# Patient Record
Sex: MALE | Race: BLACK OR AFRICAN AMERICAN | Employment: OTHER | URBAN - METROPOLITAN AREA
[De-identification: names, ages, dates, MRNs, and addresses within clinical notes are randomized per-mention and may not be internally consistent; named-entity substitution may affect disease eponyms.]

---

## 2017-02-18 ENCOUNTER — HOSPITAL ENCOUNTER (INPATIENT)
Age: 82
LOS: 3 days | Discharge: HOME HEALTH | DRG: 101 | End: 2017-02-21
Attending: EMERGENCY MEDICINE | Admitting: INTERNAL MEDICINE
Payer: MEDICARE

## 2017-02-18 ENCOUNTER — APPOINTMENT (OUTPATIENT)
Dept: MRI IMAGING | Age: 82
DRG: 101 | End: 2017-02-18
Attending: INTERNAL MEDICINE
Payer: MEDICARE

## 2017-02-18 ENCOUNTER — APPOINTMENT (OUTPATIENT)
Dept: GENERAL RADIOLOGY | Age: 82
DRG: 101 | End: 2017-02-18
Attending: INTERNAL MEDICINE
Payer: MEDICARE

## 2017-02-18 ENCOUNTER — APPOINTMENT (OUTPATIENT)
Dept: CT IMAGING | Age: 82
DRG: 101 | End: 2017-02-18
Attending: EMERGENCY MEDICINE
Payer: MEDICARE

## 2017-02-18 DIAGNOSIS — G40.909 SEIZURE DISORDER (HCC): ICD-10-CM

## 2017-02-18 DIAGNOSIS — R56.9 SEIZURE (HCC): ICD-10-CM

## 2017-02-18 DIAGNOSIS — R47.01 APHASIA: ICD-10-CM

## 2017-02-18 DIAGNOSIS — R56.9 POST-ICTAL STATE (HCC): ICD-10-CM

## 2017-02-18 DIAGNOSIS — G93.40 ACUTE ENCEPHALOPATHY: Primary | ICD-10-CM

## 2017-02-18 LAB
ALBUMIN SERPL BCP-MCNC: 4.1 G/DL (ref 3.5–5)
ALBUMIN/GLOB SERPL: 1.1 {RATIO} (ref 1.1–2.2)
ALP SERPL-CCNC: 80 U/L (ref 45–117)
ALT SERPL-CCNC: 16 U/L (ref 12–78)
ANION GAP BLD CALC-SCNC: 5 MMOL/L (ref 5–15)
AST SERPL W P-5'-P-CCNC: 21 U/L (ref 15–37)
BASOPHILS # BLD AUTO: 0 K/UL (ref 0–0.1)
BASOPHILS # BLD: 0 % (ref 0–1)
BILIRUB SERPL-MCNC: 0.6 MG/DL (ref 0.2–1)
BUN SERPL-MCNC: 16 MG/DL (ref 6–20)
BUN/CREAT SERPL: 13 (ref 12–20)
CALCIUM SERPL-MCNC: 8.7 MG/DL (ref 8.5–10.1)
CHLORIDE SERPL-SCNC: 102 MMOL/L (ref 97–108)
CK MB CFR SERPL CALC: 0.8 % (ref 0–2.5)
CK MB SERPL-MCNC: 2.4 NG/ML (ref 5–25)
CK SERPL-CCNC: 320 U/L (ref 39–308)
CO2 SERPL-SCNC: 30 MMOL/L (ref 21–32)
CREAT SERPL-MCNC: 1.19 MG/DL (ref 0.7–1.3)
EOSINOPHIL # BLD: 0 K/UL (ref 0–0.4)
EOSINOPHIL NFR BLD: 0 % (ref 0–7)
ERYTHROCYTE [DISTWIDTH] IN BLOOD BY AUTOMATED COUNT: 12 % (ref 11.5–14.5)
ETHANOL SERPL-MCNC: <10 MG/DL
GLOBULIN SER CALC-MCNC: 3.7 G/DL (ref 2–4)
GLUCOSE BLD STRIP.AUTO-MCNC: 97 MG/DL (ref 65–100)
GLUCOSE SERPL-MCNC: 108 MG/DL (ref 65–100)
HCT VFR BLD AUTO: 43.8 % (ref 36.6–50.3)
HGB BLD-MCNC: 14.9 G/DL (ref 12.1–17)
INR BLD: 1.1 (ref 0.9–1.2)
LYMPHOCYTES # BLD AUTO: 20 % (ref 12–49)
LYMPHOCYTES # BLD: 0.9 K/UL (ref 0.8–3.5)
MAGNESIUM SERPL-MCNC: 1.6 MG/DL (ref 1.6–2.4)
MCH RBC QN AUTO: 32.6 PG (ref 26–34)
MCHC RBC AUTO-ENTMCNC: 34 G/DL (ref 30–36.5)
MCV RBC AUTO: 95.8 FL (ref 80–99)
MONOCYTES # BLD: 0.4 K/UL (ref 0–1)
MONOCYTES NFR BLD AUTO: 9 % (ref 5–13)
NEUTS SEG # BLD: 3.2 K/UL (ref 1.8–8)
NEUTS SEG NFR BLD AUTO: 71 % (ref 32–75)
PLATELET # BLD AUTO: 113 K/UL (ref 150–400)
POTASSIUM SERPL-SCNC: 5.1 MMOL/L (ref 3.5–5.1)
PROT SERPL-MCNC: 7.8 G/DL (ref 6.4–8.2)
RBC # BLD AUTO: 4.57 M/UL (ref 4.1–5.7)
SERVICE CMNT-IMP: NORMAL
SODIUM SERPL-SCNC: 137 MMOL/L (ref 136–145)
TROPONIN I SERPL-MCNC: 0.04 NG/ML
WBC # BLD AUTO: 4.5 K/UL (ref 4.1–11.1)

## 2017-02-18 PROCEDURE — 77030031197 HC NDL INFUS INTOSSE TELE -C

## 2017-02-18 PROCEDURE — 74011250637 HC RX REV CODE- 250/637: Performed by: INTERNAL MEDICINE

## 2017-02-18 PROCEDURE — 82550 ASSAY OF CK (CPK): CPT | Performed by: EMERGENCY MEDICINE

## 2017-02-18 PROCEDURE — 80053 COMPREHEN METABOLIC PANEL: CPT | Performed by: EMERGENCY MEDICINE

## 2017-02-18 PROCEDURE — 82962 GLUCOSE BLOOD TEST: CPT

## 2017-02-18 PROCEDURE — 80307 DRUG TEST PRSMV CHEM ANLYZR: CPT | Performed by: EMERGENCY MEDICINE

## 2017-02-18 PROCEDURE — 80177 DRUG SCRN QUAN LEVETIRACETAM: CPT | Performed by: EMERGENCY MEDICINE

## 2017-02-18 PROCEDURE — 84484 ASSAY OF TROPONIN QUANT: CPT | Performed by: EMERGENCY MEDICINE

## 2017-02-18 PROCEDURE — 74011000258 HC RX REV CODE- 258: Performed by: INTERNAL MEDICINE

## 2017-02-18 PROCEDURE — 70551 MRI BRAIN STEM W/O DYE: CPT

## 2017-02-18 PROCEDURE — 65660000000 HC RM CCU STEPDOWN

## 2017-02-18 PROCEDURE — 85025 COMPLETE CBC W/AUTO DIFF WBC: CPT | Performed by: EMERGENCY MEDICINE

## 2017-02-18 PROCEDURE — 74011000258 HC RX REV CODE- 258: Performed by: EMERGENCY MEDICINE

## 2017-02-18 PROCEDURE — 94761 N-INVAS EAR/PLS OXIMETRY MLT: CPT

## 2017-02-18 PROCEDURE — 36415 COLL VENOUS BLD VENIPUNCTURE: CPT | Performed by: EMERGENCY MEDICINE

## 2017-02-18 PROCEDURE — 99285 EMERGENCY DEPT VISIT HI MDM: CPT

## 2017-02-18 PROCEDURE — 74011250636 HC RX REV CODE- 250/636: Performed by: INTERNAL MEDICINE

## 2017-02-18 PROCEDURE — 83735 ASSAY OF MAGNESIUM: CPT | Performed by: EMERGENCY MEDICINE

## 2017-02-18 PROCEDURE — 70450 CT HEAD/BRAIN W/O DYE: CPT

## 2017-02-18 PROCEDURE — 77030011992 HC AIRWY NASOPHGL TELE -A

## 2017-02-18 PROCEDURE — 74011250636 HC RX REV CODE- 250/636: Performed by: EMERGENCY MEDICINE

## 2017-02-18 PROCEDURE — 71010 XR CHEST PORT: CPT

## 2017-02-18 PROCEDURE — 85610 PROTHROMBIN TIME: CPT

## 2017-02-18 RX ORDER — MAGNESIUM SULFATE HEPTAHYDRATE 40 MG/ML
2 INJECTION, SOLUTION INTRAVENOUS ONCE
Status: COMPLETED | OUTPATIENT
Start: 2017-02-18 | End: 2017-02-19

## 2017-02-18 RX ORDER — METOPROLOL SUCCINATE 25 MG/1
TABLET, EXTENDED RELEASE ORAL DAILY
COMMUNITY
End: 2017-07-22

## 2017-02-18 RX ORDER — NALOXONE HYDROCHLORIDE 0.4 MG/ML
0.4 INJECTION, SOLUTION INTRAMUSCULAR; INTRAVENOUS; SUBCUTANEOUS AS NEEDED
Status: DISCONTINUED | OUTPATIENT
Start: 2017-02-18 | End: 2017-02-21 | Stop reason: HOSPADM

## 2017-02-18 RX ORDER — ASPIRIN 325 MG
325 TABLET ORAL DAILY
Status: DISCONTINUED | OUTPATIENT
Start: 2017-02-19 | End: 2017-02-21 | Stop reason: HOSPADM

## 2017-02-18 RX ORDER — PREGABALIN 75 MG/1
300 CAPSULE ORAL 2 TIMES DAILY
Status: DISCONTINUED | OUTPATIENT
Start: 2017-02-18 | End: 2017-02-21 | Stop reason: HOSPADM

## 2017-02-18 RX ORDER — SODIUM CHLORIDE 9 MG/ML
75 INJECTION, SOLUTION INTRAVENOUS CONTINUOUS
Status: DISCONTINUED | OUTPATIENT
Start: 2017-02-18 | End: 2017-02-20

## 2017-02-18 RX ORDER — ONDANSETRON 2 MG/ML
4 INJECTION INTRAMUSCULAR; INTRAVENOUS
Status: DISCONTINUED | OUTPATIENT
Start: 2017-02-18 | End: 2017-02-21 | Stop reason: HOSPADM

## 2017-02-18 RX ORDER — SODIUM CHLORIDE 0.9 % (FLUSH) 0.9 %
5-10 SYRINGE (ML) INJECTION AS NEEDED
Status: DISCONTINUED | OUTPATIENT
Start: 2017-02-18 | End: 2017-02-21 | Stop reason: HOSPADM

## 2017-02-18 RX ORDER — HYDRALAZINE HYDROCHLORIDE 20 MG/ML
20 INJECTION INTRAMUSCULAR; INTRAVENOUS
Status: DISCONTINUED | OUTPATIENT
Start: 2017-02-18 | End: 2017-02-21 | Stop reason: HOSPADM

## 2017-02-18 RX ORDER — LORAZEPAM 2 MG/ML
2 INJECTION INTRAMUSCULAR AS NEEDED
Status: DISCONTINUED | OUTPATIENT
Start: 2017-02-18 | End: 2017-02-21 | Stop reason: HOSPADM

## 2017-02-18 RX ORDER — POTASSIUM CHLORIDE 750 MG/1
10 TABLET, FILM COATED, EXTENDED RELEASE ORAL
COMMUNITY
End: 2017-02-21

## 2017-02-18 RX ORDER — OMEPRAZOLE 40 MG/1
40 CAPSULE, DELAYED RELEASE ORAL DAILY
COMMUNITY
End: 2017-08-01

## 2017-02-18 RX ORDER — LEVETIRACETAM 750 MG/1
750 TABLET ORAL 2 TIMES DAILY
Status: ON HOLD | COMMUNITY
End: 2017-02-21

## 2017-02-18 RX ORDER — THIAMINE HYDROCHLORIDE 100 MG/ML
100 INJECTION, SOLUTION INTRAMUSCULAR; INTRAVENOUS DAILY
Status: DISCONTINUED | OUTPATIENT
Start: 2017-02-19 | End: 2017-02-18 | Stop reason: SDUPTHER

## 2017-02-18 RX ORDER — FACIAL-BODY WIPES
10 EACH TOPICAL DAILY PRN
Status: DISCONTINUED | OUTPATIENT
Start: 2017-02-18 | End: 2017-02-21 | Stop reason: HOSPADM

## 2017-02-18 RX ORDER — PANTOPRAZOLE SODIUM 40 MG/1
40 TABLET, DELAYED RELEASE ORAL
Status: DISCONTINUED | OUTPATIENT
Start: 2017-02-19 | End: 2017-02-21 | Stop reason: HOSPADM

## 2017-02-18 RX ORDER — ENOXAPARIN SODIUM 100 MG/ML
40 INJECTION SUBCUTANEOUS EVERY 24 HOURS
Status: DISCONTINUED | OUTPATIENT
Start: 2017-02-19 | End: 2017-02-21 | Stop reason: HOSPADM

## 2017-02-18 RX ORDER — SODIUM CHLORIDE 0.9 % (FLUSH) 0.9 %
5-10 SYRINGE (ML) INJECTION EVERY 8 HOURS
Status: DISCONTINUED | OUTPATIENT
Start: 2017-02-18 | End: 2017-02-21 | Stop reason: HOSPADM

## 2017-02-18 RX ORDER — METOPROLOL SUCCINATE 25 MG/1
25 TABLET, EXTENDED RELEASE ORAL DAILY
Status: DISCONTINUED | OUTPATIENT
Start: 2017-02-19 | End: 2017-02-21 | Stop reason: HOSPADM

## 2017-02-18 RX ORDER — LANOLIN ALCOHOL/MO/W.PET/CERES
400 CREAM (GRAM) TOPICAL 2 TIMES DAILY
Status: DISCONTINUED | OUTPATIENT
Start: 2017-02-18 | End: 2017-02-21 | Stop reason: HOSPADM

## 2017-02-18 RX ORDER — OXYCODONE AND ACETAMINOPHEN 5; 325 MG/1; MG/1
1 TABLET ORAL
Status: DISCONTINUED | OUTPATIENT
Start: 2017-02-18 | End: 2017-02-21 | Stop reason: HOSPADM

## 2017-02-18 RX ORDER — ACETAMINOPHEN 325 MG/1
650 TABLET ORAL
Status: DISCONTINUED | OUTPATIENT
Start: 2017-02-18 | End: 2017-02-21 | Stop reason: HOSPADM

## 2017-02-18 RX ADMIN — LEVETIRACETAM 1000 MG: 100 INJECTION, SOLUTION, CONCENTRATE INTRAVENOUS at 15:39

## 2017-02-18 RX ADMIN — SODIUM CHLORIDE 75 ML/HR: 900 INJECTION, SOLUTION INTRAVENOUS at 18:41

## 2017-02-18 RX ADMIN — Medication 10 ML: at 22:51

## 2017-02-18 RX ADMIN — PREGABALIN 300 MG: 75 CAPSULE ORAL at 18:50

## 2017-02-18 RX ADMIN — MAGNESIUM SULFATE HEPTAHYDRATE 2 G: 40 INJECTION, SOLUTION INTRAVENOUS at 21:13

## 2017-02-18 RX ADMIN — THIAMINE HYDROCHLORIDE 100 MG: 100 INJECTION, SOLUTION INTRAMUSCULAR; INTRAVENOUS at 19:54

## 2017-02-18 RX ADMIN — Medication 10 ML: at 18:44

## 2017-02-18 RX ADMIN — Medication 400 MG: at 18:51

## 2017-02-18 NOTE — IP AVS SNAPSHOT
[address] [facility] 
[phone] Patient: Hector Wong MRN: [id number] :1934 You are allergic to the following No active allergies Immunizations Administered for This Admission Name Date Influenza Vaccine (Quad) PF 2017 Recent Documentation Smoking Status Never Smoker Emergency Contacts Name Discharge Info Relation Home Work Mobile [address] [address]  Patient [10] [phone] About your hospitalization You were admitted on:  2017 You last received care in the:  [facility] 3 NEUROSCIENCE TELEMETRY You were discharged on:  2017 Unit phone number:  [phone] Why you were hospitalized Your primary diagnosis was:  Not on File Your diagnoses also included:  Seizures (Hcc) Providers Seen During Your Hospitalizations Provider Role Specialty Primary office phone Li Vilchis MD Attending Provider Emergency Medicine [phone] Catarina Casanova MD Attending Provider Hospitalist [phone] Sushant Craft MD Attending Provider Internal Medicine [phone] Karen Linton MD Attending Provider Hospitalist [phone] Your Primary Care Physician (PCP) Primary Care Physician Office Phone Office Fax Sarwatbonifacio Zackary [phone] Follow-up Information Follow up With Details Comments Contact Info [facility]  This is your provider for your rolling walker, if you have any questions please contact them directly  [address] [facility] [address] [place] [address] [phone] [address]  This is your home health provider of choice. If you do not hear from them within 24 - 48 hours please contact them directly  [address] [address] [phone] Francesco Toth MD In 1 week  1 [facility] [address] [facility] [address] [phone] 
  
 neurology In 2 weeks Current Discharge Medication List  
  
START taking these medications Dose & Instructions Dispensing Information Comments Morning Noon Evening Bedtime  
 atorvastatin 40 mg tablet Commonly known as:  LIPITOR Your next dose is: Today, Tomorrow Other:  _________ Dose:  40 mg Take 1 Tab by mouth daily. Quantity:  30 Tab Refills:  0  
     
   
   
   
  
 pregabalin 300 mg capsule Commonly known as:  Dipika Fletcher Your next dose is: Today, Tomorrow Other:  _________ Dose:  300 mg Take 1 Cap by mouth two (2) times a day. Max Daily Amount: 600 mg. Quantity:  60 Cap Refills:  0  
     
   
   
   
  
 thiamine 100 mg tablet Commonly known as:  B-1 Your next dose is: Today, Tomorrow Other:  _________ Dose:  100 mg Take 1 Tab by mouth daily. Quantity:  30 Tab Refills:  0 CONTINUE these medications which have CHANGED Dose & Instructions Dispensing Information Comments Morning Noon Evening Bedtime  
 levETIRAcetam 1,000 mg tablet What changed:   
- medication strength 
- how much to take Your next dose is: Today, Tomorrow Other:  _________ Dose:  1000 mg Take 1 Tab by mouth two (2) times a day. Quantity:  60 Tab Refills:  0 CONTINUE these medications which have NOT CHANGED Dose & Instructions Dispensing Information Comments Morning Noon Evening Bedtime  
 aspirin 325 mg tablet Commonly known as:  ASPIRIN Your next dose is: Today, Tomorrow Other:  _________ Dose:  325 mg Take 1 Tab by mouth daily. Refills:  0  
     
   
   
   
  
 magnesium oxide 400 mg tablet Commonly known as:  MAG-OX Your next dose is: Today, Tomorrow Other:  _________  Dose:  400 mg  
 Take 400 mg by mouth daily. Refills:  0 METANX 2.8-2-25 mg Tab Generic drug:  l-methylfolate-vit b12-vit b6 Your next dose is: Today, Tomorrow Other:  _________ Take  by mouth. Refills:  0  
     
   
   
   
  
 metoprolol succinate 25 mg XL tablet Commonly known as:  TOPROL-XL Your next dose is: Today, Tomorrow Other:  _________ Take  by mouth daily. Refills:  0  
     
   
   
   
  
 omeprazole 40 mg capsule Commonly known as:  PRILOSEC Your next dose is: Today, Tomorrow Other:  _________ Dose:  40 mg Take 40 mg by mouth daily. Refills:  0 STOP taking these medications   
 potassium chloride SR 10 mEq tablet Commonly known as:  KLOR-CON 10 Where to Get Your Medications Information on where to get these meds will be given to you by the nurse or doctor. ! Ask your nurse or doctor about these medications  
  atorvastatin 40 mg tablet  
 levETIRAcetam 1,000 mg tablet  
 pregabalin 300 mg capsule  
 thiamine 100 mg tablet Discharge Instructions HOSPITALIST DISCHARGE INSTRUCTIONS 
 
NAME: Hermelindo Serrato :  1934 MRN:  [id number] Date/Time:  2017 4:38 PM 
 
ADMIT DATE: 2017 DISCHARGE DATE: 2017 · It is important that you take the medication exactly as they are prescribed. · Keep your medication in the bottles provided by the pharmacist and keep a list of the medication names, dosages, and times to be taken in your wallet. · Do not take other medications without consulting your doctor. What to do at [facility] Recommended diet:  Soft diet Recommended activity: PT/OT per Home Health, walker If you have questions regarding the hospital related prescriptions or hospital related issues please call Edouard Frank at [phone] can always direct your questions to your primary care doctor if you are unable to reach your hospital physician; your PCP works as an extension of your hospital doctor just like your hospital doctor is an extension of your PCP for your time at the [facility], [facility]) If you experience any of the following symptoms then please call your primary care physician or return to the emergency room if you cannot get hold of your doctor: 
 
Fever, chills, nausea, vomiting, or persistent diarrhea Worsening weakness or new problems with your speech or balance Dark stools or visible blood in your stools New Leg swelling or shortness of breath as this could be signs of a clot Additional Instructions: 
 
Ordered home health PT/OT/SLP/nursing aid Follow up with PCP in 1 week Follow up with neurology in 2 weeks Bring these papers with you to your follow up appointments. The papers will help your doctors be sure to continue the care plan from the hospital. 
 
 
 
 
 
 
Information obtained by : 
I understand that if any problems occur once I am at home I am to contact my physician. I understand and acknowledge receipt of the instructions indicated above. Physician's or R.N.'s Signature                                                                  Date/Time Patient or Representative Signature Discharge Orders None Introducing [address] Dwayne Barnett introduces [employer] patient portal. Now you can access parts of your medical record, email your doctor's office, and request medication refills online. 1. In your internet browser, go to [email or web address] 2. Click on the First Time User? Click Here link in the Sign In box. You will see the New Member Sign Up page. 3. Enter your [employer] Access Code exactly as it appears below. You will not need to use this code after youve completed the sign-up process. If you do not sign up before the expiration date, you must request a new code. · [employer] Access Code: [email or web address] Expires: 5/19/2017  1:03 PM 
 
4. Enter the last four digits of your Social Security Number (xxxx) and Date of Birth (mm/dd/yyyy) as indicated and click Submit. You will be taken to the next sign-up page. 5. Create a [employer] ID. This will be your [employer] login ID and cannot be changed, so think of one that is secure and easy to remember. 6. Create a [employer] password. You can change your password at any time. 7. Enter your Password Reset Question and Answer. This can be used at a later time if you forget your password. 8. Enter your e-mail address. You will receive e-mail notification when new information is available in [address] 9. Click Sign Up. You can now view and download portions of your medical record. 10. Click the Download Summary menu link to download a portable copy of your medical information. If you have questions, please visit the Frequently Asked Questions section of the [employer] website. Remember, [employer] is NOT to be used for urgent needs. For medical emergencies, dial 911. Now available from your iPhone and Android! General Information Please provide this summary of care documentation to your next provider. Patient Signature:  ____________________________________________________________ Date:  ____________________________________________________________  
  
Greta Deiters Provider Signature:  ____________________________________________________________ Date:  ____________________________________________________________

## 2017-02-18 NOTE — ED NOTES
TRANSFER - OUT REPORT:    Verbal report given to Nikki Nova RN (name) on Javier Camejoton  being transferred to Room 3108 Neuro (unit) for routine progression of care       Report consisted of patients Situation, Background, Assessment and   Recommendations(SBAR). Information from the following report(s) SBAR, ED Summary, [facility] and Recent Results was reviewed with the receiving nurse. Lines:   Peripheral IV 09/06/12 Left Hand (Active)       Peripheral IV 02/18/17 Left Hand (Active)   Site Assessment Clean, dry, & intact 2/18/2017 12:25 PM   Phlebitis Assessment 0 2/18/2017 12:25 PM   Infiltration Assessment 0 2/18/2017 12:25 PM   Dressing Status Clean, dry, & intact 2/18/2017 12:25 PM   Hub Color/Line Status Pink 2/18/2017 12:25 PM        Opportunity for questions and clarification was provided.

## 2017-02-18 NOTE — H&P
[facility], [address]   HISTORY AND PHYSICAL       Name:  Mahogany Mix   MR#:  [id number]   :  1934   Account #:  [de-identified]        Date of Adm:  2017     Birdia Jourdain P. Corrinne Kicks, MD      [facility] / Yaima Hartmann   D:  2017   16:33   T:  2017   16:53   Job #:  [id number]

## 2017-02-18 NOTE — ED NOTES
Bedside shift change report given to [address] [place] [address] 1788 (oncoming nurse) by Eladia Pate (offgoing nurse). Report included the following information SBAR, ED Summary, MAR and Recent Results. Assumed care of patient who is lying quietly on the stretcher in no apparent distress. Pt is alert but nonverbal.  Respirations are even and unlabored. No needs are expressed at this time.

## 2017-02-18 NOTE — ED NOTES
Family reports seizure activity from alcohol abuse . Pt had a bottle of seizure medication in his bag. Spouse fills seizure medication for him.
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
## 2017-02-18 NOTE — ED PROVIDER NOTES
HPI Comments: Rosario Shepherd is a 80 y.o. male with hx seizures who presents via EMS to the ED for evaluation of AMS x 0845. Per EMS, pt was in his normal state of health until around [address] when his family noted he became unresponsive, not following commands, mumbling to himself, and not speaking in full sentences. He is neurologically intact at baseline. En route to the ED, EMS noted a rightward eye gaze. Pt received 10 mg Versed intranasally, and his blood glucose was 86. Pt is currently taking Keppra. Limited history due to AMS. PCP: Olivia Gunter MD  Soc Hx: -tobacco, -EtOH    History is limited due to the condition of the patient. The history is provided by the EMS personnel. Past Medical History:   Diagnosis Date    Alcohol abuse, in remission     Seizures ([facility] [address]        History reviewed. No pertinent past surgical history. Family History:   Problem Relation Age of Onset    Other Other      no strokes or seizures       Social History     Social History    Marital status:      Spouse name: N/A    Number of children: N/A    Years of education: N/A     Occupational History    Not on file. Social History Main Topics    Smoking status: Never Smoker    Smokeless tobacco: Not on file    Alcohol use Yes      Comment: Quit drinking 10 years ago    Drug use: No    Sexual activity: Not on file     Other Topics Concern    Not on file     Social History Narrative         ALLERGIES: Review of patient's allergies indicates no known allergies. Review of Systems   Unable to perform ROS: Mental status change       Vitals:    02/18/17 1242 02/18/17 1245   BP: 144/69 152/71   Pulse: (!) 58 (!) 59   Resp: 18 11   Temp: 98.6 °F (37 °C)    SpO2: 90% 93%            Physical Exam   Constitutional: He appears well-developed and well-nourished. No distress. HENT:   Head: Normocephalic and atraumatic. Eyes: EOM are normal. Pupils are equal, round, and reactive to light.  Right eye exhibits no discharge. Left eye exhibits no discharge. No scleral icterus. Neck: Normal range of motion. Neck supple. No tracheal deviation present. Cardiovascular: Normal rate, regular rhythm, normal heart sounds and intact distal pulses. Exam reveals no gallop and no friction rub. No murmur heard. Pulmonary/Chest: Effort normal and breath sounds normal. No respiratory distress. He has no wheezes. He has no rales. Abdominal: Soft. He exhibits no distension. There is no tenderness. Musculoskeletal: Normal range of motion. He exhibits no edema. Lymphadenopathy:     He has no cervical adenopathy. Neurological: He is alert. Alert, nonverbal, moving all extremities, facial symmetry, not following commands   Skin: Skin is warm and dry. No rash noted. Psychiatric: He has a normal mood and affect. Nursing note and vitals reviewed.        MDM  Number of Diagnoses or Management Options  Acute encephalopathy:   Post-ictal state ([facility] [address]):   Seizure [facility]):   Diagnosis management comments:     Differential includes seizure, post-ictal state, TIA, CVA, hypoglycemia  - CBC, CMP, Mg, Ruby, coags  - HCT  - Tele-neurology consult         Amount and/or Complexity of Data Reviewed  Clinical lab tests: ordered and reviewed  Tests in the radiology section of CPT®: ordered and reviewed  Tests in the medicine section of CPT®: ordered and reviewed  Obtain history from someone other than the patient: yes (EMS)  Review and summarize past medical records: yes  Discuss the patient with other providers: yes (Neurology, hospitalist)  Independent visualization of images, tracings, or specimens: yes    Critical Care  Total time providing critical care: 30-74 minutes    ED Course       Procedures    Darren Henning  1934    Arrival time to ED: 12:16    Code S Called: 12:15    Physician at Bedside: 12:16     CT Order Time: 12:22    ACT Page: 12:22    ACT Call Back: 12:26    CONSULT NOTE:   12:39 PM  Jonathon Segal MD spoke with Kori Gayle MD  Specialty: Neurology  Discussed pt's hx, disposition, and available diagnostic and imaging results. Reviewed care plans. Consultant agrees with plans as outlined. He will evaluate the pt via tele-neurology. Written by VANGIE Smith, as dictated by Lacy Mclean MD.    PROGRESS NOTE:  12:58 PM  Per Dr. Sedrick Bustamante, pt 's family at bedside reporting witnessed seizure and medication non-compliance. He recommends admitting the pt for observation and treating with 1 gram of Keppra. Written by VANGIE Smith, as dictated by Lacy Mclean MD     PROGRESS NOTE:  2:07 PM  Pt reevaluated. Pt alert but with mumbling speech, intermittently following commands. Discussed again with tele-neurology - sx are consistent with postictal state (CVA unlikely) and advises MRI upon admission. Of note, patient had similar presentation in 2012. Written by VANGIE Smith, as dictated by Lacy Mclean MD    CONSULT NOTE:   2:21 PM  Lacy Mclean MD spoke with Dr. Tatiana Lin,   Specialty: Hospitalist  Discussed pt's hx, disposition, and available diagnostic and imaging results. Reviewed care plans. Consultant will evaluate pt for admission.   Written by VANGIE Smith, as dictated by Lacy Mclean MD.    LABORATORY TESTS:  Recent Results (from the past 12 hour(s))   GLUCOSE, POC    Collection Time: 02/18/17 12:22 PM   Result Value Ref Range    Glucose (POC) 97 65 - 100 mg/dL    Performed by Jens Shore    POC INR    Collection Time: 02/18/17 12:24 PM   Result Value Ref Range    INR (POC) 1.1 <1.2     CBC WITH AUTOMATED DIFF    Collection Time: 02/18/17 12:27 PM   Result Value Ref Range    WBC 4.5 4.1 - 11.1 K/uL    RBC 4.57 4.10 - 5.70 M/uL    HGB 14.9 12.1 - 17.0 g/dL    HCT 43.8 36.6 - 50.3 %    MCV 95.8 80.0 - 99.0 FL    MCH 32.6 26.0 - 34.0 PG    MCHC 34.0 30.0 - 36.5 g/dL    RDW 12.0 11.5 - 14.5 %    PLATELET 662 (L) 745 - 400 K/uL    NEUTROPHILS 71 32 - 75 %    LYMPHOCYTES 20 12 - 49 %    MONOCYTES 9 5 - 13 %    EOSINOPHILS 0 0 - 7 %    BASOPHILS 0 0 - 1 %    ABS. NEUTROPHILS 3.2 1.8 - 8.0 K/UL    ABS. LYMPHOCYTES 0.9 0.8 - 3.5 K/UL    ABS. MONOCYTES 0.4 0.0 - 1.0 K/UL    ABS. EOSINOPHILS 0.0 0.0 - 0.4 K/UL    ABS. BASOPHILS 0.0 0.0 - 0.1 K/UL   METABOLIC PANEL, COMPREHENSIVE    Collection Time: 02/18/17 12:27 PM   Result Value Ref Range    Sodium 137 136 - 145 mmol/L    Potassium 5.1 3.5 - 5.1 mmol/L    Chloride 102 97 - 108 mmol/L    CO2 30 21 - 32 mmol/L    Anion gap 5 5 - 15 mmol/L    Glucose 108 (H) 65 - 100 mg/dL    BUN 16 6 - 20 MG/DL    Creatinine 1.19 0.70 - 1.30 MG/DL    BUN/Creatinine ratio 13 12 - 20      GFR est AA >60 >60 ml/min/1.73m2    GFR est non-AA 59 (L) >60 ml/min/1.73m2    Calcium 8.7 8.5 - 10.1 MG/DL    Bilirubin, total 0.6 0.2 - 1.0 MG/DL    ALT (SGPT) 16 12 - 78 U/L    AST (SGOT) 21 15 - 37 U/L    Alk. phosphatase 80 45 - 117 U/L    Protein, total 7.8 6.4 - 8.2 g/dL    Albumin 4.1 3.5 - 5.0 g/dL    Globulin 3.7 2.0 - 4.0 g/dL    A-G Ratio 1.1 1.1 - 2.2     CK W/ CKMB & INDEX    Collection Time: 02/18/17 12:27 PM   Result Value Ref Range     (H) 39 - 308 U/L    CK - MB 2.4 <3.6 NG/ML    CK-MB Index 0.8 0 - 2.5     TROPONIN I    Collection Time: 02/18/17 12:27 PM   Result Value Ref Range    Troponin-I, Qt. 0.04 <0.05 ng/mL   MAGNESIUM    Collection Time: 02/18/17 12:27 PM   Result Value Ref Range    Magnesium 1.6 1.6 - 2.4 mg/dL   ETHYL ALCOHOL    Collection Time: 02/18/17 12:27 PM   Result Value Ref Range    ALCOHOL(ETHYL),SERUM <10 <10 MG/DL       IMAGING RESULTS:  CT CODE NEURO HEAD WO CONTRAST   Final Result   CT Results  (Last 48 hours)               02/18/17 1238  CT CODE NEURO HEAD WO CONTRAST Final result    Impression:  IMPRESSION:    1. No evidence of acute intracranial abnormality by this modality. 2. Sinusitis.                Narrative:  EXAM:  CT CODE NEURO HEAD WO CONTRAST   INDICATION:   AMS, slurred speech, possible seizure activity. Additional history: Extremity weakness all over. COMPARISON: CT of the head, 9/3/2012. .   TECHNIQUE:    Unenhanced CT of the head was performed using 5 mm images. Coronal and sagittal   reformats were produced. Brain and bone windows were generated. CT dose reduction was achieved through use of a standardized protocol tailored   for this examination and automatic exposure control for dose modulation. [place] Gains FINDINGS:   The ventricles and sulci are normal in size, shape and configuration and   midline. Calcifications in the basal ganglia subcortical and periventricular   white matter hypoattenuation. There is no intracranial hemorrhage, extra-axial   collection, mass, mass effect or midline shift. The basilar cisterns are open. No acute infarct is identified. Intracranial atherosclerosis. The bone windows   demonstrate no abnormalities. Mucosal thickening in the ethmoid air cells. [place] Gains MEDICATIONS GIVEN:  Medications   levETIRAcetam (KEPPRA) 1,000 mg in 0.9% sodium chloride IVPB (not administered)       IMPRESSION:  1. Acute encephalopathy    2. Seizure ([address]    3. Post-ictal state ([address]        PLAN:  1. Admit patient to hospitalist care. Admission Note:  2:21 PM  Patient is being admitted to the hospital by Dr. Radu Dorman. The results of their tests and reasons for their admission have been discussed with them and available family. They convey agreement and understanding for the need to be admitted and for their admission diagnosis. Written by VANGIE Fraser, as dictated by Marbin Theodore MD.    This note is prepared by Dimas Higgins acting as Scribe for Marbin Theodore MD.    Marbin Theodore MD: The scribe's documentation has been prepared under my direction and personally reviewed by me in its entirety.  I confirm that the note above accurately reflects all work, treatment, procedures, and medical decision making performed by me.

## 2017-02-19 LAB
ALBUMIN SERPL BCP-MCNC: 3.6 G/DL (ref 3.5–5)
ALBUMIN/GLOB SERPL: 0.9 {RATIO} (ref 1.1–2.2)
ALP SERPL-CCNC: 73 U/L (ref 45–117)
ALT SERPL-CCNC: 14 U/L (ref 12–78)
ANION GAP BLD CALC-SCNC: 7 MMOL/L (ref 5–15)
APPEARANCE UR: CLEAR
AST SERPL W P-5'-P-CCNC: 21 U/L (ref 15–37)
BACTERIA URNS QL MICRO: NEGATIVE /HPF
BASOPHILS # BLD AUTO: 0 K/UL (ref 0–0.1)
BASOPHILS # BLD: 0 % (ref 0–1)
BILIRUB SERPL-MCNC: 1.1 MG/DL (ref 0.2–1)
BILIRUB UR QL: NEGATIVE
BUN SERPL-MCNC: 17 MG/DL (ref 6–20)
BUN/CREAT SERPL: 15 (ref 12–20)
CALCIUM SERPL-MCNC: 8.9 MG/DL (ref 8.5–10.1)
CHLORIDE SERPL-SCNC: 101 MMOL/L (ref 97–108)
CHOLEST SERPL-MCNC: 183 MG/DL
CK MB CFR SERPL CALC: 1.3 % (ref 0–2.5)
CK MB SERPL-MCNC: 4.8 NG/ML (ref 5–25)
CK SERPL-CCNC: 382 U/L (ref 39–308)
CO2 SERPL-SCNC: 29 MMOL/L (ref 21–32)
COLOR UR: ABNORMAL
CREAT SERPL-MCNC: 1.1 MG/DL (ref 0.7–1.3)
EOSINOPHIL # BLD: 0 K/UL (ref 0–0.4)
EOSINOPHIL NFR BLD: 0 % (ref 0–7)
EPITH CASTS URNS QL MICRO: ABNORMAL /LPF
ERYTHROCYTE [DISTWIDTH] IN BLOOD BY AUTOMATED COUNT: 12 % (ref 11.5–14.5)
EST. AVERAGE GLUCOSE BLD GHB EST-MCNC: 97 MG/DL
GLOBULIN SER CALC-MCNC: 4 G/DL (ref 2–4)
GLUCOSE SERPL-MCNC: 106 MG/DL (ref 65–100)
GLUCOSE UR STRIP.AUTO-MCNC: NEGATIVE MG/DL
HBA1C MFR BLD: 5 % (ref 4.2–6.3)
HCT VFR BLD AUTO: 40.5 % (ref 36.6–50.3)
HDLC SERPL-MCNC: 80 MG/DL
HDLC SERPL: 2.3 {RATIO} (ref 0–5)
HGB BLD-MCNC: 14.1 G/DL (ref 12.1–17)
HGB UR QL STRIP: ABNORMAL
KETONES UR QL STRIP.AUTO: 15 MG/DL
LDLC SERPL CALC-MCNC: 93.8 MG/DL (ref 0–100)
LEUKOCYTE ESTERASE UR QL STRIP.AUTO: NEGATIVE
LIPID PROFILE,FLP: NORMAL
LYMPHOCYTES # BLD AUTO: 19 % (ref 12–49)
LYMPHOCYTES # BLD: 1.9 K/UL (ref 0.8–3.5)
MAGNESIUM SERPL-MCNC: 2.1 MG/DL (ref 1.6–2.4)
MCH RBC QN AUTO: 32.9 PG (ref 26–34)
MCHC RBC AUTO-ENTMCNC: 34.8 G/DL (ref 30–36.5)
MCV RBC AUTO: 94.4 FL (ref 80–99)
MONOCYTES # BLD: 0.8 K/UL (ref 0–1)
MONOCYTES NFR BLD AUTO: 8 % (ref 5–13)
NEUTS SEG # BLD: 7.5 K/UL (ref 1.8–8)
NEUTS SEG NFR BLD AUTO: 73 % (ref 32–75)
NITRITE UR QL STRIP.AUTO: NEGATIVE
PH UR STRIP: 5.5 [PH] (ref 5–8)
PLATELET # BLD AUTO: 128 K/UL (ref 150–400)
POTASSIUM SERPL-SCNC: 4.4 MMOL/L (ref 3.5–5.1)
PROT SERPL-MCNC: 7.6 G/DL (ref 6.4–8.2)
PROT UR STRIP-MCNC: NEGATIVE MG/DL
RBC # BLD AUTO: 4.29 M/UL (ref 4.1–5.7)
RBC #/AREA URNS HPF: ABNORMAL /HPF (ref 0–5)
SODIUM SERPL-SCNC: 137 MMOL/L (ref 136–145)
SP GR UR REFRACTOMETRY: 1.01 (ref 1–1.03)
TRIGL SERPL-MCNC: 46 MG/DL (ref ?–150)
TROPONIN I SERPL-MCNC: 0.88 NG/ML
TSH SERPL DL<=0.05 MIU/L-ACNC: 0.35 UIU/ML (ref 0.36–3.74)
UROBILINOGEN UR QL STRIP.AUTO: 0.2 EU/DL (ref 0.2–1)
VIT B12 SERPL-MCNC: 1371 PG/ML (ref 211–911)
VLDLC SERPL CALC-MCNC: 9.2 MG/DL
WBC # BLD AUTO: 10.2 K/UL (ref 4.1–11.1)
WBC URNS QL MICRO: ABNORMAL /HPF (ref 0–4)

## 2017-02-19 PROCEDURE — 85025 COMPLETE CBC W/AUTO DIFF WBC: CPT | Performed by: INTERNAL MEDICINE

## 2017-02-19 PROCEDURE — 83735 ASSAY OF MAGNESIUM: CPT | Performed by: INTERNAL MEDICINE

## 2017-02-19 PROCEDURE — 74011000258 HC RX REV CODE- 258: Performed by: INTERNAL MEDICINE

## 2017-02-19 PROCEDURE — 65660000000 HC RM CCU STEPDOWN

## 2017-02-19 PROCEDURE — 80061 LIPID PANEL: CPT | Performed by: INTERNAL MEDICINE

## 2017-02-19 PROCEDURE — 83036 HEMOGLOBIN GLYCOSYLATED A1C: CPT | Performed by: INTERNAL MEDICINE

## 2017-02-19 PROCEDURE — 80053 COMPREHEN METABOLIC PANEL: CPT | Performed by: INTERNAL MEDICINE

## 2017-02-19 PROCEDURE — 74011250637 HC RX REV CODE- 250/637: Performed by: PSYCHIATRY & NEUROLOGY

## 2017-02-19 PROCEDURE — 84484 ASSAY OF TROPONIN QUANT: CPT | Performed by: INTERNAL MEDICINE

## 2017-02-19 PROCEDURE — 84443 ASSAY THYROID STIM HORMONE: CPT | Performed by: INTERNAL MEDICINE

## 2017-02-19 PROCEDURE — 74011250636 HC RX REV CODE- 250/636: Performed by: INTERNAL MEDICINE

## 2017-02-19 PROCEDURE — 82607 VITAMIN B-12: CPT | Performed by: INTERNAL MEDICINE

## 2017-02-19 PROCEDURE — 81001 URINALYSIS AUTO W/SCOPE: CPT | Performed by: INTERNAL MEDICINE

## 2017-02-19 PROCEDURE — 87086 URINE CULTURE/COLONY COUNT: CPT | Performed by: INTERNAL MEDICINE

## 2017-02-19 PROCEDURE — 74011250637 HC RX REV CODE- 250/637: Performed by: INTERNAL MEDICINE

## 2017-02-19 PROCEDURE — 95816 EEG AWAKE AND DROWSY: CPT | Performed by: PSYCHIATRY & NEUROLOGY

## 2017-02-19 PROCEDURE — 36415 COLL VENOUS BLD VENIPUNCTURE: CPT | Performed by: INTERNAL MEDICINE

## 2017-02-19 PROCEDURE — 82550 ASSAY OF CK (CPK): CPT | Performed by: INTERNAL MEDICINE

## 2017-02-19 RX ADMIN — LEVETIRACETAM 1000 MG: 100 INJECTION, SOLUTION, CONCENTRATE INTRAVENOUS at 01:28

## 2017-02-19 RX ADMIN — ASPIRIN 325 MG ORAL TABLET 325 MG: 325 PILL ORAL at 09:18

## 2017-02-19 RX ADMIN — Medication 10 ML: at 22:07

## 2017-02-19 RX ADMIN — LEVETIRACETAM 750 MG: 250 TABLET, FILM COATED ORAL at 20:52

## 2017-02-19 RX ADMIN — PREGABALIN 300 MG: 75 CAPSULE ORAL at 09:18

## 2017-02-19 RX ADMIN — THIAMINE HYDROCHLORIDE 100 MG: 100 INJECTION, SOLUTION INTRAMUSCULAR; INTRAVENOUS at 09:18

## 2017-02-19 RX ADMIN — Medication 10 ML: at 16:11

## 2017-02-19 RX ADMIN — METOPROLOL SUCCINATE 25 MG: 25 TABLET, EXTENDED RELEASE ORAL at 09:18

## 2017-02-19 RX ADMIN — PANTOPRAZOLE SODIUM 40 MG: 40 TABLET, DELAYED RELEASE ORAL at 09:18

## 2017-02-19 RX ADMIN — Medication 400 MG: at 17:29

## 2017-02-19 RX ADMIN — SODIUM CHLORIDE 75 ML/HR: 900 INJECTION, SOLUTION INTRAVENOUS at 07:04

## 2017-02-19 RX ADMIN — ENOXAPARIN SODIUM 40 MG: 40 INJECTION SUBCUTANEOUS at 09:18

## 2017-02-19 RX ADMIN — SODIUM CHLORIDE 75 ML/HR: 900 INJECTION, SOLUTION INTRAVENOUS at 21:01

## 2017-02-19 RX ADMIN — Medication 400 MG: at 09:18

## 2017-02-19 RX ADMIN — PREGABALIN 300 MG: 75 CAPSULE ORAL at 17:29

## 2017-02-19 RX ADMIN — LEVETIRACETAM 750 MG: 250 TABLET, FILM COATED ORAL at 13:58

## 2017-02-19 NOTE — PROGRESS NOTES
* No surgery found *  Bedside and Verbal shift change report given to Yoselin RN (oncoming nurse) by Addison Puri (offgoing nurse). Report included the following information SBAR, Kardex and MAR.     Zone Phone: [phone]        Significant changes during shift:   1) elevated troponin           Patient Information     Cody Borden  80 y.o.  2/18/2017 12:17 PM by Faye Jaimes MD. Cody Borden was admitted from Home     Problem List          Patient Active Problem List     Diagnosis Date Noted    Seizures ([facility] [address] 02/18/2017    Encephalopathy, unspecified 09/03/2012    SVT (supraventricular tachycardia) 09/03/2012    Aphasia 09/03/2012    CVA (cerebral vascular accident) ([facility] [address] 09/03/2012    Seizure disorder ([facility] [address] 09/03/2012           Past Medical History   Diagnosis Date    Alcohol abuse, in remission      Seizures ([facility] [address]              Core Measures:     CVA: No Not applicable  CHF:No Not applicable  PNA:No Not applicable     Activity Status:     OOB to Chair No  Ambulated this shift No   Bed Rest No     Supplemental O2: (If Applicable)     NC No  NRB No  Venti-mask No  On room air Liters/min        LINES AND DRAINS:     Central Line? No      PICC LINE? No      Urinary Catheter? No      DVT prophylaxis:     DVT prophylaxis Med- Yes  DVT prophylaxis SCD or JAIME- No      Wounds: (If Applicable)     Wounds- No     Location none     Patient Safety:     Falls Score Total Score: 4  Safety Level____III___  Bed Alarm On? Yes  Sitter?  No. tele sitter in place     Plan for upcoming shift: 1) Neuro consult  2) SLP swallow eval (NPO for altereed mental state/slurred speecj  3)            Discharge Plan: No just admitted     Active Consults:  IP CONSULT TO NEUROLOGY

## 2017-02-19 NOTE — CONSULTS
[address]   [address] [place] [address]       Name:  Cyn Camejo   MR#:  [id number]   :  1934   Account #:  [de-identified]    Date of Consultation:  2017   Date of Adm:  2017       REFERRING PHYSICIAN:   Consulted by Dr. Meg Bonilla. REASON FOR CONSULTATION: Seizure. HISTORY OF PRESENT ILLNESS: The patient is an 20-year-old   gentleman who presented to the emergency room yesterday with   complaints of breakthrough seizure. The patient has a known seizure   disorder. Apparently this is secondary to alcohol withdrawal. The   patient has not been drinking in over 5 years. He also has not had a   seizure in over 5 years. The patient denies any recent illnesses or   fevers. When he came to the emergency room, he was very confused   and not at his baseline. He was admitted for further observation. There   is no family at the bedside to help assist with history. Currently, the   patient appears to continue with confusion and possibly aphasia. I   am not sure with his previous history of some type Wernicke's aphasia,   so will have to discuss with family when available. Also, according to   medical records, the patient was being noncompliant with his Keppra   medication and this likely is the etiology of his current seizures. PAST MEDICAL HISTORY: Alcohol abuse and seizures. SOCIAL HISTORY: The patient is , does not smoke, no longer   drinks. FAMILY HISTORY: No significant family history. ALLERGIES: NO KNOWN DRUG ALLERGIES. CURRENT MEDICATIONS   1. Tylenol. 2. Percocet. 3. Narcan. 4. Zofran. 5. Dulcolax. 6. Lovenox. 7. Keppra 1 gram q.12h.   8. Ativan. 9. Hydralazine. 10. Toprol-XL. 11. Aspirin. 12. Magnesium oxide. 13. Protonix. 14. Lyrica. REVIEW OF SYSTEMS   Unobtainable secondary to the patient's confusion. CLINICAL DATA REVIEW   Imaging:   CT of the head showed no acute process. MRI of the brain   showed atrophy and white matter disease, but no acute process. LABORATORY DATA: CBC within normal limits. Metabolic panel   within normal limits. , troponin 0.04. Magnesium 1.6. Alcohol   less than 10. UA negative for infection. Troponin 0.887. Vitamin B12:   1371. Lipid profile with an LDL of 93.8. Hemoglobin A1c 5.0. is 0.35. PHYSICAL EXAMINATION   VITAL SIGNS: Blood pressure 127/64, pulse 59, temperature 98.8,   respirations 18, oxygen saturation 94%. GENERAL: The patient is alert and cooperative. HEAD:  Normocephalic without obvious abnormality. EYES: Conjunctivae clear. Pupils equally round and reactive to light. Extraocular movements are intact. Visual fields appear to be full. LUNGS:  Nonlabored breathing. HEART: Regular rate and rhythm. ABDOMEN: Soft, nondistended. EXTREMITIES: Normocephalic, atraumatic. No cyanosis or edema. Pulses 2+ and symmetric. SKIN: Color, texture, turgor normal.   NEUROLOGIC   GENERAL:  Attention normal.   LANGUAGE: Naming, repetition and fluency are impaired. The patient   is not able to name or repeat. MEMORY: Unable to be assessed. CRANIAL NERVES 2-12: Visual fields appear to be full. Pupils equally   round and reactive to light. Extraocular movements intact. Face is   symmetric. MOTOR: Normal bulk and tone. No tremor. Strength: He moves all 4   extremities equally. SENSORY: Intact to light touch in all 4 extremities. COORDINATION AND GAIT: Deferred. IMPRESSION: This is an 80-year-old gentleman who presented to the   emergency room with breakthrough seizure, likely secondary to   noncompliance with his Keppra. I will restart Keppra orally as his usual   dose is 750 twice a day. Additionally, he had an MRI of the brain that   was negative. Currently, the patient is either still postictal and confused   or has aphasia secondary to his drinking history. Will have to   confirm with the family when available.  I did attempt to call family but   nobody answered the phone. RECOMMENDATIONS   1. MRI of the brain negative as above. 2. No need for EEG at this time. 3. We will change Keppra to p.o. 750 twice a day. 4. Seizure precautions. 5. Ativan as needed for seizures lasting greater than 5 minutes. 6. Continue aspirin and statin for stroke prevention. 7. VTE prophylaxis with Lovenox. Thank you very much for this consultation. We will followup on the   patient's status with you and give further recommendations as   indicated. If the patient is back to baseline according to family, he is   cleared for discharge from a neurology standpoint and can followup   with Neurology as an outpatient.         Andre Halsted, MD      MR / DV   D:  02/19/2017   13:04   T:  02/19/2017   16:21   Job #:  [id number]

## 2017-02-19 NOTE — ROUTINE PROCESS
* No surgery found *  Bedside and Verbal shift change report given to [address] (oncoming nurse) by Bella Davis RN (offgoing nurse). Report included the following information SBAR, Kardex and MAR. Zone Phone:   [phone]      Significant changes during shift:    1) admission        Patient Information    Jelena Mustafa  80 y.o.  2/18/2017 12:17 PM by Fernanda Lee MD. Jelena Mustafa was admitted from Home    Problem List    Patient Active Problem List    Diagnosis Date Noted    Seizures ([facility] [address] 02/18/2017    Encephalopathy, unspecified 09/03/2012    SVT (supraventricular tachycardia) 09/03/2012    Aphasia 09/03/2012    CVA (cerebral vascular accident) ([facility] [address] 09/03/2012    [facility] 09/03/2012     Past Medical History   Diagnosis Date    Alcohol abuse, in remission     Seizures ([facility] [address]          Core Measures:    CVA: No Not applicable  CHF:No Not applicable  PNA:No Not applicable    Activity Status:    OOB to Chair No  Ambulated this shift No   Bed Rest No    Supplemental O2: (If Applicable)    NC No  NRB No  Venti-mask No  On room air Liters/min      LINES AND DRAINS:    Central Line? No     PICC LINE? No     Urinary Catheter? No     DVT prophylaxis:    DVT prophylaxis Med- Yes  DVT prophylaxis SCD or JAIME- No     Wounds: (If Applicable)    Wounds- No    Location none    Patient Safety:    Falls Score Total Score: 4  Safety Level____III___  Bed Alarm On? Yes  Sitter?  No. tele sitter in place    Plan for upcoming shift: 1) Neuro consult  2) SLP swallow eval (NPO for altereed mental state/slurred speecj  3)         Discharge Plan: No just admitted    Active Consults:  IP CONSULT TO NEUROLOGY

## 2017-02-19 NOTE — ROUTINE PROCESS
Bedside and Verbal shift change report given to [address] [place] [address] [address] (oncoming nurse) by Griselda Woodward (offgoing nurse). Report included the following information SBAR, Kardex and MAR.     Zone Phone: [phone]        Significant changes during shift: more alert and responsive speech slurred and delayed               Patient Information     Alicia Celeste  80 y.o.  2/18/2017 12:17 PM by Alda Yusuf MD. Alicia Celeste was admitted from Home     Problem List          Patient Active Problem List     Diagnosis Date Noted    Seizures ([facility] [address] 02/18/2017    Encephalopathy, unspecified 09/03/2012    SVT (supraventricular tachycardia) 09/03/2012    Aphasia 09/03/2012    CVA (cerebral vascular accident) ([facility] [address] 09/03/2012    Seizure disorder ([facility] 09/03/2012           Past Medical History   Diagnosis Date    Alcohol abuse, in remission      Seizures ([facility] [address]              Core Measures:     CVA: No Not applicable  CHF:No Not applicable  PNA:No Not applicable     Activity Status:     OOB to Chair No  Ambulated this shift No   Bed Rest No     Supplemental O2: (If Applicable)     NC No  NRB No  Venti-mask No  On room air Liters/min        LINES AND DRAINS:     Central Line? No      PICC LINE? No      Urinary Catheter? No      DVT prophylaxis:     DVT prophylaxis Med- Yes  DVT prophylaxis SCD or JAIME- No      Wounds: (If Applicable)     Wounds- No     Location none     Patient Safety:     Falls Score Total Score: 4  Safety Level____III___  Bed Alarm On? Yes  Sitter?  No. tele sitter in place     Plan for upcoming shift: 1) Neuro consult  2) SLP edgar guardado (NPO for altereed mental state/slurred speecj  3)            Discharge Plan: No just admitted     Active Consults:  IP CONSULT TO NEUROLOGY

## 2017-02-19 NOTE — PROGRESS NOTES
Hospitalist Progress Note    NAME: Rosario Shepherd   :  1934   MRN:  [id number]       Interim Hospital Summary: 80 y.o. male whom presented on 2017 with      Assessment / Plan:  Seizure POA  Acute encephalopathy POA due to post-ictal state   Had similar episode of seizure in   Known seizure disorder \"from alcohol\", admitted in  at [facility]  Brain MRI negative  Maintained on keppra, no seizures family is aware of since that time  Stopped drinking then  Complaint with keppra  Head CT negative  EEG pending  Neurology is on the case  Continue Keppra with increased dose  MTI brain negative for acute changes  Takes lyrica for neuropathy, just ran out 3 days ago, ? Effect as trigger  Seizure precautions  PT/OT  PRN ativan for seizures  Check CXR and UA  No fevers or leukocytosis, consider LP if develops fevers     Elevated Troponin, possible NSTEmi  Continue ASA  Trend troponin  Cardiology consult    Essential HTN   History of SVT   Continue toprol  PRN hydralazine     Peripheral neuropathy POA  Resume lyrica, ran out 3 days ago (getting from mail order company)     Remote ETOH abuse  IV THIAMINE, no need for CIWA scale     GERD continue PPI     Code status: Full, Next of Kin: Wife  Prophylaxis: Lovenox  Recommended Disposition: Home w/Family     Subjective: Pt seen and examined at bedside. NAD. Feels \"fine\". Overnight events d/w RN     Chief Complaint / Reason for Physician Visit: f/u \"seizure\"    Review of Systems:  Symptom Y/N Comments  Symptom Y/N Comments   Fever/Chills    Chest Pain     Poor Appetite    Edema     Cough    Abdominal Pain     Sputum    Joint Pain     SOB/AL    Pruritis/Rash     Nausea/vomit    Tolerating PT/OT     Diarrhea    Tolerating Diet     Constipation    Other       Could NOT obtain due to: Poor Insight     Objective:     VITALS:   Last 24hrs VS reviewed since prior progress note.  Most recent are:  Patient Vitals for the past 24 hrs:   Temp Pulse Resp BP SpO2   17 1115 98.8 °F (37.1 °C) (!) 59 18 127/68 94 %   02/19/17 0725 98.2 °F (36.8 °C) 97 16 117/71 96 %   02/19/17 0430 98 °F (36.7 °C) 94 18 141/76 92 %   02/18/17 2304 99 °F (37.2 °C) 72 18 129/71 97 %   02/18/17 2103 98.9 °F (37.2 °C) 99 18 (!) 141/92 96 %   02/18/17 1835 98.4 °F (36.9 °C) 73 18 130/59 98 %   02/18/17 1630 - 67 11 165/85 97 %   02/18/17 1600 - 63 12 (!) 153/135 96 %   02/18/17 1530 - 77 14 (!) 136/99 96 %   02/18/17 1500 - 61 13 154/78 95 %   02/18/17 1430 - (!) 57 13 151/72 96 %       Intake/Output Summary (Last 24 hours) at 02/19/17 1416  Last data filed at 02/19/17 0725   Gross per 24 hour   Intake           626.25 ml   Output              650 ml   Net           -23.75 ml        PHYSICAL EXAM:  General: WD, WN. Alert, cooperative, no acute distress    EENT:  EOMI. Anicteric sclerae. MMM  Resp:  CTA bilaterally, no wheezing or rales. No accessory muscle use  CV:  Regular  rhythm,  No edema  GI:  Soft, Non distended, Non tender.  +Bowel sounds  Neurologic:  Alert and oriented X 2, normal speech,   Psych:   Poor insight. Not anxious nor agitated  Skin:  No rashes. No jaundice    Reviewed most current lab test results and cultures  YES  Reviewed most current radiology test results   YES  Review and summation of old records today    NO  Reviewed patient's current orders and MAR    YES  PMH/SH reviewed - no change compared to H&P  ________________________________________________________________________  Care Plan discussed with:    Comments   Patient y    Family      RN y    Care Manager     Consultant                        Multidiciplinary team rounds were held today with , nursing, pharmacist and clinical coordinator. Patient's plan of care was discussed; medications were reviewed and discharge planning was addressed.      ________________________________________________________________________  Total NON critical care TIME:  35 Minutes    Total CRITICAL CARE TIME Spent:   Minutes non procedure based      Comments   >50% of visit spent in counseling and coordination of care     ________________________________________________________________________  Salima Barboza MD     Procedures: see electronic medical records for all procedures/Xrays and details which were not copied into this note but were reviewed prior to creation of Plan. LABS:  I reviewed today's most current labs and imaging studies.   Pertinent labs include:  Recent Labs      02/19/17 0232 02/18/17   1227   WBC  10.2  4.5   HGB  14.1  14.9   HCT  40.5  43.8   PLT  128*  113*     Recent Labs      02/19/17   0232  02/18/17   1227  02/18/17   1224   NA  137  137   --    K  4.4  5.1   --    CL  101  102   --    CO2  29  30   --    GLU  106*  108*   --    BUN  17  16   --    CREA  1.10  1.19   --    CA  8.9  8.7   --    MG  2.1  1.6   --    ALB  3.6  4.1   --    TBILI  1.1*  0.6   --    SGOT  21  21   --    ALT  14  16   --    INR   --    --   1.1       Signed: Salima Barboza MD

## 2017-02-19 NOTE — PROGRESS NOTES
Bedside report given to PETR RN in [employer]. All questions answered. Called maintenance about smoking outlet. Informed they are on their way to turn .

## 2017-02-20 LAB
ANION GAP BLD CALC-SCNC: 7 MMOL/L (ref 5–15)
BACTERIA SPEC CULT: ABNORMAL
BUN SERPL-MCNC: 15 MG/DL (ref 6–20)
BUN/CREAT SERPL: 15 (ref 12–20)
CALCIUM SERPL-MCNC: 8.6 MG/DL (ref 8.5–10.1)
CC UR VC: ABNORMAL
CHLORIDE SERPL-SCNC: 103 MMOL/L (ref 97–108)
CO2 SERPL-SCNC: 28 MMOL/L (ref 21–32)
CREAT SERPL-MCNC: 1.03 MG/DL (ref 0.7–1.3)
GLUCOSE SERPL-MCNC: 90 MG/DL (ref 65–100)
POTASSIUM SERPL-SCNC: 4.3 MMOL/L (ref 3.5–5.1)
SERVICE CMNT-IMP: ABNORMAL
SODIUM SERPL-SCNC: 138 MMOL/L (ref 136–145)
TROPONIN I SERPL-MCNC: 0.26 NG/ML

## 2017-02-20 PROCEDURE — 36415 COLL VENOUS BLD VENIPUNCTURE: CPT | Performed by: INTERNAL MEDICINE

## 2017-02-20 PROCEDURE — G8988 SELF CARE GOAL STATUS: HCPCS | Performed by: OCCUPATIONAL THERAPIST

## 2017-02-20 PROCEDURE — 92523 SPEECH SOUND LANG COMPREHEN: CPT

## 2017-02-20 PROCEDURE — 97165 OT EVAL LOW COMPLEX 30 MIN: CPT | Performed by: OCCUPATIONAL THERAPIST

## 2017-02-20 PROCEDURE — G8979 MOBILITY GOAL STATUS: HCPCS

## 2017-02-20 PROCEDURE — 74011250637 HC RX REV CODE- 250/637: Performed by: INTERNAL MEDICINE

## 2017-02-20 PROCEDURE — 84484 ASSAY OF TROPONIN QUANT: CPT | Performed by: INTERNAL MEDICINE

## 2017-02-20 PROCEDURE — G8987 SELF CARE CURRENT STATUS: HCPCS | Performed by: OCCUPATIONAL THERAPIST

## 2017-02-20 PROCEDURE — 74011250636 HC RX REV CODE- 250/636: Performed by: INTERNAL MEDICINE

## 2017-02-20 PROCEDURE — 92610 EVALUATE SWALLOWING FUNCTION: CPT

## 2017-02-20 PROCEDURE — G8978 MOBILITY CURRENT STATUS: HCPCS

## 2017-02-20 PROCEDURE — 97116 GAIT TRAINING THERAPY: CPT

## 2017-02-20 PROCEDURE — 74011000258 HC RX REV CODE- 258: Performed by: INTERNAL MEDICINE

## 2017-02-20 PROCEDURE — 80048 BASIC METABOLIC PNL TOTAL CA: CPT | Performed by: INTERNAL MEDICINE

## 2017-02-20 PROCEDURE — 97161 PT EVAL LOW COMPLEX 20 MIN: CPT

## 2017-02-20 PROCEDURE — 74011250637 HC RX REV CODE- 250/637: Performed by: PSYCHIATRY & NEUROLOGY

## 2017-02-20 PROCEDURE — 65660000000 HC RM CCU STEPDOWN

## 2017-02-20 PROCEDURE — 93306 TTE W/DOPPLER COMPLETE: CPT

## 2017-02-20 RX ORDER — LEVETIRACETAM 500 MG/1
1000 TABLET ORAL EVERY 12 HOURS
Status: DISCONTINUED | OUTPATIENT
Start: 2017-02-20 | End: 2017-02-21 | Stop reason: HOSPADM

## 2017-02-20 RX ADMIN — SODIUM CHLORIDE 75 ML/HR: 900 INJECTION, SOLUTION INTRAVENOUS at 09:54

## 2017-02-20 RX ADMIN — Medication 400 MG: at 08:33

## 2017-02-20 RX ADMIN — Medication 10 ML: at 14:10

## 2017-02-20 RX ADMIN — ENOXAPARIN SODIUM 40 MG: 40 INJECTION SUBCUTANEOUS at 08:32

## 2017-02-20 RX ADMIN — THIAMINE HYDROCHLORIDE 100 MG: 100 INJECTION, SOLUTION INTRAMUSCULAR; INTRAVENOUS at 08:32

## 2017-02-20 RX ADMIN — PREGABALIN 300 MG: 75 CAPSULE ORAL at 08:33

## 2017-02-20 RX ADMIN — Medication 10 ML: at 21:41

## 2017-02-20 RX ADMIN — Medication 10 ML: at 03:30

## 2017-02-20 RX ADMIN — PANTOPRAZOLE SODIUM 40 MG: 40 TABLET, DELAYED RELEASE ORAL at 08:33

## 2017-02-20 RX ADMIN — LEVETIRACETAM 750 MG: 250 TABLET, FILM COATED ORAL at 08:33

## 2017-02-20 RX ADMIN — LEVETIRACETAM 1000 MG: 500 TABLET, FILM COATED ORAL at 21:41

## 2017-02-20 RX ADMIN — PREGABALIN 300 MG: 75 CAPSULE ORAL at 17:59

## 2017-02-20 RX ADMIN — ASPIRIN 325 MG ORAL TABLET 325 MG: 325 PILL ORAL at 08:33

## 2017-02-20 RX ADMIN — Medication 400 MG: at 17:59

## 2017-02-20 RX ADMIN — METOPROLOL SUCCINATE 25 MG: 25 TABLET, EXTENDED RELEASE ORAL at 08:33

## 2017-02-20 NOTE — INTERDISCIPLINARY ROUNDS
[facility] Telemetry Unit Interdisciplinary rounds were held today to discuss patient plan of care and outcomes. The interdisciplinary team collaborated to facilitate discharge planning needs. The following members were present; Nurse Manager, [id number], Pharmacist, Primary Nurse, , Physical Therapy,   Physician, and Case Management.      PLAN:  PT recommending SNF, need 3 midnights, neuro to see, echo and eeg completed

## 2017-02-20 NOTE — PROGRESS NOTES
Neurology Progress Note    Patient ID:  Elvia Vitale  [id number]  76 y.o.  1934    Chief Complaint: none    Subjective:     Pt with no further seizures. Family at bedside and report that patient does have a history of speech issues after previous seizure 5 years ago. He recovered after rehab and is not currently at baseline. Objective:    All records in [facility] reviewed and noted    ROS:  Per HPI  All other 12 pt ROS negative    Meds:  Current Facility-Administered Medications   Medication Dose Route Frequency    [START ON 2/21/2017] influenza vaccine 2016-17 (36mos+)(PF) (FLUZONE/FLUARIX/FLULAVAL QUAD) injection 0.5 mL  0.5 mL IntraMUSCular PRIOR TO DISCHARGE    levETIRAcetam (KEPPRA) tablet 750 mg  750 mg Oral Q12H    sodium chloride (NS) flush 5-10 mL  5-10 mL IntraVENous Q8H    sodium chloride (NS) flush 5-10 mL  5-10 mL IntraVENous PRN    acetaminophen (TYLENOL) tablet 650 mg  650 mg Oral Q6H PRN    oxyCODONE-acetaminophen (PERCOCET) 5-325 mg per tablet 1 Tab  1 Tab Oral Q6H PRN    naloxone (NARCAN) injection 0.4 mg  0.4 mg IntraVENous PRN    ondansetron (ZOFRAN) injection 4 mg  4 mg IntraVENous Q4H PRN    bisacodyl (DULCOLAX) suppository 10 mg  10 mg Rectal DAILY PRN    enoxaparin (LOVENOX) injection 40 mg  40 mg SubCUTAneous Q24H    LORazepam (ATIVAN) injection 2 mg  2 mg IntraVENous PRN    hydrALAZINE (APRESOLINE) 20 mg/mL injection 20 mg  20 mg IntraVENous Q6H PRN    thiamine (B-1) 100 mg in 0.9% sodium chloride 50 mL IVPB  100 mg IntraVENous DAILY    metoprolol succinate (TOPROL-XL) XL tablet 25 mg  25 mg Oral DAILY    aspirin (ASPIRIN) tablet 325 mg  325 mg Oral DAILY    magnesium oxide (MAG-OX) tablet 400 mg  400 mg Oral BID    pantoprazole (PROTONIX) tablet 40 mg  40 mg Oral ACB    pregabalin (LYRICA) capsule 300 mg  300 mg Oral BID       Imaging:  EEG with left frontotemporal sharps with no seizures    Lab Review   Results for orders placed or performed during the hospital encounter of 02/18/17   CULTURE, URINE   Result Value Ref Range    Special Requests: NO SPECIAL REQUESTS      [place] Count >100,000  COLONIES/mL        Culture result: DIPHTHEROIDS  (>100,000 COLONIES/mL)   (A)      Culture result: (A)       STAPHYLOCOCCUS SPECIES, COAGULASE NEGATIVE  (POSSIBLE 2 COLONY TYPES/STRAINS)  (10,000 COLONIES/mL)      Culture result: (A)       PROBABLE  ENTEROCOCCUS SPECIES  (4,000 COLONIES/mL)     CBC WITH AUTOMATED DIFF   Result Value Ref Range    WBC 4.5 4.1 - 11.1 K/uL    RBC 4.57 4.10 - 5.70 M/uL    HGB 14.9 12.1 - 17.0 g/dL    HCT 43.8 36.6 - 50.3 %    MCV 95.8 80.0 - 99.0 FL    MCH 32.6 26.0 - 34.0 PG    MCHC 34.0 30.0 - 36.5 g/dL    RDW 12.0 11.5 - 14.5 %    PLATELET 220 (L) 218 - 400 K/uL    NEUTROPHILS 71 32 - 75 %    LYMPHOCYTES 20 12 - 49 %    MONOCYTES 9 5 - 13 %    EOSINOPHILS 0 0 - 7 %    BASOPHILS 0 0 - 1 %    ABS. NEUTROPHILS 3.2 1.8 - 8.0 K/UL    ABS. LYMPHOCYTES 0.9 0.8 - 3.5 K/UL    ABS. MONOCYTES 0.4 0.0 - 1.0 K/UL    ABS. EOSINOPHILS 0.0 0.0 - 0.4 K/UL    ABS. BASOPHILS 0.0 0.0 - 0.1 K/UL   METABOLIC PANEL, COMPREHENSIVE   Result Value Ref Range    Sodium 137 136 - 145 mmol/L    Potassium 5.1 3.5 - 5.1 mmol/L    Chloride 102 97 - 108 mmol/L    CO2 30 21 - 32 mmol/L    Anion gap 5 5 - 15 mmol/L    Glucose 108 (H) 65 - 100 mg/dL    BUN 16 6 - 20 MG/DL    Creatinine 1.19 0.70 - 1.30 MG/DL    BUN/Creatinine ratio 13 12 - 20      GFR est AA >60 >60 ml/min/1.73m2    GFR est non-AA 59 (L) >60 ml/min/1.73m2    Calcium 8.7 8.5 - 10.1 MG/DL    Bilirubin, total 0.6 0.2 - 1.0 MG/DL    ALT (SGPT) 16 12 - 78 U/L    AST (SGOT) 21 15 - 37 U/L    Alk.  phosphatase 80 45 - 117 U/L    Protein, total 7.8 6.4 - 8.2 g/dL    Albumin 4.1 3.5 - 5.0 g/dL    Globulin 3.7 2.0 - 4.0 g/dL    A-G Ratio 1.1 1.1 - 2.2     CK W/ CKMB & INDEX   Result Value Ref Range     (H) 39 - 308 U/L    CK - MB 2.4 <3.6 NG/ML    CK-MB Index 0.8 0 - 2.5     TROPONIN I   Result Value Ref Range Troponin-I, Qt. 0.04 <0.05 ng/mL   MAGNESIUM   Result Value Ref Range    Magnesium 1.6 1.6 - 2.4 mg/dL   ETHYL ALCOHOL   Result Value Ref Range    ALCOHOL(ETHYL),SERUM <10 <10 MG/DL   LEVETIRACETAM (KEPPRA)   Result Value Ref Range    Levetiracetam (Keppra) 21.1 10.0 - 40.0 ug/mL   URINALYSIS W/MICROSCOPIC   Result Value Ref Range    Color YELLOW/STRAW      Appearance CLEAR CLEAR      Specific gravity 1.015 1.003 - 1.030      pH (UA) 5.5 5.0 - 8.0      Protein NEGATIVE  NEG mg/dL    Glucose NEGATIVE  NEG mg/dL    Ketone 15 (A) NEG mg/dL    Bilirubin NEGATIVE  NEG      Blood TRACE (A) NEG      Urobilinogen 0.2 0.2 - 1.0 EU/dL    Nitrites NEGATIVE  NEG      Leukocyte Esterase NEGATIVE  NEG      WBC 0-4 0 - 4 /hpf    RBC 5-10 0 - 5 /hpf    Epithelial cells FEW FEW /lpf    Bacteria NEGATIVE  NEG /hpf   METABOLIC PANEL, COMPREHENSIVE   Result Value Ref Range    Sodium 137 136 - 145 mmol/L    Potassium 4.4 3.5 - 5.1 mmol/L    Chloride 101 97 - 108 mmol/L    CO2 29 21 - 32 mmol/L    Anion gap 7 5 - 15 mmol/L    Glucose 106 (H) 65 - 100 mg/dL    BUN 17 6 - 20 MG/DL    Creatinine 1.10 0.70 - 1.30 MG/DL    BUN/Creatinine ratio 15 12 - 20      GFR est AA >60 >60 ml/min/1.73m2    GFR est non-AA >60 >60 ml/min/1.73m2    Calcium 8.9 8.5 - 10.1 MG/DL    Bilirubin, total 1.1 (H) 0.2 - 1.0 MG/DL    ALT (SGPT) 14 12 - 78 U/L    AST (SGOT) 21 15 - 37 U/L    Alk. phosphatase 73 45 - 117 U/L    Protein, total 7.6 6.4 - 8.2 g/dL    Albumin 3.6 3.5 - 5.0 g/dL    Globulin 4.0 2.0 - 4.0 g/dL    A-G Ratio 0.9 (L) 1.1 - 2.2     CBC WITH AUTOMATED DIFF   Result Value Ref Range    WBC 10.2 4.1 - 11.1 K/uL    RBC 4.29 4. 10 - 5.70 M/uL    HGB 14.1 12.1 - 17.0 g/dL    HCT 40.5 36.6 - 50.3 %    MCV 94.4 80.0 - 99.0 FL    MCH 32.9 26.0 - 34.0 PG    MCHC 34.8 30.0 - 36.5 g/dL    RDW 12.0 11.5 - 14.5 %    PLATELET 700 (L) 717 - 400 K/uL    NEUTROPHILS 73 32 - 75 %    LYMPHOCYTES 19 12 - 49 %    MONOCYTES 8 5 - 13 %    EOSINOPHILS 0 0 - 7 % BASOPHILS 0 0 - 1 %    ABS. NEUTROPHILS 7.5 1.8 - 8.0 K/UL    ABS. LYMPHOCYTES 1.9 0.8 - 3.5 K/UL    ABS. MONOCYTES 0.8 0.0 - 1.0 K/UL    ABS. EOSINOPHILS 0.0 0.0 - 0.4 K/UL    ABS.  BASOPHILS 0.0 0.0 - 0.1 K/UL   CK W/ CKMB & INDEX   Result Value Ref Range     (H) 39 - 308 U/L    CK - MB 4.8 (H) <3.6 NG/ML    CK-MB Index 1.3 0 - 2.5     TROPONIN I   Result Value Ref Range    Troponin-I, Qt. 0.88 (H) <0.05 ng/mL   VITAMIN B12   Result Value Ref Range    Vitamin B12 1371 (H) 211 - 911 pg/mL   LIPID PANEL   Result Value Ref Range    LIPID PROFILE          Cholesterol, total 183 <200 MG/DL    Triglyceride 46 <150 MG/DL    HDL Cholesterol 80 MG/DL    LDL, calculated 93.8 0 - 100 MG/DL    VLDL, calculated 9.2 MG/DL    CHOL/HDL Ratio 2.3 0 - 5.0     HEMOGLOBIN A1C WITH EAG   Result Value Ref Range    Hemoglobin A1c 5.0 4.2 - 6.3 %    Est. average glucose 97 mg/dL   MAGNESIUM   Result Value Ref Range    Magnesium 2.1 1.6 - 2.4 mg/dL   TSH 3RD GENERATION   Result Value Ref Range    TSH 0.35 (L) 0.36 - 3.74 uIU/mL   TROPONIN I   Result Value Ref Range    Troponin-I, Qt. 0.26 (H) <5.89 ng/mL   METABOLIC PANEL, BASIC   Result Value Ref Range    Sodium 138 136 - 145 mmol/L    Potassium 4.3 3.5 - 5.1 mmol/L    Chloride 103 97 - 108 mmol/L    CO2 28 21 - 32 mmol/L    Anion gap 7 5 - 15 mmol/L    Glucose 90 65 - 100 mg/dL    BUN 15 6 - 20 MG/DL    Creatinine 1.03 0.70 - 1.30 MG/DL    BUN/Creatinine ratio 15 12 - 20      GFR est AA >60 >60 ml/min/1.73m2    GFR est non-AA >60 >60 ml/min/1.73m2    Calcium 8.6 8.5 - 10.1 MG/DL   POC INR   Result Value Ref Range    INR (POC) 1.1 <1.2     GLUCOSE, POC   Result Value Ref Range    Glucose (POC) 97 65 - 100 mg/dL    Performed by Valery Dash        Exam:  Visit Vitals    /73 (BP 1 Location: Right arm, BP Patient Position: At rest;Sitting)    Pulse (!) 55    Temp 98.6 °F (37 °C)    Resp 16    SpO2 98%     Gen: Well developed  CV: RRR  Lungs: non labored breathing  Abd: non distending  Neuro: Alert with aphasia  CN II-XII: PERRL, EOMI, face symmetric, tongue/palate midline  Motor: strength 5/5 all four ext  Sensory: intact to LT      Assessment:     Patient Active Problem List   Diagnosis Code    Encephalopathy, unspecified G93.40    SVT (supraventricular tachycardia) I47.1    Aphasia R47.01    CVA (cerebral vascular accident) ([facility] [address] I63.9    Seizure disorder ([address] G40.909    Seizures ([facility] [address] R649     80-year-old gentleman who presented to the   emergency room with breakthrough seizure, likely secondary to   noncompliance with his Keppra. However family feels he was taking it so will increase dose to hopefully prevent further seizures. Speech is a postictal issue for him so will have eval for rehab. Plan:   1. MRI of the brain negative as above. 2. EEG with left frontal temporal sharps but no seizures  3. Increase keppra to 1G BID  4. Seizure precautions. 5. Ativan as needed for seizures lasting greater than 5 minutes. 6. Continue aspirin and statin for stroke prevention. 7. VTE prophylaxis with Lovenox. 8. Plan for SNF for patient to recover speech    At this point, no other neuro recs. Will sign off but please call with further questions. Patient should f/u with neuro as outpt in 2 wks. Signed:  Rosy Shook MD  2/20/2017  4:56 PM    Over 35 minutes was spent reviewing records, discussing with Dr. Jean Samples and nursing, obtaining history, examining patient and discussing treatment plans.

## 2017-02-20 NOTE — CONSULTS
[address]    [facility]       Name:  Nate Barber   MR#:  [id number]   :  1934   Account #:  [de-identified]    Date of Consultation:  2017   Date of Adm:  2017       REQUESTING PHYSICIAN: Jinx Opitz, MD.    REASON FOR CONSULTATION: Abnormal troponin. CHIEF COMPLAINT: The patient voices no chief complaint. HISTORY OF PRESENT ILLNESS: The patient is an 20-year-old man   with a history of a seizure disorder and a remote history of alcohol   abuse, as well as a history of hypertension and a remote history of   SVT. He was admitted after an apparent seizure episode. Details are   somewhat sketchy, but the patient has apparently not had any alcohol   abuse for a number of years. He has been seen by Neurology. For   unclear reasons, a troponin was ordered this morning and was   abnormal at 0.88. For equally unclear reasons, an EKG was not done   and Cardiology was subsequently consulted. I was asked to comment   on this abnormal troponin. The patient saw Dr. Kat Gonzalez in  during the hospital admission. He had   an echo that showed an EF of 55%. There was mild pulmonary   hypertension. The patient himself is a limited historian but apparently   has not had any chest pain or shortness of breath. He has not seen a   cardiologist recently. Prior to admission, he was on Toprol and aspirin. PAST MEDICAL HISTORY: Seizure disorder, peripheral neuropathy,   gastroesophageal reflux. PAST SURGICAL HISTORY: None. CURRENT MEDICATIONS   1. IV normal saline. 2. Aspirin. 3. Lovenox. 4. Toprol-XL. 5. Lyrica. 6. Keppra. 7. Thiamine. 8. Protonix. 9. Magnesium oxide. SOCIAL HISTORY: The patient is a nonsmoker and has a remote   history of alcohol abuse. FAMILY HISTORY: No history of coronary disease. REVIEW OF SYSTEMS   As noted above, otherwise noncontributory.     PHYSICAL EXAMINATION   GENERAL: Reveals an elderly  male in no obvious   distress. VITAL SIGNS: Blood pressure 136/70, pulse 67, respirations 18,   temperature 98.7. HEENT: Pupils are equal. Oropharynx with moist oral mucosa. NECK: Supple. No masses or thyromegaly. No cervical or   supraclavicular adenopathy. No carotid bruits or obvious JVD. CHEST: Clear. BACK: No scoliosis. SKIN: Warm and dry. CARDIAC: Regular rate and rhythm, 1-7/5 systolic murmur, no gallop. ABDOMEN: Soft, nontender, no masses or organomegaly. Bowel   sounds positive. EXTREMITIES: No cyanosis or clubbing. Distal pulses not well   palpated in the feet, 1+ pedal edema. NEUROLOGIC: No obvious gross motor deficits. LABORATORY DATA: BUN 17, creatinine 1.1, troponin 0.88. Hemoglobin 14.1. LDL 94. IMPRESSION   1. Abnormal troponin after a seizure disorder. 2. Hypertension. 3. Supraventricular tachycardia. 4. Remote history of alcohol abuse, none recently. RECOMMENDATIONS: I think that the abnormal troponin was   probably due to transient hypoxia at the time of the seizure. PLAN: The patient has certainly had no clinical evidence to support an   acute coronary syndrome. I am not sure what the thought process was   in ordering a troponin in this situation or if there was a thought process. I would certainly get an EKG if there is any cardiac concern, but I do   not see much point in doing additional testing in this situation. Thank you for this interesting consult.          Leelee Tam MD      [address] [place] [address] / [facility]   D:  02/19/2017   16:32   T:  02/19/2017   22:10   Job #:  [id number]

## 2017-02-20 NOTE — PROGRESS NOTES
Occupational Therapy Goals  Initiated 2/20/2017  1. Patient will perform grooming standing at sink with modified independence within 7 day(s). 2.  Patient will perform upper body dressing with moderate assistance  within 7 day(s). 3.  Patient will perform lower body dressing with moderate assistance  within 7 day(s). 4.  Patient will perform toilet transfers with supervision/set-up within 7 day(s). 5.  Patient will perform all aspects of toileting with moderate assistance  within 7 day(s). 6.  Patient will perform sponge bathing with supervision/set-up within 7 day(s). Occupational Therapy EVALUATION  Patient: Nena Jade (68 y.o. male)  Date: 2/20/2017  Primary Diagnosis: Seizures ([address]        Precautions: falls       ASSESSMENT :  Based on the objective data described below, the patient presents with confusion, h/o aphasia, confusion, GW, decreased activity tolerance, decreased safety awareness and decreased balance which is impairing his functional independence. He is also extremely hard of hearing, making command follow even more so difficult. Difficult to obtain baseline level of function 2/2 to family not present and patient being unable to convey information. He is at a total A level for most ADLs, with the exception of needing supervision for self feeding, and is supervision to min A for functional mobility. Patient will benefit from skilled OT intervention to address the above impairments on a trial basis.   Patients rehabilitation potential is considered to be Guarded  Factors which may influence rehabilitation potential include:   []             None noted  [x]             Mental ability/status  []             Medical condition  []             Home/family situation and support systems  [x]             Safety awareness  []             Pain tolerance/management  []             Other:      PLAN :  Recommendations and Planned Interventions:  [x]               Self Care Training []        Therapeutic Activities  [x]               Functional Mobility Training    [x]        Cognitive Retraining  []               Therapeutic Exercises           [x]        Endurance Activities  [x]               Balance Training                   []        Neuromuscular Re-Education  []               Visual/Perceptual Training     [x]   Home Safety Training  [x]               Patient Education                 [x]        Family Training/Education  []               Other (comment):    Frequency/Duration: Patient will be followed by occupational therapy 3 times per week to address goals. Discharge Recommendations: Skilled Nursing Facility         OBJECTIVE DATA SUMMARY:     Past Medical History   Diagnosis Date    Alcohol abuse, in remission     Seizures ([facility] [place] [address]    History reviewed. No pertinent past surgical history. Prior Level of Function/Home Situation: Unable to determine. No family present, patient [place] and confused, with question of residual aphasia per documentation from 2012 admit for CVA. Expanded or extensive additional review of patient history:        [x]  Right hand dominant   []  Left hand dominant  Cognitive/Behavioral Status:  Neurologic State: Alert;Confused  Orientation Level: Oriented to place  Cognition: Impaired decision making; Impulsive;Poor safety awareness;Decreased command following;Decreased attention/concentration        Safety/Judgement: Decreased awareness of environment;Decreased awareness of need for assistance;Decreased awareness of need for safety;Decreased insight into deficits    Vision/Perceptual:    Acuity:  (Appears WFL)       Range of Motion:  AROM: Generally decreased, functional     Strength:  Strength: Generally decreased, functional  Coordination:  Coordination: Generally decreased, functional          Tone & Sensation:  Tone: Normal  Balance:  Sitting: Intact  Standing: Impaired  Standing - Static: Fair  Standing - Dynamic : Fair  Functional Mobility and Transfers for ADLs:  Bed Mobility:  Rolling: Supervision  Supine to Sit: Supervision     Scooting: Supervision  Transfers:  Sit to Stand: Minimum assistance     Bed to Chair: Minimum assistance         ADL Assessment:  Feeding: Supervision;Setup    Oral Facial Hygiene/Grooming: Total assistance    Bathing: Total assistance    Upper Body Dressing: Total assistance    Lower Body Dressing: Total assistance    Toileting: Total assistance                Functional Measure:  Barthel Index:    Bathin  Bladder: 5  Bowels: 10  Groomin  Dressin  Feedin  Mobility: 0  Stairs: 0  Toilet Use: 0  Transfer (Bed to Chair and Back): 10  Total: 30       Barthel and G-code impairment scale:  Percentage of impairment CH  0% CI  1-19% CJ  20-39% CK  40-59% CL  60-79% CM  80-99% CN  100%   Barthel Score 0-100 100 99-80 79-60 59-40 20-39 1-19   0   Barthel Score 0-20 20 17-19 13-16 9-12 5-8 1-4 0      The Barthel ADL Index: Guidelines  1. The index should be used as a record of what a patient does, not as a record of what a patient could do. 2. The main aim is to establish degree of independence from any help, physical or verbal, however minor and for whatever reason. 3. The need for supervision renders the patient not independent. 4. A patient's performance should be established using the best available evidence. Asking the patient, friends/relatives and nurses are the usual sources, but direct observation and common sense are also important. However direct testing is not needed. 5. Usually the patient's performance over the preceding 24-48 hours is important, but occasionally longer periods will be relevant. 6. Middle categories imply that the patient supplies over 50 per cent of the effort. 7. Use of aids to be independent is allowed. Johnnie Angel., Barthel, D.W. ([id number]). Functional evaluation: the Barthel Index. [address] [facility] (14)2. HUBER Curiel, Mireille De Leon.Danielle Killen, [address] [place] [address] Ave ().  Measuring the change indisability after inpatient rehabilitation; comparison of the responsiveness of the Barthel Index and Functional [place] Measure. Journal of Neurology, Neurosurgery, and Psychiatry, 66(4), 867-085. ANGELIC Gaona, GLADYS Richey, & Jazzy Valle M.A. (2004.) Assessment of post-stroke quality of life in cost-effectiveness studies: The usefulness of the Barthel Index and the EuroQoL-5D. Quality of Life Research, 13, 427-43       In compliance with CMSs Claims Based Outcome Reporting, the following G-code set was chosen for this patient based on their primary functional limitation being treated: The outcome measure chosen to determine the severity of the functional limitation was the Barthel Index with a score of 30/100 which was correlated with the impairment scale. ?  Self Care:     - CURRENT STATUS: CL - 60%-79% impaired, limited or restricted    - GOAL STATUS:  CK - 40%-59% impaired, limited or restricted    - D/C STATUS:  ---------------To be determined---------------       Occupational Therapy Evaluation Charge Determination   History Examination Decision-Making   LOW Complexity : Brief history review  LOW Complexity : 1-3 performance deficits relating to physical, cognitive , or psychosocial skils that result in activity limitations and / or participation restrictions  LOW Complexity : No comorbidities that affect functional and no verbal or physical assistance needed to complete eval tasks       Based on the above components, the patient evaluation is determined to be of the following complexity level: LOW   Pain:  Pain Scale 1: Numeric (0 - 10)  Pain Intensity 1: 0     After treatment:   [x] Patient left in no apparent distress sitting up in chair  [] Patient left in no apparent distress in bed  [x] Call bell left within reach  [x] Nursing notified  [] Caregiver present  [x] Bed alarm activated    COMMUNICATION/EDUCATION:   The patients plan of care was discussed with: Registered Nurse and PT  [] Home safety education was provided and the patient/caregiver indicated understanding. [] Patient/family have participated as able in goal setting and plan of care. [] Patient/family agree to work toward stated goals and plan of care. [] Patient understands intent and goals of therapy, but is neutral about his/her participation. [x] Patient is unable to participate in goal setting and plan of care. This patients plan of care is appropriate for delegation to [facility].     Thank you for this referral.  Yoan Horn, OTR/L  Time Calculation: 11 mins

## 2017-02-20 NOTE — PROGRESS NOTES
Problem: Mobility Impaired (Adult and Pediatric)  Goal: *Acute Goals and Plan of Care (Insert Text)  Physical Therapy Goals  Initiated 2/20/2017  1. Patient will move from supine to sit and sit to supine , scoot up and down and roll side to side in bed with independence within 7 day(s). 2. Patient will transfer from bed to chair and chair to bed with modified independence using the least restrictive device within 7 day(s). 3. Patient will perform sit to stand with modified independence within 7 day(s). 4. Patient will ambulate with modified independence for 300 feet with the least restrictive device within 7 day(s). 5. Patient will ascend/descend 4 stairs with single handrail(s) with modified independence within 7 day(s). PHYSICAL THERAPY EVALUATION  Patient: Cody Borden (12 y.o. male)  Date: 2/20/2017  Primary Diagnosis: Seizures ([facility] [address]        Precautions: Bed Alarm         ASSESSMENT :  Based on the objective data described below, the patient presents with generalized weakness, impaired balance, and decreased functional mobility s/p seizure resulting increased confusion. Unable to obtain clear history 2/2 aphasia and difficulty hearing; pt prior level of function and family support unclear. Pt required Sup for bed mobility, Min A for transfers and amb. Attempted amb x 75 ft w/ no AD, Min A w/ pt unsteady gait, decreased arm swing, pt reaching for objects for increased stability; 1 LOB noted w/ Min A to correct. Trial w/ RW w/ increased stability and safety noted. Pt demonstrates poor safety awareness. Would benefit from continued PT services for improved safety and functional mobility. At end of session pt seated in chair, bed alarm on, all needs met. SNF recommended upon DC. Patient will benefit from skilled intervention to address the above impairments.   Patients rehabilitation potential is considered to be Fair  Factors which may influence rehabilitation potential include:   [ ]         None noted  [X]         Mental ability/status  [ ]         Medical condition  [X]         Home/family situation and support systems  [X]         Safety awareness  [ ]         Pain tolerance/management  [ ]         Other:        PLAN :  Recommendations and Planned Interventions:  [X]           Bed Mobility Training             [ ]    Neuromuscular Re-Education  [X]           Transfer Training                   [ ]    Orthotic/Prosthetic Training  [X]           Gait Training                         [ ]    Modalities  [X]           Therapeutic Exercises           [ ]    Edema Management/Control  [X]           Therapeutic Activities            [X]    Patient and Family Training/Education  [ ]           Other (comment):     Frequency/Duration: Patient will be followed by physical therapy  3 times a week to address goals. Discharge Recommendations: Skilled Nursing Facility  Further Equipment Recommendations for Discharge: Defer to SNF       SUBJECTIVE:   Patient stated You want me to get to the chair? Kallie Gnuter      OBJECTIVE DATA SUMMARY:   HISTORY:    Past Medical History   Diagnosis Date    Alcohol abuse, in remission      Seizures ([facility] [address]     History reviewed. No pertinent past surgical history. Prior Level of Function/Home Situation: Unable to obtain clear hx 2/2 aphasia.    Personal factors and/or comorbidities impacting plan of care:            EXAMINATION/PRESENTATION/DECISION MAKING:   Critical Behavior:  Neurologic State: Alert, Confused  Orientation Level: Oriented to place  Cognition: Impaired decision making, Impulsive, Poor safety awareness, Decreased command following, Decreased attention/concentration  Safety/Judgement: Decreased awareness of environment, Decreased awareness of need for assistance, Decreased awareness of need for safety, Decreased insight into deficits     Strength:    Strength: Generally decreased, functional                    Tone & Sensation:   Tone: Normal                              Range Of Motion:  AROM: Generally decreased, functional                       Coordination:  Coordination: Generally decreased, functional     Functional Mobility:  Bed Mobility:  Rolling: Supervision  Supine to Sit: Supervision     Scooting: Supervision  Transfers:  Sit to Stand: Minimum assistance  Stand to Sit: Minimum assistance        Bed to Chair: Minimum assistance              Balance:   Sitting: Intact  Standing: Impaired  Standing - Static: Fair  Standing - Dynamic : Fair  Ambulation/Gait Training:  Distance (ft): 75 Feet (ft)  Assistive Device: Gait belt (trial RW w/ improved stability noted)  Ambulation - Level of Assistance: Minimal assistance        Gait Abnormalities: Trunk sway increased; Path deviations;Decreased step clearance; Altered arm swing        Base of Support: Widened     Speed/Shannon: Pace decreased (<100 feet/min)  Step Length: Left shortened;Right shortened                             Pt required Min A for correction of LOB, tactile cues in order to prevent pt from reaching for objects. Trial w/ RW w/ increased stability noted. Functional Measure:  Barthel Index:      Bathin  Bladder: 5  Bowels: 10  Groomin  Dressin  Feedin  Mobility: 0  Stairs: 0  Toilet Use: 0  Transfer (Bed to Chair and Back): 10  Total: 30         Barthel and G-code impairment scale:  Percentage of impairment CH  0% CI  1-19% CJ  20-39% CK  40-59% CL  60-79% CM  80-99% CN  100%   Barthel Score 0-100 100 99-80 79-60 59-40 20-39 1-19    0   Barthel Score 0-20 20 17-19 13-16 9-12 5-8 1-4 0      The Barthel ADL Index: Guidelines  1. The index should be used as a record of what a patient does, not as a record of what a patient could do. 2. The main aim is to establish degree of independence from any help, physical or verbal, however minor and for whatever reason. 3. The need for supervision renders the patient not independent.   4. A patient's performance should be established using the best available evidence. Asking the patient, friends/relatives and nurses are the usual sources, but direct observation and common sense are also important. However direct testing is not needed. 5. Usually the patient's performance over the preceding 24-48 hours is important, but occasionally longer periods will be relevant. 6. Middle categories imply that the patient supplies over 50 per cent of the effort. 7. Use of aids to be independent is allowed. José Antonio Castillo., Barthel, D.W. ([id number]). Functional evaluation: the Barthel Index. [address] [facility] (14)2. HUBER Camargo, Chelsea Almeida., Blair Hoffman., Terence, [address] (1999). Measuring the change indisability after inpatient rehabilitation; comparison of the responsiveness of the Barthel Index and Functional [place] Measure. Journal of Neurology, Neurosurgery, and Psychiatry, 66(4), 619-790. CECI Burkett.JIMBO, GLADYS Richey, & Elidia Mancia MEdwarA. (2004.) Assessment of post-stroke quality of life in cost-effectiveness studies: The usefulness of the Barthel Index and the EuroQoL-5D. Quality of Life Research, 13, 396-60            G codes: In compliance with CMSs Claims Based Outcome Reporting, the following G-code set was chosen for this patient based on their primary functional limitation being treated: The outcome measure chosen to determine the severity of the functional limitation was the Barthel Index with a score of 30/100 which was correlated with the impairment scale. · Mobility - Walking and Moving Around:               - CURRENT STATUS:    CL - 60%-79% impaired, limited or restricted               - GOAL STATUS:           CK - 40%-59% impaired, limited or restricted               - D/C STATUS:                       ---------------To be determined---------------               Activity Tolerance: WNL  Please refer to the flowsheet for vital signs taken during this treatment.   After treatment:   [X]         Patient left in no apparent distress sitting up in chair  [ ]         Patient left in no apparent distress in bed  [X]         Call bell left within reach  [X]         Nursing notified  [ ]         Caregiver present  [X]         Bed alarm activated      COMMUNICATION/EDUCATION:   The patients plan of care was discussed with: Occupational Therapist, Registered Nurse and . [X]         Fall prevention education was provided and the patient/caregiver indicated understanding. [X]         Patient/family have participated as able in goal setting and plan of care. [X]         Patient/family agree to work toward stated goals and plan of care. [ ]         Patient understands intent and goals of therapy, but is neutral about his/her participation. [ ]         Patient is unable to participate in goal setting and plan of care. Thank you for this referral.  Duncan Rabago, SPT   Time Calculation: 25 mins  Regarding student involvement in patient care:  A student participated in this treatment session. Per CMS Medicare statements and APTA guidelines I certify that the following was true:  1. I was present and directly observed the entire session. 2. I made all skilled judgments and clinical decisions regarding care. 3. I am the practitioner responsible for assessment, treatment, and documentation.

## 2017-02-20 NOTE — PROGRESS NOTES
Problem: Communication Impaired (Adult)  Goal: *Acute Goals and Plan of Care (Insert Text)  2/20/2017  Speech path goals:  1. Pt will follow one step commands with 50% acc with max cues. 2. Pt will answer simple y/n questions with 60% acc with max cues. 3. Pt will name basic ADL objects with 80% acc with mod cues. SPEECH LANGUAGE PATHOLOGY EVALUATION  Patient: Hermelindo Serrato (86 y.o. male)  Date: 2/20/2017  Primary Diagnosis: Seizures ([facility] [address]        Precautions:          ASSESSMENT :  Based on the objective data described below, the patient presents with severe Wernicke's aphasia characterized by poor auditory comprehension and poor verbal expression. He does not follow commands or answer y/n questions. He was fluent and intelligible but very perseverative. Poor naming with perseveration noted. He did not state any biog info accurately as he did not understand the questions. Pt had something similar occur 5 years ago. He had aphasia following szs but recovered fully per family. They seemed distraught with current aphasia. Patient will benefit from skilled intervention to address the above impairments. Patients rehabilitation potential is considered to be Fair  Factors which may influence rehabilitation potential include:   [ ]              None noted  [X]              Mental ability/status  [X]              Medical condition  [X]              Home/family situation and support systems  [X]              Safety awareness  [ ]              Pain tolerance/management  [ ]              Other:        PLAN :  Recommendations and Planned Interventions:  Intensive speech therapy  Frequency/Duration: Patient will be followed by speech-language pathology 4 times a week to address goals. Discharge Recommendations: Outpatient       SUBJECTIVE:   Patient stated highway. OBJECTIVE:       Past Medical History   Diagnosis Date    Alcohol abuse, in remission      Seizures ([facility] [address]     History reviewed.  No pertinent past surgical history. Prior Level of Function/Home Situation:*  Home Situation  Home Environment: Private residence  # Steps to Enter: 3  One/Two Story Residence: One story  Living Alone: No  Support Systems: Child(rogelio), Family member(s), Spouse/Significant Other/Partner  Patient Expects to be Discharged to[de-identified] Skilled nursing facility  Current DME Used/Available at Home: Shower chair  Mental Status:  Neurologic State: Alert  Orientation Level: Other (Comment)  Cognition: No command following  Perception: Appears intact  Perseveration: Perseverates during conversation  Safety/Judgement: Decreased awareness of environment, Decreased awareness of need for assistance, Decreased awareness of need for safety, Decreased insight into deficits  Motor Speech:  Oral-Motor Structure/Motor Speech  Labial: No impairment  Dentition: Limited; Extractions; Natural;Partials (comment)  Mandible: No impairment  Language Comprehension and Expression:  Auditory Comprehension  Auditory Impairment: Yes  Response to Basic Yes/No Questions (%): 16 %  One-Step Basic Commands (%): 0 %  Verbal Expression  Primary Mode of Expression: Verbal  Initiation: No impairment  Naming: Impaired  Confrontation (%): 38 %  Responsive (%): 0 %  Naming cueing amount: Mod-max  Conversation: Fluent  Speech Characteristics: Word retrieval;Paraphasias;Perseveration                                                                    Voice:                 Vocal Quality: Hoarse                                   G Codes: In compliance with CMSs Claims Based Outcome Reporting, the following G-code set was chosen for this patient based the use of the NOMS functional outcome to quantify this patient's level of auditory comprehension impairment. Using the NOMS, the patient was determined to be at level 2 for auditory comprehension which correlates with the CM= 80-99% level of severity.      Based on the objective assessment provided within this note, the current, goal, and discharge g-codes are as follows:     Comprehension   Lang Comp Current Status CM= 80-99%   Lang Comp Goal Status CL= 60-79%           Pain:  Pain Scale 1: Numeric (0 - 10)  Pain Intensity 1: 0     After treatment:   [ ]              Patient left in no apparent distress sitting up in chair  [X]              Patient left in no apparent distress in bed  [X]              Call bell left within reach  [X]              Nursing notified  [ ]              Caregiver present  [ ]              Bed alarm activated      COMMUNICATION/EDUCATION:   The patients plan of care including recommendations and planned interventions was discussed with: Registered Nurse. Patient was educated regarding his deficit(s) of aphasia as this relates to His diagnosis of szs. Kevin Schneider He demonstrated Poor -  understanding as evidenced by poor comprehension. [X]  Patient/family have participated as able in goal setting and plan of care. [ ]  Patient/family agree to work toward stated goals and plan of care. [ ]  Patient understands intent and goals of therapy, but is neutral about his/her participation. [ ]  Patient is unable to participate in goal setting and plan of care.      Thank you for this referral.  Sherlyn Levi, SLP  Time Calculation: 28 mins

## 2017-02-20 NOTE — ROUTINE PROCESS
* No surgery found *  Bedside and Verbal shift change report given to [address] [place] [address] [address] (oncoming nurse) by Anthony Mcgowan RN (offgoing nurse).  Report included the following information SBAR, Kardex and MAR.     Zone Phone: [phone]        Significant changes during shift:   none           Patient Information     Lili Giron  80 y.o.  2/18/2017 12:17 PM by Kathi Centeno MD. Lili Giron was admitted from Home     Problem List          Patient Active Problem List     Diagnosis Date Noted    Seizures ([facility] 02/18/2017    Encephalopathy, unspecified 09/03/2012    SVT (supraventricular tachycardia) 09/03/2012    Aphasia 09/03/2012    CVA (cerebral vascular accident) ([facility] 09/03/2012    Seizure disorder ([facility] 09/03/2012           Past Medical History   Diagnosis Date    Alcohol abuse, in remission      Seizures ([facility]              Core Measures:     CVA: No Not applicable  CHF:No Not applicable  PNA:No Not applicable     Activity Status:     OOB to Chair No  Ambulated this shift No   Bed Rest No     Supplemental O2: (If Applicable)     NC No  NRB No  Venti-mask No  On room air Liters/min

## 2017-02-20 NOTE — PROGRESS NOTES
Speech Pathology bedside swallow evaluation/discharge  Patient: Wandy Martínez (40 y.o. male)  Date: 2/20/2017  Primary Diagnosis: Seizures ([facility]        Precautions:       ASSESSMENT :  Based on the objective data described below, the patient presents with functional swallow of current diet. Tolerating thins and solids. Please see language eval.   Skilled therapy provided by a speech-language pathologist is not indicated at this time. PLAN :  Recommendations:  Intensive speech therapy  Discharge Recommendations: Outpatient speech path     SUBJECTIVE:   Patient stated highway for questions and commands. OBJECTIVE:     Past Medical History   Diagnosis Date    Alcohol abuse, in remission     Seizures ([facility]    History reviewed. No pertinent past surgical history. Prior Level of Function/Home Situation:      Diet prior to admission: regular /thins  Current Diet: mech soft  Cognitive and Communication Status:  Neurologic State: Alert  Orientation Level:  Cognition: No command following  Perception: Appears intact  Perseveration: Perseverates during conversation  Safety/Judgement: Decreased awareness of environment, Decreased awareness of need for assistance, Decreased awareness of need for safety, Decreased insight into deficits  Oral Assessment:  Oral Assessment  Labial: No impairment  Dentition: Limited; Extractions; Natural;Partials (comment)  Mandible: No impairment  P.O. Trials:     Vocal quality prior to P.O.: Hoarse  Consistency Presented: Thin liquid; Solid  How Presented: Self-fed/presented;Straw     Bolus Acceptance: No impairment  Bolus Formation/Control: No impairment     Propulsion: No impairment  Oral Residue: None  Initiation of Swallow: No impairment  Laryngeal Elevation: Functional  Aspiration Signs/Symptoms: None  Pharyngeal Phase Characteristics: No impairment, issues, or problems              Oral Phase Severity: No impairment  Pharyngeal Phase Severity : No impairment  NOMS:   The NOMS functional outcome measure was used to quantify this patient's level of swallowing impairment. Based on the NOMS, the patient was determined to be at level 7 for swallow function     G Codes: In compliance with CMSs Claims Based Outcome Reporting, the following G-code set was chosen for this patient based the use of the NOMS functional outcome to quantify this patient's level of swallowing impairment. Using the NOMS, the patient was determined to be at level 7 for swallow function which correlates with the CH= 0% level of severity. Based on the objective assessment provided within this note, the current, goal, and discharge g-codes are as follows:    Swallow  Swallowing:   Swallow Current Status CH= 0%   Swallow Goal Status CH= 0%   Swallow D/C Status CH= 0%      NOMS Swallowing Levels:  Level 1 (CN): NPO  Level 2 (CM): NPO but takes consistency in therapy  Level 3 (CL): Takes less than 50% of nutrition p.o. and continues with nonoral feedings; and/or safe with mod cues; and/or max diet restriction  Level 4 (CK): Safe swallow but needs mod cues; and/or mod diet restriction; and/or still requires some nonoral feeding/supplements  Level 5 (CJ): Safe swallow with min diet restriction; and/or needs min cues  Level 6 (CI): Independent with p.o.; rare cues; usually self cues; may need to avoid some foods or needs extra time  Level 7 ([address] [place] [address]): Independent for all p.o.  DEON. (2003). National Outcomes Measurement System (NOMS): Adult Speech-Language Pathology User's Guide.        Pain:  Pain Scale 1: Numeric (0 - 10)  Pain Intensity 1: 0     After treatment:   [x] Patient left in no apparent distress sitting up in chair  [] Patient left in no apparent distress in bed  [x] Call bell left within reach  [x] Nursing notified  [x] Caregivers present  [] Bed alarm activated    COMMUNICATION/EDUCATION:   The patients plan of care including findings, recommendations, and recommended diet changes were discussed with: Registered Nurse.  [] Posted safety precautions in patient's room. [] Patient/family have participated as able and agree with findings and recommendations. [] Patient is unable to participate in plan of care at this time.     Thank you for this referral.  FABIANO Tidwell  Time Calculation: 28 mins

## 2017-02-20 NOTE — PROGRESS NOTES
Cardiology Progress Note      2/20/2017 1:05 PM    Admit Date: 2/18/2017          Subjective:  Denies chest pain . Echo ok, nl EF          Visit Vitals    /60    Pulse (!) 58    Temp 98.3 °F (36.8 °C)    Resp 18    SpO2 95%     02/18 1901 - 02/20 0700  In: 626.3 [I.V.:626.3]  Out: 1100 [Urine:1100]        Objective:      Physical Exam:  VS as above     Data Review:   Labs:    Trop 0.26  Creat 1.0     Telemetry: SR   No 12 lead on chart       Assessment:     1. Abnormal troponin after a seizure disorder. 2. Hypertension. Nl EF  3. Supraventricular tachycardia. 4. Remote history of alcohol abuse, none recently. Plan:  No clinical evidence of acute cardiac issue. Still need 12 lead. Will see back prn. Call if further questions.

## 2017-02-20 NOTE — PROGRESS NOTES
Hospitalist Progress Note    NAME: Wandy Martínez   :  1934   MRN:  [id number]       Interim Hospital Summary: 80 y.o. male whom presented on 2017 with      Assessment / Plan:  Seizure POA  Acute encephalopathy POA due to post-ictal state   Had similar episode of seizure in   Known seizure disorder \"from alcohol\", admitted in  at [address]  Brain MRI negative  Maintained on keppra, no seizures family is aware of since that time  Stopped drinking then  Complaint with keppra  Head CT negative  EEG pending  Neurology is on the case  Continue Keppra with increased dose  MTI brain negative for acute changes  Takes lyrica for neuropathy, just ran out 3 days ago, ? Effect as trigger  Seizure precautions  PT/OT  PRN ativan for seizures  Check CXR and UA  No fevers or leukocytosis, consider LP if develops fevers     Elevated Troponin, likely related to intracranial event (seizure)  Continue ASA  Trending down  Cardiology consult highly apreacited, they questioned why troponin was ordered in first place, they don't think ACS. 2D echo with normal EF     Essential HTN   History of SVT   Continue toprol  PRN hydralazine     Peripheral neuropathy POA  Resume lyrica, ran out 3 days ago (getting from mail order company)     Remote ETOH abuse  IV THIAMINE, no need for CIWA scale     GERD continue PPI     Code status: Full, Next of Kin: Wife  Prophylaxis: Lovenox  Recommended Disposition: SNF     Subjective: Pt seen and examined at bedside. NAD. Feels \"fine\".  Overnight events d/w RN     Chief Complaint / Reason for Physician Visit: f/u \"seizure\"    Review of Systems:  Symptom Y/N Comments  Symptom Y/N Comments   Fever/Chills    Chest Pain     Poor Appetite    Edema     Cough    Abdominal Pain     Sputum    Joint Pain     SOB/AL    Pruritis/Rash     Nausea/vomit    Tolerating PT/OT     Diarrhea    Tolerating Diet     Constipation    Other       Could NOT obtain due to: Poor Insight     Objective:     VITALS: Last 24hrs VS reviewed since prior progress note. Most recent are:  Patient Vitals for the past 24 hrs:   Temp Pulse Resp BP SpO2   02/20/17 1538 98.6 °F (37 °C) (!) 55 16 122/73 98 %   02/20/17 1228 98.3 °F (36.8 °C) (!) 58 18 122/60 95 %   02/20/17 0830 98.3 °F (36.8 °C) 61 18 151/72 96 %   02/20/17 0322 98.5 °F (36.9 °C) 61 18 137/75 -   02/19/17 2335 97.5 °F (36.4 °C) (!) 55 16 131/65 97 %   02/19/17 1938 97.5 °F (36.4 °C) (!) 55 18 130/56 95 %       Intake/Output Summary (Last 24 hours) at 02/20/17 1551  Last data filed at 02/19/17 2337   Gross per 24 hour   Intake                0 ml   Output              325 ml   Net             -325 ml        PHYSICAL EXAM:  General: WD, WN. Alert, cooperative, no acute distress    EENT:  EOMI. Anicteric sclerae. MMM  Resp:  CTA bilaterally, no wheezing or rales. No accessory muscle use  CV:  Regular  rhythm,  No edema  GI:  Soft, Non distended, Non tender.  +Bowel sounds  Neurologic:  Alert and oriented X 2, normal speech,   Psych:   Poor insight. Not anxious nor agitated  Skin:  No rashes. No jaundice    Reviewed most current lab test results and cultures  YES  Reviewed most current radiology test results   YES  Review and summation of old records today    NO  Reviewed patient's current orders and MAR    YES  PMH/ reviewed - no change compared to H&P  ________________________________________________________________________  Care Plan discussed with:    Comments   Patient y    Family      RN y    Care Manager     Consultant                        Multidiciplinary team rounds were held today with , nursing, pharmacist and clinical coordinator. Patient's plan of care was discussed; medications were reviewed and discharge planning was addressed.      ________________________________________________________________________  Total NON critical care TIME:  25 Minutes    Total CRITICAL CARE TIME Spent:   Minutes non procedure based      Comments   >50% of visit spent in counseling and coordination of care     ________________________________________________________________________  Holli Camacho MD     Procedures: see electronic medical records for all procedures/Xrays and details which were not copied into this note but were reviewed prior to creation of Plan. LABS:  I reviewed today's most current labs and imaging studies.   Pertinent labs include:  Recent Labs      02/19/17   0232  02/18/17   1227   WBC  10.2  4.5   HGB  14.1  14.9   HCT  40.5  43.8   PLT  128*  113*     Recent Labs      02/20/17   0338  02/19/17   0232  02/18/17   1227  02/18/17   1224   NA  138  137  137   --    K  4.3  4.4  5.1   --    CL  103  101  102   --    CO2  28  29  30   --    GLU  90  106*  108*   --    BUN  15  17  16   --    CREA  1.03  1.10  1.19   --    CA  8.6  8.9  8.7   --    MG   --   2.1  1.6   --    ALB   --   3.6  4.1   --    TBILI   --   1.1*  0.6   --    SGOT   --   21  21   --    ALT   --   14  16   --    INR   --    --    --   1.1       Signed: Holli Camacho MD

## 2017-02-20 NOTE — PROCEDURES
Patient Name: Salina Villalobos  : 1934  Age: 80 y.o. Ordering physician: No ref. provider found  Date of EE2017  EEG procedure number: [id number]  Diagnosis: seizure  Interpreting physician: Na Cortés MD      ELECTROENCEPHALOGRAM REPORT     PROCEDURE: EEG. CLINICAL INDICATION: The patient is a 80 y.o. male with a history of   possible seizures. EEG to rule out seizures, rule out stroke, rule out   cortical abnormality. EEG CLASSIFICATION: Abnormal    DESCRIPTION OF THE RECORD:   The background of this recording contains a posteriorly-located occipital alpha rhythm of 9 Hz that attenuates with eye opening. Throughout the recording, there were left frontotemporal sharps seen. Hyperventilation was not performed. Photic stimulation produced a minimal driving response in the posterior head regions. During the recording, the patient did enter prolonged states of sleep with K-complexes and sleep spindles seen in the central head regions. INTERPRETATION: There are left frontotemporal sharps indicative of underlying cortical irritability and could be a seizure focus. No seizures. Clinical correlation recommended.         Jonathon Marie MD  2017  5:29 PM

## 2017-02-21 ENCOUNTER — HOME HEALTH ADMISSION (OUTPATIENT)
Dept: HOME HEALTH SERVICES | Facility: HOME HEALTH | Age: 82
End: 2017-02-21
Payer: MEDICARE

## 2017-02-21 VITALS
SYSTOLIC BLOOD PRESSURE: 146 MMHG | OXYGEN SATURATION: 97 % | TEMPERATURE: 98 F | HEART RATE: 57 BPM | RESPIRATION RATE: 16 BRPM | DIASTOLIC BLOOD PRESSURE: 79 MMHG

## 2017-02-21 PROCEDURE — 74011000258 HC RX REV CODE- 258: Performed by: INTERNAL MEDICINE

## 2017-02-21 PROCEDURE — 74011250637 HC RX REV CODE- 250/637: Performed by: INTERNAL MEDICINE

## 2017-02-21 PROCEDURE — 3E0234Z INTRODUCTION OF SERUM, TOXOID AND VACCINE INTO MUSCLE, PERCUTANEOUS APPROACH: ICD-10-PCS | Performed by: INTERNAL MEDICINE

## 2017-02-21 PROCEDURE — 90686 IIV4 VACC NO PRSV 0.5 ML IM: CPT | Performed by: INTERNAL MEDICINE

## 2017-02-21 PROCEDURE — 97116 GAIT TRAINING THERAPY: CPT | Performed by: PHYSICAL THERAPIST

## 2017-02-21 PROCEDURE — 90471 IMMUNIZATION ADMIN: CPT

## 2017-02-21 PROCEDURE — 74011250637 HC RX REV CODE- 250/637: Performed by: PSYCHIATRY & NEUROLOGY

## 2017-02-21 PROCEDURE — 74011250636 HC RX REV CODE- 250/636: Performed by: INTERNAL MEDICINE

## 2017-02-21 RX ORDER — PREGABALIN 300 MG/1
300 CAPSULE ORAL 2 TIMES DAILY
Qty: 60 CAP | Refills: 0 | Status: SHIPPED | OUTPATIENT
Start: 2017-02-21 | End: 2017-07-27 | Stop reason: DRUGHIGH

## 2017-02-21 RX ORDER — LEVETIRACETAM 1000 MG/1
1000 TABLET ORAL 2 TIMES DAILY
Qty: 60 TAB | Refills: 0 | Status: SHIPPED | OUTPATIENT
Start: 2017-02-21 | End: 2017-03-07 | Stop reason: SDUPTHER

## 2017-02-21 RX ORDER — ATORVASTATIN CALCIUM 40 MG/1
40 TABLET, FILM COATED ORAL DAILY
Qty: 30 TAB | Refills: 0 | Status: SHIPPED | OUTPATIENT
Start: 2017-02-21 | End: 2017-07-27 | Stop reason: DRUGHIGH

## 2017-02-21 RX ORDER — LANOLIN ALCOHOL/MO/W.PET/CERES
100 CREAM (GRAM) TOPICAL DAILY
Qty: 30 TAB | Refills: 0 | Status: SHIPPED | OUTPATIENT
Start: 2017-02-21 | End: 2017-07-27 | Stop reason: SDUPTHER

## 2017-02-21 RX ADMIN — Medication 400 MG: at 11:18

## 2017-02-21 RX ADMIN — THIAMINE HYDROCHLORIDE 100 MG: 100 INJECTION, SOLUTION INTRAMUSCULAR; INTRAVENOUS at 11:22

## 2017-02-21 RX ADMIN — PREGABALIN 300 MG: 75 CAPSULE ORAL at 11:17

## 2017-02-21 RX ADMIN — ASPIRIN 325 MG ORAL TABLET 325 MG: 325 PILL ORAL at 11:17

## 2017-02-21 RX ADMIN — INFLUENZA VIRUS VACCINE 0.5 ML: 15; 15; 15; 15 SUSPENSION INTRAMUSCULAR at 11:27

## 2017-02-21 RX ADMIN — PANTOPRAZOLE SODIUM 40 MG: 40 TABLET, DELAYED RELEASE ORAL at 11:17

## 2017-02-21 RX ADMIN — LEVETIRACETAM 1000 MG: 500 TABLET, FILM COATED ORAL at 11:18

## 2017-02-21 RX ADMIN — METOPROLOL SUCCINATE 25 MG: 25 TABLET, EXTENDED RELEASE ORAL at 11:18

## 2017-02-21 RX ADMIN — ENOXAPARIN SODIUM 40 MG: 40 INJECTION SUBCUTANEOUS at 11:17

## 2017-02-21 RX ADMIN — Medication 10 ML: at 03:37

## 2017-02-21 NOTE — DISCHARGE INSTRUCTIONS
HOSPITALIST DISCHARGE INSTRUCTIONS    NAME: Holly Orellana   :  1934   MRN:  [id number]     Date/Time:  2017 4:38 PM    ADMIT DATE: 2017   DISCHARGE DATE: 2017         · It is important that you take the medication exactly as they are prescribed. · Keep your medication in the bottles provided by the pharmacist and keep a list of the medication names, dosages, and times to be taken in your wallet. · Do not take other medications without consulting your doctor. What to do at Home    Recommended diet:  Soft diet    Recommended activity: PT/OT per Home Health, walker      If you have questions regarding the hospital related prescriptions or hospital related issues please call [facility] at . You can always direct your questions to your primary care doctor if you are unable to reach your hospital physician; your PCP works as an extension of your hospital doctor just like your hospital doctor is an extension of your PCP for your time at the hospital [facility], [facility])    If you experience any of the following symptoms then please call your primary care physician or return to the emergency room if you cannot get hold of your doctor:    Fever, chills, nausea, vomiting, or persistent diarrhea  Worsening weakness or new problems with your speech or balance  Dark stools or visible blood in your stools  New Leg swelling or shortness of breath as this could be signs of a clot    Additional Instructions:    Ordered home health PT/OT/SLP/nursing aid  Follow up with PCP in 1 week  Follow up with neurology in 2 weeks    Bring these papers with you to your follow up appointments. The papers will help your doctors be sure to continue the care plan from the hospital.              Information obtained by :  I understand that if any problems occur once I am at home I am to contact my physician.     I understand and acknowledge receipt of the instructions indicated above.                                                                                                                                           Physician's or R.N.'s Signature                                                                  Date/Time                                                                                                                                              Patient or Representative Signature

## 2017-02-21 NOTE — PROGRESS NOTES
Bedside and Verbal shift change report given to RICHARD saldana (oncoming nurse) by Oracio Nash RN (offgoing nurse). Report included the following information SBAR, Kardex and MAR.     Zone Phone: [phone]        Significant changes during shift: none        Patient Information     Bala Nunez  80 y.o.  2/18/2017 12:17 PM by Courtney Dubose MD. Bala Nunez was admitted from Home     Problem List          Patient Active Problem List     Diagnosis Date Noted    Seizures ([facility] 02/18/2017    Encephalopathy, unspecified 09/03/2012    SVT (supraventricular tachycardia) 09/03/2012    Aphasia 09/03/2012    CVA (cerebral vascular accident) ([facility] 09/03/2012    Seizure disorder ([facility] 09/03/2012           Past Medical History   Diagnosis Date    Alcohol abuse, in remission      Seizures ([facility]              Core Measures:     CVA: No Not applicable  CHF:No Not applicable  PNA:No Not applicable     Activity Status:     OOB to Chair No  Ambulated this shift No   Bed Rest No     Supplemental O2: (If Applicable)     NC No  NRB No  Venti-mask No  On room air Liters/min        LINES AND DRAINS:     Central Line? No      PICC LINE? No      Urinary Catheter? No      DVT prophylaxis:     DVT prophylaxis Med- Yes  DVT prophylaxis SCD or JAIME- No      Wounds: (If Applicable)     Wounds- No     Patient Safety:     Falls Score Total Score: 4  Safety Level____III___  Bed Alarm On? Yes  Sitter?  No. tele sitter in place     Plan for upcoming shift: discharge planning , CM consult placed           Discharge Plan: ongoing     Active Consults:  IP CONSULT TO NEUROLOGY  IP CONSULT TO CARDIOLOGY

## 2017-02-21 NOTE — PROGRESS NOTES
CM was advised pt was prepared for discharge. CM reviewed latest PT note which recommends HH (PT/OT/SLP and Nursing) with rolling walker. CM spoke with pt spouse and she recommended [address] for their needs. Pt will be transported at discharge by his daughter. CM was advised pt's PCP may not approve [facility] services but [facility] representative is contacting neurologist to approve. CM sent referral for rolling walker to [facility]. Care Management Interventions  PCP Verified by CM: Yes (Dr. Araceli Harris )  Mode of Transport at Discharge:  Other (see comment) (private vehicle )  Transition of Care Consult (CM Consult): [address]: Yes  Discharge Durable Medical Equipment: Yes (CM ordered rolling walker )  Health Maintenance Reviewed: Yes  Physical Therapy Consult: Yes  Occupational Therapy Consult: Yes  Current Support Network: Lives with Spouse  Confirm Follow Up Transport: Family  Freedom of Choice Offered: Yes ([address] )  Discharge Location  Discharge Placement: Home with home health    SHERRY Yoo, [employer]   [phone]

## 2017-02-21 NOTE — PROGRESS NOTES
CM was advised pt would need to have rolling walker requested from PCP. CM contacted PCP to advise of DME order needing to be from his office. CM was advised that PCP is out of the office on Tuesdays and will not return until Wednesday. CM provided PCP office with direct fax number to [facility] to have them send order. CM advised attending that rolling walker is not going to be available for pt until PCP completes orders. Attending advised pt will not be able to discharge without rolling walker. CM contacted pt spouse to see if pt already owns a rolling walker and she advised they do not have one nor do they have access to one. CM advised pt family and floor nurse of cancelled discharge pending rolling walker.      SHERRY Vega, [employer]   [phone]

## 2017-02-21 NOTE — PROGRESS NOTES
* No surgery found *  Bedside and Verbal shift change report given to  [address] (oncoming nurse) by [address] (offgoing nurse).  Report included the following information SBAR, Kardex and MAR.      Zone Phone: [phone]          Significant changes during shift:   none              Patient Information  Swapna Form  80 y.o.  2/18/2017 12:17 PM by Asim Rodrigez MD. Wandy Martínez was admitted from Home      Problem List              Patient Active Problem List      Diagnosis Date Noted    Seizures ([facility] 02/18/2017    Encephalopathy, unspecified 09/03/2012    SVT (supraventricular tachycardia) 09/03/2012    Aphasia 09/03/2012    CVA (cerebral vascular accident) ([facility] 09/03/2012    Seizure disorder ([facility] 09/03/2012               Past Medical History   Diagnosis Date    Alcohol abuse, in remission       Seizures ([facility]                  Core Measures:      CVA: No Not applicable  CHF:No Not applicable  PNA:No Not applicable      Activity Status:      OOB to Chair No  Ambulated this shift No   Bed Rest No      Supplemental O2: (If Applicable)      NC No  NRB No  Venti-mask No  On room air Liters/min

## 2017-02-21 NOTE — PROGRESS NOTES
Spoke with Dr. Wali Thomas, neurologist,  and she is willing to follow his  Home Health . Have spoken with daughter in reviewing discharge instructions and she has been provided with contact information of [phone] as well. Daughter verbalizes understanding of all information reviewed.

## 2017-02-21 NOTE — ROUTINE PROCESS
Bedside and Verbal shift change report given to Juvenal Faye RN (oncoming nurse) by Kimberlee Hester RN (offgoing nurse). Report included the following information SBAR, Kardex and MAR. Zone Phone:   [phone]      Significant changes during shift:  none      Patient Donya Aranda  80 y.o.  2/18/2017 12:17 PM by Jose Antonio Bianchi MD. Aniya Cedillo was admitted from Home    Problem List    Patient Active Problem List    Diagnosis Date Noted    Seizures ([facility] 02/18/2017    Encephalopathy, unspecified 09/03/2012    SVT (supraventricular tachycardia) 09/03/2012    Aphasia 09/03/2012    CVA (cerebral vascular accident) ([facility] 09/03/2012    [facility]/2012     Past Medical History   Diagnosis Date    Alcohol abuse, in remission     Seizures ([facility]          Core Measures:    CVA: No Not applicable  CHF:No Not applicable  PNA:No Not applicable    Activity Status:    OOB to Chair No  Ambulated this shift No   Bed Rest No    Supplemental O2: (If Applicable)    NC No  NRB No  Venti-mask No  On room air Liters/min      LINES AND DRAINS:    Central Line? No     PICC LINE? No     Urinary Catheter? No     DVT prophylaxis:    DVT prophylaxis Med- Yes  DVT prophylaxis SCD or JAIME- No     Wounds: (If Applicable)    Wounds- No    Patient Safety:    Falls Score Total Score: 4  Safety Level____III___  Bed Alarm On? Yes  Sitter?  No. tele sitter in place    Plan for upcoming shift: discharge planning and fall safety        Discharge Plan: ongoing    Active Consults:  IP CONSULT TO NEUROLOGY  IP CONSULT TO CARDIOLOGY

## 2017-02-21 NOTE — PROGRESS NOTES
Problem: Mobility Impaired (Adult and Pediatric)  Goal: *Acute Goals and Plan of Care (Insert Text)  Physical Therapy Goals  Initiated 2/20/2017  1. Patient will move from supine to sit and sit to supine , scoot up and down and roll side to side in bed with independence within 7 day(s). 2. Patient will transfer from bed to chair and chair to bed with modified independence using the least restrictive device within 7 day(s). 3. Patient will perform sit to stand with modified independence within 7 day(s). 4. Patient will ambulate with modified independence for 300 feet with the least restrictive device within 7 day(s). 5. Patient will ascend/descend 4 stairs with single handrail(s) with modified independence within 7 day(s). PHYSICAL THERAPY TREATMENT  Patient: Sahra Teixeira (13 y.o. male)  Date: 2/21/2017  Diagnosis: Seizures ([facility] [address] <principal problem not specified>       Precautions:  fall      ASSESSMENT:  Patient with improvement in mobility today; able to increase gait distance and balance improved. Bed mobility with supervision; transfers with SBA. Patient ambulated 200' with rolling walker and CGA. Verbal cues to increase step clearance. Patient is steady with no LOB, no SOB. Patient does not seem to be having difficulty with speech today; states lives in a home with a roommate. If patient able to have supervision at home, at least initially, recommend HHPT and rolling walker. If this is not possible, may need short SNF stay, but will continue to assess for improvements, safety. Progression toward goals:  [X]    Improving appropriately and progressing toward goals  [ ]    Improving slowly and progressing toward goals  [ ]    Not making progress toward goals and plan of care will be adjusted       PLAN:  Patient continues to benefit from skilled intervention to address the above impairments. Continue treatment per established plan of care.   Discharge Recommendations:  Home Health and additional assistance  Further Equipment Recommendations for Discharge:  rolling walker       SUBJECTIVE:   Patient stated I can keep going.       OBJECTIVE DATA SUMMARY:   Critical Behavior:  Neurologic State: Alert  Orientation Level: Unable to verbalize  Cognition: Decreased command following  Safety/Judgement: Decreased awareness of environment, Decreased awareness of need for assistance, Decreased awareness of need for safety, Decreased insight into deficits  Functional Mobility Training:  Bed Mobility:  Rolling: Supervision  Supine to Sit: Independent     Scooting: Independent        Transfers:  Sit to Stand: Stand-by asssistance  Stand to Sit: Stand-by asssistance        Bed to Chair: Stand-by asssistance                    Balance:  Sitting: Intact  Standing: Intact; With support  Standing - Static: Good;Constant support  Standing - Dynamic : Good (with rolling walker)  Ambulation/Gait Training:  Distance (ft): 200 Feet (ft)  Assistive Device: Gait belt;Walker, rolling  Ambulation - Level of Assistance: Contact guard assistance        Gait Abnormalities: Decreased step clearance              Speed/Shannon: Pace decreased (<100 feet/min)  Step Length: Left shortened;Right shortened                                                    Therapeutic Exercises:   Patient performed a few LE strengthening exercises but requires cues to do properly. Pain:  Pain Scale 1: Numeric (0 - 10)  Pain Intensity 1: 0              Activity Tolerance:   good  Please refer to the flowsheet for vital signs taken during this treatment.   After treatment:   [X]    Patient left in no apparent distress sitting up in chair  [ ]    Patient left in no apparent distress in bed  [X]    Call bell left within reach  [X]    Nursing notified  [ ]    Caregiver present  [X]    Bed alarm activated      COMMUNICATION/COLLABORATION:   The patients plan of care was discussed with: Registered Nurse     Delores Bethea, PT   Time Calculation: 14 mins

## 2017-02-21 NOTE — PROGRESS NOTES
Wife called and stated that they have found a rolling walker that they are able to use and are picking it up today. Dr. Rachana Giraldo, neurologist has ordered [place] for nursing, PT/OT and speech. CM made aware of all of this . Wife stated that their daughter will  patient. MD made aware and he will  proceed with DC order today.

## 2017-02-22 NOTE — DISCHARGE SUMMARY
Hospitalist Discharge Summary     Patient ID:  Rosario Shepherd  [id number]  88 y.o.  1934    PCP on record: Olivia Gunter MD    Admit date: 2/18/2017  Discharge date and time: 2/21/2017      DISCHARGE DIAGNOSIS:    Seizure POA  Acute encephalopathy POA due to post-ictal state   Aphasia  Elevated Troponin, likely related to intracranial event (seizure)  Essential HTN   History of SVT   Peripheral neuropathy POA  Remote ETOH abuse  GERD      CONSULTATIONS:  IP CONSULT TO NEUROLOGY  IP CONSULT TO CARDIOLOGY    Excerpted HPI from H&P of Iveth Sinclair MD:  An 78-year-old [facility] [employer]   gentleman with remote history of alcohol use. Family says he has not   had a drink in 5 years. He was admitted last at [facility] in September 2012, when he presented with a seizure. He had a prolonged postictal state with encephalopathy and aphasia. MRI of the brain and stroke workup were negative at that time. He was   placed on Keppra, and the family is not aware of any further   seizures since that time. He has a known history of peripheral   neuropathy and has been taking Lyrica 300 mg twice a day. He   recently ran out of the lyrica 3 days ago and is waiting for his new prescription  to come in the mail. In the last several days, he has had a poor   appetite but, otherwise, has been feeling well. He had an episode of   \"dizziness\" after going to [employer] for breakfast several days ago,   but this resolved. Today he got up and seemed to be at his baseline. He drove to [employer] for breakfast and then came back. He did not   really eat much for breakfast. He then went and laid down, which the   wife said was unusual. He began to feel like he was a little bit hot and   [place] The wife noticed, when he was walking   to the porch, that he was off balance and she had to help him walk. Finally she got him to a chair.  While he was sitting in the chair, he had   what she described as a \"seizure episode\", where his face was twitching   uncontrollably. He may have had some mild shaking of his right hand. He was unable to talk and afterward was very confused and his   speech was slurred. They called EMS and he was brought to the   emergency room.      The wife said he has not been feeling sick recently. He has not had   any documented fevers or chills. He has not been reporting a   headache or had any changes in his behavior. No chest pain or abdominal   pain. No nausea, vomiting, or diarrhea. No dysuria. He has had a mild   cough and sinus congestion for the past several days. He has been   compliant with his medications, except for the Lyrica, for which he is   waiting to get the new prescription mailed to him.      In the emergency room, he had no further seizures. He was   encephalopathic, was not able really to talk, except for saying a few   words here and there. He would follow some simple commands. He   did have some intermittent shaking of his right hand, and he was   loaded with Keppra, which then seemed to be improved. He was seen by   Teleneurology, who recommended admission and MRI of the brain.    ______________________________________________________________________  DISCHARGE SUMMARY/HOSPITAL COURSE:  for full details see H&P, daily progress notes, labs, consult notes. 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   Follow up with neurology in 2 weeks     Elevated Troponin, likely related to intracranial event (seizure)  Continue ASA  Trending down  Cardiology consult highly apreacited, they questioned why troponin was ordered in first place, they don't think ACS. 2D echo with normal EF      Essential HTN   History of SVT   Continue toprol  PRN hydralazine      Peripheral neuropathy POA  Resume lyrica, ran out 3 days prior to admission (getting from mail order company)      Remote ETOH abuse  IV THIAMINE while here, DC with PO thiamine      GERD continue PPI        _______________________________________________________________________  Patient seen and examined by me on discharge day. Pertinent Findings:  Gen:    Not in distress  Chest: Clear lungs  CVS:   Regular rhythm. No edema  Abd:  Soft, not distended, not tender  Neuro:  Alert, aphasia  _______________________________________________________________________  DISCHARGE MEDICATIONS:   Discharge Medication List as of 2/21/2017  5:05 PM      START taking these medications    Details   pregabalin (LYRICA) 300 mg capsule Take 1 Cap by mouth two (2) times a day. Max Daily Amount: 600 mg., Print, Disp-60 Cap, R-0      thiamine (B-1) 100 mg tablet Take 1 Tab by mouth daily. , Print, Disp-30 Tab, R-0      atorvastatin (LIPITOR) 40 mg tablet Take 1 Tab by mouth daily. , Print, Disp-30 Tab, R-0         CONTINUE these medications which have CHANGED    Details   levETIRAcetam 1,000 mg tablet Take 1 Tab by mouth two (2) times a day., Print, Disp-60 Tab, R-0         CONTINUE these medications which have NOT CHANGED    Details   metoprolol succinate (TOPROL-XL) 25 mg XL tablet Take  by mouth daily. , Historical Med      omeprazole (PRILOSEC) 40 mg capsule Take 40 mg by mouth daily. , Historical Med      aspirin (ASPIRIN) 325 mg tablet Take 1 Tab by mouth daily. OTC, 325 mg      magnesium oxide (MAG-OX) 400 mg tablet Take 400 mg by mouth daily. Historical Med, 400 mg      l-methylfolate-vit b12-vit b6 (METANX) 2.8-2-25 mg Tab Take  by mouth.   Historical Med         STOP taking these medications       potassium chloride SR (KLOR-CON 10) 10 mEq tablet Comments:   Reason for Stopping:               My Recommended Diet, Activity, Wound Care, and follow-up labs are listed in the patient's Discharge Insturctions which I have personally completed and reviewed. _______________________________________________________________________  DISPOSITION:    Home with Family:    Home with HH/PT/OT/RN: x   SNF/LTC:    CHRISTINA:    OTHER:        Condition at Discharge:  Stable  _______________________________________________________________________  Follow up with:   PCP : Adam Byrd MD  Follow-up Information     Follow up With Details Comments [facility]  This is your provider for your rolling walker, if you have any questions please contact them directly  [address] [address]  This is your home health provider of choice.  If you do not hear from them within 24 - 48 hours please contact them directly  Jose Osuna MD In 1 week  [facility]  [phone]      neurology In 2 weeks                Total time in minutes spent coordinating this discharge (includes going over instructions, follow-up, prescriptions, and preparing report for sign off to her PCP) :  45 minutes    Signed:  Helene Crowder MD

## 2017-02-25 ENCOUNTER — HOME CARE VISIT (OUTPATIENT)
Dept: SCHEDULING | Facility: HOME HEALTH | Age: 82
End: 2017-02-25
Payer: MEDICARE

## 2017-02-25 PROCEDURE — 400013 HH SOC

## 2017-02-25 PROCEDURE — 3331090001 HH PPS REVENUE CREDIT

## 2017-02-25 PROCEDURE — 3331090002 HH PPS REVENUE DEBIT

## 2017-02-25 PROCEDURE — G0299 HHS/HOSPICE OF RN EA 15 MIN: HCPCS

## 2017-02-26 PROCEDURE — 3331090002 HH PPS REVENUE DEBIT

## 2017-02-26 PROCEDURE — 3331090001 HH PPS REVENUE CREDIT

## 2017-02-27 ENCOUNTER — HOME CARE VISIT (OUTPATIENT)
Dept: HOME HEALTH SERVICES | Facility: HOME HEALTH | Age: 82
End: 2017-02-27
Payer: MEDICARE

## 2017-02-27 ENCOUNTER — TELEPHONE (OUTPATIENT)
Dept: NEUROLOGY | Age: 82
End: 2017-02-27

## 2017-02-27 VITALS
BODY MASS INDEX: 24.88 KG/M2 | DIASTOLIC BLOOD PRESSURE: 70 MMHG | RESPIRATION RATE: 16 BRPM | SYSTOLIC BLOOD PRESSURE: 132 MMHG | HEART RATE: 54 BPM | OXYGEN SATURATION: 98 % | HEIGHT: 69 IN | WEIGHT: 168 LBS | TEMPERATURE: 99 F

## 2017-02-27 PROCEDURE — 3331090002 HH PPS REVENUE DEBIT

## 2017-02-27 PROCEDURE — 3331090001 HH PPS REVENUE CREDIT

## 2017-02-27 NOTE — TELEPHONE ENCOUNTER
Elyse with [address] [place] [address]. Please call her at [phone]. Need to give report of visit and discuss medications.

## 2017-02-28 ENCOUNTER — HOME CARE VISIT (OUTPATIENT)
Dept: SCHEDULING | Facility: HOME HEALTH | Age: 82
End: 2017-02-28
Payer: MEDICARE

## 2017-02-28 VITALS
HEART RATE: 52 BPM | RESPIRATION RATE: 18 BRPM | SYSTOLIC BLOOD PRESSURE: 117 MMHG | TEMPERATURE: 98 F | DIASTOLIC BLOOD PRESSURE: 64 MMHG | OXYGEN SATURATION: 96 %

## 2017-02-28 PROCEDURE — 3331090001 HH PPS REVENUE CREDIT

## 2017-02-28 PROCEDURE — 3331090002 HH PPS REVENUE DEBIT

## 2017-02-28 PROCEDURE — G0151 HHCP-SERV OF PT,EA 15 MIN: HCPCS

## 2017-02-28 PROCEDURE — G0153 HHCP-SVS OF S/L PATH,EA 15MN: HCPCS

## 2017-03-01 ENCOUNTER — HOME CARE VISIT (OUTPATIENT)
Dept: HOME HEALTH SERVICES | Facility: HOME HEALTH | Age: 82
End: 2017-03-01
Payer: MEDICARE

## 2017-03-01 VITALS
SYSTOLIC BLOOD PRESSURE: 126 MMHG | HEART RATE: 76 BPM | TEMPERATURE: 98 F | OXYGEN SATURATION: 98 % | RESPIRATION RATE: 17 BRPM | DIASTOLIC BLOOD PRESSURE: 76 MMHG

## 2017-03-01 PROCEDURE — 3331090001 HH PPS REVENUE CREDIT

## 2017-03-01 PROCEDURE — 3331090002 HH PPS REVENUE DEBIT

## 2017-03-02 ENCOUNTER — HOME CARE VISIT (OUTPATIENT)
Dept: SCHEDULING | Facility: HOME HEALTH | Age: 82
End: 2017-03-02
Payer: MEDICARE

## 2017-03-02 VITALS
HEART RATE: 76 BPM | RESPIRATION RATE: 17 BRPM | DIASTOLIC BLOOD PRESSURE: 76 MMHG | SYSTOLIC BLOOD PRESSURE: 124 MMHG | TEMPERATURE: 98 F

## 2017-03-02 PROCEDURE — G0151 HHCP-SERV OF PT,EA 15 MIN: HCPCS

## 2017-03-02 PROCEDURE — 3331090001 HH PPS REVENUE CREDIT

## 2017-03-02 PROCEDURE — G0152 HHCP-SERV OF OT,EA 15 MIN: HCPCS

## 2017-03-02 PROCEDURE — 3331090002 HH PPS REVENUE DEBIT

## 2017-03-03 ENCOUNTER — HOME CARE VISIT (OUTPATIENT)
Dept: SCHEDULING | Facility: HOME HEALTH | Age: 82
End: 2017-03-03
Payer: MEDICARE

## 2017-03-03 VITALS
OXYGEN SATURATION: 97 % | TEMPERATURE: 98.8 F | HEART RATE: 50 BPM | SYSTOLIC BLOOD PRESSURE: 118 MMHG | RESPIRATION RATE: 16 BRPM | DIASTOLIC BLOOD PRESSURE: 80 MMHG

## 2017-03-03 PROCEDURE — 3331090001 HH PPS REVENUE CREDIT

## 2017-03-03 PROCEDURE — 3331090002 HH PPS REVENUE DEBIT

## 2017-03-04 PROCEDURE — 3331090001 HH PPS REVENUE CREDIT

## 2017-03-04 PROCEDURE — 3331090002 HH PPS REVENUE DEBIT

## 2017-03-05 PROCEDURE — 3331090001 HH PPS REVENUE CREDIT

## 2017-03-05 PROCEDURE — 3331090002 HH PPS REVENUE DEBIT

## 2017-03-06 ENCOUNTER — HOME CARE VISIT (OUTPATIENT)
Dept: SCHEDULING | Facility: HOME HEALTH | Age: 82
End: 2017-03-06
Payer: MEDICARE

## 2017-03-06 PROCEDURE — 3331090001 HH PPS REVENUE CREDIT

## 2017-03-06 PROCEDURE — 3331090002 HH PPS REVENUE DEBIT

## 2017-03-06 PROCEDURE — G0153 HHCP-SVS OF S/L PATH,EA 15MN: HCPCS

## 2017-03-07 ENCOUNTER — HOME CARE VISIT (OUTPATIENT)
Dept: SCHEDULING | Facility: HOME HEALTH | Age: 82
End: 2017-03-07
Payer: MEDICARE

## 2017-03-07 ENCOUNTER — OFFICE VISIT (OUTPATIENT)
Dept: NEUROLOGY | Age: 82
End: 2017-03-07

## 2017-03-07 VITALS
OXYGEN SATURATION: 98 % | DIASTOLIC BLOOD PRESSURE: 80 MMHG | HEART RATE: 62 BPM | BODY MASS INDEX: 24.59 KG/M2 | SYSTOLIC BLOOD PRESSURE: 140 MMHG | HEIGHT: 69 IN | WEIGHT: 166 LBS

## 2017-03-07 VITALS
DIASTOLIC BLOOD PRESSURE: 68 MMHG | TEMPERATURE: 98.3 F | HEART RATE: 55 BPM | OXYGEN SATURATION: 96 % | SYSTOLIC BLOOD PRESSURE: 123 MMHG

## 2017-03-07 VITALS
DIASTOLIC BLOOD PRESSURE: 80 MMHG | HEART RATE: 62 BPM | SYSTOLIC BLOOD PRESSURE: 140 MMHG | OXYGEN SATURATION: 98 % | RESPIRATION RATE: 16 BRPM

## 2017-03-07 DIAGNOSIS — R56.9 SEIZURES (HCC): Primary | ICD-10-CM

## 2017-03-07 DIAGNOSIS — R47.01 APHASIA: ICD-10-CM

## 2017-03-07 PROCEDURE — 3331090001 HH PPS REVENUE CREDIT

## 2017-03-07 PROCEDURE — 3331090002 HH PPS REVENUE DEBIT

## 2017-03-07 PROCEDURE — G0151 HHCP-SERV OF PT,EA 15 MIN: HCPCS

## 2017-03-07 PROCEDURE — G0152 HHCP-SERV OF OT,EA 15 MIN: HCPCS

## 2017-03-07 RX ORDER — LEVETIRACETAM 1000 MG/1
1000 TABLET ORAL 2 TIMES DAILY
Qty: 60 TAB | Refills: 5 | Status: SHIPPED | OUTPATIENT
Start: 2017-03-07 | End: 2017-07-22

## 2017-03-07 NOTE — MR AVS SNAPSHOT
Visit Information Date & Time Provider Department Dept. Phone Encounter #  
 3/7/2017  9:00 AM Shonda Bernstein MD Neurology Clinic at [facility] [phone] [id number] Upcoming Health Maintenance Date Due DTaP/Tdap/Td series (1 - Tdap) 12/24/1955 ZOSTER VACCINE AGE 60> 12/24/1994 GLAUCOMA SCREENING Q2Y 12/24/1999 MEDICARE YEARLY EXAM 12/24/1999 Pneumococcal 65+ Low/Medium Risk (2 of 2 - PPSV23) 9/7/2017 Allergies as of 3/7/2017  Review Complete On: 3/7/2017 By: Shonda Bernstein MD  
  
 Severity Noted Reaction Type Reactions No Known Allergies  02/25/2017 Current Immunizations  Reviewed on 9/3/2012 Name Date Influenza Vaccine (Quad) PF 2/21/2017 11:27 AM  
 Influenza Vaccine Split 9/7/2012  1:50 PM  
 Pneumococcal Vaccine (Unspecified Type) 9/7/2012  1:54 PM  
  
 Not reviewed this visit Vitals BP Pulse Height(growth percentile) Weight(growth percentile) SpO2 BMI  
 140/80 62 5' 9\" (1.753 m) 166 lb (75.3 kg) 98% 24.51 kg/m2 Smoking Status Never Smoker Vitals History BMI and BSA Data Body Mass Index Body Surface Area 24.51 kg/m 2 1.91 m 2 Preferred Pharmacy Pharmacy Name Phone [phone] [place] [place] [phone] Your Updated Medication List  
  
   
This list is accurate as of: 3/7/17  9:41 AM.  Always use your most recent med list.  
  
  
  
  
 aspirin 325 mg tablet Commonly known as:  ASPIRIN Take 1 Tab by mouth daily. atorvastatin 40 mg tablet Commonly known as:  LIPITOR Take 1 Tab by mouth daily. levETIRAcetam 1,000 mg tablet Take 1 Tab by mouth two (2) times a day. magnesium oxide 400 mg tablet Commonly known as:  MAG-OX Take 400 mg by mouth daily. metoprolol succinate 25 mg XL tablet Commonly known as:  TOPROL-XL Take  by mouth daily. omeprazole 40 mg capsule Commonly known as:  PRILOSEC Take 40 mg by mouth daily. pregabalin 300 mg capsule Commonly known as:  Susie Torres Take 1 Cap by mouth two (2) times a day. Max Daily Amount: 600 mg.  
  
 thiamine 100 mg tablet Commonly known as:  B-1 Take 1 Tab by mouth daily. Prescriptions Sent to Pharmacy Refills  
 levETIRAcetam 1,000 mg tablet 5 Sig: Take 1 Tab by mouth two (2) times a day. Class: Normal  
 [facility] [place] [address] Ph #: [phone] Route: Oral  
  
To-Do List   
 03/07/2017 1:30 PM  
  Appointment with Peggy Fowler PT at [place] [address]  
  
 03/07/2017 1:30 PM  
  Appointment with Jessica Velázquez OT at [place] [address]  
  
 03/09/2017 To Be Determined Appointment with Peter Galloway. RICHARD Montgomery at [place] [address]  
  
 03/09/2017 To Be Determined Appointment with Peggy Fowler PT at [place] [address]  
  
 03/09/2017 1:45 PM  
  Appointment with FABIANO Jeffries at [place] [address]  
  
 03/10/2017 10:00 AM  
  Appointment with Jessica Velázquez OT at [place] [address]  
  
 03/13/2017 To Be Determined Appointment with FABIANO Jeffries at [place] [address]  
  
 03/14/2017 To Be Determined Appointment with Jessica Velázquez OT at [place] [address]  
  
 03/14/2017 To Be Determined Appointment with Peggy Fowler PT at [place] [address]  
  
 03/15/2017 To Be Determined Appointment with FABIANO Jeffries at [place] [address]  
  
 03/15/2017 To Be Determined Appointment with Peter Montgomery RN at [place] [address]  
  
 03/16/2017 To Be Determined Appointment with Yenni Nova PT at [place] [address]  
  
 03/20/2017 To Be Determined Appointment with Susi Cabot, SLP at [place] [address]  
  
 03/21/2017 To Be Determined Appointment with Yenni Nova PT at [place] [address]  
  
 03/22/2017 To Be Determined Appointment with Susi Cabot, SLP at [place] [address]  
  
 03/23/2017 To Be Determined Appointment with Yenni Nova PT at [place] [address] Patient Instructions PRESCRIPTION REFILL POLICY [facility] Statement to Patients April 1, 2014 In an effort to ensure the large volume of patient prescription refills is processed in the most efficient and expeditious manner, we are asking our patients to assist us by calling your Pharmacy for all prescription refills, this will include also your  Mail Order Pharmacy. The pharmacy will contact our office electronically to continue the refill process. Please do not wait until the last minute to call your pharmacy. We need at least 48 hours (2days) to fill prescriptions. We also encourage you to call your pharmacy before going to  your prescription to make sure it is ready. With regard to controlled substance prescription refill requests (narcotic refills) that need to be picked up at our office, we ask your cooperation by providing us with at least 72 hours (3days) notice that you will need a refill. We will not refill narcotic prescription refill requests after 4:00pm on any weekday, Monday through Thursday, or after 2:00pm on Fridays, or on the weekends. We encourage everyone to explore another way of getting your prescription refill request processed using [employer], our patient web portal through our electronic medical record system.  [employer] is an efficient and effective way to communicate your medication request directly to the office and  downloadable as an karen on your smart phone . [employer] also features a review functionality that allows you to view your medication list as well as leave messages for your physician. Are you ready to get connected? If so please review the attatched instructions or speak to any of our staff to get you set up right away! Thank you so much for your cooperation. Should you have any questions please contact our Practice Administrator. The Physicians and Staff,  [facility] Patient Instructions/Plans: 
Please try tonic water (with quinine) 4-8 ounces per day for leg cramping Introducing [facility] SERVICES! Hiral Martinezuel introduces [employer] patient portal. Now you can access parts of your medical record, email your doctor's office, and request medication refills online. 1. In your internet browser, go to [email or web address] "Shahab [email or web address]. Tabatabai, Broker"/[email or web address] 2. Click on the First Time User? Click Here link in the Sign In box. You will see the New Member Sign Up page. 3. Enter your [employer] Access Code exactly as it appears below. You will not need to use this code after youve completed the sign-up process. If you do not sign up before the expiration date, you must request a new code. · [employer] Access Code: [email or web address] Expires: 5/19/2017  1:03 PM 
 
4. Enter the last four digits of your Social Security Number (xxxx) and Date of Birth (mm/dd/yyyy) as indicated and click Submit. You will be taken to the next sign-up page. 5. Create a Active International[employer] ID. This will be your [employer] login ID and cannot be changed, so think of one that is secure and easy to remember. 6. Create a [employer] password. You can change your password at any time. 7. Enter your Password Reset Question and Answer. This can be used at a later time if you forget your password. 8. Enter your e-mail address.  You will receive e-mail notification when new information is available in [facility]. 9. Click Sign Up. You can now view and download portions of your medical record. 10. Click the Download Summary menu link to download a portable copy of your medical information. If you have questions, please visit the Frequently Asked Questions section of the [employer] [facility] website. Remember, [facility] is NOT to be used for urgent needs. For medical emergencies, dial 911. Now available from your iPhone and Android! Please provide this summary of care documentation to your next provider. Your primary care clinician is listed as Hosea Martinez. If you have any questions after today's visit, please call [phone].

## 2017-03-07 NOTE — PATIENT INSTRUCTIONS
[facility]   Statement to Patients  April 1, 2014      In an effort to ensure the large volume of patient prescription refills is processed in the most efficient and expeditious manner, we are asking our patients to assist us by calling your Pharmacy for all prescription refills, this will include also your  Mail Order Pharmacy. The pharmacy will contact our office electronically to continue the refill process. Please do not wait until the last minute to call your pharmacy. We need at least 48 hours (2days) to fill prescriptions. We also encourage you to call your pharmacy before going to  your prescription to make sure it is ready. With regard to controlled substance prescription refill requests (narcotic refills) that need to be picked up at our office, we ask your cooperation by providing us with at least 72 hours (3days) notice that you will need a refill. We will not refill narcotic prescription refill requests after 4:00pm on any weekday, Monday through Thursday, or after 2:00pm on Fridays, or on the weekends. We encourage everyone to explore another way of getting your prescription refill request processed using [employer], our patient web portal through our electronic medical record system. [employer] is an efficient and effective way to communicate your medication request directly to the office and  downloadable as an kaern on your smart phone . [employer] also features a review functionality that allows you to view your medication list as well as leave messages for your physician. Are you ready to get connected? If so please review the attatched instructions or speak to any of our staff to get you set up right away! Thank you so much for your cooperation. Should you have any questions please contact our Practice Administrator.     The Physicians and Staff,  [facility]     Patient Instructions/Plans:  Please try tonic water (with quinine) 4-8 ounces per day for leg cramping

## 2017-03-07 NOTE — LETTER
Neurology Progress Note Patient ID: 
Shanae Baum [id number] 
76 y.o. 
1934 Chief Complaint: Hospital follow-up Subjective:  
 
Mr. Patria Primrose presents with his wife today for follow-up after hospital.  He was recently hospitalized in February for a breakthrough seizure. 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  He was seizure-free for 5 years prior to this admission. Following his seizure he had a period of a aphasia. This is also improved. In the hospital patient did have an MRI of the brain that was negative for stroke. His Keppra was increased to 1 g twice daily. It is unclear if patient was not compliant with his medication prior to the seizure. His wife is now trying to help with his medications to ensure this does not happen. Patient also has a history of leg pain. He takes Lyrica 300 mg twice daily for this. Patient does endorse some leg cramping in the evenings. We discussed trying gabapentin, however since he is on Lyrica, will try just quinine water. Patient denies any focal numbness or weakness at this time. Patient is continuing with home PT/OT/ST. Objective: All records in [facility] reviewed and noted ROS: 
Per HPI All other 12 pt ROS negative Meds: 
Current Outpatient Prescriptions Medication Sig  pregabalin (LYRICA) 300 mg capsule Take 1 Cap by mouth two (2) times a day. Max Daily Amount: 600 mg.  levETIRAcetam 1,000 mg tablet Take 1 Tab by mouth two (2) times a day.  thiamine (B-1) 100 mg tablet Take 1 Tab by mouth daily.  atorvastatin (LIPITOR) 40 mg tablet Take 1 Tab by mouth daily.  metoprolol succinate (TOPROL-XL) 25 mg XL tablet Take  by mouth daily.  omeprazole (PRILOSEC) 40 mg capsule Take 40 mg by mouth daily.  aspirin (ASPIRIN) 325 mg tablet Take 1 Tab by mouth daily.  magnesium oxide (MAG-OX) 400 mg tablet Take 400 mg by mouth daily. No current facility-administered medications for this visit. Imaging: EEG with left frontotemporal sharps with no seizures MRI of the brain: Negative for acute stroke Lab Review Results for orders placed or performed during the hospital encounter of 02/18/17 CULTURE, URINE Result Value Ref Range Special Requests: NO SPECIAL REQUESTS [place] Count >100,000 COLONIES/mL Culture result: DIPHTHEROIDS 
(>100,000 COLONIES/mL) 
 (A) Culture result: (A) STAPHYLOCOCCUS SPECIES, COAGULASE NEGATIVE 
(POSSIBLE 2 COLONY TYPES/STRAINS) 
(10,000 COLONIES/mL) Culture result: (A) PROBABLE 
ENTEROCOCCUS SPECIES 
(4,000 COLONIES/mL) CBC WITH AUTOMATED DIFF Result Value Ref Range WBC 4.5 4.1 - 11.1 K/uL  
 RBC 4.57 4.10 - 5.70 M/uL  
 HGB 14.9 12.1 - 17.0 g/dL HCT 43.8 36.6 - 50.3 % MCV 95.8 80.0 - 99.0 FL  
 MCH 32.6 26.0 - 34.0 PG  
 MCHC 34.0 30.0 - 36.5 g/dL  
 RDW 12.0 11.5 - 14.5 % PLATELET 243 (L) 110 - 400 K/uL NEUTROPHILS 71 32 - 75 % LYMPHOCYTES 20 12 - 49 % MONOCYTES 9 5 - 13 % EOSINOPHILS 0 0 - 7 % BASOPHILS 0 0 - 1 %  
 ABS. NEUTROPHILS 3.2 1.8 - 8.0 K/UL  
 ABS. LYMPHOCYTES 0.9 0.8 - 3.5 K/UL  
 ABS. MONOCYTES 0.4 0.0 - 1.0 K/UL  
 ABS. EOSINOPHILS 0.0 0.0 - 0.4 K/UL  
 ABS. BASOPHILS 0.0 0.0 - 0.1 K/UL METABOLIC PANEL, COMPREHENSIVE Result Value Ref Range Sodium 137 136 - 145 mmol/L Potassium 5.1 3.5 - 5.1 mmol/L Chloride 102 97 - 108 mmol/L  
 CO2 30 21 - 32 mmol/L Anion gap 5 5 - 15 mmol/L Glucose 108 (H) 65 - 100 mg/dL BUN 16 6 - 20 MG/DL Creatinine 1.19 0.70 - 1.30 MG/DL  
 BUN/Creatinine ratio 13 12 - 20 GFR est AA >60 >60 ml/min/1.73m2 GFR est non-AA 59 (L) >60 ml/min/1.73m2 Calcium 8.7 8.5 - 10.1 MG/DL Bilirubin, total 0.6 0.2 - 1.0 MG/DL  
 ALT (SGPT) 16 12 - 78 U/L  
 AST (SGOT) 21 15 - 37 U/L Alk. phosphatase 80 45 - 117 U/L Protein, total 7.8 6.4 - 8.2 g/dL Albumin 4.1 3.5 - 5.0 g/dL Globulin 3.7 2.0 - 4.0 g/dL A-G Ratio 1.1 1.1 - 2.2 CK W/ CKMB & INDEX Result Value Ref Range  (H) 39 - 308 U/L  
 CK - MB 2.4 <3.6 NG/ML  
 CK-MB Index 0.8 0 - 2.5    
TROPONIN I Result Value Ref Range Troponin-I, Qt. 0.04 <0.05 ng/mL MAGNESIUM Result Value Ref Range Magnesium 1.6 1.6 - 2.4 mg/dL ETHYL ALCOHOL Result Value Ref Range ALCOHOL(ETHYL),SERUM <10 <10 MG/DL  
LEVETIRACETAM (KEPPRA) Result Value Ref Range Levetiracetam (Keppra) 21.1 10.0 - 40.0 ug/mL URINALYSIS W/MICROSCOPIC Result Value Ref Range Color YELLOW/STRAW Appearance CLEAR CLEAR Specific gravity 1.015 1.003 - 1.030    
 pH (UA) 5.5 5.0 - 8.0 Protein NEGATIVE  NEG mg/dL Glucose NEGATIVE  NEG mg/dL Ketone 15 (A) NEG mg/dL Bilirubin NEGATIVE  NEG Blood TRACE (A) NEG Urobilinogen 0.2 0.2 - 1.0 EU/dL Nitrites NEGATIVE  NEG Leukocyte Esterase NEGATIVE  NEG    
 WBC 0-4 0 - 4 /hpf  
 RBC 5-10 0 - 5 /hpf Epithelial cells FEW FEW /lpf Bacteria NEGATIVE  NEG /hpf METABOLIC PANEL, COMPREHENSIVE Result Value Ref Range Sodium 137 136 - 145 mmol/L Potassium 4.4 3.5 - 5.1 mmol/L Chloride 101 97 - 108 mmol/L  
 CO2 29 21 - 32 mmol/L Anion gap 7 5 - 15 mmol/L Glucose 106 (H) 65 - 100 mg/dL BUN 17 6 - 20 MG/DL Creatinine 1.10 0.70 - 1.30 MG/DL  
 BUN/Creatinine ratio 15 12 - 20 GFR est AA >60 >60 ml/min/1.73m2 GFR est non-AA >60 >60 ml/min/1.73m2 Calcium 8.9 8.5 - 10.1 MG/DL Bilirubin, total 1.1 (H) 0.2 - 1.0 MG/DL  
 ALT (SGPT) 14 12 - 78 U/L  
 AST (SGOT) 21 15 - 37 U/L Alk. phosphatase 73 45 - 117 U/L Protein, total 7.6 6.4 - 8.2 g/dL Albumin 3.6 3.5 - 5.0 g/dL Globulin 4.0 2.0 - 4.0 g/dL A-G Ratio 0.9 (L) 1.1 - 2.2    
CBC WITH AUTOMATED DIFF Result Value Ref Range WBC 10.2 4.1 - 11.1 K/uL  
 RBC 4.29 4. 10 - 5.70 M/uL  
 HGB 14.1 12.1 - 17.0 g/dL HCT 40.5 36.6 - 50.3 % MCV 94.4 80.0 - 99.0 FL  
 MCH 32.9 26.0 - 34.0 PG  
 MCHC 34.8 30.0 - 36.5 g/dL RDW 12.0 11.5 - 14.5 % PLATELET 929 (L) 905 - 400 K/uL NEUTROPHILS 73 32 - 75 % LYMPHOCYTES 19 12 - 49 % MONOCYTES 8 5 - 13 % EOSINOPHILS 0 0 - 7 % BASOPHILS 0 0 - 1 %  
 ABS. NEUTROPHILS 7.5 1.8 - 8.0 K/UL  
 ABS. LYMPHOCYTES 1.9 0.8 - 3.5 K/UL  
 ABS. MONOCYTES 0.8 0.0 - 1.0 K/UL  
 ABS. EOSINOPHILS 0.0 0.0 - 0.4 K/UL  
 ABS. BASOPHILS 0.0 0.0 - 0.1 K/UL  
CK W/ CKMB & INDEX Result Value Ref Range  (H) 39 - 308 U/L  
 CK - MB 4.8 (H) <3.6 NG/ML  
 CK-MB Index 1.3 0 - 2.5    
TROPONIN I Result Value Ref Range Troponin-I, Qt. 0.88 (H) <0.05 ng/mL VITAMIN B12 Result Value Ref Range Vitamin B12 1371 (H) 211 - 911 pg/mL LIPID PANEL Result Value Ref Range LIPID PROFILE Cholesterol, total 183 <200 MG/DL Triglyceride 46 <150 MG/DL  
 HDL Cholesterol 80 MG/DL  
 LDL, calculated 93.8 0 - 100 MG/DL VLDL, calculated 9.2 MG/DL  
 CHOL/HDL Ratio 2.3 0 - 5.0 HEMOGLOBIN A1C WITH EAG Result Value Ref Range Hemoglobin A1c 5.0 4.2 - 6.3 % Est. average glucose 97 mg/dL MAGNESIUM Result Value Ref Range Magnesium 2.1 1.6 - 2.4 mg/dL TSH 3RD GENERATION Result Value Ref Range TSH 0.35 (L) 0.36 - 3.74 uIU/mL  
TROPONIN I Result Value Ref Range Troponin-I, Qt. 0.26 (H) <0.05 ng/mL METABOLIC PANEL, BASIC Result Value Ref Range Sodium 138 136 - 145 mmol/L Potassium 4.3 3.5 - 5.1 mmol/L Chloride 103 97 - 108 mmol/L  
 CO2 28 21 - 32 mmol/L Anion gap 7 5 - 15 mmol/L Glucose 90 65 - 100 mg/dL BUN 15 6 - 20 MG/DL Creatinine 1.03 0.70 - 1.30 MG/DL  
 BUN/Creatinine ratio 15 12 - 20 GFR est AA >60 >60 ml/min/1.73m2 GFR est non-AA >60 >60 ml/min/1.73m2 Calcium 8.6 8.5 - 10.1 MG/DL  
POC INR Result Value Ref Range INR (POC) 1.1 <1.2 GLUCOSE, POC Result Value Ref Range Glucose (POC) 97 65 - 100 mg/dL Performed by Louanna Blizzard Exam: 
Visit Vitals  /80  Pulse 62  Ht 5' 9\" (1.753 m)  Wt 166 lb (75.3 kg)  SpO2 98%  BMI 24.51 kg/m2 Gen: Well developed CV: RRR Lungs: non labored breathing Abd: non distending Neuro: Alert and oriented ×3, no aphasia CN II-XII: PERRL, EOMI, face symmetric, tongue/palate midline Motor: strength 5/5 all four ext Sensory: intact to LT Assessment:  
 
Patient Active Problem List  
Diagnosis Code  Encephalopathy, unspecified G93.40  SVT (supraventricular tachycardia) I47.1  Aphasia R47.01  
 CVA (cerebral vascular accident) ([facility] I63.9  Seizure disorder ([facility] [address] G40.909  [facility] R649  
 
35-year-old gentleman who presents for follow-up of seizures. Seizures were in the setting of alcohol withdrawal.  However patient has not had a drink in over 5 years and had a breakthrough seizure. Keppra was increased. Will continue this increased dose for the next 6 months and consider decreasing at follow-up. It is not clear if patient was compliant with medication or not prior to the event. Family is not taking care of his medication. We will try quinine water for leg cramping. Plan: 1. MRI of the brain negative as above. 2.  EEG with left frontal temporal sharps but no seizures 3. Continue Keppra 1 g twice daily 4. Try quinine water for leg cramping 5. Continue aspirin and statin for stroke prevention 6. Continue PT/OT/ST 7.  [place]. Patient cannot drive until August. 
 
Follow-up in August to be released to drive Signed: 
Ana Riddle MD 
3/7/2017 
4:56 PM 
 
Over 40 minutes was spent with the patient and family of which > 50% of the visit was spent counseling on diagnosis, management, and treatment of the diagnosis seizures This note was created using voice recognition software. Despite editing, there may be syntax errors. This note will not be viewable in [address]

## 2017-03-07 NOTE — PROGRESS NOTES
Neurology Progress Note    Patient ID:  Sommer Mclain  [id number]  27 y.o.  1934    Chief Complaint: Hospital follow-up    Subjective:     Mr. Elena Perez presents with his wife today for follow-up after hospital.  He was recently hospitalized in February for a breakthrough seizure. 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  He was seizure-free for 5 years prior to this admission. Following his seizure he had a period of a aphasia. This is also improved. In the hospital patient did have an MRI of the brain that was negative for stroke. His Keppra was increased to 1 g twice daily. It is unclear if patient was not compliant with his medication prior to the seizure. His wife is now trying to help with his medications to ensure this does not happen. Patient also has a history of leg pain. He takes Lyrica 300 mg twice daily for this. Patient does endorse some leg cramping in the evenings. We discussed trying gabapentin, however since he is on Lyrica, will try just quinine water. Patient denies any focal numbness or weakness at this time. Patient is continuing with home PT/OT/ST. Objective: All records in [facility] reviewed and noted    ROS:  Per HPI  All other 12 pt ROS negative    Meds:  Current Outpatient Prescriptions   Medication Sig    pregabalin (LYRICA) 300 mg capsule Take 1 Cap by mouth two (2) times a day. Max Daily Amount: 600 mg.  levETIRAcetam 1,000 mg tablet Take 1 Tab by mouth two (2) times a day.  thiamine (B-1) 100 mg tablet Take 1 Tab by mouth daily.  atorvastatin (LIPITOR) 40 mg tablet Take 1 Tab by mouth daily.  metoprolol succinate (TOPROL-XL) 25 mg XL tablet Take  by mouth daily.  omeprazole (PRILOSEC) 40 mg capsule Take 40 mg by mouth daily.  aspirin (ASPIRIN) 325 mg tablet Take 1 Tab by mouth daily.  magnesium oxide (MAG-OX) 400 mg tablet Take 400 mg by mouth daily. No current facility-administered medications for this visit. Imaging:  EEG with left frontotemporal sharps with no seizures  MRI of the brain: Negative for acute stroke  Lab Review   Results for orders placed or performed during the hospital encounter of 02/18/17   CULTURE, URINE   Result Value Ref Range    Special Requests: NO SPECIAL REQUESTS      [place] Count >100,000  COLONIES/mL        Culture result: DIPHTHEROIDS  (>100,000 COLONIES/mL)   (A)      Culture result: (A)       STAPHYLOCOCCUS SPECIES, COAGULASE NEGATIVE  (POSSIBLE 2 COLONY TYPES/STRAINS)  (10,000 COLONIES/mL)      Culture result: (A)       PROBABLE  ENTEROCOCCUS SPECIES  (4,000 COLONIES/mL)     CBC WITH AUTOMATED DIFF   Result Value Ref Range    WBC 4.5 4.1 - 11.1 K/uL    RBC 4.57 4.10 - 5.70 M/uL    HGB 14.9 12.1 - 17.0 g/dL    HCT 43.8 36.6 - 50.3 %    MCV 95.8 80.0 - 99.0 FL    MCH 32.6 26.0 - 34.0 PG    MCHC 34.0 30.0 - 36.5 g/dL    RDW 12.0 11.5 - 14.5 %    PLATELET 831 (L) 292 - 400 K/uL    NEUTROPHILS 71 32 - 75 %    LYMPHOCYTES 20 12 - 49 %    MONOCYTES 9 5 - 13 %    EOSINOPHILS 0 0 - 7 %    BASOPHILS 0 0 - 1 %    ABS. NEUTROPHILS 3.2 1.8 - 8.0 K/UL    ABS. LYMPHOCYTES 0.9 0.8 - 3.5 K/UL    ABS. MONOCYTES 0.4 0.0 - 1.0 K/UL    ABS. EOSINOPHILS 0.0 0.0 - 0.4 K/UL    ABS. BASOPHILS 0.0 0.0 - 0.1 K/UL   METABOLIC PANEL, COMPREHENSIVE   Result Value Ref Range    Sodium 137 136 - 145 mmol/L    Potassium 5.1 3.5 - 5.1 mmol/L    Chloride 102 97 - 108 mmol/L    CO2 30 21 - 32 mmol/L    Anion gap 5 5 - 15 mmol/L    Glucose 108 (H) 65 - 100 mg/dL    BUN 16 6 - 20 MG/DL    Creatinine 1.19 0.70 - 1.30 MG/DL    BUN/Creatinine ratio 13 12 - 20      GFR est AA >60 >60 ml/min/1.73m2    GFR est non-AA 59 (L) >60 ml/min/1.73m2    Calcium 8.7 8.5 - 10.1 MG/DL    Bilirubin, total 0.6 0.2 - 1.0 MG/DL    ALT (SGPT) 16 12 - 78 U/L    AST (SGOT) 21 15 - 37 U/L    Alk.  phosphatase 80 45 - 117 U/L    Protein, total 7.8 6.4 - 8.2 g/dL    Albumin 4.1 3.5 - 5.0 g/dL    Globulin 3.7 2.0 - 4.0 g/dL    A-G Ratio 1.1 1.1 - 2.2 CK W/ CKMB & INDEX   Result Value Ref Range     (H) 39 - 308 U/L    CK - MB 2.4 <3.6 NG/ML    CK-MB Index 0.8 0 - 2.5     TROPONIN I   Result Value Ref Range    Troponin-I, Qt. 0.04 <0.05 ng/mL   MAGNESIUM   Result Value Ref Range    Magnesium 1.6 1.6 - 2.4 mg/dL   ETHYL ALCOHOL   Result Value Ref Range    ALCOHOL(ETHYL),SERUM <10 <10 MG/DL   LEVETIRACETAM (KEPPRA)   Result Value Ref Range    Levetiracetam (Keppra) 21.1 10.0 - 40.0 ug/mL   URINALYSIS W/MICROSCOPIC   Result Value Ref Range    Color YELLOW/STRAW      Appearance CLEAR CLEAR      Specific gravity 1.015 1.003 - 1.030      pH (UA) 5.5 5.0 - 8.0      Protein NEGATIVE  NEG mg/dL    Glucose NEGATIVE  NEG mg/dL    Ketone 15 (A) NEG mg/dL    Bilirubin NEGATIVE  NEG      Blood TRACE (A) NEG      Urobilinogen 0.2 0.2 - 1.0 EU/dL    Nitrites NEGATIVE  NEG      Leukocyte Esterase NEGATIVE  NEG      WBC 0-4 0 - 4 /hpf    RBC 5-10 0 - 5 /hpf    Epithelial cells FEW FEW /lpf    Bacteria NEGATIVE  NEG /hpf   METABOLIC PANEL, COMPREHENSIVE   Result Value Ref Range    Sodium 137 136 - 145 mmol/L    Potassium 4.4 3.5 - 5.1 mmol/L    Chloride 101 97 - 108 mmol/L    CO2 29 21 - 32 mmol/L    Anion gap 7 5 - 15 mmol/L    Glucose 106 (H) 65 - 100 mg/dL    BUN 17 6 - 20 MG/DL    Creatinine 1.10 0.70 - 1.30 MG/DL    BUN/Creatinine ratio 15 12 - 20      GFR est AA >60 >60 ml/min/1.73m2    GFR est non-AA >60 >60 ml/min/1.73m2    Calcium 8.9 8.5 - 10.1 MG/DL    Bilirubin, total 1.1 (H) 0.2 - 1.0 MG/DL    ALT (SGPT) 14 12 - 78 U/L    AST (SGOT) 21 15 - 37 U/L    Alk. phosphatase 73 45 - 117 U/L    Protein, total 7.6 6.4 - 8.2 g/dL    Albumin 3.6 3.5 - 5.0 g/dL    Globulin 4.0 2.0 - 4.0 g/dL    A-G Ratio 0.9 (L) 1.1 - 2.2     CBC WITH AUTOMATED DIFF   Result Value Ref Range    WBC 10.2 4.1 - 11.1 K/uL    RBC 4.29 4. 10 - 5.70 M/uL    HGB 14.1 12.1 - 17.0 g/dL    HCT 40.5 36.6 - 50.3 %    MCV 94.4 80.0 - 99.0 FL    MCH 32.9 26.0 - 34.0 PG    MCHC 34.8 30.0 - 36.5 g/dL    RDW 12.0 11.5 - 14.5 %    PLATELET 261 (L) 547 - 400 K/uL    NEUTROPHILS 73 32 - 75 %    LYMPHOCYTES 19 12 - 49 %    MONOCYTES 8 5 - 13 %    EOSINOPHILS 0 0 - 7 %    BASOPHILS 0 0 - 1 %    ABS. NEUTROPHILS 7.5 1.8 - 8.0 K/UL    ABS. LYMPHOCYTES 1.9 0.8 - 3.5 K/UL    ABS. MONOCYTES 0.8 0.0 - 1.0 K/UL    ABS. EOSINOPHILS 0.0 0.0 - 0.4 K/UL    ABS.  BASOPHILS 0.0 0.0 - 0.1 K/UL   CK W/ CKMB & INDEX   Result Value Ref Range     (H) 39 - 308 U/L    CK - MB 4.8 (H) <3.6 NG/ML    CK-MB Index 1.3 0 - 2.5     TROPONIN I   Result Value Ref Range    Troponin-I, Qt. 0.88 (H) <0.05 ng/mL   VITAMIN B12   Result Value Ref Range    Vitamin B12 1371 (H) 211 - 911 pg/mL   LIPID PANEL   Result Value Ref Range    LIPID PROFILE          Cholesterol, total 183 <200 MG/DL    Triglyceride 46 <150 MG/DL    HDL Cholesterol 80 MG/DL    LDL, calculated 93.8 0 - 100 MG/DL    VLDL, calculated 9.2 MG/DL    CHOL/HDL Ratio 2.3 0 - 5.0     HEMOGLOBIN A1C WITH EAG   Result Value Ref Range    Hemoglobin A1c 5.0 4.2 - 6.3 %    Est. average glucose 97 mg/dL   MAGNESIUM   Result Value Ref Range    Magnesium 2.1 1.6 - 2.4 mg/dL   TSH 3RD GENERATION   Result Value Ref Range    TSH 0.35 (L) 0.36 - 3.74 uIU/mL   TROPONIN I   Result Value Ref Range    Troponin-I, Qt. 0.26 (H) <5.86 ng/mL   METABOLIC PANEL, BASIC   Result Value Ref Range    Sodium 138 136 - 145 mmol/L    Potassium 4.3 3.5 - 5.1 mmol/L    Chloride 103 97 - 108 mmol/L    CO2 28 21 - 32 mmol/L    Anion gap 7 5 - 15 mmol/L    Glucose 90 65 - 100 mg/dL    BUN 15 6 - 20 MG/DL    Creatinine 1.03 0.70 - 1.30 MG/DL    BUN/Creatinine ratio 15 12 - 20      GFR est AA >60 >60 ml/min/1.73m2    GFR est non-AA >60 >60 ml/min/1.73m2    Calcium 8.6 8.5 - 10.1 MG/DL   POC INR   Result Value Ref Range    INR (POC) 1.1 <1.2     GLUCOSE, POC   Result Value Ref Range    Glucose (POC) 97 65 - 100 mg/dL    Performed by Esequiel Helm        Exam:  Visit Vitals    /80    Pulse 62    Ht 5' 9\" (1.753 m)    [place] 166 lb (75.3 kg)    SpO2 98%    BMI 24.51 kg/m2     Gen: Well developed  CV: RRR  Lungs: non labored breathing  Abd: non distending  Neuro: Alert and oriented ×3, no aphasia  CN II-XII: PERRL, EOMI, face symmetric, tongue/palate midline  Motor: strength 5/5 all four ext  Sensory: intact to LT      Assessment:     Patient Active Problem List   Diagnosis Code    Encephalopathy, unspecified G93.40    SVT (supraventricular tachycardia) I47.1    Aphasia R47.01    CVA (cerebral vascular accident) ([facility] [address] I63.9    Seizure disorder ([facility] [address] G40.909    [facility]     80-year-old gentleman who presents for follow-up of seizures. Seizures were in the setting of alcohol withdrawal.  However patient has not had a drink in over 5 years and had a breakthrough seizure. Keppra was increased. Will continue this increased dose for the next 6 months and consider decreasing at follow-up. It is not clear if patient was compliant with medication or not prior to the event. Family is not taking care of his medication. We will try quinine water for leg cramping. Plan:   1. MRI of the brain negative as above. 2.  EEG with left frontal temporal sharps but no seizures  3. Continue Keppra 1 g twice daily  4. Try quinine water for leg cramping  5. Continue aspirin and statin for stroke prevention  6. Continue PT/OT/ST  7.  [place] law discussed. Patient cannot drive until August.    Follow-up in August to be released to drive    Signed:  Marisa Fam MD  3/7/2017  4:56 PM    Over 40 minutes was spent with the patient and family of which > 50% of the visit was spent counseling on diagnosis, management, and treatment of the diagnosis seizures    This note was created using voice recognition software. Despite editing, there may be syntax errors. This note will not be viewable in [address]

## 2017-03-08 VITALS
DIASTOLIC BLOOD PRESSURE: 73 MMHG | SYSTOLIC BLOOD PRESSURE: 123 MMHG | OXYGEN SATURATION: 98 % | RESPIRATION RATE: 17 BRPM | TEMPERATURE: 98 F | HEART RATE: 73 BPM

## 2017-03-08 PROCEDURE — 3331090002 HH PPS REVENUE DEBIT

## 2017-03-08 PROCEDURE — 3331090001 HH PPS REVENUE CREDIT

## 2017-03-09 ENCOUNTER — HOME CARE VISIT (OUTPATIENT)
Dept: SCHEDULING | Facility: HOME HEALTH | Age: 82
End: 2017-03-09
Payer: MEDICARE

## 2017-03-09 ENCOUNTER — HOME CARE VISIT (OUTPATIENT)
Dept: HOME HEALTH SERVICES | Facility: HOME HEALTH | Age: 82
End: 2017-03-09
Payer: MEDICARE

## 2017-03-09 PROCEDURE — G0300 HHS/HOSPICE OF LPN EA 15 MIN: HCPCS

## 2017-03-09 PROCEDURE — G0151 HHCP-SERV OF PT,EA 15 MIN: HCPCS

## 2017-03-09 PROCEDURE — 3331090001 HH PPS REVENUE CREDIT

## 2017-03-09 PROCEDURE — G0153 HHCP-SVS OF S/L PATH,EA 15MN: HCPCS

## 2017-03-09 PROCEDURE — 3331090002 HH PPS REVENUE DEBIT

## 2017-03-10 ENCOUNTER — HOME CARE VISIT (OUTPATIENT)
Dept: SCHEDULING | Facility: HOME HEALTH | Age: 82
End: 2017-03-10
Payer: MEDICARE

## 2017-03-10 VITALS
OXYGEN SATURATION: 98 % | SYSTOLIC BLOOD PRESSURE: 125 MMHG | RESPIRATION RATE: 17 BRPM | HEART RATE: 70 BPM | DIASTOLIC BLOOD PRESSURE: 70 MMHG | TEMPERATURE: 98 F

## 2017-03-10 VITALS
DIASTOLIC BLOOD PRESSURE: 60 MMHG | SYSTOLIC BLOOD PRESSURE: 118 MMHG | HEART RATE: 55 BPM | RESPIRATION RATE: 16 BRPM | OXYGEN SATURATION: 96 %

## 2017-03-10 VITALS
SYSTOLIC BLOOD PRESSURE: 129 MMHG | OXYGEN SATURATION: 97 % | TEMPERATURE: 98.1 F | HEART RATE: 52 BPM | DIASTOLIC BLOOD PRESSURE: 73 MMHG

## 2017-03-10 PROCEDURE — G0152 HHCP-SERV OF OT,EA 15 MIN: HCPCS

## 2017-03-10 PROCEDURE — 3331090001 HH PPS REVENUE CREDIT

## 2017-03-10 PROCEDURE — 3331090002 HH PPS REVENUE DEBIT

## 2017-03-11 PROCEDURE — 3331090001 HH PPS REVENUE CREDIT

## 2017-03-11 PROCEDURE — 3331090002 HH PPS REVENUE DEBIT

## 2017-03-12 PROCEDURE — 3331090001 HH PPS REVENUE CREDIT

## 2017-03-12 PROCEDURE — 3331090002 HH PPS REVENUE DEBIT

## 2017-03-13 ENCOUNTER — HOME CARE VISIT (OUTPATIENT)
Dept: SCHEDULING | Facility: HOME HEALTH | Age: 82
End: 2017-03-13
Payer: MEDICARE

## 2017-03-13 VITALS
HEART RATE: 55 BPM | SYSTOLIC BLOOD PRESSURE: 145 MMHG | DIASTOLIC BLOOD PRESSURE: 87 MMHG | TEMPERATURE: 96.9 F | OXYGEN SATURATION: 92 %

## 2017-03-13 PROCEDURE — 3331090002 HH PPS REVENUE DEBIT

## 2017-03-13 PROCEDURE — 3331090001 HH PPS REVENUE CREDIT

## 2017-03-13 PROCEDURE — G0151 HHCP-SERV OF PT,EA 15 MIN: HCPCS

## 2017-03-13 PROCEDURE — G0153 HHCP-SVS OF S/L PATH,EA 15MN: HCPCS

## 2017-03-14 VITALS
SYSTOLIC BLOOD PRESSURE: 124 MMHG | TEMPERATURE: 98 F | HEART RATE: 70 BPM | DIASTOLIC BLOOD PRESSURE: 73 MMHG | OXYGEN SATURATION: 98 % | RESPIRATION RATE: 17 BRPM

## 2017-03-14 PROCEDURE — 3331090002 HH PPS REVENUE DEBIT

## 2017-03-14 PROCEDURE — 3331090001 HH PPS REVENUE CREDIT

## 2017-03-15 ENCOUNTER — HOME CARE VISIT (OUTPATIENT)
Dept: SCHEDULING | Facility: HOME HEALTH | Age: 82
End: 2017-03-15
Payer: MEDICARE

## 2017-03-15 VITALS
HEART RATE: 57 BPM | TEMPERATURE: 97.1 F | SYSTOLIC BLOOD PRESSURE: 131 MMHG | OXYGEN SATURATION: 92 % | DIASTOLIC BLOOD PRESSURE: 67 MMHG | RESPIRATION RATE: 18 BRPM

## 2017-03-15 PROCEDURE — G0153 HHCP-SVS OF S/L PATH,EA 15MN: HCPCS

## 2017-03-15 PROCEDURE — 3331090001 HH PPS REVENUE CREDIT

## 2017-03-15 PROCEDURE — 3331090002 HH PPS REVENUE DEBIT

## 2017-03-16 PROCEDURE — 3331090001 HH PPS REVENUE CREDIT

## 2017-03-16 PROCEDURE — 3331090002 HH PPS REVENUE DEBIT

## 2017-03-17 PROCEDURE — 3331090002 HH PPS REVENUE DEBIT

## 2017-03-17 PROCEDURE — 3331090001 HH PPS REVENUE CREDIT

## 2017-03-18 ENCOUNTER — HOSPITAL ENCOUNTER (EMERGENCY)
Age: 82
Discharge: HOME | End: 2017-03-18
Attending: EMERGENCY MEDICINE
Payer: MEDICARE

## 2017-03-18 ENCOUNTER — APPOINTMENT (OUTPATIENT)
Dept: GENERAL RADIOLOGY | Age: 82
End: 2017-03-18
Attending: EMERGENCY MEDICINE
Payer: MEDICARE

## 2017-03-18 VITALS
HEIGHT: 68 IN | HEART RATE: 64 BPM | RESPIRATION RATE: 16 BRPM | WEIGHT: 150 LBS | DIASTOLIC BLOOD PRESSURE: 71 MMHG | BODY MASS INDEX: 22.73 KG/M2 | OXYGEN SATURATION: 97 % | SYSTOLIC BLOOD PRESSURE: 133 MMHG

## 2017-03-18 DIAGNOSIS — S46.912A SHOULDER STRAIN, LEFT, INITIAL ENCOUNTER: ICD-10-CM

## 2017-03-18 DIAGNOSIS — R56.9 SEIZURE (HCC): Primary | ICD-10-CM

## 2017-03-18 LAB
ANION GAP BLD CALC-SCNC: 6 MMOL/L (ref 5–15)
BUN SERPL-MCNC: 15 MG/DL (ref 6–20)
BUN/CREAT SERPL: 13 (ref 12–20)
CALCIUM SERPL-MCNC: 9.3 MG/DL (ref 8.5–10.1)
CHLORIDE SERPL-SCNC: 101 MMOL/L (ref 97–108)
CO2 SERPL-SCNC: 32 MMOL/L (ref 21–32)
CREAT SERPL-MCNC: 1.13 MG/DL (ref 0.7–1.3)
ERYTHROCYTE [DISTWIDTH] IN BLOOD BY AUTOMATED COUNT: 12 % (ref 11.5–14.5)
GLUCOSE SERPL-MCNC: 91 MG/DL (ref 65–100)
HCT VFR BLD AUTO: 39.4 % (ref 36.6–50.3)
HGB BLD-MCNC: 13.4 G/DL (ref 12.1–17)
MCH RBC QN AUTO: 32.5 PG (ref 26–34)
MCHC RBC AUTO-ENTMCNC: 34 G/DL (ref 30–36.5)
MCV RBC AUTO: 95.6 FL (ref 80–99)
PLATELET # BLD AUTO: 107 K/UL (ref 150–400)
POTASSIUM SERPL-SCNC: 3.8 MMOL/L (ref 3.5–5.1)
RBC # BLD AUTO: 4.12 M/UL (ref 4.1–5.7)
SODIUM SERPL-SCNC: 139 MMOL/L (ref 136–145)
WBC # BLD AUTO: 5.3 K/UL (ref 4.1–11.1)

## 2017-03-18 PROCEDURE — 85027 COMPLETE CBC AUTOMATED: CPT | Performed by: EMERGENCY MEDICINE

## 2017-03-18 PROCEDURE — 73030 X-RAY EXAM OF SHOULDER: CPT

## 2017-03-18 PROCEDURE — 99284 EMERGENCY DEPT VISIT MOD MDM: CPT

## 2017-03-18 PROCEDURE — 80048 BASIC METABOLIC PNL TOTAL CA: CPT | Performed by: EMERGENCY MEDICINE

## 2017-03-18 PROCEDURE — 36415 COLL VENOUS BLD VENIPUNCTURE: CPT | Performed by: EMERGENCY MEDICINE

## 2017-03-18 PROCEDURE — 3331090002 HH PPS REVENUE DEBIT

## 2017-03-18 PROCEDURE — 74011250637 HC RX REV CODE- 250/637: Performed by: EMERGENCY MEDICINE

## 2017-03-18 PROCEDURE — 3331090001 HH PPS REVENUE CREDIT

## 2017-03-18 RX ORDER — LEVETIRACETAM 250 MG/1
250 TABLET ORAL
Status: COMPLETED | OUTPATIENT
Start: 2017-03-18 | End: 2017-03-18

## 2017-03-18 RX ORDER — CYCLOBENZAPRINE HCL 10 MG
10 TABLET ORAL
Status: COMPLETED | OUTPATIENT
Start: 2017-03-18 | End: 2017-03-18

## 2017-03-18 RX ORDER — NAPROXEN 250 MG/1
500 TABLET ORAL
Status: COMPLETED | OUTPATIENT
Start: 2017-03-18 | End: 2017-03-18

## 2017-03-18 RX ORDER — LEVETIRACETAM 250 MG/1
250 TABLET ORAL 2 TIMES DAILY
Qty: 60 TAB | Refills: 0 | Status: SHIPPED | OUTPATIENT
Start: 2017-03-18 | End: 2017-03-21 | Stop reason: SDUPTHER

## 2017-03-18 RX ORDER — CYCLOBENZAPRINE HCL 5 MG
5 TABLET ORAL
Qty: 20 TAB | Refills: 0 | Status: SHIPPED | OUTPATIENT
Start: 2017-03-18 | End: 2017-07-27 | Stop reason: DRUGHIGH

## 2017-03-18 RX ADMIN — CYCLOBENZAPRINE HYDROCHLORIDE 10 MG: 10 TABLET, FILM COATED ORAL at 13:36

## 2017-03-18 RX ADMIN — LEVETIRACETAM 250 MG: 250 TABLET, FILM COATED ORAL at 13:36

## 2017-03-18 RX ADMIN — NAPROXEN 500 MG: 250 TABLET ORAL at 13:36

## 2017-03-18 NOTE — DISCHARGE INSTRUCTIONS

## 2017-03-18 NOTE — ED PROVIDER NOTES
HPI Comments: Johny Gutierrez is a 80 y.o. male with PMhx significant for Seizure disorder who presents via EMS from [facility] to the ED s/p 2 witnessed seizures that occurred today. Per EMS report each episode lasted about 45 seconds. EMS reports pt had a brief postictal moment following his episodes. Pt reports that he is compliant with his rx for Keppra and notes that his last dose occurred this morning. Wife reports that he patients rx was recentlt changed from 750 mg BID to 1000 mg BID. Pt also c/o left shoulder pain secondary to shoulder injury. Pt denies any CP, fevers, cough, N/V/D, or dysuria. Social history significant for: - Tobacco, - EtOH, - Illicit drug use    PCP: Alex Garcia MD  Neurology: Tigist Isaac MD  There are no other complaints, changes or physical findings at this time. Written by Dagoberto Lamas ED Scribe, as dictated by Debbi Lindsay M.D      The history is provided by the patient, the spouse and the EMS personnel. No  was used. Past Medical History:   Diagnosis Date    Alcohol abuse, in remission     Seizures ([facility] [address]        History reviewed. No pertinent surgical history. Family History:   Problem Relation Age of Onset    Other Other      no strokes or seizures       Social History     Social History    Marital status:      Spouse name: N/A    Number of children: N/A    Years of education: N/A     Occupational History    Not on file. Social History Main Topics    Smoking status: Never Smoker    Smokeless tobacco: Not on file    Alcohol use No      Comment: Quit drinking 10 years ago    Drug use: No    Sexual activity: Not on file     Other Topics Concern    Not on file     Social History Narrative         ALLERGIES: No known allergies    Review of Systems   Constitutional: Negative for chills and fever. Respiratory: Negative for cough and shortness of breath. Cardiovascular: Negative for chest pain.    Gastrointestinal: Negative for constipation, diarrhea, nausea and vomiting. Genitourinary: Negative for dysuria. Musculoskeletal: Positive for arthralgias (Left shoulder). Neurological: Positive for seizures. Negative for weakness and numbness. All other systems reviewed and are negative. Patient Vitals for the past 12 hrs:   Pulse Resp BP SpO2   03/18/17 1345 - - 133/71 97 %   03/18/17 1245 - - 105/74 96 %   03/18/17 1218 - - 145/73 94 %   03/18/17 1213 64 16 (!) 135/92 94 %     Physical Exam   Constitutional: He is oriented to person, place, and time. He appears well-developed and well-nourished. HENT:   Head: Normocephalic and atraumatic. Eyes: Conjunctivae and EOM are normal.   Neck: Normal range of motion. Neck supple. Cardiovascular: Normal rate and regular rhythm. Pulmonary/Chest: Effort normal and breath sounds normal. No respiratory distress. Abdominal: Soft. He exhibits no distension. There is no tenderness. Musculoskeletal: Normal range of motion. Neurological: He is alert and oriented to person, place, and time. Skin: Skin is warm and dry. Psychiatric: He has a normal mood and affect. Nursing note and vitals reviewed. MDM  Number of Diagnoses or Management Options  Seizure [facility]):   Shoulder strain, left, initial encounter:   Diagnosis management comments:   Patient presents after a seizure. DDx: seizure 2/2 noncompliance, infection, increased stressor, electrolyte anomaly (hypoglycemia, etc), ETOH withdrawal. Less likely related to meningitis or brain mass at this time. Will obtain  labs and speak with tele neuro about management. 1:57 PM  Hilda Kagn  results have been reviewed with him. He has been counseled regarding his diagnosis. He verbally conveys understanding and agreement of the signs, symptoms, diagnosis, treatment and prognosis and additionally agrees to follow up as recommended with Neurology.   He also agrees with the care-plan and conveys that all of his questions have been answered. He understands also the seizure precautions. I have also put together some discharge instructions for him that include: 1) educational information regarding their diagnosis, 2) how to care for their diagnosis at home, as well a 3) list of reasons why they would want to return to the ED prior to their follow-up appointment, should their condition change. Amount and/or Complexity of Data Reviewed  Clinical lab tests: ordered and reviewed  Tests in the radiology section of CPT®: ordered and reviewed  Obtain history from someone other than the patient: yes (EMS  Wife)  Review and summarize past medical records: yes  Discuss the patient with other providers: yes (Neurology)    Patient Progress  Patient progress: stable    Procedures    CONSULT NOTE:  12:39 PM  Melvin Santillan M.D spoke with Elvia Al. Aurora Marina MD  Specialty: Neurology  Discussed pt's hx, disposition, and available diagnostic and imaging results. Reviewed care plans. Consultant recommends Increase pt rx for Cleo Merl to 1250. Give 250 dose of Cleo Merl in ED. Follow up with Dr. Antonio Caruso to add another antiepileptic. Written by Jimmie Avery, ED scribe, as dictated by Melvin Santillan M.D    PROGRESS NOTE:  1:19 PM  Pt has been re-evaluated. Old circular wound over left  I/O head with tenderness. No erythema, ecchymosis, or swelling.    Written by Jimmie Avery ED scribe, as dictated by Melvin Santillan M.D    LABORATORY TESTS:  Recent Results (from the past 12 hour(s))   CBC W/O DIFF    Collection Time: 03/18/17 12:25 PM   Result Value Ref Range    WBC 5.3 4.1 - 11.1 K/uL    RBC 4.12 4.10 - 5.70 M/uL    HGB 13.4 12.1 - 17.0 g/dL    HCT 39.4 36.6 - 50.3 %    MCV 95.6 80.0 - 99.0 FL    MCH 32.5 26.0 - 34.0 PG    MCHC 34.0 30.0 - 36.5 g/dL    RDW 12.0 11.5 - 14.5 %    PLATELET 059 (L) 207 - 226 K/uL   METABOLIC PANEL, BASIC    Collection Time: 03/18/17 12:25 PM   Result Value Ref Range    Sodium 139 136 - 145 mmol/L Potassium 3.8 3.5 - 5.1 mmol/L    Chloride 101 97 - 108 mmol/L    CO2 32 21 - 32 mmol/L    Anion gap 6 5 - 15 mmol/L    Glucose 91 65 - 100 mg/dL    BUN 15 6 - 20 MG/DL    Creatinine 1.13 0.70 - 1.30 MG/DL    BUN/Creatinine ratio 13 12 - 20      GFR est AA >60 >60 ml/min/1.73m2    GFR est non-AA >60 >60 ml/min/1.73m2    Calcium 9.3 8.5 - 10.1 MG/DL       IMAGING RESULTS:  XR SHOULDER LT AP/LAT MIN 2 V   Final Result   EXAM: XR SHOULDER LT AP/LAT MIN 2 V     INDICATION: Left shoulder pain following seizure today.     COMPARISON: None.     FINDINGS: Three views of the left shoulder demonstrate no fracture, dislocation  or other acute abnormality.     IMPRESSION  IMPRESSION: No acute abnormality. MEDICATIONS GIVEN:  Medications   levETIRAcetam (KEPPRA) tablet 250 mg (250 mg Oral Given 3/18/17 1336)   cyclobenzaprine (FLEXERIL) tablet 10 mg (10 mg Oral Given 3/18/17 1336)   naproxen (NAPROSYN) tablet 500 mg (500 mg Oral Given 3/18/17 1336)       IMPRESSION:  1. Seizure ([address]    2. Shoulder strain, left, initial encounter        PLAN:  1. Current Discharge Medication List      START taking these medications    Details   !! levETIRAcetam (KEPPRA) 250 mg tablet Take 1 Tab by mouth two (2) times a day. Take with the 1000mg to make 1250mg twice a day. Qty: 60 Tab, Refills: 0      cyclobenzaprine (FLEXERIL) 5 mg tablet Take 1 Tab by mouth three (3) times daily as needed for Muscle Spasm(s). Qty: 20 Tab, Refills: 0       !! - Potential duplicate medications found. Please discuss with provider. CONTINUE these medications which have NOT CHANGED    Details   !! levETIRAcetam 1,000 mg tablet Take 1 Tab by mouth two (2) times a day. Qty: 60 Tab, Refills: 5       !! - Potential duplicate medications found. Please discuss with provider.         2.   Follow-up Information     Follow up With Details Comments Nerissa Lopez MD  About breakthrough seizures and this visit [address] [address] [place] [address]  [phone]          Return to ED if worse     DISCHARGE NOTE  1:12 PM  The patient has been re-evaluated and is ready for discharge. Reviewed available results with patient. Counseled patient on diagnosis and care plan. Patient has expressed understanding, and all questions have been answered. Patient agrees with plan and agrees to follow up as recommended, or return to the ED if their symptoms worsen. Discharge instructions have been provided and explained to the patient, along with reasons to return to the ED. This note is prepared by Charmayne Cohen, acting as Scribe for Luke Obrien M.D. Luke Obrien M.D: The scribe's documentation has been prepared under my direction and personally reviewed by me in its entirety. I confirm that the note above accurately reflects all work, treatment, procedures, and medical decision making performed by me.

## 2017-03-18 NOTE — ED NOTES
Pt discharged by Dr. Augustin Zarate. Patient provided with discharge instructions, Rx, and follow-up care instructions. Pt out of ED via wheelchair, accompanied by family.

## 2017-03-19 PROCEDURE — 3331090002 HH PPS REVENUE DEBIT

## 2017-03-19 PROCEDURE — 3331090001 HH PPS REVENUE CREDIT

## 2017-03-20 PROCEDURE — 3331090002 HH PPS REVENUE DEBIT

## 2017-03-20 PROCEDURE — 3331090001 HH PPS REVENUE CREDIT

## 2017-03-21 ENCOUNTER — OFFICE VISIT (OUTPATIENT)
Dept: NEUROLOGY | Age: 82
End: 2017-03-21

## 2017-03-21 VITALS
HEIGHT: 68 IN | WEIGHT: 166 LBS | BODY MASS INDEX: 25.16 KG/M2 | HEART RATE: 76 BPM | DIASTOLIC BLOOD PRESSURE: 70 MMHG | OXYGEN SATURATION: 98 % | SYSTOLIC BLOOD PRESSURE: 130 MMHG

## 2017-03-21 DIAGNOSIS — R26.89 BALANCE DISORDER: ICD-10-CM

## 2017-03-21 DIAGNOSIS — R25.3 MUSCLE TWITCHING: ICD-10-CM

## 2017-03-21 DIAGNOSIS — R56.9 SEIZURES (HCC): Primary | ICD-10-CM

## 2017-03-21 PROCEDURE — 3331090002 HH PPS REVENUE DEBIT

## 2017-03-21 PROCEDURE — 3331090001 HH PPS REVENUE CREDIT

## 2017-03-21 RX ORDER — LEVETIRACETAM 250 MG/1
250 TABLET ORAL 2 TIMES DAILY
Qty: 60 TAB | Refills: 5 | Status: SHIPPED | OUTPATIENT
Start: 2017-03-21 | End: 2017-07-22

## 2017-03-21 NOTE — MR AVS SNAPSHOT
Visit Information Date & Time Provider Department Dept. Phone Encounter #  
 3/21/2017  8:00 AM Khurram Oglesby NP Neurology [facility] at [facility] [phone] [id number] Follow-up Instructions Return in about 3 months (around 6/21/2017). Upcoming Health Maintenance Date Due DTaP/Tdap/Td series (1 - Tdap) 12/24/1955 ZOSTER VACCINE AGE 60> 12/24/1994 GLAUCOMA SCREENING Q2Y 12/24/1999 MEDICARE YEARLY EXAM 12/24/1999 Pneumococcal 65+ Low/Medium Risk (2 of 2 - PPSV23) 9/7/2017 Allergies as of 3/21/2017  Review Complete On: 3/21/2017 By: Khurram Oglesby NP Severity Noted Reaction Type Reactions No Known Allergies  02/25/2017 Current Immunizations  Reviewed on 9/3/2012 Name Date Influenza Vaccine (Quad) PF 2/21/2017 11:27 AM  
 Influenza Vaccine Split 9/7/2012  1:50 PM  
 Pneumococcal Vaccine (Unspecified Type) 9/7/2012  1:54 PM  
  
 Not reviewed this visit You Were Diagnosed With   
  
 Codes Comments [facility]izures [facility]    -  Primary ICD-10-CM: R56.9 ICD-9-CM: 780.39 Balance disorder     ICD-10-CM: R26.89 
ICD-9-CM: 781.99 Muscle twitching     ICD-10-CM: R25.3 ICD-9-CM: 802. 0 Vitals BP Pulse Height(growth percentile) Weight(growth percentile) SpO2 BMI  
 130/70 76 5' 8\" (1.727 m) 166 lb (75.3 kg) 98% 25.24 kg/m2 Smoking Status Never Smoker Vitals History BMI and BSA Data Body Mass Index Body Surface Area  
 25.24 kg/m 2 1.9 m 2 Preferred Pharmacy Pharmacy Name Phone [phone], [address] [phone] Your Updated Medication List  
  
   
This list is accurate as of: 3/21/17  8:38 AM.  Always use your most recent med list.  
  
  
  
  
 aspirin 325 mg tablet Commonly known as:  ASPIRIN Take 1 Tab by mouth daily. atorvastatin 40 mg tablet Commonly known as:  LIPITOR Take 1 Tab by mouth daily. cyclobenzaprine 5 mg tablet Commonly known as:  FLEXERIL Take 1 Tab by mouth three (3) times daily as needed for Muscle Spasm(s). * levETIRAcetam 1,000 mg tablet Take 1 Tab by mouth two (2) times a day. * levETIRAcetam 250 mg tablet Commonly known as:  KEPPRA Take 1 Tab by mouth two (2) times a day. Take with the 1000mg to make 1250mg twice a day. magnesium oxide 400 mg tablet Commonly known as:  MAG-OX Take 400 mg by mouth daily. metoprolol succinate 25 mg XL tablet Commonly known as:  TOPROL-XL Take  by mouth daily. omeprazole 40 mg capsule Commonly known as:  PRILOSEC Take 40 mg by mouth daily. pregabalin 300 mg capsule Commonly known as:  Seleta Baller Take 1 Cap by mouth two (2) times a day. Max Daily Amount: 600 mg.  
  
 thiamine 100 mg tablet Commonly known as:  B-1 Take 1 Tab by mouth daily. * Notice: This list has 2 medication(s) that are the same as other medications prescribed for you. Read the directions carefully, and ask your doctor or other care provider to review them with you. Prescriptions Sent to Pharmacy Refills  
 levETIRAcetam (KEPPRA) 250 mg tablet 5 Sig: Take 1 Tab by mouth two (2) times a day. Take with the 1000mg to make 1250mg twice a day. Class: Normal  
 Pharmacy: [address] Ph #: [phone] Route: Oral  
  
Follow-up Instructions Return in about 3 months (around 6/21/2017). To-Do List   
 03/22/2017 To Be Determined Appointment with Clair Lewis, FABIANO at [address] [facility] [facility],[address] \[facility] [place] [facility]\"" & HE[facility] SERVICES! Raoul Griffin introduces Infor patient portal. Now you can access parts of your medical record, email your doctor's office, and request medication refills online. 1. In your internet browser, go to [email or web address]. [email or web address] 2. Click on the First Time User? Click Here link in the Sign In box. You will see the New Member Sign Up page. 3. Enter your Viewdle Access Code exactly as it appears below. You will not need to use this code after youve completed the sign-up process. If you do not sign up before the expiration date, you must request a new code. · Viewdle Access Code: [email or web address] Expires: 5/19/2017  2:03 PM 
 
4. Enter the last four digits of your Social Security Number (xxxx) and Date of Birth (mm/dd/yyyy) as indicated and click Submit. You will be taken to the next sign-up page. 5. Create a Viewdle ID. This will be your Viewdle login ID and cannot be changed, so think of one that is secure and easy to remember. 6. Create a Viewdle password. You can change your password at any time. 7. Enter your Password Reset Question and Answer. This can be used at a later time if you forget your password. 8. Enter your e-mail address. You will receive e-mail notification when new information is available in [address] 9. Click Sign Up. You can now view and download portions of your medical record. 10. Click the Download Summary menu link to download a portable copy of your medical information. If you have questions, please visit the Frequently Asked Questions section of the Viewdle website. Remember, Viewdle is NOT to be used for urgent needs. For medical emergencies, dial 911. Now available from your iPhone and Android! Please provide this summary of care documentation to your next provider. Your primary care clinician is listed as Nathan Fisher. If you have any questions after today's visit, please call [phone].

## 2017-03-21 NOTE — PROGRESS NOTES
Date:            2017    Name:  Sirena Morrell  :  1934  MRN:  [id number]     PCP:  Clinton Suazo MD    Chief Complaint   Patient presents with    Seizure     last saturday 3/18/17         HISTORY OF PRESENT ILLNESS:  Eliud Dc is a 80 y.o., male who presents today for follow up for seizures. He was admitted for breakthrough seizures in February, Keppra was increased to 1000 mg twice daily. Was in the ER again last week for breakthrough seizures, he was at urgent care because he had pain in his shoulder, then had a seizure while there. Keppra was then increase to 1250 twice daily. He has not missed any doses of his meds, denies infections or illness. He is now taking medicine for muscle spasm and is taking tylenol and using heat. Seizures initially were related to alcohol withdrawal, he has not been drinking for at least five years. He twitches sometimes, not very often. He admits that he does not drink enough water. Is getting home PT for unstable gait.     3.7.2017 recap  Mr. Bibi Jon presents with his wife today for follow-up after hospital. He was recently hospitalized in February for a breakthrough seizure. 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 He was seizure-free for 5 years prior to this admission. Following his seizure he had a period of a aphasia. This is also improved. In the hospital patient did have an MRI of the brain that was negative for stroke. His Keppra was increased to 1 g twice daily. It is unclear if patient was not compliant with his medication prior to the seizure. His wife is now trying to help with his medications to ensure this does not happen. Patient also has a history of leg pain. He takes Lyrica 300 mg twice daily for this. Patient does endorse some leg cramping in the evenings. We discussed trying gabapentin, however since he is on Lyrica, will try just quinine water. Patient denies any focal numbness or weakness at this time.   Patient is continuing with home PT/OT/ST. Current Outpatient Prescriptions   Medication Sig    levETIRAcetam (KEPPRA) 250 mg tablet Take 1 Tab by mouth two (2) times a day. Take with the 1000mg to make 1250mg twice a day.  cyclobenzaprine (FLEXERIL) 5 mg tablet Take 1 Tab by mouth three (3) times daily as needed for Muscle Spasm(s).  levETIRAcetam 1,000 mg tablet Take 1 Tab by mouth two (2) times a day.  pregabalin (LYRICA) 300 mg capsule Take 1 Cap by mouth two (2) times a day. Max Daily Amount: 600 mg.  thiamine (B-1) 100 mg tablet Take 1 Tab by mouth daily.  atorvastatin (LIPITOR) 40 mg tablet Take 1 Tab by mouth daily.  metoprolol succinate (TOPROL-XL) 25 mg XL tablet Take  by mouth daily.  omeprazole (PRILOSEC) 40 mg capsule Take 40 mg by mouth daily.  aspirin (ASPIRIN) 325 mg tablet Take 1 Tab by mouth daily.  magnesium oxide (MAG-OX) 400 mg tablet Take 400 mg by mouth daily. No current facility-administered medications for this visit. Allergies   Allergen Reactions    No Known Allergies      Past Medical History:   Diagnosis Date    Alcohol abuse, in remission     Seizures ([facility] [address]      No past surgical history on file. Social History     Social History    Marital status:      Spouse name: N/A    Number of children: N/A    Years of education: N/A     Occupational History    Not on file.      Social History Main Topics    Smoking status: Never Smoker    Smokeless tobacco: Not on file    Alcohol use No      Comment: Quit drinking 10 years ago    Drug use: No    Sexual activity: Not on file     Other Topics Concern    Not on file     Social History Narrative     Family History   Problem Relation Age of Onset    Other Other      no strokes or seizures         PHYSICAL EXAMINATION:    Visit Vitals    /70    Pulse 76    Ht 5' 8\" (1.727 m)    Wt 166 lb (75.3 kg)    SpO2 98%    BMI 25.24 kg/m2     General:  Well defined, nourished, and groomed individual in no acute distress. Neck: Supple, nontender, no bruits, no pain with resistance to active range of motion. Heart: Regular rate and rhythm, no murmurs, rub, or gallop. Normal S1S2. Lungs:  Clear to auscultation bilaterally with equal chest expansion, no cough, no wheeze  Musculoskeletal:  Extremities revealed no edema and had full range of motion of joints. Psych:  Good mood and bright affect    NEUROLOGICAL EXAMINATION:     Mental Status:   Alert and oriented to person, place, and time with recent and remote memory intact. Attention span and concentration are normal. Speech is fluent with a full fund of knowledge. Cranial Nerves:    II, III, IV, VI:  Visual acuity grossly intact. Pupils are equal, round, and reactive to light. Extra-ocular movements are full and fluid. No ptosis or nystagmus. V-XII: [place]. Facial features are symmetric, with normal sensation and strength. The palate rises symmetrically and the tongue protrudes midline. Sternocleidomastoids 5/5. Motor Examination: Normal tone, bulk, and strength, 5/5 RUE, BLE. LUE exam limited by pain, but 5/5 distal   Coordination:  No resting or intention tremor  Gait and Station:  Steady while walking. Normal arm swing. No pronator drift. No muscle wasting or fasciculations noted. ASSESSMENT AND PLAN    ICD-10-CM ICD-9-CM    1. Seizures ([facility] [address] R56.9 780.39    2. Balance disorder R26.89 781.99    3. Muscle twitching R25.3 66.0      80year old male seen in follow up for seizures. Initially secondary to alcohol withdrawal, then well controlled for 5 years on Keppra 750 mg bid. This has been increased twice in the last two months due to breakthrough seizures. Last was when he was in severe shoulder pain. Gait is unstable, he is getting home PT/OT. C/o occasional twitches in his hands, admits that he is dehydrated. 1. Continue Keppra 1250 big for seizures  2.  Patient or his wife will call for seizures or staring spells, will increase Keppra to 1500 mg bid and consider adding second AED  3. Continue exercises as managed by PT/OT. Call for worsening of gait and will workup up further if needed  4. Follow with PCP of should pain is not controlled  5. Increase hydration for twitching, unlikely to be a seizure without alteration of consciousness    FU in 3 months, will establish care with Dr Debbi Becerra NP    This note was created using voice recognition software. Despite editing, there may be syntax errors.

## 2017-03-22 LAB — LEVETIRACETAM SERPL-MCNC: 21.1 UG/ML (ref 10–40)

## 2017-03-22 PROCEDURE — 3331090001 HH PPS REVENUE CREDIT

## 2017-03-22 PROCEDURE — 3331090002 HH PPS REVENUE DEBIT

## 2017-03-23 ENCOUNTER — HOME CARE VISIT (OUTPATIENT)
Dept: SCHEDULING | Facility: HOME HEALTH | Age: 82
End: 2017-03-23
Payer: MEDICARE

## 2017-03-23 PROCEDURE — 3331090002 HH PPS REVENUE DEBIT

## 2017-03-23 PROCEDURE — 3331090001 HH PPS REVENUE CREDIT

## 2017-03-23 PROCEDURE — G0153 HHCP-SVS OF S/L PATH,EA 15MN: HCPCS

## 2017-03-24 ENCOUNTER — HOME CARE VISIT (OUTPATIENT)
Dept: SCHEDULING | Facility: HOME HEALTH | Age: 82
End: 2017-03-24
Payer: MEDICARE

## 2017-03-24 VITALS
TEMPERATURE: 98.8 F | OXYGEN SATURATION: 93 % | HEART RATE: 67 BPM | SYSTOLIC BLOOD PRESSURE: 133 MMHG | DIASTOLIC BLOOD PRESSURE: 74 MMHG

## 2017-03-24 VITALS
DIASTOLIC BLOOD PRESSURE: 69 MMHG | TEMPERATURE: 99.6 F | SYSTOLIC BLOOD PRESSURE: 109 MMHG | HEART RATE: 68 BPM | OXYGEN SATURATION: 97 %

## 2017-03-24 VITALS
OXYGEN SATURATION: 96 % | DIASTOLIC BLOOD PRESSURE: 72 MMHG | RESPIRATION RATE: 18 BRPM | TEMPERATURE: 97.9 F | SYSTOLIC BLOOD PRESSURE: 132 MMHG | HEART RATE: 60 BPM

## 2017-03-24 PROCEDURE — 3331090002 HH PPS REVENUE DEBIT

## 2017-03-24 PROCEDURE — 3331090001 HH PPS REVENUE CREDIT

## 2017-03-24 PROCEDURE — G0153 HHCP-SVS OF S/L PATH,EA 15MN: HCPCS

## 2017-03-24 PROCEDURE — 3331090003 HH PPS REVENUE ADJ

## 2017-03-25 PROCEDURE — 3331090001 HH PPS REVENUE CREDIT

## 2017-03-25 PROCEDURE — 3331090002 HH PPS REVENUE DEBIT

## 2017-03-26 PROCEDURE — 3331090002 HH PPS REVENUE DEBIT

## 2017-03-26 PROCEDURE — 3331090001 HH PPS REVENUE CREDIT

## 2017-03-27 PROCEDURE — 3331090001 HH PPS REVENUE CREDIT

## 2017-03-27 PROCEDURE — 3331090002 HH PPS REVENUE DEBIT

## 2017-03-28 PROCEDURE — 3331090001 HH PPS REVENUE CREDIT

## 2017-03-28 PROCEDURE — 3331090002 HH PPS REVENUE DEBIT

## 2017-04-17 ENCOUNTER — HOSPITAL ENCOUNTER (OUTPATIENT)
Dept: MRI IMAGING | Age: 82
Discharge: HOME | End: 2017-04-17
Attending: ORTHOPAEDIC SURGERY
Payer: MEDICARE

## 2017-04-17 DIAGNOSIS — M25.512 LEFT SHOULDER PAIN: ICD-10-CM

## 2017-04-17 PROCEDURE — 73221 MRI JOINT UPR EXTREM W/O DYE: CPT

## 2017-06-21 ENCOUNTER — OFFICE VISIT (OUTPATIENT)
Dept: NEUROLOGY | Age: 82
End: 2017-06-21

## 2017-06-21 VITALS
HEIGHT: 68 IN | OXYGEN SATURATION: 98 % | DIASTOLIC BLOOD PRESSURE: 62 MMHG | SYSTOLIC BLOOD PRESSURE: 110 MMHG | HEART RATE: 63 BPM | WEIGHT: 155 LBS | BODY MASS INDEX: 23.49 KG/M2

## 2017-06-21 DIAGNOSIS — R20.0 NUMBNESS OF LEFT HAND: ICD-10-CM

## 2017-06-21 DIAGNOSIS — R56.9 SEIZURES (HCC): Primary | ICD-10-CM

## 2017-06-21 NOTE — PROGRESS NOTES
Date:             2017    Name:  Carter Caraballo  :  1934  MRN:  [id number]     PCP:  Peyton Esposito MD    Chief Complaint   Patient presents with    Follow-up    Seizure         HISTORY OF PRESENT ILLNESS:  Elodia Lion is a 80 y.o., male who presents today for follow up for seizures. When he was last seen in March of this year, Basia Kotyk had been increased due to breakthrough seizures. He has not had any seizures since he was last seen, he reports that he has not missed any doses. Last seizure was on , he went to the ER for that. He was in severe shoulder pain at that time, Keppra was increased. He denies significant side effects since the increase, he might get a little dizzy sometimes, but he can sit down and it will go away. He has completed PT, is walking better and has not fallen. His daughter is wondering about driving. He was driving before his seizure without issues, they think he does not have any issues with memory or with directions. They also wonder if he can be safely left alone or if somebody should be with him at all times in case he has a seizure. He does not carry cell phone. Gevena Ergina His left arm and fingers are getting numb, this started after getting injection in his left shoulder for tendinopathy by an orthopedic provider. Left shoulder pain is better, but numbness is new. 3.21.2017 recap  Elodia Lion is a 80 y.o., male who presents today for follow up for seizures. He was admitted for breakthrough seizures in February, Keppra was increased to 1000 mg twice daily. Was in the ER again last week for breakthrough seizures, he was at urgent care because he had pain in his shoulder, then had a seizure while there. Keppra was then increase to 1250 twice daily. He has not missed any doses of his meds, denies infections or illness. He is now taking medicine for muscle spasm and is taking tylenol and using heat.  Seizures initially were related to alcohol withdrawal, he has not been drinking for at least five years. He twitches sometimes, not very often. He admits that he does not drink enough water. Is getting home PT for unstable gait. Current Outpatient Prescriptions   Medication Sig    levETIRAcetam (KEPPRA) 250 mg tablet Take 1 Tab by mouth two (2) times a day. Take with the 1000mg to make 1250mg twice a day.  cyclobenzaprine (FLEXERIL) 5 mg tablet Take 1 Tab by mouth three (3) times daily as needed for Muscle Spasm(s).  levETIRAcetam 1,000 mg tablet Take 1 Tab by mouth two (2) times a day.  pregabalin (LYRICA) 300 mg capsule Take 1 Cap by mouth two (2) times a day. Max Daily Amount: 600 mg.  thiamine (B-1) 100 mg tablet Take 1 Tab by mouth daily.  atorvastatin (LIPITOR) 40 mg tablet Take 1 Tab by mouth daily.  metoprolol succinate (TOPROL-XL) 25 mg XL tablet Take  by mouth daily.  omeprazole (PRILOSEC) 40 mg capsule Take 40 mg by mouth daily.  aspirin (ASPIRIN) 325 mg tablet Take 1 Tab by mouth daily.  magnesium oxide (MAG-OX) 400 mg tablet Take 400 mg by mouth daily. No current facility-administered medications for this visit. Allergies   Allergen Reactions    No Known Allergies      Past Medical History:   Diagnosis Date    Alcohol abuse, in remission     Seizures ([facility] [address]      No past surgical history on file. Social History     Social History    Marital status:      Spouse name: N/A    Number of children: N/A    Years of education: N/A     Occupational History    Not on file.      Social History Main Topics    Smoking status: Never Smoker    Smokeless tobacco: Not on file    Alcohol use No      Comment: Quit drinking 10 years ago    Drug use: No    Sexual activity: Not on file     Other Topics Concern    Not on file     Social History Narrative     Family History   Problem Relation Age of Onset    Other Other      no strokes or seizures         PHYSICAL EXAMINATION:    Visit Vitals    /62    Pulse 63  Ht 5' 8\" (1.727 m)    Wt 155 lb (70.3 kg)    SpO2 98%    BMI 23.57 kg/m2     General:  Well defined, nourished, and groomed individual in no acute distress. Neck: Supple, nontender, no bruits, no pain with resistance to active range of motion. Heart: Regular rate and rhythm, no murmurs, rub, or gallop. Normal S1S2. Lungs:  Clear to auscultation bilaterally with equal chest expansion, no cough, no wheeze  Musculoskeletal:  Extremities revealed no edema and had full range of motion of joints. Psych:  Good mood and bright affect    NEUROLOGICAL EXAMINATION:     Mental Status:   Alert. Attention span and concentration are normal. Speech is fluent with a full fund of knowledge. Follows commands well, speech is clear. Cranial Nerves:    II, III, IV, VI:  Visual acuity grossly intact. Pupils are equal, round, and reactive to light. Extra-ocular movements are full and fluid. No ptosis or nystagmus. V-XII: Very hard of hearing. Facial features are symmetric, with normal sensation and strength. The palate rises symmetrically and the tongue protrudes midline. Sternocleidomastoids 5/5. Motor Examination: Normal tone, bulk, and strength, 5/5 muscle strength throughout. No cogwheel rigidity. Coordination:  Finger to nose testing was normal.   No resting or intention tremor  Gait and Station:  Steady while walking with walker, no shuffling. No pronator drift. No muscle wasting or fasciculations noted. ASSESSMENT AND PLAN    ICD-10-CM ICD-9-CM    1. Seizures ([address] R56.9 780.39    2. Numbness of left hand R20.8 66.0      80-year-old male seen in follow-up for seizures. He did have breakthrough seizure in March when he was under significant stress due to shoulder pain, none since then as his Keppra was increased. No significant side effects of Keppra. He has occasional dizziness, relieved with sitting down. Gait is improved, he is not falling.   He does have a new left hand numbness since getting injection in his left shoulder for tendinopathy. 1.  Continue Keppra 1250 mg twice daily for seizure prophylaxis  2. Advised patient and family that he can drive again after September 18, which is 6 months after his last seizure. If he has another seizure, he will need to wait another 6 months. 3.  Family denies significant changes in memory or difficulty with driving, advised him to notify us if they do notice changes for patient getting lost  4. Advised to follow with orthopedic provider for left hand numbness secondary to shoulder tendinopathy and cortisone injection, if still present when he returns he may need an EMG although his orthopedic provider can also do that  5. Advised family to get patient medical alert button if they are concerned about leaving him alone at home    Follow-up in 6 months, call sooner with concerns      Marisol Rae NP    This note was created using voice recognition software. Despite editing, there may be syntax errors.

## 2017-06-21 NOTE — PATIENT INSTRUCTIONS
You can drive again after September 18, if you have another seizure he will not be able to drive for 6 months after that    I suggest that you drive only locally and during the day at first, you should have family members with you to let us know how they think you are driving    [facility]   Statement to Patients  April 1, 2014      In an effort to ensure the large volume of patient prescription refills is processed in the most efficient and expeditious manner, we are asking our patients to assist us by calling your Pharmacy for all prescription refills, this will include also your  Mail Order Pharmacy. The pharmacy will contact our office electronically to continue the refill process. Please do not wait until the last minute to call your pharmacy. We need at least 48 hours (2days) to fill prescriptions. We also encourage you to call your pharmacy before going to  your prescription to make sure it is ready. With regard to controlled substance prescription refill requests (narcotic refills) that need to be picked up at our office, we ask your cooperation by providing us with at least 72 hours (3days) notice that you will need a refill. We will not refill narcotic prescription refill requests after 4:00pm on any weekday, Monday through Thursday, or after 2:00pm on Fridays, or on the weekends. We encourage everyone to explore another way of getting your prescription refill request processed using [employer], our patient web portal through our electronic medical record system. [employer] is an efficient and effective way to communicate your medication request directly to the office and  downloadable as an karen on your smart phone . [employer] also features a review functionality that allows you to view your medication list as well as leave messages for your physician. Are you ready to get connected?  If so please review the attatched instructions or speak to any of our staff to get you set up right away! Thank you so much for your cooperation. Should you have any questions please contact our Practice Administrator. The Physicians and Staff,  [facility]          A Healthy Lifestyle: Care Instructions  Your Care Instructions  A healthy lifestyle can help you feel good, stay at a healthy weight, and have plenty of energy for both work and play. A healthy lifestyle is something you can share with your whole family. A healthy lifestyle also can lower your risk for serious health problems, such as high blood pressure, heart disease, and diabetes. You can follow a few steps listed below to improve your health and the health of your family. Follow-up care is a key part of your treatment and safety. Be sure to make and go to all appointments, and call your doctor if you are having problems. Its also a good idea to know your test results and keep a list of the medicines you take. How can you care for yourself at home? · Do not eat too much sugar, fat, or fast foods. You can still have dessert and treats now and then. The goal is moderation. · Start small to improve your eating habits. Pay attention to portion sizes, drink less juice and soda pop, and eat more fruits and vegetables. ¨ Eat a healthy amount of food. A 3-ounce serving of meat, for example, is about the size of a deck of cards. Fill the rest of your plate with vegetables and whole grains. ¨ Limit the amount of soda and sports drinks you have every day. Drink more water when you are thirsty. ¨ Eat at least 5 servings of fruits and vegetables every day. It may seem like a lot, but it is not hard to reach this goal. A serving or helping is 1 piece of fruit, 1 cup of vegetables, or 2 cups of leafy, raw vegetables. Have an apple or some carrot sticks as an afternoon snack instead of a candy bar. Try to have fruits and/or vegetables at every meal.  · Make exercise part of your daily routine. You may want to start with simple activities, such as walking, bicycling, or slow swimming. Try to be active 30 to 60 minutes every day. You do not need to do all 30 to 60 minutes all at once. For example, you can exercise 3 times a day for 10 or 20 minutes. Moderate exercise is safe for most people, but it is always a good idea to talk to your doctor before starting an exercise program.  · Keep moving. Shannon Cathy the lawn, work in the garden, or [employer]. Take the stairs instead of the elevator at work. · If you smoke, quit. People who smoke have an increased risk for heart attack, stroke, cancer, and other lung illnesses. Quitting is hard, but there are ways to boost your chance of quitting tobacco for good. ¨ Use nicotine gum, patches, or lozenges. ¨ Ask your doctor about stop-smoking programs and medicines. ¨ Keep trying. In addition to reducing your risk of diseases in the future, you will notice some benefits soon after you stop using tobacco. If you have shortness of breath or asthma symptoms, they will likely get better within a few weeks after you quit. · Limit how much alcohol you drink. Moderate amounts of alcohol (up to 2 drinks a day for men, 1 drink a day for women) are okay. But drinking too much can lead to liver problems, high blood pressure, and other health problems. Family health  If you have a family, there are many things you can do together to improve your health. · Eat meals together as a family as often as possible. · Eat healthy foods. This includes fruits, vegetables, lean meats and dairy, and whole grains. · Include your family in your fitness plan. Most people think of activities such as jogging or tennis as the way to fitness, but there are many ways you and your family can be more active. Anything that makes you breathe hard and gets your heart pumping is exercise. Here are some tips:  ¨ Walk to do errands or to take your child to school or the bus.   ¨ Go for a family bike ride after dinner instead of watching TV. Where can you learn more? Go to http://angelina-kendal.info/. Enter I498 in the search box to learn more about \"A Healthy Lifestyle: Care Instructions. \"  Current as of: July 26, 2016  Content Version: 11.3  © 9388-5432 [employer]. Care instructions adapted under license by [employer] (which disclaims liability or warranty for this information). If you have questions about a medical condition or this instruction, always ask your healthcare professional. [address] any warranty or liability for your use of this information.

## 2017-06-21 NOTE — MR AVS SNAPSHOT
Visit Information Date & Time Provider Department Dept. Phone Encounter #  
 6/21/2017  8:00 AM Erwin Rivas NP [facility] [phone] [id number] Follow-up Instructions Return in about 6 months (around 12/21/2017). Upcoming Health Maintenance Date Due DTaP/Tdap/Td series (1 - Tdap) 12/24/1955 ZOSTER VACCINE AGE 60> 12/24/1994 GLAUCOMA SCREENING Q2Y 12/24/1999 MEDICARE YEARLY EXAM 12/24/1999 INFLUENZA AGE 9 TO ADULT 8/1/2017 Pneumococcal 65+ Low/Medium Risk (2 of 2 - PPSV23) 9/7/2017 Allergies as of 6/21/2017  Review Complete On: 3/21/2017 By: Erwin Rivas NP Severity Noted Reaction Type Reactions No Known Allergies  02/25/2017 Current Immunizations  Reviewed on 9/3/2012 Name Date Influenza Vaccine (Quad) PF 2/21/2017 11:27 AM  
 Influenza Vaccine Split 9/7/2012  1:50 PM  
 Pneumococcal Vaccine (Unspecified Type) 9/7/2012  1:54 PM  
  
 Not reviewed this visit Vitals BP Pulse Height(growth percentile) Weight(growth percentile) SpO2 BMI  
 110/62 63 5' 8\" (1.727 m) 155 lb (70.3 kg) 98% 23.57 kg/m2 Smoking Status Never Smoker Vitals History BMI and BSA Data Body Mass Index Body Surface Area  
 23.57 kg/m 2 1.84 m 2 Preferred Pharmacy Pharmacy Name Phone [id number] [address], [address] [phone] Your Updated Medication List  
  
   
This list is accurate as of: 6/21/17  8:18 AM.  Always use your most recent med list.  
  
  
  
  
 aspirin 325 mg tablet Commonly known as:  ASPIRIN Take 1 Tab by mouth daily. atorvastatin 40 mg tablet Commonly known as:  LIPITOR Take 1 Tab by mouth daily. cyclobenzaprine 5 mg tablet Commonly known as:  FLEXERIL Take 1 Tab by mouth three (3) times daily as needed for Muscle Spasm(s). * levETIRAcetam 1,000 mg tablet Take 1 Tab by mouth two (2) times a day. * levETIRAcetam 250 mg tablet Commonly known as:  KEPPRA Take 1 Tab by mouth two (2) times a day. Take with the 1000mg to make 1250mg twice a day. magnesium oxide 400 mg tablet Commonly known as:  MAG-OX Take 400 mg by mouth daily. metoprolol succinate 25 mg XL tablet Commonly known as:  TOPROL-XL Take  by mouth daily. omeprazole 40 mg capsule Commonly known as:  PRILOSEC Take 40 mg by mouth daily. pregabalin 300 mg capsule Commonly known as:  Vivar Dye Take 1 Cap by mouth two (2) times a day. Max Daily Amount: 600 mg.  
  
 thiamine 100 mg tablet Commonly known as:  B-1 Take 1 Tab by mouth daily. * Notice: This list has 2 medication(s) that are the same as other medications prescribed for you. Read the directions carefully, and ask your doctor or other care provider to review them with you. Follow-up Instructions Return in about 6 months (around 12/21/2017). Patient Instructions You can drive again after September 18, if you have another seizure he will not be able to drive for 6 months after that I suggest that you drive only locally and during the day at first, you should have family members with you to let us know how they think you are driving PRESCRIPTION REFILL POLICY [facility] Statement to Patients April 1, 2014 In an effort to ensure the large volume of patient prescription refills is processed in the most efficient and expeditious manner, we are asking our patients to assist us by calling your Pharmacy for all prescription refills, this will include also your  Mail Order Pharmacy. The pharmacy will contact our office electronically to continue the refill process. Please do not wait until the last minute to call your pharmacy. We need at least 48 hours (2days) to fill prescriptions.  We also encourage you to call your pharmacy before going to  your prescription to make sure it is ready. With regard to controlled substance prescription refill requests (narcotic refills) that need to be picked up at our office, we ask your cooperation by providing us with at least 72 hours (3days) notice that you will need a refill. We will not refill narcotic prescription refill requests after 4:00pm on any weekday, Monday through Thursday, or after 2:00pm on Fridays, or on the weekends. We encourage everyone to explore another way of getting your prescription refill request processed using Tapastreet, our patient web portal through our electronic medical record system. Tapastreet is an efficient and effective way to communicate your medication request directly to the office and  downloadable as an karen on your smart phone . Tapastreet also features a review functionality that allows you to view your medication list as well as leave messages for your physician. Are you ready to get connected? If so please review the attatched instructions or speak to any of our staff to get you set up right away! Thank you so much for your cooperation. Should you have any questions please contact our Practice Administrator. The Physicians and Staff,  [facility] A Healthy Lifestyle: Care Instructions Your Care Instructions A healthy lifestyle can help you feel good, stay at a healthy weight, and have plenty of energy for both work and play. A healthy lifestyle is something you can share with your whole family. A healthy lifestyle also can lower your risk for serious health problems, such as high blood pressure, heart disease, and diabetes. You can follow a few steps listed below to improve your health and the health of your family. Follow-up care is a key part of your treatment and safety.  Be sure to make and go to all appointments, and call your doctor if you are having problems. Its also a good idea to know your test results and keep a list of the medicines you take. How can you care for yourself at home? · Do not eat too much sugar, fat, or fast foods. You can still have dessert and treats now and then. The goal is moderation. · Start small to improve your eating habits. Pay attention to portion sizes, drink less juice and soda pop, and eat more fruits and vegetables. ¨ Eat a healthy amount of food. A 3-ounce serving of meat, for example, is about the size of a deck of cards. Fill the rest of your plate with vegetables and whole grains. ¨ Limit the amount of soda and sports drinks you have every day. Drink more water when you are thirsty. ¨ Eat at least 5 servings of fruits and vegetables every day. It may seem like a lot, but it is not hard to reach this goal. A serving or helping is 1 piece of fruit, 1 cup of vegetables, or 2 cups of leafy, raw vegetables. Have an apple or some carrot sticks as an afternoon snack instead of a candy bar. Try to have fruits and/or vegetables at every meal. 
· Make exercise part of your daily routine. You may want to start with simple activities, such as walking, bicycling, or slow swimming. Try to be active 30 to 60 minutes every day. You do not need to do all 30 to 60 minutes all at once. For example, you can exercise 3 times a day for 10 or 20 minutes. Moderate exercise is safe for most people, but it is always a good idea to talk to your doctor before starting an exercise program. 
· Keep moving. Remedios Moralesdex the lawn, work in the garden, or [employer]. Take the stairs instead of the elevator at work. · If you smoke, quit. People who smoke have an increased risk for heart attack, stroke, cancer, and other lung illnesses. Quitting is hard, but there are ways to boost your chance of quitting tobacco for good. ¨ Use nicotine gum, patches, or lozenges. ¨ Ask your doctor about stop-smoking programs and medicines. ¨ Keep trying. In addition to reducing your risk of diseases in the future, you will notice some benefits soon after you stop using tobacco. If you have shortness of breath or asthma symptoms, they will likely get better within a few weeks after you quit. · Limit how much alcohol you drink. Moderate amounts of alcohol (up to 2 drinks a day for men, 1 drink a day for women) are okay. But drinking too much can lead to liver problems, high blood pressure, and other health problems. Family health If you have a family, there are many things you can do together to improve your health. · Eat meals together as a family as often as possible. · Eat healthy foods. This includes fruits, vegetables, lean meats and dairy, and whole grains. · Include your family in your fitness plan. Most people think of activities such as jogging or tennis as the way to fitness, but there are many ways you and your family can be more active. Anything that makes you breathe hard and gets your heart pumping is exercise. Here are some tips: 
¨ Walk to do errands or to take your child to school or the bus. ¨ Go for a family bike ride after dinner instead of watching TV. Where can you learn more? Go to http://angelina-kendal.info/. Enter Q306 in the search box to learn more about \"A Healthy Lifestyle: Care Instructions. \" Current as of: July 26, 2016 Content Version: 11.3 © 5250-6471 [employer]. Care instructions adapted under license by [employer] (which disclaims liability or warranty for this information). If you have questions about a medical condition or this instruction, always ask your healthcare professional. [place] [address] any warranty or liability for your use of this information. Introducing [facility]! [facility] introduces Donuts patient portal. Now you can access parts of your medical record, email your doctor's office, and request medication refills online. 1. In your internet browser, go to [email or web address] 2. Click on the First Time User? Click Here link in the Sign In box. You will see the New Member Sign Up page. 3. Enter your Avison Young Access Code exactly as it appears below. You will not need to use this code after youve completed the sign-up process. If you do not sign up before the expiration date, you must request a new code. · Avison Young Access Code: [email or web address] Expires: 9/9/2017  5:34 PM 
 
4. Enter the last four digits of your Social Security Number (xxxx) and Date of Birth (mm/dd/yyyy) as indicated and click Submit. You will be taken to the next sign-up page. 5. Create a Rentabilitiest ID. This will be your Avison Young login ID and cannot be changed, so think of one that is secure and easy to remember. 6. Create a Avison Young password. You can change your password at any time. 7. Enter your Password Reset Question and Answer. This can be used at a later time if you forget your password. 8. Enter your e-mail address. You will receive e-mail notification when new information is available in [address] 9. Click Sign Up. You can now view and download portions of your medical record. 10. Click the Download Summary menu link to download a portable copy of your medical information. If you have questions, please visit the Frequently Asked Questions section of the Avison Young website. Remember, Avison Young is NOT to be used for urgent needs. For medical emergencies, dial 911. Now available from your iPhone and Android! Please provide this summary of care documentation to your next provider. Your primary care clinician is listed as Zonia Medeiros. If you have any questions after today's visit, please call [phone].

## 2017-06-24 ENCOUNTER — HOSPITAL ENCOUNTER (INPATIENT)
Age: 82
LOS: 28 days | Discharge: NURSING, REHAB OR LONG-TERM CARE | DRG: 208 | End: 2017-07-22
Attending: EMERGENCY MEDICINE | Admitting: INTERNAL MEDICINE
Payer: MEDICARE

## 2017-06-24 ENCOUNTER — APPOINTMENT (OUTPATIENT)
Dept: CT IMAGING | Age: 82
DRG: 208 | End: 2017-06-24
Attending: EMERGENCY MEDICINE
Payer: MEDICARE

## 2017-06-24 ENCOUNTER — APPOINTMENT (OUTPATIENT)
Dept: GENERAL RADIOLOGY | Age: 82
DRG: 208 | End: 2017-06-24
Attending: EMERGENCY MEDICINE
Payer: MEDICARE

## 2017-06-24 DIAGNOSIS — Z71.89 COUNSELING REGARDING GOALS OF CARE: ICD-10-CM

## 2017-06-24 DIAGNOSIS — R47.01 APHASIA: ICD-10-CM

## 2017-06-24 DIAGNOSIS — G40.909 SEIZURE DISORDER (HCC): ICD-10-CM

## 2017-06-24 DIAGNOSIS — R41.82 ALTERED MENTAL STATUS, UNSPECIFIED ALTERED MENTAL STATUS TYPE: Primary | ICD-10-CM

## 2017-06-24 DIAGNOSIS — G93.40 ENCEPHALOPATHY, UNSPECIFIED: ICD-10-CM

## 2017-06-24 DIAGNOSIS — R13.12 OROPHARYNGEAL DYSPHAGIA: ICD-10-CM

## 2017-06-24 DIAGNOSIS — R13.11 ORAL PHASE DYSPHAGIA: ICD-10-CM

## 2017-06-24 DIAGNOSIS — R53.1 WEAKNESS: ICD-10-CM

## 2017-06-24 DIAGNOSIS — G40.901 SEIZURE DISORDER, NONCONVULSIVE, WITH STATUS EPILEPTICUS (HCC): ICD-10-CM

## 2017-06-24 DIAGNOSIS — R53.81 DEBILITY: ICD-10-CM

## 2017-06-24 DIAGNOSIS — G40.901 STATUS EPILEPTICUS (HCC): ICD-10-CM

## 2017-06-24 DIAGNOSIS — R47.1 DYSARTHRIA: ICD-10-CM

## 2017-06-24 DIAGNOSIS — J96.00 ACUTE RESPIRATORY FAILURE, UNSPECIFIED WHETHER WITH HYPOXIA OR HYPERCAPNIA (HCC): ICD-10-CM

## 2017-06-24 LAB
ALBUMIN SERPL BCP-MCNC: 4.4 G/DL (ref 3.5–5)
ALBUMIN/GLOB SERPL: 1 {RATIO} (ref 1.1–2.2)
ALP SERPL-CCNC: 83 U/L (ref 45–117)
ALT SERPL-CCNC: 19 U/L (ref 12–78)
ANION GAP BLD CALC-SCNC: 14 MMOL/L (ref 5–15)
ANION GAP BLD CALC-SCNC: 7 MMOL/L (ref 5–15)
APPEARANCE UR: ABNORMAL
ARTERIAL PATENCY WRIST A: YES
AST SERPL W P-5'-P-CCNC: 16 U/L (ref 15–37)
BACTERIA URNS QL MICRO: NEGATIVE /HPF
BASE EXCESS BLDA CALC-SCNC: 1 MMOL/L
BASOPHILS # BLD AUTO: 0 K/UL (ref 0–0.1)
BASOPHILS # BLD: 0 % (ref 0–1)
BDY SITE: ABNORMAL
BILIRUB SERPL-MCNC: 1.1 MG/DL (ref 0.2–1)
BILIRUB UR QL CFM: NEGATIVE
BUN BLD-MCNC: 15 MG/DL (ref 9–20)
BUN SERPL-MCNC: 13 MG/DL (ref 6–20)
BUN/CREAT SERPL: 13 (ref 12–20)
CA-I BLD-MCNC: 1.14 MMOL/L (ref 1.12–1.32)
CALCIUM SERPL-MCNC: 9.6 MG/DL (ref 8.5–10.1)
CHLORIDE BLD-SCNC: 101 MMOL/L (ref 98–107)
CHLORIDE SERPL-SCNC: 102 MMOL/L (ref 97–108)
CK MB CFR SERPL CALC: 0.7 % (ref 0–2.5)
CK MB SERPL-MCNC: 1.6 NG/ML (ref 5–25)
CK SERPL-CCNC: 196 U/L (ref 39–308)
CK SERPL-CCNC: 232 U/L (ref 39–308)
CO2 BLD-SCNC: 30 MMOL/L (ref 21–32)
CO2 SERPL-SCNC: 28 MMOL/L (ref 21–32)
COLOR UR: ABNORMAL
CREAT BLD-MCNC: 0.9 MG/DL (ref 0.6–1.3)
CREAT BLD-MCNC: 0.9 MG/DL (ref 0.6–1.3)
CREAT SERPL-MCNC: 0.98 MG/DL (ref 0.7–1.3)
EOSINOPHIL # BLD: 0 K/UL (ref 0–0.4)
EOSINOPHIL NFR BLD: 0 % (ref 0–7)
EPAP/CPAP/PEEP, PAPEEP: 6
EPITH CASTS URNS QL MICRO: ABNORMAL /LPF
ERYTHROCYTE [DISTWIDTH] IN BLOOD BY AUTOMATED COUNT: 12.4 % (ref 11.5–14.5)
FIO2 ON VENT: 50 %
GAS FLOW.O2 SETTING OXYMISER: 16 L/MIN
GLOBULIN SER CALC-MCNC: 4.5 G/DL (ref 2–4)
GLUCOSE BLD STRIP.AUTO-MCNC: 110 MG/DL (ref 65–100)
GLUCOSE BLD-MCNC: 107 MG/DL (ref 65–100)
GLUCOSE SERPL-MCNC: 105 MG/DL (ref 65–100)
GLUCOSE UR STRIP.AUTO-MCNC: NEGATIVE MG/DL
HCO3 BLDA-SCNC: 24 MMOL/L (ref 22–26)
HCT VFR BLD AUTO: 40.8 % (ref 36.6–50.3)
HCT VFR BLD CALC: 42 % (ref 36.6–50.3)
HGB BLD-MCNC: 14.3 G/DL (ref 12.1–17)
HGB BLD-MCNC: 14.3 GM/DL (ref 12.1–17)
HGB UR QL STRIP: NEGATIVE
HYALINE CASTS URNS QL MICRO: ABNORMAL /LPF (ref 0–5)
KETONES UR QL STRIP.AUTO: ABNORMAL MG/DL
LACTATE SERPL-SCNC: 2.2 MMOL/L (ref 0.4–2)
LEUKOCYTE ESTERASE UR QL STRIP.AUTO: NEGATIVE
LYMPHOCYTES # BLD AUTO: 23 % (ref 12–49)
LYMPHOCYTES # BLD: 1.4 K/UL (ref 0.8–3.5)
MCH RBC QN AUTO: 33.4 PG (ref 26–34)
MCHC RBC AUTO-ENTMCNC: 35 G/DL (ref 30–36.5)
MCV RBC AUTO: 95.3 FL (ref 80–99)
MONOCYTES # BLD: 0.8 K/UL (ref 0–1)
MONOCYTES NFR BLD AUTO: 12 % (ref 5–13)
NEUTS SEG # BLD: 4 K/UL (ref 1.8–8)
NEUTS SEG NFR BLD AUTO: 65 % (ref 32–75)
NITRITE UR QL STRIP.AUTO: NEGATIVE
PCO2 BLDA: 34 MMHG (ref 35–45)
PH BLDA: 7.47 [PH] (ref 7.35–7.45)
PH UR STRIP: 7 [PH] (ref 5–8)
PLATELET # BLD AUTO: 115 K/UL (ref 150–400)
PO2 BLDA: 212 MMHG (ref 80–100)
POTASSIUM BLD-SCNC: 4.3 MMOL/L (ref 3.5–5.1)
POTASSIUM SERPL-SCNC: 3.6 MMOL/L (ref 3.5–5.1)
PROT SERPL-MCNC: 8.9 G/DL (ref 6.4–8.2)
PROT UR STRIP-MCNC: NEGATIVE MG/DL
RBC # BLD AUTO: 4.28 M/UL (ref 4.1–5.7)
RBC #/AREA URNS HPF: ABNORMAL /HPF (ref 0–5)
SAO2 % BLD: 100 % (ref 92–97)
SAO2% DEVICE SAO2% SENSOR NAME: ABNORMAL
SERVICE CMNT-IMP: ABNORMAL
SERVICE CMNT-IMP: ABNORMAL
SODIUM BLD-SCNC: 140 MMOL/L (ref 136–145)
SODIUM SERPL-SCNC: 137 MMOL/L (ref 136–145)
SP GR UR REFRACTOMETRY: 1.02 (ref 1–1.03)
SPECIMEN SITE: ABNORMAL
TROPONIN I SERPL-MCNC: <0.04 NG/ML
UROBILINOGEN UR QL STRIP.AUTO: 1 EU/DL (ref 0.2–1)
VENTILATION MODE VENT: ABNORMAL
VT SETTING VENT: 500 ML
WBC # BLD AUTO: 6.2 K/UL (ref 4.1–11.1)
WBC URNS QL MICRO: ABNORMAL /HPF (ref 0–4)

## 2017-06-24 PROCEDURE — 74011250636 HC RX REV CODE- 250/636: Performed by: EMERGENCY MEDICINE

## 2017-06-24 PROCEDURE — 87040 BLOOD CULTURE FOR BACTERIA: CPT | Performed by: EMERGENCY MEDICINE

## 2017-06-24 PROCEDURE — 84484 ASSAY OF TROPONIN QUANT: CPT | Performed by: EMERGENCY MEDICINE

## 2017-06-24 PROCEDURE — 74011636320 HC RX REV CODE- 636/320: Performed by: EMERGENCY MEDICINE

## 2017-06-24 PROCEDURE — 5A1945Z RESPIRATORY VENTILATION, 24-96 CONSECUTIVE HOURS: ICD-10-PCS | Performed by: EMERGENCY MEDICINE

## 2017-06-24 PROCEDURE — 87077 CULTURE AEROBIC IDENTIFY: CPT | Performed by: EMERGENCY MEDICINE

## 2017-06-24 PROCEDURE — 70450 CT HEAD/BRAIN W/O DYE: CPT

## 2017-06-24 PROCEDURE — 83605 ASSAY OF LACTIC ACID: CPT | Performed by: EMERGENCY MEDICINE

## 2017-06-24 PROCEDURE — 36415 COLL VENOUS BLD VENIPUNCTURE: CPT | Performed by: PSYCHIATRY & NEUROLOGY

## 2017-06-24 PROCEDURE — 77030032490 HC SLV COMPR SCD KNE COVD -B

## 2017-06-24 PROCEDURE — 36600 WITHDRAWAL OF ARTERIAL BLOOD: CPT | Performed by: INTERNAL MEDICINE

## 2017-06-24 PROCEDURE — 82550 ASSAY OF CK (CPK): CPT | Performed by: EMERGENCY MEDICINE

## 2017-06-24 PROCEDURE — 02H633Z INSERTION OF INFUSION DEVICE INTO RIGHT ATRIUM, PERCUTANEOUS APPROACH: ICD-10-PCS | Performed by: EMERGENCY MEDICINE

## 2017-06-24 PROCEDURE — 75810000137 HC PLCMT CENT VENOUS CATH

## 2017-06-24 PROCEDURE — 82803 BLOOD GASES ANY COMBINATION: CPT | Performed by: INTERNAL MEDICINE

## 2017-06-24 PROCEDURE — 71010 XR CHEST PORT: CPT

## 2017-06-24 PROCEDURE — 65610000006 HC RM INTENSIVE CARE

## 2017-06-24 PROCEDURE — 74011000250 HC RX REV CODE- 250: Performed by: EMERGENCY MEDICINE

## 2017-06-24 PROCEDURE — 82962 GLUCOSE BLOOD TEST: CPT

## 2017-06-24 PROCEDURE — 74000 XR ABD (KUB): CPT

## 2017-06-24 PROCEDURE — 77030008771 HC TU NG SALEM SUMP -A

## 2017-06-24 PROCEDURE — 80366 DRUG SCREENING PREGABALIN: CPT

## 2017-06-24 PROCEDURE — 94002 VENT MGMT INPAT INIT DAY: CPT

## 2017-06-24 PROCEDURE — 96375 TX/PRO/DX INJ NEW DRUG ADDON: CPT

## 2017-06-24 PROCEDURE — 94762 N-INVAS EAR/PLS OXIMTRY CONT: CPT

## 2017-06-24 PROCEDURE — 96376 TX/PRO/DX INJ SAME DRUG ADON: CPT

## 2017-06-24 PROCEDURE — 74011000258 HC RX REV CODE- 258: Performed by: EMERGENCY MEDICINE

## 2017-06-24 PROCEDURE — 77030019563 HC DEV ATTCH FEED HOLL -A

## 2017-06-24 PROCEDURE — 87186 SC STD MICRODIL/AGAR DIL: CPT | Performed by: EMERGENCY MEDICINE

## 2017-06-24 PROCEDURE — 82565 ASSAY OF CREATININE: CPT

## 2017-06-24 PROCEDURE — 80177 DRUG SCRN QUAN LEVETIRACETAM: CPT | Performed by: EMERGENCY MEDICINE

## 2017-06-24 PROCEDURE — 96365 THER/PROPH/DIAG IV INF INIT: CPT

## 2017-06-24 PROCEDURE — 99285 EMERGENCY DEPT VISIT HI MDM: CPT

## 2017-06-24 PROCEDURE — 70496 CT ANGIOGRAPHY HEAD: CPT

## 2017-06-24 PROCEDURE — 80053 COMPREHEN METABOLIC PANEL: CPT | Performed by: EMERGENCY MEDICINE

## 2017-06-24 PROCEDURE — 81001 URINALYSIS AUTO W/SCOPE: CPT | Performed by: EMERGENCY MEDICINE

## 2017-06-24 PROCEDURE — 82553 CREATINE MB FRACTION: CPT | Performed by: INTERNAL MEDICINE

## 2017-06-24 PROCEDURE — 74011250636 HC RX REV CODE- 250/636

## 2017-06-24 PROCEDURE — C1751 CATH, INF, PER/CENT/MIDLINE: HCPCS

## 2017-06-24 PROCEDURE — 80047 BASIC METABLC PNL IONIZED CA: CPT

## 2017-06-24 PROCEDURE — 85025 COMPLETE CBC W/AUTO DIFF WBC: CPT | Performed by: EMERGENCY MEDICINE

## 2017-06-24 PROCEDURE — 31500 INSERT EMERGENCY AIRWAY: CPT

## 2017-06-24 PROCEDURE — 0BH17EZ INSERTION OF ENDOTRACHEAL AIRWAY INTO TRACHEA, VIA NATURAL OR ARTIFICIAL OPENING: ICD-10-PCS | Performed by: EMERGENCY MEDICINE

## 2017-06-24 PROCEDURE — 77030008683 HC TU ET CUF COVD -A

## 2017-06-24 PROCEDURE — 74011250636 HC RX REV CODE- 250/636: Performed by: INTERNAL MEDICINE

## 2017-06-24 PROCEDURE — 93005 ELECTROCARDIOGRAM TRACING: CPT

## 2017-06-24 RX ORDER — ACETAMINOPHEN 650 MG/1
650 SUPPOSITORY RECTAL
Status: DISCONTINUED | OUTPATIENT
Start: 2017-06-24 | End: 2017-07-22 | Stop reason: HOSPADM

## 2017-06-24 RX ORDER — LABETALOL HYDROCHLORIDE 5 MG/ML
20 INJECTION, SOLUTION INTRAVENOUS
Status: DISCONTINUED | OUTPATIENT
Start: 2017-06-24 | End: 2017-06-30

## 2017-06-24 RX ORDER — SODIUM CHLORIDE 0.9 % (FLUSH) 0.9 %
10 SYRINGE (ML) INJECTION
Status: COMPLETED | OUTPATIENT
Start: 2017-06-24 | End: 2017-06-24

## 2017-06-24 RX ORDER — LIDOCAINE HYDROCHLORIDE AND EPINEPHRINE 20; 10 MG/ML; UG/ML
4.5 INJECTION, SOLUTION INFILTRATION; PERINEURAL
Status: COMPLETED | OUTPATIENT
Start: 2017-06-24 | End: 2017-06-24

## 2017-06-24 RX ORDER — NALOXONE HYDROCHLORIDE 0.4 MG/ML
0.4 INJECTION, SOLUTION INTRAMUSCULAR; INTRAVENOUS; SUBCUTANEOUS AS NEEDED
Status: DISCONTINUED | OUTPATIENT
Start: 2017-06-24 | End: 2017-07-22 | Stop reason: HOSPADM

## 2017-06-24 RX ORDER — LIDOCAINE HYDROCHLORIDE AND EPINEPHRINE 20; 5 MG/ML; UG/ML
INJECTION, SOLUTION EPIDURAL; INFILTRATION; INTRACAUDAL; PERINEURAL
Status: DISPENSED
Start: 2017-06-24 | End: 2017-06-25

## 2017-06-24 RX ORDER — LORAZEPAM 2 MG/ML
2 INJECTION INTRAMUSCULAR AS NEEDED
Status: DISCONTINUED | OUTPATIENT
Start: 2017-06-24 | End: 2017-07-06

## 2017-06-24 RX ORDER — ENOXAPARIN SODIUM 100 MG/ML
40 INJECTION SUBCUTANEOUS EVERY 24 HOURS
Status: DISCONTINUED | OUTPATIENT
Start: 2017-06-25 | End: 2017-07-22 | Stop reason: HOSPADM

## 2017-06-24 RX ORDER — LORAZEPAM 2 MG/ML
1 INJECTION INTRAMUSCULAR
Status: COMPLETED | OUTPATIENT
Start: 2017-06-24 | End: 2017-06-24

## 2017-06-24 RX ORDER — PROPOFOL 10 MG/ML
5-50 VIAL (ML) INTRAVENOUS
Status: DISCONTINUED | OUTPATIENT
Start: 2017-06-24 | End: 2017-06-26

## 2017-06-24 RX ORDER — FENTANYL CITRATE 50 UG/ML
50 INJECTION, SOLUTION INTRAMUSCULAR; INTRAVENOUS
Status: COMPLETED | OUTPATIENT
Start: 2017-06-24 | End: 2017-06-24

## 2017-06-24 RX ORDER — ONDANSETRON 2 MG/ML
4 INJECTION INTRAMUSCULAR; INTRAVENOUS
Status: DISCONTINUED | OUTPATIENT
Start: 2017-06-24 | End: 2017-07-22 | Stop reason: HOSPADM

## 2017-06-24 RX ORDER — SODIUM CHLORIDE 0.9 % (FLUSH) 0.9 %
5-10 SYRINGE (ML) INJECTION EVERY 8 HOURS
Status: DISCONTINUED | OUTPATIENT
Start: 2017-06-24 | End: 2017-07-22

## 2017-06-24 RX ORDER — PHENYTOIN SODIUM 50 MG/ML
100 INJECTION, SOLUTION INTRAMUSCULAR; INTRAVENOUS EVERY 8 HOURS
Status: DISCONTINUED | OUTPATIENT
Start: 2017-06-25 | End: 2017-06-24

## 2017-06-24 RX ORDER — SODIUM CHLORIDE 0.9 % (FLUSH) 0.9 %
5-10 SYRINGE (ML) INJECTION AS NEEDED
Status: DISCONTINUED | OUTPATIENT
Start: 2017-06-24 | End: 2017-07-22 | Stop reason: HOSPADM

## 2017-06-24 RX ORDER — LORAZEPAM 2 MG/ML
1 INJECTION INTRAMUSCULAR ONCE
Status: COMPLETED | OUTPATIENT
Start: 2017-06-24 | End: 2017-06-24

## 2017-06-24 RX ORDER — ASPIRIN 300 MG/1
300 SUPPOSITORY RECTAL DAILY
Status: DISCONTINUED | OUTPATIENT
Start: 2017-06-25 | End: 2017-07-05

## 2017-06-24 RX ORDER — SUCCINYLCHOLINE CHLORIDE 20 MG/ML
100 INJECTION INTRAMUSCULAR; INTRAVENOUS
Status: COMPLETED | OUTPATIENT
Start: 2017-06-24 | End: 2017-06-24

## 2017-06-24 RX ORDER — PROPOFOL 10 MG/ML
120 INJECTION, EMULSION INTRAVENOUS
Status: COMPLETED | OUTPATIENT
Start: 2017-06-24 | End: 2017-06-24

## 2017-06-24 RX ORDER — PHENYTOIN SODIUM 50 MG/ML
100 INJECTION, SOLUTION INTRAMUSCULAR; INTRAVENOUS EVERY 8 HOURS
Status: DISCONTINUED | OUTPATIENT
Start: 2017-06-24 | End: 2017-06-24

## 2017-06-24 RX ORDER — FOSPHENYTOIN SODIUM 50 MG/ML
20 INJECTION, SOLUTION INTRAMUSCULAR; INTRAVENOUS
Status: DISCONTINUED | OUTPATIENT
Start: 2017-06-24 | End: 2017-06-24 | Stop reason: RX

## 2017-06-24 RX ORDER — SODIUM CHLORIDE 9 MG/ML
1000 INJECTION, SOLUTION INTRAVENOUS ONCE
Status: COMPLETED | OUTPATIENT
Start: 2017-06-24 | End: 2017-06-26

## 2017-06-24 RX ORDER — SODIUM CHLORIDE 9 MG/ML
25 INJECTION, SOLUTION INTRAVENOUS CONTINUOUS
Status: DISCONTINUED | OUTPATIENT
Start: 2017-06-24 | End: 2017-07-08

## 2017-06-24 RX ORDER — LORAZEPAM 2 MG/ML
INJECTION INTRAMUSCULAR
Status: COMPLETED
Start: 2017-06-24 | End: 2017-06-24

## 2017-06-24 RX ORDER — FACIAL-BODY WIPES
10 EACH TOPICAL DAILY PRN
Status: DISCONTINUED | OUTPATIENT
Start: 2017-06-24 | End: 2017-07-22 | Stop reason: HOSPADM

## 2017-06-24 RX ORDER — SODIUM CHLORIDE 9 MG/ML
50 INJECTION, SOLUTION INTRAVENOUS
Status: COMPLETED | OUTPATIENT
Start: 2017-06-24 | End: 2017-06-24

## 2017-06-24 RX ORDER — PROPOFOL 10 MG/ML
INJECTION, EMULSION INTRAVENOUS
Status: COMPLETED
Start: 2017-06-24 | End: 2017-06-24

## 2017-06-24 RX ADMIN — PROPOFOL 15 MCG/KG/MIN: 10 INJECTION, EMULSION INTRAVENOUS at 14:48

## 2017-06-24 RX ADMIN — FENTANYL CITRATE 50 MCG: 50 INJECTION, SOLUTION INTRAMUSCULAR; INTRAVENOUS at 14:50

## 2017-06-24 RX ADMIN — PROPOFOL 20 MCG/KG/MIN: 10 INJECTION, EMULSION INTRAVENOUS at 15:45

## 2017-06-24 RX ADMIN — SUCCINYLCHOLINE CHLORIDE 100 MG: 20 INJECTION, SOLUTION INTRAMUSCULAR; INTRAVENOUS at 14:36

## 2017-06-24 RX ADMIN — LORAZEPAM 1 MG: 2 INJECTION INTRAMUSCULAR; INTRAVENOUS at 12:48

## 2017-06-24 RX ADMIN — Medication 10 ML: at 13:41

## 2017-06-24 RX ADMIN — PROPOFOL 25 MCG/KG/MIN: 10 INJECTION, EMULSION INTRAVENOUS at 16:00

## 2017-06-24 RX ADMIN — LIDOCAINE HYDROCHLORIDE AND EPINEPHRINE 90 MG: 20; 10 INJECTION, SOLUTION INFILTRATION; PERINEURAL at 14:40

## 2017-06-24 RX ADMIN — LORAZEPAM 2 MG: 2 INJECTION INTRAMUSCULAR; INTRAVENOUS at 17:11

## 2017-06-24 RX ADMIN — LORAZEPAM 1 MG: 2 INJECTION INTRAMUSCULAR; INTRAVENOUS at 13:16

## 2017-06-24 RX ADMIN — LEVETIRACETAM 500 MG: 100 INJECTION, SOLUTION, CONCENTRATE INTRAVENOUS at 15:30

## 2017-06-24 RX ADMIN — SODIUM CHLORIDE 1000 ML: 900 INJECTION, SOLUTION INTRAVENOUS at 12:50

## 2017-06-24 RX ADMIN — PROPOFOL 10 MCG/KG/MIN: 10 INJECTION, EMULSION INTRAVENOUS at 14:43

## 2017-06-24 RX ADMIN — LEVETIRACETAM 1000 MG: 100 INJECTION, SOLUTION, CONCENTRATE INTRAVENOUS at 13:02

## 2017-06-24 RX ADMIN — LORAZEPAM 1 MG: 2 INJECTION INTRAMUSCULAR at 13:16

## 2017-06-24 RX ADMIN — PHENYTOIN SODIUM 1500 MG: 50 INJECTION INTRAMUSCULAR; INTRAVENOUS at 17:11

## 2017-06-24 RX ADMIN — SODIUM CHLORIDE 75 ML/HR: 900 INJECTION, SOLUTION INTRAVENOUS at 20:00

## 2017-06-24 RX ADMIN — LORAZEPAM 1 MG: 2 INJECTION INTRAMUSCULAR; INTRAVENOUS at 12:39

## 2017-06-24 RX ADMIN — PROPOFOL 50 MCG/KG/MIN: 10 INJECTION, EMULSION INTRAVENOUS at 18:37

## 2017-06-24 RX ADMIN — PROPOFOL 50 MCG/KG/MIN: 10 INJECTION, EMULSION INTRAVENOUS at 22:41

## 2017-06-24 RX ADMIN — SODIUM CHLORIDE 50 ML/HR: 900 INJECTION, SOLUTION INTRAVENOUS at 13:41

## 2017-06-24 RX ADMIN — Medication 5 ML: at 15:25

## 2017-06-24 RX ADMIN — Medication 10 ML: at 22:42

## 2017-06-24 RX ADMIN — FENTANYL CITRATE 50 MCG: 50 INJECTION, SOLUTION INTRAMUSCULAR; INTRAVENOUS at 15:13

## 2017-06-24 RX ADMIN — PROPOFOL 120 MG: 10 INJECTION, EMULSION INTRAVENOUS at 14:35

## 2017-06-24 RX ADMIN — IOPAMIDOL 100 ML: 755 INJECTION, SOLUTION INTRAVENOUS at 13:41

## 2017-06-24 NOTE — PROGRESS NOTES
Spiritual Care Assessment/Progress Notes    Coral Jackson [id number]  [id number]    1934  80 y.o.  male    Patient Telephone Number: [phone] (home)   Scientology Affiliation: [facility]   Language: English   Extended Emergency Contact Information  Primary Emergency Contact: [address] Phone: [phone]  Relation: Spouse   Patient Active Problem List    Diagnosis Date Noted    [facility]    Encephalopathy, unspecified 09/03/2012    SVT [facility]ra[facility]ricular [facility]cardia[facility] 09/03/2012    Aphasia 09/03/2012    CVA (cerebral vascular accident) ([facility] 09/03/2012    Seizure disorder ([facility] 09/03/2012        Date: 6/24/2017       Level of Scientology/Spiritual Activity:  [x]         Involved in niles tradition/spiritual practice    []         Not involved in niles tradition/spiritual practice  [x]         Spiritually oriented    []         Claims no spiritual orientation    []         seeking spiritual identity  []         Feels alienated from Scientology practice/tradition  []         Feels angry about Scientology practice/tradition  [x]         Spirituality/Scientology tradition is a resource for coping at this time.   []         Not able to assess due to medical condition    Services Provided Today:  [x]         crisis intervention    []         reading Scriptures  []         spiritual assessment    []         prayer  []         empathic listening/emotional support  []         rites and rituals (cite in comments)  []         life review     [x]         Scientology support  []         theological development   []         advocacy  []         ethical dialog     []         blessing  []         bereavement support    [x]         support to family  []         anticipatory grief support   []         help with AMD  []         spiritual guidance    []         meditation      Spiritual Care Needs  [x]         Emotional Support  [x]         Spiritual/Scientology Care  [x] Loss/Adjustment  []         Advocacy/Referral                /Ethics  []         No needs expressed at               this time  []         Other: (note in               comments)  Spiritual Care Plan  [x]         Follow up visits with               pt/family  []         Provide materials  []         Schedule sacraments  []         Contact Community               Clergy  []         Follow up as needed  []         Other: (note in               comments)     Comments:   Paged to ER to provide support to patient's family as Mr. Estrada Rodríguez had been intubated. Pt's wife and family are familiar to me from previous hospitalizations. Provided emotional support to wife and daughter Praneeth James who was present. Mr. Estrada Rodírguez is a member of [employer] and niles is important to family. Presence and care provided. Escorted family to waiting area while line was being placed with patient; pastoral care will continue to follow family and provide care. Please page as needed. [phone]. Visit by: Ning Cummings. Anish Vanegas.  Wale Gallardo MA, [facility]    Lead  Profession Development & Advancement

## 2017-06-24 NOTE — PROGRESS NOTES
Primary Nurse Johanna Matthew, RN and ER NURSE  RN performed a dual skin assessment on this patient No impairment noted  Klever score is 7

## 2017-06-24 NOTE — IP AVS SNAPSHOT
[address] [facility] 
[phone] Patient: Tiffani Gordon MRN: [id number] :1934 You are allergic to the following Allergen Reactions No Known Allergies Not Noted Recent Documentation Height Weight BMI Smoking Status 1.727 m 69 kg 23.14 kg/m2 Never Smoker Unresulted Labs Order Current Status MYASTHENIA GRAVIS PANEL (COMPLETE AB PROFILE) Preliminary result Emergency Contacts Name Discharge Info Relation Home Work Mobile [address] CAREGIVER [3] Spouse [3] [phone] Lanny Torre  Daughter [21]   [phone] About your hospitalization You were admitted on:  2017 You last received care in the:  [facility] PROGRESSIVE [facility] You were discharged on:  2017 Unit phone number:  [phone] Why you were hospitalized Your primary diagnosis was:  Not on File Your diagnoses also included:  Status Epilepticus (Hcc), Oral Phase Dysphagia, Dysarthria, Dysphagia, Weakness, Debility, Counseling Regarding Goals Of Care Providers Seen During Your Hospitalizations Provider Role Specialty Primary office phone Rudolph Real. Lotus Stephens MD Attending Provider Emergency Medicine [phone] Etta Dougherty MD Attending Provider Hospitalist [phone] Vy Baker DO Attending Provider Internal Medicine [phone] Seble Sullivan MD Attending Provider Internal Medicine [phone] Alberto Bui MD Attending Provider Internal Medicine [phone] Darion Bowie MD Attending Provider Internal Medicine [phone] Tylor Haddad MD Attending Provider Internal Medicine [phone] Your Primary Care Physician (PCP) Primary Care Physician Office Phone Office Fax Estephanie Phillips [phone] Follow-up Information Follow up With Details Comments Contact Info CONSULATE Bladimir Robison ([facility])  after Discharge from [address] [place] [address] [phone] Sree Contreras MD Call  1 [address] [address] [phone] Current Discharge Medication List  
  
START taking these medications Dose & Instructions Dispensing Information Comments Morning Noon Evening Bedtime  
 acetaminophen 650 mg suppository Commonly known as:  TYLENOL Your last dose was: Your next dose is:    
   
   
 Dose:  650 mg Insert 1 Suppository into rectum every four (4) hours as needed. Indications: Fever, HEADACHE DISORDER, Pain Quantity:  30 Suppository Refills:  1  
     
   
   
   
  
 albuterol-ipratropium 2.5 mg-0.5 mg/3 ml Nebu Commonly known as:  Valjean Left Your last dose was: Your next dose is:    
   
   
 Dose:  3 mL  
3 mL by Nebulization route every six (6) hours as needed. Quantity:  30 Nebule Refills:  1  
     
   
   
   
  
 latanoprost 0.005 % ophthalmic solution Commonly known as:  Collin Gil Your last dose was: Your next dose is:    
   
   
 Dose:  1 Drop Administer 1 Drop to both eyes every evening. Quantity:  1 mL Refills:  1  
     
   
   
   
  
 levETIRAcetam 100 mg/ml Soln oral solution Commonly known as:  KEPPRA Replaces:  levETIRAcetam 1,000 mg tablet Your last dose was: Your next dose is:    
   
   
 Dose:  1500 mg  
15 mL by Per G Tube route two (2) times a day. Quantity:  1 Bottle Refills:  1  
     
   
   
   
  
 metoprolol tartrate 25 mg tablet Commonly known as:  LOPRESSOR Your last dose was: Your next dose is:    
   
   
 Dose:  12.5 mg  
0.5 Tabs by Per G Tube route two (2) times a day. Quantity:  60 Tab Refills:  1  
     
   
   
   
  
 multivit w-min-ferrous gluconate 9 mg iron/15 mL oral liquid Commonly known as:  Sera Cook Your last dose was: Your next dose is:    
   
   
 Dose:  15 mL  
15 mL by Per G Tube route daily. Quantity:  1 Bottle Refills:  01  
     
   
   
   
  
 nitrofurantoin (macrocrystal-monohydrate) 100 mg capsule Commonly known as:  MACROBID Your last dose was: Your next dose is:    
   
   
 Dose:  100 mg Take 1 Cap by mouth two (2) times daily (with meals). Quantity:  10 Cap Refills:  0 QUEtiapine 25 mg tablet Commonly known as:  SEROquel Your last dose was: Your next dose is:    
   
   
 Dose:  25 mg Take 1 Tab by mouth nightly. Quantity:  30 Tab Refills:  1  
     
   
   
   
  
 valproic acid (as sodium salt) 250 mg/5 mL (5 mL) Soln oral solution Commonly known as:  Richarbill Savage Your last dose was: Your next dose is:    
   
   
 Dose:  500 mg  
10 mL by Per G Tube route three (3) times daily. Quantity:  1 Bottle Refills:  1 CONTINUE these medications which have CHANGED Dose & Instructions Dispensing Information Comments Morning Noon Evening Bedtime * thiamine 100 mg tablet Commonly known as:  B-1 What changed:  Another medication with the same name was added. Make sure you understand how and when to take each. Your last dose was: Your next dose is:    
   
   
 Dose:  100 mg Take 1 Tab by mouth daily. Quantity:  30 Tab Refills:  0  
     
   
   
   
  
 * thiamine 100 mg tablet Commonly known as:  B-1 What changed: You were already taking a medication with the same name, and this prescription was added. Make sure you understand how and when to take each. Your last dose was: Your next dose is:    
   
   
 Dose:  100 mg  
1 Tab by Per G Tube route daily. Quantity:  30 Tab Refills:  1  
     
   
   
   
  
 * Notice:   This list has 2 medication(s) that are the same as other medications prescribed for you. Read the directions carefully, and ask your doctor or other care provider to review them with you. CONTINUE these medications which have NOT CHANGED Dose & Instructions Dispensing Information Comments Morning Noon Evening Bedtime  
 aspirin 325 mg tablet Commonly known as:  ASPIRIN Your last dose was: Your next dose is:    
   
   
 Dose:  325 mg Take 1 Tab by mouth daily. Refills:  0  
     
   
   
   
  
 atorvastatin 40 mg tablet Commonly known as:  LIPITOR Your last dose was: Your next dose is:    
   
   
 Dose:  40 mg Take 1 Tab by mouth daily. Quantity:  30 Tab Refills:  0  
     
   
   
   
  
 cyclobenzaprine 5 mg tablet Commonly known as:  FLEXERIL Your last dose was: Your next dose is:    
   
   
 Dose:  5 mg Take 1 Tab by mouth three (3) times daily as needed for Muscle Spasm(s). Quantity:  20 Tab Refills:  0  
     
   
   
   
  
 omeprazole 40 mg capsule Commonly known as:  PRILOSEC Your last dose was: Your next dose is:    
   
   
 Dose:  40 mg Take 40 mg by mouth daily. Refills:  0  
     
   
   
   
  
 pregabalin 300 mg capsule Commonly known as:  Charlesetta Oms Your last dose was: Your next dose is:    
   
   
 Dose:  300 mg Take 1 Cap by mouth two (2) times a day. Max Daily Amount: 600 mg. Quantity:  60 Cap Refills:  0 STOP taking these medications   
 levETIRAcetam 1,000 mg tablet Replaced by:  levETIRAcetam 100 mg/ml Soln oral solution  
   
  
 levETIRAcetam 250 mg tablet Commonly known as:  KEPPRA  
   
  
 magnesium oxide 400 mg tablet Commonly known as:  MAG-OX  
   
  
 metoprolol succinate 25 mg XL tablet Commonly known as:  TOPROL-XL Where to Get Your Medications These medications were sent to [facility] [place] - [address], [address] [place] [id number] Phone:  [phone]  
  acetaminophen 650 mg suppository  
 albuterol-ipratropium 2.5 mg-0.5 mg/3 ml Nebu  
 latanoprost 0.005 % ophthalmic solution  
 metoprolol tartrate 25 mg tablet  
 multivit w-min-ferrous gluconate 9 mg iron/15 mL oral liquid  
 nitrofurantoin (macrocrystal-monohydrate) 100 mg capsule QUEtiapine 25 mg tablet  
 thiamine 100 mg tablet  
 valproic acid (as sodium salt) 250 mg/5 mL (5 mL) Soln oral solution Information on where to get these meds will be given to you by the nurse or doctor. ! Ask your nurse or doctor about these medications  
  levETIRAcetam 100 mg/ml Soln oral solution Discharge Instructions HOSPITALIST DISCHARGE INSTRUCTIONS 
 
NAME: Clarice Garcia :  1934 MRN:  [id number] Date/Time:  2017 11:52 AM 
 
ADMIT DATE: 2017 DISCHARGE DATE: 2017 Attending Physician: Micaela Santoyo MD 
 
DISCHARGE DIAGNOSIs Acute encephalopathy , POA Status epilepticus in setting of known  Seizure Disorder  
Seizure Disorder  E. Coli and enterococcus UTI Dysphagia/dyarthria with resultant moderate protein calorie malnutrition:   
S/P Peg tube placement Hypertension, benign/essential: Hyperlipidemia:  
History of SVT:   
Peripheral neuropathy: 
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   
GERD:  
Acute respiratory failure (resolved) Medications: Per above medication reconciliation. Pain Management: per above medications Recommended diet:   NPO  ALL     MEDICATIONS ARE VIA PEG 
 OSMOLYTE  PEG feeding at 50 ml/hr, continuous,  200CC/Free water flushes every 6 hours Recommended activity: PT/OT Eval and Treat Wound care: None Indwelling devices:  PEG and Chronic Cummins Catheter Supplemental Oxygen: None Required Lab work: Per SNF routine Glucose management:  None Code status: Full Outside physician follow up:  
 
 NO BENZO's On this patient please, causes WORSE Encephalopathy Follow-up Information Follow up With Details Comments Contact Info CONSULATE Bladimir Robison ([facility])  after Discharge from [address] [place] [address] [phone] Judith Mascorro MD Call  1 [facility] [facility] [address] [address] [phone] Skilled nursing facility/ SNF MD responsible for above on discharge. Information obtained by : 
I understand that if any problems occur once I am at home I am to contact my physician. I understand and acknowledge receipt of the instructions indicated above. Physician's or R.N.'s Signature                                                                  Date/Time Patient or Repres Discharge Orders None Introducing [facility]! [facility] introduces [employer] patient portal. Now you can access parts of your medical record, email your doctor's office, and request medication refills online. 1. In your internet browser, go to [email or web address] 2. Click on the First Time User? Click Here link in the Sign In box. You will see the New Member Sign Up page. 3. Enter your [employer] Access Code exactly as it appears below. You will not need to use this code after youve completed the sign-up process. If you do not sign up before the expiration date, you must request a new code. · [employer] Access Code: [email or web address] Expires: 9/9/2017  5:34 PM 
 
4. Enter the last four digits of your Social Security Number (xxxx) and Date of Birth (mm/dd/yyyy) as indicated and click Submit.  You will be taken to the next sign-up page. 5. Create a [email or web address] ID. This will be your [employer] login ID and cannot be changed, so think of one that is secure and easy to remember. 6. Create a [email or web address] password. You can change your password at any time. 7. Enter your Password Reset Question and Answer. This can be used at a later time if you forget your password. 8. Enter your e-mail address. You will receive e-mail notification when new information is available in [address] 9. Click Sign Up. You can now view and download portions of your medical record. 10. Click the Download Summary menu link to download a portable copy of your medical information. If you have questions, please visit the Frequently Asked Questions section of the [email or web address] website. Remember, [email or web address] is NOT to be used for urgent needs. For medical emergencies, dial 911. Now available from your iPhone and Android! General Information Please provide this summary of care documentation to your next provider. Patient Signature:  ____________________________________________________________ Date:  ____________________________________________________________  
  
My [facility] Provider Signature:  ____________________________________________________________ Date:  ____________________________________________________________

## 2017-06-24 NOTE — ED NOTES
Pt arrives to ED via EMS c/o AMS when he woke up this morning as reported by his wife, wife states he went to bed normal last night (a/o x 3), wife states pt has a hx of seizures. Pt is unable to converse appropriately with staff, unable to preform NIH. Nystagmus present on exam. RIGHT arm rigid, eye and head  deviation to the RIGHT.

## 2017-06-24 NOTE — PROGRESS NOTES
1630 pt received from ED REPORT RECEIVED ASSESSED 2 NURSE SKIN ASSESSMENT NO SKIN BREAKDOWN 1700 DR Simeon in CCU aware of pt 1735 ABG Called to DR Simeon Rate Per DR Simeon ok to use central line  changed to 12 FIO2 to 35 per respiratory 1800 Dr Desi Caal called to inform that lactate was 2.2 and to clarify seizure meds 1821 Call placed to DR Abraham to clarify seizure meds 1830 PER neuro for seizure control pt to have diprivan and keppra 1930 report to nurse

## 2017-06-24 NOTE — ED PROVIDER NOTES
HPI Comments: Judi Austin is a 80 y.o. male, pmhx significant for seizures, who presents via EMS to the ED for evaluation of sudden onset AMS x 0400 this morning. Per pt's wife, the pt went to bed at his baseline last night, noting that he is A&Ox3 and able to communicate effectively normally. He woke up this morning being unable to speak in coherent sentences along with confusion about orientation. She reports that he was saying that he didn't feel right and that he wanted to go to the ER. Pt was able to ambulate to and from the bathroom on his own just after waking up with his sxs. She endorses that the pt has a long hx of seizures and that he takes 1250mg of Keppra BID. However, pt was not compliant with any of his medications today. His dose of Keppra was recently increased by Dr. Angela Shrestha. His last seizure was a few months ago, but his wife is concerned that he may have had one in the middle of the night. She specifically denies nausea, vomiting, recent illness, hx of CVA or urinary/bowel incontinence. PCP: Reina Alcaraz MD  Neurology: Dr. Gabriela Fletcher Hx: -tobacco, +EtOH (hx of abuse 10 years ago), -Illicit Drugs   FHx: no pertinent family hx  Medication Allergies: none      HPI is limited due to AMS      The history is provided by the patient, the spouse and the EMS personnel. Past Medical History:   Diagnosis Date    Alcohol abuse, in remission     Seizures ([address]        No past surgical history on file. Family History:   Problem Relation Age of Onset    Other Other      no strokes or seizures       Social History     Social History    Marital status:      Spouse name: N/A    Number of children: N/A    Years of education: N/A     Occupational History    Not on file.      Social History Main Topics    Smoking status: Never Smoker    Smokeless tobacco: Not on file    Alcohol use No      Comment: Quit drinking 10 years ago    Drug use: No    Sexual activity: Not on file Other Topics Concern    Not on file     Social History Narrative         ALLERGIES: No known allergies    Review of Systems   Unable to perform ROS: Mental status change       Patient Vitals for the past 12 hrs:   Temp Pulse Resp BP SpO2   06/24/17 1530 - 62 16 117/64 99 %   06/24/17 1515 - (!) 59 16 126/66 100 %   06/24/17 1500 - 60 16 92/54 99 %   06/24/17 1449 - 84 20 - 98 %   06/24/17 1445 - 65 20 141/85 98 %   06/24/17 1439 - 79 - 144/87 98 %   06/24/17 1330 - 88 20 151/82 93 %   06/24/17 1230 - 89 20 145/79 95 %   06/24/17 1219 - - - 139/80 97 %   06/24/17 1217 98.4 °F (36.9 °C) 90 - - 97 %       Physical Exam   Nursing note and vitals reviewed.   General appearance: non-toxic, NAD  Eyes: PERRL, EOMI, conjunctiva normal, anicteric sclera, some intermittent rightward/downward beating nystagmus,   HEENT: mucous membranes moist, oropharynx is clear  Pulmonary: clear to auscultation bilaterally  Cardiac: normal rate and regular rhythm, no murmurs, gallops, or rubs, 2+DP pulses, 2+ radial pulses, 2+ peripheral pulses  Abdomen: soft, nontender, nondistended, bowel sounds present  MSK: no pre-tibial edema, no clonus, nl tone BUE, tone symmetrical and slightly increased in BLE, neck supple  Neuro: awake, confused, twitching movement of the lips, gaze preference rightward but able to turn his head leftward, responds \"yeah\" to most questions, reflex difficult to illicit BL,  Skin: capillary refill brisk    MDM  Number of Diagnoses or Management Options  Altered mental status, unspecified altered mental status type:   Diagnosis management comments: DDx: suspicion for ongoing, non-convulsive status epilepticus, electrolyte disorder, subtherapeutic drug level, break through seizure, CVA, UTI        Amount and/or Complexity of Data Reviewed  Clinical lab tests: reviewed and ordered  Tests in the radiology section of CPT®: ordered and reviewed  Tests in the medicine section of CPT®: ordered and reviewed  Obtain history from someone other than the patient: yes (EMS, wife )  Review and summarize past medical records: yes  Discuss the patient with other providers: yes (Tele-Neuro, neuro, hospitalist )  Independent visualization of images, tracings, or specimens: yes    Critical Care  Total time providing critical care:  minutes    Patient Progress  Patient progress: stable    ED Course       Procedures   12:48 PM  Pt reexamined. He is having continued nystagmus, confusion and one word answers. Will consult neurology. Written by Luke Stephenson ED Scribe as dictated by Ava Lou MD    CONSULT NOTE:   12:54 PM  Ava Lou MD spoke with Dr. Hooper Lung  Specialty: Neurology   Discussed pt's hx, disposition, and available diagnostic and imaging results. Reviewed care plans. Consultant agrees with current plan and recommends a CTA and to load him with keppra. Written by Luke Stephenson, ED Scribe, as dictated by Ava Lou MD.    12:55 PM  Now has R gaze preference head turned to the R, decreased responsiveness, CT pending. Written by Luke Stephenson ED Scribe as dictated by Ava Lou MD    1:18 PM  R deviation continues with nystagmus. No change in level of responsiveness. Increase in muscle tone of the RUE, concerned for continued status epilepticus. Written by Luke Stephenson ED Scribe as dictated by Ava Lou MD    2:17 PM  Updated family on current situation and need for intubation. Written by Luke Stephenson ED Scribe as dictated by Ava Lou MD    Procedure Note - Orotracheal Intubation:   2:35 PM  Performed by: Ava Lou MD   Indication for procedure: impending respiratory failure  RSI performed. The patient was sedated with 120 mg of propofol and 100mg of succinylcholine and orotracheally intubated with a 7.5 cuffed Serbian endotracheal tube using a mach 4 blade with direct visualization. Tube was advanced to 21 cm at the lip.  ETT location confirmed by bilateral, symmetric breath sounds, color change, CXR pending. Number of attempts: 1  Complications: none  Assisted with [facility]  The procedure took 1-15 minutes, and pt tolerated well. Written by VANGIE Terry, as dictated by Giana Calhoun MD.     CONSULT NOTE:   2:36 PM  Giana Calhoun MD spoke with Dr. Cecilio Albarran,   Specialty: Neurology  Discussed pt's hx, disposition, and available diagnostic and imaging results. Reviewed care plans. Consultant recommends increasing phosphotoic acid and loading him with keppra as seizure activity continues. Written by VANGIE Terry, as dictated by Giana Calhoun MD.    CONSULT NOTE:   2:55 PM  Giana Calhoun MD spoke with Dr. Sheryle Basque,   Specialty: Hospitalist  Discussed pt's hx, disposition, and available diagnostic and imaging results. Reviewed care plans. Consultant will evaluate pt for admission. Written by VANGIE Terry, as dictated by Giana Calhoun MD.    Procedure Note - Central Line Placement:   3:39 PM  Performed by: Giana Calhoun MD      Immediately prior to the procedure, the patient was reevaluated and found suitable for the planned procedure and any planned medications. Immediately prior to the procedure a time out was called to verify the correct patient, procedure, equipment, staff, and marking as appropriate. Area was cleansed with Chlorprep and anesthetized with 5 mLs of 2% lidocaine with epi. Prepped and draped in sterile fashion. Landmarks identified. 18 gauge needle with triple lumen catheter was inserted into pt's Right, Internal Jugular Vein with ultrasound guidance. Line sutured in place; sterile dressing applied. Position: Trendelenburg  Number of attempts: 1  Estimated blood loss: 5 mL  The procedure took 16-30 minutes, and pt tolerated well.   Written by VANGIE Terry, as dictated by Giana Calhoun MD.       CRITICAL CARE NOTE :    3:50 PM      IMPENDING DETERIORATION -Airway, Respiratory and CNS    ASSOCIATED RISK FACTORS - Hypoxia and CNS Decompensation    MANAGEMENT- Bedside Assessment and Supervision of Care    INTERPRETATION -  Xrays, CT Scan, Blood Gases, ECG and Blood Pressure    INTERVENTIONS - vent mngmt and Neurologic interventions     CASE REVIEW - Hospitalist, Medical Sub-Specialist, Nursing and Family    TREATMENT RESPONSE -Stable    PERFORMED BY - Self        NOTES   :      I have spent 75 minutes of critical care time involved in lab review, consultations with specialist, family decision- making, bedside attention and documentation. During this entire length of time I was immediately available to the patient . Haydee Taylor MD    ADMIT NOTE:  LABORATORY TESTS:  Recent Results (from the past 12 hour(s))   EKG, 12 LEAD, INITIAL    Collection Time: 06/24/17 12:22 PM   Result Value Ref Range    Ventricular Rate 88 BPM    Atrial Rate 88 BPM    P-R Interval 294 ms    QRS Duration 82 ms    Q-T Interval 368 ms    QTC Calculation (Bezet) 445 ms    Calculated P Axis 61 degrees    Calculated R Axis -38 degrees    Calculated T Axis 52 degrees    Diagnosis       Sinus rhythm with 1st degree AV block  Left axis deviation  Nonspecific T wave abnormality  When compared with ECG of 06-SEP-2012 03:00,  IN interval has increased  ST no longer elevated in Anterior leads  Nonspecific T wave abnormality, worse in Anterolateral leads     CBC WITH AUTOMATED DIFF    Collection Time: 06/24/17 12:30 PM   Result Value Ref Range    WBC 6.2 4.1 - 11.1 K/uL    RBC 4.28 4.10 - 5.70 M/uL    HGB 14.3 12.1 - 17.0 g/dL    HCT 40.8 36.6 - 50.3 %    MCV 95.3 80.0 - 99.0 FL    MCH 33.4 26.0 - 34.0 PG    MCHC 35.0 30.0 - 36.5 g/dL    RDW 12.4 11.5 - 14.5 %    PLATELET 002 (L) 913 - 400 K/uL    NEUTROPHILS 65 32 - 75 %    LYMPHOCYTES 23 12 - 49 %    MONOCYTES 12 5 - 13 %    EOSINOPHILS 0 0 - 7 %    BASOPHILS 0 0 - 1 %    ABS. NEUTROPHILS 4.0 1.8 - 8.0 K/UL    ABS. LYMPHOCYTES 1.4 0.8 - 3.5 K/UL    ABS. MONOCYTES 0.8 0.0 - 1.0 K/UL    ABS. EOSINOPHILS 0.0 0.0 - 0.4 K/UL    ABS. BASOPHILS 0.0 0.0 - 0.1 K/UL   METABOLIC PANEL, COMPREHENSIVE    Collection Time: 06/24/17 12:30 PM   Result Value Ref Range    Sodium 137 136 - 145 mmol/L    Potassium 3.6 3.5 - 5.1 mmol/L    Chloride 102 97 - 108 mmol/L    CO2 28 21 - 32 mmol/L    Anion gap 7 5 - 15 mmol/L    Glucose 105 (H) 65 - 100 mg/dL    BUN 13 6 - 20 MG/DL    Creatinine 0.98 0.70 - 1.30 MG/DL    BUN/Creatinine ratio 13 12 - 20      GFR est AA >60 >60 ml/min/1.73m2    GFR est non-AA >60 >60 ml/min/1.73m2    Calcium 9.6 8.5 - 10.1 MG/DL    Bilirubin, total 1.1 (H) 0.2 - 1.0 MG/DL    ALT (SGPT) 19 12 - 78 U/L    AST (SGOT) 16 15 - 37 U/L    Alk.  phosphatase 83 45 - 117 U/L    Protein, total 8.9 (H) 6.4 - 8.2 g/dL    Albumin 4.4 3.5 - 5.0 g/dL    Globulin 4.5 (H) 2.0 - 4.0 g/dL    A-G Ratio 1.0 (L) 1.1 - 2.2     TROPONIN I    Collection Time: 06/24/17 12:30 PM   Result Value Ref Range    Troponin-I, Qt. <0.04 <0.05 ng/mL   CK W/ REFLX CKMB    Collection Time: 06/24/17 12:30 PM   Result Value Ref Range     39 - 308 U/L   GLUCOSE, POC    Collection Time: 06/24/17 12:42 PM   Result Value Ref Range    Glucose (POC) 110 (H) 65 - 100 mg/dL    Performed by Laurence Reyes    POC CREATININE    Collection Time: 06/24/17  1:04 PM   Result Value Ref Range    Creatinine (POC) 0.9 0.6 - 1.3 MG/DL    GFRAA, POC >60 >60 ml/min/1.73m2    GFRNA, POC >60 >60 ml/min/1.73m2   URINALYSIS W/MICROSCOPIC    Collection Time: 06/24/17  1:05 PM   Result Value Ref Range    Color YELLOW/STRAW      Appearance CLOUDY (A) CLEAR      Specific gravity 1.019 1.003 - 1.030      pH (UA) 7.0 5.0 - 8.0      Protein NEGATIVE  NEG mg/dL    Glucose NEGATIVE  NEG mg/dL    Ketone TRACE (A) NEG mg/dL    Blood NEGATIVE  NEG      Urobilinogen 1.0 0.2 - 1.0 EU/dL    Nitrites NEGATIVE  NEG      Leukocyte Esterase NEGATIVE  NEG      WBC 0-4 0 - 4 /hpf    RBC 0-5 0 - 5 /hpf Epithelial cells FEW FEW /lpf    Bacteria NEGATIVE  NEG /hpf    Hyaline cast 0-2 0 - 5 /lpf   BILIRUBIN, CONFIRM    Collection Time: 06/24/17  1:05 PM   Result Value Ref Range    Bilirubin UA, confirm NEGATIVE  NEG     POC CHEM8    Collection Time: 06/24/17  1:10 PM   Result Value Ref Range    Calcium, ionized (POC) 1.14 1.12 - 1.32 MMOL/L    Sodium (POC) 140 136 - 145 MMOL/L    Potassium (POC) 4.3 3.5 - 5.1 MMOL/L    Chloride (POC) 101 98 - 107 MMOL/L    CO2 (POC) 30 21 - 32 MMOL/L    Anion gap (POC) 14 5 - 15 mmol/L    Glucose (POC) 107 (H) 65 - 100 MG/DL    BUN (POC) 15 9 - 20 MG/DL    Creatinine (POC) 0.9 0.6 - 1.3 MG/DL    GFRAA, POC >60 >60 ml/min/1.73m2    GFRNA, POC >60 >60 ml/min/1.73m2    Hemoglobin (POC) 14.3 12.1 - 17.0 GM/DL    Hematocrit (POC) 42 36.6 - 50.3 %    Comment Comment Not Indicated. IMAGING RESULTS:  ALL IMAGING RESULTS FROM THE ED REVIEWED, INCLUDING PRELIMINARY REPORT OF CTA.        MEDICATIONS GIVEN:  Medications   phenytoin (DILANTIN) 1,500 mg in 0.9% sodium chloride 250 mL IVPB (not administered)   propofol (DIPRIVAN) infusion (15 mcg/kg/min × 74.2 kg IntraVENous New Bag 6/24/17 1448)   levETIRAcetam (KEPPRA) 500 mg in 0.9% sodium chloride IVPB (not administered)   lidocaine-EPINEPHrine (PF) (XYLOCAINE) 2 %-1:200,000 injection (  Canceled Entry 6/24/17 1509)   sodium chloride (NS) flush 5-10 mL (not administered)   sodium chloride (NS) flush 5-10 mL (not administered)   naloxone (NARCAN) injection 0.4 mg (not administered)   ondansetron (ZOFRAN) injection 4 mg (not administered)   bisacodyl (DULCOLAX) suppository 10 mg (not administered)   acetaminophen (TYLENOL) suppository 650 mg (not administered)   enoxaparin (LOVENOX) injection 40 mg (not administered)   LORazepam (ATIVAN) injection 1 mg (1 mg IntraVENous Given 6/24/17 1239)   levETIRAcetam (KEPPRA) 1,000 mg in 0.9% sodium chloride IVPB (0 mg IntraVENous IV Completed 6/24/17 1342)   LORazepam (ATIVAN) injection 1 mg (1 mg IntraVENous Given 6/24/17 1248)   0.9% sodium chloride infusion 1,000 mL (1,000 mL IntraVENous New Bag 6/24/17 1250)   0.9% sodium chloride infusion (0 mL/hr IntraVENous IV Completed 6/24/17 1343)   iopamidol (ISOVUE-370) 76 % injection 100 mL (100 mL IntraVENous Given 6/24/17 1341)   sodium chloride (NS) flush 10 mL (10 mL IntraVENous Given 6/24/17 1341)   LORazepam (ATIVAN) injection 1 mg (1 mg IntraVENous Given 6/24/17 1316)   succinylcholine (ANECTINE) injection 100 mg (100 mg IntraVENous Given 6/24/17 1436)   propofol (DIPRIVAN) 10 mg/mL injection 120 mg (120 mg IntraVENous Given by Provider 6/24/17 1435)   fentaNYL citrate (PF) injection 50 mcg (50 mcg IntraVENous Given 6/24/17 1450)   lidocaine-EPINEPHrine (XYLOCAINE) 2 %-1:100,000 injection 90 mg (90 mg IntraDERMal Given by Provider 6/24/17 1440)   fentaNYL citrate (PF) injection 50 mcg (50 mcg IntraVENous Given 6/24/17 1513)       IMPRESSION:  1. Altered mental status, unspecified altered mental status type    2. Seizure disorder, nonconvulsive, with status epilepticus ([address]    3. Acute respiratory failure, unspecified whether with hypoxia or hypercapnia ([address]        PLAN: Admit to Hospitalist    2:56 PM  Patient is being admitted to the hospital by Dr. Bong Shook. The results of their tests and reasons for their admission have been discussed with them and/or available family. They convey agreement and understanding for the need to be admitted and for their admission diagnosis. Written by VANGIE Garland, as dictated by Lorenzo Meadows MD  .    This note is prepared by Sabi George acting as scribe for MD Lorenzo Wong MD : The scribe's documentation has been prepared under my direction and personally reviewed by me in its entirety.  I confirm that the note above accurately reflects all work, treatment, procedures, and medical decision making performed by me.

## 2017-06-24 NOTE — H&P
Hospitalist Admission Note    NAME:  Arjun Lilly   :   1934   MRN:   [id number]     Date of admit:  2017    PCP: Steffi Pedro MD    Assessment/Plan:      Status epilepticus ([address] (2017) POA with recurrent seizures in ED  Acute encephalopathy POA  Acute respiratory failure requiring intubation for airway protection POA  Known seizure disorder with admits for seizures in   and 2017, takes keppra at home(compliant)   Prior seizures tonic clonic with prolonged post-ictal confusion  No seizures since discharge until today  Confused this Am, ? Post ictal from seizure before wife got up  Seizure activity started in ED  Interestingly both the seizure today and in February occurred after stopping lyrica for several days  IV keppra 1500 mg q12(prior was on 1250 mg)  Phenytoin load then  mg q8  PRN ativan  Will be on propafol for ventilator as well  No fevers, HA, leukocytosis PTA, hold on antibiotics for now  IVF  Check CXR and ABG, pulmonary consult  Discussed case and management with Dr Tommy Ham, he will see in Consultation in AM     Essential HTN POA   History of SVT POA  Hold oral meds till OG tube placed  PRN labetalol  Tele     Peripheral neuropathy POA  Again ran out of lyrica several days ago     Remote ETOH abuse  Family confirms no ETOH in 5 years     GERD continue PPI     DVT prophylaxis: lovenox SQ     Stress ulcer prophylaxis: Not indicated     Code status: Full code     Next of Kin: Wife    Subjective:     CHIEF COMPLAINT:  Brought in by family with AMS this morning, recurrent seizures in ED    HISTORY OF PRESENT ILLNESS:      80 y.o.    male:  Known seizure diosorder first in , maintained on keppra, no seizures until 2012  Admitted  to 2017 with recurrent seizure   Had run out of lyrica 3 days prior(mail order package was late)   keppra increased to 1000 mg BID   No further seizures   MRI brain with no acute findings, atrophy and small vessel ichemic changes   Bumped troponin to 0.88, seen by Dr Charli Garibay, quickly came back to normal    Echo LVEF 55 to 60% with no wall motion changes, normal RV size and function  Other wise doing well with no recurrent seizures until today   Wife denies seizures at home since discharge, but keppa was increased to 1250 mg PID for unclear reasons  Ran out of lyrica Monday(no refills)  Not feeling sick the past few days, no fevers, chills, headaches, abnormal behavior or MS changes  Well yesterday at bedtime after eating a normal dinner  awake today and was confused \"not himself\", wife unaware of any seizures overnight   Pt was talking and not having any clear seizure activity   Kept saying he could not move his bowels and that he needed to go the ED  EMS was called    ED confused, then started having recurrent seizure activity   Not like his typical tonic clonic seizures   Looking off to the right side with nystagmus to the right and downward   Right arm was repeatedly flexing  This happened repeatedly and pt never woke up  Intubated and started on propafol  Loaded with phenytoin and keppra  Tele neuro saw  Head CT and CTA head and neck negative  We were called to admit the patient    Past Medical History:   Diagnosis Date    Alcohol abuse, in remission     Seizures ([facility] [address]         No past surgical history on file. Social History:  Social History   Substance Use Topics    Smoking status: Never Smoker    Smokeless tobacco: Not on file    Alcohol use No      Comment: Quit drinking 10 years ago    Lives with wife    FAMILY HISTORY:  Family History   Problem Relation Age of Onset    Other Other      no strokes or seizures   Mother had unknown type of cancer  1 daughter    Allergies   Allergen Reactions    No Known Allergies         Prior to Admission medications    Medication Sig Start Date End Date Taking? Authorizing Provider   levETIRAcetam (KEPPRA) 250 mg tablet Take 1 Tab by mouth two (2) times a day.  Take with the 1000mg to make 1250mg twice a day. 3/21/17   Lianna Davis NP   cyclobenzaprine (FLEXERIL) 5 mg tablet Take 1 Tab by mouth three (3) times daily as needed for Muscle Spasm(s). 3/18/17   Maru Horton MD   levETIRAcetam 1,000 mg tablet Take 1 Tab by mouth two (2) times a day. 3/7/17   Abdulaziz Lindsey MD   pregabalin (LYRICA) 300 mg capsule Take 1 Cap by mouth two (2) times a day. Max Daily Amount: 600 mg. 17   Marlene Guevara MD   thiamine (B-1) 100 mg tablet Take 1 Tab by mouth daily. 17   Marlene Guevara MD   atorvastatin (LIPITOR) 40 mg tablet Take 1 Tab by mouth daily. 17   Marlene Guevara MD   metoprolol succinate (TOPROL-XL) 25 mg XL tablet Take  by mouth daily. Nadia Bob MD   omeprazole (PRILOSEC) 40 mg capsule Take 40 mg by mouth daily. Nadia Bob MD   aspirin (ASPIRIN) 325 mg tablet Take 1 Tab by mouth daily. 12   Diomedes Rojo MD   magnesium oxide (MAG-OX) 400 mg tablet Take 400 mg by mouth daily.       Nadia Bob MD       REVIEW OF SYSTEMS:  Bold equals positive    Unable to obtain  ROS due to mental status change and intubated      Objective:   VITALS:    Patient Vitals for the past 24 hrs:   Temp Pulse Resp BP SpO2   17 1515 - (!) 59 16 126/66 100 %   17 1500 - 60 16 92/54 99 %   17 1449 - 84 20 - 98 %   17 1445 - 65 20 141/85 98 %   17 1439 - 79 - 144/87 98 %   17 1330 - 88 20 151/82 93 %   17 1230 - 89 20 145/79 95 %   17 1219 - - - 139/80 97 %   17 1217 98.4 °F (36.9 °C) 90 - - 97 %     Temp (24hrs), Av.4 °F (36.9 °C), Min:98.4 °F (36.9 °C), Max:98.4 °F (36.9 °C)      O2 Device: Room air    PHYSICAL EXAM:   General:    Intubated, sedated, no recurrent seizure activity  HEENT: Normocephalic, atraumatic    PERRL Sclera no icterus    Nasal mucosa without masses or discharge     Oropharynx with ETT in place  Neck:  No meningismus, trachea midline, no carotid bruits     Thyroid not enlarged, no nodules or tenderness  Lungs:   Clear to auscultation bilaterally. No wheezing or rales    No accessory muscle use or retractions. Heart:   Regular rate and rhythm,  no murmur or gallop. No LE edema  Abdomen:   Soft, non-tender. Not distended. Bowel sounds normal.     No masses, No Hepatosplenomegaly, No Rebound or guarding  Lymph nodes: No cervical or inguinal VESNA  Musculoskeletal:  No Joint swelling, erythema, warmth. No Cyanosis or clubbing  Skin:      No rashes    Not Jaundiced   No nodules or thickening  Neurologic: Intubated and sedated    Not moving or withdrawing or following commands    Cranial nerves 2 to 12 intact    Symmetric motor strength bilaterally         LAB DATA REVIEWED:    Recent Results (from the past 24 hour(s))   EKG, 12 LEAD, INITIAL    Collection Time: 06/24/17 12:22 PM   Result Value Ref Range    Ventricular Rate 88 BPM    Atrial Rate 88 BPM    P-R Interval 294 ms    QRS Duration 82 ms    Q-T Interval 368 ms    QTC Calculation (Bezet) 445 ms    Calculated P Axis 61 degrees    Calculated R Axis -38 degrees    Calculated T Axis 52 degrees    Diagnosis       Sinus rhythm with 1st degree AV block  Left axis deviation  Nonspecific T wave abnormality  When compared with ECG of 06-SEP-2012 03:00,  NE interval has increased  ST no longer elevated in Anterior leads  Nonspecific T wave abnormality, worse in Anterolateral leads     CBC WITH AUTOMATED DIFF    Collection Time: 06/24/17 12:30 PM   Result Value Ref Range    WBC 6.2 4.1 - 11.1 K/uL    RBC 4.28 4.10 - 5.70 M/uL    HGB 14.3 12.1 - 17.0 g/dL    HCT 40.8 36.6 - 50.3 %    MCV 95.3 80.0 - 99.0 FL    MCH 33.4 26.0 - 34.0 PG    MCHC 35.0 30.0 - 36.5 g/dL    RDW 12.4 11.5 - 14.5 %    PLATELET 569 (L) 426 - 400 K/uL    NEUTROPHILS 65 32 - 75 %    LYMPHOCYTES 23 12 - 49 %    MONOCYTES 12 5 - 13 %    EOSINOPHILS 0 0 - 7 %    BASOPHILS 0 0 - 1 %    ABS. NEUTROPHILS 4.0 1.8 - 8.0 K/UL    ABS. LYMPHOCYTES 1.4 0.8 - 3.5 K/UL    ABS.  MONOCYTES 0.8 0.0 - 1.0 K/UL    ABS. EOSINOPHILS 0.0 0.0 - 0.4 K/UL    ABS. BASOPHILS 0.0 0.0 - 0.1 K/UL   METABOLIC PANEL, COMPREHENSIVE    Collection Time: 06/24/17 12:30 PM   Result Value Ref Range    Sodium 137 136 - 145 mmol/L    Potassium 3.6 3.5 - 5.1 mmol/L    Chloride 102 97 - 108 mmol/L    CO2 28 21 - 32 mmol/L    Anion gap 7 5 - 15 mmol/L    Glucose 105 (H) 65 - 100 mg/dL    BUN 13 6 - 20 MG/DL    Creatinine 0.98 0.70 - 1.30 MG/DL    BUN/Creatinine ratio 13 12 - 20      GFR est AA >60 >60 ml/min/1.73m2    GFR est non-AA >60 >60 ml/min/1.73m2    Calcium 9.6 8.5 - 10.1 MG/DL    Bilirubin, total 1.1 (H) 0.2 - 1.0 MG/DL    ALT (SGPT) 19 12 - 78 U/L    AST (SGOT) 16 15 - 37 U/L    Alk.  phosphatase 83 45 - 117 U/L    Protein, total 8.9 (H) 6.4 - 8.2 g/dL    Albumin 4.4 3.5 - 5.0 g/dL    Globulin 4.5 (H) 2.0 - 4.0 g/dL    A-G Ratio 1.0 (L) 1.1 - 2.2     TROPONIN I    Collection Time: 06/24/17 12:30 PM   Result Value Ref Range    Troponin-I, Qt. <0.04 <0.05 ng/mL   CK W/ REFLX CKMB    Collection Time: 06/24/17 12:30 PM   Result Value Ref Range     39 - 308 U/L   GLUCOSE, POC    Collection Time: 06/24/17 12:42 PM   Result Value Ref Range    Glucose (POC) 110 (H) 65 - 100 mg/dL    Performed by Wendelyn Cooks    POC CREATININE    Collection Time: 06/24/17  1:04 PM   Result Value Ref Range    Creatinine (POC) 0.9 0.6 - 1.3 MG/DL    GFRAA, POC >60 >60 ml/min/1.73m2    GFRNA, POC >60 >60 ml/min/1.73m2   URINALYSIS W/MICROSCOPIC    Collection Time: 06/24/17  1:05 PM   Result Value Ref Range    Color YELLOW/STRAW      Appearance CLOUDY (A) CLEAR      Specific gravity 1.019 1.003 - 1.030      pH (UA) 7.0 5.0 - 8.0      Protein NEGATIVE  NEG mg/dL    Glucose NEGATIVE  NEG mg/dL    Ketone TRACE (A) NEG mg/dL    Blood NEGATIVE  NEG      Urobilinogen 1.0 0.2 - 1.0 EU/dL    Nitrites NEGATIVE  NEG      Leukocyte Esterase NEGATIVE  NEG      WBC 0-4 0 - 4 /hpf    RBC 0-5 0 - 5 /hpf    Epithelial cells FEW FEW /lpf    Bacteria NEGATIVE  NEG /hpf    Hyaline cast 0-2 0 - 5 /lpf   BILIRUBIN, CONFIRM    Collection Time: 06/24/17  1:05 PM   Result Value Ref Range    Bilirubin UA, confirm NEGATIVE  NEG     POC CHEM8    Collection Time: 06/24/17  1:10 PM   Result Value Ref Range    Calcium, ionized (POC) 1.14 1.12 - 1.32 MMOL/L    Sodium (POC) 140 136 - 145 MMOL/L    Potassium (POC) 4.3 3.5 - 5.1 MMOL/L    Chloride (POC) 101 98 - 107 MMOL/L    CO2 (POC) 30 21 - 32 MMOL/L    Anion gap (POC) 14 5 - 15 mmol/L    Glucose (POC) 107 (H) 65 - 100 MG/DL    BUN (POC) 15 9 - 20 MG/DL    Creatinine (POC) 0.9 0.6 - 1.3 MG/DL    GFRAA, POC >60 >60 ml/min/1.73m2    GFRNA, POC >60 >60 ml/min/1.73m2    Hemoglobin (POC) 14.3 12.1 - 17.0 GM/DL    Hematocrit (POC) 42 36.6 - 50.3 %    Comment Comment Not Indicated. I have reviewed the results of all available imaging studies. Yes I have personally reviewed the actual    xray     EKG as read by me shows SR with first degree AV block, LAD, No LVH  AZ 0.29, nonspecific ST-T changes    CXR pending    CT scan head FINDINGS:  The ventricles and sulci are normal in size, shape and configuration and  midline. There are atherosclerotic calcifications of the vertebral and carotid  arteries. There is no significant white matter disease. There is basal ganglia  calcification. There is no intracranial hemorrhage. There is no extra-axial  collection, mass, mass effect or midline shift. The basilar cisterns are open. No acute infarct is identified. The bone windows demonstrate no abnormalities. There is mucoperiosteal thickening in the ethmoid sinuses. IMPRESSION: No acute intracranial abnormality and no change    CTA chest FINDINGS: Comparison exam:  9/4/2012. Images through the thoracic arch reveal patency of the right innominate, left  common carotid, and left subclavian arteries. Both vertebral arteries are patent.   Images through the neck reveal patency of both common and internal and external  carotid arteries with no significant bifurcation flow-limiting stenosis. Right internal carotid arterial stenosis: [20] % by NASCET criteria. Left internal carotid arterial stenosis: [20] % by NASCET criteria. Images through the brain reveal patency of the cavernous carotid arteries as  well as the bilateral anterior and middle cerebral arterial distributions. Vertebral and basilar arteries are patent. There is patency of the bilateral  posterior cerebral arteries. Superior sagittal sinus patent. No significant edema or hemorrhage or mass effect are identified. Incidental mild mucoperiosteal thickening involving bilateral maxillary sinuses. IMPRESSION:  No significant cervical carotid arterial stenosis. Patent vertebral basilar system. No large vessel intracranial vascular occlusive change. Mild mucous retention cyst and mucoperiosteal thickening in the maxillary sinuses bilaterally. 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  ___________________________________________________    Inpatient is warranted for this patient because they presents with AMS. I have a high level of concern for Recurrent seizures  I anticipate the stay in the hospital will span at least 2 midnights. My assessment of the clinical condition and my plan of care is as outlined above  __________________________________________________    Care Plan discussed with:    Family, ED Doc, Dr Corrine De León     I saw the patient personally, took a history and did a complete physical exam at the bedside. I performed complex decision making in coming up with a diagnostic and treatment plan for the patient. I reviewed the patient's past medical records, current laboratory and radiology results, and actual Xray films/EKG. I have also discussed this case with the involved ED physician.     Risk of deterioration:  High    Total Time Coordinating Admission:   65   minutes    Total Critical Care Time:     Admitting Physician: Douglas Epps Javad Sprague MD

## 2017-06-24 NOTE — PROGRESS NOTES
1900 Bedside verbal report received from Babatunde Slaughter RN    2000 patient is intubated and sedated. Patient does withdrawal from pain. Pupils are equal and reactive to light. Lung sounds are clear. Bowel sounds are active. Pulses are palpable. Right IJ with Propofol infusing @ 50mcg, Normal Saline @ 100mL/hr. Cummins is draining clear yellow urine. Restraints are in place. Skin is dry and intact. 0000 Assessment complete. No changes from previous. 0400 Assessment complete. No changes from previous.     7831 Propofol stopped to start SAT/SBT    0656 Patient tolerating SBT.    0700 Bedside verbal report given to Julio Cesar Burroughs, 24[address] [facility]

## 2017-06-24 NOTE — IP AVS SNAPSHOT
[address] [facility] 
[phone] Patient: Sneha Cedillo MRN: [id number] :1934 Current Discharge Medication List  
  
START taking these medications Dose & Instructions Dispensing Information Comments Morning Noon Evening Bedtime  
 acetaminophen 650 mg suppository Commonly known as:  TYLENOL Your last dose was: Your next dose is:    
   
   
 Dose:  650 mg Insert 1 Suppository into rectum every four (4) hours as needed. Indications: Fever, HEADACHE DISORDER, Pain Quantity:  30 Suppository Refills:  1  
     
   
   
   
  
 albuterol-ipratropium 2.5 mg-0.5 mg/3 ml Nebu Commonly known as:  Metcalfe Frame Your last dose was: Your next dose is:    
   
   
 Dose:  3 mL  
3 mL by Nebulization route every six (6) hours as needed. Quantity:  30 Nebule Refills:  1  
     
   
   
   
  
 latanoprost 0.005 % ophthalmic solution Commonly known as:  Marlyse Billow Your last dose was: Your next dose is:    
   
   
 Dose:  1 Drop Administer 1 Drop to both eyes every evening. Quantity:  1 mL Refills:  1  
     
   
   
   
  
 levETIRAcetam 100 mg/ml Soln oral solution Commonly known as:  KEPPRA Replaces:  levETIRAcetam 1,000 mg tablet Your last dose was: Your next dose is:    
   
   
 Dose:  1500 mg  
15 mL by Per G Tube route two (2) times a day. Quantity:  1 Bottle Refills:  1  
     
   
   
   
  
 metoprolol tartrate 25 mg tablet Commonly known as:  LOPRESSOR Your last dose was: Your next dose is:    
   
   
 Dose:  12.5 mg  
0.5 Tabs by Per G Tube route two (2) times a day. Quantity:  60 Tab Refills:  1  
     
   
   
   
  
 multivit w-min-ferrous gluconate 9 mg iron/15 mL oral liquid Commonly known as:  Collin Cantu Your last dose was: Your next dose is:    
   
   
 Dose:  15 mL 15 mL by Per G Tube route daily. Quantity:  1 Bottle Refills:  01  
     
   
   
   
  
 nitrofurantoin (macrocrystal-monohydrate) 100 mg capsule Commonly known as:  MACROBID Your last dose was: Your next dose is:    
   
   
 Dose:  100 mg Take 1 Cap by mouth two (2) times daily (with meals). Quantity:  10 Cap Refills:  0 QUEtiapine 25 mg tablet Commonly known as:  SEROquel Your last dose was: Your next dose is:    
   
   
 Dose:  25 mg Take 1 Tab by mouth nightly. Quantity:  30 Tab Refills:  1  
     
   
   
   
  
 valproic acid (as sodium salt) 250 mg/5 mL (5 mL) Soln oral solution Commonly known as:  Danyelle Gaaurelio Your last dose was: Your next dose is:    
   
   
 Dose:  500 mg  
10 mL by Per G Tube route three (3) times daily. Quantity:  1 Bottle Refills:  1 CONTINUE these medications which have CHANGED Dose & Instructions Dispensing Information Comments Morning Noon Evening Bedtime * thiamine 100 mg tablet Commonly known as:  B-1 What changed:  Another medication with the same name was added. Make sure you understand how and when to take each. Your last dose was: Your next dose is:    
   
   
 Dose:  100 mg Take 1 Tab by mouth daily. Quantity:  30 Tab Refills:  0  
     
   
   
   
  
 * thiamine 100 mg tablet Commonly known as:  B-1 What changed: You were already taking a medication with the same name, and this prescription was added. Make sure you understand how and when to take each. Your last dose was: Your next dose is:    
   
   
 Dose:  100 mg  
1 Tab by Per G Tube route daily. Quantity:  30 Tab Refills:  1  
     
   
   
   
  
 * Notice: This list has 2 medication(s) that are the same as other medications prescribed for you.  Read the directions carefully, and ask your doctor or other care provider to review them with you. CONTINUE these medications which have NOT CHANGED Dose & Instructions Dispensing Information Comments Morning Noon Evening Bedtime  
 aspirin 325 mg tablet Commonly known as:  ASPIRIN Your last dose was: Your next dose is:    
   
   
 Dose:  325 mg Take 1 Tab by mouth daily. Refills:  0  
     
   
   
   
  
 atorvastatin 40 mg tablet Commonly known as:  LIPITOR Your last dose was: Your next dose is:    
   
   
 Dose:  40 mg Take 1 Tab by mouth daily. Quantity:  30 Tab Refills:  0  
     
   
   
   
  
 cyclobenzaprine 5 mg tablet Commonly known as:  FLEXERIL Your last dose was: Your next dose is:    
   
   
 Dose:  5 mg Take 1 Tab by mouth three (3) times daily as needed for Muscle Spasm(s). Quantity:  20 Tab Refills:  0  
     
   
   
   
  
 omeprazole 40 mg capsule Commonly known as:  PRILOSEC Your last dose was: Your next dose is:    
   
   
 Dose:  40 mg Take 40 mg by mouth daily. Refills:  0  
     
   
   
   
  
 pregabalin 300 mg capsule Commonly known as:  Elaineraquel Albrechtutz Your last dose was: Your next dose is:    
   
   
 Dose:  300 mg Take 1 Cap by mouth two (2) times a day. Max Daily Amount: 600 mg. Quantity:  60 Cap Refills:  0 STOP taking these medications   
 levETIRAcetam 1,000 mg tablet Replaced by:  levETIRAcetam 100 mg/ml Soln oral solution  
   
  
 levETIRAcetam 250 mg tablet Commonly known as:  KEPPRA  
   
  
 magnesium oxide 400 mg tablet Commonly known as:  MAG-OX  
   
  
 metoprolol succinate 25 mg XL tablet Commonly known as:  TOPROL-XL Where to Get Your Medications These medications were sent to [address], [address], [address] [place] [id number] Phone:  [phone] acetaminophen 650 mg suppository  
 albuterol-ipratropium 2.5 mg-0.5 mg/3 ml Nebu  
 latanoprost 0.005 % ophthalmic solution  
 metoprolol tartrate 25 mg tablet  
 multivit w-min-ferrous gluconate 9 mg iron/15 mL oral liquid  
 nitrofurantoin (macrocrystal-monohydrate) 100 mg capsule QUEtiapine 25 mg tablet  
 thiamine 100 mg tablet  
 valproic acid (as sodium salt) 250 mg/5 mL (5 mL) Soln oral solution Information on where to get these meds will be given to you by the nurse or doctor. ! Ask your nurse or doctor about these medications  
  levETIRAcetam 100 mg/ml Soln oral solution

## 2017-06-24 NOTE — PROGRESS NOTES
Escorted family from ER waiting area to CCU waiting area as patient was transferred to . Pt's sister now present along with wife and daughter. Assured family of continued care and support. [phone]. Visit by: Sharda Nickerson. Fabiola Waddell.  Wale Gallardo MA, [facility]    Lead  Profession Development & Advancement

## 2017-06-24 NOTE — CONSULTS
NEUROLOGY CONSULTATION NOTE     DATE OF CONSULTATION: 6/24/2017    CONSULTED BY: Chelo Waddell MD    Reason for Consult  I have been asked to see the patient in neurological consultation to render advice and opinion regarding seizures    HISTORY OF PRESENT ILLNESS  Alfonso Escamilla is a 80 y.o. male who presents to the hospital because of seizures. The family is not at bedside in the history is obtained by chart review. According to the ED notes. The patient was oriented ×3 when he went to bed last night and was able to communicate but when he woke up in the morning he was unable to speak in coherent sentences and was confused about orientation. He he was not feeling great and wanted to go to the ER. He does have long history of seizures and takes Keppra 1250 mg p.o. twice daily and Lyrica 300 mg p.o. twice daily. He ran out of his Lyrica 3 days ago. In the emergency room he did have an episode of deviation of the eyes to the right along with stiffening of the right upper extremity. The patient was intubated at this time for airway protection and is on propofol. No obvious seizures in the ICU yet.     ROS  A ten system review of constitutional, cardiovascular, respiratory, musculoskeletal, endocrine, skin, SHEENT, genitourinary, psychiatric and neurologic systems was obtained and is unremarkable except as stated in HPI     PMH  Past Medical History:   Diagnosis Date    Alcohol abuse, in remission     Seizures ([address]        FH  Family History   Problem Relation Age of Onset    Other Other      no strokes or seizures       SH  Social History     Social History    Marital status:      Spouse name: N/A    Number of children: N/A    Years of education: N/A     Social History Main Topics    Smoking status: Never Smoker    Smokeless tobacco: Not on file    Alcohol use No      Comment: Quit drinking 10 years ago   Minerva Arreola Drug use: No    Sexual activity: Not on file     Other Topics Concern    Not on file     Social History Narrative       ALLERGIES  Allergies   Allergen Reactions    No Known Allergies        PHYSICAL EXAM  EXAMINATION:   Patient Vitals for the past 24 hrs:   Temp Pulse Resp BP SpO2   06/24/17 1530 - 62 16 117/64 99 %   06/24/17 1515 - (!) 59 16 126/66 100 %   06/24/17 1500 - 60 16 92/54 99 %   06/24/17 1449 - 84 20 - 98 %   06/24/17 1445 - 65 20 141/85 98 %   06/24/17 1439 - 79 - 144/87 98 %   06/24/17 1330 - 88 20 151/82 93 %   06/24/17 1230 - 89 20 145/79 95 %   06/24/17 1219 - - - 139/80 97 %   06/24/17 1217 98.4 °F (36.9 °C) 90 - - 97 %        General:   General appearance: Pt is in no acute distress   Distal pulses are preserved  Fundoscopic exam: attempted    Neurological Examination:   Mental Status: Patient is intubated. Patient is in sedation. Patient does not follow any commands. Patient grimaces to painful stimuli. Cranial Nerves: PERRL, Extraocular movements are full. Facial movement intact, symmetric. Motor: Moves all 4 extremities symmetrically and spontaneously    Sensation: Grimaces to painful stimuli in all 4 extremities    Coordination/Cerebellar: Unable to assess    Gait: Unable to assess    Skin: No significant bruising or lacerations. LAB DATA REVIEWED:    Results for orders placed or performed during the hospital encounter of 06/24/17   CBC WITH AUTOMATED DIFF   Result Value Ref Range    WBC 6.2 4.1 - 11.1 K/uL    RBC 4.28 4.10 - 5.70 M/uL    HGB 14.3 12.1 - 17.0 g/dL    HCT 40.8 36.6 - 50.3 %    MCV 95.3 80.0 - 99.0 FL    MCH 33.4 26.0 - 34.0 PG    MCHC 35.0 30.0 - 36.5 g/dL    RDW 12.4 11.5 - 14.5 %    PLATELET 556 (L) 882 - 400 K/uL    NEUTROPHILS 65 32 - 75 %    LYMPHOCYTES 23 12 - 49 %    MONOCYTES 12 5 - 13 %    EOSINOPHILS 0 0 - 7 %    BASOPHILS 0 0 - 1 %    ABS. NEUTROPHILS 4.0 1.8 - 8.0 K/UL    ABS. LYMPHOCYTES 1.4 0.8 - 3.5 K/UL    ABS. MONOCYTES 0.8 0.0 - 1.0 K/UL    ABS. EOSINOPHILS 0.0 0.0 - 0.4 K/UL    ABS.  BASOPHILS 0.0 0.0 - 0.1 K/UL METABOLIC PANEL, COMPREHENSIVE   Result Value Ref Range    Sodium 137 136 - 145 mmol/L    Potassium 3.6 3.5 - 5.1 mmol/L    Chloride 102 97 - 108 mmol/L    CO2 28 21 - 32 mmol/L    Anion gap 7 5 - 15 mmol/L    Glucose 105 (H) 65 - 100 mg/dL    BUN 13 6 - 20 MG/DL    Creatinine 0.98 0.70 - 1.30 MG/DL    BUN/Creatinine ratio 13 12 - 20      GFR est AA >60 >60 ml/min/1.73m2    GFR est non-AA >60 >60 ml/min/1.73m2    Calcium 9.6 8.5 - 10.1 MG/DL    Bilirubin, total 1.1 (H) 0.2 - 1.0 MG/DL    ALT (SGPT) 19 12 - 78 U/L    AST (SGOT) 16 15 - 37 U/L    Alk.  phosphatase 83 45 - 117 U/L    Protein, total 8.9 (H) 6.4 - 8.2 g/dL    Albumin 4.4 3.5 - 5.0 g/dL    Globulin 4.5 (H) 2.0 - 4.0 g/dL    A-G Ratio 1.0 (L) 1.1 - 2.2     TROPONIN I   Result Value Ref Range    Troponin-I, Qt. <0.04 <0.05 ng/mL   CK W/ REFLX CKMB   Result Value Ref Range     39 - 308 U/L   URINALYSIS W/MICROSCOPIC   Result Value Ref Range    Color YELLOW/STRAW      Appearance CLOUDY (A) CLEAR      Specific gravity 1.019 1.003 - 1.030      pH (UA) 7.0 5.0 - 8.0      Protein NEGATIVE  NEG mg/dL    Glucose NEGATIVE  NEG mg/dL    Ketone TRACE (A) NEG mg/dL    Blood NEGATIVE  NEG      Urobilinogen 1.0 0.2 - 1.0 EU/dL    Nitrites NEGATIVE  NEG      Leukocyte Esterase NEGATIVE  NEG      WBC 0-4 0 - 4 /hpf    RBC 0-5 0 - 5 /hpf    Epithelial cells FEW FEW /lpf    Bacteria NEGATIVE  NEG /hpf    Hyaline cast 0-2 0 - 5 /lpf   BILIRUBIN, CONFIRM   Result Value Ref Range    Bilirubin UA, confirm NEGATIVE  NEG     GLUCOSE, POC   Result Value Ref Range    Glucose (POC) 110 (H) 65 - 100 mg/dL    Performed by Laurence Reyes    POC CREATININE   Result Value Ref Range    Creatinine (POC) 0.9 0.6 - 1.3 MG/DL    GFRAA, POC >60 >60 ml/min/1.73m2    GFRNA, POC >60 >60 ml/min/1.73m2   POC CHEM8   Result Value Ref Range    Calcium, ionized (POC) 1.14 1.12 - 1.32 MMOL/L    Sodium (POC) 140 136 - 145 MMOL/L    Potassium (POC) 4.3 3.5 - 5.1 MMOL/L    Chloride (POC) 101 98 - 107 MMOL/L    CO2 (POC) 30 21 - 32 MMOL/L    Anion gap (POC) 14 5 - 15 mmol/L    Glucose (POC) 107 (H) 65 - 100 MG/DL    BUN (POC) 15 9 - 20 MG/DL    Creatinine (POC) 0.9 0.6 - 1.3 MG/DL    GFRAA, POC >60 >60 ml/min/1.73m2    GFRNA, POC >60 >60 ml/min/1.73m2    Hemoglobin (POC) 14.3 12.1 - 17.0 GM/DL    Hematocrit (POC) 42 36.6 - 50.3 %    Comment Comment Not Indicated. EKG, 12 LEAD, INITIAL   Result Value Ref Range    Ventricular Rate 88 BPM    Atrial Rate 88 BPM    P-R Interval 294 ms    QRS Duration 82 ms    Q-T Interval 368 ms    QTC Calculation (Bezet) 445 ms    Calculated P Axis 61 degrees    Calculated R Axis -38 degrees    Calculated T Axis 52 degrees    Diagnosis       Sinus rhythm with 1st degree AV block  Left axis deviation  Nonspecific T wave abnormality  When compared with ECG of 06-SEP-2012 03:00,  NE interval has increased  ST no longer elevated in Anterior leads  Nonspecific T wave abnormality, worse in Anterolateral leads          Imaging review:  6/24/2017  CT scan of the head without contrast  No acute intracranial abnormality    CT angiogram of the head and neck  No significant cervical carotid artery stenosis. No large vessel intracranial vascular occlusive change. HOME MEDS  Prior to Admission Medications   Prescriptions Last Dose Informant Patient Reported? Taking?   aspirin (ASPIRIN) 325 mg tablet   Yes No   Sig: Take 1 Tab by mouth daily. atorvastatin (LIPITOR) 40 mg tablet   No No   Sig: Take 1 Tab by mouth daily. cyclobenzaprine (FLEXERIL) 5 mg tablet   No No   Sig: Take 1 Tab by mouth three (3) times daily as needed for Muscle Spasm(s). levETIRAcetam (KEPPRA) 250 mg tablet   No No   Sig: Take 1 Tab by mouth two (2) times a day. Take with the 1000mg to make 1250mg twice a day. levETIRAcetam 1,000 mg tablet   No No   Sig: Take 1 Tab by mouth two (2) times a day. magnesium oxide (MAG-OX) 400 mg tablet   Yes No   Sig: Take 400 mg by mouth daily.      metoprolol succinate (TOPROL-XL) 25 mg XL tablet   Yes No   Sig: Take  by mouth daily. omeprazole (PRILOSEC) 40 mg capsule   Yes No   Sig: Take 40 mg by mouth daily. pregabalin (LYRICA) 300 mg capsule   No No   Sig: Take 1 Cap by mouth two (2) times a day. Max Daily Amount: 600 mg.   thiamine (B-1) 100 mg tablet   No No   Sig: Take 1 Tab by mouth daily. Facility-Administered Medications: None       CURRENT MEDS  Current Facility-Administered Medications   Medication Dose Route Frequency    phenytoin (DILANTIN) 1,500 mg in 0.9% sodium chloride 250 mL IVPB  1,500 mg IntraVENous NOW    propofol (DIPRIVAN) infusion  5-50 mcg/kg/min IntraVENous TITRATE    levETIRAcetam (KEPPRA) 500 mg in 0.9% sodium chloride IVPB  500 mg IntraVENous ONCE    lidocaine-EPINEPHrine (PF) (XYLOCAINE) 2 %-1:200,000 injection        sodium chloride (NS) flush 5-10 mL  5-10 mL IntraVENous Q8H    [START ON 2017] enoxaparin (LOVENOX) injection 40 mg  40 mg SubCUTAneous Q24H    [START ON 2017] aspirin (ASA) suppository 300 mg  300 mg Rectal DAILY    [START ON 2017] pantoprazole (PROTONIX) 40 mg in sodium chloride 0.9 % 10 mL injection  40 mg IntraVENous DAILY    0.9% sodium chloride infusion  75 mL/hr IntraVENous CONTINUOUS    phenytoin (DILANTIN) injection 100 mg  100 mg IntraVENous Q8H       IMPRESSION:  Lucretia Jaramillo is a 80 y.o. male who presents with altered mental status since this morning and in the emergency room had stiffening on right side of the body along with deviation of the eye to the right. Left hemispheric seizure suspected. The patient is taking Keppra 1250 mg p.o. twice daily and Lyrica 300 mg p.o. twice daily. The patient ran out of Lyrica 3 days ago. RECOMMENDATIONS:  1. EEG  2. Increase Keppra to 1500 mg p.o. twice daily  3. Continue Lyrica 300 mg p.o. twice daily    Patient is critically ill with status epilepticus. Significant risk of morbidity and mortality.   More than 30 minutes of critical care time provided. Thank you very much for this consultation. We will follow up on the above studies and give further recommendations as indicated.       Paul Vergara MD  Neurologist

## 2017-06-25 ENCOUNTER — APPOINTMENT (OUTPATIENT)
Dept: GENERAL RADIOLOGY | Age: 82
DRG: 208 | End: 2017-06-25
Attending: INTERNAL MEDICINE
Payer: MEDICARE

## 2017-06-25 LAB
ALBUMIN SERPL BCP-MCNC: 3.4 G/DL (ref 3.5–5)
ALBUMIN/GLOB SERPL: 0.9 {RATIO} (ref 1.1–2.2)
ALP SERPL-CCNC: 59 U/L (ref 45–117)
ALT SERPL-CCNC: 15 U/L (ref 12–78)
ANION GAP BLD CALC-SCNC: 7 MMOL/L (ref 5–15)
AST SERPL W P-5'-P-CCNC: 14 U/L (ref 15–37)
ATRIAL RATE: 88 BPM
BASOPHILS # BLD AUTO: 0 K/UL (ref 0–0.1)
BASOPHILS # BLD: 0 % (ref 0–1)
BILIRUB SERPL-MCNC: 1.4 MG/DL (ref 0.2–1)
BUN SERPL-MCNC: 17 MG/DL (ref 6–20)
BUN/CREAT SERPL: 16 (ref 12–20)
CALCIUM SERPL-MCNC: 8.2 MG/DL (ref 8.5–10.1)
CALCULATED P AXIS, ECG09: 61 DEGREES
CALCULATED R AXIS, ECG10: -38 DEGREES
CALCULATED T AXIS, ECG11: 52 DEGREES
CHLORIDE SERPL-SCNC: 103 MMOL/L (ref 97–108)
CO2 SERPL-SCNC: 26 MMOL/L (ref 21–32)
CREAT SERPL-MCNC: 1.04 MG/DL (ref 0.7–1.3)
DIAGNOSIS, 93000: NORMAL
EOSINOPHIL # BLD: 0 K/UL (ref 0–0.4)
EOSINOPHIL NFR BLD: 0 % (ref 0–7)
ERYTHROCYTE [DISTWIDTH] IN BLOOD BY AUTOMATED COUNT: 12.8 % (ref 11.5–14.5)
GLOBULIN SER CALC-MCNC: 3.7 G/DL (ref 2–4)
GLUCOSE SERPL-MCNC: 123 MG/DL (ref 65–100)
HCT VFR BLD AUTO: 34.9 % (ref 36.6–50.3)
HGB BLD-MCNC: 12 G/DL (ref 12.1–17)
LYMPHOCYTES # BLD AUTO: 9 % (ref 12–49)
LYMPHOCYTES # BLD: 1.3 K/UL (ref 0.8–3.5)
MCH RBC QN AUTO: 33.4 PG (ref 26–34)
MCHC RBC AUTO-ENTMCNC: 34.4 G/DL (ref 30–36.5)
MCV RBC AUTO: 97.2 FL (ref 80–99)
MONOCYTES # BLD: 1.1 K/UL (ref 0–1)
MONOCYTES NFR BLD AUTO: 8 % (ref 5–13)
NEUTS SEG # BLD: 11.7 K/UL (ref 1.8–8)
NEUTS SEG NFR BLD AUTO: 83 % (ref 32–75)
P-R INTERVAL, ECG05: 294 MS
PLATELET # BLD AUTO: 96 K/UL (ref 150–400)
POTASSIUM SERPL-SCNC: 4.4 MMOL/L (ref 3.5–5.1)
PROT SERPL-MCNC: 7.1 G/DL (ref 6.4–8.2)
Q-T INTERVAL, ECG07: 368 MS
QRS DURATION, ECG06: 82 MS
QTC CALCULATION (BEZET), ECG08: 445 MS
RBC # BLD AUTO: 3.59 M/UL (ref 4.1–5.7)
SODIUM SERPL-SCNC: 136 MMOL/L (ref 136–145)
TROPONIN I SERPL-MCNC: <0.04 NG/ML
VENTRICULAR RATE, ECG03: 88 BPM
WBC # BLD AUTO: 14.1 K/UL (ref 4.1–11.1)

## 2017-06-25 PROCEDURE — 74011250636 HC RX REV CODE- 250/636: Performed by: INTERNAL MEDICINE

## 2017-06-25 PROCEDURE — 36415 COLL VENOUS BLD VENIPUNCTURE: CPT | Performed by: INTERNAL MEDICINE

## 2017-06-25 PROCEDURE — 74011250637 HC RX REV CODE- 250/637: Performed by: INTERNAL MEDICINE

## 2017-06-25 PROCEDURE — 74000 XR ABD PORT  1 V: CPT

## 2017-06-25 PROCEDURE — 77030008771 HC TU NG SALEM SUMP -A

## 2017-06-25 PROCEDURE — 71010 XR CHEST PORT: CPT

## 2017-06-25 PROCEDURE — 80053 COMPREHEN METABOLIC PANEL: CPT | Performed by: INTERNAL MEDICINE

## 2017-06-25 PROCEDURE — C9113 INJ PANTOPRAZOLE SODIUM, VIA: HCPCS | Performed by: INTERNAL MEDICINE

## 2017-06-25 PROCEDURE — 95816 EEG AWAKE AND DROWSY: CPT | Performed by: INTERNAL MEDICINE

## 2017-06-25 PROCEDURE — 74011000258 HC RX REV CODE- 258: Performed by: PSYCHIATRY & NEUROLOGY

## 2017-06-25 PROCEDURE — 94003 VENT MGMT INPAT SUBQ DAY: CPT

## 2017-06-25 PROCEDURE — 74011000250 HC RX REV CODE- 250: Performed by: INTERNAL MEDICINE

## 2017-06-25 PROCEDURE — 85025 COMPLETE CBC W/AUTO DIFF WBC: CPT | Performed by: INTERNAL MEDICINE

## 2017-06-25 PROCEDURE — 65610000006 HC RM INTENSIVE CARE

## 2017-06-25 PROCEDURE — 84484 ASSAY OF TROPONIN QUANT: CPT | Performed by: INTERNAL MEDICINE

## 2017-06-25 PROCEDURE — 74011250636 HC RX REV CODE- 250/636: Performed by: EMERGENCY MEDICINE

## 2017-06-25 PROCEDURE — 74011250636 HC RX REV CODE- 250/636: Performed by: PSYCHIATRY & NEUROLOGY

## 2017-06-25 RX ORDER — LANOLIN ALCOHOL/MO/W.PET/CERES
100 CREAM (GRAM) TOPICAL DAILY
Status: DISCONTINUED | OUTPATIENT
Start: 2017-06-26 | End: 2017-06-26

## 2017-06-25 RX ORDER — FENTANYL CITRATE 50 UG/ML
25-100 INJECTION, SOLUTION INTRAMUSCULAR; INTRAVENOUS
Status: DISCONTINUED | OUTPATIENT
Start: 2017-06-25 | End: 2017-07-06

## 2017-06-25 RX ORDER — MUPIROCIN 20 MG/G
OINTMENT TOPICAL 2 TIMES DAILY
Status: COMPLETED | OUTPATIENT
Start: 2017-06-25 | End: 2017-06-29

## 2017-06-25 RX ORDER — LANOLIN ALCOHOL/MO/W.PET/CERES
100 CREAM (GRAM) TOPICAL DAILY
Status: DISCONTINUED | OUTPATIENT
Start: 2017-06-25 | End: 2017-06-25

## 2017-06-25 RX ORDER — PREGABALIN 150 MG/1
300 CAPSULE ORAL 2 TIMES DAILY
Status: DISCONTINUED | OUTPATIENT
Start: 2017-06-25 | End: 2017-07-06

## 2017-06-25 RX ORDER — ATORVASTATIN CALCIUM 40 MG/1
40 TABLET, FILM COATED ORAL DAILY
Status: DISCONTINUED | OUTPATIENT
Start: 2017-06-25 | End: 2017-06-25

## 2017-06-25 RX ORDER — LANOLIN ALCOHOL/MO/W.PET/CERES
400 CREAM (GRAM) TOPICAL DAILY
Status: DISCONTINUED | OUTPATIENT
Start: 2017-06-26 | End: 2017-06-26

## 2017-06-25 RX ORDER — ATORVASTATIN CALCIUM 40 MG/1
40 TABLET, FILM COATED ORAL DAILY
Status: DISCONTINUED | OUTPATIENT
Start: 2017-06-26 | End: 2017-07-06

## 2017-06-25 RX ORDER — CHLORHEXIDINE GLUCONATE 1.2 MG/ML
15 RINSE ORAL 2 TIMES DAILY
Status: DISCONTINUED | OUTPATIENT
Start: 2017-06-25 | End: 2017-07-04

## 2017-06-25 RX ORDER — LANOLIN ALCOHOL/MO/W.PET/CERES
400 CREAM (GRAM) TOPICAL DAILY
Status: DISCONTINUED | OUTPATIENT
Start: 2017-06-25 | End: 2017-06-25

## 2017-06-25 RX ORDER — PANTOPRAZOLE SODIUM 40 MG/1
40 GRANULE, DELAYED RELEASE ORAL
Status: DISCONTINUED | OUTPATIENT
Start: 2017-06-26 | End: 2017-06-26

## 2017-06-25 RX ADMIN — LEVETIRACETAM 1500 MG: 100 INJECTION, SOLUTION, CONCENTRATE INTRAVENOUS at 22:08

## 2017-06-25 RX ADMIN — PROPOFOL 40 MCG/KG/MIN: 10 INJECTION, EMULSION INTRAVENOUS at 03:05

## 2017-06-25 RX ADMIN — MUPIROCIN: 20 OINTMENT TOPICAL at 17:20

## 2017-06-25 RX ADMIN — FENTANYL CITRATE 50 MCG: 50 INJECTION, SOLUTION INTRAMUSCULAR; INTRAVENOUS at 10:01

## 2017-06-25 RX ADMIN — ASPIRIN 300 MG: 300 SUPPOSITORY RECTAL at 08:52

## 2017-06-25 RX ADMIN — MUPIROCIN: 20 OINTMENT TOPICAL at 08:59

## 2017-06-25 RX ADMIN — SODIUM CHLORIDE 40 MG: 9 INJECTION, SOLUTION INTRAMUSCULAR; INTRAVENOUS; SUBCUTANEOUS at 08:52

## 2017-06-25 RX ADMIN — PROPOFOL 30 MCG/KG/MIN: 10 INJECTION, EMULSION INTRAVENOUS at 22:10

## 2017-06-25 RX ADMIN — ENOXAPARIN SODIUM 40 MG: 40 INJECTION SUBCUTANEOUS at 08:52

## 2017-06-25 RX ADMIN — LEVETIRACETAM 1500 MG: 100 INJECTION, SOLUTION, CONCENTRATE INTRAVENOUS at 00:00

## 2017-06-25 RX ADMIN — PROPOFOL 40 MCG/KG/MIN: 10 INJECTION, EMULSION INTRAVENOUS at 14:57

## 2017-06-25 RX ADMIN — LEVETIRACETAM 1500 MG: 100 INJECTION, SOLUTION, CONCENTRATE INTRAVENOUS at 08:55

## 2017-06-25 RX ADMIN — Medication 10 ML: at 06:00

## 2017-06-25 RX ADMIN — Medication 10 ML: at 13:00

## 2017-06-25 RX ADMIN — Medication 10 ML: at 22:08

## 2017-06-25 RX ADMIN — PREGABALIN 300 MG: 150 CAPSULE ORAL at 13:00

## 2017-06-25 RX ADMIN — CHLORHEXIDINE GLUCONATE 15 ML: 1.2 RINSE ORAL at 17:20

## 2017-06-25 RX ADMIN — SODIUM CHLORIDE 75 ML/HR: 900 INJECTION, SOLUTION INTRAVENOUS at 17:58

## 2017-06-25 RX ADMIN — CHLORHEXIDINE GLUCONATE 15 ML: 1.2 RINSE ORAL at 08:55

## 2017-06-25 RX ADMIN — SODIUM CHLORIDE 75 ML/HR: 900 INJECTION, SOLUTION INTRAVENOUS at 08:57

## 2017-06-25 RX ADMIN — PREGABALIN 300 MG: 150 CAPSULE ORAL at 18:30

## 2017-06-25 NOTE — PROGRESS NOTES
Hospitalist Progress Note    NAME: Rajat Winston   :  1934   MRN:  [id number]       Assessment / Plan:  Acute encephalopathy due to status epilepticus in setting of seizure d/o:  Out of lyrica for a few days prior to admission. Prior seizures tonic clonic with prolonged post-ictal confusion. Mental status remains poor this morning.  - CT head no acute process  - CTA head/neck with no significant cervical carotid arterial stenosis. Patent vertebral basilar system. No large vessel intracranial vascular occlusive change. Mild mucous retention cyst and mucoperiosteal thickening in the maxillary sinuses bilaterally. 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 - appreciate neuro consult  - con't keppra at increased dose  - I am checking with pharmacy to see if lyrica can be given per NG  - ativan prn seizure activity  Acute respiratory failure due to acute encephalopathy (intubated for airway protection):   - CXR this morning with little change in the right upper lobe infiltrate compared to prior exam.  - appreciate pulmonology assistance with vent mgmt  - con't propfol prn sedation  Hypertension, benign/essential:  Intermittent low BP, possibly due to propofol  - holding home toprol  - prn IV labetalol  History of SVT    Peripheral neuropathy: ran out of lyrica several days ago as discussed above    Remote ETOH abuse:  Family confirms no ETOH in 5 years  GERD: con't protonix, changed to per NG      DVT prophylaxis: lovenox    Code status: Full (wife is decision maker)     Subjective:     Chief Complaint / Reason for Physician Visit  Intubated, will weakly squeeze R hand but otherwise not following commands. Discussed with RN events overnight.      Review of Systems:  Symptom Y/N Comments  Symptom Y/N Comments   Fever/Chills    Chest Pain     Poor Appetite    Edema     Cough    Abdominal Pain     Sputum    Joint Pain     SOB/AL    Pruritis/Rash     Nausea/vomit Tolerating PT/OT     Diarrhea    Tolerating Diet     Constipation    Other       Could NOT obtain due to: encephalopathy     Objective:     VITALS:   Last 24hrs VS reviewed since prior progress note. Most recent are:  Patient Vitals for the past 24 hrs:   Temp Pulse Resp BP SpO2   06/25/17 0800 - 71 22 130/62 100 %   06/25/17 0725 - 66 26 - 100 %   06/25/17 0700 - 84 22 123/63 99 %   06/25/17 0600 - 76 12 104/56 98 %   06/25/17 0500 - 79 12 93/57 98 %   06/25/17 0400 98.5 °F (36.9 °C) 78 13 96/56 98 %   06/25/17 0336 - 78 14 - 98 %   06/25/17 0300 - 81 12 (!) 80/47 97 %   06/25/17 0200 - 84 12 90/52 97 %   06/25/17 0100 - 85 12 (!) 85/56 97 %   06/25/17 0010 - 79 13 - 97 %   06/25/17 0000 98.8 °F (37.1 °C) 69 12 - 97 %   06/24/17 2300 - 73 12 92/52 97 %   06/24/17 2200 - 85 12 101/50 97 %   06/24/17 2100 - 71 12 104/58 98 %   06/24/17 2005 - 77 14 - 98 %   06/24/17 2000 98.4 °F (36.9 °C) 74 12 110/53 98 %   06/24/17 1900 - 73 12 114/58 100 %   06/24/17 1806 - 73 15 - 99 %   06/24/17 1805 - 74 14 - 99 %   06/24/17 1804 - 74 14 - 99 %   06/24/17 1800 - 73 15 111/58 99 %   06/24/17 1757 - 73 12 - 100 %   06/24/17 1700 98.5 °F (36.9 °C) - 14 117/84 100 %   06/24/17 1630 98.5 °F (36.9 °C) - - - -   06/24/17 1530 - 62 16 117/64 99 %   06/24/17 1515 - (!) 59 16 126/66 100 %   06/24/17 1500 - 60 16 92/54 99 %   06/24/17 1449 - 84 20 - 98 %   06/24/17 1445 - 65 20 141/85 98 %   06/24/17 1439 - 79 - 144/87 98 %   06/24/17 1330 - 88 20 151/82 93 %   06/24/17 1230 - 89 20 145/79 95 %   06/24/17 1219 - - - 139/80 97 %   06/24/17 1217 98.4 °F (36.9 °C) 90 - - 97 %       Intake/Output Summary (Last 24 hours) at 06/25/17 0804  Last data filed at 06/25/17 0600   Gross per 24 hour   Intake          1016.15 ml   Output              429 ml   Net           587.15 ml        PHYSICAL EXAM:  General: WD, WN. Minimally alert, not consistently cooperative with commands, no acute distress    EENT:  EOMI. Anicteric sclerae.  MMM with ET tube in place  Resp:  CTA bilaterally, no wheezing or rales. No accessory muscle use  CV:  Regular rhythm,  No edema  GI:  Soft, mildly distended, Non tender.  +Bowel sounds  Neurologic:  Oriented X 0-1, no speech,   Psych:   No insight. Not anxious nor agitated  Skin:  No rashes. No jaundice    Reviewed most current lab test results and cultures  YES  Reviewed most current radiology test results   YES  Review and summation of old records today    NO  Reviewed patient's current orders and MAR    YES  PMH/SH reviewed - no change compared to H&P  ________________________________________________________________________  Care Plan discussed with:    Comments   Patient x    Family      RN x    Care Manager     Consultant                        Multidiciplinary team rounds were held today with , nursing, pharmacist and clinical coordinator. Patient's plan of care was discussed; medications were reviewed and discharge planning was addressed. ________________________________________________________________________  Total NON critical care TIME:  35 Minutes    Total CRITICAL CARE TIME Spent:   Minutes non procedure based      Comments   >50% of visit spent in counseling and coordination of care x    ________________________________________________________________________  Sebastian Hernandez MD     Procedures: see electronic medical records for all procedures/Xrays and details which were not copied into this note but were reviewed prior to creation of Plan. LABS:  I reviewed today's most current labs and imaging studies.   Pertinent labs include:  Recent Labs      06/25/17   0415  06/24/17   1230   WBC  14.1*  6.2   HGB  12.0*  14.3   HCT  34.9*  40.8   PLT  96*  115*     Recent Labs      06/25/17   0415  06/24/17   1230   NA  136  137   K  4.4  3.6   CL  103  102   CO2  26  28   GLU  123*  105*   BUN  17  13   CREA  1.04  0.98   CA  8.2*  9.6   ALB  3.4*  4.4   TBILI  1.4*  1.1*   SGOT  14*  16   ALT  15  19 Signed: Marilou Fleischer, MD

## 2017-06-25 NOTE — PROGRESS NOTES
0700 Report received from [facility]. 0745 Assessment completed. Patient currently intubated on Spontaneous mode on ventilator, currently tolerating SBT well. Patient opens eyes spontaneously, does not follow commands. Patient currently on no sedation, no signs of acute distress noted at present. Cummins intact draining yellow colored urine. Seizure precautions maintained, no signs of seizure activity noted at present. Bilateral wrist restrains intact due to patient attempting to pull ETT and IV lines. Will closely monitor. 46 Dr. Elian Rodriguez in to see and assess patient. Dr. Elian Rodriguez placed patient back on Rate on ventilator for airway protection. Will closely monitor. 1001 Fentanyl 50mcg IV PRN given due to patient becoming restless and agitated. 1030 Fentanyl IV PRN medication not effective. Patient continues to be agitated, restless and attempting to move arms to pull out ETT and IV lines, patient is not able to be redirected and not following commands. Propofol IV restarted at 15mcg/kg/min and will titrate to keep patient calm. Will closely monitor. 1056 OG tube placed per request of Dr. Elian Rodriguez, portable abdominal x-ray ordered to verify placement. Patient tolerated procedure well. 1215 Patient resting in bed. No seizure activity noted at present. Will closely monitor. [id number] [facility] Dr. Milton Rodriguez made aware that EEG will not be done until tomorrow, Dr. Milton Rodriguez noted understanding and confirmed that that was fine with him. 1610 No change from previous assessment. Patient sedated on Propofol IV 30mcg/kgmin due to patient being highly restless and agitated in AM.    1900 Report given to [facility].

## 2017-06-25 NOTE — CONSULTS
NEUROLOGY PROGRESS NOTE     CC: Seizures    SUBJECTIVE  No seizures in ICU  Pt still confused and was AAO X 3 at home    202 ABDIAZIZ Huber is a 80 y.o. male who presents to the hospital because of seizures. The family is not at bedside in the history is obtained by chart review. According to the ED notes. The patient was oriented ×3 when he went to bed last night and was able to communicate but when he woke up in the morning he was unable to speak in coherent sentences and was confused about orientation. He he was not feeling great and wanted to go to the ER. He does have long history of seizures and takes Keppra 1250 mg p.o. twice daily and Lyrica 300 mg p.o. twice daily. He ran out of his Lyrica 3 days ago. In the emergency room he did have an episode of deviation of the eyes to the right along with stiffening of the right upper extremity. The patient was intubated at this time for airway protection and is on propofol. No obvious seizures in the ICU yet.     ROS  A ten system review of constitutional, cardiovascular, respiratory, musculoskeletal, endocrine, skin, SHEENT, genitourinary, psychiatric and neurologic systems was obtained and is unremarkable except as stated in HPI     PHYSICAL EXAM  EXAMINATION:   Patient Vitals for the past 24 hrs:   Temp Pulse Resp BP SpO2   06/25/17 0900 - 74 16 118/68 100 %   06/25/17 0800 98 °F (36.7 °C) 71 22 130/62 100 %   06/25/17 0725 - 66 26 - 100 %   06/25/17 0700 - 84 22 123/63 99 %   06/25/17 0600 - 76 12 104/56 98 %   06/25/17 0500 - 79 12 93/57 98 %   06/25/17 0400 98.5 °F (36.9 °C) 78 13 96/56 98 %   06/25/17 0336 - 78 14 - 98 %   06/25/17 0300 - 81 12 (!) 80/47 97 %   06/25/17 0200 - 84 12 90/52 97 %   06/25/17 0100 - 85 12 (!) 85/56 97 %   06/25/17 0010 - 79 13 - 97 %   06/25/17 0000 98.8 °F (37.1 °C) 69 12 - 97 %   06/24/17 2300 - 73 12 92/52 97 %   06/24/17 2200 - 85 12 101/50 97 %   06/24/17 2100 - 71 12 104/58 98 %   06/24/17 2005 - 77 14 - 98 %   06/24/17 2000 98.4 °F (36.9 °C) 74 12 110/53 98 %   06/24/17 1900 - 73 12 114/58 100 %   06/24/17 1806 - 73 15 - 99 %   06/24/17 1805 - 74 14 - 99 %   06/24/17 1804 - 74 14 - 99 %   06/24/17 1800 - 73 15 111/58 99 %   06/24/17 1757 - 73 12 - 100 %   06/24/17 1700 98.5 °F (36.9 °C) - 14 117/84 100 %   06/24/17 1630 98.5 °F (36.9 °C) - - - -   06/24/17 1530 - 62 16 117/64 99 %   06/24/17 1515 - (!) 59 16 126/66 100 %   06/24/17 1500 - 60 16 92/54 99 %   06/24/17 1449 - 84 20 - 98 %   06/24/17 1445 - 65 20 141/85 98 %   06/24/17 1439 - 79 - 144/87 98 %   06/24/17 1330 - 88 20 151/82 93 %   06/24/17 1230 - 89 20 145/79 95 %   06/24/17 1219 - - - 139/80 97 %   06/24/17 1217 98.4 °F (36.9 °C) 90 - - 97 %        General:   General appearance: Pt is in no acute distress   Distal pulses are preserved    Neurological Examination:   Mental Status: Patient is intubated. Patient not on sedation. Patient does not follow any commands. Patient grimaces to painful stimuli. Cranial Nerves: PERRL, Extraocular movements are full. Facial movement intact, symmetric. Motor: Moves all 4 extremities symmetrically and spontaneously    Sensation: Grimaces to painful stimuli in all 4 extremities    Coordination/Cerebellar: Unable to assess    Gait: Unable to assess    Skin: No significant bruising or lacerations. LAB DATA REVIEWED:    Results for orders placed or performed during the hospital encounter of 06/24/17   CBC WITH AUTOMATED DIFF   Result Value Ref Range    WBC 6.2 4.1 - 11.1 K/uL    RBC 4.28 4.10 - 5.70 M/uL    HGB 14.3 12.1 - 17.0 g/dL    HCT 40.8 36.6 - 50.3 %    MCV 95.3 80.0 - 99.0 FL    MCH 33.4 26.0 - 34.0 PG    MCHC 35.0 30.0 - 36.5 g/dL    RDW 12.4 11.5 - 14.5 %    PLATELET 514 (L) 767 - 400 K/uL    NEUTROPHILS 65 32 - 75 %    LYMPHOCYTES 23 12 - 49 %    MONOCYTES 12 5 - 13 %    EOSINOPHILS 0 0 - 7 %    BASOPHILS 0 0 - 1 %    ABS. NEUTROPHILS 4.0 1.8 - 8.0 K/UL    ABS.  LYMPHOCYTES 1.4 0.8 - 3.5 K/UL    ABS. MONOCYTES 0.8 0.0 - 1.0 K/UL    ABS. EOSINOPHILS 0.0 0.0 - 0.4 K/UL    ABS. BASOPHILS 0.0 0.0 - 0.1 K/UL   METABOLIC PANEL, COMPREHENSIVE   Result Value Ref Range    Sodium 137 136 - 145 mmol/L    Potassium 3.6 3.5 - 5.1 mmol/L    Chloride 102 97 - 108 mmol/L    CO2 28 21 - 32 mmol/L    Anion gap 7 5 - 15 mmol/L    Glucose 105 (H) 65 - 100 mg/dL    BUN 13 6 - 20 MG/DL    Creatinine 0.98 0.70 - 1.30 MG/DL    BUN/Creatinine ratio 13 12 - 20      GFR est AA >60 >60 ml/min/1.73m2    GFR est non-AA >60 >60 ml/min/1.73m2    Calcium 9.6 8.5 - 10.1 MG/DL    Bilirubin, total 1.1 (H) 0.2 - 1.0 MG/DL    ALT (SGPT) 19 12 - 78 U/L    AST (SGOT) 16 15 - 37 U/L    Alk.  phosphatase 83 45 - 117 U/L    Protein, total 8.9 (H) 6.4 - 8.2 g/dL    Albumin 4.4 3.5 - 5.0 g/dL    Globulin 4.5 (H) 2.0 - 4.0 g/dL    A-G Ratio 1.0 (L) 1.1 - 2.2     TROPONIN I   Result Value Ref Range    Troponin-I, Qt. <0.04 <0.05 ng/mL   CK W/ REFLX CKMB   Result Value Ref Range     39 - 308 U/L   URINALYSIS W/MICROSCOPIC   Result Value Ref Range    Color YELLOW/STRAW      Appearance CLOUDY (A) CLEAR      Specific gravity 1.019 1.003 - 1.030      pH (UA) 7.0 5.0 - 8.0      Protein NEGATIVE  NEG mg/dL    Glucose NEGATIVE  NEG mg/dL    Ketone TRACE (A) NEG mg/dL    Blood NEGATIVE  NEG      Urobilinogen 1.0 0.2 - 1.0 EU/dL    Nitrites NEGATIVE  NEG      Leukocyte Esterase NEGATIVE  NEG      WBC 0-4 0 - 4 /hpf    RBC 0-5 0 - 5 /hpf    Epithelial cells FEW FEW /lpf    Bacteria NEGATIVE  NEG /hpf    Hyaline cast 0-2 0 - 5 /lpf   BILIRUBIN, CONFIRM   Result Value Ref Range    Bilirubin UA, confirm NEGATIVE  NEG     CK W/ CKMB & INDEX   Result Value Ref Range     39 - 308 U/L    CK - MB 1.6 <3.6 NG/ML    CK-MB Index 0.7 0 - 2.5     LACTIC ACID, PLASMA   Result Value Ref Range    Lactic acid 2.2 (HH) 0.4 - 2.0 MMOL/L   BLOOD GAS, ARTERIAL   Result Value Ref Range    pH 7.47 (H) 7.35 - 7.45      PCO2 34 (L) 35.0 - 45.0 mmHg    PO2 212 (H) 80 - 100 mmHg    O2  (H) 92 - 97 %    BICARBONATE 24 22 - 26 mmol/L    BASE EXCESS 1.0 mmol/L    O2 METHOD VENTILATOR      FIO2 50 %    MODE A/C      Tidal volume 500      SET RATE 16      EPAP/CPAP/PEEP 6.0      Sample source ARTERIAL      SITE RIGHT RADIAL      TERE'S TEST YES     METABOLIC PANEL, COMPREHENSIVE   Result Value Ref Range    Sodium 136 136 - 145 mmol/L    Potassium 4.4 3.5 - 5.1 mmol/L    Chloride 103 97 - 108 mmol/L    CO2 26 21 - 32 mmol/L    Anion gap 7 5 - 15 mmol/L    Glucose 123 (H) 65 - 100 mg/dL    BUN 17 6 - 20 MG/DL    Creatinine 1.04 0.70 - 1.30 MG/DL    BUN/Creatinine ratio 16 12 - 20      GFR est AA >60 >60 ml/min/1.73m2    GFR est non-AA >60 >60 ml/min/1.73m2    Calcium 8.2 (L) 8.5 - 10.1 MG/DL    Bilirubin, total 1.4 (H) 0.2 - 1.0 MG/DL    ALT (SGPT) 15 12 - 78 U/L    AST (SGOT) 14 (L) 15 - 37 U/L    Alk. phosphatase 59 45 - 117 U/L    Protein, total 7.1 6.4 - 8.2 g/dL    Albumin 3.4 (L) 3.5 - 5.0 g/dL    Globulin 3.7 2.0 - 4.0 g/dL    A-G Ratio 0.9 (L) 1.1 - 2.2     CBC WITH AUTOMATED DIFF   Result Value Ref Range    WBC 14.1 (H) 4.1 - 11.1 K/uL    RBC 3.59 (L) 4.10 - 5.70 M/uL    HGB 12.0 (L) 12.1 - 17.0 g/dL    HCT 34.9 (L) 36.6 - 50.3 %    MCV 97.2 80.0 - 99.0 FL    MCH 33.4 26.0 - 34.0 PG    MCHC 34.4 30.0 - 36.5 g/dL    RDW 12.8 11.5 - 14.5 %    PLATELET 96 (L) 550 - 400 K/uL    NEUTROPHILS 83 (H) 32 - 75 %    LYMPHOCYTES 9 (L) 12 - 49 %    MONOCYTES 8 5 - 13 %    EOSINOPHILS 0 0 - 7 %    BASOPHILS 0 0 - 1 %    ABS. NEUTROPHILS 11.7 (H) 1.8 - 8.0 K/UL    ABS. LYMPHOCYTES 1.3 0.8 - 3.5 K/UL    ABS. MONOCYTES 1.1 (H) 0.0 - 1.0 K/UL    ABS. EOSINOPHILS 0.0 0.0 - 0.4 K/UL    ABS.  BASOPHILS 0.0 0.0 - 0.1 K/UL   TROPONIN I   Result Value Ref Range    Troponin-I, Qt. <0.04 <0.05 ng/mL   GLUCOSE, POC   Result Value Ref Range    Glucose (POC) 110 (H) 65 - 100 mg/dL    Performed by Ian George    POC CREATININE   Result Value Ref Range    Creatinine (POC) 0.9 0.6 - 1.3 MG/DL GFRAA, POC >60 >60 ml/min/1.73m2    GFRNA, POC >60 >60 ml/min/1.73m2   POC CHEM8   Result Value Ref Range    Calcium, ionized (POC) 1.14 1.12 - 1.32 MMOL/L    Sodium (POC) 140 136 - 145 MMOL/L    Potassium (POC) 4.3 3.5 - 5.1 MMOL/L    Chloride (POC) 101 98 - 107 MMOL/L    CO2 (POC) 30 21 - 32 MMOL/L    Anion gap (POC) 14 5 - 15 mmol/L    Glucose (POC) 107 (H) 65 - 100 MG/DL    BUN (POC) 15 9 - 20 MG/DL    Creatinine (POC) 0.9 0.6 - 1.3 MG/DL    GFRAA, POC >60 >60 ml/min/1.73m2    GFRNA, POC >60 >60 ml/min/1.73m2    Hemoglobin (POC) 14.3 12.1 - 17.0 GM/DL    Hematocrit (POC) 42 36.6 - 50.3 %    Comment Comment Not Indicated. EKG, 12 LEAD, INITIAL   Result Value Ref Range    Ventricular Rate 88 BPM    Atrial Rate 88 BPM    P-R Interval 294 ms    QRS Duration 82 ms    Q-T Interval 368 ms    QTC Calculation (Bezet) 445 ms    Calculated P Axis 61 degrees    Calculated R Axis -38 degrees    Calculated T Axis 52 degrees    Diagnosis       Sinus rhythm with 1st degree AV block  Left axis deviation  Nonspecific T wave abnormality  When compared with ECG of 06-SEP-2012 03:00,  SC interval has increased  ST no longer elevated in Anterior leads  Nonspecific T wave abnormality, worse in Anterolateral leads          Imaging review:  6/24/2017  CT scan of the head without contrast  No acute intracranial abnormality    CT angiogram of the head and neck  No significant cervical carotid artery stenosis. No large vessel intracranial vascular occlusive change. HOME MEDS  Prior to Admission Medications   Prescriptions Last Dose Informant Patient Reported? Taking?   aspirin (ASPIRIN) 325 mg tablet   Yes No   Sig: Take 1 Tab by mouth daily. atorvastatin (LIPITOR) 40 mg tablet   No No   Sig: Take 1 Tab by mouth daily. cyclobenzaprine (FLEXERIL) 5 mg tablet   No No   Sig: Take 1 Tab by mouth three (3) times daily as needed for Muscle Spasm(s).    levETIRAcetam (KEPPRA) 250 mg tablet   No No   Sig: Take 1 Tab by mouth two (2) times a day. Take with the 1000mg to make 1250mg twice a day. levETIRAcetam 1,000 mg tablet   No No   Sig: Take 1 Tab by mouth two (2) times a day. magnesium oxide (MAG-OX) 400 mg tablet   Yes No   Sig: Take 400 mg by mouth daily. metoprolol succinate (TOPROL-XL) 25 mg XL tablet   Yes No   Sig: Take  by mouth daily. omeprazole (PRILOSEC) 40 mg capsule   Yes No   Sig: Take 40 mg by mouth daily. pregabalin (LYRICA) 300 mg capsule   No No   Sig: Take 1 Cap by mouth two (2) times a day. Max Daily Amount: 600 mg.   thiamine (B-1) 100 mg tablet   No No   Sig: Take 1 Tab by mouth daily. Facility-Administered Medications: None       CURRENT MEDS  Current Facility-Administered Medications   Medication Dose Route Frequency    chlorhexidine (PERIDEX) 0.12 % mouthwash 15 mL  15 mL Oral BID    mupirocin (BACTROBAN) 2 % ointment   Both Nostrils BID    propofol (DIPRIVAN) infusion  5-50 mcg/kg/min IntraVENous TITRATE    sodium chloride (NS) flush 5-10 mL  5-10 mL IntraVENous Q8H    enoxaparin (LOVENOX) injection 40 mg  40 mg SubCUTAneous Q24H    aspirin (ASA) suppository 300 mg  300 mg Rectal DAILY    pantoprazole (PROTONIX) 40 mg in sodium chloride 0.9 % 10 mL injection  40 mg IntraVENous DAILY    0.9% sodium chloride infusion  75 mL/hr IntraVENous CONTINUOUS    levETIRAcetam (KEPPRA) 1,500 mg in 0.9% sodium chloride IVPB  1,500 mg IntraVENous Q12H       IMPRESSION:  Arelis Graham is a 80 y.o. male who presents with altered mental status since this morning and in the emergency room had stiffening on right side of the body along with deviation of the eye to the right. Left hemispheric seizure suspected. The patient was taking Keppra 1250 mg p.o. twice daily and Lyrica 300 mg p.o. twice daily. The patient ran out of Lyrica 3 days ago. RECOMMENDATIONS:  1.  EEG to check for any ongoing seizures  2. Continue Keppra 1500 mg p.o. twice daily  3.   Start Lyrica 300 mg p.o. twice daily once extubated. Patient is critically ill with status epilepticus. Significant risk of morbidity and mortality. More than 30 minutes of critical care time provided.       Tanja Knowles MD  Neurologist

## 2017-06-25 NOTE — PROGRESS NOTES
06/25/17 0642   ABCDE Bundle   SBT Safety Screen Passed Yes   SBT Trial Passed Yes   Weaning Parameters   Spontaneous Breathing Trial Complete Yes   Resp Rate Observed 22   Ve 7      RSBI 65

## 2017-06-26 ENCOUNTER — APPOINTMENT (OUTPATIENT)
Dept: GENERAL RADIOLOGY | Age: 82
DRG: 208 | End: 2017-06-26
Attending: INTERNAL MEDICINE
Payer: MEDICARE

## 2017-06-26 LAB
ANION GAP BLD CALC-SCNC: 6 MMOL/L (ref 5–15)
ARTERIAL PATENCY WRIST A: ABNORMAL
BASE DEFICIT BLDA-SCNC: 1.3 MMOL/L
BDY SITE: ABNORMAL
BREATHS.SPONTANEOUS ON VENT: 26
BUN SERPL-MCNC: 12 MG/DL (ref 6–20)
BUN/CREAT SERPL: 17 (ref 12–20)
CALCIUM SERPL-MCNC: 8.6 MG/DL (ref 8.5–10.1)
CHLORIDE SERPL-SCNC: 105 MMOL/L (ref 97–108)
CO2 SERPL-SCNC: 27 MMOL/L (ref 21–32)
CREAT SERPL-MCNC: 0.71 MG/DL (ref 0.7–1.3)
ERYTHROCYTE [DISTWIDTH] IN BLOOD BY AUTOMATED COUNT: 12.7 % (ref 11.5–14.5)
GAS FLOW.O2 O2 DELIVERY SYS: 4 L/MIN
GLUCOSE SERPL-MCNC: 100 MG/DL (ref 65–100)
HCO3 BLDA-SCNC: 27 MMOL/L (ref 22–26)
HCT VFR BLD AUTO: 33.3 % (ref 36.6–50.3)
HGB BLD-MCNC: 11.3 G/DL (ref 12.1–17)
LEVETIRACETAM SERPL-MCNC: 36.6 UG/ML (ref 10–40)
MAGNESIUM SERPL-MCNC: 2.2 MG/DL (ref 1.6–2.4)
MCH RBC QN AUTO: 32.9 PG (ref 26–34)
MCHC RBC AUTO-ENTMCNC: 33.9 G/DL (ref 30–36.5)
MCV RBC AUTO: 97.1 FL (ref 80–99)
PCO2 BLDA: 59 MMHG (ref 35–45)
PH BLDA: 7.27 [PH] (ref 7.35–7.45)
PHOSPHATE SERPL-MCNC: 2.4 MG/DL (ref 2.6–4.7)
PLATELET # BLD AUTO: 77 K/UL (ref 150–400)
PO2 BLDA: 95 MMHG (ref 80–100)
POTASSIUM SERPL-SCNC: 3.7 MMOL/L (ref 3.5–5.1)
RBC # BLD AUTO: 3.43 M/UL (ref 4.1–5.7)
SAO2 % BLD: 96 % (ref 92–97)
SAO2% DEVICE SAO2% SENSOR NAME: ABNORMAL
SODIUM SERPL-SCNC: 138 MMOL/L (ref 136–145)
SPECIMEN SITE: ABNORMAL
WBC # BLD AUTO: 8 K/UL (ref 4.1–11.1)

## 2017-06-26 PROCEDURE — 94003 VENT MGMT INPAT SUBQ DAY: CPT

## 2017-06-26 PROCEDURE — 36600 WITHDRAWAL OF ARTERIAL BLOOD: CPT | Performed by: INTERNAL MEDICINE

## 2017-06-26 PROCEDURE — 80048 BASIC METABOLIC PNL TOTAL CA: CPT | Performed by: INTERNAL MEDICINE

## 2017-06-26 PROCEDURE — 77010033678 HC OXYGEN DAILY

## 2017-06-26 PROCEDURE — 83735 ASSAY OF MAGNESIUM: CPT | Performed by: INTERNAL MEDICINE

## 2017-06-26 PROCEDURE — 84100 ASSAY OF PHOSPHORUS: CPT | Performed by: INTERNAL MEDICINE

## 2017-06-26 PROCEDURE — 77030029684 HC NEB SM VOL KT MONA -A

## 2017-06-26 PROCEDURE — 71010 XR CHEST PORT: CPT

## 2017-06-26 PROCEDURE — 74011250636 HC RX REV CODE- 250/636: Performed by: INTERNAL MEDICINE

## 2017-06-26 PROCEDURE — 94640 AIRWAY INHALATION TREATMENT: CPT

## 2017-06-26 PROCEDURE — 74011000250 HC RX REV CODE- 250: Performed by: INTERNAL MEDICINE

## 2017-06-26 PROCEDURE — 74011250636 HC RX REV CODE- 250/636: Performed by: PSYCHIATRY & NEUROLOGY

## 2017-06-26 PROCEDURE — 74011250636 HC RX REV CODE- 250/636: Performed by: EMERGENCY MEDICINE

## 2017-06-26 PROCEDURE — 85027 COMPLETE CBC AUTOMATED: CPT | Performed by: INTERNAL MEDICINE

## 2017-06-26 PROCEDURE — 74011250636 HC RX REV CODE- 250/636

## 2017-06-26 PROCEDURE — 74011000258 HC RX REV CODE- 258: Performed by: INTERNAL MEDICINE

## 2017-06-26 PROCEDURE — 82803 BLOOD GASES ANY COMBINATION: CPT | Performed by: INTERNAL MEDICINE

## 2017-06-26 PROCEDURE — 65610000006 HC RM INTENSIVE CARE

## 2017-06-26 PROCEDURE — 74011250637 HC RX REV CODE- 250/637: Performed by: INTERNAL MEDICINE

## 2017-06-26 PROCEDURE — 36415 COLL VENOUS BLD VENIPUNCTURE: CPT | Performed by: INTERNAL MEDICINE

## 2017-06-26 PROCEDURE — 74011000258 HC RX REV CODE- 258: Performed by: PSYCHIATRY & NEUROLOGY

## 2017-06-26 RX ORDER — ASPIRIN 325 MG
325 TABLET ORAL DAILY
Status: DISCONTINUED | OUTPATIENT
Start: 2017-06-27 | End: 2017-06-26

## 2017-06-26 RX ORDER — HALOPERIDOL 5 MG/ML
INJECTION INTRAMUSCULAR
Status: COMPLETED
Start: 2017-06-26 | End: 2017-06-26

## 2017-06-26 RX ORDER — HALOPERIDOL 5 MG/ML
5 INJECTION INTRAMUSCULAR
Status: DISCONTINUED | OUTPATIENT
Start: 2017-06-26 | End: 2017-06-27

## 2017-06-26 RX ORDER — METOPROLOL SUCCINATE 25 MG/1
25 TABLET, EXTENDED RELEASE ORAL DAILY
Status: DISCONTINUED | OUTPATIENT
Start: 2017-06-26 | End: 2017-06-26

## 2017-06-26 RX ORDER — HALOPERIDOL 5 MG/ML
5 INJECTION INTRAMUSCULAR ONCE
Status: ACTIVE | OUTPATIENT
Start: 2017-06-26 | End: 2017-06-26

## 2017-06-26 RX ORDER — IPRATROPIUM BROMIDE AND ALBUTEROL SULFATE 2.5; .5 MG/3ML; MG/3ML
3 SOLUTION RESPIRATORY (INHALATION)
Status: DISCONTINUED | OUTPATIENT
Start: 2017-06-26 | End: 2017-06-29

## 2017-06-26 RX ORDER — METOPROLOL TARTRATE 5 MG/5ML
2.5 INJECTION INTRAVENOUS EVERY 6 HOURS
Status: DISCONTINUED | OUTPATIENT
Start: 2017-06-26 | End: 2017-06-26

## 2017-06-26 RX ORDER — METOPROLOL TARTRATE 5 MG/5ML
5 INJECTION INTRAVENOUS EVERY 6 HOURS
Status: DISCONTINUED | OUTPATIENT
Start: 2017-06-26 | End: 2017-06-30

## 2017-06-26 RX ADMIN — PROPOFOL 30 MCG/KG/MIN: 10 INJECTION, EMULSION INTRAVENOUS at 05:08

## 2017-06-26 RX ADMIN — Medication 10 ML: at 15:17

## 2017-06-26 RX ADMIN — MUPIROCIN: 20 OINTMENT TOPICAL at 18:44

## 2017-06-26 RX ADMIN — HALOPERIDOL LACTATE 5 MG: 5 INJECTION, SOLUTION INTRAMUSCULAR at 21:30

## 2017-06-26 RX ADMIN — HALOPERIDOL LACTATE 5 MG: 5 INJECTION, SOLUTION INTRAMUSCULAR at 11:35

## 2017-06-26 RX ADMIN — IPRATROPIUM BROMIDE AND ALBUTEROL SULFATE 3 ML: .5; 3 SOLUTION RESPIRATORY (INHALATION) at 11:11

## 2017-06-26 RX ADMIN — MUPIROCIN: 20 OINTMENT TOPICAL at 09:44

## 2017-06-26 RX ADMIN — ENOXAPARIN SODIUM 40 MG: 40 INJECTION SUBCUTANEOUS at 09:42

## 2017-06-26 RX ADMIN — CHLORHEXIDINE GLUCONATE 15 ML: 1.2 RINSE ORAL at 08:51

## 2017-06-26 RX ADMIN — CHLORHEXIDINE GLUCONATE 15 ML: 1.2 RINSE ORAL at 18:44

## 2017-06-26 RX ADMIN — Medication 10 ML: at 05:07

## 2017-06-26 RX ADMIN — IPRATROPIUM BROMIDE AND ALBUTEROL SULFATE 3 ML: .5; 3 SOLUTION RESPIRATORY (INHALATION) at 19:03

## 2017-06-26 RX ADMIN — METOPROLOL TARTRATE 5 MG: 5 INJECTION INTRAVENOUS at 18:45

## 2017-06-26 RX ADMIN — LEVETIRACETAM 1500 MG: 100 INJECTION, SOLUTION, CONCENTRATE INTRAVENOUS at 21:32

## 2017-06-26 RX ADMIN — IPRATROPIUM BROMIDE AND ALBUTEROL SULFATE 3 ML: .5; 3 SOLUTION RESPIRATORY (INHALATION) at 09:11

## 2017-06-26 RX ADMIN — FAMOTIDINE 20 MG: 10 INJECTION, SOLUTION INTRAVENOUS at 11:39

## 2017-06-26 RX ADMIN — SODIUM CHLORIDE 75 ML/HR: 900 INJECTION, SOLUTION INTRAVENOUS at 14:15

## 2017-06-26 RX ADMIN — THIAMINE HYDROCHLORIDE 100 MG: 100 INJECTION, SOLUTION INTRAMUSCULAR; INTRAVENOUS at 12:30

## 2017-06-26 RX ADMIN — FENTANYL CITRATE 50 MCG: 50 INJECTION, SOLUTION INTRAMUSCULAR; INTRAVENOUS at 19:58

## 2017-06-26 RX ADMIN — LEVETIRACETAM 1500 MG: 100 INJECTION, SOLUTION, CONCENTRATE INTRAVENOUS at 08:54

## 2017-06-26 RX ADMIN — ASPIRIN 300 MG: 300 SUPPOSITORY RECTAL at 10:36

## 2017-06-26 RX ADMIN — FENTANYL CITRATE 50 MCG: 50 INJECTION, SOLUTION INTRAMUSCULAR; INTRAVENOUS at 21:59

## 2017-06-26 RX ADMIN — Medication 10 ML: at 21:35

## 2017-06-26 RX ADMIN — SODIUM CHLORIDE 75 ML/HR: 900 INJECTION, SOLUTION INTRAVENOUS at 06:04

## 2017-06-26 RX ADMIN — FAMOTIDINE 20 MG: 10 INJECTION, SOLUTION INTRAVENOUS at 21:36

## 2017-06-26 RX ADMIN — METOPROLOL TARTRATE 2.5 MG: 5 INJECTION INTRAVENOUS at 11:29

## 2017-06-26 RX ADMIN — IPRATROPIUM BROMIDE AND ALBUTEROL SULFATE 3 ML: .5; 3 SOLUTION RESPIRATORY (INHALATION) at 15:56

## 2017-06-26 NOTE — WOUND CARE
Pressure Ulcer Prevention In basket Alert Received for Klever < 14 (moderate risk).      Suggested Care Plan/Interventions for Nursing  1. Complete Klever Pressure Ulcer Risk Scale and use sub scores to identify appropriate interventions. 2. Perform Assessment: skin, changes in LOC, visual cues for pain, monitor skin under medical devices  3. Respond to Reduced Sensory Perception: changes in LOC, check visual cues for pain, float heels, suspension boots, pressure redistribution bed/mattress/chair cushion, turning and reposition approximately every 2 hours (pillows & wedges), pad between skin to skin, turn & reposition  4. Manage Moisture: absorbent under pads, internal / external urinary device, internal /  external fecal device, minimize layers, contain wound drainage, access need for specialty bed, limit adult briefs, maintain skin hydration (lotion/cream), moisture barrier, offer toileting every hour  5. Promote Activity: increase time out of bed, chair cushion, PT/OT evaluation, trapeze to reposition, pressure redistribution bed/mattress/chair  6. Address Reduced Mobility: float heels / suspension boot, HOB 30 degrees or less, pressure redistribution bed/mattress/cushion, PT / OT evaluation, turn and reposition approximately every 2 hours (pillows & wedges)  7. Promote Nutrition: document food / fluid / supplement intake, encourage/assist with meals as needed  8. Reduce Friction and Shear: transferring/repositioning devices (lift/draw sheet), lift team/ patient mobility team, feet elevated on foot rest, minimize layers, foam dressing / transparent film / skin sealants, protective barrier creams and emollients, transfer aides (board, Leonor lift, ceiling lift, stand assist), HOB 30 degrees or less, trapeze to reposition.   Wound Care Team

## 2017-06-26 NOTE — PROGRESS NOTES
Hospitalist Progress Note    NAME: Elaine Dunbar   :  1934   MRN:  [id number]       Assessment / Plan:  Acute encephalopathy due to status epilepticus in setting of seizure d/o:  Out of lyrica for a few days prior to admission. Prior seizures tonic clonic with prolonged post-ictal confusion. Pt remains confused with slurred speech and difficulty handling secretions post self-extubation this morning.  - CT head  no acute process  - CTA head/neck  with no significant cervical carotid arterial stenosis. Patent vertebral basilar system. No large vessel intracranial vascular occlusive change. Mild mucous retention cyst and mucoperiosteal thickening in the maxillary sinuses bilaterally. 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 - EEG this morning abnormal. Moderate diffuse cerebral dysfunction which is non specific for etiology but can be seen in toxic/metabolic states. No seizures. - appreciate neuro consult  - con't keppra at increased dose with home lyrica  - ativan prn seizure activity  - PT/OT to assess  Acute respiratory failure due to acute encephalopathy (intubated for airway protection) with chronic underlying hypercapnea:   - CXR today with minimal airspace disease right upper lobe. Appropriate nasogastric tube placement. - appreciate pulmonology assistance   - strict aspiration precautions with suction prn for secretions. May need reintubation due to poor mental status. Hypertension, benign/essential:  Intermittent low BP  - restart toprol  - prn IV labetalol  History of SVT    Peripheral neuropathy: ran out of lyrica several days PTA    Remote ETOH abuse:  Family confirms no ETOH in 5 years  GERD: con't protonix      DVT prophylaxis: lovenox    Code status: Full (wife is decision maker)     Subjective:     Chief Complaint / Reason for Physician Visit  Awake, slurred speech. Minimally following some simple commands.   Discussed with RN events overnight. Review of Systems:  Symptom Y/N Comments  Symptom Y/N Comments   Fever/Chills    Chest Pain     Poor Appetite    Edema     Cough    Abdominal Pain     Sputum    Joint Pain     SOB/AL    Pruritis/Rash     Nausea/vomit    Tolerating PT/OT     Diarrhea    Tolerating Diet     Constipation    Other       Could NOT obtain due to: encephalopathy     Objective:     VITALS:   Last 24hrs VS reviewed since prior progress note.  Most recent are:  Patient Vitals for the past 24 hrs:   Temp Pulse Resp BP SpO2   06/26/17 0800 99 °F (37.2 °C) (!) 104 18 160/65 99 %   06/26/17 0723 - - - - 96 %   06/26/17 0700 - 96 22 156/76 95 %   06/26/17 0640 - 87 18 - 100 %   06/26/17 0635 - 95 (!) 33 - 100 %   06/26/17 0630 - 79 22 - 94 %   06/26/17 0620 - 71 21 - 90 %   06/26/17 0605 - 62 12 - 100 %   06/26/17 0600 - 62 13 121/60 100 %   06/26/17 0500 - 64 14 104/70 100 %   06/26/17 0400 98 °F (36.7 °C) (!) 59 14 117/56 99 %   06/26/17 0336 - (!) 54 15 - 98 %   06/26/17 0300 - (!) 58 17 107/53 99 %   06/26/17 0200 - (!) 56 16 111/49 99 %   06/26/17 0100 - (!) 59 15 106/47 98 %   06/26/17 0001 98.3 °F (36.8 °C) 61 15 94/53 100 %   06/25/17 2300 - (!) 58 11 108/55 98 %   06/25/17 2200 - (!) 58 12 108/57 99 %   06/25/17 2100 - (!) 59 12 120/61 99 %   06/25/17 2000 - (!) 59 17 117/57 100 %   06/25/17 1948 - 60 15 - 99 %   06/25/17 1930 98.1 °F (36.7 °C) - - - -   06/25/17 1900 - 60 14 104/50 99 %   06/25/17 1800 - 60 12 118/58 100 %   06/25/17 1700 - 60 12 111/55 99 %   06/25/17 1600 98.2 °F (36.8 °C) (!) 59 13 105/53 98 %   06/25/17 1522 - 60 12 - 98 %   06/25/17 1500 - 60 11 102/51 98 %   06/25/17 1400 - 64 15 109/51 99 %   06/25/17 1300 - 73 13 138/66 100 %   06/25/17 1200 98.2 °F (36.8 °C) 74 12 143/63 100 %   06/25/17 1146 - 74 15 - 100 %   06/25/17 1100 - 74 12 143/64 100 %   06/25/17 1000 - 84 16 133/71 100 %   06/25/17 0900 - 74 16 118/68 100 %       Intake/Output Summary (Last 24 hours) at 06/26/17 0831  Last data filed at 06/26/17 0700   Gross per 24 hour   Intake          2227.09 ml   Output              965 ml   Net          1262.09 ml        PHYSICAL EXAM:  General: WD, WN. Alert, intermittently cooperative, no acute distress    EENT:  EOMI. Anicteric sclerae. MMM  Resp:  CTA bilaterally, no wheezing or rales. No accessory muscle use  CV:  Regular  rhythm,  No edema  GI:  Soft, Non distended, Non tender.  +Bowel sounds  Neurologic:  Alert and oriented X 1, slurred speech,   Psych:   Poor insight. Not anxious nor agitated  Skin:  No rashes. No jaundice    Reviewed most current lab test results and cultures  YES  Reviewed most current radiology test results   YES  Review and summation of old records today    NO  Reviewed patient's current orders and MAR    YES  PMH/SH reviewed - no change compared to H&P  ________________________________________________________________________  Care Plan discussed with:    Comments   Patient x    Family      RN     Care Manager     Consultant                        Multidiciplinary team rounds were held today with , nursing, pharmacist and clinical coordinator. Patient's plan of care was discussed; medications were reviewed and discharge planning was addressed. ________________________________________________________________________  Total NON critical care TIME:  25 Minutes    Total CRITICAL CARE TIME Spent:   Minutes non procedure based      Comments   >50% of visit spent in counseling and coordination of care x    ________________________________________________________________________  Leo Dyer MD     Procedures: see electronic medical records for all procedures/Xrays and details which were not copied into this note but were reviewed prior to creation of Plan. LABS:  I reviewed today's most current labs and imaging studies.   Pertinent labs include:  Recent Labs      06/26/17   0358  06/25/17   0415  06/24/17   1230   WBC  8.0  14.1*  6.2   HGB  11.3*  12.0*  14.3 HCT  33.3*  34.9*  40.8   PLT  77*  96*  115*     Recent Labs      06/26/17   0358  06/25/17   0415  06/24/17   1230   NA  138  136  137   K  3.7  4.4  3.6   CL  105  103  102   CO2  27  26  28   GLU  100  123*  105*   BUN  12  17  13   CREA  0.71  1.04  0.98   CA  8.6  8.2*  9.6   MG  2.2   --    --    PHOS  2.4*   --    --    ALB   --   3.4*  4.4   TBILI   --   1.4*  1.1*   SGOT   --   14*  16   ALT   --   15  19       Signed: Bigg Chaparro MD

## 2017-06-26 NOTE — PROGRESS NOTES
2000 vss. Afebrile. Assessment as noted. Will monitor. 0000 no acute changes at this time. Will continue to monitor. 0400 labs drawn and sent. 0500 pt bathed, linens changed. 3459 propofol stopped for sat.     0620 pt's vent alarming pt found to have reached head forward to meet his hands (while in bilat wrist restraints) and self extubated. Pt suctioned orally and nt suctioned for thick tan secretions. Rt paged and placed pt on 4l nc.     0905 pt attempting to communicate. Making incomprehensible sounds at this time. Pt still reaches for tubes and pulls at monitors and equipment when hands are free. Restraints left in place. 0700 dr still updated on recent events.

## 2017-06-26 NOTE — PROGRESS NOTES
Patient with very wet congested cough. Patient suctioned frequently for large amounts of oral secretions. Patient often coughing up secretions onto his gown or bed covers. 0945 Tech in to do EEG. 1000 Patient restless and attempting to pull O2 off and pull at kelly with the wrist restraints on. Patient oriented to room and situation. 1100 Patient restless and kicking legs out of bed. Patient speech mumbled off and on.   1126 Patient attempting to get out of bed, very reslelss and agitated. Patient re-oriented to room and situation, but patient constantly trying to get up. Dr. Bree Onofre notified and order received for patient to have a sitter. 1135 Patient agitated and restless. Patient trying to pull and kelly and keeps swinging legs out of bed. Dr. Bree Onofre notified and order received for haldol. Haldol 5mg IV given at this time. 1210 Patient calmer at this time. Sitter remains at bedside. 1600 Patient restless off and on. Still swinging legs out of bed and trying to get up. Sitter at bedside. Kelly catheter discontinued at this time. Condom cath placed on patient at this time. 1640 Patient moving around in bed so much that condom cath came off. New condom cath placed on patient at this time. 1755 Condom cath off of patient and patient incontinent of urine at this time. Patient cleaned and linens changed. 1900 Patient restless. Sitter at bedside. Report to Kerrie Escobedo RN.

## 2017-06-26 NOTE — PROGRESS NOTES
NEUROLOGY PROGRESS NOTE     CC: Seizures    SUBJECTIVE  No seizures in ICU  Pt self extubated. Remains confused. Pt still confused and was AAO X 3 at home    [address] ABDIAZ[address] Huber is a 80 y.o. male who presents to the hospital because of seizures. The family is not at bedside in the history is obtained by chart review. According to the ED notes. The patient was oriented ×3 when he went to bed last night and was able to communicate but when he woke up in the morning he was unable to speak in coherent sentences and was confused about orientation. He he was not feeling great and wanted to go to the ER. He does have long history of seizures and takes Keppra 1250 mg p.o. twice daily and Lyrica 300 mg p.o. twice daily. He ran out of his Lyrica 3 days ago. In the emergency room he did have an episode of deviation of the eyes to the right along with stiffening of the right upper extremity. The patient was intubated at this time for airway protection and is on propofol. No obvious seizures in the ICU yet.     ROS  A ten system review of constitutional, cardiovascular, respiratory, musculoskeletal, endocrine, skin, SHEENT, genitourinary, psychiatric and neurologic systems was obtained and is unremarkable except as stated in HPI     PHYSICAL EXAM  EXAMINATION:   Patient Vitals for the past 24 hrs:   Temp Pulse Resp BP SpO2   06/26/17 1000 - (!) 110 17 172/81 98 %   06/26/17 0910 - - - - 100 %   06/26/17 0900 - 98 23 145/73 100 %   06/26/17 0800 99 °F (37.2 °C) (!) 104 18 160/65 99 %   06/26/17 0723 - - - - 96 %   06/26/17 0700 - 96 22 156/76 95 %   06/26/17 0640 - 87 18 - 100 %   06/26/17 0635 - 95 (!) 33 - 100 %   06/26/17 0630 - 79 22 - 94 %   06/26/17 0620 - 71 21 - 90 %   06/26/17 0605 - 62 12 - 100 %   06/26/17 0600 - 62 13 121/60 100 %   06/26/17 0500 - 64 14 104/70 100 %   06/26/17 0400 98 °F (36.7 °C) (!) 59 14 117/56 99 %   06/26/17 0336 - (!) 54 15 - 98 %   06/26/17 0300 - (!) 58 17 107/53 99 %   06/26/17 0200 - (!) 56 16 111/49 99 %   06/26/17 0100 - (!) 59 15 106/47 98 %   06/26/17 0001 98.3 °F (36.8 °C) 61 15 94/53 100 %   06/25/17 2300 - (!) 58 11 108/55 98 %   06/25/17 2200 - (!) 58 12 108/57 99 %   06/25/17 2100 - (!) 59 12 120/61 99 %   06/25/17 2000 - (!) 59 17 117/57 100 %   06/25/17 1948 - 60 15 - 99 %   06/25/17 1930 98.1 °F (36.7 °C) - - - -   06/25/17 1900 - 60 14 104/50 99 %   06/25/17 1800 - 60 12 118/58 100 %   06/25/17 1700 - 60 12 111/55 99 %   06/25/17 1600 98.2 °F (36.8 °C) (!) 59 13 105/53 98 %   06/25/17 1522 - 60 12 - 98 %   06/25/17 1500 - 60 11 102/51 98 %   06/25/17 1400 - 64 15 109/51 99 %   06/25/17 1300 - 73 13 138/66 100 %   06/25/17 1200 98.2 °F (36.8 °C) 74 12 143/63 100 %   06/25/17 1146 - 74 15 - 100 %   06/25/17 1100 - 74 12 143/64 100 %        General:   General appearance: Pt is in no acute distress   Distal pulses are preserved    Neurological Examination:   Mental Status: Patient is self extubated and combative. Cranial Nerves: Unable to examine as patient is combative. Motor: Moves all 4 extremities symmetrically and spontaneously    Sensation: Unable to assess    Coordination/Cerebellar: Unable to assess    Gait: Unable to assess    Skin: No significant bruising or lacerations.     LAB DATA REVIEWED:    Results for orders placed or performed during the hospital encounter of 06/24/17   CULTURE, BLOOD   Result Value Ref Range    Special Requests: NO SPECIAL REQUESTS      Culture result: NO GROWTH 2 DAYS     CULTURE, BLOOD   Result Value Ref Range    Special Requests: NO SPECIAL REQUESTS      Culture result: NO GROWTH 2 DAYS     CBC WITH AUTOMATED DIFF   Result Value Ref Range    WBC 6.2 4.1 - 11.1 K/uL    RBC 4.28 4.10 - 5.70 M/uL    HGB 14.3 12.1 - 17.0 g/dL    HCT 40.8 36.6 - 50.3 %    MCV 95.3 80.0 - 99.0 FL    MCH 33.4 26.0 - 34.0 PG    MCHC 35.0 30.0 - 36.5 g/dL    RDW 12.4 11.5 - 14.5 %    PLATELET 674 (L) 328 - 400 K/uL    NEUTROPHILS 65 32 - 75 %    LYMPHOCYTES 23 12 - 49 %    MONOCYTES 12 5 - 13 %    EOSINOPHILS 0 0 - 7 %    BASOPHILS 0 0 - 1 %    ABS. NEUTROPHILS 4.0 1.8 - 8.0 K/UL    ABS. LYMPHOCYTES 1.4 0.8 - 3.5 K/UL    ABS. MONOCYTES 0.8 0.0 - 1.0 K/UL    ABS. EOSINOPHILS 0.0 0.0 - 0.4 K/UL    ABS. BASOPHILS 0.0 0.0 - 0.1 K/UL   METABOLIC PANEL, COMPREHENSIVE   Result Value Ref Range    Sodium 137 136 - 145 mmol/L    Potassium 3.6 3.5 - 5.1 mmol/L    Chloride 102 97 - 108 mmol/L    CO2 28 21 - 32 mmol/L    Anion gap 7 5 - 15 mmol/L    Glucose 105 (H) 65 - 100 mg/dL    BUN 13 6 - 20 MG/DL    Creatinine 0.98 0.70 - 1.30 MG/DL    BUN/Creatinine ratio 13 12 - 20      GFR est AA >60 >60 ml/min/1.73m2    GFR est non-AA >60 >60 ml/min/1.73m2    Calcium 9.6 8.5 - 10.1 MG/DL    Bilirubin, total 1.1 (H) 0.2 - 1.0 MG/DL    ALT (SGPT) 19 12 - 78 U/L    AST (SGOT) 16 15 - 37 U/L    Alk.  phosphatase 83 45 - 117 U/L    Protein, total 8.9 (H) 6.4 - 8.2 g/dL    Albumin 4.4 3.5 - 5.0 g/dL    Globulin 4.5 (H) 2.0 - 4.0 g/dL    A-G Ratio 1.0 (L) 1.1 - 2.2     TROPONIN I   Result Value Ref Range    Troponin-I, Qt. <0.04 <0.05 ng/mL   CK W/ REFLX CKMB   Result Value Ref Range     39 - 308 U/L   LEVETIRACETAM (KEPPRA)   Result Value Ref Range    Levetiracetam (Keppra) 36.6 10.0 - 40.0 ug/mL   URINALYSIS W/MICROSCOPIC   Result Value Ref Range    Color YELLOW/STRAW      Appearance CLOUDY (A) CLEAR      Specific gravity 1.019 1.003 - 1.030      pH (UA) 7.0 5.0 - 8.0      Protein NEGATIVE  NEG mg/dL    Glucose NEGATIVE  NEG mg/dL    Ketone TRACE (A) NEG mg/dL    Blood NEGATIVE  NEG      Urobilinogen 1.0 0.2 - 1.0 EU/dL    Nitrites NEGATIVE  NEG      Leukocyte Esterase NEGATIVE  NEG      WBC 0-4 0 - 4 /hpf    RBC 0-5 0 - 5 /hpf    Epithelial cells FEW FEW /lpf    Bacteria NEGATIVE  NEG /hpf    Hyaline cast 0-2 0 - 5 /lpf   BILIRUBIN, CONFIRM   Result Value Ref Range    Bilirubin UA, confirm NEGATIVE  NEG     CK W/ CKMB & INDEX   Result Value Ref Range     39 - 308 U/L    CK - MB 1.6 <3.6 NG/ML    CK-MB Index 0.7 0 - 2.5     LACTIC ACID, PLASMA   Result Value Ref Range    Lactic acid 2.2 (HH) 0.4 - 2.0 MMOL/L   BLOOD GAS, ARTERIAL   Result Value Ref Range    pH 7.47 (H) 7.35 - 7.45      PCO2 34 (L) 35.0 - 45.0 mmHg    PO2 212 (H) 80 - 100 mmHg    O2  (H) 92 - 97 %    BICARBONATE 24 22 - 26 mmol/L    BASE EXCESS 1.0 mmol/L    O2 METHOD VENTILATOR      FIO2 50 %    MODE A/C      Tidal volume 500      SET RATE 16      EPAP/CPAP/PEEP 6.0      Sample source ARTERIAL      SITE RIGHT RADIAL      TERE'S TEST YES     MISC. LAB TEST   Result Value Ref Range    Test Description: LYRICA LEVEL     Reference Lab: [email or web address]     Results: PENDING    METABOLIC PANEL, COMPREHENSIVE   Result Value Ref Range    Sodium 136 136 - 145 mmol/L    Potassium 4.4 3.5 - 5.1 mmol/L    Chloride 103 97 - 108 mmol/L    CO2 26 21 - 32 mmol/L    Anion gap 7 5 - 15 mmol/L    Glucose 123 (H) 65 - 100 mg/dL    BUN 17 6 - 20 MG/DL    Creatinine 1.04 0.70 - 1.30 MG/DL    BUN/Creatinine ratio 16 12 - 20      GFR est AA >60 >60 ml/min/1.73m2    GFR est non-AA >60 >60 ml/min/1.73m2    Calcium 8.2 (L) 8.5 - 10.1 MG/DL    Bilirubin, total 1.4 (H) 0.2 - 1.0 MG/DL    ALT (SGPT) 15 12 - 78 U/L    AST (SGOT) 14 (L) 15 - 37 U/L    Alk. phosphatase 59 45 - 117 U/L    Protein, total 7.1 6.4 - 8.2 g/dL    Albumin 3.4 (L) 3.5 - 5.0 g/dL    Globulin 3.7 2.0 - 4.0 g/dL    A-G Ratio 0.9 (L) 1.1 - 2.2     CBC WITH AUTOMATED DIFF   Result Value Ref Range    WBC 14.1 (H) 4.1 - 11.1 K/uL    RBC 3.59 (L) 4.10 - 5.70 M/uL    HGB 12.0 (L) 12.1 - 17.0 g/dL    HCT 34.9 (L) 36.6 - 50.3 %    MCV 97.2 80.0 - 99.0 FL    MCH 33.4 26.0 - 34.0 PG    MCHC 34.4 30.0 - 36.5 g/dL    RDW 12.8 11.5 - 14.5 %    PLATELET 96 (L) 765 - 400 K/uL    NEUTROPHILS 83 (H) 32 - 75 %    LYMPHOCYTES 9 (L) 12 - 49 %    MONOCYTES 8 5 - 13 %    EOSINOPHILS 0 0 - 7 %    BASOPHILS 0 0 - 1 %    ABS. NEUTROPHILS 11.7 (H) 1.8 - 8.0 K/UL    ABS.  LYMPHOCYTES 1.3 0.8 - 3.5 K/UL    ABS. MONOCYTES 1.1 (H) 0.0 - 1.0 K/UL    ABS. EOSINOPHILS 0.0 0.0 - 0.4 K/UL    ABS.  BASOPHILS 0.0 0.0 - 0.1 K/UL   TROPONIN I   Result Value Ref Range    Troponin-I, Qt. <0.04 <0.05 ng/mL   CBC W/O DIFF   Result Value Ref Range    WBC 8.0 4.1 - 11.1 K/uL    RBC 3.43 (L) 4.10 - 5.70 M/uL    HGB 11.3 (L) 12.1 - 17.0 g/dL    HCT 33.3 (L) 36.6 - 50.3 %    MCV 97.1 80.0 - 99.0 FL    MCH 32.9 26.0 - 34.0 PG    MCHC 33.9 30.0 - 36.5 g/dL    RDW 12.7 11.5 - 14.5 %    PLATELET 77 (L) 073 - 274 K/uL   METABOLIC PANEL, BASIC   Result Value Ref Range    Sodium 138 136 - 145 mmol/L    Potassium 3.7 3.5 - 5.1 mmol/L    Chloride 105 97 - 108 mmol/L    CO2 27 21 - 32 mmol/L    Anion gap 6 5 - 15 mmol/L    Glucose 100 65 - 100 mg/dL    BUN 12 6 - 20 MG/DL    Creatinine 0.71 0.70 - 1.30 MG/DL    BUN/Creatinine ratio 17 12 - 20      GFR est AA >60 >60 ml/min/1.73m2    GFR est non-AA >60 >60 ml/min/1.73m2    Calcium 8.6 8.5 - 10.1 MG/DL   MAGNESIUM   Result Value Ref Range    Magnesium 2.2 1.6 - 2.4 mg/dL   PHOSPHORUS   Result Value Ref Range    Phosphorus 2.4 (L) 2.6 - 4.7 MG/DL   BLOOD GAS, ARTERIAL   Result Value Ref Range    pH 7.27 (L) 7.35 - 7.45      PCO2 59 (H) 35.0 - 45.0 mmHg    PO2 95 80 - 100 mmHg    O2 SAT 96 92 - 97 %    BICARBONATE 27 (H) 22 - 26 mmol/L    BASE DEFICIT 1.3 mmol/L    O2 METHOD NASAL O2      O2 FLOW RATE 4.00 L/min    SPONTANEOUS RATE 26.0      Sample source ARTERIAL      SITE LEFT BRACHIAL      TERE'S TEST N/A     GLUCOSE, POC   Result Value Ref Range    Glucose (POC) 110 (H) 65 - 100 mg/dL    Performed by Laurence Reyes    POC CREATININE   Result Value Ref Range    Creatinine (POC) 0.9 0.6 - 1.3 MG/DL    GFRAA, POC >60 >60 ml/min/1.73m2    GFRNA, POC >60 >60 ml/min/1.73m2   POC CHEM8   Result Value Ref Range    Calcium, ionized (POC) 1.14 1.12 - 1.32 MMOL/L    Sodium (POC) 140 136 - 145 MMOL/L    Potassium (POC) 4.3 3.5 - 5.1 MMOL/L    Chloride (POC) 101 98 - 107 MMOL/L    CO2 (POC) 30 21 - 32 MMOL/L    Anion gap (POC) 14 5 - 15 mmol/L    Glucose (POC) 107 (H) 65 - 100 MG/DL    BUN (POC) 15 9 - 20 MG/DL    Creatinine (POC) 0.9 0.6 - 1.3 MG/DL    GFRAA, POC >60 >60 ml/min/1.73m2    GFRNA, POC >60 >60 ml/min/1.73m2    Hemoglobin (POC) 14.3 12.1 - 17.0 GM/DL    Hematocrit (POC) 42 36.6 - 50.3 %    Comment Comment Not Indicated. EKG, 12 LEAD, INITIAL   Result Value Ref Range    Ventricular Rate 88 BPM    Atrial Rate 88 BPM    P-R Interval 294 ms    QRS Duration 82 ms    Q-T Interval 368 ms    QTC Calculation (Bezet) 445 ms    Calculated P Axis 61 degrees    Calculated R Axis -38 degrees    Calculated T Axis 52 degrees    Diagnosis       Sinus rhythm with 1st degree AV block  Left axis deviation  Nonspecific T wave abnormality  When compared with ECG of 06-SEP-2012 03:00,  DE interval has increased  ST no longer elevated in Anterior leads  Nonspecific T wave abnormality, worse in Anterolateral leads  Confirmed by Denia Saravia ([id number]) on 6/25/2017 1:15:25 PM          Imaging review:  6/24/2017  CT scan of the head without contrast  No acute intracranial abnormality    CT angiogram of the head and neck  No significant cervical carotid artery stenosis. No large vessel intracranial vascular occlusive change. HOME MEDS  Prior to Admission Medications   Prescriptions Last Dose Informant Patient Reported? Taking?   aspirin (ASPIRIN) 325 mg tablet   Yes No   Sig: Take 1 Tab by mouth daily. atorvastatin (LIPITOR) 40 mg tablet   No No   Sig: Take 1 Tab by mouth daily. cyclobenzaprine (FLEXERIL) 5 mg tablet   No No   Sig: Take 1 Tab by mouth three (3) times daily as needed for Muscle Spasm(s). levETIRAcetam (KEPPRA) 250 mg tablet   No No   Sig: Take 1 Tab by mouth two (2) times a day. Take with the 1000mg to make 1250mg twice a day. levETIRAcetam 1,000 mg tablet   No No   Sig: Take 1 Tab by mouth two (2) times a day.    magnesium oxide (MAG-OX) 400 mg tablet   Yes No   Sig: Take 400 mg by mouth daily.     metoprolol succinate (TOPROL-XL) 25 mg XL tablet   Yes No   Sig: Take  by mouth daily. omeprazole (PRILOSEC) 40 mg capsule   Yes No   Sig: Take 40 mg by mouth daily. pregabalin (LYRICA) 300 mg capsule   No No   Sig: Take 1 Cap by mouth two (2) times a day. Max Daily Amount: 600 mg.   thiamine (B-1) 100 mg tablet   No No   Sig: Take 1 Tab by mouth daily. Facility-Administered Medications: None       CURRENT MEDS  Current Facility-Administered Medications   Medication Dose Route Frequency    albuterol-ipratropium (DUO-NEB) 2.5 MG-0.5 MG/3 ML  3 mL Nebulization QID RT    famotidine (PF) (PEPCID) 20 mg in sodium chloride 0.9 % 10 mL injection  20 mg IntraVENous Q12H    thiamine (B-1) 100 mg in 0.9% sodium chloride 50 mL IVPB  100 mg IntraVENous DAILY    metoprolol (LOPRESSOR) injection 2.5 mg  2.5 mg IntraVENous Q6H    chlorhexidine (PERIDEX) 0.12 % mouthwash 15 mL  15 mL Oral BID    mupirocin (BACTROBAN) 2 % ointment   Both Nostrils BID    atorvastatin (LIPITOR) tablet 40 mg  40 mg Per NG tube DAILY    pregabalin (LYRICA) capsule 300 mg  300 mg Oral BID    propofol (DIPRIVAN) infusion  5-50 mcg/kg/min IntraVENous TITRATE    sodium chloride (NS) flush 5-10 mL  5-10 mL IntraVENous Q8H    enoxaparin (LOVENOX) injection 40 mg  40 mg SubCUTAneous Q24H    aspirin (ASA) suppository 300 mg  300 mg Rectal DAILY    0.9% sodium chloride infusion  75 mL/hr IntraVENous CONTINUOUS    levETIRAcetam (KEPPRA) 1,500 mg in 0.9% sodium chloride IVPB  1,500 mg IntraVENous Q12H       IMPRESSION:  Elaine Dunbar is a 80 y.o. male who presents with altered mental status since this morning and in the emergency room had stiffening on right side of the body along with deviation of the eye to the right. Left hemispheric seizure suspected. The patient was taking Keppra 1250 mg p.o. twice daily and Lyrica 300 mg p.o. twice daily. The patient ran out of Lyrica 3 days ago.     RECOMMENDATIONS:  1.  EEG to check for any ongoing seizures  2. Continue Keppra 1500 mg p.o. twice daily  3. Continue Lyrica 300 mg p.o. twice daily      Patient is critically ill with status epilepticus. Significant risk of morbidity and mortality. 30 minutes of critical care time provided.       Paul Vergara MD  Neurologist

## 2017-06-26 NOTE — PROGRESS NOTES
Nutrition Assessment:    RECOMMENDATIONS:   Advance diet as medically able per SLP    DIETITIANS INTERVENTIONS/PLAN:   Advance diet as tolerated  Monitor plan of care    ASSESSMENT:   Pt admitted status post epilepticus. PMH: GERD, HTN, ETOH abuse (5 years sober). Chart reviewed, case discussed during CCU rounds. Pt self extubated this morning. He is very confused and agitated and requires restraints and a sitter. NPO for now, as he is unable to work with SLP. Labs reviewed, WNL currently. BM noted on 6/24. No skin breakdown. Will monitor readiness for diet advancement. SUBJECTIVE/OBJECTIVE:   Pt confused/combative, no family in the room to speak with  Diet Order: NPO  % Eaten:  No data found. Pertinent Medications:pepcid, thiamin; Hubert@[email or web address]). Chemistries:  Lab Results   Component Value Date/Time    Sodium 138 06/26/2017 03:58 AM    Potassium 3.7 06/26/2017 03:58 AM    Chloride 105 06/26/2017 03:58 AM    CO2 27 06/26/2017 03:58 AM    Anion gap 6 06/26/2017 03:58 AM    Glucose 100 06/26/2017 03:58 AM    BUN 12 06/26/2017 03:58 AM    Creatinine 0.71 06/26/2017 03:58 AM    BUN/Creatinine ratio 17 06/26/2017 03:58 AM    GFR est AA >60 06/26/2017 03:58 AM    GFR est non-AA >60 06/26/2017 03:58 AM    Calcium 8.6 06/26/2017 03:58 AM    AST (SGOT) 14 06/25/2017 04:15 AM    Alk. phosphatase 59 06/25/2017 04:15 AM    Protein, total 7.1 06/25/2017 04:15 AM    Albumin 3.4 06/25/2017 04:15 AM    Globulin 3.7 06/25/2017 04:15 AM    A-G Ratio 0.9 06/25/2017 04:15 AM    ALT (SGPT) 15 06/25/2017 04:15 AM      Anthropometrics: Height: 5' 8\" (172.7 cm) Weight: 72.4 kg (159 lb 9.8 oz)    IBW (%IBW):   ( ) UBW (%UBW):   (  %)    BMI: Body mass index is 24.27 kg/(m^2). This BMI is indicative of:  []Underweight   [x]Normal   []Overweight   [] Obesity   [] Extreme Obesity (BMI>40)  Estimated Nutrition Needs (Based on): 1673 Kcals/day (MSJ 1394 x 1.2) , 58 g (-72 (0.8-1gPro/kg) ) Protein  Carbohydrate:  At Least 130 g/day  Fluids: 1700 mL/day    Last BM: 6/24   [x]Active     []Hyperactive  []Hypoactive       [] Absent   BS  Skin:    [x] Intact   [] Incision  [] Breakdown   [] DTI   [] Tears/Excoriation/Abrasion  []Edema [] Other: Wt Readings from Last 30 Encounters:   06/26/17 72.4 kg (159 lb 9.8 oz)   06/21/17 70.3 kg (155 lb)   03/21/17 75.3 kg (166 lb)   03/18/17 68 kg (150 lb)   03/07/17 75.3 kg (166 lb)   02/25/17 76.2 kg (168 lb)   02/18/17 85.5 kg (188 lb 7.9 oz)   09/03/12 87.1 kg (192 lb)      NUTRITION DIAGNOSES:   Problem:  Inadequate protein-energy intake      Etiology: related to pt NPO 2' agitation/confusion     Signs/Symptoms: as evidenced by NPO meets 0% kcal and protein needs. NUTRITION INTERVENTIONS:                     GOAL:   Pt will be advanced to PO diet vs nutrition support in 2-3 days.      Cultural, Synagogue, or Ethnic Dietary Needs: None     LEARNING NEEDS (Diet, Food/Nutrient-Drug Interaction):    [x] None Identified   [] Identified and Education Provided/Documented   [] Identified and Pt declined/was not appropriate      [x] Interdisciplinary Care Plan Reviewed/Documented    [x] Participated in Discharge Planning: unable to determine    [] Interdisciplinary Rounds     NUTRITION RISK:    [x] High              [] Moderate           []  Low  []  Minimal/Uncompromised    PT SEEN FOR:    []  MD Consult: []Calorie Count      []Diabetic Diet Education        []Diet Education     []Electrolyte Management     []General Nutrition Management and Supplements     []Management of Tube Feeding     []TPN Recommendations    [x]  RN Referral:  [x]MST score >=2     []Enteral/Parenteral Nutrition PTA     []Pregnant: Gestational DM or Multigestation   []  Low BMI  []  DTR Referral       Marcos Lara, [address] [place] [address]    Pager [phone]  Weekend Pager [phone]

## 2017-06-26 NOTE — INTERDISCIPLINARY ROUNDS
Interdisciplinary team rounds were held 6/26/17 with the following team members:Care Management, Diabetes Treatment Specialist, Nursing, Nutrition, Pharmacy, Physical Therapy, Physician, Respiratory Therapy and Clinical Coordinator. Plan of care discussed. Goal: See MD orders and progress notes for further  interventions and desired outcomes.

## 2017-06-26 NOTE — PROGRESS NOTES
PT note:    Orders received and acknowledged. Chart reviewed and cleared by nursing. Attempted to see patient for PT evaluation. Patient very confused, attempting to climb out of bed, pulling off nebulizer treatment and very restless. Attempted to re-direct patient without success. RN and PCT in room with patient. Will continue to follow.      Lenora Castro, PT, DPT   Total time spent: 8 minutes

## 2017-06-26 NOTE — PROGRESS NOTES
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 Self-extubated during SBT. Unintelligible speech.     Review of Systems   Unable to perform ROS: Mental status change     Objective:   Vital Signs:    Visit Vitals    /65 (BP 1 Location: Right arm, BP Patient Position: At rest)    Pulse (!) 104    Temp 99 °F (37.2 °C)    Resp 18    Wt 72.4 kg (159 lb 9.8 oz)    SpO2 99%    BMI 24.27 kg/m2       O2 Device: Nasal cannula   O2 Flow Rate (L/min): 4 l/min   Temp (24hrs), Av.3 °F (36.8 °C), Min:98 °F (36.7 °C), Max:99 °F (37.2 °C)       Intake/Output:   Last shift:      701 - 1900  In: 75 [I.V.:75]  Out: 215 [Urine:215]  Last 3 shifts:  190 -  0700  In: 3260.1 [I.V.:3260.1]  Out: 1334 [RMKQL:1640]    Intake/Output Summary (Last 24 hours) at 17 0844  Last data filed at 17 0800   Gross per 24 hour   Intake          2302.09 ml   Output             1180 ml   Net          1122.09 ml     Hemodynamics:   PAP:   CO:     Wedge: CI:     CVP:    SVR:       PVR:       Ventilator Settings:  Mode Rate Tidal Volume Pressure FiO2 PEEP   Assist control   500 ml  5 cm H2O 35 % 6 cm H20     Peak airway pressure: 22 cm H2O    Minute ventilation: 6.15 l/min       Physical Exam   Constitutional: No distress. HENT:   Head: Normocephalic and atraumatic. Mouth/Throat: No oropharyngeal exudate. Eyes: No scleral icterus. Cardiovascular: Normal rate and regular rhythm. Pulmonary/Chest: No respiratory distress. He has no wheezes. He has no rales. Abdominal: Soft. Bowel sounds are normal. He exhibits no distension. There is no tenderness. Musculoskeletal: He exhibits no edema. Neurological: He is alert. Skin: Skin is warm and dry.      Data:     Current Facility-Administered Medications   Medication Dose Route Frequency    chlorhexidine (PERIDEX) 0.12 % mouthwash 15 mL  15 mL Oral BID    mupirocin (BACTROBAN) 2 % ointment   Both Nostrils BID    atorvastatin (LIPITOR) tablet 40 mg  40 mg Per NG tube DAILY    thiamine (B-1) tablet 100 mg  100 mg Per NG tube DAILY    magnesium oxide (MAG-OX) tablet 400 mg  400 mg Per NG tube DAILY    pantoprazole (PROTONIX) granules for oral suspension 40 mg  40 mg Per NG tube ACB    pregabalin (LYRICA) capsule 300 mg  300 mg Oral BID    0.9% sodium chloride infusion 1,000 mL  1,000 mL IntraVENous ONCE    propofol (DIPRIVAN) infusion  5-50 mcg/kg/min IntraVENous TITRATE    sodium chloride (NS) flush 5-10 mL  5-10 mL IntraVENous Q8H    enoxaparin (LOVENOX) injection 40 mg  40 mg SubCUTAneous Q24H    aspirin (ASA) suppository 300 mg  300 mg Rectal DAILY    0.9% sodium chloride infusion  75 mL/hr IntraVENous CONTINUOUS    levETIRAcetam (KEPPRA) 1,500 mg in 0.9% sodium chloride IVPB  1,500 mg IntraVENous Q12H                Labs:  Recent Labs      06/26/17   0358  06/25/17   0415  06/24/17   1230   WBC  8.0  14.1*  6.2   HGB  11.3*  12.0*  14.3   HCT  33.3*  34.9*  40.8   PLT  77*  96*  115* Recent Labs      06/26/17   0358  06/25/17   0415  06/24/17   1230   NA  138  136  137   K  3.7  4.4  3.6   CL  105  103  102   CO2  27  26  28   GLU  100  123*  105*   BUN  12  17  13   CREA  0.71  1.04  0.98   CA  8.6  8.2*  9.6   MG  2.2   --    --    PHOS  2.4*   --    --    ALB   --   3.4*  4.4   TBILI   --   1.4*  1.1*   SGOT   --   14*  16   ALT   --   15  19     Recent Labs      06/26/17   0647  06/24/17   1639   PH  7.27*  7.47*   PCO2  59*  34*   PO2  95  212*   HCO3  27*  24   FIO2   --   50     Imaging:  I have personally reviewed the patients radiographs and have reviewed the reports:  No change        Total critical care time exclusive of procedures: 25 minutes  Damon Cosme MD

## 2017-06-26 NOTE — PROCEDURES
Patient Name: Dwight Manzo  : 1934  Age: 80 y.o. Ordering physician: No ref. provider found  Date of EE2017  EEG procedure number: [id number]   Diagnosis: Seizure  Interpreting physician: Asael Tilley MD    ELECTROENCEPHALOGRAM REPORT     PROCEDURE: EEG. CLINICAL INDICATION: The patient is a 80 y.o. male with a history of possible seizures. EEG to rule out seizures, rule out stroke, rule out cortical abnormality. EEG CLASSIFICATION: Abnormal     DESCRIPTION OF THE RECORD:   The background of this recording contains a posterior rhythm of 4-5 hz.    Throughout the recording, there were no clear areas of focal slowing nor spike or spike-and-wave discharges seen. Hyperventilation was not performed. Photic stimulation produced no response. During the recording the patient did not achieve stage II sleep    INTERPRETATION:   Abnormal. Moderate diffuse cerebral dysfunction which is non specific for etiology but can be seen in toxic/metabolic states. No seizures. Clinical correlation recommended.       Asael Tilley MD  Neurologist

## 2017-06-26 NOTE — PROGRESS NOTES
OT note:  Orders received and acknowledged. Chart reviewed and cleared by nursing. Attempted to see patient for OT evaluation. Patient very confused, attempting to climb out of bed, pulling off nebulizer treatment and very restless. Attempted to re-direct patient without success. RN and PCT in room with patient.  Will continue to follow.        Total time spent: 8 minutes

## 2017-06-27 ENCOUNTER — APPOINTMENT (OUTPATIENT)
Dept: GENERAL RADIOLOGY | Age: 82
DRG: 208 | End: 2017-06-27
Attending: INTERNAL MEDICINE
Payer: MEDICARE

## 2017-06-27 LAB
AMMONIA PLAS-SCNC: 25 UMOL/L
ANION GAP BLD CALC-SCNC: 6 MMOL/L (ref 5–15)
ARTERIAL PATENCY WRIST A: YES
BASE EXCESS BLDA CALC-SCNC: 0.8 MMOL/L
BDY SITE: ABNORMAL
BREATHS.SPONTANEOUS ON VENT: 26
BUN SERPL-MCNC: 8 MG/DL (ref 6–20)
BUN/CREAT SERPL: 11 (ref 12–20)
CALCIUM SERPL-MCNC: 8.9 MG/DL (ref 8.5–10.1)
CHLORIDE SERPL-SCNC: 108 MMOL/L (ref 97–108)
CO2 SERPL-SCNC: 29 MMOL/L (ref 21–32)
CREAT SERPL-MCNC: 0.71 MG/DL (ref 0.7–1.3)
ERYTHROCYTE [DISTWIDTH] IN BLOOD BY AUTOMATED COUNT: 12.4 % (ref 11.5–14.5)
GAS FLOW.O2 O2 DELIVERY SYS: 2 L/MIN
GLUCOSE SERPL-MCNC: 119 MG/DL (ref 65–100)
HCO3 BLDA-SCNC: 26 MMOL/L (ref 22–26)
HCT VFR BLD AUTO: 33.2 % (ref 36.6–50.3)
HGB BLD-MCNC: 11.6 G/DL (ref 12.1–17)
MAGNESIUM SERPL-MCNC: 1.8 MG/DL (ref 1.6–2.4)
MCH RBC QN AUTO: 34.1 PG (ref 26–34)
MCHC RBC AUTO-ENTMCNC: 34.9 G/DL (ref 30–36.5)
MCV RBC AUTO: 97.6 FL (ref 80–99)
PCO2 BLDA: 45 MMHG (ref 35–45)
PH BLDA: 7.38 [PH] (ref 7.35–7.45)
PHOSPHATE SERPL-MCNC: 2.4 MG/DL (ref 2.6–4.7)
PLATELET # BLD AUTO: 77 K/UL (ref 150–400)
PO2 BLDA: 104 MMHG (ref 80–100)
POTASSIUM SERPL-SCNC: 3.5 MMOL/L (ref 3.5–5.1)
RBC # BLD AUTO: 3.4 M/UL (ref 4.1–5.7)
SAO2 % BLD: 98 % (ref 92–97)
SAO2% DEVICE SAO2% SENSOR NAME: ABNORMAL
SODIUM SERPL-SCNC: 143 MMOL/L (ref 136–145)
SPECIMEN SITE: ABNORMAL
TSH SERPL DL<=0.05 MIU/L-ACNC: 0.93 UIU/ML (ref 0.36–3.74)
VIT B12 SERPL-MCNC: 1064 PG/ML (ref 211–911)
WBC # BLD AUTO: 7.8 K/UL (ref 4.1–11.1)

## 2017-06-27 PROCEDURE — 82607 VITAMIN B-12: CPT | Performed by: PSYCHIATRY & NEUROLOGY

## 2017-06-27 PROCEDURE — 74011000258 HC RX REV CODE- 258: Performed by: PSYCHIATRY & NEUROLOGY

## 2017-06-27 PROCEDURE — 74011000258 HC RX REV CODE- 258: Performed by: INTERNAL MEDICINE

## 2017-06-27 PROCEDURE — 82803 BLOOD GASES ANY COMBINATION: CPT | Performed by: INTERNAL MEDICINE

## 2017-06-27 PROCEDURE — 71010 XR CHEST PORT: CPT

## 2017-06-27 PROCEDURE — 36415 COLL VENOUS BLD VENIPUNCTURE: CPT | Performed by: INTERNAL MEDICINE

## 2017-06-27 PROCEDURE — 74011250637 HC RX REV CODE- 250/637: Performed by: INTERNAL MEDICINE

## 2017-06-27 PROCEDURE — 77010033678 HC OXYGEN DAILY

## 2017-06-27 PROCEDURE — 36600 WITHDRAWAL OF ARTERIAL BLOOD: CPT | Performed by: INTERNAL MEDICINE

## 2017-06-27 PROCEDURE — 82140 ASSAY OF AMMONIA: CPT | Performed by: PSYCHIATRY & NEUROLOGY

## 2017-06-27 PROCEDURE — 85027 COMPLETE CBC AUTOMATED: CPT | Performed by: INTERNAL MEDICINE

## 2017-06-27 PROCEDURE — 80048 BASIC METABOLIC PNL TOTAL CA: CPT | Performed by: INTERNAL MEDICINE

## 2017-06-27 PROCEDURE — 65610000006 HC RM INTENSIVE CARE

## 2017-06-27 PROCEDURE — 84100 ASSAY OF PHOSPHORUS: CPT | Performed by: INTERNAL MEDICINE

## 2017-06-27 PROCEDURE — 83735 ASSAY OF MAGNESIUM: CPT | Performed by: INTERNAL MEDICINE

## 2017-06-27 PROCEDURE — 74011000250 HC RX REV CODE- 250: Performed by: INTERNAL MEDICINE

## 2017-06-27 PROCEDURE — 74011250636 HC RX REV CODE- 250/636: Performed by: PSYCHIATRY & NEUROLOGY

## 2017-06-27 PROCEDURE — 74011250636 HC RX REV CODE- 250/636: Performed by: INTERNAL MEDICINE

## 2017-06-27 PROCEDURE — 84443 ASSAY THYROID STIM HORMONE: CPT | Performed by: PSYCHIATRY & NEUROLOGY

## 2017-06-27 PROCEDURE — 94640 AIRWAY INHALATION TREATMENT: CPT

## 2017-06-27 RX ORDER — HALOPERIDOL 5 MG/ML
5 INJECTION INTRAMUSCULAR 3 TIMES DAILY
Status: DISCONTINUED | OUTPATIENT
Start: 2017-06-27 | End: 2017-07-05

## 2017-06-27 RX ADMIN — HALOPERIDOL LACTATE 5 MG: 5 INJECTION, SOLUTION INTRAMUSCULAR at 09:53

## 2017-06-27 RX ADMIN — HALOPERIDOL LACTATE 5 MG: 5 INJECTION, SOLUTION INTRAMUSCULAR at 21:01

## 2017-06-27 RX ADMIN — MUPIROCIN: 20 OINTMENT TOPICAL at 17:11

## 2017-06-27 RX ADMIN — SODIUM CHLORIDE 0.4 MCG/KG/HR: 900 INJECTION, SOLUTION INTRAVENOUS at 12:52

## 2017-06-27 RX ADMIN — IPRATROPIUM BROMIDE AND ALBUTEROL SULFATE 3 ML: .5; 3 SOLUTION RESPIRATORY (INHALATION) at 15:13

## 2017-06-27 RX ADMIN — FAMOTIDINE 20 MG: 10 INJECTION, SOLUTION INTRAVENOUS at 10:05

## 2017-06-27 RX ADMIN — SODIUM CHLORIDE 75 ML/HR: 900 INJECTION, SOLUTION INTRAVENOUS at 02:00

## 2017-06-27 RX ADMIN — FAMOTIDINE 20 MG: 10 INJECTION, SOLUTION INTRAVENOUS at 20:59

## 2017-06-27 RX ADMIN — HALOPERIDOL LACTATE 5 MG: 5 INJECTION, SOLUTION INTRAMUSCULAR at 16:03

## 2017-06-27 RX ADMIN — Medication 10 ML: at 21:00

## 2017-06-27 RX ADMIN — FENTANYL CITRATE 50 MCG: 50 INJECTION, SOLUTION INTRAMUSCULAR; INTRAVENOUS at 05:55

## 2017-06-27 RX ADMIN — CHLORHEXIDINE GLUCONATE 15 ML: 1.2 RINSE ORAL at 17:10

## 2017-06-27 RX ADMIN — MUPIROCIN: 20 OINTMENT TOPICAL at 08:59

## 2017-06-27 RX ADMIN — CHLORHEXIDINE GLUCONATE 15 ML: 1.2 RINSE ORAL at 08:54

## 2017-06-27 RX ADMIN — ASPIRIN 300 MG: 300 SUPPOSITORY RECTAL at 10:53

## 2017-06-27 RX ADMIN — METOPROLOL TARTRATE 5 MG: 5 INJECTION INTRAVENOUS at 12:15

## 2017-06-27 RX ADMIN — METOPROLOL TARTRATE 5 MG: 5 INJECTION INTRAVENOUS at 17:11

## 2017-06-27 RX ADMIN — Medication 10 ML: at 06:14

## 2017-06-27 RX ADMIN — METOPROLOL TARTRATE 5 MG: 5 INJECTION INTRAVENOUS at 00:37

## 2017-06-27 RX ADMIN — METOPROLOL TARTRATE 5 MG: 5 INJECTION INTRAVENOUS at 06:13

## 2017-06-27 RX ADMIN — SODIUM CHLORIDE 0.4 MCG/KG/HR: 900 INJECTION, SOLUTION INTRAVENOUS at 01:40

## 2017-06-27 RX ADMIN — SODIUM CHLORIDE 75 ML/HR: 900 INJECTION, SOLUTION INTRAVENOUS at 16:02

## 2017-06-27 RX ADMIN — IPRATROPIUM BROMIDE AND ALBUTEROL SULFATE 3 ML: .5; 3 SOLUTION RESPIRATORY (INHALATION) at 22:12

## 2017-06-27 RX ADMIN — THIAMINE HYDROCHLORIDE 100 MG: 100 INJECTION, SOLUTION INTRAMUSCULAR; INTRAVENOUS at 10:06

## 2017-06-27 RX ADMIN — Medication 10 ML: at 14:26

## 2017-06-27 RX ADMIN — ENOXAPARIN SODIUM 40 MG: 40 INJECTION SUBCUTANEOUS at 08:53

## 2017-06-27 RX ADMIN — IPRATROPIUM BROMIDE AND ALBUTEROL SULFATE 3 ML: .5; 3 SOLUTION RESPIRATORY (INHALATION) at 08:45

## 2017-06-27 RX ADMIN — LEVETIRACETAM 1500 MG: 100 INJECTION, SOLUTION, CONCENTRATE INTRAVENOUS at 20:58

## 2017-06-27 RX ADMIN — IPRATROPIUM BROMIDE AND ALBUTEROL SULFATE 3 ML: .5; 3 SOLUTION RESPIRATORY (INHALATION) at 12:57

## 2017-06-27 RX ADMIN — LEVETIRACETAM 1500 MG: 100 INJECTION, SOLUTION, CONCENTRATE INTRAVENOUS at 09:02

## 2017-06-27 NOTE — PROGRESS NOTES
Pt is a 79 y/o male that was admitted on 6/24/17 due to Dx of epilepticus with recurrent seizures, acute encephalopathy, acute respiratory failure requiring intubation for airway protection. Pt is a Full code. Pt is confused and disoriented x 4 with poor command following. CM called and spoke with spouse, Sarah Jones over the phone to review demographic information with Pt. CM obtained another emergency contact for the chart. Kenny Gomez, [address] [place] [address], [phone] entered onto the face sheet. Spouse indicated that he does see his PCP Dr. Galen Jung regularly. Pt lives with his spouse in a one story home with 3 steps. DME: Rolling walker, shower chair and raised toilet seat with arms on the side. Pt has used [id number] in the past.   Pt has gone to [facility] for SNF in the past in 2012. Spouse indicated that Pt was AMB with walker/cane at times at home and outside. Spouse would standby during baths for safety and would wash his back but he was I W all other ADLs. Pt was not driving. Wife would drive locally in [place] and DTR, would transport in car if needed into [facility]. Pt's Pharmacy is [facility] in [place]. CM will continue to monitor discharge plan. Care Management Interventions  PCP Verified by CM: Yes  Transition of Care Consult (CM Consult): Discharge Planning  Discharge Durable Medical Equipment:  (Pt owns a RW, shower chair and raised toilet seat with arms. )  Physical Therapy Consult: Yes  Occupational Therapy Consult: Yes  Speech Therapy Consult: Yes  Current Support Network: Lives with Spouse, Own Home, Family Lives Nearby (Pt lives with spouse in a one story home with 3 steps.   DTR lives nearby. )  Discharge Location  Discharge Placement:  (TBD)    [address]

## 2017-06-27 NOTE — PROGRESS NOTES
NEUROLOGY PROGRESS NOTE     CC: Seizures    SUBJECTIVE  No seizures in ICU  Pt still confused and was AAO X 3 at home    [address] ABDIAZ[address] Huber is a 80 y.o. male who presents to the hospital because of seizures. The family is not at bedside in the history is obtained by chart review. According to the ED notes. The patient was oriented ×3 when he went to bed last night and was able to communicate but when he woke up in the morning he was unable to speak in coherent sentences and was confused about orientation. He he was not feeling great and wanted to go to the ER. He does have long history of seizures and takes Keppra 1250 mg p.o. twice daily and Lyrica 300 mg p.o. twice daily. He ran out of his Lyrica 3 days ago. In the emergency room he did have an episode of deviation of the eyes to the right along with stiffening of the right upper extremity. The patient was intubated at this time for airway protection and is on propofol. No obvious seizures in the ICU yet.     ROS  A ten system review of constitutional, cardiovascular, respiratory, musculoskeletal, endocrine, skin, SHEENT, genitourinary, psychiatric and neurologic systems was obtained and is unremarkable except as stated in HPI     PHYSICAL EXAM  EXAMINATION:   Patient Vitals for the past 24 hrs:   Temp Pulse Resp BP SpO2   06/27/17 1100 - 71 23 160/57 97 %   06/27/17 1000 - 76 21 162/76 97 %   06/27/17 0900 - 73 22 159/68 96 %   06/27/17 0854 - - - - 98 %   06/27/17 0800 - 61 22 162/70 97 %   06/27/17 0700 - 63 18 147/63 96 %   06/27/17 0600 - 97 - 167/89 96 %   06/27/17 0500 - 83 23 150/72 98 %   06/27/17 0400 98.1 °F (36.7 °C) 73 25 163/65 99 %   06/27/17 0300 - 87 21 147/65 99 %   06/27/17 0200 - 86 19 146/78 99 %   06/27/17 0100 - 79 20 140/69 98 %   06/27/17 0000 98 °F (36.7 °C) 80 24 142/66 98 %   06/26/17 2305 - 81 19 152/63 97 %   06/26/17 2300 - 80 23 (!) 163/23 98 %   06/26/17 2200 - 91 24 156/80 97 %   06/26/17 2100 - 88 25 163/83 97 %   06/26/17 2007 - - - - 99 %   06/26/17 2000 97.9 °F (36.6 °C) 83 26 154/72 98 %   06/26/17 1904 - - - - 99 %   06/26/17 1900 - 81 21 146/78 100 %   06/26/17 1845 - 86 - 154/73 -   06/26/17 1800 - 87 20 154/73 97 %   06/26/17 1700 - 97 20 157/75 98 %   06/26/17 1600 98.7 °F (37.1 °C) 100 19 159/81 100 %   06/26/17 1555 - - - - 97 %   06/26/17 1500 - (!) 105 21 168/88 97 %   06/26/17 1400 - (!) 101 18 148/74 99 %   06/26/17 1300 - (!) 109 21 160/72 97 %   06/26/17 1200 98.8 °F (37.1 °C) (!) 119 21 126/68 96 %        General:   General appearance: Pt is in no acute distress   Distal pulses are preserved    Neurological Examination:   Mental Status: Patient is lying comfortably in the bed    Cranial Nerves: Pupils equal and reactive to light. Face symmetric. Motor: Moves all 4 extremities symmetrically and spontaneously    Sensation: Unable to assess    Coordination/Cerebellar: Unable to assess    Gait: Unable to assess    Skin: No significant bruising or lacerations. LAB DATA REVIEWED:    Results for orders placed or performed during the hospital encounter of 06/24/17   CULTURE, BLOOD   Result Value Ref Range    Special Requests: NO SPECIAL REQUESTS      Culture result: NO GROWTH 3 DAYS     CULTURE, BLOOD   Result Value Ref Range    Special Requests: NO SPECIAL REQUESTS      Culture result: NO GROWTH 3 DAYS     CBC WITH AUTOMATED DIFF   Result Value Ref Range    WBC 6.2 4.1 - 11.1 K/uL    RBC 4.28 4.10 - 5.70 M/uL    HGB 14.3 12.1 - 17.0 g/dL    HCT 40.8 36.6 - 50.3 %    MCV 95.3 80.0 - 99.0 FL    MCH 33.4 26.0 - 34.0 PG    MCHC 35.0 30.0 - 36.5 g/dL    RDW 12.4 11.5 - 14.5 %    PLATELET 882 (L) 228 - 400 K/uL    NEUTROPHILS 65 32 - 75 %    LYMPHOCYTES 23 12 - 49 %    MONOCYTES 12 5 - 13 %    EOSINOPHILS 0 0 - 7 %    BASOPHILS 0 0 - 1 %    ABS. NEUTROPHILS 4.0 1.8 - 8.0 K/UL    ABS. LYMPHOCYTES 1.4 0.8 - 3.5 K/UL    ABS. MONOCYTES 0.8 0.0 - 1.0 K/UL    ABS.  EOSINOPHILS 0.0 0.0 - 0.4 K/UL ABS. BASOPHILS 0.0 0.0 - 0.1 K/UL   METABOLIC PANEL, COMPREHENSIVE   Result Value Ref Range    Sodium 137 136 - 145 mmol/L    Potassium 3.6 3.5 - 5.1 mmol/L    Chloride 102 97 - 108 mmol/L    CO2 28 21 - 32 mmol/L    Anion gap 7 5 - 15 mmol/L    Glucose 105 (H) 65 - 100 mg/dL    BUN 13 6 - 20 MG/DL    Creatinine 0.98 0.70 - 1.30 MG/DL    BUN/Creatinine ratio 13 12 - 20      GFR est AA >60 >60 ml/min/1.73m2    GFR est non-AA >60 >60 ml/min/1.73m2    Calcium 9.6 8.5 - 10.1 MG/DL    Bilirubin, total 1.1 (H) 0.2 - 1.0 MG/DL    ALT (SGPT) 19 12 - 78 U/L    AST (SGOT) 16 15 - 37 U/L    Alk.  phosphatase 83 45 - 117 U/L    Protein, total 8.9 (H) 6.4 - 8.2 g/dL    Albumin 4.4 3.5 - 5.0 g/dL    Globulin 4.5 (H) 2.0 - 4.0 g/dL    A-G Ratio 1.0 (L) 1.1 - 2.2     TROPONIN I   Result Value Ref Range    Troponin-I, Qt. <0.04 <0.05 ng/mL   CK W/ REFLX CKMB   Result Value Ref Range     39 - 308 U/L   LEVETIRACETAM (KEPPRA)   Result Value Ref Range    Levetiracetam (Keppra) 36.6 10.0 - 40.0 ug/mL   URINALYSIS W/MICROSCOPIC   Result Value Ref Range    Color YELLOW/STRAW      Appearance CLOUDY (A) CLEAR      Specific gravity 1.019 1.003 - 1.030      pH (UA) 7.0 5.0 - 8.0      Protein NEGATIVE  NEG mg/dL    Glucose NEGATIVE  NEG mg/dL    Ketone TRACE (A) NEG mg/dL    Blood NEGATIVE  NEG      Urobilinogen 1.0 0.2 - 1.0 EU/dL    Nitrites NEGATIVE  NEG      Leukocyte Esterase NEGATIVE  NEG      WBC 0-4 0 - 4 /hpf    RBC 0-5 0 - 5 /hpf    Epithelial cells FEW FEW /lpf    Bacteria NEGATIVE  NEG /hpf    Hyaline cast 0-2 0 - 5 /lpf   BILIRUBIN, CONFIRM   Result Value Ref Range    Bilirubin UA, confirm NEGATIVE  NEG     CK W/ CKMB & INDEX   Result Value Ref Range     39 - 308 U/L    CK - MB 1.6 <3.6 NG/ML    CK-MB Index 0.7 0 - 2.5     LACTIC ACID, PLASMA   Result Value Ref Range    Lactic acid 2.2 (HH) 0.4 - 2.0 MMOL/L   BLOOD GAS, ARTERIAL   Result Value Ref Range    pH 7.47 (H) 7.35 - 7.45      PCO2 34 (L) 35.0 - 45.0 mmHg    PO2 212 (H) 80 - 100 mmHg    O2  (H) 92 - 97 %    BICARBONATE 24 22 - 26 mmol/L    BASE EXCESS 1.0 mmol/L    O2 METHOD VENTILATOR      FIO2 50 %    MODE A/C      Tidal volume 500      SET RATE 16      EPAP/CPAP/PEEP 6.0      Sample source ARTERIAL      SITE RIGHT RADIAL      TERE'S TEST YES     MISC. LAB TEST   Result Value Ref Range    Test Description: LYRICA LEVEL     Reference Lab: [id number]     Results: PENDING    METABOLIC PANEL, COMPREHENSIVE   Result Value Ref Range    Sodium 136 136 - 145 mmol/L    Potassium 4.4 3.5 - 5.1 mmol/L    Chloride 103 97 - 108 mmol/L    CO2 26 21 - 32 mmol/L    Anion gap 7 5 - 15 mmol/L    Glucose 123 (H) 65 - 100 mg/dL    BUN 17 6 - 20 MG/DL    Creatinine 1.04 0.70 - 1.30 MG/DL    BUN/Creatinine ratio 16 12 - 20      GFR est AA >60 >60 ml/min/1.73m2    GFR est non-AA >60 >60 ml/min/1.73m2    Calcium 8.2 (L) 8.5 - 10.1 MG/DL    Bilirubin, total 1.4 (H) 0.2 - 1.0 MG/DL    ALT (SGPT) 15 12 - 78 U/L    AST (SGOT) 14 (L) 15 - 37 U/L    Alk. phosphatase 59 45 - 117 U/L    Protein, total 7.1 6.4 - 8.2 g/dL    Albumin 3.4 (L) 3.5 - 5.0 g/dL    Globulin 3.7 2.0 - 4.0 g/dL    A-G Ratio 0.9 (L) 1.1 - 2.2     CBC WITH AUTOMATED DIFF   Result Value Ref Range    WBC 14.1 (H) 4.1 - 11.1 K/uL    RBC 3.59 (L) 4.10 - 5.70 M/uL    HGB 12.0 (L) 12.1 - 17.0 g/dL    HCT 34.9 (L) 36.6 - 50.3 %    MCV 97.2 80.0 - 99.0 FL    MCH 33.4 26.0 - 34.0 PG    MCHC 34.4 30.0 - 36.5 g/dL    RDW 12.8 11.5 - 14.5 %    PLATELET 96 (L) 694 - 400 K/uL    NEUTROPHILS 83 (H) 32 - 75 %    LYMPHOCYTES 9 (L) 12 - 49 %    MONOCYTES 8 5 - 13 %    EOSINOPHILS 0 0 - 7 %    BASOPHILS 0 0 - 1 %    ABS. NEUTROPHILS 11.7 (H) 1.8 - 8.0 K/UL    ABS. LYMPHOCYTES 1.3 0.8 - 3.5 K/UL    ABS. MONOCYTES 1.1 (H) 0.0 - 1.0 K/UL    ABS. EOSINOPHILS 0.0 0.0 - 0.4 K/UL    ABS.  BASOPHILS 0.0 0.0 - 0.1 K/UL   TROPONIN I   Result Value Ref Range    Troponin-I, Qt. <0.04 <0.05 ng/mL   CBC W/O DIFF   Result Value Ref Range    WBC 8.0 4.1 - 11.1 K/uL    RBC 3.43 (L) 4.10 - 5.70 M/uL    HGB 11.3 (L) 12.1 - 17.0 g/dL    HCT 33.3 (L) 36.6 - 50.3 %    MCV 97.1 80.0 - 99.0 FL    MCH 32.9 26.0 - 34.0 PG    MCHC 33.9 30.0 - 36.5 g/dL    RDW 12.7 11.5 - 14.5 %    PLATELET 77 (L) 779 - 207 K/uL   METABOLIC PANEL, BASIC   Result Value Ref Range    Sodium 138 136 - 145 mmol/L    Potassium 3.7 3.5 - 5.1 mmol/L    Chloride 105 97 - 108 mmol/L    CO2 27 21 - 32 mmol/L    Anion gap 6 5 - 15 mmol/L    Glucose 100 65 - 100 mg/dL    BUN 12 6 - 20 MG/DL    Creatinine 0.71 0.70 - 1.30 MG/DL    BUN/Creatinine ratio 17 12 - 20      GFR est AA >60 >60 ml/min/1.73m2    GFR est non-AA >60 >60 ml/min/1.73m2    Calcium 8.6 8.5 - 10.1 MG/DL   MAGNESIUM   Result Value Ref Range    Magnesium 2.2 1.6 - 2.4 mg/dL   PHOSPHORUS   Result Value Ref Range    Phosphorus 2.4 (L) 2.6 - 4.7 MG/DL   BLOOD GAS, ARTERIAL   Result Value Ref Range    pH 7.27 (L) 7.35 - 7.45      PCO2 59 (H) 35.0 - 45.0 mmHg    PO2 95 80 - 100 mmHg    O2 SAT 96 92 - 97 %    BICARBONATE 27 (H) 22 - 26 mmol/L    BASE DEFICIT 1.3 mmol/L    O2 METHOD NASAL O2      O2 FLOW RATE 4.00 L/min    SPONTANEOUS RATE 26.0      Sample source ARTERIAL      SITE LEFT BRACHIAL      TERE'S TEST N/A     CBC W/O DIFF   Result Value Ref Range    WBC 7.8 4.1 - 11.1 K/uL    RBC 3.40 (L) 4.10 - 5.70 M/uL    HGB 11.6 (L) 12.1 - 17.0 g/dL    HCT 33.2 (L) 36.6 - 50.3 %    MCV 97.6 80.0 - 99.0 FL    MCH 34.1 (H) 26.0 - 34.0 PG    MCHC 34.9 30.0 - 36.5 g/dL    RDW 12.4 11.5 - 14.5 %    PLATELET 77 (L) 975 - 496 K/uL   METABOLIC PANEL, BASIC   Result Value Ref Range    Sodium 143 136 - 145 mmol/L    Potassium 3.5 3.5 - 5.1 mmol/L    Chloride 108 97 - 108 mmol/L    CO2 29 21 - 32 mmol/L    Anion gap 6 5 - 15 mmol/L    Glucose 119 (H) 65 - 100 mg/dL    BUN 8 6 - 20 MG/DL    Creatinine 0.71 0.70 - 1.30 MG/DL    BUN/Creatinine ratio 11 (L) 12 - 20      GFR est AA >60 >60 ml/min/1.73m2    GFR est non-AA >60 >60 ml/min/1.73m2    Calcium 8.9 8.5 - 10.1 MG/DL   MAGNESIUM   Result Value Ref Range    Magnesium 1.8 1.6 - 2.4 mg/dL   PHOSPHORUS   Result Value Ref Range    Phosphorus 2.4 (L) 2.6 - 4.7 MG/DL   BLOOD GAS, ARTERIAL   Result Value Ref Range    pH 7.38 7.35 - 7.45      PCO2 45 35.0 - 45.0 mmHg    PO2 104 (H) 80 - 100 mmHg    O2 SAT 98 (H) 92 - 97 %    BICARBONATE 26 22 - 26 mmol/L    BASE EXCESS 0.8 mmol/L    O2 METHOD NASAL O2      O2 FLOW RATE 2.00 L/min    SPONTANEOUS RATE 26.0      Sample source ARTERIAL      SITE LEFT RADIAL      TERE'S TEST YES     GLUCOSE, POC   Result Value Ref Range    Glucose (POC) 110 (H) 65 - 100 mg/dL    Performed by Carlos Del Rosario    POC CREATININE   Result Value Ref Range    Creatinine (POC) 0.9 0.6 - 1.3 MG/DL    GFRAA, POC >60 >60 ml/min/1.73m2    GFRNA, POC >60 >60 ml/min/1.73m2   POC CHEM8   Result Value Ref Range    Calcium, ionized (POC) 1.14 1.12 - 1.32 MMOL/L    Sodium (POC) 140 136 - 145 MMOL/L    Potassium (POC) 4.3 3.5 - 5.1 MMOL/L    Chloride (POC) 101 98 - 107 MMOL/L    CO2 (POC) 30 21 - 32 MMOL/L    Anion gap (POC) 14 5 - 15 mmol/L    Glucose (POC) 107 (H) 65 - 100 MG/DL    BUN (POC) 15 9 - 20 MG/DL    Creatinine (POC) 0.9 0.6 - 1.3 MG/DL    GFRAA, POC >60 >60 ml/min/1.73m2    GFRNA, POC >60 >60 ml/min/1.73m2    Hemoglobin (POC) 14.3 12.1 - 17.0 GM/DL    Hematocrit (POC) 42 36.6 - 50.3 %    Comment Comment Not Indicated.      EKG, 12 LEAD, INITIAL   Result Value Ref Range    Ventricular Rate 88 BPM    Atrial Rate 88 BPM    P-R Interval 294 ms    QRS Duration 82 ms    Q-T Interval 368 ms    QTC Calculation (Bezet) 445 ms    Calculated P Axis 61 degrees    Calculated R Axis -38 degrees    Calculated T Axis 52 degrees    Diagnosis       Sinus rhythm with 1st degree AV block  Left axis deviation  Nonspecific T wave abnormality  When compared with ECG of 06-SEP-2012 03:00,  [place] interval has increased  ST no longer elevated in Anterior leads  Nonspecific T wave abnormality, worse in Anterolateral leads  Confirmed by Nisreen Schofield ([id number]) on 6/25/2017 1:15:25 PM          Imaging review:  6/24/2017  CT scan of the head without contrast  No acute intracranial abnormality    CT angiogram of the head and neck  No significant cervical carotid artery stenosis. No large vessel intracranial vascular occlusive change. HOME MEDS  Prior to Admission Medications   Prescriptions Last Dose Informant Patient Reported? Taking?   aspirin (ASPIRIN) 325 mg tablet   Yes No   Sig: Take 1 Tab by mouth daily. atorvastatin (LIPITOR) 40 mg tablet   No No   Sig: Take 1 Tab by mouth daily. cyclobenzaprine (FLEXERIL) 5 mg tablet   No No   Sig: Take 1 Tab by mouth three (3) times daily as needed for Muscle Spasm(s). levETIRAcetam (KEPPRA) 250 mg tablet   No No   Sig: Take 1 Tab by mouth two (2) times a day. Take with the 1000mg to make 1250mg twice a day. levETIRAcetam 1,000 mg tablet   No No   Sig: Take 1 Tab by mouth two (2) times a day. magnesium oxide (MAG-OX) 400 mg tablet   Yes No   Sig: Take 400 mg by mouth daily. metoprolol succinate (TOPROL-XL) 25 mg XL tablet   Yes No   Sig: Take  by mouth daily. omeprazole (PRILOSEC) 40 mg capsule   Yes No   Sig: Take 40 mg by mouth daily. pregabalin (LYRICA) 300 mg capsule   No No   Sig: Take 1 Cap by mouth two (2) times a day. Max Daily Amount: 600 mg.   thiamine (B-1) 100 mg tablet   No No   Sig: Take 1 Tab by mouth daily.       Facility-Administered Medications: None       CURRENT MEDS  Current Facility-Administered Medications   Medication Dose Route Frequency    haloperidol lactate (HALDOL) injection 5 mg  5 mg IntraVENous TID    albuterol-ipratropium (DUO-NEB) 2.5 MG-0.5 MG/3 ML  3 mL Nebulization QID RT    famotidine (PF) (PEPCID) 20 mg in sodium chloride 0.9 % 10 mL injection  20 mg IntraVENous Q12H    thiamine (B-1) 100 mg in 0.9% sodium chloride 50 mL IVPB  100 mg IntraVENous DAILY    metoprolol (LOPRESSOR) injection 5 mg  5 mg IntraVENous Q6H    dexmedetomidine (PRECEDEX) 400 mcg in 0.9% sodium chloride 100 mL infusion  0.4-1.5 mcg/kg/hr IntraVENous TITRATE    chlorhexidine (PERIDEX) 0.12 % mouthwash 15 mL  15 mL Oral BID    mupirocin (BACTROBAN) 2 % ointment   Both Nostrils BID    atorvastatin (LIPITOR) tablet 40 mg  40 mg Per NG tube DAILY    pregabalin (LYRICA) capsule 300 mg  300 mg Oral BID    sodium chloride (NS) flush 5-10 mL  5-10 mL IntraVENous Q8H    enoxaparin (LOVENOX) injection 40 mg  40 mg SubCUTAneous Q24H    aspirin (ASA) suppository 300 mg  300 mg Rectal DAILY    0.9% sodium chloride infusion  75 mL/hr IntraVENous CONTINUOUS    levETIRAcetam (KEPPRA) 1,500 mg in 0.9% sodium chloride IVPB  1,500 mg IntraVENous Q12H       IMPRESSION:  Madeline Dietrich is a 80 y.o. male who presents with altered mental status since this morning and in the emergency room had stiffening on right side of the body along with deviation of the eye to the right. Left hemispheric seizure suspected. The patient was taking Keppra 1250 mg p.o. twice daily and Lyrica 300 mg p.o. twice daily. The patient ran out of Lyrica 3 days ago. RECOMMENDATIONS:  1. No improvement in mental status. Will get blood work. If no improvement in mental status, will repeat EEG tomorrow. 2.  Continue Keppra 1500 mg p.o. twice daily  3. Continue Lyrica 300 mg p.o. twice daily      Patient is critically ill with status epilepticus. Significant risk of morbidity and mortality. 30 minutes of critical care time provided.       Aminata Whittaker MD  Neurologist

## 2017-06-27 NOTE — PROGRESS NOTES
PT note:    Chart reviewed and spoke with nursing. Per OT, patient not able to follow one step commands or participate in therapy at this time. Will continue to follow.     Ange Laureano, SPT

## 2017-06-27 NOTE — PROGRESS NOTES
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 Episodes of confusion/agitation. Now sedated on Precedex. Review of Systems   Unable to perform ROS: Mental status change     Objective:   Vital Signs:    Visit Vitals    /63    Pulse 63    Temp 98.1 °F (36.7 °C)    Resp 18    Ht 5' 8\" (1.727 m)    Wt 72.4 kg (159 lb 9.8 oz)    SpO2 96%    BMI 24.27 kg/m2       O2 Device: Room air   O2 Flow Rate (L/min): 2 l/min   Temp (24hrs), Av.3 °F (36.8 °C), Min:97.9 °F (36.6 °C), Max:98.8 °F (37.1 °C)       Intake/Output:   Last shift:         Last 3 shifts: 1901 -  0700  In: 3186.9 [I.V.:3186.9]  Out: 1665 [Urine:2715]    Intake/Output Summary (Last 24 hours) at 17 0839  Last data filed at 17 0700   Gross per 24 hour   Intake           1963.4 ml   Output             1940 ml   Net             23.4 ml      Physical Exam   Constitutional: No distress. HENT:   Head: Normocephalic and atraumatic. Mouth/Throat: No oropharyngeal exudate. Eyes: No scleral icterus. Cardiovascular: Normal rate and regular rhythm.     Pulmonary/Chest: No respiratory distress. He has no wheezes. He has no rales. Abdominal: Soft. Bowel sounds are normal. He exhibits no distension. There is no tenderness. Musculoskeletal: He exhibits no edema. Neurological: He is alert. Skin: Skin is warm and dry.      Data:     Current Facility-Administered Medications   Medication Dose Route Frequency    albuterol-ipratropium (DUO-NEB) 2.5 MG-0.5 MG/3 ML  3 mL Nebulization QID RT    famotidine (PF) (PEPCID) 20 mg in sodium chloride 0.9 % 10 mL injection  20 mg IntraVENous Q12H    thiamine (B-1) 100 mg in 0.9% sodium chloride 50 mL IVPB  100 mg IntraVENous DAILY    metoprolol (LOPRESSOR) injection 5 mg  5 mg IntraVENous Q6H    dexmedetomidine (PRECEDEX) 400 mcg in 0.9% sodium chloride 100 mL infusion  0.4-1.5 mcg/kg/hr IntraVENous TITRATE    chlorhexidine (PERIDEX) 0.12 % mouthwash 15 mL  15 mL Oral BID    mupirocin (BACTROBAN) 2 % ointment   Both Nostrils BID    atorvastatin (LIPITOR) tablet 40 mg  40 mg Per NG tube DAILY    pregabalin (LYRICA) capsule 300 mg  300 mg Oral BID    sodium chloride (NS) flush 5-10 mL  5-10 mL IntraVENous Q8H    enoxaparin (LOVENOX) injection 40 mg  40 mg SubCUTAneous Q24H    aspirin (ASA) suppository 300 mg  300 mg Rectal DAILY    0.9% sodium chloride infusion  75 mL/hr IntraVENous CONTINUOUS    levETIRAcetam (KEPPRA) 1,500 mg in 0.9% sodium chloride IVPB  1,500 mg IntraVENous Q12H                Labs:  Recent Labs      06/27/17   0333  06/26/17   0358  06/25/17   0415   WBC  7.8  8.0  14.1*   HGB  11.6*  11.3*  12.0*   HCT  33.2*  33.3*  34.9*   PLT  77*  77*  96*     Recent Labs      06/27/17   0333  06/26/17   0358  06/25/17   0415  06/24/17   1230   NA  143  138  136  137   K  3.5  3.7  4.4  3.6   CL  108  105  103  102   CO2  29  27  26  28   GLU  119*  100  123*  105*   BUN  8  12  17  13   CREA  0.71  0.71  1.04  0.98   CA  8.9  8.6  8.2*  9.6   MG  1.8  2.2   --    --    PHOS  2.4*  2.4*   --    --    ALB   --    --   3.4* 4. 4   TBILI   --    --   1.4*  1.1*   SGOT   --    --   14*  16   ALT   --    --   15  19     Recent Labs      06/27/17   0513  06/26/17   0647  06/24/17   1639   PH  7.38  7.27*  7.47*   PCO2  45  59*  34*   PO2  104*  95  212*   HCO3  26  27*  24   FIO2   --    --   50     Imaging:  I have personally reviewed the patients radiographs and have reviewed the reports:  Extubated, no change        Total critical care time exclusive of procedures:   minutes  Khoi Elder MD

## 2017-06-27 NOTE — PROGRESS NOTES
Speech pathology note  Reviewed chart and discussed case with RN who cleared SLP to see patient. Attempted to see patient for swallowing evaluation, however patient not appropriate at this time. Patient confused and disoriented x4. Also with poor command following. Required verbal, visual, and tactile cues to follow commands for oral motor exam. Patient with brown secretions in oral cavity. Attempted to remove with suction and green swab, however patient with bite reflex and would not follow commands to allow oral care. Patient perseverated on \"I need to go home. \" Not appropriate for PO trials at this time secondary to significant confusion. SLP to follow up tomorrow for swallowing evaluation. Thank you.     Jovana Carranza, CCC-SLP

## 2017-06-27 NOTE — PROGRESS NOTES
OT was cleared to see by nursing and OT arrived in room, with pt supine, wrist restraints on and sitter in room. Pt was consistently saying \" I need to go home. I need to go home\"  Pt was not able to follow one step commands and did not respond to therapy asking his name, or to squeeze therapists hand. Pt was asked if he wanted to sit on EOB and he stopped talking for a few seconds and then started to repeat that he needed to go home. He stated that he had to pee, and OT assisted with urinal and assisted sitter with changing pad on bed. Pt is not appropriate for therapy at this time and will continue to follow.

## 2017-06-27 NOTE — PROGRESS NOTES
PT and OT in to see patient. 1206 Patient increasingly restless and kicking legs out of bed. Precedex increased to . 4mcg/kg/min. 1220 Patient less restless at this time. Moving about in bed, but not trying to get out of bed. Patient oriented to self only. 1500 Patient restless off and on. Sitter at bedside. 1800 Family in to visit with patient. Patient restless. Sitter at bedside. 1900 No changes in patient condition. Report to Estephania Castro RN.

## 2017-06-27 NOTE — PROGRESS NOTES
2000 vss. Afebrile. Pt agitated and restless. Attempting to get up out of bed despite bilat wrists restrained and pt's sitter constantly reorienting and reminding patient to stay in bed. Pt does say yes when asked if he is in pain. 50 mcgs of fentanyl given iv. Will monitor. 2130 pt continues to be restless and agitated, attempting to get out of bed and yelling at times. 5mg haldol given im.     2200 agitation and restlessness continues. 50 mcgs of fentanyl given iv.     0000 dr Honora Najjar paged for pulmonary associates and updated on continued restlessness and agitation. Order received for precedex iv. Will start when received from pharmacy. 0100 pt is calm and restful at this time. Will delay start of precedex unless pt becomes agitated again. 0140 pt restless and attempting to get out of bed. precedex started at 0.4 mcg/kg/hr. Will ttr as needed. 0330 labs drawn and sent. Assessment remains unchanged. Pt less agitated on lowest dose of precedex gtt. Will monitor. 0500 pt bathed, linens changed. Pt reports \"yes\" when asked if he is in pain. 50 mcgs fentanyl given iv.

## 2017-06-28 LAB
ANION GAP BLD CALC-SCNC: 7 MMOL/L (ref 5–15)
BUN SERPL-MCNC: 7 MG/DL (ref 6–20)
BUN/CREAT SERPL: 11 (ref 12–20)
CALCIUM SERPL-MCNC: 8.6 MG/DL (ref 8.5–10.1)
CHLORIDE SERPL-SCNC: 105 MMOL/L (ref 97–108)
CO2 SERPL-SCNC: 26 MMOL/L (ref 21–32)
CREAT SERPL-MCNC: 0.61 MG/DL (ref 0.7–1.3)
ERYTHROCYTE [DISTWIDTH] IN BLOOD BY AUTOMATED COUNT: 12.1 % (ref 11.5–14.5)
GLUCOSE SERPL-MCNC: 111 MG/DL (ref 65–100)
HCT VFR BLD AUTO: 33.9 % (ref 36.6–50.3)
HGB BLD-MCNC: 12 G/DL (ref 12.1–17)
Lab: NORMAL
MAGNESIUM SERPL-MCNC: 1.5 MG/DL (ref 1.6–2.4)
MCH RBC QN AUTO: 33 PG (ref 26–34)
MCHC RBC AUTO-ENTMCNC: 35.4 G/DL (ref 30–36.5)
MCV RBC AUTO: 93.1 FL (ref 80–99)
PHOSPHATE SERPL-MCNC: 2.2 MG/DL (ref 2.6–4.7)
PLATELET # BLD AUTO: 87 K/UL (ref 150–400)
POTASSIUM SERPL-SCNC: 3.2 MMOL/L (ref 3.5–5.1)
RBC # BLD AUTO: 3.64 M/UL (ref 4.1–5.7)
REFERENCE LAB,REFLB: NORMAL
SODIUM SERPL-SCNC: 138 MMOL/L (ref 136–145)
TEST DESCRIPTION:,ATST: NORMAL
WBC # BLD AUTO: 6.2 K/UL (ref 4.1–11.1)

## 2017-06-28 PROCEDURE — 85027 COMPLETE CBC AUTOMATED: CPT | Performed by: INTERNAL MEDICINE

## 2017-06-28 PROCEDURE — 95816 EEG AWAKE AND DROWSY: CPT | Performed by: PSYCHIATRY & NEUROLOGY

## 2017-06-28 PROCEDURE — 94640 AIRWAY INHALATION TREATMENT: CPT

## 2017-06-28 PROCEDURE — 74011250637 HC RX REV CODE- 250/637: Performed by: INTERNAL MEDICINE

## 2017-06-28 PROCEDURE — 74011250636 HC RX REV CODE- 250/636: Performed by: INTERNAL MEDICINE

## 2017-06-28 PROCEDURE — 74011000250 HC RX REV CODE- 250: Performed by: INTERNAL MEDICINE

## 2017-06-28 PROCEDURE — 36415 COLL VENOUS BLD VENIPUNCTURE: CPT | Performed by: INTERNAL MEDICINE

## 2017-06-28 PROCEDURE — 74011000258 HC RX REV CODE- 258: Performed by: INTERNAL MEDICINE

## 2017-06-28 PROCEDURE — 84100 ASSAY OF PHOSPHORUS: CPT | Performed by: INTERNAL MEDICINE

## 2017-06-28 PROCEDURE — 80048 BASIC METABOLIC PNL TOTAL CA: CPT | Performed by: INTERNAL MEDICINE

## 2017-06-28 PROCEDURE — 83735 ASSAY OF MAGNESIUM: CPT | Performed by: INTERNAL MEDICINE

## 2017-06-28 PROCEDURE — 74011000258 HC RX REV CODE- 258: Performed by: PSYCHIATRY & NEUROLOGY

## 2017-06-28 PROCEDURE — 65610000006 HC RM INTENSIVE CARE

## 2017-06-28 PROCEDURE — 74011250636 HC RX REV CODE- 250/636: Performed by: PSYCHIATRY & NEUROLOGY

## 2017-06-28 RX ORDER — SODIUM CHLORIDE 0.9 % (FLUSH) 0.9 %
10 SYRINGE (ML) INJECTION AS NEEDED
Status: DISCONTINUED | OUTPATIENT
Start: 2017-06-28 | End: 2017-07-22 | Stop reason: HOSPADM

## 2017-06-28 RX ORDER — SODIUM CHLORIDE 0.9 % (FLUSH) 0.9 %
10 SYRINGE (ML) INJECTION EVERY 24 HOURS
Status: DISCONTINUED | OUTPATIENT
Start: 2017-06-28 | End: 2017-07-22 | Stop reason: HOSPADM

## 2017-06-28 RX ORDER — MAGNESIUM SULFATE 1 G/100ML
1 INJECTION INTRAVENOUS ONCE
Status: COMPLETED | OUTPATIENT
Start: 2017-06-28 | End: 2017-06-30

## 2017-06-28 RX ORDER — POTASSIUM CHLORIDE 29.8 MG/ML
20 INJECTION INTRAVENOUS
Status: COMPLETED | OUTPATIENT
Start: 2017-06-28 | End: 2017-06-28

## 2017-06-28 RX ORDER — SODIUM CHLORIDE 0.9 % (FLUSH) 0.9 %
10-40 SYRINGE (ML) INJECTION EVERY 8 HOURS
Status: DISCONTINUED | OUTPATIENT
Start: 2017-06-28 | End: 2017-07-22 | Stop reason: HOSPADM

## 2017-06-28 RX ORDER — SODIUM CHLORIDE 0.9 % (FLUSH) 0.9 %
10-30 SYRINGE (ML) INJECTION AS NEEDED
Status: DISCONTINUED | OUTPATIENT
Start: 2017-06-28 | End: 2017-07-22 | Stop reason: HOSPADM

## 2017-06-28 RX ORDER — MAGNESIUM SULFATE HEPTAHYDRATE 40 MG/ML
2 INJECTION, SOLUTION INTRAVENOUS ONCE
Status: COMPLETED | OUTPATIENT
Start: 2017-06-28 | End: 2017-06-28

## 2017-06-28 RX ORDER — LATANOPROST 50 UG/ML
1 SOLUTION/ DROPS OPHTHALMIC EVERY EVENING
Status: DISCONTINUED | OUTPATIENT
Start: 2017-06-28 | End: 2017-07-22 | Stop reason: HOSPADM

## 2017-06-28 RX ORDER — SODIUM CHLORIDE 0.9 % (FLUSH) 0.9 %
20 SYRINGE (ML) INJECTION EVERY 24 HOURS
Status: DISCONTINUED | OUTPATIENT
Start: 2017-06-28 | End: 2017-07-22 | Stop reason: HOSPADM

## 2017-06-28 RX ADMIN — LEVETIRACETAM 1500 MG: 100 INJECTION, SOLUTION, CONCENTRATE INTRAVENOUS at 21:19

## 2017-06-28 RX ADMIN — Medication 10 ML: at 13:35

## 2017-06-28 RX ADMIN — MUPIROCIN: 20 OINTMENT TOPICAL at 10:23

## 2017-06-28 RX ADMIN — POTASSIUM CHLORIDE 20 MEQ: 400 INJECTION, SOLUTION INTRAVENOUS at 09:50

## 2017-06-28 RX ADMIN — IPRATROPIUM BROMIDE AND ALBUTEROL SULFATE 3 ML: .5; 3 SOLUTION RESPIRATORY (INHALATION) at 15:42

## 2017-06-28 RX ADMIN — LEVETIRACETAM 1500 MG: 100 INJECTION, SOLUTION, CONCENTRATE INTRAVENOUS at 09:47

## 2017-06-28 RX ADMIN — HALOPERIDOL LACTATE 5 MG: 5 INJECTION, SOLUTION INTRAMUSCULAR at 22:54

## 2017-06-28 RX ADMIN — IPRATROPIUM BROMIDE AND ALBUTEROL SULFATE 3 ML: .5; 3 SOLUTION RESPIRATORY (INHALATION) at 08:05

## 2017-06-28 RX ADMIN — SODIUM CHLORIDE 0.5 MCG/KG/HR: 900 INJECTION, SOLUTION INTRAVENOUS at 03:50

## 2017-06-28 RX ADMIN — POTASSIUM CHLORIDE 20 MEQ: 400 INJECTION, SOLUTION INTRAVENOUS at 08:39

## 2017-06-28 RX ADMIN — IPRATROPIUM BROMIDE AND ALBUTEROL SULFATE 3 ML: .5; 3 SOLUTION RESPIRATORY (INHALATION) at 19:18

## 2017-06-28 RX ADMIN — HALOPERIDOL LACTATE 5 MG: 5 INJECTION, SOLUTION INTRAMUSCULAR at 09:43

## 2017-06-28 RX ADMIN — MAGNESIUM SULFATE HEPTAHYDRATE 1 G: 1 INJECTION, SOLUTION INTRAVENOUS at 06:57

## 2017-06-28 RX ADMIN — SODIUM CHLORIDE 0.4 MCG/KG/HR: 900 INJECTION, SOLUTION INTRAVENOUS at 00:14

## 2017-06-28 RX ADMIN — IPRATROPIUM BROMIDE AND ALBUTEROL SULFATE 3 ML: .5; 3 SOLUTION RESPIRATORY (INHALATION) at 11:09

## 2017-06-28 RX ADMIN — METOPROLOL TARTRATE 5 MG: 5 INJECTION INTRAVENOUS at 00:14

## 2017-06-28 RX ADMIN — Medication 20 ML: at 13:27

## 2017-06-28 RX ADMIN — Medication 10 ML: at 05:29

## 2017-06-28 RX ADMIN — Medication 10 ML: at 08:41

## 2017-06-28 RX ADMIN — CHLORHEXIDINE GLUCONATE 15 ML: 1.2 RINSE ORAL at 09:48

## 2017-06-28 RX ADMIN — CHLORHEXIDINE GLUCONATE 15 ML: 1.2 RINSE ORAL at 18:16

## 2017-06-28 RX ADMIN — SODIUM CHLORIDE 0.5 MCG/KG/HR: 900 INJECTION, SOLUTION INTRAVENOUS at 13:34

## 2017-06-28 RX ADMIN — POTASSIUM CHLORIDE 20 MEQ: 400 INJECTION, SOLUTION INTRAVENOUS at 07:58

## 2017-06-28 RX ADMIN — METOPROLOL TARTRATE 5 MG: 5 INJECTION INTRAVENOUS at 18:09

## 2017-06-28 RX ADMIN — MUPIROCIN: 20 OINTMENT TOPICAL at 18:15

## 2017-06-28 RX ADMIN — HALOPERIDOL LACTATE 5 MG: 5 INJECTION, SOLUTION INTRAMUSCULAR at 18:08

## 2017-06-28 RX ADMIN — SODIUM CHLORIDE 75 ML/HR: 900 INJECTION, SOLUTION INTRAVENOUS at 05:09

## 2017-06-28 RX ADMIN — Medication 10 ML: at 21:00

## 2017-06-28 RX ADMIN — FAMOTIDINE 20 MG: 10 INJECTION, SOLUTION INTRAVENOUS at 21:00

## 2017-06-28 RX ADMIN — METOPROLOL TARTRATE 5 MG: 5 INJECTION INTRAVENOUS at 12:28

## 2017-06-28 RX ADMIN — FAMOTIDINE 20 MG: 10 INJECTION, SOLUTION INTRAVENOUS at 08:41

## 2017-06-28 RX ADMIN — SODIUM CHLORIDE 75 ML/HR: 900 INJECTION, SOLUTION INTRAVENOUS at 21:17

## 2017-06-28 RX ADMIN — THIAMINE HYDROCHLORIDE 100 MG: 100 INJECTION, SOLUTION INTRAMUSCULAR; INTRAVENOUS at 11:40

## 2017-06-28 RX ADMIN — POTASSIUM PHOSPHATE, MONOBASIC AND POTASSIUM PHOSPHATE, DIBASIC: 224; 236 INJECTION, SOLUTION INTRAVENOUS at 21:21

## 2017-06-28 RX ADMIN — METOPROLOL TARTRATE 5 MG: 5 INJECTION INTRAVENOUS at 05:30

## 2017-06-28 RX ADMIN — ENOXAPARIN SODIUM 40 MG: 40 INJECTION SUBCUTANEOUS at 09:45

## 2017-06-28 RX ADMIN — MAGNESIUM SULFATE HEPTAHYDRATE 2 G: 40 INJECTION, SOLUTION INTRAVENOUS at 07:50

## 2017-06-28 RX ADMIN — ASPIRIN 300 MG: 300 SUPPOSITORY RECTAL at 09:33

## 2017-06-28 RX ADMIN — SODIUM CHLORIDE 75 ML/HR: 900 INJECTION, SOLUTION INTRAVENOUS at 03:52

## 2017-06-28 RX ADMIN — SODIUM CHLORIDE 0.5 MCG/KG/HR: 900 INJECTION, SOLUTION INTRAVENOUS at 11:13

## 2017-06-28 NOTE — PROGRESS NOTES
Hospitalist Progress Note    NAME: Malini Gallardo   :  1934   MRN:  [id number]       Assessment / Plan: Witnessed left hemispheric Seizure in ER with Acute respiratory failure with hypoxia, requiring intubation [id number], self  extubated  am. No obvious infection. CXR no infiltrate. Blood culture negative so far. no urine culture indicated by UA. Metabolic encephalopathy secondary to presumed prolonged seizure overnight at home (status epilepticus) after stopping lyrica and keppra X3 days prior. No seizure activity currently. apprciate neurology, keppra iv and lyrica orally. ct head no abnormality, ct angiogram of head and neck, no overt pathology. vitamin b1, electrolyte replacement, haldol as needed. Ammonia normal. siter and restraints as needed. Tube feedings? Restarted presedex.     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 aspirin, lipitor      Peripheral neuropathy, vitamin b12 and tsh normal    Copd , appreciate pulmonary, duonebs. Remote etoh use per family      Code status: full, wife is nok  Prophylaxis: lovenox, continues on pepcid iv from intubation  Recommended Disposition: snf     Subjective:     Chief Complaint / Reason for Physician Visit   Discussed with RN events overnight. Review of Systems:  Symptom Y/N Comments  Symptom Y/N Comments   Fever/Chills    Chest Pain     Poor Appetite    Edema     Cough    Abdominal Pain     Sputum    Joint Pain     SOB/AL    Pruritis/Rash     Nausea/vomit    Tolerating PT/OT     Diarrhea    Tolerating Diet     Constipation    Other       Could NOT obtain due to: Confusion, no change     Objective:     VITALS:   Last 24hrs VS reviewed since prior progress note.  Most recent are:  Patient Vitals for the past 24 hrs:   Temp Pulse Resp BP SpO2   178 - - - - 99 %   17 1900 - 76 23 171/62 98 %   17 1800 - 85 19 160/88 99 %   17 1700 - 70 16 164/79 99 %   17 1615 98.2 °F (36.8 °C) 84 25 - 98 % 06/28/17 1600 - 82 26 150/84 99 %   06/28/17 1542 - - - - 99 %   06/28/17 1500 - 72 18 149/67 -   06/28/17 1400 - 62 17 148/68 96 %   06/28/17 1306 - 74 21 158/85 100 %   06/28/17 1300 - 74 19 - 100 %   06/28/17 1212 98.5 °F (36.9 °C) 92 23 171/70 99 %   06/28/17 1109 - - - - 98 %   06/28/17 1100 - 96 20 - -   06/28/17 1000 - 84 20 156/82 -   06/28/17 0900 - 89 20 (!) 166/94 -   06/28/17 0805 - 71 19 - 98 %   06/28/17 0800 - 75 19 168/88 99 %   06/28/17 0759 98.1 °F (36.7 °C) 74 19 - 99 %   06/28/17 0700 - 87 18 - 99 %   06/28/17 0600 - 84 23 (!) 155/92 97 %   06/28/17 0535 - 84 19 150/86 98 %   06/28/17 0530 - 82 21 154/86 98 %   06/28/17 0436 98.3 °F (36.8 °C) 80 22 166/77 97 %   06/28/17 0400 - 82 15 - 99 %   06/28/17 0300 - 85 27 163/87 98 %   06/28/17 0200 - 74 21 159/83 98 %   06/28/17 0100 - 74 17 151/75 98 %   06/28/17 0013 98 °F (36.7 °C) 80 20 157/82 98 %   06/28/17 0000 - 86 29 - 97 %   06/27/17 2300 - 83 15 152/76 98 %   06/27/17 2212 - - - - 100 %   06/27/17 2200 - 71 24 150/71 98 %   06/27/17 2145 98.3 °F (36.8 °C) 74 26 - 98 %   06/27/17 2100 - 68 19 140/67 97 %   06/27/17 2037 - 75 22 160/78 94 %   06/27/17 2000 - 73 23 - 98 %       Intake/Output Summary (Last 24 hours) at 06/28/17 1920  Last data filed at 06/28/17 1800   Gross per 24 hour   Intake          2154.18 ml   Output                0 ml   Net          2154.18 ml        PHYSICAL EXAM:  General: Trying to get out of bed no acute distress, sitter present.    EENT:  No stridor, trachea midline. Anicteric sclerae. MMM  Resp:  Coarse bilaterally, no wheezing or rales. No accessory muscle use  CV:  Regular  rhythm,  No edema  GI:  Soft, Non distended, Non tender.  +Bowel sounds  Neurologic:  Moves extremities spontaneously, does not follow commands, agitated . Psych:    agitated  Skin:  No rashes.   No jaundice    Reviewed most current lab test results and cultures  YES  Reviewed most current radiology test results   YES  Review and summation of old records today    NO  Reviewed patient's current orders and MAR    YES  PMH/SH reviewed - no change compared to H&P  ________________________________________________________________________  Care Plan discussed with:    Comments   Patient x    Family      RN x    Care Manager     Consultant                        Multidiciplinary team rounds were held today with , nursing, pharmacist and clinical coordinator. Patient's plan of care was discussed; medications were reviewed and discharge planning was addressed. ________________________________________________________________________  Total NON critical care TIME:   27   Minutes    Total CRITICAL CARE TIME Spent:   Minutes non procedure based      Comments   >50% of visit spent in counseling and coordination of care     ________________________________________________________________________  Chinedu Vivian, DO     Procedures: see electronic medical records for all procedures/Xrays and details which were not copied into this note but were reviewed prior to creation of Plan. LABS:  I reviewed today's most current labs and imaging studies.   Pertinent labs include:  Recent Labs      06/28/17   0356  06/27/17   0333  06/26/17   0358   WBC  6.2  7.8  8.0   HGB  12.0*  11.6*  11.3*   HCT  33.9*  33.2*  33.3*   PLT  87*  77*  77*     Recent Labs      06/28/17   0356  06/27/17   0333  06/26/17   0358   NA  138  143  138   K  3.2*  3.5  3.7   CL  105  108  105   CO2  26  29  27   GLU  111*  119*  100   BUN  7  8  12   CREA  0.61*  0.71  0.71   CA  8.6  8.9  8.6   MG  1.5*  1.8  2.2   PHOS  2.2*  2.4*  2.4*       Signed: Chinedu Park, DO

## 2017-06-28 NOTE — PROGRESS NOTES
1930- Bedside and verbal report received from [facility]. Pt resting in bed, no signs of distress. VSS. Family and sitter at bedside. 2145- Complete assessment done as documented. 2345- Call placed to Dr Sanam Silverio re: [facility] results. 0025- 2nd call to hospitalist.   Nadeem De Jesus- Dr Sanam Silverio returned call. Updated with results. No new orders. 1817- Pt noted to be in/out of A-flutter and NSR.   [id number]- Dr Sanam Silverio called with lab results and cardiac rhythm. Orders received for electrolyte replacements. 9956- Bedside and verbal report given to [facility].

## 2017-06-28 NOTE — PROGRESS NOTES
OT note:  Chart reviewed and spoke with nursing. Patient not able to follow commands and is not able to participate in therapy at this time.   Will continue to follow.

## 2017-06-28 NOTE — PROGRESS NOTES
Hospitalist Progress Note    NAME: Madeline Dietrich   :  1934   MRN:  [id number]       Assessment / Plan:    Pt seen this pm. Confusion persists, non focal.  No need for re-intubation.  neg for pneumonia/infiltrate/pneumothorax, ammonia level and vit b12 in range. Recheck electrolyts in am.    Witnessed left hemispheric Seizure in ER with Acute respiratory failure with hypoxia, requiring intubation [id number], self  extubated  am and improved. No obvious infection. Blood culture taken, no urine culture indicated by UA. Metabolic encephalopathy secondary to presumed prolonged seizure overnight at home (status epilepticus) after stopping lyrica and keppra X3 days prior. No seizure activity currently. apprciate neurology, keppra iv and lyrica orally. ct head no abnormality, ct angiogram of head and neck, no overt pathology. vitamin b1, electrolyte replacement, haldol as needed. Ammonia normal.  cxr no infiltrate, but scarring noted. siter and restraints as needed.    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 aspirin, lipitor      Peripheral neuropathy, vitamin b12 and tsh normal    Copd , appreciate pulmonary, duonebs. Remote etoh use per family      Code status: full, wife is nok  Prophylaxis: lovenox, continues on pepcid iv from intubation  Recommended Disposition: snf     Subjective:     Chief Complaint / Reason for Physician Visit   Discussed with RN events overnight. Review of Systems:  Symptom Y/N Comments  Symptom Y/N Comments   Fever/Chills    Chest Pain     Poor Appetite    Edema     Cough    Abdominal Pain     Sputum    Joint Pain     SOB/AL    Pruritis/Rash     Nausea/vomit    Tolerating PT/OT     Diarrhea    Tolerating Diet     Constipation    Other       Could NOT obtain due to: confusion     Objective:     VITALS:   Last 24hrs VS reviewed since prior progress note.  Most recent are:  Patient Vitals for the past 24 hrs:   Temp Pulse Resp BP SpO2   17 1900 - 71 21 152/72 99 %   06/27/17 1800 - 71 19 151/75 98 %   06/27/17 1700 - 84 22 156/63 98 %   06/27/17 1600 98.3 °F (36.8 °C) 83 25 159/72 97 %   06/27/17 1513 - - - - 97 %   06/27/17 1502 - 71 20 162/74 98 %   06/27/17 1500 - 75 18 170/70 97 %   06/27/17 1400 - 88 29 (!) 151/127 97 %   06/27/17 1300 - 73 16 150/69 97 %   06/27/17 1257 - - - - 99 %   06/27/17 1215 - 74 - 150/72 -   06/27/17 1200 98.4 °F (36.9 °C) 70 23 150/72 98 %   06/27/17 1100 - 71 23 160/57 97 %   06/27/17 1000 - 76 21 162/76 97 %   06/27/17 0900 - 73 22 159/68 96 %   06/27/17 0854 - - - - 98 %   06/27/17 0800 - 61 22 162/70 97 %   06/27/17 0700 - 63 18 147/63 96 %   06/27/17 0600 - 97 - 167/89 96 %   06/27/17 0500 - 83 23 150/72 98 %   06/27/17 0400 98.1 °F (36.7 °C) 73 25 163/65 99 %   06/27/17 0300 - 87 21 147/65 99 %   06/27/17 0200 - 86 19 146/78 99 %   06/27/17 0100 - 79 20 140/69 98 %   06/27/17 0000 98 °F (36.7 °C) 80 24 142/66 98 %   06/26/17 2305 - 81 19 152/63 97 %   06/26/17 2300 - 80 23 (!) 163/23 98 %   06/26/17 2200 - 91 24 156/80 97 %   06/26/17 2100 - 88 25 163/83 97 %   06/26/17 2007 - - - - 99 %       Intake/Output Summary (Last 24 hours) at 06/27/17 2001  Last data filed at 06/27/17 1700   Gross per 24 hour   Intake           1931.8 ml   Output                0 ml   Net           1931.8 ml        PHYSICAL EXAM:  General: Trying to get out of bed no acute distress    EENT:  No stridor, trachea midline. Anicteric sclerae. MMM  Resp:  Coarse bilaterally, no wheezing or rales. No accessory muscle use  CV:  Regular  rhythm,  No edema  GI:  Soft, Non distended, Non tender.  +Bowel sounds  Neurologic:  Moves extremities spontaneously, does not follow commands, agitated . Psych:    agitated  Skin:  No rashes.   No jaundice    Reviewed most current lab test results and cultures  YES  Reviewed most current radiology test results   YES  Review and summation of old records today    NO  Reviewed patient's current orders and MAR    YES  PMH/SH reviewed - no change compared to H&P  ________________________________________________________________________  Care Plan discussed with:    Comments   Patient x    Family      RN x    Care Manager     Consultant                        Multidiciplinary team rounds were held today with , nursing, pharmacist and clinical coordinator. Patient's plan of care was discussed; medications were reviewed and discharge planning was addressed. ________________________________________________________________________  Total NON critical care TIME:   35   Minutes    Total CRITICAL CARE TIME Spent:   Minutes non procedure based      Comments   >50% of visit spent in counseling and coordination of care     ________________________________________________________________________  Yulisa Brennan DO     Procedures: see electronic medical records for all procedures/Xrays and details which were not copied into this note but were reviewed prior to creation of Plan. LABS:  I reviewed today's most current labs and imaging studies.   Pertinent labs include:  Recent Labs      06/27/17 0333 06/26/17 0358  06/25/17   0415   WBC  7.8  8.0  14.1*   HGB  11.6*  11.3*  12.0*   HCT  33.2*  33.3*  34.9*   PLT  77*  77*  96*     Recent Labs      06/27/17 0333  06/26/17 0358  06/25/17   0415   NA  143  138  136   K  3.5  3.7  4.4   CL  108  105  103   CO2  29  27  26   GLU  119*  100  123*   BUN  8  12  17   CREA  0.71  0.71  1.04   CA  8.9  8.6  8.2*   MG  1.8  2.2   --    PHOS  2.4*  2.4*   --    ALB   --    --   3.4*   TBILI   --    --   1.4*   SGOT   --    --   14*   ALT   --    --   15       Signed: Yulisa Brennan DO

## 2017-06-28 NOTE — PROGRESS NOTES
PT note:    Chart reviewed and spoke with nursing. Patient not able to follow commands and is not able to participate in therapy at this time. Will continue to follow.     Dima Betancourt, SPT

## 2017-06-28 NOTE — PROGRESS NOTES
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 No meaningful history from patient. Tracks movement and he moans. Review of Systems   Unable to perform ROS: Mental status change     Objective:   Vital Signs:    Visit Vitals    BP (!) 155/92    Pulse 71    Temp 98.1 °F (36.7 °C)    Resp 19    Ht 5' 8\" (1.727 m)    Wt 72.4 kg (159 lb 9.8 oz)    SpO2 98%    BMI 24.27 kg/m2       O2 Device: Room air   O2 Flow Rate (L/min): 2 l/min   Temp (24hrs), Av.2 °F (36.8 °C), Min:98 °F (36.7 °C), Max:98.4 °F (36.9 °C)       Intake/Output:   Last shift:      701 - 1900  In: 485.9 [I.V.:485.9]  Out: -   Last 3 shifts: 1901 -  07  In: 3211.8 [I.V.:3211.8]  Out: -     Intake/Output Summary (Last 24 hours) at 17 0845  Last data filed at 17 0800   Gross per 24 hour   Intake          2427.12 ml   Output                0 ml   Net          2427.12 ml      Physical Exam   Constitutional: No distress. HENT:   Head: Normocephalic and atraumatic. Mouth/Throat: No oropharyngeal exudate. Eyes: No scleral icterus.    Cardiovascular: Normal rate and regular rhythm. Pulmonary/Chest: No respiratory distress. He has no wheezes. He has no rales. Abdominal: Soft. Bowel sounds are normal. He exhibits no distension. There is no tenderness. Musculoskeletal: He exhibits no edema. Neurological: He is alert. Skin: Skin is warm and dry.      Data:     Current Facility-Administered Medications   Medication Dose Route Frequency    potassium chloride 20 mEq in 50 ml IVPB  20 mEq IntraVENous Q1H    haloperidol lactate (HALDOL) injection 5 mg  5 mg IntraVENous TID    albuterol-ipratropium (DUO-NEB) 2.5 MG-0.5 MG/3 ML  3 mL Nebulization QID RT    famotidine (PF) (PEPCID) 20 mg in sodium chloride 0.9 % 10 mL injection  20 mg IntraVENous Q12H    thiamine (B-1) 100 mg in 0.9% sodium chloride 50 mL IVPB  100 mg IntraVENous DAILY    metoprolol (LOPRESSOR) injection 5 mg  5 mg IntraVENous Q6H    dexmedetomidine (PRECEDEX) 400 mcg in 0.9% sodium chloride 100 mL infusion  0.4-1.5 mcg/kg/hr IntraVENous TITRATE    chlorhexidine (PERIDEX) 0.12 % mouthwash 15 mL  15 mL Oral BID    mupirocin (BACTROBAN) 2 % ointment   Both Nostrils BID    atorvastatin (LIPITOR) tablet 40 mg  40 mg Per NG tube DAILY    pregabalin (LYRICA) capsule 300 mg  300 mg Oral BID    sodium chloride (NS) flush 5-10 mL  5-10 mL IntraVENous Q8H    enoxaparin (LOVENOX) injection 40 mg  40 mg SubCUTAneous Q24H    aspirin (ASA) suppository 300 mg  300 mg Rectal DAILY    0.9% sodium chloride infusion  75 mL/hr IntraVENous CONTINUOUS    levETIRAcetam (KEPPRA) 1,500 mg in 0.9% sodium chloride IVPB  1,500 mg IntraVENous Q12H                Labs:  Recent Labs      06/28/17   0356  06/27/17   0333  06/26/17 0358   WBC  6.2  7.8  8.0   HGB  12.0*  11.6*  11.3*   HCT  33.9*  33.2*  33.3*   PLT  87*  77*  77*     Recent Labs      06/28/17   0356  06/27/17   0333  06/26/17 0358   NA  138  143  138   K  3.2*  3.5  3.7   CL  105  108  105   CO2  26  29  27   GLU  111*  119*  100   BUN  7  8  12 CREA  0.61*  0.71  0.71   CA  8.6  8.9  8.6   MG  1.5*  1.8  2.2   PHOS  2.2*  2.4*  2.4*     Recent Labs      06/27/17   0513  06/26/17   0647   PH  7.38  7.27*   PCO2  45  59*   PO2  104*  95   HCO3  26  27*     Imaging:  I have personally reviewed the patients radiographs and have reviewed the reports:  None today        Total critical care time exclusive of procedures:   minutes  Dudley Muñoz MD

## 2017-06-28 NOTE — PROGRESS NOTES
NEUROLOGY PROGRESS NOTE     CC: Seizures    SUBJECTIVE  No seizures in ICU  Pt still confused and was AAO X 3 at home based on outpatient neurology notes    HISTORY OF PRESENT ILLNESS  Judi Austin is a 80 y.o. male who presents to the hospital because of seizures. The family is not at bedside in the history is obtained by chart review. According to the ED notes. The patient was oriented ×3 when he went to bed last night and was able to communicate but when he woke up in the morning he was unable to speak in coherent sentences and was confused about orientation. He he was not feeling great and wanted to go to the ER. He does have long history of seizures and takes Keppra 1250 mg p.o. twice daily and Lyrica 300 mg p.o. twice daily. He ran out of his Lyrica 3 days ago. In the emergency room he did have an episode of deviation of the eyes to the right along with stiffening of the right upper extremity. The patient was intubated at this time for airway protection and is on propofol. No obvious seizures in the ICU yet.     ROS  A ten system review of constitutional, cardiovascular, respiratory, musculoskeletal, endocrine, skin, SHEENT, genitourinary, psychiatric and neurologic systems was obtained and is unremarkable except as stated in HPI     PHYSICAL EXAM  EXAMINATION:   Patient Vitals for the past 24 hrs:   Temp Pulse Resp BP SpO2   06/28/17 1000 - 84 20 156/82 -   06/28/17 0900 - 89 20 (!) 166/94 -   06/28/17 0805 - 71 19 - 98 %   06/28/17 0759 98.1 °F (36.7 °C) 74 19 - 99 %   06/28/17 0700 - 87 18 - 99 %   06/28/17 0600 - 84 23 (!) 155/92 97 %   06/28/17 0535 - 84 19 150/86 98 %   06/28/17 0530 - 82 21 154/86 98 %   06/28/17 0436 98.3 °F (36.8 °C) 80 22 166/77 97 %   06/28/17 0400 - 82 15 - 99 %   06/28/17 0300 - 85 27 163/87 98 %   06/28/17 0200 - 74 21 159/83 98 %   06/28/17 0100 - 74 17 151/75 98 %   06/28/17 0013 98 °F (36.7 °C) 80 20 157/82 98 %   06/28/17 0000 - 86 29 - 97 %   06/27/17 2300 - 83 15 152/76 98 %   06/27/17 2212 - - - - 100 %   06/27/17 2200 - 71 24 150/71 98 %   06/27/17 2145 98.3 °F (36.8 °C) 74 26 - 98 %   06/27/17 2100 - 68 19 140/67 97 %   06/27/17 2037 - 75 22 160/78 94 %   06/27/17 2000 - 73 23 - 98 %   06/27/17 1900 - 71 21 152/72 99 %   06/27/17 1800 - 71 19 151/75 98 %   06/27/17 1700 - 84 22 156/63 98 %   06/27/17 1600 98.3 °F (36.8 °C) 83 25 159/72 97 %   06/27/17 1513 - - - - 97 %   06/27/17 1502 - 71 20 162/74 98 %   06/27/17 1500 - 75 18 170/70 97 %   06/27/17 1400 - 88 29 (!) 151/127 97 %   06/27/17 1300 - 73 16 150/69 97 %   06/27/17 1257 - - - - 99 %   06/27/17 1215 - 74 - 150/72 -   06/27/17 1200 98.4 °F (36.9 °C) 70 23 150/72 98 %   06/27/17 1100 - 71 23 160/57 97 %        General:   General appearance: Pt is in no acute distress   Distal pulses are preserved    Neurological Examination:   Mental Status: Patient is lying comfortably in the bed    Cranial Nerves: Pupils equal and reactive to light. Face symmetric. Motor: Moves all 4 extremities symmetrically and spontaneously    Sensation: Unable to assess    Coordination/Cerebellar: Unable to assess    Gait: Unable to assess    Skin: No significant bruising or lacerations. LAB DATA REVIEWED:    Results for orders placed or performed during the hospital encounter of 06/24/17   CULTURE, BLOOD   Result Value Ref Range    Special Requests: NO SPECIAL REQUESTS      Culture result: NO GROWTH 4 DAYS     CULTURE, BLOOD   Result Value Ref Range    Special Requests: NO SPECIAL REQUESTS      Culture result: (A)       GRAM POSITIVE COCCI  IN CLUSTERS  GROWING IN 1 OF 2 BOTTLES DRAWN  SITE = (C LINE )      Culture result: (A)       PRELIMINARY REPORT OF  GRAM POSITIVE COCCI  IN CLUSTERS  GROWING IN 1 OF 2 BOTTLES DRAWN  CALLED TO AND READ BACK BY  FIGUEROA RODRIGUES RN [id number] [place] y AT 2013 ON 6/27/2017.  Roc 1850      Culture result: REMAINING BOTTLE(S) HAS/HAVE NO GROWTH SO FAR     CBC WITH AUTOMATED DIFF   Result Value Ref Range    WBC 6.2 4.1 - 11.1 K/uL    RBC 4.28 4.10 - 5.70 M/uL    HGB 14.3 12.1 - 17.0 g/dL    HCT 40.8 36.6 - 50.3 %    MCV 95.3 80.0 - 99.0 FL    MCH 33.4 26.0 - 34.0 PG    MCHC 35.0 30.0 - 36.5 g/dL    RDW 12.4 11.5 - 14.5 %    PLATELET 298 (L) 089 - 400 K/uL    NEUTROPHILS 65 32 - 75 %    LYMPHOCYTES 23 12 - 49 %    MONOCYTES 12 5 - 13 %    EOSINOPHILS 0 0 - 7 %    BASOPHILS 0 0 - 1 %    ABS. NEUTROPHILS 4.0 1.8 - 8.0 K/UL    ABS. LYMPHOCYTES 1.4 0.8 - 3.5 K/UL    ABS. MONOCYTES 0.8 0.0 - 1.0 K/UL    ABS. EOSINOPHILS 0.0 0.0 - 0.4 K/UL    ABS. BASOPHILS 0.0 0.0 - 0.1 K/UL   METABOLIC PANEL, COMPREHENSIVE   Result Value Ref Range    Sodium 137 136 - 145 mmol/L    Potassium 3.6 3.5 - 5.1 mmol/L    Chloride 102 97 - 108 mmol/L    CO2 28 21 - 32 mmol/L    Anion gap 7 5 - 15 mmol/L    Glucose 105 (H) 65 - 100 mg/dL    BUN 13 6 - 20 MG/DL    Creatinine 0.98 0.70 - 1.30 MG/DL    BUN/Creatinine ratio 13 12 - 20      GFR est AA >60 >60 ml/min/1.73m2    GFR est non-AA >60 >60 ml/min/1.73m2    Calcium 9.6 8.5 - 10.1 MG/DL    Bilirubin, total 1.1 (H) 0.2 - 1.0 MG/DL    ALT (SGPT) 19 12 - 78 U/L    AST (SGOT) 16 15 - 37 U/L    Alk.  phosphatase 83 45 - 117 U/L    Protein, total 8.9 (H) 6.4 - 8.2 g/dL    Albumin 4.4 3.5 - 5.0 g/dL    Globulin 4.5 (H) 2.0 - 4.0 g/dL    A-G Ratio 1.0 (L) 1.1 - 2.2     TROPONIN I   Result Value Ref Range    Troponin-I, Qt. <0.04 <0.05 ng/mL   CK W/ REFLX CKMB   Result Value Ref Range     39 - 308 U/L   LEVETIRACETAM (KEPPRA)   Result Value Ref Range    Levetiracetam (Keppra) 36.6 10.0 - 40.0 ug/mL   URINALYSIS W/MICROSCOPIC   Result Value Ref Range    Color YELLOW/STRAW      Appearance CLOUDY (A) CLEAR      Specific gravity 1.019 1.003 - 1.030      pH (UA) 7.0 5.0 - 8.0      Protein NEGATIVE  NEG mg/dL    Glucose NEGATIVE  NEG mg/dL    Ketone TRACE (A) NEG mg/dL    Blood NEGATIVE  NEG      Urobilinogen 1.0 0.2 - 1.0 EU/dL    Nitrites NEGATIVE  NEG      Leukocyte Esterase NEGATIVE  NEG      WBC 0-4 0 - 4 /hpf RBC 0-5 0 - 5 /hpf    Epithelial cells FEW FEW /lpf    Bacteria NEGATIVE  NEG /hpf    Hyaline cast 0-2 0 - 5 /lpf   BILIRUBIN, CONFIRM   Result Value Ref Range    Bilirubin UA, confirm NEGATIVE  NEG     CK W/ CKMB & INDEX   Result Value Ref Range     39 - 308 U/L    CK - MB 1.6 <3.6 NG/ML    CK-MB Index 0.7 0 - 2.5     LACTIC ACID, PLASMA   Result Value Ref Range    Lactic acid 2.2 (HH) 0.4 - 2.0 MMOL/L   BLOOD GAS, ARTERIAL   Result Value Ref Range    pH 7.47 (H) 7.35 - 7.45      PCO2 34 (L) 35.0 - 45.0 mmHg    PO2 212 (H) 80 - 100 mmHg    O2  (H) 92 - 97 %    BICARBONATE 24 22 - 26 mmol/L    BASE EXCESS 1.0 mmol/L    O2 METHOD VENTILATOR      FIO2 50 %    MODE A/C      Tidal volume 500      SET RATE 16      EPAP/CPAP/PEEP 6.0      Sample source ARTERIAL      SITE RIGHT RADIAL      TERE'S TEST YES     MISC. LAB TEST   Result Value Ref Range    Test Description: LYRICA LEVEL     Reference Lab: [email or web address]     Results: PENDING    METABOLIC PANEL, COMPREHENSIVE   Result Value Ref Range    Sodium 136 136 - 145 mmol/L    Potassium 4.4 3.5 - 5.1 mmol/L    Chloride 103 97 - 108 mmol/L    CO2 26 21 - 32 mmol/L    Anion gap 7 5 - 15 mmol/L    Glucose 123 (H) 65 - 100 mg/dL    BUN 17 6 - 20 MG/DL    Creatinine 1.04 0.70 - 1.30 MG/DL    BUN/Creatinine ratio 16 12 - 20      GFR est AA >60 >60 ml/min/1.73m2    GFR est non-AA >60 >60 ml/min/1.73m2    Calcium 8.2 (L) 8.5 - 10.1 MG/DL    Bilirubin, total 1.4 (H) 0.2 - 1.0 MG/DL    ALT (SGPT) 15 12 - 78 U/L    AST (SGOT) 14 (L) 15 - 37 U/L    Alk.  phosphatase 59 45 - 117 U/L    Protein, total 7.1 6.4 - 8.2 g/dL    Albumin 3.4 (L) 3.5 - 5.0 g/dL    Globulin 3.7 2.0 - 4.0 g/dL    A-G Ratio 0.9 (L) 1.1 - 2.2     CBC WITH AUTOMATED DIFF   Result Value Ref Range    WBC 14.1 (H) 4.1 - 11.1 K/uL    RBC 3.59 (L) 4.10 - 5.70 M/uL    HGB 12.0 (L) 12.1 - 17.0 g/dL    HCT 34.9 (L) 36.6 - 50.3 %    MCV 97.2 80.0 - 99.0 FL    MCH 33.4 26.0 - 34.0 PG    MCHC 34.4 30.0 - 36.5 g/dL    RDW 12.8 11.5 - 14.5 %    PLATELET 96 (L) 175 - 400 K/uL    NEUTROPHILS 83 (H) 32 - 75 %    LYMPHOCYTES 9 (L) 12 - 49 %    MONOCYTES 8 5 - 13 %    EOSINOPHILS 0 0 - 7 %    BASOPHILS 0 0 - 1 %    ABS. NEUTROPHILS 11.7 (H) 1.8 - 8.0 K/UL    ABS. LYMPHOCYTES 1.3 0.8 - 3.5 K/UL    ABS. MONOCYTES 1.1 (H) 0.0 - 1.0 K/UL    ABS. EOSINOPHILS 0.0 0.0 - 0.4 K/UL    ABS.  BASOPHILS 0.0 0.0 - 0.1 K/UL   TROPONIN I   Result Value Ref Range    Troponin-I, Qt. <0.04 <0.05 ng/mL   CBC W/O DIFF   Result Value Ref Range    WBC 8.0 4.1 - 11.1 K/uL    RBC 3.43 (L) 4.10 - 5.70 M/uL    HGB 11.3 (L) 12.1 - 17.0 g/dL    HCT 33.3 (L) 36.6 - 50.3 %    MCV 97.1 80.0 - 99.0 FL    MCH 32.9 26.0 - 34.0 PG    MCHC 33.9 30.0 - 36.5 g/dL    RDW 12.7 11.5 - 14.5 %    PLATELET 77 (L) 557 - 763 K/uL   METABOLIC PANEL, BASIC   Result Value Ref Range    Sodium 138 136 - 145 mmol/L    Potassium 3.7 3.5 - 5.1 mmol/L    Chloride 105 97 - 108 mmol/L    CO2 27 21 - 32 mmol/L    Anion gap 6 5 - 15 mmol/L    Glucose 100 65 - 100 mg/dL    BUN 12 6 - 20 MG/DL    Creatinine 0.71 0.70 - 1.30 MG/DL    BUN/Creatinine ratio 17 12 - 20      GFR est AA >60 >60 ml/min/1.73m2    GFR est non-AA >60 >60 ml/min/1.73m2    Calcium 8.6 8.5 - 10.1 MG/DL   MAGNESIUM   Result Value Ref Range    Magnesium 2.2 1.6 - 2.4 mg/dL   PHOSPHORUS   Result Value Ref Range    Phosphorus 2.4 (L) 2.6 - 4.7 MG/DL   BLOOD GAS, ARTERIAL   Result Value Ref Range    pH 7.27 (L) 7.35 - 7.45      PCO2 59 (H) 35.0 - 45.0 mmHg    PO2 95 80 - 100 mmHg    O2 SAT 96 92 - 97 %    BICARBONATE 27 (H) 22 - 26 mmol/L    BASE DEFICIT 1.3 mmol/L    O2 METHOD NASAL O2      O2 FLOW RATE 4.00 L/min    SPONTANEOUS RATE 26.0      Sample source ARTERIAL      SITE LEFT BRACHIAL      TERE'S TEST N/A     CBC W/O DIFF   Result Value Ref Range    WBC 7.8 4.1 - 11.1 K/uL    RBC 3.40 (L) 4.10 - 5.70 M/uL    HGB 11.6 (L) 12.1 - 17.0 g/dL    HCT 33.2 (L) 36.6 - 50.3 %    MCV 97.6 80.0 - 99.0 FL    MCH 34.1 (H) 26.0 - 34.0 PG MCHC 34.9 30.0 - 36.5 g/dL    RDW 12.4 11.5 - 14.5 %    PLATELET 77 (L) 444 - 473 K/uL   METABOLIC PANEL, BASIC   Result Value Ref Range    Sodium 143 136 - 145 mmol/L    Potassium 3.5 3.5 - 5.1 mmol/L    Chloride 108 97 - 108 mmol/L    CO2 29 21 - 32 mmol/L    Anion gap 6 5 - 15 mmol/L    Glucose 119 (H) 65 - 100 mg/dL    BUN 8 6 - 20 MG/DL    Creatinine 0.71 0.70 - 1.30 MG/DL    BUN/Creatinine ratio 11 (L) 12 - 20      GFR est AA >60 >60 ml/min/1.73m2    GFR est non-AA >60 >60 ml/min/1.73m2    Calcium 8.9 8.5 - 10.1 MG/DL   MAGNESIUM   Result Value Ref Range    Magnesium 1.8 1.6 - 2.4 mg/dL   PHOSPHORUS   Result Value Ref Range    Phosphorus 2.4 (L) 2.6 - 4.7 MG/DL   BLOOD GAS, ARTERIAL   Result Value Ref Range    pH 7.38 7.35 - 7.45      PCO2 45 35.0 - 45.0 mmHg    PO2 104 (H) 80 - 100 mmHg    O2 SAT 98 (H) 92 - 97 %    BICARBONATE 26 22 - 26 mmol/L    BASE EXCESS 0.8 mmol/L    O2 METHOD NASAL O2      O2 FLOW RATE 2.00 L/min    SPONTANEOUS RATE 26.0      Sample source ARTERIAL      SITE LEFT RADIAL      TERE'S TEST YES     VITAMIN B12   Result Value Ref Range    Vitamin B12 1064 (H) 211 - 911 pg/mL   TSH 3RD GENERATION   Result Value Ref Range    TSH 0.93 0.36 - 3.74 uIU/mL   AMMONIA   Result Value Ref Range    Ammonia 25 <32 UMOL/L   CBC W/O DIFF   Result Value Ref Range    WBC 6.2 4.1 - 11.1 K/uL    RBC 3.64 (L) 4.10 - 5.70 M/uL    HGB 12.0 (L) 12.1 - 17.0 g/dL    HCT 33.9 (L) 36.6 - 50.3 %    MCV 93.1 80.0 - 99.0 FL    MCH 33.0 26.0 - 34.0 PG    MCHC 35.4 30.0 - 36.5 g/dL    RDW 12.1 11.5 - 14.5 %    PLATELET 87 (L) 893 - 040 K/uL   METABOLIC PANEL, BASIC   Result Value Ref Range    Sodium 138 136 - 145 mmol/L    Potassium 3.2 (L) 3.5 - 5.1 mmol/L    Chloride 105 97 - 108 mmol/L    CO2 26 21 - 32 mmol/L    Anion gap 7 5 - 15 mmol/L    Glucose 111 (H) 65 - 100 mg/dL    BUN 7 6 - 20 MG/DL    Creatinine 0.61 (L) 0.70 - 1.30 MG/DL    BUN/Creatinine ratio 11 (L) 12 - 20      GFR est AA >60 >60 ml/min/1.73m2    GFR est non-AA >60 >60 ml/min/1.73m2    Calcium 8.6 8.5 - 10.1 MG/DL   MAGNESIUM   Result Value Ref Range    Magnesium 1.5 (L) 1.6 - 2.4 mg/dL   PHOSPHORUS   Result Value Ref Range    Phosphorus 2.2 (L) 2.6 - 4.7 MG/DL   GLUCOSE, POC   Result Value Ref Range    Glucose (POC) 110 (H) 65 - 100 mg/dL    Performed by Nyla Ware    POC CREATININE   Result Value Ref Range    Creatinine (POC) 0.9 0.6 - 1.3 MG/DL    GFRAA, POC >60 >60 ml/min/1.73m2    GFRNA, POC >60 >60 ml/min/1.73m2   POC CHEM8   Result Value Ref Range    Calcium, ionized (POC) 1.14 1.12 - 1.32 MMOL/L    Sodium (POC) 140 136 - 145 MMOL/L    Potassium (POC) 4.3 3.5 - 5.1 MMOL/L    Chloride (POC) 101 98 - 107 MMOL/L    CO2 (POC) 30 21 - 32 MMOL/L    Anion gap (POC) 14 5 - 15 mmol/L    Glucose (POC) 107 (H) 65 - 100 MG/DL    BUN (POC) 15 9 - 20 MG/DL    Creatinine (POC) 0.9 0.6 - 1.3 MG/DL    GFRAA, POC >60 >60 ml/min/1.73m2    GFRNA, POC >60 >60 ml/min/1.73m2    Hemoglobin (POC) 14.3 12.1 - 17.0 GM/DL    Hematocrit (POC) 42 36.6 - 50.3 %    Comment Comment Not Indicated. EKG, 12 LEAD, INITIAL   Result Value Ref Range    Ventricular Rate 88 BPM    Atrial Rate 88 BPM    P-R Interval 294 ms    QRS Duration 82 ms    Q-T Interval 368 ms    QTC Calculation (Bezet) 445 ms    Calculated P Axis 61 degrees    Calculated R Axis -38 degrees    Calculated T Axis 52 degrees    Diagnosis       Sinus rhythm with 1st degree AV block  Left axis deviation  Nonspecific T wave abnormality  When compared with ECG of 06-SEP-2012 03:00,  FL interval has increased  ST no longer elevated in Anterior leads  Nonspecific T wave abnormality, worse in Anterolateral leads  Confirmed by Robbin Morales ([id number]) on 6/25/2017 1:15:25 PM          Imaging review:  6/24/2017  CT scan of the head without contrast  No acute intracranial abnormality    CT angiogram of the head and neck  No significant cervical carotid artery stenosis.   No large vessel intracranial vascular occlusive change. HOME MEDS  Prior to Admission Medications   Prescriptions Last Dose Informant Patient Reported? Taking?   aspirin (ASPIRIN) 325 mg tablet   Yes No   Sig: Take 1 Tab by mouth daily. atorvastatin (LIPITOR) 40 mg tablet   No No   Sig: Take 1 Tab by mouth daily. cyclobenzaprine (FLEXERIL) 5 mg tablet   No No   Sig: Take 1 Tab by mouth three (3) times daily as needed for Muscle Spasm(s). levETIRAcetam (KEPPRA) 250 mg tablet   No No   Sig: Take 1 Tab by mouth two (2) times a day. Take with the 1000mg to make 1250mg twice a day. levETIRAcetam 1,000 mg tablet   No No   Sig: Take 1 Tab by mouth two (2) times a day. magnesium oxide (MAG-OX) 400 mg tablet   Yes No   Sig: Take 400 mg by mouth daily. metoprolol succinate (TOPROL-XL) 25 mg XL tablet   Yes No   Sig: Take  by mouth daily. omeprazole (PRILOSEC) 40 mg capsule   Yes No   Sig: Take 40 mg by mouth daily. pregabalin (LYRICA) 300 mg capsule   No No   Sig: Take 1 Cap by mouth two (2) times a day. Max Daily Amount: 600 mg.   thiamine (B-1) 100 mg tablet   No No   Sig: Take 1 Tab by mouth daily.       Facility-Administered Medications: None       CURRENT MEDS  Current Facility-Administered Medications   Medication Dose Route Frequency    potassium chloride 20 mEq in 50 ml IVPB  20 mEq IntraVENous Q1H    dexmedetomidine (PRECEDEX) 400 mcg in 0.9% sodium chloride 100 mL infusion  0.2-0.7 mcg/kg/hr IntraVENous TITRATE    haloperidol lactate (HALDOL) injection 5 mg  5 mg IntraVENous TID    albuterol-ipratropium (DUO-NEB) 2.5 MG-0.5 MG/3 ML  3 mL Nebulization QID RT    famotidine (PF) (PEPCID) 20 mg in sodium chloride 0.9 % 10 mL injection  20 mg IntraVENous Q12H    thiamine (B-1) 100 mg in 0.9% sodium chloride 50 mL IVPB  100 mg IntraVENous DAILY    metoprolol (LOPRESSOR) injection 5 mg  5 mg IntraVENous Q6H    chlorhexidine (PERIDEX) 0.12 % mouthwash 15 mL  15 mL Oral BID    mupirocin (BACTROBAN) 2 % ointment   Both Nostrils BID    atorvastatin (LIPITOR) tablet 40 mg  40 mg Per NG tube DAILY    pregabalin (LYRICA) capsule 300 mg  300 mg Oral BID    sodium chloride (NS) flush 5-10 mL  5-10 mL IntraVENous Q8H    enoxaparin (LOVENOX) injection 40 mg  40 mg SubCUTAneous Q24H    aspirin (ASA) suppository 300 mg  300 mg Rectal DAILY    0.9% sodium chloride infusion  75 mL/hr IntraVENous CONTINUOUS    levETIRAcetam (KEPPRA) 1,500 mg in 0.9% sodium chloride IVPB  1,500 mg IntraVENous Q12H       IMPRESSION:  Malini Gallardo is a 80 y.o. male who presents with altered mental status since this morning and in the emergency room had stiffening on right side of the body along with deviation of the eye to the right. Left hemispheric seizure suspected. The patient was taking Keppra 1250 mg p.o. twice daily and Lyrica 300 mg p.o. twice daily. RECOMMENDATIONS:  1. No improvement in mental status. Blood work is normal. Will repeat EEG  2. Continue Keppra 1500 mg p.o. twice daily  3. Continue Lyrica 300 mg p.o. twice daily      Patient is critically ill with status epilepticus. Significant risk of morbidity and mortality. 30 minutes of critical care time provided.       Chuy Dasilva MD  Neurologist

## 2017-06-28 NOTE — PROGRESS NOTES
Speech path  Pt is currently lethargic. His oral cavity has thick bloody dried mucus caked on the hard palate. Nsg has attempted to clear and was partially successful.  We will follow up when he is able to stay alert enough for an eval.   Andrew Sun, SLP

## 2017-06-28 NOTE — PROGRESS NOTES
Attended Interdisciplinary rounds in Critical Care Unit, where patient care was discussed. Visit by: Amber Kuhn. Hudson Stevenson.  Janna Farah MA, [address]

## 2017-06-28 NOTE — PROGRESS NOTES
Report received at bedside from Aicha, lab values reviewed. 6330  Assessment completed, will titrate Precedex down as able. 1015  Incontinent of urine, attempted to use condom catheter without success. Swings legs over side of bed at times. 1215  No changes noted. Coarse cough noted, unable to expectorate effectively. Copious amount of old blood in mouth, difficult to retrieve. Remains restrained to keep patient in bed and prevent removal of Central line. 1430  Incontinent again with total bed change completed. 1615  No changes in assessment. Speech garbled at times, but continues to cough secretions into mouth without letting staff retrieve easily. Frequent mouth care given and required. 26  Wife called giving name of prescription eye drop recently ordered. Asked to please bring to hospital.  On [place]  0.0004% OU QHS 1 drop. Will alert Dr. Chirinos Call to recent prescription. 0  Wife and daughter at bedside, aware of sedative and patient continued confusion. Requested to talk with MD, but now on call only available. Asked to call or arrive earlier and a Doctor will talk to them who has seen him. Eye drops from home now here.

## 2017-06-29 LAB
ALBUMIN SERPL BCP-MCNC: 2.9 G/DL (ref 3.5–5)
ALBUMIN/GLOB SERPL: 0.6 {RATIO} (ref 1.1–2.2)
ALP SERPL-CCNC: 56 U/L (ref 45–117)
ALT SERPL-CCNC: 17 U/L (ref 12–78)
ANION GAP BLD CALC-SCNC: 9 MMOL/L (ref 5–15)
AST SERPL W P-5'-P-CCNC: 23 U/L (ref 15–37)
BASOPHILS # BLD AUTO: 0 K/UL (ref 0–0.1)
BASOPHILS # BLD: 0 % (ref 0–1)
BILIRUB SERPL-MCNC: 1.2 MG/DL (ref 0.2–1)
BUN SERPL-MCNC: 9 MG/DL (ref 6–20)
BUN/CREAT SERPL: 17 (ref 12–20)
CALCIUM SERPL-MCNC: 8.6 MG/DL (ref 8.5–10.1)
CHLORIDE SERPL-SCNC: 105 MMOL/L (ref 97–108)
CO2 SERPL-SCNC: 24 MMOL/L (ref 21–32)
CREAT SERPL-MCNC: 0.52 MG/DL (ref 0.7–1.3)
EOSINOPHIL # BLD: 0.2 K/UL (ref 0–0.4)
EOSINOPHIL NFR BLD: 3 % (ref 0–7)
ERYTHROCYTE [DISTWIDTH] IN BLOOD BY AUTOMATED COUNT: 12.1 % (ref 11.5–14.5)
GLOBULIN SER CALC-MCNC: 4.6 G/DL (ref 2–4)
GLUCOSE SERPL-MCNC: 102 MG/DL (ref 65–100)
HCT VFR BLD AUTO: 33.2 % (ref 36.6–50.3)
HGB BLD-MCNC: 11.9 G/DL (ref 12.1–17)
LYMPHOCYTES # BLD AUTO: 18 % (ref 12–49)
LYMPHOCYTES # BLD: 1 K/UL (ref 0.8–3.5)
MAGNESIUM SERPL-MCNC: 1.6 MG/DL (ref 1.6–2.4)
MCH RBC QN AUTO: 33.2 PG (ref 26–34)
MCHC RBC AUTO-ENTMCNC: 35.8 G/DL (ref 30–36.5)
MCV RBC AUTO: 92.7 FL (ref 80–99)
MONOCYTES # BLD: 1 K/UL (ref 0–1)
MONOCYTES NFR BLD AUTO: 18 % (ref 5–13)
NEUTS SEG # BLD: 3.3 K/UL (ref 1.8–8)
NEUTS SEG NFR BLD AUTO: 61 % (ref 32–75)
PHOSPHATE SERPL-MCNC: 3 MG/DL (ref 2.6–4.7)
PLATELET # BLD AUTO: 103 K/UL (ref 150–400)
POTASSIUM SERPL-SCNC: 3.3 MMOL/L (ref 3.5–5.1)
PROT SERPL-MCNC: 7.5 G/DL (ref 6.4–8.2)
RBC # BLD AUTO: 3.58 M/UL (ref 4.1–5.7)
SODIUM SERPL-SCNC: 138 MMOL/L (ref 136–145)
WBC # BLD AUTO: 5.4 K/UL (ref 4.1–11.1)

## 2017-06-29 PROCEDURE — 36591 DRAW BLOOD OFF VENOUS DEVICE: CPT

## 2017-06-29 PROCEDURE — 74011250636 HC RX REV CODE- 250/636: Performed by: PSYCHIATRY & NEUROLOGY

## 2017-06-29 PROCEDURE — 74011000258 HC RX REV CODE- 258: Performed by: PSYCHIATRY & NEUROLOGY

## 2017-06-29 PROCEDURE — 84100 ASSAY OF PHOSPHORUS: CPT | Performed by: INTERNAL MEDICINE

## 2017-06-29 PROCEDURE — 74011000250 HC RX REV CODE- 250: Performed by: INTERNAL MEDICINE

## 2017-06-29 PROCEDURE — 74011250636 HC RX REV CODE- 250/636: Performed by: INTERNAL MEDICINE

## 2017-06-29 PROCEDURE — 80053 COMPREHEN METABOLIC PANEL: CPT | Performed by: INTERNAL MEDICINE

## 2017-06-29 PROCEDURE — 74011250637 HC RX REV CODE- 250/637: Performed by: INTERNAL MEDICINE

## 2017-06-29 PROCEDURE — 36415 COLL VENOUS BLD VENIPUNCTURE: CPT | Performed by: INTERNAL MEDICINE

## 2017-06-29 PROCEDURE — 85025 COMPLETE CBC W/AUTO DIFF WBC: CPT | Performed by: INTERNAL MEDICINE

## 2017-06-29 PROCEDURE — 65610000006 HC RM INTENSIVE CARE

## 2017-06-29 PROCEDURE — 74011000258 HC RX REV CODE- 258: Performed by: INTERNAL MEDICINE

## 2017-06-29 PROCEDURE — 83735 ASSAY OF MAGNESIUM: CPT | Performed by: INTERNAL MEDICINE

## 2017-06-29 RX ORDER — IPRATROPIUM BROMIDE AND ALBUTEROL SULFATE 2.5; .5 MG/3ML; MG/3ML
3 SOLUTION RESPIRATORY (INHALATION)
Status: DISCONTINUED | OUTPATIENT
Start: 2017-06-29 | End: 2017-07-04

## 2017-06-29 RX ADMIN — Medication 30 ML: at 13:15

## 2017-06-29 RX ADMIN — FAMOTIDINE 20 MG: 10 INJECTION, SOLUTION INTRAVENOUS at 21:01

## 2017-06-29 RX ADMIN — FAMOTIDINE 20 MG: 10 INJECTION, SOLUTION INTRAVENOUS at 10:25

## 2017-06-29 RX ADMIN — Medication 20 ML: at 22:55

## 2017-06-29 RX ADMIN — METOPROLOL TARTRATE 5 MG: 5 INJECTION INTRAVENOUS at 00:59

## 2017-06-29 RX ADMIN — MUPIROCIN: 20 OINTMENT TOPICAL at 09:33

## 2017-06-29 RX ADMIN — Medication 20 ML: at 05:38

## 2017-06-29 RX ADMIN — Medication 10 ML: at 03:14

## 2017-06-29 RX ADMIN — Medication 5 ML: at 06:00

## 2017-06-29 RX ADMIN — ENOXAPARIN SODIUM 40 MG: 40 INJECTION SUBCUTANEOUS at 09:42

## 2017-06-29 RX ADMIN — LATANOPROST 1 DROP: 50 SOLUTION OPHTHALMIC at 17:25

## 2017-06-29 RX ADMIN — Medication 10 ML: at 06:40

## 2017-06-29 RX ADMIN — ASPIRIN 300 MG: 300 SUPPOSITORY RECTAL at 10:31

## 2017-06-29 RX ADMIN — METOPROLOL TARTRATE 5 MG: 5 INJECTION INTRAVENOUS at 12:11

## 2017-06-29 RX ADMIN — MUPIROCIN: 20 OINTMENT TOPICAL at 17:26

## 2017-06-29 RX ADMIN — LEVETIRACETAM 1500 MG: 100 INJECTION, SOLUTION, CONCENTRATE INTRAVENOUS at 09:29

## 2017-06-29 RX ADMIN — SODIUM CHLORIDE 0.5 MCG/KG/HR: 900 INJECTION, SOLUTION INTRAVENOUS at 13:36

## 2017-06-29 RX ADMIN — SODIUM CHLORIDE 0.5 MCG/KG/HR: 900 INJECTION, SOLUTION INTRAVENOUS at 02:17

## 2017-06-29 RX ADMIN — SODIUM CHLORIDE 75 ML/HR: 900 INJECTION, SOLUTION INTRAVENOUS at 11:12

## 2017-06-29 RX ADMIN — HALOPERIDOL LACTATE 5 MG: 5 INJECTION, SOLUTION INTRAMUSCULAR at 10:19

## 2017-06-29 RX ADMIN — METOPROLOL TARTRATE 5 MG: 5 INJECTION INTRAVENOUS at 06:40

## 2017-06-29 RX ADMIN — HALOPERIDOL LACTATE 5 MG: 5 INJECTION, SOLUTION INTRAMUSCULAR at 21:00

## 2017-06-29 RX ADMIN — LEVETIRACETAM 1500 MG: 100 INJECTION, SOLUTION, CONCENTRATE INTRAVENOUS at 21:22

## 2017-06-29 RX ADMIN — CHLORHEXIDINE GLUCONATE 15 ML: 1.2 RINSE ORAL at 09:33

## 2017-06-29 RX ADMIN — Medication 10 ML: at 22:56

## 2017-06-29 RX ADMIN — THIAMINE HYDROCHLORIDE 100 MG: 100 INJECTION, SOLUTION INTRAMUSCULAR; INTRAVENOUS at 10:26

## 2017-06-29 RX ADMIN — SODIUM CHLORIDE 0.3 MCG/KG/HR: 900 INJECTION, SOLUTION INTRAVENOUS at 21:00

## 2017-06-29 RX ADMIN — CHLORHEXIDINE GLUCONATE 15 ML: 1.2 RINSE ORAL at 17:27

## 2017-06-29 RX ADMIN — METOPROLOL TARTRATE 5 MG: 5 INJECTION INTRAVENOUS at 17:21

## 2017-06-29 RX ADMIN — HALOPERIDOL LACTATE 5 MG: 5 INJECTION, SOLUTION INTRAMUSCULAR at 16:29

## 2017-06-29 NOTE — PROGRESS NOTES
Chart reviewed and patient discussed at rounds. Per nursing, patient is agitated and not able to follow any commands at this time. Patient not appropriate for therapy td.   Will continue to follow.

## 2017-06-29 NOTE — PROGRESS NOTES
Spoke to nurse. Pt uncooperative and restrained. Unable to be done today.  Waiting on family to fill out Safety Screening Form

## 2017-06-29 NOTE — PROGRESS NOTES
Nutrition Assessment:    RECOMMENDATIONS:   Advance diet as medically able per SLP    If unable to advance diet in 24-48h, consider NGT placement and initiation of TF (RD to follow with recommendations prn)     ASSESSMENT:   Chart reviewed, case discussed during CCU rounds. Pt remains agitated and on a precedex drip. He has been getting violent today and has been inappropriate for SLP consult. Labs reviewed, K+ 3.3 and being repleted. Phos and Mag WNL today. BM noted on 6/27. He is NPO x 4 days today, we should consider an NGT in 24-48h but this may not be feasible given his agitation level and high likeliness of pulling it out. Will continue to monitor closely. Dietitians Intervention(s)/Plan(s): Advance diet as/if able, monitor need for enteral nutrition via NGt   SUBJECTIVE/OBJECTIVE:   Pt agitated, violent   Diet Order: NPO  % Eaten:  No data found. Pertinent Medications:pepcid, thiamin, MagSO4, KCl; [email or web address]); Drips: precedex. Chemistries:  Lab Results   Component Value Date/Time    Sodium 138 06/29/2017 05:37 AM    Potassium 3.3 06/29/2017 05:37 AM    Chloride 105 06/29/2017 05:37 AM    CO2 24 06/29/2017 05:37 AM    Anion gap 9 06/29/2017 05:37 AM    Glucose 102 06/29/2017 05:37 AM    BUN 9 06/29/2017 05:37 AM    Creatinine 0.52 06/29/2017 05:37 AM    BUN/Creatinine ratio 17 06/29/2017 05:37 AM    GFR est AA >60 06/29/2017 05:37 AM    GFR est non-AA >60 06/29/2017 05:37 AM    Calcium 8.6 06/29/2017 05:37 AM    Albumin 2.9 06/29/2017 05:37 AM      Anthropometrics: Height: 5' 8\" (172.7 cm) Weight: 72.4 kg (159 lb 9.8 oz)    IBW (%IBW):   ( ) UBW (%UBW):   (  %)    BMI: Body mass index is 24.27 kg/(m^2). This BMI is indicative of:  []Underweight   [x]Normal   []Overweight   [] Obesity   [] Extreme Obesity (BMI>40)  Estimated Nutrition Needs (Based on): 1673 Kcals/day (MSJ 1394 x 1.2) , 58 g (-72 (0.8-1gPro/kg) ) Protein  Carbohydrate:  At Least 130 g/day  Fluids: 1700 mL/day    Last BM: 6/27   [x]Active     []Hyperactive  []Hypoactive       [] Absent   BS  Skin:    [x] Intact   [] Incision  [] Breakdown   [] DTI   [x] Tears/Excoriation/Abrasion  []Edema [] Other: Wt Readings from Last 30 Encounters:   06/26/17 72.4 kg (159 lb 9.8 oz)   06/21/17 70.3 kg (155 lb)   03/21/17 75.3 kg (166 lb)   03/18/17 68 kg (150 lb)   03/07/17 75.3 kg (166 lb)   02/25/17 76.2 kg (168 lb)   02/18/17 85.5 kg (188 lb 7.9 oz)   09/03/12 87.1 kg (192 lb)      NUTRITION DIAGNOSES:   Problem:  Inadequate protein-energy intake      Etiology: related to pt NPO 2' agitation/confusion     Signs/Symptoms: as evidenced by NPO meets 0% kcal and protein needs. Previous dx re: inadequate protein energy intake continues, pt remains NPO. NUTRITION INTERVENTIONS:  Meals/Snacks: Other (advance diet as medically able per SLP )                  GOAL:   Pt will be advanced to PO diet vs nutrition support in 2-4 days.      NUTRITION MONITORING AND EVALUATION   Previous Goal: Pt will be advanced to PO diet vs nutrition support in 2-3 days   Previous Goal Met: No   Previous Recommendations Implemented: N/A   Cultural, Yazidism, or Ethnic Dietary Needs: None   LEARNING NEEDS (Diet, Food/Nutrient-Drug Interaction):    [x] None Identified   [] Identified and Education Provided/Documented   [] Identified and Pt declined/was not appropriate      [x] Interdisciplinary Care Plan Reviewed/Documented    [x] Participated in Discharge Planning: Unable to determine    [x] Interdisciplinary Rounds     NUTRITION RISK:    [x] High              [] Moderate           []  Low  []  Minimal/Uncompromised      Franne Sandifer, [address]  Pager [phone]  Weekend Pager [phone]

## 2017-06-29 NOTE — PROGRESS NOTES
PT note:      Chart reviewed and patient discussed at rounds. Per nursing, patient is agitated and not able to follow any commands at this time. Patient not appropriate for therapy td. Will continue to follow.     Radha Dawson, SPT

## 2017-06-29 NOTE — PROGRESS NOTES
Attended Interdisciplinary rounds in Critical Care Unit, where patient care was discussed. Visit by: Mahogany De Leon.  Sandhya Stephens MA, [address]

## 2017-06-29 NOTE — PROGRESS NOTES
Speech pathology  Spoke with RN and PCT in room who both report agitation and no command following. Rn reports poor oral care due to resistance. Cleared to attempt to see patient but patient finally fell asleep and nursing preferred to hold off. Will follow up this afternoon if schedule permits.    Thanks,   Yi Schumacher M.S. CCC-SLP

## 2017-06-29 NOTE — ROUTINE PROCESS

## 2017-06-29 NOTE — PROGRESS NOTES
Report received from Shante Swenson, lab results reviewed. 0825  Attempted to care for patient, but upset after having EEG with tech cleansing his bald head. Unable to get temp, but able to assess skin and rest of assessment. Irritated and wants to go home. Incontinent of urine, tech gave malina care, but unable to remove bottom pad at present. Will allow to rest and attempt again soon. 0900  Afebrile, remains on Precedex at 0.9 with periods of agitation making care difficult. Very resistant to oral care, will continue to try to give oral care when he allows. 1015  Incontinent of moderate amount of urine, malina care given and new diaper applied. 1215 assessment completed, no changes noted. Remains restrained to protect him from central line removal and sudden movement propelling out of bed.  1645  No changes noted since prior assessment. Precedex decreased due to heart rate slowed. Resting quietly now.

## 2017-06-29 NOTE — PROGRESS NOTES
[facility]  Pulmonary, Critical Care, and Sleep Medicine    Name: Katie Gil MRN: [id number]   : 1934 Hospital: [facility]   Date: 2017        IMPRESSION:   · Acute respiratory failure - airway protection - now extubated  · Persistent encephalopathy  · Seizures with status epilepticus  · HTN  · H/O SVT  · Peripheral neuropathy  · Right upper lobe scar/infiltrate - no tobacco history per records - has been present since at least February of this year, may be present to some degree on chest X-ray from   · Suspect COPD      PLAN:   · On room air  · Bronchodilators   · Antiepileptics  · Neurology eval ongoing - encephalopathy persists  · Antihypertensives  · Monitor platelets   · Antipsychotics for agitation  · Restrain for safety  · DVT prophylaxis     Subjective/Interval History:   I have reviewed the flowsheet and previous days notes. No meaningful history from patient. Tracks movement and he moans. Still requiring sedation with precedex, otherwise he is uncontrollable. Review of Systems   Unable to perform ROS: Mental status change     Objective:   Vital Signs:    Visit Vitals    /75    Pulse 73    Temp 98.3 °F (36.8 °C)    Resp 17    Ht 5' 8\" (1.727 m)    Wt 72.4 kg (159 lb 9.8 oz)    SpO2 99%    BMI 24.27 kg/m2       O2 Device: Room air   O2 Flow Rate (L/min): 2 l/min   Temp (24hrs), Av.3 °F (36.8 °C), Min:97.8 °F (36.6 °C), Max:98.7 °F (37.1 °C)       Intake/Output:   Last shift:         Last 3 shifts: 1901 -  0700  In: 3219.5 [I.V.:3219.5]  Out: 50 [Urine:50]    Intake/Output Summary (Last 24 hours) at 17 0850  Last data filed at 17 0640   Gross per 24 hour   Intake          1842.93 ml   Output               50 ml   Net          1792.93 ml      Physical Exam   Constitutional: No distress. HENT:   Head: Normocephalic and atraumatic. Mouth/Throat: No oropharyngeal exudate. Eyes: No scleral icterus. Cardiovascular: Normal rate and regular rhythm. Pulmonary/Chest: No respiratory distress. He has no wheezes. He has no rales. Abdominal: Soft. Bowel sounds are normal. He exhibits no distension. There is no tenderness. Musculoskeletal: He exhibits no edema. Neurological: He is alert. Skin: Skin is warm and dry.      Data:     Current Facility-Administered Medications   Medication Dose Route Frequency    dexmedetomidine (PRECEDEX) 400 mcg in 0.9% sodium chloride 100 mL infusion  0.2-0.7 mcg/kg/hr IntraVENous TITRATE    sodium chloride (NS) flush 10 mL  10 mL InterCATHeter Q24H    sodium chloride (NS) flush 10-40 mL  10-40 mL InterCATHeter Q8H    sodium chloride (NS) flush 20 mL  20 mL InterCATHeter Q24H    latanoprost (XALATAN) 0.005 % ophthalmic solution 1 Drop  1 Drop Both Eyes QPM    haloperidol lactate (HALDOL) injection 5 mg  5 mg IntraVENous TID    albuterol-ipratropium (DUO-NEB) 2.5 MG-0.5 MG/3 ML  3 mL Nebulization QID RT    famotidine (PF) (PEPCID) 20 mg in sodium chloride 0.9 % 10 mL injection  20 mg IntraVENous Q12H    thiamine (B-1) 100 mg in 0.9% sodium chloride 50 mL IVPB  100 mg IntraVENous DAILY    metoprolol (LOPRESSOR) injection 5 mg  5 mg IntraVENous Q6H    chlorhexidine (PERIDEX) 0.12 % mouthwash 15 mL  15 mL Oral BID    mupirocin (BACTROBAN) 2 % ointment   Both Nostrils BID    atorvastatin (LIPITOR) tablet 40 mg  40 mg Per NG tube DAILY    pregabalin (LYRICA) capsule 300 mg  300 mg Oral BID    sodium chloride (NS) flush 5-10 mL  5-10 mL IntraVENous Q8H    enoxaparin (LOVENOX) injection 40 mg  40 mg SubCUTAneous Q24H    aspirin (ASA) suppository 300 mg  300 mg Rectal DAILY    0.9% sodium chloride infusion  75 mL/hr IntraVENous CONTINUOUS    levETIRAcetam (KEPPRA) 1,500 mg in 0.9% sodium chloride IVPB  1,500 mg IntraVENous Q12H                Labs:  Recent Labs      06/29/17   0537  06/28/17   0356  06/27/17   0333   WBC  5.4  6.2  7.8   HGB  11.9*  12.0*  11.6*   HCT 33.2*  33.9*  33.2*   PLT  103*  87*  77*     Recent Labs      06/29/17   0537  06/28/17   0356  06/27/17   0333   NA  138  138  143   K  3.3*  3.2*  3.5   CL  105  105  108   CO2  24  26  29   GLU  102*  111*  119*   BUN  9  7  8   CREA  0.52*  0.61*  0.71   CA  8.6  8.6  8.9   MG  1.6  1.5*  1.8   PHOS  3.0  2.2*  2.4*   ALB  2.9*   --    --    TBILI  1.2*   --    --    SGOT  23   --    --    ALT  17   --    --      Recent Labs      06/27/17   0513   PH  7.38   PCO2  45   PO2  104*   HCO3  26     Imaging:  I have personally reviewed the patients radiographs and have reviewed the reports:  None today        Total critical care time exclusive of procedures:   minutes  Deven De Leon MD

## 2017-06-29 NOTE — PROCEDURES
Patient Name: Ernesto Evangelista  : 1934  Age: 80 y.o. Ordering physician: No ref. provider found  Date of EE2017  EEG procedure number: [id number]  Diagnosis: Seizure  Interpreting physician: Arcenio Duarte MD    ELECTROENCEPHALOGRAM REPORT     PROCEDURE: EEG. CLINICAL INDICATION: The patient is a 80 y.o. male with a history of possible seizures. EEG to rule out seizures, rule out stroke, rule out cortical abnormality. EEG CLASSIFICATION: Abnormal     DESCRIPTION OF THE RECORD:   The background of this recording contains a posterior rhythm of 4-5 hz.    Throughout the recording, there were no clear areas of focal slowing nor spike or spike-and-wave discharges seen. Hyperventilation was not performed. Photic stimulation produced no response. During the recording the patient did not achieve stage II sleep    INTERPRETATION:   Abnormal. Moderate diffuse cerebral dysfunction which is non specific for etiology but can be seen in toxic/metabolic states. No seizures. Clinical correlation recommended.       Arcenio Duarte MD  Neurologist

## 2017-06-29 NOTE — PROGRESS NOTES
[employer] Approved Pharmacy Auto-Substitution    Patient uses Travatan eye drops prior to admission. Travatan eye drops auto-substituted to formulary Xalatan (latanoprost) eyes drops while admitted.      Communicated auto-substitution information on CCU rounds on 6/29/17 AM.    Thanks,  Ricarod Cotto, PHARMD

## 2017-06-29 NOTE — PROGRESS NOTES
Hospitalist Progress Note    NAME: Katie Gil   :  1934   MRN:  [id number]       Assessment / Plan: Witnessed left hemispheric Seizure in ER with Acute respiratory failure with hypoxia, requiring intubation [id number], self  extubated  am. No obvious infection. CXR no infiltrate. Blood culture. no urine culture indicated by UA. Metabolic encephalopathy secondary to presumed prolonged seizure at home (status epilepticus) after stopping lyrica. Pt was continued on keppra. apprciate neurology, keppra iv and lyrica orally. ct head no abnormality, ct angiogram of head and neck, no overt pathology. vitamin b1, electrolyte replacement, haldol as needed. Ammonia normal. siter and restraints as needed. discussedTube feedings with family, reconsider in 1-3 days. Continue presedex.     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 Remote etoh use per family, last drink 5 years ago. Code status: full, wife is nok  Prophylaxis: lovenox, continues on pepcid iv from intubation  Recommended Disposition: snf     Subjective:     Chief Complaint / Reason for Physician Visit  No change in mental status. Discussed with RN events overnight. Review of Systems:  Symptom Y/N Comments  Symptom Y/N Comments   Fever/Chills    Chest Pain     Poor Appetite    Edema     Cough    Abdominal Pain     Sputum    Joint Pain     SOB/AL    Pruritis/Rash     Nausea/vomit    Tolerating PT/OT     Diarrhea    Tolerating Diet     Constipation    Other       Could NOT obtain due to: Confusion, no change     Objective:     VITALS:   Last 24hrs VS reviewed since prior progress note.  Most recent are:  Patient Vitals for the past 24 hrs:   Temp Pulse Resp BP SpO2   17 1800 - 64 15 164/81 99 %   17 1700 - 69 19 163/80 99 %   17 1624 98.2 °F (36.8 °C) 77 20 174/86 100 %   06/29/17 1500 - 62 16 - 100 %   06/29/17 1400 - (!) 55 14 146/73 99 %   06/29/17 1315 - (!) 56 16 156/69 98 %   06/29/17 1300 - (!) 53 15 - 99 %   06/29/17 1215 98.5 °F (36.9 °C) 71 17 - 99 %   06/29/17 1200 - 81 21 - 99 %   06/29/17 1100 - (!) 57 16 158/74 98 %   06/29/17 1000 - 61 26 168/85 99 %   06/29/17 0900 98.8 °F (37.1 °C) (!) 58 18 168/81 99 %   06/29/17 0800 - 73 17 161/75 99 %   06/29/17 0700 - 68 16 (!) 167/101 100 %   06/29/17 0640 - 70 - 173/79 -   06/29/17 0600 - 76 18 184/89 100 %   06/29/17 0500 - 60 13 169/86 99 %   06/29/17 0400 98.3 °F (36.8 °C) 73 22 158/80 100 %   06/29/17 0300 - 60 15 158/74 100 %   06/29/17 0200 - 62 15 162/77 99 %   06/29/17 0100 - 72 21 (!) 170/92 99 %   06/29/17 0059 - 72 - 153/76 -   06/29/17 0000 97.8 °F (36.6 °C) 60 14 153/76 99 %   06/28/17 2300 - 65 16 146/77 99 %   06/28/17 2100 - 71 14 150/83 100 %   06/28/17 2000 98.7 °F (37.1 °C) 89 24 163/86 100 %       Intake/Output Summary (Last 24 hours) at 06/29/17 1955  Last data filed at 06/29/17 1800   Gross per 24 hour   Intake          2102.43 ml   Output              150 ml   Net          1952.43 ml        PHYSICAL EXAM:  General: More sedate, sitter present.    EENT:  No stridor, trachea midline. Anicteric sclerae. MMM  Resp:  Coarse bilaterally, no wheezing or rales. No accessory muscle use  CV:  Regular  rhythm,  No edema  GI:  Soft, Non distended, Non tender.  +Bowel sounds  Neurologic:  Sedate, restraints intact   Psych:   sedate  Skin:  No rashes.   No jaundice    Reviewed most current lab test results and cultures  YES  Reviewed most current radiology test results   YES  Review and summation of old records today    NO  Reviewed patient's current orders and MAR    YES  PMH/ reviewed - no change compared to H&P  ________________________________________________________________________  Care Plan discussed with:    Comments   Patient x    Family  x    RN x    Care Manager Consultant                        Multidiciplinary team rounds were held today with , nursing, pharmacist and clinical coordinator. Patient's plan of care was discussed; medications were reviewed and discharge planning was addressed. ________________________________________________________________________  Total NON critical care TIME:   25   Minutes    Total CRITICAL CARE TIME Spent:   Minutes non procedure based      Comments   >50% of visit spent in counseling and coordination of care x Discussed tube feedings with wife, as well as clinical progress. ________________________________________________________________________  Lauren Hollingsworth, DO     Procedures: see electronic medical records for all procedures/Xrays and details which were not copied into this note but were reviewed prior to creation of Plan. LABS:  I reviewed today's most current labs and imaging studies.   Pertinent labs include:  Recent Labs      06/29/17   0537  06/28/17   0356  06/27/17   0333   WBC  5.4  6.2  7.8   HGB  11.9*  12.0*  11.6*   HCT  33.2*  33.9*  33.2*   PLT  103*  87*  77*     Recent Labs      06/29/17   0537  06/28/17   0356  06/27/17   0333   NA  138  138  143   K  3.3*  3.2*  3.5   CL  105  105  108   CO2  24  26  29   GLU  102*  111*  119*   BUN  9  7  8   CREA  0.52*  0.61*  0.71   CA  8.6  8.6  8.9   MG  1.6  1.5*  1.8   PHOS  3.0  2.2*  2.4*   ALB  2.9*   --    --    TBILI  1.2*   --    --    SGOT  23   --    --    ALT  17   --    --        Signed: Lauren Hollingsworth, DO

## 2017-06-29 NOTE — PROGRESS NOTES
NEUROLOGY PROGRESS NOTE     CC: Seizures    SUBJECTIVE  No seizures in ICU  Pt still confused and was AAO X 3 at home based on outpatient neurology notes    HISTORY OF PRESENT ILLNESS  Karen Cha is a 80 y.o. male who presents to the hospital because of seizures. The family is not at bedside in the history is obtained by chart review. According to the ED notes. The patient was oriented ×3 when he went to bed last night and was able to communicate but when he woke up in the morning he was unable to speak in coherent sentences and was confused about orientation. He he was not feeling great and wanted to go to the ER. He does have long history of seizures and takes Keppra 1250 mg p.o. twice daily and Lyrica 300 mg p.o. twice daily. He ran out of his Lyrica 3 days ago. In the emergency room he did have an episode of deviation of the eyes to the right along with stiffening of the right upper extremity. The patient was intubated at this time for airway protection and is on propofol. No obvious seizures in the ICU yet.     ROS  A ten system review of constitutional, cardiovascular, respiratory, musculoskeletal, endocrine, skin, SHEENT, genitourinary, psychiatric and neurologic systems was obtained and is unremarkable except as stated in HPI     PHYSICAL EXAM  EXAMINATION:   Patient Vitals for the past 24 hrs:   Temp Pulse Resp BP SpO2   06/29/17 1315 - (!) 56 16 156/69 98 %   06/29/17 1300 - (!) 53 15 - 99 %   06/29/17 1215 98.5 °F (36.9 °C) 71 17 - 99 %   06/29/17 1200 - 81 21 - 99 %   06/29/17 1100 - (!) 57 16 158/74 98 %   06/29/17 1000 - 61 26 168/85 99 %   06/29/17 0900 98.8 °F (37.1 °C) (!) 58 18 168/81 99 %   06/29/17 0800 - 73 17 161/75 99 %   06/29/17 0700 - 68 16 (!) 167/101 100 %   06/29/17 0640 - 70 - 173/79 -   06/29/17 0600 - 76 18 184/89 100 %   06/29/17 0500 - 60 13 169/86 99 %   06/29/17 0400 98.3 °F (36.8 °C) 73 22 158/80 100 %   06/29/17 0300 - 60 15 158/74 100 %   06/29/17 0200 - 62 15 162/77 99 %   06/29/17 0100 - 72 21 (!) 170/92 99 %   06/29/17 0059 - 72 - 153/76 -   06/29/17 0000 97.8 °F (36.6 °C) 60 14 153/76 99 %   06/28/17 2300 - 65 16 146/77 99 %   06/28/17 2100 - 71 14 150/83 100 %   06/28/17 2000 98.7 °F (37.1 °C) 89 24 163/86 100 %   06/28/17 1918 - - - - 99 %   06/28/17 1900 - 76 23 171/62 98 %   06/28/17 1800 - 85 19 160/88 99 %   06/28/17 1700 - 70 16 164/79 99 %   06/28/17 1615 98.2 °F (36.8 °C) 84 25 - 98 %   06/28/17 1600 - 82 26 150/84 99 %   06/28/17 1542 - - - - 99 %   06/28/17 1500 - 72 18 149/67 -   06/28/17 1400 - 62 17 148/68 96 %        General:   General appearance: Pt is in no acute distress   Distal pulses are preserved    Neurological Examination:   Mental Status: Patient is lying comfortably in the bed    Cranial Nerves: Pupils equal and reactive to light. Face symmetric. Motor: Moves all 4 extremities symmetrically and spontaneously    Sensation: Unable to assess    Coordination/Cerebellar: Unable to assess    Gait: Unable to assess    Skin: No significant bruising or lacerations. LAB DATA REVIEWED:    Results for orders placed or performed during the hospital encounter of 06/24/17   CULTURE, BLOOD   Result Value Ref Range    Special Requests: NO SPECIAL REQUESTS      Culture result: NO GROWTH 4 DAYS     CULTURE, BLOOD   Result Value Ref Range    Special Requests: NO SPECIAL REQUESTS      Culture result: (A)       PROBABLE  STAPHYLOCOCCUS SPECIES, COAGULASE NEGATIVE  GROWING IN 1 OF 2 BOTTLES DRAWN  SITE = (C LINE )      Culture result: (A)       PRELIMINARY REPORT OF  GRAM POSITIVE COCCI  IN CLUSTERS  GROWING IN 1 OF 2 BOTTLES DRAWN  CALLED TO AND READ BACK BY  FIGUEROA RODRIGUES RN [facility] AT 2013 ON 6/27/2017.  Roc [phone]      Culture result: REMAINING BOTTLE(S) HAS/HAVE NO GROWTH SO FAR     CBC WITH AUTOMATED DIFF   Result Value Ref Range    WBC 6.2 4.1 - 11.1 K/uL    RBC 4.28 4.10 - 5.70 M/uL    HGB 14.3 12.1 - 17.0 g/dL    HCT 40.8 36.6 - 50.3 %    MCV 95.3 80.0 - 99.0 FL MCH 33.4 26.0 - 34.0 PG    MCHC 35.0 30.0 - 36.5 g/dL    RDW 12.4 11.5 - 14.5 %    PLATELET 446 (L) 274 - 400 K/uL    NEUTROPHILS 65 32 - 75 %    LYMPHOCYTES 23 12 - 49 %    MONOCYTES 12 5 - 13 %    EOSINOPHILS 0 0 - 7 %    BASOPHILS 0 0 - 1 %    ABS. NEUTROPHILS 4.0 1.8 - 8.0 K/UL    ABS. LYMPHOCYTES 1.4 0.8 - 3.5 K/UL    ABS. MONOCYTES 0.8 0.0 - 1.0 K/UL    ABS. EOSINOPHILS 0.0 0.0 - 0.4 K/UL    ABS. BASOPHILS 0.0 0.0 - 0.1 K/UL   METABOLIC PANEL, COMPREHENSIVE   Result Value Ref Range    Sodium 137 136 - 145 mmol/L    Potassium 3.6 3.5 - 5.1 mmol/L    Chloride 102 97 - 108 mmol/L    CO2 28 21 - 32 mmol/L    Anion gap 7 5 - 15 mmol/L    Glucose 105 (H) 65 - 100 mg/dL    BUN 13 6 - 20 MG/DL    Creatinine 0.98 0.70 - 1.30 MG/DL    BUN/Creatinine ratio 13 12 - 20      GFR est AA >60 >60 ml/min/1.73m2    GFR est non-AA >60 >60 ml/min/1.73m2    Calcium 9.6 8.5 - 10.1 MG/DL    Bilirubin, total 1.1 (H) 0.2 - 1.0 MG/DL    ALT (SGPT) 19 12 - 78 U/L    AST (SGOT) 16 15 - 37 U/L    Alk.  phosphatase 83 45 - 117 U/L    Protein, total 8.9 (H) 6.4 - 8.2 g/dL    Albumin 4.4 3.5 - 5.0 g/dL    Globulin 4.5 (H) 2.0 - 4.0 g/dL    A-G Ratio 1.0 (L) 1.1 - 2.2     TROPONIN I   Result Value Ref Range    Troponin-I, Qt. <0.04 <0.05 ng/mL   CK W/ REFLX CKMB   Result Value Ref Range     39 - 308 U/L   LEVETIRACETAM (KEPPRA)   Result Value Ref Range    Levetiracetam (Keppra) 36.6 10.0 - 40.0 ug/mL   URINALYSIS W/MICROSCOPIC   Result Value Ref Range    Color YELLOW/STRAW      Appearance CLOUDY (A) CLEAR      Specific gravity 1.019 1.003 - 1.030      pH (UA) 7.0 5.0 - 8.0      Protein NEGATIVE  NEG mg/dL    Glucose NEGATIVE  NEG mg/dL    Ketone TRACE (A) NEG mg/dL    Blood NEGATIVE  NEG      Urobilinogen 1.0 0.2 - 1.0 EU/dL    Nitrites NEGATIVE  NEG      Leukocyte Esterase NEGATIVE  NEG      WBC 0-4 0 - 4 /hpf    RBC 0-5 0 - 5 /hpf    Epithelial cells FEW FEW /lpf    Bacteria NEGATIVE  NEG /hpf    Hyaline cast 0-2 0 - 5 /lpf BILIRUBIN, CONFIRM   Result Value Ref Range    Bilirubin UA, confirm NEGATIVE  NEG     CK W/ CKMB & INDEX   Result Value Ref Range     39 - 308 U/L    CK - MB 1.6 <3.6 NG/ML    CK-MB Index 0.7 0 - 2.5     LACTIC ACID, PLASMA   Result Value Ref Range    Lactic acid 2.2 (HH) 0.4 - 2.0 MMOL/L   BLOOD GAS, ARTERIAL   Result Value Ref Range    pH 7.47 (H) 7.35 - 7.45      PCO2 34 (L) 35.0 - 45.0 mmHg    PO2 212 (H) 80 - 100 mmHg    O2  (H) 92 - 97 %    BICARBONATE 24 22 - 26 mmol/L    BASE EXCESS 1.0 mmol/L    O2 METHOD VENTILATOR      FIO2 50 %    MODE A/C      Tidal volume 500      SET RATE 16      EPAP/CPAP/PEEP 6.0      Sample source ARTERIAL      SITE RIGHT RADIAL      TERE'S TEST YES     MISC. LAB TEST   Result Value Ref Range    Test Description: LYRICA LEVEL     Reference Lab: [email or web address]     Results: SEE REPORT FROM [facility]    METABOLIC PANEL, COMPREHENSIVE   Result Value Ref Range    Sodium 136 136 - 145 mmol/L    Potassium 4.4 3.5 - 5.1 mmol/L    Chloride 103 97 - 108 mmol/L    CO2 26 21 - 32 mmol/L    Anion gap 7 5 - 15 mmol/L    Glucose 123 (H) 65 - 100 mg/dL    BUN 17 6 - 20 MG/DL    Creatinine 1.04 0.70 - 1.30 MG/DL    BUN/Creatinine ratio 16 12 - 20      GFR est AA >60 >60 ml/min/1.73m2    GFR est non-AA >60 >60 ml/min/1.73m2    Calcium 8.2 (L) 8.5 - 10.1 MG/DL    Bilirubin, total 1.4 (H) 0.2 - 1.0 MG/DL    ALT (SGPT) 15 12 - 78 U/L    AST (SGOT) 14 (L) 15 - 37 U/L    Alk.  phosphatase 59 45 - 117 U/L    Protein, total 7.1 6.4 - 8.2 g/dL    Albumin 3.4 (L) 3.5 - 5.0 g/dL    Globulin 3.7 2.0 - 4.0 g/dL    A-G Ratio 0.9 (L) 1.1 - 2.2     CBC WITH AUTOMATED DIFF   Result Value Ref Range    WBC 14.1 (H) 4.1 - 11.1 K/uL    RBC 3.59 (L) 4.10 - 5.70 M/uL    HGB 12.0 (L) 12.1 - 17.0 g/dL    HCT 34.9 (L) 36.6 - 50.3 %    MCV 97.2 80.0 - 99.0 FL    MCH 33.4 26.0 - 34.0 PG    MCHC 34.4 30.0 - 36.5 g/dL    RDW 12.8 11.5 - 14.5 %    PLATELET 96 (L) 707 - 400 K/uL    NEUTROPHILS 83 (H) 32 - 75 % LYMPHOCYTES 9 (L) 12 - 49 %    MONOCYTES 8 5 - 13 %    EOSINOPHILS 0 0 - 7 %    BASOPHILS 0 0 - 1 %    ABS. NEUTROPHILS 11.7 (H) 1.8 - 8.0 K/UL    ABS. LYMPHOCYTES 1.3 0.8 - 3.5 K/UL    ABS. MONOCYTES 1.1 (H) 0.0 - 1.0 K/UL    ABS. EOSINOPHILS 0.0 0.0 - 0.4 K/UL    ABS.  BASOPHILS 0.0 0.0 - 0.1 K/UL   TROPONIN I   Result Value Ref Range    Troponin-I, Qt. <0.04 <0.05 ng/mL   CBC W/O DIFF   Result Value Ref Range    WBC 8.0 4.1 - 11.1 K/uL    RBC 3.43 (L) 4.10 - 5.70 M/uL    HGB 11.3 (L) 12.1 - 17.0 g/dL    HCT 33.3 (L) 36.6 - 50.3 %    MCV 97.1 80.0 - 99.0 FL    MCH 32.9 26.0 - 34.0 PG    MCHC 33.9 30.0 - 36.5 g/dL    RDW 12.7 11.5 - 14.5 %    PLATELET 77 (L) 286 - 834 K/uL   METABOLIC PANEL, BASIC   Result Value Ref Range    Sodium 138 136 - 145 mmol/L    Potassium 3.7 3.5 - 5.1 mmol/L    Chloride 105 97 - 108 mmol/L    CO2 27 21 - 32 mmol/L    Anion gap 6 5 - 15 mmol/L    Glucose 100 65 - 100 mg/dL    BUN 12 6 - 20 MG/DL    Creatinine 0.71 0.70 - 1.30 MG/DL    BUN/Creatinine ratio 17 12 - 20      GFR est AA >60 >60 ml/min/1.73m2    GFR est non-AA >60 >60 ml/min/1.73m2    Calcium 8.6 8.5 - 10.1 MG/DL   MAGNESIUM   Result Value Ref Range    Magnesium 2.2 1.6 - 2.4 mg/dL   PHOSPHORUS   Result Value Ref Range    Phosphorus 2.4 (L) 2.6 - 4.7 MG/DL   BLOOD GAS, ARTERIAL   Result Value Ref Range    pH 7.27 (L) 7.35 - 7.45      PCO2 59 (H) 35.0 - 45.0 mmHg    PO2 95 80 - 100 mmHg    O2 SAT 96 92 - 97 %    BICARBONATE 27 (H) 22 - 26 mmol/L    BASE DEFICIT 1.3 mmol/L    O2 METHOD NASAL O2      O2 FLOW RATE 4.00 L/min    SPONTANEOUS RATE 26.0      Sample source ARTERIAL      SITE LEFT BRACHIAL      TERE'S TEST N/A     CBC W/O DIFF   Result Value Ref Range    WBC 7.8 4.1 - 11.1 K/uL    RBC 3.40 (L) 4.10 - 5.70 M/uL    HGB 11.6 (L) 12.1 - 17.0 g/dL    HCT 33.2 (L) 36.6 - 50.3 %    MCV 97.6 80.0 - 99.0 FL    MCH 34.1 (H) 26.0 - 34.0 PG    MCHC 34.9 30.0 - 36.5 g/dL    RDW 12.4 11.5 - 14.5 %    PLATELET 77 (L) 560 - 400 K/uL METABOLIC PANEL, BASIC   Result Value Ref Range    Sodium 143 136 - 145 mmol/L    Potassium 3.5 3.5 - 5.1 mmol/L    Chloride 108 97 - 108 mmol/L    CO2 29 21 - 32 mmol/L    Anion gap 6 5 - 15 mmol/L    Glucose 119 (H) 65 - 100 mg/dL    BUN 8 6 - 20 MG/DL    Creatinine 0.71 0.70 - 1.30 MG/DL    BUN/Creatinine ratio 11 (L) 12 - 20      GFR est AA >60 >60 ml/min/1.73m2    GFR est non-AA >60 >60 ml/min/1.73m2    Calcium 8.9 8.5 - 10.1 MG/DL   MAGNESIUM   Result Value Ref Range    Magnesium 1.8 1.6 - 2.4 mg/dL   PHOSPHORUS   Result Value Ref Range    Phosphorus 2.4 (L) 2.6 - 4.7 MG/DL   BLOOD GAS, ARTERIAL   Result Value Ref Range    pH 7.38 7.35 - 7.45      PCO2 45 35.0 - 45.0 mmHg    PO2 104 (H) 80 - 100 mmHg    O2 SAT 98 (H) 92 - 97 %    BICARBONATE 26 22 - 26 mmol/L    BASE EXCESS 0.8 mmol/L    O2 METHOD NASAL O2      O2 FLOW RATE 2.00 L/min    SPONTANEOUS RATE 26.0      Sample source ARTERIAL      SITE LEFT RADIAL      TERE'S TEST YES     VITAMIN B12   Result Value Ref Range    Vitamin B12 1064 (H) 211 - 911 pg/mL   TSH 3RD GENERATION   Result Value Ref Range    TSH 0.93 0.36 - 3.74 uIU/mL   AMMONIA   Result Value Ref Range    Ammonia 25 <32 UMOL/L   CBC W/O DIFF   Result Value Ref Range    WBC 6.2 4.1 - 11.1 K/uL    RBC 3.64 (L) 4.10 - 5.70 M/uL    HGB 12.0 (L) 12.1 - 17.0 g/dL    HCT 33.9 (L) 36.6 - 50.3 %    MCV 93.1 80.0 - 99.0 FL    MCH 33.0 26.0 - 34.0 PG    MCHC 35.4 30.0 - 36.5 g/dL    RDW 12.1 11.5 - 14.5 %    PLATELET 87 (L) 957 - 327 K/uL   METABOLIC PANEL, BASIC   Result Value Ref Range    Sodium 138 136 - 145 mmol/L    Potassium 3.2 (L) 3.5 - 5.1 mmol/L    Chloride 105 97 - 108 mmol/L    CO2 26 21 - 32 mmol/L    Anion gap 7 5 - 15 mmol/L    Glucose 111 (H) 65 - 100 mg/dL    BUN 7 6 - 20 MG/DL    Creatinine 0.61 (L) 0.70 - 1.30 MG/DL    BUN/Creatinine ratio 11 (L) 12 - 20      GFR est AA >60 >60 ml/min/1.73m2    GFR est non-AA >60 >60 ml/min/1.73m2    Calcium 8.6 8.5 - 10.1 MG/DL   MAGNESIUM   Result Value Ref Range    Magnesium 1.5 (L) 1.6 - 2.4 mg/dL   PHOSPHORUS   Result Value Ref Range    Phosphorus 2.2 (L) 2.6 - 4.7 MG/DL   METABOLIC PANEL, COMPREHENSIVE   Result Value Ref Range    Sodium 138 136 - 145 mmol/L    Potassium 3.3 (L) 3.5 - 5.1 mmol/L    Chloride 105 97 - 108 mmol/L    CO2 24 21 - 32 mmol/L    Anion gap 9 5 - 15 mmol/L    Glucose 102 (H) 65 - 100 mg/dL    BUN 9 6 - 20 MG/DL    Creatinine 0.52 (L) 0.70 - 1.30 MG/DL    BUN/Creatinine ratio 17 12 - 20      GFR est AA >60 >60 ml/min/1.73m2    GFR est non-AA >60 >60 ml/min/1.73m2    Calcium 8.6 8.5 - 10.1 MG/DL    Bilirubin, total 1.2 (H) 0.2 - 1.0 MG/DL    ALT (SGPT) 17 12 - 78 U/L    AST (SGOT) 23 15 - 37 U/L    Alk. phosphatase 56 45 - 117 U/L    Protein, total 7.5 6.4 - 8.2 g/dL    Albumin 2.9 (L) 3.5 - 5.0 g/dL    Globulin 4.6 (H) 2.0 - 4.0 g/dL    A-G Ratio 0.6 (L) 1.1 - 2.2     MAGNESIUM   Result Value Ref Range    Magnesium 1.6 1.6 - 2.4 mg/dL   PHOSPHORUS   Result Value Ref Range    Phosphorus 3.0 2.6 - 4.7 MG/DL   CBC WITH AUTOMATED DIFF   Result Value Ref Range    WBC 5.4 4.1 - 11.1 K/uL    RBC 3.58 (L) 4.10 - 5.70 M/uL    HGB 11.9 (L) 12.1 - 17.0 g/dL    HCT 33.2 (L) 36.6 - 50.3 %    MCV 92.7 80.0 - 99.0 FL    MCH 33.2 26.0 - 34.0 PG    MCHC 35.8 30.0 - 36.5 g/dL    RDW 12.1 11.5 - 14.5 %    PLATELET 141 (L) 789 - 400 K/uL    NEUTROPHILS 61 32 - 75 %    LYMPHOCYTES 18 12 - 49 %    MONOCYTES 18 (H) 5 - 13 %    EOSINOPHILS 3 0 - 7 %    BASOPHILS 0 0 - 1 %    ABS. NEUTROPHILS 3.3 1.8 - 8.0 K/UL    ABS. LYMPHOCYTES 1.0 0.8 - 3.5 K/UL    ABS. MONOCYTES 1.0 0.0 - 1.0 K/UL    ABS. EOSINOPHILS 0.2 0.0 - 0.4 K/UL    ABS.  BASOPHILS 0.0 0.0 - 0.1 K/UL   GLUCOSE, POC   Result Value Ref Range    Glucose (POC) 110 (H) 65 - 100 mg/dL    Performed by [facility]    POC CREATININE   Result Value Ref Range    Creatinine (POC) 0.9 0.6 - 1.3 MG/DL    GFRAA, POC >60 >60 ml/min/1.73m2    GFRNA, POC >60 >60 ml/min/1.73m2   POC CHEM8   Result Value Ref Range    Calcium, ionized (POC) 1.14 1.12 - 1.32 MMOL/L    Sodium (POC) 140 136 - 145 MMOL/L    Potassium (POC) 4.3 3.5 - 5.1 MMOL/L    Chloride (POC) 101 98 - 107 MMOL/L    CO2 (POC) 30 21 - 32 MMOL/L    Anion gap (POC) 14 5 - 15 mmol/L    Glucose (POC) 107 (H) 65 - 100 MG/DL    BUN (POC) 15 9 - 20 MG/DL    Creatinine (POC) 0.9 0.6 - 1.3 MG/DL    GFRAA, POC >60 >60 ml/min/1.73m2    GFRNA, POC >60 >60 ml/min/1.73m2    Hemoglobin (POC) 14.3 12.1 - 17.0 GM/DL    Hematocrit (POC) 42 36.6 - 50.3 %    Comment Comment Not Indicated. EKG, 12 LEAD, INITIAL   Result Value Ref Range    Ventricular Rate 88 BPM    Atrial Rate 88 BPM    P-R Interval 294 ms    QRS Duration 82 ms    Q-T Interval 368 ms    QTC Calculation (Bezet) 445 ms    Calculated P Axis 61 degrees    Calculated R Axis -38 degrees    Calculated T Axis 52 degrees    Diagnosis       Sinus rhythm with 1st degree AV block  Left axis deviation  Nonspecific T wave abnormality  When compared with ECG of 06-SEP-2012 03:00,  WV interval has increased  ST no longer elevated in Anterior leads  Nonspecific T wave abnormality, worse in Anterolateral leads  Confirmed by Edmond Patel ([id number]) on 6/25/2017 1:15:25 PM          Imaging review:  6/24/2017  CT scan of the head without contrast  No acute intracranial abnormality    CT angiogram of the head and neck  No significant cervical carotid artery stenosis. No large vessel intracranial vascular occlusive change. HOME MEDS  Prior to Admission Medications   Prescriptions Last Dose Informant Patient Reported? Taking?   aspirin (ASPIRIN) 325 mg tablet   Yes No   Sig: Take 1 Tab by mouth daily. atorvastatin (LIPITOR) 40 mg tablet   No No   Sig: Take 1 Tab by mouth daily. cyclobenzaprine (FLEXERIL) 5 mg tablet   No No   Sig: Take 1 Tab by mouth three (3) times daily as needed for Muscle Spasm(s). levETIRAcetam (KEPPRA) 250 mg tablet   No No   Sig: Take 1 Tab by mouth two (2) times a day.  Take with the 1000mg to make 1250mg twice a day. levETIRAcetam 1,000 mg tablet   No No   Sig: Take 1 Tab by mouth two (2) times a day. magnesium oxide (MAG-OX) 400 mg tablet   Yes No   Sig: Take 400 mg by mouth daily. metoprolol succinate (TOPROL-XL) 25 mg XL tablet   Yes No   Sig: Take  by mouth daily. omeprazole (PRILOSEC) 40 mg capsule   Yes No   Sig: Take 40 mg by mouth daily. pregabalin (LYRICA) 300 mg capsule   No No   Sig: Take 1 Cap by mouth two (2) times a day. Max Daily Amount: 600 mg.   thiamine (B-1) 100 mg tablet   No No   Sig: Take 1 Tab by mouth daily.       Facility-Administered Medications: None       CURRENT MEDS  Current Facility-Administered Medications   Medication Dose Route Frequency    dexmedetomidine (PRECEDEX) 400 mcg in 0.9% sodium chloride 100 mL infusion  0.2-0.7 mcg/kg/hr IntraVENous TITRATE    sodium chloride (NS) flush 10 mL  10 mL InterCATHeter Q24H    sodium chloride (NS) flush 10-40 mL  10-40 mL InterCATHeter Q8H    sodium chloride (NS) flush 20 mL  20 mL InterCATHeter Q24H    latanoprost (XALATAN) 0.005 % ophthalmic solution 1 Drop  1 Drop Both Eyes QPM    haloperidol lactate (HALDOL) injection 5 mg  5 mg IntraVENous TID    famotidine (PF) (PEPCID) 20 mg in sodium chloride 0.9 % 10 mL injection  20 mg IntraVENous Q12H    thiamine (B-1) 100 mg in 0.9% sodium chloride 50 mL IVPB  100 mg IntraVENous DAILY    metoprolol (LOPRESSOR) injection 5 mg  5 mg IntraVENous Q6H    chlorhexidine (PERIDEX) 0.12 % mouthwash 15 mL  15 mL Oral BID    mupirocin (BACTROBAN) 2 % ointment   Both Nostrils BID    atorvastatin (LIPITOR) tablet 40 mg  40 mg Per NG tube DAILY    pregabalin (LYRICA) capsule 300 mg  300 mg Oral BID    sodium chloride (NS) flush 5-10 mL  5-10 mL IntraVENous Q8H    enoxaparin (LOVENOX) injection 40 mg  40 mg SubCUTAneous Q24H    aspirin (ASA) suppository 300 mg  300 mg Rectal DAILY    0.9% sodium chloride infusion  75 mL/hr IntraVENous CONTINUOUS    levETIRAcetam (KEPPRA) 1,500 mg in 0.9% sodium chloride IVPB  1,500 mg IntraVENous Q12H       IMPRESSION:  Sneha Cedillo is a 80 y.o. male who presents with altered mental status since this morning and in the emergency room had stiffening on right side of the body along with deviation of the eye to the right. Left hemispheric seizure suspected. The patient was taking Keppra 1250 mg p.o. twice daily and Lyrica 300 mg p.o. twice daily. RECOMMENDATIONS:  1. No improvement in mental status. Blood work is normal. Repeat EEG shows slowing but no seizure activity. Will get a MRI of the brain to check for any ischemic event. Not sure why the pt is still having altered mental status. 2.  Continue Keppra 1500 mg p.o. twice daily  3. Continue Lyrica 300 mg p.o. twice daily      Patient is critically ill with status epilepticus. Significant risk of morbidity and mortality. 30 minutes of critical care time provided.       Cleve Walker MD  Neurologist

## 2017-06-29 NOTE — PROGRESS NOTES

## 2017-06-30 ENCOUNTER — TELEPHONE (OUTPATIENT)
Dept: NEUROLOGY | Age: 82
End: 2017-06-30

## 2017-06-30 ENCOUNTER — APPOINTMENT (OUTPATIENT)
Dept: GENERAL RADIOLOGY | Age: 82
DRG: 208 | End: 2017-06-30
Attending: INTERNAL MEDICINE
Payer: MEDICARE

## 2017-06-30 LAB
ANION GAP BLD CALC-SCNC: 13 MMOL/L (ref 5–15)
BACTERIA SPEC CULT: NORMAL
BUN SERPL-MCNC: 10 MG/DL (ref 6–20)
BUN/CREAT SERPL: 20 (ref 12–20)
CALCIUM SERPL-MCNC: 8.6 MG/DL (ref 8.5–10.1)
CHLORIDE SERPL-SCNC: 104 MMOL/L (ref 97–108)
CO2 SERPL-SCNC: 22 MMOL/L (ref 21–32)
CREAT SERPL-MCNC: 0.49 MG/DL (ref 0.7–1.3)
ERYTHROCYTE [DISTWIDTH] IN BLOOD BY AUTOMATED COUNT: 12.3 % (ref 11.5–14.5)
GLUCOSE BLD STRIP.AUTO-MCNC: 112 MG/DL (ref 65–100)
GLUCOSE BLD STRIP.AUTO-MCNC: 92 MG/DL (ref 65–100)
GLUCOSE SERPL-MCNC: 92 MG/DL (ref 65–100)
HCT VFR BLD AUTO: 33.1 % (ref 36.6–50.3)
HGB BLD-MCNC: 11.9 G/DL (ref 12.1–17)
MAGNESIUM SERPL-MCNC: 1.3 MG/DL (ref 1.6–2.4)
MCH RBC QN AUTO: 33.8 PG (ref 26–34)
MCHC RBC AUTO-ENTMCNC: 36 G/DL (ref 30–36.5)
MCV RBC AUTO: 94 FL (ref 80–99)
PHOSPHATE SERPL-MCNC: 2.8 MG/DL (ref 2.6–4.7)
PLATELET # BLD AUTO: 112 K/UL (ref 150–400)
POTASSIUM SERPL-SCNC: 3.3 MMOL/L (ref 3.5–5.1)
RBC # BLD AUTO: 3.52 M/UL (ref 4.1–5.7)
SERVICE CMNT-IMP: ABNORMAL
SERVICE CMNT-IMP: NORMAL
SERVICE CMNT-IMP: NORMAL
SODIUM SERPL-SCNC: 139 MMOL/L (ref 136–145)
WBC # BLD AUTO: 4.7 K/UL (ref 4.1–11.1)

## 2017-06-30 PROCEDURE — 74011000250 HC RX REV CODE- 250: Performed by: INTERNAL MEDICINE

## 2017-06-30 PROCEDURE — 97162 PT EVAL MOD COMPLEX 30 MIN: CPT

## 2017-06-30 PROCEDURE — 83735 ASSAY OF MAGNESIUM: CPT | Performed by: INTERNAL MEDICINE

## 2017-06-30 PROCEDURE — 77030011256 HC DRSG MEPILEX <16IN NO BORD MOLN -A

## 2017-06-30 PROCEDURE — 94640 AIRWAY INHALATION TREATMENT: CPT

## 2017-06-30 PROCEDURE — 36415 COLL VENOUS BLD VENIPUNCTURE: CPT | Performed by: INTERNAL MEDICINE

## 2017-06-30 PROCEDURE — 82962 GLUCOSE BLOOD TEST: CPT

## 2017-06-30 PROCEDURE — G8979 MOBILITY GOAL STATUS: HCPCS

## 2017-06-30 PROCEDURE — 84100 ASSAY OF PHOSPHORUS: CPT | Performed by: INTERNAL MEDICINE

## 2017-06-30 PROCEDURE — 92610 EVALUATE SWALLOWING FUNCTION: CPT

## 2017-06-30 PROCEDURE — 74011250636 HC RX REV CODE- 250/636: Performed by: INTERNAL MEDICINE

## 2017-06-30 PROCEDURE — G8987 SELF CARE CURRENT STATUS: HCPCS

## 2017-06-30 PROCEDURE — 74011250637 HC RX REV CODE- 250/637: Performed by: INTERNAL MEDICINE

## 2017-06-30 PROCEDURE — G8978 MOBILITY CURRENT STATUS: HCPCS

## 2017-06-30 PROCEDURE — 74000 XR ABD PORT  1 V: CPT

## 2017-06-30 PROCEDURE — 85027 COMPLETE CBC AUTOMATED: CPT | Performed by: INTERNAL MEDICINE

## 2017-06-30 PROCEDURE — 74011250636 HC RX REV CODE- 250/636: Performed by: PSYCHIATRY & NEUROLOGY

## 2017-06-30 PROCEDURE — 74011000258 HC RX REV CODE- 258: Performed by: INTERNAL MEDICINE

## 2017-06-30 PROCEDURE — 77030010547 HC BG URIN W/UMETER -A

## 2017-06-30 PROCEDURE — 97165 OT EVAL LOW COMPLEX 30 MIN: CPT

## 2017-06-30 PROCEDURE — 77030011992 HC AIRWY NASOPHGL TELE -A

## 2017-06-30 PROCEDURE — 80048 BASIC METABOLIC PNL TOTAL CA: CPT | Performed by: INTERNAL MEDICINE

## 2017-06-30 PROCEDURE — 65610000006 HC RM INTENSIVE CARE

## 2017-06-30 PROCEDURE — 74011000258 HC RX REV CODE- 258: Performed by: PSYCHIATRY & NEUROLOGY

## 2017-06-30 RX ORDER — METOPROLOL TARTRATE 5 MG/5ML
5 INJECTION INTRAVENOUS
Status: DISCONTINUED | OUTPATIENT
Start: 2017-06-30 | End: 2017-07-01

## 2017-06-30 RX ORDER — METOPROLOL TARTRATE 5 MG/5ML
10 INJECTION INTRAVENOUS EVERY 6 HOURS
Status: DISCONTINUED | OUTPATIENT
Start: 2017-06-30 | End: 2017-07-01

## 2017-06-30 RX ORDER — MAGNESIUM SULFATE HEPTAHYDRATE 40 MG/ML
2 INJECTION, SOLUTION INTRAVENOUS ONCE
Status: COMPLETED | OUTPATIENT
Start: 2017-06-30 | End: 2017-06-30

## 2017-06-30 RX ORDER — POTASSIUM CHLORIDE 29.8 MG/ML
20 INJECTION INTRAVENOUS
Status: COMPLETED | OUTPATIENT
Start: 2017-06-30 | End: 2017-06-30

## 2017-06-30 RX ADMIN — SODIUM CHLORIDE 0.2 MCG/KG/HR: 900 INJECTION, SOLUTION INTRAVENOUS at 03:26

## 2017-06-30 RX ADMIN — THIAMINE HYDROCHLORIDE 100 MG: 100 INJECTION, SOLUTION INTRAMUSCULAR; INTRAVENOUS at 10:42

## 2017-06-30 RX ADMIN — PREGABALIN 300 MG: 150 CAPSULE ORAL at 18:04

## 2017-06-30 RX ADMIN — Medication 10 ML: at 21:47

## 2017-06-30 RX ADMIN — METOPROLOL TARTRATE 5 MG: 5 INJECTION INTRAVENOUS at 14:09

## 2017-06-30 RX ADMIN — LEVETIRACETAM 1500 MG: 100 INJECTION, SOLUTION, CONCENTRATE INTRAVENOUS at 20:20

## 2017-06-30 RX ADMIN — SODIUM CHLORIDE 75 ML/HR: 900 INJECTION, SOLUTION INTRAVENOUS at 16:29

## 2017-06-30 RX ADMIN — ENOXAPARIN SODIUM 40 MG: 40 INJECTION SUBCUTANEOUS at 08:48

## 2017-06-30 RX ADMIN — ASPIRIN 300 MG: 300 SUPPOSITORY RECTAL at 10:40

## 2017-06-30 RX ADMIN — METOPROLOL TARTRATE 10 MG: 5 INJECTION INTRAVENOUS at 16:45

## 2017-06-30 RX ADMIN — METOPROLOL TARTRATE 5 MG: 5 INJECTION INTRAVENOUS at 05:41

## 2017-06-30 RX ADMIN — LEVETIRACETAM 1500 MG: 100 INJECTION, SOLUTION, CONCENTRATE INTRAVENOUS at 10:40

## 2017-06-30 RX ADMIN — Medication 30 ML: at 16:48

## 2017-06-30 RX ADMIN — HALOPERIDOL LACTATE 5 MG: 5 INJECTION, SOLUTION INTRAMUSCULAR at 08:52

## 2017-06-30 RX ADMIN — FAMOTIDINE 20 MG: 10 INJECTION, SOLUTION INTRAVENOUS at 08:48

## 2017-06-30 RX ADMIN — CHLORHEXIDINE GLUCONATE 15 ML: 1.2 RINSE ORAL at 18:08

## 2017-06-30 RX ADMIN — METOPROLOL TARTRATE 5 MG: 5 INJECTION INTRAVENOUS at 01:00

## 2017-06-30 RX ADMIN — FAMOTIDINE 20 MG: 10 INJECTION, SOLUTION INTRAVENOUS at 20:16

## 2017-06-30 RX ADMIN — FENTANYL CITRATE 50 MCG: 50 INJECTION, SOLUTION INTRAMUSCULAR; INTRAVENOUS at 10:47

## 2017-06-30 RX ADMIN — POTASSIUM CHLORIDE 20 MEQ: 400 INJECTION, SOLUTION INTRAVENOUS at 13:27

## 2017-06-30 RX ADMIN — IPRATROPIUM BROMIDE AND ALBUTEROL SULFATE 3 ML: .5; 3 SOLUTION RESPIRATORY (INHALATION) at 20:09

## 2017-06-30 RX ADMIN — Medication 10 ML: at 19:53

## 2017-06-30 RX ADMIN — HALOPERIDOL LACTATE 5 MG: 5 INJECTION, SOLUTION INTRAMUSCULAR at 21:45

## 2017-06-30 RX ADMIN — FENTANYL CITRATE 50 MCG: 50 INJECTION, SOLUTION INTRAMUSCULAR; INTRAVENOUS at 16:50

## 2017-06-30 RX ADMIN — MAGNESIUM SULFATE HEPTAHYDRATE 2 G: 40 INJECTION, SOLUTION INTRAVENOUS at 10:50

## 2017-06-30 RX ADMIN — SODIUM CHLORIDE 0.2 MCG/KG/HR: 900 INJECTION, SOLUTION INTRAVENOUS at 18:48

## 2017-06-30 RX ADMIN — HALOPERIDOL LACTATE 5 MG: 5 INJECTION, SOLUTION INTRAMUSCULAR at 16:49

## 2017-06-30 RX ADMIN — SODIUM CHLORIDE 75 ML/HR: 900 INJECTION, SOLUTION INTRAVENOUS at 23:45

## 2017-06-30 RX ADMIN — LATANOPROST 1 DROP: 50 SOLUTION OPHTHALMIC at 18:11

## 2017-06-30 RX ADMIN — POTASSIUM CHLORIDE 20 MEQ: 400 INJECTION, SOLUTION INTRAVENOUS at 10:51

## 2017-06-30 NOTE — PROGRESS NOTES
Hospitalist Progress Note    NAME: Broderick Ortega   :  1934   MRN:  [id number]       Assessment / Plan: Witnessed left hemispheric Seizure in ER with Acute respiratory failure and hypoxia, requiring intubation [id number], self  extubated  am. No obvious infection. CXR no infiltrate. Blood culture. no urine culture indicated by UA. Metabolic encephalopathy secondary to presumed prolonged seizure at home (status epilepticus) after stopping lyrica improving and following some commands. keppra iv and lyrica orally. ct head no abnormality, ct angiogram of head and neck, no overt pathology. vitamin b1, electrolyte replacement, haldol as needed. Ammonia normal. sitter and restraints as needed. Discussed tube feedings with family, reconsider July 3. Weaned presedex today with improved mentation. htn metoprolol iv scheduled. CAD/CVA risk with carotid stenosis. 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 Remote etoh use per family, last drink 5 years ago. Code status: full, wife is nok  Prophylaxis: lovenox, continues on pepcid iv from intubation  Recommended Disposition: snf     Subjective:     Chief Complaint / Reason for Physician Visit  Answers to name     Discussed with RN events overnight. Review of Systems:  Symptom Y/N Comments  Symptom Y/N Comments   Fever/Chills    Chest Pain     Poor Appetite    Edema     Cough    Abdominal Pain     Sputum    Joint Pain     SOB/AL    Pruritis/Rash     Nausea/vomit    Tolerating PT/OT     Diarrhea    Tolerating Diet     Constipation    Other       Could NOT obtain due to: Confusion, some improvenent     Objective:     VITALS:   Last 24hrs VS reviewed since prior progress note.  Most recent are:  Patient Vitals for the past 24 hrs:   Temp Pulse Resp BP SpO2   17 1700 - 80 22 159/90 95 %   17 1600 - 97 23 (!) 193/111 98 % 06/30/17 1535 - (!) 104 25 (!) 176/98 97 %   06/30/17 1400 - 76 17 158/88 97 %   06/30/17 1300 - (!) 109 22 (!) 151/111 98 %   06/30/17 1200 - (!) 102 25 (!) 165/122 98 %   06/30/17 1100 - (!) 105 27 (!) 163/92 97 %   06/30/17 1000 - 99 15 (!) 182/100 98 %   06/30/17 0900 - 81 20 169/79 99 %   06/30/17 0800 98.8 °F (37.1 °C) 72 23 168/86 98 %   06/30/17 0600 - 66 24 167/79 100 %   06/30/17 0541 - 68 - 157/78 -   06/30/17 0500 - 60 15 - 99 %   06/30/17 0400 - (!) 51 15 133/57 99 %   06/30/17 0300 - 62 17 (!) 161/96 99 %   06/30/17 0200 - (!) 58 20 158/70 99 %   06/30/17 0100 - 78 25 161/88 99 %   06/30/17 0000 - 83 24 (!) 169/92 99 %   06/29/17 2300 - (!) 58 16 150/69 99 %   06/29/17 2255 - (!) 52 18 141/66 99 %   06/29/17 2200 - 75 22 - -   06/29/17 2100 - 66 21 169/76 99 %   06/29/17 2000 98.5 °F (36.9 °C) (!) 54 16 150/77 99 %   06/29/17 1900 - 64 14 162/75 99 %   06/29/17 1800 - 64 15 164/81 99 %       Intake/Output Summary (Last 24 hours) at 06/30/17 1726  Last data filed at 06/30/17 1400   Gross per 24 hour   Intake          1309.09 ml   Output              250 ml   Net          1059.09 ml        PHYSICAL EXAM:  General: Coughing, restraints intact, sitter present.    EENT:  No stridor, trachea midline. Anicteric sclerae. Resp:  Coarse bilaterally, no wheezing or rales. No accessory muscle use, upper secretions heard  CV:  Regular  rhythm,  No edema  GI:  Soft, Non distended, Non tender.  +Bowel sounds  Neurologic:  Tracks with eyes, answers to name restraints intact   Psych:   Minor aggitation  Skin:  No rashes.   No jaundice    Reviewed most current lab test results and cultures  YES  Reviewed most current radiology test results   YES  Review and summation of old records today    NO  Reviewed patient's current orders and MAR    YES  PMH/ reviewed - no change compared to H&P  ________________________________________________________________________  Care Plan discussed with:    Comments   Patient x Family      RN x    Care Manager     Consultant                        Multidiciplinary team rounds were held today with , nursing, pharmacist and clinical coordinator. Patient's plan of care was discussed; medications were reviewed and discharge planning was addressed. ________________________________________________________________________  Total NON critical care TIME:   19   Minutes    Total CRITICAL CARE TIME Spent:   Minutes non procedure based      Comments   >50% of visit spent in counseling and coordination of care     ________________________________________________________________________  Jassi Franklin DO     Procedures: see electronic medical records for all procedures/Xrays and details which were not copied into this note but were reviewed prior to creation of Plan. LABS:  I reviewed today's most current labs and imaging studies.   Pertinent labs include:  Recent Labs      06/30/17   0354  06/29/17   0537  06/28/17   0356   WBC  4.7  5.4  6.2   HGB  11.9*  11.9*  12.0*   HCT  33.1*  33.2*  33.9*   PLT  112*  103*  87*     Recent Labs      06/30/17   0354  06/29/17   0537  06/28/17   0356   NA  139  138  138   K  3.3*  3.3*  3.2*   CL  104  105  105   CO2  22  24  26   GLU  92  102*  111*   BUN  10  9  7   CREA  0.49*  0.52*  0.61*   CA  8.6  8.6  8.6   MG  1.3*  1.6  1.5*   PHOS  2.8  3.0  2.2*   ALB   --   2.9*   --    TBILI   --   1.2*   --    SGOT   --   23   --    ALT   --   17   --        Signed: Jassi Franklin DO

## 2017-06-30 NOTE — PROGRESS NOTES
0745 Bed side shift report received from [facility].    0820 Assessment ocmpleted. Restraints removed during assessment period. He has slow movement not attempting to grab at central line at this time. Precedex decreased to 0.1 mcg/kg/hr. Will continue to monitor closely. Condom catheter in place and draining. 0830 Patient without restraints for 10 minutes. Nurse remained at the bedside the entire time. He started pulling at his condom catheter, increased restlessness. Restraints replaced. 1047 Patient complaining of pain, restless. Fentanyl 50mcg given. 1340 Limit responsiveness. Precedex stopped. 1420 NGT placed with out difficulty. 1650 Patient complaining of pain, restless. Fentanyl 50mcg given. 1845 TF started, patient confused and restless attempting to pull lines and get out of bed. Sitter at the bed side and restraints in place. Precedex restarted. 1916 Report given to [facility]. Patient remains confused and restless. Continues to sound wet/coarse in his throat. Suctioned about every two hours with moderated to copious secretions.

## 2017-06-30 NOTE — PROGRESS NOTES
[facility]  Pulmonary, Critical Care, and Sleep Medicine    Name: Geovani Valenzuela MRN: [id number]   : 1934 Hospital: [facility]   Date: 2017        IMPRESSION:   · Acute respiratory failure - airway protection - now extubated  · Persistent encephalopathy  · Seizures with status epilepticus  · HTN  · H/O SVT  · Peripheral neuropathy  · Right upper lobe scar/infiltrate - no tobacco history per records - has been present since at least February of this year, may be present to some degree on chest X-ray from   · Suspect COPD      PLAN:   · On room air  · Bronchodilators   · Antiepileptics  · Neurology eval ongoing - encephalopathy persists - MRI pending  · Antihypertensives  · Monitor platelets   · Antipsychotics for agitation  · Restrain for safety  · DVT prophylaxis     Subjective/Interval History:   I have reviewed the flowsheet and previous days notes. No meaningful history from patient. More alert, but still confused and difficult to control. Pulls at lines, etc.     Review of Systems   Unable to perform ROS: Mental status change     Objective:   Vital Signs:    Visit Vitals    /86 (BP 1 Location: Left arm, BP Patient Position: At rest)    Pulse 72    Temp 98.8 °F (37.1 °C)    Resp 23    Ht 5' 8\" (1.727 m)    Wt 72.4 kg (159 lb 9.8 oz)    SpO2 98%    BMI 24.27 kg/m2       O2 Device: Room air   O2 Flow Rate (L/min): 2 l/min   Temp (24hrs), Av.6 °F (37 °C), Min:98.2 °F (36.8 °C), Max:98.8 °F (37.1 °C)       Intake/Output:   Last shift:         Last 3 shifts: 1901 -  07  In: 3157.3 [I.V.:3157.3]  Out: 150 [Urine:150]    Intake/Output Summary (Last 24 hours) at 17 0830  Last data filed at 17 0600   Gross per 24 hour   Intake          2176.16 ml   Output              100 ml   Net          2076.16 ml      Physical Exam   Constitutional: No distress. HENT:   Head: Normocephalic and atraumatic.    Mouth/Throat: No oropharyngeal exudate. Eyes: No scleral icterus. Cardiovascular: Normal rate and regular rhythm. Pulmonary/Chest: No respiratory distress. He has no wheezes. He has no rales. Abdominal: Soft. Bowel sounds are normal. He exhibits no distension. There is no tenderness. Musculoskeletal: He exhibits no edema. Neurological: He is alert. Skin: Skin is warm and dry.      Data:     Current Facility-Administered Medications   Medication Dose Route Frequency    dexmedetomidine (PRECEDEX) 400 mcg in 0.9% sodium chloride 100 mL infusion  0.2-0.7 mcg/kg/hr IntraVENous TITRATE    sodium chloride (NS) flush 10 mL  10 mL InterCATHeter Q24H    sodium chloride (NS) flush 10-40 mL  10-40 mL InterCATHeter Q8H    sodium chloride (NS) flush 20 mL  20 mL InterCATHeter Q24H    latanoprost (XALATAN) 0.005 % ophthalmic solution 1 Drop  1 Drop Both Eyes QPM    haloperidol lactate (HALDOL) injection 5 mg  5 mg IntraVENous TID    famotidine (PF) (PEPCID) 20 mg in sodium chloride 0.9 % 10 mL injection  20 mg IntraVENous Q12H    thiamine (B-1) 100 mg in 0.9% sodium chloride 50 mL IVPB  100 mg IntraVENous DAILY    metoprolol (LOPRESSOR) injection 5 mg  5 mg IntraVENous Q6H    chlorhexidine (PERIDEX) 0.12 % mouthwash 15 mL  15 mL Oral BID    atorvastatin (LIPITOR) tablet 40 mg  40 mg Per NG tube DAILY    pregabalin (LYRICA) capsule 300 mg  300 mg Oral BID    sodium chloride (NS) flush 5-10 mL  5-10 mL IntraVENous Q8H    enoxaparin (LOVENOX) injection 40 mg  40 mg SubCUTAneous Q24H    aspirin (ASA) suppository 300 mg  300 mg Rectal DAILY    0.9% sodium chloride infusion  75 mL/hr IntraVENous CONTINUOUS    levETIRAcetam (KEPPRA) 1,500 mg in 0.9% sodium chloride IVPB  1,500 mg IntraVENous Q12H                Labs:  Recent Labs      06/30/17   0354  06/29/17   0537  06/28/17 0356   WBC  4.7  5.4  6.2   HGB  11.9*  11.9*  12.0*   HCT  33.1*  33.2*  33.9*   PLT  112*  103*  87*     Recent Labs      06/30/17   0354 06/29/17   0537  06/28/17   0356   NA  139  138  138   K  3.3*  3.3*  3.2*   CL  104  105  105   CO2  22  24  26   GLU  92  102*  111*   BUN  10  9  7   CREA  0.49*  0.52*  0.61*   CA  8.6  8.6  8.6   MG  1.3*  1.6  1.5*   PHOS  2.8  3.0  2.2*   ALB   --   2.9*   --    TBILI   --   1.2*   --    SGOT   --   23   --    ALT   --   17   --      No results for input(s): PH, PCO2, PO2, HCO3, FIO2 in the last 72 hours.   Imaging:  I have personally reviewed the patients radiographs and have reviewed the reports:  None today        Total critical care time exclusive of procedures:   minutes  Mulugeta Allison MD

## 2017-06-30 NOTE — PROGRESS NOTES
1900- bedside report rc'd from [place], [place] and assumed care of pt. Sitter in room. Pt continues to be confused, attempting to get OOB, picking at lines. Wrist restraints bilat on.    0100-  Continue to monitor . Remains oriented to name only, confused . Haldol scheduled and given routinely. ttr precedex.       0700- Bedside report given to RICHARD Holcomb

## 2017-06-30 NOTE — PROGRESS NOTES
Problem: Self Care Deficits Care Plan (Adult)  Goal: *Acute Goals and Plan of Care (Insert Text)  Occupational Therapy Goals  Initiated 6/30/2017  1. Patient will perform self-feeding with maximal assistance within 7 day(s). 2. Patient will be able to sit on EOB for 5 minutes with min assist in prep for ADLs and within 7 days. 3. Patient will be able to follow one step verbal commands with max verbal cues, within 7days  OCCUPATIONAL THERAPY EVALUATION  Patient: Hilda Monroy (34 y.o. male)  Date: 6/30/2017  Primary Diagnosis: Status epilepticus ([facility] [address]        Precautions: fall         ASSESSMENT :  Based on the objective data described below, the patient presents with generalized weakness, decreased endurance, strength, functional mobility, cognition, bilateral wrists restraints, and ADLs. Unable to get PLOF from pt due to his confusion and decreased cognition. Pt was cleared to see for therapy, and he was supine in bed with sitter and bilateral wrist restraints. Pt was able to state his name when asked but was otherwise was not oriented . He was able to come to sitting on EOb with CGA  and  one hand was restrained at all time due to pts confusion and past agitation. Pt needed hand over hand assist to jaspreet his face and SLP came in to assess pt and he was not able to follow commands in order work with SLP. Jase Chandler He continued to try to stand and noted that he has full range in right UE and strength in right is good. Pt was put back to bed, min assist of 2. Pt  was starting to get more agitated once he was put in bed and then calmed. He was left in bed with sitter in room and bilateral wrist restraints. Recommend that pt have further therapy at discharge at [facility]. Patient will benefit from skilled intervention to address the above impairments.   Patients rehabilitation potential is considered to be Guarded  Factors which may influence rehabilitation potential include:   [ ]             None noted  [X] Mental ability/status  [X]             Medical condition  [X]             Home/family situation and support systems  [ ]             Safety awareness  [ ]             Pain tolerance/management  [ ]             Other:        PLAN :  Recommendations and Planned Interventions:  [X]               Self Care Training                  [ ]        Therapeutic Activities  [X]               Functional Mobility Training    [ ]        Cognitive Retraining  [X]               Therapeutic Exercises           [X]        Endurance Activities  [ ]               Balance Training                   [ ]        Neuromuscular Re-Education  [ ]               Visual/Perceptual Training     [X]   Home Safety Training  [X]               Patient Education                 [X]        Family Training/Education  [ ]               Other (comment):     Frequency/Duration: Patient will be followed by occupational therapy 3 times a week to address goals. Discharge Recommendations: Skilled Nursing Facility  Further Equipment Recommendations for Discharge: tbd       SUBJECTIVE:   Patient stated I need to wash my face.       OBJECTIVE DATA SUMMARY:   HISTORY:   Past Medical History:   Diagnosis Date    Alcohol abuse, in remission      Seizures ([facility] [address]     No past surgical history on file.      Prior Level of Function/Home Situation: unable to attain PLOF due to pts confusion  Expanded or extensive additional review of patient history:      Home Situation  Home Environment: Apartment  # Steps to Enter: 2  One/Two Story Residence: One story  Living Alone: No  Support Systems: Family member(s)  Patient Expects to be Discharged to[de-identified] Apartment  Current DME Used/Available at Home: None  [X]  Right hand dominant             [ ]  Left hand dominant     EXAMINATION OF PERFORMANCE DEFICITS:  Cognitive/Behavioral Status:  Neurologic State: Alert;Confused  Orientation Level: Oriented to person;Disoriented to time;Disoriented to situation;Disoriented to place  Cognition: Decreased command following;Decreased attention/concentration  Perception: Appears intact  Perseveration: Perseverates during conversation;Perseverates during ADLS  Safety/Judgement: Decreased awareness of environment;Decreased awareness of need for assistance;Decreased awareness of need for safety;Decreased insight into deficits     Skin: in good health      Edema: none      Hearing: Auditory  Auditory Impairment: Hard of hearing, bilateral     Vision/Perceptual:                 Difficult to assess                     Range of Motion:     AROM: Within functional limits  PROM: Within functional limits                       Strength:     Strength: Within functional limits                 Coordination:  Coordination: Generally decreased, functional             Tone & Sensation:     Tone: Normal  Sensation: Intact                       Balance:  Sitting: Impaired; With support  Sitting - Static: Fair (occasional)  Sitting - Dynamic: Fair (occasional)     Functional Mobility and Transfers for ADLs:  Bed Mobility:  Rolling: Contact guard assistance  Supine to Sit: Contact guard assistance  Sit to Supine: Maximum assistance  Scooting: Contact guard assistance     Transfers:  Sit to Stand:  (did not test due to confusion and unable to redirect)     ADL Assessment:  Feeding: Total assistance     Oral Facial Hygiene/Grooming: Total assistance     Bathing: Total assistance     Upper Body Dressing: Total assistance     Lower Body Dressing: Total assistance     Toileting:  Total assistance              Barthel Index:      Bathin  Bladder: 0  Bowels: 0  Groomin  Dressin  Feedin  Mobility: 0  Stairs: 0  Toilet Use: 0  Transfer (Bed to Chair and Back): 0  Total: 0         Barthel and G-code impairment scale:  Percentage of impairment CH  0% CI  1-19% CJ  20-39% CK  40-59% CL  60-79% CM  80-99% CN  100%   Barthel Score 0-100 100 99-80 79-60 59-40 20-39 1-19    0   Barthel Score 0-20 20 17-19 13-16 9-12 5-8 1-4 0      The Barthel ADL Index: Guidelines  1. The index should be used as a record of what a patient does, not as a record of what a patient could do. 2. The main aim is to establish degree of independence from any help, physical or verbal, however minor and for whatever reason. 3. The need for supervision renders the patient not independent. 4. A patient's performance should be established using the best available evidence. Asking the patient, friends/relatives and nurses are the usual sources, but direct observation and common sense are also important. However direct testing is not needed. 5. Usually the patient's performance over the preceding 24-48 hours is important, but occasionally longer periods will be relevant. 6. Middle categories imply that the patient supplies over 50 per cent of the effort. 7. Use of aids to be independent is allowed. Tyson Theodore., Barthel, DEdwarW. (1528). Functional evaluation: the Barthel Index. [facility] (14)2. Mando Meyers velasquez HUBER Godinez, Serena Real., Perry Sinks., [place], [address] [place] [address] (1999). Measuring the change indisability after inpatient rehabilitation; comparison of the responsiveness of the Barthel Index and Functional [place] Measure. Journal of Neurology, Neurosurgery, and Psychiatry, 66(4), 864-928. Sugey Hernandez, N.J.A, GLADYS Richey, & Dante Mitchell MJUAN ANTONIO. (2004.) Assessment of post-stroke quality of life in cost-effectiveness studies: The usefulness of the Barthel Index and the EuroQoL-5D. Quality of Life Research, 15, 389-04         Cognitive Retraining  Safety/Judgement: Decreased awareness of environment;Decreased awareness of need for assistance;Decreased awareness of need for safety;Decreased insight into deficits           Functional Measure:        G codes: In compliance with CMSs Claims Based Outcome Reporting, the following G-code set was chosen for this patient based on their primary functional limitation being treated:      The outcome measure chosen to determine the severity of the functional limitation was the Barthel with a score of 0/100 which was correlated with the impairment scale. · Self Care:               - CURRENT STATUS:    CN - 100% impaired, limited or restricted               - GOAL STATUS:           CM - 80%-99% impaired, limited or restricted               - D/C STATUS:                       ---------------To be determined---------------      Occupational Therapy Evaluation Charge Determination   History Examination Decision-Making   HIGH Complexity : Extensive review of history including physical, cognitive and psychosocial history  HIGH Complexity : 5 or more performance deficits relating to physical, cognitive , or psychosocial skils that result in activity limitations and / or participation restrictions HIGH Complexity : Patient presents with comorbidities that affect occupational performance. Signifigant modification of tasks or assistance (eg, physical or verbal) with assessment (s) is necessary to enable patient to complete evaluation       Based on the above components, the patient evaluation is determined to be of the following complexity level: HIGH   Pain:  Pain Scale 1: Numeric (0 - 10)  Pain Intensity 1: 0              Activity Tolerance:   vss  Please refer to the flowsheet for vital signs taken during this treatment. After treatment:   [ ] Patient left in no apparent distress sitting up in chair  [ ] Patient left in no apparent distress in bed  [ ] Call bell left within reach  [ ] Nursing notified  [ ] Caregiver present  [ ] Bed alarm activated      COMMUNICATION/EDUCATION:   The patients plan of care was discussed with: Physical Therapist and Registered Nurse. [ ] Home safety education was provided and the patient/caregiver indicated understanding. [ ] Patient/family have participated as able in goal setting and plan of care.   [ ] Patient/family agree to work toward stated goals and plan of care. [ ] Patient understands intent and goals of therapy, but is neutral about his/her participation. [X] Patient is unable to participate in goal setting and plan of care. This patients plan of care is appropriate for delegation to [facility].      Thank you for this referral.  Oc Barlow OT  Time Calculation: 19 mins

## 2017-06-30 NOTE — PROGRESS NOTES
Problem: Dysphagia (Adult)  Goal: *Acute Goals and Plan of Care (Insert Text)  Speech pathology goals  Initiated 6/30/2017  1. Patient will participate in re-evaluation of swallow function daily  [address] EVALUATION  Patient: Mukesh Levi (65 y.o. male)  Date: 6/30/2017  Primary Diagnosis: Status epilepticus ([facility] [address]        Precautions: swallow         ASSESSMENT :  Based on the objective data described below, the patient presents with severe oral dysphagia secondary to significant confusion. Patient seen with PT, OT, and sitter assisting patient with sitting EOB. Patient alert and agreeable to PO trials, however patient unable to extract ice chip from spoon. Patient able to suck on straw to extract thin liquid MCFP up the straw, however unable to take a sip from the straw despite max verbal cues. Patient is at high risk for aspiration secondary to cognitive status. Recommend continue NPO until mental status improves. Discussed with RN who will place NG today. Patient will benefit from skilled intervention to address the above impairments. Patients rehabilitation potential is considered to be Fair  Factors which may influence rehabilitation potential include:   [ ]            None noted  [X]            Mental ability/status  [X]            Medical condition  [ ]            Home/family situation and support systems  [ ]            Safety awareness  [ ]            Pain tolerance/management  [ ]            Other:        PLAN :  Recommendations and Planned Interventions:  --NPO with oral care. RN to place NG today  --SLP to follow for daily re-evaluation of swallow function  Frequency/Duration: Patient will be followed by speech-language pathology 4 times a week to address goals. Discharge Recommendations: To Be Determined       SUBJECTIVE:   Patient stated I want some water.  Patient alert, cooperative given encouragement. Oriented to person only.       OBJECTIVE:       Past Medical History:   Diagnosis Date    Alcohol abuse, in remission      Seizures ([facility] [address]     No past surgical history on file. Prior Level of Function/Home Situation:   Home Situation  Home Environment: Apartment  # Steps to Enter: 2  One/Two Story Residence: One story  Living Alone: No  Support Systems: Family member(s)  Patient Expects to be Discharged to[de-identified] Apartment  Current DME Used/Available at Home: None  Diet prior to admission: unknown  Current Diet:  NPO   Cognitive and Communication Status:  Neurologic State: Alert, Confused  Orientation Level: Oriented to person, Disoriented to time, Disoriented to situation, Disoriented to place  Cognition: Decreased command following, Decreased attention/concentration  Perception: Appears intact  Perseveration: Perseverates during conversation, Perseverates during ADLS  Safety/Judgement: Decreased awareness of environment, Decreased awareness of need for assistance, Decreased awareness of need for safety, Decreased insight into deficits  Oral Assessment:  Oral Assessment  Labial: Other (comment) (unable to assess; poor command following)  P.O. Trials:  Patient Position: sitting EOB with assistance from PT, OT, and sitter  Vocal quality prior to P.O.: No impairment  Consistency Presented: Ice chips; Thin liquid  How Presented: SLP-fed/presented;Spoon;Straw     Bolus Acceptance: Impaired (unable to extract ice from spoon or liquid from straw)                  NOMS:   The NOMS functional outcome measure was used to quantify this patient's level of swallowing impairment. Based on the NOMS, the patient was determined to be at level 1 for swallow function      G Codes: In compliance with CMSs Claims Based Outcome Reporting, the following G-code set was chosen for this patient based the use of the NOMS functional outcome to quantify this patient's level of swallowing impairment.      Using the NOMS, the patient was determined to be at level 1 for swallow function which correlates with the CN= 100% level of severity. Based on the objective assessment provided within this note, the current, goal, and discharge g-codes are as follows:     Swallow  Swallowing:   Swallow Current Status CN= 100%   Swallow Goal Status CK= 40-59%         NOMS Swallowing Levels:  Level 1 (CN): NPO  Level 2 (CM): NPO but takes consistency in therapy  Level 3 (CL): Takes less than 50% of nutrition p.o. and continues with nonoral feedings; and/or safe with mod cues; and/or max diet restriction  Level 4 (CK): Safe swallow but needs mod cues; and/or mod diet restriction; and/or still requires some nonoral feeding/supplements  Level 5 (CJ): Safe swallow with min diet restriction; and/or needs min cues  Level 6 (CI): Independent with p.o.; rare cues; usually self cues; may need to avoid some foods or needs extra time  Level 7 ([address] [place] [address]): Independent for all p.o.  DEON. (2003). National Outcomes Measurement System (NOMS): Adult Speech-Language Pathology User's Guide. Pain:  Pain Scale 1: Numeric (0 - 10)  Pain Intensity 1: 0     After treatment:   [ ]            Patient left in no apparent distress sitting up in chair  [X]            Patient left in no apparent distress in bed  [X]            Call bell left within reach  [X]            Nursing notified  [ ]            Caregiver present  [ ]            Bed alarm activated      COMMUNICATION/EDUCATION:   The patients plan of care including recommendations, planned interventions, and recommended diet changes were discussed with: Registered Nurse. [ ]            Patient/family have participated as able in goal setting and plan of care. [ ]            Patient/family agree to work toward stated goals and plan of care. [ ]            Patient understands intent and goals of therapy, but is neutral about his/her participation. [X]            Patient is unable to participate in goal setting and plan of care.      Thank you for this referral.  Suma Rivers Nataliya Harris, SLP  Time Calculation: 10 mins

## 2017-06-30 NOTE — PROGRESS NOTES
NEUROLOGY PROGRESS NOTE     CC: Seizures    SUBJECTIVE  Patient's confusion is better. Incomprehensible speech but was able to stick his tongue out on command. HISTORY OF PRESENT ILLNESS  Edmond Smith is a 80 y.o. male who presents to the hospital because of seizures. The family is not at bedside in the history is obtained by chart review. According to the ED notes. The patient was oriented ×3 when he went to bed last night and was able to communicate but when he woke up in the morning he was unable to speak in coherent sentences and was confused about orientation. He he was not feeling great and wanted to go to the ER. He does have long history of seizures and takes Keppra 1250 mg p.o. twice daily and Lyrica 300 mg p.o. twice daily. He ran out of his Lyrica 3 days ago. In the emergency room he did have an episode of deviation of the eyes to the right along with stiffening of the right upper extremity. The patient was intubated at this time for airway protection and is on propofol. No obvious seizures in the ICU yet.     ROS  A ten system review of constitutional, cardiovascular, respiratory, musculoskeletal, endocrine, skin, SHEENT, genitourinary, psychiatric and neurologic systems was obtained and is unremarkable except as stated in HPI     PHYSICAL EXAM  EXAMINATION:   Patient Vitals for the past 24 hrs:   Temp Pulse Resp BP SpO2   06/30/17 1400 - 76 17 158/88 97 %   06/30/17 1300 - (!) 109 22 (!) 151/111 98 %   06/30/17 1200 - (!) 102 25 (!) 165/122 98 %   06/30/17 1000 - 99 15 (!) 182/100 98 %   06/30/17 0900 - 81 20 169/79 99 %   06/30/17 0800 98.8 °F (37.1 °C) 72 23 168/86 98 %   06/30/17 0600 - 66 24 167/79 100 %   06/30/17 0541 - 68 - 157/78 -   06/30/17 0500 - 60 15 - 99 %   06/30/17 0400 - (!) 51 15 133/57 99 %   06/30/17 0300 - 62 17 (!) 161/96 99 %   06/30/17 0200 - (!) 58 20 158/70 99 %   06/30/17 0100 - 78 25 161/88 99 %   06/30/17 0000 - 83 24 (!) 169/92 99 %   06/29/17 2300 - (!) 58 16 150/69 99 %   06/29/17 2255 - (!) 52 18 141/66 99 %   06/29/17 2200 - 75 22 - -   06/29/17 2100 - 66 21 169/76 99 %   06/29/17 2000 98.5 °F (36.9 °C) (!) 54 16 150/77 99 %   06/29/17 1900 - 64 14 162/75 99 %   06/29/17 1800 - 64 15 164/81 99 %   06/29/17 1700 - 69 19 163/80 99 %   06/29/17 1624 98.2 °F (36.8 °C) 77 20 174/86 100 %        General:   General appearance: Pt is in no acute distress   Distal pulses are preserved    Neurological Examination:   Mental Status: Patient is lying comfortably in the bed. Patient has incomprehensible speech but he did stick his tongue out on command. Cranial Nerves: Pupils equal and reactive to light. Face symmetric. Tongue is midline    Motor: Moves all 4 extremities symmetrically and spontaneously    Sensation: Unable to assess    Coordination/Cerebellar: Unable to assess    Gait: Unable to assess    Skin: No significant bruising or lacerations. LAB DATA REVIEWED:    Results for orders placed or performed during the hospital encounter of 06/24/17   CULTURE, BLOOD   Result Value Ref Range    Special Requests: NO SPECIAL REQUESTS      Culture result: NO GROWTH 6 DAYS     CULTURE, BLOOD   Result Value Ref Range    Special Requests: NO SPECIAL REQUESTS      Culture result: (A)       PROBABLE  STAPHYLOCOCCUS SPECIES, COAGULASE NEGATIVE  GROWING IN 1 OF 2 BOTTLES DRAWN  SITE = (C LINE )      Culture result: (A)       PRELIMINARY REPORT OF  GRAM POSITIVE COCCI  IN CLUSTERS  GROWING IN 1 OF 2 BOTTLES DRAWN  CALLED TO AND READ BACK BY  FIGUEROA RODRIGUES RN [facility] AT 2013 ON 6/27/2017.  Roc 1920      Culture result: REMAINING BOTTLE(S) HAS/HAVE NO GROWTH SO FAR     CBC WITH AUTOMATED DIFF   Result Value Ref Range    WBC 6.2 4.1 - 11.1 K/uL    RBC 4.28 4.10 - 5.70 M/uL    HGB 14.3 12.1 - 17.0 g/dL    HCT 40.8 36.6 - 50.3 %    MCV 95.3 80.0 - 99.0 FL    MCH 33.4 26.0 - 34.0 PG    MCHC 35.0 30.0 - 36.5 g/dL    RDW 12.4 11.5 - 14.5 %    PLATELET 135 (L) 456 - 400 K/uL    NEUTROPHILS 65 32 - 75 %    LYMPHOCYTES 23 12 - 49 %    MONOCYTES 12 5 - 13 %    EOSINOPHILS 0 0 - 7 %    BASOPHILS 0 0 - 1 %    ABS. NEUTROPHILS 4.0 1.8 - 8.0 K/UL    ABS. LYMPHOCYTES 1.4 0.8 - 3.5 K/UL    ABS. MONOCYTES 0.8 0.0 - 1.0 K/UL    ABS. EOSINOPHILS 0.0 0.0 - 0.4 K/UL    ABS. BASOPHILS 0.0 0.0 - 0.1 K/UL   METABOLIC PANEL, COMPREHENSIVE   Result Value Ref Range    Sodium 137 136 - 145 mmol/L    Potassium 3.6 3.5 - 5.1 mmol/L    Chloride 102 97 - 108 mmol/L    CO2 28 21 - 32 mmol/L    Anion gap 7 5 - 15 mmol/L    Glucose 105 (H) 65 - 100 mg/dL    BUN 13 6 - 20 MG/DL    Creatinine 0.98 0.70 - 1.30 MG/DL    BUN/Creatinine ratio 13 12 - 20      GFR est AA >60 >60 ml/min/1.73m2    GFR est non-AA >60 >60 ml/min/1.73m2    Calcium 9.6 8.5 - 10.1 MG/DL    Bilirubin, total 1.1 (H) 0.2 - 1.0 MG/DL    ALT (SGPT) 19 12 - 78 U/L    AST (SGOT) 16 15 - 37 U/L    Alk.  phosphatase 83 45 - 117 U/L    Protein, total 8.9 (H) 6.4 - 8.2 g/dL    Albumin 4.4 3.5 - 5.0 g/dL    Globulin 4.5 (H) 2.0 - 4.0 g/dL    A-G Ratio 1.0 (L) 1.1 - 2.2     TROPONIN I   Result Value Ref Range    Troponin-I, Qt. <0.04 <0.05 ng/mL   CK W/ REFLX CKMB   Result Value Ref Range     39 - 308 U/L   LEVETIRACETAM (KEPPRA)   Result Value Ref Range    Levetiracetam (Keppra) 36.6 10.0 - 40.0 ug/mL   URINALYSIS W/MICROSCOPIC   Result Value Ref Range    Color YELLOW/STRAW      Appearance CLOUDY (A) CLEAR      Specific gravity 1.019 1.003 - 1.030      pH (UA) 7.0 5.0 - 8.0      Protein NEGATIVE  NEG mg/dL    Glucose NEGATIVE  NEG mg/dL    Ketone TRACE (A) NEG mg/dL    Blood NEGATIVE  NEG      Urobilinogen 1.0 0.2 - 1.0 EU/dL    Nitrites NEGATIVE  NEG      Leukocyte Esterase NEGATIVE  NEG      WBC 0-4 0 - 4 /hpf    RBC 0-5 0 - 5 /hpf    Epithelial cells FEW FEW /lpf    Bacteria NEGATIVE  NEG /hpf    Hyaline cast 0-2 0 - 5 /lpf   BILIRUBIN, CONFIRM   Result Value Ref Range    Bilirubin UA, confirm NEGATIVE  NEG     CK W/ CKMB & INDEX   Result Value Ref Range     39 - 308 U/L    CK - MB 1.6 <3.6 NG/ML    CK-MB Index 0.7 0 - 2.5     LACTIC ACID, PLASMA   Result Value Ref Range    Lactic acid 2.2 (HH) 0.4 - 2.0 MMOL/L   BLOOD GAS, ARTERIAL   Result Value Ref Range    pH 7.47 (H) 7.35 - 7.45      PCO2 34 (L) 35.0 - 45.0 mmHg    PO2 212 (H) 80 - 100 mmHg    O2  (H) 92 - 97 %    BICARBONATE 24 22 - 26 mmol/L    BASE EXCESS 1.0 mmol/L    O2 METHOD VENTILATOR      FIO2 50 %    MODE A/C      Tidal volume 500      SET RATE 16      EPAP/CPAP/PEEP 6.0      Sample source ARTERIAL      SITE RIGHT RADIAL      TERE'S TEST YES     MISC. LAB TEST   Result Value Ref Range    Test Description: LYRICA LEVEL     Reference Lab: [email or web address]     Results: SEE REPORT FROM [facility]    METABOLIC PANEL, COMPREHENSIVE   Result Value Ref Range    Sodium 136 136 - 145 mmol/L    Potassium 4.4 3.5 - 5.1 mmol/L    Chloride 103 97 - 108 mmol/L    CO2 26 21 - 32 mmol/L    Anion gap 7 5 - 15 mmol/L    Glucose 123 (H) 65 - 100 mg/dL    BUN 17 6 - 20 MG/DL    Creatinine 1.04 0.70 - 1.30 MG/DL    BUN/Creatinine ratio 16 12 - 20      GFR est AA >60 >60 ml/min/1.73m2    GFR est non-AA >60 >60 ml/min/1.73m2    Calcium 8.2 (L) 8.5 - 10.1 MG/DL    Bilirubin, total 1.4 (H) 0.2 - 1.0 MG/DL    ALT (SGPT) 15 12 - 78 U/L    AST (SGOT) 14 (L) 15 - 37 U/L    Alk. phosphatase 59 45 - 117 U/L    Protein, total 7.1 6.4 - 8.2 g/dL    Albumin 3.4 (L) 3.5 - 5.0 g/dL    Globulin 3.7 2.0 - 4.0 g/dL    A-G Ratio 0.9 (L) 1.1 - 2.2     CBC WITH AUTOMATED DIFF   Result Value Ref Range    WBC 14.1 (H) 4.1 - 11.1 K/uL    RBC 3.59 (L) 4.10 - 5.70 M/uL    HGB 12.0 (L) 12.1 - 17.0 g/dL    HCT 34.9 (L) 36.6 - 50.3 %    MCV 97.2 80.0 - 99.0 FL    MCH 33.4 26.0 - 34.0 PG    MCHC 34.4 30.0 - 36.5 g/dL    RDW 12.8 11.5 - 14.5 %    PLATELET 96 (L) 845 - 400 K/uL    NEUTROPHILS 83 (H) 32 - 75 %    LYMPHOCYTES 9 (L) 12 - 49 %    MONOCYTES 8 5 - 13 %    EOSINOPHILS 0 0 - 7 %    BASOPHILS 0 0 - 1 %    ABS.  NEUTROPHILS 11.7 (H) 1.8 - 8.0 K/UL ABS. LYMPHOCYTES 1.3 0.8 - 3.5 K/UL    ABS. MONOCYTES 1.1 (H) 0.0 - 1.0 K/UL    ABS. EOSINOPHILS 0.0 0.0 - 0.4 K/UL    ABS.  BASOPHILS 0.0 0.0 - 0.1 K/UL   TROPONIN I   Result Value Ref Range    Troponin-I, Qt. <0.04 <0.05 ng/mL   CBC W/O DIFF   Result Value Ref Range    WBC 8.0 4.1 - 11.1 K/uL    RBC 3.43 (L) 4.10 - 5.70 M/uL    HGB 11.3 (L) 12.1 - 17.0 g/dL    HCT 33.3 (L) 36.6 - 50.3 %    MCV 97.1 80.0 - 99.0 FL    MCH 32.9 26.0 - 34.0 PG    MCHC 33.9 30.0 - 36.5 g/dL    RDW 12.7 11.5 - 14.5 %    PLATELET 77 (L) 877 - 477 K/uL   METABOLIC PANEL, BASIC   Result Value Ref Range    Sodium 138 136 - 145 mmol/L    Potassium 3.7 3.5 - 5.1 mmol/L    Chloride 105 97 - 108 mmol/L    CO2 27 21 - 32 mmol/L    Anion gap 6 5 - 15 mmol/L    Glucose 100 65 - 100 mg/dL    BUN 12 6 - 20 MG/DL    Creatinine 0.71 0.70 - 1.30 MG/DL    BUN/Creatinine ratio 17 12 - 20      GFR est AA >60 >60 ml/min/1.73m2    GFR est non-AA >60 >60 ml/min/1.73m2    Calcium 8.6 8.5 - 10.1 MG/DL   MAGNESIUM   Result Value Ref Range    Magnesium 2.2 1.6 - 2.4 mg/dL   PHOSPHORUS   Result Value Ref Range    Phosphorus 2.4 (L) 2.6 - 4.7 MG/DL   BLOOD GAS, ARTERIAL   Result Value Ref Range    pH 7.27 (L) 7.35 - 7.45      PCO2 59 (H) 35.0 - 45.0 mmHg    PO2 95 80 - 100 mmHg    O2 SAT 96 92 - 97 %    BICARBONATE 27 (H) 22 - 26 mmol/L    BASE DEFICIT 1.3 mmol/L    O2 METHOD NASAL O2      O2 FLOW RATE 4.00 L/min    SPONTANEOUS RATE 26.0      Sample source ARTERIAL      SITE LEFT BRACHIAL      TERE'S TEST N/A     CBC W/O DIFF   Result Value Ref Range    WBC 7.8 4.1 - 11.1 K/uL    RBC 3.40 (L) 4.10 - 5.70 M/uL    HGB 11.6 (L) 12.1 - 17.0 g/dL    HCT 33.2 (L) 36.6 - 50.3 %    MCV 97.6 80.0 - 99.0 FL    MCH 34.1 (H) 26.0 - 34.0 PG    MCHC 34.9 30.0 - 36.5 g/dL    RDW 12.4 11.5 - 14.5 %    PLATELET 77 (L) 351 - 696 K/uL   METABOLIC PANEL, BASIC   Result Value Ref Range    Sodium 143 136 - 145 mmol/L    Potassium 3.5 3.5 - 5.1 mmol/L    Chloride 108 97 - 108 mmol/L CO2 29 21 - 32 mmol/L    Anion gap 6 5 - 15 mmol/L    Glucose 119 (H) 65 - 100 mg/dL    BUN 8 6 - 20 MG/DL    Creatinine 0.71 0.70 - 1.30 MG/DL    BUN/Creatinine ratio 11 (L) 12 - 20      GFR est AA >60 >60 ml/min/1.73m2    GFR est non-AA >60 >60 ml/min/1.73m2    Calcium 8.9 8.5 - 10.1 MG/DL   MAGNESIUM   Result Value Ref Range    Magnesium 1.8 1.6 - 2.4 mg/dL   PHOSPHORUS   Result Value Ref Range    Phosphorus 2.4 (L) 2.6 - 4.7 MG/DL   BLOOD GAS, ARTERIAL   Result Value Ref Range    pH 7.38 7.35 - 7.45      PCO2 45 35.0 - 45.0 mmHg    PO2 104 (H) 80 - 100 mmHg    O2 SAT 98 (H) 92 - 97 %    BICARBONATE 26 22 - 26 mmol/L    BASE EXCESS 0.8 mmol/L    O2 METHOD NASAL O2      O2 FLOW RATE 2.00 L/min    SPONTANEOUS RATE 26.0      Sample source ARTERIAL      SITE LEFT RADIAL      TERE'S TEST YES     VITAMIN B12   Result Value Ref Range    Vitamin B12 1064 (H) 211 - 911 pg/mL   TSH 3RD GENERATION   Result Value Ref Range    TSH 0.93 0.36 - 3.74 uIU/mL   AMMONIA   Result Value Ref Range    Ammonia 25 <32 UMOL/L   CBC W/O DIFF   Result Value Ref Range    WBC 6.2 4.1 - 11.1 K/uL    RBC 3.64 (L) 4.10 - 5.70 M/uL    HGB 12.0 (L) 12.1 - 17.0 g/dL    HCT 33.9 (L) 36.6 - 50.3 %    MCV 93.1 80.0 - 99.0 FL    MCH 33.0 26.0 - 34.0 PG    MCHC 35.4 30.0 - 36.5 g/dL    RDW 12.1 11.5 - 14.5 %    PLATELET 87 (L) 575 - 961 K/uL   METABOLIC PANEL, BASIC   Result Value Ref Range    Sodium 138 136 - 145 mmol/L    Potassium 3.2 (L) 3.5 - 5.1 mmol/L    Chloride 105 97 - 108 mmol/L    CO2 26 21 - 32 mmol/L    Anion gap 7 5 - 15 mmol/L    Glucose 111 (H) 65 - 100 mg/dL    BUN 7 6 - 20 MG/DL    Creatinine 0.61 (L) 0.70 - 1.30 MG/DL    BUN/Creatinine ratio 11 (L) 12 - 20      GFR est AA >60 >60 ml/min/1.73m2    GFR est non-AA >60 >60 ml/min/1.73m2    Calcium 8.6 8.5 - 10.1 MG/DL   MAGNESIUM   Result Value Ref Range    Magnesium 1.5 (L) 1.6 - 2.4 mg/dL   PHOSPHORUS   Result Value Ref Range    Phosphorus 2.2 (L) 2.6 - 4.7 MG/DL   METABOLIC PANEL, COMPREHENSIVE   Result Value Ref Range    Sodium 138 136 - 145 mmol/L    Potassium 3.3 (L) 3.5 - 5.1 mmol/L    Chloride 105 97 - 108 mmol/L    CO2 24 21 - 32 mmol/L    Anion gap 9 5 - 15 mmol/L    Glucose 102 (H) 65 - 100 mg/dL    BUN 9 6 - 20 MG/DL    Creatinine 0.52 (L) 0.70 - 1.30 MG/DL    BUN/Creatinine ratio 17 12 - 20      GFR est AA >60 >60 ml/min/1.73m2    GFR est non-AA >60 >60 ml/min/1.73m2    Calcium 8.6 8.5 - 10.1 MG/DL    Bilirubin, total 1.2 (H) 0.2 - 1.0 MG/DL    ALT (SGPT) 17 12 - 78 U/L    AST (SGOT) 23 15 - 37 U/L    Alk. phosphatase 56 45 - 117 U/L    Protein, total 7.5 6.4 - 8.2 g/dL    Albumin 2.9 (L) 3.5 - 5.0 g/dL    Globulin 4.6 (H) 2.0 - 4.0 g/dL    A-G Ratio 0.6 (L) 1.1 - 2.2     MAGNESIUM   Result Value Ref Range    Magnesium 1.6 1.6 - 2.4 mg/dL   PHOSPHORUS   Result Value Ref Range    Phosphorus 3.0 2.6 - 4.7 MG/DL   CBC WITH AUTOMATED DIFF   Result Value Ref Range    WBC 5.4 4.1 - 11.1 K/uL    RBC 3.58 (L) 4.10 - 5.70 M/uL    HGB 11.9 (L) 12.1 - 17.0 g/dL    HCT 33.2 (L) 36.6 - 50.3 %    MCV 92.7 80.0 - 99.0 FL    MCH 33.2 26.0 - 34.0 PG    MCHC 35.8 30.0 - 36.5 g/dL    RDW 12.1 11.5 - 14.5 %    PLATELET 771 (L) 800 - 400 K/uL    NEUTROPHILS 61 32 - 75 %    LYMPHOCYTES 18 12 - 49 %    MONOCYTES 18 (H) 5 - 13 %    EOSINOPHILS 3 0 - 7 %    BASOPHILS 0 0 - 1 %    ABS. NEUTROPHILS 3.3 1.8 - 8.0 K/UL    ABS. LYMPHOCYTES 1.0 0.8 - 3.5 K/UL    ABS. MONOCYTES 1.0 0.0 - 1.0 K/UL    ABS. EOSINOPHILS 0.2 0.0 - 0.4 K/UL    ABS.  BASOPHILS 0.0 0.0 - 0.1 K/UL   CBC W/O DIFF   Result Value Ref Range    WBC 4.7 4.1 - 11.1 K/uL    RBC 3.52 (L) 4.10 - 5.70 M/uL    HGB 11.9 (L) 12.1 - 17.0 g/dL    HCT 33.1 (L) 36.6 - 50.3 %    MCV 94.0 80.0 - 99.0 FL    MCH 33.8 26.0 - 34.0 PG    MCHC 36.0 30.0 - 36.5 g/dL    RDW 12.3 11.5 - 14.5 %    PLATELET 632 (L) 948 - 342 K/uL   METABOLIC PANEL, BASIC   Result Value Ref Range    Sodium 139 136 - 145 mmol/L    Potassium 3.3 (L) 3.5 - 5.1 mmol/L    Chloride 104 97 - 108 mmol/L    CO2 22 21 - 32 mmol/L    Anion gap 13 5 - 15 mmol/L    Glucose 92 65 - 100 mg/dL    BUN 10 6 - 20 MG/DL    Creatinine 0.49 (L) 0.70 - 1.30 MG/DL    BUN/Creatinine ratio 20 12 - 20      GFR est AA >60 >60 ml/min/1.73m2    GFR est non-AA >60 >60 ml/min/1.73m2    Calcium 8.6 8.5 - 10.1 MG/DL   MAGNESIUM   Result Value Ref Range    Magnesium 1.3 (L) 1.6 - 2.4 mg/dL   PHOSPHORUS   Result Value Ref Range    Phosphorus 2.8 2.6 - 4.7 MG/DL   GLUCOSE, POC   Result Value Ref Range    Glucose (POC) 110 (H) 65 - 100 mg/dL    Performed by Ruth Velásquez    POC CREATININE   Result Value Ref Range    Creatinine (POC) 0.9 0.6 - 1.3 MG/DL    GFRAA, POC >60 >60 ml/min/1.73m2    GFRNA, POC >60 >60 ml/min/1.73m2   POC CHEM8   Result Value Ref Range    Calcium, ionized (POC) 1.14 1.12 - 1.32 MMOL/L    Sodium (POC) 140 136 - 145 MMOL/L    Potassium (POC) 4.3 3.5 - 5.1 MMOL/L    Chloride (POC) 101 98 - 107 MMOL/L    CO2 (POC) 30 21 - 32 MMOL/L    Anion gap (POC) 14 5 - 15 mmol/L    Glucose (POC) 107 (H) 65 - 100 MG/DL    BUN (POC) 15 9 - 20 MG/DL    Creatinine (POC) 0.9 0.6 - 1.3 MG/DL    GFRAA, POC >60 >60 ml/min/1.73m2    GFRNA, POC >60 >60 ml/min/1.73m2    Hemoglobin (POC) 14.3 12.1 - 17.0 GM/DL    Hematocrit (POC) 42 36.6 - 50.3 %    Comment Comment Not Indicated.      EKG, 12 LEAD, INITIAL   Result Value Ref Range    Ventricular Rate 88 BPM    Atrial Rate 88 BPM    P-R Interval 294 ms    QRS Duration 82 ms    Q-T Interval 368 ms    QTC Calculation (Bezet) 445 ms    Calculated P Axis 61 degrees    Calculated R Axis -38 degrees    Calculated T Axis 52 degrees    Diagnosis       Sinus rhythm with 1st degree AV block  Left axis deviation  Nonspecific T wave abnormality  When compared with ECG of 06-SEP-2012 03:00,  HI interval has increased  ST no longer elevated in Anterior leads  Nonspecific T wave abnormality, worse in Anterolateral leads  Confirmed by Nisreen Schofield ([id number]) on 6/25/2017 1:15:25 PM          Imaging review:  6/24/2017  CT scan of the head without contrast  No acute intracranial abnormality    CT angiogram of the head and neck  No significant cervical carotid artery stenosis. No large vessel intracranial vascular occlusive change. HOME MEDS  Prior to Admission Medications   Prescriptions Last Dose Informant Patient Reported? Taking?   aspirin (ASPIRIN) 325 mg tablet   Yes No   Sig: Take 1 Tab by mouth daily. atorvastatin (LIPITOR) 40 mg tablet   No No   Sig: Take 1 Tab by mouth daily. cyclobenzaprine (FLEXERIL) 5 mg tablet   No No   Sig: Take 1 Tab by mouth three (3) times daily as needed for Muscle Spasm(s). levETIRAcetam (KEPPRA) 250 mg tablet   No No   Sig: Take 1 Tab by mouth two (2) times a day. Take with the 1000mg to make 1250mg twice a day. levETIRAcetam 1,000 mg tablet   No No   Sig: Take 1 Tab by mouth two (2) times a day. magnesium oxide (MAG-OX) 400 mg tablet   Yes No   Sig: Take 400 mg by mouth daily. metoprolol succinate (TOPROL-XL) 25 mg XL tablet   Yes No   Sig: Take  by mouth daily. omeprazole (PRILOSEC) 40 mg capsule   Yes No   Sig: Take 40 mg by mouth daily. pregabalin (LYRICA) 300 mg capsule   No No   Sig: Take 1 Cap by mouth two (2) times a day. Max Daily Amount: 600 mg.   thiamine (B-1) 100 mg tablet   No No   Sig: Take 1 Tab by mouth daily.       Facility-Administered Medications: None       CURRENT MEDS  Current Facility-Administered Medications   Medication Dose Route Frequency    dexmedetomidine (PRECEDEX) 400 mcg in 0.9% sodium chloride 100 mL infusion  0.2-0.7 mcg/kg/hr IntraVENous TITRATE    sodium chloride (NS) flush 10 mL  10 mL InterCATHeter Q24H    sodium chloride (NS) flush 10-40 mL  10-40 mL InterCATHeter Q8H    sodium chloride (NS) flush 20 mL  20 mL InterCATHeter Q24H    latanoprost (XALATAN) 0.005 % ophthalmic solution 1 Drop  1 Drop Both Eyes QPM    haloperidol lactate (HALDOL) injection 5 mg  5 mg IntraVENous TID    famotidine (PF) (PEPCID) 20 mg in sodium chloride 0.9 % 10 mL injection  20 mg IntraVENous Q12H    thiamine (B-1) 100 mg in 0.9% sodium chloride 50 mL IVPB  100 mg IntraVENous DAILY    metoprolol (LOPRESSOR) injection 5 mg  5 mg IntraVENous Q6H    chlorhexidine (PERIDEX) 0.12 % mouthwash 15 mL  15 mL Oral BID    atorvastatin (LIPITOR) tablet 40 mg  40 mg Per NG tube DAILY    pregabalin (LYRICA) capsule 300 mg  300 mg Oral BID    sodium chloride (NS) flush 5-10 mL  5-10 mL IntraVENous Q8H    enoxaparin (LOVENOX) injection 40 mg  40 mg SubCUTAneous Q24H    aspirin (ASA) suppository 300 mg  300 mg Rectal DAILY    0.9% sodium chloride infusion  75 mL/hr IntraVENous CONTINUOUS    levETIRAcetam (KEPPRA) 1,500 mg in 0.9% sodium chloride IVPB  1,500 mg IntraVENous Q12H       IMPRESSION:  Dwight Manzo is a 80 y.o. male who presents with altered mental status since this morning and in the emergency room had stiffening on right side of the body along with deviation of the eye to the right. Left hemispheric seizure suspected. The patient was taking Keppra 1250 mg p.o. twice daily and Lyrica 300 mg p.o. twice daily. RECOMMENDATIONS:  1. The patient's mental status is improving. 2.  Continue Keppra 1500 mg p.o. twice daily  3. Continue Lyrica 300 mg p.o. twice daily        No further neuro workup at this time. Call with questions.     Asael Tilley MD  Neurologist

## 2017-06-30 NOTE — PROGRESS NOTES
Problem: Mobility Impaired (Adult and Pediatric)  Goal: *Acute Goals and Plan of Care (Insert Text)  Physical Therapy Goals  Initiated 6/30/2017  1. Patient will move from supine to sit and sit to supine , scoot up and down and roll side to side in bed with supervision/set-up within 7 day(s). 2. Patient will transfer from bed to chair and chair to bed with minimal assistance/contact guard assist using the least restrictive device within 7 day(s). 3. Patient will perform sit to stand with minimal assistance/contact guard assist within 7 day(s). 4. Patient will demonstrate intact sitting balance without support within 7 days. PHYSICAL THERAPY EVALUATION  Patient: Fátima Wiseman (76 y.o. male)  Date: 6/30/2017  Primary Diagnosis: Status epilepticus ([facility] [address]        Precautions:          ASSESSMENT :  Based on the objective data described below, the patient presents with confusion, decreased command following, decreased functional mobility, and impaired sitting balance following admission for seizure. PTA patient unable to provide PLOF due to confusion. Patient lives with his wife and per chart ambulates with RW. Currently, patient confused and able to only follow simple commands inconsistently. Patient restrained and able to only undo one wrist restraint for safety. Patient required CGA for bed mobility. Patient required CGA-min A for sitting balance. Attempted to have patient wash his face and SLP entered room to attempt swallowing exam although patient unable to follow commands. Patient returned to supine with max A at the conclusion of PT evaluation and restrained with sitter present. Patient will benefit from SNF at discharge. Patient will benefit from skilled intervention to address the above impairments.   Patients rehabilitation potential is considered to be Fair  Factors which may influence rehabilitation potential include:   [ ]         None noted  [X]         Mental ability/status  [X]         Medical condition  [ ]         Home/family situation and support systems  [ ]         Safety awareness  [ ]         Pain tolerance/management  [ ]         Other:        PLAN :  Recommendations and Planned Interventions:  [X]           Bed Mobility Training             [ ]    Neuromuscular Re-Education  [X]           Transfer Training                   [ ]    Orthotic/Prosthetic Training  [ ]           Gait Training                         [ ]    Modalities  [X]           Therapeutic Exercises           [ ]    Edema Management/Control  [X]           Therapeutic Activities            [X]    Patient and Family Training/Education  [ ]           Other (comment):     Frequency/Duration: Patient will be followed by physical therapy  3 times a week to address goals. Discharge Recommendations: Skilled Nursing Facility  Further Equipment Recommendations for Discharge: TBD       SUBJECTIVE:   Patient stated I need to go home.       OBJECTIVE DATA SUMMARY:   HISTORY:    Past Medical History:   Diagnosis Date    Alcohol abuse, in remission      Seizures ([facility] [address]     No past surgical history on file. Prior Level of Function/Home Situation: patient unable to provide PLOF due to confusion. Patient lives with his wife and per chart ambulates with RW. Personal factors and/or comorbidities impacting plan of care: confusion, seizure     Home Situation  Home Environment: Apartment  # Steps to Enter: 2  One/Two Story Residence: One story  Living Alone: No  Support Systems: Family member(s)  Patient Expects to be Discharged to[de-identified] Apartment  Current DME Used/Available at Home: None     EXAMINATION/PRESENTATION/DECISION MAKING:   Critical Behavior:  Neurologic State: Confused  Orientation Level: Unable to verbalize  Cognition: Decreased command following     Hearing:   Auditory  Auditory Impairment: Hard of hearing, bilateral  Skin:    Edema:   Range Of Motion:  AROM: Within functional limits           PROM: Within functional limits Strength:    Strength: Within functional limits                    Tone & Sensation:   Tone: Normal              Sensation: Intact               Coordination:  Coordination: Generally decreased, functional  Vision:      Functional Mobility:  Bed Mobility:  Rolling: Contact guard assistance  Supine to Sit: Contact guard assistance  Sit to Supine: Maximum assistance  Scooting: Contact guard assistance  Transfers:  Sit to Stand:  (did not test due to confusion and unable to redirect)                          Balance:   Sitting: Impaired; With support  Sitting - Static: Fair (occasional)  Sitting - Dynamic: Fair (occasional)  Ambulation/Gait Training:              Gait Description (WDL): Exceptions to WDL                                                                Stairs: Therapeutic Exercises:         Functional Measure:  Barthel Index:      Bathin  Bladder: 0  Bowels: 0  Groomin  Dressin  Feedin  Mobility: 0  Stairs: 0  Toilet Use: 0  Transfer (Bed to Chair and Back): 0  Total: 0         Barthel and G-code impairment scale:  Percentage of impairment CH  0% CI  1-19% CJ  20-39% CK  40-59% CL  60-79% CM  80-99% CN  100%   Barthel Score 0-100 100 99-80 79-60 59-40 20-39 1-19    0   Barthel Score 0-20 20 17-19 13-16 9-12 5-8 1-4 0      The Barthel ADL Index: Guidelines  1. The index should be used as a record of what a patient does, not as a record of what a patient could do. 2. The main aim is to establish degree of independence from any help, physical or verbal, however minor and for whatever reason. 3. The need for supervision renders the patient not independent. 4. A patient's performance should be established using the best available evidence. Asking the patient, friends/relatives and nurses are the usual sources, but direct observation and common sense are also important. However direct testing is not needed.   5. Usually the patient's performance over the preceding 24-48 hours is important, but occasionally longer periods will be relevant. 6. Middle categories imply that the patient supplies over 50 per cent of the effort. 7. Use of aids to be independent is allowed. Marzella Mis., Barthel, D.W. (1191). Functional evaluation: the Barthel Index. [facility] (14)2. Raoul Pinto velasquez HUBER Godinez, Jeffrey El., Silvino Lebron., Terence, [address] [place] [address] (1999). Measuring the change indisability after inpatient rehabilitation; comparison of the responsiveness of the Barthel Index and Functional [place] Measure. Journal of Neurology, Neurosurgery, and Psychiatry, 66(4), 298-042. Criss Eric, N.J.A, GLADYS Richey, & Rosalee Madera M.A. (2004.) Assessment of post-stroke quality of life in cost-effectiveness studies: The usefulness of the Barthel Index and the EuroQoL-5D. Quality of Life Research, 13, 758-55            G codes: In compliance with CMSs Claims Based Outcome Reporting, the following G-code set was chosen for this patient based on their primary functional limitation being treated: The outcome measure chosen to determine the severity of the functional limitation was the Barthel with a score of 0/100 which was correlated with the impairment scale.       · Mobility - Walking and Moving Around:               - CURRENT STATUS:    CN - 100% impaired, limited or restricted               - GOAL STATUS:           CM - 80%-99% impaired, limited or restricted               - D/C STATUS:                       ---------------To be determined---------------      Physical Therapy Evaluation Charge Determination   History Examination Presentation Decision-Making   MEDIUM  Complexity : 1-2 comorbidities / personal factors will impact the outcome/ POC  MEDIUM Complexity : 3 Standardized tests and measures addressing body structure, function, activity limitation and / or participation in recreation  MEDIUM Complexity : Evolving with changing characteristics  Other outcome measures Barthel  MEDIUM      Based on the above components, the patient evaluation is determined to be of the following complexity level: MEDIUM     Pain:  Pain Scale 1: Numeric (0 - 10)  Pain Intensity 1: 0              Activity Tolerance:   VSS on RA, confused and difficult to follow commands. Please refer to the flowsheet for vital signs taken during this treatment. After treatment:   [ ]         Patient left in no apparent distress sitting up in chair  [X]         Patient left in no apparent distress in bed  [X]         Call bell left within reach  [X]         Nursing notified  [X]         Caregiver present  [ ]         Bed alarm activated      COMMUNICATION/EDUCATION:   The patients plan of care was discussed with: Occupational Therapist, Registered Nurse and . [X]         Fall prevention education was provided and the patient/caregiver indicated understanding. [X]         Patient/family have participated as able in goal setting and plan of care. [ ]         Patient/family agree to work toward stated goals and plan of care. [ ]         Patient understands intent and goals of therapy, but is neutral about his/her participation. [X]         Patient is unable to participate in goal setting and plan of care.      Thank you for this referral.  Gina He, PT, DPT   Time Calculation: 19 mins

## 2017-06-30 NOTE — PROGRESS NOTES
Nutrition:  Chart reviewed, case discussed during CCU rounds. Pt is NPO day 5, he continues to fail swallow evals. Discussed weekend plan for NGT placement if pt fails SLP eval again today. He is in restraints and hopefully will be unable to pull out an NGT. Given extended period of NPO, recommend slow TF advancement and monitor K+, Mag, Phos for refeeding. Recommend:    Start Osmolite 1.2 @ 10mL/h via NGT, advance rate 10mL q 8h as tolerated to Goal Rate of 60mL/h + 100mL H2O flush q 4h (provides 1728kcals/80gPro/1781mL)     Will continue to monitor plan of care closely. Thank you!     Yamilex, [address]   Pager [phone]

## 2017-07-01 ENCOUNTER — APPOINTMENT (OUTPATIENT)
Dept: GENERAL RADIOLOGY | Age: 82
DRG: 208 | End: 2017-07-01
Attending: INTERNAL MEDICINE
Payer: MEDICARE

## 2017-07-01 LAB
ANION GAP BLD CALC-SCNC: 12 MMOL/L (ref 5–15)
BACTERIA SPEC CULT: ABNORMAL
BUN SERPL-MCNC: 13 MG/DL (ref 6–20)
BUN/CREAT SERPL: 19 (ref 12–20)
CALCIUM SERPL-MCNC: 8.6 MG/DL (ref 8.5–10.1)
CHLORIDE SERPL-SCNC: 104 MMOL/L (ref 97–108)
CO2 SERPL-SCNC: 23 MMOL/L (ref 21–32)
CREAT SERPL-MCNC: 0.67 MG/DL (ref 0.7–1.3)
ERYTHROCYTE [DISTWIDTH] IN BLOOD BY AUTOMATED COUNT: 12.2 % (ref 11.5–14.5)
GLUCOSE SERPL-MCNC: 114 MG/DL (ref 65–100)
HCT VFR BLD AUTO: 33.5 % (ref 36.6–50.3)
HGB BLD-MCNC: 11.7 G/DL (ref 12.1–17)
MAGNESIUM SERPL-MCNC: 1.6 MG/DL (ref 1.6–2.4)
MCH RBC QN AUTO: 32.5 PG (ref 26–34)
MCHC RBC AUTO-ENTMCNC: 34.9 G/DL (ref 30–36.5)
MCV RBC AUTO: 93.1 FL (ref 80–99)
PHOSPHATE SERPL-MCNC: 3.1 MG/DL (ref 2.6–4.7)
PLATELET # BLD AUTO: 148 K/UL (ref 150–400)
POTASSIUM SERPL-SCNC: 3.4 MMOL/L (ref 3.5–5.1)
RBC # BLD AUTO: 3.6 M/UL (ref 4.1–5.7)
SERVICE CMNT-IMP: ABNORMAL
SODIUM SERPL-SCNC: 139 MMOL/L (ref 136–145)
WBC # BLD AUTO: 6.2 K/UL (ref 4.1–11.1)

## 2017-07-01 PROCEDURE — 74011250637 HC RX REV CODE- 250/637: Performed by: INTERNAL MEDICINE

## 2017-07-01 PROCEDURE — 83735 ASSAY OF MAGNESIUM: CPT | Performed by: INTERNAL MEDICINE

## 2017-07-01 PROCEDURE — 77030032490 HC SLV COMPR SCD KNE COVD -B

## 2017-07-01 PROCEDURE — 74011000258 HC RX REV CODE- 258: Performed by: INTERNAL MEDICINE

## 2017-07-01 PROCEDURE — 74011250636 HC RX REV CODE- 250/636: Performed by: INTERNAL MEDICINE

## 2017-07-01 PROCEDURE — 74011250636 HC RX REV CODE- 250/636: Performed by: PSYCHIATRY & NEUROLOGY

## 2017-07-01 PROCEDURE — 77030011256 HC DRSG MEPILEX <16IN NO BORD MOLN -A

## 2017-07-01 PROCEDURE — 84100 ASSAY OF PHOSPHORUS: CPT | Performed by: INTERNAL MEDICINE

## 2017-07-01 PROCEDURE — 74011000258 HC RX REV CODE- 258: Performed by: PSYCHIATRY & NEUROLOGY

## 2017-07-01 PROCEDURE — 74011000250 HC RX REV CODE- 250: Performed by: INTERNAL MEDICINE

## 2017-07-01 PROCEDURE — 36415 COLL VENOUS BLD VENIPUNCTURE: CPT | Performed by: INTERNAL MEDICINE

## 2017-07-01 PROCEDURE — 65610000006 HC RM INTENSIVE CARE

## 2017-07-01 PROCEDURE — 71010 XR CHEST PORT: CPT

## 2017-07-01 PROCEDURE — 85027 COMPLETE CBC AUTOMATED: CPT | Performed by: INTERNAL MEDICINE

## 2017-07-01 PROCEDURE — 80048 BASIC METABOLIC PNL TOTAL CA: CPT | Performed by: INTERNAL MEDICINE

## 2017-07-01 RX ORDER — METOPROLOL TARTRATE 5 MG/5ML
5 INJECTION INTRAVENOUS EVERY 6 HOURS
Status: DISCONTINUED | OUTPATIENT
Start: 2017-07-01 | End: 2017-07-01

## 2017-07-01 RX ORDER — DILTIAZEM HYDROCHLORIDE 5 MG/ML
INJECTION INTRAVENOUS
Status: DISPENSED
Start: 2017-07-01 | End: 2017-07-02

## 2017-07-01 RX ORDER — HYDRALAZINE HYDROCHLORIDE 20 MG/ML
10 INJECTION INTRAMUSCULAR; INTRAVENOUS
Status: DISCONTINUED | OUTPATIENT
Start: 2017-07-01 | End: 2017-07-22 | Stop reason: HOSPADM

## 2017-07-01 RX ORDER — DILTIAZEM HYDROCHLORIDE 5 MG/ML
5 INJECTION INTRAVENOUS ONCE
Status: COMPLETED | OUTPATIENT
Start: 2017-07-01 | End: 2017-07-01

## 2017-07-01 RX ORDER — METOPROLOL TARTRATE 5 MG/5ML
5 INJECTION INTRAVENOUS
Status: DISCONTINUED | OUTPATIENT
Start: 2017-07-01 | End: 2017-07-14

## 2017-07-01 RX ORDER — POTASSIUM CHLORIDE 29.8 MG/ML
20 INJECTION INTRAVENOUS
Status: COMPLETED | OUTPATIENT
Start: 2017-07-01 | End: 2017-07-01

## 2017-07-01 RX ORDER — FUROSEMIDE 10 MG/ML
20 INJECTION INTRAMUSCULAR; INTRAVENOUS ONCE
Status: COMPLETED | OUTPATIENT
Start: 2017-07-01 | End: 2017-07-01

## 2017-07-01 RX ADMIN — Medication 10 ML: at 21:19

## 2017-07-01 RX ADMIN — POTASSIUM CHLORIDE 20 MEQ: 400 INJECTION, SOLUTION INTRAVENOUS at 11:40

## 2017-07-01 RX ADMIN — LEVETIRACETAM 1500 MG: 100 INJECTION, SOLUTION, CONCENTRATE INTRAVENOUS at 20:14

## 2017-07-01 RX ADMIN — Medication 20 ML: at 14:04

## 2017-07-01 RX ADMIN — Medication 10 ML: at 14:05

## 2017-07-01 RX ADMIN — DILTIAZEM HYDROCHLORIDE 5 MG: 5 INJECTION INTRAVENOUS at 15:54

## 2017-07-01 RX ADMIN — HALOPERIDOL LACTATE 5 MG: 5 INJECTION, SOLUTION INTRAMUSCULAR at 22:54

## 2017-07-01 RX ADMIN — Medication 10 ML: at 14:04

## 2017-07-01 RX ADMIN — METOPROLOL TARTRATE 5 MG: 5 INJECTION INTRAVENOUS at 17:15

## 2017-07-01 RX ADMIN — ASPIRIN 300 MG: 300 SUPPOSITORY RECTAL at 08:55

## 2017-07-01 RX ADMIN — THIAMINE HYDROCHLORIDE 100 MG: 100 INJECTION, SOLUTION INTRAMUSCULAR; INTRAVENOUS at 09:11

## 2017-07-01 RX ADMIN — METOPROLOL TARTRATE 5 MG: 5 INJECTION INTRAVENOUS at 12:50

## 2017-07-01 RX ADMIN — LEVETIRACETAM 1500 MG: 100 INJECTION, SOLUTION, CONCENTRATE INTRAVENOUS at 08:52

## 2017-07-01 RX ADMIN — HALOPERIDOL LACTATE 5 MG: 5 INJECTION, SOLUTION INTRAMUSCULAR at 09:46

## 2017-07-01 RX ADMIN — FAMOTIDINE 20 MG: 10 INJECTION, SOLUTION INTRAVENOUS at 09:41

## 2017-07-01 RX ADMIN — Medication 10 ML: at 06:33

## 2017-07-01 RX ADMIN — SODIUM CHLORIDE 0.2 MCG/KG/HR: 900 INJECTION, SOLUTION INTRAVENOUS at 01:12

## 2017-07-01 RX ADMIN — LATANOPROST 1 DROP: 50 SOLUTION OPHTHALMIC at 18:05

## 2017-07-01 RX ADMIN — SODIUM CHLORIDE 0.2 MCG/KG/HR: 900 INJECTION, SOLUTION INTRAVENOUS at 20:40

## 2017-07-01 RX ADMIN — POTASSIUM CHLORIDE 20 MEQ: 400 INJECTION, SOLUTION INTRAVENOUS at 12:54

## 2017-07-01 RX ADMIN — PREGABALIN 300 MG: 150 CAPSULE ORAL at 18:05

## 2017-07-01 RX ADMIN — CHLORHEXIDINE GLUCONATE 15 ML: 1.2 RINSE ORAL at 08:55

## 2017-07-01 RX ADMIN — SODIUM CHLORIDE 0.1 MCG/KG/HR: 900 INJECTION, SOLUTION INTRAVENOUS at 13:13

## 2017-07-01 RX ADMIN — METOPROLOL TARTRATE 5 MG: 5 INJECTION INTRAVENOUS at 01:05

## 2017-07-01 RX ADMIN — HYDRALAZINE HYDROCHLORIDE 10 MG: 20 INJECTION INTRAMUSCULAR; INTRAVENOUS at 15:25

## 2017-07-01 RX ADMIN — METOPROLOL TARTRATE 5 MG: 5 INJECTION INTRAVENOUS at 06:30

## 2017-07-01 RX ADMIN — HALOPERIDOL LACTATE 5 MG: 5 INJECTION, SOLUTION INTRAMUSCULAR at 16:04

## 2017-07-01 RX ADMIN — DILTIAZEM HYDROCHLORIDE 2.5 MG/HR: 5 INJECTION, SOLUTION INTRAVENOUS at 16:12

## 2017-07-01 RX ADMIN — PREGABALIN 300 MG: 150 CAPSULE ORAL at 08:55

## 2017-07-01 RX ADMIN — CHLORHEXIDINE GLUCONATE 15 ML: 1.2 RINSE ORAL at 18:05

## 2017-07-01 RX ADMIN — ATORVASTATIN CALCIUM 40 MG: 40 TABLET, FILM COATED ORAL at 08:54

## 2017-07-01 RX ADMIN — FAMOTIDINE 20 MG: 10 INJECTION, SOLUTION INTRAVENOUS at 20:10

## 2017-07-01 RX ADMIN — ENOXAPARIN SODIUM 40 MG: 40 INJECTION SUBCUTANEOUS at 08:55

## 2017-07-01 RX ADMIN — FUROSEMIDE 20 MG: 10 INJECTION, SOLUTION INTRAMUSCULAR; INTRAVENOUS at 11:36

## 2017-07-01 NOTE — PROGRESS NOTES
Hospitalist Progress Note    NAME: Reyes Savoy   :  1934   MRN:  [id number]       Assessment / Plan: Witnessed left hemispheric Seizure in ER with Acute respiratory failure and hypoxia, requiring intubation [id number], self  extubated  am. No obvious infection. CXR no infiltrate. Blood culture. no urine culture indicated by UA. Metabolic encephalopathy secondary to presumed prolonged seizure (status epilepticus) after stopping lyrica. Improved mentation, consider changing to oral meds next am. keppra iv and lyrica orally. ct head no abnormality, ct angiogram of head and neck, no overt pathology. vitamin b1, electrolyte replacement, haldol as needed. Ammonia normal. sitter and restraints as needed. Discussed tube feedings with family, reconsider July 3. Weaned presedex today with improved mentation, now at a low dose.     htn metoprolol iv scheduled. switched to diltiazem iv. CAD/CVA risk with carotid stenosis. 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 Remote etoh use per family, last drink 5 years ago. Code status: full, wife is nok  Prophylaxis: lovenox, continues on pepcid iv from intubation  Recommended Disposition: snf     Subjective:     Chief Complaint / Reason for Physician Visit  Answers to name, awake     Discussed with RN events overnight. Review of Systems:  Symptom Y/N Comments  Symptom Y/N Comments   Fever/Chills    Chest Pain     Poor Appetite    Edema     Cough    Abdominal Pain     Sputum    Joint Pain     SOB/AL    Pruritis/Rash     Nausea/vomit    Tolerating PT/OT     Diarrhea    Tolerating Diet     Constipation    Other       Could NOT obtain due to: Confusion, more improvenent     Objective:     VITALS:   Last 24hrs VS reviewed since prior progress note.  Most recent are:  Patient Vitals for the past 24 hrs:   Temp Pulse Resp BP SpO2   17 1500 97.8 °F (36.6 °C) (!) 112 24 (!) 165/92 97 %   07/01/17 1400 97.4 °F (36.3 °C) 79 15 133/79 98 %   07/01/17 1300 97.8 °F (36.6 °C) 64 15 116/60 96 %   07/01/17 1250 - 66 - 111/65 -   07/01/17 1200 98.2 °F (36.8 °C) 70 15 147/72 97 %   07/01/17 1135 - 89 18 (!) 144/97 98 %   07/01/17 1100 97.9 °F (36.6 °C) 75 18 (!) 144/97 97 %   07/01/17 1038 - 84 19 153/87 99 %   07/01/17 1000 97.4 °F (36.3 °C) 83 21 153/87 99 %   07/01/17 0939 - 90 20 116/62 99 %   07/01/17 0900 97.8 °F (36.6 °C) 62 15 116/62 97 %   07/01/17 0845 - 61 16 155/86 97 %   07/01/17 0800 97.4 °F (36.3 °C) 75 18 155/86 98 %   07/01/17 0747 - 74 18 141/79 98 %   07/01/17 0630 - 80 - 150/90 -   07/01/17 0600 - 79 17 (!) 150/94 98 %   07/01/17 0500 - 62 13 124/65 98 %   07/01/17 0400 - 64 16 118/54 96 %   07/01/17 0313 97.8 °F (36.6 °C) - - - -   07/01/17 0312 98.3 °F (36.8 °C) - - - -   07/01/17 0300 - 82 21 147/77 96 %   07/01/17 0200 - 78 19 145/81 97 %   07/01/17 0105 - (!) 112 - 157/82 -   07/01/17 0100 - (!) 124 21 157/82 98 %   07/01/17 0001 - 70 14 127/64 97 %   07/01/17 0000 - 82 16 - 97 %   06/30/17 2319 97.5 °F (36.4 °C) - - - -   06/30/17 2313 98.2 °F (36.8 °C) - - - -   06/30/17 2300 - 64 17 114/58 97 %   06/30/17 2200 - 69 16 102/54 97 %   06/30/17 2100 - 80 16 117/63 96 %   06/30/17 2010 99.6 °F (37.6 °C) - - - 96 %   06/30/17 2000 99.3 °F (37.4 °C) 71 19 115/64 97 %   06/30/17 1926 - 77 25 141/71 95 %   06/30/17 1900 - (!) 111 26 (!) 185/106 96 %   06/30/17 1700 - 80 22 159/90 95 %   06/30/17 1600 98.7 °F (37.1 °C) 97 23 (!) 193/111 98 %       Intake/Output Summary (Last 24 hours) at 07/01/17 1553  Last data filed at 07/01/17 1400   Gross per 24 hour   Intake          2964.75 ml   Output              757 ml   Net          2207.75 ml        PHYSICAL EXAM:  General: awake, restraints intact, sitter present.    EENT:  No stridor, trachea midline. Anicteric sclerae. Resp:  Coarse bilaterally, no wheezing or rales.   No accessory muscle use, upper secretions heard  CV:  Regular  rhythm,  No edema  GI:  Soft, Non distended, Non tender.  +Bowel sounds  Neurologic:  Answers yes and no questions. answers to name restraints intact   Psych:   no agitation, calm  Skin:  No rashes. No jaundice    Reviewed most current lab test results and cultures  YES  Reviewed most current radiology test results   YES  Review and summation of old records today    NO  Reviewed patient's current orders and MAR    YES  PMH/SH reviewed - no change compared to H&P  ________________________________________________________________________  Care Plan discussed with:    Comments   Patient x    Family      RN x    Care Manager     Consultant                        Multidiciplinary team rounds were held today with , nursing, pharmacist and clinical coordinator. Patient's plan of care was discussed; medications were reviewed and discharge planning was addressed. ________________________________________________________________________  Total NON critical care TIME:   18   Minutes    Total CRITICAL CARE TIME Spent:   Minutes non procedure based      Comments   >50% of visit spent in counseling and coordination of care     ________________________________________________________________________  Alvira Hint, DO     Procedures: see electronic medical records for all procedures/Xrays and details which were not copied into this note but were reviewed prior to creation of Plan. LABS:  I reviewed today's most current labs and imaging studies.   Pertinent labs include:  Recent Labs      07/01/17   0308  06/30/17   0354  06/29/17   0537   WBC  6.2  4.7  5.4   HGB  11.7*  11.9*  11.9*   HCT  33.5*  33.1*  33.2*   PLT  148*  112*  103*     Recent Labs      07/01/17   0308  06/30/17   0354  06/29/17   0537   NA  139  139  138   K  3.4*  3.3*  3.3*   CL  104  104  105   CO2  23  22  24   GLU  114*  92  102*   BUN  13  10  9   CREA  0.67*  0.49*  0.52*   CA  8.6  8.6  8.6 MG  1.6  1.3*  1.6   PHOS  3.1  2.8  3.0   ALB   --    --   2.9*   TBILI   --    --   1.2*   SGOT   --    --   23   ALT   --    --   17       Signed: Christy Nair, DO

## 2017-07-01 NOTE — PROGRESS NOTES
End of shift change report given to Sophie Patel RN. Report included SBAR, Kardex, MAR, and recent results.

## 2017-07-01 NOTE — PROGRESS NOTES
5742  Patient received at 1900 hours last evening from RICHARD Holcomb; patient on room air; patient has large amount of tan oral secretions and requires suction aprox. Every 1 - 1 1/2 hours; patient opens eyes to stimulus, once to verbal command; patient stated his name one time only this shift, otherwise non-verbal; patient moves all extremities and is restrained on right and left wrist to protect IV line and NG tube; patient has right IJ triple lumen catheter infusing NS at 75 cc/hr and precedex drip at 0.2 mcg; patient is NPO and has right nare NG tube infusing Osmolite 1.2 at 20 cc/hr; order to increase tube feed by 10 cc every 8 hours and give water flush 100 cc every 4 hours; patient in sinus rhythm and had a few short episodes of a-fib/flutter with rate in the 80's/min; condom catheter intact; patient has sitter at the bedside. 0700  Patient rested well on precedex 0.2 - 0.3 mics; patient now alert, oriented to name, obeys, moves all extremities; I have replaced the condom catheter three times this shift and the last time it came off around 0600 hours I just put a depend diaper on the patient; patient now awake stating he needs to void and we are giving the patient the urinal; patient has been in sinus rhythm; no more a-fib/flutter rhythm since last evening; while on the precedex drip I only gave metoprolol 5 mg IV each dose tonight instead of the 10 mg ordered for each dose (metoprolol is scheduled); tube feed rate increased to 30 cc/hr; patient was suctioned orally every two hours the last couple of times; portable chest x-ray done, lab done; no contact with family during the night; report given to Chari Aburto RN.

## 2017-07-01 NOTE — PROGRESS NOTES
0800: Initial assessment completed on patient. Patient arousable to voice and stimulus and able to verbalize name. Patient stated he needed to go to the bathroom, and a urinal was provided - patient did not void. Patient continued to be lethargic, but vital signs stable. Patient suctioned first with yaunkauer, but continued to sound coarse bilaterally. A 14fr suction was used and nurse able to suction a small amount of tan, thick secretions. 0848: Precedex rate changed to 0.2 mcg/kg/hr. 1041: Orders received to decrease rate of normal saline infusion to 25mL/hour and begin two runs of  Potassium chloride 20mEq as patient's 0308 lab results revealed a potassium of 3.4    1100: Patient voided and had large bowel movement. 1313: Patient continues to rest without impulsivity to get out of bed. Precedex rate changed to 0.1 mcg/kg/hr. 1342: Precedex infusion put on standby. 1532: PRN dose of hydralazine given for a blood pressure of 165/92. Patient went into a-fib with heart rate fluctuating from 110's to 130's. MD Renee Hernández notified. Orders to begin Cardizem drip received. 1554: Cardizem bolus of 5mg given at this time. Pharmacy notified, and waiting for drip to come up from pharmacy. 1612: Cardizem drip started at 2.5 mg/hr. Patient's NG tube retaped and placement verified. 1642: Heart rate 115. Per order, rate on cardizem increased to 5.0 mg/hr in order to achieve a heart rate goal of <110.    1711: Heart rate 151. Cardizem increased to 7.5 mg/hr and PRN dose of metoprolol given. 1737: Precedex drip restarted at 0.1 mcg/kg/hr as patient becomes more restless and trying to pull off gown. 1814: Precedex drip rate increased to 0.2 mcg/kg/hr as patient becomes increasingly more restless and patient kicking legs out of the bed.

## 2017-07-02 LAB
ALBUMIN SERPL BCP-MCNC: 2.6 G/DL (ref 3.5–5)
ALBUMIN/GLOB SERPL: 0.7 {RATIO} (ref 1.1–2.2)
ALP SERPL-CCNC: 59 U/L (ref 45–117)
ALT SERPL-CCNC: 25 U/L (ref 12–78)
ANION GAP BLD CALC-SCNC: 7 MMOL/L (ref 5–15)
APTT PPP: 30.1 SEC (ref 22.1–32.5)
AST SERPL W P-5'-P-CCNC: 24 U/L (ref 15–37)
BASOPHILS # BLD AUTO: 0 K/UL (ref 0–0.1)
BASOPHILS # BLD: 0 % (ref 0–1)
BILIRUB SERPL-MCNC: 0.8 MG/DL (ref 0.2–1)
BUN SERPL-MCNC: 9 MG/DL (ref 6–20)
BUN/CREAT SERPL: 14 (ref 12–20)
CALCIUM SERPL-MCNC: 8.4 MG/DL (ref 8.5–10.1)
CHLORIDE SERPL-SCNC: 102 MMOL/L (ref 97–108)
CK MB CFR SERPL CALC: 1.8 % (ref 0–2.5)
CK MB SERPL-MCNC: 1.8 NG/ML (ref 5–25)
CK SERPL-CCNC: 99 U/L (ref 39–308)
CO2 SERPL-SCNC: 29 MMOL/L (ref 21–32)
CREAT SERPL-MCNC: 0.63 MG/DL (ref 0.7–1.3)
DIFFERENTIAL METHOD BLD: ABNORMAL
EOSINOPHIL # BLD: 0.3 K/UL (ref 0–0.4)
EOSINOPHIL NFR BLD: 5 % (ref 0–7)
ERYTHROCYTE [DISTWIDTH] IN BLOOD BY AUTOMATED COUNT: 12.3 % (ref 11.5–14.5)
GLOBULIN SER CALC-MCNC: 3.9 G/DL (ref 2–4)
GLUCOSE BLD STRIP.AUTO-MCNC: 125 MG/DL (ref 65–100)
GLUCOSE SERPL-MCNC: 133 MG/DL (ref 65–100)
HCT VFR BLD AUTO: 32.5 % (ref 36.6–50.3)
HGB BLD-MCNC: 11.7 G/DL (ref 12.1–17)
INR PPP: 1.2 (ref 0.9–1.1)
LYMPHOCYTES # BLD AUTO: 21 % (ref 12–49)
LYMPHOCYTES # BLD: 1.2 K/UL (ref 0.8–3.5)
MAGNESIUM SERPL-MCNC: 1.3 MG/DL (ref 1.6–2.4)
MCH RBC QN AUTO: 33.3 PG (ref 26–34)
MCHC RBC AUTO-ENTMCNC: 36 G/DL (ref 30–36.5)
MCV RBC AUTO: 92.6 FL (ref 80–99)
MONOCYTES # BLD: 1.2 K/UL (ref 0–1)
MONOCYTES NFR BLD AUTO: 20 % (ref 5–13)
NEUTS SEG # BLD: 3.1 K/UL (ref 1.8–8)
NEUTS SEG NFR BLD AUTO: 54 % (ref 32–75)
PHOSPHATE SERPL-MCNC: 2.8 MG/DL (ref 2.6–4.7)
PLATELET # BLD AUTO: 140 K/UL (ref 150–400)
POTASSIUM SERPL-SCNC: 2.9 MMOL/L (ref 3.5–5.1)
PROT SERPL-MCNC: 6.5 G/DL (ref 6.4–8.2)
PROTHROMBIN TIME: 11.7 SEC (ref 9–11.1)
RBC # BLD AUTO: 3.51 M/UL (ref 4.1–5.7)
RBC MORPH BLD: ABNORMAL
SERVICE CMNT-IMP: ABNORMAL
SODIUM SERPL-SCNC: 138 MMOL/L (ref 136–145)
THERAPEUTIC RANGE,PTTT: NORMAL SECS (ref 58–77)
TROPONIN I SERPL-MCNC: 0.31 NG/ML
WBC # BLD AUTO: 5.8 K/UL (ref 4.1–11.1)

## 2017-07-02 PROCEDURE — 77030019563 HC DEV ATTCH FEED HOLL -A

## 2017-07-02 PROCEDURE — 74011000250 HC RX REV CODE- 250: Performed by: INTERNAL MEDICINE

## 2017-07-02 PROCEDURE — 74011250637 HC RX REV CODE- 250/637: Performed by: INTERNAL MEDICINE

## 2017-07-02 PROCEDURE — 85610 PROTHROMBIN TIME: CPT | Performed by: INTERNAL MEDICINE

## 2017-07-02 PROCEDURE — 84100 ASSAY OF PHOSPHORUS: CPT | Performed by: INTERNAL MEDICINE

## 2017-07-02 PROCEDURE — 85025 COMPLETE CBC W/AUTO DIFF WBC: CPT | Performed by: INTERNAL MEDICINE

## 2017-07-02 PROCEDURE — 74011000258 HC RX REV CODE- 258: Performed by: PSYCHIATRY & NEUROLOGY

## 2017-07-02 PROCEDURE — 77010033678 HC OXYGEN DAILY

## 2017-07-02 PROCEDURE — 74011000258 HC RX REV CODE- 258: Performed by: INTERNAL MEDICINE

## 2017-07-02 PROCEDURE — 74011250636 HC RX REV CODE- 250/636: Performed by: INTERNAL MEDICINE

## 2017-07-02 PROCEDURE — 80053 COMPREHEN METABOLIC PANEL: CPT | Performed by: INTERNAL MEDICINE

## 2017-07-02 PROCEDURE — 65610000006 HC RM INTENSIVE CARE

## 2017-07-02 PROCEDURE — 82550 ASSAY OF CK (CPK): CPT | Performed by: INTERNAL MEDICINE

## 2017-07-02 PROCEDURE — 85730 THROMBOPLASTIN TIME PARTIAL: CPT | Performed by: INTERNAL MEDICINE

## 2017-07-02 PROCEDURE — 36415 COLL VENOUS BLD VENIPUNCTURE: CPT | Performed by: INTERNAL MEDICINE

## 2017-07-02 PROCEDURE — 77030018798 HC PMP KT ENTRL FED COVD -A

## 2017-07-02 PROCEDURE — 84484 ASSAY OF TROPONIN QUANT: CPT | Performed by: INTERNAL MEDICINE

## 2017-07-02 PROCEDURE — 74011250636 HC RX REV CODE- 250/636: Performed by: PSYCHIATRY & NEUROLOGY

## 2017-07-02 PROCEDURE — 77030011256 HC DRSG MEPILEX <16IN NO BORD MOLN -A

## 2017-07-02 PROCEDURE — 83735 ASSAY OF MAGNESIUM: CPT | Performed by: INTERNAL MEDICINE

## 2017-07-02 PROCEDURE — 82962 GLUCOSE BLOOD TEST: CPT

## 2017-07-02 RX ORDER — POTASSIUM CHLORIDE 29.8 MG/ML
20 INJECTION INTRAVENOUS
Status: COMPLETED | OUTPATIENT
Start: 2017-07-02 | End: 2017-07-02

## 2017-07-02 RX ORDER — MAGNESIUM SULFATE HEPTAHYDRATE 40 MG/ML
2 INJECTION, SOLUTION INTRAVENOUS ONCE
Status: ACTIVE | OUTPATIENT
Start: 2017-07-02 | End: 2017-07-02

## 2017-07-02 RX ADMIN — CHLORHEXIDINE GLUCONATE 15 ML: 1.2 RINSE ORAL at 18:01

## 2017-07-02 RX ADMIN — HALOPERIDOL LACTATE 5 MG: 5 INJECTION, SOLUTION INTRAMUSCULAR at 16:23

## 2017-07-02 RX ADMIN — FAMOTIDINE 20 MG: 10 INJECTION, SOLUTION INTRAVENOUS at 20:34

## 2017-07-02 RX ADMIN — Medication 20 ML: at 06:28

## 2017-07-02 RX ADMIN — ACETAMINOPHEN 650 MG: 650 SUPPOSITORY RECTAL at 15:29

## 2017-07-02 RX ADMIN — ATORVASTATIN CALCIUM 40 MG: 40 TABLET, FILM COATED ORAL at 09:09

## 2017-07-02 RX ADMIN — Medication 10 ML: at 06:29

## 2017-07-02 RX ADMIN — HYDRALAZINE HYDROCHLORIDE 10 MG: 20 INJECTION INTRAMUSCULAR; INTRAVENOUS at 08:28

## 2017-07-02 RX ADMIN — PREGABALIN 300 MG: 150 CAPSULE ORAL at 09:09

## 2017-07-02 RX ADMIN — DILTIAZEM HYDROCHLORIDE 10 MG/HR: 5 INJECTION, SOLUTION INTRAVENOUS at 01:21

## 2017-07-02 RX ADMIN — THIAMINE HYDROCHLORIDE 100 MG: 100 INJECTION, SOLUTION INTRAMUSCULAR; INTRAVENOUS at 09:44

## 2017-07-02 RX ADMIN — CHLORHEXIDINE GLUCONATE 15 ML: 1.2 RINSE ORAL at 09:10

## 2017-07-02 RX ADMIN — Medication 10 ML: at 14:27

## 2017-07-02 RX ADMIN — SODIUM CHLORIDE 25 ML/HR: 900 INJECTION, SOLUTION INTRAVENOUS at 03:45

## 2017-07-02 RX ADMIN — HALOPERIDOL LACTATE 5 MG: 5 INJECTION, SOLUTION INTRAMUSCULAR at 09:05

## 2017-07-02 RX ADMIN — LATANOPROST 1 DROP: 50 SOLUTION OPHTHALMIC at 18:01

## 2017-07-02 RX ADMIN — ASPIRIN 300 MG: 300 SUPPOSITORY RECTAL at 09:10

## 2017-07-02 RX ADMIN — POTASSIUM CHLORIDE 20 MEQ: 400 INJECTION, SOLUTION INTRAVENOUS at 10:04

## 2017-07-02 RX ADMIN — ACETAMINOPHEN 650 MG: 650 SUPPOSITORY RECTAL at 09:24

## 2017-07-02 RX ADMIN — FAMOTIDINE 20 MG: 10 INJECTION, SOLUTION INTRAVENOUS at 09:07

## 2017-07-02 RX ADMIN — ENOXAPARIN SODIUM 40 MG: 40 INJECTION SUBCUTANEOUS at 09:09

## 2017-07-02 RX ADMIN — Medication 10 ML: at 14:26

## 2017-07-02 RX ADMIN — LEVETIRACETAM 1500 MG: 100 INJECTION, SOLUTION, CONCENTRATE INTRAVENOUS at 20:37

## 2017-07-02 RX ADMIN — SODIUM CHLORIDE 0.1 MCG/KG/HR: 900 INJECTION, SOLUTION INTRAVENOUS at 14:05

## 2017-07-02 RX ADMIN — PREGABALIN 300 MG: 150 CAPSULE ORAL at 18:00

## 2017-07-02 RX ADMIN — LEVETIRACETAM 1500 MG: 100 INJECTION, SOLUTION, CONCENTRATE INTRAVENOUS at 09:21

## 2017-07-02 RX ADMIN — HALOPERIDOL LACTATE 5 MG: 5 INJECTION, SOLUTION INTRAMUSCULAR at 21:48

## 2017-07-02 RX ADMIN — DEXTROSE MONOHYDRATE 10 MG/HR: 50 INJECTION, SOLUTION INTRAVENOUS at 10:02

## 2017-07-02 RX ADMIN — POTASSIUM CHLORIDE 20 MEQ: 400 INJECTION, SOLUTION INTRAVENOUS at 11:11

## 2017-07-02 RX ADMIN — Medication 20 ML: at 14:27

## 2017-07-02 NOTE — PROGRESS NOTES
0700  Patient on room air at the beginning of the shift; around 2300 hours patient restless, heart rate increased to 130's - 140's/min and oxygen saturations 94 - 95 % and due to increased heart rate and agitation patient placed on nasal cannula 2 liters/min; patient on precedex drip which has been off for a short time this shift when patient very sleepy and as high as 0.3 mics; currently precedex drip at 0.1 mcg; patient had drop in systolic BP for about 3 hours to 89 - 96 systolic and then spontaneously back to 422 systolic; currently patient alert, oriented to name, obeys; restraints removed around 0645 hours this AM; previously during the night however, patient only spoke his name and would not obey; moving all extremities; patient requires oral suctioning to back of throat every 1 hour for thick tan secretions which patient cannot cough up on his own; patient has been in a sinus rhythm rate in the 80's - 90's/min with occasional short bursts of a-fib rate 130's - 140's; cardizem drip currently at 10 mg (has been as low as 5 mg and as high as 15 mg also during the night); patient has right IJ triple lumen catheter infusing NS at 25 cc/hr, and the precedex drip and the cardizem drip; patient is NPO and has NG tube infusing Osmolite 1.2 at 50 cc/hr (goal of 60 cc) with water flush 100 cc every 4 hours; no BM this shift; patient is incontinent of urine most of the night and was able to use the urinal one time and voided 100 cc at that time; lab done, EKG done. Restraints right and left wrist removed at 0645 hours this AM; patient alert, obeys and sitter at bedside. Report given to Сергей García RN.

## 2017-07-02 NOTE — PROGRESS NOTES
[facility]  Pulmonary, Critical Care, and Sleep Medicine    Name: Xiao Luis MRN: [id number]   : 1934 Hospital: [facility]   Date: 2017        IMPRESSION:   · Acute respiratory failure - airway protection - now extubated  · Encephalopathy  · Seizures with status epilepticus  · HTN  · H/O SVT  · Peripheral neuropathy  · Right upper lobe scar/infiltrate - no tobacco history per records - has been present since at least February of this year, may be present to some degree on chest X-ray from   · Suspect COPD      PLAN:   · On O2  · Poor pulmonary toilet, needs suctioning   · Bronchodilators   · Antiepileptics  · Neurology eval ongoing - encephalopathy persists - MRI pending  · Antihypertensives  · Monitor platelets   · Antipsychotics for agitation  · Restrain for safety  · DVT prophylaxis     Subjective/Interval History:     Poor airway clearance. Needs assistance. Replete K and Mag      I have reviewed the flowsheet and previous days notes. No meaningful history from patient. More alert, but still confused and difficult to control.   Pulls at lines, etc.     Review of Systems   Unable to perform ROS: Mental status change     Objective:   Vital Signs:    Visit Vitals    /65    Pulse 100    Temp 98.6 °F (37 °C)    Resp 19    Ht 5' 8\" (1.727 m)    Wt 67 kg (147 lb 11.3 oz)    SpO2 97%    BMI 22.46 kg/m2       O2 Device: Nasal cannula   O2 Flow Rate (L/min): 1 l/min   Temp (24hrs), Av.1 °F (36.7 °C), Min:97.4 °F (36.3 °C), Max:98.6 °F (37 °C)       Intake/Output:   Last shift:      701 - 1900  In: 303.6 [I.V.:73.6]  Out: -   Last 3 shifts: 1901 - 700  In: 4361.8 [I.V.:2381.8]  Out: 607 [Urine:607]    Intake/Output Summary (Last 24 hours) at 17 0934  Last data filed at 17 0900   Gross per 24 hour   Intake          2665.22 ml   Output              300 ml   Net          2365.22 ml      Physical Exam Constitutional: No distress. HENT:   Head: Normocephalic and atraumatic. Mouth/Throat: No oropharyngeal exudate. Eyes: No scleral icterus. Cardiovascular: Normal rate and regular rhythm. Pulmonary/Chest: No respiratory distress. He has no wheezes. He has no rales. Abdominal: Soft. Bowel sounds are normal. He exhibits no distension. There is no tenderness. Musculoskeletal: He exhibits no edema. Neurological: He is alert. Skin: Skin is warm and dry.      Data:     Current Facility-Administered Medications   Medication Dose Route Frequency    dilTIAZem (CARDIZEM) 125 mg in dextrose 5% 125 mL infusion  0-15 mg/hr IntraVENous TITRATE    potassium chloride 20 mEq in 50 ml IVPB  20 mEq IntraVENous Q1H    magnesium sulfate 2 g/50 ml IVPB (premix or compounded)  2 g IntraVENous ONCE    dilTIAZem (CARDIZEM) 100 mg in dextrose 5% (MBP/ADV) 100 mL infusion  0-15 mg/hr IntraVENous TITRATE    dexmedetomidine (PRECEDEX) 400 mcg in 0.9% sodium chloride 100 mL infusion  0.2-0.7 mcg/kg/hr IntraVENous TITRATE    sodium chloride (NS) flush 10 mL  10 mL InterCATHeter Q24H    sodium chloride (NS) flush 10-40 mL  10-40 mL InterCATHeter Q8H    sodium chloride (NS) flush 20 mL  20 mL InterCATHeter Q24H    latanoprost (XALATAN) 0.005 % ophthalmic solution 1 Drop  1 Drop Both Eyes QPM    haloperidol lactate (HALDOL) injection 5 mg  5 mg IntraVENous TID    famotidine (PF) (PEPCID) 20 mg in sodium chloride 0.9 % 10 mL injection  20 mg IntraVENous Q12H    thiamine (B-1) 100 mg in 0.9% sodium chloride 50 mL IVPB  100 mg IntraVENous DAILY    chlorhexidine (PERIDEX) 0.12 % mouthwash 15 mL  15 mL Oral BID    atorvastatin (LIPITOR) tablet 40 mg  40 mg Per NG tube DAILY    pregabalin (LYRICA) capsule 300 mg  300 mg Oral BID    sodium chloride (NS) flush 5-10 mL  5-10 mL IntraVENous Q8H    enoxaparin (LOVENOX) injection 40 mg  40 mg SubCUTAneous Q24H    aspirin (ASA) suppository 300 mg  300 mg Rectal DAILY    0.9% sodium chloride infusion  25 mL/hr IntraVENous CONTINUOUS    levETIRAcetam (KEPPRA) 1,500 mg in 0.9% sodium chloride IVPB  1,500 mg IntraVENous Q12H                Labs:  Recent Labs      07/02/17 0333 07/01/17 0308 06/30/17 0354   WBC  5.8  6.2  4.7   HGB  11.7*  11.7*  11.9*   HCT  32.5*  33.5*  33.1*   PLT  140*  148*  112*     Recent Labs      07/02/17 0333 07/01/17 0308 06/30/17 0354   NA  138  139  139   K  2.9*  3.4*  3.3*   CL  102  104  104   CO2  29  23  22   GLU  133*  114*  92   BUN  9  13  10   CREA  0.63*  0.67*  0.49*   CA  8.4*  8.6  8.6   MG  1.3*  1.6  1.3*   PHOS  2.8  3.1  2.8   ALB  2.6*   --    --    TBILI  0.8   --    --    SGOT  24   --    --    ALT  25   --    --    INR  1.2*   --    --      No results for input(s): PH, PCO2, PO2, HCO3, FIO2 in the last 72 hours.   Imaging:  I have personally reviewed the patients radiographs and have reviewed the reports:  None today        Total critical care time exclusive of procedures:   minutes  Apryl Cao MD

## 2017-07-02 NOTE — PROGRESS NOTES
Hospitalist Progress Note    NAME: Ernesto Remedies   :  1934   MRN:  [id number]       Assessment / Plan:  Acute respiratory failure requiring intubation for airway protection POA  Suspected baseline COPD   Self extubated    cxray with RUL scar/infiltarte - present since       Status epilepticus POA with recurrent seizures in ED  Acute encephalopathy POA  MS: slowly improving  Failed speech, cont TF   Cont c/w IV Keppra while NPO   On scheduled Haldol for agitation   Interestingly both the seizure today and in February occurred after stopping lyrica for several days      Essential HTN POA   History of SVT POA  Hold oral meds till OG tube placed  PRN labetalol  Tele      Peripheral neuropathy POA, cont lyrica   Remote ETOH abuse. Family confirms no ETOH in 5 years  GERD continue PPI           Code status: Full codeNext of Kin: Wife  DVT prophylaxis: lovenox SQ    Recommended Disposition: TBD     Subjective:     Chief Complaint / Reason for Physician Visit: following respiratory failure / encephalopathy / seizure   MS is slowly improving  + copious secretion, poor inspiratory effort, cannot clear secretion     Discussed with RN events overnight. Review of Systems:  Symptom Y/N Comments  Symptom Y/N Comments   Fever/Chills    Chest Pain     Poor Appetite    Edema     Cough    Abdominal Pain     Sputum    Joint Pain     SOB/AL    Pruritis/Rash     Nausea/vomit    Tolerating PT/OT     Diarrhea    Tolerating Diet     Constipation    Other       Could NOT obtain due to: AMS      Objective:     VITALS:   Last 24hrs VS reviewed since prior progress note.  Most recent are:  Patient Vitals for the past 24 hrs:   Temp Pulse Resp BP SpO2   17 1400 98.2 °F (36.8 °C) 92 22 126/60 96 %   17 1300 98.3 °F (36.8 °C) 87 18 110/68 98 %   17 1216 - 81 18 101/50 96 %   17 1200 98.3 °F (36.8 °C) 79 16 96/54 95 %   17 1100 98.1 °F (36.7 °C) 87 19 122/56 96 %   17 1030 - 92 20 - 98 % 07/02/17 1000 98.1 °F (36.7 °C) 93 26 126/59 98 %   07/02/17 0900 98.6 °F (37 °C) 100 19 170/65 97 %   07/02/17 0828 - 89 - 149/72 -   07/02/17 0800 98.3 °F (36.8 °C) 86 18 154/73 97 %   07/02/17 0715 - 89 20 156/70 97 %   07/02/17 0714 97.7 °F (36.5 °C) 95 18 156/70 99 %   07/02/17 0700 - 84 18 137/79 98 %   07/02/17 0600 - 83 16 143/76 98 %   07/02/17 0500 - 89 21 148/73 97 %   07/02/17 0400 - 77 16 123/56 98 %   07/02/17 0326 98.6 °F (37 °C) - - - -   07/02/17 0300 - 88 19 140/65 98 %   07/02/17 0200 - 81 20 108/58 97 %   07/02/17 0154 - 71 20 99/52 97 %   07/02/17 0100 - 73 18 (!) 88/50 97 %   07/02/17 0001 - 98 17 143/64 96 %   07/02/17 0000 - (!) 101 19 - 99 %   07/01/17 2332 - 91 16 117/68 96 %   07/01/17 2325 - - - - 97 %   07/01/17 2320 98.4 °F (36.9 °C) - - - -   07/01/17 2314 98.6 °F (37 °C) - - - -   07/01/17 2300 - (!) 116 25 149/77 96 %   07/01/17 2200 - 69 14 99/53 96 %   07/01/17 2132 - 70 14 95/50 96 %   07/01/17 2100 - 70 15 93/51 97 %   07/01/17 2000 - 86 22 133/75 97 %   07/01/17 1952 98.4 °F (36.9 °C) - - - -   07/01/17 1900 98.5 °F (36.9 °C) 90 21 149/76 98 %   07/01/17 1800 98.4 °F (36.9 °C) 95 20 (!) 155/94 98 %   07/01/17 1715 - (!) 151 - 140/87 -   07/01/17 1700 98 °F (36.7 °C) (!) 108 24 142/69 98 %   07/01/17 1642 - (!) 115 - - -   07/01/17 1600 98.3 °F (36.8 °C) (!) 107 25 134/72 98 %   07/01/17 1554 - (!) 123 - 142/82 -   07/01/17 1500 97.8 °F (36.6 °C) (!) 112 24 (!) 165/92 97 %       Intake/Output Summary (Last 24 hours) at 07/02/17 1435  Last data filed at 07/02/17 1400   Gross per 24 hour   Intake          2789.65 ml   Output              200 ml   Net          2589.65 ml        PHYSICAL EXAM:  General: WD, WN. Alert, cooperative, no acute distress    EENT:  EOMI. Anicteric sclerae. MMM  Resp:  rhonchi bilaterally, no wheezing or rales.   No accessory muscle use  CV:  Regular  rhythm,  No edema  GI:  Soft, Non distended, Non tender.  +Bowel sounds  Neurologic:  Alert and oriented X 2, confused, normal speech,   Psych:   Poor insight. Not anxious nor agitated  Skin:  No rashes. No jaundice    Reviewed most current lab test results and cultures  YES  Reviewed most current radiology test results   YES  Review and summation of old records today    NO  Reviewed patient's current orders and MAR    YES  PMH/SH reviewed - no change compared to H&P  ________________________________________________________________________  Care Plan discussed with:    Comments   Patient y    Family      RN y    Care Manager     Consultant                        Multidiciplinary team rounds were held today with , nursing, pharmacist and clinical coordinator. Patient's plan of care was discussed; medications were reviewed and discharge planning was addressed. ________________________________________________________________________  Total NON critical care TIME:  25  Minutes    Total CRITICAL CARE TIME Spent:   Minutes non procedure based      Comments   >50% of visit spent in counseling and coordination of care     ________________________________________________________________________  Basia Casillas MD     Procedures: see electronic medical records for all procedures/Xrays and details which were not copied into this note but were reviewed prior to creation of Plan. LABS:  I reviewed today's most current labs and imaging studies.   Pertinent labs include:  Recent Labs      07/02/17 0333 07/01/17 0308 06/30/17   0354   WBC  5.8  6.2  4.7   HGB  11.7*  11.7*  11.9*   HCT  32.5*  33.5*  33.1*   PLT  140*  148*  112*     Recent Labs      07/02/17 0333 07/01/17   0308  06/30/17   0354   NA  138  139  139   K  2.9*  3.4*  3.3*   CL  102  104  104   CO2  29  23  22   GLU  133*  114*  92   BUN  9  13  10   CREA  0.63*  0.67*  0.49*   CA  8.4*  8.6  8.6   MG  1.3*  1.6  1.3*   PHOS  2.8  3.1  2.8   ALB  2.6*   --    --    TBILI  0.8   --    --    SGOT  24   --    --    ALT  25   --    --    INR 1.2*   --    --        Signed: Julien Max MD

## 2017-07-02 NOTE — PROGRESS NOTES
0800: Initial assessment completed on patient. Patient alert and oriented to person and place. Patient suctioned first with yaunkauer, but continued to sound coarse bilaterally. A 14fr suction was used and nurse able to suction a copious amount of white, thick secretions. A new tube feed set was started (Osmolite 1.2) at a rate of 50mL/hour with a 100cc flush. New cardizem drip 100mg in dextrose 5% 100mL infusion begun at 10mL/hour. 1822: PRN dose of hydralazine was given for a blood pressure of 149/72.    0900: Patient c/o pain but unable to verbalize where pain was located; PRN dose of tylenol (suppository) given. 0521: Based on patient's magnesium level of 1.3 and potassium level of 2.9 at 0333, orders received to infuse two runs of 20mEq of potassium, and one run of 2g/50mL magnesium. 1030: Patient O2 saturation on 1 liter 98% and holding. Have removed O2 via nasal canula and patient back on room air. Will continue to monitor sats. 1130: Cardizem rate changed from 10mL/hr to 5mL/hr as patient's heart rate is harboring in the 70s and holding. 1212: Patient blood pressure 96/54; cardizem drip put on standby    1216: Patient very lethargic, but arousable to stimulation and voice; precedex put on standby. Blood pressure rechecked and revealed a reading of 101/50.    1330: Patient becoming increasingly restless, precedex restarted at 0.1 mcg/kg/hr    1529: Wife and daughter with patient. Per wife, copious amounts of secretions are not new to patient. Patient states he is in pain, and PRN dose of tylenol given. 1707: Patient drowsy and blood pressure dropped to 93/46; patient arousable to voice and stimulus. Precedex put on standby    9717 9434: Patient once again increasingly restless and precedex restarted at 0.1 mcg/kg/hr    1824: Patient converted into a-fib. Heart rate holding in the 110s.  Cardizem drip restarted at 5mL/hr

## 2017-07-02 NOTE — PROGRESS NOTES
End of shift change report given to Tiesha Sanford RN. Report included SBAR, Kardex, MAR, and recent results.

## 2017-07-03 ENCOUNTER — APPOINTMENT (OUTPATIENT)
Dept: GENERAL RADIOLOGY | Age: 82
DRG: 208 | End: 2017-07-03
Attending: INTERNAL MEDICINE
Payer: MEDICARE

## 2017-07-03 LAB
ANION GAP BLD CALC-SCNC: 6 MMOL/L (ref 5–15)
BASOPHILS # BLD AUTO: 0 K/UL (ref 0–0.1)
BASOPHILS # BLD: 0 % (ref 0–1)
BUN SERPL-MCNC: 11 MG/DL (ref 6–20)
BUN/CREAT SERPL: 18 (ref 12–20)
CALCIUM SERPL-MCNC: 8.6 MG/DL (ref 8.5–10.1)
CHLORIDE SERPL-SCNC: 100 MMOL/L (ref 97–108)
CO2 SERPL-SCNC: 31 MMOL/L (ref 21–32)
CREAT SERPL-MCNC: 0.62 MG/DL (ref 0.7–1.3)
EOSINOPHIL # BLD: 0.3 K/UL (ref 0–0.4)
EOSINOPHIL NFR BLD: 4 % (ref 0–7)
ERYTHROCYTE [DISTWIDTH] IN BLOOD BY AUTOMATED COUNT: 12.5 % (ref 11.5–14.5)
GLUCOSE SERPL-MCNC: 136 MG/DL (ref 65–100)
HCT VFR BLD AUTO: 33.5 % (ref 36.6–50.3)
HGB BLD-MCNC: 11.8 G/DL (ref 12.1–17)
LYMPHOCYTES # BLD AUTO: 22 % (ref 12–49)
LYMPHOCYTES # BLD: 1.6 K/UL (ref 0.8–3.5)
MAGNESIUM SERPL-MCNC: 1.6 MG/DL (ref 1.6–2.4)
MCH RBC QN AUTO: 33 PG (ref 26–34)
MCHC RBC AUTO-ENTMCNC: 35.2 G/DL (ref 30–36.5)
MCV RBC AUTO: 93.6 FL (ref 80–99)
MONOCYTES # BLD: 1.2 K/UL (ref 0–1)
MONOCYTES NFR BLD AUTO: 17 % (ref 5–13)
NEUTS SEG # BLD: 4.2 K/UL (ref 1.8–8)
NEUTS SEG NFR BLD AUTO: 57 % (ref 32–75)
PHOSPHATE SERPL-MCNC: 2.9 MG/DL (ref 2.6–4.7)
PLATELET # BLD AUTO: 181 K/UL (ref 150–400)
POTASSIUM SERPL-SCNC: 3 MMOL/L (ref 3.5–5.1)
RBC # BLD AUTO: 3.58 M/UL (ref 4.1–5.7)
SODIUM SERPL-SCNC: 137 MMOL/L (ref 136–145)
WBC # BLD AUTO: 7.2 K/UL (ref 4.1–11.1)

## 2017-07-03 PROCEDURE — 65610000006 HC RM INTENSIVE CARE

## 2017-07-03 PROCEDURE — 77030019563 HC DEV ATTCH FEED HOLL -A

## 2017-07-03 PROCEDURE — 74011000258 HC RX REV CODE- 258: Performed by: INTERNAL MEDICINE

## 2017-07-03 PROCEDURE — 74011250636 HC RX REV CODE- 250/636: Performed by: INTERNAL MEDICINE

## 2017-07-03 PROCEDURE — 74011250636 HC RX REV CODE- 250/636: Performed by: PSYCHIATRY & NEUROLOGY

## 2017-07-03 PROCEDURE — 74011250637 HC RX REV CODE- 250/637: Performed by: INTERNAL MEDICINE

## 2017-07-03 PROCEDURE — 74011000250 HC RX REV CODE- 250: Performed by: INTERNAL MEDICINE

## 2017-07-03 PROCEDURE — 84100 ASSAY OF PHOSPHORUS: CPT | Performed by: INTERNAL MEDICINE

## 2017-07-03 PROCEDURE — 74011000258 HC RX REV CODE- 258: Performed by: PSYCHIATRY & NEUROLOGY

## 2017-07-03 PROCEDURE — 36415 COLL VENOUS BLD VENIPUNCTURE: CPT | Performed by: INTERNAL MEDICINE

## 2017-07-03 PROCEDURE — 80048 BASIC METABOLIC PNL TOTAL CA: CPT | Performed by: INTERNAL MEDICINE

## 2017-07-03 PROCEDURE — 77030011256 HC DRSG MEPILEX <16IN NO BORD MOLN -A

## 2017-07-03 PROCEDURE — 71010 XR CHEST PORT: CPT

## 2017-07-03 PROCEDURE — 83735 ASSAY OF MAGNESIUM: CPT | Performed by: INTERNAL MEDICINE

## 2017-07-03 PROCEDURE — 85025 COMPLETE CBC W/AUTO DIFF WBC: CPT | Performed by: INTERNAL MEDICINE

## 2017-07-03 RX ORDER — POTASSIUM CHLORIDE 1.5 G/1.77G
40 POWDER, FOR SOLUTION ORAL EVERY 4 HOURS
Status: COMPLETED | OUTPATIENT
Start: 2017-07-03 | End: 2017-07-03

## 2017-07-03 RX ORDER — MIDAZOLAM HYDROCHLORIDE 1 MG/ML
1-2 INJECTION, SOLUTION INTRAMUSCULAR; INTRAVENOUS
Status: DISCONTINUED | OUTPATIENT
Start: 2017-07-03 | End: 2017-07-06

## 2017-07-03 RX ADMIN — SODIUM CHLORIDE 25 ML/HR: 900 INJECTION, SOLUTION INTRAVENOUS at 04:30

## 2017-07-03 RX ADMIN — POTASSIUM CHLORIDE 40 MEQ: 1.5 POWDER, FOR SOLUTION ORAL at 18:59

## 2017-07-03 RX ADMIN — HALOPERIDOL LACTATE 5 MG: 5 INJECTION, SOLUTION INTRAMUSCULAR at 15:38

## 2017-07-03 RX ADMIN — LEVETIRACETAM 1500 MG: 100 INJECTION, SOLUTION, CONCENTRATE INTRAVENOUS at 20:55

## 2017-07-03 RX ADMIN — HALOPERIDOL LACTATE 5 MG: 5 INJECTION, SOLUTION INTRAMUSCULAR at 08:51

## 2017-07-03 RX ADMIN — LEVETIRACETAM 1500 MG: 100 INJECTION, SOLUTION, CONCENTRATE INTRAVENOUS at 09:00

## 2017-07-03 RX ADMIN — MIDAZOLAM HYDROCHLORIDE 1 MG: 1 INJECTION INTRAMUSCULAR; INTRAVENOUS at 18:57

## 2017-07-03 RX ADMIN — PIPERACILLIN SODIUM,TAZOBACTAM SODIUM 3.38 G: 3; .375 INJECTION, POWDER, FOR SOLUTION INTRAVENOUS at 21:07

## 2017-07-03 RX ADMIN — CHLORHEXIDINE GLUCONATE 15 ML: 1.2 RINSE ORAL at 17:06

## 2017-07-03 RX ADMIN — Medication 10 ML: at 21:02

## 2017-07-03 RX ADMIN — PREGABALIN 300 MG: 150 CAPSULE ORAL at 18:08

## 2017-07-03 RX ADMIN — FAMOTIDINE 20 MG: 10 INJECTION, SOLUTION INTRAVENOUS at 08:58

## 2017-07-03 RX ADMIN — ATORVASTATIN CALCIUM 40 MG: 40 TABLET, FILM COATED ORAL at 09:02

## 2017-07-03 RX ADMIN — CHLORHEXIDINE GLUCONATE 15 ML: 1.2 RINSE ORAL at 10:13

## 2017-07-03 RX ADMIN — POTASSIUM CHLORIDE 40 MEQ: 1.5 POWDER, FOR SOLUTION ORAL at 15:41

## 2017-07-03 RX ADMIN — ASPIRIN 300 MG: 300 SUPPOSITORY RECTAL at 10:08

## 2017-07-03 RX ADMIN — MIDAZOLAM HYDROCHLORIDE 2 MG: 1 INJECTION INTRAMUSCULAR; INTRAVENOUS at 23:56

## 2017-07-03 RX ADMIN — LATANOPROST 1 DROP: 50 SOLUTION OPHTHALMIC at 18:09

## 2017-07-03 RX ADMIN — Medication 10 ML: at 06:27

## 2017-07-03 RX ADMIN — Medication 10 ML: at 00:08

## 2017-07-03 RX ADMIN — Medication 10 ML: at 12:52

## 2017-07-03 RX ADMIN — ENOXAPARIN SODIUM 40 MG: 40 INJECTION SUBCUTANEOUS at 08:51

## 2017-07-03 RX ADMIN — DEXTROSE MONOHYDRATE 15 MG/HR: 50 INJECTION, SOLUTION INTRAVENOUS at 04:34

## 2017-07-03 RX ADMIN — POTASSIUM CHLORIDE 40 MEQ: 1.5 POWDER, FOR SOLUTION ORAL at 11:52

## 2017-07-03 RX ADMIN — Medication 10 ML: at 13:36

## 2017-07-03 RX ADMIN — THIAMINE HYDROCHLORIDE 100 MG: 100 INJECTION, SOLUTION INTRAMUSCULAR; INTRAVENOUS at 09:54

## 2017-07-03 RX ADMIN — PIPERACILLIN SODIUM,TAZOBACTAM SODIUM 3.38 G: 3; .375 INJECTION, POWDER, FOR SOLUTION INTRAVENOUS at 12:39

## 2017-07-03 RX ADMIN — FAMOTIDINE 20 MG: 10 INJECTION, SOLUTION INTRAVENOUS at 20:55

## 2017-07-03 RX ADMIN — PREGABALIN 300 MG: 150 CAPSULE ORAL at 09:02

## 2017-07-03 RX ADMIN — HALOPERIDOL LACTATE 5 MG: 5 INJECTION, SOLUTION INTRAMUSCULAR at 21:02

## 2017-07-03 NOTE — PROGRESS NOTES
Pharmacy Automatic Renal Dosing Protocol - Antimicrobials    Indication for Antimicrobials: sepsis of unknown origin  Current Regimen of Each Antimicrobial (Start Day & Day of Therapy):  Zosyn 3.375 gm q8h IVPB, day #1 (7/3)    Significant Cultures:  - blood - Staph capitis - sensitive to vanc EDGARD 0.5 mcg/ml  CAPD, Hemodialysis or Renal Replacement Therapy: n/a  Paralysis, amputations, malnutrition: n/a  Recent Labs      17   0423  17   0333  17   0308   CREA  0.62*  0.63*  0.67*   BUN  11  9  13   WBC  7.2  5.8  6.2     Temp (24hrs), Av.5 °F (36.9 °C), Min:97.9 °F (36.6 °C), Max:100.2 °F (37.9 °C)    Creatinine Clearance (Creatinine Clearance (ml/min)): ~ 89 ml/min    Impression/Plan: adjusted dose to q8h according to renal dosing protocol       Pharmacy will follow daily and adjust medications as appropriate for renal function and/or serum levels.     Thank you,  Sloane Blue

## 2017-07-03 NOTE — PROGRESS NOTES
Nutrition Assessment:    RECOMMENDATIONS:   Continue TF as ordered    ASSESSMENT:   Chart reviewed, case discussed during CCU rounds. Pt remains confused and on a precedex drip. He has a sitter. NGT placed Friday and TF started, now running at goal rate as ordered with minimal residuals. Labs reviewed, K+ 3.0, being repleted. Phos and Mag WNL. BM noted on 7/1. TF is adequate to meet 100% kcal and protein needs. Dietitians Intervention(s)/Plan(s): Continue TF as ordered, monitor long term goals of care  SUBJECTIVE/OBJECTIVE:   Pt confused, sleeping  Diet Order: NPO, Other (comment) (TF via NGT: Osm 1.2 @ 60mL/h + 100mL flush q 4h (provides 1728kcals/80gPro/1781mL) )  % Eaten:  No data found. Osmolite 1.2 at 60 mL/hr flush with 100 mL  Q4H  via NG Tube   Residuals: 10 mL    Pertinent Medications:pepcid, KCl, thiamin; [email or web address]); Drips: precedex. Chemistries:  Lab Results   Component Value Date/Time    Sodium 137 07/03/2017 04:23 AM    Potassium 3.0 07/03/2017 04:23 AM    Chloride 100 07/03/2017 04:23 AM    CO2 31 07/03/2017 04:23 AM    Anion gap 6 07/03/2017 04:23 AM    Glucose 136 07/03/2017 04:23 AM    BUN 11 07/03/2017 04:23 AM    Creatinine 0.62 07/03/2017 04:23 AM    BUN/Creatinine ratio 18 07/03/2017 04:23 AM    GFR est AA >60 07/03/2017 04:23 AM    GFR est non-AA >60 07/03/2017 04:23 AM    Calcium 8.6 07/03/2017 04:23 AM    Albumin 2.6 07/02/2017 03:33 AM      Anthropometrics: Height: 5' 8\" (172.7 cm) Weight: 68.7 kg (151 lb 7.3 oz)    IBW (%IBW):   ( ) UBW (%UBW):   (  %)    BMI: Body mass index is 23.03 kg/(m^2). This BMI is indicative of:  []Underweight   [x]Normal   []Overweight   [] Obesity   [] Extreme Obesity (BMI>40)  Estimated Nutrition Needs (Based on): 1673 Kcals/day (MSJ 1394 x 1.2) , 58 g (-72 (0.8-1gPro/kg) ) Protein  Carbohydrate:  At Least 130 g/day  Fluids: 1700 mL/day    Last BM: 7/1   []Active     []Hyperactive  [x]Hypoactive       [] Absent   BS  Skin:    [x] Intact [] Incision  [] Breakdown   [] DTI   [] Tears/Excoriation/Abrasion  []Edema [] Other: Wt Readings from Last 30 Encounters:   07/03/17 68.7 kg (151 lb 7.3 oz)   06/21/17 70.3 kg (155 lb)   03/21/17 75.3 kg (166 lb)   03/18/17 68 kg (150 lb)   03/07/17 75.3 kg (166 lb)   02/25/17 76.2 kg (168 lb)   02/18/17 85.5 kg (188 lb 7.9 oz)   09/03/12 87.1 kg (192 lb)      NUTRITION DIAGNOSES:   Problem:  Inadequate protein-energy intake      Etiology: related to pt NPO 2' agitation/confusion     Signs/Symptoms: as evidenced by NPO meets 0% kcal and protein needs. Previous dx re: inadequate protein energy intake resolved. TF meets 100% needs. NUTRITION INTERVENTIONS:   Enteral/Parenteral Nutrition: Other (Continue TF as ordered)                GOAL:   Pt will tolerate TF @ goal rate with residuals <250mL in 2-4 days.      NUTRITION MONITORING AND EVALUATION   Previous Goal: Pt will be advanced to PO diet vs nutrition support in 2-4 days  Previous Goal Met: Yes   Previous Recommendations Implemented: Yes   Cultural, Moravian, or Ethnic Dietary Needs: None   LEARNING NEEDS (Diet, Food/Nutrient-Drug Interaction):    [x] None Identified   [] Identified and Education Provided/Documented   [] Identified and Pt declined/was not appropriate      [x] Interdisciplinary Care Plan Reviewed/Documented    [x] Participated in Discharge Planning: Unable to determine    [x] Interdisciplinary Rounds     NUTRITION RISK:    [x] High              [] Moderate           []  Low  []  Minimal/Uncompromised      Solomon López, [address], [address] [place] [address]   Pager [phone]  Weekend Pager [phone]

## 2017-07-03 NOTE — PROGRESS NOTES
Hospitalist Progress Note    NAME: Dawit Jones   :  1934   MRN:  [id number]       Assessment / Plan:  Acute respiratory failure requiring intubation for airway protection POA  Suspected baseline COPD   Self extubated    cxray with RUL scar/infiltarte - present since     + copious secretion, cannot clear - aggressive pulmonary toilet   Pulmonary help appreciated     Status epilepticus POA with recurrent seizures in ED  Acute encephalopathy POA  MS: slowly improving  Still failed speech, cont TF   Cont c/w IV Keppra while NPO   On scheduled Haldol for agitation   Interestingly both the seizure today and in February occurred after stopping lyrica for several days      Essential HTN POA   History of SVT POA  Hold oral meds till OG tube placed  On cardizem gtt   PRN labetalol      Peripheral neuropathy POA, cont lyrica   Remote ETOH abuse. Family confirms no ETOH in 5 years  GERD continue PPI           Code status: Full codeNext of Kin: Wife  DVT prophylaxis: lovenox SQ    Recommended Disposition: TBD     Subjective:     Chief Complaint / Reason for Physician Visit: following respiratory failure / encephalopathy / seizure   MS: continue to slowly improve   + copious secretion, poor inspiratory effort, cannot clear secretion     Discussed with RN events overnight. Review of Systems:  Symptom Y/N Comments  Symptom Y/N Comments   Fever/Chills    Chest Pain     Poor Appetite    Edema     Cough    Abdominal Pain     Sputum    Joint Pain     SOB/AL    Pruritis/Rash     Nausea/vomit    Tolerating PT/OT     Diarrhea    Tolerating Diet     Constipation    Other       Could NOT obtain due to: AMS      Objective:     VITALS:   Last 24hrs VS reviewed since prior progress note.  Most recent are:  Patient Vitals for the past 24 hrs:   Temp Pulse Resp BP SpO2   17 1300 - 68 13 93/42 98 %   17 1200 100.2 °F (37.9 °C) 67 15 105/53 100 %   17 1100 - 66 15 (!) 88/49 99 %   17 1007 - 70 18 91/41 95 %   07/03/17 1000 - 63 19 (!) 81/40 95 %   07/03/17 0900 - 98 26 147/79 94 %   07/03/17 0800 99.4 °F (37.4 °C) 99 24 (!) 137/95 96 %   07/03/17 0600 - (!) 101 19 158/73 97 %   07/03/17 0500 - 97 21 119/76 96 %   07/03/17 0400 - 95 17 142/70 97 %   07/03/17 0349 98.2 °F (36.8 °C) - - - -   07/03/17 0300 - 86 24 130/63 97 %   07/03/17 0200 - 88 22 127/62 95 %   07/03/17 0100 - 87 22 116/63 96 %   07/03/17 0001 - 73 15 118/49 95 %   07/03/17 0000 98.3 °F (36.8 °C) 74 16 - 96 %   07/02/17 2300 - 70 14 108/52 96 %   07/02/17 2200 - 87 21 141/62 97 %   07/02/17 2100 - 74 19 117/58 96 %   07/02/17 2000 98.5 °F (36.9 °C) 74 14 110/51 96 %   07/02/17 1900 97.9 °F (36.6 °C) (!) 107 18 120/65 96 %   07/02/17 1800 98.6 °F (37 °C) 74 16 109/55 96 %   07/02/17 1700 98 °F (36.7 °C) 74 17 93/46 95 %   07/02/17 1600 98.2 °F (36.8 °C) 88 22 116/63 96 %   07/02/17 1500 98.4 °F (36.9 °C) 90 22 131/63 95 %       Intake/Output Summary (Last 24 hours) at 07/03/17 1428  Last data filed at 07/03/17 1300   Gross per 24 hour   Intake          3109.99 ml   Output              175 ml   Net          2934.99 ml        PHYSICAL EXAM:  General: WD, WN. Alert, cooperative, no acute distress, appears weak and debilitated     EENT:  EOMI. Anicteric sclerae. MMM  Resp:  rhonchi bilaterally, no wheezing or rales. No accessory muscle use  CV:  Regular  rhythm,  No edema  GI:  Soft, Non distended, Non tender.  +Bowel sounds  Neurologic:  Alert and oriented X 2, confused, normal speech,   Psych:   Poor insight. Not anxious nor agitated  Skin:  No rashes.   No jaundice    Reviewed most current lab test results and cultures  YES  Reviewed most current radiology test results   YES  Review and summation of old records today    NO  Reviewed patient's current orders and MAR    YES  PMH/SH reviewed - no change compared to H&P  ________________________________________________________________________  Care Plan discussed with:    Comments   Patient y Family      RN y    Care Manager     Consultant                        Multidiciplinary team rounds were held today with , nursing, pharmacist and clinical coordinator. Patient's plan of care was discussed; medications were reviewed and discharge planning was addressed. ________________________________________________________________________  Total NON critical care TIME:  25  Minutes    Total CRITICAL CARE TIME Spent:   Minutes non procedure based      Comments   >50% of visit spent in counseling and coordination of care     ________________________________________________________________________  Caitlin Syed MD     Procedures: see electronic medical records for all procedures/Xrays and details which were not copied into this note but were reviewed prior to creation of Plan. LABS:  I reviewed today's most current labs and imaging studies.   Pertinent labs include:  Recent Labs      07/03/17   0423  07/02/17   0333  07/01/17   0308   WBC  7.2  5.8  6.2   HGB  11.8*  11.7*  11.7*   HCT  33.5*  32.5*  33.5*   PLT  181  140*  148*     Recent Labs      07/03/17   0423  07/02/17   0333  07/01/17   0308   NA  137  138  139   K  3.0*  2.9*  3.4*   CL  100  102  104   CO2  31  29  23   GLU  136*  133*  114*   BUN  11  9  13   CREA  0.62*  0.63*  0.67*   CA  8.6  8.4*  8.6   MG  1.6  1.3*  1.6   PHOS  2.9  2.8  3.1   ALB   --   2.6*   --    TBILI   --   0.8   --    SGOT   --   24   --    ALT   --   25   --    INR   --   1.2*   --        Signed: Caitlin Syed MD

## 2017-07-03 NOTE — INTERDISCIPLINARY ROUNDS
Interdisciplinary team rounds were held 7/3/2017 with the following team members:Care Management, Diabetes Treatment Specialist, Nursing, Nutrition, Pharmacy, Physician, and Respiratory Therapy. Plan of care discussed. See clinical pathway and/or care plan for interventions and desired outcomes.

## 2017-07-03 NOTE — PROGRESS NOTES
CM completed chart review today. Pt remains in CCU. No CM needs at this time. Pt may require SNF at discharge. CM will continue to monitor discharge plan. [facility].  CM  Ext [phone]

## 2017-07-03 NOTE — PROGRESS NOTES
Speech Pathology Note:  Attempted to see patient this am, however, spoke with RN who reported patient is not appropriate for swallow treatment at this time. Will continue to follow as medically appropriate. Thank you.     Larinda Mcburney, MS; CCC-SLP

## 2017-07-03 NOTE — PROGRESS NOTES
[facility]  Pulmonary, Critical Care, and Sleep Medicine    Name: Edmond Smith MRN: [id number]   : 1934 Hospital: [facility]   Date: 7/3/2017        IMPRESSION:   · Acute respiratory failure - airway protection - now extubated  · Encephalopathy- alert but very sluggish; on IV precedex  · Seizures with status epilepticus  · HTN  · SVT on IV cardizem  · Peripheral neuropathy  · Right upper lobe scar/infiltrate - no tobacco history per records - has been present since at least February of this year, may be present to some degree on chest X-ray from   · Suspect COPD      PLAN:   · On O2  · Poor pulmonary toilet, needs suctioning   · mobilize  · Bronchodilators   · Antiepileptics  · Neurology eval ongoing - encephalopathy persists - MRI pending  · Antihypertensives  · Monitor platelets   · Antipsychotics for agitation  · Restrain for safety but trying to reorient and wean Precedex  · DVT prophylaxis     Subjective/Interval History:     7/3 copious bronchial secretions requiring deep suctioning prn     Poor airway clearance. Needs assistance. Replete K and       I have reviewed the flowsheet and previous days notes. No meaningful history from patient. More alert, but still confused and difficult to control. Pulls at lines, etc.     Review of Systems   Unable to perform ROS: Mental status change   Neurological: Positive for tremors.      Objective:   Vital Signs:    Visit Vitals    /73 (BP 1 Location: Left arm, BP Patient Position: At rest)    Pulse (!) 101    Temp 98.2 °F (36.8 °C)    Resp 19    Ht 5' 8\" (1.727 m)    Wt 68.7 kg (151 lb 7.3 oz)    SpO2 97%    BMI 23.03 kg/m2       O2 Device: Room air   O2 Flow Rate (L/min): 1 l/min   Temp (24hrs), Av.3 °F (36.8 °C), Min:97.9 °F (36.6 °C), Max:98.6 °F (37 °C)       Intake/Output:   Last shift:         Last 3 shifts:  1901 -  0700  In: 4378.2 [I.V.:1618.2]  Out: 275 [Urine:275]    Intake/Output Summary (Last 24 hours) at 07/03/17 0727  Last data filed at 07/03/17 0600   Gross per 24 hour   Intake          3039.73 ml   Output              175 ml   Net          2864.73 ml      Physical Exam   Constitutional: No distress. HENT:   Head: Normocephalic and atraumatic. Mouth/Throat: No oropharyngeal exudate. Eyes: No scleral icterus. Cardiovascular: Normal rate and regular rhythm. Pulmonary/Chest: No respiratory distress. He has no wheezes. He has no rales. Abdominal: Soft. Bowel sounds are normal. He exhibits no distension. There is no tenderness. Musculoskeletal: He exhibits no edema. Neurological: He is alert. Skin: Skin is warm and dry.      Data:     Current Facility-Administered Medications   Medication Dose Route Frequency    dilTIAZem (CARDIZEM) 100 mg in dextrose 5% (MBP/ADV) 100 mL infusion  0-15 mg/hr IntraVENous TITRATE    dilTIAZem (CARDIZEM) 100 mg in dextrose 5% (MBP/ADV) 100 mL infusion  0-15 mg/hr IntraVENous TITRATE    dexmedetomidine (PRECEDEX) 400 mcg in 0.9% sodium chloride 100 mL infusion  0.2-0.7 mcg/kg/hr IntraVENous TITRATE    sodium chloride (NS) flush 10 mL  10 mL InterCATHeter Q24H    sodium chloride (NS) flush 10-40 mL  10-40 mL InterCATHeter Q8H    sodium chloride (NS) flush 20 mL  20 mL InterCATHeter Q24H    latanoprost (XALATAN) 0.005 % ophthalmic solution 1 Drop  1 Drop Both Eyes QPM    haloperidol lactate (HALDOL) injection 5 mg  5 mg IntraVENous TID    famotidine (PF) (PEPCID) 20 mg in sodium chloride 0.9 % 10 mL injection  20 mg IntraVENous Q12H    thiamine (B-1) 100 mg in 0.9% sodium chloride 50 mL IVPB  100 mg IntraVENous DAILY    chlorhexidine (PERIDEX) 0.12 % mouthwash 15 mL  15 mL Oral BID    atorvastatin (LIPITOR) tablet 40 mg  40 mg Per NG tube DAILY    pregabalin (LYRICA) capsule 300 mg  300 mg Oral BID    sodium chloride (NS) flush 5-10 mL  5-10 mL IntraVENous Q8H    enoxaparin (LOVENOX) injection 40 mg  40 mg SubCUTAneous Q24H    aspirin (ASA) suppository 300 mg  300 mg Rectal DAILY    0.9% sodium chloride infusion  25 mL/hr IntraVENous CONTINUOUS    levETIRAcetam (KEPPRA) 1,500 mg in 0.9% sodium chloride IVPB  1,500 mg IntraVENous Q12H                Labs:  Recent Labs      07/03/17 0423 07/02/17 0333  07/01/17   0308   WBC  7.2  5.8  6.2   HGB  11.8*  11.7*  11.7*   HCT  33.5*  32.5*  33.5*   PLT  181  140*  148*     Recent Labs      07/03/17 0423 07/02/17 0333  07/01/17   0308   NA  137  138  139   K  3.0*  2.9*  3.4*   CL  100  102  104   CO2  31  29  23   GLU  136*  133*  114*   BUN  11  9  13   CREA  0.62*  0.63*  0.67*   CA  8.6  8.4*  8.6   MG  1.6  1.3*  1.6   PHOS  2.9  2.8  3.1   ALB   --   2.6*   --    TBILI   --   0.8   --    SGOT   --   24   --    ALT   --   25   --    INR   --   1.2*   --      No results for input(s): PH, PCO2, PO2, HCO3, FIO2 in the last 72 hours.   Imaging:  I have personally reviewed the patients radiographs and have reviewed the reports:  None today        Total critical care time exclusive of procedures:   minutes  Kenyetta Sifuentes MD

## 2017-07-03 NOTE — PROGRESS NOTES
Report received from JESSICA Sandoval RN,lab results reviewed. 0800  Assessment completed, coughing frequently. All upper airway secretions, possible gagging against NG tube at times also. 0830  NT suctioned by nurse and Dr. Alyson Collier. Oxygen added due to desat, but maintaining sats now. Frequently pulling at tubes and monitor lines. Sitter helpful to patient. 0900  Becoming more agitated, required more suctioning with tonsillar but performed orally. Sitter remains attentive to patient. Unable to control patient attempting to pull out tubes, kicking legs over side rails and nearly constantly gagging and coughing. See increase in Precedex. 1015  BP down, Precedex dropped back to 0.3, sleepy and not awakening easily to stimulus. Cardizem also decreased due to changes in heart rate. Now back up to 65-80, appeats to be in NSR.  1130  Dr. Alyson Collier told of 1 degree AVB. Remains off of Cardizem. 1200  Assessment completed, grimaces when touched, but otherwise sleeping. Will wean off of Precedex if he doesn't awaken soon. 1300  Did not fully awaken when oral tracheal suctioned. Precedex turned off. Remains in 1 degree AVB. Snoring at intervals. 1340  Still sleeping with Precedex off, will continue to watch closely. 1430  Back to baseline, awake and moving arms occasionally. Sitter asked to make me aware if patient begins to be agitated. 1855  Versed 1 mg given IV for confusion and increased anxiety. Attempts to pull gown off, impulsive with tubes. Sitter remains at bedside. 1920  Report given to [facility].

## 2017-07-03 NOTE — ROUTINE PROCESS
8218  Patient received last evening at 1900 hours; on room air; patient has very congested cough, requires suctioning to the back of the throat area every 1 - 2 hours (sometimes every 1 - 1/2 hours) and suctioned for moderate to large amount of tan secretions; patient has right IJ triple lumen catheter infusing precedex drip, cardizem drip and NS at 25 cc/hr; patient is in a-fib; patient is usually incontinent of urine and has a depend diaper on; on occasion patient can and will state he needs to \"use the bathroom\" and will use the urinal with assistance; patient is NPO and has NG tube infusing Osmolite 1.2 at 60 cc/hr (goal) with water flush 100 cc every 4 hours; lab done; patient has sitter at the bedside; Neuro -   On low dose precedex of 0.1 mcg patient will open eyes spontaneously, moves all extremities, has tremors both arms (tremors more right arm than left arm); patient is oriented to name, sometimes place, oriented about his family members, will ask when he can go home and when he can have something to eat, otherwise confused; patient is cooperative and obeys and mildly restless at times and is able to self suction with yankauer  when patient's precedex drip on 0.1 mcg patient's heart rate 90 - 746/WNF and systolic BP in the 679'H - 150's and cardizem drip is at 15 mg; when patient more sedated on precedex drip at 0.2 - 0.3 mcg systolic  - 662'R and heart rate in the 80's/min; patient is on scheduled haldol 5 mg IV three times a day; no contact with family during the night. Report given to RICHARD Lu.

## 2017-07-04 LAB
ANION GAP BLD CALC-SCNC: 6 MMOL/L (ref 5–15)
BUN SERPL-MCNC: 13 MG/DL (ref 6–20)
BUN/CREAT SERPL: 19 (ref 12–20)
CALCIUM SERPL-MCNC: 8.6 MG/DL (ref 8.5–10.1)
CHLORIDE SERPL-SCNC: 97 MMOL/L (ref 97–108)
CO2 SERPL-SCNC: 33 MMOL/L (ref 21–32)
CREAT SERPL-MCNC: 0.69 MG/DL (ref 0.7–1.3)
GLUCOSE SERPL-MCNC: 149 MG/DL (ref 65–100)
POTASSIUM SERPL-SCNC: 4.2 MMOL/L (ref 3.5–5.1)
SODIUM SERPL-SCNC: 136 MMOL/L (ref 136–145)

## 2017-07-04 PROCEDURE — 74011250636 HC RX REV CODE- 250/636: Performed by: INTERNAL MEDICINE

## 2017-07-04 PROCEDURE — 74011000250 HC RX REV CODE- 250: Performed by: INTERNAL MEDICINE

## 2017-07-04 PROCEDURE — 74011250637 HC RX REV CODE- 250/637: Performed by: EMERGENCY MEDICINE

## 2017-07-04 PROCEDURE — 94640 AIRWAY INHALATION TREATMENT: CPT

## 2017-07-04 PROCEDURE — 80048 BASIC METABOLIC PNL TOTAL CA: CPT | Performed by: INTERNAL MEDICINE

## 2017-07-04 PROCEDURE — 74011250637 HC RX REV CODE- 250/637: Performed by: INTERNAL MEDICINE

## 2017-07-04 PROCEDURE — 74011000258 HC RX REV CODE- 258: Performed by: PSYCHIATRY & NEUROLOGY

## 2017-07-04 PROCEDURE — 65610000006 HC RM INTENSIVE CARE

## 2017-07-04 PROCEDURE — 77010033678 HC OXYGEN DAILY

## 2017-07-04 PROCEDURE — 74011000258 HC RX REV CODE- 258: Performed by: INTERNAL MEDICINE

## 2017-07-04 PROCEDURE — 74011250636 HC RX REV CODE- 250/636: Performed by: PSYCHIATRY & NEUROLOGY

## 2017-07-04 PROCEDURE — 77030010545

## 2017-07-04 PROCEDURE — 36415 COLL VENOUS BLD VENIPUNCTURE: CPT | Performed by: INTERNAL MEDICINE

## 2017-07-04 PROCEDURE — 77030018846 HC SOL IRR STRL H20 ICUM -A

## 2017-07-04 PROCEDURE — 77030019563 HC DEV ATTCH FEED HOLL -A

## 2017-07-04 RX ORDER — GUAIFENESIN 100 MG/5ML
100 SOLUTION ORAL EVERY 6 HOURS
Status: DISCONTINUED | OUTPATIENT
Start: 2017-07-04 | End: 2017-07-06

## 2017-07-04 RX ORDER — SCOLOPAMINE TRANSDERMAL SYSTEM 1 MG/1
1.5 PATCH, EXTENDED RELEASE TRANSDERMAL
Status: DISCONTINUED | OUTPATIENT
Start: 2017-07-04 | End: 2017-07-06

## 2017-07-04 RX ORDER — IPRATROPIUM BROMIDE AND ALBUTEROL SULFATE 2.5; .5 MG/3ML; MG/3ML
3 SOLUTION RESPIRATORY (INHALATION)
Status: DISCONTINUED | OUTPATIENT
Start: 2017-07-04 | End: 2017-07-08

## 2017-07-04 RX ADMIN — PIPERACILLIN SODIUM,TAZOBACTAM SODIUM 3.38 G: 3; .375 INJECTION, POWDER, FOR SOLUTION INTRAVENOUS at 04:50

## 2017-07-04 RX ADMIN — METOPROLOL TARTRATE 5 MG: 5 INJECTION INTRAVENOUS at 12:38

## 2017-07-04 RX ADMIN — FENTANYL CITRATE 25 MCG: 50 INJECTION, SOLUTION INTRAMUSCULAR; INTRAVENOUS at 15:18

## 2017-07-04 RX ADMIN — DEXTROSE MONOHYDRATE 2.5 MG/HR: 50 INJECTION, SOLUTION INTRAVENOUS at 17:22

## 2017-07-04 RX ADMIN — Medication 10 ML: at 14:13

## 2017-07-04 RX ADMIN — GUAIFENESIN 100 MG: 100 SOLUTION ORAL at 14:35

## 2017-07-04 RX ADMIN — IPRATROPIUM BROMIDE AND ALBUTEROL SULFATE 3 ML: .5; 3 SOLUTION RESPIRATORY (INHALATION) at 03:31

## 2017-07-04 RX ADMIN — GUAIFENESIN 100 MG: 100 SOLUTION ORAL at 23:01

## 2017-07-04 RX ADMIN — IPRATROPIUM BROMIDE AND ALBUTEROL SULFATE 3 ML: .5; 3 SOLUTION RESPIRATORY (INHALATION) at 19:59

## 2017-07-04 RX ADMIN — LATANOPROST 1 DROP: 50 SOLUTION OPHTHALMIC at 18:27

## 2017-07-04 RX ADMIN — ATORVASTATIN CALCIUM 40 MG: 40 TABLET, FILM COATED ORAL at 08:28

## 2017-07-04 RX ADMIN — FAMOTIDINE 20 MG: 10 INJECTION, SOLUTION INTRAVENOUS at 21:06

## 2017-07-04 RX ADMIN — CHLORHEXIDINE GLUCONATE 15 ML: 1.2 RINSE ORAL at 09:58

## 2017-07-04 RX ADMIN — Medication 10 ML: at 04:50

## 2017-07-04 RX ADMIN — HYDRALAZINE HYDROCHLORIDE 10 MG: 20 INJECTION INTRAMUSCULAR; INTRAVENOUS at 20:12

## 2017-07-04 RX ADMIN — HYDRALAZINE HYDROCHLORIDE 10 MG: 20 INJECTION INTRAMUSCULAR; INTRAVENOUS at 06:16

## 2017-07-04 RX ADMIN — PIPERACILLIN SODIUM,TAZOBACTAM SODIUM 3.38 G: 3; .375 INJECTION, POWDER, FOR SOLUTION INTRAVENOUS at 21:27

## 2017-07-04 RX ADMIN — LEVETIRACETAM 1500 MG: 100 INJECTION, SOLUTION, CONCENTRATE INTRAVENOUS at 21:03

## 2017-07-04 RX ADMIN — HALOPERIDOL LACTATE 5 MG: 5 INJECTION, SOLUTION INTRAMUSCULAR at 22:18

## 2017-07-04 RX ADMIN — IPRATROPIUM BROMIDE AND ALBUTEROL SULFATE 3 ML: .5; 3 SOLUTION RESPIRATORY (INHALATION) at 12:20

## 2017-07-04 RX ADMIN — SODIUM CHLORIDE 25 ML/HR: 900 INJECTION, SOLUTION INTRAVENOUS at 10:50

## 2017-07-04 RX ADMIN — IPRATROPIUM BROMIDE AND ALBUTEROL SULFATE 3 ML: .5; 3 SOLUTION RESPIRATORY (INHALATION) at 15:16

## 2017-07-04 RX ADMIN — FAMOTIDINE 20 MG: 10 INJECTION, SOLUTION INTRAVENOUS at 08:35

## 2017-07-04 RX ADMIN — PREGABALIN 300 MG: 150 CAPSULE ORAL at 08:23

## 2017-07-04 RX ADMIN — LEVETIRACETAM 1500 MG: 100 INJECTION, SOLUTION, CONCENTRATE INTRAVENOUS at 09:42

## 2017-07-04 RX ADMIN — HYDRALAZINE HYDROCHLORIDE 10 MG: 20 INJECTION INTRAMUSCULAR; INTRAVENOUS at 14:10

## 2017-07-04 RX ADMIN — HALOPERIDOL LACTATE 5 MG: 5 INJECTION, SOLUTION INTRAMUSCULAR at 08:40

## 2017-07-04 RX ADMIN — Medication 10 ML: at 21:08

## 2017-07-04 RX ADMIN — GUAIFENESIN 100 MG: 100 SOLUTION ORAL at 17:17

## 2017-07-04 RX ADMIN — ENOXAPARIN SODIUM 40 MG: 40 INJECTION SUBCUTANEOUS at 08:33

## 2017-07-04 RX ADMIN — HALOPERIDOL LACTATE 5 MG: 5 INJECTION, SOLUTION INTRAMUSCULAR at 16:08

## 2017-07-04 RX ADMIN — THIAMINE HYDROCHLORIDE 100 MG: 100 INJECTION, SOLUTION INTRAMUSCULAR; INTRAVENOUS at 09:44

## 2017-07-04 RX ADMIN — PREGABALIN 300 MG: 150 CAPSULE ORAL at 17:30

## 2017-07-04 RX ADMIN — PIPERACILLIN SODIUM,TAZOBACTAM SODIUM 3.38 G: 3; .375 INJECTION, POWDER, FOR SOLUTION INTRAVENOUS at 12:29

## 2017-07-04 RX ADMIN — ASPIRIN 300 MG: 300 SUPPOSITORY RECTAL at 09:47

## 2017-07-04 RX ADMIN — IPRATROPIUM BROMIDE AND ALBUTEROL SULFATE 3 ML: .5; 3 SOLUTION RESPIRATORY (INHALATION) at 23:05

## 2017-07-04 NOTE — PROGRESS NOTES
0700:  Bedside handoff report received from Emma Turner RN (offgoing nurse). Pt oriented to self, place, disoriented to time and situation. VSS, afebrile; on 2L NC. Wheezy, coarse lung sounds bilaterally. Sitter remains at bedside. Osmolite 1.2 infusing at 60 mL/hr; no residuals noted. 6096-0212:  Pt able to expectorate small amounts of tan sputum with coughing, writer able to clear sputum with yankauer. No nt suctioning required at this time. 1230:  Pt's HR noted to be 120-130 during albuterol treatment, BP stable; s/w Dr. Yo Lai, ok to give metoprolol and continue treatment. 1238:  Metoprolol given for HR ~ 130-140.    6215-0951:  Pt NT suctioned x 4 with good effect. Pt tolerates poorly. 1518:  Patient states \"I hurt everywhere\"; 25 mcg of fentanyl given with good effect. 1650:  Pt's HR noted to be sustaining between 130-150; BP stable; Sinus tach vs A fib;  Dr. Yo Lai updated: orders received to begin diltiazem infusion. 1722:  Diltiazem started at 2.5 mg/hr. Will monitor for tolerance and titrate accordingly. 1900:  Bedside handoff report given to Mati Britt RN (oncoming nurse).     Candy Bai RN

## 2017-07-04 NOTE — PROGRESS NOTES
Hospitalist Progress Note    NAME: Alisha Hartman   :  1934   MRN:  [id number]       Assessment / Plan:    Acute respiratory failure requiring intubation for airway protection POA  RUL infiltrate / Suspected baseline COPD   -Self extubated    -cxray with RUL scar/infiltarte - present since     -still sounds congested. Continue pulmonary toilet; suction prn; duonebs and oxygen prn  -continue zosyn    Status epilepticus POA with recurrent seizures  -admitted for seizures in  and 2017. Both this admission and last February occurred after stopping lyrica for several days  -continue IV keppra    Acute encephalopathy POA  MS: slowly improving  Speech re eval in AM, hopefully can transition from TF to PO    Essential HTN POA   History of SVT POA  -holding toprol; now off cardizem gtt   -asa supp        Peripheral neuropathy POA, cont lyrica   Remote ETOH abuse. Family confirms no ETOH in 5 years  GERD continue PPI       Code status: Full codeNext of Kin: Wife  DVT prophylaxis: lovenox SQ    Recommended Disposition: TBD     Subjective:     Chief Complaint / Reason for Physician Visit:   following respiratory failure / encephalopathy / seizure   Discussed with RN events overnight. Seems more alert and responsive today. Review of Systems:  Symptom Y/N Comments  Symptom Y/N Comments   Fever/Chills n   Chest Pain n    Poor Appetite    Edema     Cough    Abdominal Pain n    Sputum    Joint Pain     SOB/AL   stable  Pruritis/Rash     Nausea/vomit    Tolerating PT/OT     Diarrhea    Tolerating Diet     Constipation    Other       Could NOT obtain due to:      Objective:     VITALS:   Last 24hrs VS reviewed since prior progress note.  Most recent are:  Patient Vitals for the past 24 hrs:   Temp Pulse Resp BP SpO2   17 1300 - 96 22 (!) 154/105 98 %   17 1222 - (!) 131 20 157/75 95 %   17 1219 - - - - 99 %   17 1200 - (!) 110 19 (!) 159/131 98 %   17 1100 98.6 °F (37 °C) (!) 113 21 152/75 95 %   07/04/17 1000 - 82 16 129/60 99 %   07/04/17 0900 - (!) 101 25 150/71 98 %   07/04/17 0803 98.4 °F (36.9 °C) (!) 103 24 151/65 95 %   07/04/17 0700 - (!) 109 28 95/53 98 %   07/04/17 0600 - (!) 101 22 175/72 98 %   07/04/17 0500 - 78 15 124/52 96 %   07/04/17 0400 99.2 °F (37.3 °C) 84 16 136/55 98 %   07/04/17 0331 - - - - 97 %   07/04/17 0300 - (!) 107 25 154/83 95 %   07/04/17 0200 - 92 17 144/56 98 %   07/04/17 0100 - 83 15 139/54 98 %   07/04/17 0001 98.7 °F (37.1 °C) 97 23 155/74 96 %   07/03/17 2300 - 76 15 138/65 99 %   07/03/17 2200 - 94 16 154/73 93 %   07/03/17 2100 - 94 22 156/77 95 %   07/03/17 2000 98.6 °F (37 °C) 87 21 152/71 95 %   07/03/17 1900 - 93 23 149/75 100 %   07/03/17 1800 - 95 24 152/62 94 %   07/03/17 1700 - 86 18 132/83 100 %   07/03/17 1600 - 80 17 132/61 100 %   07/03/17 1545 98.4 °F (36.9 °C) 91 18 - 100 %   07/03/17 1500 - 89 20 138/59 98 %   07/03/17 1400 - 74 14 112/40 99 %       Intake/Output Summary (Last 24 hours) at 07/04/17 1345  Last data filed at 07/04/17 1100   Gross per 24 hour   Intake          3115.59 ml   Output                0 ml   Net          3115.59 ml        PHYSICAL EXAM:  General: WD, WN. Alert, cooperative, no acute distress, appears weak and debilitated     EENT:  EOMI. Anicteric sclerae. MMM  Resp:  rhonchi bilaterally, no wheezing or rales. No accessory muscle use  CV:  Regular  rhythm,  No edema  GI:  Soft, Non distended, Non tender.  +Bowel sounds  Neurologic:  Alert and follows commands, normal speech, weak but grossly non focal  Psych:   Not anxious nor agitated  Skin:  No rashes.   No jaundice    Reviewed most current lab test results and cultures  YES  Reviewed most current radiology test results   YES  Review and summation of old records today    NO  Reviewed patient's current orders and MAR    YES  PMH/SH reviewed - no change compared to H&P  ________________________________________________________________________  Care Plan discussed with:    Comments   Patient y    Family  y wife   RN y    Care Manager     Consultant                        Multidiciplinary team rounds were held today with , nursing, pharmacist and clinical coordinator. Patient's plan of care was discussed; medications were reviewed and discharge planning was addressed. ________________________________________________________________________  Total NON critical care TIME:  25  Minutes    Total CRITICAL CARE TIME Spent:   Minutes non procedure based      Comments   >50% of visit spent in counseling and coordination of care     ________________________________________________________________________  Del Landaverde MD     Procedures: see electronic medical records for all procedures/Xrays and details which were not copied into this note but were reviewed prior to creation of Plan. LABS:  I reviewed today's most current labs and imaging studies.   Pertinent labs include:  Recent Labs      07/03/17 0423 07/02/17 0333   WBC  7.2  5.8   HGB  11.8*  11.7*   HCT  33.5*  32.5*   PLT  181  140*     Recent Labs      07/03/17 0423 07/02/17 0333   NA  137  138   K  3.0*  2.9*   CL  100  102   CO2  31  29   GLU  136*  133*   BUN  11  9   CREA  0.62*  0.63*   CA  8.6  8.4*   MG  1.6  1.3*   PHOS  2.9  2.8   ALB   --   2.6*   TBILI   --   0.8   SGOT   --   24   ALT   --   25   INR   --   1.2*       Signed: Del Landaverde MD

## 2017-07-04 NOTE — PROGRESS NOTES
[facility] [place]  Pulmonary, Critical Care, and Sleep Medicine    Name: Rajat Winston MRN: [id number]   : 1934 Hospital: [facility]   Date: 2017        IMPRESSION:   · Acute respiratory failure - airway protection - now extubated  · Encephalopathy- alert but very sluggish; on IV precedex  · Seizures with status epilepticus  · HTN  · SVT on IV cardizem  · Peripheral neuropathy  · Right upper lobe scar/infiltrate - no tobacco history per records - has been present since at least February of this year, may be present to some degree on chest X-ray from   · Suspect COPD      PLAN:   · Wean O2  · Poor pulmonary toilet, suctioning prn  · mobilize  · Bronchodilators   · Antiepileptics  · Neurology eval ongoing - encephalopathy improving  · Antihypertensives  · Monitor platelets   · Antipsychotics for agitation  · Restrain for safety but trying to reorient and wean off Precedex  · DVT prophylaxis  · Transfer to step down today     Subjective/Interval History:     More awake and cooperative today  No acute distress       Review of Systems   Unable to perform ROS: Mental status change     Objective:   Vital Signs:    Visit Vitals    /65 (BP 1 Location: Left arm, BP Patient Position: Supine)    Pulse (!) 103    Temp 98.4 °F (36.9 °C)    Resp 24    Ht 5' 8\" (1.727 m)    Wt 68.7 kg (151 lb 7.3 oz)    SpO2 95%    BMI 23.03 kg/m2       O2 Device: Nasal cannula   O2 Flow Rate (L/min): 2 l/min   Temp (24hrs), Av.9 °F (37.2 °C), Min:98.4 °F (36.9 °C), Max:100.2 °F (37.9 °C)       Intake/Output:   Last shift:      701 - 1900  In: 260   Out: -   Last 3 shifts: 1901 -  07  In: 4845.5 [I.V.:1725.5]  Out: 150 [Urine:150]    Intake/Output Summary (Last 24 hours) at 17 0918  Last data filed at 17 0835   Gross per 24 hour   Intake          3156.58 ml   Output                0 ml   Net          3156.58 ml      Physical Exam Constitutional: No distress. HENT:   Head: Normocephalic and atraumatic. Mouth/Throat: No oropharyngeal exudate. Eyes: No scleral icterus. Cardiovascular: Normal rate and regular rhythm. Pulmonary/Chest: No respiratory distress. He has no wheezes. He has no rales. Abdominal: Soft. Bowel sounds are normal. He exhibits no distension. There is no tenderness. Musculoskeletal: He exhibits no edema. Neurological: He is alert. Skin: Skin is warm and dry.      Data:     Current Facility-Administered Medications   Medication Dose Route Frequency    piperacillin-tazobactam (ZOSYN) 3.375 g in 0.9% sodium chloride (MBP/ADV) 100 mL  3.375 g IntraVENous Q8H    dexmedetomidine (PRECEDEX) 400 mcg in 0.9% sodium chloride 100 mL infusion  0.2-0.7 mcg/kg/hr IntraVENous TITRATE    sodium chloride (NS) flush 10 mL  10 mL InterCATHeter Q24H    sodium chloride (NS) flush 10-40 mL  10-40 mL InterCATHeter Q8H    sodium chloride (NS) flush 20 mL  20 mL InterCATHeter Q24H    latanoprost (XALATAN) 0.005 % ophthalmic solution 1 Drop  1 Drop Both Eyes QPM    haloperidol lactate (HALDOL) injection 5 mg  5 mg IntraVENous TID    famotidine (PF) (PEPCID) 20 mg in sodium chloride 0.9 % 10 mL injection  20 mg IntraVENous Q12H    thiamine (B-1) 100 mg in 0.9% sodium chloride 50 mL IVPB  100 mg IntraVENous DAILY    chlorhexidine (PERIDEX) 0.12 % mouthwash 15 mL  15 mL Oral BID    atorvastatin (LIPITOR) tablet 40 mg  40 mg Per NG tube DAILY    pregabalin (LYRICA) capsule 300 mg  300 mg Oral BID    sodium chloride (NS) flush 5-10 mL  5-10 mL IntraVENous Q8H    enoxaparin (LOVENOX) injection 40 mg  40 mg SubCUTAneous Q24H    aspirin (ASA) suppository 300 mg  300 mg Rectal DAILY    0.9% sodium chloride infusion  25 mL/hr IntraVENous CONTINUOUS    levETIRAcetam (KEPPRA) 1,500 mg in 0.9% sodium chloride IVPB  1,500 mg IntraVENous Q12H                Labs:  Recent Labs      07/03/17   0423  07/02/17   0333   WBC  7.2  5.8 HGB  11.8*  11.7*   HCT  33.5*  32.5*   PLT  181  140*     Recent Labs      07/03/17   0423  07/02/17   0333   NA  137  138   K  3.0*  2.9*   CL  100  102   CO2  31  29   GLU  136*  133*   BUN  11  9   CREA  0.62*  0.63*   CA  8.6  8.4*   MG  1.6  1.3*   PHOS  2.9  2.8   ALB   --   2.6*   TBILI   --   0.8   SGOT   --   24   ALT   --   25   INR   --   1.2*     No results for input(s): PH, PCO2, PO2, HCO3, FIO2 in the last 72 hours.   Imaging:  I have personally reviewed the patients radiographs and have reviewed the reports:  None today        Total critical care time exclusive of procedures:   minutes  Cassia Willard MD

## 2017-07-04 NOTE — PROGRESS NOTES
1900 Report received from Raciel Pardo RN.     0400 Bilateral wrist restraints d/c'd. Sitter at bedside-precedex restarted. 0630 Shift summary-pt with periodic confusion, A&Ox3-confused to time only. Pt needing to be NT suctioned frequently, pt with weak cough but moderate to large amount of oral secretions. 2L NC placed on pt d/t O2 dropping into upper 80s while sleeping & also desating while NT suctioning. Pt bathed/linen changed & cleaned of 1 BM. PRN versed given x1 & precedex restarted d/t agiation/pt pulling at lines/NGT-BP & HR stable. Sitter at bedside. 0700 Report given to Toño Vergara RN.

## 2017-07-05 LAB
ARTERIAL PATENCY WRIST A: YES
BASE EXCESS BLDA CALC-SCNC: 6.7 MMOL/L
BDY SITE: ABNORMAL
BREATHS.SPONTANEOUS ON VENT: 19
EPAP/CPAP/PEEP, PAPEEP: 5
FIO2 ON VENT: 40 %
GAS FLOW.O2 SETTING OXYMISER: 4 L/MIN
GLUCOSE BLD STRIP.AUTO-MCNC: 138 MG/DL (ref 65–100)
GLUCOSE BLD STRIP.AUTO-MCNC: 151 MG/DL (ref 65–100)
HCO3 BLDA-SCNC: 35 MMOL/L (ref 22–26)
IPAP/PIP, IPAPIP: 14
PCO2 BLDA: 65 MMHG (ref 35–45)
PH BLDA: 7.35 [PH] (ref 7.35–7.45)
PO2 BLDA: 164 MMHG (ref 80–100)
SAO2 % BLD: 99 % (ref 92–97)
SAO2% DEVICE SAO2% SENSOR NAME: ABNORMAL
SERVICE CMNT-IMP: ABNORMAL
SERVICE CMNT-IMP: ABNORMAL
SPECIMEN SITE: ABNORMAL
VENTILATION MODE VENT: ABNORMAL

## 2017-07-05 PROCEDURE — 74011250636 HC RX REV CODE- 250/636: Performed by: INTERNAL MEDICINE

## 2017-07-05 PROCEDURE — 74011000250 HC RX REV CODE- 250: Performed by: INTERNAL MEDICINE

## 2017-07-05 PROCEDURE — 74011000258 HC RX REV CODE- 258: Performed by: INTERNAL MEDICINE

## 2017-07-05 PROCEDURE — 65610000006 HC RM INTENSIVE CARE

## 2017-07-05 PROCEDURE — 36600 WITHDRAWAL OF ARTERIAL BLOOD: CPT | Performed by: INTERNAL MEDICINE

## 2017-07-05 PROCEDURE — 94660 CPAP INITIATION&MGMT: CPT

## 2017-07-05 PROCEDURE — 82962 GLUCOSE BLOOD TEST: CPT

## 2017-07-05 PROCEDURE — 74011250636 HC RX REV CODE- 250/636: Performed by: PSYCHIATRY & NEUROLOGY

## 2017-07-05 PROCEDURE — 74011250637 HC RX REV CODE- 250/637: Performed by: INTERNAL MEDICINE

## 2017-07-05 PROCEDURE — 77010033678 HC OXYGEN DAILY

## 2017-07-05 PROCEDURE — 82803 BLOOD GASES ANY COMBINATION: CPT | Performed by: INTERNAL MEDICINE

## 2017-07-05 PROCEDURE — 74011000258 HC RX REV CODE- 258: Performed by: PSYCHIATRY & NEUROLOGY

## 2017-07-05 PROCEDURE — 94640 AIRWAY INHALATION TREATMENT: CPT

## 2017-07-05 RX ORDER — ASPIRIN 325 MG
325 TABLET ORAL DAILY
Status: DISCONTINUED | OUTPATIENT
Start: 2017-07-05 | End: 2017-07-06

## 2017-07-05 RX ORDER — DEXAMETHASONE SODIUM PHOSPHATE 4 MG/ML
4 INJECTION, SOLUTION INTRA-ARTICULAR; INTRALESIONAL; INTRAMUSCULAR; INTRAVENOUS; SOFT TISSUE ONCE
Status: COMPLETED | OUTPATIENT
Start: 2017-07-05 | End: 2017-07-05

## 2017-07-05 RX ORDER — DEXAMETHASONE SODIUM PHOSPHATE 4 MG/ML
4 INJECTION, SOLUTION INTRA-ARTICULAR; INTRALESIONAL; INTRAMUSCULAR; INTRAVENOUS; SOFT TISSUE EVERY 8 HOURS
Status: COMPLETED | OUTPATIENT
Start: 2017-07-05 | End: 2017-07-06

## 2017-07-05 RX ORDER — FAMOTIDINE 20 MG/1
20 TABLET, FILM COATED ORAL 2 TIMES DAILY
Status: DISCONTINUED | OUTPATIENT
Start: 2017-07-05 | End: 2017-07-06

## 2017-07-05 RX ORDER — HALOPERIDOL 5 MG/ML
2 INJECTION INTRAMUSCULAR 3 TIMES DAILY
Status: DISCONTINUED | OUTPATIENT
Start: 2017-07-05 | End: 2017-07-06

## 2017-07-05 RX ORDER — FAMOTIDINE 20 MG/1
20 TABLET, FILM COATED ORAL 2 TIMES DAILY
Status: DISCONTINUED | OUTPATIENT
Start: 2017-07-05 | End: 2017-07-05

## 2017-07-05 RX ADMIN — IPRATROPIUM BROMIDE AND ALBUTEROL SULFATE 3 ML: .5; 3 SOLUTION RESPIRATORY (INHALATION) at 11:29

## 2017-07-05 RX ADMIN — FAMOTIDINE 20 MG: 20 TABLET ORAL at 17:54

## 2017-07-05 RX ADMIN — METOPROLOL TARTRATE 5 MG: 5 INJECTION INTRAVENOUS at 00:36

## 2017-07-05 RX ADMIN — PREGABALIN 300 MG: 150 CAPSULE ORAL at 10:05

## 2017-07-05 RX ADMIN — IPRATROPIUM BROMIDE AND ALBUTEROL SULFATE 3 ML: .5; 3 SOLUTION RESPIRATORY (INHALATION) at 07:39

## 2017-07-05 RX ADMIN — PREGABALIN 300 MG: 150 CAPSULE ORAL at 17:54

## 2017-07-05 RX ADMIN — DEXAMETHASONE SODIUM PHOSPHATE 4 MG: 4 INJECTION, SOLUTION INTRAMUSCULAR; INTRAVENOUS at 13:52

## 2017-07-05 RX ADMIN — HYDRALAZINE HYDROCHLORIDE 10 MG: 20 INJECTION INTRAMUSCULAR; INTRAVENOUS at 02:30

## 2017-07-05 RX ADMIN — ATORVASTATIN CALCIUM 40 MG: 40 TABLET, FILM COATED ORAL at 10:05

## 2017-07-05 RX ADMIN — DEXAMETHASONE SODIUM PHOSPHATE 4 MG: 4 INJECTION, SOLUTION INTRAMUSCULAR; INTRAVENOUS at 23:00

## 2017-07-05 RX ADMIN — GUAIFENESIN 100 MG: 100 SOLUTION ORAL at 05:00

## 2017-07-05 RX ADMIN — LATANOPROST 1 DROP: 50 SOLUTION OPHTHALMIC at 17:57

## 2017-07-05 RX ADMIN — Medication 10 ML: at 05:00

## 2017-07-05 RX ADMIN — IPRATROPIUM BROMIDE AND ALBUTEROL SULFATE 3 ML: .5; 3 SOLUTION RESPIRATORY (INHALATION) at 15:17

## 2017-07-05 RX ADMIN — DEXTROSE MONOHYDRATE 10 MG/HR: 50 INJECTION, SOLUTION INTRAVENOUS at 10:14

## 2017-07-05 RX ADMIN — SODIUM CHLORIDE 25 ML/HR: 900 INJECTION, SOLUTION INTRAVENOUS at 09:42

## 2017-07-05 RX ADMIN — IPRATROPIUM BROMIDE AND ALBUTEROL SULFATE 3 ML: .5; 3 SOLUTION RESPIRATORY (INHALATION) at 04:00

## 2017-07-05 RX ADMIN — HALOPERIDOL LACTATE 5 MG: 5 INJECTION, SOLUTION INTRAMUSCULAR at 10:05

## 2017-07-05 RX ADMIN — Medication 10 ML: at 23:01

## 2017-07-05 RX ADMIN — GUAIFENESIN 100 MG: 100 SOLUTION ORAL at 17:54

## 2017-07-05 RX ADMIN — Medication 10 ML: at 16:28

## 2017-07-05 RX ADMIN — PIPERACILLIN SODIUM,TAZOBACTAM SODIUM 3.38 G: 3; .375 INJECTION, POWDER, FOR SOLUTION INTRAVENOUS at 04:58

## 2017-07-05 RX ADMIN — ASPIRIN 325 MG: 325 TABLET ORAL at 10:05

## 2017-07-05 RX ADMIN — GUAIFENESIN 100 MG: 100 SOLUTION ORAL at 13:48

## 2017-07-05 RX ADMIN — PIPERACILLIN SODIUM,TAZOBACTAM SODIUM 3.38 G: 3; .375 INJECTION, POWDER, FOR SOLUTION INTRAVENOUS at 20:56

## 2017-07-05 RX ADMIN — IPRATROPIUM BROMIDE AND ALBUTEROL SULFATE 3 ML: .5; 3 SOLUTION RESPIRATORY (INHALATION) at 23:00

## 2017-07-05 RX ADMIN — LEVETIRACETAM 1500 MG: 100 INJECTION, SOLUTION, CONCENTRATE INTRAVENOUS at 10:14

## 2017-07-05 RX ADMIN — FAMOTIDINE 20 MG: 10 INJECTION, SOLUTION INTRAVENOUS at 10:05

## 2017-07-05 RX ADMIN — HALOPERIDOL LACTATE 2 MG: 5 INJECTION, SOLUTION INTRAMUSCULAR at 16:29

## 2017-07-05 RX ADMIN — GUAIFENESIN 100 MG: 100 SOLUTION ORAL at 23:01

## 2017-07-05 RX ADMIN — PIPERACILLIN SODIUM,TAZOBACTAM SODIUM 3.38 G: 3; .375 INJECTION, POWDER, FOR SOLUTION INTRAVENOUS at 13:52

## 2017-07-05 RX ADMIN — HALOPERIDOL LACTATE 2 MG: 5 INJECTION, SOLUTION INTRAMUSCULAR at 23:01

## 2017-07-05 RX ADMIN — IPRATROPIUM BROMIDE AND ALBUTEROL SULFATE 3 ML: .5; 3 SOLUTION RESPIRATORY (INHALATION) at 20:02

## 2017-07-05 RX ADMIN — THIAMINE HYDROCHLORIDE 100 MG: 100 INJECTION, SOLUTION INTRAMUSCULAR; INTRAVENOUS at 10:38

## 2017-07-05 RX ADMIN — DEXAMETHASONE SODIUM PHOSPHATE 4 MG: 4 INJECTION, SOLUTION INTRAMUSCULAR; INTRAVENOUS at 10:02

## 2017-07-05 RX ADMIN — ENOXAPARIN SODIUM 40 MG: 40 INJECTION SUBCUTANEOUS at 10:05

## 2017-07-05 RX ADMIN — LEVETIRACETAM 1500 MG: 100 INJECTION, SOLUTION, CONCENTRATE INTRAVENOUS at 20:34

## 2017-07-05 RX ADMIN — RACEPINEPHRINE HYDROCHLORIDE 0.5 ML: 11.25 SOLUTION RESPIRATORY (INHALATION) at 08:51

## 2017-07-05 NOTE — PROGRESS NOTES
Ot note:  Chart reviewed and spoke with nursing. Patient currently not appropriate for OT treatment this date.  Will continue to follow.

## 2017-07-05 NOTE — PROGRESS NOTES
Hospitalist Progress Note    NAME: Judi Austin   :  1934   MRN:  [id number]       Assessment / Plan:    Acute respiratory failure requiring intubation for airway protection POA  RUL infiltrate / Suspected baseline COPD   -Self extubated    -cxray with RUL scar/infiltarte - present since     -still sounds congested. Continue pulmonary toilet; suction prn; duonebs and oxygen prn  -continue BIPAP support prn  -continue zosyn    Status epilepticus POA with recurrent seizures  -admitted for seizures in  and 2017. Both this admission and last February occurred after stopping lyrica for several days  -continue IV keppra    Acute encephalopathy POA  MS: slowly improving  Not ready for PO diet yet. Essential HTN POA   History of SVT POA  -holding toprol; on cardizem gtt prn  -asa po        Peripheral neuropathy POA, cont lyrica   Remote ETOH abuse. Family confirms no ETOH in 5 years  GERD continue PPI       Code status: Full codeNext of Kin: Wife  DVT prophylaxis: lovenox SQ    Recommended Disposition: TBD     Subjective:     Chief Complaint / Reason for Physician Visit:   following respiratory failure / encephalopathy / seizure   Discussed with RN events overnight. Back on BIPAP support    Review of Systems:  Symptom Y/N Comments  Symptom Y/N Comments   Fever/Chills n   Chest Pain n    Poor Appetite    Edema     Cough    Abdominal Pain n    Sputum    Joint Pain     SOB/AL     Pruritis/Rash     Nausea/vomit    Tolerating PT/OT     Diarrhea    Tolerating Diet     Constipation    Other       Could NOT obtain due to:      Objective:     VITALS:   Last 24hrs VS reviewed since prior progress note.  Most recent are:  Patient Vitals for the past 24 hrs:   Temp Pulse Resp BP SpO2   17 1630 98.1 °F (36.7 °C) 91 21 140/71 98 %   17 1600 - 86 20 (!) 143/38 99 %   17 1530 - 86 19 140/57 98 %   17 1517 - - - - 98 %   17 1500 - 76 16 129/49 97 %   17 1430 - 75 16 131/51 98 %   07/05/17 1400 - 75 16 130/51 97 %   07/05/17 1330 - 78 17 118/48 98 %   07/05/17 1300 - 78 18 117/52 92 %   07/05/17 1230 98.9 °F (37.2 °C) 90 24 142/63 98 %   07/05/17 1200 - 87 20 141/46 99 %   07/05/17 1130 - 80 18 129/51 98 %   07/05/17 1129 - - - - 98 %   07/05/17 1100 - 80 18 125/53 97 %   07/05/17 1030 - 77 19 125/48 98 %   07/05/17 1000 - 78 20 134/52 98 %   07/05/17 0930 - 82 20 122/52 98 %   07/05/17 0900 - 99 30 143/61 98 %   07/05/17 0852 - - - - 98 %   07/05/17 0845 - - - - 98 %   07/05/17 0830 - (!) 104 (!) 34 163/60 97 %   07/05/17 0800 - (!) 105 (!) 34 160/56 99 %   07/05/17 0739 - - - - 97 %   07/05/17 0730 99.6 °F (37.6 °C) (!) 106 (!) 34 158/63 97 %   07/05/17 0700 - (!) 107 23 162/62 96 %   07/05/17 0600 99.4 °F (37.4 °C) (!) 102 28 149/64 96 %   07/05/17 0500 - (!) 105 (!) 31 149/61 97 %   07/05/17 0400 99.4 °F (37.4 °C) (!) 106 (!) 32 140/58 96 %   07/05/17 0305 - - - - 97 %   07/05/17 0300 - (!) 105 (!) 33 149/61 96 %   07/05/17 0200 - 99 30 157/66 98 %   07/05/17 0130 - 95 27 145/57 97 %   07/05/17 0100 - 90 29 152/56 97 %   07/05/17 0030 - (!) 114 29 172/68 96 %   07/05/17 0000 - 85 27 130/51 96 %   07/04/17 2305 - - - - 97 %   07/04/17 2300 99.7 °F (37.6 °C) 85 19 121/46 97 %   07/04/17 2217 - (!) 107 26 146/63 97 %   07/04/17 2200 - (!) 108 28 167/63 96 %   07/04/17 2100 - (!) 109 28 137/60 98 %   07/04/17 2000 99.5 °F (37.5 °C) (!) 107 22 162/79 97 %   07/04/17 1959 - - - - 97 %   07/04/17 1900 - (!) 109 25 151/61 98 %   07/04/17 1830 - (!) 133 27 160/78 98 %   07/04/17 1804 - (!) 129 26 (!) 156/123 97 %       Intake/Output Summary (Last 24 hours) at 07/05/17 1745  Last data filed at 07/05/17 1630   Gross per 24 hour   Intake          2467.13 ml   Output              525 ml   Net          1942.13 ml        PHYSICAL EXAM:  General: WD, WN. Alert, cooperative, mild distress, appears weak and debilitated     EENT:  EOMI. Anicteric sclerae.  MMM  Resp:  rhonchi bilaterally, no wheezing or rales. No accessory muscle use  CV:  Regular  rhythm,  No edema  GI:  Soft, Non distended, Non tender.  +Bowel sounds  Neurologic:  Alert and follows commands, normal speech, weak but grossly non focal  Psych:   Not anxious nor agitated  Skin:  No rashes. No jaundice    Reviewed most current lab test results and cultures  YES  Reviewed most current radiology test results   YES  Review and summation of old records today    NO  Reviewed patient's current orders and MAR    YES  PMH/SH reviewed - no change compared to H&P  ________________________________________________________________________  Care Plan discussed with:    Comments   Patient y    Family   wife   RN y    Care Manager     Consultant                        Multidiciplinary team rounds were held today with , nursing, pharmacist and clinical coordinator. Patient's plan of care was discussed; medications were reviewed and discharge planning was addressed. ________________________________________________________________________  Total NON critical care TIME:  15  Minutes    Total CRITICAL CARE TIME Spent:   Minutes non procedure based      Comments   >50% of visit spent in counseling and coordination of care     ________________________________________________________________________  Chava Galindo MD     Procedures: see electronic medical records for all procedures/Xrays and details which were not copied into this note but were reviewed prior to creation of Plan. LABS:  I reviewed today's most current labs and imaging studies.   Pertinent labs include:  Recent Labs      07/03/17 0423   WBC  7.2   HGB  11.8*   HCT  33.5*   PLT  181     Recent Labs      07/04/17   1947  07/03/17   0423   NA  136  137   K  4.2  3.0*   CL  97  100   CO2  33*  31   GLU  149*  136*   BUN  13  11   CREA  0.69*  0.62*   CA  8.6  8.6   MG   --   1.6   PHOS   --   2.9       Signed: Chava Galindo MD

## 2017-07-05 NOTE — PROGRESS NOTES
[facility]  Pulmonary, Critical Care, and Sleep Medicine    Name: Malini Gallardo MRN: [id number]   : 1934 Hospital: [facility]   Date: 2017          IMPRESSION:   · Acute respiratory failure -   · Tracheobronchitis? On empiric abx given seizures and possible aspiration  · Encephalopathy- very sluggish with intermittent agitation; on IV precedex  · Seizures with status epilepticus  · HTN  · SVT on IV cardizem  · Peripheral neuropathy  · Right upper lobe scar/infiltrate - no tobacco history per records - has been present since at least February of this year, may be present to some degree on chest X-ray from   · Suspect COPD      PLAN:   · BIPAP- hold on transfer until stridor has resolved  · Racemic epinephrine prn  · pulmonary toilet  · Reorient- try to reduce Haldol  · mobilize  · Bronchodilators   · Antiepileptics  · Antihypertensives  · Monitor platelets   · Antipsychotics for agitation  · Restrain for safety  · DVT prophylaxis     Subjective/Interval History:       Scopolamine added the other day. Stridor after NT suctioning. No fever. WBC ok. No seizures. Review of Systems   Unable to perform ROS: Mental status change     Objective:   Vital Signs:    Visit Vitals    /56    Pulse (!) 105    Temp 99.4 °F (37.4 °C)    Resp (!) 31    Ht 5' 8\" (1.727 m)    Wt 68.7 kg (151 lb 7.3 oz)    SpO2 98%    BMI 23.03 kg/m2       O2 Device: BIPAP   O2 Flow Rate (L/min): 2 l/min   Temp (24hrs), Av.2 °F (37.3 °C), Min:98.6 °F (37 °C), Max:99.7 °F (37.6 °C)       Intake/Output:   Last shift:         Last 3 shifts: 1 -  0700  In: 4942.8 [I.V.:1762.8]  Out: 200 [Urine:200]    Intake/Output Summary (Last 24 hours) at 17 1028  Last data filed at 17 0700   Gross per 24 hour   Intake          2782.17 ml   Output              200 ml   Net          2582.17 ml      Physical Exam   Constitutional: No distress.    HENT:   Head: Normocephalic and atraumatic. Mouth/Throat: No oropharyngeal exudate. Eyes: No scleral icterus. Cardiovascular: Normal rate and regular rhythm. Pulmonary/Chest: No respiratory distress. He has no wheezes. He has no rales. Abdominal: Soft. Bowel sounds are normal. He exhibits no distension. There is no tenderness. Musculoskeletal: He exhibits no edema. Neurological: He is alert. Skin: Skin is warm and dry.      Data:     Current Facility-Administered Medications   Medication Dose Route Frequency    aspirin (ASPIRIN) tablet 325 mg  325 mg Nasogastric DAILY    albuterol-ipratropium (DUO-NEB) 2.5 MG-0.5 MG/3 ML  3 mL Nebulization Q4H RT    guaiFENesin (ROBITUSSIN) 100 mg/5 mL oral liquid 100 mg  100 mg Per NG tube Q6H    dilTIAZem (CARDIZEM) 100 mg in dextrose 5% (MBP/ADV) 100 mL infusion  0-15 mg/hr IntraVENous TITRATE    scopolamine (TRANSDERM-SCOP) 1.5 mg  1.5 mg TransDERmal Q72H    piperacillin-tazobactam (ZOSYN) 3.375 g in 0.9% sodium chloride (MBP/ADV) 100 mL  3.375 g IntraVENous Q8H    sodium chloride (NS) flush 10 mL  10 mL InterCATHeter Q24H    sodium chloride (NS) flush 10-40 mL  10-40 mL InterCATHeter Q8H    sodium chloride (NS) flush 20 mL  20 mL InterCATHeter Q24H    latanoprost (XALATAN) 0.005 % ophthalmic solution 1 Drop  1 Drop Both Eyes QPM    haloperidol lactate (HALDOL) injection 5 mg  5 mg IntraVENous TID    famotidine (PF) (PEPCID) 20 mg in sodium chloride 0.9 % 10 mL injection  20 mg IntraVENous Q12H    thiamine (B-1) 100 mg in 0.9% sodium chloride 50 mL IVPB  100 mg IntraVENous DAILY    atorvastatin (LIPITOR) tablet 40 mg  40 mg Per NG tube DAILY    pregabalin (LYRICA) capsule 300 mg  300 mg Oral BID    sodium chloride (NS) flush 5-10 mL  5-10 mL IntraVENous Q8H    enoxaparin (LOVENOX) injection 40 mg  40 mg SubCUTAneous Q24H    0.9% sodium chloride infusion  25 mL/hr IntraVENous CONTINUOUS    levETIRAcetam (KEPPRA) 1,500 mg in 0.9% sodium chloride IVPB  1,500 mg IntraVENous Q12H                Labs:  Recent Labs      07/03/17   0423   WBC  7.2   HGB  11.8*   HCT  33.5*   PLT  181     Recent Labs      07/04/17   1947  07/03/17   0423   NA  136  137   K  4.2  3.0*   CL  97  100   CO2  33*  31   GLU  149*  136*   BUN  13  11   CREA  0.69*  0.62*   CA  8.6  8.6   MG   --   1.6   PHOS   --   2.9     No results for input(s): PH, PCO2, PO2, HCO3, FIO2 in the last 72 hours.   Imaging:  I have personally reviewed the patients radiographs and have reviewed the reports:  None today        Total critical care time exclusive of procedures:   minutes  Alfredo Zamora MD

## 2017-07-05 NOTE — PROGRESS NOTES
PT note:    Chart reviewed and spoke with nursing. Patient currently not appropriate for PT treatment this date. Will continue to follow.      Atiya Higgins, PT, DPT

## 2017-07-05 NOTE — PROGRESS NOTES
Interdisciplinary team rounds were held 7/5/2017 with the following team members:Care Management, Diabetes Treatment Specialist, Nursing, Nutrition, Pastoral Care, Pharmacy and Physician. Plan of care discussed. Goal: Adjust medications, continue to monitor and support. See MD orders and progress notes for further  interventions and desired outcomes.

## 2017-07-05 NOTE — PROGRESS NOTES
Speech Pathology  Spoke with RN. Patient has had an increase in secretions, needing NG suctioning, having increase stridor and is now on Bipap. Patient is not appropriate for swallowing tx today. Will continue to follow.

## 2017-07-05 NOTE — PROGRESS NOTES
, patient received from day shift. Neuro; Alert , slight drowsy and oriented to self , not to situation or time, GCS;  Eye; 4, verbal; 4, motor; 6.  Respiratory; Sat 97% on NC 3 l/min, coarse rhonchi diminished lung sounds all over the lung, patient cough but nothing came out, NT suction perform > small thick Tan secretion. Cardiac; S Tachycardia Irrigular, 107-120 B/min, NIBP 162/79 mmHg > to give Hydralazine, on Diltiazem 7.5 mg/hr. GI; NPO ( NGT at 68 cm) Osmolite 1.2  At 60 ml/hr, free water 100 ml Q 4 hr, residual  10 ml, Abd semi soft with hypoactive bowel sound, NS at 25 ml/hr  Renal; Diaper > to apply condom cath. Skin; laceration at left shin, both shin cover with foam dressing. Endo; no,   Lines; right CVC,   Code; Full. Lab; WBC; 7.2,  HGB; 11.8, platelet; 897 , Na; 295 , K; 3.0 > sample repeated > 4.2 , BUN; 13, Crea; 0.69 ,  DVT; SCD both legs & Lovenox. Plan; titrate oxygen to keep sat more than 92%, suction PRN, titrate Cardizem to keep HR less than 100, BP management to keep SBP less [id number] mmHg with PRN med, pain management, follow lab tomorrow,    2250, Cardizem held for HR 85, to follow up. Discussed the case with Hospitalist on call as RT suggest to add scopolamine to reduce secretion, she agreed, medication order scopolamine  Dose 1.5 mg  :  TransDERmal  :  EVERY 72 HOURS. Condom cath applied. 0700, Bedside and Verbal shift change report given to MOHSEN TYREE [facility] (oncoming nurse) by Prasad Bonilla (offgoing nurse). Report included the following information SBAR, Kardex, Procedure Summary, Intake/Output, MAR, Accordion, Recent Results, Med Rec Status and Cardiac Rhythm Sinus Tachycardia 104.

## 2017-07-05 NOTE — PROGRESS NOTES
0730  AM assessment complete. Pt NT suctioned by RT. Thin, blood tinged secretions obtained. Pt has stridor. Duoneb applied. RR 34. Pt agrees that he is having \"a little bit\" of trouble breathing. Pt appears to be pulling hard for each breath and has a constant facial grimace. , ST with increased PVC's, . Cardizem increased to 10mg/hr. 0820  No improvement with breathing treatment. Suctioned thin, blood tinged secretions from back of throat after pt coughed using small suction catheter. Called Dr. Nay Pop to bedside to discuss pt condition. Orders received for BIPAP, racemic epi and decadron  1000  Restraints removed. Pt no longer pulling lines/tubes. Pt resting comfortably on BIPAP. 1030  /48, RR 19, HR 77, O2 sats 98%. Pt tolerating BIPAP well and stridor improved. Lungs with rhonchi bilaterally. Spoke with pt's wife via phone. Updated her on pt condition. 1230  Pt pulled tubing from BIPAP mask and disconnected it. Pt has secretions audible in back of throat. Oral tracheal suctioning performed with 14f suction catheter. Thick, tenacious secretions obtained. Pt on 3LPM NC at this time. Stridor still audible to auscultation. Pt placed back on BIPAP. Pt not responding to commands or answering orientation questions. Dr. Nay Pop notified. Orders received. ABG ordered. Haldol decreased  1630  Pt more awake. States first and last name. Disoriented to place. BIPAP removed for mouth care and suctioning and replaced. Pt still having stridor and lung sounds rhonchi and inspiratory wheezing  1720  Spoke with Dr. Nay Pop regarding tube feeds still on hold. Ok to continue holding.   Will start POC glucose Q6H

## 2017-07-06 ENCOUNTER — APPOINTMENT (OUTPATIENT)
Dept: GENERAL RADIOLOGY | Age: 82
DRG: 208 | End: 2017-07-06
Attending: INTERNAL MEDICINE
Payer: MEDICARE

## 2017-07-06 LAB
ARTERIAL PATENCY WRIST A: YES
BASE EXCESS BLDA CALC-SCNC: 6.7 MMOL/L
BDY SITE: ABNORMAL
EPAP/CPAP/PEEP, PAPEEP: 5
FIO2 ON VENT: 30 %
GAS FLOW.O2 SETTING OXYMISER: 4 L/MIN
GLUCOSE BLD STRIP.AUTO-MCNC: 123 MG/DL (ref 65–100)
GLUCOSE BLD STRIP.AUTO-MCNC: 138 MG/DL (ref 65–100)
GLUCOSE BLD STRIP.AUTO-MCNC: 139 MG/DL (ref 65–100)
GLUCOSE BLD STRIP.AUTO-MCNC: 160 MG/DL (ref 65–100)
HCO3 BLDA-SCNC: 32 MMOL/L (ref 22–26)
IPAP/PIP, IPAPIP: 14
PCO2 BLDA: 47 MMHG (ref 35–45)
PH BLDA: 7.45 [PH] (ref 7.35–7.45)
PO2 BLDA: 139 MMHG (ref 80–100)
SAO2 % BLD: 99 % (ref 92–97)
SAO2% DEVICE SAO2% SENSOR NAME: ABNORMAL
SERVICE CMNT-IMP: ABNORMAL
SPECIMEN SITE: ABNORMAL
VENTILATION MODE VENT: ABNORMAL

## 2017-07-06 PROCEDURE — 74011250637 HC RX REV CODE- 250/637: Performed by: INTERNAL MEDICINE

## 2017-07-06 PROCEDURE — 36600 WITHDRAWAL OF ARTERIAL BLOOD: CPT | Performed by: INTERNAL MEDICINE

## 2017-07-06 PROCEDURE — 94660 CPAP INITIATION&MGMT: CPT

## 2017-07-06 PROCEDURE — 77030027138 HC INCENT SPIROMETER -A

## 2017-07-06 PROCEDURE — 77010033678 HC OXYGEN DAILY

## 2017-07-06 PROCEDURE — 74011250636 HC RX REV CODE- 250/636: Performed by: INTERNAL MEDICINE

## 2017-07-06 PROCEDURE — 82962 GLUCOSE BLOOD TEST: CPT

## 2017-07-06 PROCEDURE — 92526 ORAL FUNCTION THERAPY: CPT

## 2017-07-06 PROCEDURE — 82803 BLOOD GASES ANY COMBINATION: CPT | Performed by: INTERNAL MEDICINE

## 2017-07-06 PROCEDURE — 74011000250 HC RX REV CODE- 250: Performed by: INTERNAL MEDICINE

## 2017-07-06 PROCEDURE — 71010 XR CHEST PORT: CPT

## 2017-07-06 PROCEDURE — 74011000258 HC RX REV CODE- 258: Performed by: INTERNAL MEDICINE

## 2017-07-06 PROCEDURE — 65610000006 HC RM INTENSIVE CARE

## 2017-07-06 PROCEDURE — 74011000258 HC RX REV CODE- 258: Performed by: PSYCHIATRY & NEUROLOGY

## 2017-07-06 PROCEDURE — 94640 AIRWAY INHALATION TREATMENT: CPT

## 2017-07-06 PROCEDURE — 74011250636 HC RX REV CODE- 250/636: Performed by: PSYCHIATRY & NEUROLOGY

## 2017-07-06 PROCEDURE — 36600 WITHDRAWAL OF ARTERIAL BLOOD: CPT

## 2017-07-06 RX ORDER — LORAZEPAM 2 MG/ML
2 INJECTION INTRAMUSCULAR AS NEEDED
Status: DISCONTINUED | OUTPATIENT
Start: 2017-07-06 | End: 2017-07-06

## 2017-07-06 RX ORDER — HALOPERIDOL 5 MG/ML
4 INJECTION INTRAMUSCULAR 3 TIMES DAILY
Status: DISCONTINUED | OUTPATIENT
Start: 2017-07-06 | End: 2017-07-06

## 2017-07-06 RX ORDER — FENTANYL CITRATE 50 UG/ML
25-50 INJECTION, SOLUTION INTRAMUSCULAR; INTRAVENOUS
Status: DISCONTINUED | OUTPATIENT
Start: 2017-07-06 | End: 2017-07-22 | Stop reason: HOSPADM

## 2017-07-06 RX ORDER — HALOPERIDOL 5 MG/ML
4 INJECTION INTRAMUSCULAR EVERY 6 HOURS
Status: DISCONTINUED | OUTPATIENT
Start: 2017-07-06 | End: 2017-07-07

## 2017-07-06 RX ORDER — MAGNESIUM SULFATE 1 G/100ML
1 INJECTION INTRAVENOUS ONCE
Status: COMPLETED | OUTPATIENT
Start: 2017-07-06 | End: 2017-07-12

## 2017-07-06 RX ORDER — LORAZEPAM 2 MG/ML
2 INJECTION INTRAMUSCULAR
Status: DISCONTINUED | OUTPATIENT
Start: 2017-07-06 | End: 2017-07-09

## 2017-07-06 RX ADMIN — DEXTROSE MONOHYDRATE 7.5 MG/HR: 50 INJECTION, SOLUTION INTRAVENOUS at 15:13

## 2017-07-06 RX ADMIN — ACETAMINOPHEN 650 MG: 650 SUPPOSITORY RECTAL at 01:40

## 2017-07-06 RX ADMIN — GUAIFENESIN 100 MG: 100 SOLUTION ORAL at 05:17

## 2017-07-06 RX ADMIN — PIPERACILLIN SODIUM,TAZOBACTAM SODIUM 3.38 G: 3; .375 INJECTION, POWDER, FOR SOLUTION INTRAVENOUS at 05:15

## 2017-07-06 RX ADMIN — LORAZEPAM 2 MG: 2 INJECTION INTRAMUSCULAR; INTRAVENOUS at 17:55

## 2017-07-06 RX ADMIN — DEXTROSE MONOHYDRATE 5 MG/HR: 50 INJECTION, SOLUTION INTRAVENOUS at 02:11

## 2017-07-06 RX ADMIN — IPRATROPIUM BROMIDE AND ALBUTEROL SULFATE 3 ML: .5; 3 SOLUTION RESPIRATORY (INHALATION) at 12:09

## 2017-07-06 RX ADMIN — IPRATROPIUM BROMIDE AND ALBUTEROL SULFATE 3 ML: .5; 3 SOLUTION RESPIRATORY (INHALATION) at 03:04

## 2017-07-06 RX ADMIN — HALOPERIDOL LACTATE 4 MG: 5 INJECTION, SOLUTION INTRAMUSCULAR at 15:38

## 2017-07-06 RX ADMIN — Medication 10 ML: at 21:02

## 2017-07-06 RX ADMIN — Medication 10 ML: at 13:02

## 2017-07-06 RX ADMIN — HALOPERIDOL LACTATE 4 MG: 5 INJECTION, SOLUTION INTRAMUSCULAR at 23:01

## 2017-07-06 RX ADMIN — IPRATROPIUM BROMIDE AND ALBUTEROL SULFATE 3 ML: .5; 3 SOLUTION RESPIRATORY (INHALATION) at 20:05

## 2017-07-06 RX ADMIN — SODIUM CHLORIDE 25 ML/HR: 900 INJECTION, SOLUTION INTRAVENOUS at 22:00

## 2017-07-06 RX ADMIN — Medication 10 ML: at 05:18

## 2017-07-06 RX ADMIN — ENOXAPARIN SODIUM 40 MG: 40 INJECTION SUBCUTANEOUS at 08:03

## 2017-07-06 RX ADMIN — DEXAMETHASONE SODIUM PHOSPHATE 4 MG: 4 INJECTION, SOLUTION INTRAMUSCULAR; INTRAVENOUS at 05:17

## 2017-07-06 RX ADMIN — ATORVASTATIN CALCIUM 40 MG: 40 TABLET, FILM COATED ORAL at 08:03

## 2017-07-06 RX ADMIN — HYDRALAZINE HYDROCHLORIDE 10 MG: 20 INJECTION INTRAMUSCULAR; INTRAVENOUS at 14:51

## 2017-07-06 RX ADMIN — THIAMINE HYDROCHLORIDE 100 MG: 100 INJECTION, SOLUTION INTRAMUSCULAR; INTRAVENOUS at 08:05

## 2017-07-06 RX ADMIN — LEVETIRACETAM 1500 MG: 100 INJECTION, SOLUTION, CONCENTRATE INTRAVENOUS at 20:36

## 2017-07-06 RX ADMIN — Medication 10 ML: at 13:01

## 2017-07-06 RX ADMIN — FENTANYL CITRATE 25 MCG: 50 INJECTION, SOLUTION INTRAMUSCULAR; INTRAVENOUS at 11:07

## 2017-07-06 RX ADMIN — IPRATROPIUM BROMIDE AND ALBUTEROL SULFATE 3 ML: .5; 3 SOLUTION RESPIRATORY (INHALATION) at 07:50

## 2017-07-06 RX ADMIN — IPRATROPIUM BROMIDE AND ALBUTEROL SULFATE 3 ML: .5; 3 SOLUTION RESPIRATORY (INHALATION) at 23:41

## 2017-07-06 RX ADMIN — LATANOPROST 1 DROP: 50 SOLUTION OPHTHALMIC at 17:44

## 2017-07-06 RX ADMIN — FAMOTIDINE 20 MG: 20 TABLET ORAL at 08:03

## 2017-07-06 RX ADMIN — FENTANYL CITRATE 50 MCG: 50 INJECTION, SOLUTION INTRAMUSCULAR; INTRAVENOUS at 15:52

## 2017-07-06 RX ADMIN — PIPERACILLIN SODIUM,TAZOBACTAM SODIUM 3.38 G: 3; .375 INJECTION, POWDER, FOR SOLUTION INTRAVENOUS at 20:59

## 2017-07-06 RX ADMIN — ASPIRIN 325 MG: 325 TABLET ORAL at 08:03

## 2017-07-06 RX ADMIN — PREGABALIN 300 MG: 150 CAPSULE ORAL at 08:03

## 2017-07-06 RX ADMIN — IPRATROPIUM BROMIDE AND ALBUTEROL SULFATE 3 ML: .5; 3 SOLUTION RESPIRATORY (INHALATION) at 15:23

## 2017-07-06 RX ADMIN — LEVETIRACETAM 1500 MG: 100 INJECTION, SOLUTION, CONCENTRATE INTRAVENOUS at 08:05

## 2017-07-06 RX ADMIN — PIPERACILLIN SODIUM,TAZOBACTAM SODIUM 3.38 G: 3; .375 INJECTION, POWDER, FOR SOLUTION INTRAVENOUS at 12:57

## 2017-07-06 RX ADMIN — Medication 20 ML: at 13:01

## 2017-07-06 RX ADMIN — HALOPERIDOL LACTATE 2 MG: 5 INJECTION, SOLUTION INTRAMUSCULAR at 08:04

## 2017-07-06 RX ADMIN — MAGNESIUM SULFATE HEPTAHYDRATE 1 G: 1 INJECTION, SOLUTION INTRAVENOUS at 21:00

## 2017-07-06 RX ADMIN — HYDRALAZINE HYDROCHLORIDE 10 MG: 20 INJECTION INTRAMUSCULAR; INTRAVENOUS at 03:08

## 2017-07-06 NOTE — PROGRESS NOTES
OT note:  Chart reviewed and spoke with nursing and pt is agitated and not following commands. Defer pt at this time and will continue to follow.

## 2017-07-06 NOTE — PROGRESS NOTES
Problem: Dysphagia (Adult)  Goal: *Acute Goals and Plan of Care (Insert Text)  Speech pathology goals  Initiated 6/30/2017, Re-eval 7/6/2017   1. Patient will participate in re-evaluation of swallow function daily continued 7/6/2017   SPEECH LANGUAGE PATHOLOGY DYSPHAGIA TREATMENT: WEEKLY REASSESSMENT  Patient: Alfonso Escamilla (75 y.o. male)  Date: 7/6/2017  Diagnosis: Status epilepticus ([facility] [address] <principal problem not specified>       Precautions: Aspiration        ASSESSMENT:  Patient pulled NGT and SLP asked to re-assess to determine if replacement was needed. Patient continues with confusion, attempting to get out of bed. He is in wrist restraints. Patient oriented to self only. He presents with at least moderate oral and severe pharyngeal dysphagia. Oral dysphagia characterized by impaired bolus acceptance with cues needed to close lips around spoon, delayed posterior propulsion of ice chip and puree, high suspicion for premature spillage. Hyolaryngeal elevation/excursion is limited via palpation, suspect delayed swallow initiation, multiple audible swallows with thin liquids followed by coughing. Double, audible swallows with puree followed by throat clearing and change in vocal quality. Regardless of confusion and agitation, patient has significant dysphagia and therefore not appropriate for PO. Recommend replacing NGT. Patient's progression toward goals since last assessment: SLP has been following for the past week; however, he has remained inappropriate for PO consideration due to confusion and agitation. He requires NGT for nutrition. PLAN:  Goals have been updated based on progression since last assessment. Patient continues to benefit from skilled intervention to address the above impairments. Continue to follow the patient 2-3 times a week to address goals. Recommendations and Planned Interventions:  NPO.  Recommend replacing NGT  slp to follow up next week     Discharge Recommendations: Skilled Nursing Facility       SUBJECTIVE:   Patient alert, confused. OBJECTIVE:   Cognitive and Communication Status:  Neurologic State: Confused  Orientation Level: Oriented to place  Cognition: Decreased command following  Perception: Appears intact  Perseveration: Perseverates during conversation  Safety/Judgement: Decreased awareness of environment  Dysphagia Treatment:  Oral Assessment:  Oral Assessment  Labial:  (unable to formally assess)  Dentition: Natural  Oral Hygiene:  (dry)  Lingual:  (unable to assess)  Velum: Unable to visualize  Mandible: No impairment  P.O. Trials:  Patient Position:  (upright in bed)  Vocal quality prior to P.O.: No impairment  Consistency Presented: Ice chips; Thin liquid;Puree  How Presented: Straw;Spoon;SLP-fed/presented     Bolus Acceptance: Impaired  Bolus Formation/Control: Impaired  Type of Impairment: Delayed;Premature spillage  Propulsion: Delayed (# of seconds)  Oral Residue: None  Initiation of Swallow: Delayed (# of seconds)  Laryngeal Elevation: Decreased;Weak  Aspiration Signs/Symptoms: Strong cough;Clear throat; Change vocal quality  Pharyngeal Phase Characteristics: Multiple swallows; Audible swallow; Altered vocal quality  Effective Modifications: None        Oral Phase Severity: Moderate  Pharyngeal Phase Severity : Severe     Pain:  Pain Scale 1: Numeric (0 - 10)  Pain Intensity 1: 0  Pain Location 1: Generalized  After treatment:   [ ]                Patient left in no apparent distress sitting up in chair  [X]                Patient left in no apparent distress in bed  [X]                Call bell left within reach  [X]                Nursing notified  [ ]                Caregiver present  [ ]                Bed alarm activated      COMMUNICATION/EDUCATION:      The patients plan of care including recommendations, planned interventions, and recommended diet changes were discussed with: Registered Nurse.   Yamilex Pereira M.S. CCC-SLP  Time Calculation: 11 mins

## 2017-07-06 NOTE — PROGRESS NOTES
0700 Bedside report received from Anderson Ortega RN.    0800 Assessment completed. Patient oriented to self, confused and needs redirection and reorientation frequently. Patient is anxious and restless at times, hard to redirect because of confusion. Respirations nonlabored on 2LNC at rest. Patient suctioned for moderate amount of tan secretions. No seizure activity noted, seizure precautions maintained. Cardizem IV drip infusing at 7.5mg/hr, currently HR 94. Patient remain NPO per doctors order. No signs of pain or acute distress noted. Will closely monitor. 2821 Patient pulled out NG tube, per  NG tube does not need to be reinserted at this time. Speech consulted for speech evaluation. 0930 Patient calmer, resting in bed at present. 1150 Patient restless and agitated attempting to pull at IV lines and continues to take off monitor leads, patient is not able to be redirected to stop pulling lines due to patient's confusion. Bilateral wrist restraints reordered by Dr. Tee Burk due to restlessness and patient attempting to pull out IV lines. [address] in room to see patient. 1210 Per Speech Therapist, patient is at high risk for aspiration and should not be taking anything PO at present. Dr. Tee Burk notified and per Dr. Tee Burk do not reinsert NG tube. Will closely monitor. 1451 Hydralazine 10mg IV PRN given due to /71, HR 94.     1530 Patient highly agitated, attempting to get out of bed. Dr. Tee Burk notified, scheduled Haldol IV increased to 4mg.    1550 Cardizem IV drip increased to 10mg/hr due to .    1640 Per Dr. Tee Burk if patient is agitated we may given Ativan PRN. 1755 Patient agitated, attempting to hit staff if approached. Patient is not able to be redirected with therapeutic communication. Ativan 2mg IV PRN given due to restlessness, agitation, and combativeness. Will continue to monitor. 1845 Ativan PRN effective. Patient calmer with no signs of agitation noted.  Patient shows no signs of respiratory distress at present. Cardizem IV drip decreased to 7.5mg/hr, HR 76.    1910 Bedside report given to [facility] RICHARD GARAY.

## 2017-07-06 NOTE — PROGRESS NOTES
[facility]  Pulmonary, Critical Care, and Sleep Medicine    Name: Mukesh Levi MRN: [id number]   : 1934 Hospital: [facility]   Date: 2017          IMPRESSION:   · Acute respiratory failure - improving  · Stridor better  · Tracheobronchitis? On empiric abx given seizures and possible aspiration  · Encephalopathy-with intermittent agitation;  · Seizures with status epilepticus  · HTN  · SVT on IV cardizem  · Peripheral neuropathy  · Right upper lobe scar/infiltrate - no tobacco history per records - has been present since at least February of this year, may be present to some degree on chest X-ray from   · Suspect COPD      PLAN:   · BIPAP prn  · Leave NGT out  · pulmonary toilet but try to avoid NT suctioning  · IV abx  · Reorient- try to reduce Haldol  · mobilize  · Bronchodilators   · Antiepileptics  · Antihypertensives  · Monitor platelets   · Antipsychotics for agitation  · Restrain for safety  · DVT prophylaxis     Subjective/Interval History:      stridor better off BIPAP but more fidgety. Pulled out his NGT. Disoriented. No SOB    Scopolamine added the other day. Stridor after NT suctioning. No fever. WBC ok. No seizures.       Review of Systems   Unable to perform ROS: Mental status change     Objective:   Vital Signs:    Visit Vitals    /72 (BP 1 Location: Left arm, BP Patient Position: At rest)    Pulse 93    Temp 98 °F (36.7 °C)    Resp 16    Ht 5' 8\" (1.727 m)    Wt 68.7 kg (151 lb 7.3 oz)    SpO2 97%    BMI 23.03 kg/m2       O2 Device: Nasal cannula   O2 Flow Rate (L/min): 2 l/min   Temp (24hrs), Av.1 °F (36.7 °C), Min:97.1 °F (36.2 °C), Max:98.9 °F (37.2 °C)       Intake/Output:   Last shift:      701 - 1900  In: 190 [I.V.:190]  Out: 250 [Urine:250]  Last 3 shifts: 1901 - 700  In: 3018.3 [I.V.:1898.3]  Out: 1325 [Urine:1325]    Intake/Output Summary (Last 24 hours) at 17 1229  Last data filed at 07/06/17 1200   Gross per 24 hour   Intake          1458.67 ml   Output             1375 ml   Net            83.67 ml      Physical Exam   Constitutional: No distress. HENT:   Head: Normocephalic and atraumatic. Mouth/Throat: No oropharyngeal exudate. Eyes: No scleral icterus. Cardiovascular: Normal rate and regular rhythm. Pulmonary/Chest: No respiratory distress. He has no wheezes. He has no rales. Abdominal: Soft. Bowel sounds are normal. He exhibits no distension. There is no tenderness. Musculoskeletal: He exhibits no edema. Neurological: He is alert. Skin: Skin is warm and dry.      Data:     Current Facility-Administered Medications   Medication Dose Route Frequency    piperacillin-tazobactam (ZOSYN) 3.375 g in 0.9% sodium chloride (MBP/ADV) 100 mL  3.375 g IntraVENous Q8H    aspirin (ASPIRIN) tablet 325 mg  325 mg Nasogastric DAILY    famotidine (PEPCID) tablet 20 mg  20 mg Per NG tube BID    haloperidol lactate (HALDOL) injection 2 mg  2 mg IntraVENous TID    albuterol-ipratropium (DUO-NEB) 2.5 MG-0.5 MG/3 ML  3 mL Nebulization Q4H RT    guaiFENesin (ROBITUSSIN) 100 mg/5 mL oral liquid 100 mg  100 mg Per NG tube Q6H    dilTIAZem (CARDIZEM) 100 mg in dextrose 5% (MBP/ADV) 100 mL infusion  0-15 mg/hr IntraVENous TITRATE    scopolamine (TRANSDERM-SCOP) 1.5 mg  1.5 mg TransDERmal Q72H    sodium chloride (NS) flush 10 mL  10 mL InterCATHeter Q24H    sodium chloride (NS) flush 10-40 mL  10-40 mL InterCATHeter Q8H    sodium chloride (NS) flush 20 mL  20 mL InterCATHeter Q24H    latanoprost (XALATAN) 0.005 % ophthalmic solution 1 Drop  1 Drop Both Eyes QPM    thiamine (B-1) 100 mg in 0.9% sodium chloride 50 mL IVPB  100 mg IntraVENous DAILY    atorvastatin (LIPITOR) tablet 40 mg  40 mg Per NG tube DAILY    pregabalin (LYRICA) capsule 300 mg  300 mg Oral BID    sodium chloride (NS) flush 5-10 mL  5-10 mL IntraVENous Q8H    enoxaparin (LOVENOX) injection 40 mg  40 mg SubCUTAneous Q24H    0.9% sodium chloride infusion  25 mL/hr IntraVENous CONTINUOUS    levETIRAcetam (KEPPRA) 1,500 mg in 0.9% sodium chloride IVPB  1,500 mg IntraVENous Q12H                Labs:  No results for input(s): WBC, HGB, HCT, PLT, HGBEXT, HCTEXT, PLTEXT, HGBEXT, HCTEXT, PLTEXT in the last 72 hours.   Recent Labs      07/04/17   1947   NA  136   K  4.2   CL  97   CO2  33*   GLU  149*   BUN  13   CREA  0.69*   CA  8.6     Recent Labs      07/06/17   0407  07/05/17   1320   PH  7.45  7.35   PCO2  47*  65*   PO2  139*  164*   HCO3  32*  35*   FIO2  30  40     Imaging:  I have personally reviewed the patients radiographs and have reviewed the reports:  None today        Total critical care time exclusive of procedures:   minutes  Spencer Giles MD

## 2017-07-06 NOTE — PROGRESS NOTES
, patient received from day shift. Neuro; Alert , slight drowsy and oriented to self , not to situation or time, GCS;  Eye; 4, verbal; 4, motor; 6.  Respiratory; Sat 97% on BiPAP 30%, coarse rhonchi diminished lung sounds all over the lung, patient cough but nothing came out, NT suction perform > small thick Tan secretion. Cardiac; NSR, 68 B/min, NIBP 132/58 mmHg, on Diltiazem 10 mg/hr > held for now  GI; NPO ( NGT at 68 cm) (Osmolite 1.2  At 60 ml/hr, free water 100 ml Q 4 hr, residual  0 ml) > feeding held for today, Abd semi soft with hypoactive bowel sound, NS at 25 ml/hr  Renal; condom cath. Leaks another one applied  Skin; laceration at left shin, both shin cover with foam dressing. Endo; Q 6 hr check only while NPO,   Lines; right CVC, Condom, NGT   Code; Full. Lab; WBC; 7.2,  HGB; 11.8, platelet; 920 , Na; 075 , K; 3.0 > sample repeated > 4.2 , BUN; 13, Crea; 0.69 ,  DVT; SCD both legs & Lovenox. Plan; BiPAP management, titrate oxygen to keep sat more than 92%, suction PRN, titrate Cardizem to keep HR less than 100, BP management to keep SBP less [id number] mmHg with PRN med, pain management, follow lab tomorrow,  0055, patient HR jumped to 130 SVT like/ S Tachy Irregular after turning him > Diltiazem restarted at 2.5 mg/hr, after 4 minutes, rhythm back to NSR at 80, to follow up.  0320, patient more awake, re oriented, bathed, Cardizem at 5 mg/hr, /69 mmHg > hydralazine 10 mg IV PRN given, chest still coarse rhonchi diminished all over the lungs > CPT > vibration by bed done, weak to cough still, to train him to use I/S,  8630, patient in NC at 2 L/min, taking well more alert and oriented, CPT done, he can now cough and use the Yankauer to clear the mouth, I/S 250 ml weak to use it, need more encourage. No lab today, to follow up.   0700, Bedside and Verbal shift change report given to [address] [place] [address] (oncoming nurse) by Anderson Ortega RN (offgoing nurse).  Report included the following information SBAR, Kardex, ED Summary, Intake/Output, MAR, Accordion, Recent Results, Med Rec Status and Cardiac Rhythm NSR.

## 2017-07-06 NOTE — PROGRESS NOTES
Nutrition Assessment:    RECOMMENDATIONS:   Recommend place NGT and initiate TF:   Jevity 1.5 @ 30mL/h, advance rate 10mL q 4h as tolerated to goal rate of 50mL/h + 150mL H2O flush q 6h (provides 1800kcals/77gPro/1512mL)     ASSESSMENT:   Chart reviewed, case discussed during CCU rounds. Pt is confused and has gotten progressively more agitated over the course of the day. He pulled his NGT out this morning which was not replaced in hopeful anticipation of diet advancement by SLP. However pt failed speech eval and recommendations from SLP are for NGT insertion and resumption of TF. See recommendations above if this is the case. No new labs today. Last BM noted on 7/4. Dietitians Intervention(s)/Plan(s): TF recommendations  SUBJECTIVE/OBJECTIVE:   Pt confused, agitated  Diet Order: NPO  % Eaten:  No data found. Pertinent Medications:thiamin, zosyn; [email or web address]). Chemistries:  Lab Results   Component Value Date/Time    Sodium 136 07/04/2017 07:47 PM    Potassium 4.2 07/04/2017 07:47 PM    Chloride 97 07/04/2017 07:47 PM    CO2 33 07/04/2017 07:47 PM    Anion gap 6 07/04/2017 07:47 PM    Glucose 149 07/04/2017 07:47 PM    BUN 13 07/04/2017 07:47 PM    Creatinine 0.69 07/04/2017 07:47 PM    BUN/Creatinine ratio 19 07/04/2017 07:47 PM    GFR est AA >60 07/04/2017 07:47 PM    GFR est non-AA >60 07/04/2017 07:47 PM    Calcium 8.6 07/04/2017 07:47 PM    Albumin 2.6 07/02/2017 03:33 AM      Anthropometrics: Height: 5' 8\" (172.7 cm) Weight: 68.7 kg (151 lb 7.3 oz)    IBW (%IBW):   ( ) UBW (%UBW):   (  %)    BMI: Body mass index is 23.03 kg/(m^2). This BMI is indicative of:  []Underweight   [x]Normal   []Overweight   [] Obesity   [] Extreme Obesity (BMI>40)  Estimated Nutrition Needs (Based on): 1673 Kcals/day (MSJ 1394 x 1.2) , 58 g (-72 (0.8-1gPro/kg) ) Protein  Carbohydrate:  At Least 130 g/day  Fluids: 1700 mL/day    Last BM: 7/4   []Active     []Hyperactive  [x]Hypoactive       [] Absent   BS  Skin: [x] Intact   [] Incision  [] Breakdown   [] DTI   [] Tears/Excoriation/Abrasion  []Edema [] Other: Wt Readings from Last 30 Encounters:   07/03/17 68.7 kg (151 lb 7.3 oz)   06/21/17 70.3 kg (155 lb)   03/21/17 75.3 kg (166 lb)   03/18/17 68 kg (150 lb)   03/07/17 75.3 kg (166 lb)   02/25/17 76.2 kg (168 lb)   02/18/17 85.5 kg (188 lb 7.9 oz)   09/03/12 87.1 kg (192 lb)      NUTRITION DIAGNOSES:   Problem:  Inadequate protein-energy intake      Etiology: related to pt NPO 2' agitation/confusion     Signs/Symptoms: as evidenced by NPO meets 0% kcal and protein needs. Previous dx re: inadequate protein energy intake continues, pt NPO. NUTRITION INTERVENTIONS:  Meals/Snacks: Other (advance diet as medically able per SLP ) Enteral/Parenteral Nutrition: Initiate enteral nutrition                GOAL:   Pt will be started on PO diet vs nutrition support in 2-4 days.      NUTRITION MONITORING AND EVALUATION   Previous Goal: Pt will tolerate TF @ goal rate with residuals <250mL in 2-4 days   Previous Goal Met: N/A (TF has been discontinued as pt pulled NGT out)   Previous Recommendations Implemented: Yes   Cultural, Anabaptism, or Ethnic Dietary Needs: None   LEARNING NEEDS (Diet, Food/Nutrient-Drug Interaction):    [x] None Identified   [] Identified and Education Provided/Documented   [] Identified and Pt declined/was not appropriate      [x] Interdisciplinary Care Plan Reviewed/Documented    [x] Participated in Discharge Planning: Unable to determine    [x] Interdisciplinary Rounds     NUTRITION RISK:    [x] High              [] Moderate           []  Low  []  Minimal/Uncompromised      Aimee Lin, [address], [address] [place] [address] Dr  Pager [phone]  Weekend Pager [phone]

## 2017-07-06 NOTE — PROGRESS NOTES
PT note:    Chart reviewed and spoke with nursing. Per nursing, patient still agitated and not following commands. Defer PT at this time, will continue to follow.       Мария Deepthi, SPT

## 2017-07-06 NOTE — PROGRESS NOTES
Hospitalist Progress Note    NAME: Arjun Lilly   :  1934   MRN:  [id number]       Assessment / Plan:    Acute respiratory failure requiring intubation for airway protection POA  Tracheobronchitis / RUL infiltrate / Suspected baseline COPD   -Self extubated    -cxray with RUL scar/infiltarte - present since  . Possible aspiration  -still sounds congested. Continue pulmonary toilet; suction prn; duonebs and oxygen prn  -continue BIPAP support prn  -continue zosyn    Status epilepticus POA with recurrent seizures  -admitted for seizures in  and 2017. Both this admission and last February occurred after stopping lyrica for several days  -continue IV keppra    Acute encephalopathy POA  MS: slowly improving  Not ready for PO diet yet. Patient pulled NGT out. Essential HTN POA   History of SVT POA  -holding toprol; on cardizem gtt prn  -start ASA when safe for PO        Peripheral neuropathy POA, cont lyrica   Remote ETOH abuse. Family confirms no ETOH in 5 years  GERD continue PPI       Code status: Full codeNext of Kin: Wife  DVT prophylaxis: lovenox SQ    Recommended Disposition: TBD     Subjective:     Chief Complaint / Reason for Physician Visit:   following respiratory failure / encephalopathy / seizure   Discussed with RN events overnight. Off BIPAP this AM.  Pulled out NGT    Review of Systems:  Symptom Y/N Comments  Symptom Y/N Comments   Fever/Chills n   Chest Pain n    Poor Appetite    Edema     Cough    Abdominal Pain n    Sputum    Joint Pain     SOB/AL     Pruritis/Rash     Nausea/vomit    Tolerating PT/OT     Diarrhea    Tolerating Diet     Constipation    Other       Could NOT obtain due to:      Objective:     VITALS:   Last 24hrs VS reviewed since prior progress note.  Most recent are:  Patient Vitals for the past 24 hrs:   Temp Pulse Resp BP SpO2   17 1700 - 100 22 124/59 93 %   17 1645 - 99 18 - 95 %   17 1600 97.9 °F (36.6 °C) (!) 108 20 125/64 92 %   07/06/17 1523 - - - - 96 %   07/06/17 1500 - 99 16 157/68 95 %   07/06/17 1448 - 97 17 162/71 92 %   07/06/17 1400 - 88 19 148/72 98 %   07/06/17 1300 - 89 19 157/54 98 %   07/06/17 1207 - - - - 97 %   07/06/17 1200 98 °F (36.7 °C) 93 16 150/72 97 %   07/06/17 1100 - 94 18 156/62 97 %   07/06/17 1000 - 93 16 135/57 96 %   07/06/17 0900 - 96 21 136/58 97 %   07/06/17 0800 97.8 °F (36.6 °C) 97 20 138/72 99 %   07/06/17 0749 - - - - 98 %   07/06/17 0700 - 95 20 133/79 98 %   07/06/17 0600 - 99 25 128/57 98 %   07/06/17 0511 - - - - 99 %   07/06/17 0500 - 94 19 156/57 99 %   07/06/17 0400 - 92 18 144/72 99 %   07/06/17 0347 - - - - 100 %   07/06/17 0330 - 78 15 140/63 99 %   07/06/17 0304 - - - - 100 %   07/06/17 0300 97.1 °F (36.2 °C) 77 18 149/69 100 %   07/06/17 0200 - 97 17 (!) 137/92 99 %   07/06/17 0130 - 95 17 141/83 100 %   07/06/17 0100 - 79 17 145/69 100 %   07/06/17 0055 - 90 17 - 100 %   07/06/17 0051 - (!) 124 20 128/85 99 %   07/06/17 0047 - (!) 127 14 - 100 %   07/06/17 0003 - - - - 99 %   07/06/17 0000 - 72 14 135/60 99 %   07/05/17 2344 - 76 17 147/64 98 %   07/05/17 2306 98 °F (36.7 °C) 91 18 162/81 96 %   07/05/17 2300 - - - - 99 %   07/05/17 2200 - 65 16 132/58 99 %   07/05/17 2100 - 66 11 138/62 99 %   07/05/17 2032 - - - - 99 %   07/05/17 2003 - - - - 99 %   07/05/17 2000 98.6 °F (37 °C) 68 15 132/58 98 %   07/05/17 1900 - 93 19 135/90 99 %   07/05/17 1830 - 91 24 164/70 95 %   07/05/17 1800 - 69 15 134/56 99 %       Intake/Output Summary (Last 24 hours) at 07/06/17 1739  Last data filed at 07/06/17 1700   Gross per 24 hour   Intake          1256.67 ml   Output             1050 ml   Net           206.67 ml        PHYSICAL EXAM:  General: WD, WN. Alert, cooperative, mild distress, appears weak and debilitated     EENT:  EOMI. Anicteric sclerae. MMM  Resp:  rhonchi bilaterally, no wheezing or rales.   No accessory muscle use  CV:  Regular  rhythm,  No edema  GI:  Soft, Non distended, Non tender.  +Bowel sounds  Neurologic:  Alert but seems confused. He follows commands, normal speech, weak but grossly non focal  Psych:   Not anxious nor agitated  Skin:  No rashes. No jaundice    Reviewed most current lab test results and cultures  YES  Reviewed most current radiology test results   YES  Review and summation of old records today    NO  Reviewed patient's current orders and MAR    YES  PMH/SH reviewed - no change compared to H&P  ________________________________________________________________________  Care Plan discussed with:    Comments   Patient y    Family      RN y    Care Manager     Consultant                        Multidiciplinary team rounds were held today with , nursing, pharmacist and clinical coordinator. Patient's plan of care was discussed; medications were reviewed and discharge planning was addressed. ________________________________________________________________________  Total NON critical care TIME:  15  Minutes    Total CRITICAL CARE TIME Spent:   Minutes non procedure based      Comments   >50% of visit spent in counseling and coordination of care     ________________________________________________________________________  Latisha Larios MD     Procedures: see electronic medical records for all procedures/Xrays and details which were not copied into this note but were reviewed prior to creation of Plan. LABS:  I reviewed today's most current labs and imaging studies. Pertinent labs include:  No results for input(s): WBC, HGB, HCT, PLT, HGBEXT, HCTEXT, PLTEXT, HGBEXT, HCTEXT, PLTEXT in the last 72 hours.   Recent Labs      07/04/17   1947   NA  136   K  4.2   CL  97   CO2  33*   GLU  149*   BUN  13   CREA  0.69*   CA  8.6       Signed: Latisha Larios MD

## 2017-07-07 LAB
ANION GAP BLD CALC-SCNC: 5 MMOL/L (ref 5–15)
BUN SERPL-MCNC: 26 MG/DL (ref 6–20)
BUN/CREAT SERPL: 34 (ref 12–20)
CALCIUM SERPL-MCNC: 9.3 MG/DL (ref 8.5–10.1)
CHLORIDE SERPL-SCNC: 99 MMOL/L (ref 97–108)
CO2 SERPL-SCNC: 33 MMOL/L (ref 21–32)
CREAT SERPL-MCNC: 0.77 MG/DL (ref 0.7–1.3)
GLUCOSE BLD STRIP.AUTO-MCNC: 104 MG/DL (ref 65–100)
GLUCOSE BLD STRIP.AUTO-MCNC: 121 MG/DL (ref 65–100)
GLUCOSE SERPL-MCNC: 126 MG/DL (ref 65–100)
MAGNESIUM SERPL-MCNC: 2 MG/DL (ref 1.6–2.4)
POTASSIUM SERPL-SCNC: 3.6 MMOL/L (ref 3.5–5.1)
SERVICE CMNT-IMP: ABNORMAL
SERVICE CMNT-IMP: ABNORMAL
SODIUM SERPL-SCNC: 137 MMOL/L (ref 136–145)

## 2017-07-07 PROCEDURE — 74011250637 HC RX REV CODE- 250/637: Performed by: INTERNAL MEDICINE

## 2017-07-07 PROCEDURE — 82962 GLUCOSE BLOOD TEST: CPT

## 2017-07-07 PROCEDURE — 74011000258 HC RX REV CODE- 258: Performed by: INTERNAL MEDICINE

## 2017-07-07 PROCEDURE — 74011250636 HC RX REV CODE- 250/636: Performed by: INTERNAL MEDICINE

## 2017-07-07 PROCEDURE — 74011000250 HC RX REV CODE- 250: Performed by: INTERNAL MEDICINE

## 2017-07-07 PROCEDURE — 36415 COLL VENOUS BLD VENIPUNCTURE: CPT | Performed by: INTERNAL MEDICINE

## 2017-07-07 PROCEDURE — 74011000258 HC RX REV CODE- 258: Performed by: PSYCHIATRY & NEUROLOGY

## 2017-07-07 PROCEDURE — 83735 ASSAY OF MAGNESIUM: CPT | Performed by: INTERNAL MEDICINE

## 2017-07-07 PROCEDURE — 65610000006 HC RM INTENSIVE CARE

## 2017-07-07 PROCEDURE — 80048 BASIC METABOLIC PNL TOTAL CA: CPT | Performed by: INTERNAL MEDICINE

## 2017-07-07 PROCEDURE — 76450000000

## 2017-07-07 PROCEDURE — 77030010545

## 2017-07-07 PROCEDURE — 94640 AIRWAY INHALATION TREATMENT: CPT

## 2017-07-07 PROCEDURE — 74011250636 HC RX REV CODE- 250/636: Performed by: PSYCHIATRY & NEUROLOGY

## 2017-07-07 RX ORDER — DIPHENHYDRAMINE HYDROCHLORIDE 50 MG/ML
25 INJECTION, SOLUTION INTRAMUSCULAR; INTRAVENOUS EVERY 6 HOURS
Status: COMPLETED | OUTPATIENT
Start: 2017-07-07 | End: 2017-07-08

## 2017-07-07 RX ORDER — HALOPERIDOL 5 MG/ML
2 INJECTION INTRAMUSCULAR EVERY 6 HOURS
Status: DISCONTINUED | OUTPATIENT
Start: 2017-07-07 | End: 2017-07-09

## 2017-07-07 RX ADMIN — IPRATROPIUM BROMIDE AND ALBUTEROL SULFATE 3 ML: .5; 3 SOLUTION RESPIRATORY (INHALATION) at 11:50

## 2017-07-07 RX ADMIN — LORAZEPAM 2 MG: 2 INJECTION INTRAMUSCULAR; INTRAVENOUS at 23:26

## 2017-07-07 RX ADMIN — HALOPERIDOL LACTATE 2 MG: 5 INJECTION, SOLUTION INTRAMUSCULAR at 16:53

## 2017-07-07 RX ADMIN — LORAZEPAM 2 MG: 2 INJECTION INTRAMUSCULAR; INTRAVENOUS at 08:22

## 2017-07-07 RX ADMIN — HYDRALAZINE HYDROCHLORIDE 10 MG: 20 INJECTION INTRAMUSCULAR; INTRAVENOUS at 07:15

## 2017-07-07 RX ADMIN — IPRATROPIUM BROMIDE AND ALBUTEROL SULFATE 3 ML: .5; 3 SOLUTION RESPIRATORY (INHALATION) at 23:57

## 2017-07-07 RX ADMIN — Medication 10 ML: at 13:27

## 2017-07-07 RX ADMIN — METOPROLOL TARTRATE 5 MG: 5 INJECTION INTRAVENOUS at 08:14

## 2017-07-07 RX ADMIN — HYDRALAZINE HYDROCHLORIDE 10 MG: 20 INJECTION INTRAMUSCULAR; INTRAVENOUS at 00:09

## 2017-07-07 RX ADMIN — LORAZEPAM 2 MG: 2 INJECTION INTRAMUSCULAR; INTRAVENOUS at 15:11

## 2017-07-07 RX ADMIN — DIPHENHYDRAMINE HYDROCHLORIDE 25 MG: 50 INJECTION, SOLUTION INTRAMUSCULAR; INTRAVENOUS at 21:12

## 2017-07-07 RX ADMIN — IPRATROPIUM BROMIDE AND ALBUTEROL SULFATE 3 ML: .5; 3 SOLUTION RESPIRATORY (INHALATION) at 08:19

## 2017-07-07 RX ADMIN — PIPERACILLIN SODIUM,TAZOBACTAM SODIUM 3.38 G: 3; .375 INJECTION, POWDER, FOR SOLUTION INTRAVENOUS at 21:08

## 2017-07-07 RX ADMIN — IPRATROPIUM BROMIDE AND ALBUTEROL SULFATE 3 ML: .5; 3 SOLUTION RESPIRATORY (INHALATION) at 20:13

## 2017-07-07 RX ADMIN — PIPERACILLIN SODIUM,TAZOBACTAM SODIUM 3.38 G: 3; .375 INJECTION, POWDER, FOR SOLUTION INTRAVENOUS at 04:56

## 2017-07-07 RX ADMIN — DIPHENHYDRAMINE HYDROCHLORIDE 25 MG: 50 INJECTION, SOLUTION INTRAMUSCULAR; INTRAVENOUS at 16:53

## 2017-07-07 RX ADMIN — LEVETIRACETAM 1500 MG: 100 INJECTION, SOLUTION, CONCENTRATE INTRAVENOUS at 08:12

## 2017-07-07 RX ADMIN — Medication 10 ML: at 13:26

## 2017-07-07 RX ADMIN — ACETAMINOPHEN 650 MG: 650 SUPPOSITORY RECTAL at 00:11

## 2017-07-07 RX ADMIN — LEVETIRACETAM 1500 MG: 100 INJECTION, SOLUTION, CONCENTRATE INTRAVENOUS at 20:35

## 2017-07-07 RX ADMIN — SODIUM CHLORIDE 25 ML/HR: 900 INJECTION, SOLUTION INTRAVENOUS at 08:11

## 2017-07-07 RX ADMIN — ENOXAPARIN SODIUM 40 MG: 40 INJECTION SUBCUTANEOUS at 08:12

## 2017-07-07 RX ADMIN — Medication 10 ML: at 21:08

## 2017-07-07 RX ADMIN — IPRATROPIUM BROMIDE AND ALBUTEROL SULFATE 3 ML: .5; 3 SOLUTION RESPIRATORY (INHALATION) at 15:34

## 2017-07-07 RX ADMIN — THIAMINE HYDROCHLORIDE 100 MG: 100 INJECTION, SOLUTION INTRAMUSCULAR; INTRAVENOUS at 08:14

## 2017-07-07 RX ADMIN — DEXTROSE MONOHYDRATE 5 MG/HR: 50 INJECTION, SOLUTION INTRAVENOUS at 10:11

## 2017-07-07 RX ADMIN — DIPHENHYDRAMINE HYDROCHLORIDE 25 MG: 50 INJECTION, SOLUTION INTRAMUSCULAR; INTRAVENOUS at 12:55

## 2017-07-07 RX ADMIN — PIPERACILLIN SODIUM,TAZOBACTAM SODIUM 3.38 G: 3; .375 INJECTION, POWDER, FOR SOLUTION INTRAVENOUS at 12:51

## 2017-07-07 RX ADMIN — LORAZEPAM 2 MG: 2 INJECTION INTRAMUSCULAR; INTRAVENOUS at 04:59

## 2017-07-07 RX ADMIN — HALOPERIDOL LACTATE 2 MG: 5 INJECTION, SOLUTION INTRAMUSCULAR at 21:11

## 2017-07-07 RX ADMIN — Medication 20 ML: at 13:27

## 2017-07-07 RX ADMIN — HALOPERIDOL LACTATE 4 MG: 5 INJECTION, SOLUTION INTRAMUSCULAR at 04:10

## 2017-07-07 RX ADMIN — Medication 10 ML: at 05:00

## 2017-07-07 RX ADMIN — IPRATROPIUM BROMIDE AND ALBUTEROL SULFATE 3 ML: .5; 3 SOLUTION RESPIRATORY (INHALATION) at 04:27

## 2017-07-07 RX ADMIN — LATANOPROST 1 DROP: 50 SOLUTION OPHTHALMIC at 18:09

## 2017-07-07 RX ADMIN — HALOPERIDOL LACTATE 2 MG: 5 INJECTION, SOLUTION INTRAMUSCULAR at 10:06

## 2017-07-07 NOTE — PROGRESS NOTES
1900-Bedside report received from RICHARD Reyez  2000-Shift assessment complete, alert and confused, bilateral wrist restraints continued for patient safety, NSR, cardizem gtt infusing at 5, VSS, room air, will continue to monitor. 0000-Reassessment complete, no changes. 0400-Reassessment complete, no changes. 0710-Bedside report given to RICHARD Reyez.

## 2017-07-07 NOTE — PROGRESS NOTES
[facility]  Pulmonary, Critical Care, and Sleep Medicine    Name: Clarice Garcia MRN: [id number]   : 1934 Hospital: [facility]   Date: 2017          IMPRESSION:   · Acute respiratory failure -resolving  · Stridor better  · Tracheobronchitis? On empiric abx given seizures and possible aspiration  · Encephalopathy-with intermittent agitation; very restless despite excellent nursing care- not sure about his baseline  · Seizures with status epilepticus  · HTN  · SVT on IV cardizem  · Peripheral neuropathy  · Right upper lobe scar/infiltrate - no tobacco history per records - has been present since at least February of this year, may be present to some degree on chest X-ray from   · Suspect COPD      PLAN:   · D/c BIPAP  · Trial of IV benadryl in case restlessness from IV haldol  · Leave NGT out  · Finish IV abx  · Reorient- try to reduce Haldol  · mobilize  · Bronchodilators   · Antiepileptics  · Antihypertensives  · Monitor platelets   · Antipsychotics for agitation  · Restrain for safety, pt keeps puling at devices  · DVT prophylaxis     Subjective/Interval History:    No SOB. Has not needed BIPAP. Very restless and cannot localize any particular discomfort. Mouth now dry.  stridor better off BIPAP but more fidgety. Pulled out his NGT. Disoriented. No SOB    Scopolamine added the other day. Stridor after NT suctioning. No fever. WBC ok. No seizures.       Review of Systems   Unable to perform ROS: Mental status change     Objective:   Vital Signs:    Visit Vitals    /56 (BP 1 Location: Left arm, BP Patient Position: Other (comment))  Comment (BP Patient Position): agitated and restless    Pulse 83    Temp 98.2 °F (36.8 °C)    Resp 16    Ht 5' 8\" (1.727 m)    Wt 68.7 kg (151 lb 7.3 oz)    SpO2 96%    BMI 23.03 kg/m2       O2 Device: Room air   O2 Flow Rate (L/min): 2 l/min   Temp (24hrs), Av °F (36.7 °C), Min:97.3 °F (36.3 °C), Max:98.5 °F (36.9 °C)       Intake/Output:   Last shift:      07/07 0701 - 07/07 1900  In: 30 [I.V.:30]  Out: 450 [Urine:450]  Last 3 shifts: 07/05 1901 - 07/07 0700  In: 1937.9 [I.V.:1937.9]  Out: 4484 [Urine:1675]    Intake/Output Summary (Last 24 hours) at 07/07/17 0903  Last data filed at 07/07/17 0844   Gross per 24 hour   Intake          1074.21 ml   Output             1215 ml   Net          -140.79 ml      Physical Exam   Constitutional: He is active. No distress. HENT:   Head: Normocephalic and atraumatic. Mouth/Throat: No oropharyngeal exudate. Eyes: No scleral icterus. Cardiovascular: Normal rate and regular rhythm. Pulmonary/Chest: No respiratory distress. He has no wheezes. He has no rales. Abdominal: Soft. Bowel sounds are normal. He exhibits no distension. There is no tenderness. Musculoskeletal: He exhibits no edema. Neurological: He is alert. He is disoriented. Skin: Skin is warm and dry.      Data:     Current Facility-Administered Medications   Medication Dose Route Frequency    piperacillin-tazobactam (ZOSYN) 3.375 g in 0.9% sodium chloride (MBP/ADV) 100 mL  3.375 g IntraVENous Q8H    haloperidol lactate (HALDOL) injection 4 mg  4 mg IntraVENous Q6H    albuterol-ipratropium (DUO-NEB) 2.5 MG-0.5 MG/3 ML  3 mL Nebulization Q4H RT    dilTIAZem (CARDIZEM) 100 mg in dextrose 5% (MBP/ADV) 100 mL infusion  0-15 mg/hr IntraVENous TITRATE    sodium chloride (NS) flush 10 mL  10 mL InterCATHeter Q24H    sodium chloride (NS) flush 10-40 mL  10-40 mL InterCATHeter Q8H    sodium chloride (NS) flush 20 mL  20 mL InterCATHeter Q24H    latanoprost (XALATAN) 0.005 % ophthalmic solution 1 Drop  1 Drop Both Eyes QPM    thiamine (B-1) 100 mg in 0.9% sodium chloride 50 mL IVPB  100 mg IntraVENous DAILY    sodium chloride (NS) flush 5-10 mL  5-10 mL IntraVENous Q8H    enoxaparin (LOVENOX) injection 40 mg  40 mg SubCUTAneous Q24H    0.9% sodium chloride infusion  25 mL/hr IntraVENous CONTINUOUS    levETIRAcetam (KEPPRA) 1,500 mg in 0.9% sodium chloride IVPB  1,500 mg IntraVENous Q12H                Labs:  No results for input(s): WBC, HGB, HCT, PLT, HGBEXT, HCTEXT, PLTEXT, HGBEXT, HCTEXT, PLTEXT in the last 72 hours.   Recent Labs      07/07/17   0415  07/04/17   1947   NA  137  136   K  3.6  4.2   CL  99  97   CO2  33*  33*   GLU  126*  149*   BUN  26*  13   CREA  0.77  0.69*   CA  9.3  8.6   MG  2.0   --      Recent Labs      07/06/17   0407  07/05/17   1320   PH  7.45  7.35   PCO2  47*  65*   PO2  139*  164*   HCO3  32*  35*   FIO2  30  40     Imaging:  I have personally reviewed the patients radiographs and have reviewed the reports:  None today        Total critical care time exclusive of procedures:   minutes  Anyi Munoz MD

## 2017-07-07 NOTE — CONSULTS
Palliative Medicine Consult  Buddy: [phone] ([phone])    Patient Name: Gilbert Pereyra  YOB: 1934    Date of Initial Consult:  7/7/17  Reason for Consult: care decisions  Requesting Provider: Kacie Peck  Primary Care Physician: Esther Mejias MD      SUMMARY:   Gilbert Pereyra is a 80 y.o. with a past history of seizures, who was admitted on 6/24/2017 from home with a diagnosis of status epilecticus. Current medical issues leading to Palliative Medicine involvement include: AMS not related to seizures, EEGs neg for seizure activity. AMS continues, no real improvement. Neurology recommended MRI, which was not done. PALLIATIVE DIAGNOSES:   1. Acute encephalopathy  2. Acute respiratory failure requiring intubation for airway protection: self extubated 6/26,   3. Peripheral neuropathy: ran out of lyrica several days ago  4. Dysphagia        PLAN:   1. Met with wife and daughter; obtained history, discussed current medical condition, goals of care. Family report pt independent with ADLs and some IADLs just prior to admission. They report pt was much better mentally on July 4 &5th, then suddenly declined. We talked about his not being safe to eat, may have to replace NGT. We agreed to give pt the weekend and reevaluate on Monday. 2. There are 2 medications that were added after July 5th that might be affecting pt's mental status; Haldol and Ativan. I suggest we titrate off the ativan, and consider changing the Haldol to Citalopram.  3. Initial consult note routed to primary continuity provider  4.  Communicated plan of care with: Palliative IDT RN       GOALS OF CARE / TREATMENT PREFERENCES:   [====Goals of Care====]  GOALS OF CARE:  Patient / health care proxy stated goals:     Pending; will meet again [facility]      TREATMENT PREFERENCES:   Code Status: Full Code    Advance Care Planning:  Advance Care Planning 7/2/2017   Patient's Healthcare Decision Maker is: Verbal statement (Legal Next of Kin remains as decision maker)   Primary Decision Maker Name -   Primary Decision Maker Phone Number -   Primary Decision Maker Relationship to Patient -   Secondary Decision Maker Name Melvin Sanches   Confirm Advance Directive -       Other:    The palliative care team has discussed with patient / health care proxy about goals of care / treatment preferences for patient.  [====Goals of Care====]         HISTORY:     History obtained from: chart    CHIEF COMPLAINT:  AMS    HPI/SUBJECTIVE:    The patient is:   [] Verbal and participatory  [x] Non-participatory due to:     [facility] for evaluation of sudden onset of AMS that started at 0400 on am of admission. Wife reports pt went to bed at baseline the night before (baseline is AA&O x3 and able to communicate normally, independent with all ADLs), but woke up unable to speak in coherent sentences and confused about orientation. He was able to tell her he did not feel right and he wanted to go to the ED. Pt takes 1250mg of Keppra bid, however, he is not compliant with his medications. Last seizure was a few months ago. In the ED, his neuro status decreased and he required intubation. Was experiencing some seizure activity with stiffening on right side of the body and deviation gaze to the right, but not his usual tonic clonic type. Head CT and CTA neg. Self extubated 6/26. EEG 6/26 neg for seizures, however, moderate diffuse cerebral dysfunction which is non specific for etiology but can be seen in toxic/metabolic states. 6/29 no improvement in mental status, all blood work normal, EEG showed slowing but no seizure activity. Neuro signed off 6/30, with nothing more to offer, no identified cause of AMS.      Clinical Pain Assessment (nonverbal scale for severity on nonverbal patients):   [++++ Clinical Pain Assessment++++]  [++++Pain Severity++++]: Pain: 0  [++++Pain Character++++]:   [++++Pain Duration++++]:   [++++Pain Effect++++]:   [++++Pain Factors++++]: [++++Pain Frequency++++]:   [++++Pain Location++++]:   [++++ Clinical Pain Assessment++++]     FUNCTIONAL ASSESSMENT:     Palliative Performance Scale (PPS):  PPS: 30       PSYCHOSOCIAL/SPIRITUAL SCREENING:     Advance Care Planning:  Advance Care Planning 7/2/2017   Patient's Healthcare Decision Maker is: Verbal statement (Legal Next of Kin remains as decision maker)   Primary Decision Maker Name -   Primary Decision Maker Phone Number -   Primary Decision Maker Relationship to Patient -   Secondary Decision Maker Name Jacky Finnegan   Confirm Advance Directive -        Any spiritual / Tenriism concerns:  [] Yes /  [x] No    Caregiver Burnout:  [] Yes /  [x] No /  [] No Caregiver Present      Anticipatory grief assessment:   [x] Normal  / [] Maladaptive       ESAS Anxiety: Anxiety: 3    ESAS Depression:   unable to assess due to pt factors       REVIEW OF SYSTEMS:     Positive and pertinent negative findings in ROS are noted above in HPI. The following systems were [] reviewed / [x] unable to be reviewed as noted in HPI  Other findings are noted below. Systems: constitutional, ears/nose/mouth/throat, respiratory, gastrointestinal, genitourinary, musculoskeletal, integumentary, neurologic, psychiatric, endocrine. Positive findings noted below. Modified ESAS Completed by: provider           Pain: 0   Anxiety: 3       Dyspnea: 0           Stool Occurrence(s): 1        PHYSICAL EXAM:     From RN flowsheet:  Wt Readings from Last 3 Encounters:   07/03/17 151 lb 7.3 oz (68.7 kg)   06/21/17 155 lb (70.3 kg)   03/21/17 166 lb (75.3 kg)     Blood pressure 141/71, pulse 80, temperature 98 °F (36.7 °C), resp. rate 22, height 5' 8\" (1.727 m), weight 151 lb 7.3 oz (68.7 kg), SpO2 97 %.     Pain Scale 1: FLACC  Pain Intensity 1: 3     Pain Location 1: Generalized        Pain Intervention(s) 1: Medication (see MAR), Repositioned, Rest  Last bowel movement, if known:     Constitutional: elderly, confused, unable to answer questions  Eyes: pupils equal, anicteric  ENMT: no nasal discharge, moist mucous membranes  Cardiovascular: regular rhythm, distal pulses intact  Respiratory: breathing not labored, symmetric  Gastrointestinal: soft non-tender, +bowel sounds  Musculoskeletal: no deformity, no tenderness to palpation  Skin: warm, dry  Neurologic: not following commands, picking at clothes, pulling on lines  Psychiatric: delirium      HISTORY:     Active Problems:    Status epilepticus ([address] (6/24/2017)      Past Medical History:   Diagnosis Date    Alcohol abuse, in remission     Seizures (HCC)       No past surgical history on file. Family History   Problem Relation Age of Onset    Other Other      no strokes or seizures      History reviewed, no pertinent family history.   Social History   Substance Use Topics    Smoking status: Never Smoker    Smokeless tobacco: Not on file    Alcohol use No      Comment: Quit drinking 10 years ago     Allergies   Allergen Reactions    No Known Allergies       Current Facility-Administered Medications   Medication Dose Route Frequency    diphenhydrAMINE (BENADRYL) injection 25 mg  25 mg IntraVENous Q6H    haloperidol lactate (HALDOL) injection 2 mg  2 mg IntraVENous Q6H    piperacillin-tazobactam (ZOSYN) 3.375 g in 0.9% sodium chloride (MBP/ADV) 100 mL  3.375 g IntraVENous Q8H    LORazepam (ATIVAN) injection 2 mg  2 mg IntraVENous Q2H PRN    fentaNYL citrate (PF) injection 25-50 mcg  25-50 mcg IntraVENous Q2H PRN    albuterol-ipratropium (DUO-NEB) 2.5 MG-0.5 MG/3 ML  3 mL Nebulization Q4H RT    dilTIAZem (CARDIZEM) 100 mg in dextrose 5% (MBP/ADV) 100 mL infusion  0-15 mg/hr IntraVENous TITRATE    hydrALAZINE (APRESOLINE) 20 mg/mL injection 10 mg  10 mg IntraVENous Q6H PRN    metoprolol (LOPRESSOR) injection 5 mg  5 mg IntraVENous Q6H PRN    sodium chloride (NS) flush 10-30 mL  10-30 mL InterCATHeter PRN    sodium chloride (NS) flush 10 mL  10 mL InterCATHeter Q24H    sodium chloride (NS) flush 10 mL  10 mL InterCATHeter PRN    sodium chloride (NS) flush 10-40 mL  10-40 mL InterCATHeter Q8H    sodium chloride (NS) flush 20 mL  20 mL InterCATHeter Q24H    latanoprost (XALATAN) 0.005 % ophthalmic solution 1 Drop  1 Drop Both Eyes QPM    thiamine (B-1) 100 mg in 0.9% sodium chloride 50 mL IVPB  100 mg IntraVENous DAILY    sodium chloride (NS) flush 5-10 mL  5-10 mL IntraVENous Q8H    sodium chloride (NS) flush 5-10 mL  5-10 mL IntraVENous PRN    naloxone (NARCAN) injection 0.4 mg  0.4 mg IntraVENous PRN    ondansetron (ZOFRAN) injection 4 mg  4 mg IntraVENous Q4H PRN    bisacodyl (DULCOLAX) suppository 10 mg  10 mg Rectal DAILY PRN    acetaminophen (TYLENOL) suppository 650 mg  650 mg Rectal Q4H PRN    enoxaparin (LOVENOX) injection 40 mg  40 mg SubCUTAneous Q24H    0.9% sodium chloride infusion  25 mL/hr IntraVENous CONTINUOUS    levETIRAcetam (KEPPRA) 1,500 mg in 0.9% sodium chloride IVPB  1,500 mg IntraVENous Q12H          LAB AND IMAGING FINDINGS:     Lab Results   Component Value Date/Time    WBC 7.2 07/03/2017 04:23 AM    HGB 11.8 07/03/2017 04:23 AM    PLATELET 538 84/09/7463 04:23 AM     Lab Results   Component Value Date/Time    Sodium 137 07/07/2017 04:15 AM    Potassium 3.6 07/07/2017 04:15 AM    Chloride 99 07/07/2017 04:15 AM    CO2 33 07/07/2017 04:15 AM    BUN 26 07/07/2017 04:15 AM    Creatinine 0.77 07/07/2017 04:15 AM    Calcium 9.3 07/07/2017 04:15 AM    Magnesium 2.0 07/07/2017 04:15 AM    Phosphorus 2.9 07/03/2017 04:23 AM      Lab Results   Component Value Date/Time    AST (SGOT) 24 07/02/2017 03:33 AM    Alk.  phosphatase 59 07/02/2017 03:33 AM    Protein, total 6.5 07/02/2017 03:33 AM    Albumin 2.6 07/02/2017 03:33 AM    Globulin 3.9 07/02/2017 03:33 AM     Lab Results   Component Value Date/Time    INR 1.2 07/02/2017 03:33 AM    Prothrombin time 11.7 07/02/2017 03:33 AM    aPTT 30.1 07/02/2017 03:33 AM      No results found for: IRON, FE, TIBC, IBCT, PSAT, FERR   Lab Results   Component Value Date/Time    pH 7.45 07/06/2017 04:07 AM    PCO2 47 07/06/2017 04:07 AM    PO2 139 07/06/2017 04:07 AM     No components found for: [place]   Lab Results   Component Value Date/Time    CK 99 07/02/2017 03:33 AM    CK - MB 1.8 07/02/2017 03:33 AM                Total time: 75 min  Counseling / coordination time, spent as noted above: 65 min  > 50% counseling / coordination?: yes    Prolonged service was provided for  []30 min   []75 min in face to face time in the presence of the patient, spent as noted above. Time Start:   Time End:   Note: this can only be billed with 33833 (initial) or 31189 (follow up). If multiple start / stop times, list each separately.

## 2017-07-07 NOTE — PROGRESS NOTES
OT note:  Chart reviewed and cleared by nursing. Attempted to see patient for OT treatment session. Patient received very drowsy, not following commands. Attempted to have patient wash his face with cold washcloth with no response noted with stimulus. Performed PROM on BUE and patient resisting, not understanding role of PT/OT due to poor command following and confusion.  Aborted session and notified nursing.      Total time spent: 11 minutes

## 2017-07-07 NOTE — PROGRESS NOTES
2000, patient received from day shift. Neuro; drowsy > mostly sleeping post ativan PRN for agitation, and oriented to self only, not to situation or time, GCS;  Eye; 3, verbal; 4, motor; 4.  Respiratory;  Sat 95% on RA, coarse rhonchi diminished lung sounds all over the lung,   Cardiac; NSR, 76 B/min, NIBP 139/48 mmHg, on Diltiazem 7.5 mg/hr   GI; NPO with Dysphagia ( NGT removed today) (Osmolite 1.2  At 60 ml/hr, free water 100 ml Q 4 hr, residual  0 ml) > feeding held for today, Abd semi soft with hypoactive bowel sound, NS at 25 ml/hr  Renal; diapper > to apply condom  Skin; laceration at left shin, both shin cover with foam dressing > today looks better. Endo; Q 6 hr check only while NPO,   Lines; right CVC,    Code; Full. Lab; WBC; 7.2,  HGB; 11.8, platelet; 292 , Na; 597 , K; 3.0 > sample repeated > 4.2 , BUN; 13, Crea; 0.69 ,  DVT; SCD both legs & Lovenox. Restraint type: Soft restraint:  right wrist and left wrist  Reason for restraints: Interference with medical treatment  Duration: 24 hours    Restraints must be removed when an alternative is available and effective and/or patient no longer meets criteria. Orders must be renewed every calendar day or when discontinued.     The MD must conduct a face to face assessment within 1 calendar day of initiation when initial restraint order is verbal.    Plan; BiPAP management PRN, titrate oxygen to keep sat more than 92%, suction PRN, titrate Cardizem to keep HR less than 100, BP management to keep SBP less [id number] mmHg with PRN med, pain management, follow lab tomorrow,  2010, 6 beats of V Tach noted, Dr Lani Pineda informed, prescribed to give 1 g magnesium IV once as last Mg was 1.6, then check BMP & Mg tomorrow am.   2300, patient more awake right now, moving with some restless, try to get out from the bed, reoriented but still confused, schedule haldol given, to follow up.  0030, new condom applied, incontinent care done, BP been 175/59 mmHg, Hydralazine 10 mg IV PRN given, to follow up,  0500, patient more agitated, Ativan 2 mg IV PRN given to follow up.  0530; P. SVT irregular, Diltiazem  Increased to 5 mg/hr,   0630, again has 4 beats of V Tach, K 3.6, Mg 2, patient restless a little, to follow up with MD.  0700, Bedside and Verbal shift change report given to [address] [place] [address] Drive  (oncoming nurse) by Richard Barlow RN (offgoing nurse). Report included the following information SBAR, Kardex, ED Summary, Intake/Output, MAR, Accordion, Recent Results, Med Rec Status and Cardiac Rhythm NSR.

## 2017-07-07 NOTE — PROGRESS NOTES
PT note:    Chart reviewed and cleared by nursing. Attempted to see patient for PT treatment session. Patient received very drowsy, not following commands. Attempted to have patient wash his face with cold washcloth with no response noted with stimulus. Performed PROM on BUE and patient resisting, not understanding role of PT/OT due to poor command following and confusion. Aborted session and notified nursing.      Lenora Castro, PT, DPT   Total time spent: 11 minutes

## 2017-07-07 NOTE — INTERDISCIPLINARY ROUNDS
Interdisciplinary team rounds were held 7/7/2017 with the following team members:Care Management, Diabetes Treatment Specialist, Nursing, Nutrition, Pastoral Care, Pharmacy, Physician and Respiratory Therapy. Plan of care discussed, palliative consult. See clinical pathway and/or care plan for interventions and desired outcomes.

## 2017-07-07 NOTE — PROGRESS NOTES
Attended Interdisciplinary rounds in Critical Care Unit, where patient care was discussed. Visit by: Farooq George. Vida Thacker.  Chiqui Becerril MA, [address]

## 2017-07-08 PROBLEM — R13.11 ORAL PHASE DYSPHAGIA: Status: ACTIVE | Noted: 2017-07-08

## 2017-07-08 LAB
GLUCOSE BLD STRIP.AUTO-MCNC: 104 MG/DL (ref 65–100)
GLUCOSE BLD STRIP.AUTO-MCNC: 97 MG/DL (ref 65–100)
SERVICE CMNT-IMP: ABNORMAL
SERVICE CMNT-IMP: NORMAL

## 2017-07-08 PROCEDURE — 82962 GLUCOSE BLOOD TEST: CPT

## 2017-07-08 PROCEDURE — 74011000250 HC RX REV CODE- 250: Performed by: INTERNAL MEDICINE

## 2017-07-08 PROCEDURE — 74011250636 HC RX REV CODE- 250/636: Performed by: INTERNAL MEDICINE

## 2017-07-08 PROCEDURE — 74011000258 HC RX REV CODE- 258: Performed by: INTERNAL MEDICINE

## 2017-07-08 PROCEDURE — 94640 AIRWAY INHALATION TREATMENT: CPT

## 2017-07-08 PROCEDURE — 74011000258 HC RX REV CODE- 258: Performed by: PSYCHIATRY & NEUROLOGY

## 2017-07-08 PROCEDURE — 74011250636 HC RX REV CODE- 250/636: Performed by: PSYCHIATRY & NEUROLOGY

## 2017-07-08 PROCEDURE — 65610000006 HC RM INTENSIVE CARE

## 2017-07-08 RX ORDER — IPRATROPIUM BROMIDE AND ALBUTEROL SULFATE 2.5; .5 MG/3ML; MG/3ML
3 SOLUTION RESPIRATORY (INHALATION)
Status: DISCONTINUED | OUTPATIENT
Start: 2017-07-08 | End: 2017-07-11

## 2017-07-08 RX ORDER — POTASSIUM CHLORIDE 29.8 MG/ML
20 INJECTION INTRAVENOUS
Status: COMPLETED | OUTPATIENT
Start: 2017-07-08 | End: 2017-07-12

## 2017-07-08 RX ORDER — DEXTROSE MONOHYDRATE AND SODIUM CHLORIDE 5; .45 G/100ML; G/100ML
75 INJECTION, SOLUTION INTRAVENOUS CONTINUOUS
Status: DISCONTINUED | OUTPATIENT
Start: 2017-07-08 | End: 2017-07-12

## 2017-07-08 RX ADMIN — IPRATROPIUM BROMIDE AND ALBUTEROL SULFATE 3 ML: .5; 3 SOLUTION RESPIRATORY (INHALATION) at 07:19

## 2017-07-08 RX ADMIN — Medication 10 ML: at 14:17

## 2017-07-08 RX ADMIN — IPRATROPIUM BROMIDE AND ALBUTEROL SULFATE 3 ML: .5; 3 SOLUTION RESPIRATORY (INHALATION) at 14:54

## 2017-07-08 RX ADMIN — Medication 10 ML: at 14:58

## 2017-07-08 RX ADMIN — PIPERACILLIN SODIUM,TAZOBACTAM SODIUM 3.38 G: 3; .375 INJECTION, POWDER, FOR SOLUTION INTRAVENOUS at 21:21

## 2017-07-08 RX ADMIN — THIAMINE HYDROCHLORIDE 100 MG: 100 INJECTION, SOLUTION INTRAMUSCULAR; INTRAVENOUS at 09:40

## 2017-07-08 RX ADMIN — HALOPERIDOL LACTATE 2 MG: 5 INJECTION, SOLUTION INTRAMUSCULAR at 16:36

## 2017-07-08 RX ADMIN — HYDRALAZINE HYDROCHLORIDE 10 MG: 20 INJECTION INTRAMUSCULAR; INTRAVENOUS at 01:18

## 2017-07-08 RX ADMIN — HALOPERIDOL LACTATE 2 MG: 5 INJECTION, SOLUTION INTRAMUSCULAR at 03:07

## 2017-07-08 RX ADMIN — POTASSIUM CHLORIDE 20 MEQ: 400 INJECTION, SOLUTION INTRAVENOUS at 16:34

## 2017-07-08 RX ADMIN — IPRATROPIUM BROMIDE AND ALBUTEROL SULFATE 3 ML: .5; 3 SOLUTION RESPIRATORY (INHALATION) at 20:25

## 2017-07-08 RX ADMIN — Medication 10 ML: at 04:21

## 2017-07-08 RX ADMIN — IPRATROPIUM BROMIDE AND ALBUTEROL SULFATE 3 ML: .5; 3 SOLUTION RESPIRATORY (INHALATION) at 03:43

## 2017-07-08 RX ADMIN — PIPERACILLIN SODIUM,TAZOBACTAM SODIUM 3.38 G: 3; .375 INJECTION, POWDER, FOR SOLUTION INTRAVENOUS at 05:08

## 2017-07-08 RX ADMIN — METOPROLOL TARTRATE 5 MG: 5 INJECTION INTRAVENOUS at 05:08

## 2017-07-08 RX ADMIN — Medication 20 ML: at 21:22

## 2017-07-08 RX ADMIN — LORAZEPAM 2 MG: 2 INJECTION INTRAMUSCULAR; INTRAVENOUS at 19:29

## 2017-07-08 RX ADMIN — DEXTROSE MONOHYDRATE AND SODIUM CHLORIDE 75 ML/HR: 5; .45 INJECTION, SOLUTION INTRAVENOUS at 14:18

## 2017-07-08 RX ADMIN — PIPERACILLIN SODIUM,TAZOBACTAM SODIUM 3.38 G: 3; .375 INJECTION, POWDER, FOR SOLUTION INTRAVENOUS at 14:16

## 2017-07-08 RX ADMIN — LEVETIRACETAM 1500 MG: 100 INJECTION, SOLUTION, CONCENTRATE INTRAVENOUS at 09:39

## 2017-07-08 RX ADMIN — LEVETIRACETAM 1500 MG: 100 INJECTION, SOLUTION, CONCENTRATE INTRAVENOUS at 21:17

## 2017-07-08 RX ADMIN — HALOPERIDOL LACTATE 2 MG: 5 INJECTION, SOLUTION INTRAMUSCULAR at 10:10

## 2017-07-08 RX ADMIN — ENOXAPARIN SODIUM 40 MG: 40 INJECTION SUBCUTANEOUS at 09:39

## 2017-07-08 RX ADMIN — DEXTROSE MONOHYDRATE 7.5 MG/HR: 50 INJECTION, SOLUTION INTRAVENOUS at 05:09

## 2017-07-08 RX ADMIN — LORAZEPAM 2 MG: 2 INJECTION INTRAMUSCULAR; INTRAVENOUS at 04:21

## 2017-07-08 RX ADMIN — POTASSIUM CHLORIDE 20 MEQ: 400 INJECTION, SOLUTION INTRAVENOUS at 15:00

## 2017-07-08 RX ADMIN — HALOPERIDOL LACTATE 2 MG: 5 INJECTION, SOLUTION INTRAMUSCULAR at 21:22

## 2017-07-08 RX ADMIN — Medication 10 ML: at 23:52

## 2017-07-08 RX ADMIN — DIPHENHYDRAMINE HYDROCHLORIDE 25 MG: 50 INJECTION, SOLUTION INTRAMUSCULAR; INTRAVENOUS at 10:11

## 2017-07-08 RX ADMIN — DIPHENHYDRAMINE HYDROCHLORIDE 25 MG: 50 INJECTION, SOLUTION INTRAMUSCULAR; INTRAVENOUS at 03:07

## 2017-07-08 RX ADMIN — LATANOPROST 1 DROP: 50 SOLUTION OPHTHALMIC at 17:35

## 2017-07-08 RX ADMIN — POTASSIUM CHLORIDE 20 MEQ: 400 INJECTION, SOLUTION INTRAVENOUS at 14:17

## 2017-07-08 NOTE — PROGRESS NOTES
Hospitalist Progress Note    NAME: Geovani Valenzuela   :  1934   MRN:  [id number]       Assessment / Plan:    Acute respiratory failure requiring intubation for airway protection POA  Tracheobronchitis / RUL infiltrate / Suspected baseline COPD   -Self extubated . -cxray with RUL scar/infiltarte - present since  . Possible aspiration  -still sounds congested. Continue pulmonary toilet; suction prn; duonebs and oxygen prn  -BIPAP support prn - currently seems stable off bipap  -continue zosyn    Status epilepticus POA with recurrent seizures  -admitted for seizures in  and 2017. Both this admission and last February occurred after stopping lyrica for several days  -continue IV keppra    Acute encephalopathy / agitation  -Not ready for PO diet yet. Patient pulled NGT out - leaving it out for now.     -currently on IV haldol with benadryl and prn ativan per ICU team    Essential HTN POA   History of SVT POA  -holding toprol; on cardizem gtt prn  -start ASA when safe for PO        Peripheral neuropathy POA, cont lyrica   Remote ETOH abuse. Family confirms no ETOH in 5 years  GERD continue PPI       Code status: Full codeNext of Kin: Wife  DVT prophylaxis: lovenox SQ    Recommended Disposition: TBD     Subjective:     Chief Complaint / Reason for Physician Visit:   following respiratory failure / encephalopathy / seizure   Discussed with RN events overnight. Off BIPAP. NG out. Remains confused and agitated    Review of Systems:  Symptom Y/N Comments  Symptom Y/N Comments   Fever/Chills n   Chest Pain n    Poor Appetite    Edema     Cough    Abdominal Pain n    Sputum    Joint Pain     SOB/AL     Pruritis/Rash     Nausea/vomit    Tolerating PT/OT     Diarrhea    Tolerating Diet     Constipation    Other       Could NOT obtain due to:      Objective:     VITALS:   Last 24hrs VS reviewed since prior progress note.  Most recent are:  Patient Vitals for the past 24 hrs:   Temp Pulse Resp BP SpO2   07/07/17 2200 - 74 19 130/62 96 %   07/07/17 2100 - 79 21 139/57 94 %   07/07/17 2013 - - - - 96 %   07/07/17 2000 - 84 18 145/58 96 %   07/07/17 1900 98.2 °F (36.8 °C) 71 20 139/55 97 %   07/07/17 1800 - 76 20 137/57 97 %   07/07/17 1700 - 89 18 - 96 %   07/07/17 1600 98 °F (36.7 °C) 80 22 141/71 97 %   07/07/17 1534 - - - - 97 %   07/07/17 1500 - 86 20 145/70 97 %   07/07/17 1400 - 85 22 128/82 97 %   07/07/17 1300 - 89 22 - 97 %   07/07/17 1200 97.6 °F (36.4 °C) 84 20 133/86 99 %   07/07/17 1148 - - - - 97 %   07/07/17 1100 - 84 16 162/73 98 %   07/07/17 1000 - 85 22 132/61 96 %   07/07/17 0900 - 80 20 124/56 95 %   07/07/17 0830 - 83 16 - 96 %   07/07/17 0818 - - - - 96 %   07/07/17 0800 98.2 °F (36.8 °C) - 21 145/56 97 %   07/07/17 0700 - 92 25 169/71 95 %   07/07/17 0600 - 85 - 126/79 95 %   07/07/17 0500 - (!) 106 23 151/72 97 %   07/07/17 0400 - 97 19 148/60 96 %   07/07/17 0300 97.3 °F (36.3 °C) 89 20 131/80 97 %   07/07/17 0200 - 94 19 149/69 95 %   07/07/17 0110 - 94 21 160/59 95 %   07/07/17 0000 - 88 21 175/59 96 %   07/06/17 2300 98.5 °F (36.9 °C) 91 21 137/73 96 %       Intake/Output Summary (Last 24 hours) at 07/07/17 2248  Last data filed at 07/07/17 2200   Gross per 24 hour   Intake          1109.33 ml   Output             1525 ml   Net          -415.67 ml        PHYSICAL EXAM:  General: WD, WN. Alert, cooperative, mild distress, appears weak and debilitated     EENT:  EOMI. Anicteric sclerae. MMM  Resp:  rhonchi bilaterally, no wheezing or rales. No accessory muscle use  CV:  Regular  rhythm,  No edema  GI:  Soft, Non distended, Non tender.  +Bowel sounds  Neurologic:  Alert but confused. He follows commands, normal speech, weak but grossly non focal  Psych:   Not anxious nor agitated  Skin:  No rashes.   No jaundice    Reviewed most current lab test results and cultures  YES  Reviewed most current radiology test results   YES  Review and summation of old records today    NO  Reviewed patient's current orders and MAR    YES  PMH/SH reviewed - no change compared to H&P  ________________________________________________________________________  Care Plan discussed with:    Comments   Patient y    Family  y wife   RN y    Care Manager     Consultant                        Multidiciplinary team rounds were held today with , nursing, pharmacist and clinical coordinator. Patient's plan of care was discussed; medications were reviewed and discharge planning was addressed. ________________________________________________________________________  Total NON critical care TIME:  15  Minutes    Total CRITICAL CARE TIME Spent:   Minutes non procedure based      Comments   >50% of visit spent in counseling and coordination of care     ________________________________________________________________________  Faustino Mayes MD     Procedures: see electronic medical records for all procedures/Xrays and details which were not copied into this note but were reviewed prior to creation of Plan. LABS:  I reviewed today's most current labs and imaging studies. Pertinent labs include:  No results for input(s): WBC, HGB, HCT, PLT, HGBEXT, HCTEXT, PLTEXT, HGBEXT, HCTEXT, PLTEXT in the last 72 hours.   Recent Labs      07/07/17   0415   NA  137   K  3.6   CL  99   CO2  33*   GLU  126*   BUN  26*   CREA  0.77   CA  9.3   MG  2.0       Signed: Faustino Mayes MD

## 2017-07-08 NOTE — PROGRESS NOTES
[facility]  Pulmonary, Critical Care, and Sleep Medicine    Name: Alfonso Escamilla MRN: [id number]   : 1934 Hospital: [facility]   Date: 2017          IMPRESSION:   · Acute respiratory failure -resolving  · Stridor better  · Tracheobronchitis? On empiric abx given seizures and possible aspiration- less secretions  · Encephalopathy-with intermittent agitation; very restless despite excellent nursing care- wife says he is independent at home  · Seizures with status epilepticus  · HTN  · SVT on IV cardizem  · Peripheral neuropathy  · Right upper lobe scar/infiltrate - no tobacco history per records - has been present since at least February of this year, may be present to some degree on chest X-ray from   · Suspect COPD      PLAN:   · Trial of IV benadryl in case restlessness from IV haldol may have helped some? · Leave NGT out  · adjsut IVF- getting dry? · Finish IV abx  · Reorient- try to reduce Haldol  · mobilize  · Bronchodilators   · Antiepileptics  · Antihypertensives  · Monitor platelets   · Antipsychotics for agitation  · Restrain for safety, pt keeps puling at devices  · DVT prophylaxis     Subjective/Interval History:      less restless?  No SOB. Has not needed BIPAP. Very restless and cannot localize any particular discomfort. Mouth now dry.  stridor better off BIPAP but more fidgety. Pulled out his NGT. Disoriented. No SOB   Scopolamine added the other day. Stridor after NT suctioning. No fever. WBC ok. No seizures.   BIPAP    Review of Systems   Unable to perform ROS: Mental status change     Objective:   Vital Signs:    Visit Vitals    BP (!) 178/92    Pulse 88    Temp 97.2 °F (36.2 °C)    Resp 22    Ht 5' 8\" (1.727 m)    Wt 68.7 kg (151 lb 7.3 oz)    SpO2 96%    BMI 23.03 kg/m2       O2 Device: Room air   O2 Flow Rate (L/min): 2 l/min   Temp (24hrs), Av.7 °F (36.5 °C), Min:97.2 °F (36.2 °C), Max:98.2 °F (36.8 °C) Intake/Output:   Last shift:      07/08 0701 - 07/08 1900  In: 30 [I.V.:30]  Out: -   Last 3 shifts: 07/06 1901 - 07/08 0700  In: 1518.8 [I.V.:1518.8]  Out: 1525 [Urine:1525]    Intake/Output Summary (Last 24 hours) at 07/08/17 1143  Last data filed at 07/08/17 0800   Gross per 24 hour   Intake           816.96 ml   Output              450 ml   Net           366.96 ml      Physical Exam   Constitutional: He is active. No distress. HENT:   Head: Normocephalic and atraumatic. Mouth/Throat: No oropharyngeal exudate. Eyes: No scleral icterus. Cardiovascular: Normal rate and regular rhythm. Pulmonary/Chest: No respiratory distress. He has no wheezes. He has no rales. Abdominal: Soft. Bowel sounds are normal. He exhibits no distension. There is no tenderness. Musculoskeletal: He exhibits no edema. Neurological: He is alert. He is disoriented. Skin: Skin is warm and dry.      Data:     Current Facility-Administered Medications   Medication Dose Route Frequency    potassium chloride 20 mEq in 50 ml IVPB  20 mEq IntraVENous Q1H    dextrose 5 % - 0.45% NaCl infusion  50 mL/hr IntraVENous CONTINUOUS    haloperidol lactate (HALDOL) injection 2 mg  2 mg IntraVENous Q6H    piperacillin-tazobactam (ZOSYN) 3.375 g in 0.9% sodium chloride (MBP/ADV) 100 mL  3.375 g IntraVENous Q8H    albuterol-ipratropium (DUO-NEB) 2.5 MG-0.5 MG/3 ML  3 mL Nebulization Q4H RT    dilTIAZem (CARDIZEM) 100 mg in dextrose 5% (MBP/ADV) 100 mL infusion  0-15 mg/hr IntraVENous TITRATE    sodium chloride (NS) flush 10 mL  10 mL InterCATHeter Q24H    sodium chloride (NS) flush 10-40 mL  10-40 mL InterCATHeter Q8H    sodium chloride (NS) flush 20 mL  20 mL InterCATHeter Q24H    latanoprost (XALATAN) 0.005 % ophthalmic solution 1 Drop  1 Drop Both Eyes QPM    thiamine (B-1) 100 mg in 0.9% sodium chloride 50 mL IVPB  100 mg IntraVENous DAILY    sodium chloride (NS) flush 5-10 mL  5-10 mL IntraVENous Q8H    enoxaparin (LOVENOX) injection 40 mg  40 mg SubCUTAneous Q24H    levETIRAcetam (KEPPRA) 1,500 mg in 0.9% sodium chloride IVPB  1,500 mg IntraVENous Q12H                Labs:  No results for input(s): WBC, HGB, HCT, PLT, HGBEXT, HCTEXT, PLTEXT, HGBEXT, HCTEXT, PLTEXT in the last 72 hours.   Recent Labs      07/07/17   0415   NA  137   K  3.6   CL  99   CO2  33*   GLU  126*   BUN  26*   CREA  0.77   CA  9.3   MG  2.0     Recent Labs      07/06/17   0407  07/05/17   1320   PH  7.45  7.35   PCO2  47*  65*   PO2  139*  164*   HCO3  32*  35*   FIO2  30  40     Imaging:  I have personally reviewed the patients radiographs and have reviewed the reports:  None today        Total critical care time exclusive of procedures:   minutes  Kacie Peck MD

## 2017-07-08 NOTE — PROGRESS NOTES
Hospitalist Progress Note    NAME: Karen Cha   :  1934   MRN:  [id number]       Assessment / Plan:    Acute respiratory failure requiring intubation for airway protection POA  Tracheobronchitis / RUL infiltrate / Suspected baseline COPD   -cxray with RUL scar/infiltarte - present since  . Possible aspiration  -Self extubated . Continue pulmonary toilet; suction prn; duonebs and oxygen prn  -BIPAP support prn - currently seems stable off bipap  -finishing course of zosyn    Status epilepticus POA with recurrent seizures  -admitted for seizures in  and 2017. Both this admission and last February occurred after stopping lyrica for several days  -continue IV keppra    Acute encephalopathy / agitation  -Not ready for PO diet yet. Patient pulled NGT out - leaving it out for now. Continue IVFs  -trial of IV haldol with benadryl and prn ativan per ICU team    Essential HTN POA   History of SVT POA  -on cardizem gtt prn  -start ASA when safe for PO    Peripheral neuropathy POA, cont lyrica   Remote ETOH abuse. Family confirms no ETOH in 5 years  GERD continue PPI       Code status: Full codeNext of Kin: Wife  DVT prophylaxis: lovenox SQ    Recommended Disposition: TBD     Subjective:     Chief Complaint / Reason for Physician Visit:   following respiratory failure / encephalopathy / seizure   Discussed with RN events overnight. Off BIPAP. NG out. Remains confused and agitated    Review of Systems:  Symptom Y/N Comments  Symptom Y/N Comments   Fever/Chills n   Chest Pain n    Poor Appetite    Edema     Cough    Abdominal Pain n    Sputum    Joint Pain     SOB/AL     Pruritis/Rash     Nausea/vomit    Tolerating PT/OT     Diarrhea    Tolerating Diet     Constipation    Other       Could NOT obtain due to:      Objective:     VITALS:   Last 24hrs VS reviewed since prior progress note.  Most recent are:  Patient Vitals for the past 24 hrs:   Temp Pulse Resp BP SpO2   17 1400 - 72 21 122/65 94 %   07/08/17 1311 - 72 18 135/54 96 %   07/08/17 1236 98.2 °F (36.8 °C) - - - -   07/08/17 1109 - 90 24 131/65 95 %   07/08/17 1106 - 90 25 119/52 94 %   07/08/17 1100 - 88 22 (!) 178/92 96 %   07/08/17 1043 - 88 23 - 97 %   07/08/17 1000 - 91 20 - (!) 79 %   07/08/17 0900 - 83 21 132/65 96 %   07/08/17 0800 - 72 20 126/65 96 %   07/08/17 0726 97.2 °F (36.2 °C) 69 20 - 100 %   07/08/17 0700 - 67 17 134/64 98 %   07/08/17 0600 - 68 23 116/67 98 %   07/08/17 0528 - 70 21 133/68 96 %   07/08/17 0507 - (!) 133 24 (!) 157/108 90 %   07/08/17 0500 - 96 21 (!) 156/92 96 %   07/08/17 0400 97.5 °F (36.4 °C) 88 20 137/73 99 %   07/08/17 0343 - - - - 97 %   07/08/17 0300 - (!) 119 21 131/61 96 %   07/08/17 0200 - 85 20 138/55 96 %   07/08/17 0116 - 76 20 153/70 94 %   07/08/17 0100 - 80 19 149/64 96 %   07/08/17 0001 - 74 20 145/69 99 %   07/07/17 2358 - - - - 97 %   07/07/17 2300 97.9 °F (36.6 °C) 81 20 146/62 96 %   07/07/17 2200 - 74 19 130/62 96 %   07/07/17 2100 - 79 21 139/57 94 %   07/07/17 2013 - - - - 96 %   07/07/17 2000 - 84 18 145/58 96 %   07/07/17 1900 98.2 °F (36.8 °C) 71 20 139/55 97 %   07/07/17 1800 - 76 20 137/57 97 %   07/07/17 1700 - 89 18 - 96 %   07/07/17 1600 98 °F (36.7 °C) 80 22 141/71 97 %   07/07/17 1534 - - - - 97 %   07/07/17 1500 - 86 20 145/70 97 %       Intake/Output Summary (Last 24 hours) at 07/08/17 1458  Last data filed at 07/08/17 0800   Gross per 24 hour   Intake           816.96 ml   Output                0 ml   Net           816.96 ml        PHYSICAL EXAM:  General: WD, WN. Alert, cooperative, mild distress, appears weak and debilitated     EENT:  EOMI. Anicteric sclerae. MMM  Resp:  rhonchi bilaterally, no wheezing or rales. No accessory muscle use  CV:  Regular  rhythm,  No edema  GI:  Soft, Non distended, Non tender.  +Bowel sounds  Neurologic:  Alert but confused.  He follows commands, normal speech, weak but grossly non focal  Psych:   Not anxious nor agitated  Skin:  No rashes. No jaundice    Reviewed most current lab test results and cultures  YES  Reviewed most current radiology test results   YES  Review and summation of old records today    NO  Reviewed patient's current orders and MAR    YES  PMH/SH reviewed - no change compared to H&P  ________________________________________________________________________  Care Plan discussed with:    Comments   Patient y    Family  y wife   RN y    Care Manager     Consultant                        Multidiciplinary team rounds were held today with , nursing, pharmacist and clinical coordinator. Patient's plan of care was discussed; medications were reviewed and discharge planning was addressed. ________________________________________________________________________  Total NON critical care TIME:  15  Minutes    Total CRITICAL CARE TIME Spent:   Minutes non procedure based      Comments   >50% of visit spent in counseling and coordination of care     ________________________________________________________________________  Jolanta Spargue MD     Procedures: see electronic medical records for all procedures/Xrays and details which were not copied into this note but were reviewed prior to creation of Plan. LABS:  I reviewed today's most current labs and imaging studies. Pertinent labs include:  No results for input(s): WBC, HGB, HCT, PLT, HGBEXT, HCTEXT, PLTEXT, HGBEXT, HCTEXT, PLTEXT in the last 72 hours.   Recent Labs      07/07/17   0415   NA  137   K  3.6   CL  99   CO2  33*   GLU  126*   BUN  26*   CREA  0.77   CA  9.3   MG  2.0       Signed: Jolanta Sprague MD

## 2017-07-09 LAB
ARTERIAL PATENCY WRIST A: YES
BASE EXCESS BLDA CALC-SCNC: 4 MMOL/L
BDY SITE: ABNORMAL
GAS FLOW.O2 O2 DELIVERY SYS: 2 L/MIN
HCO3 BLDA-SCNC: 30 MMOL/L (ref 22–26)
PCO2 BLDA: 49 MMHG (ref 35–45)
PH BLDA: 7.4 [PH] (ref 7.35–7.45)
PO2 BLDA: 127 MMHG (ref 80–100)
SAO2 % BLD: 99 % (ref 92–97)
SAO2% DEVICE SAO2% SENSOR NAME: ABNORMAL
SPECIMEN SITE: ABNORMAL

## 2017-07-09 PROCEDURE — 74011000258 HC RX REV CODE- 258: Performed by: INTERNAL MEDICINE

## 2017-07-09 PROCEDURE — 74011250636 HC RX REV CODE- 250/636: Performed by: INTERNAL MEDICINE

## 2017-07-09 PROCEDURE — 74011000250 HC RX REV CODE- 250: Performed by: INTERNAL MEDICINE

## 2017-07-09 PROCEDURE — 74011250636 HC RX REV CODE- 250/636: Performed by: PSYCHIATRY & NEUROLOGY

## 2017-07-09 PROCEDURE — 36600 WITHDRAWAL OF ARTERIAL BLOOD: CPT | Performed by: INTERNAL MEDICINE

## 2017-07-09 PROCEDURE — 65610000006 HC RM INTENSIVE CARE

## 2017-07-09 PROCEDURE — 94640 AIRWAY INHALATION TREATMENT: CPT

## 2017-07-09 PROCEDURE — 77010033678 HC OXYGEN DAILY

## 2017-07-09 PROCEDURE — 82803 BLOOD GASES ANY COMBINATION: CPT | Performed by: INTERNAL MEDICINE

## 2017-07-09 PROCEDURE — 74011000258 HC RX REV CODE- 258: Performed by: PSYCHIATRY & NEUROLOGY

## 2017-07-09 RX ORDER — LORAZEPAM 2 MG/ML
1 INJECTION INTRAMUSCULAR
Status: DISCONTINUED | OUTPATIENT
Start: 2017-07-09 | End: 2017-07-20

## 2017-07-09 RX ORDER — HALOPERIDOL 5 MG/ML
2 INJECTION INTRAMUSCULAR EVERY 12 HOURS
Status: DISCONTINUED | OUTPATIENT
Start: 2017-07-09 | End: 2017-07-10

## 2017-07-09 RX ADMIN — PIPERACILLIN SODIUM,TAZOBACTAM SODIUM 3.38 G: 3; .375 INJECTION, POWDER, FOR SOLUTION INTRAVENOUS at 14:00

## 2017-07-09 RX ADMIN — METOPROLOL TARTRATE 5 MG: 5 INJECTION INTRAVENOUS at 01:18

## 2017-07-09 RX ADMIN — LATANOPROST 1 DROP: 50 SOLUTION OPHTHALMIC at 18:09

## 2017-07-09 RX ADMIN — LORAZEPAM 2 MG: 2 INJECTION INTRAMUSCULAR; INTRAVENOUS at 00:58

## 2017-07-09 RX ADMIN — LEVETIRACETAM 1500 MG: 100 INJECTION, SOLUTION, CONCENTRATE INTRAVENOUS at 09:17

## 2017-07-09 RX ADMIN — Medication 20 ML: at 14:00

## 2017-07-09 RX ADMIN — HYDRALAZINE HYDROCHLORIDE 10 MG: 20 INJECTION INTRAMUSCULAR; INTRAVENOUS at 23:15

## 2017-07-09 RX ADMIN — IPRATROPIUM BROMIDE AND ALBUTEROL SULFATE 3 ML: .5; 3 SOLUTION RESPIRATORY (INHALATION) at 07:24

## 2017-07-09 RX ADMIN — Medication 10 ML: at 14:00

## 2017-07-09 RX ADMIN — HYDRALAZINE HYDROCHLORIDE 10 MG: 20 INJECTION INTRAMUSCULAR; INTRAVENOUS at 00:00

## 2017-07-09 RX ADMIN — Medication 10 ML: at 21:51

## 2017-07-09 RX ADMIN — HALOPERIDOL LACTATE 2 MG: 5 INJECTION, SOLUTION INTRAMUSCULAR at 21:19

## 2017-07-09 RX ADMIN — DEXTROSE MONOHYDRATE 5 MG/HR: 50 INJECTION, SOLUTION INTRAVENOUS at 02:47

## 2017-07-09 RX ADMIN — PIPERACILLIN SODIUM,TAZOBACTAM SODIUM 3.38 G: 3; .375 INJECTION, POWDER, FOR SOLUTION INTRAVENOUS at 04:22

## 2017-07-09 RX ADMIN — Medication 10 ML: at 14:01

## 2017-07-09 RX ADMIN — PIPERACILLIN SODIUM,TAZOBACTAM SODIUM 3.38 G: 3; .375 INJECTION, POWDER, FOR SOLUTION INTRAVENOUS at 21:03

## 2017-07-09 RX ADMIN — LEVETIRACETAM 1500 MG: 100 INJECTION, SOLUTION, CONCENTRATE INTRAVENOUS at 21:06

## 2017-07-09 RX ADMIN — LORAZEPAM 2 MG: 2 INJECTION INTRAMUSCULAR; INTRAVENOUS at 04:44

## 2017-07-09 RX ADMIN — IPRATROPIUM BROMIDE AND ALBUTEROL SULFATE 3 ML: .5; 3 SOLUTION RESPIRATORY (INHALATION) at 02:09

## 2017-07-09 RX ADMIN — HALOPERIDOL LACTATE 2 MG: 5 INJECTION, SOLUTION INTRAMUSCULAR at 09:12

## 2017-07-09 RX ADMIN — IPRATROPIUM BROMIDE AND ALBUTEROL SULFATE 3 ML: .5; 3 SOLUTION RESPIRATORY (INHALATION) at 14:05

## 2017-07-09 RX ADMIN — HALOPERIDOL LACTATE 2 MG: 5 INJECTION, SOLUTION INTRAMUSCULAR at 04:22

## 2017-07-09 RX ADMIN — DEXTROSE MONOHYDRATE 5 MG/HR: 50 INJECTION, SOLUTION INTRAVENOUS at 16:30

## 2017-07-09 RX ADMIN — Medication 10 ML: at 05:29

## 2017-07-09 RX ADMIN — IPRATROPIUM BROMIDE AND ALBUTEROL SULFATE 3 ML: .5; 3 SOLUTION RESPIRATORY (INHALATION) at 20:01

## 2017-07-09 RX ADMIN — DEXTROSE MONOHYDRATE AND SODIUM CHLORIDE 75 ML/HR: 5; .45 INJECTION, SOLUTION INTRAVENOUS at 04:30

## 2017-07-09 RX ADMIN — THIAMINE HYDROCHLORIDE 100 MG: 100 INJECTION, SOLUTION INTRAMUSCULAR; INTRAVENOUS at 09:17

## 2017-07-09 RX ADMIN — LORAZEPAM 1 MG: 2 INJECTION INTRAMUSCULAR; INTRAVENOUS at 21:49

## 2017-07-09 RX ADMIN — METOPROLOL TARTRATE 5 MG: 5 INJECTION INTRAVENOUS at 19:43

## 2017-07-09 RX ADMIN — DEXTROSE MONOHYDRATE 15 MG/HR: 50 INJECTION, SOLUTION INTRAVENOUS at 09:12

## 2017-07-09 RX ADMIN — DEXTROSE MONOHYDRATE AND SODIUM CHLORIDE 75 ML/HR: 5; .45 INJECTION, SOLUTION INTRAVENOUS at 18:42

## 2017-07-09 RX ADMIN — Medication 10 ML: at 23:15

## 2017-07-09 RX ADMIN — ENOXAPARIN SODIUM 40 MG: 40 INJECTION SUBCUTANEOUS at 09:12

## 2017-07-09 NOTE — PROGRESS NOTES
9996[facility]  [facility]  [facility]- Patient assessed. See flowsheet. Patient opens eyes to verbal stimuli, then closes eyes. Patient is drowsy and lethargic. Lung sounds diminished and coarse. 4940- 0459- Updated patient's wife via telephone. 1200- Reassessed. No changes. 1400- Patient remains drowsy. ABG drawn per protocol. 1429-  Removed nasal cannula. 1500- Reassessed. No changes. 1600- Patient's family is very concerned that the patient slept most of the day. They request to avoid sedatives if possible. 1640- Patient alert. Patient able to cough on request.  Suctioned copious amounts of yellow sputum. 1700- Patient is talking with his wife. His wife states he is answering some questions appropriately. He is trying to get out of bed from time to time, but is listening to his wife when she redirects him.  1930-Report given to [facility]

## 2017-07-09 NOTE — PROGRESS NOTES
Hospitalist Progress Note    NAME: Katie Gil   :  1934   MRN:  [id number]       Assessment / Plan:    Acute respiratory failure requiring intubation for airway protection POA  Tracheobronchitis / RUL infiltrate / Suspected baseline COPD   -cxray with RUL scar/infiltarte - present since  . Possible aspiration  -Self extubated . Now off BIPAP also. Continue pulmonary toilet; suction prn; duonebs and oxygen prn  -finishing course of zosyn    Status epilepticus POA with recurrent seizures  -admitted for seizures in  and 2017. Both this admission and last February occurred after stopping lyrica for several days  -continue IV keppra    Acute encephalopathy / agitation  -on scheduled IV haldol with prn ativan per ICU team.     -Not ready for PO diet yet. Patient pulled NGT out - leaving it out for now. Continue IVF as patient getting pre renal.   Hopefully can start PO soon. Essential HTN POA   History of SVT POA  -on cardizem gtt prn  -start ASA when safe for PO    Peripheral neuropathy POA, cont lyrica   Remote ETOH abuse. Family confirms no ETOH in 5 years  GERD continue PPI       Code status: Full codeNext of Kin: Wife  DVT prophylaxis: lovenox SQ    Recommended Disposition: TBD     Subjective:     Chief Complaint / Reason for Physician Visit:   following respiratory failure / encephalopathy / seizure   Discussed with RN events overnight. Review of Systems:  Symptom Y/N Comments  Symptom Y/N Comments   Fever/Chills n   Chest Pain n    Poor Appetite    Edema     Cough    Abdominal Pain n    Sputum    Joint Pain     SOB/AL     Pruritis/Rash     Nausea/vomit    Tolerating PT/OT     Diarrhea    Tolerating Diet     Constipation    Other       Could NOT obtain due to:      Objective:     VITALS:   Last 24hrs VS reviewed since prior progress note.  Most recent are:  Patient Vitals for the past 24 hrs:   Temp Pulse Resp BP SpO2   17 0831 98.3 °F (36.8 °C) 69 16 148/64 100 %   07/09/17 0724 - - - - 100 %   07/09/17 0700 - 71 21 136/69 100 %   07/09/17 0600 - 87 20 138/70 99 %   07/09/17 0500 - 95 22 - 98 %   07/09/17 0400 - 93 19 133/64 99 %   07/09/17 0300 - 82 17 134/66 100 %   07/09/17 0209 - - - - 99 %   07/09/17 0200 - 80 18 - 99 %   07/09/17 0118 - (!) 115 - - -   07/09/17 0100 - (!) 107 21 - 99 %   07/09/17 0009 98.5 °F (36.9 °C) 70 16 152/70 100 %   07/09/17 0000 - 68 19 - 100 %   07/08/17 2300 - (!) 110 22 169/69 98 %   07/08/17 2200 - 94 27 - 98 %   07/08/17 2100 - 97 27 152/79 99 %   07/08/17 2027 97.9 °F (36.6 °C) 77 18 142/68 100 %   07/08/17 2025 - - - - 98 %   07/08/17 2000 - 92 24 161/69 98 %   07/08/17 1900 - 80 17 137/70 97 %   07/08/17 1800 - 82 17 141/72 96 %   07/08/17 1700 - 80 19 143/59 96 %   07/08/17 1600 97.5 °F (36.4 °C) 86 19 (!) 158/97 99 %   07/08/17 1500 - (!) 113 23 155/66 100 %   07/08/17 1400 - 72 21 122/65 94 %   07/08/17 1311 - 72 18 135/54 96 %   07/08/17 1300 - 71 19 - 96 %   07/08/17 1236 98.2 °F (36.8 °C) - - - -   07/08/17 1200 - 92 25 - 97 %       Intake/Output Summary (Last 24 hours) at 07/09/17 1130  Last data filed at 07/09/17 0700   Gross per 24 hour   Intake             1675 ml   Output                0 ml   Net             1675 ml        PHYSICAL EXAM:  General: WD, WN. restless  EENT:  EOMI. Anicteric sclerae. MMM  Resp:  rhonchi bilaterally, no wheezing or rales. No accessory muscle use  CV:  Regular  rhythm,  No edema  GI:  Soft, Non distended, Non tender.  +Bowel sounds  Neurologic:  Alert but confused, occasionally follows simple commands, weak ext   Psych:   Not anxious nor agitated  Skin:  No rashes.   No jaundice    Reviewed most current lab test results and cultures  YES  Reviewed most current radiology test results   YES  Review and summation of old records today    NO  Reviewed patient's current orders and MAR    YES  PMH/SH reviewed - no change compared to H&P  ________________________________________________________________________  Care Plan discussed with:    Comments   Patient y    Family   wife   RN y    Care Manager     Consultant  y Pulm                     Multidiciplinary team rounds were held today with , nursing, pharmacist and clinical coordinator. Patient's plan of care was discussed; medications were reviewed and discharge planning was addressed. ________________________________________________________________________  Total NON critical care TIME:  15  Minutes    Total CRITICAL CARE TIME Spent:   Minutes non procedure based      Comments   >50% of visit spent in counseling and coordination of care     ________________________________________________________________________  Ruiz Bermeo MD     Procedures: see electronic medical records for all procedures/Xrays and details which were not copied into this note but were reviewed prior to creation of Plan. LABS:  I reviewed today's most current labs and imaging studies. Pertinent labs include:  No results for input(s): WBC, HGB, HCT, PLT, HGBEXT, HCTEXT, PLTEXT, HGBEXT, HCTEXT, PLTEXT in the last 72 hours.   Recent Labs      07/07/17   0415   NA  137   K  3.6   CL  99   CO2  33*   GLU  126*   BUN  26*   CREA  0.77   CA  9.3   MG  2.0       Signed: Ruiz Bermeo MD

## 2017-07-09 NOTE — PROGRESS NOTES
[facility]  Pulmonary, Critical Care, and Sleep Medicine    Name: Lucretia Jaramillo MRN: [id number]   : 1934 Hospital: [facility]   Date: 2017          IMPRESSION:   · Acute respiratory failure -resolving  · Stridor better  · Tracheobronchitis? On empiric abx given seizures and possible aspiration- less secretions  · Encephalopathy-with intermittent agitation; very restless despite excellent nursing care- wife says he is independent at home  · Seizures with status epilepticus  · HTN  · SVT on IV cardizem  · Peripheral neuropathy  · Right upper lobe scar/infiltrate - no tobacco history per records - has been present since at least February of this year, may be present to some degree on chest X-ray from   · Suspect COPD      PLAN:   · Speech therapy consult in AM  · Leave NGT out  · adjsut IVF- getting dry? · Finish IV abx  · Reorient- try to reduce Haldol and ativan  · Bronchodilators   · Antiepileptics  · Antihypertensives  · Monitor platelets   · Antipsychotics for agitation  · Restrain for safety, pt keeps puling at devices  · DVT prophylaxis     Subjective/Interval History:      agitation at times but now sedated   less restless?  No SOB. Has not needed BIPAP. Very restless and cannot localize any particular discomfort. Mouth now dry.  stridor better off BIPAP but more fidgety. Pulled out his NGT. Disoriented. No SOB   Scopolamine added the other day. Stridor after NT suctioning. No fever. WBC ok. No seizures.   BIPAP    Review of Systems   Unable to perform ROS: Mental status change     Objective:   Vital Signs:    Visit Vitals    /64 (BP 1 Location: Left arm, BP Patient Position: At rest)    Pulse 69    Temp 98.3 °F (36.8 °C)    Resp 16    Ht 5' 8\" (1.727 m)    Wt 67.3 kg (148 lb 5.9 oz)    SpO2 100%    BMI 22.56 kg/m2       O2 Device: Nasal cannula   O2 Flow Rate (L/min): 2 l/min   Temp (24hrs), Av.1 °F (36.7 °C), Min:97.5 °F (36.4 °C), Max:98.5 °F (36.9 °C)       Intake/Output:   Last shift:         Last 3 shifts: 07/07 1901 - 07/09 0700  In: 4447 [I.V.:2422]  Out: -     Intake/Output Summary (Last 24 hours) at 07/09/17 1122  Last data filed at 07/09/17 0700   Gross per 24 hour   Intake             1675 ml   Output                0 ml   Net             1675 ml      Physical Exam   Constitutional: He is active. No distress. HENT:   Head: Normocephalic and atraumatic. Mouth/Throat: No oropharyngeal exudate. Eyes: No scleral icterus. Cardiovascular: Normal rate and regular rhythm. Pulmonary/Chest: No respiratory distress. He has no wheezes. He has no rales. Abdominal: Soft. Bowel sounds are normal. He exhibits no distension. There is no tenderness. Musculoskeletal: He exhibits no edema. Neurological: He is alert. He is disoriented. Skin: Skin is warm and dry.      Data:     Current Facility-Administered Medications   Medication Dose Route Frequency    haloperidol lactate (HALDOL) injection 2 mg  2 mg IntraVENous Q12H    dextrose 5 % - 0.45% NaCl infusion  75 mL/hr IntraVENous CONTINUOUS    albuterol-ipratropium (DUO-NEB) 2.5 MG-0.5 MG/3 ML  3 mL Nebulization Q6H RT    piperacillin-tazobactam (ZOSYN) 3.375 g in 0.9% sodium chloride (MBP/ADV) 100 mL  3.375 g IntraVENous Q8H    dilTIAZem (CARDIZEM) 100 mg in dextrose 5% (MBP/ADV) 100 mL infusion  0-15 mg/hr IntraVENous TITRATE    sodium chloride (NS) flush 10 mL  10 mL InterCATHeter Q24H    sodium chloride (NS) flush 10-40 mL  10-40 mL InterCATHeter Q8H    sodium chloride (NS) flush 20 mL  20 mL InterCATHeter Q24H    latanoprost (XALATAN) 0.005 % ophthalmic solution 1 Drop  1 Drop Both Eyes QPM    thiamine (B-1) 100 mg in 0.9% sodium chloride 50 mL IVPB  100 mg IntraVENous DAILY    sodium chloride (NS) flush 5-10 mL  5-10 mL IntraVENous Q8H    enoxaparin (LOVENOX) injection 40 mg  40 mg SubCUTAneous Q24H    levETIRAcetam (KEPPRA) 1,500 mg in 0.9% sodium chloride IVPB  1,500 mg IntraVENous Q12H                Labs:  No results for input(s): WBC, HGB, HCT, PLT, HGBEXT, HCTEXT, PLTEXT, HGBEXT, HCTEXT, PLTEXT in the last 72 hours. Recent Labs      07/07/17   0415   NA  137   K  3.6   CL  99   CO2  33*   GLU  126*   BUN  26*   CREA  0.77   CA  9.3   MG  2.0     No results for input(s): PH, PCO2, PO2, HCO3, FIO2 in the last 72 hours.   Imaging:  I have personally reviewed the patients radiographs and have reviewed the reports:  None today        Total critical care time exclusive of procedures:   minutes  Lamar Sotomayor MD

## 2017-07-09 NOTE — PROGRESS NOTES
Shift summary  1900 to 720 am  Patient restless to agitated to period of resting after ativan prn  and haldol schedule. Confused and hard to reorient. Denies pain. SR on the monitor 80. Prn lopressor 5 mg given. Patient was agitated and hr 130. Good response. Maintained on soft wrist for safety. Attempt to pull out line. Multiple incontinent of urine and cleaned multiple time. Maintained dry and clean. Skin intact. Applied barrier cream to sacrum.   720 am   Shift report given to [facility]

## 2017-07-10 LAB
ANION GAP BLD CALC-SCNC: 5 MMOL/L (ref 5–15)
BUN SERPL-MCNC: 10 MG/DL (ref 6–20)
BUN/CREAT SERPL: 16 (ref 12–20)
CALCIUM SERPL-MCNC: 8.9 MG/DL (ref 8.5–10.1)
CHLORIDE SERPL-SCNC: 101 MMOL/L (ref 97–108)
CO2 SERPL-SCNC: 29 MMOL/L (ref 21–32)
CREAT SERPL-MCNC: 0.64 MG/DL (ref 0.7–1.3)
ERYTHROCYTE [DISTWIDTH] IN BLOOD BY AUTOMATED COUNT: 12.2 % (ref 11.5–14.5)
GLUCOSE SERPL-MCNC: 115 MG/DL (ref 65–100)
HCT VFR BLD AUTO: 32.8 % (ref 36.6–50.3)
HGB BLD-MCNC: 11.2 G/DL (ref 12.1–17)
MCH RBC QN AUTO: 32 PG (ref 26–34)
MCHC RBC AUTO-ENTMCNC: 34.1 G/DL (ref 30–36.5)
MCV RBC AUTO: 93.7 FL (ref 80–99)
PLATELET # BLD AUTO: 268 K/UL (ref 150–400)
POTASSIUM SERPL-SCNC: 3.5 MMOL/L (ref 3.5–5.1)
RBC # BLD AUTO: 3.5 M/UL (ref 4.1–5.7)
SODIUM SERPL-SCNC: 135 MMOL/L (ref 136–145)
WBC # BLD AUTO: 5.9 K/UL (ref 4.1–11.1)

## 2017-07-10 PROCEDURE — 65660000000 HC RM CCU STEPDOWN

## 2017-07-10 PROCEDURE — 74011000250 HC RX REV CODE- 250: Performed by: INTERNAL MEDICINE

## 2017-07-10 PROCEDURE — 74011250636 HC RX REV CODE- 250/636: Performed by: INTERNAL MEDICINE

## 2017-07-10 PROCEDURE — 3E0336Z INTRODUCTION OF NUTRITIONAL SUBSTANCE INTO PERIPHERAL VEIN, PERCUTANEOUS APPROACH: ICD-10-PCS | Performed by: INTERNAL MEDICINE

## 2017-07-10 PROCEDURE — 92526 ORAL FUNCTION THERAPY: CPT

## 2017-07-10 PROCEDURE — 74011000258 HC RX REV CODE- 258: Performed by: INTERNAL MEDICINE

## 2017-07-10 PROCEDURE — 80048 BASIC METABOLIC PNL TOTAL CA: CPT | Performed by: INTERNAL MEDICINE

## 2017-07-10 PROCEDURE — 77030029684 HC NEB SM VOL KT MONA -A

## 2017-07-10 PROCEDURE — 85027 COMPLETE CBC AUTOMATED: CPT | Performed by: INTERNAL MEDICINE

## 2017-07-10 PROCEDURE — 94640 AIRWAY INHALATION TREATMENT: CPT

## 2017-07-10 PROCEDURE — 74011250636 HC RX REV CODE- 250/636: Performed by: PSYCHIATRY & NEUROLOGY

## 2017-07-10 PROCEDURE — 36415 COLL VENOUS BLD VENIPUNCTURE: CPT | Performed by: INTERNAL MEDICINE

## 2017-07-10 PROCEDURE — 95951 EEG 24 HR W/ VIDEO: CPT | Performed by: PSYCHIATRY & NEUROLOGY

## 2017-07-10 PROCEDURE — 74011000258 HC RX REV CODE- 258: Performed by: PSYCHIATRY & NEUROLOGY

## 2017-07-10 RX ORDER — HALOPERIDOL 5 MG/ML
2 INJECTION INTRAMUSCULAR
Status: DISCONTINUED | OUTPATIENT
Start: 2017-07-10 | End: 2017-07-11

## 2017-07-10 RX ORDER — HEPARIN 100 UNIT/ML
300 SYRINGE INTRAVENOUS AS NEEDED
Status: DISCONTINUED | OUTPATIENT
Start: 2017-07-10 | End: 2017-07-22 | Stop reason: HOSPADM

## 2017-07-10 RX ADMIN — Medication 10 ML: at 05:28

## 2017-07-10 RX ADMIN — IPRATROPIUM BROMIDE AND ALBUTEROL SULFATE 3 ML: .5; 3 SOLUTION RESPIRATORY (INHALATION) at 15:05

## 2017-07-10 RX ADMIN — HALOPERIDOL LACTATE 2 MG: 5 INJECTION, SOLUTION INTRAMUSCULAR at 21:54

## 2017-07-10 RX ADMIN — LEVETIRACETAM 1500 MG: 100 INJECTION, SOLUTION, CONCENTRATE INTRAVENOUS at 09:11

## 2017-07-10 RX ADMIN — PIPERACILLIN SODIUM,TAZOBACTAM SODIUM 3.38 G: 3; .375 INJECTION, POWDER, FOR SOLUTION INTRAVENOUS at 06:19

## 2017-07-10 RX ADMIN — LEVETIRACETAM 1500 MG: 100 INJECTION, SOLUTION, CONCENTRATE INTRAVENOUS at 21:54

## 2017-07-10 RX ADMIN — METOPROLOL TARTRATE 5 MG: 5 INJECTION INTRAVENOUS at 02:04

## 2017-07-10 RX ADMIN — IPRATROPIUM BROMIDE AND ALBUTEROL SULFATE 3 ML: .5; 3 SOLUTION RESPIRATORY (INHALATION) at 20:18

## 2017-07-10 RX ADMIN — IPRATROPIUM BROMIDE AND ALBUTEROL SULFATE 3 ML: .5; 3 SOLUTION RESPIRATORY (INHALATION) at 07:26

## 2017-07-10 RX ADMIN — Medication 10 ML: at 21:55

## 2017-07-10 RX ADMIN — DEXTROSE MONOHYDRATE AND SODIUM CHLORIDE 75 ML/HR: 5; .45 INJECTION, SOLUTION INTRAVENOUS at 23:44

## 2017-07-10 RX ADMIN — DEXTROSE MONOHYDRATE AND SODIUM CHLORIDE 75 ML/HR: 5; .45 INJECTION, SOLUTION INTRAVENOUS at 09:32

## 2017-07-10 RX ADMIN — Medication 20 ML: at 13:25

## 2017-07-10 RX ADMIN — IPRATROPIUM BROMIDE AND ALBUTEROL SULFATE 3 ML: .5; 3 SOLUTION RESPIRATORY (INHALATION) at 02:03

## 2017-07-10 RX ADMIN — SODIUM CHLORIDE, PRESERVATIVE FREE 300 UNITS: 5 INJECTION INTRAVENOUS at 12:24

## 2017-07-10 RX ADMIN — METOPROLOL TARTRATE 5 MG: 5 INJECTION INTRAVENOUS at 06:48

## 2017-07-10 RX ADMIN — THIAMINE HYDROCHLORIDE 100 MG: 100 INJECTION, SOLUTION INTRAMUSCULAR; INTRAVENOUS at 08:21

## 2017-07-10 RX ADMIN — HALOPERIDOL LACTATE 2 MG: 5 INJECTION, SOLUTION INTRAMUSCULAR at 09:24

## 2017-07-10 RX ADMIN — LORAZEPAM 1 MG: 2 INJECTION INTRAMUSCULAR; INTRAVENOUS at 06:07

## 2017-07-10 RX ADMIN — LATANOPROST 1 DROP: 50 SOLUTION OPHTHALMIC at 17:47

## 2017-07-10 RX ADMIN — ENOXAPARIN SODIUM 40 MG: 40 INJECTION SUBCUTANEOUS at 08:13

## 2017-07-10 NOTE — PROGRESS NOTES
Music Therapy Assessment    Coral Jackson [id number]  [id number]    1934  80 y.o.  male    Patient Telephone Number: [phone] (home)   Restorationism Affiliation: Torsten Tituspromiseernie   Language: English   Extended Emergency Contact Information  Primary Emergency Contact: Alexander Peck Osteen Phone: [phone]  Relation: Spouse  Secondary Emergency Contact: [address]  Mobile Phone: [phone]  Relation: Daughter   Patient Active Problem List    Diagnosis Date Noted    Oral phase dysphagia 07/08/2017    Status epilepticus ([facility] [address] 06/24/2017    Seizures ([facility] [address] 02/18/2017    Encephalopathy, unspecified 09/03/2012    SVT (supraventricular tachycardia) ([facility] [address] 09/03/2012    Aphasia 09/03/2012    CVA (cerebral vascular accident) ([facility] [address] 09/03/2012    Seizure disorder ([facility] [address] 09/03/2012        Date: 7/10/2017       Mental Status:   [  ] Alert [  ] Almetta Zelalem [  ]  Confused  [x] Asleep    Communication Status: [  ] Impaired Speech [  ] Nonverbal     Physical Status:   [  ] Oxygen in use  [  ] Hard of Hearing [  ] Vision Impaired  [  ] Ambulatory  [  ] Ambulatory with assistance [  ] Non-ambulatory -N/A    Music Preferences, Background: N/A: Please see Session Observations below. Clinical Problem addressed: N/A: Please see Session Observations below. Goal(s) met in session: N/A: Please see Session Observations below.   Physical/Pain management (Scale of 1-10):    Pre-session rating ___________    Post-session rating __________   [  ] Increased relaxation   [  ] Regulated breathing patterns  [  ] Decreased muscle tension   [  ] Minimized physical distress     Emotional/Psychological:  [  ] Increased self-expression   [  ] Decreased aggressive behavior   [  ] Decreased sadness   [  ] Discussed healthy coping skills     [  ] Improved mood    [  ] Decreased withdrawn behavior     Social:  [  ] Decreased feelings of isolation/loneliness [  ] Positive social interaction   [ ] Provided support and/or comfort for family/friends    Spiritual:  [  ] Spiritual support    [  ] Expressed peace  [  ] Expressed niles    [  ] Discussed beliefs    Techniques Utilized (Check all that apply): N/A: Please see Session Observations below. [  ] Procedural support MT [  ] Music for relaxation [  ] Patient preferred music  [  ] Kenna analysis  [  ] Song choice  [  ] Music for validation  [  ] Entrainment  [  ] Progressive muscle relax. [  ] Guided visualization  [  ] Ralradhadayday Cradle  [  ] Patient instrument playing [  ] Gabino Silence writing  [  ] Raygoran Jf along   [  ] Evalina Arm  [  ] Sensory stimulation  [  ] Active Listening  [  ] Music for spiritual support [  ] Making of CDs as gifts    Session Observations:  Referral from Oxana Max, Palliative NP. The patient (pt) was observed to be sleeping soundly in bed. This music therapist (MT) consulted the pt's nurse Mita to ask if it was alright to awaken the pt or if he needed rest. Shaun Alfredo said if the pt is awake that music therapy would be helpful because the pt has been agitated. She said he needs rest, so to please let him sleep if he's sleeping. Will follow as able.     Gina Ennis MT-BC (Music Therapist-Board Certified)  Spiritual Care Department  Referral-based service

## 2017-07-10 NOTE — PROGRESS NOTES
Hospitalist Progress Note    NAME: Mukesh Levi   :  1934   MRN:  [id number]       Assessment / Plan:      Acute encephalopathy / agitation  -mental status has not recovered since his extubation and persisted throughout his ICU course. At one point he was on scheduled haldol / benadryl with ativan.    -Not ready for PO diet yet. Patient pulled NGT out - leaving it out for now. Continue IVF as patient getting pre renal.    -discussed with palliative care and with Neurology. More testing ordered. Avoid or limit sedative or psychoactive meds as much as possible. Status epilepticus POA with recurrent seizures  -admitted for seizures in  and 2017. Both this admission and last February occurred after stopping lyrica for several days  -continue IV keppra  -repeating EEG  -getting CT head,  MRI at some point    Acute respiratory failure requiring intubation for airway protection POA  Tracheobronchitis / RUL infiltrate / Suspected baseline COPD   -cxray with RUL scar/infiltarte - present since  . Possible aspiration  -Self extubated . Now off BIPAP also. Continue pulmonary toilet; suction prn; duonebs and oxygen prn.   -finished course of zosyn     Essential HTN POA   History of SVT POA  -off cardizem drip  -start ASA when safe for PO    Peripheral neuropathy POA, cont lyrica   Remote ETOH abuse. Family confirms no ETOH in 5 years  GERD continue PPI       Code status: Full codeNext of Kin: Wife  DVT prophylaxis: lovenox SQ    Recommended Disposition: TBD     Subjective:     Chief Complaint / Reason for Physician Visit:   following respiratory failure / encephalopathy / seizure   Discussed with RN events overnight.    Remains encephalopathic   Transferred out of ICU to neurology floor    Review of Systems:  Symptom Y/N Comments  Symptom Y/N Comments   Fever/Chills n   Chest Pain n    Poor Appetite    Edema     Cough    Abdominal Pain n    Sputum    Joint Pain     SOB/AL Pruritis/Rash     Nausea/vomit    Tolerating PT/OT     Diarrhea    Tolerating Diet     Constipation    Other       Could NOT obtain due to:      Objective:     VITALS:   Last 24hrs VS reviewed since prior progress note. Most recent are:  Patient Vitals for the past 24 hrs:   Temp Pulse Resp BP SpO2   07/10/17 1505 - - - - 98 %   07/10/17 1322 97.8 °F (36.6 °C) 89 18 169/86 99 %   07/10/17 1131 - (!) 112 16 148/86 97 %   07/10/17 1100 - (!) 108 26 - 96 %   07/10/17 1000 - (!) 108 18 (!) 156/138 98 %   07/10/17 0900 - (!) 109 15 (!) 181/92 98 %   07/10/17 0800 - 81 16 163/77 97 %   07/10/17 0726 - - - - 97 %   07/10/17 0700 97.8 °F (36.6 °C) 87 21 (!) 164/96 98 %   07/10/17 0600 - (!) 103 20 - 97 %   07/10/17 0500 - 89 26 151/77 98 %   07/10/17 0400 - 92 21 151/78 98 %   07/10/17 0300 - 82 24 128/77 98 %   07/10/17 0203 - - - - 98 %   07/10/17 0200 - 96 19 (!) 142/101 98 %   07/10/17 0100 - 94 21 144/77 98 %   07/10/17 0000 - 95 24 - 97 %   07/09/17 2355 98.2 °F (36.8 °C) 92 15 - 99 %   07/09/17 2315 - 88 - 154/77 -   07/09/17 2300 - 90 17 - 98 %   07/09/17 2200 - 78 23 149/73 99 %   07/09/17 2130 - 82 21 (!) 147/93 99 %   07/09/17 2100 - 80 21 151/65 96 %   07/09/17 2030 - 68 16 133/55 96 %   07/09/17 2001 - - - - 98 %   07/09/17 2000 - 70 16 137/60 92 %   07/09/17 1950 97.8 °F (36.6 °C) 77 16 150/61 -   07/09/17 1943 - 83 - 150/61 -   07/09/17 1900 - 88 20 150/61 98 %   07/09/17 1800 - 83 24 141/74 98 %   07/09/17 1730 - 84 19 126/74 98 %   07/09/17 1700 - 89 20 - 97 %   07/09/17 1630 - 88 22 - 97 %   07/09/17 1600 98.2 °F (36.8 °C) 70 16 117/53 97 %       Intake/Output Summary (Last 24 hours) at 07/10/17 1538  Last data filed at 07/10/17 1100   Gross per 24 hour   Intake          1685.83 ml   Output              400 ml   Net          1285.83 ml        PHYSICAL EXAM:  General: WD, WN. Restless but today looks sedated  EENT:  EOMI. Anicteric sclerae. MMM  Resp:  rhonchi bilaterally, no wheezing or rales.   No accessory muscle use  CV:  Regular  rhythm,  No edema  GI:  Soft, Non distended, Non tender.  +Bowel sounds  Neurologic:  When awake he occasionally follows simple commands, weak ext   Psych:   Not anxious nor agitated  Skin:  No rashes. No jaundice    Reviewed most current lab test results and cultures  YES  Reviewed most current radiology test results   YES  Review and summation of old records today    NO  Reviewed patient's current orders and MAR    YES  PMH/SH reviewed - no change compared to H&P  ________________________________________________________________________  Care Plan discussed with:    Comments   Patient y    Family   wife   RN y    Care Manager     Consultant  y Neurology                     Multidiciplinary team rounds were held today with , nursing, pharmacist and clinical coordinator. Patient's plan of care was discussed; medications were reviewed and discharge planning was addressed. ________________________________________________________________________  Total NON critical care TIME:  25  Minutes    Total CRITICAL CARE TIME Spent:   Minutes non procedure based      Comments   >50% of visit spent in counseling and coordination of care     ________________________________________________________________________  Carlos Alberto Engle MD     Procedures: see electronic medical records for all procedures/Xrays and details which were not copied into this note but were reviewed prior to creation of Plan. LABS:  I reviewed today's most current labs and imaging studies.   Pertinent labs include:  Recent Labs      07/10/17   0457   WBC  5.9   HGB  11.2*   HCT  32.8*   PLT  268     Recent Labs      07/10/17   0325   NA  135*   K  3.5   CL  101   CO2  29   GLU  115*   BUN  10   CREA  0.64*   CA  8.9       Signed: Carlos Alberto Engle MD

## 2017-07-10 NOTE — PROGRESS NOTES
Speech pathology  SLP has been following/seeing this patient when appropriate since 6/27. We have received multiple new orders since that time despite patient already being on caseload. SLP will see patient today.      Vahid Neves M.S. CCC-SLP

## 2017-07-10 NOTE — PROGRESS NOTES
[facility]  Pulmonary, Critical Care, and Sleep Medicine    Name: Gale Delgado MRN: [id number]   : 1934 Hospital: [address]   Date: 7/10/2017          IMPRESSION:   · Acute respiratory failure -resolving  · Stridor better  · Tracheobronchitis? On empiric abx given seizures and possible aspiration- less secretions  · Encephalopathy-with intermittent agitation; very restless despite excellent nursing care- wife says he is independent at home  · Seizures with status epilepticus  · HTN  · SVT on IV cardizem  · Peripheral neuropathy  · Right upper lobe scar/infiltrate - no tobacco history per records - has been present since at least February of this year, may be present to some degree on chest X-ray from   · Suspect COPD      PLAN:   · Speech therapy consult today  · IVF  · Finish IV abx  · Reorient  · Bronchodilators   · Antiepileptics  · Antihypertensives  · Monitor platelets   · Antipsychotics for agitation  · Restrain for safety, pt keeps puling at devices  · DVT prophylaxis     Subjective/Interval History:     No acute distress  No agitation     Review of Systems   Unable to perform ROS: Mental status change     Objective:   Vital Signs:    Visit Vitals    /77    Pulse 81    Temp 97.8 °F (36.6 °C)    Resp 16    Ht 5' 8\" (1.727 m)    Wt 67.3 kg (148 lb 5.9 oz)    SpO2 97%    BMI 22.56 kg/m2       O2 Device: Room air   O2 Flow Rate (L/min): 2 l/min   Temp (24hrs), Av.2 °F (36.8 °C), Min:97.8 °F (36.6 °C), Max:98.8 °F (37.1 °C)       Intake/Output:   Last shift:         Last 3 shifts: 1901 - 07/10 0700  In: 3709.6 [I.V.:3709.6]  Out: 400 [Urine:400]    Intake/Output Summary (Last 24 hours) at 07/10/17 0914  Last data filed at 07/10/17 07   Gross per 24 hour   Intake          2438. 58 ml   Output              400 ml   Net          2038. 58 ml      Physical Exam   Constitutional: He is active. No distress.    HENT:   Head: Normocephalic and atraumatic. Mouth/Throat: No oropharyngeal exudate. Eyes: No scleral icterus. Cardiovascular: Normal rate and regular rhythm. Pulmonary/Chest: No respiratory distress. He has no wheezes. He has no rales. Abdominal: Soft. Bowel sounds are normal. He exhibits no distension. There is no tenderness. Musculoskeletal: He exhibits no edema. Neurological: He is alert. He is disoriented. Skin: Skin is warm and dry.      Data:     Current Facility-Administered Medications   Medication Dose Route Frequency    haloperidol lactate (HALDOL) injection 2 mg  2 mg IntraVENous Q12H    dextrose 5 % - 0.45% NaCl infusion  75 mL/hr IntraVENous CONTINUOUS    albuterol-ipratropium (DUO-NEB) 2.5 MG-0.5 MG/3 ML  3 mL Nebulization Q6H RT    piperacillin-tazobactam (ZOSYN) 3.375 g in 0.9% sodium chloride (MBP/ADV) 100 mL  3.375 g IntraVENous Q8H    dilTIAZem (CARDIZEM) 100 mg in dextrose 5% (MBP/ADV) 100 mL infusion  0-15 mg/hr IntraVENous TITRATE    sodium chloride (NS) flush 10 mL  10 mL InterCATHeter Q24H    sodium chloride (NS) flush 10-40 mL  10-40 mL InterCATHeter Q8H    sodium chloride (NS) flush 20 mL  20 mL InterCATHeter Q24H    latanoprost (XALATAN) 0.005 % ophthalmic solution 1 Drop  1 Drop Both Eyes QPM    thiamine (B-1) 100 mg in 0.9% sodium chloride 50 mL IVPB  100 mg IntraVENous DAILY    sodium chloride (NS) flush 5-10 mL  5-10 mL IntraVENous Q8H    enoxaparin (LOVENOX) injection 40 mg  40 mg SubCUTAneous Q24H    levETIRAcetam (KEPPRA) 1,500 mg in 0.9% sodium chloride IVPB  1,500 mg IntraVENous Q12H                Labs:  Recent Labs      07/10/17   0457   WBC  5.9   HGB  11.2*   HCT  32.8*   PLT  268     Recent Labs      07/10/17   0325   NA  135*   K  3.5   CL  101   CO2  29   GLU  115*   BUN  10   CREA  0.64*   CA  8.9     Recent Labs      07/09/17   1429   PH  7.40   PCO2  49*   PO2  127*   HCO3  30*     Imaging:  I have personally reviewed the patients radiographs and have reviewed the reports:  None today        Total critical care time exclusive of procedures:   minutes  James Franco MD

## 2017-07-10 NOTE — PROGRESS NOTES
Problem: Dysphagia (Adult)  Goal: *Acute Goals and Plan of Care (Insert Text)  Speech pathology goals  Initiated 6/30/2017, Re-eval 7/6/2017   1. Patient will participate in re-evaluation of swallow function daily continued 7/6/2017    SPEECH LANGUAGE PATHOLOGY DYSPHAGIA TREATMENT  Patient: Aretha Eisenmenger (74 y.o. male)  Date: 7/10/2017  Diagnosis: Status epilepticus ([facility] [address] <principal problem not specified>       Precautions:       ASSESSMENT:  Patient with no change in ability to safely take PO overt the last 13 days of SLP following. Patient remains confused, restless, no awareness of surroundings. He intermittently follows commands and has no interest in accepting PO. He remains in wrist restraints. Patient accepted ice chip and 2 tsp amounts of applesauce. Patient with slow bolus manipulation and mild lingual residue. Refusal to accept liquids. Pharyngeal swallow initiation is suspected to be delayed, hyolaryngeal elevation/excursion limited via palpation, multiple audible swallows with puree followed by repeated weak coughing. SLP continues to recommend strict NPO due to severity of pharyngeal phase of swallow function based on bedside assessments along with significant confusion. Progression toward goals:  [ ]         Improving appropriately and progressing toward goals  [ ]         Improving slowly and progressing toward goals  [ ]         Not making progress toward goals and plan of care will be adjusted       PLAN:  Recommendations and Planned Interventions:  --Continue to recommend NPO. Would re-start TF via replacement of NGT. Discuss how to proceed with nutrition- ? PEG as this is unlikely something that is going to improve in the next few days as we have seen no change in the past 13 days     Patient continues to benefit from skilled intervention to address the above impairments. Continue treatment per established plan of care. Discharge Recommendations:   To Be Determined       SUBJECTIVE: Patient confused, trying to get out of bed. OBJECTIVE:   Cognitive and Communication Status:  Neurologic State: Confused  Orientation Level: Oriented to person, Oriented to place  Cognition: Decreased command following  Perception: Appears intact  Perseveration: Perseverates during conversation  Safety/Judgement: Decreased awareness of environment  Dysphagia Treatment:  Oral Assessment:  Oral Assessment  Dentition: Natural  Oral Hygiene:  (thick mucous on uvula- unable to remove due to refusal)  Lingual:  (didnt follow commands to assess)  Velum: Unable to visualize  Mandible: No impairment  P.O. Trials:  Patient Position:  (upright in bed)  Vocal quality prior to P.O.: Low volume  Consistency Presented: Ice chips;Puree; Thin liquid  How Presented: Straw;Spoon;SLP-fed/presented     Bolus Acceptance: Impaired  Bolus Formation/Control: Impaired  Type of Impairment: Delayed  Propulsion: Delayed (# of seconds)  Oral Residue: None  Initiation of Swallow: Delayed (# of seconds)  Laryngeal Elevation: Decreased;Weak  Aspiration Signs/Symptoms: Weak cough;Clear throat (with puree)  Pharyngeal Phase Characteristics: Multiple swallows; Suspected pharyngeal residue;Poor endurance  Effective Modifications: None           Oral Phase Severity: Moderate-severe  Pharyngeal Phase Severity : Severe        Pain:  Pain Scale 1: Adult Nonverbal Pain Scale  Pain Intensity 1: 0     After treatment:   [ ]              Patient left in no apparent distress sitting up in chair  [X]              Patient left in no apparent distress in bed  [X]              Call bell left within reach  [ ]              Nursing notified  [ ]              Caregiver present  [X]              Bed alarm activated      COMMUNICATION/EDUCATION:         The patients plan of care including recommendations, planned interventions, and recommended diet changes were discussed with: Registered Nurse. [ ]              Posted safety precautions in patient's room. Bernardo Levi M.S. CCC-SLP  Time Calculation: 15 mins

## 2017-07-10 NOTE — PROGRESS NOTES
07:15 Report received from [facility], pt resting in bed   09:00 pt continues to try and exit the bed over the side rail, bilateral wrist restraints in place, needs frequent redirection   09:40 brief changed, urine soaked, pt repositioned, continues to be restless

## 2017-07-10 NOTE — PROGRESS NOTES
Pt agitated and pulling at restraints. Right wrist red and blanchable. Left wrist approx 1 inch abrasion. Notified Dr. Gabino Bartlett. Orders to discontinue restraints and order for sitter.

## 2017-07-10 NOTE — PROGRESS NOTES
..  * No surgery found *  Bedside and Verbal shift change report given to April Choi (oncoming nurse) by Romie Lundberg (offgoing nurse). Report included the following information SBAR, Kardex and ED Summary. Zone Phone:   [phone]      Significant changes during shift:  Restraints discontinued, sitter in room        Patient Eri Riddles  80 y.o.  6/24/2017 12:13 PM by Jhoan Tomlin MD. Ashley Mazariegos was admitted from Home    Problem List    Patient Active Problem List    Diagnosis Date Noted    Oral phase dysphagia 07/08/2017    Status epilepticus ([facility] [address] 06/24/2017    Seizures ([address] 02/18/2017    Encephalopathy, unspecified 09/03/2012    SVT (supraventricular tachycardia) ([facility] [address] 09/03/2012    Aphasia 09/03/2012    CVA (cerebral vascular accident) ([facility] [address] 09/03/2012    Seizure disorder ([facility] [address] 09/03/2012     Past Medical History:   Diagnosis Date    Alcohol abuse, in remission     Seizures ([facility] [address]          Core Measures:    CVA: No No  CHF:No No  PNA:No No    Post Op Surgical (If Applicable):     Number times ambulated in hallway past shift:  0  Number of times OOB to chair past shift:   0  NG Tube: No  Incentive Spirometer: No  Drains: No   Volume    Dressing Present:  No  Flatus:  Yes    Activity Status:    OOB to Chair No  Ambulated this shift No   Bed Rest Yes    Supplemental O2: (If Applicable)    NC No  NRB No  Venti-mask No  On  Liters/min      LINES AND DRAINS:    Central Line? Yes Placement date 6/24/17 Reason Medically Necessary limited access    PICC LINE? No Placement date Reason Medically Necessary     Urinary Catheter? No Placement Date  Reason Medically Necessary     DVT prophylaxis:    DVT prophylaxis Med- No  DVT prophylaxis SCD or JAIME- Refused     Wounds: (If Applicable)    Wounds- No    Location     Patient Safety:    Falls Score Total Score: 3  Safety Level_______  Bed Alarm On? No  Sitter?  Yes    Plan for upcoming shift: safety        Discharge Plan: Yes     Active Consults:  IP CONSULT TO NEUROLOGY  IP CONSULT TO INTENSIVIST  IP CONSULT TO PALLIATIVE CARE - PROVIDER

## 2017-07-10 NOTE — PROGRESS NOTES
Nutrition Assessment:    RECOMMENDATIONS:   Advance diet as medically able per SLP  If unable to advance diet, recommend NGT placement vs discussion of feeding tube placement (pt NPO day 6): If access is obtained, recommend initiation Jevity 1.5 @ 30mL/h, advance rate 10mL q 12h as tolerated to Goal Rate of 50mL/h + 150mL H2O flush q 6h (provides 1800kcals/77gPro/1512mL)     ASSESSMENT:   Chart reviewed, case discussed during CCU rounds. Pt remains confused and agitated at times. He is now NPO day 6 as he was unable to be advanced over the weekend. SLP to re-evaluate for PO diet today. Palliative is following and discussed possible need for feeding tube with family last week, but they wanted to see if he improved over the weekend. Provided recommendations for possible TF during rounds. Plan is to see what SLP finds during swallow eval.  See recommendations above. Labs reviewed. No BM since 7/4, but also NPO x 6 days. Dietitians Intervention(s)/Plan(s): Advance diet as/if able, consider TF  SUBJECTIVE/OBJECTIVE:   Pt sleeping  Diet Order: NPO  % Eaten:  No data found. Pertinent Medications:MagSO4, zosyn, KCl, thiamin; IVF(D5, [email or web address]). Chemistries:  Lab Results   Component Value Date/Time    Sodium 135 07/10/2017 03:25 AM    Potassium 3.5 07/10/2017 03:25 AM    Chloride 101 07/10/2017 03:25 AM    CO2 29 07/10/2017 03:25 AM    Anion gap 5 07/10/2017 03:25 AM    Glucose 115 07/10/2017 03:25 AM    BUN 10 07/10/2017 03:25 AM    Creatinine 0.64 07/10/2017 03:25 AM    BUN/Creatinine ratio 16 07/10/2017 03:25 AM    GFR est AA >60 07/10/2017 03:25 AM    GFR est non-AA >60 07/10/2017 03:25 AM    Calcium 8.9 07/10/2017 03:25 AM    Albumin 2.6 07/02/2017 03:33 AM      Anthropometrics: Height: 5' 8\" (172.7 cm) Weight: 67.3 kg (148 lb 5.9 oz)    IBW (%IBW):   ( ) UBW (%UBW):   (  %)    BMI: Body mass index is 22.56 kg/(m^2).     This BMI is indicative of:  []Underweight   [x]Normal   []Overweight   [] Obesity   [] Extreme Obesity (BMI>40)  Estimated Nutrition Needs (Based on): 1673 Kcals/day (MSJ 1394 x 1.2) , 58 g (-72 (0.8-1gPro/kg) ) Protein  Carbohydrate: At Least 130 g/day  Fluids: 1700 mL/day    Last BM: 7/4   [x]Active     []Hyperactive  []Hypoactive       [] Absent   BS  Skin:    [x] Intact   [] Incision  [] Breakdown   [] DTI   [] Tears/Excoriation/Abrasion  []Edema [] Other: Wt Readings from Last 30 Encounters:   07/09/17 67.3 kg (148 lb 5.9 oz)   06/21/17 70.3 kg (155 lb)   03/21/17 75.3 kg (166 lb)   03/18/17 68 kg (150 lb)   03/07/17 75.3 kg (166 lb)   02/25/17 76.2 kg (168 lb)   02/18/17 85.5 kg (188 lb 7.9 oz)   09/03/12 87.1 kg (192 lb)      NUTRITION DIAGNOSES:   Problem:  Inadequate protein-energy intake      Etiology: related to pt NPO 2' agitation/confusion     Signs/Symptoms: as evidenced by NPO meets 0% kcal and protein needs. Previous dx re: inadequate protein energy intake continues, pt remains NPO. NUTRITION INTERVENTIONS:  Meals/Snacks: Other (advance diet per SLP) Enteral/Parenteral Nutrition: Initiate enteral nutrition                GOAL:   Pt will be started on PO diet vs nutrition support in 1-2 days.    P  NUTRITION MONITORING AND EVALUATION   Previous Goal: Pt will be started on PO diet vs nutrition support in 2-4 days   Previous Goal Met: No   Previous Recommendations Implemented: N/A   Cultural, Cheondoism, or Ethnic Dietary Needs: None   LEARNING NEEDS (Diet, Food/Nutrient-Drug Interaction):    [x] None Identified   [] Identified and Education Provided/Documented   [] Identified and Pt declined/was not appropriate      [x] Interdisciplinary Care Plan Reviewed/Documented    [x] Participated in Discharge Planning: Unable to determine    [x] Interdisciplinary Rounds     NUTRITION RISK:    [x] High              [] Moderate           []  Low  []  Minimal/Uncompromised      Ana Rosa Alexandre, [address] [place] [address], [address] [place] [address] Dr  Pager [phone]  Weekend Pager [phone]

## 2017-07-10 NOTE — PROGRESS NOTES
Shift summary  1900 to 7 am  Patient received restless, confused, answer to name. Hard of hearing. Wife at bedside does not recognize wife. SR on monitor. Maintained clean and dry. Increase agitation for incontinence noted. Applied sensicare cream post cleaning. Frequent oral care and suctioned done all night. Unable to reorient or redirect remained restless with only short period of resting noted after ativan 1 mg prn .  Bedside shift report given to [facility] incoming [address]

## 2017-07-10 NOTE — PROGRESS NOTES
NEUROLOGY PROGRESS NOTE     CC: Seizures    SUBJECTIVE  Neurology reconsulted to evaluate Mr. Marcano today. He was last seen on 6/30/17 by my partner Dr. Milton Rodriguez. As listed below, patient was admitted for confusion and suspected seizure. Patient has a history of seizures. I have followed him as an outpatient in the past.  He has had his Keppra dosing adjusted several times due to seizures. This admission his Keppra was increased to 1500 mg twice daily. Patient has had progressive confusion this hospital stay. He was requiring Benadryl, Ativan, and Haldol for agitation. After several days of this patient has become obtunded. Some of these medications are being held, but patient is still obtunded. When I came to see the patient was having some focal twitching of the right hand. This is not the patient's baseline at all and is concerning for continued seizures versus medication side effect. No family is at the bedside. HISTORY OF PRESENT ILLNESS  Alexandra Chan is a 80 y.o. male who presents to the hospital because of seizures. The family is not at bedside in the history is obtained by chart review. According to the ED notes. The patient was oriented ×3 when he went to bed last night and was able to communicate but when he woke up in the morning he was unable to speak in coherent sentences and was confused about orientation. He he was not feeling great and wanted to go to the ER. He does have long history of seizures and takes Keppra 1250 mg p.o. twice daily and Lyrica 300 mg p.o. twice daily. He ran out of his Lyrica 3 days ago. In the emergency room he did have an episode of deviation of the eyes to the right along with stiffening of the right upper extremity. The patient was intubated at this time for airway protection and is on propofol. No obvious seizures in the ICU yet.     ROS  A ten system review of constitutional, cardiovascular, respiratory, musculoskeletal, endocrine, skin, SHEENT, genitourinary, psychiatric and neurologic systems was obtained and is unremarkable except as stated in HPI     PHYSICAL EXAM  EXAMINATION:   Patient Vitals for the past 24 hrs:   Temp Pulse Resp BP SpO2   07/10/17 1322 97.8 °F (36.6 °C) 89 18 169/86 99 %   07/10/17 1131 - (!) 112 16 148/86 97 %   07/10/17 1100 - (!) 108 26 - 96 %   07/10/17 1000 - (!) 108 18 (!) 156/138 98 %   07/10/17 0900 - (!) 109 15 (!) 181/92 98 %   07/10/17 0800 - 81 16 163/77 97 %   07/10/17 0726 - - - - 97 %   07/10/17 0700 97.8 °F (36.6 °C) 87 21 (!) 164/96 98 %   07/10/17 0600 - (!) 103 20 - 97 %   07/10/17 0500 - 89 26 151/77 98 %   07/10/17 0400 - 92 21 151/78 98 %   07/10/17 0300 - 82 24 128/77 98 %   07/10/17 0203 - - - - 98 %   07/10/17 0200 - 96 19 (!) 142/101 98 %   07/10/17 0100 - 94 21 144/77 98 %   07/10/17 0000 - 95 24 - 97 %   07/09/17 2355 98.2 °F (36.8 °C) 92 15 - 99 %   07/09/17 2315 - 88 - 154/77 -   07/09/17 2300 - 90 17 - 98 %   07/09/17 2200 - 78 23 149/73 99 %   07/09/17 2130 - 82 21 (!) 147/93 99 %   07/09/17 2100 - 80 21 151/65 96 %   07/09/17 2030 - 68 16 133/55 96 %   07/09/17 2001 - - - - 98 %   07/09/17 2000 - 70 16 137/60 92 %   07/09/17 1950 97.8 °F (36.6 °C) 77 16 150/61 -   07/09/17 1943 - 83 - 150/61 -   07/09/17 1900 - 88 20 150/61 98 %   07/09/17 1800 - 83 24 141/74 98 %   07/09/17 1730 - 84 19 126/74 98 %   07/09/17 1700 - 89 20 - 97 %   07/09/17 1630 - 88 22 - 97 %   07/09/17 1600 98.2 °F (36.8 °C) 70 16 117/53 97 %   07/09/17 1530 - 69 17 - 96 %   07/09/17 1500 - 70 14 115/57 97 %        General:   General appearance: Pt is in no acute distress   Distal pulses are preserved    Neurological Examination:   Mental Status: Patient snoring loudly but opens eyes with painful stimulation    Cranial Nerves: Pupils equal and reactive to light. Face symmetric.   Tongue is midline    Motor: Moves all 4 extremities symmetrically to pain    Sensation: Withdraws to pain ×4    Coordination/Cerebellar: Unable to assess    Gait: Unable to assess    Skin: No significant bruising or lacerations. LAB DATA REVIEWED:    Results for orders placed or performed during the hospital encounter of 06/24/17   CULTURE, BLOOD   Result Value Ref Range    Special Requests: NO SPECIAL REQUESTS      Culture result: NO GROWTH 6 DAYS     CULTURE, BLOOD   Result Value Ref Range    Special Requests: NO SPECIAL REQUESTS      Culture result: (A)       STAPHYLOCOCCUS CAPITIS  GROWING IN 1 OF 2 BOTTLES DRAWN  (SITE = C LINE)      Culture result: (A)       PRELIMINARY REPORT OF  GRAM POSITIVE COCCI  IN CLUSTERS  GROWING IN 1 OF 2 BOTTLES DRAWN  CALLED TO AND READ BACK BY  FIGUEROA RODRIGUES RN [facility] AT 2013 ON 6/27/2017. Roc 1107      Culture result: REMAINING BOTTLE(S) HAS/HAVE NO GROWTH IN 5 DAYS         Susceptibility    Staphylococcus capitis - EDGARD     Ampicillin/sulbactam ($) <=8/4 Susceptible ug/mL     Cefazolin ($) <=4 Susceptible ug/mL     Ciprofloxacin ($) <=1 Susceptible ug/mL     Clindamycin ($) <=0.5 Susceptible ug/mL     Daptomycin ($$$$$) <=0.5 Susceptible ug/mL     Erythromycin ($$$$) <=0.5 Susceptible ug/mL     Gentamicin ($) <=4 Susceptible ug/mL     Levofloxacin ($) <=1 Susceptible ug/mL     Linezolid ($$$$$) <=1 Susceptible ug/mL     Oxacillin <=0.25 Susceptible ug/mL     Rifampin ($$$$)* <=1 Susceptible ug/mL      * Rifampin is not to be used for mono-therapy. Tetracycline <=4 Susceptible ug/mL     Trimeth-Sulfamethoxa <=0.5/9.5 Susceptible ug/mL     Vancomycin ($) 0.5 Susceptible ug/mL   CBC WITH AUTOMATED DIFF   Result Value Ref Range    WBC 6.2 4.1 - 11.1 K/uL    RBC 4.28 4.10 - 5.70 M/uL    HGB 14.3 12.1 - 17.0 g/dL    HCT 40.8 36.6 - 50.3 %    MCV 95.3 80.0 - 99.0 FL    MCH 33.4 26.0 - 34.0 PG    MCHC 35.0 30.0 - 36.5 g/dL    RDW 12.4 11.5 - 14.5 %    PLATELET 769 (L) 269 - 400 K/uL    NEUTROPHILS 65 32 - 75 %    LYMPHOCYTES 23 12 - 49 %    MONOCYTES 12 5 - 13 %    EOSINOPHILS 0 0 - 7 %    BASOPHILS 0 0 - 1 %    ABS.  NEUTROPHILS 4.0 1.8 - 8.0 K/UL    ABS. LYMPHOCYTES 1.4 0.8 - 3.5 K/UL    ABS. MONOCYTES 0.8 0.0 - 1.0 K/UL    ABS. EOSINOPHILS 0.0 0.0 - 0.4 K/UL    ABS. BASOPHILS 0.0 0.0 - 0.1 K/UL   METABOLIC PANEL, COMPREHENSIVE   Result Value Ref Range    Sodium 137 136 - 145 mmol/L    Potassium 3.6 3.5 - 5.1 mmol/L    Chloride 102 97 - 108 mmol/L    CO2 28 21 - 32 mmol/L    Anion gap 7 5 - 15 mmol/L    Glucose 105 (H) 65 - 100 mg/dL    BUN 13 6 - 20 MG/DL    Creatinine 0.98 0.70 - 1.30 MG/DL    BUN/Creatinine ratio 13 12 - 20      GFR est AA >60 >60 ml/min/1.73m2    GFR est non-AA >60 >60 ml/min/1.73m2    Calcium 9.6 8.5 - 10.1 MG/DL    Bilirubin, total 1.1 (H) 0.2 - 1.0 MG/DL    ALT (SGPT) 19 12 - 78 U/L    AST (SGOT) 16 15 - 37 U/L    Alk.  phosphatase 83 45 - 117 U/L    Protein, total 8.9 (H) 6.4 - 8.2 g/dL    Albumin 4.4 3.5 - 5.0 g/dL    Globulin 4.5 (H) 2.0 - 4.0 g/dL    A-G Ratio 1.0 (L) 1.1 - 2.2     TROPONIN I   Result Value Ref Range    Troponin-I, Qt. <0.04 <0.05 ng/mL   CK W/ REFLX CKMB   Result Value Ref Range     39 - 308 U/L   LEVETIRACETAM (KEPPRA)   Result Value Ref Range    Levetiracetam (Keppra) 36.6 10.0 - 40.0 ug/mL   URINALYSIS W/MICROSCOPIC   Result Value Ref Range    Color YELLOW/STRAW      Appearance CLOUDY (A) CLEAR      Specific gravity 1.019 1.003 - 1.030      pH (UA) 7.0 5.0 - 8.0      Protein NEGATIVE  NEG mg/dL    Glucose NEGATIVE  NEG mg/dL    Ketone TRACE (A) NEG mg/dL    Blood NEGATIVE  NEG      Urobilinogen 1.0 0.2 - 1.0 EU/dL    Nitrites NEGATIVE  NEG      Leukocyte Esterase NEGATIVE  NEG      WBC 0-4 0 - 4 /hpf    RBC 0-5 0 - 5 /hpf    Epithelial cells FEW FEW /lpf    Bacteria NEGATIVE  NEG /hpf    Hyaline cast 0-2 0 - 5 /lpf   BILIRUBIN, CONFIRM   Result Value Ref Range    Bilirubin UA, confirm NEGATIVE  NEG     CK W/ CKMB & INDEX   Result Value Ref Range     39 - 308 U/L    CK - MB 1.6 <3.6 NG/ML    CK-MB Index 0.7 0 - 2.5     LACTIC ACID, PLASMA   Result Value Ref Range    Lactic acid 2.2 (HH) 0.4 - 2.0 MMOL/L   BLOOD GAS, ARTERIAL   Result Value Ref Range    pH 7.47 (H) 7.35 - 7.45      PCO2 34 (L) 35.0 - 45.0 mmHg    PO2 212 (H) 80 - 100 mmHg    O2  (H) 92 - 97 %    BICARBONATE 24 22 - 26 mmol/L    BASE EXCESS 1.0 mmol/L    O2 METHOD VENTILATOR      FIO2 50 %    MODE A/C      Tidal volume 500      SET RATE 16      EPAP/CPAP/PEEP 6.0      Sample source ARTERIAL      SITE RIGHT RADIAL      ETRE'S TEST YES     MISC. LAB TEST   Result Value Ref Range    Test Description: LYRICA LEVEL     Reference Lab: [email or web address]     Results: SEE REPORT FROM [facility]    METABOLIC PANEL, COMPREHENSIVE   Result Value Ref Range    Sodium 136 136 - 145 mmol/L    Potassium 4.4 3.5 - 5.1 mmol/L    Chloride 103 97 - 108 mmol/L    CO2 26 21 - 32 mmol/L    Anion gap 7 5 - 15 mmol/L    Glucose 123 (H) 65 - 100 mg/dL    BUN 17 6 - 20 MG/DL    Creatinine 1.04 0.70 - 1.30 MG/DL    BUN/Creatinine ratio 16 12 - 20      GFR est AA >60 >60 ml/min/1.73m2    GFR est non-AA >60 >60 ml/min/1.73m2    Calcium 8.2 (L) 8.5 - 10.1 MG/DL    Bilirubin, total 1.4 (H) 0.2 - 1.0 MG/DL    ALT (SGPT) 15 12 - 78 U/L    AST (SGOT) 14 (L) 15 - 37 U/L    Alk. phosphatase 59 45 - 117 U/L    Protein, total 7.1 6.4 - 8.2 g/dL    Albumin 3.4 (L) 3.5 - 5.0 g/dL    Globulin 3.7 2.0 - 4.0 g/dL    A-G Ratio 0.9 (L) 1.1 - 2.2     CBC WITH AUTOMATED DIFF   Result Value Ref Range    WBC 14.1 (H) 4.1 - 11.1 K/uL    RBC 3.59 (L) 4.10 - 5.70 M/uL    HGB 12.0 (L) 12.1 - 17.0 g/dL    HCT 34.9 (L) 36.6 - 50.3 %    MCV 97.2 80.0 - 99.0 FL    MCH 33.4 26.0 - 34.0 PG    MCHC 34.4 30.0 - 36.5 g/dL    RDW 12.8 11.5 - 14.5 %    PLATELET 96 (L) 304 - 400 K/uL    NEUTROPHILS 83 (H) 32 - 75 %    LYMPHOCYTES 9 (L) 12 - 49 %    MONOCYTES 8 5 - 13 %    EOSINOPHILS 0 0 - 7 %    BASOPHILS 0 0 - 1 %    ABS. NEUTROPHILS 11.7 (H) 1.8 - 8.0 K/UL    ABS. LYMPHOCYTES 1.3 0.8 - 3.5 K/UL    ABS. MONOCYTES 1.1 (H) 0.0 - 1.0 K/UL    ABS. EOSINOPHILS 0.0 0.0 - 0.4 K/UL    ABS. BASOPHILS 0.0 0.0 - 0.1 K/UL   TROPONIN I   Result Value Ref Range    Troponin-I, Qt. <0.04 <0.05 ng/mL   CBC W/O DIFF   Result Value Ref Range    WBC 8.0 4.1 - 11.1 K/uL    RBC 3.43 (L) 4.10 - 5.70 M/uL    HGB 11.3 (L) 12.1 - 17.0 g/dL    HCT 33.3 (L) 36.6 - 50.3 %    MCV 97.1 80.0 - 99.0 FL    MCH 32.9 26.0 - 34.0 PG    MCHC 33.9 30.0 - 36.5 g/dL    RDW 12.7 11.5 - 14.5 %    PLATELET 77 (L) 225 - 638 K/uL   METABOLIC PANEL, BASIC   Result Value Ref Range    Sodium 138 136 - 145 mmol/L    Potassium 3.7 3.5 - 5.1 mmol/L    Chloride 105 97 - 108 mmol/L    CO2 27 21 - 32 mmol/L    Anion gap 6 5 - 15 mmol/L    Glucose 100 65 - 100 mg/dL    BUN 12 6 - 20 MG/DL    Creatinine 0.71 0.70 - 1.30 MG/DL    BUN/Creatinine ratio 17 12 - 20      GFR est AA >60 >60 ml/min/1.73m2    GFR est non-AA >60 >60 ml/min/1.73m2    Calcium 8.6 8.5 - 10.1 MG/DL   MAGNESIUM   Result Value Ref Range    Magnesium 2.2 1.6 - 2.4 mg/dL   PHOSPHORUS   Result Value Ref Range    Phosphorus 2.4 (L) 2.6 - 4.7 MG/DL   BLOOD GAS, ARTERIAL   Result Value Ref Range    pH 7.27 (L) 7.35 - 7.45      PCO2 59 (H) 35.0 - 45.0 mmHg    PO2 95 80 - 100 mmHg    O2 SAT 96 92 - 97 %    BICARBONATE 27 (H) 22 - 26 mmol/L    BASE DEFICIT 1.3 mmol/L    O2 METHOD NASAL O2      O2 FLOW RATE 4.00 L/min    SPONTANEOUS RATE 26.0      Sample source ARTERIAL      SITE LEFT BRACHIAL      TERE'S TEST N/A     CBC W/O DIFF   Result Value Ref Range    WBC 7.8 4.1 - 11.1 K/uL    RBC 3.40 (L) 4.10 - 5.70 M/uL    HGB 11.6 (L) 12.1 - 17.0 g/dL    HCT 33.2 (L) 36.6 - 50.3 %    MCV 97.6 80.0 - 99.0 FL    MCH 34.1 (H) 26.0 - 34.0 PG    MCHC 34.9 30.0 - 36.5 g/dL    RDW 12.4 11.5 - 14.5 %    PLATELET 77 (L) 617 - 522 K/uL   METABOLIC PANEL, BASIC   Result Value Ref Range    Sodium 143 136 - 145 mmol/L    Potassium 3.5 3.5 - 5.1 mmol/L    Chloride 108 97 - 108 mmol/L    CO2 29 21 - 32 mmol/L    Anion gap 6 5 - 15 mmol/L    Glucose 119 (H) 65 - 100 mg/dL    BUN 8 6 - 20 MG/DL    Creatinine 0. 71 0.70 - 1.30 MG/DL    BUN/Creatinine ratio 11 (L) 12 - 20      GFR est AA >60 >60 ml/min/1.73m2    GFR est non-AA >60 >60 ml/min/1.73m2    Calcium 8.9 8.5 - 10.1 MG/DL   MAGNESIUM   Result Value Ref Range    Magnesium 1.8 1.6 - 2.4 mg/dL   PHOSPHORUS   Result Value Ref Range    Phosphorus 2.4 (L) 2.6 - 4.7 MG/DL   BLOOD GAS, ARTERIAL   Result Value Ref Range    pH 7.38 7.35 - 7.45      PCO2 45 35.0 - 45.0 mmHg    PO2 104 (H) 80 - 100 mmHg    O2 SAT 98 (H) 92 - 97 %    BICARBONATE 26 22 - 26 mmol/L    BASE EXCESS 0.8 mmol/L    O2 METHOD NASAL O2      O2 FLOW RATE 2.00 L/min    SPONTANEOUS RATE 26.0      Sample source ARTERIAL      SITE LEFT RADIAL      TERE'S TEST YES     VITAMIN B12   Result Value Ref Range    Vitamin B12 1064 (H) 211 - 911 pg/mL   TSH 3RD GENERATION   Result Value Ref Range    TSH 0.93 0.36 - 3.74 uIU/mL   AMMONIA   Result Value Ref Range    Ammonia 25 <32 UMOL/L   CBC W/O DIFF   Result Value Ref Range    WBC 6.2 4.1 - 11.1 K/uL    RBC 3.64 (L) 4.10 - 5.70 M/uL    HGB 12.0 (L) 12.1 - 17.0 g/dL    HCT 33.9 (L) 36.6 - 50.3 %    MCV 93.1 80.0 - 99.0 FL    MCH 33.0 26.0 - 34.0 PG    MCHC 35.4 30.0 - 36.5 g/dL    RDW 12.1 11.5 - 14.5 %    PLATELET 87 (L) 203 - 622 K/uL   METABOLIC PANEL, BASIC   Result Value Ref Range    Sodium 138 136 - 145 mmol/L    Potassium 3.2 (L) 3.5 - 5.1 mmol/L    Chloride 105 97 - 108 mmol/L    CO2 26 21 - 32 mmol/L    Anion gap 7 5 - 15 mmol/L    Glucose 111 (H) 65 - 100 mg/dL    BUN 7 6 - 20 MG/DL    Creatinine 0.61 (L) 0.70 - 1.30 MG/DL    BUN/Creatinine ratio 11 (L) 12 - 20      GFR est AA >60 >60 ml/min/1.73m2    GFR est non-AA >60 >60 ml/min/1.73m2    Calcium 8.6 8.5 - 10.1 MG/DL   MAGNESIUM   Result Value Ref Range    Magnesium 1.5 (L) 1.6 - 2.4 mg/dL   PHOSPHORUS   Result Value Ref Range    Phosphorus 2.2 (L) 2.6 - 4.7 MG/DL   METABOLIC PANEL, COMPREHENSIVE   Result Value Ref Range    Sodium 138 136 - 145 mmol/L    Potassium 3.3 (L) 3.5 - 5.1 mmol/L    Chloride 105 97 - 108 mmol/L    CO2 24 21 - 32 mmol/L    Anion gap 9 5 - 15 mmol/L    Glucose 102 (H) 65 - 100 mg/dL    BUN 9 6 - 20 MG/DL    Creatinine 0.52 (L) 0.70 - 1.30 MG/DL    BUN/Creatinine ratio 17 12 - 20      GFR est AA >60 >60 ml/min/1.73m2    GFR est non-AA >60 >60 ml/min/1.73m2    Calcium 8.6 8.5 - 10.1 MG/DL    Bilirubin, total 1.2 (H) 0.2 - 1.0 MG/DL    ALT (SGPT) 17 12 - 78 U/L    AST (SGOT) 23 15 - 37 U/L    Alk. phosphatase 56 45 - 117 U/L    Protein, total 7.5 6.4 - 8.2 g/dL    Albumin 2.9 (L) 3.5 - 5.0 g/dL    Globulin 4.6 (H) 2.0 - 4.0 g/dL    A-G Ratio 0.6 (L) 1.1 - 2.2     MAGNESIUM   Result Value Ref Range    Magnesium 1.6 1.6 - 2.4 mg/dL   PHOSPHORUS   Result Value Ref Range    Phosphorus 3.0 2.6 - 4.7 MG/DL   CBC WITH AUTOMATED DIFF   Result Value Ref Range    WBC 5.4 4.1 - 11.1 K/uL    RBC 3.58 (L) 4.10 - 5.70 M/uL    HGB 11.9 (L) 12.1 - 17.0 g/dL    HCT 33.2 (L) 36.6 - 50.3 %    MCV 92.7 80.0 - 99.0 FL    MCH 33.2 26.0 - 34.0 PG    MCHC 35.8 30.0 - 36.5 g/dL    RDW 12.1 11.5 - 14.5 %    PLATELET 484 (L) 287 - 400 K/uL    NEUTROPHILS 61 32 - 75 %    LYMPHOCYTES 18 12 - 49 %    MONOCYTES 18 (H) 5 - 13 %    EOSINOPHILS 3 0 - 7 %    BASOPHILS 0 0 - 1 %    ABS. NEUTROPHILS 3.3 1.8 - 8.0 K/UL    ABS. LYMPHOCYTES 1.0 0.8 - 3.5 K/UL    ABS. MONOCYTES 1.0 0.0 - 1.0 K/UL    ABS. EOSINOPHILS 0.2 0.0 - 0.4 K/UL    ABS.  BASOPHILS 0.0 0.0 - 0.1 K/UL   CBC W/O DIFF   Result Value Ref Range    WBC 4.7 4.1 - 11.1 K/uL    RBC 3.52 (L) 4.10 - 5.70 M/uL    HGB 11.9 (L) 12.1 - 17.0 g/dL    HCT 33.1 (L) 36.6 - 50.3 %    MCV 94.0 80.0 - 99.0 FL    MCH 33.8 26.0 - 34.0 PG    MCHC 36.0 30.0 - 36.5 g/dL    RDW 12.3 11.5 - 14.5 %    PLATELET 751 (L) 594 - 941 K/uL   METABOLIC PANEL, BASIC   Result Value Ref Range    Sodium 139 136 - 145 mmol/L    Potassium 3.3 (L) 3.5 - 5.1 mmol/L    Chloride 104 97 - 108 mmol/L    CO2 22 21 - 32 mmol/L    Anion gap 13 5 - 15 mmol/L    Glucose 92 65 - 100 mg/dL    BUN 10 6 - 20 MG/DL Creatinine 0.49 (L) 0.70 - 1.30 MG/DL    BUN/Creatinine ratio 20 12 - 20      GFR est AA >60 >60 ml/min/1.73m2    GFR est non-AA >60 >60 ml/min/1.73m2    Calcium 8.6 8.5 - 10.1 MG/DL   MAGNESIUM   Result Value Ref Range    Magnesium 1.3 (L) 1.6 - 2.4 mg/dL   PHOSPHORUS   Result Value Ref Range    Phosphorus 2.8 2.6 - 4.7 MG/DL   METABOLIC PANEL, BASIC   Result Value Ref Range    Sodium 139 136 - 145 mmol/L    Potassium 3.4 (L) 3.5 - 5.1 mmol/L    Chloride 104 97 - 108 mmol/L    CO2 23 21 - 32 mmol/L    Anion gap 12 5 - 15 mmol/L    Glucose 114 (H) 65 - 100 mg/dL    BUN 13 6 - 20 MG/DL    Creatinine 0.67 (L) 0.70 - 1.30 MG/DL    BUN/Creatinine ratio 19 12 - 20      GFR est AA >60 >60 ml/min/1.73m2    GFR est non-AA >60 >60 ml/min/1.73m2    Calcium 8.6 8.5 - 10.1 MG/DL   MAGNESIUM   Result Value Ref Range    Magnesium 1.6 1.6 - 2.4 mg/dL   PHOSPHORUS   Result Value Ref Range    Phosphorus 3.1 2.6 - 4.7 MG/DL   CBC W/O DIFF   Result Value Ref Range    WBC 6.2 4.1 - 11.1 K/uL    RBC 3.60 (L) 4.10 - 5.70 M/uL    HGB 11.7 (L) 12.1 - 17.0 g/dL    HCT 33.5 (L) 36.6 - 50.3 %    MCV 93.1 80.0 - 99.0 FL    MCH 32.5 26.0 - 34.0 PG    MCHC 34.9 30.0 - 36.5 g/dL    RDW 12.2 11.5 - 14.5 %    PLATELET 776 (L) 422 - 400 K/uL   CBC WITH AUTOMATED DIFF   Result Value Ref Range    WBC 5.8 4.1 - 11.1 K/uL    RBC 3.51 (L) 4.10 - 5.70 M/uL    HGB 11.7 (L) 12.1 - 17.0 g/dL    HCT 32.5 (L) 36.6 - 50.3 %    MCV 92.6 80.0 - 99.0 FL    MCH 33.3 26.0 - 34.0 PG    MCHC 36.0 30.0 - 36.5 g/dL    RDW 12.3 11.5 - 14.5 %    PLATELET 482 (L) 819 - 400 K/uL    NEUTROPHILS 54 32 - 75 %    LYMPHOCYTES 21 12 - 49 %    MONOCYTES 20 (H) 5 - 13 %    EOSINOPHILS 5 0 - 7 %    BASOPHILS 0 0 - 1 %    ABS. NEUTROPHILS 3.1 1.8 - 8.0 K/UL    ABS. LYMPHOCYTES 1.2 0.8 - 3.5 K/UL    ABS. MONOCYTES 1.2 (H) 0.0 - 1.0 K/UL    ABS. EOSINOPHILS 0.3 0.0 - 0.4 K/UL    ABS.  BASOPHILS 0.0 0.0 - 0.1 K/UL    RBC COMMENTS NORMOCYTIC, NORMOCHROMIC      DF SMEAR SCANNED METABOLIC PANEL, COMPREHENSIVE   Result Value Ref Range    Sodium 138 136 - 145 mmol/L    Potassium 2.9 (L) 3.5 - 5.1 mmol/L    Chloride 102 97 - 108 mmol/L    CO2 29 21 - 32 mmol/L    Anion gap 7 5 - 15 mmol/L    Glucose 133 (H) 65 - 100 mg/dL    BUN 9 6 - 20 MG/DL    Creatinine 0.63 (L) 0.70 - 1.30 MG/DL    BUN/Creatinine ratio 14 12 - 20      GFR est AA >60 >60 ml/min/1.73m2    GFR est non-AA >60 >60 ml/min/1.73m2    Calcium 8.4 (L) 8.5 - 10.1 MG/DL    Bilirubin, total 0.8 0.2 - 1.0 MG/DL    ALT (SGPT) 25 12 - 78 U/L    AST (SGOT) 24 15 - 37 U/L    Alk. phosphatase 59 45 - 117 U/L    Protein, total 6.5 6.4 - 8.2 g/dL    Albumin 2.6 (L) 3.5 - 5.0 g/dL    Globulin 3.9 2.0 - 4.0 g/dL    A-G Ratio 0.7 (L) 1.1 - 2.2     MAGNESIUM   Result Value Ref Range    Magnesium 1.3 (L) 1.6 - 2.4 mg/dL   PHOSPHORUS   Result Value Ref Range    Phosphorus 2.8 2.6 - 4.7 MG/DL   TROPONIN I   Result Value Ref Range    Troponin-I, Qt. 0.31 (H) <0.05 ng/mL   CK W/ CKMB & INDEX   Result Value Ref Range    CK 99 39 - 308 U/L    CK - MB 1.8 <3.6 NG/ML    CK-MB Index 1.8 0 - 2.5     PROTHROMBIN TIME + INR   Result Value Ref Range    INR 1.2 (H) 0.9 - 1.1      Prothrombin time 11.7 (H) 9.0 - 11.1 sec   PTT   Result Value Ref Range    aPTT 30.1 22.1 - 32.5 sec    aPTT, therapeutic range     58.0 - 77.0 SECS   CBC WITH AUTOMATED DIFF   Result Value Ref Range    WBC 7.2 4.1 - 11.1 K/uL    RBC 3.58 (L) 4.10 - 5.70 M/uL    HGB 11.8 (L) 12.1 - 17.0 g/dL    HCT 33.5 (L) 36.6 - 50.3 %    MCV 93.6 80.0 - 99.0 FL    MCH 33.0 26.0 - 34.0 PG    MCHC 35.2 30.0 - 36.5 g/dL    RDW 12.5 11.5 - 14.5 %    PLATELET 003 943 - 639 K/uL    NEUTROPHILS 57 32 - 75 %    LYMPHOCYTES 22 12 - 49 %    MONOCYTES 17 (H) 5 - 13 %    EOSINOPHILS 4 0 - 7 %    BASOPHILS 0 0 - 1 %    ABS. NEUTROPHILS 4.2 1.8 - 8.0 K/UL    ABS. LYMPHOCYTES 1.6 0.8 - 3.5 K/UL    ABS. MONOCYTES 1.2 (H) 0.0 - 1.0 K/UL    ABS. EOSINOPHILS 0.3 0.0 - 0.4 K/UL    ABS.  BASOPHILS 0.0 0.0 - 0.1 K/UL METABOLIC PANEL, BASIC   Result Value Ref Range    Sodium 137 136 - 145 mmol/L    Potassium 3.0 (L) 3.5 - 5.1 mmol/L    Chloride 100 97 - 108 mmol/L    CO2 31 21 - 32 mmol/L    Anion gap 6 5 - 15 mmol/L    Glucose 136 (H) 65 - 100 mg/dL    BUN 11 6 - 20 MG/DL    Creatinine 0.62 (L) 0.70 - 1.30 MG/DL    BUN/Creatinine ratio 18 12 - 20      GFR est AA >60 >60 ml/min/1.73m2    GFR est non-AA >60 >60 ml/min/1.73m2    Calcium 8.6 8.5 - 10.1 MG/DL   MAGNESIUM   Result Value Ref Range    Magnesium 1.6 1.6 - 2.4 mg/dL   PHOSPHORUS   Result Value Ref Range    Phosphorus 2.9 2.6 - 4.7 MG/DL   METABOLIC PANEL, BASIC   Result Value Ref Range    Sodium 136 136 - 145 mmol/L    Potassium 4.2 3.5 - 5.1 mmol/L    Chloride 97 97 - 108 mmol/L    CO2 33 (H) 21 - 32 mmol/L    Anion gap 6 5 - 15 mmol/L    Glucose 149 (H) 65 - 100 mg/dL    BUN 13 6 - 20 MG/DL    Creatinine 0.69 (L) 0.70 - 1.30 MG/DL    BUN/Creatinine ratio 19 12 - 20      GFR est AA >60 >60 ml/min/1.73m2    GFR est non-AA >60 >60 ml/min/1.73m2    Calcium 8.6 8.5 - 10.1 MG/DL   BLOOD GAS, ARTERIAL   Result Value Ref Range    pH 7.35 7.35 - 7.45      PCO2 65 (H) 35.0 - 45.0 mmHg    PO2 164 (H) 80 - 100 mmHg    O2 SAT 99 (H) 92 - 97 %    BICARBONATE 35 (H) 22 - 26 mmol/L    BASE EXCESS 6.7 mmol/L    O2 METHOD BIPAP      FIO2 40 %    MODE BIPAP      SET RATE 4      SPONTANEOUS RATE 19.0      IPAP/PIP 14.0      EPAP/CPAP/PEEP 5.0      Sample source ARTERIAL      SITE RIGHT RADIAL      TERE'S TEST YES     BLOOD GAS, ARTERIAL   Result Value Ref Range    pH 7.45 7.35 - 7.45      PCO2 47 (H) 35.0 - 45.0 mmHg    PO2 139 (H) 80 - 100 mmHg    O2 SAT 99 (H) 92 - 97 %    BICARBONATE 32 (H) 22 - 26 mmol/L    BASE EXCESS 6.7 mmol/L    O2 METHOD BIPAP      FIO2 30 %    MODE BIPAP      SET RATE 4      IPAP/PIP 14.0      EPAP/CPAP/PEEP 5.0      Sample source ARTERIAL      SITE RIGHT RADIAL      TERE'S TEST YES     METABOLIC PANEL, BASIC   Result Value Ref Range    Sodium 137 136 - 145 mmol/L    Potassium 3.6 3.5 - 5.1 mmol/L    Chloride 99 97 - 108 mmol/L    CO2 33 (H) 21 - 32 mmol/L    Anion gap 5 5 - 15 mmol/L    Glucose 126 (H) 65 - 100 mg/dL    BUN 26 (H) 6 - 20 MG/DL    Creatinine 0.77 0.70 - 1.30 MG/DL    BUN/Creatinine ratio 34 (H) 12 - 20      GFR est AA >60 >60 ml/min/1.73m2    GFR est non-AA >60 >60 ml/min/1.73m2    Calcium 9.3 8.5 - 10.1 MG/DL   MAGNESIUM   Result Value Ref Range    Magnesium 2.0 1.6 - 2.4 mg/dL   BLOOD GAS, ARTERIAL   Result Value Ref Range    pH 7.40 7.35 - 7.45      PCO2 49 (H) 35.0 - 45.0 mmHg    PO2 127 (H) 80 - 100 mmHg    O2 SAT 99 (H) 92 - 97 %    BICARBONATE 30 (H) 22 - 26 mmol/L    BASE EXCESS 4.0 mmol/L    O2 METHOD NASAL O2      O2 FLOW RATE 2.00 L/min    Sample source ARTERIAL      SITE LEFT RADIAL      TERE'S TEST YES     METABOLIC PANEL, BASIC   Result Value Ref Range    Sodium 135 (L) 136 - 145 mmol/L    Potassium 3.5 3.5 - 5.1 mmol/L    Chloride 101 97 - 108 mmol/L    CO2 29 21 - 32 mmol/L    Anion gap 5 5 - 15 mmol/L    Glucose 115 (H) 65 - 100 mg/dL    BUN 10 6 - 20 MG/DL    Creatinine 0.64 (L) 0.70 - 1.30 MG/DL    BUN/Creatinine ratio 16 12 - 20      GFR est AA >60 >60 ml/min/1.73m2    GFR est non-AA >60 >60 ml/min/1.73m2    Calcium 8.9 8.5 - 10.1 MG/DL   CBC W/O DIFF   Result Value Ref Range    WBC 5.9 4.1 - 11.1 K/uL    RBC 3.50 (L) 4.10 - 5.70 M/uL    HGB 11.2 (L) 12.1 - 17.0 g/dL    HCT 32.8 (L) 36.6 - 50.3 %    MCV 93.7 80.0 - 99.0 FL    MCH 32.0 26.0 - 34.0 PG    MCHC 34.1 30.0 - 36.5 g/dL    RDW 12.2 11.5 - 14.5 %    PLATELET 049 497 - 278 K/uL   GLUCOSE, POC   Result Value Ref Range    Glucose (POC) 110 (H) 65 - 100 mg/dL    Performed by Maddi Baltazar    POC CREATININE   Result Value Ref Range    Creatinine (POC) 0.9 0.6 - 1.3 MG/DL    GFRAA, POC >60 >60 ml/min/1.73m2    GFRNA, POC >60 >60 ml/min/1.73m2   POC CHEM8   Result Value Ref Range    Calcium, ionized (POC) 1.14 1.12 - 1.32 MMOL/L    Sodium (POC) 140 136 - 145 MMOL/L Potassium (POC) 4.3 3.5 - 5.1 MMOL/L    Chloride (POC) 101 98 - 107 MMOL/L    CO2 (POC) 30 21 - 32 MMOL/L    Anion gap (POC) 14 5 - 15 mmol/L    Glucose (POC) 107 (H) 65 - 100 MG/DL    BUN (POC) 15 9 - 20 MG/DL    Creatinine (POC) 0.9 0.6 - 1.3 MG/DL    GFRAA, POC >60 >60 ml/min/1.73m2    GFRNA, POC >60 >60 ml/min/1.73m2    Hemoglobin (POC) 14.3 12.1 - 17.0 GM/DL    Hematocrit (POC) 42 36.6 - 50.3 %    Comment Comment Not Indicated.      GLUCOSE, POC   Result Value Ref Range    Glucose (POC) 92 65 - 100 mg/dL    Performed by Reymundo Holcomb    GLUCOSE, POC   Result Value Ref Range    Glucose (POC) 112 (H) 65 - 100 mg/dL    Performed by Prashant Ennis    GLUCOSE, POC   Result Value Ref Range    Glucose (POC) 125 (H) 65 - 100 mg/dL    Performed by Prashant Ennis    GLUCOSE, POC   Result Value Ref Range    Glucose (POC) 151 (H) 65 - 100 mg/dL    Performed by Brandie Jaime    GLUCOSE, POC   Result Value Ref Range    Glucose (POC) 138 (H) 65 - 100 mg/dL    [facility]    GLUCOSE, POC   Result Value Ref Range    Glucose (POC) 139 (H) 65 - 100 mg/dL    Performed by [facility]    GLUCOSE, POC   Result Value Ref Range    Glucose (POC) 160 (H) 65 - 100 mg/dL    Performed by Keena Jackson    GLUCOSE, POC   Result Value Ref Range    Glucose (POC) 138 (H) 65 - 100 mg/dL    Performed by Keena Jackson    GLUCOSE, POC   Result Value Ref Range    Glucose (POC) 123 (H) 65 - 100 mg/dL    [facility]    GLUCOSE, POC   Result Value Ref Range    Glucose (POC) 121 (H) 65 - 100 mg/dL    Performed by [facility]    GLUCOSE, POC   Result Value Ref Range    Glucose (POC) 104 (H) 65 - 100 mg/dL    Performed by Demarco Davila    GLUCOSE, POC   Result Value Ref Range    Glucose (POC) 97 65 - 100 mg/dL    Performed by Tatyana Friedman    GLUCOSE, POC   Result Value Ref Range    Glucose (POC) 104 (H) 65 - 100 mg/dL    Performed by Tatyana Friedman    EKG, 12 LEAD, INITIAL   Result Value Ref Range Ventricular Rate 88 BPM    Atrial Rate 88 BPM    P-R Interval 294 ms    QRS Duration 82 ms    Q-T Interval 368 ms    QTC Calculation (Bezet) 445 ms    Calculated P Axis 61 degrees    Calculated R Axis -38 degrees    Calculated T Axis 52 degrees    Diagnosis       Sinus rhythm with 1st degree AV block  Left axis deviation  Nonspecific T wave abnormality  When compared with ECG of 06-SEP-2012 03:00,  [place] interval has increased  ST no longer elevated in Anterior leads  Nonspecific T wave abnormality, worse in Anterolateral leads  Confirmed by Debbie Mcgowan ([id number]) on 6/25/2017 1:15:25 PM          Imaging review:  6/24/2017  CT scan of the head without contrast  No acute intracranial abnormality    CT angiogram of the head and neck  No significant cervical carotid artery stenosis. No large vessel intracranial vascular occlusive change. HOME MEDS  Prior to Admission Medications   Prescriptions Last Dose Informant Patient Reported? Taking?   aspirin (ASPIRIN) 325 mg tablet   Yes No   Sig: Take 1 Tab by mouth daily. atorvastatin (LIPITOR) 40 mg tablet   No No   Sig: Take 1 Tab by mouth daily. cyclobenzaprine (FLEXERIL) 5 mg tablet   No No   Sig: Take 1 Tab by mouth three (3) times daily as needed for Muscle Spasm(s). levETIRAcetam (KEPPRA) 250 mg tablet   No No   Sig: Take 1 Tab by mouth two (2) times a day. Take with the 1000mg to make 1250mg twice a day. levETIRAcetam 1,000 mg tablet   No No   Sig: Take 1 Tab by mouth two (2) times a day. magnesium oxide (MAG-OX) 400 mg tablet   Yes No   Sig: Take 400 mg by mouth daily. metoprolol succinate (TOPROL-XL) 25 mg XL tablet   Yes No   Sig: Take  by mouth daily. omeprazole (PRILOSEC) 40 mg capsule   Yes No   Sig: Take 40 mg by mouth daily. pregabalin (LYRICA) 300 mg capsule   No No   Sig: Take 1 Cap by mouth two (2) times a day. Max Daily Amount: 600 mg.   thiamine (B-1) 100 mg tablet   No No   Sig: Take 1 Tab by mouth daily.       Facility-Administered Medications: None       CURRENT MEDS  Current Facility-Administered Medications   Medication Dose Route Frequency    alteplase (CATHFLO) 1 mg in sterile water (preservative free) 1 mL injection  1 mg InterCATHeter ONCE    haloperidol lactate (HALDOL) injection 2 mg  2 mg IntraVENous Q12H    dextrose 5 % - 0.45% NaCl infusion  75 mL/hr IntraVENous CONTINUOUS    albuterol-ipratropium (DUO-NEB) 2.5 MG-0.5 MG/3 ML  3 mL Nebulization Q6H RT    sodium chloride (NS) flush 10 mL  10 mL InterCATHeter Q24H    sodium chloride (NS) flush 10-40 mL  10-40 mL InterCATHeter Q8H    sodium chloride (NS) flush 20 mL  20 mL InterCATHeter Q24H    latanoprost (XALATAN) 0.005 % ophthalmic solution 1 Drop  1 Drop Both Eyes QPM    thiamine (B-1) 100 mg in 0.9% sodium chloride 50 mL IVPB  100 mg IntraVENous DAILY    sodium chloride (NS) flush 5-10 mL  5-10 mL IntraVENous Q8H    enoxaparin (LOVENOX) injection 40 mg  40 mg SubCUTAneous Q24H    levETIRAcetam (KEPPRA) 1,500 mg in 0.9% sodium chloride IVPB  1,500 mg IntraVENous Q12H       IMPRESSION:  Maritza Woodson is a 80 y.o. male gentleman admitted with seizure. Keppra was increased. Patient continues to be combative and confused. There is a possibility of continued seizure activity versus stroke versus medication effect. Will limit all sedating medications. Continue Keppra at current dose. Will also get imaging and continuous EEG. RECOMMENDATIONS:  1. Continue Keppra 1500 mg twice daily  2. Continuous EEG  3. Continue Lyrica 300 mg p.o. twice daily when has p.o. access  4. Limit all sedating medications including Ativan, Benadryl, and Haldol. Now that he is out of ICU will start to wean off Haldol. 5.  Seizure precautions  6.   CT head and would like to get MRI of the brain when patient is able to tolerate    Will follow    Todd Celis MD  7/10/2017    Over 35 minutes was spent reviewing records, discussing with Dr. Zulma Shin and nursing, obtaining history, examining patient and discussing treatment plans.

## 2017-07-10 NOTE — PROGRESS NOTES
Attended Interdisciplinary rounds in Critical Care Unit, where patient care was discussed. Visit by: Waldemar Storey. Xena Bustos.  Jayshree Colby MA, [address]

## 2017-07-11 LAB — TROPONIN I SERPL-MCNC: 0.08 NG/ML

## 2017-07-11 PROCEDURE — 74011000250 HC RX REV CODE- 250: Performed by: INTERNAL MEDICINE

## 2017-07-11 PROCEDURE — 74011250636 HC RX REV CODE- 250/636: Performed by: INTERNAL MEDICINE

## 2017-07-11 PROCEDURE — 74011000258 HC RX REV CODE- 258: Performed by: PSYCHIATRY & NEUROLOGY

## 2017-07-11 PROCEDURE — 97530 THERAPEUTIC ACTIVITIES: CPT

## 2017-07-11 PROCEDURE — 74011250637 HC RX REV CODE- 250/637: Performed by: INTERNAL MEDICINE

## 2017-07-11 PROCEDURE — 74011000258 HC RX REV CODE- 258: Performed by: INTERNAL MEDICINE

## 2017-07-11 PROCEDURE — 74011250636 HC RX REV CODE- 250/636: Performed by: PSYCHIATRY & NEUROLOGY

## 2017-07-11 PROCEDURE — 94640 AIRWAY INHALATION TREATMENT: CPT

## 2017-07-11 PROCEDURE — 93005 ELECTROCARDIOGRAM TRACING: CPT

## 2017-07-11 PROCEDURE — 84484 ASSAY OF TROPONIN QUANT: CPT | Performed by: INTERNAL MEDICINE

## 2017-07-11 PROCEDURE — 65660000000 HC RM CCU STEPDOWN

## 2017-07-11 PROCEDURE — 36415 COLL VENOUS BLD VENIPUNCTURE: CPT | Performed by: INTERNAL MEDICINE

## 2017-07-11 RX ORDER — KETOROLAC TROMETHAMINE 30 MG/ML
15 INJECTION, SOLUTION INTRAMUSCULAR; INTRAVENOUS
Status: COMPLETED | OUTPATIENT
Start: 2017-07-11 | End: 2017-07-11

## 2017-07-11 RX ORDER — METOPROLOL TARTRATE 5 MG/5ML
2.5 INJECTION INTRAVENOUS EVERY 6 HOURS
Status: DISCONTINUED | OUTPATIENT
Start: 2017-07-12 | End: 2017-07-12

## 2017-07-11 RX ORDER — IPRATROPIUM BROMIDE AND ALBUTEROL SULFATE 2.5; .5 MG/3ML; MG/3ML
3 SOLUTION RESPIRATORY (INHALATION)
Status: DISCONTINUED | OUTPATIENT
Start: 2017-07-11 | End: 2017-07-14

## 2017-07-11 RX ORDER — IPRATROPIUM BROMIDE AND ALBUTEROL SULFATE 2.5; .5 MG/3ML; MG/3ML
3 SOLUTION RESPIRATORY (INHALATION)
Status: DISCONTINUED | OUTPATIENT
Start: 2017-07-11 | End: 2017-07-11

## 2017-07-11 RX ORDER — LORAZEPAM 2 MG/ML
0.5 INJECTION INTRAMUSCULAR ONCE
Status: COMPLETED | OUTPATIENT
Start: 2017-07-11 | End: 2017-07-11

## 2017-07-11 RX ORDER — HALOPERIDOL 5 MG/ML
1 INJECTION INTRAMUSCULAR
Status: DISCONTINUED | OUTPATIENT
Start: 2017-07-11 | End: 2017-07-12

## 2017-07-11 RX ADMIN — Medication 10 ML: at 14:53

## 2017-07-11 RX ADMIN — HALOPERIDOL LACTATE 1 MG: 5 INJECTION, SOLUTION INTRAMUSCULAR at 21:13

## 2017-07-11 RX ADMIN — IPRATROPIUM BROMIDE AND ALBUTEROL SULFATE 3 ML: .5; 3 SOLUTION RESPIRATORY (INHALATION) at 20:49

## 2017-07-11 RX ADMIN — ACETAMINOPHEN 650 MG: 650 SUPPOSITORY RECTAL at 15:25

## 2017-07-11 RX ADMIN — IPRATROPIUM BROMIDE AND ALBUTEROL SULFATE 3 ML: .5; 3 SOLUTION RESPIRATORY (INHALATION) at 02:52

## 2017-07-11 RX ADMIN — LEVETIRACETAM 500 MG: 100 INJECTION, SOLUTION, CONCENTRATE INTRAVENOUS at 16:32

## 2017-07-11 RX ADMIN — THIAMINE HYDROCHLORIDE 100 MG: 100 INJECTION, SOLUTION INTRAMUSCULAR; INTRAVENOUS at 09:41

## 2017-07-11 RX ADMIN — LEVETIRACETAM 2000 MG: 100 INJECTION, SOLUTION, CONCENTRATE INTRAVENOUS at 21:18

## 2017-07-11 RX ADMIN — LEVETIRACETAM 1500 MG: 100 INJECTION, SOLUTION, CONCENTRATE INTRAVENOUS at 10:20

## 2017-07-11 RX ADMIN — KETOROLAC TROMETHAMINE 15 MG: 30 INJECTION, SOLUTION INTRAMUSCULAR at 16:31

## 2017-07-11 RX ADMIN — IPRATROPIUM BROMIDE AND ALBUTEROL SULFATE 3 ML: .5; 3 SOLUTION RESPIRATORY (INHALATION) at 08:12

## 2017-07-11 RX ADMIN — Medication 10 ML: at 05:59

## 2017-07-11 RX ADMIN — Medication 20 ML: at 14:54

## 2017-07-11 RX ADMIN — LATANOPROST 1 DROP: 50 SOLUTION OPHTHALMIC at 18:18

## 2017-07-11 RX ADMIN — ENOXAPARIN SODIUM 40 MG: 40 INJECTION SUBCUTANEOUS at 09:41

## 2017-07-11 RX ADMIN — DEXTROSE MONOHYDRATE AND SODIUM CHLORIDE 75 ML/HR: 5; .45 INJECTION, SOLUTION INTRAVENOUS at 12:40

## 2017-07-11 RX ADMIN — Medication 10 ML: at 21:18

## 2017-07-11 RX ADMIN — HYDRALAZINE HYDROCHLORIDE 10 MG: 20 INJECTION INTRAMUSCULAR; INTRAVENOUS at 14:53

## 2017-07-11 RX ADMIN — Medication 10 ML: at 14:54

## 2017-07-11 RX ADMIN — IPRATROPIUM BROMIDE AND ALBUTEROL SULFATE 3 ML: .5; 3 SOLUTION RESPIRATORY (INHALATION) at 13:40

## 2017-07-11 RX ADMIN — Medication 10 ML: at 06:00

## 2017-07-11 RX ADMIN — Medication 10 ML: at 21:13

## 2017-07-11 RX ADMIN — LORAZEPAM 0.5 MG: 2 INJECTION INTRAMUSCULAR; INTRAVENOUS at 19:11

## 2017-07-11 NOTE — PROGRESS NOTES
..  * No surgery found *  Bedside and Verbal shift change report given to [facility] by Palmer waters). Report included the following information SBAR, Kardex and ED Summary. Zone Phone:   [phone]      Significant changes during shift:  Sitter at bedside. Pt voiding frequently. Very restless trying to get out of bed often to urinate. Patient Anusha Gregorio  80 y.o.  6/24/2017 12:13 PM by Sha Jones MD. Arjun Lilly was admitted from Home    Problem List    Patient Active Problem List    Diagnosis Date Noted    Oral phase dysphagia 07/08/2017    Status epilepticus ([facility] [address] 06/24/2017    Seizures ([facility] [address] 02/18/2017    Encephalopathy, unspecified 09/03/2012    SVT (supraventricular tachycardia) ([facility] [address] 09/03/2012    Aphasia 09/03/2012    CVA (cerebral vascular accident) ([facility] [address] 09/03/2012    Seizure disorder ([facility] [address] 09/03/2012     Past Medical History:   Diagnosis Date    Alcohol abuse, in remission     Seizures ([facility] [address]          Core Measures:    CVA: No No  CHF:No No  PNA:No No    Post Op Surgical (If Applicable):     Number times ambulated in hallway past shift:  0  Number of times OOB to chair past shift:   0  NG Tube: No  Incentive Spirometer: No  Drains: No   Volume    Dressing Present:  No  Flatus:  Yes    Activity Status:    OOB to Chair No  Ambulated this shift No   Bed Rest Yes    Supplemental O2: (If Applicable)    NC No  NRB No  Venti-mask No  On  Liters/min      LINES AND DRAINS:    Central Line? Yes Placement date 6/24/17 Reason Medically Necessary limited access    PICC LINE? No Placement date Reason Medically Necessary     Urinary Catheter? No Placement Date  Reason Medically Necessary     DVT prophylaxis:    DVT prophylaxis Med- No  DVT prophylaxis SCD or JAIME- Refused     Wounds: (If Applicable)    Wounds- No    Location     Patient Safety:    Falls Score Total Score: 3  Safety Level_______  Bed Alarm On? No  Sitter?  Yes    Plan for upcoming shift: safety        Discharge Plan: Yes     Active Consults:  IP CONSULT TO NEUROLOGY  IP CONSULT TO INTENSIVIST  IP CONSULT TO PALLIATIVE CARE - PROVIDER

## 2017-07-11 NOTE — PROGRESS NOTES
Pt reporting pain in in his feet bilat that is unchanged since receiving toradol at 430p. Telemetry also reporting ST depression. Pt restless in bed and ST at 106-108 on telemetry. Phoned  to report findings. She ordered to perform an EKG, draw troponin, and administer ativan.

## 2017-07-11 NOTE — PROGRESS NOTES
Problem: Mobility Impaired (Adult and Pediatric)  Goal: *Acute Goals and Plan of Care (Insert Text)  Physical Therapy Goals  Initiated 6/30/2017  1. Patient will move from supine to sit and sit to supine , scoot up and down and roll side to side in bed with supervision/set-up within 7 day(s). 2. Patient will transfer from bed to chair and chair to bed with minimal assistance/contact guard assist using the least restrictive device within 7 day(s). 3. Patient will perform sit to stand with minimal assistance/contact guard assist within 7 day(s). 4. Patient will demonstrate intact sitting balance without support within 7 days. Revised 7/11/2017  1. Patient will move from supine to sit and sit to supine , scoot up and down and roll side to side in bed with supervision/set-up within 7 day(s). 2. Patient will transfer from bed to chair and chair to bed with contact guard assist using the least restrictive device within 7 day(s). 3. Patient will perform sit to stand with contact guard assist within 7 day(s). 4. Patient will ambulate with minimal assistance/contact guard assist for 25 feet with the least restrictive device within 7 day(s). PHYSICAL THERAPY TREATMENT: WEEKLY REASSESSMENT  Patient: Tiffani Gordon (64 y.o. male)  Date: 7/11/2017  Diagnosis: Status epilepticus ([address] <principal problem not specified>       Precautions: Fall      ASSESSMENT:  Pt was received in supine and cleared by nursing to mobilize. Pt cooperative and following commands today. He performed bed mobility at a SBA level. Needing increased cues and additional time to complete tasks. Sit<>stand was performed 2x with mod/min A x 2 to come to full stand with RW. Needed hand over hand assist for proper hand placement with RW. Noted posterior lean in standing, which he had difficulty correcting with cues. Able to march in place with RW and side stepped to [facility] with min A from cues and managing walker.  Noted increased tremulous movements in LE and UE. He was returned to sitting then supine. Continue to recommend SNF at discharge. Patient's progression toward goals since last assessment: making progress towards goals, sitting balance improved and ambulation goal created        PLAN:  Goals have been updated based on progression since last assessment. Patient continues to benefit from skilled intervention to address the above impairments. Continue to follow the patient 4 times a week to address goals. Planned Interventions:  [X]              Bed Mobility Training             [ ]       Neuromuscular Re-Education  [X]              Transfer Training                   [ ]       Orthotic/Prosthetic Training  [X]              Gait Training                         [ ]       Modalities  [X]              Therapeutic Exercises           [ ]       Edema Management/Control  [X]              Therapeutic Activities            [X]       Patient and Family Training/Education  [ ]              Other (comment):  Discharge Recommendations: Skilled Nursing Facility  Further Equipment Recommendations for Discharge: TBD       SUBJECTIVE:   Patient stated You want me to stand now.       OBJECTIVE DATA SUMMARY:   Critical Behavior:  Neurologic State: Alert, Confused  Orientation Level: Oriented to person, Oriented to place, Disoriented to situation, Disoriented to time  Cognition: Impulsive, Decreased command following, Decreased attention/concentration  Safety/Judgement: Decreased awareness of environment     Strength:                           Functional Mobility Training:  Bed Mobility:  Rolling: Stand-by asssistance  Supine to Sit: Stand-by asssistance  Sit to Supine: Stand-by asssistance  Scooting: Stand-by asssistance        Transfers:  Sit to Stand:  Moderate assistance;Minimum assistance;Assist x2                                Balance:  Sitting: Intact  Standing: Impaired  Standing - Static: Fair;Constant support  Standing - Dynamic : Poor  Ambulation/Gait Training:   side stepped to [facility] for 2ft with RW and min A x 1-2 (decreased foot clearance)                          Therapeutic Exercises:   Standing marches with RW for 20x  Pain:   no complaint                 Activity Tolerance:   WFL  Please refer to the flowsheet for vital signs taken during this treatment.   After treatment:   [ ]  Patient left in no apparent distress sitting up in chair  [X]  Patient left in no apparent distress in bed (in chair position)  [X]  Call bell left within reach  [X]  Nursing notified  [X]  Caregiver present (sitter)  [ ]  Bed alarm activated      COMMUNICATION/COLLABORATION:   The patients plan of care was discussed with: Occupational Therapist and Registered Nurse     Patricio Joseph, PT, DPT   Time Calculation: 18 mins

## 2017-07-11 NOTE — PROGRESS NOTES
Problem: Self Care Deficits Care Plan (Adult)  Goal: *Acute Goals and Plan of Care (Insert Text)  Occupational Therapy Goals  Weekly reassessment 7/11/2017  1. Patient will perform self-feeding with maximal assistance within 7 day(s). 2. Patient will perform transfer to [facility] with least restrictive DME with moderate assistance within 7 days. 3. Patient will be able to follow one step verbal commands with minimum verbal cues, within 7 days. 4. Patient will perform LB ADLs with moderate assistance seated within 7 days. 5. Patient will perform toileting hygiene with moderate assistance within 7 days. Initiated 6/30/2017, goals modified above  1. Patient will perform self-feeding with maximal assistance within 7 day(s). (change to supervision/setup 7/11/17)  2. Patient will be able to sit on EOB for 5 minutes with min assist in prep for ADLs and within 7 days. (MET 7/11/17)  3. Patient will be able to follow one step verbal commands with max verbal cues, within 7 days (Change minimum 7/11/17)     OCCUPATIONAL THERAPY TREATMENT: WEEKLY REASSESSMENT  Patient: Ana Ball (11 y.o. male)  Date: 7/11/2017  Diagnosis: Status epilepticus ([facility] [address] <principal problem not specified>       Precautions: Fall      ASSESSMENT:  Patient is slowly progress, met 1/3 OT goals with 2/3 upgraded to reflect progress. Pt cooperative this date, no agitation noted. However, pt continues to demonstrate impaired processing, decreased command following and poor standing balance. Pt was SBA and additional time for bed mobility, mod A x 2 to stand from EOB with strong posterior lean. Pt leaning B LEs heavily against bed for support, total A in standing for brief management. Pt was able to demonstrated marching in place with mod A for weight shift to the L , able to take several small side steps up to [facility]. Pt performed face washing seated EOB with setup in R hand and verbal cue and visual cue to initiate.  He is progressing but would continue to benefit from OT. He is needing additional time for seated grooming, mod A upper body and max to total for toileting and LB ADLs at this time. Will follow 3x/week to address above goals. recommend SNF rehab at discharge. Progression toward goals:  [ ]            Improving appropriately and progressing toward goals  [X]            Improving slowly and progressing toward goals  [ ]            Not making progress toward goals and plan of care will be adjusted       PLAN:  Goals have been updated based on progression since last assessment. Patient continues to benefit from skilled intervention to address the above impairments. Continue to follow patient 3 times a week to address goals. Planned Interventions:  [X]                    Self Care Training                  [X]             Therapeutic Activities  [X]                    Functional Mobility Training    [ ]             Cognitive Retraining  [X]                    Therapeutic Exercises           [X]             Endurance Activities  [X]                    Balance Training                   [ ]             Neuromuscular Re-Education  [ ]                    Visual/Perceptual Training     [X]        Home Safety Training  [X]                    Patient Education                 [X]             Family Training/Education  [ ]                    Other (comment):  Discharge Recommendations: Skilled Nursing Facility  Further Equipment Recommendations for Discharge: TBD       SUBJECTIVE:   Patient stated Fargo Lis are you?       OBJECTIVE DATA SUMMARY:   Cognitive/Behavioral Status:  Neurologic State: Alert;Confused  Orientation Level: Oriented to person;Oriented to place  Cognition: Impaired decision making              Functional Mobility and Transfers for ADLs:  Bed Mobility:  Rolling: Stand-by asssistance  Supine to Sit: Stand-by asssistance  Sit to Supine: Stand-by asssistance  Scooting: Stand-by asssistance     Transfers:  Sit to Stand:  Moderate assistance;Minimum assistance;Assist x2        Balance:  Sitting: Intact  Standing: Impaired  Standing - Static: Fair;Constant support  Standing - Dynamic : Poor     ADL Intervention:        Grooming  Washing Face: Supervision/set-up (in R hand, verbal cue x 1 to initiate)              Pain:  Pain Scale 1: Numeric (0 - 10)  Pain Intensity 1: 0              Activity Tolerance:   VSS  Please refer to the flowsheet for vital signs taken during this treatment.   After treatment:   [ ] Patient left in no apparent distress sitting up in chair  [X] Patient left in no apparent distress in bed  [X] Call bell left within reach  [X] Nursing notified  [X] Caregiver present  [ ] Bed alarm activated      COMMUNICATION/COLLABORATION:   The patients plan of care was discussed with: Physical Therapist and Registered Nurse     Yogesh Snow OT  Time Calculation: 18 mins

## 2017-07-11 NOTE — PROGRESS NOTES
Hospitalist Progress Note    NAME: Christina Bernstein   :  1934   MRN:  [id number]       Assessment / Plan:  Acute encephalopathy due to status epilepticus in setting of seizure d/o:  Out of lyrica for a few days prior to admission - has also preceeded other admissions for sz. Mental status still not at baseline since his extubation and persisted throughout his hospital course. - CT head  no acute process  - CTA head/neck  with no significant cervical carotid arterial stenosis.  Patent vertebral basilar system. No large vessel intracranial vascular occlusive change. Mild mucous retention cyst and mucoperiosteal thickening in the maxillary sinuses bilaterally. 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 - appreciate neuro consult, did have some focal twitching today per notes, so have increased keppra. Repeat EEG pending.   - holding home lyrica while NPO. Con't ativan prn seizure activity only. - reducing sedating meds, including reducing haldol tonight  - PT/OT appreciated, improved participation today. Have asked speech to reconsult. Hypertension, benign/essential:    - metoprolol IV q6hrs started (on toprol at home)  - prn IV metoprolol  Hyperlipidemia: holding lipitor while NPO  History of SVT:  Briefly on cardizem gtt this admission  - serial troponin and EKG ordered with lower chest/abd pain complaints today  Peripheral neuropathy: ran out of lyrica several days PTA. Needs to be restarted ASAP due to ongoing leg sx. Remote ETOH abuse:  Family confirms no ETOH in 5 years. Con't thiamine supplementation. GERD: con't protonix  Acute respiratory failure (resolved) due to acute encephalopathy with chronic underlying hypercapnea: intubated initially during admission. Duonebs changed to prn only.   - self-extubated   - finished zosyn       DVT prophylaxis: lovenox    Code status: Full (wife is decision maker)     Subjective:     Chief Complaint / Reason for Physician Visit  Mild confusion, but understandable speech. Case d/w Pt's wife at bedside. Discussed with RN events overnight. Review of Systems:  Symptom Y/N Comments  Symptom Y/N Comments   Fever/Chills n   Chest Pain n    Poor Appetite n   Edema n    Cough n   Abdominal Pain y    Sputum n   Joint Pain     SOB/AL n   Pruritis/Rash     Nausea/vomit    Tolerating PT/OT     Diarrhea    Tolerating Diet     Constipation    Other       Could NOT obtain due to:      Objective:     VITALS:   Last 24hrs VS reviewed since prior progress note. Most recent are:  Patient Vitals for the past 24 hrs:   Temp Pulse Resp BP SpO2   07/11/17 0813 - - - - 98 %   07/11/17 0807 98.4 °F (36.9 °C) 91 18 (!) 167/93 98 %   07/11/17 0413 97.9 °F (36.6 °C) 87 18 143/71 98 %   07/11/17 0251 - - - - 98 %   07/10/17 2329 97.3 °F (36.3 °C) (!) 54 18 157/66 98 %   07/10/17 2017 - - - - 96 %   07/10/17 1955 97.3 °F (36.3 °C) 92 18 150/83 99 %   07/10/17 1545 98 °F (36.7 °C) 100 18 149/75 97 %   07/10/17 1505 - - - - 98 %   07/10/17 1322 97.8 °F (36.6 °C) 89 18 169/86 99 %     No intake or output data in the 24 hours ending 07/11/17 1134     PHYSICAL EXAM:  General: WD, WN. Alert, cooperative, no acute distress    EENT:  EOMI. Anicteric sclerae. MMM  Resp:  CTA bilaterally, no wheezing or rales. No accessory muscle use  CV:  Regular rhythm,  No edema  GI:  Soft, Non distended, Non tender.  +Bowel sounds  Neurologic:  Alert and oriented X 3, normal speech,   Psych:   Limited insight. Not anxious nor agitated  Skin:  No rashes.   No jaundice    Reviewed most current lab test results and cultures  YES  Reviewed most current radiology test results   YES  Review and summation of old records today    NO  Reviewed patient's current orders and MAR    YES  PMH/SH reviewed - no change compared to H&P  ________________________________________________________________________  Care Plan discussed with:    Comments   Patient x    Family  x wife RN x    Care Manager     Consultant  x neuro                     Multidiciplinary team rounds were held today with , nursing, pharmacist and clinical coordinator. Patient's plan of care was discussed; medications were reviewed and discharge planning was addressed. ________________________________________________________________________  Total NON critical care TIME:  35 Minutes    Total CRITICAL CARE TIME Spent:   Minutes non procedure based      Comments   >50% of visit spent in counseling and coordination of care x    ________________________________________________________________________  Lorraine Pineda MD     Procedures: see electronic medical records for all procedures/Xrays and details which were not copied into this note but were reviewed prior to creation of Plan. LABS:  I reviewed today's most current labs and imaging studies.   Pertinent labs include:  Recent Labs      07/10/17   0457   WBC  5.9   HGB  11.2*   HCT  32.8*   PLT  268     Recent Labs      07/10/17   0325   NA  135*   K  3.5   CL  101   CO2  29   GLU  115*   BUN  10   CREA  0.64*   CA  8.9       Signed: Lorraine Pineda MD

## 2017-07-11 NOTE — PROGRESS NOTES
NEUROLOGY PROGRESS NOTE     CC: Seizures    SUBJECTIVE  No new issues. Patient more alert and partially oriented today. He did have some focal left arm and facial twitching today. ROS  A ten system review of constitutional, cardiovascular, respiratory, musculoskeletal, endocrine, skin, SHEENT, genitourinary, psychiatric and neurologic systems was obtained and is unremarkable except as stated in HPI     PHYSICAL EXAM  EXAMINATION:   Patient Vitals for the past 24 hrs:   Temp Pulse Resp BP SpO2   07/11/17 1442 98.7 °F (37.1 °C) 99 18 158/79 98 %   07/11/17 1340 - - - - 96 %   07/11/17 1205 97.9 °F (36.6 °C) 95 19 (!) 179/94 97 %   07/11/17 0813 - - - - 98 %   07/11/17 0807 98.4 °F (36.9 °C) 91 18 (!) 167/93 98 %   07/11/17 0413 97.9 °F (36.6 °C) 87 18 143/71 98 %   07/11/17 0251 - - - - 98 %   07/10/17 2329 97.3 °F (36.3 °C) (!) 54 18 157/66 98 %   07/10/17 2017 - - - - 96 %   07/10/17 1955 97.3 °F (36.3 °C) 92 18 150/83 99 %        General:   General appearance: Pt is in no acute distress   Distal pulses are preserved    Neurological Examination:   Mental Status: alert and oriented to partial date and location    Cranial Nerves: Pupils equal and reactive to light. Face symmetric. Tongue is midline, left facial twitching noted    Motor: Moves all 4 extremities symmetrically to pain, left UE tremor    Sensation: Withdraws to pain ×4    Coordination/Cerebellar: Unable to assess    Gait: Unable to assess    Skin: No significant bruising or lacerations.     LAB DATA REVIEWED:    Results for orders placed or performed during the hospital encounter of 06/24/17   CULTURE, BLOOD   Result Value Ref Range    Special Requests: NO SPECIAL REQUESTS      Culture result: NO GROWTH 6 DAYS     CULTURE, BLOOD   Result Value Ref Range    Special Requests: NO SPECIAL REQUESTS      Culture result: (A)       STAPHYLOCOCCUS CAPITIS  GROWING IN 1 OF 2 BOTTLES DRAWN  (SITE = C LINE)      Culture result: (A)       PRELIMINARY REPORT OF  GRAM POSITIVE COCCI  IN CLUSTERS  GROWING IN 1 OF 2 BOTTLES DRAWN  CALLED TO AND READ BACK BY  [facility] RN [id number] AT 2013 ON 6/27/2017. Roc [address]68      Culture result: REMAINING BOTTLE(S) HAS/HAVE NO GROWTH IN 5 DAYS         Susceptibility    Staphylococcus capitis - EDGARD     Ampicillin/sulbactam ($) <=8/4 Susceptible ug/mL     Cefazolin ($) <=4 Susceptible ug/mL     Ciprofloxacin ($) <=1 Susceptible ug/mL     Clindamycin ($) <=0.5 Susceptible ug/mL     Daptomycin ($$$$$) <=0.5 Susceptible ug/mL     Erythromycin ($$$$) <=0.5 Susceptible ug/mL     Gentamicin ($) <=4 Susceptible ug/mL     Levofloxacin ($) <=1 Susceptible ug/mL     Linezolid ($$$$$) <=1 Susceptible ug/mL     Oxacillin <=0.25 Susceptible ug/mL     Rifampin ($$$$)* <=1 Susceptible ug/mL      * Rifampin is not to be used for mono-therapy. Tetracycline <=4 Susceptible ug/mL     Trimeth-Sulfamethoxa <=0.5/9.5 Susceptible ug/mL     Vancomycin ($) 0.5 Susceptible ug/mL   CBC WITH AUTOMATED DIFF   Result Value Ref Range    WBC 6.2 4.1 - 11.1 K/uL    RBC 4.28 4.10 - 5.70 M/uL    HGB 14.3 12.1 - 17.0 g/dL    HCT 40.8 36.6 - 50.3 %    MCV 95.3 80.0 - 99.0 FL    MCH 33.4 26.0 - 34.0 PG    MCHC 35.0 30.0 - 36.5 g/dL    RDW 12.4 11.5 - 14.5 %    PLATELET 181 (L) 354 - 400 K/uL    NEUTROPHILS 65 32 - 75 %    LYMPHOCYTES 23 12 - 49 %    MONOCYTES 12 5 - 13 %    EOSINOPHILS 0 0 - 7 %    BASOPHILS 0 0 - 1 %    ABS. NEUTROPHILS 4.0 1.8 - 8.0 K/UL    ABS. LYMPHOCYTES 1.4 0.8 - 3.5 K/UL    ABS. MONOCYTES 0.8 0.0 - 1.0 K/UL    ABS. EOSINOPHILS 0.0 0.0 - 0.4 K/UL    ABS.  BASOPHILS 0.0 0.0 - 0.1 K/UL   METABOLIC PANEL, COMPREHENSIVE   Result Value Ref Range    Sodium 137 136 - 145 mmol/L    Potassium 3.6 3.5 - 5.1 mmol/L    Chloride 102 97 - 108 mmol/L    CO2 28 21 - 32 mmol/L    Anion gap 7 5 - 15 mmol/L    Glucose 105 (H) 65 - 100 mg/dL    BUN 13 6 - 20 MG/DL    Creatinine 0.98 0.70 - 1.30 MG/DL    BUN/Creatinine ratio 13 12 - 20      GFR est AA >60 >60 ml/min/1.73m2 GFR est non-AA >60 >60 ml/min/1.73m2    Calcium 9.6 8.5 - 10.1 MG/DL    Bilirubin, total 1.1 (H) 0.2 - 1.0 MG/DL    ALT (SGPT) 19 12 - 78 U/L    AST (SGOT) 16 15 - 37 U/L    Alk. phosphatase 83 45 - 117 U/L    Protein, total 8.9 (H) 6.4 - 8.2 g/dL    Albumin 4.4 3.5 - 5.0 g/dL    Globulin 4.5 (H) 2.0 - 4.0 g/dL    A-G Ratio 1.0 (L) 1.1 - 2.2     TROPONIN I   Result Value Ref Range    Troponin-I, Qt. <0.04 <0.05 ng/mL   CK W/ REFLX CKMB   Result Value Ref Range     39 - 308 U/L   LEVETIRACETAM (KEPPRA)   Result Value Ref Range    Levetiracetam (Keppra) 36.6 10.0 - 40.0 ug/mL   URINALYSIS W/MICROSCOPIC   Result Value Ref Range    Color YELLOW/STRAW      Appearance CLOUDY (A) CLEAR      Specific gravity 1.019 1.003 - 1.030      pH (UA) 7.0 5.0 - 8.0      Protein NEGATIVE  NEG mg/dL    Glucose NEGATIVE  NEG mg/dL    Ketone TRACE (A) NEG mg/dL    Blood NEGATIVE  NEG      Urobilinogen 1.0 0.2 - 1.0 EU/dL    Nitrites NEGATIVE  NEG      Leukocyte Esterase NEGATIVE  NEG      WBC 0-4 0 - 4 /hpf    RBC 0-5 0 - 5 /hpf    Epithelial cells FEW FEW /lpf    Bacteria NEGATIVE  NEG /hpf    Hyaline cast 0-2 0 - 5 /lpf   BILIRUBIN, CONFIRM   Result Value Ref Range    Bilirubin UA, confirm NEGATIVE  NEG     CK W/ CKMB & INDEX   Result Value Ref Range     39 - 308 U/L    CK - MB 1.6 <3.6 NG/ML    CK-MB Index 0.7 0 - 2.5     LACTIC ACID, PLASMA   Result Value Ref Range    Lactic acid 2.2 (HH) 0.4 - 2.0 MMOL/L   BLOOD GAS, ARTERIAL   Result Value Ref Range    pH 7.47 (H) 7.35 - 7.45      PCO2 34 (L) 35.0 - 45.0 mmHg    PO2 212 (H) 80 - 100 mmHg    O2  (H) 92 - 97 %    BICARBONATE 24 22 - 26 mmol/L    BASE EXCESS 1.0 mmol/L    O2 METHOD VENTILATOR      FIO2 50 %    MODE A/C      Tidal volume 500      SET RATE 16      EPAP/CPAP/PEEP 6.0      Sample source ARTERIAL      SITE RIGHT RADIAL      TERE'S TEST YES     MISC.  LAB TEST   Result Value Ref Range    Test Description: LYRICA LEVEL     Reference Lab: [facility] Results: SEE REPORT FROM [facility]    METABOLIC PANEL, COMPREHENSIVE   Result Value Ref Range    Sodium 136 136 - 145 mmol/L    Potassium 4.4 3.5 - 5.1 mmol/L    Chloride 103 97 - 108 mmol/L    CO2 26 21 - 32 mmol/L    Anion gap 7 5 - 15 mmol/L    Glucose 123 (H) 65 - 100 mg/dL    BUN 17 6 - 20 MG/DL    Creatinine 1.04 0.70 - 1.30 MG/DL    BUN/Creatinine ratio 16 12 - 20      GFR est AA >60 >60 ml/min/1.73m2    GFR est non-AA >60 >60 ml/min/1.73m2    Calcium 8.2 (L) 8.5 - 10.1 MG/DL    Bilirubin, total 1.4 (H) 0.2 - 1.0 MG/DL    ALT (SGPT) 15 12 - 78 U/L    AST (SGOT) 14 (L) 15 - 37 U/L    Alk. phosphatase 59 45 - 117 U/L    Protein, total 7.1 6.4 - 8.2 g/dL    Albumin 3.4 (L) 3.5 - 5.0 g/dL    Globulin 3.7 2.0 - 4.0 g/dL    A-G Ratio 0.9 (L) 1.1 - 2.2     CBC WITH AUTOMATED DIFF   Result Value Ref Range    WBC 14.1 (H) 4.1 - 11.1 K/uL    RBC 3.59 (L) 4.10 - 5.70 M/uL    HGB 12.0 (L) 12.1 - 17.0 g/dL    HCT 34.9 (L) 36.6 - 50.3 %    MCV 97.2 80.0 - 99.0 FL    MCH 33.4 26.0 - 34.0 PG    MCHC 34.4 30.0 - 36.5 g/dL    RDW 12.8 11.5 - 14.5 %    PLATELET 96 (L) 131 - 400 K/uL    NEUTROPHILS 83 (H) 32 - 75 %    LYMPHOCYTES 9 (L) 12 - 49 %    MONOCYTES 8 5 - 13 %    EOSINOPHILS 0 0 - 7 %    BASOPHILS 0 0 - 1 %    ABS. NEUTROPHILS 11.7 (H) 1.8 - 8.0 K/UL    ABS. LYMPHOCYTES 1.3 0.8 - 3.5 K/UL    ABS. MONOCYTES 1.1 (H) 0.0 - 1.0 K/UL    ABS. EOSINOPHILS 0.0 0.0 - 0.4 K/UL    ABS.  BASOPHILS 0.0 0.0 - 0.1 K/UL   TROPONIN I   Result Value Ref Range    Troponin-I, Qt. <0.04 <0.05 ng/mL   CBC W/O DIFF   Result Value Ref Range    WBC 8.0 4.1 - 11.1 K/uL    RBC 3.43 (L) 4.10 - 5.70 M/uL    HGB 11.3 (L) 12.1 - 17.0 g/dL    HCT 33.3 (L) 36.6 - 50.3 %    MCV 97.1 80.0 - 99.0 FL    MCH 32.9 26.0 - 34.0 PG    MCHC 33.9 30.0 - 36.5 g/dL    RDW 12.7 11.5 - 14.5 %    PLATELET 77 (L) 830 - 631 K/uL   METABOLIC PANEL, BASIC   Result Value Ref Range    Sodium 138 136 - 145 mmol/L    Potassium 3.7 3.5 - 5.1 mmol/L    Chloride 105 97 - 108 mmol/L    CO2 27 21 - 32 mmol/L    Anion gap 6 5 - 15 mmol/L    Glucose 100 65 - 100 mg/dL    BUN 12 6 - 20 MG/DL    Creatinine 0.71 0.70 - 1.30 MG/DL    BUN/Creatinine ratio 17 12 - 20      GFR est AA >60 >60 ml/min/1.73m2    GFR est non-AA >60 >60 ml/min/1.73m2    Calcium 8.6 8.5 - 10.1 MG/DL   MAGNESIUM   Result Value Ref Range    Magnesium 2.2 1.6 - 2.4 mg/dL   PHOSPHORUS   Result Value Ref Range    Phosphorus 2.4 (L) 2.6 - 4.7 MG/DL   BLOOD GAS, ARTERIAL   Result Value Ref Range    pH 7.27 (L) 7.35 - 7.45      PCO2 59 (H) 35.0 - 45.0 mmHg    PO2 95 80 - 100 mmHg    O2 SAT 96 92 - 97 %    BICARBONATE 27 (H) 22 - 26 mmol/L    BASE DEFICIT 1.3 mmol/L    O2 METHOD NASAL O2      O2 FLOW RATE 4.00 L/min    SPONTANEOUS RATE 26.0      Sample source ARTERIAL      SITE LEFT BRACHIAL      TERE'S TEST N/A     CBC W/O DIFF   Result Value Ref Range    WBC 7.8 4.1 - 11.1 K/uL    RBC 3.40 (L) 4.10 - 5.70 M/uL    HGB 11.6 (L) 12.1 - 17.0 g/dL    HCT 33.2 (L) 36.6 - 50.3 %    MCV 97.6 80.0 - 99.0 FL    MCH 34.1 (H) 26.0 - 34.0 PG    MCHC 34.9 30.0 - 36.5 g/dL    RDW 12.4 11.5 - 14.5 %    PLATELET 77 (L) 638 - 525 K/uL   METABOLIC PANEL, BASIC   Result Value Ref Range    Sodium 143 136 - 145 mmol/L    Potassium 3.5 3.5 - 5.1 mmol/L    Chloride 108 97 - 108 mmol/L    CO2 29 21 - 32 mmol/L    Anion gap 6 5 - 15 mmol/L    Glucose 119 (H) 65 - 100 mg/dL    BUN 8 6 - 20 MG/DL    Creatinine 0.71 0.70 - 1.30 MG/DL    BUN/Creatinine ratio 11 (L) 12 - 20      GFR est AA >60 >60 ml/min/1.73m2    GFR est non-AA >60 >60 ml/min/1.73m2    Calcium 8.9 8.5 - 10.1 MG/DL   MAGNESIUM   Result Value Ref Range    Magnesium 1.8 1.6 - 2.4 mg/dL   PHOSPHORUS   Result Value Ref Range    Phosphorus 2.4 (L) 2.6 - 4.7 MG/DL   BLOOD GAS, ARTERIAL   Result Value Ref Range    pH 7.38 7.35 - 7.45      PCO2 45 35.0 - 45.0 mmHg    PO2 104 (H) 80 - 100 mmHg    O2 SAT 98 (H) 92 - 97 %    BICARBONATE 26 22 - 26 mmol/L    BASE EXCESS 0.8 mmol/L    O2 METHOD NASAL O2      O2 FLOW RATE 2.00 L/min    SPONTANEOUS RATE 26.0      Sample source ARTERIAL      SITE LEFT RADIAL      TERE'S TEST YES     VITAMIN B12   Result Value Ref Range    Vitamin B12 1064 (H) 211 - 911 pg/mL   TSH 3RD GENERATION   Result Value Ref Range    TSH 0.93 0.36 - 3.74 uIU/mL   AMMONIA   Result Value Ref Range    Ammonia 25 <32 UMOL/L   CBC W/O DIFF   Result Value Ref Range    WBC 6.2 4.1 - 11.1 K/uL    RBC 3.64 (L) 4.10 - 5.70 M/uL    HGB 12.0 (L) 12.1 - 17.0 g/dL    HCT 33.9 (L) 36.6 - 50.3 %    MCV 93.1 80.0 - 99.0 FL    MCH 33.0 26.0 - 34.0 PG    MCHC 35.4 30.0 - 36.5 g/dL    RDW 12.1 11.5 - 14.5 %    PLATELET 87 (L) 100 - 498 K/uL   METABOLIC PANEL, BASIC   Result Value Ref Range    Sodium 138 136 - 145 mmol/L    Potassium 3.2 (L) 3.5 - 5.1 mmol/L    Chloride 105 97 - 108 mmol/L    CO2 26 21 - 32 mmol/L    Anion gap 7 5 - 15 mmol/L    Glucose 111 (H) 65 - 100 mg/dL    BUN 7 6 - 20 MG/DL    Creatinine 0.61 (L) 0.70 - 1.30 MG/DL    BUN/Creatinine ratio 11 (L) 12 - 20      GFR est AA >60 >60 ml/min/1.73m2    GFR est non-AA >60 >60 ml/min/1.73m2    Calcium 8.6 8.5 - 10.1 MG/DL   MAGNESIUM   Result Value Ref Range    Magnesium 1.5 (L) 1.6 - 2.4 mg/dL   PHOSPHORUS   Result Value Ref Range    Phosphorus 2.2 (L) 2.6 - 4.7 MG/DL   METABOLIC PANEL, COMPREHENSIVE   Result Value Ref Range    Sodium 138 136 - 145 mmol/L    Potassium 3.3 (L) 3.5 - 5.1 mmol/L    Chloride 105 97 - 108 mmol/L    CO2 24 21 - 32 mmol/L    Anion gap 9 5 - 15 mmol/L    Glucose 102 (H) 65 - 100 mg/dL    BUN 9 6 - 20 MG/DL    Creatinine 0.52 (L) 0.70 - 1.30 MG/DL    BUN/Creatinine ratio 17 12 - 20      GFR est AA >60 >60 ml/min/1.73m2    GFR est non-AA >60 >60 ml/min/1.73m2    Calcium 8.6 8.5 - 10.1 MG/DL    Bilirubin, total 1.2 (H) 0.2 - 1.0 MG/DL    ALT (SGPT) 17 12 - 78 U/L    AST (SGOT) 23 15 - 37 U/L    Alk.  phosphatase 56 45 - 117 U/L    Protein, total 7.5 6.4 - 8.2 g/dL    Albumin 2.9 (L) 3.5 - 5.0 g/dL    Globulin 4.6 (H) 2.0 - 4.0 g/dL    A-G Ratio 0.6 (L) 1.1 - 2.2     MAGNESIUM   Result Value Ref Range    Magnesium 1.6 1.6 - 2.4 mg/dL   PHOSPHORUS   Result Value Ref Range    Phosphorus 3.0 2.6 - 4.7 MG/DL   CBC WITH AUTOMATED DIFF   Result Value Ref Range    WBC 5.4 4.1 - 11.1 K/uL    RBC 3.58 (L) 4.10 - 5.70 M/uL    HGB 11.9 (L) 12.1 - 17.0 g/dL    HCT 33.2 (L) 36.6 - 50.3 %    MCV 92.7 80.0 - 99.0 FL    MCH 33.2 26.0 - 34.0 PG    MCHC 35.8 30.0 - 36.5 g/dL    RDW 12.1 11.5 - 14.5 %    PLATELET 708 (L) 660 - 400 K/uL    NEUTROPHILS 61 32 - 75 %    LYMPHOCYTES 18 12 - 49 %    MONOCYTES 18 (H) 5 - 13 %    EOSINOPHILS 3 0 - 7 %    BASOPHILS 0 0 - 1 %    ABS. NEUTROPHILS 3.3 1.8 - 8.0 K/UL    ABS. LYMPHOCYTES 1.0 0.8 - 3.5 K/UL    ABS. MONOCYTES 1.0 0.0 - 1.0 K/UL    ABS. EOSINOPHILS 0.2 0.0 - 0.4 K/UL    ABS.  BASOPHILS 0.0 0.0 - 0.1 K/UL   CBC W/O DIFF   Result Value Ref Range    WBC 4.7 4.1 - 11.1 K/uL    RBC 3.52 (L) 4.10 - 5.70 M/uL    HGB 11.9 (L) 12.1 - 17.0 g/dL    HCT 33.1 (L) 36.6 - 50.3 %    MCV 94.0 80.0 - 99.0 FL    MCH 33.8 26.0 - 34.0 PG    MCHC 36.0 30.0 - 36.5 g/dL    RDW 12.3 11.5 - 14.5 %    PLATELET 121 (L) 870 - 284 K/uL   METABOLIC PANEL, BASIC   Result Value Ref Range    Sodium 139 136 - 145 mmol/L    Potassium 3.3 (L) 3.5 - 5.1 mmol/L    Chloride 104 97 - 108 mmol/L    CO2 22 21 - 32 mmol/L    Anion gap 13 5 - 15 mmol/L    Glucose 92 65 - 100 mg/dL    BUN 10 6 - 20 MG/DL    Creatinine 0.49 (L) 0.70 - 1.30 MG/DL    BUN/Creatinine ratio 20 12 - 20      GFR est AA >60 >60 ml/min/1.73m2    GFR est non-AA >60 >60 ml/min/1.73m2    Calcium 8.6 8.5 - 10.1 MG/DL   MAGNESIUM   Result Value Ref Range    Magnesium 1.3 (L) 1.6 - 2.4 mg/dL   PHOSPHORUS   Result Value Ref Range    Phosphorus 2.8 2.6 - 4.7 MG/DL   METABOLIC PANEL, BASIC   Result Value Ref Range    Sodium 139 136 - 145 mmol/L    Potassium 3.4 (L) 3.5 - 5.1 mmol/L    Chloride 104 97 - 108 mmol/L    CO2 23 21 - 32 mmol/L    Anion gap 12 5 - 15 mmol/L    Glucose 114 (H) 65 - 100 mg/dL    BUN 13 6 - 20 MG/DL    Creatinine 0.67 (L) 0.70 - 1.30 MG/DL    BUN/Creatinine ratio 19 12 - 20      GFR est AA >60 >60 ml/min/1.73m2    GFR est non-AA >60 >60 ml/min/1.73m2    Calcium 8.6 8.5 - 10.1 MG/DL   MAGNESIUM   Result Value Ref Range    Magnesium 1.6 1.6 - 2.4 mg/dL   PHOSPHORUS   Result Value Ref Range    Phosphorus 3.1 2.6 - 4.7 MG/DL   CBC W/O DIFF   Result Value Ref Range    WBC 6.2 4.1 - 11.1 K/uL    RBC 3.60 (L) 4.10 - 5.70 M/uL    HGB 11.7 (L) 12.1 - 17.0 g/dL    HCT 33.5 (L) 36.6 - 50.3 %    MCV 93.1 80.0 - 99.0 FL    MCH 32.5 26.0 - 34.0 PG    MCHC 34.9 30.0 - 36.5 g/dL    RDW 12.2 11.5 - 14.5 %    PLATELET 814 (L) 498 - 400 K/uL   CBC WITH AUTOMATED DIFF   Result Value Ref Range    WBC 5.8 4.1 - 11.1 K/uL    RBC 3.51 (L) 4.10 - 5.70 M/uL    HGB 11.7 (L) 12.1 - 17.0 g/dL    HCT 32.5 (L) 36.6 - 50.3 %    MCV 92.6 80.0 - 99.0 FL    MCH 33.3 26.0 - 34.0 PG    MCHC 36.0 30.0 - 36.5 g/dL    RDW 12.3 11.5 - 14.5 %    PLATELET 392 (L) 026 - 400 K/uL    NEUTROPHILS 54 32 - 75 %    LYMPHOCYTES 21 12 - 49 %    MONOCYTES 20 (H) 5 - 13 %    EOSINOPHILS 5 0 - 7 %    BASOPHILS 0 0 - 1 %    ABS. NEUTROPHILS 3.1 1.8 - 8.0 K/UL    ABS. LYMPHOCYTES 1.2 0.8 - 3.5 K/UL    ABS. MONOCYTES 1.2 (H) 0.0 - 1.0 K/UL    ABS. EOSINOPHILS 0.3 0.0 - 0.4 K/UL    ABS.  BASOPHILS 0.0 0.0 - 0.1 K/UL    RBC COMMENTS NORMOCYTIC, NORMOCHROMIC      DF SMEAR SCANNED     METABOLIC PANEL, COMPREHENSIVE   Result Value Ref Range    Sodium 138 136 - 145 mmol/L    Potassium 2.9 (L) 3.5 - 5.1 mmol/L    Chloride 102 97 - 108 mmol/L    CO2 29 21 - 32 mmol/L    Anion gap 7 5 - 15 mmol/L    Glucose 133 (H) 65 - 100 mg/dL    BUN 9 6 - 20 MG/DL    Creatinine 0.63 (L) 0.70 - 1.30 MG/DL    BUN/Creatinine ratio 14 12 - 20      GFR est AA >60 >60 ml/min/1.73m2    GFR est non-AA >60 >60 ml/min/1.73m2    Calcium 8.4 (L) 8.5 - 10.1 MG/DL    Bilirubin, total 0.8 0.2 - 1.0 MG/DL    ALT (SGPT) 25 12 - 78 U/L    AST (SGOT) 24 15 - 37 U/L Alk. phosphatase 59 45 - 117 U/L    Protein, total 6.5 6.4 - 8.2 g/dL    Albumin 2.6 (L) 3.5 - 5.0 g/dL    Globulin 3.9 2.0 - 4.0 g/dL    A-G Ratio 0.7 (L) 1.1 - 2.2     MAGNESIUM   Result Value Ref Range    Magnesium 1.3 (L) 1.6 - 2.4 mg/dL   PHOSPHORUS   Result Value Ref Range    Phosphorus 2.8 2.6 - 4.7 MG/DL   TROPONIN I   Result Value Ref Range    Troponin-I, Qt. 0.31 (H) <0.05 ng/mL   CK W/ CKMB & INDEX   Result Value Ref Range    CK 99 39 - 308 U/L    CK - MB 1.8 <3.6 NG/ML    CK-MB Index 1.8 0 - 2.5     PROTHROMBIN TIME + INR   Result Value Ref Range    INR 1.2 (H) 0.9 - 1.1      Prothrombin time 11.7 (H) 9.0 - 11.1 sec   PTT   Result Value Ref Range    aPTT 30.1 22.1 - 32.5 sec    aPTT, therapeutic range     58.0 - 77.0 SECS   CBC WITH AUTOMATED DIFF   Result Value Ref Range    WBC 7.2 4.1 - 11.1 K/uL    RBC 3.58 (L) 4.10 - 5.70 M/uL    HGB 11.8 (L) 12.1 - 17.0 g/dL    HCT 33.5 (L) 36.6 - 50.3 %    MCV 93.6 80.0 - 99.0 FL    MCH 33.0 26.0 - 34.0 PG    MCHC 35.2 30.0 - 36.5 g/dL    RDW 12.5 11.5 - 14.5 %    PLATELET 487 125 - 960 K/uL    NEUTROPHILS 57 32 - 75 %    LYMPHOCYTES 22 12 - 49 %    MONOCYTES 17 (H) 5 - 13 %    EOSINOPHILS 4 0 - 7 %    BASOPHILS 0 0 - 1 %    ABS. NEUTROPHILS 4.2 1.8 - 8.0 K/UL    ABS. LYMPHOCYTES 1.6 0.8 - 3.5 K/UL    ABS. MONOCYTES 1.2 (H) 0.0 - 1.0 K/UL    ABS. EOSINOPHILS 0.3 0.0 - 0.4 K/UL    ABS.  BASOPHILS 0.0 0.0 - 0.1 K/UL   METABOLIC PANEL, BASIC   Result Value Ref Range    Sodium 137 136 - 145 mmol/L    Potassium 3.0 (L) 3.5 - 5.1 mmol/L    Chloride 100 97 - 108 mmol/L    CO2 31 21 - 32 mmol/L    Anion gap 6 5 - 15 mmol/L    Glucose 136 (H) 65 - 100 mg/dL    BUN 11 6 - 20 MG/DL    Creatinine 0.62 (L) 0.70 - 1.30 MG/DL    BUN/Creatinine ratio 18 12 - 20      GFR est AA >60 >60 ml/min/1.73m2    GFR est non-AA >60 >60 ml/min/1.73m2    Calcium 8.6 8.5 - 10.1 MG/DL   MAGNESIUM   Result Value Ref Range    Magnesium 1.6 1.6 - 2.4 mg/dL   PHOSPHORUS   Result Value Ref Range Phosphorus 2.9 2.6 - 4.7 MG/DL   METABOLIC PANEL, BASIC   Result Value Ref Range    Sodium 136 136 - 145 mmol/L    Potassium 4.2 3.5 - 5.1 mmol/L    Chloride 97 97 - 108 mmol/L    CO2 33 (H) 21 - 32 mmol/L    Anion gap 6 5 - 15 mmol/L    Glucose 149 (H) 65 - 100 mg/dL    BUN 13 6 - 20 MG/DL    Creatinine 0.69 (L) 0.70 - 1.30 MG/DL    BUN/Creatinine ratio 19 12 - 20      GFR est AA >60 >60 ml/min/1.73m2    GFR est non-AA >60 >60 ml/min/1.73m2    Calcium 8.6 8.5 - 10.1 MG/DL   BLOOD GAS, ARTERIAL   Result Value Ref Range    pH 7.35 7.35 - 7.45      PCO2 65 (H) 35.0 - 45.0 mmHg    PO2 164 (H) 80 - 100 mmHg    O2 SAT 99 (H) 92 - 97 %    BICARBONATE 35 (H) 22 - 26 mmol/L    BASE EXCESS 6.7 mmol/L    O2 METHOD BIPAP      FIO2 40 %    MODE BIPAP      SET RATE 4      SPONTANEOUS RATE 19.0      IPAP/PIP 14.0      EPAP/CPAP/PEEP 5.0      Sample source ARTERIAL      SITE RIGHT RADIAL      TERE'S TEST YES     BLOOD GAS, ARTERIAL   Result Value Ref Range    pH 7.45 7.35 - 7.45      PCO2 47 (H) 35.0 - 45.0 mmHg    PO2 139 (H) 80 - 100 mmHg    O2 SAT 99 (H) 92 - 97 %    BICARBONATE 32 (H) 22 - 26 mmol/L    BASE EXCESS 6.7 mmol/L    O2 METHOD BIPAP      FIO2 30 %    MODE BIPAP      SET RATE 4      IPAP/PIP 14.0      EPAP/CPAP/PEEP 5.0      Sample source ARTERIAL      SITE RIGHT RADIAL      TERE'S TEST YES     METABOLIC PANEL, BASIC   Result Value Ref Range    Sodium 137 136 - 145 mmol/L    Potassium 3.6 3.5 - 5.1 mmol/L    Chloride 99 97 - 108 mmol/L    CO2 33 (H) 21 - 32 mmol/L    Anion gap 5 5 - 15 mmol/L    Glucose 126 (H) 65 - 100 mg/dL    BUN 26 (H) 6 - 20 MG/DL    Creatinine 0.77 0.70 - 1.30 MG/DL    BUN/Creatinine ratio 34 (H) 12 - 20      GFR est AA >60 >60 ml/min/1.73m2    GFR est non-AA >60 >60 ml/min/1.73m2    Calcium 9.3 8.5 - 10.1 MG/DL   MAGNESIUM   Result Value Ref Range    Magnesium 2.0 1.6 - 2.4 mg/dL   BLOOD GAS, ARTERIAL   Result Value Ref Range    pH 7.40 7.35 - 7.45      PCO2 49 (H) 35.0 - 45.0 mmHg    PO2 127 (H) 80 - 100 mmHg    O2 SAT 99 (H) 92 - 97 %    BICARBONATE 30 (H) 22 - 26 mmol/L    BASE EXCESS 4.0 mmol/L    O2 METHOD NASAL O2      O2 FLOW RATE 2.00 L/min    Sample source ARTERIAL      SITE LEFT RADIAL      TERE'S TEST YES     METABOLIC PANEL, BASIC   Result Value Ref Range    Sodium 135 (L) 136 - 145 mmol/L    Potassium 3.5 3.5 - 5.1 mmol/L    Chloride 101 97 - 108 mmol/L    CO2 29 21 - 32 mmol/L    Anion gap 5 5 - 15 mmol/L    Glucose 115 (H) 65 - 100 mg/dL    BUN 10 6 - 20 MG/DL    Creatinine 0.64 (L) 0.70 - 1.30 MG/DL    BUN/Creatinine ratio 16 12 - 20      GFR est AA >60 >60 ml/min/1.73m2    GFR est non-AA >60 >60 ml/min/1.73m2    Calcium 8.9 8.5 - 10.1 MG/DL   CBC W/O DIFF   Result Value Ref Range    WBC 5.9 4.1 - 11.1 K/uL    RBC 3.50 (L) 4.10 - 5.70 M/uL    HGB 11.2 (L) 12.1 - 17.0 g/dL    HCT 32.8 (L) 36.6 - 50.3 %    MCV 93.7 80.0 - 99.0 FL    MCH 32.0 26.0 - 34.0 PG    MCHC 34.1 30.0 - 36.5 g/dL    RDW 12.2 11.5 - 14.5 %    PLATELET 090 661 - 510 K/uL   GLUCOSE, POC   Result Value Ref Range    Glucose (POC) 110 (H) 65 - 100 mg/dL    Performed by Devin Orantes    POC CREATININE   Result Value Ref Range    Creatinine (POC) 0.9 0.6 - 1.3 MG/DL    GFRAA, POC >60 >60 ml/min/1.73m2    GFRNA, POC >60 >60 ml/min/1.73m2   POC CHEM8   Result Value Ref Range    Calcium, ionized (POC) 1.14 1.12 - 1.32 MMOL/L    Sodium (POC) 140 136 - 145 MMOL/L    Potassium (POC) 4.3 3.5 - 5.1 MMOL/L    Chloride (POC) 101 98 - 107 MMOL/L    CO2 (POC) 30 21 - 32 MMOL/L    Anion gap (POC) 14 5 - 15 mmol/L    Glucose (POC) 107 (H) 65 - 100 MG/DL    BUN (POC) 15 9 - 20 MG/DL    Creatinine (POC) 0.9 0.6 - 1.3 MG/DL    GFRAA, POC >60 >60 ml/min/1.73m2    GFRNA, POC >60 >60 ml/min/1.73m2    Hemoglobin (POC) 14.3 12.1 - 17.0 GM/DL    Hematocrit (POC) 42 36.6 - 50.3 %    Comment Comment Not Indicated.      GLUCOSE, POC   Result Value Ref Range    Glucose (POC) 92 65 - 100 mg/dL    Performed by Juvencio Holcomb    GLUCOSE, POC   Result Value Ref Range    Glucose (POC) 112 (H) 65 - 100 mg/dL    Performed by [email or web address]    GLUCOSE, POC   Result Value Ref Range    Glucose (POC) 125 (H) 65 - 100 mg/dL    Performed by [email or web address]    GLUCOSE, POC   Result Value Ref Range    Glucose (POC) 151 (H) 65 - 100 mg/dL    Performed by [facility]    GLUCOSE, POC   Result Value Ref Range    Glucose (POC) 138 (H) 65 - 100 mg/dL    [facility]formed [facility]    GLUCOSE, POC   Result Value Ref Range    Glucose (POC) 139 (H) 65 - 100 mg/dL    Performed by [facility]    GLUCOSE, POC   Result Value Ref Range    Glucose (POC) 160 (H) 65 - 100 mg/dL    Performed by Arias Busby    GLUCOSE, POC   Result Value Ref Range    Glucose (POC) 138 (H) 65 - 100 mg/dL    Performed by Arias Busby    GLUCOSE, POC   Result Value Ref Range    Glucose (POC) 123 (H) 65 - 100 mg/dL    Performed by [facility]    GLUCOSE, POC   Result Value Ref Range    Glucose (POC) 121 (H) 65 - 100 mg/dL    Performed by [facility]    GLUCOSE, POC   Result Value Ref Range    Glucose (POC) 104 (H) 65 - 100 mg/dL    Performed by [facility]    GLUCOSE, POC   Result Value Ref Range    Glucose (POC) 97 65 - 100 mg/dL    Performed by [facility]    GLUCOSE, POC   Result Value Ref Range    Glucose (POC) 104 (H) 65 - 100 mg/dL    Performed by [facility]    EKG, 12 LEAD, INITIAL   Result Value Ref Range    Ventricular Rate 88 BPM    Atrial Rate 88 BPM    P-R Interval 294 ms    QRS Duration 82 ms    Q-T Interval 368 ms    QTC Calculation (Bezet) 445 ms    Calculated P Axis 61 degrees    Calculated R Axis -38 degrees    Calculated T Axis 52 degrees    Diagnosis       Sinus rhythm with 1st degree AV block  Left axis deviation  Nonspecific T wave abnormality  When compared with ECG of 06-SEP-2012 03:00,  CO interval has increased  ST no longer elevated in Anterior leads  Nonspecific T wave abnormality, worse in Anterolateral leads  Confirmed by Etelvina Winters ([id number]) on 6/25/2017 1:15:25 PM          Imaging review:  6/24/2017  CT scan of the head without contrast  No acute intracranial abnormality    CT angiogram of the head and neck  No significant cervical carotid artery stenosis. No large vessel intracranial vascular occlusive change. HOME MEDS  Prior to Admission Medications   Prescriptions Last Dose Informant Patient Reported? Taking?   aspirin (ASPIRIN) 325 mg tablet   Yes No   Sig: Take 1 Tab by mouth daily. atorvastatin (LIPITOR) 40 mg tablet   No No   Sig: Take 1 Tab by mouth daily. cyclobenzaprine (FLEXERIL) 5 mg tablet   No No   Sig: Take 1 Tab by mouth three (3) times daily as needed for Muscle Spasm(s). levETIRAcetam (KEPPRA) 250 mg tablet   No No   Sig: Take 1 Tab by mouth two (2) times a day. Take with the 1000mg to make 1250mg twice a day. levETIRAcetam 1,000 mg tablet   No No   Sig: Take 1 Tab by mouth two (2) times a day. magnesium oxide (MAG-OX) 400 mg tablet   Yes No   Sig: Take 400 mg by mouth daily. metoprolol succinate (TOPROL-XL) 25 mg XL tablet   Yes No   Sig: Take  by mouth daily. omeprazole (PRILOSEC) 40 mg capsule   Yes No   Sig: Take 40 mg by mouth daily. pregabalin (LYRICA) 300 mg capsule   No No   Sig: Take 1 Cap by mouth two (2) times a day. Max Daily Amount: 600 mg.   thiamine (B-1) 100 mg tablet   No No   Sig: Take 1 Tab by mouth daily.       Facility-Administered Medications: None       CURRENT MEDS  Current Facility-Administered Medications   Medication Dose Route Frequency    albuterol-ipratropium (DUO-NEB) 2.5 MG-0.5 MG/3 ML  3 mL Nebulization Q6HWA RT    haloperidol lactate (HALDOL) injection 1 mg  1 mg IntraVENous QHS    levETIRAcetam (KEPPRA) 2,000 mg in 0.9% sodium chloride IVPB  2,000 mg IntraVENous Q12H    levETIRAcetam (KEPPRA) 500 mg in 0.9% sodium chloride IVPB  500 mg IntraVENous ONCE    dextrose 5 % - 0.45% NaCl infusion  75 mL/hr IntraVENous CONTINUOUS    sodium chloride (NS) flush 10 mL  10 mL InterCATHeter Q24H    sodium chloride (NS) flush 10-40 mL  10-40 mL InterCATHeter Q8H    sodium chloride (NS) flush 20 mL  20 mL InterCATHeter Q24H    latanoprost (XALATAN) 0.005 % ophthalmic solution 1 Drop  1 Drop Both Eyes QPM    thiamine (B-1) 100 mg in 0.9% sodium chloride 50 mL IVPB  100 mg IntraVENous DAILY    sodium chloride (NS) flush 5-10 mL  5-10 mL IntraVENous Q8H    enoxaparin (LOVENOX) injection 40 mg  40 mg SubCUTAneous Q24H       IMPRESSION:  Gilbert Pereyra is a 80 y.o. male gentleman admitted with seizure. He has been encephalopathic for days. He is improving with decreased haldol. Today he did have some focal twitching so will increase keppra. EEG pending. RECOMMENDATIONS:  1. Keppra 500mg IV x 1 then increase to 2000mg BID  2. Continuous EEG complete. Report pending. 3.  Continue Lyrica 300 mg p.o. twice daily when has p.o. access  4. Limit all sedating medications including Ativan, Benadryl, and Haldol. Decrease haldol again tonight to 1 mg qhs  5. Seizure precautions  6. Will wait on further neuro imaging at this point since patient is improved from yesterday. Will follow    Charlotte Mistry MD  7/11/2017    Over 35 minutes was spent reviewing records, discussing with Dr. Jaqui Carrington, palliative, and nursing, obtaining history, examining patient and discussing treatment plans.

## 2017-07-11 NOTE — INTERDISCIPLINARY ROUNDS
[facility] Telemetry Unit Interdisciplinary rounds were held today to discuss patient plan of care and outcomes. The interdisciplinary team collaborated to facilitate discharge planning needs. The following members were present; Nurse Manager, [id number], Pharmacist, Primary Nurse, , Physical Therapy,   Physician, and Case Management.      PLAN:  Pt was transferred to our unit, PT is recommending SNF, MD to eval, DC planning TBD

## 2017-07-11 NOTE — PROGRESS NOTES
ADULT PROTOCOL: JET AEROSOL ASSESSMENT    Patient  Broderick Ortega     80 y.o.   male     7/11/2017  10:09 AM    Breath Sounds Pre Procedure: Right Breath Sounds: Clear                               Left Breath Sounds: Clear    Breath Sounds Post Procedure: Right Breath Sounds: Clear                                 Left Breath Sounds: Clear    Breathing pattern: Pre procedure Breathing Pattern: Regular          Post procedure Breathing Pattern: Regular    Heart Rate: Pre procedure Pulse: 90           Post procedure Pulse: 88    Resp Rate: Pre procedure Respirations: 16           Post procedure Respirations: 16      Cough: Pre procedure Cough: Congested               Post procedure Cough: Non-productive    Sputum: Pre procedure Sputum amount: Moderate  Sputum color/odor: Pink tinged, Tan  Sputum consistency:  Thick  Sputum method obtained: Cough on request                 Post procedure      Oxygen: O2 Device: Room air   room air     Changed: NO    SpO2: Pre procedure SpO2: 98 %   without oxygen              Post procedure SpO2: 96 %  without oxygen    Nebulizer Therapy: Current medications Aerosolized Medications: DuoNeb      Changed: YES    Smoking History: never    Problem List:   Patient Active Problem List   Diagnosis Code    Encephalopathy, unspecified G93.40    SVT [facility]supraventricular tachycardia) ([facility]) I47.1    Aphasia R47.01    CVA (cerebral vascular accident) ([facility] [address] I63.9    Seizure disorder ([facility] [address] G40.909    Seizures ([address] R56.9    Status epilepticus ([facility] [address] G40.901    Oral phase dysphagia R13.11       Respiratory Therapist: Modesta Rosenthal RT

## 2017-07-11 NOTE — PROGRESS NOTES
Met with pt and pt's family and discussed therapy's recommendation of SNF at discharge. Pt had been to [facility] in the past and family lives in [place]. Pt's wife would like pt to go to [facility]. CM will send referral via [facility]. FOC form completed and filed on pt's chart.      Jessica Huang, [id number]

## 2017-07-12 LAB
ALBUMIN SERPL BCP-MCNC: 2.9 G/DL (ref 3.5–5)
ALBUMIN/GLOB SERPL: 0.8 {RATIO} (ref 1.1–2.2)
ALP SERPL-CCNC: 52 U/L (ref 45–117)
ALT SERPL-CCNC: 28 U/L (ref 12–78)
ANION GAP BLD CALC-SCNC: 5 MMOL/L (ref 5–15)
AST SERPL W P-5'-P-CCNC: 21 U/L (ref 15–37)
ATRIAL RATE: 120 BPM
BILIRUB SERPL-MCNC: 0.7 MG/DL (ref 0.2–1)
BUN SERPL-MCNC: 10 MG/DL (ref 6–20)
BUN/CREAT SERPL: 16 (ref 12–20)
CALCIUM SERPL-MCNC: 8.9 MG/DL (ref 8.5–10.1)
CALCULATED R AXIS, ECG10: -76 DEGREES
CALCULATED T AXIS, ECG11: -78 DEGREES
CHLORIDE SERPL-SCNC: 103 MMOL/L (ref 97–108)
CO2 SERPL-SCNC: 30 MMOL/L (ref 21–32)
CREAT SERPL-MCNC: 0.64 MG/DL (ref 0.7–1.3)
DIAGNOSIS, 93000: NORMAL
ERYTHROCYTE [DISTWIDTH] IN BLOOD BY AUTOMATED COUNT: 12.4 % (ref 11.5–14.5)
GLOBULIN SER CALC-MCNC: 3.5 G/DL (ref 2–4)
GLUCOSE SERPL-MCNC: 120 MG/DL (ref 65–100)
HCT VFR BLD AUTO: 31.2 % (ref 36.6–50.3)
HGB BLD-MCNC: 10.9 G/DL (ref 12.1–17)
MAGNESIUM SERPL-MCNC: 1.3 MG/DL (ref 1.6–2.4)
MCH RBC QN AUTO: 32.7 PG (ref 26–34)
MCHC RBC AUTO-ENTMCNC: 34.9 G/DL (ref 30–36.5)
MCV RBC AUTO: 93.7 FL (ref 80–99)
P-R INTERVAL, ECG05: 180 MS
PHOSPHATE SERPL-MCNC: 2.5 MG/DL (ref 2.6–4.7)
PLATELET # BLD AUTO: 237 K/UL (ref 150–400)
POTASSIUM SERPL-SCNC: 3.2 MMOL/L (ref 3.5–5.1)
PROT SERPL-MCNC: 6.4 G/DL (ref 6.4–8.2)
Q-T INTERVAL, ECG07: 356 MS
QRS DURATION, ECG06: 76 MS
QTC CALCULATION (BEZET), ECG08: 503 MS
RBC # BLD AUTO: 3.33 M/UL (ref 4.1–5.7)
SODIUM SERPL-SCNC: 138 MMOL/L (ref 136–145)
TROPONIN I SERPL-MCNC: 0.07 NG/ML
TROPONIN I SERPL-MCNC: 0.09 NG/ML
VENTRICULAR RATE, ECG03: 120 BPM
WBC # BLD AUTO: 4 K/UL (ref 4.1–11.1)

## 2017-07-12 PROCEDURE — 74011000258 HC RX REV CODE- 258: Performed by: PSYCHIATRY & NEUROLOGY

## 2017-07-12 PROCEDURE — 74011000258 HC RX REV CODE- 258: Performed by: INTERNAL MEDICINE

## 2017-07-12 PROCEDURE — 74011250636 HC RX REV CODE- 250/636: Performed by: INTERNAL MEDICINE

## 2017-07-12 PROCEDURE — 84484 ASSAY OF TROPONIN QUANT: CPT | Performed by: INTERNAL MEDICINE

## 2017-07-12 PROCEDURE — 36415 COLL VENOUS BLD VENIPUNCTURE: CPT | Performed by: INTERNAL MEDICINE

## 2017-07-12 PROCEDURE — 92526 ORAL FUNCTION THERAPY: CPT

## 2017-07-12 PROCEDURE — 83735 ASSAY OF MAGNESIUM: CPT | Performed by: INTERNAL MEDICINE

## 2017-07-12 PROCEDURE — 65660000000 HC RM CCU STEPDOWN

## 2017-07-12 PROCEDURE — 85027 COMPLETE CBC AUTOMATED: CPT | Performed by: INTERNAL MEDICINE

## 2017-07-12 PROCEDURE — 74011250636 HC RX REV CODE- 250/636: Performed by: PSYCHIATRY & NEUROLOGY

## 2017-07-12 PROCEDURE — 80053 COMPREHEN METABOLIC PANEL: CPT | Performed by: INTERNAL MEDICINE

## 2017-07-12 PROCEDURE — 74011000250 HC RX REV CODE- 250: Performed by: INTERNAL MEDICINE

## 2017-07-12 PROCEDURE — 84100 ASSAY OF PHOSPHORUS: CPT | Performed by: INTERNAL MEDICINE

## 2017-07-12 RX ORDER — LORAZEPAM 2 MG/ML
0.5 INJECTION INTRAMUSCULAR
Status: COMPLETED | OUTPATIENT
Start: 2017-07-12 | End: 2017-07-13

## 2017-07-12 RX ORDER — HALOPERIDOL 5 MG/ML
0.5 INJECTION INTRAMUSCULAR
Status: DISCONTINUED | OUTPATIENT
Start: 2017-07-12 | End: 2017-07-13

## 2017-07-12 RX ORDER — METOPROLOL TARTRATE 5 MG/5ML
5 INJECTION INTRAVENOUS EVERY 6 HOURS
Status: DISCONTINUED | OUTPATIENT
Start: 2017-07-13 | End: 2017-07-14

## 2017-07-12 RX ORDER — POTASSIUM CHLORIDE 7.45 MG/ML
10 INJECTION INTRAVENOUS
Status: COMPLETED | OUTPATIENT
Start: 2017-07-12 | End: 2017-07-15

## 2017-07-12 RX ORDER — ENALAPRILAT 1.25 MG/ML
0.62 INJECTION INTRAVENOUS EVERY 6 HOURS
Status: DISCONTINUED | OUTPATIENT
Start: 2017-07-12 | End: 2017-07-13

## 2017-07-12 RX ADMIN — METOPROLOL TARTRATE 2.5 MG: 5 INJECTION INTRAVENOUS at 11:41

## 2017-07-12 RX ADMIN — Medication 10 ML: at 13:10

## 2017-07-12 RX ADMIN — Medication 10 ML: at 21:38

## 2017-07-12 RX ADMIN — ENALAPRILAT 0.62 MG: 2.5 INJECTION INTRAVENOUS at 23:46

## 2017-07-12 RX ADMIN — LEVETIRACETAM 2000 MG: 100 INJECTION, SOLUTION, CONCENTRATE INTRAVENOUS at 21:36

## 2017-07-12 RX ADMIN — METOPROLOL TARTRATE 2.5 MG: 5 INJECTION INTRAVENOUS at 00:25

## 2017-07-12 RX ADMIN — THIAMINE HYDROCHLORIDE 100 MG: 100 INJECTION, SOLUTION INTRAMUSCULAR; INTRAVENOUS at 08:37

## 2017-07-12 RX ADMIN — METOPROLOL TARTRATE 2.5 MG: 5 INJECTION INTRAVENOUS at 07:30

## 2017-07-12 RX ADMIN — DEXTROSE MONOHYDRATE AND SODIUM CHLORIDE 75 ML/HR: 5; .45 INJECTION, SOLUTION INTRAVENOUS at 00:57

## 2017-07-12 RX ADMIN — HYDRALAZINE HYDROCHLORIDE 10 MG: 20 INJECTION INTRAMUSCULAR; INTRAVENOUS at 15:55

## 2017-07-12 RX ADMIN — POTASSIUM CHLORIDE 10 MEQ: 10 INJECTION, SOLUTION INTRAVENOUS at 08:28

## 2017-07-12 RX ADMIN — POTASSIUM CHLORIDE 10 MEQ: 10 INJECTION, SOLUTION INTRAVENOUS at 13:09

## 2017-07-12 RX ADMIN — Medication 10 ML: at 04:13

## 2017-07-12 RX ADMIN — Medication 10 ML: at 00:26

## 2017-07-12 RX ADMIN — METOPROLOL TARTRATE 2.5 MG: 5 INJECTION INTRAVENOUS at 18:25

## 2017-07-12 RX ADMIN — LEVETIRACETAM 2000 MG: 100 INJECTION, SOLUTION, CONCENTRATE INTRAVENOUS at 09:18

## 2017-07-12 RX ADMIN — METOPROLOL TARTRATE 5 MG: 5 INJECTION INTRAVENOUS at 23:47

## 2017-07-12 RX ADMIN — LATANOPROST 1 DROP: 50 SOLUTION OPHTHALMIC at 19:46

## 2017-07-12 RX ADMIN — RETINOL, ERGOCALCIFEROL, .ALPHA.-TOCOPHEROL ACETATE, DL-, PHYTONADIONE, ASCORBIC ACID, NIACINAMIDE, RIBOFLAVIN 5-PHOSPHATE SODIUM, THIAMINE HYDROCHLORIDE, PYRIDOXINE HYDROCHLORIDE, DEXPANTHENOL, BIOTIN, FOLIC ACID, AND CYANOCOBALAMIN: KIT at 19:48

## 2017-07-12 RX ADMIN — LORAZEPAM 1 MG: 2 INJECTION INTRAMUSCULAR; INTRAVENOUS at 04:12

## 2017-07-12 RX ADMIN — LORAZEPAM 1 MG: 2 INJECTION INTRAMUSCULAR; INTRAVENOUS at 00:25

## 2017-07-12 RX ADMIN — POTASSIUM CHLORIDE 10 MEQ: 10 INJECTION, SOLUTION INTRAVENOUS at 11:44

## 2017-07-12 RX ADMIN — Medication 10 ML: at 07:34

## 2017-07-12 RX ADMIN — DEXTROSE MONOHYDRATE AND SODIUM CHLORIDE 75 ML/HR: 5; .45 INJECTION, SOLUTION INTRAVENOUS at 12:02

## 2017-07-12 RX ADMIN — POTASSIUM CHLORIDE 10 MEQ: 10 INJECTION, SOLUTION INTRAVENOUS at 09:19

## 2017-07-12 RX ADMIN — ENALAPRILAT 0.62 MG: 2.5 INJECTION INTRAVENOUS at 20:41

## 2017-07-12 RX ADMIN — ENOXAPARIN SODIUM 40 MG: 40 INJECTION SUBCUTANEOUS at 08:27

## 2017-07-12 RX ADMIN — HALOPERIDOL LACTATE 0.5 MG: 5 INJECTION, SOLUTION INTRAMUSCULAR at 21:38

## 2017-07-12 NOTE — PROGRESS NOTES
Nutrition Assessment:    INTERVENTIONS/RECOMMENDATIONS:   Addendum: Per Conversation with Dr. Hunter Memos, pt to start TPN: D20 5%AA @ 42ml/hr. Monitor for refeeding    If K+, Mg and Phos are stable and anticipate pt will be receive for >5 days, consider below recommendations:   D20 5%AA @ 63 + 250 ml 20% lipid emulsion 3x per week    Provides: 1544 kcal and 75 g protein    Of note, pt has functioning GI tract, recommend TF via NG, recs provided below    · Consider TF via NG until PO intake resumes or PEG is placed  · Jevity 1.5 @ 30mL/h, advance rate 10mL q 12h as tolerated to Goal Rate of 50mL/h + 150mL H2O flush q 6h (provides 1800kcals/77gPro/1512mL)   · Pt may be at risk for refeeding, monitor K+, Mg and Phos once nutrition is initiated     ASSESSMENT:   Chart reviewed, medically noted for SLP documenting pt not candidate for PO due to altered mental status and GI holding off on PEG for 1-2 days in hope that mental status improves for PO intake. This is day 7 without nutrition and receiving minimal kcal from D5. Diet Order: NPO (x7days)  % Eaten:  No data found. Pertinent Medications: [x] Reviewed []Other: (D5 1/2NS)  Pertinent Labs: [x]Reviewed  []Other: (K+ 3.2, Phos 2.5, Mg 1.3)  Food Allergies: [x]None []Other:     Last BM: 7/4   []Active     []Hyperactive  []Hypoactive       [] Absent  BS  Skin:    [x] Intact   [] Incision  [] Breakdown   []Edema   []Other:    Anthropometrics: Height: 5' 8\" (172.7 cm) Weight: 67.3 kg (148 lb 5.9 oz)    IBW (%IBW):   ( ) UBW (%UBW):   (  %)    BMI: Body mass index is 22.56 kg/(m^2).     This BMI is indicative of:  []Underweight   [x]Normal   []Overweight   [] Obesity   [] Extreme Obesity (BMI>40)  Last Weight Metrics:  Weight Loss Metrics 7/9/2017 6/21/2017 3/21/2017 3/18/2017 3/7/2017 2/25/2017 9/3/2012   Today's Wt 148 lb 5.9 oz 155 lb 166 lb 150 lb 166 lb 168 lb 192 lb   BMI 22.56 kg/m2 23.57 kg/m2 25.24 kg/m2 22.81 kg/m2 24.51 kg/m2 24.81 kg/m2 25.34 kg/m2 Estimated Nutrition Needs (Based on): 1673 Kcals/day (MSJ 1394 x 1.2) , 58 g (-72 (0.8-1gPro/kg) ) Protein  Carbohydrate: At Least 130 g/day  Fluids: 1673 mL/day or per primary team    NUTRITION DIAGNOSES:   Problem:  Inadequate protein-energy intake      Etiology: related to pt NPO 2' agitation/confusion     Signs/Symptoms: as evidenced by NPO meets 0% kcal and protein needs. NUTRITION INTERVENTIONS:  Meals/Snacks:  Other (advance diet per SLP) Enteral/Parenteral Nutrition: Initiate enteral nutrition                GOAL:   intiate nutrition via TF or PO in 1-2 days    NUTRITION MONITORING AND EVALUATION   Behavioral-Environmental Outcomes: Behavior  Food/Nutrient Intake Outcomes: Enteral/parenteral nutrition intake, Total energy intake, IV fluids  Physical Signs/Symptoms Outcomes: GI, Glucose profile, GI profile, Electrolyte and renal profile, Weight/weight change    Previous Goal Met:   [] Met              [] Progressing Towards Goal              [x] Not Progressing Towards Goal   Previous Recommendations:   [] Implemented          [] Not Implemented          [x] Not Applicable    LEARNING NEEDS (Diet, Food/Nutrient-Drug Interaction):    [x] None Identified   [] Identified and Education Provided/Documented   [] Identified and Pt declined/was not appropriate     Cultural, Jainism, OR Ethnic Dietary Needs:    [x] None Identified   [] Identified and Addressed     [x] Interdisciplinary Care Plan Reviewed/Documented    [x] Discharge Planning: TBD   [] Participated in Interdisciplinary Rounds    NUTRITION RISK:    [x] High              [] Moderate           []  Low  []  Minimal/Uncompromised      Evelyne Espinoza RDN  Pager [phone]  Weekend Pager [phone]

## 2017-07-12 NOTE — PROGRESS NOTES
Problem: Falls - Risk of  Goal: *Absence of falls  Outcome: Progressing Towards Goal  Pt was trying to get out of bed, not listening to sitter's instructions. Became agitated. Medicated with Ativan 1mg IV. Pt resting comfortably at present.

## 2017-07-12 NOTE — CONSULTS
GI Consultation Note [facility])    NAME: Mitesh Dhillon : 1934 MRN: [id number]   ATTG: [unfilled] PCP: Silvia Trevino MD  Date/Time:  2017 1:47 PM  Subjective:   REASON FOR CONSULT:    Oropharyngeal dysphagia for PEG placement    Emmy Goetz is a 80 y.o.  male who I was asked to see for above. Pt has had a complicated hospital course after presenting with acute encephalopathy 2/2 status epilepticus. Hospital course has included acute respiratory failure requiring intubation from which patient had self extubated himself. Pt failed a Speech pathology evaluation today and GI is consulted for PEG tube. I am unable to get a history from pt but he does not appear to have any h/o abdominal surgeries and other than ASA 325mg he is not on any other anticoagulation/antii-platelet agents. Past Medical History:   Diagnosis Date    Alcohol abuse, in remission     Seizures ([facility] [address]       No past surgical history on file. Social History   Substance Use Topics    Smoking status: Never Smoker    Smokeless tobacco: Not on file    Alcohol use No      Comment: Quit drinking 10 years ago      Family History   Problem Relation Age of Onset    Other Other      no strokes or seizures      Allergies   Allergen Reactions    No Known Allergies       Home Medications:  Prior to Admission Medications   Prescriptions Last Dose Informant Patient Reported? Taking?   aspirin (ASPIRIN) 325 mg tablet   Yes No   Sig: Take 1 Tab by mouth daily. atorvastatin (LIPITOR) 40 mg tablet   No No   Sig: Take 1 Tab by mouth daily. cyclobenzaprine (FLEXERIL) 5 mg tablet   No No   Sig: Take 1 Tab by mouth three (3) times daily as needed for Muscle Spasm(s). levETIRAcetam (KEPPRA) 250 mg tablet   No No   Sig: Take 1 Tab by mouth two (2) times a day. Take with the 1000mg to make 1250mg twice a day. levETIRAcetam 1,000 mg tablet   No No   Sig: Take 1 Tab by mouth two (2) times a day.    magnesium oxide (MAG-OX) 400 mg tablet   Yes No   Sig: Take 400 mg by mouth daily. metoprolol succinate (TOPROL-XL) 25 mg XL tablet   Yes No   Sig: Take  by mouth daily. omeprazole (PRILOSEC) 40 mg capsule   Yes No   Sig: Take 40 mg by mouth daily. pregabalin (LYRICA) 300 mg capsule   No No   Sig: Take 1 Cap by mouth two (2) times a day. Max Daily Amount: 600 mg.   thiamine (B-1) 100 mg tablet   No No   Sig: Take 1 Tab by mouth daily.       Facility-Administered Medications: None     Hospital medications:  Current Facility-Administered Medications   Medication Dose Route Frequency    potassium chloride 10 mEq in 100 ml IVPB  10 mEq IntraVENous Q1H    albuterol-ipratropium (DUO-NEB) 2.5 MG-0.5 MG/3 ML  3 mL Nebulization Q4H PRN    haloperidol lactate (HALDOL) injection 1 mg  1 mg IntraVENous QHS    levETIRAcetam (KEPPRA) 2,000 mg in 0.9% sodium chloride IVPB  2,000 mg IntraVENous Q12H    metoprolol (LOPRESSOR) injection 2.5 mg  2.5 mg IntraVENous Q6H    heparin (porcine) pf 300 Units  300 Units InterCATHeter PRN    LORazepam (ATIVAN) injection 1 mg  1 mg IntraVENous Q2H PRN    dextrose 5 % - 0.45% NaCl infusion  75 mL/hr IntraVENous CONTINUOUS    fentaNYL citrate (PF) injection 25-50 mcg  25-50 mcg IntraVENous Q2H PRN    hydrALAZINE (APRESOLINE) 20 mg/mL injection 10 mg  10 mg IntraVENous Q6H PRN    metoprolol (LOPRESSOR) injection 5 mg  5 mg IntraVENous Q6H PRN    sodium chloride (NS) flush 10-30 mL  10-30 mL InterCATHeter PRN    sodium chloride (NS) flush 10 mL  10 mL InterCATHeter Q24H    sodium chloride (NS) flush 10 mL  10 mL InterCATHeter PRN    sodium chloride (NS) flush 10-40 mL  10-40 mL InterCATHeter Q8H    sodium chloride (NS) flush 20 mL  20 mL InterCATHeter Q24H    latanoprost (XALATAN) 0.005 % ophthalmic solution 1 Drop  1 Drop Both Eyes QPM    thiamine (B-1) 100 mg in 0.9% sodium chloride 50 mL IVPB  100 mg IntraVENous DAILY    sodium chloride (NS) flush 5-10 mL  5-10 mL IntraVENous Q8H    sodium chloride (NS) flush 5-10 mL  5-10 mL IntraVENous PRN    naloxone (NARCAN) injection 0.4 mg  0.4 mg IntraVENous PRN    ondansetron (ZOFRAN) injection 4 mg  4 mg IntraVENous Q4H PRN    bisacodyl (DULCOLAX) suppository 10 mg  10 mg Rectal DAILY PRN    acetaminophen (TYLENOL) suppository 650 mg  650 mg Rectal Q4H PRN    enoxaparin (LOVENOX) injection 40 mg  40 mg SubCUTAneous Q24H     REVIEW OF SYSTEMS:     [x]     Unable to obtain  ROS due to  [x]    mental status change  []    sedated   []    intubated   []    Total of 11 systems reviewed as follows:  Const:  negative fever, negative chills, negative weight loss  Eyes:   negative diplopia or visual changes, negative eye pain  ENT:   negative coryza, negative sore throat  Resp:   negative cough, hemoptysis, dyspnea  Cards:  negative for chest pain, palpitations, lower extremity edema  :  negative for frequency, dysuria and hematuria  Skin:   negative for rash and pruritus  Heme:  negative for easy bruising and gum/nose bleeding  MS:  negative for myalgias, arthralgias, back pain and muscle weakness  Neurolo:  negative for headaches, dizziness, vertigo, memory problems   Psych:  negative for feelings of anxiety, depression     Pertinent Positives include :    Objective:   VITALS:    Visit Vitals    BP (!) 178/92 (BP 1 Location: Left arm, BP Patient Position: At rest)    Pulse 98    Temp 97.5 °F (36.4 °C)    Resp 18    Ht 5' 8\" (1.727 m)    Wt 67.3 kg (148 lb 5.9 oz)    SpO2 98%    BMI 22.56 kg/m2     Temp (24hrs), Av.3 °F (36.8 °C), Min:97.5 °F (36.4 °C), Max:98.7 °F (37.1 °C)    PHYSICAL EXAM:   General:    Alert, cooperative appears stated age. Head:   Normocephalic, without obvious abnormality, atraumatic. Eyes:   Conjunctivae clear, anicteric sclerae. Pupils are equal  Nose:  Nares normal. No drainage or sinus tenderness.   Throat:    Lips, mucosa, and tongue normal.  No Thrush  Neck:  Supple, symmetrical,  no adenopathy, thyroid: non tender  Back:    Symmetric,  No CVA tenderness. Lungs:   CTA bilaterally. No wheezing/rhonchi/rales. Chest wall:  No tenderness or deformity. No Accessory muscle use. Heart:   Regular rate and rhythm,  no murmur, rub or gallop. Abdomen:   Soft, non-tender. Not distended. Bowel sounds normal. No masses  Extremities: Atraumatic, No cyanosis. No edema. No clubbing  Skin:     Texture, turgor normal. No rashes/lesions/jaundice  Lymph: Cervical, supraclavicular normal.  Psych:  Good insight. Not depressed. Not anxious or agitated. Neurologic: EOMs intact. No facial asymmetry. No aphasia or slurred speech. Normal  strength, A/O X 2.      LAB DATA REVIEWED:    Recent Results (from the past 48 hour(s))   TROPONIN I    Collection Time: 07/11/17  6:31 PM   Result Value Ref Range    Troponin-I, Qt. 0.08 (H) <0.05 ng/mL   EKG, 12 LEAD, SUBSEQUENT    Collection Time: 07/11/17  6:31 PM   Result Value Ref Range    Ventricular Rate 120 BPM    Atrial Rate 120 BPM    P-R Interval 180 ms    QRS Duration 76 ms    Q-T Interval 356 ms    QTC Calculation (Bezet) 503 ms    Calculated R Axis -76 degrees    Calculated T Axis -78 degrees    Diagnosis       Sinus tachycardia with frequent premature ventricular complexes  Left anterior fascicular block  Confirmed by Valarie Smith ([id number]) on 7/12/2017 9:55:41 AM     CBC W/O DIFF    Collection Time: 07/12/17  1:03 AM   Result Value Ref Range    WBC 4.0 (L) 4.1 - 11.1 K/uL    RBC 3.33 (L) 4.10 - 5.70 M/uL    HGB 10.9 (L) 12.1 - 17.0 g/dL    HCT 31.2 (L) 36.6 - 50.3 %    MCV 93.7 80.0 - 99.0 FL    MCH 32.7 26.0 - 34.0 PG    MCHC 34.9 30.0 - 36.5 g/dL    RDW 12.4 11.5 - 14.5 %    PLATELET 201 113 - 129 K/uL   METABOLIC PANEL, COMPREHENSIVE    Collection Time: 07/12/17  1:03 AM   Result Value Ref Range    Sodium 138 136 - 145 mmol/L    Potassium 3.2 (L) 3.5 - 5.1 mmol/L    Chloride 103 97 - 108 mmol/L    CO2 30 21 - 32 mmol/L    Anion gap 5 5 - 15 mmol/L    Glucose 120 (H) 65 - 100 mg/dL    BUN 10 6 - 20 MG/DL    Creatinine 0.64 (L) 0.70 - 1.30 MG/DL    BUN/Creatinine ratio 16 12 - 20      GFR est AA >60 >60 ml/min/1.73m2    GFR est non-AA >60 >60 ml/min/1.73m2    Calcium 8.9 8.5 - 10.1 MG/DL    Bilirubin, total 0.7 0.2 - 1.0 MG/DL    ALT (SGPT) 28 12 - 78 U/L    AST (SGOT) 21 15 - 37 U/L    Alk. phosphatase 52 45 - 117 U/L    Protein, total 6.4 6.4 - 8.2 g/dL    Albumin 2.9 (L) 3.5 - 5.0 g/dL    Globulin 3.5 2.0 - 4.0 g/dL    A-G Ratio 0.8 (L) 1.1 - 2.2     MAGNESIUM    Collection Time: 07/12/17  1:03 AM   Result Value Ref Range    Magnesium 1.3 (L) 1.6 - 2.4 mg/dL   PHOSPHORUS    Collection Time: 07/12/17  1:03 AM   Result Value Ref Range    Phosphorus 2.5 (L) 2.6 - 4.7 MG/DL   TROPONIN I    Collection Time: 07/12/17  1:03 AM   Result Value Ref Range    Troponin-I, Qt. 0.09 (H) <0.05 ng/mL   TROPONIN I    Collection Time: 07/12/17  5:41 AM   Result Value Ref Range    Troponin-I, Qt. 0.07 (H) <0.05 ng/mL     IMAGING RESULTS:   []      I have personally reviewed the actual   []    CXR  []    CT  []     US    Assessment/Plan:      Active Problems:    Status epilepticus ([facility] [address] (6/24/2017)      Oral phase dysphagia (7/8/2017)    Pt is currently on hospital day #25 of complicated course for encephalopathy 2/2 status epilepticus, acute respiratory failure s/p intubation and now self-extubation. GI is consulted for PEG tube as pt failed Speech Pathology evaluation today. I had a lengthy discussion with Dr. Ashley Gonzalez today about pt prior to PEG placement. Dr. Ashley Gonzalez feels that patient has improved clinically from Neurologic standpoint over the last couple of days and should continue to improve. She hopes that pt will be able to swallow on his own with continued improvement.  ___________________________________________________  RECOMMENDATIONS:    Given it appears that patient has a reversible condition with the hope that he can eat on his own in the near future, will defer PEG tube at this time.  Will monitor to see how patient does clinically over the next 1-2 days. If patient does not continue to improve and his not able to pass a swallow evaluation in the near future we can reconsider PEG at that time.     ___________________________________________________  Care Plan discussed with:    [x]    Patient   []    Family   []    Nursing   [x]    Attending     ___________________________________________________  GI: Rico Dallas MD

## 2017-07-12 NOTE — ROUTINE PROCESS
Bedside and Verbal shift change report given to [facility] (oncoming nurse) by [employer] nurse). Report included the following information SBAR, Kardex and ED Summary.     Zone Phone:   [phone]        Significant changes during shift:  Sitter at bedside. Very restless trying to get out of bed often. Blue port does not flush. PICC team aware.            Patient Information     Rodrigo Parisi  80 y.o.  6/24/2017 12:13 PM by Ridge Torres MD. Rodrigo Parisi was admitted from Home     Problem List          Patient Active Problem List     Diagnosis Date Noted    Oral phase dysphagia 07/08/2017    Status epilepticus ([facility] [address] 06/24/2017    Seizures ([facility] [address] 02/18/2017    Encephalopathy, unspecified 09/03/2012    SVT (supraventricular tachycardia) ([facility] [address] 09/03/2012    Aphasia 09/03/2012    CVA (cerebral vascular accident) ([facility] [address] 09/03/2012    Seizure disorder ([facility] [address] 09/03/2012           Past Medical History:   Diagnosis Date    Alcohol abuse, in remission      Seizures ([address]              Core Measures:     CVA: No No  CHF:No No  PNA:No No     Post Op Surgical (If Applicable):      Number times ambulated in hallway past shift:  0  Number of times OOB to chair past shift:   0  NG Tube: No  Incentive Spirometer: No  Drains: No   Volume    Dressing Present:  No  Flatus: Yes     Activity Status:     OOB to Chair No  Ambulated this shift No   Bed Rest Yes     Supplemental O2: (If Applicable)     NC No  NRB No  Venti-mask No  On  Liters/min        LINES AND DRAINS:     Central Line? Yes Placement date 6/24/17 Reason Medically Necessary limited access     PICC LINE? No Placement date Reason Medically Necessary      Urinary Catheter? No Placement Date  Reason Medically Necessary      DVT prophylaxis:     DVT prophylaxis Med- No  DVT prophylaxis SCD or JAIME- Refused      Wounds: (If Applicable)     Wounds- No     Location      Patient Safety:     Falls Score Total Score: 3  Safety Level_______  Bed Alarm On? No  Sitter? Yes     Plan for upcoming shift: safety           Discharge Plan:  Yes      Active Consults:  IP CONSULT TO NEUROLOGY  IP CONSULT TO INTENSIVIST  IP CONSULT TO PALLIATIVE CARE - PROVIDER

## 2017-07-12 NOTE — ROUTINE PROCESS
Bedside and Verbal shift change report given to Renuka Chase RN (oncoming nurse) by Marlene Orozco RN (offgoing nurse). Report included the following information SBAR, Kardex, Intake/Output, MAR and Recent Results.

## 2017-07-12 NOTE — PROCEDURES
Patient Name: Aretha Eisenmenger  : 1934  Age: 80 y.o. Ordering physician: No ref. provider found  Date of EE2017  EEG procedure number: [id number]  Diagnosis: Seizure  Interpreting physician: Jimy Bourne MD    24 HR ELECTROENCEPHALOGRAM REPORT     PROCEDURE: 24 HR EEG with video. Start time: 7/10/17 at 1546  End time: 7/10/17 at [address]: The patient is a 80 y.o. male with a history of possible seizures. EEG to rule out seizures, rule out stroke, rule out cortical abnormality. EEG CLASSIFICATION: Abnormal     DESCRIPTION OF THE RECORD:   The background of this recording contains a posterior rhythm of 6 hz. Throughout the recording, there were no clear areas of focal slowing nor spike or spike-and-wave discharges seen. Hyperventilation was not performed. Photic stimulation produced no response. During the recording the patient did achieve stage II sleep with sleep spindles and K complexes seen in the central head regions. INTERPRETATION:   Abnormal. Moderate diffuse cerebral dysfunction which is non specific for etiology but can be seen in toxic/metabolic states. No seizures. Clinical correlation recommended. Study was terminated early due to patient pulling off the leads due to agitation.     Jimy Bourne MD  Neurologist

## 2017-07-12 NOTE — PROGRESS NOTES
Hospitalist Progress Note    NAME: Xiao Luis   :  1934   MRN:  [id number]       Assessment / Plan:  Dysphagia with resultant moderate protein calorie malnutrition:  Has not had any nutrition in the last 7 days. - failed 10th speech evaluation today  - discussed with wife today who favors placement of a PEG tube as she is not ready to discuss palliative options  - GI consult placed for PEG, they would rather wait. I agree that mental status has been improving, he has not been able to follow instructions well enough to come close to safe po intake and needs a PEG tube. Will have to remain hospitalized until nutritional issues as resolved. - will start central TPN for now. Monitor electrolytes. Hypokalemia:    - replacing IV  - check mag, K in AM  Hypertension, benign/essential:  uncontrolled  - metoprolol IV q6hrs increased dose today (on toprol at home)  - add vasotec  - prn IV metoprolol  Acute encephalopathy due to status epilepticus in setting of seizure d/o:  Out of lyrica for a few days prior to admission - has also preceeded other admissions for sz. Mental status still not at baseline since his extubation and persisted throughout his hospital course. - CT head  no acute process  - CTA head/neck  with no significant cervical carotid arterial stenosis.  Patent vertebral basilar system. No large vessel intracranial vascular occlusive change. Mild mucous retention cyst and mucoperiosteal thickening in the maxillary sinuses bilaterally. 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 - will try to get MRI given improving mental status to confirm if comorbid CVA  - appreciate neuro consult, con't keppra  - holding home lyrica while NPO. Con't ativan prn seizure activity only. - titrating haldol down as mental status improved  - PT/OT and speech appreciated. Plan for [facility] on discharge.   Hyperlipidemia: holding lipitor while NPO  History of SVT: Briefly on cardizem gtt this admission  - serial troponin and EKG ordered with lower chest/abd pain complaints today  Peripheral neuropathy: ran out of lyrica several days PTA. Needs to be restarted ASAP due to ongoing leg sx. Remote ETOH abuse:  Family confirms no ETOH in 5 years. Con't thiamine supplementation. GERD: con't protonix  Acute respiratory failure (resolved) due to acute encephalopathy with chronic underlying hypercapnea: intubated initially during admission. Duonebs changed to prn only. - self-extubated 6/24  - finished zosyn 7/09      DVT prophylaxis: lovenox    Code status: Full (wife is decision maker)     Subjective:     Chief Complaint / Reason for Physician Visit  More alert, not following instructions. Discussed with RN events overnight. Review of Systems:  Symptom Y/N Comments  Symptom Y/N Comments   Fever/Chills    Chest Pain     Poor Appetite    Edema     Cough    Abdominal Pain     Sputum    Joint Pain     SOB/AL    Pruritis/Rash     Nausea/vomit    Tolerating PT/OT     Diarrhea    Tolerating Diet     Constipation    Other       Could NOT obtain due to: encephalopathy     Objective:     VITALS:   Last 24hrs VS reviewed since prior progress note.  Most recent are:  Patient Vitals for the past 24 hrs:   Temp Pulse Resp BP SpO2   07/12/17 0856 98 °F (36.7 °C) - - - -   07/12/17 0720 - 92 18 (!) 179/99 99 %   07/12/17 0331 98.7 °F (37.1 °C) 73 20 139/76 96 %   07/11/17 2351 98.6 °F (37 °C) 73 18 149/73 96 %   07/11/17 2049 - - - - 96 %   07/11/17 1938 - (!) 104 - - -   07/11/17 1841 98.3 °F (36.8 °C) (!) 126 20 138/68 94 %   07/11/17 1442 98.7 °F (37.1 °C) 99 18 158/79 98 %   07/11/17 1340 - - - - 96 %   07/11/17 1205 97.9 °F (36.6 °C) 95 19 (!) 179/94 97 %       Intake/Output Summary (Last 24 hours) at 07/12/17 1041  Last data filed at 07/12/17 0023   Gross per 24 hour   Intake          2803.75 ml   Output                0 ml   Net          2803.75 ml        PHYSICAL EXAM:  General: WD, WN. Alert, not cooperative, no acute distress    EENT:  EOMI. Anicteric sclerae. MMM  Resp:  CTA bilaterally, no wheezing or rales. No accessory muscle use  CV:  Regular  rhythm,  No edema  GI:  Soft, Non distended, Non tender.  +Bowel sounds  Neurologic:  Alert and oriented X 1, mildly slurred speech,   Psych:   Poor insight. Not anxious; intermittently mildly agitated  Skin:  No rashes. No jaundice    Reviewed most current lab test results and cultures  YES  Reviewed most current radiology test results   YES  Review and summation of old records today    NO  Reviewed patient's current orders and MAR    YES  PMH/SH reviewed - no change compared to H&P  ________________________________________________________________________  Care Plan discussed with:    Comments   Patient x    Family  x Wife over phone   RN x    Care Manager     Consultant  x neuro                     Multidiciplinary team rounds were held today with , nursing, pharmacist and clinical coordinator. Patient's plan of care was discussed; medications were reviewed and discharge planning was addressed. ________________________________________________________________________  Total NON critical care TIME:  35 Minutes    Total CRITICAL CARE TIME Spent:   Minutes non procedure based      Comments   >50% of visit spent in counseling and coordination of care x    ________________________________________________________________________  Nj Hernandez MD     Procedures: see electronic medical records for all procedures/Xrays and details which were not copied into this note but were reviewed prior to creation of Plan. LABS:  I reviewed today's most current labs and imaging studies.   Pertinent labs include:  Recent Labs      07/12/17   0103  07/10/17   0457   WBC  4.0*  5.9   HGB  10.9*  11.2*   HCT  31.2*  32.8*   PLT  237  268     Recent Labs      07/12/17   0103  07/10/17   0325   NA  138  135*   K  3.2*  3.5   CL 103  101   CO2  30  29   GLU  120*  115*   BUN  10  10   CREA  0.64*  0.64*   CA  8.9  8.9   MG  1.3*   --    PHOS  2.5*   --    ALB  2.9*   --    TBILI  0.7   --    SGOT  21   --    ALT  28   --        Signed: Danny Escalante MD

## 2017-07-12 NOTE — PROGRESS NOTES
Problem: Dysphagia (Adult)  Goal: *Acute Goals and Plan of Care (Insert Text)  Speech pathology goals  Initiated 6/30/2017, Re-eval 7/6/2017   1. Patient will participate in re-evaluation of swallow function daily continued 7/6/2017    SPEECH LANGUAGE PATHOLOGY DYSPHAGIA TREATMENT  Patient: Ashley Mazariegos (5 y.o. male)  Date: 7/12/2017  Diagnosis: Status epilepticus ([facility] [address] <principal problem not specified>       Precautions: aspiration Fall      ASSESSMENT:  Pt seen today for swallowing therapy. He was Ox2 but very confused. He accepted water via straw with mild difficulty but was successful. Large sip taken with multiple, audible swallows which could indicate pharyngeal residue. He had altered vocal quality and throat clearing after water. He accepted applesauce with oral residue on the back and right lingual surface after the swallow. Double swallow after pureed with altered vocal quality. Suctioning of oral residue needed. His dysphagia and altered mental status put him at significant risk to aspirate. After a lengthy stay, decision to place a feeding tube, feed with risk of aspiration or consider comfort care will be discussed with the pt's wife. Progression toward goals:  [ ]         Improving appropriately and progressing toward goals  [X]         Improving slowly and progressing toward goals  [ ]         Not making progress toward goals and plan of care will be adjusted       PLAN:  Recommendations and Planned Interventions:  Pt is not a candidate for po due to his altered mental status causing severe dysphagia. Options to be discussed with his wife today. Patient continues to benefit from skilled intervention to address the above impairments. Continue treatment per established plan of care. Discharge Recommendations: To Be Determined       SUBJECTIVE:   Patient stated his name and hospital name.        OBJECTIVE:   Cognitive and Communication Status:  Neurologic State: Alert  Orientation Level: Oriented to person, Oriented to place  Cognition: Decreased command following, Impaired decision making, Poor safety awareness, Decreased attention/concentration  Perception: Appears intact  Perseveration: Perseverates during conversation  Safety/Judgement: Lack of insight into deficits, Decreased awareness of need for safety  Dysphagia Treatment:  Oral Assessment:  Oral Assessment  Labial: No impairment  Dentition: Natural  Lingual: Other (comment)  Velum: Other (comment)  Mandible: No impairment  P.O. Trials:  Patient Position: upright in bed  Vocal quality prior to P.O.: Low volume  Consistency Presented: Thin liquid;Puree  How Presented: Self-fed/presented;Straw;Spoon     Bolus Acceptance: Impaired  Bolus Formation/Control: Impaired  Type of Impairment: Delayed  Propulsion: Delayed (# of seconds)     Initiation of Swallow: Delayed (# of seconds)  Laryngeal Elevation: Decreased;Weak  Aspiration Signs/Symptoms: Material suctioned; Change vocal quality                 Oral Phase Severity: Moderate-severe  Pharyngeal Phase Severity : Severe            Pain:      appeared very comfortable     After treatment:   [ ]              Patient left in no apparent distress sitting up in chair  [X]              Patient left in no apparent distress in bed  [X]              Call bell left within reach  [X]              Nursing notified  [X]              Caregiver present  [ ]              Bed alarm activated      COMMUNICATION/EDUCATION:   Patient was educated regarding His deficit(s) of dysphagia as this relates to His diagnosis of status epilepticus. He demonstrated guarded understanding as evidenced by poor comprehension     The patients plan of care including recommendations, planned interventions, and recommended diet changes were discussed with: Registered Nurse.         Andrew Sun SLP  Time Calculation: 15 mins

## 2017-07-12 NOTE — PROGRESS NOTES
Bedside shift change report given to Janes North (oncoming nurse) by Christian Wilson RN (offgoing nurse). Report included the following information SBAR.

## 2017-07-12 NOTE — ROUTINE PROCESS
Became agitated again. Pulled tele leads off. Would not let sitter reapply them. Wanted to leave. Medicated with Ativan 1mg IV with good results. Pt resting comfortably at present.

## 2017-07-13 ENCOUNTER — APPOINTMENT (OUTPATIENT)
Dept: MRI IMAGING | Age: 82
DRG: 208 | End: 2017-07-13
Attending: INTERNAL MEDICINE
Payer: MEDICARE

## 2017-07-13 LAB
ANION GAP BLD CALC-SCNC: 6 MMOL/L (ref 5–15)
BUN SERPL-MCNC: 10 MG/DL (ref 6–20)
BUN/CREAT SERPL: 15 (ref 12–20)
CALCIUM SERPL-MCNC: 9.3 MG/DL (ref 8.5–10.1)
CHLORIDE SERPL-SCNC: 98 MMOL/L (ref 97–108)
CO2 SERPL-SCNC: 28 MMOL/L (ref 21–32)
CREAT SERPL-MCNC: 0.65 MG/DL (ref 0.7–1.3)
ERYTHROCYTE [DISTWIDTH] IN BLOOD BY AUTOMATED COUNT: 12.5 % (ref 11.5–14.5)
GLUCOSE BLD STRIP.AUTO-MCNC: 118 MG/DL (ref 65–100)
GLUCOSE BLD STRIP.AUTO-MCNC: 146 MG/DL (ref 65–100)
GLUCOSE SERPL-MCNC: 114 MG/DL (ref 65–100)
HCT VFR BLD AUTO: 36.8 % (ref 36.6–50.3)
HGB BLD-MCNC: 13.2 G/DL (ref 12.1–17)
MAGNESIUM SERPL-MCNC: 1.2 MG/DL (ref 1.6–2.4)
MCH RBC QN AUTO: 34.1 PG (ref 26–34)
MCHC RBC AUTO-ENTMCNC: 35.9 G/DL (ref 30–36.5)
MCV RBC AUTO: 95.1 FL (ref 80–99)
PHOSPHATE SERPL-MCNC: 2.3 MG/DL (ref 2.6–4.7)
PLATELET # BLD AUTO: 233 K/UL (ref 150–400)
POTASSIUM SERPL-SCNC: 3.5 MMOL/L (ref 3.5–5.1)
RBC # BLD AUTO: 3.87 M/UL (ref 4.1–5.7)
SERVICE CMNT-IMP: ABNORMAL
SERVICE CMNT-IMP: ABNORMAL
SODIUM SERPL-SCNC: 132 MMOL/L (ref 136–145)
WBC # BLD AUTO: 6.1 K/UL (ref 4.1–11.1)

## 2017-07-13 PROCEDURE — 74011250636 HC RX REV CODE- 250/636: Performed by: PSYCHIATRY & NEUROLOGY

## 2017-07-13 PROCEDURE — 74011250636 HC RX REV CODE- 250/636: Performed by: INTERNAL MEDICINE

## 2017-07-13 PROCEDURE — 74011000258 HC RX REV CODE- 258: Performed by: INTERNAL MEDICINE

## 2017-07-13 PROCEDURE — 65660000000 HC RM CCU STEPDOWN

## 2017-07-13 PROCEDURE — 70551 MRI BRAIN STEM W/O DYE: CPT

## 2017-07-13 PROCEDURE — 94640 AIRWAY INHALATION TREATMENT: CPT

## 2017-07-13 PROCEDURE — 74011000250 HC RX REV CODE- 250: Performed by: INTERNAL MEDICINE

## 2017-07-13 PROCEDURE — 84100 ASSAY OF PHOSPHORUS: CPT | Performed by: INTERNAL MEDICINE

## 2017-07-13 PROCEDURE — 80048 BASIC METABOLIC PNL TOTAL CA: CPT | Performed by: INTERNAL MEDICINE

## 2017-07-13 PROCEDURE — 85027 COMPLETE CBC AUTOMATED: CPT | Performed by: INTERNAL MEDICINE

## 2017-07-13 PROCEDURE — 82962 GLUCOSE BLOOD TEST: CPT

## 2017-07-13 PROCEDURE — 83735 ASSAY OF MAGNESIUM: CPT | Performed by: INTERNAL MEDICINE

## 2017-07-13 PROCEDURE — 74011000258 HC RX REV CODE- 258: Performed by: PSYCHIATRY & NEUROLOGY

## 2017-07-13 PROCEDURE — 74011250637 HC RX REV CODE- 250/637: Performed by: INTERNAL MEDICINE

## 2017-07-13 PROCEDURE — 36415 COLL VENOUS BLD VENIPUNCTURE: CPT | Performed by: INTERNAL MEDICINE

## 2017-07-13 RX ORDER — MAGNESIUM SULFATE HEPTAHYDRATE 40 MG/ML
2 INJECTION, SOLUTION INTRAVENOUS ONCE
Status: COMPLETED | OUTPATIENT
Start: 2017-07-13 | End: 2017-07-15

## 2017-07-13 RX ORDER — CLONIDINE 0.2 MG/24H
1 PATCH, EXTENDED RELEASE TRANSDERMAL
Status: DISCONTINUED | OUTPATIENT
Start: 2017-07-13 | End: 2017-07-14

## 2017-07-13 RX ORDER — ENALAPRILAT 1.25 MG/ML
1.25 INJECTION INTRAVENOUS EVERY 6 HOURS
Status: DISCONTINUED | OUTPATIENT
Start: 2017-07-13 | End: 2017-07-15

## 2017-07-13 RX ADMIN — Medication 10 ML: at 13:26

## 2017-07-13 RX ADMIN — METOPROLOL TARTRATE 5 MG: 5 INJECTION INTRAVENOUS at 11:24

## 2017-07-13 RX ADMIN — HYDRALAZINE HYDROCHLORIDE 10 MG: 20 INJECTION INTRAMUSCULAR; INTRAVENOUS at 20:10

## 2017-07-13 RX ADMIN — ONDANSETRON 4 MG: 2 INJECTION INTRAMUSCULAR; INTRAVENOUS at 11:24

## 2017-07-13 RX ADMIN — Medication 10 ML: at 05:50

## 2017-07-13 RX ADMIN — LEVETIRACETAM 2000 MG: 100 INJECTION, SOLUTION, CONCENTRATE INTRAVENOUS at 22:24

## 2017-07-13 RX ADMIN — Medication 10 ML: at 13:27

## 2017-07-13 RX ADMIN — METOPROLOL TARTRATE 5 MG: 5 INJECTION INTRAVENOUS at 18:13

## 2017-07-13 RX ADMIN — LEVETIRACETAM 2000 MG: 100 INJECTION, SOLUTION, CONCENTRATE INTRAVENOUS at 09:27

## 2017-07-13 RX ADMIN — Medication 10 ML: at 22:01

## 2017-07-13 RX ADMIN — Medication 10 ML: at 05:54

## 2017-07-13 RX ADMIN — Medication 10 ML: at 22:02

## 2017-07-13 RX ADMIN — IPRATROPIUM BROMIDE AND ALBUTEROL SULFATE 3 ML: .5; 3 SOLUTION RESPIRATORY (INHALATION) at 23:58

## 2017-07-13 RX ADMIN — ENALAPRILAT 0.62 MG: 2.5 INJECTION INTRAVENOUS at 05:51

## 2017-07-13 RX ADMIN — THIAMINE HYDROCHLORIDE 100 MG: 100 INJECTION, SOLUTION INTRAMUSCULAR; INTRAVENOUS at 11:22

## 2017-07-13 RX ADMIN — SODIUM CHLORIDE 1000 ML: 900 INJECTION, SOLUTION INTRAVENOUS at 21:01

## 2017-07-13 RX ADMIN — HYDRALAZINE HYDROCHLORIDE 10 MG: 20 INJECTION INTRAMUSCULAR; INTRAVENOUS at 04:00

## 2017-07-13 RX ADMIN — MAGNESIUM SULFATE HEPTAHYDRATE 2 G: 40 INJECTION, SOLUTION INTRAVENOUS at 09:28

## 2017-07-13 RX ADMIN — POTASSIUM PHOSPHATE, MONOBASIC AND POTASSIUM PHOSPHATE, DIBASIC: 224; 236 INJECTION, SOLUTION INTRAVENOUS at 09:27

## 2017-07-13 RX ADMIN — IPRATROPIUM BROMIDE AND ALBUTEROL SULFATE 3 ML: .5; 3 SOLUTION RESPIRATORY (INHALATION) at 20:12

## 2017-07-13 RX ADMIN — LORAZEPAM 0.5 MG: 2 INJECTION INTRAMUSCULAR; INTRAVENOUS at 20:09

## 2017-07-13 RX ADMIN — ENOXAPARIN SODIUM 40 MG: 40 INJECTION SUBCUTANEOUS at 09:27

## 2017-07-13 RX ADMIN — METOPROLOL TARTRATE 5 MG: 5 INJECTION INTRAVENOUS at 05:53

## 2017-07-13 RX ADMIN — RETINOL, ERGOCALCIFEROL, .ALPHA.-TOCOPHEROL ACETATE, DL-, PHYTONADIONE, ASCORBIC ACID, NIACINAMIDE, RIBOFLAVIN 5-PHOSPHATE SODIUM, THIAMINE HYDROCHLORIDE, PYRIDOXINE HYDROCHLORIDE, DEXPANTHENOL, BIOTIN, FOLIC ACID, AND CYANOCOBALAMIN: KIT at 18:05

## 2017-07-13 RX ADMIN — ENALAPRILAT 1.25 MG: 2.5 INJECTION INTRAVENOUS at 11:24

## 2017-07-13 RX ADMIN — Medication 20 ML: at 13:26

## 2017-07-13 RX ADMIN — LATANOPROST 1 DROP: 50 SOLUTION OPHTHALMIC at 18:04

## 2017-07-13 NOTE — PROGRESS NOTES
Attempted to see pt for PT tx. Currently being taken off the floor to MRI. Will follow up tomorrow. Thank you.   Francine James, PT, DPT

## 2017-07-13 NOTE — PROGRESS NOTES
NEUROLOGY PROGRESS NOTE     CC: Seizures    SUBJECTIVE  His twitching continues today, believe that this is a an extrapyramidal side effects of excessive antipsychotic use. Patient had another speech eval and still was not able to swallow TPN was started last night. MRI order waiting for Imaging to be completed. Leg pain seem to be better today based on patient. A ten system review of constitutional, cardiovascular, respiratory, musculoskeletal, endocrine, skin, SHEENT, genitourinary, psychiatric and neurologic systems was obtained and is unremarkable except as stated in HPI     PHYSICAL EXAM  EXAMINATION:   Patient Vitals for the past 24 hrs:   Temp Pulse Resp BP SpO2   07/13/17 0659 97.5 °F (36.4 °C) 83 18 158/78 94 %   07/13/17 0442 - - - (!) 164/93 -   07/13/17 0342 97.5 °F (36.4 °C) 99 - (!) 189/111 99 %   07/13/17 0105 - 84 - (!) 121/93 -   07/12/17 2325 97.5 °F (36.4 °C) 91 18 172/90 98 %   07/12/17 1939 98.4 °F (36.9 °C) 88 18 148/86 98 %   07/12/17 1551 97.9 °F (36.6 °C) 83 18 (!) 188/100 97 %   07/12/17 1122 97.5 °F (36.4 °C) 98 18 (!) 178/92 98 %        General:   General appearance: Pt is in no acute distress   Distal pulses are preserved    Neurological Examination:   Mental Status: alert and oriented to partial date and location    Cranial Nerves: Pupils equal and reactive to light. Face symmetric. Tongue is midline,  bilateral facial twitching noted    Motor: Moves all 4 extremities symmetrically to pain, slight tremor of both upper extremities    Sensation: Withdraws to pain ×4    Coordination/Cerebellar: Unable to assess    Gait: Unable to assess    Skin: No significant bruising or lacerations.     LAB DATA REVIEWED:    Results for orders placed or performed during the hospital encounter of 06/24/17   CULTURE, BLOOD   Result Value Ref Range    Special Requests: NO SPECIAL REQUESTS      Culture result: NO GROWTH 6 DAYS     CULTURE, BLOOD   Result Value Ref Range    Special Requests: NO SPECIAL REQUESTS      Culture result: (A)       STAPHYLOCOCCUS CAPITIS  GROWING IN 1 OF 2 BOTTLES DRAWN  (SITE = C LINE)      Culture result: (A)       PRELIMINARY REPORT OF  GRAM POSITIVE COCCI  IN CLUSTERS  GROWING IN 1 OF 2 BOTTLES DRAWN  CALLED TO AND READ BACK BY  FIGUEROA RODRIGUES RN [facility] AT 2013 ON 6/27/2017. Roc [id number]      Culture result: REMAINING BOTTLE(S) HAS/HAVE NO GROWTH IN 5 DAYS         Susceptibility    Staphylococcus capitis - EDGARD     Ampicillin/sulbactam ($) <=8/4 Susceptible ug/mL     Cefazolin ($) <=4 Susceptible ug/mL     Ciprofloxacin ($) <=1 Susceptible ug/mL     Clindamycin ($) <=0.5 Susceptible ug/mL     Daptomycin ($$$$$) <=0.5 Susceptible ug/mL     Erythromycin ($$$$) <=0.5 Susceptible ug/mL     Gentamicin ($) <=4 Susceptible ug/mL     Levofloxacin ($) <=1 Susceptible ug/mL     Linezolid ($$$$$) <=1 Susceptible ug/mL     Oxacillin <=0.25 Susceptible ug/mL     Rifampin ($$$$)* <=1 Susceptible ug/mL      * Rifampin is not to be used for mono-therapy. Tetracycline <=4 Susceptible ug/mL     Trimeth-Sulfamethoxa <=0.5/9.5 Susceptible ug/mL     Vancomycin ($) 0.5 Susceptible ug/mL   CBC WITH AUTOMATED DIFF   Result Value Ref Range    WBC 6.2 4.1 - 11.1 K/uL    RBC 4.28 4.10 - 5.70 M/uL    HGB 14.3 12.1 - 17.0 g/dL    HCT 40.8 36.6 - 50.3 %    MCV 95.3 80.0 - 99.0 FL    MCH 33.4 26.0 - 34.0 PG    MCHC 35.0 30.0 - 36.5 g/dL    RDW 12.4 11.5 - 14.5 %    PLATELET 979 (L) 535 - 400 K/uL    NEUTROPHILS 65 32 - 75 %    LYMPHOCYTES 23 12 - 49 %    MONOCYTES 12 5 - 13 %    EOSINOPHILS 0 0 - 7 %    BASOPHILS 0 0 - 1 %    ABS. NEUTROPHILS 4.0 1.8 - 8.0 K/UL    ABS. LYMPHOCYTES 1.4 0.8 - 3.5 K/UL    ABS. MONOCYTES 0.8 0.0 - 1.0 K/UL    ABS. EOSINOPHILS 0.0 0.0 - 0.4 K/UL    ABS.  BASOPHILS 0.0 0.0 - 0.1 K/UL   METABOLIC PANEL, COMPREHENSIVE   Result Value Ref Range    Sodium 137 136 - 145 mmol/L    Potassium 3.6 3.5 - 5.1 mmol/L    Chloride 102 97 - 108 mmol/L    CO2 28 21 - 32 mmol/L    Anion gap 7 5 - 15 mmol/L Glucose 105 (H) 65 - 100 mg/dL    BUN 13 6 - 20 MG/DL    Creatinine 0.98 0.70 - 1.30 MG/DL    BUN/Creatinine ratio 13 12 - 20      GFR est AA >60 >60 ml/min/1.73m2    GFR est non-AA >60 >60 ml/min/1.73m2    Calcium 9.6 8.5 - 10.1 MG/DL    Bilirubin, total 1.1 (H) 0.2 - 1.0 MG/DL    ALT (SGPT) 19 12 - 78 U/L    AST (SGOT) 16 15 - 37 U/L    Alk.  phosphatase 83 45 - 117 U/L    Protein, total 8.9 (H) 6.4 - 8.2 g/dL    Albumin 4.4 3.5 - 5.0 g/dL    Globulin 4.5 (H) 2.0 - 4.0 g/dL    A-G Ratio 1.0 (L) 1.1 - 2.2     TROPONIN I   Result Value Ref Range    Troponin-I, Qt. <0.04 <0.05 ng/mL   CK W/ REFLX CKMB   Result Value Ref Range     39 - 308 U/L   LEVETIRACETAM (KEPPRA)   Result Value Ref Range    Levetiracetam (Keppra) 36.6 10.0 - 40.0 ug/mL   URINALYSIS W/MICROSCOPIC   Result Value Ref Range    Color YELLOW/STRAW      Appearance CLOUDY (A) CLEAR      Specific gravity 1.019 1.003 - 1.030      pH (UA) 7.0 5.0 - 8.0      Protein NEGATIVE  NEG mg/dL    Glucose NEGATIVE  NEG mg/dL    Ketone TRACE (A) NEG mg/dL    Blood NEGATIVE  NEG      Urobilinogen 1.0 0.2 - 1.0 EU/dL    Nitrites NEGATIVE  NEG      Leukocyte Esterase NEGATIVE  NEG      WBC 0-4 0 - 4 /hpf    RBC 0-5 0 - 5 /hpf    Epithelial cells FEW FEW /lpf    Bacteria NEGATIVE  NEG /hpf    Hyaline cast 0-2 0 - 5 /lpf   BILIRUBIN, CONFIRM   Result Value Ref Range    Bilirubin UA, confirm NEGATIVE  NEG     CK W/ CKMB & INDEX   Result Value Ref Range     39 - 308 U/L    CK - MB 1.6 <3.6 NG/ML    CK-MB Index 0.7 0 - 2.5     LACTIC ACID, PLASMA   Result Value Ref Range    Lactic acid 2.2 (HH) 0.4 - 2.0 MMOL/L   BLOOD GAS, ARTERIAL   Result Value Ref Range    pH 7.47 (H) 7.35 - 7.45      PCO2 34 (L) 35.0 - 45.0 mmHg    PO2 212 (H) 80 - 100 mmHg    O2  (H) 92 - 97 %    BICARBONATE 24 22 - 26 mmol/L    BASE EXCESS 1.0 mmol/L    O2 METHOD VENTILATOR      FIO2 50 %    MODE A/C      Tidal volume 500      SET RATE 16      EPAP/CPAP/PEEP 6.0      Sample source ARTERIAL      SITE RIGHT RADIAL      TERE'S TEST YES     MISC. LAB TEST   Result Value Ref Range    Test Description: LYRICA LEVEL     Reference Lab: [email or web address]     Results: SEE REPORT FROM [facility]    METABOLIC PANEL, COMPREHENSIVE   Result Value Ref Range    Sodium 136 136 - 145 mmol/L    Potassium 4.4 3.5 - 5.1 mmol/L    Chloride 103 97 - 108 mmol/L    CO2 26 21 - 32 mmol/L    Anion gap 7 5 - 15 mmol/L    Glucose 123 (H) 65 - 100 mg/dL    BUN 17 6 - 20 MG/DL    Creatinine 1.04 0.70 - 1.30 MG/DL    BUN/Creatinine ratio 16 12 - 20      GFR est AA >60 >60 ml/min/1.73m2    GFR est non-AA >60 >60 ml/min/1.73m2    Calcium 8.2 (L) 8.5 - 10.1 MG/DL    Bilirubin, total 1.4 (H) 0.2 - 1.0 MG/DL    ALT (SGPT) 15 12 - 78 U/L    AST (SGOT) 14 (L) 15 - 37 U/L    Alk. phosphatase 59 45 - 117 U/L    Protein, total 7.1 6.4 - 8.2 g/dL    Albumin 3.4 (L) 3.5 - 5.0 g/dL    Globulin 3.7 2.0 - 4.0 g/dL    A-G Ratio 0.9 (L) 1.1 - 2.2     CBC WITH AUTOMATED DIFF   Result Value Ref Range    WBC 14.1 (H) 4.1 - 11.1 K/uL    RBC 3.59 (L) 4.10 - 5.70 M/uL    HGB 12.0 (L) 12.1 - 17.0 g/dL    HCT 34.9 (L) 36.6 - 50.3 %    MCV 97.2 80.0 - 99.0 FL    MCH 33.4 26.0 - 34.0 PG    MCHC 34.4 30.0 - 36.5 g/dL    RDW 12.8 11.5 - 14.5 %    PLATELET 96 (L) 209 - 400 K/uL    NEUTROPHILS 83 (H) 32 - 75 %    LYMPHOCYTES 9 (L) 12 - 49 %    MONOCYTES 8 5 - 13 %    EOSINOPHILS 0 0 - 7 %    BASOPHILS 0 0 - 1 %    ABS. NEUTROPHILS 11.7 (H) 1.8 - 8.0 K/UL    ABS. LYMPHOCYTES 1.3 0.8 - 3.5 K/UL    ABS. MONOCYTES 1.1 (H) 0.0 - 1.0 K/UL    ABS. EOSINOPHILS 0.0 0.0 - 0.4 K/UL    ABS.  BASOPHILS 0.0 0.0 - 0.1 K/UL   TROPONIN I   Result Value Ref Range    Troponin-I, Qt. <0.04 <0.05 ng/mL   CBC W/O DIFF   Result Value Ref Range    WBC 8.0 4.1 - 11.1 K/uL    RBC 3.43 (L) 4.10 - 5.70 M/uL    HGB 11.3 (L) 12.1 - 17.0 g/dL    HCT 33.3 (L) 36.6 - 50.3 %    MCV 97.1 80.0 - 99.0 FL    MCH 32.9 26.0 - 34.0 PG    MCHC 33.9 30.0 - 36.5 g/dL    RDW 12.7 11.5 - 14.5 % PLATELET 77 (L) 421 - 831 K/uL   METABOLIC PANEL, BASIC   Result Value Ref Range    Sodium 138 136 - 145 mmol/L    Potassium 3.7 3.5 - 5.1 mmol/L    Chloride 105 97 - 108 mmol/L    CO2 27 21 - 32 mmol/L    Anion gap 6 5 - 15 mmol/L    Glucose 100 65 - 100 mg/dL    BUN 12 6 - 20 MG/DL    Creatinine 0.71 0.70 - 1.30 MG/DL    BUN/Creatinine ratio 17 12 - 20      GFR est AA >60 >60 ml/min/1.73m2    GFR est non-AA >60 >60 ml/min/1.73m2    Calcium 8.6 8.5 - 10.1 MG/DL   MAGNESIUM   Result Value Ref Range    Magnesium 2.2 1.6 - 2.4 mg/dL   PHOSPHORUS   Result Value Ref Range    Phosphorus 2.4 (L) 2.6 - 4.7 MG/DL   BLOOD GAS, ARTERIAL   Result Value Ref Range    pH 7.27 (L) 7.35 - 7.45      PCO2 59 (H) 35.0 - 45.0 mmHg    PO2 95 80 - 100 mmHg    O2 SAT 96 92 - 97 %    BICARBONATE 27 (H) 22 - 26 mmol/L    BASE DEFICIT 1.3 mmol/L    O2 METHOD NASAL O2      O2 FLOW RATE 4.00 L/min    SPONTANEOUS RATE 26.0      Sample source ARTERIAL      SITE LEFT BRACHIAL      TERE'S TEST N/A     CBC W/O DIFF   Result Value Ref Range    WBC 7.8 4.1 - 11.1 K/uL    RBC 3.40 (L) 4.10 - 5.70 M/uL    HGB 11.6 (L) 12.1 - 17.0 g/dL    HCT 33.2 (L) 36.6 - 50.3 %    MCV 97.6 80.0 - 99.0 FL    MCH 34.1 (H) 26.0 - 34.0 PG    MCHC 34.9 30.0 - 36.5 g/dL    RDW 12.4 11.5 - 14.5 %    PLATELET 77 (L) 368 - 145 K/uL   METABOLIC PANEL, BASIC   Result Value Ref Range    Sodium 143 136 - 145 mmol/L    Potassium 3.5 3.5 - 5.1 mmol/L    Chloride 108 97 - 108 mmol/L    CO2 29 21 - 32 mmol/L    Anion gap 6 5 - 15 mmol/L    Glucose 119 (H) 65 - 100 mg/dL    BUN 8 6 - 20 MG/DL    Creatinine 0.71 0.70 - 1.30 MG/DL    BUN/Creatinine ratio 11 (L) 12 - 20      GFR est AA >60 >60 ml/min/1.73m2    GFR est non-AA >60 >60 ml/min/1.73m2    Calcium 8.9 8.5 - 10.1 MG/DL   MAGNESIUM   Result Value Ref Range    Magnesium 1.8 1.6 - 2.4 mg/dL   PHOSPHORUS   Result Value Ref Range    Phosphorus 2.4 (L) 2.6 - 4.7 MG/DL   BLOOD GAS, ARTERIAL   Result Value Ref Range    pH 7.38 7.35 - 7.45      PCO2 45 35.0 - 45.0 mmHg    PO2 104 (H) 80 - 100 mmHg    O2 SAT 98 (H) 92 - 97 %    BICARBONATE 26 22 - 26 mmol/L    BASE EXCESS 0.8 mmol/L    O2 METHOD NASAL O2      O2 FLOW RATE 2.00 L/min    SPONTANEOUS RATE 26.0      Sample source ARTERIAL      SITE LEFT RADIAL      TERE'S TEST YES     VITAMIN B12   Result Value Ref Range    Vitamin B12 1064 (H) 211 - 911 pg/mL   TSH 3RD GENERATION   Result Value Ref Range    TSH 0.93 0.36 - 3.74 uIU/mL   AMMONIA   Result Value Ref Range    Ammonia 25 <32 UMOL/L   CBC W/O DIFF   Result Value Ref Range    WBC 6.2 4.1 - 11.1 K/uL    RBC 3.64 (L) 4.10 - 5.70 M/uL    HGB 12.0 (L) 12.1 - 17.0 g/dL    HCT 33.9 (L) 36.6 - 50.3 %    MCV 93.1 80.0 - 99.0 FL    MCH 33.0 26.0 - 34.0 PG    MCHC 35.4 30.0 - 36.5 g/dL    RDW 12.1 11.5 - 14.5 %    PLATELET 87 (L) 238 - 110 K/uL   METABOLIC PANEL, BASIC   Result Value Ref Range    Sodium 138 136 - 145 mmol/L    Potassium 3.2 (L) 3.5 - 5.1 mmol/L    Chloride 105 97 - 108 mmol/L    CO2 26 21 - 32 mmol/L    Anion gap 7 5 - 15 mmol/L    Glucose 111 (H) 65 - 100 mg/dL    BUN 7 6 - 20 MG/DL    Creatinine 0.61 (L) 0.70 - 1.30 MG/DL    BUN/Creatinine ratio 11 (L) 12 - 20      GFR est AA >60 >60 ml/min/1.73m2    GFR est non-AA >60 >60 ml/min/1.73m2    Calcium 8.6 8.5 - 10.1 MG/DL   MAGNESIUM   Result Value Ref Range    Magnesium 1.5 (L) 1.6 - 2.4 mg/dL   PHOSPHORUS   Result Value Ref Range    Phosphorus 2.2 (L) 2.6 - 4.7 MG/DL   METABOLIC PANEL, COMPREHENSIVE   Result Value Ref Range    Sodium 138 136 - 145 mmol/L    Potassium 3.3 (L) 3.5 - 5.1 mmol/L    Chloride 105 97 - 108 mmol/L    CO2 24 21 - 32 mmol/L    Anion gap 9 5 - 15 mmol/L    Glucose 102 (H) 65 - 100 mg/dL    BUN 9 6 - 20 MG/DL    Creatinine 0.52 (L) 0.70 - 1.30 MG/DL    BUN/Creatinine ratio 17 12 - 20      GFR est AA >60 >60 ml/min/1.73m2    GFR est non-AA >60 >60 ml/min/1.73m2    Calcium 8.6 8.5 - 10.1 MG/DL    Bilirubin, total 1.2 (H) 0.2 - 1.0 MG/DL    ALT (SGPT) 17 12 - 78 U/L    AST (SGOT) 23 15 - 37 U/L    Alk. phosphatase 56 45 - 117 U/L    Protein, total 7.5 6.4 - 8.2 g/dL    Albumin 2.9 (L) 3.5 - 5.0 g/dL    Globulin 4.6 (H) 2.0 - 4.0 g/dL    A-G Ratio 0.6 (L) 1.1 - 2.2     MAGNESIUM   Result Value Ref Range    Magnesium 1.6 1.6 - 2.4 mg/dL   PHOSPHORUS   Result Value Ref Range    Phosphorus 3.0 2.6 - 4.7 MG/DL   CBC WITH AUTOMATED DIFF   Result Value Ref Range    WBC 5.4 4.1 - 11.1 K/uL    RBC 3.58 (L) 4.10 - 5.70 M/uL    HGB 11.9 (L) 12.1 - 17.0 g/dL    HCT 33.2 (L) 36.6 - 50.3 %    MCV 92.7 80.0 - 99.0 FL    MCH 33.2 26.0 - 34.0 PG    MCHC 35.8 30.0 - 36.5 g/dL    RDW 12.1 11.5 - 14.5 %    PLATELET 168 (L) 628 - 400 K/uL    NEUTROPHILS 61 32 - 75 %    LYMPHOCYTES 18 12 - 49 %    MONOCYTES 18 (H) 5 - 13 %    EOSINOPHILS 3 0 - 7 %    BASOPHILS 0 0 - 1 %    ABS. NEUTROPHILS 3.3 1.8 - 8.0 K/UL    ABS. LYMPHOCYTES 1.0 0.8 - 3.5 K/UL    ABS. MONOCYTES 1.0 0.0 - 1.0 K/UL    ABS. EOSINOPHILS 0.2 0.0 - 0.4 K/UL    ABS.  BASOPHILS 0.0 0.0 - 0.1 K/UL   CBC W/O DIFF   Result Value Ref Range    WBC 4.7 4.1 - 11.1 K/uL    RBC 3.52 (L) 4.10 - 5.70 M/uL    HGB 11.9 (L) 12.1 - 17.0 g/dL    HCT 33.1 (L) 36.6 - 50.3 %    MCV 94.0 80.0 - 99.0 FL    MCH 33.8 26.0 - 34.0 PG    MCHC 36.0 30.0 - 36.5 g/dL    RDW 12.3 11.5 - 14.5 %    PLATELET 362 (L) 773 - 790 K/uL   METABOLIC PANEL, BASIC   Result Value Ref Range    Sodium 139 136 - 145 mmol/L    Potassium 3.3 (L) 3.5 - 5.1 mmol/L    Chloride 104 97 - 108 mmol/L    CO2 22 21 - 32 mmol/L    Anion gap 13 5 - 15 mmol/L    Glucose 92 65 - 100 mg/dL    BUN 10 6 - 20 MG/DL    Creatinine 0.49 (L) 0.70 - 1.30 MG/DL    BUN/Creatinine ratio 20 12 - 20      GFR est AA >60 >60 ml/min/1.73m2    GFR est non-AA >60 >60 ml/min/1.73m2    Calcium 8.6 8.5 - 10.1 MG/DL   MAGNESIUM   Result Value Ref Range    Magnesium 1.3 (L) 1.6 - 2.4 mg/dL   PHOSPHORUS   Result Value Ref Range    Phosphorus 2.8 2.6 - 4.7 MG/DL   METABOLIC PANEL, BASIC   Result Value Ref Range Sodium 139 136 - 145 mmol/L    Potassium 3.4 (L) 3.5 - 5.1 mmol/L    Chloride 104 97 - 108 mmol/L    CO2 23 21 - 32 mmol/L    Anion gap 12 5 - 15 mmol/L    Glucose 114 (H) 65 - 100 mg/dL    BUN 13 6 - 20 MG/DL    Creatinine 0.67 (L) 0.70 - 1.30 MG/DL    BUN/Creatinine ratio 19 12 - 20      GFR est AA >60 >60 ml/min/1.73m2    GFR est non-AA >60 >60 ml/min/1.73m2    Calcium 8.6 8.5 - 10.1 MG/DL   MAGNESIUM   Result Value Ref Range    Magnesium 1.6 1.6 - 2.4 mg/dL   PHOSPHORUS   Result Value Ref Range    Phosphorus 3.1 2.6 - 4.7 MG/DL   CBC W/O DIFF   Result Value Ref Range    WBC 6.2 4.1 - 11.1 K/uL    RBC 3.60 (L) 4.10 - 5.70 M/uL    HGB 11.7 (L) 12.1 - 17.0 g/dL    HCT 33.5 (L) 36.6 - 50.3 %    MCV 93.1 80.0 - 99.0 FL    MCH 32.5 26.0 - 34.0 PG    MCHC 34.9 30.0 - 36.5 g/dL    RDW 12.2 11.5 - 14.5 %    PLATELET 440 (L) 944 - 400 K/uL   CBC WITH AUTOMATED DIFF   Result Value Ref Range    WBC 5.8 4.1 - 11.1 K/uL    RBC 3.51 (L) 4.10 - 5.70 M/uL    HGB 11.7 (L) 12.1 - 17.0 g/dL    HCT 32.5 (L) 36.6 - 50.3 %    MCV 92.6 80.0 - 99.0 FL    MCH 33.3 26.0 - 34.0 PG    MCHC 36.0 30.0 - 36.5 g/dL    RDW 12.3 11.5 - 14.5 %    PLATELET 289 (L) 835 - 400 K/uL    NEUTROPHILS 54 32 - 75 %    LYMPHOCYTES 21 12 - 49 %    MONOCYTES 20 (H) 5 - 13 %    EOSINOPHILS 5 0 - 7 %    BASOPHILS 0 0 - 1 %    ABS. NEUTROPHILS 3.1 1.8 - 8.0 K/UL    ABS. LYMPHOCYTES 1.2 0.8 - 3.5 K/UL    ABS. MONOCYTES 1.2 (H) 0.0 - 1.0 K/UL    ABS. EOSINOPHILS 0.3 0.0 - 0.4 K/UL    ABS.  BASOPHILS 0.0 0.0 - 0.1 K/UL    RBC COMMENTS NORMOCYTIC, NORMOCHROMIC      DF SMEAR SCANNED     METABOLIC PANEL, COMPREHENSIVE   Result Value Ref Range    Sodium 138 136 - 145 mmol/L    Potassium 2.9 (L) 3.5 - 5.1 mmol/L    Chloride 102 97 - 108 mmol/L    CO2 29 21 - 32 mmol/L    Anion gap 7 5 - 15 mmol/L    Glucose 133 (H) 65 - 100 mg/dL    BUN 9 6 - 20 MG/DL    Creatinine 0.63 (L) 0.70 - 1.30 MG/DL    BUN/Creatinine ratio 14 12 - 20      GFR est AA >60 >60 ml/min/1.73m2    GFR est non-AA >60 >60 ml/min/1.73m2    Calcium 8.4 (L) 8.5 - 10.1 MG/DL    Bilirubin, total 0.8 0.2 - 1.0 MG/DL    ALT (SGPT) 25 12 - 78 U/L    AST (SGOT) 24 15 - 37 U/L    Alk. phosphatase 59 45 - 117 U/L    Protein, total 6.5 6.4 - 8.2 g/dL    Albumin 2.6 (L) 3.5 - 5.0 g/dL    Globulin 3.9 2.0 - 4.0 g/dL    A-G Ratio 0.7 (L) 1.1 - 2.2     MAGNESIUM   Result Value Ref Range    Magnesium 1.3 (L) 1.6 - 2.4 mg/dL   PHOSPHORUS   Result Value Ref Range    Phosphorus 2.8 2.6 - 4.7 MG/DL   TROPONIN I   Result Value Ref Range    Troponin-I, Qt. 0.31 (H) <0.05 ng/mL   CK W/ CKMB & INDEX   Result Value Ref Range    CK 99 39 - 308 U/L    CK - MB 1.8 <3.6 NG/ML    CK-MB Index 1.8 0 - 2.5     PROTHROMBIN TIME + INR   Result Value Ref Range    INR 1.2 (H) 0.9 - 1.1      Prothrombin time 11.7 (H) 9.0 - 11.1 sec   PTT   Result Value Ref Range    aPTT 30.1 22.1 - 32.5 sec    aPTT, therapeutic range     58.0 - 77.0 SECS   CBC WITH AUTOMATED DIFF   Result Value Ref Range    WBC 7.2 4.1 - 11.1 K/uL    RBC 3.58 (L) 4.10 - 5.70 M/uL    HGB 11.8 (L) 12.1 - 17.0 g/dL    HCT 33.5 (L) 36.6 - 50.3 %    MCV 93.6 80.0 - 99.0 FL    MCH 33.0 26.0 - 34.0 PG    MCHC 35.2 30.0 - 36.5 g/dL    RDW 12.5 11.5 - 14.5 %    PLATELET 624 439 - 062 K/uL    NEUTROPHILS 57 32 - 75 %    LYMPHOCYTES 22 12 - 49 %    MONOCYTES 17 (H) 5 - 13 %    EOSINOPHILS 4 0 - 7 %    BASOPHILS 0 0 - 1 %    ABS. NEUTROPHILS 4.2 1.8 - 8.0 K/UL    ABS. LYMPHOCYTES 1.6 0.8 - 3.5 K/UL    ABS. MONOCYTES 1.2 (H) 0.0 - 1.0 K/UL    ABS. EOSINOPHILS 0.3 0.0 - 0.4 K/UL    ABS.  BASOPHILS 0.0 0.0 - 0.1 K/UL   METABOLIC PANEL, BASIC   Result Value Ref Range    Sodium 137 136 - 145 mmol/L    Potassium 3.0 (L) 3.5 - 5.1 mmol/L    Chloride 100 97 - 108 mmol/L    CO2 31 21 - 32 mmol/L    Anion gap 6 5 - 15 mmol/L    Glucose 136 (H) 65 - 100 mg/dL    BUN 11 6 - 20 MG/DL    Creatinine 0.62 (L) 0.70 - 1.30 MG/DL    BUN/Creatinine ratio 18 12 - 20      GFR est AA >60 >60 ml/min/1.73m2    GFR est non-AA >60 >60 ml/min/1.73m2    Calcium 8.6 8.5 - 10.1 MG/DL   MAGNESIUM   Result Value Ref Range    Magnesium 1.6 1.6 - 2.4 mg/dL   PHOSPHORUS   Result Value Ref Range    Phosphorus 2.9 2.6 - 4.7 MG/DL   METABOLIC PANEL, BASIC   Result Value Ref Range    Sodium 136 136 - 145 mmol/L    Potassium 4.2 3.5 - 5.1 mmol/L    Chloride 97 97 - 108 mmol/L    CO2 33 (H) 21 - 32 mmol/L    Anion gap 6 5 - 15 mmol/L    Glucose 149 (H) 65 - 100 mg/dL    BUN 13 6 - 20 MG/DL    Creatinine 0.69 (L) 0.70 - 1.30 MG/DL    BUN/Creatinine ratio 19 12 - 20      GFR est AA >60 >60 ml/min/1.73m2    GFR est non-AA >60 >60 ml/min/1.73m2    Calcium 8.6 8.5 - 10.1 MG/DL   BLOOD GAS, ARTERIAL   Result Value Ref Range    pH 7.35 7.35 - 7.45      PCO2 65 (H) 35.0 - 45.0 mmHg    PO2 164 (H) 80 - 100 mmHg    O2 SAT 99 (H) 92 - 97 %    BICARBONATE 35 (H) 22 - 26 mmol/L    BASE EXCESS 6.7 mmol/L    O2 METHOD BIPAP      FIO2 40 %    MODE BIPAP      SET RATE 4      SPONTANEOUS RATE 19.0      IPAP/PIP 14.0      EPAP/CPAP/PEEP 5.0      Sample source ARTERIAL      SITE RIGHT RADIAL      TERE'S TEST YES     BLOOD GAS, ARTERIAL   Result Value Ref Range    pH 7.45 7.35 - 7.45      PCO2 47 (H) 35.0 - 45.0 mmHg    PO2 139 (H) 80 - 100 mmHg    O2 SAT 99 (H) 92 - 97 %    BICARBONATE 32 (H) 22 - 26 mmol/L    BASE EXCESS 6.7 mmol/L    O2 METHOD BIPAP      FIO2 30 %    MODE BIPAP      SET RATE 4      IPAP/PIP 14.0      EPAP/CPAP/PEEP 5.0      Sample source ARTERIAL      SITE RIGHT RADIAL      TERE'S TEST YES     METABOLIC PANEL, BASIC   Result Value Ref Range    Sodium 137 136 - 145 mmol/L    Potassium 3.6 3.5 - 5.1 mmol/L    Chloride 99 97 - 108 mmol/L    CO2 33 (H) 21 - 32 mmol/L    Anion gap 5 5 - 15 mmol/L    Glucose 126 (H) 65 - 100 mg/dL    BUN 26 (H) 6 - 20 MG/DL    Creatinine 0.77 0.70 - 1.30 MG/DL    BUN/Creatinine ratio 34 (H) 12 - 20      GFR est AA >60 >60 ml/min/1.73m2    GFR est non-AA >60 >60 ml/min/1.73m2    Calcium 9.3 8.5 - 10.1 MG/DL   MAGNESIUM Result Value Ref Range    Magnesium 2.0 1.6 - 2.4 mg/dL   BLOOD GAS, ARTERIAL   Result Value Ref Range    pH 7.40 7.35 - 7.45      PCO2 49 (H) 35.0 - 45.0 mmHg    PO2 127 (H) 80 - 100 mmHg    O2 SAT 99 (H) 92 - 97 %    BICARBONATE 30 (H) 22 - 26 mmol/L    BASE EXCESS 4.0 mmol/L    O2 METHOD NASAL O2      O2 FLOW RATE 2.00 L/min    Sample source ARTERIAL      SITE LEFT RADIAL      TERE'S TEST YES     METABOLIC PANEL, BASIC   Result Value Ref Range    Sodium 135 (L) 136 - 145 mmol/L    Potassium 3.5 3.5 - 5.1 mmol/L    Chloride 101 97 - 108 mmol/L    CO2 29 21 - 32 mmol/L    Anion gap 5 5 - 15 mmol/L    Glucose 115 (H) 65 - 100 mg/dL    BUN 10 6 - 20 MG/DL    Creatinine 0.64 (L) 0.70 - 1.30 MG/DL    BUN/Creatinine ratio 16 12 - 20      GFR est AA >60 >60 ml/min/1.73m2    GFR est non-AA >60 >60 ml/min/1.73m2    Calcium 8.9 8.5 - 10.1 MG/DL   CBC W/O DIFF   Result Value Ref Range    WBC 5.9 4.1 - 11.1 K/uL    RBC 3.50 (L) 4.10 - 5.70 M/uL    HGB 11.2 (L) 12.1 - 17.0 g/dL    HCT 32.8 (L) 36.6 - 50.3 %    MCV 93.7 80.0 - 99.0 FL    MCH 32.0 26.0 - 34.0 PG    MCHC 34.1 30.0 - 36.5 g/dL    RDW 12.2 11.5 - 14.5 %    PLATELET 861 454 - 460 K/uL   TROPONIN I   Result Value Ref Range    Troponin-I, Qt. 0.08 (H) <0.05 ng/mL   CBC W/O DIFF   Result Value Ref Range    WBC 4.0 (L) 4.1 - 11.1 K/uL    RBC 3.33 (L) 4.10 - 5.70 M/uL    HGB 10.9 (L) 12.1 - 17.0 g/dL    HCT 31.2 (L) 36.6 - 50.3 %    MCV 93.7 80.0 - 99.0 FL    MCH 32.7 26.0 - 34.0 PG    MCHC 34.9 30.0 - 36.5 g/dL    RDW 12.4 11.5 - 14.5 %    PLATELET 269 840 - 785 K/uL   METABOLIC PANEL, COMPREHENSIVE   Result Value Ref Range    Sodium 138 136 - 145 mmol/L    Potassium 3.2 (L) 3.5 - 5.1 mmol/L    Chloride 103 97 - 108 mmol/L    CO2 30 21 - 32 mmol/L    Anion gap 5 5 - 15 mmol/L    Glucose 120 (H) 65 - 100 mg/dL    BUN 10 6 - 20 MG/DL    Creatinine 0.64 (L) 0.70 - 1.30 MG/DL    BUN/Creatinine ratio 16 12 - 20      GFR est AA >60 >60 ml/min/1.73m2    GFR est non-AA >60 >60 ml/min/1.73m2    Calcium 8.9 8.5 - 10.1 MG/DL    Bilirubin, total 0.7 0.2 - 1.0 MG/DL    ALT (SGPT) 28 12 - 78 U/L    AST (SGOT) 21 15 - 37 U/L    Alk.  phosphatase 52 45 - 117 U/L    Protein, total 6.4 6.4 - 8.2 g/dL    Albumin 2.9 (L) 3.5 - 5.0 g/dL    Globulin 3.5 2.0 - 4.0 g/dL    A-G Ratio 0.8 (L) 1.1 - 2.2     MAGNESIUM   Result Value Ref Range    Magnesium 1.3 (L) 1.6 - 2.4 mg/dL   PHOSPHORUS   Result Value Ref Range    Phosphorus 2.5 (L) 2.6 - 4.7 MG/DL   TROPONIN I   Result Value Ref Range    Troponin-I, Qt. 0.09 (H) <0.05 ng/mL   TROPONIN I   Result Value Ref Range    Troponin-I, Qt. 0.07 (H) <8.79 ng/mL   METABOLIC PANEL, BASIC   Result Value Ref Range    Sodium 132 (L) 136 - 145 mmol/L    Potassium 3.5 3.5 - 5.1 mmol/L    Chloride 98 97 - 108 mmol/L    CO2 28 21 - 32 mmol/L    Anion gap 6 5 - 15 mmol/L    Glucose 114 (H) 65 - 100 mg/dL    BUN 10 6 - 20 MG/DL    Creatinine 0.65 (L) 0.70 - 1.30 MG/DL    BUN/Creatinine ratio 15 12 - 20      GFR est AA >60 >60 ml/min/1.73m2    GFR est non-AA >60 >60 ml/min/1.73m2    Calcium 9.3 8.5 - 10.1 MG/DL   MAGNESIUM   Result Value Ref Range    Magnesium 1.2 (L) 1.6 - 2.4 mg/dL   PHOSPHORUS   Result Value Ref Range    Phosphorus 2.3 (L) 2.6 - 4.7 MG/DL   CBC W/O DIFF   Result Value Ref Range    WBC 6.1 4.1 - 11.1 K/uL    RBC 3.87 (L) 4.10 - 5.70 M/uL    HGB 13.2 12.1 - 17.0 g/dL    HCT 36.8 36.6 - 50.3 %    MCV 95.1 80.0 - 99.0 FL    MCH 34.1 (H) 26.0 - 34.0 PG    MCHC 35.9 30.0 - 36.5 g/dL    RDW 12.5 11.5 - 14.5 %    PLATELET 405 796 - 764 K/uL   GLUCOSE, POC   Result Value Ref Range    Glucose (POC) 110 (H) 65 - 100 mg/dL    Performed by Laurence Reyes    POC CREATININE   Result Value Ref Range    Creatinine (POC) 0.9 0.6 - 1.3 MG/DL    GFRAA, POC >60 >60 ml/min/1.73m2    GFRNA, POC >60 >60 ml/min/1.73m2   POC CHEM8   Result Value Ref Range    Calcium, ionized (POC) 1.14 1.12 - 1.32 MMOL/L    Sodium (POC) 140 136 - 145 MMOL/L    Potassium (POC) 4.3 3.5 - 5.1 MMOL/L    Chloride (POC) 101 98 - 107 MMOL/L    CO2 (POC) 30 21 - 32 MMOL/L    Anion gap (POC) 14 5 - 15 mmol/L    Glucose (POC) 107 (H) 65 - 100 MG/DL    BUN (POC) 15 9 - 20 MG/DL    Creatinine (POC) 0.9 0.6 - 1.3 MG/DL    GFRAA, POC >60 >60 ml/min/1.73m2    GFRNA, POC >60 >60 ml/min/1.73m2    Hemoglobin (POC) 14.3 12.1 - 17.0 GM/DL    Hematocrit (POC) 42 36.6 - 50.3 %    Comment Comment Not Indicated.      GLUCOSE, POC   Result Value Ref Range    Glucose (POC) 92 65 - 100 mg/dL    Performed by Osman Ross'COLLIN'Cody Holcomb    GLUCOSE, POC   Result Value Ref Range    Glucose (POC) 112 (H) 65 - 100 mg/dL    Performed by Natasha Lopez    GLUCOSE, POC   Result Value Ref Range    Glucose (POC) 125 (H) 65 - 100 mg/dL    Performed by Natasha Lopez    GLUCOSE, POC   Result Value Ref Range    Glucose (POC) 151 (H) 65 - 100 mg/dL    Performed by Lizzy Mejia    GLUCOSE, POC   Result Value Ref Range    Glucose (POC) 138 (H) 65 - 100 mg/dL    [facility]    GLUCOSE, POC   Result Value Ref Range    Glucose (POC) 139 (H) 65 - 100 mg/dL    [facility]    GLUCOSE, POC   Result Value Ref Range    Glucose (POC) 160 (H) 65 - 100 mg/dL    Performed by Rudy Parker    GLUCOSE, POC   Result Value Ref Range    Glucose (POC) 138 (H) 65 - 100 mg/dL    Performed by Rudy Parker    GLUCOSE, POC   Result Value Ref Range    Glucose (POC) 123 (H) 65 - 100 mg/dL    [facility]    GLUCOSE, POC   Result Value Ref Range    Glucose (POC) 121 (H) 65 - 100 mg/dL    Performed by Kaila Merchant    GLUCOSE, POC   Result Value Ref Range    Glucose (POC) 104 (H) 65 - 100 mg/dL    Performed by Olga Florez    GLUCOSE, POC   Result Value Ref Range    Glucose (POC) 97 65 - 100 mg/dL    Performed by Leighton Collins    GLUCOSE, POC   Result Value Ref Range    Glucose (POC) 104 (H) 65 - 100 mg/dL    Performed by Leighton Dennis    EKG, 12 LEAD, INITIAL   Result Value Ref Range    Ventricular Rate 88 BPM    Atrial Rate 88 BPM    P-R Interval 294 ms    QRS Duration 82 ms    Q-T Interval 368 ms    QTC Calculation (Bezet) 445 ms    Calculated P Axis 61 degrees    Calculated R Axis -38 degrees    Calculated T Axis 52 degrees    Diagnosis       Sinus rhythm with 1st degree AV block  Left axis deviation  Nonspecific T wave abnormality  When compared with ECG of 06-SEP-2012 03:00,  [place] interval has increased  ST no longer elevated in Anterior leads  Nonspecific T wave abnormality, worse in Anterolateral leads  Confirmed by Marion Barnes ([id number]) on 6/25/2017 1:15:25 PM     EKG, 12 LEAD, SUBSEQUENT   Result Value Ref Range    Ventricular Rate 120 BPM    Atrial Rate 120 BPM    P-R Interval 180 ms    QRS Duration 76 ms    Q-T Interval 356 ms    QTC Calculation (Bezet) 503 ms    Calculated R Axis -76 degrees    Calculated T Axis -78 degrees    Diagnosis       Sinus tachycardia with frequent premature ventricular complexes  Left anterior fascicular block  Confirmed by Abbie Odonnell ([id number]) on 7/12/2017 9:55:41 AM          Imaging review:  6/24/2017  CT scan of the head without contrast  No acute intracranial abnormality    CT angiogram of the head and neck  No significant cervical carotid artery stenosis. No large vessel intracranial vascular occlusive change. HOME MEDS  Prior to Admission Medications   Prescriptions Last Dose Informant Patient Reported? Taking?   aspirin (ASPIRIN) 325 mg tablet   Yes No   Sig: Take 1 Tab by mouth daily. atorvastatin (LIPITOR) 40 mg tablet   No No   Sig: Take 1 Tab by mouth daily. cyclobenzaprine (FLEXERIL) 5 mg tablet   No No   Sig: Take 1 Tab by mouth three (3) times daily as needed for Muscle Spasm(s). levETIRAcetam (KEPPRA) 250 mg tablet   No No   Sig: Take 1 Tab by mouth two (2) times a day. Take with the 1000mg to make 1250mg twice a day. levETIRAcetam 1,000 mg tablet   No No   Sig: Take 1 Tab by mouth two (2) times a day.    magnesium oxide (MAG-OX) 400 mg tablet   Yes No   Sig: Take 400 mg by mouth daily. metoprolol succinate (TOPROL-XL) 25 mg XL tablet   Yes No   Sig: Take  by mouth daily. omeprazole (PRILOSEC) 40 mg capsule   Yes No   Sig: Take 40 mg by mouth daily. pregabalin (LYRICA) 300 mg capsule   No No   Sig: Take 1 Cap by mouth two (2) times a day. Max Daily Amount: 600 mg.   thiamine (B-1) 100 mg tablet   No No   Sig: Take 1 Tab by mouth daily. Facility-Administered Medications: None       CURRENT MEDS  Current Facility-Administered Medications   Medication Dose Route Frequency    enalaprilat (VASOTEC) injection 1.25 mg  1.25 mg IntraVENous Q6H    cloNIDine (CATAPRES) 0.2 mg/24 hr patch 1 Patch  1 Patch TransDERmal Q7D    magnesium sulfate 2 g/50 ml IVPB (premix or compounded)  2 g IntraVENous ONCE    potassium phosphate 20 mmol in 0.9% sodium chloride 250 mL infusion   IntraVENous ONCE    TPN ADULT - CENTRAL AA 5% D20% W/ CA + ELECTROLYTES   IntraVENous CONTINUOUS    metoprolol (LOPRESSOR) injection 5 mg  5 mg IntraVENous Q6H    haloperidol lactate (HALDOL) injection 0.5 mg  0.5 mg IntraVENous QHS    levETIRAcetam (KEPPRA) 2,000 mg in 0.9% sodium chloride IVPB  2,000 mg IntraVENous Q12H    sodium chloride (NS) flush 10 mL  10 mL InterCATHeter Q24H    sodium chloride (NS) flush 10-40 mL  10-40 mL InterCATHeter Q8H    sodium chloride (NS) flush 20 mL  20 mL InterCATHeter Q24H    latanoprost (XALATAN) 0.005 % ophthalmic solution 1 Drop  1 Drop Both Eyes QPM    thiamine (B-1) 100 mg in 0.9% sodium chloride 50 mL IVPB  100 mg IntraVENous DAILY    sodium chloride (NS) flush 5-10 mL  5-10 mL IntraVENous Q8H    enoxaparin (LOVENOX) injection 40 mg  40 mg SubCUTAneous Q24H       IMPRESSION:  .Malini Gallardo is a 80 y.o. male gentleman admitted with seizure. He has been encephalopathic for days. He is improving did not need haldol or ativan last night for agitation. Patient  alertness is the same from yesterday but still failing swallow eval this is the 3 attempt.  I believe that patient is coherent enough that we consider placing a PEG tube. As of now patient is receiving TPN. Still ruling out possible stroke, patient has not received MRI. Today patient  has increase bilateral facial twitching and arm twitching more consistent with potential extrapyramidal side effects of excessive antipsychotic use. RECOMMENDATIONS:  1. Continue Keppra  2000mg BID  2. Continue Lyrica 300 mg p.o. twice daily when has p.o. access  3. Limit all sedating medications including Ativan, Benadryl, and Haldol. Stop Haldol tonight  4. Seizure precautions  5. MRI of the brain without contrast.  Patient may need Ativan for this  6. Will monitor patient over the next day and if no improvement in swallowing will need a PEG placed. At this point I think he is able to be coherent enough to keep the PEG in place if needed.       Will follow    Angella Garrido MD      7/13/2017

## 2017-07-13 NOTE — PROGRESS NOTES
Occupational Therapy  chart reviewed. attempted to see pt for OT txt. Pt is off the floor for MRI. Will continue to follow.  Ge Wright OT

## 2017-07-13 NOTE — PROGRESS NOTES
Speech Therapy    Chart reviewed and spoke with RN and MD. Patient was awake and alert, oriented at least to himself and location. Audibly congested with palpable pharyngeal secretions. Oral care provided which patient tolerated fairly well. When asked if he felt as if his mouth was clean, or needed more care, he stated he felt that it was clean. Large chunk of yellowish mucous was then removed from his hard palate. Patient clearly has poor sensation in his oral cavity. Given this and congestion with difficulty clearing, he is not appropriate for PO. Given that he has now been seen by SLP 11 times, all with the same results, he will likely benefit from alternative nutrition. Alice Kidd M.S., CCC-SLP

## 2017-07-13 NOTE — ROUTINE PROCESS

## 2017-07-13 NOTE — PROGRESS NOTES
Bedside and Verbal shift change report given to  [address]  (oncoming nurse) by Dipesh Lester RN(offgoing nurse). Report included the following information SBAR, Kardex and ED Summary.      Zone Phone:   [phone]          Significant changes during shift:  Sitter at bedside. Very restless trying to get out of bed often. Blue port does not flush. PICC team aware. Red port does not give bood return. TPN running. PRN meds given for increased BP.  Fluids stopped.              Patient Information  Nedra Hogue  80 y.o.  6/24/2017 12:13 PM by Dede Armijo MD. Autumn Palma admitted from [facility]  Problem List              Patient Active Problem List      Diagnosis Date Noted    Oral phase dysphagia 07/08/2017    Status epilepticus ([address] 06/24/2017    Seizures ([address] 02/18/2017    Encephalopathy, unspecified 09/03/2012    SVT (supraventricular tachycardia) ([address] 09/03/2012    Aphasia 09/03/2012    CVA (cerebral vascular accident) ([address] 09/03/2012    Seizure disorder ([address] 09/03/2012               Past Medical History:   Diagnosis Date    Alcohol abuse, in remission       Seizures ([address]                  Core Measures:      CVA: No No  CHF:No No  PNA:No No      Post Op Surgical (If Applicable):       Number times ambulated in hallway past shift:  0  Number of times OOB to chair past shift:   0  NG Tube: No  Incentive Spirometer: No  Drains: No   Volume    Dressing Present:  No  Flatus:  Yes      Activity Status:      OOB to Chair No  Ambulated this shift No   Bed Rest Yes      Supplemental B1: (ZQ Applicable)      NC No  NRB No  Venti-mask No  On  Liters/min          LINES AND DRAINS:      Central Line? Yes Placement date 6/24/17 Reason Medically Necessary limited access      PICC Benja Beltran Placement date Reason Medically Necessary       Urinary Catheter? No Placement Date  Reason Medically Necessary       DVT prophylaxis:      DVT prophylaxis Med- No  DVT prophylaxis SCD or JAIME- Refused       Wounds: (If Applicable)      Wounds- No      Location       Patient Safety:      Falls Score Total Score: 3  Safety Level_______  Bed Alarm On? No  Sitter?  Yes      Plan for upcoming shift: safety, monitor BP, continue TPN              Discharge Plan: when stable      Active Consults:  IP CONSULT TO NEUROLOGY  IP CONSULT TO INTENSIVIST  IP CONSULT TO PALLIATIVE CARE - PROVIDER

## 2017-07-13 NOTE — PROGRESS NOTES
Problem: Pressure Injury - Risk of  Goal: *Prevention of pressure ulcer  Outcome: Progressing Towards Goal  Patient unable to verbalize understanding on importance of turning but still turns self in bed and does not put pressure on one area for very long.

## 2017-07-13 NOTE — PROGRESS NOTES
Hospitalist Progress Note    NAME: Broderick Ortega   :  1934   MRN:  [id number]       Assessment / Plan:  Dysphagia with resultant moderate protein calorie malnutrition:   - failed  speech evaluation today  - discussed with wife  who favors placement of a PEG tube if he is unable to take po as she is not ready to discuss palliative options  - GI consult placed for PEG, they would rather wait. I do not want to wait until Monday for his PEG as I feel outcome would be no different waiting further and is only prolonging hospital care. - con't central TPN for now. Monitor electrolytes as below. Hypomagnesemia, hypophosphatemia:  mild refeeding syndrome with initiation of TPN  - replacing IV  - check mag, phos, BMP in AM  Hypertension, benign/essential:  uncontrolled  - metoprolol IV q6hrs increased dose today (on toprol at home)  - titrating vasotec  - clionidine patch added  - prn IV metoprolol  Acute encephalopathy due to status epilepticus in setting of seizure d/o:  Out of lyrica for a few days prior to admission - has also preceeded other admissions for sz. Mental status still not at baseline since his extubation and persisted throughout his hospital course. ??increased bilateral facial twitching today  - CT head  no acute process  - CTA head/neck  with no significant cervical carotid arterial stenosis.  Patent vertebral basilar system. No large vessel intracranial vascular occlusive change. Mild mucous retention cyst and mucoperiosteal thickening in the maxillary sinuses bilaterally. 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 - MRI ordered yesterday, still pending. Will f/u result  - appreciate neuro consult, con't keppra  - holding home lyrica while NPO.  Con't ativan prn seizure activity only. - titrating haldol down as mental status improved  - PT/OT and speech appreciated. Plan for [facility] on discharge.   Hyperlipidemia: holding lipitor while NPO  History of SVT:  Briefly on cardizem gtt this admission  - serial troponin and EKG ordered with lower chest/abd pain complaints today  Peripheral neuropathy: ran out of lyrica several days PTA.  Needs to be restarted as soon as enteral access established due to ongoing leg sx. Remote ETOH abuse:  Family confirms no EtOH in 5 years.  Con't thiamine supplementation. GERD: con't protonix  Acute respiratory failure (resolved) due to acute encephalopathy with chronic underlying hypercapnea: intubated initially during admission.  Duonebs changed to prn only. Pt self-extubated 6/24; finished zosyn 7/09.      DVT prophylaxis: lovenox    Code status: Full (wife is decision maker)     Subjective:     Chief Complaint / Reason for Physician Visit  Awake, answers some simple questions. Discussed with RN events overnight. Review of Systems:  Symptom Y/N Comments  Symptom Y/N Comments   Fever/Chills    Chest Pain     Poor Appetite    Edema     Cough    Abdominal Pain     Sputum    Joint Pain     SOB/AL    Pruritis/Rash     Nausea/vomit    Tolerating PT/OT     Diarrhea    Tolerating Diet     Constipation    Other       Could NOT obtain due to: encephalopathy     Objective:     VITALS:   Last 24hrs VS reviewed since prior progress note.  Most recent are:  Patient Vitals for the past 24 hrs:   Temp Pulse Resp BP SpO2   07/13/17 0659 97.5 °F (36.4 °C) 83 18 158/78 94 %   07/13/17 0442 - - - (!) 164/93 -   07/13/17 0342 97.5 °F (36.4 °C) 99 - (!) 189/111 99 %   07/13/17 0105 - 84 - (!) 121/93 -   07/12/17 2325 97.5 °F (36.4 °C) 91 18 172/90 98 %   07/12/17 1939 98.4 °F (36.9 °C) 88 18 148/86 98 %   07/12/17 1551 97.9 °F (36.6 °C) 83 18 (!) 188/100 97 %   07/12/17 1122 97.5 °F (36.4 °C) 98 18 (!) 178/92 98 %       Intake/Output Summary (Last 24 hours) at 07/13/17 0916  Last data filed at 07/12/17 1600   Gross per 24 hour   Intake              600 ml   Output                0 ml   Net              600 ml        PHYSICAL EXAM:  General: WD, WN. Alert, cooperative, no acute distress    EENT:  EOMI. Anicteric sclerae. Dry oropharynx. Bilateral lower facial twiching. Resp:  CTA bilaterally, no wheezing or rales. No accessory muscle use  CV:  Regular rhythm,  No edema  GI:  Soft, non distended, Non tender.  +Bowel sounds  Neurologic:  Alert and oriented X 1-2, minimal speech,   Psych:   Poor insight. Not anxious nor agitated  Skin:  No rashes. No jaundice    Reviewed most current lab test results and cultures  YES  Reviewed most current radiology test results   YES  Review and summation of old records today    NO  Reviewed patient's current orders and MAR    YES  PMH/SH reviewed - no change compared to H&P  ________________________________________________________________________  Care Plan discussed with:    Comments   Patient x    Family      RN x    Care Manager     Consultant                        Multidiciplinary team rounds were held today with , nursing, pharmacist and clinical coordinator. Patient's plan of care was discussed; medications were reviewed and discharge planning was addressed. ________________________________________________________________________  Total NON critical care TIME:  30 Minutes    Total CRITICAL CARE TIME Spent:   Minutes non procedure based      Comments   >50% of visit spent in counseling and coordination of care x    ________________________________________________________________________  Nj Hernandez MD     Procedures: see electronic medical records for all procedures/Xrays and details which were not copied into this note but were reviewed prior to creation of Plan. LABS:  I reviewed today's most current labs and imaging studies.   Pertinent labs include:  Recent Labs      07/13/17 0354  07/12/17 0103   WBC  6.1  4.0*   HGB  13.2  10.9*   HCT  36.8  31.2*   PLT  233  237     Recent Labs      07/13/17 0354 07/12/17 0103   NA  132*  138   K  3.5  3.2*   CL  98  103 CO2  28  30   GLU  114*  120*   BUN  10  10   CREA  0.65*  0.64*   CA  9.3  8.9   MG  1.2*  1.3*   PHOS  2.3*  2.5*   ALB   --   2.9*   TBILI   --   0.7   SGOT   --   21   ALT   --   28       Signed: Leo Dyer MD

## 2017-07-13 NOTE — PROGRESS NOTES
Bedside shift change report given to Herman Kuhn RN (oncoming nurse) by Serina Olmstead RN (offgoing nurse). Report included the following information SBAR.

## 2017-07-14 ENCOUNTER — APPOINTMENT (OUTPATIENT)
Dept: GENERAL RADIOLOGY | Age: 82
DRG: 208 | End: 2017-07-14
Attending: INTERNAL MEDICINE
Payer: MEDICARE

## 2017-07-14 LAB
ANION GAP BLD CALC-SCNC: 7 MMOL/L (ref 5–15)
BUN SERPL-MCNC: 14 MG/DL (ref 6–20)
BUN/CREAT SERPL: 26 (ref 12–20)
CALCIUM SERPL-MCNC: 8.6 MG/DL (ref 8.5–10.1)
CHLORIDE SERPL-SCNC: 100 MMOL/L (ref 97–108)
CO2 SERPL-SCNC: 27 MMOL/L (ref 21–32)
CREAT SERPL-MCNC: 0.53 MG/DL (ref 0.7–1.3)
ERYTHROCYTE [DISTWIDTH] IN BLOOD BY AUTOMATED COUNT: 12.7 % (ref 11.5–14.5)
GLUCOSE BLD STRIP.AUTO-MCNC: 130 MG/DL (ref 65–100)
GLUCOSE BLD STRIP.AUTO-MCNC: 140 MG/DL (ref 65–100)
GLUCOSE BLD STRIP.AUTO-MCNC: 146 MG/DL (ref 65–100)
GLUCOSE SERPL-MCNC: 131 MG/DL (ref 65–100)
HCT VFR BLD AUTO: 31.5 % (ref 36.6–50.3)
HGB BLD-MCNC: 11 G/DL (ref 12.1–17)
MAGNESIUM SERPL-MCNC: 1.4 MG/DL (ref 1.6–2.4)
MCH RBC QN AUTO: 32.9 PG (ref 26–34)
MCHC RBC AUTO-ENTMCNC: 34.9 G/DL (ref 30–36.5)
MCV RBC AUTO: 94.3 FL (ref 80–99)
PHOSPHATE SERPL-MCNC: 2.9 MG/DL (ref 2.6–4.7)
PLATELET # BLD AUTO: 191 K/UL (ref 150–400)
POTASSIUM SERPL-SCNC: 3.5 MMOL/L (ref 3.5–5.1)
RBC # BLD AUTO: 3.34 M/UL (ref 4.1–5.7)
SERVICE CMNT-IMP: ABNORMAL
SODIUM SERPL-SCNC: 134 MMOL/L (ref 136–145)
WBC # BLD AUTO: 7 K/UL (ref 4.1–11.1)

## 2017-07-14 PROCEDURE — 85027 COMPLETE CBC AUTOMATED: CPT | Performed by: INTERNAL MEDICINE

## 2017-07-14 PROCEDURE — 74011000250 HC RX REV CODE- 250: Performed by: INTERNAL MEDICINE

## 2017-07-14 PROCEDURE — 94640 AIRWAY INHALATION TREATMENT: CPT

## 2017-07-14 PROCEDURE — 74011250636 HC RX REV CODE- 250/636: Performed by: INTERNAL MEDICINE

## 2017-07-14 PROCEDURE — 80048 BASIC METABOLIC PNL TOTAL CA: CPT | Performed by: INTERNAL MEDICINE

## 2017-07-14 PROCEDURE — 36415 COLL VENOUS BLD VENIPUNCTURE: CPT | Performed by: INTERNAL MEDICINE

## 2017-07-14 PROCEDURE — 83735 ASSAY OF MAGNESIUM: CPT | Performed by: INTERNAL MEDICINE

## 2017-07-14 PROCEDURE — 74011000258 HC RX REV CODE- 258: Performed by: PSYCHIATRY & NEUROLOGY

## 2017-07-14 PROCEDURE — 65660000000 HC RM CCU STEPDOWN

## 2017-07-14 PROCEDURE — 74011000250 HC RX REV CODE- 250: Performed by: PSYCHIATRY & NEUROLOGY

## 2017-07-14 PROCEDURE — 84100 ASSAY OF PHOSPHORUS: CPT | Performed by: INTERNAL MEDICINE

## 2017-07-14 PROCEDURE — 74011250636 HC RX REV CODE- 250/636: Performed by: PSYCHIATRY & NEUROLOGY

## 2017-07-14 PROCEDURE — 82962 GLUCOSE BLOOD TEST: CPT

## 2017-07-14 PROCEDURE — 74011000258 HC RX REV CODE- 258: Performed by: INTERNAL MEDICINE

## 2017-07-14 PROCEDURE — 71010 XR CHEST PORT: CPT

## 2017-07-14 RX ORDER — ALBUTEROL SULFATE 0.83 MG/ML
2.5 SOLUTION RESPIRATORY (INHALATION)
Status: DISCONTINUED | OUTPATIENT
Start: 2017-07-14 | End: 2017-07-22 | Stop reason: HOSPADM

## 2017-07-14 RX ORDER — IPRATROPIUM BROMIDE AND ALBUTEROL SULFATE 2.5; .5 MG/3ML; MG/3ML
3 SOLUTION RESPIRATORY (INHALATION)
Status: DISCONTINUED | OUTPATIENT
Start: 2017-07-14 | End: 2017-07-22

## 2017-07-14 RX ORDER — METOPROLOL TARTRATE 5 MG/5ML
5 INJECTION INTRAVENOUS EVERY 4 HOURS
Status: DISCONTINUED | OUTPATIENT
Start: 2017-07-14 | End: 2017-07-15

## 2017-07-14 RX ORDER — MAGNESIUM SULFATE HEPTAHYDRATE 40 MG/ML
2 INJECTION, SOLUTION INTRAVENOUS ONCE
Status: COMPLETED | OUTPATIENT
Start: 2017-07-14 | End: 2017-07-15

## 2017-07-14 RX ADMIN — Medication 10 ML: at 21:32

## 2017-07-14 RX ADMIN — Medication 20 ML: at 17:05

## 2017-07-14 RX ADMIN — Medication 10 ML: at 11:41

## 2017-07-14 RX ADMIN — Medication 10 ML: at 10:09

## 2017-07-14 RX ADMIN — ENOXAPARIN SODIUM 40 MG: 40 INJECTION SUBCUTANEOUS at 08:12

## 2017-07-14 RX ADMIN — ENALAPRILAT 1.25 MG: 2.5 INJECTION INTRAVENOUS at 17:40

## 2017-07-14 RX ADMIN — Medication 10 ML: at 08:12

## 2017-07-14 RX ADMIN — THIAMINE HYDROCHLORIDE 100 MG: 100 INJECTION, SOLUTION INTRAMUSCULAR; INTRAVENOUS at 09:35

## 2017-07-14 RX ADMIN — IPRATROPIUM BROMIDE AND ALBUTEROL SULFATE 3 ML: .5; 3 SOLUTION RESPIRATORY (INHALATION) at 20:18

## 2017-07-14 RX ADMIN — LEVETIRACETAM 2000 MG: 100 INJECTION, SOLUTION, CONCENTRATE INTRAVENOUS at 08:12

## 2017-07-14 RX ADMIN — Medication 10 ML: at 15:00

## 2017-07-14 RX ADMIN — VALPROATE SODIUM 500 MG: 100 INJECTION, SOLUTION INTRAVENOUS at 18:48

## 2017-07-14 RX ADMIN — METOPROLOL TARTRATE 5 MG: 5 INJECTION INTRAVENOUS at 11:27

## 2017-07-14 RX ADMIN — SODIUM CHLORIDE 500 ML: 900 INJECTION, SOLUTION INTRAVENOUS at 21:28

## 2017-07-14 RX ADMIN — ENALAPRILAT 1.25 MG: 2.5 INJECTION INTRAVENOUS at 11:40

## 2017-07-14 RX ADMIN — LEVETIRACETAM 1500 MG: 100 INJECTION, SOLUTION, CONCENTRATE INTRAVENOUS at 23:28

## 2017-07-14 RX ADMIN — IPRATROPIUM BROMIDE AND ALBUTEROL SULFATE 3 ML: .5; 3 SOLUTION RESPIRATORY (INHALATION) at 05:56

## 2017-07-14 RX ADMIN — HYDRALAZINE HYDROCHLORIDE 10 MG: 20 INJECTION INTRAMUSCULAR; INTRAVENOUS at 08:10

## 2017-07-14 RX ADMIN — Medication 10 ML: at 04:44

## 2017-07-14 RX ADMIN — Medication 10 ML: at 04:46

## 2017-07-14 RX ADMIN — ALBUTEROL SULFATE 2.5 MG: 2.5 SOLUTION RESPIRATORY (INHALATION) at 12:06

## 2017-07-14 RX ADMIN — IPRATROPIUM BROMIDE AND ALBUTEROL SULFATE 3 ML: .5; 3 SOLUTION RESPIRATORY (INHALATION) at 14:45

## 2017-07-14 RX ADMIN — Medication 10 ML: at 21:33

## 2017-07-14 RX ADMIN — METOPROLOL TARTRATE 5 MG: 5 INJECTION INTRAVENOUS at 17:27

## 2017-07-14 RX ADMIN — Medication 10 ML: at 17:27

## 2017-07-14 RX ADMIN — LATANOPROST 1 DROP: 50 SOLUTION OPHTHALMIC at 17:12

## 2017-07-14 RX ADMIN — RETINOL, ERGOCALCIFEROL, .ALPHA.-TOCOPHEROL ACETATE, DL-, PHYTONADIONE, ASCORBIC ACID, NIACINAMIDE, RIBOFLAVIN 5-PHOSPHATE SODIUM, THIAMINE HYDROCHLORIDE, PYRIDOXINE HYDROCHLORIDE, DEXPANTHENOL, BIOTIN, FOLIC ACID, AND CYANOCOBALAMIN: KIT at 17:02

## 2017-07-14 RX ADMIN — MAGNESIUM SULFATE HEPTAHYDRATE 2 G: 40 INJECTION, SOLUTION INTRAVENOUS at 20:49

## 2017-07-14 NOTE — PROGRESS NOTES
Pt. remains same,with labored snooring kind of breathing, mouth opens all the time and lungs coarse. Pt. was suctioned  several times and deep suction by RT. Pt. is alert and respond to his name. Dr. Jaqui Carrington have seen the patient twice and aware of patients condition.

## 2017-07-14 NOTE — PROGRESS NOTES
NEUROLOGY PROGRESS NOTE     CC: Seizures    SUBJECTIVE  Pt alert but not responding today. Wife at bedside and we discussed pt. A ten system review of constitutional, cardiovascular, respiratory, musculoskeletal, endocrine, skin, SHEENT, genitourinary, psychiatric and neurologic systems was obtained and is unremarkable except as stated in HPI     PHYSICAL EXAM  EXAMINATION:   Patient Vitals for the past 24 hrs:   Temp Pulse Resp BP SpO2   07/14/17 1740 - 100 - 148/76 -   07/14/17 1727 - 90 - 159/79 -   07/14/17 1452 99 °F (37.2 °C) 84 24 121/58 95 %   07/14/17 1447 - - - - 99 %   07/14/17 1206 - - - - 98 %   07/14/17 1140 - 74 - 159/79 -   07/14/17 1110 98.4 °F (36.9 °C) (!) 106 28 156/84 97 %   07/14/17 0941 - 61 - 121/61 -   07/14/17 0707 97.2 °F (36.2 °C) 97 24 159/81 93 %   07/14/17 0638 - 97 28 - -   07/14/17 0300 98.4 °F (36.9 °C) 96 24 149/79 97 %   07/14/17 0032 - 60 22 132/79 99 %   07/13/17 2248 98.5 °F (36.9 °C) 72 18 114/49 95 %   07/13/17 2225 - 71 18 112/47 96 %   07/13/17 2212 - 76 18 117/46 96 %   07/13/17 2159 - 72 18 93/40 97 %   07/13/17 2121 - 74 20 99/40 97 %   07/13/17 2114 - 71 - 92/41 96 %   07/13/17 2105 - (!) 103 - 96/45 94 %   07/13/17 2057 - (!) 110 - 91/46 -   07/13/17 2055 - (!) 104 - (!) 65/42 95 %   07/13/17 2052 - - 18 - -   07/13/17 2043 - (!) 103 - (!) 74/40 94 %   07/13/17 2012 - (!) 103 20 (!) 67/42 -   07/13/17 1914 98.2 °F (36.8 °C) (!) 110 24 (!) 157/102 96 %   07/13/17 1811 - 92 20 (!) 124/91 97 %        General:   General appearance: Pt is in no acute distress   Distal pulses are preserved    Neurological Examination:   Mental Status: alert and oriented to partial date and location    Cranial Nerves: Pupils equal and reactive to light. Face symmetric.   Tongue is midline,  bilateral facial twitching noted    Motor: Moves all 4 extremities symmetrically to pain, slight tremor of both upper extremities    Sensation: Withdraws to pain ×4    Coordination/Cerebellar: Unable to assess    Gait: Unable to assess    Skin: No significant bruising or lacerations. LAB DATA REVIEWED:    Results for orders placed or performed during the hospital encounter of 06/24/17   CULTURE, BLOOD   Result Value Ref Range    Special Requests: NO SPECIAL REQUESTS      Culture result: NO GROWTH 6 DAYS     CULTURE, BLOOD   Result Value Ref Range    Special Requests: NO SPECIAL REQUESTS      Culture result: (A)       STAPHYLOCOCCUS CAPITIS  GROWING IN 1 OF 2 BOTTLES DRAWN  (SITE = C LINE)      Culture result: (A)       PRELIMINARY REPORT OF  GRAM POSITIVE COCCI  IN CLUSTERS  GROWING IN 1 OF 2 BOTTLES DRAWN  CALLED TO AND READ BACK BY  FIGUEROA RODRIGUES RN [facility] AT 2013 ON 6/27/2017. Roc [id number]      Culture result: REMAINING BOTTLE(S) HAS/HAVE NO GROWTH IN 5 DAYS         Susceptibility    Staphylococcus capitis - EDGARD     Ampicillin/sulbactam ($) <=8/4 Susceptible ug/mL     Cefazolin ($) <=4 Susceptible ug/mL     Ciprofloxacin ($) <=1 Susceptible ug/mL     Clindamycin ($) <=0.5 Susceptible ug/mL     Daptomycin ($$$$$) <=0.5 Susceptible ug/mL     Erythromycin ($$$$) <=0.5 Susceptible ug/mL     Gentamicin ($) <=4 Susceptible ug/mL     Levofloxacin ($) <=1 Susceptible ug/mL     Linezolid ($$$$$) <=1 Susceptible ug/mL     Oxacillin <=0.25 Susceptible ug/mL     Rifampin ($$$$)* <=1 Susceptible ug/mL      * Rifampin is not to be used for mono-therapy. Tetracycline <=4 Susceptible ug/mL     Trimeth-Sulfamethoxa <=0.5/9.5 Susceptible ug/mL     Vancomycin ($) 0.5 Susceptible ug/mL   CBC WITH AUTOMATED DIFF   Result Value Ref Range    WBC 6.2 4.1 - 11.1 K/uL    RBC 4.28 4.10 - 5.70 M/uL    HGB 14.3 12.1 - 17.0 g/dL    HCT 40.8 36.6 - 50.3 %    MCV 95.3 80.0 - 99.0 FL    MCH 33.4 26.0 - 34.0 PG    MCHC 35.0 30.0 - 36.5 g/dL    RDW 12.4 11.5 - 14.5 %    PLATELET 036 (L) 461 - 400 K/uL    NEUTROPHILS 65 32 - 75 %    LYMPHOCYTES 23 12 - 49 %    MONOCYTES 12 5 - 13 %    EOSINOPHILS 0 0 - 7 %    BASOPHILS 0 0 - 1 %    ABS.  NEUTROPHILS 4.0 1.8 - 8.0 K/UL    ABS. LYMPHOCYTES 1.4 0.8 - 3.5 K/UL    ABS. MONOCYTES 0.8 0.0 - 1.0 K/UL    ABS. EOSINOPHILS 0.0 0.0 - 0.4 K/UL    ABS. BASOPHILS 0.0 0.0 - 0.1 K/UL   METABOLIC PANEL, COMPREHENSIVE   Result Value Ref Range    Sodium 137 136 - 145 mmol/L    Potassium 3.6 3.5 - 5.1 mmol/L    Chloride 102 97 - 108 mmol/L    CO2 28 21 - 32 mmol/L    Anion gap 7 5 - 15 mmol/L    Glucose 105 (H) 65 - 100 mg/dL    BUN 13 6 - 20 MG/DL    Creatinine 0.98 0.70 - 1.30 MG/DL    BUN/Creatinine ratio 13 12 - 20      GFR est AA >60 >60 ml/min/1.73m2    GFR est non-AA >60 >60 ml/min/1.73m2    Calcium 9.6 8.5 - 10.1 MG/DL    Bilirubin, total 1.1 (H) 0.2 - 1.0 MG/DL    ALT (SGPT) 19 12 - 78 U/L    AST (SGOT) 16 15 - 37 U/L    Alk.  phosphatase 83 45 - 117 U/L    Protein, total 8.9 (H) 6.4 - 8.2 g/dL    Albumin 4.4 3.5 - 5.0 g/dL    Globulin 4.5 (H) 2.0 - 4.0 g/dL    A-G Ratio 1.0 (L) 1.1 - 2.2     TROPONIN I   Result Value Ref Range    Troponin-I, Qt. <0.04 <0.05 ng/mL   CK W/ REFLX CKMB   Result Value Ref Range     39 - 308 U/L   LEVETIRACETAM (KEPPRA)   Result Value Ref Range    Levetiracetam (Keppra) 36.6 10.0 - 40.0 ug/mL   URINALYSIS W/MICROSCOPIC   Result Value Ref Range    Color YELLOW/STRAW      Appearance CLOUDY (A) CLEAR      Specific gravity 1.019 1.003 - 1.030      pH (UA) 7.0 5.0 - 8.0      Protein NEGATIVE  NEG mg/dL    Glucose NEGATIVE  NEG mg/dL    Ketone TRACE (A) NEG mg/dL    Blood NEGATIVE  NEG      Urobilinogen 1.0 0.2 - 1.0 EU/dL    Nitrites NEGATIVE  NEG      Leukocyte Esterase NEGATIVE  NEG      WBC 0-4 0 - 4 /hpf    RBC 0-5 0 - 5 /hpf    Epithelial cells FEW FEW /lpf    Bacteria NEGATIVE  NEG /hpf    Hyaline cast 0-2 0 - 5 /lpf   BILIRUBIN, CONFIRM   Result Value Ref Range    Bilirubin UA, confirm NEGATIVE  NEG     CK W/ CKMB & INDEX   Result Value Ref Range     39 - 308 U/L    CK - MB 1.6 <3.6 NG/ML    CK-MB Index 0.7 0 - 2.5     LACTIC ACID, PLASMA   Result Value Ref Range    Lactic acid 2.2 (HH) 0.4 - 2.0 MMOL/L   BLOOD GAS, ARTERIAL   Result Value Ref Range    pH 7.47 (H) 7.35 - 7.45      PCO2 34 (L) 35.0 - 45.0 mmHg    PO2 212 (H) 80 - 100 mmHg    O2  (H) 92 - 97 %    BICARBONATE 24 22 - 26 mmol/L    BASE EXCESS 1.0 mmol/L    O2 METHOD VENTILATOR      FIO2 50 %    MODE A/C      Tidal volume 500      SET RATE 16      EPAP/CPAP/PEEP 6.0      Sample source ARTERIAL      SITE RIGHT RADIAL      TERE'S TEST YES     MISC. LAB TEST   Result Value Ref Range    Test Description: LYRICA LEVEL     Reference Lab: [email or web address]     Results: SEE REPORT FROM [facility]    METABOLIC PANEL, COMPREHENSIVE   Result Value Ref Range    Sodium 136 136 - 145 mmol/L    Potassium 4.4 3.5 - 5.1 mmol/L    Chloride 103 97 - 108 mmol/L    CO2 26 21 - 32 mmol/L    Anion gap 7 5 - 15 mmol/L    Glucose 123 (H) 65 - 100 mg/dL    BUN 17 6 - 20 MG/DL    Creatinine 1.04 0.70 - 1.30 MG/DL    BUN/Creatinine ratio 16 12 - 20      GFR est AA >60 >60 ml/min/1.73m2    GFR est non-AA >60 >60 ml/min/1.73m2    Calcium 8.2 (L) 8.5 - 10.1 MG/DL    Bilirubin, total 1.4 (H) 0.2 - 1.0 MG/DL    ALT (SGPT) 15 12 - 78 U/L    AST (SGOT) 14 (L) 15 - 37 U/L    Alk. phosphatase 59 45 - 117 U/L    Protein, total 7.1 6.4 - 8.2 g/dL    Albumin 3.4 (L) 3.5 - 5.0 g/dL    Globulin 3.7 2.0 - 4.0 g/dL    A-G Ratio 0.9 (L) 1.1 - 2.2     CBC WITH AUTOMATED DIFF   Result Value Ref Range    WBC 14.1 (H) 4.1 - 11.1 K/uL    RBC 3.59 (L) 4.10 - 5.70 M/uL    HGB 12.0 (L) 12.1 - 17.0 g/dL    HCT 34.9 (L) 36.6 - 50.3 %    MCV 97.2 80.0 - 99.0 FL    MCH 33.4 26.0 - 34.0 PG    MCHC 34.4 30.0 - 36.5 g/dL    RDW 12.8 11.5 - 14.5 %    PLATELET 96 (L) 396 - 400 K/uL    NEUTROPHILS 83 (H) 32 - 75 %    LYMPHOCYTES 9 (L) 12 - 49 %    MONOCYTES 8 5 - 13 %    EOSINOPHILS 0 0 - 7 %    BASOPHILS 0 0 - 1 %    ABS. NEUTROPHILS 11.7 (H) 1.8 - 8.0 K/UL    ABS. LYMPHOCYTES 1.3 0.8 - 3.5 K/UL    ABS. MONOCYTES 1.1 (H) 0.0 - 1.0 K/UL    ABS. EOSINOPHILS 0.0 0.0 - 0.4 K/UL    ABS. BASOPHILS 0.0 0.0 - 0.1 K/UL   TROPONIN I   Result Value Ref Range    Troponin-I, Qt. <0.04 <0.05 ng/mL   CBC W/O DIFF   Result Value Ref Range    WBC 8.0 4.1 - 11.1 K/uL    RBC 3.43 (L) 4.10 - 5.70 M/uL    HGB 11.3 (L) 12.1 - 17.0 g/dL    HCT 33.3 (L) 36.6 - 50.3 %    MCV 97.1 80.0 - 99.0 FL    MCH 32.9 26.0 - 34.0 PG    MCHC 33.9 30.0 - 36.5 g/dL    RDW 12.7 11.5 - 14.5 %    PLATELET 77 (L) 750 - 474 K/uL   METABOLIC PANEL, BASIC   Result Value Ref Range    Sodium 138 136 - 145 mmol/L    Potassium 3.7 3.5 - 5.1 mmol/L    Chloride 105 97 - 108 mmol/L    CO2 27 21 - 32 mmol/L    Anion gap 6 5 - 15 mmol/L    Glucose 100 65 - 100 mg/dL    BUN 12 6 - 20 MG/DL    Creatinine 0.71 0.70 - 1.30 MG/DL    BUN/Creatinine ratio 17 12 - 20      GFR est AA >60 >60 ml/min/1.73m2    GFR est non-AA >60 >60 ml/min/1.73m2    Calcium 8.6 8.5 - 10.1 MG/DL   MAGNESIUM   Result Value Ref Range    Magnesium 2.2 1.6 - 2.4 mg/dL   PHOSPHORUS   Result Value Ref Range    Phosphorus 2.4 (L) 2.6 - 4.7 MG/DL   BLOOD GAS, ARTERIAL   Result Value Ref Range    pH 7.27 (L) 7.35 - 7.45      PCO2 59 (H) 35.0 - 45.0 mmHg    PO2 95 80 - 100 mmHg    O2 SAT 96 92 - 97 %    BICARBONATE 27 (H) 22 - 26 mmol/L    BASE DEFICIT 1.3 mmol/L    O2 METHOD NASAL O2      O2 FLOW RATE 4.00 L/min    SPONTANEOUS RATE 26.0      Sample source ARTERIAL      SITE LEFT BRACHIAL      TERE'S TEST N/A     CBC W/O DIFF   Result Value Ref Range    WBC 7.8 4.1 - 11.1 K/uL    RBC 3.40 (L) 4.10 - 5.70 M/uL    HGB 11.6 (L) 12.1 - 17.0 g/dL    HCT 33.2 (L) 36.6 - 50.3 %    MCV 97.6 80.0 - 99.0 FL    MCH 34.1 (H) 26.0 - 34.0 PG    MCHC 34.9 30.0 - 36.5 g/dL    RDW 12.4 11.5 - 14.5 %    PLATELET 77 (L) 975 - 309 K/uL   METABOLIC PANEL, BASIC   Result Value Ref Range    Sodium 143 136 - 145 mmol/L    Potassium 3.5 3.5 - 5.1 mmol/L    Chloride 108 97 - 108 mmol/L    CO2 29 21 - 32 mmol/L    Anion gap 6 5 - 15 mmol/L    Glucose 119 (H) 65 - 100 mg/dL    BUN 8 6 - 20 MG/DL    Creatinine 0. 71 0.70 - 1.30 MG/DL    BUN/Creatinine ratio 11 (L) 12 - 20      GFR est AA >60 >60 ml/min/1.73m2    GFR est non-AA >60 >60 ml/min/1.73m2    Calcium 8.9 8.5 - 10.1 MG/DL   MAGNESIUM   Result Value Ref Range    Magnesium 1.8 1.6 - 2.4 mg/dL   PHOSPHORUS   Result Value Ref Range    Phosphorus 2.4 (L) 2.6 - 4.7 MG/DL   BLOOD GAS, ARTERIAL   Result Value Ref Range    pH 7.38 7.35 - 7.45      PCO2 45 35.0 - 45.0 mmHg    PO2 104 (H) 80 - 100 mmHg    O2 SAT 98 (H) 92 - 97 %    BICARBONATE 26 22 - 26 mmol/L    BASE EXCESS 0.8 mmol/L    O2 METHOD NASAL O2      O2 FLOW RATE 2.00 L/min    SPONTANEOUS RATE 26.0      Sample source ARTERIAL      SITE LEFT RADIAL      TERE'S TEST YES     VITAMIN B12   Result Value Ref Range    Vitamin B12 1064 (H) 211 - 911 pg/mL   TSH 3RD GENERATION   Result Value Ref Range    TSH 0.93 0.36 - 3.74 uIU/mL   AMMONIA   Result Value Ref Range    Ammonia 25 <32 UMOL/L   CBC W/O DIFF   Result Value Ref Range    WBC 6.2 4.1 - 11.1 K/uL    RBC 3.64 (L) 4.10 - 5.70 M/uL    HGB 12.0 (L) 12.1 - 17.0 g/dL    HCT 33.9 (L) 36.6 - 50.3 %    MCV 93.1 80.0 - 99.0 FL    MCH 33.0 26.0 - 34.0 PG    MCHC 35.4 30.0 - 36.5 g/dL    RDW 12.1 11.5 - 14.5 %    PLATELET 87 (L) 380 - 514 K/uL   METABOLIC PANEL, BASIC   Result Value Ref Range    Sodium 138 136 - 145 mmol/L    Potassium 3.2 (L) 3.5 - 5.1 mmol/L    Chloride 105 97 - 108 mmol/L    CO2 26 21 - 32 mmol/L    Anion gap 7 5 - 15 mmol/L    Glucose 111 (H) 65 - 100 mg/dL    BUN 7 6 - 20 MG/DL    Creatinine 0.61 (L) 0.70 - 1.30 MG/DL    BUN/Creatinine ratio 11 (L) 12 - 20      GFR est AA >60 >60 ml/min/1.73m2    GFR est non-AA >60 >60 ml/min/1.73m2    Calcium 8.6 8.5 - 10.1 MG/DL   MAGNESIUM   Result Value Ref Range    Magnesium 1.5 (L) 1.6 - 2.4 mg/dL   PHOSPHORUS   Result Value Ref Range    Phosphorus 2.2 (L) 2.6 - 4.7 MG/DL   METABOLIC PANEL, COMPREHENSIVE   Result Value Ref Range    Sodium 138 136 - 145 mmol/L    Potassium 3.3 (L) 3.5 - 5.1 mmol/L    Chloride 105 97 - 108 mmol/L    CO2 24 21 - 32 mmol/L    Anion gap 9 5 - 15 mmol/L    Glucose 102 (H) 65 - 100 mg/dL    BUN 9 6 - 20 MG/DL    Creatinine 0.52 (L) 0.70 - 1.30 MG/DL    BUN/Creatinine ratio 17 12 - 20      GFR est AA >60 >60 ml/min/1.73m2    GFR est non-AA >60 >60 ml/min/1.73m2    Calcium 8.6 8.5 - 10.1 MG/DL    Bilirubin, total 1.2 (H) 0.2 - 1.0 MG/DL    ALT (SGPT) 17 12 - 78 U/L    AST (SGOT) 23 15 - 37 U/L    Alk. phosphatase 56 45 - 117 U/L    Protein, total 7.5 6.4 - 8.2 g/dL    Albumin 2.9 (L) 3.5 - 5.0 g/dL    Globulin 4.6 (H) 2.0 - 4.0 g/dL    A-G Ratio 0.6 (L) 1.1 - 2.2     MAGNESIUM   Result Value Ref Range    Magnesium 1.6 1.6 - 2.4 mg/dL   PHOSPHORUS   Result Value Ref Range    Phosphorus 3.0 2.6 - 4.7 MG/DL   CBC WITH AUTOMATED DIFF   Result Value Ref Range    WBC 5.4 4.1 - 11.1 K/uL    RBC 3.58 (L) 4.10 - 5.70 M/uL    HGB 11.9 (L) 12.1 - 17.0 g/dL    HCT 33.2 (L) 36.6 - 50.3 %    MCV 92.7 80.0 - 99.0 FL    MCH 33.2 26.0 - 34.0 PG    MCHC 35.8 30.0 - 36.5 g/dL    RDW 12.1 11.5 - 14.5 %    PLATELET 868 (L) 639 - 400 K/uL    NEUTROPHILS 61 32 - 75 %    LYMPHOCYTES 18 12 - 49 %    MONOCYTES 18 (H) 5 - 13 %    EOSINOPHILS 3 0 - 7 %    BASOPHILS 0 0 - 1 %    ABS. NEUTROPHILS 3.3 1.8 - 8.0 K/UL    ABS. LYMPHOCYTES 1.0 0.8 - 3.5 K/UL    ABS. MONOCYTES 1.0 0.0 - 1.0 K/UL    ABS. EOSINOPHILS 0.2 0.0 - 0.4 K/UL    ABS.  BASOPHILS 0.0 0.0 - 0.1 K/UL   CBC W/O DIFF   Result Value Ref Range    WBC 4.7 4.1 - 11.1 K/uL    RBC 3.52 (L) 4.10 - 5.70 M/uL    HGB 11.9 (L) 12.1 - 17.0 g/dL    HCT 33.1 (L) 36.6 - 50.3 %    MCV 94.0 80.0 - 99.0 FL    MCH 33.8 26.0 - 34.0 PG    MCHC 36.0 30.0 - 36.5 g/dL    RDW 12.3 11.5 - 14.5 %    PLATELET 081 (L) 695 - 826 K/uL   METABOLIC PANEL, BASIC   Result Value Ref Range    Sodium 139 136 - 145 mmol/L    Potassium 3.3 (L) 3.5 - 5.1 mmol/L    Chloride 104 97 - 108 mmol/L    CO2 22 21 - 32 mmol/L    Anion gap 13 5 - 15 mmol/L    Glucose 92 65 - 100 mg/dL    BUN 10 6 - 20 MG/DL Creatinine 0.49 (L) 0.70 - 1.30 MG/DL    BUN/Creatinine ratio 20 12 - 20      GFR est AA >60 >60 ml/min/1.73m2    GFR est non-AA >60 >60 ml/min/1.73m2    Calcium 8.6 8.5 - 10.1 MG/DL   MAGNESIUM   Result Value Ref Range    Magnesium 1.3 (L) 1.6 - 2.4 mg/dL   PHOSPHORUS   Result Value Ref Range    Phosphorus 2.8 2.6 - 4.7 MG/DL   METABOLIC PANEL, BASIC   Result Value Ref Range    Sodium 139 136 - 145 mmol/L    Potassium 3.4 (L) 3.5 - 5.1 mmol/L    Chloride 104 97 - 108 mmol/L    CO2 23 21 - 32 mmol/L    Anion gap 12 5 - 15 mmol/L    Glucose 114 (H) 65 - 100 mg/dL    BUN 13 6 - 20 MG/DL    Creatinine 0.67 (L) 0.70 - 1.30 MG/DL    BUN/Creatinine ratio 19 12 - 20      GFR est AA >60 >60 ml/min/1.73m2    GFR est non-AA >60 >60 ml/min/1.73m2    Calcium 8.6 8.5 - 10.1 MG/DL   MAGNESIUM   Result Value Ref Range    Magnesium 1.6 1.6 - 2.4 mg/dL   PHOSPHORUS   Result Value Ref Range    Phosphorus 3.1 2.6 - 4.7 MG/DL   CBC W/O DIFF   Result Value Ref Range    WBC 6.2 4.1 - 11.1 K/uL    RBC 3.60 (L) 4.10 - 5.70 M/uL    HGB 11.7 (L) 12.1 - 17.0 g/dL    HCT 33.5 (L) 36.6 - 50.3 %    MCV 93.1 80.0 - 99.0 FL    MCH 32.5 26.0 - 34.0 PG    MCHC 34.9 30.0 - 36.5 g/dL    RDW 12.2 11.5 - 14.5 %    PLATELET 914 (L) 288 - 400 K/uL   CBC WITH AUTOMATED DIFF   Result Value Ref Range    WBC 5.8 4.1 - 11.1 K/uL    RBC 3.51 (L) 4.10 - 5.70 M/uL    HGB 11.7 (L) 12.1 - 17.0 g/dL    HCT 32.5 (L) 36.6 - 50.3 %    MCV 92.6 80.0 - 99.0 FL    MCH 33.3 26.0 - 34.0 PG    MCHC 36.0 30.0 - 36.5 g/dL    RDW 12.3 11.5 - 14.5 %    PLATELET 741 (L) 132 - 400 K/uL    NEUTROPHILS 54 32 - 75 %    LYMPHOCYTES 21 12 - 49 %    MONOCYTES 20 (H) 5 - 13 %    EOSINOPHILS 5 0 - 7 %    BASOPHILS 0 0 - 1 %    ABS. NEUTROPHILS 3.1 1.8 - 8.0 K/UL    ABS. LYMPHOCYTES 1.2 0.8 - 3.5 K/UL    ABS. MONOCYTES 1.2 (H) 0.0 - 1.0 K/UL    ABS. EOSINOPHILS 0.3 0.0 - 0.4 K/UL    ABS.  BASOPHILS 0.0 0.0 - 0.1 K/UL    RBC COMMENTS NORMOCYTIC, NORMOCHROMIC      DF SMEAR SCANNED METABOLIC PANEL, COMPREHENSIVE   Result Value Ref Range    Sodium 138 136 - 145 mmol/L    Potassium 2.9 (L) 3.5 - 5.1 mmol/L    Chloride 102 97 - 108 mmol/L    CO2 29 21 - 32 mmol/L    Anion gap 7 5 - 15 mmol/L    Glucose 133 (H) 65 - 100 mg/dL    BUN 9 6 - 20 MG/DL    Creatinine 0.63 (L) 0.70 - 1.30 MG/DL    BUN/Creatinine ratio 14 12 - 20      GFR est AA >60 >60 ml/min/1.73m2    GFR est non-AA >60 >60 ml/min/1.73m2    Calcium 8.4 (L) 8.5 - 10.1 MG/DL    Bilirubin, total 0.8 0.2 - 1.0 MG/DL    ALT (SGPT) 25 12 - 78 U/L    AST (SGOT) 24 15 - 37 U/L    Alk. phosphatase 59 45 - 117 U/L    Protein, total 6.5 6.4 - 8.2 g/dL    Albumin 2.6 (L) 3.5 - 5.0 g/dL    Globulin 3.9 2.0 - 4.0 g/dL    A-G Ratio 0.7 (L) 1.1 - 2.2     MAGNESIUM   Result Value Ref Range    Magnesium 1.3 (L) 1.6 - 2.4 mg/dL   PHOSPHORUS   Result Value Ref Range    Phosphorus 2.8 2.6 - 4.7 MG/DL   TROPONIN I   Result Value Ref Range    Troponin-I, Qt. 0.31 (H) <0.05 ng/mL   CK W/ CKMB & INDEX   Result Value Ref Range    CK 99 39 - 308 U/L    CK - MB 1.8 <3.6 NG/ML    CK-MB Index 1.8 0 - 2.5     PROTHROMBIN TIME + INR   Result Value Ref Range    INR 1.2 (H) 0.9 - 1.1      Prothrombin time 11.7 (H) 9.0 - 11.1 sec   PTT   Result Value Ref Range    aPTT 30.1 22.1 - 32.5 sec    aPTT, therapeutic range     58.0 - 77.0 SECS   CBC WITH AUTOMATED DIFF   Result Value Ref Range    WBC 7.2 4.1 - 11.1 K/uL    RBC 3.58 (L) 4.10 - 5.70 M/uL    HGB 11.8 (L) 12.1 - 17.0 g/dL    HCT 33.5 (L) 36.6 - 50.3 %    MCV 93.6 80.0 - 99.0 FL    MCH 33.0 26.0 - 34.0 PG    MCHC 35.2 30.0 - 36.5 g/dL    RDW 12.5 11.5 - 14.5 %    PLATELET 036 695 - 786 K/uL    NEUTROPHILS 57 32 - 75 %    LYMPHOCYTES 22 12 - 49 %    MONOCYTES 17 (H) 5 - 13 %    EOSINOPHILS 4 0 - 7 %    BASOPHILS 0 0 - 1 %    ABS. NEUTROPHILS 4.2 1.8 - 8.0 K/UL    ABS. LYMPHOCYTES 1.6 0.8 - 3.5 K/UL    ABS. MONOCYTES 1.2 (H) 0.0 - 1.0 K/UL    ABS. EOSINOPHILS 0.3 0.0 - 0.4 K/UL    ABS.  BASOPHILS 0.0 0.0 - 0.1 K/UL METABOLIC PANEL, BASIC   Result Value Ref Range    Sodium 137 136 - 145 mmol/L    Potassium 3.0 (L) 3.5 - 5.1 mmol/L    Chloride 100 97 - 108 mmol/L    CO2 31 21 - 32 mmol/L    Anion gap 6 5 - 15 mmol/L    Glucose 136 (H) 65 - 100 mg/dL    BUN 11 6 - 20 MG/DL    Creatinine 0.62 (L) 0.70 - 1.30 MG/DL    BUN/Creatinine ratio 18 12 - 20      GFR est AA >60 >60 ml/min/1.73m2    GFR est non-AA >60 >60 ml/min/1.73m2    Calcium 8.6 8.5 - 10.1 MG/DL   MAGNESIUM   Result Value Ref Range    Magnesium 1.6 1.6 - 2.4 mg/dL   PHOSPHORUS   Result Value Ref Range    Phosphorus 2.9 2.6 - 4.7 MG/DL   METABOLIC PANEL, BASIC   Result Value Ref Range    Sodium 136 136 - 145 mmol/L    Potassium 4.2 3.5 - 5.1 mmol/L    Chloride 97 97 - 108 mmol/L    CO2 33 (H) 21 - 32 mmol/L    Anion gap 6 5 - 15 mmol/L    Glucose 149 (H) 65 - 100 mg/dL    BUN 13 6 - 20 MG/DL    Creatinine 0.69 (L) 0.70 - 1.30 MG/DL    BUN/Creatinine ratio 19 12 - 20      GFR est AA >60 >60 ml/min/1.73m2    GFR est non-AA >60 >60 ml/min/1.73m2    Calcium 8.6 8.5 - 10.1 MG/DL   BLOOD GAS, ARTERIAL   Result Value Ref Range    pH 7.35 7.35 - 7.45      PCO2 65 (H) 35.0 - 45.0 mmHg    PO2 164 (H) 80 - 100 mmHg    O2 SAT 99 (H) 92 - 97 %    BICARBONATE 35 (H) 22 - 26 mmol/L    BASE EXCESS 6.7 mmol/L    O2 METHOD BIPAP      FIO2 40 %    MODE BIPAP      SET RATE 4      SPONTANEOUS RATE 19.0      IPAP/PIP 14.0      EPAP/CPAP/PEEP 5.0      Sample source ARTERIAL      SITE RIGHT RADIAL      TERE'S TEST YES     BLOOD GAS, ARTERIAL   Result Value Ref Range    pH 7.45 7.35 - 7.45      PCO2 47 (H) 35.0 - 45.0 mmHg    PO2 139 (H) 80 - 100 mmHg    O2 SAT 99 (H) 92 - 97 %    BICARBONATE 32 (H) 22 - 26 mmol/L    BASE EXCESS 6.7 mmol/L    O2 METHOD BIPAP      FIO2 30 %    MODE BIPAP      SET RATE 4      IPAP/PIP 14.0      EPAP/CPAP/PEEP 5.0      Sample source ARTERIAL      SITE RIGHT RADIAL      TERE'S TEST YES     METABOLIC PANEL, BASIC   Result Value Ref Range    Sodium 137 136 - 145 mmol/L    Potassium 3.6 3.5 - 5.1 mmol/L    Chloride 99 97 - 108 mmol/L    CO2 33 (H) 21 - 32 mmol/L    Anion gap 5 5 - 15 mmol/L    Glucose 126 (H) 65 - 100 mg/dL    BUN 26 (H) 6 - 20 MG/DL    Creatinine 0.77 0.70 - 1.30 MG/DL    BUN/Creatinine ratio 34 (H) 12 - 20      GFR est AA >60 >60 ml/min/1.73m2    GFR est non-AA >60 >60 ml/min/1.73m2    Calcium 9.3 8.5 - 10.1 MG/DL   MAGNESIUM   Result Value Ref Range    Magnesium 2.0 1.6 - 2.4 mg/dL   BLOOD GAS, ARTERIAL   Result Value Ref Range    pH 7.40 7.35 - 7.45      PCO2 49 (H) 35.0 - 45.0 mmHg    PO2 127 (H) 80 - 100 mmHg    O2 SAT 99 (H) 92 - 97 %    BICARBONATE 30 (H) 22 - 26 mmol/L    BASE EXCESS 4.0 mmol/L    O2 METHOD NASAL O2      O2 FLOW RATE 2.00 L/min    Sample source ARTERIAL      SITE LEFT RADIAL      TERE'S TEST YES     METABOLIC PANEL, BASIC   Result Value Ref Range    Sodium 135 (L) 136 - 145 mmol/L    Potassium 3.5 3.5 - 5.1 mmol/L    Chloride 101 97 - 108 mmol/L    CO2 29 21 - 32 mmol/L    Anion gap 5 5 - 15 mmol/L    Glucose 115 (H) 65 - 100 mg/dL    BUN 10 6 - 20 MG/DL    Creatinine 0.64 (L) 0.70 - 1.30 MG/DL    BUN/Creatinine ratio 16 12 - 20      GFR est AA >60 >60 ml/min/1.73m2    GFR est non-AA >60 >60 ml/min/1.73m2    Calcium 8.9 8.5 - 10.1 MG/DL   CBC W/O DIFF   Result Value Ref Range    WBC 5.9 4.1 - 11.1 K/uL    RBC 3.50 (L) 4.10 - 5.70 M/uL    HGB 11.2 (L) 12.1 - 17.0 g/dL    HCT 32.8 (L) 36.6 - 50.3 %    MCV 93.7 80.0 - 99.0 FL    MCH 32.0 26.0 - 34.0 PG    MCHC 34.1 30.0 - 36.5 g/dL    RDW 12.2 11.5 - 14.5 %    PLATELET 491 250 - 416 K/uL   TROPONIN I   Result Value Ref Range    Troponin-I, Qt. 0.08 (H) <0.05 ng/mL   CBC W/O DIFF   Result Value Ref Range    WBC 4.0 (L) 4.1 - 11.1 K/uL    RBC 3.33 (L) 4.10 - 5.70 M/uL    HGB 10.9 (L) 12.1 - 17.0 g/dL    HCT 31.2 (L) 36.6 - 50.3 %    MCV 93.7 80.0 - 99.0 FL    MCH 32.7 26.0 - 34.0 PG    MCHC 34.9 30.0 - 36.5 g/dL    RDW 12.4 11.5 - 14.5 %    PLATELET 412 118 - 579 K/uL   METABOLIC PANEL, COMPREHENSIVE   Result Value Ref Range    Sodium 138 136 - 145 mmol/L    Potassium 3.2 (L) 3.5 - 5.1 mmol/L    Chloride 103 97 - 108 mmol/L    CO2 30 21 - 32 mmol/L    Anion gap 5 5 - 15 mmol/L    Glucose 120 (H) 65 - 100 mg/dL    BUN 10 6 - 20 MG/DL    Creatinine 0.64 (L) 0.70 - 1.30 MG/DL    BUN/Creatinine ratio 16 12 - 20      GFR est AA >60 >60 ml/min/1.73m2    GFR est non-AA >60 >60 ml/min/1.73m2    Calcium 8.9 8.5 - 10.1 MG/DL    Bilirubin, total 0.7 0.2 - 1.0 MG/DL    ALT (SGPT) 28 12 - 78 U/L    AST (SGOT) 21 15 - 37 U/L    Alk.  phosphatase 52 45 - 117 U/L    Protein, total 6.4 6.4 - 8.2 g/dL    Albumin 2.9 (L) 3.5 - 5.0 g/dL    Globulin 3.5 2.0 - 4.0 g/dL    A-G Ratio 0.8 (L) 1.1 - 2.2     MAGNESIUM   Result Value Ref Range    Magnesium 1.3 (L) 1.6 - 2.4 mg/dL   PHOSPHORUS   Result Value Ref Range    Phosphorus 2.5 (L) 2.6 - 4.7 MG/DL   TROPONIN I   Result Value Ref Range    Troponin-I, Qt. 0.09 (H) <0.05 ng/mL   TROPONIN I   Result Value Ref Range    Troponin-I, Qt. 0.07 (H) <5.09 ng/mL   METABOLIC PANEL, BASIC   Result Value Ref Range    Sodium 132 (L) 136 - 145 mmol/L    Potassium 3.5 3.5 - 5.1 mmol/L    Chloride 98 97 - 108 mmol/L    CO2 28 21 - 32 mmol/L    Anion gap 6 5 - 15 mmol/L    Glucose 114 (H) 65 - 100 mg/dL    BUN 10 6 - 20 MG/DL    Creatinine 0.65 (L) 0.70 - 1.30 MG/DL    BUN/Creatinine ratio 15 12 - 20      GFR est AA >60 >60 ml/min/1.73m2    GFR est non-AA >60 >60 ml/min/1.73m2    Calcium 9.3 8.5 - 10.1 MG/DL   MAGNESIUM   Result Value Ref Range    Magnesium 1.2 (L) 1.6 - 2.4 mg/dL   PHOSPHORUS   Result Value Ref Range    Phosphorus 2.3 (L) 2.6 - 4.7 MG/DL   CBC W/O DIFF   Result Value Ref Range    WBC 6.1 4.1 - 11.1 K/uL    RBC 3.87 (L) 4.10 - 5.70 M/uL    HGB 13.2 12.1 - 17.0 g/dL    HCT 36.8 36.6 - 50.3 %    MCV 95.1 80.0 - 99.0 FL    MCH 34.1 (H) 26.0 - 34.0 PG    MCHC 35.9 30.0 - 36.5 g/dL    RDW 12.5 11.5 - 14.5 %    PLATELET 781 113 - 344 K/uL   CBC W/O DIFF   Result Value Ref Range WBC 7.0 4.1 - 11.1 K/uL    RBC 3.34 (L) 4.10 - 5.70 M/uL    HGB 11.0 (L) 12.1 - 17.0 g/dL    HCT 31.5 (L) 36.6 - 50.3 %    MCV 94.3 80.0 - 99.0 FL    MCH 32.9 26.0 - 34.0 PG    MCHC 34.9 30.0 - 36.5 g/dL    RDW 12.7 11.5 - 14.5 %    PLATELET 867 279 - 466 K/uL   METABOLIC PANEL, BASIC   Result Value Ref Range    Sodium 134 (L) 136 - 145 mmol/L    Potassium 3.5 3.5 - 5.1 mmol/L    Chloride 100 97 - 108 mmol/L    CO2 27 21 - 32 mmol/L    Anion gap 7 5 - 15 mmol/L    Glucose 131 (H) 65 - 100 mg/dL    BUN 14 6 - 20 MG/DL    Creatinine 0.53 (L) 0.70 - 1.30 MG/DL    BUN/Creatinine ratio 26 (H) 12 - 20      GFR est AA >60 >60 ml/min/1.73m2    GFR est non-AA >60 >60 ml/min/1.73m2    Calcium 8.6 8.5 - 10.1 MG/DL   PHOSPHORUS   Result Value Ref Range    Phosphorus 2.9 2.6 - 4.7 MG/DL   MAGNESIUM   Result Value Ref Range    Magnesium 1.4 (L) 1.6 - 2.4 mg/dL   GLUCOSE, POC   Result Value Ref Range    Glucose (POC) 110 (H) 65 - 100 mg/dL    Performed by Danuta Mac    POC CREATININE   Result Value Ref Range    Creatinine (POC) 0.9 0.6 - 1.3 MG/DL    GFRAA, POC >60 >60 ml/min/1.73m2    GFRNA, POC >60 >60 ml/min/1.73m2   POC CHEM8   Result Value Ref Range    Calcium, ionized (POC) 1.14 1.12 - 1.32 MMOL/L    Sodium (POC) 140 136 - 145 MMOL/L    Potassium (POC) 4.3 3.5 - 5.1 MMOL/L    Chloride (POC) 101 98 - 107 MMOL/L    CO2 (POC) 30 21 - 32 MMOL/L    Anion gap (POC) 14 5 - 15 mmol/L    Glucose (POC) 107 (H) 65 - 100 MG/DL    BUN (POC) 15 9 - 20 MG/DL    Creatinine (POC) 0.9 0.6 - 1.3 MG/DL    GFRAA, POC >60 >60 ml/min/1.73m2    GFRNA, POC >60 >60 ml/min/1.73m2    Hemoglobin (POC) 14.3 12.1 - 17.0 GM/DL    Hematocrit (POC) 42 36.6 - 50.3 %    Comment Comment Not Indicated.      GLUCOSE, POC   Result Value Ref Range    Glucose (POC) 92 65 - 100 mg/dL    Performed by Juvencio Holcomb    GLUCOSE, POC   Result Value Ref Range    Glucose (POC) 112 (H) 65 - 100 mg/dL    Performed by [address] [place] [address], POC   Result Value Ref Range    Glucose (POC) 125 (H) 65 - 100 mg/dL    Performed by Lana Pennington    GLUCOSE, POC   Result Value Ref Range    Glucose (POC) 151 (H) 65 - 100 mg/dL    Performed by Tanja Diaz    GLUCOSE, POC   Result Value Ref Range    Glucose (POC) 138 (H) 65 - 100 mg/dL    [facility] by [facility]    GLUCOSE, POC   Result Value Ref Range    Glucose (POC) 139 (H) 65 - 100 mg/dL    Performed by [facility]    GLUCOSE, POC   Result Value Ref Range    Glucose (POC) 160 (H) 65 - 100 mg/dL    Performed by [employer]    GLUCOSE, POC   Result Value Ref Range    Glucose (POC) 138 (H) 65 - 100 mg/dL    Performed by [employer]    GLUCOSE, POC   Result Value Ref Range    Glucose (POC) 123 (H) 65 - 100 mg/dL    Performed by [facility]    GLUCOSE, POC   Result Value Ref Range    Glucose (POC) 121 (H) 65 - 100 mg/dL    [facility]    GLUCOSE, POC   Result Value Ref Range    Glucose (POC) 104 (H) 65 - 100 mg/dL    Performed by Geo Quevedo    GLUCOSE, POC   Result Value Ref Range    Glucose (POC) 97 65 - 100 mg/dL    Performed by Lethyojanael Comp    GLUCOSE, POC   Result Value Ref Range    Glucose (POC) 104 (H) 65 - 100 mg/dL    Performed by Lethaniel Comp    GLUCOSE, POC   Result Value Ref Range    Glucose (POC) 146 (H) 65 - 100 mg/dL    Performed by BRIT LARA    GLUCOSE, POC   Result Value Ref Range    Glucose (POC) 118 (H) 65 - 100 mg/dL    Performed by JEFF OCHOA (PCT)    GLUCOSE, POC   Result Value Ref Range    Glucose (POC) 130 (H) 65 - 100 mg/dL    Performed by Keshia DUGAN (S0N)    GLUCOSE, POC   Result Value Ref Range    Glucose (POC) 146 (H) 65 - 100 mg/dL    Performed by Edis Fischer    EKG, 12 LEAD, INITIAL   Result Value Ref Range    Ventricular Rate 88 BPM    Atrial Rate 88 BPM    P-R Interval 294 ms    QRS Duration 82 ms    Q-T Interval 368 ms    QTC Calculation (Bezet) 445 ms    Calculated P Axis 61 degrees    Calculated R Axis -38 degrees Calculated T Axis 52 degrees    Diagnosis       Sinus rhythm with 1st degree AV block  Left axis deviation  Nonspecific T wave abnormality  When compared with ECG of 06-SEP-2012 03:00,  AL interval has increased  ST no longer elevated in Anterior leads  Nonspecific T wave abnormality, worse in Anterolateral leads  Confirmed by Kisha Cross ([id number]) on 6/25/2017 1:15:25 PM     EKG, 12 LEAD, SUBSEQUENT   Result Value Ref Range    Ventricular Rate 120 BPM    Atrial Rate 120 BPM    P-R Interval 180 ms    QRS Duration 76 ms    Q-T Interval 356 ms    QTC Calculation (Bezet) 503 ms    Calculated R Axis -76 degrees    Calculated T Axis -78 degrees    Diagnosis       Sinus tachycardia with frequent premature ventricular complexes  Left anterior fascicular block  Confirmed by Laurine Skiff ([id number]) on 7/12/2017 9:55:41 AM          Imaging review:  6/24/2017  CT scan of the head without contrast  No acute intracranial abnormality    CT angiogram of the head and neck  No significant cervical carotid artery stenosis. No large vessel intracranial vascular occlusive change. MRI Brain: neg (I personally reviewed these images in PACS and this is my impression)      HOME MEDS  Prior to Admission Medications   Prescriptions Last Dose Informant Patient Reported? Taking?   aspirin (ASPIRIN) 325 mg tablet   Yes No   Sig: Take 1 Tab by mouth daily. atorvastatin (LIPITOR) 40 mg tablet   No No   Sig: Take 1 Tab by mouth daily. cyclobenzaprine (FLEXERIL) 5 mg tablet   No No   Sig: Take 1 Tab by mouth three (3) times daily as needed for Muscle Spasm(s). levETIRAcetam (KEPPRA) 250 mg tablet   No No   Sig: Take 1 Tab by mouth two (2) times a day. Take with the 1000mg to make 1250mg twice a day. levETIRAcetam 1,000 mg tablet   No No   Sig: Take 1 Tab by mouth two (2) times a day. magnesium oxide (MAG-OX) 400 mg tablet   Yes No   Sig: Take 400 mg by mouth daily.      metoprolol succinate (TOPROL-XL) 25 mg XL tablet   Yes No Sig: Take  by mouth daily. omeprazole (PRILOSEC) 40 mg capsule   Yes No   Sig: Take 40 mg by mouth daily. pregabalin (LYRICA) 300 mg capsule   No No   Sig: Take 1 Cap by mouth two (2) times a day. Max Daily Amount: 600 mg.   thiamine (B-1) 100 mg tablet   No No   Sig: Take 1 Tab by mouth daily. Facility-Administered Medications: None       CURRENT MEDS  Current Facility-Administered Medications   Medication Dose Route Frequency    albuterol-ipratropium (DUO-NEB) 2.5 MG-0.5 MG/3 ML  3 mL Nebulization Q6H RT    TPN ADULT - CENTRAL AA 5% D20% W/ CA + ELECTROLYTES   IntraVENous CONTINUOUS    levETIRAcetam (KEPPRA) 1,500 mg in 0.9% sodium chloride IVPB  1,500 mg IntraVENous Q12H    valproate (DEPACON) 500 mg in 0.9% sodium chloride 50 mL IVPB  500 mg IntraVENous Q8H    enalaprilat (VASOTEC) injection 1.25 mg  1.25 mg IntraVENous Q6H    cloNIDine (CATAPRES) 0.2 mg/24 hr patch 1 Patch  1 Patch TransDERmal Q7D    TPN ADULT - CENTRAL AA 5% D20% W/ CA + ELECTROLYTES   IntraVENous CONTINUOUS    metoprolol (LOPRESSOR) injection 5 mg  5 mg IntraVENous Q6H    sodium chloride (NS) flush 10 mL  10 mL InterCATHeter Q24H    sodium chloride (NS) flush 10-40 mL  10-40 mL InterCATHeter Q8H    sodium chloride (NS) flush 20 mL  20 mL InterCATHeter Q24H    latanoprost (XALATAN) 0.005 % ophthalmic solution 1 Drop  1 Drop Both Eyes QPM    thiamine (B-1) 100 mg in 0.9% sodium chloride 50 mL IVPB  100 mg IntraVENous DAILY    sodium chloride (NS) flush 5-10 mL  5-10 mL IntraVENous Q8H    enoxaparin (LOVENOX) injection 40 mg  40 mg SubCUTAneous Q24H       IMPRESSION:  .Lucretia Excello is a 80 y.o. male gentleman admitted with seizure. He has been encephalopathic for days. He is improving did not need haldol or ativan last night for agitation. Patient  alertness is the same from yesterday but still failing swallow eval this is the 3 attempt. I believe that patient is coherent enough that we consider placing a PEG tube.  As of now patient is receiving TPN. Still ruling out possible stroke, patient has not received MRI. Today patient  has increase bilateral facial twitching and arm twitching more consistent with potential extrapyramidal side effects of excessive antipsychotic use. MRI neg for stroke. Will try to adjust AEDs to see if this helps him. RECOMMENDATIONS:  1. Decrease keppra to 1500mg BID  2. Continue Lyrica 300 mg p.o. twice daily when has p.o. access  3. Limit all sedating medications including Ativan, Benadryl, and Haldol. Stop Haldol tonight  4. Seizure precautions  5. MRI of the brain neg  6. Start depacon 500mg q 8  7.  Suspect patient will need a PEG    Will follow    Power Bedoya MD      7/14/2017  Over 35 minutes was spent reviewing records, discussing with Dr. Nelson Calhoun  and nursing, obtaining history, examining patient and discussing treatment plans with wife

## 2017-07-14 NOTE — PROGRESS NOTES
Brief Progress Note    Chart reviewed. I called wife and left msg to set up time for f/u family meeting. Purpose of meeting is to continue discussion regarding goals of care. Should you have questions/concerns or the need for a bedside visit by our team, please do not hesitate to contact us at [phone] Thank you for providing us w/ the continued opportunity to be involved in this patient's care.       Deanne Miller MD  Palliative Care Team

## 2017-07-14 NOTE — PROGRESS NOTES
Attempted to see pt for PT tx. Cleared by nursing. PT/OT entered the room, pt visibly labored with breathing. Significant amount of agonal breathing, retracted tongue, increased secretions, open mouth and increased flexor tone in UEs. Able to track L and R, but gaze preference to the L and up. Called nursing in to assess and reported pt has been the same since she received report this morning. PT/OT informed nursing this is a significant change in status since last session Wednesday when pt was talking and able to stand. Pt is now non-verbal and non-interactive Noted that speech was concerned earlier in the morning and MD made aware, nurse attempting to call MD again. Extremely concerned with change in status. Pt would benefit from transfer to higher level of care as pt is a FULL code. Therapy hold.   Patricio Joseph, PT, DPT

## 2017-07-14 NOTE — PROGRESS NOTES
Rapid Response called for pt's BP 65 sys. Pt had received ativan and hydralazine close together. On repeat BP was 91 sys. Dr Kaden Coleman ordered 1000cc bolus and hold BP meds. BP 96/45 at 2105. Pt resting comfortably. 2230:  /47 after rapid finished. Will cont to watch in connect for changes. 0375:  Called to reassess pt for labored breathing. Pt sounds congested, more than before. O2 97%, resp 28/min. Coughs up white/tan sputum, suctioned orally. Chest xray ordered.

## 2017-07-14 NOTE — PROGRESS NOTES
Occupational Therapy  Chart reviewed. Attempted to see pt for OT txt. Pt received in bed, snorous respirations, heavy secretions, tongue retracted into back of throat, and flexor tone in B UEs. Pt is now non verbal (speaking to therapist on Wednesday in full sentences) Today he was able to track L and R with verbal and auditory cueing but with noted gaze preference upward and to the L. Noted RRT call last night for hypotension. Currently BP stable, O2 stable but pt's presentation is SIGNIFICANTLY different than presentation on Wednesday (when pt was last seen by OT/PT services). Informed RN of concerns regarding change in pt's status and asked RN into room for assessment. RN placed call to MD, pt would benefit from transfer to higher level of care as he is a full code. Session aborted.  Tori Gross, MAGALIS    Total time 25 minutes

## 2017-07-14 NOTE — PROGRESS NOTES
Speech path  Pt had a Rapid response last night. Pt is currently having labored respirations and ? stridorous inhalation. Nsg is assessing the pt. At this point the pt can state his name although with very dysarthric speech. He is not appropriate for po trials at this time due to labored breathing. We will try to reassess later today if he is appropriate. Dr. Arlet Gill is aware of concerns.    Ila Cartagena, SLP

## 2017-07-14 NOTE — PROGRESS NOTES
Nutrition Assessment:    INTERVENTIONS/RECOMMENDATIONS:   If no plans for PEG today, recommend placing NGT and initiating TF enterally as it is preferred over TPN in a patient with a functioning gut: Jevity 1.5 @ 20 mL/hr and advance as tolerated by 10 mL q 12 hours to a goal rate of 50 mL/hr + 150 mL water flushes q 4 hours (Provides 1800 kcal, 77g protein, and 1812 mL water)    If plans are to continue TPN, would increase rate to AA5% D20% @ 63 mL/hr to provide 1331 kcal, 76g protein and meet 77% of estimated kcal needs and 100% of estimated protein needs   Consider adding 250 mL 20% lipids 3x/week to provide a total of 1545 kcal, 76g protein and meet 89% of estimated kcal needs and 100% of estimated protein needs     ASSESSMENT:   Chart reviewed; patient medically noted for dysphagia. Nursing and therapy in room at time of visit. SLP unable to perform PO trials today due to respiratory issues but have been recommending NPO. Plans were for PEG but GI waiting to see if mental status and swallow function improved. TPN was started; currently meetings 51% of estimated kcal needs and 76% of estimated protein needs. Enteral nutrition preferred over parenteral as patient has a functioning gut. TF recommendations provided above; either via NGT or once PEG placed. Diet Order: NPO (TPN: AA5%/D20 @ 42 mL/hr; providse 887 kcal, 50g protein)  % Eaten:  No data found. Pertinent Medications: [x] Reviewed []Other: Keppra, Lopressor, Thiamine   Pertinent Labs: [x]Reviewed  []Other: Na 134, Mg 1.4  Food Allergies: [x]None []Other:     Last BM: 7/12   [x]Active     []Hyperactive  []Hypoactive       [] Absent  BS  Skin:    [x] Intact   [] Incision  [] Breakdown   []Edema   []Other:    Anthropometrics: Height: 5' 8\" (172.7 cm) Weight: 65.9 kg (145 lb 3.2 oz)    IBW (%IBW):   ( ) UBW (%UBW):   (  %)    BMI: Body mass index is 22.08 kg/(m^2).     This BMI is indicative of:  []Underweight   [x]Normal   []Overweight   [] Obesity [] Extreme Obesity (BMI>40)  Last Weight Metrics:  Weight Loss Metrics 7/14/2017 6/21/2017 3/21/2017 3/18/2017 3/7/2017 2/25/2017 9/3/2012   Today's Wt 145 lb 3.2 oz 155 lb 166 lb 150 lb 166 lb 168 lb 192 lb   BMI 22.08 kg/m2 23.57 kg/m2 25.24 kg/m2 22.81 kg/m2 24.51 kg/m2 24.81 kg/m2 25.34 kg/m2       Estimated Nutrition Needs (Based on): 2279 Kcals/day (BMR (1335) x 1. 3AF) , 66 g (-79g (1.0-1.2 g/kg bw)) Protein  Carbohydrate:  At Least 130 g/day  Fluids: 1750 mL/day     Pt expected to meet estimated nutrient needs: []Yes [x]No    NUTRITION DIAGNOSES:   Problem:  Inadequate protein-energy intake      Etiology: related to current medical condition, AMS, dysphagia     Signs/Symptoms: as evidenced by NPO status, TPN meeting 51% estimated kcal needs      NUTRITION INTERVENTIONS:   Enteral/Parenteral Nutrition: Modify rate, concentration, composition, and schedule                GOAL:   Initiate nutrition support via TF or TPN increased to goal next 1-3 days    NUTRITION MONITORING AND EVALUATION   Behavioral-Environmental Outcomes: Behavior  Food/Nutrient Intake Outcomes: Enteral/parenteral nutrition intake  Physical Signs/Symptoms Outcomes: Weight/weight change, Electrolyte and renal profile, GI profile, Glucose profile    Previous Goal Met:   [] Met              [] Progressing Towards Goal              [x] Not Progressing Towards Goal   Previous Recommendations:   [] Implemented          [x] Not Implemented          [] Not Applicable    LEARNING NEEDS (Diet, Food/Nutrient-Drug Interaction):    [x] None Identified   [] Identified and Education Provided/Documented   [] Identified and Pt declined/was not appropriate     Cultural, Zoroastrian, OR Ethnic Dietary Needs:    [x] None Identified   [] Identified and Addressed     [x] Interdisciplinary Care Plan Reviewed/Documented    [x] Discharge Planning: TBD pending progress/plan of care   [] Participated in Interdisciplinary Rounds    NUTRITION RISK:    [x] High [] Moderate           []  Low  []  Minimal/Uncompromised      Bob Johnson  Pager [phone]              Weekend Pager [phone]

## 2017-07-14 NOTE — PROGRESS NOTES
-1945: at start of shift patient was agitated, /102, , respirations 24 and wet sounding. -PRN ativan 0.5mg, PRN hydralazine 10mg, and PRN breathing treatment given. -Patient calmed down and started breathing easier but BP dropped to 67/42.   -Rapid response was called at 2055.  -Dr. Lilly Bunn ordered a 1000cc bolus and to hold all BP meds. Respiratory deep suctioned.   -At 2225, at end of bolus, /47, HR 71, respirations 18, O2 96. Patient resting comfortably.

## 2017-07-14 NOTE — PROGRESS NOTES
Hospitalist Progress Note    NAME: Dwight Manzo   :  1934   MRN:  [id number]       Assessment / Plan:  Acute encephalopathy due to status epilepticus in setting of seizure d/o:  Out of lyrica for a few days prior to admission - has also preceeded other admissions for sz. Pt clinically worsened overnight with decreased BP and worsened mental status. VS stable today but remains with gurgling upper airway noise, not following commands or as alert as he has been the last few days. - CT head  no acute process  - MRI brain with no evidence of acute infarction. No acute intracranial process. Moderate cerebral atrophy and mild chronic microvascular ischemic change. - CXR this morning with improved right upper lobe airspace disease and left basilar atelectasis. - appreciate neuro consult, adjusting keppra and adding valproate in light of worsened mental status today  - holding home lyrica while NPO.  Con't ativan prn seizure activity only. Dysphagia with resultant moderate protein calorie malnutrition:  Suspect aspiration event overnight. CXR clear but loud noises coming from throat. - discussed with wife and daughter again today. They still want PEG placement.  - GI consult placed for PEG, possible placement on Monday  - con't central TPN for now.  Monitor electrolytes as below. Hypomagnesemia:  mild refeeding syndrome with initiation of TPN  - con't replacing IV  - check mag, phos, BMP in AM  Hypertension, benign/essential:  uncontrolled  - metoprolol IV q4hrs increased dose today (on toprol at home)  - titrating vasotec  - prn IV hydralazine  - PT/OT and speech appreciated.  Plan for [facility] on discharge.   Hyperlipidemia: holding lipitor while NPO  History of SVT:  Briefly on cardizem gtt this admission  - serial troponin and EKG ordered with lower chest/abd pain complaints today  Peripheral neuropathy: ran out of lyrica several days PTA.  Needs to be restarted as soon as enteral access established due to ongoing leg sx. 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  Con't thiamine supplementation. GERD: con't protonix  Acute respiratory failure (resolved) due to acute encephalopathy with chronic underlying hypercapnea: intubated initially during admission.  Duonebs changed to prn only. Pt self-extubated 6/24; finished zosyn 7/09.      DVT prophylaxis: lovenox    Code status: Full (wife is decision maker)     Subjective:     Chief Complaint / Reason for Physician Visit  Awake but less responsive. Appropriately follows some simple commands. Discussed with RN events overnight. Review of Systems:  Symptom Y/N Comments  Symptom Y/N Comments   Fever/Chills    Chest Pain     Poor Appetite    Edema     Cough    Abdominal Pain     Sputum    Joint Pain     SOB/AL    Pruritis/Rash     Nausea/vomit    Tolerating PT/OT     Diarrhea    Tolerating Diet     Constipation    Other       Could NOT obtain due to: encephalopathy     Objective:     VITALS:   Last 24hrs VS reviewed since prior progress note.  Most recent are:  Patient Vitals for the past 24 hrs:   Temp Pulse Resp BP SpO2   07/14/17 1110 98.4 °F (36.9 °C) (!) 106 28 156/84 97 %   07/14/17 0941 - 61 - 121/61 -   07/14/17 0707 97.2 °F (36.2 °C) 97 24 159/81 93 %   07/14/17 0638 - 97 28 - -   07/14/17 0300 98.4 °F (36.9 °C) 96 24 149/79 97 %   07/14/17 0032 - 60 22 132/79 99 %   07/13/17 2248 98.5 °F (36.9 °C) 72 18 114/49 95 %   07/13/17 2225 - 71 18 112/47 96 %   07/13/17 2212 - 76 18 117/46 96 %   07/13/17 2159 - 72 18 93/40 97 %   07/13/17 2121 - 74 20 99/40 97 %   07/13/17 2114 - 71 - 92/41 96 %   07/13/17 2105 - (!) 103 - 96/45 94 %   07/13/17 2057 - (!) 110 - 91/46 -   07/13/17 2055 - (!) 104 - (!) 65/42 95 %   07/13/17 2052 - - 18 - -   07/13/17 2043 - (!) 103 - (!) 74/40 94 %   07/13/17 2012 - (!) 103 20 (!) 67/42 -   07/13/17 1914 98.2 °F (36.8 °C) (!) 110 24 (!) 157/102 96 %   07/13/17 1811 - 92 20 (!) 124/91 97 %   07/13/17 1612 98.2 °F (36.8 °C) 84 - 114/78 96 %     No intake or output data in the 24 hours ending 07/14/17 1127     PHYSICAL EXAM:  General: WD, WN. Alert, cooperative with simple commands, no acute distress    EENT:  EOMI. Anicteric sclerae. MMM  Resp:  CTA bilaterally, no wheezing or rales. No accessory muscle use  CV:  Regular  rhythm,  No edema  GI:  Soft, Non distended, Non tender.  +Bowel sounds  Neurologic:  Alert and oriented X 1-2, no speech,   Psych:   Poor insight. Not anxious nor agitated  Skin:  No rashes. No jaundice    Reviewed most current lab test results and cultures  YES  Reviewed most current radiology test results   YES  Review and summation of old records today    NO  Reviewed patient's current orders and MAR    YES  PMH/SH reviewed - no change compared to H&P  ________________________________________________________________________  Care Plan discussed with:    Comments   Patient x    Family  x Wife, dtr   RN x    Care Manager     Consultant  x GI                     Multidiciplinary team rounds were held today with , nursing, pharmacist and clinical coordinator. Patient's plan of care was discussed; medications were reviewed and discharge planning was addressed. ________________________________________________________________________  Total NON critical care TIME:  40 Minutes    Total CRITICAL CARE TIME Spent:   Minutes non procedure based      Comments   >50% of visit spent in counseling and coordination of care x    ________________________________________________________________________  Darion Bowie MD     Procedures: see electronic medical records for all procedures/Xrays and details which were not copied into this note but were reviewed prior to creation of Plan. LABS:  I reviewed today's most current labs and imaging studies.   Pertinent labs include:  Recent Labs      07/14/17   0305  07/13/17   0354  07/12/17   0103   WBC  7.0  6.1  4.0*   HGB  11.0*  13.2  10.9* HCT  31.5*  36.8  31.2*   PLT  191  233  237     Recent Labs      07/14/17   0305  07/13/17   0354  07/12/17   0103   NA  134*  132*  138   K  3.5  3.5  3.2*   CL  100  98  103   CO2  27  28  30   GLU  131*  114*  120*   BUN  14  10  10   CREA  0.53*  0.65*  0.64*   CA  8.6  9.3  8.9   MG  1.4*  1.2*  1.3*   PHOS  2.9  2.3*  2.5*   ALB   --    --   2.9*   TBILI   --    --   0.7   SGOT   --    --   21   ALT   --    --   28       Signed: Darion Bowie MD

## 2017-07-14 NOTE — PROGRESS NOTES
Bedside and Verbal shift change report given to   RN  (oncoming nurse) by Arnulfo Robertson RN(offgoing nurse). Report included the following information SBAR, Kardex and ED Summary.      Zone Phone:   [phone]          Significant changes during shift:  RR called due to drop in BP after ativan and hydralazine. Bolus given and BP came back up. Continued agitation and respirations throughout the night. Blue port does not flush. [facility] team aware.  Chest xray ordered.              Patient Information  Jimena Dugan  80 y.o.  6/24/2017 12:13 PM by Etta Dougherty MD. Sacha Agarwal admitted from [facility]  Problem List                 Patient Active Problem List      Diagnosis Date Noted    Oral phase dysphagia 07/08/2017    Status epilepticus ([facility] [address] 06/24/2017    Seizures ([address] 02/18/2017    Encephalopathy, unspecified 09/03/2012    SVT (supraventricular tachycardia) ([address] 09/03/2012    Aphasia 09/03/2012    CVA (cerebral vascular accident) ([address] 09/03/2012    Seizure disorder ([address] 09/03/2012                  Past Medical History:   Diagnosis Date    Alcohol abuse, in remission       Seizures ([address]                  Core Measures:      CVA: No No  CHF:No No  PNA:No No      Post Op Surgical (If Applicable):       Number times ambulated in hallway past shift:  0  Number of times OOB to chair past shift:   0  NG Tube: No  Incentive Spirometer: No  Drains: No   Volume    Dressing Present:  No  Flatus:  Yes      Activity Status:      OOB to Chair No  Ambulated this shift No   Bed Rest Yes      Supplemental G9: (HC Applicable)      NC No  NRB No  Venti-mask No  On  Liters/min          LINES AND DRAINS:      Central Line? Yes Placement date 6/24/17 Reason Medically Necessary limited access      PICC Ronaldo Drivers Placement date Reason Medically Necessary       Urinary Catheter? No Placement Date  Reason Medically Necessary       DVT prophylaxis:      DVT prophylaxis Med- No  DVT prophylaxis SCD or JAIME- Refused       Wounds: (If Applicable)      Wounds- No      Location       Patient Safety:      Falls Score Total Score: 3  Safety Level_______  Bed Alarm On? No  Sitter?  Yes      Plan for upcoming shift: safety, monitor BP, continue TPN, monitor respirations              Discharge Plan: no      Active Consults:  IP CONSULT TO NEUROLOGY  IP CONSULT TO INTENSIVIST  IP CONSULT TO PALLIATIVE CARE - PROVIDER

## 2017-07-14 NOTE — PROGRESS NOTES
Talked to Dr. Cassie Ortega because per PT/OT, pt. has changes from 2 days ago. I asked her if she wants to transfer it to PCU,, she said she is not going to do anything different even if she will transfer the patient.

## 2017-07-14 NOTE — PROGRESS NOTES
Bedside and Verbal shift change report given to [facility] RN  (oncoming nurse) by Anabel Louise RN(offgoing nurse). Report included the following information SBAR, Kardex and ED Summary.      Zone Phone:   [phone]          Significant changes during shift:  had a labored breathing this am,was evaluated by Dr. Dale Rao.  Nedra Drivers  80 y.o.  6/24/2017 12:13 PM by Nathan Naranjo MD. Lucie Correa admitted from [facility]  Problem List                 Patient Active Problem List      Diagnosis Date Noted    Oral phase dysphagia 07/08/2017    Status epilepticus ([facility] [address] [address] 06/24/2017    Seizures ([address] 02/18/2017    Encephalopathy, unspecified 09/03/2012    SVT (supraventricular tachycardia) ([address] 09/03/2012    Aphasia 09/03/2012    CVA (cerebral vascular accident) ([address] 09/03/2012    Seizure disorder ([facility] [address] 09/03/2012                  Past Medical History:   Diagnosis Date    Alcohol abuse, in remission       Seizures ([address]                  Core Measures:      CVA: No No  CHF:No No  PNA:No No      Post Op Surgical (If Applicable):       Number times ambulated in hallway past shift:  0  Number of times OOB to chair past shift:   0  NG Tube: No  Incentive Spirometer: No  Drains: No   Volume    Dressing Present:  No  Flatus:  Yes      Activity Status:      OOB to Chair No  Ambulated this shift No   Bed Rest Yes      Supplemental D4: (KL Applicable)      NC No  NRB No  Venti-mask No  On  Liters/min          LINES AND DRAINS:      Central Line? Yes Placement date 6/24/17       PICC LINE? No       Urinary Catheter? No       DVT prophylaxis:      DVT prophylaxis Med- No  DVT prophylaxis SCD or JAIME- Refused       Wounds: (If Applicable)      Wounds- No      Location       Patient Safety:      Falls Score Total Score: 3  Safety Level_______  Bed Alarm On? No  Sitter?  Yes      Plan for upcoming shift: safety, monitor BP, continue TPN, monitor respirations              Discharge Plan: no      Active Consults:  IP CONSULT TO NEUROLOGY  IP CONSULT TO INTENSIVIST  IP CONSULT TO PALLIATIVE CARE - PROVIDER

## 2017-07-14 NOTE — PROGRESS NOTES
Problem: Pressure Injury - Risk of  Goal: *Prevention of pressure ulcer  Outcome: Progressing Towards Goal  Patient turns self in bed. Pillows used to elevate heels and adjust pressure from side to side Q2 hours.

## 2017-07-15 PROBLEM — R47.1 DYSARTHRIA: Status: ACTIVE | Noted: 2017-07-15

## 2017-07-15 PROBLEM — R13.10 DYSPHAGIA: Status: ACTIVE | Noted: 2017-07-15

## 2017-07-15 LAB
ALBUMIN SERPL BCP-MCNC: 2.9 G/DL (ref 3.5–5)
ALBUMIN/GLOB SERPL: 0.8 {RATIO} (ref 1.1–2.2)
ALP SERPL-CCNC: 50 U/L (ref 45–117)
ALT SERPL-CCNC: 24 U/L (ref 12–78)
ANION GAP BLD CALC-SCNC: 4 MMOL/L (ref 5–15)
APPEARANCE UR: CLEAR
AST SERPL W P-5'-P-CCNC: 27 U/L (ref 15–37)
BACTERIA URNS QL MICRO: ABNORMAL /HPF
BASOPHILS # BLD AUTO: 0 K/UL (ref 0–0.1)
BASOPHILS # BLD: 0 % (ref 0–1)
BILIRUB SERPL-MCNC: 0.9 MG/DL (ref 0.2–1)
BILIRUB UR QL: NEGATIVE
BUN SERPL-MCNC: 20 MG/DL (ref 6–20)
BUN/CREAT SERPL: 31 (ref 12–20)
CALCIUM SERPL-MCNC: 8.2 MG/DL (ref 8.5–10.1)
CHLORIDE SERPL-SCNC: 99 MMOL/L (ref 97–108)
CO2 SERPL-SCNC: 30 MMOL/L (ref 21–32)
COLOR UR: ABNORMAL
CREAT SERPL-MCNC: 0.65 MG/DL (ref 0.7–1.3)
DIFFERENTIAL METHOD BLD: ABNORMAL
EOSINOPHIL # BLD: 0.1 K/UL (ref 0–0.4)
EOSINOPHIL NFR BLD: 1 % (ref 0–7)
EPITH CASTS URNS QL MICRO: ABNORMAL /LPF
ERYTHROCYTE [DISTWIDTH] IN BLOOD BY AUTOMATED COUNT: 12.8 % (ref 11.5–14.5)
GLOBULIN SER CALC-MCNC: 3.7 G/DL (ref 2–4)
GLUCOSE BLD STRIP.AUTO-MCNC: 129 MG/DL (ref 65–100)
GLUCOSE BLD STRIP.AUTO-MCNC: 140 MG/DL (ref 65–100)
GLUCOSE SERPL-MCNC: 125 MG/DL (ref 65–100)
GLUCOSE UR STRIP.AUTO-MCNC: NEGATIVE MG/DL
HCT VFR BLD AUTO: 29.7 % (ref 36.6–50.3)
HGB BLD-MCNC: 10.3 G/DL (ref 12.1–17)
HGB UR QL STRIP: NEGATIVE
KETONES UR QL STRIP.AUTO: NEGATIVE MG/DL
LEUKOCYTE ESTERASE UR QL STRIP.AUTO: NEGATIVE
LYMPHOCYTES # BLD AUTO: 25 % (ref 12–49)
LYMPHOCYTES # BLD: 1.6 K/UL (ref 0.8–3.5)
MAGNESIUM SERPL-MCNC: 1.7 MG/DL (ref 1.6–2.4)
MCH RBC QN AUTO: 33.8 PG (ref 26–34)
MCHC RBC AUTO-ENTMCNC: 34.7 G/DL (ref 30–36.5)
MCV RBC AUTO: 97.4 FL (ref 80–99)
MONOCYTES # BLD: 1.3 K/UL (ref 0–1)
MONOCYTES NFR BLD AUTO: 21 % (ref 5–13)
NEUTS SEG # BLD: 3.3 K/UL (ref 1.8–8)
NEUTS SEG NFR BLD AUTO: 53 % (ref 32–75)
NITRITE UR QL STRIP.AUTO: NEGATIVE
PH UR STRIP: 6 [PH] (ref 5–8)
PHOSPHATE SERPL-MCNC: 2.6 MG/DL (ref 2.6–4.7)
PLATELET # BLD AUTO: 160 K/UL (ref 150–400)
POTASSIUM SERPL-SCNC: 3.7 MMOL/L (ref 3.5–5.1)
PROT SERPL-MCNC: 6.6 G/DL (ref 6.4–8.2)
PROT UR STRIP-MCNC: NEGATIVE MG/DL
RBC # BLD AUTO: 3.05 M/UL (ref 4.1–5.7)
RBC #/AREA URNS HPF: ABNORMAL /HPF (ref 0–5)
RBC MORPH BLD: ABNORMAL
SERVICE CMNT-IMP: ABNORMAL
SERVICE CMNT-IMP: ABNORMAL
SODIUM SERPL-SCNC: 133 MMOL/L (ref 136–145)
SP GR UR REFRACTOMETRY: 1.03 (ref 1–1.03)
UA: UC IF INDICATED,UAUC: ABNORMAL
UROBILINOGEN UR QL STRIP.AUTO: 1 EU/DL (ref 0.2–1)
VALPROATE SERPL-MCNC: 62 UG/ML (ref 50–100)
WBC # BLD AUTO: 6.3 K/UL (ref 4.1–11.1)
WBC URNS QL MICRO: ABNORMAL /HPF (ref 0–4)

## 2017-07-15 PROCEDURE — 74011250637 HC RX REV CODE- 250/637: Performed by: INTERNAL MEDICINE

## 2017-07-15 PROCEDURE — 77030034849

## 2017-07-15 PROCEDURE — 84100 ASSAY OF PHOSPHORUS: CPT | Performed by: INTERNAL MEDICINE

## 2017-07-15 PROCEDURE — 74011250636 HC RX REV CODE- 250/636: Performed by: INTERNAL MEDICINE

## 2017-07-15 PROCEDURE — 87070 CULTURE OTHR SPECIMN AEROBIC: CPT | Performed by: INTERNAL MEDICINE

## 2017-07-15 PROCEDURE — 77030029684 HC NEB SM VOL KT MONA -A

## 2017-07-15 PROCEDURE — 74011250636 HC RX REV CODE- 250/636: Performed by: PSYCHIATRY & NEUROLOGY

## 2017-07-15 PROCEDURE — 87086 URINE CULTURE/COLONY COUNT: CPT | Performed by: INTERNAL MEDICINE

## 2017-07-15 PROCEDURE — 83520 IMMUNOASSAY QUANT NOS NONAB: CPT | Performed by: INTERNAL MEDICINE

## 2017-07-15 PROCEDURE — 87077 CULTURE AEROBIC IDENTIFY: CPT | Performed by: INTERNAL MEDICINE

## 2017-07-15 PROCEDURE — 81001 URINALYSIS AUTO W/SCOPE: CPT | Performed by: INTERNAL MEDICINE

## 2017-07-15 PROCEDURE — 82962 GLUCOSE BLOOD TEST: CPT

## 2017-07-15 PROCEDURE — 74011000258 HC RX REV CODE- 258: Performed by: INTERNAL MEDICINE

## 2017-07-15 PROCEDURE — 83735 ASSAY OF MAGNESIUM: CPT | Performed by: INTERNAL MEDICINE

## 2017-07-15 PROCEDURE — 74011000250 HC RX REV CODE- 250: Performed by: INTERNAL MEDICINE

## 2017-07-15 PROCEDURE — 36415 COLL VENOUS BLD VENIPUNCTURE: CPT | Performed by: INTERNAL MEDICINE

## 2017-07-15 PROCEDURE — 77010033678 HC OXYGEN DAILY

## 2017-07-15 PROCEDURE — 65660000000 HC RM CCU STEPDOWN

## 2017-07-15 PROCEDURE — 94640 AIRWAY INHALATION TREATMENT: CPT

## 2017-07-15 PROCEDURE — 74011000250 HC RX REV CODE- 250: Performed by: PSYCHIATRY & NEUROLOGY

## 2017-07-15 PROCEDURE — 80053 COMPREHEN METABOLIC PANEL: CPT | Performed by: INTERNAL MEDICINE

## 2017-07-15 PROCEDURE — 74011000258 HC RX REV CODE- 258: Performed by: PSYCHIATRY & NEUROLOGY

## 2017-07-15 PROCEDURE — 85025 COMPLETE CBC W/AUTO DIFF WBC: CPT | Performed by: INTERNAL MEDICINE

## 2017-07-15 PROCEDURE — 80164 ASSAY DIPROPYLACETIC ACD TOT: CPT | Performed by: INTERNAL MEDICINE

## 2017-07-15 PROCEDURE — 80177 DRUG SCRN QUAN LEVETIRACETAM: CPT | Performed by: INTERNAL MEDICINE

## 2017-07-15 PROCEDURE — 87186 SC STD MICRODIL/AGAR DIL: CPT | Performed by: INTERNAL MEDICINE

## 2017-07-15 RX ORDER — METOPROLOL TARTRATE 5 MG/5ML
2.5 INJECTION INTRAVENOUS EVERY 4 HOURS
Status: DISCONTINUED | OUTPATIENT
Start: 2017-07-15 | End: 2017-07-16

## 2017-07-15 RX ADMIN — ENALAPRILAT 1.25 MG: 2.5 INJECTION INTRAVENOUS at 06:10

## 2017-07-15 RX ADMIN — METOPROLOL TARTRATE 3 MG: 5 INJECTION INTRAVENOUS at 00:32

## 2017-07-15 RX ADMIN — Medication 30 ML: at 16:32

## 2017-07-15 RX ADMIN — SODIUM CHLORIDE 3 G: 900 INJECTION, SOLUTION INTRAVENOUS at 11:58

## 2017-07-15 RX ADMIN — METOPROLOL TARTRATE 2.5 MG: 5 INJECTION INTRAVENOUS at 08:44

## 2017-07-15 RX ADMIN — Medication 40 ML: at 14:10

## 2017-07-15 RX ADMIN — Medication 20 ML: at 14:10

## 2017-07-15 RX ADMIN — IPRATROPIUM BROMIDE AND ALBUTEROL SULFATE 3 ML: .5; 3 SOLUTION RESPIRATORY (INHALATION) at 14:57

## 2017-07-15 RX ADMIN — VALPROATE SODIUM 500 MG: 100 INJECTION, SOLUTION INTRAVENOUS at 23:32

## 2017-07-15 RX ADMIN — VALPROATE SODIUM 500 MG: 100 INJECTION, SOLUTION INTRAVENOUS at 00:01

## 2017-07-15 RX ADMIN — Medication 10 ML: at 14:10

## 2017-07-15 RX ADMIN — Medication 10 ML: at 05:37

## 2017-07-15 RX ADMIN — IPRATROPIUM BROMIDE AND ALBUTEROL SULFATE 3 ML: .5; 3 SOLUTION RESPIRATORY (INHALATION) at 02:24

## 2017-07-15 RX ADMIN — LEVETIRACETAM 1500 MG: 100 INJECTION, SOLUTION, CONCENTRATE INTRAVENOUS at 22:32

## 2017-07-15 RX ADMIN — IPRATROPIUM BROMIDE AND ALBUTEROL SULFATE 3 ML: .5; 3 SOLUTION RESPIRATORY (INHALATION) at 21:05

## 2017-07-15 RX ADMIN — FENTANYL CITRATE 25 MCG: 50 INJECTION, SOLUTION INTRAMUSCULAR; INTRAVENOUS at 23:57

## 2017-07-15 RX ADMIN — SODIUM CHLORIDE 3 G: 900 INJECTION, SOLUTION INTRAVENOUS at 18:56

## 2017-07-15 RX ADMIN — VALPROATE SODIUM 500 MG: 100 INJECTION, SOLUTION INTRAVENOUS at 05:38

## 2017-07-15 RX ADMIN — IPRATROPIUM BROMIDE AND ALBUTEROL SULFATE 3 ML: .5; 3 SOLUTION RESPIRATORY (INHALATION) at 07:40

## 2017-07-15 RX ADMIN — Medication 10 ML: at 05:38

## 2017-07-15 RX ADMIN — FENTANYL CITRATE 25 MCG: 50 INJECTION, SOLUTION INTRAMUSCULAR; INTRAVENOUS at 06:49

## 2017-07-15 RX ADMIN — Medication 10 ML: at 21:12

## 2017-07-15 RX ADMIN — RETINOL, ERGOCALCIFEROL, .ALPHA.-TOCOPHEROL ACETATE, DL-, PHYTONADIONE, ASCORBIC ACID, NIACINAMIDE, RIBOFLAVIN 5-PHOSPHATE SODIUM, THIAMINE HYDROCHLORIDE, PYRIDOXINE HYDROCHLORIDE, DEXPANTHENOL, BIOTIN, FOLIC ACID, AND CYANOCOBALAMIN: KIT at 19:45

## 2017-07-15 RX ADMIN — METOPROLOL TARTRATE 2.5 MG: 5 INJECTION INTRAVENOUS at 11:59

## 2017-07-15 RX ADMIN — VALPROATE SODIUM 500 MG: 100 INJECTION, SOLUTION INTRAVENOUS at 14:30

## 2017-07-15 RX ADMIN — ENOXAPARIN SODIUM 40 MG: 40 INJECTION SUBCUTANEOUS at 08:44

## 2017-07-15 RX ADMIN — THIAMINE HYDROCHLORIDE 100 MG: 100 INJECTION, SOLUTION INTRAMUSCULAR; INTRAVENOUS at 09:34

## 2017-07-15 RX ADMIN — Medication 10 ML: at 08:45

## 2017-07-15 RX ADMIN — ACETAMINOPHEN 650 MG: 650 SUPPOSITORY RECTAL at 03:16

## 2017-07-15 RX ADMIN — LATANOPROST 1 DROP: 50 SOLUTION OPHTHALMIC at 19:45

## 2017-07-15 RX ADMIN — LEVETIRACETAM 1500 MG: 100 INJECTION, SOLUTION, CONCENTRATE INTRAVENOUS at 08:58

## 2017-07-15 RX ADMIN — METOPROLOL TARTRATE 2.5 MG: 5 INJECTION INTRAVENOUS at 21:10

## 2017-07-15 NOTE — PROGRESS NOTES
Bedside and Verbal shift change report given to [address]  (oncoming nurse) by Torin Barraza RN(offgoing nurse). Report included the following information SBAR, Kardex and ED Summary.      Zone Phone:   [phone]          Significant changes during shift:  labored breathing continued. Blood pressure up and down. 500cc bolus given. BP meds held. Respiratory evaluated. Tylenol and then Fentanyl given for increased pain              Patient Information  Alfredo Danilo  80 y.o.  6/24/2017 12:13 PM by Pablito Jewell MD. Vashti Pederson admitted from [facility]  Problem List                 Patient Active Problem List      Diagnosis Date Noted    Oral phase dysphagia 07/08/2017    Status epilepticus ([facility] [address] 06/24/2017    Seizures ([address] 02/18/2017    Encephalopathy, unspecified 09/03/2012    SVT (supraventricular tachycardia) ([address] 09/03/2012    Aphasia 09/03/2012    CVA (cerebral vascular accident) ([facility] [address] 09/03/2012    Seizure disorder ([facility] [address] 09/03/2012                  Past Medical History:   Diagnosis Date    Alcohol abuse, in remission       Seizures ([address]                  Core Measures:      CVA: No No  CHF:No No  PNA:No No      Post Op Surgical (If Applicable):       Number times ambulated in hallway past shift:  0  Number of times OOB to chair past shift:   0  NG Tube: No  Incentive Spirometer: No  Drains: No   Volume    Dressing Present:  No  Flatus:  Yes      Activity Status:      OOB to Chair No  Ambulated this shift No   Bed Rest Yes      Supplemental J8: (OZ Applicable)      NC No  NRB No  Venti-mask No  On  Liters/min          LINES AND DRAINS:      Central Line? Yes Placement date 6/24/17       PICC LINE? No       Urinary Catheter? No       DVT prophylaxis:      DVT prophylaxis Med- No  DVT prophylaxis SCD or JAIME- Refused       Wounds: (If Applicable)      Wounds- No      Location       Patient Safety:      Falls Score Total Score: 3  Safety Level_______  Bed Alarm On? No  Sitter?  Yes      Plan for upcoming shift: safety, monitor BP, continue TPN, monitor respirations              Discharge Plan: no      Active Consults:  IP CONSULT TO NEUROLOGY  IP CONSULT TO INTENSIVIST  IP CONSULT TO PALLIATIVE CARE - PROVIDER

## 2017-07-15 NOTE — PROGRESS NOTES
Hospitalist Progress Note    NAME: Alexandra Chan   :  1934   MRN:  [id number]       Assessment / Plan:  Acute encephalopathy due to status epilepticus in setting of seizure d/o:  Out of lyrica for a few days prior to admission - has also preceeded other admissions for sz. Pt continues to be below most recent baseline with motor function in particular. Very gurgly upper airway/throat noise continues. - CT head  no acute process  - MRI brain  with no evidence of acute infarction. No acute intracranial process. Moderate cerebral atrophy and mild chronic microvascular ischemic change. - CXR  with improved right upper lobe airspace disease and left basilar atelectasis. - appreciate neuro consult, adjusted keppra and added valproate  in light of worsened mental status  - holding home lyrica while NPO - will hopefully get PEG next week so this can be started.  Con't ativan prn seizure activity only. - overall etiology for his continued encephalopathy is unclear despite multiple neurologists seeing patient in consultation here. I discussed all test results and reviewed very guarded prognosis with Pt's daughter and wife yesterday. Low-grade fever and hypotension:  Temp 99.5 overnight  - CXR nonacute yesterday as above  - check UA and culture  - sputum culture ordered  - will try on unasyn for potential recurrent aspiration   Dysphagia with resultant moderate protein calorie malnutrition:  CXR clear yesterday, but continued course noises coming from throat  - reviewed with wife/dtr , they still want PEG placement.  - GI consult placed for PEG, possible placement on Monday  - con't central TPN for now - I have renewed for today. Electrolytes stable so increasing rate. Hypertension, benign/essential: intermittent hypotension  - decrease IV metoprolol dose  - holding vasotec  - prn IV hydralazine  - PT/OT and speech appreciated.  Plan [facility] on discharge.   Hyperlipidemia: holding lipitor while NPO  History of SVT:  Briefly on cardizem gtt this admission  - serial troponin and EKG ordered with lower chest/abd pain complaints today  Peripheral neuropathy: ran out of lyrica several days PTA.  Needs to be restarted as soon as enteral access established due to ongoing leg sx. 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  Con't thiamine supplementation. GERD: con't protonix  Acute respiratory failure (resolved) due to acute encephalopathy with chronic underlying hypercapnea: intubated initially during admission.  Duonebs changed to prn only. Pt self-extubated 6/24; finished zosyn 7/09.      DVT prophylaxis: lovenox    Code status: Full (wife is decision maker)     Subjective:     Chief Complaint / Reason for Physician Visit  Eyes open, will respond to some simple commands. Continued course sounds from throat. Discussed with RN events overnight. Review of Systems:  Symptom Y/N Comments  Symptom Y/N Comments   Fever/Chills    Chest Pain     Poor Appetite    Edema     Cough    Abdominal Pain     Sputum    Joint Pain     SOB/AL    Pruritis/Rash     Nausea/vomit    Tolerating PT/OT     Diarrhea    Tolerating Diet     Constipation    Other       Could NOT obtain due to: encephalopathy     Objective:     VITALS:   Last 24hrs VS reviewed since prior progress note.  Most recent are:  Patient Vitals for the past 24 hrs:   Temp Pulse Resp BP SpO2   07/15/17 0740 - - - - 100 %   07/15/17 0712 99.1 °F (37.3 °C) 88 22 163/74 99 %   07/15/17 0608 - 87 - 162/78 100 %   07/15/17 0442 99.5 °F (37.5 °C) 69 16 103/49 100 %   07/15/17 0035 - 66 20 111/51 100 %   07/15/17 0032 - 86 28 159/65 -   07/14/17 2300 99 °F (37.2 °C) 68 20 109/47 100 %   07/14/17 2220 - 66 - 108/48 -   07/14/17 2214 - 68 - 116/52 -   07/14/17 2152 - 64 20 102/52 -   07/14/17 2142 - 66 - 95/45 100 %   07/14/17 2135 - 66 20 (!) 86/45 100 %   07/14/17 2115 - 71 - (!) 72/40 -   07/14/17 2110 - - - (!) 74/40 -   07/14/17 2108 - 73 - (!) 86/45 100 %   07/14/17 2103 99.5 °F (37.5 °C) 73 20 (!) 82/39 100 %   07/14/17 1944 98.5 °F (36.9 °C) - - - -   07/14/17 1842 100 °F (37.8 °C) 84 22 142/69 96 %   07/14/17 1740 - 100 - 148/76 -   07/14/17 1727 - 90 - 159/79 -   07/14/17 1452 99 °F (37.2 °C) 84 24 121/58 95 %   07/14/17 1447 - - - - 99 %   07/14/17 1206 - - - - 98 %   07/14/17 1140 - 74 - 159/79 -   07/14/17 1110 98.4 °F (36.9 °C) (!) 106 28 156/84 97 %   07/14/17 0941 - 61 - 121/61 -     No intake or output data in the 24 hours ending 07/15/17 0811     PHYSICAL EXAM:  General: WD, WN. Alert, cooperative with some simple commands, mild acute distress    EENT:  EOMI. Anicteric sclerae. Dry oropharynx. Course upper airway. Resp:  CTA bilaterally, no wheezing or rales. No accessory muscle use  CV:  Regular rhythm,  No edema  GI:  Soft, Non distended, Non tender.  +Bowel sounds  Neurologic:  Alert and oriented X 1. Minimal speech,   Psych:   Poor insight. Not anxious nor agitated  Skin:  No rashes. No jaundice    Reviewed most current lab test results and cultures  YES  Reviewed most current radiology test results   YES  Review and summation of old records today    NO  Reviewed patient's current orders and MAR    YES  PMH/SH reviewed - no change compared to H&P  ________________________________________________________________________  Care Plan discussed with:    Comments   Patient x    Family      RN x    Care Manager     Consultant                        Multidiciplinary team rounds were held today with , nursing, pharmacist and clinical coordinator. Patient's plan of care was discussed; medications were reviewed and discharge planning was addressed.      ________________________________________________________________________  Total NON critical care TIME:  30 Minutes    Total CRITICAL CARE TIME Spent:   Minutes non procedure based      Comments   >50% of visit spent in counseling and coordination of care x ________________________________________________________________________  Naomy Murguia MD     Procedures: see electronic medical records for all procedures/Xrays and details which were not copied into this note but were reviewed prior to creation of Plan. LABS:  I reviewed today's most current labs and imaging studies.   Pertinent labs include:  Recent Labs      07/15/17   0328  07/14/17   0305  07/13/17   0354   WBC  6.3  7.0  6.1   HGB  10.3*  11.0*  13.2   HCT  29.7*  31.5*  36.8   PLT  160  191  233     Recent Labs      07/15/17   0328  07/14/17   0305  07/13/17   0354   NA  133*  134*  132*   K  3.7  3.5  3.5   CL  99  100  98   CO2  30  27  28   GLU  125*  131*  114*   BUN  20  14  10   CREA  0.65*  0.53*  0.65*   CA  8.2*  8.6  9.3   MG  1.7  1.4*  1.2*   PHOS  2.6  2.9  2.3*   ALB  2.9*   --    --    TBILI  0.9   --    --    SGOT  27   --    --    ALT  24   --    --        Signed: Naomy Murguia MD

## 2017-07-15 NOTE — PROGRESS NOTES
TRANSFER - OUT REPORT:    Verbal report given to  Doug (name) on Geovani Pair  being transferred to PCU (unit) for routine progression of care       Report consisted of patients Situation, Background, Assessment and   Recommendations(SBAR). Information from the following report(s) Kardex, Procedure Summary, MAR, Recent Results and Cardiac Rhythm st was reviewed with the receiving nurse. Lines:   Triple Lumen IJ 06/24/17 Right Internal jugular (Active)   Central Line Being Utilized Yes 7/15/2017  3:15 AM   Criteria for Appropriate Use Limited/no vessel suitable for conventional peripheral access 7/15/2017  3:15 AM   Site Assessment Clean, dry, & intact 7/15/2017  3:15 AM   Infiltration Assessment 0 7/15/2017  3:15 AM   Affected Extremity/Extremities Color distal to insertion site pink (or appropriate for race) 7/15/2017  3:15 AM   Date of Last Dressing Change 07/13/17 7/15/2017  3:15 AM   Dressing Status Clean, dry, & intact 7/15/2017  3:15 AM   Dressing Type Disk with Chlorhexadine gluconate (CHG) 7/15/2017  3:15 AM   Action Taken Open ports on tubing capped 7/15/2017  3:15 AM   Proximal Hub Color/Line Status White 7/15/2017  3:15 AM   Positive Blood Return (Medial Site) Yes 7/15/2017  3:15 AM   Medial Hub Color/Line Status Blue 7/15/2017  3:15 AM   Positive Blood Return (Lateral Site) No 7/15/2017  3:15 AM   Distal Hub Color/Line Status Brown 7/15/2017  3:15 AM   Positive Blood Return (Site #3) Yes 7/15/2017  3:15 AM   External Catheter Length (cm) 0 centimeters 7/14/2017  8:09 AM   Alcohol Cap Used Yes 7/15/2017  3:15 AM        Opportunity for questions and clarification was provided.       Patient transported with:   Monitor  O2 @ 3 liters  Registered Nurse  [facility]

## 2017-07-15 NOTE — PROGRESS NOTES
TRANSFER - IN REPORT:    Verbal report received from Kingsley Waller RN(name) on Gale Delgado  being received from [facility] (unit) for urgent transfer      Report consisted of patients Situation, Background, Assessment and   Recommendations(SBAR). Information from the following report(s) SBAR, Kardex, ED Summary, OR Summary, Procedure Summary, Intake/Output, MAR, Recent Results, Med Rec Status, Cardiac Rhythm NSR and Alarm Parameters  was reviewed with the receiving nurse. Opportunity for questions and clarification was provided. Assessment completed upon patients arrival to unit and care assumed.        Primary Nurse Anitha Crooks RN and Kasandra Umanzor RN performed a dual skin assessment on this patient No impairment noted  Klever score is 13

## 2017-07-15 NOTE — PROGRESS NOTES
2103: Patient continues to have labored and wet breathing. BP dropped to 82/39, patient responsive only to voice, a change from last night. Paged on call physician Dr. Bryanna Hayes. and relayed changes. 500cc bolus ordered and given. Ordered to hold BP meds until BP reached previous baseline of systolic above 029. BP at 108/42 at end of bolus. Patient resting with labored breathing. 0044: BP rebecca to 159/65. Metoprolol started to administer, BP drop to 111/51, Metoprolol administration stopped at 3mg. Vasotec held.

## 2017-07-15 NOTE — PROGRESS NOTES
1530 Bedside and Verbal shift change report given to Timothy Pierre RN (oncoming nurse) by Tyrel Mcclure RN (offgoing nurse). Report included the following information SBAR, Kardex, Intake/Output, MAR, Accordion, Recent Results, Med Rec Status and Cardiac Rhythm NSR. Per report pt was just recently deep suctioned and received neb treatment which helped a lot with respiratory status. Deep suction and neb treatments ordered for RT to complete q6hr. 2030 RT made aware by phone by Richie Elder RN that pt needs 2000 neb treatment as soon as possible and will also need to be deep suctioned. 2109 RT at bedside, pt nasal tracheal suctioned by RT and nasal trumpet placed. Pt tolerated well. Sputum sample delivered to lab as per order. Shawanda RT states ok for nursing to deep suction prn via nasal trumpet. 2143 Message sent to pharmacy for keppra and depakote IVPB bags as they are not in pt specific pyxis meds. [address] messages marked as completed but keppra and depakote IVPB bags have not been delivered. Spoke with Mau Yanez from pharmacy, he states he will make sure meds are sent. 2315 Bedside and Verbal shift change report given to Jeffrey Lombardi (oncoming nurse) by Timothy Pierre RN (offgoing nurse). Report included the following information SBAR, Kardex, Intake/Output, MAR, Accordion, Recent Results, Med Rec Status and Cardiac Rhythm NSR.

## 2017-07-15 NOTE — PROGRESS NOTES
Problem: Falls - Risk of  Goal: *Absence of falls  Outcome: Progressing Towards Goal  Bed in low position, gripper socks on, sitter in room.

## 2017-07-15 NOTE — PROGRESS NOTES
Neurology Progress Note    Patient ID:  Dwight Manzo  [id number]  89 y.o.  1934    Subjective:      Patient has complaints of difficulty swallowing, and seemingly probably mildly aspirating still. He has prominent bulbar weakness of unclear etiology since he had his seizures. He is on Depakote and Keppra now, and we will check his levels. He is generally rigid, and bradykinetic and has bulbar weakness and dysarthria and dysphasia. We will check his neuromuscular parameters, but he probably needs endoscopy of his throat with ENT and probably a barium swallow to evaluate his swallowing. We will stabilize his breathing, and plan on proceeding with workup in a.m. Monday if he can swallow some to even do a modified barium swallow.   May need to consult ENT    Current Facility-Administered Medications   Medication Dose Route Frequency    metoprolol (LOPRESSOR) injection 2.5 mg  2.5 mg IntraVENous Q4H    TPN ADULT - CENTRAL AA 5% D20% W/ CA + ELECTROLYTES   IntraVENous CONTINUOUS    ampicillin-sulbactam (UNASYN) 3 g in 0.9% sodium chloride (MBP/ADV) 100 mL  3 g IntraVENous Q6H    albuterol (PROVENTIL VENTOLIN) nebulizer solution 2.5 mg  2.5 mg Nebulization Q4H PRN    albuterol-ipratropium (DUO-NEB) 2.5 MG-0.5 MG/3 ML  3 mL Nebulization Q6H RT    TPN ADULT - CENTRAL AA 5% D20% W/ CA + ELECTROLYTES   IntraVENous CONTINUOUS    levETIRAcetam (KEPPRA) 1,500 mg in 0.9% sodium chloride IVPB  1,500 mg IntraVENous Q12H    valproate (DEPACON) 500 mg in 0.9% sodium chloride 50 mL IVPB  500 mg IntraVENous Q8H    heparin (porcine) pf 300 Units  300 Units InterCATHeter PRN    LORazepam (ATIVAN) injection 1 mg  1 mg IntraVENous Q2H PRN    fentaNYL citrate (PF) injection 25-50 mcg  25-50 mcg IntraVENous Q2H PRN    hydrALAZINE (APRESOLINE) 20 mg/mL injection 10 mg  10 mg IntraVENous Q6H PRN    sodium chloride (NS) flush 10-30 mL  10-30 mL InterCATHeter PRN    sodium chloride (NS) flush 10 mL  10 mL InterCATHeter Q24H    sodium chloride (NS) flush 10 mL  10 mL InterCATHeter PRN    sodium chloride (NS) flush 10-40 mL  10-40 mL InterCATHeter Q8H    sodium chloride (NS) flush 20 mL  20 mL InterCATHeter Q24H    latanoprost (XALATAN) 0.005 % ophthalmic solution 1 Drop  1 Drop Both Eyes QPM    thiamine (B-1) 100 mg in 0.9% sodium chloride 50 mL IVPB  100 mg IntraVENous DAILY    sodium chloride (NS) flush 5-10 mL  5-10 mL IntraVENous Q8H    sodium chloride (NS) flush 5-10 mL  5-10 mL IntraVENous PRN    naloxone (NARCAN) injection 0.4 mg  0.4 mg IntraVENous PRN    ondansetron (ZOFRAN) injection 4 mg  4 mg IntraVENous Q4H PRN    bisacodyl (DULCOLAX) suppository 10 mg  10 mg Rectal DAILY PRN    acetaminophen (TYLENOL) suppository 650 mg  650 mg Rectal Q4H PRN    enoxaparin (LOVENOX) injection 40 mg  40 mg SubCUTAneous Q24H        Review of Systems:    Review of systems not obtained due to patient factors.     Objective:     Patient Vitals for the past 8 hrs:   BP Temp Pulse Resp SpO2 Height Weight   07/15/17 1457 - - - - 100 % - -   07/15/17 1127 122/79 98.9 °F (37.2 °C) 89 24 100 % - -   07/15/17 0958 166/72 98.8 °F (37.1 °C) 68 22 100 % 5' 8\" (1.727 m) 146 lb 8 oz (66.5 kg)   07/15/17 0740 - - - - 100 % - -       07/15 0701 - 07/15 1900  In: 1246.6 [I.V.:1246.6]  Out: 100 [Urine:100]       Lab Review   Recent Results (from the past 24 hour(s))   GLUCOSE, POC    Collection Time: 07/14/17  6:38 PM   Result Value Ref Range    Glucose (POC) 140 (H) 65 - 100 mg/dL    Performed by [address] [place] [address], POC    Collection Time: 07/15/17  1:18 AM   Result Value Ref Range    Glucose (POC) 140 (H) 65 - 100 mg/dL    Performed by Ismael Almanzar    MAGNESIUM    Collection Time: 07/15/17  3:28 AM   Result Value Ref Range    Magnesium 1.7 1.6 - 2.4 mg/dL   PHOSPHORUS    Collection Time: 07/15/17  3:28 AM   Result Value Ref Range    Phosphorus 2.6 2.6 - 4.7 MG/DL   CBC WITH AUTOMATED DIFF    Collection Time: 07/15/17  3:28 AM Result Value Ref Range    WBC 6.3 4.1 - 11.1 K/uL    RBC 3.05 (L) 4.10 - 5.70 M/uL    HGB 10.3 (L) 12.1 - 17.0 g/dL    HCT 29.7 (L) 36.6 - 50.3 %    MCV 97.4 80.0 - 99.0 FL    MCH 33.8 26.0 - 34.0 PG    MCHC 34.7 30.0 - 36.5 g/dL    RDW 12.8 11.5 - 14.5 %    PLATELET 344 860 - 990 K/uL    NEUTROPHILS 53 32 - 75 %    LYMPHOCYTES 25 12 - 49 %    MONOCYTES 21 (H) 5 - 13 %    EOSINOPHILS 1 0 - 7 %    BASOPHILS 0 0 - 1 %    ABS. NEUTROPHILS 3.3 1.8 - 8.0 K/UL    ABS. LYMPHOCYTES 1.6 0.8 - 3.5 K/UL    ABS. MONOCYTES 1.3 (H) 0.0 - 1.0 K/UL    ABS. EOSINOPHILS 0.1 0.0 - 0.4 K/UL    ABS. BASOPHILS 0.0 0.0 - 0.1 K/UL    RBC COMMENTS NORMOCYTIC, NORMOCHROMIC      DF SMEAR SCANNED     METABOLIC PANEL, COMPREHENSIVE    Collection Time: 07/15/17  3:28 AM   Result Value Ref Range    Sodium 133 (L) 136 - 145 mmol/L    Potassium 3.7 3.5 - 5.1 mmol/L    Chloride 99 97 - 108 mmol/L    CO2 30 21 - 32 mmol/L    Anion gap 4 (L) 5 - 15 mmol/L    Glucose 125 (H) 65 - 100 mg/dL    BUN 20 6 - 20 MG/DL    Creatinine 0.65 (L) 0.70 - 1.30 MG/DL    BUN/Creatinine ratio 31 (H) 12 - 20      GFR est AA >60 >60 ml/min/1.73m2    GFR est non-AA >60 >60 ml/min/1.73m2    Calcium 8.2 (L) 8.5 - 10.1 MG/DL    Bilirubin, total 0.9 0.2 - 1.0 MG/DL    ALT (SGPT) 24 12 - 78 U/L    AST (SGOT) 27 15 - 37 U/L    Alk. phosphatase 50 45 - 117 U/L    Protein, total 6.6 6.4 - 8.2 g/dL    Albumin 2.9 (L) 3.5 - 5.0 g/dL    Globulin 3.7 2.0 - 4.0 g/dL    A-G Ratio 0.8 (L) 1.1 - 2.2     GLUCOSE, POC    Collection Time: 07/15/17  6:18 AM   Result Value Ref Range    Glucose (POC) 129 (H) 65 - 100 mg/dL    Performed by Nel Hubbard        Additional comments:I reviewed the patients new imaging test results. MRI scan        NEUROLOGICAL EXAM:    Appearance: The patient is thinly developed and nourished, provides a incoherent history and is in no acute distress. Mental Status: Oriented to time, place and person. Mood and affect confused and anxious .    Cranial Nerves:   Intact visual fields. Fundi are benign. MAURICIO, EOM's full, no nystagmus, no ptosis. Facial sensation is normal. Corneal reflexes are not tested. Facial movement is symmetric. Hearing is abnormal bilaterally. Palate is midline with normal sternocleidomastoid and trapezius muscles are normal. Tongue is midline. Neck without meningismus or bruits  Temporal arteries are not tender or enlarged    Motor:  3/5 strength in upper and lower proximal and distal muscles. Generalized normal bulk and marked increased muscle tone tone. No fasciculations. Reflexes:   Deep tendon reflexes 2+/4 and symmetrical.  No Babinski or clonus present    Sensory:   Normal to touch, pinprick and vibration and temperature decreased in both feet . Gait:  Not tested gait. Tremor:   No tremor noted. Cerebellar:  No cerebellar signs present. Neurovascular:  Normal heart sounds and regular rhythm, peripheral pulses decreased, and no carotid bruits.            Assessment:   Active Problems:    Status epilepticus ([facility] [address] (6/24/2017)      Oral phase dysphagia (7/8/2017)        Plan:     Patient with prominent difficulty swallowing of unclear etiology and bulbar weakness with dysarthria and dysphasia  We will check neuromuscular panels but doubt he has these without other evidence of focal weakness or ptosis  May need ENT consult to rule out vocal cord mobility problems, or obstructive lesions of the esophagus, and may need modified barium swallow if he is even able to swallow some  MRI negative  Discussed with patient's wife  Very difficult case      Signed:  Dontae Martin MD  7/15/2017  3:36 PM

## 2017-07-16 LAB
ANION GAP BLD CALC-SCNC: 2 MMOL/L (ref 5–15)
BASOPHILS # BLD AUTO: 0 K/UL (ref 0–0.1)
BASOPHILS # BLD: 0 % (ref 0–1)
BUN SERPL-MCNC: 17 MG/DL (ref 6–20)
BUN/CREAT SERPL: 35 (ref 12–20)
CALCIUM SERPL-MCNC: 8.5 MG/DL (ref 8.5–10.1)
CHLORIDE SERPL-SCNC: 98 MMOL/L (ref 97–108)
CK SERPL-CCNC: 227 U/L (ref 39–308)
CO2 SERPL-SCNC: 35 MMOL/L (ref 21–32)
CREAT SERPL-MCNC: 0.48 MG/DL (ref 0.7–1.3)
EOSINOPHIL # BLD: 0 K/UL (ref 0–0.4)
EOSINOPHIL NFR BLD: 0 % (ref 0–7)
ERYTHROCYTE [DISTWIDTH] IN BLOOD BY AUTOMATED COUNT: 12.6 % (ref 11.5–14.5)
ERYTHROCYTE [SEDIMENTATION RATE] IN BLOOD: 41 MM/HR (ref 0–20)
GLUCOSE SERPL-MCNC: 118 MG/DL (ref 65–100)
HCT VFR BLD AUTO: 27.7 % (ref 36.6–50.3)
HGB BLD-MCNC: 9.4 G/DL (ref 12.1–17)
LYMPHOCYTES # BLD AUTO: 13 % (ref 12–49)
LYMPHOCYTES # BLD: 1.1 K/UL (ref 0.8–3.5)
MAGNESIUM SERPL-MCNC: 1.3 MG/DL (ref 1.6–2.4)
MCH RBC QN AUTO: 33.7 PG (ref 26–34)
MCHC RBC AUTO-ENTMCNC: 33.9 G/DL (ref 30–36.5)
MCV RBC AUTO: 99.3 FL (ref 80–99)
MONOCYTES # BLD: 1 K/UL (ref 0–1)
MONOCYTES NFR BLD AUTO: 12 % (ref 5–13)
NEUTS SEG # BLD: 6.3 K/UL (ref 1.8–8)
NEUTS SEG NFR BLD AUTO: 75 % (ref 32–75)
PLATELET # BLD AUTO: 152 K/UL (ref 150–400)
POTASSIUM SERPL-SCNC: 4 MMOL/L (ref 3.5–5.1)
RBC # BLD AUTO: 2.79 M/UL (ref 4.1–5.7)
SODIUM SERPL-SCNC: 135 MMOL/L (ref 136–145)
WBC # BLD AUTO: 8.4 K/UL (ref 4.1–11.1)

## 2017-07-16 PROCEDURE — 74011250636 HC RX REV CODE- 250/636: Performed by: INTERNAL MEDICINE

## 2017-07-16 PROCEDURE — 85652 RBC SED RATE AUTOMATED: CPT | Performed by: PSYCHIATRY & NEUROLOGY

## 2017-07-16 PROCEDURE — 74011000258 HC RX REV CODE- 258: Performed by: INTERNAL MEDICINE

## 2017-07-16 PROCEDURE — 36415 COLL VENOUS BLD VENIPUNCTURE: CPT | Performed by: PSYCHIATRY & NEUROLOGY

## 2017-07-16 PROCEDURE — 74011000250 HC RX REV CODE- 250: Performed by: INTERNAL MEDICINE

## 2017-07-16 PROCEDURE — 74011000250 HC RX REV CODE- 250: Performed by: PSYCHIATRY & NEUROLOGY

## 2017-07-16 PROCEDURE — 80177 DRUG SCRN QUAN LEVETIRACETAM: CPT | Performed by: INTERNAL MEDICINE

## 2017-07-16 PROCEDURE — 77010033678 HC OXYGEN DAILY

## 2017-07-16 PROCEDURE — 65660000000 HC RM CCU STEPDOWN

## 2017-07-16 PROCEDURE — 85025 COMPLETE CBC W/AUTO DIFF WBC: CPT | Performed by: INTERNAL MEDICINE

## 2017-07-16 PROCEDURE — 83735 ASSAY OF MAGNESIUM: CPT | Performed by: PSYCHIATRY & NEUROLOGY

## 2017-07-16 PROCEDURE — 82306 VITAMIN D 25 HYDROXY: CPT | Performed by: PSYCHIATRY & NEUROLOGY

## 2017-07-16 PROCEDURE — 74011250636 HC RX REV CODE- 250/636: Performed by: PSYCHIATRY & NEUROLOGY

## 2017-07-16 PROCEDURE — 74011000258 HC RX REV CODE- 258: Performed by: PSYCHIATRY & NEUROLOGY

## 2017-07-16 PROCEDURE — 80048 BASIC METABOLIC PNL TOTAL CA: CPT | Performed by: INTERNAL MEDICINE

## 2017-07-16 PROCEDURE — 82607 VITAMIN B-12: CPT | Performed by: PSYCHIATRY & NEUROLOGY

## 2017-07-16 PROCEDURE — 82550 ASSAY OF CK (CPK): CPT | Performed by: PSYCHIATRY & NEUROLOGY

## 2017-07-16 PROCEDURE — 83519 RIA NONANTIBODY: CPT | Performed by: PSYCHIATRY & NEUROLOGY

## 2017-07-16 PROCEDURE — 94640 AIRWAY INHALATION TREATMENT: CPT

## 2017-07-16 RX ORDER — MAGNESIUM SULFATE HEPTAHYDRATE 40 MG/ML
2 INJECTION, SOLUTION INTRAVENOUS ONCE
Status: COMPLETED | OUTPATIENT
Start: 2017-07-16 | End: 2017-07-16

## 2017-07-16 RX ORDER — METOPROLOL TARTRATE 5 MG/5ML
5 INJECTION INTRAVENOUS EVERY 6 HOURS
Status: DISCONTINUED | OUTPATIENT
Start: 2017-07-16 | End: 2017-07-20

## 2017-07-16 RX ORDER — METOPROLOL TARTRATE 5 MG/5ML
5 INJECTION INTRAVENOUS EVERY 4 HOURS
Status: DISCONTINUED | OUTPATIENT
Start: 2017-07-16 | End: 2017-07-16

## 2017-07-16 RX ADMIN — METOPROLOL TARTRATE 5 MG: 5 INJECTION INTRAVENOUS at 17:43

## 2017-07-16 RX ADMIN — IPRATROPIUM BROMIDE AND ALBUTEROL SULFATE 3 ML: .5; 3 SOLUTION RESPIRATORY (INHALATION) at 01:04

## 2017-07-16 RX ADMIN — ENOXAPARIN SODIUM 40 MG: 40 INJECTION SUBCUTANEOUS at 08:51

## 2017-07-16 RX ADMIN — METOPROLOL TARTRATE 2.5 MG: 5 INJECTION INTRAVENOUS at 11:30

## 2017-07-16 RX ADMIN — VALPROATE SODIUM 500 MG: 100 INJECTION, SOLUTION INTRAVENOUS at 06:44

## 2017-07-16 RX ADMIN — FENTANYL CITRATE 25 MCG: 50 INJECTION, SOLUTION INTRAMUSCULAR; INTRAVENOUS at 05:56

## 2017-07-16 RX ADMIN — Medication 10 ML: at 05:17

## 2017-07-16 RX ADMIN — IPRATROPIUM BROMIDE AND ALBUTEROL SULFATE 3 ML: .5; 3 SOLUTION RESPIRATORY (INHALATION) at 14:06

## 2017-07-16 RX ADMIN — IPRATROPIUM BROMIDE AND ALBUTEROL SULFATE 3 ML: .5; 3 SOLUTION RESPIRATORY (INHALATION) at 07:56

## 2017-07-16 RX ADMIN — METOPROLOL TARTRATE 5 MG: 5 INJECTION INTRAVENOUS at 22:59

## 2017-07-16 RX ADMIN — SODIUM CHLORIDE 3 G: 900 INJECTION, SOLUTION INTRAVENOUS at 05:16

## 2017-07-16 RX ADMIN — SODIUM CHLORIDE 3 G: 900 INJECTION, SOLUTION INTRAVENOUS at 00:39

## 2017-07-16 RX ADMIN — Medication 10 ML: at 14:30

## 2017-07-16 RX ADMIN — THIAMINE HYDROCHLORIDE 100 MG: 100 INJECTION, SOLUTION INTRAMUSCULAR; INTRAVENOUS at 11:00

## 2017-07-16 RX ADMIN — SODIUM CHLORIDE 3 G: 900 INJECTION, SOLUTION INTRAVENOUS at 11:13

## 2017-07-16 RX ADMIN — IPRATROPIUM BROMIDE AND ALBUTEROL SULFATE 3 ML: .5; 3 SOLUTION RESPIRATORY (INHALATION) at 19:30

## 2017-07-16 RX ADMIN — LATANOPROST 1 DROP: 50 SOLUTION OPHTHALMIC at 18:00

## 2017-07-16 RX ADMIN — METOPROLOL TARTRATE 2.5 MG: 5 INJECTION INTRAVENOUS at 00:06

## 2017-07-16 RX ADMIN — RETINOL, ERGOCALCIFEROL, .ALPHA.-TOCOPHEROL ACETATE, DL-, PHYTONADIONE, ASCORBIC ACID, NIACINAMIDE, RIBOFLAVIN 5-PHOSPHATE SODIUM, THIAMINE HYDROCHLORIDE, PYRIDOXINE HYDROCHLORIDE, DEXPANTHENOL, BIOTIN, FOLIC ACID, AND CYANOCOBALAMIN: KIT at 17:56

## 2017-07-16 RX ADMIN — LEVETIRACETAM 1500 MG: 100 INJECTION, SOLUTION, CONCENTRATE INTRAVENOUS at 21:59

## 2017-07-16 RX ADMIN — Medication 10 ML: at 22:00

## 2017-07-16 RX ADMIN — METOPROLOL TARTRATE 2.5 MG: 5 INJECTION INTRAVENOUS at 03:20

## 2017-07-16 RX ADMIN — MAGNESIUM SULFATE HEPTAHYDRATE 2 G: 40 INJECTION, SOLUTION INTRAVENOUS at 08:52

## 2017-07-16 RX ADMIN — FENTANYL CITRATE 25 MCG: 50 INJECTION, SOLUTION INTRAMUSCULAR; INTRAVENOUS at 22:55

## 2017-07-16 RX ADMIN — SODIUM CHLORIDE 3 G: 900 INJECTION, SOLUTION INTRAVENOUS at 17:43

## 2017-07-16 RX ADMIN — VALPROATE SODIUM 500 MG: 100 INJECTION, SOLUTION INTRAVENOUS at 14:30

## 2017-07-16 RX ADMIN — METOPROLOL TARTRATE 2.5 MG: 5 INJECTION INTRAVENOUS at 08:49

## 2017-07-16 RX ADMIN — Medication 10 ML: at 21:59

## 2017-07-16 RX ADMIN — LEVETIRACETAM 1500 MG: 100 INJECTION, SOLUTION, CONCENTRATE INTRAVENOUS at 10:30

## 2017-07-16 RX ADMIN — Medication 20 ML: at 14:30

## 2017-07-16 RX ADMIN — VALPROATE SODIUM 500 MG: 100 INJECTION, SOLUTION INTRAVENOUS at 22:50

## 2017-07-16 NOTE — ROUTINE PROCESS
11:29 PM  Report received from [facility], [place].     12:23 AM  Assessment completed, pt alert to self, very [place]. Does complain of abdominal pain, Fentanyl given. Will continue to monitor. 12:47 AM   Pt deep suctioned, small amount of thick tan sputum obtained. Pt tolerated well. Pt seems to be resting more comfortably. Will continue to monitor. 2:59 AM  Pt deep suctioned, thick tan/white sputum obtained, tolerated well.     3:31 AM  Reassessment completed, no changes noted. 6:59 AM  Uneventful night.

## 2017-07-16 NOTE — PROGRESS NOTES
Neurology Progress Note    Patient ID:  Dwight Manzo  [id number]  23 y.o.  1934    Subjective:      Patient has complaints of difficulty swallowing, and seemingly probably mildly aspirating still. He has prominent swallowing difficulties of unclear etiology since he had his seizures. He is on Depakote and Keppra now, and we will check his levels. He is less rigid, and bradykinetic today and has bulbar weakness and dysarthria and dysphasia. We will check his neuromuscular parameters, but he probably needs endoscopy of his throat with ENT and probably a modified barium swallow to evaluate his swallowing. We will stabilize his breathing, and plan on proceeding with workup in a.m. Monday if he can swallow some to even do a modified barium swallow. May need to consult ENT.   Will talk to speech therapy first    Current Facility-Administered Medications   Medication Dose Route Frequency    TPN ADULT - CENTRAL AA 5% D20% W/ CA + ELECTROLYTES   IntraVENous CONTINUOUS    [START ON 7/17/2017] fat emulsion 20% (LIPOSYN, INTRALIPID) infusion 250 mL  250 mL IntraVENous Q MON, WED & FRI    metoprolol (LOPRESSOR) injection 2.5 mg  2.5 mg IntraVENous Q4H    TPN ADULT - CENTRAL AA 5% D20% W/ CA + ELECTROLYTES   IntraVENous CONTINUOUS    ampicillin-sulbactam (UNASYN) 3 g in 0.9% sodium chloride (MBP/ADV) 100 mL  3 g IntraVENous Q6H    albuterol (PROVENTIL VENTOLIN) nebulizer solution 2.5 mg  2.5 mg Nebulization Q4H PRN    albuterol-ipratropium (DUO-NEB) 2.5 MG-0.5 MG/3 ML  3 mL Nebulization Q6H RT    levETIRAcetam (KEPPRA) 1,500 mg in 0.9% sodium chloride IVPB  1,500 mg IntraVENous Q12H    valproate (DEPACON) 500 mg in 0.9% sodium chloride 50 mL IVPB  500 mg IntraVENous Q8H    heparin (porcine) pf 300 Units  300 Units InterCATHeter PRN    LORazepam (ATIVAN) injection 1 mg  1 mg IntraVENous Q2H PRN    fentaNYL citrate (PF) injection 25-50 mcg  25-50 mcg IntraVENous Q2H PRN    hydrALAZINE (APRESOLINE) 20 mg/mL injection 10 mg  10 mg IntraVENous Q6H PRN    sodium chloride (NS) flush 10-30 mL  10-30 mL InterCATHeter PRN    sodium chloride (NS) flush 10 mL  10 mL InterCATHeter Q24H    sodium chloride (NS) flush 10 mL  10 mL InterCATHeter PRN    sodium chloride (NS) flush 10-40 mL  10-40 mL InterCATHeter Q8H    sodium chloride (NS) flush 20 mL  20 mL InterCATHeter Q24H    latanoprost (XALATAN) 0.005 % ophthalmic solution 1 Drop  1 Drop Both Eyes QPM    thiamine (B-1) 100 mg in 0.9% sodium chloride 50 mL IVPB  100 mg IntraVENous DAILY    sodium chloride (NS) flush 5-10 mL  5-10 mL IntraVENous Q8H    sodium chloride (NS) flush 5-10 mL  5-10 mL IntraVENous PRN    naloxone (NARCAN) injection 0.4 mg  0.4 mg IntraVENous PRN    ondansetron (ZOFRAN) injection 4 mg  4 mg IntraVENous Q4H PRN    bisacodyl (DULCOLAX) suppository 10 mg  10 mg Rectal DAILY PRN    acetaminophen (TYLENOL) suppository 650 mg  650 mg Rectal Q4H PRN    enoxaparin (LOVENOX) injection 40 mg  40 mg SubCUTAneous Q24H        Review of Systems:    Review of systems not obtained due to patient factors.     Objective:     Patient Vitals for the past 8 hrs:   BP Temp Pulse Resp SpO2   07/16/17 1109 177/68 98.6 °F (37 °C) 89 24 100 %   07/16/17 0849 135/55 98.7 °F (37.1 °C) 71 22 100 %   07/16/17 0756 - - - - 100 %   07/16/17 0733 (!) 200/112 98.9 °F (37.2 °C) 99 22 100 %   07/16/17 0706 (!) 190/104 99 °F (37.2 °C) (!) 101 24 100 %       07/16 0701 - 07/16 1900  In: 201.5 [I.V.:201.5]  Out: 425 [Urine:425]  07/14 1901 - 07/16 0700  In: 2651.7 [I.V.:2651.7]  Out: 1030 [Urine:1030]    Lab Review   Recent Results (from the past 24 hour(s))   VALPROIC ACID    Collection Time: 07/15/17  4:19 PM   Result Value Ref Range    Valproic acid 62 50 - 100 ug/ml   URINALYSIS W/ REFLEX CULTURE    Collection Time: 07/15/17  4:29 PM   Result Value Ref Range    Color DARK YELLOW      Appearance CLEAR CLEAR      Specific gravity 1.028 1.003 - 1.030      pH (UA) 6.0 5.0 - 8.0 Protein NEGATIVE  NEG mg/dL    Glucose NEGATIVE  NEG mg/dL    Ketone NEGATIVE  NEG mg/dL    Bilirubin NEGATIVE  NEG      Blood NEGATIVE  NEG      Urobilinogen 1.0 0.2 - 1.0 EU/dL    Nitrites NEGATIVE  NEG      Leukocyte Esterase NEGATIVE  NEG      WBC 5-10 0 - 4 /hpf    RBC 0-5 0 - 5 /hpf    Epithelial cells MODERATE (A) FEW /lpf    Bacteria 1+ (A) NEG /hpf    UA:UC IF INDICATED URINE CULTURE ORDERED (A) CNI     CULTURE, RESPIRATORY/SPUTUM/BRONCH W GRAM STAIN    Collection Time: 07/15/17  8:57 PM   Result Value Ref Range    Special Requests: NO SPECIAL REQUESTS      GRAM STAIN OCCASIONAL EPITHELIAL CELLS SEEN      GRAM STAIN NO WBC'S SEEN      GRAM STAIN NO ORGANISMS SEEN      Culture result: PENDING    CBC WITH AUTOMATED DIFF    Collection Time: 07/16/17  3:18 AM   Result Value Ref Range    WBC 8.4 4.1 - 11.1 K/uL    RBC 2.79 (L) 4.10 - 5.70 M/uL    HGB 9.4 (L) 12.1 - 17.0 g/dL    HCT 27.7 (L) 36.6 - 50.3 %    MCV 99.3 (H) 80.0 - 99.0 FL    MCH 33.7 26.0 - 34.0 PG    MCHC 33.9 30.0 - 36.5 g/dL    RDW 12.6 11.5 - 14.5 %    PLATELET 955 996 - 278 K/uL    NEUTROPHILS 75 32 - 75 %    LYMPHOCYTES 13 12 - 49 %    MONOCYTES 12 5 - 13 %    EOSINOPHILS 0 0 - 7 %    BASOPHILS 0 0 - 1 %    ABS. NEUTROPHILS 6.3 1.8 - 8.0 K/UL    ABS. LYMPHOCYTES 1.1 0.8 - 3.5 K/UL    ABS. MONOCYTES 1.0 0.0 - 1.0 K/UL    ABS. EOSINOPHILS 0.0 0.0 - 0.4 K/UL    ABS.  BASOPHILS 0.0 0.0 - 0.1 K/UL   METABOLIC PANEL, BASIC    Collection Time: 07/16/17  3:18 AM   Result Value Ref Range    Sodium 135 (L) 136 - 145 mmol/L    Potassium 4.0 3.5 - 5.1 mmol/L    Chloride 98 97 - 108 mmol/L    CO2 35 (H) 21 - 32 mmol/L    Anion gap 2 (L) 5 - 15 mmol/L    Glucose 118 (H) 65 - 100 mg/dL    BUN 17 6 - 20 MG/DL    Creatinine 0.48 (L) 0.70 - 1.30 MG/DL    BUN/Creatinine ratio 35 (H) 12 - 20      GFR est AA >60 >60 ml/min/1.73m2    GFR est non-AA >60 >60 ml/min/1.73m2    Calcium 8.5 8.5 - 10.1 MG/DL   CK    Collection Time: 07/16/17  3:23 AM   Result Value Ref Range     39 - 308 U/L   MAGNESIUM    Collection Time: 07/16/17  3:23 AM   Result Value Ref Range    Magnesium 1.3 (L) 1.6 - 2.4 mg/dL   SED RATE (ESR)    Collection Time: 07/16/17  3:23 AM   Result Value Ref Range    Sed rate, automated 41 (H) 0 - 20 mm/hr       Additional comments:I reviewed the patients new imaging test results. MRI scan        NEUROLOGICAL EXAM:    Appearance: The patient is thinly developed and nourished, provides a incoherent history and is in no acute distress. Mental Status: Oriented to time, place and person. Mood and affect confused and anxious . Patient dysarthric    Cranial Nerves:   Intact visual fields. Fundi are benign. MAURICIO, EOM's full, no nystagmus, no ptosis. Facial sensation is normal. Corneal reflexes are not tested. Facial movement is symmetric. Hearing is abnormal bilaterally. Palate is midline with normal sternocleidomastoid and trapezius muscles are normal. Tongue is midline. Patient very dysarthric and dysphasic  Neck without meningismus or bruits  Temporal arteries are not tender or enlarged    Motor:  3/5 strength in upper and lower proximal and distal muscles. Generalized normal bulk and mild increased muscle tone tone. No fasciculations. Reflexes:   Deep tendon reflexes 1+/4 and symmetrical.  No Babinski or clonus present    Sensory:   Normal to touch, pinprick and vibration and temperature decreased in both feet . Gait:  Not tested gait. Tremor:   No tremor noted. Cerebellar:  No cerebellar signs present. Neurovascular:  Normal heart sounds and regular rhythm, peripheral pulses decreased, and no carotid bruits.            Assessment:   Active Problems:    Status epilepticus ([facility] [address] (6/24/2017)      Oral phase dysphagia (7/8/2017)      Dysarthria (7/15/2017)      Dysphagia (7/15/2017)        Plan:     Patient with prominent difficulty swallowing of unclear etiology and bulbar weakness with dysarthria and dysphasia  We will check neuromuscular panels but doubt he has these without other evidence of focal weakness or ptosis  May need ENT consult to rule out vocal cord mobility problems, or obstructive lesions of the esophagus, and may need modified barium swallow if he is even able to swallow some  Talk to speech therapy first  MRI negative  Discussed with patient's wife  Very difficult case      Signed:  Ken Anderson MD  7/16/2017  3:36 PM

## 2017-07-16 NOTE — PROGRESS NOTES
PCU SHIFT NURSING NOTE      Bedside and Verbal shift change report given to Dia Horn RN (oncoming nurse) by RICHARD Barrera (CCU) (offgoing nurse). Report included the following information SBAR, Kardex, ED Summary, OR Summary, Procedure Summary, Intake/Output, MAR, Recent Results, Med Rec Status, Cardiac Rhythm NSR and Alarm Parameters . Shift Summary:         Admission Date 6/24/2017   Admission Diagnosis Status epilepticus ([address]   Consults IP CONSULT TO NEUROLOGY  IP CONSULT TO INTENSIVIST  IP CONSULT TO PALLIATIVE CARE - PROVIDER  IP CONSULT TO GASTROENTEROLOGY  IP CONSULT TO PALLIATIVE CARE - PROVIDER        Consults   [x]PT   [x]OT   [x]Speech   [x]Case Management      [x] Palliative      Cardiac Monitoring Order   [x]Yes   []No     IV drips   []Yes    Drip:                            Dose:  Drip:                            Dose:  Drip:                            Dose:   [x]No     GI Prophylaxis   [x]Yes   []No         DVT Prophylaxis   SCDs:  Sequential Compression Device: Bilateral     Patient Refused VTE Prophylaxis: Yes    Han stockings:         [x] Medication   []Contraindicated   []None      Activity Level Activity Level: Bed Rest     Activity Assistance: Complete care   Purposeful Rounding every 1-2 hour? [x]Yes   Edouard Score  Total Score: 3   Bed Alarm (If score 3 or >)   [x]Yes   [] Refused (See signed refusal form in chart)   Klever Score  Klever Score: 12   Klever Score (if score 14 or less)   [x]PMT consult   []Wound Care consult      []Specialty bed   [x] Nutrition consult          Needs prior to discharge:   Home O2 required:    []Yes   [x]No    If yes, how much O2 required?     Other:    Last Bowel Movement: Last Bowel Movement Date: 07/04/17      Influenza Vaccine Received Flu Vaccine for Current Season (usually Sept-March): Not Flu Season        Pneumonia Vaccine           Diet Active Orders   Diet    DIET NPO      LDAs           Triple Lumen IJ 06/24/17 Right Internal jugular (Active)   Central Line Being Utilized Yes 7/16/2017  9:00 AM   Criteria for Appropriate Use Limited/no vessel suitable for conventional peripheral access 7/16/2017  9:00 AM   Site Assessment Clean, dry, & intact 7/16/2017  9:00 AM   Infiltration Assessment 0 7/16/2017  9:00 AM   Affected Extremity/Extremities Color distal to insertion site pink (or appropriate for race) 7/16/2017  9:00 AM   Date of Last Dressing Change 07/13/17 7/16/2017  9:00 AM   Dressing Status Clean, dry, & intact 7/16/2017  9:00 AM   Dressing Type Disk with Chlorhexadine gluconate (CHG); Transparent 7/16/2017  9:00 AM   Action Taken Open ports on tubing capped 7/16/2017  9:00 AM   Proximal Hub Color/Line Status White; Infusing;Flushed 7/16/2017  9:00 AM   Positive Blood Return (Medial Site) Yes 7/16/2017  9:00 AM   Medial Hub Color/Line Status Blue;Capped;Flushed 7/16/2017  9:00 AM   Positive Blood Return (Lateral Site) Yes 7/16/2017  9:00 AM   Distal Hub Color/Line Status Brown; Infusing;Flushed 7/16/2017  9:00 AM   Positive Blood Return (Site #3) Yes 7/16/2017  9:00 AM   External Catheter Length (cm) 0 centimeters 7/14/2017  8:09 AM   Alcohol Cap Used Yes 7/16/2017  9:00 AM              Condom Catheter 07/15/17 (Active)   Criteria for Appropriate Use Strict I/Os; Comfort Care 7/16/2017  9:00 AM   Status Draining 7/16/2017  9:00 AM   Site Condition No abnormalities 7/16/2017  9:00 AM   Drainage Tube Clipped to Bed Yes 7/16/2017  9:00 AM   Catheter Secured to Thigh Yes 7/16/2017  9:00 AM   Tamper Seal Intact Yes 7/16/2017  9:00 AM   Bag Below Bladder/Not on Floor Yes 7/16/2017  9:00 AM   Lack of Dependent Loop in Tubing Yes 7/16/2017  9:00 AM   Drainage Bag Less Than Half Full Yes 7/16/2017  9:00 AM   Sterile Solution Used for  Irrigation Yes 7/16/2017  9:00 AM   Urine Output (mL) 125 ml 7/16/2017  9:00 AM                Urinary Catheter [REMOVED] Condom Catheter 06/30/17-Criteria for Appropriate Use: Medically/surgically unstable, Strict I/Os  [REMOVED] Condom Catheter 07/05/17-Criteria for Appropriate Use: Comfort Care  [REMOVED] Condom Catheter 07/07/17-Criteria for Appropriate Use: Comfort Care  Condom Catheter 07/15/17-Criteria for Appropriate Use: Strict I/Os, Comfort Care  [REMOVED] Urinary Catheter 06/24/17 2- way; [employer]-Criteria for Appropriate Use: Medically/surgically unstable    Intake & Output   Date 07/15/17 0700 - 07/16/17 0659 07/16/17 0700 - 07/17/17 0659   Shift 0700-1859 1900-0659 24 Hour Total 0700-1859 1900-0659 24 Hour Total   I  N  T  A  K  E   I.V.  (mL/kg/hr) 1459.7  (1.8) 1192  (1.5) 2651.7  (1.7) 101.5  101.5      Cardizem Volume 0 0 0         Lorazepam Volume 0  0         Volume (levETIRAcetam (KEPPRA) 1,500 mg in 0.9% sodium chloride IVPB) 200 100 300 0  0      Volume (valproate (DEPACON) 500 mg in 0.9% sodium chloride 50 mL IVPB) 200 100 300 0  0      Volume (ampicillin-sulbactam (UNASYN) 3 g in 0.9% sodium chloride (MBP/ADV) 100 mL) 100 300 400 0  0      Volume (magnesium sulfate 2 g/50 ml IVPB (premix or compounded))  0 0 50  50      Volume (TPN ADULT - CENTRAL AA 5% D20% W/ CA + ELECTROLYTES) 959.7  959.7         Volume (TPN ADULT - CENTRAL AA 5% D20% W/ CA + ELECTROLYTES) 0 692 692 51.5  51.5    Shift Total  (mL/kg) 1459.7  (22) 1192  (17.9) 2651.7  (39.9) 101.5  (1.5)  101.5  (1.5)   O  U  T  P  U  T   Urine  (mL/kg/hr) 480  (0.6) 550  (0.7) 1030  (0.6) 200  200      Urine Occurrence(s)  5 x 5 x         Urine Output (mL) (Condom Catheter 07/15/17)  200  200    Shift Total  (mL/kg) 480  (7.2) 550  (8.3) 1030  (15.5) 200  (3)  200  (3)   .7 642 1621.7 -98.6  -98.6   Weight (kg) 66.5 66.5 66.5 66.5 66.5 66.5         Readmission Risk Assessment Tool Score Medium Risk            20       Total Score        3 Relationship with PCP    3 Patient Length of Stay > 5    4 More than 1 Admission in calendar year    5 Patient Insurance is Medicare, Medicaid or Self Pay    5 Charlson Comorbidity Score Criteria that do not apply:    Patient Living Status       Expected Length of Stay 4d 4h   Actual Length of Stay 22

## 2017-07-16 NOTE — PROGRESS NOTES
Notified by nursing that Pt went into afib, HR ~115, with deep suctioning. Note h/o SVT earlier this admission requiring diltiazem gtt. Stop metoprolol. Restart diltiazem gtt.

## 2017-07-16 NOTE — PROGRESS NOTES
Nasal trumpet inserted and pt nasal tracheal suctioned for a sputum sample. Moderate amount of tan sputum obtained.

## 2017-07-16 NOTE — PROGRESS NOTES
Hospitalist Progress Note    NAME: Ernesto Remedies   :  1934   MRN:  [id number]       Assessment / Plan:  Acute encephalopathy due to status epilepticus in setting of seizure d/o:  Out of lyrica for a few days prior to admission - has also preceeded other admissions for sz. Pt continues to be below most recent baseline with worsened dysarthria bulbar weakness the last few days. More alert today with ongoing oropharyngeal sx. - CT head  no acute process  - MRI brain  with no evidence of acute infarction. No acute intracranial process. Moderate cerebral atrophy and mild chronic microvascular ischemic change. - CXR  with improved right upper lobe airspace disease and left basilar atelectasis. - appreciate neuro consult, adjusted keppra and added valproate    - holding home lyrica while NPO - will hopefully get PEG this week so this can be restarted.  Con't ativan prn seizure activity only. - overall etiology for his continued encephalopathy, dysarthria, dysphagia is unclear despite multiple neurologists seeing patient in consultation here. I discussed all test results and reviewed very guarded prognosis with Pt's daughter and wife this week. Fever (resolved) presumed due to aspiration pneumonitis :  Temp 99.5 7/15  - CXR nonacute as above  - UA 1+ bacteria but moderate epis - sent for culture  - sputum culture sent, will follow  - con't unasyn for possible aspiration, improving today   Dysphagia/dyarthria with resultant moderate protein calorie malnutrition:    - reviewed with wife/dtr , they still want PEG placement. GI will possibly place tomorrow if stable from respiratory standpoint  - con't central TPN for now - I have renewed for today. Now at goal rate. Hypertension, benign/essential: more stable  - increasing scheduled metoprolol (on po metoprolol at home)  - prn IV hydralazine  - PT/OT and speech appreciated.  Plan for [facility] on discharge.   Hyperlipidemia: holding lipitor while NPO  History of SVT:  Briefly on cardizem gtt this admission  - serial troponin and EKG ordered with lower chest/abd pain complaints today  Peripheral neuropathy: ran out of lyrica several days PTA.  Needs to be restarted as soon as enteral access established due to ongoing leg sx. 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  Con't thiamine supplementation. GERD: con't protonix  Acute respiratory failure (resolved) due to acute encephalopathy with chronic underlying hypercapnea: intubated initially during admission.  Duonebs changed to prn only. Pt self-extubated 6/24; finished zosyn 7/09.      DVT prophylaxis: lovenox    Code status: Full (wife is decision maker)     Subjective:     Chief Complaint / Reason for Physician Visit  More alert, following commands better and more fluent speech although dysarthric. Discussed with RN events overnight. Review of Systems:  Symptom Y/N Comments  Symptom Y/N Comments   Fever/Chills    Chest Pain     Poor Appetite    Edema     Cough    Abdominal Pain     Sputum    Joint Pain     SOB/AL    Pruritis/Rash     Nausea/vomit    Tolerating PT/OT     Diarrhea    Tolerating Diet     Constipation    Other       Could NOT obtain due to: dysarthria     Objective:     VITALS:   Last 24hrs VS reviewed since prior progress note.  Most recent are:  Patient Vitals for the past 24 hrs:   Temp Pulse Resp BP SpO2   07/16/17 0733 - - - (!) 200/112 -   07/16/17 0706 99 °F (37.2 °C) (!) 101 24 (!) 190/104 100 %   07/16/17 0314 98.1 °F (36.7 °C) 67 20 120/56 100 %   07/16/17 0208 - 68 - 112/53 100 %   07/16/17 0103 - - - - 100 %   07/15/17 2347 97.9 °F (36.6 °C) 91 28 156/80 100 %   07/15/17 2300 98.4 °F (36.9 °C) 90 26 159/73 100 %   07/15/17 2110 - (!) 123 - 183/67 -   07/15/17 2103 - - - - 100 %   07/15/17 1908 98.3 °F (36.8 °C) 95 22 157/73 100 %   07/15/17 1553 98.9 °F (37.2 °C) 71 20 111/48 100 %   07/15/17 1457 - - - - 100 %   07/15/17 1127 98.9 °F (37.2 °C) 89 24 122/79 100 %   07/15/17 0958 98.8 °F (37.1 °C) 68 22 166/72 100 %       Intake/Output Summary (Last 24 hours) at 07/16/17 0746  Last data filed at 07/16/17 0647   Gross per 24 hour   Intake          2392.45 ml   Output             1030 ml   Net          1362.45 ml        PHYSICAL EXAM:  General: WD, WN. Alert, cooperative with simple commands, no acute distress    EENT:  EOMI. Anicteric sclerae. MMM  Resp:  Ongoing upper airway noise, slightly improved. CTA bilaterally, no wheezing or rales. No accessory muscle use  CV:  Regular  rhythm,  No edema  GI:  Soft, Non distended, Non tender.  +Bowel sounds  Neurologic:  Alert and oriented X 2, dysarthric speech,   Psych:   Some insight. Not anxious nor agitated  Skin:  No rashes. No jaundice    Reviewed most current lab test results and cultures  YES  Reviewed most current radiology test results   YES  Review and summation of old records today    NO  Reviewed patient's current orders and MAR    YES  PMH/ reviewed - no change compared to H&P  ________________________________________________________________________  Care Plan discussed with:    Comments   Patient x    Family  x wife   RN x    Care Manager     Consultant                        Multidiciplinary team rounds were held today with , nursing, pharmacist and clinical coordinator. Patient's plan of care was discussed; medications were reviewed and discharge planning was addressed.      ________________________________________________________________________  Total NON critical care TIME:  30 Minutes    Total CRITICAL CARE TIME Spent:   Minutes non procedure based      Comments   >50% of visit spent in counseling and coordination of care x    ________________________________________________________________________  Naomy Murguia MD     Procedures: see electronic medical records for all procedures/Xrays and details which were not copied into this note but were reviewed prior to creation of Plan.      LABS:  I reviewed today's most current labs and imaging studies.   Pertinent labs include:  Recent Labs      07/16/17   0318  07/15/17   0328  07/14/17   0305   WBC  8.4  6.3  7.0   HGB  9.4*  10.3*  11.0*   HCT  27.7*  29.7*  31.5*   PLT  152  160  191     Recent Labs      07/16/17   0323  07/16/17   0318  07/15/17   0328  07/14/17   0305   NA   --   135*  133*  134*   K   --   4.0  3.7  3.5   CL   --   98  99  100   CO2   --   35*  30  27   GLU   --   118*  125*  131*   BUN   --   17  20  14   CREA   --   0.48*  0.65*  0.53*   CA   --   8.5  8.2*  8.6   MG  1.3*   --   1.7  1.4*   PHOS   --    --   2.6  2.9   ALB   --    --   2.9*   --    TBILI   --    --   0.9   --    SGOT   --    --   27   --    ALT   --    --   24   --        Signed: Hanny Wells MD

## 2017-07-17 LAB
ANION GAP BLD CALC-SCNC: 4 MMOL/L (ref 5–15)
BUN SERPL-MCNC: 15 MG/DL (ref 6–20)
BUN/CREAT SERPL: 33 (ref 12–20)
CALCIUM SERPL-MCNC: 8.7 MG/DL (ref 8.5–10.1)
CHLORIDE SERPL-SCNC: 95 MMOL/L (ref 97–108)
CO2 SERPL-SCNC: 35 MMOL/L (ref 21–32)
CREAT SERPL-MCNC: 0.45 MG/DL (ref 0.7–1.3)
ERYTHROCYTE [DISTWIDTH] IN BLOOD BY AUTOMATED COUNT: 12.4 % (ref 11.5–14.5)
GLUCOSE BLD STRIP.AUTO-MCNC: 103 MG/DL (ref 65–100)
GLUCOSE BLD STRIP.AUTO-MCNC: 111 MG/DL (ref 65–100)
GLUCOSE BLD STRIP.AUTO-MCNC: 121 MG/DL (ref 65–100)
GLUCOSE SERPL-MCNC: 139 MG/DL (ref 65–100)
HCT VFR BLD AUTO: 28.3 % (ref 36.6–50.3)
HGB BLD-MCNC: 9.7 G/DL (ref 12.1–17)
LEVETIRACETAM SERPL-MCNC: 35.4 UG/ML (ref 10–40)
LEVETIRACETAM SERPL-MCNC: 39.4 UG/ML (ref 10–40)
MCH RBC QN AUTO: 33.8 PG (ref 26–34)
MCHC RBC AUTO-ENTMCNC: 34.3 G/DL (ref 30–36.5)
MCV RBC AUTO: 98.6 FL (ref 80–99)
PLATELET # BLD AUTO: 114 K/UL (ref 150–400)
POTASSIUM SERPL-SCNC: 3.9 MMOL/L (ref 3.5–5.1)
RBC # BLD AUTO: 2.87 M/UL (ref 4.1–5.7)
SERVICE CMNT-IMP: ABNORMAL
SODIUM SERPL-SCNC: 134 MMOL/L (ref 136–145)
VALPROATE SERPL-MCNC: 61 UG/ML (ref 50–100)
VIT B12 SERPL-MCNC: 1963 PG/ML (ref 211–911)
WBC # BLD AUTO: 8.9 K/UL (ref 4.1–11.1)

## 2017-07-17 PROCEDURE — 77010033678 HC OXYGEN DAILY

## 2017-07-17 PROCEDURE — 80177 DRUG SCRN QUAN LEVETIRACETAM: CPT | Performed by: INTERNAL MEDICINE

## 2017-07-17 PROCEDURE — 74011250637 HC RX REV CODE- 250/637: Performed by: INTERNAL MEDICINE

## 2017-07-17 PROCEDURE — 80048 BASIC METABOLIC PNL TOTAL CA: CPT | Performed by: INTERNAL MEDICINE

## 2017-07-17 PROCEDURE — 85027 COMPLETE CBC AUTOMATED: CPT | Performed by: INTERNAL MEDICINE

## 2017-07-17 PROCEDURE — 82962 GLUCOSE BLOOD TEST: CPT

## 2017-07-17 PROCEDURE — 74011250636 HC RX REV CODE- 250/636: Performed by: INTERNAL MEDICINE

## 2017-07-17 PROCEDURE — 74011000258 HC RX REV CODE- 258: Performed by: INTERNAL MEDICINE

## 2017-07-17 PROCEDURE — 65660000000 HC RM CCU STEPDOWN

## 2017-07-17 PROCEDURE — 36415 COLL VENOUS BLD VENIPUNCTURE: CPT | Performed by: INTERNAL MEDICINE

## 2017-07-17 PROCEDURE — 74011250636 HC RX REV CODE- 250/636: Performed by: PSYCHIATRY & NEUROLOGY

## 2017-07-17 PROCEDURE — 74011000250 HC RX REV CODE- 250: Performed by: PSYCHIATRY & NEUROLOGY

## 2017-07-17 PROCEDURE — 74011000258 HC RX REV CODE- 258: Performed by: PSYCHIATRY & NEUROLOGY

## 2017-07-17 PROCEDURE — 80164 ASSAY DIPROPYLACETIC ACD TOT: CPT | Performed by: INTERNAL MEDICINE

## 2017-07-17 PROCEDURE — 74011000250 HC RX REV CODE- 250: Performed by: INTERNAL MEDICINE

## 2017-07-17 PROCEDURE — 94640 AIRWAY INHALATION TREATMENT: CPT

## 2017-07-17 RX ORDER — GLYCOPYRROLATE 0.2 MG/ML
0.1 INJECTION INTRAMUSCULAR; INTRAVENOUS 3 TIMES DAILY
Status: DISCONTINUED | OUTPATIENT
Start: 2017-07-17 | End: 2017-07-17

## 2017-07-17 RX ORDER — GLYCOPYRROLATE 0.2 MG/ML
0.2 INJECTION INTRAMUSCULAR; INTRAVENOUS 3 TIMES DAILY
Status: DISCONTINUED | OUTPATIENT
Start: 2017-07-17 | End: 2017-07-22 | Stop reason: HOSPADM

## 2017-07-17 RX ADMIN — Medication 10 ML: at 21:47

## 2017-07-17 RX ADMIN — GLYCOPYRROLATE 0.2 MG: 0.2 INJECTION, SOLUTION INTRAMUSCULAR; INTRAVENOUS at 21:55

## 2017-07-17 RX ADMIN — LEVETIRACETAM 1500 MG: 100 INJECTION, SOLUTION, CONCENTRATE INTRAVENOUS at 21:42

## 2017-07-17 RX ADMIN — Medication 10 ML: at 14:02

## 2017-07-17 RX ADMIN — Medication 10 ML: at 04:54

## 2017-07-17 RX ADMIN — FENTANYL CITRATE 25 MCG: 50 INJECTION, SOLUTION INTRAMUSCULAR; INTRAVENOUS at 21:49

## 2017-07-17 RX ADMIN — IPRATROPIUM BROMIDE AND ALBUTEROL SULFATE 3 ML: .5; 3 SOLUTION RESPIRATORY (INHALATION) at 08:39

## 2017-07-17 RX ADMIN — VALPROATE SODIUM 500 MG: 100 INJECTION, SOLUTION INTRAVENOUS at 21:58

## 2017-07-17 RX ADMIN — IPRATROPIUM BROMIDE AND ALBUTEROL SULFATE 3 ML: .5; 3 SOLUTION RESPIRATORY (INHALATION) at 21:11

## 2017-07-17 RX ADMIN — LATANOPROST 1 DROP: 50 SOLUTION OPHTHALMIC at 18:25

## 2017-07-17 RX ADMIN — LEVETIRACETAM 1500 MG: 100 INJECTION, SOLUTION, CONCENTRATE INTRAVENOUS at 09:23

## 2017-07-17 RX ADMIN — SODIUM CHLORIDE 3 G: 900 INJECTION, SOLUTION INTRAVENOUS at 18:10

## 2017-07-17 RX ADMIN — ENOXAPARIN SODIUM 40 MG: 40 INJECTION SUBCUTANEOUS at 09:24

## 2017-07-17 RX ADMIN — Medication 10 ML: at 04:50

## 2017-07-17 RX ADMIN — SODIUM CHLORIDE 3 G: 900 INJECTION, SOLUTION INTRAVENOUS at 01:01

## 2017-07-17 RX ADMIN — IPRATROPIUM BROMIDE AND ALBUTEROL SULFATE 3 ML: .5; 3 SOLUTION RESPIRATORY (INHALATION) at 14:33

## 2017-07-17 RX ADMIN — IPRATROPIUM BROMIDE AND ALBUTEROL SULFATE 3 ML: .5; 3 SOLUTION RESPIRATORY (INHALATION) at 02:16

## 2017-07-17 RX ADMIN — Medication 10 ML: at 21:48

## 2017-07-17 RX ADMIN — THIAMINE HYDROCHLORIDE 100 MG: 100 INJECTION, SOLUTION INTRAMUSCULAR; INTRAVENOUS at 09:23

## 2017-07-17 RX ADMIN — Medication 20 ML: at 14:02

## 2017-07-17 RX ADMIN — METOPROLOL TARTRATE 5 MG: 5 INJECTION INTRAVENOUS at 16:10

## 2017-07-17 RX ADMIN — METOPROLOL TARTRATE 5 MG: 5 INJECTION INTRAVENOUS at 21:52

## 2017-07-17 RX ADMIN — SODIUM CHLORIDE 3 G: 900 INJECTION, SOLUTION INTRAVENOUS at 11:44

## 2017-07-17 RX ADMIN — VALPROATE SODIUM 500 MG: 100 INJECTION, SOLUTION INTRAVENOUS at 05:05

## 2017-07-17 RX ADMIN — METOPROLOL TARTRATE 5 MG: 5 INJECTION INTRAVENOUS at 04:49

## 2017-07-17 RX ADMIN — I.V. FAT EMULSION 250 ML: 20 EMULSION INTRAVENOUS at 18:19

## 2017-07-17 RX ADMIN — ACETAMINOPHEN 650 MG: 650 SUPPOSITORY RECTAL at 11:44

## 2017-07-17 RX ADMIN — RETINOL, ERGOCALCIFEROL, .ALPHA.-TOCOPHEROL ACETATE, DL-, PHYTONADIONE, ASCORBIC ACID, NIACINAMIDE, RIBOFLAVIN 5-PHOSPHATE SODIUM, THIAMINE HYDROCHLORIDE, PYRIDOXINE HYDROCHLORIDE, DEXPANTHENOL, BIOTIN, FOLIC ACID, AND CYANOCOBALAMIN: KIT at 18:13

## 2017-07-17 RX ADMIN — Medication 40 ML: at 14:02

## 2017-07-17 RX ADMIN — SODIUM CHLORIDE 3 G: 900 INJECTION, SOLUTION INTRAVENOUS at 06:27

## 2017-07-17 RX ADMIN — VALPROATE SODIUM 500 MG: 100 INJECTION, SOLUTION INTRAVENOUS at 14:01

## 2017-07-17 NOTE — PROGRESS NOTES
Hospitalist Progress Note    NAME: Christ Sheppard   :  1934   MRN:  [id number]       Assessment / Plan:  Acute encephalopathy due to status epilepticus in setting of seizure d/o:  Out of lyrica for a few days prior to admission - has also preceeded other admissions for sz. Pt continues to be below most recent baseline with worsened dysarthria bulbar weakness the last few days. More alert today with ongoing oropharyngeal sx. - CT head  no acute process  - MRI brain  with no evidence of acute infarction. No acute intracranial process. Moderate cerebral atrophy and mild chronic microvascular ischemic change. - CXR  with improved right upper lobe airspace disease and left basilar atelectasis. - appreciate neuro consult, adjusted keppra and added valproate    - holding home lyrica while NPO - will hopefully get PEG this week so this can be restarted.  Con't ativan prn seizure activity only. Fever (resolved) presumed due to aspiration pneumonitis :  Temp 99.5 7/15  - CXR nonacute as above  - UA 1+ bacteria but moderate epis - sent for culture  - sputum culture sent, will follow  - con't unasyn for possible aspiration, improving today   Dysphagia/dyarthria with resultant moderate protein calorie malnutrition:    - reviewed with wife/dtr , they still want PEG placement, GI aware  - exam by ENT today with no structural reason for dysphagia. Ken recommended. - seen by speech again today, not candidate for MBS   - con't central TPN for now - I have renewed for today. Now at goal rate with stable electrolytes. Hypertension, benign/essential: more stable  - con't IV scheduled metoprolol (on po metoprolol at home)  - prn IV hydralazine  - PT/OT and speech appreciated.  Plan for [facility] on discharge.   Hyperlipidemia: holding lipitor while NPO  History of SVT:  Briefly on cardizem gtt this admission  - serial troponin and EKG ordered with lower chest/abd pain complaints today  Peripheral neuropathy: ran out of lyrica several days PTA.  Needs to be restarted as soon as enteral access established due to ongoing leg sx. 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  Con't thiamine supplementation. GERD: con't protonix  Acute respiratory failure (resolved) due to acute encephalopathy with chronic underlying hypercapnea: intubated initially during admission.  Duonebs changed to prn only. Pt self-extubated 6/24; finished zosyn 7/09.      DVT prophylaxis: lovenox    Code status: Full (wife is decision maker)     Subjective:     Chief Complaint / Reason for Physician Visit  Alert, no complaints today. Loud oropharyngeal noise/secretions continue. Discussed with RN events overnight. Review of Systems:  Symptom Y/N Comments  Symptom Y/N Comments   Fever/Chills    Chest Pain     Poor Appetite    Edema     Cough    Abdominal Pain     Sputum    Joint Pain     SOB/AL    Pruritis/Rash     Nausea/vomit    Tolerating PT/OT     Diarrhea    Tolerating Diet     Constipation    Other       Could NOT obtain due to: encephalopathy     Objective:     VITALS:   Last 24hrs VS reviewed since prior progress note.  Most recent are:  Patient Vitals for the past 24 hrs:   Temp Pulse Resp BP SpO2   07/17/17 0927 97.6 °F (36.4 °C) 76 24 144/90 100 %   07/17/17 0840 - - - - 100 %   07/17/17 0711 97.7 °F (36.5 °C) 77 24 187/79 100 %   07/17/17 0405 98.3 °F (36.8 °C) 82 24 156/82 100 %   07/17/17 0216 - - - - 100 %   07/16/17 2259 98.4 °F (36.9 °C) 80 22 153/72 100 %   07/16/17 2150 - 73 - 108/45 100 %   07/16/17 1957 98.9 °F (37.2 °C) 87 24 159/80 100 %   07/16/17 1930 - - - - 100 %   07/16/17 1521 98.2 °F (36.8 °C) (!) 112 24 147/64 100 %   07/16/17 1406 - - - - 99 %   07/16/17 1109 98.6 °F (37 °C) 89 24 177/68 100 %       Intake/Output Summary (Last 24 hours) at 07/17/17 1034  Last data filed at 07/17/17 0927   Gross per 24 hour   Intake           1473.9 ml   Output             1700 ml   Net -226.1 ml        PHYSICAL EXAM:  General: WD, WN. Alert, cooperative, no acute distress    EENT:  EOMI. Anicteric sclerae. MMM. Gurgling from throat. Resp:  CTA bilaterally, no wheezing or rales. No accessory muscle use  CV:  Regular  rhythm,  No edema  GI:  Soft, Non distended, Non tender.  +Bowel sounds  Neurologic:  Alert and oriented X 1-2, slurred speech,   Psych:   Some insight. Not anxious nor agitated  Skin:  No rashes. No jaundice    Reviewed most current lab test results and cultures  YES  Reviewed most current radiology test results   YES  Review and summation of old records today    NO  Reviewed patient's current orders and MAR    YES  PMH/SH reviewed - no change compared to H&P  ________________________________________________________________________  Care Plan discussed with:    Comments   Patient x    Family      RN x    Care Manager     Consultant  x GI                     Multidiciplinary team rounds were held today with , nursing, pharmacist and clinical coordinator. Patient's plan of care was discussed; medications were reviewed and discharge planning was addressed. ________________________________________________________________________  Total NON critical care TIME:  25 Minutes    Total CRITICAL CARE TIME Spent:   Minutes non procedure based      Comments   >50% of visit spent in counseling and coordination of care x    ________________________________________________________________________  Darion Bowie MD     Procedures: see electronic medical records for all procedures/Xrays and details which were not copied into this note but were reviewed prior to creation of Plan. LABS:  I reviewed today's most current labs and imaging studies.   Pertinent labs include:  Recent Labs      07/17/17   0447  07/16/17   0318  07/15/17   0328   WBC  8.9  8.4  6.3   HGB  9.7*  9.4*  10.3*   HCT  28.3*  27.7*  29.7*   PLT  114*  152  160     Recent Labs      07/17/17   0447 07/16/17   0323  07/16/17   0318  07/15/17   0328   NA  134*   --   135*  133*   K  3.9   --   4.0  3.7   CL  95*   --   98  99   CO2  35*   --   35*  30   GLU  139*   --   118*  125*   BUN  15   --   17  20   CREA  0.45*   --   0.48*  0.65*   CA  8.7   --   8.5  8.2*   MG   --   1.3*   --   1.7   PHOS   --    --    --   2.6   ALB   --    --    --   2.9*   TBILI   --    --    --   0.9   SGOT   --    --    --   27   ALT   --    --    --   24       Signed: Rosa Vanegas MD

## 2017-07-17 NOTE — PROGRESS NOTES
Nutrition Assessment:    INTERVENTIONS/RECOMMENDATIONS:   Enteral/Parenteral Nutrition:  (Continue current TPN regimen)    ASSESSMENT:   Chart reviewed; patient discussed during PCU rounds. TPN rate increased and lipids added as recommended; meeting majority of patient's estimated nutritional needs. Patient requiring suctioning today. SLP unable to evaluate. Plans were for PEG but patient hasn't been seen by GI since 7/12; hospitalist notes indicating he will hopefully receive PEG this week. Continue to recommend feeding patient enterally via NGT or PEG over TPN. Will monitor progress and plan of care. Diet Order: NPO (TPN AA5% D20% @ 63 mL/hr + lipids 3x/week; provides 1545 kcal, 76g protein)  % Eaten:  No data found. Pertinent Medications: [x] Reviewed []Other: Keppra, Lopressor, Thiamine   Pertinent Labs: [x]Reviewed  []Other: Mg 1.3  Food Allergies: [x]None []Other:     Last BM:    [x]Active     []Hyperactive  []Hypoactive       [] Absent  BS  Skin:    [x] Intact   [] Incision  [] Breakdown   []Edema   []Other:    Anthropometrics: Height: 5' 8\" (172.7 cm) Weight: 66.1 kg (145 lb 12.8 oz)    IBW (%IBW):   ( ) UBW (%UBW):   (  %)    BMI: Body mass index is 22.17 kg/(m^2). This BMI is indicative of:  []Underweight   [x]Normal   []Overweight   [] Obesity   [] Extreme Obesity (BMI>40)  Last Weight Metrics:  Weight Loss Metrics 7/16/2017 6/21/2017 3/21/2017 3/18/2017 3/7/2017 2/25/2017 9/3/2012   Today's Wt 145 lb 12.8 oz 155 lb 166 lb 150 lb 166 lb 168 lb 192 lb   BMI 22.17 kg/m2 23.57 kg/m2 25.24 kg/m2 22.81 kg/m2 24.51 kg/m2 24.81 kg/m2 25.34 kg/m2       Estimated Nutrition Needs (Based on): 1601 Kcals/day (BMR (1335) x 1. 2AF) , 66 g (-79g (1.0-1.2 g/kg bw)) Protein  Carbohydrate:  At Least 130 g/day  Fluids: 1600 mL/day     Pt expected to meet estimated nutrient needs: [x]Yes []No    NUTRITION DIAGNOSES:   Problem:  Inadequate protein-energy intake      Etiology: related to current medical condition, AMS, dysphagia     Signs/Symptoms: as evidenced by NPO status, TPN meeting 51% estimated kcal needs    Previous diagnosis resolved; TPN increased to goal and meeting majority of patient's nutritional needs    NUTRITION INTERVENTIONS:  Meals/Snacks:  Other (advance diet per SLP) Enteral/Parenteral Nutrition:  (Continue current TPN regimen)                GOAL:   Patient will tolerate TPN @ goal with electrolytes WNL next 1-3 days    NUTRITION MONITORING AND EVALUATION   Behavioral-Environmental Outcomes: Behavior  Food/Nutrient Intake Outcomes: Enteral/parenteral nutrition intake  Physical Signs/Symptoms Outcomes: Weight/weight change, Electrolyte and renal profile, Glucose profile, GI profile    Previous Goal Met:   [x] Met              [] Progressing Towards Goal              [] Not Progressing Towards Goal   Previous Recommendations:   [x] Implemented          [] Not Implemented          [] Not Applicable    LEARNING NEEDS (Diet, Food/Nutrient-Drug Interaction):    [x] None Identified   [] Identified and Education Provided/Documented   [] Identified and Pt declined/was not appropriate     Cultural, Church, OR Ethnic Dietary Needs:    [x] None Identified   [] Identified and Addressed     [x] Interdisciplinary Care Plan Reviewed/Documented    [x] Discharge Planning: TBD   [x] Participated in Interdisciplinary Rounds    NUTRITION RISK:    [x] High              [] Moderate           []  Low  []  Minimal/Uncompromised      [facility]  Pager [phone]              Weekend Pager [phone]

## 2017-07-17 NOTE — PROGRESS NOTES
PCU SHIFT NURSING NOTE      Bedside and Verbal shift change report given to Rosas Jack RN (oncoming nurse) by Rachele Santoyo RN (offgoing nurse). Report included the following information SBAR, Kardex, ED Summary, Procedure Summary, Intake/Output, MAR, Recent Results, Med Rec Status, Cardiac Rhythm NSR and Alarm Parameters . Shift Summary:         Admission Date 6/24/2017   Admission Diagnosis Status epilepticus ([facility] [address]  dysphagia   Consults IP CONSULT TO NEUROLOGY  IP CONSULT TO INTENSIVIST  IP CONSULT TO PALLIATIVE CARE - PROVIDER  IP CONSULT TO GASTROENTEROLOGY  IP CONSULT TO PALLIATIVE CARE - PROVIDER  IP CONSULT TO OTOLARYNGOLOGY        Consults   []PT   []OT   [x]Speech   [x]Case Management      [x] Palliative      Cardiac Monitoring Order   [x]Yes   []No     IV drips   []Yes    Drip:                            Dose:  Drip:                            Dose:  Drip:                            Dose:   [x]No     GI Prophylaxis   [x]Yes   []No         DVT Prophylaxis   SCDs:  Sequential Compression Device: Bilateral     Patient Refused VTE Prophylaxis: Yes    Han stockings:         [x] Medication   []Contraindicated   []None      Activity Level Activity Level: Bed Rest     Activity Assistance: Complete care   Purposeful Rounding every 1-2 hour? [x]Yes   Edouard Score  Total Score: 3   Bed Alarm (If score 3 or >)   [x]Yes   [] Refused (See signed refusal form in chart)   Klever Score  Klever Score: 12   Klever Score (if score 14 or less)   [x]PMT consult   []Wound Care consult      []Specialty bed   [x] Nutrition consult          Needs prior to discharge:   Home O2 required:    []Yes   [x]No    If yes, how much O2 required?     Other:    Last Bowel Movement: Last Bowel Movement Date: 07/04/17      Influenza Vaccine Received Flu Vaccine for Current Season (usually Sept-March): Not Flu Season        Pneumonia Vaccine           Diet Active Orders   Diet    DIET NPO      LDAs           Triple Lumen IJ 06/24/17 Right Internal jugular (Active)   Central Line Being Utilized Yes 7/17/2017  6:19 PM   Criteria for Appropriate Use Limited/no vessel suitable for conventional peripheral access 7/17/2017  6:19 PM   Site Assessment Clean, dry, & intact 7/17/2017  6:19 PM   Infiltration Assessment 0 7/17/2017  6:19 PM   Affected Extremity/Extremities Color distal to insertion site pink (or appropriate for race) 7/17/2017  6:19 PM   Date of Last Dressing Change 07/17/17 7/17/2017  6:19 PM   Dressing Status Clean, dry, & intact 7/17/2017  6:19 PM   Dressing Type Disk with Chlorhexadine gluconate (CHG); Transparent 7/17/2017  6:19 PM   Action Taken Open ports on tubing capped 7/17/2017  6:19 PM   Proximal Hub Color/Line Status White; Infusing;Flushed 7/17/2017  6:19 PM   Positive Blood Return (Medial Site) Yes 7/17/2017  6:19 PM   Medial Hub Color/Line Status Blue; Infusing;Flushed 7/17/2017  6:19 PM   Positive Blood Return (Lateral Site) Yes 7/17/2017  6:19 PM   Distal Hub Color/Line Status Brown;Capped;Flushed 7/17/2017  6:19 PM   Positive Blood Return (Site #3) Yes 7/17/2017  6:19 PM   External Catheter Length (cm) 0 centimeters 7/14/2017  8:09 AM   Alcohol Cap Used Yes 7/17/2017  6:19 PM              Condom Catheter 07/15/17 (Active)   Criteria for Appropriate Use Strict I/Os; Comfort Care 7/17/2017  6:32 PM   Status Draining 7/17/2017  6:32 PM   Site Condition No abnormalities 7/17/2017  6:32 PM   Drainage Tube Clipped to Bed Yes 7/17/2017  6:32 PM   Catheter Secured to Thigh Yes 7/17/2017  6:32 PM   Tamper Seal Intact Yes 7/17/2017  6:32 PM   Bag Below Bladder/Not on Floor Yes 7/17/2017  6:32 PM   Lack of Dependent Loop in Tubing Yes 7/17/2017  6:32 PM   Drainage Bag Less Than Half Full Yes 7/17/2017  6:32 PM   Sterile Solution Used for  Irrigation N/A 7/17/2017  6:32 PM   Urine Output (mL) 350 ml 7/17/2017  6:32 PM                Urinary Catheter [REMOVED] Condom Catheter 06/30/17-Criteria for Appropriate Use: Medically/surgically unstable, Strict I/Os  [REMOVED] Condom Catheter 07/05/17-Criteria for Appropriate Use: Comfort Care  [REMOVED] Condom Catheter 07/07/17-Criteria for Appropriate Use: Comfort Care  Condom Catheter 07/15/17-Criteria for Appropriate Use: Strict I/Os, Comfort Care  [REMOVED] Urinary Catheter 06/24/17 2- way; [employer]-Criteria for Appropriate Use: Medically/surgically unstable    Intake & Output   Date 07/16/17 0700 - 07/17/17 0659 07/17/17 0700 - 07/18/17 0659   Shift 8177-6559 7184-9036 24 Hour Total 6946-0165 6730-3602 24 Hour Total   I  N  T  A  K  E   I.V.  (mL/kg/hr) 694.2  (0.9) 1322.2  (1.7) 2016.3  (1.3) 1082. 2  1082. 2      Volume (levETIRAcetam (KEPPRA) 1,500 mg in 0.9% sodium chloride IVPB) 100 100 200 100  100      Volume (valproate (DEPACON) 500 mg in 0.9% sodium chloride 50 mL IVPB) 0 100 100 50  50      Volume (ampicillin-sulbactam (UNASYN) 3 g in 0.9% sodium chloride (MBP/ADV) 100 mL) 100 300 400 200  200      Volume (magnesium sulfate 2 g/50 ml IVPB (premix or compounded)) 50  50         Volume (TPN ADULT - CENTRAL AA 5% D20% W/ CA + ELECTROLYTES) 444.2  444. 2         Volume (TPN ADULT - CENTRAL AA 5% D20% W/ CA + ELECTROLYTES) 0 822.2 822.2 707.7  707.7      Volume (fat emulsion 20% (LIPOSYN, INTRALIPID) infusion 250 mL) 0 0 0 4.5  4.5      Volume (TPN ADULT - CENTRAL AA 5% D20% W/ CA + ELECTROLYTES)  0 0 20  20    Shift Total  (mL/kg) 694.2  (10.5) 1322.2  (20) 2016.3  (30.5) 1082.2  (16.3)  1082.2  (16.3)   O  U  T  P  U  T   Urine  (mL/kg/hr) 650  (0.8) 900  (1.1) 1550  (1) 1725  1725      Urine Voided    350  350      Urine Output (mL) (Condom Catheter 07/15/17)  1375  1375    Shift Total  (mL/kg) 650  (9.8) 900  (13.6) 1550  (23.4) 1725  (26)  1725  (26)   NET 44.2 422.2 466.3 -642.8  -642. 8   Weight (kg) 66.1 66.1 66.1 66.3 66.3 66.3         Readmission Risk Assessment Tool Score Medium Risk            20       Total Score        3 Relationship with PCP    3 Patient Length of Stay > 5 4 More than 1 Admission in calendar year    5 Patient Insurance is Medicare, Medicaid or Self Pay    5 Charlson Comorbidity Score        Criteria that do not apply:    Patient Living Status       Expected Length of Stay 4d 4h   Actual Length of Stay 23

## 2017-07-17 NOTE — PROGRESS NOTES
PCU SHIFT NURSING NOTE      Bedside and Verbal shift change report given to Criss Miller RN (oncoming nurse) by Violet Arevalo RN (offgoing nurse). Report included the following information SBAR, Kardex, Intake/Output, MAR, Recent Results and Cardiac Rhythm NST/ST. Shift Summary:       Admission Date 6/24/2017   Admission Diagnosis Status epilepticus ([address]   Consults IP CONSULT TO NEUROLOGY  IP CONSULT TO INTENSIVIST  IP CONSULT TO PALLIATIVE CARE - PROVIDER  IP CONSULT TO GASTROENTEROLOGY  IP CONSULT TO PALLIATIVE CARE - PROVIDER  IP CONSULT TO OTOLARYNGOLOGY        Consults   [x]PT   [x]OT   [x]Speech   []Case Management      [x] Palliative      Cardiac Monitoring Order   [x]Yes   []No     IV drips   []Yes    Drip:                            Dose:  Drip:                            Dose:  Drip:                            Dose:   [x]No     GI Prophylaxis   [x]Yes   []No         DVT Prophylaxis   SCDs:  Sequential Compression Device: Bilateral     Patient Refused VTE Prophylaxis: Yes    Han stockings:         [] Medication   []Contraindicated   []None      Activity Level Activity Level: Bed Rest     Activity Assistance: Complete care   Purposeful Rounding every 1-2 hour? [x]Yes   Edouard Score  Total Score: 3   Bed Alarm (If score 3 or >)   []Yes   [] Refused (See signed refusal form in chart)   Klever Score  Klever Score: 12   Klever Score (if score 14 or less)   []PMT consult   []Wound Care consult      []Specialty bed   [] Nutrition consult          Needs prior to discharge:   Home O2 required:    [x]Yes   []No    If yes, how much O2 required?     Other:    Last Bowel Movement: Last Bowel Movement Date: 07/04/17      Influenza Vaccine Received Flu Vaccine for Current Season (usually Sept-March): Not Flu Season        Pneumonia Vaccine           Diet Active Orders   Diet    DIET NPO      LDAs           Triple Lumen IJ 06/24/17 Right Internal jugular (Active)   Central Line Being Utilized Yes 7/17/2017  4:05 AM   Criteria for Appropriate Use Limited/no vessel suitable for conventional peripheral access 7/17/2017  4:05 AM   Site Assessment Clean, dry, & intact 7/17/2017  4:05 AM   Infiltration Assessment 0 7/17/2017  4:05 AM   Affected Extremity/Extremities Color distal to insertion site pink (or appropriate for race); Pulses palpable;Range of motion performed 7/17/2017  4:05 AM   Date of Last Dressing Change 07/17/17 7/17/2017  4:05 AM   Dressing Status New;Clean, dry, & intact 7/17/2017  4:05 AM   Dressing Type Disk with Chlorhexadine gluconate (CHG); Transparent;Tape 7/17/2017  4:05 AM   Action Taken Dressing changed;Blood drawn;Open ports on tubing capped 7/17/2017  4:05 AM   Proximal Hub Color/Line Status White; Infusing;Flushed;Patent;Cap end changed 7/17/2017  4:05 AM   Positive Blood Return (Medial Site) Yes 7/17/2017  4:05 AM   Medial Hub Color/Line Status Blue;Cap end changed; Capped;Flushed;Patent 7/17/2017  4:05 AM   Positive Blood Return (Lateral Site) Yes 7/17/2017  4:05 AM   Distal Hub Color/Line Status Mimi Tylor; Infusing;Cap end changed; Flushed;Patent 7/17/2017  4:05 AM   Positive Blood Return (Site #3) Yes 7/17/2017  4:05 AM   External Catheter Length (cm) 0 centimeters 7/14/2017  8:09 AM   Alcohol Cap Used Yes 7/17/2017  4:05 AM              Condom Catheter 07/15/17 (Active)   Criteria for Appropriate Use Strict I/Os; Comfort Care 7/16/2017  1:47 PM   Status Draining 7/16/2017  1:47 PM   Site Condition No abnormalities 7/16/2017  1:47 PM   Drainage Tube Clipped to Bed Yes 7/16/2017  1:47 PM   Catheter Secured to Thigh Yes 7/16/2017  1:47 PM   Tamper Seal Intact Yes 7/16/2017  1:47 PM   Bag Below Bladder/Not on Floor Yes 7/16/2017  1:47 PM   Lack of Dependent Loop in Tubing Yes 7/16/2017  1:47 PM   Drainage Bag Less Than Half Full Yes 7/16/2017  1:47 PM   Sterile Solution Used for  Irrigation N/A 7/16/2017  1:47 PM   Urine Output (mL) 900 ml 7/17/2017  5:09 AM                Urinary Catheter [REMOVED] Condom Catheter 06/30/17-Criteria for Appropriate Use: Medically/surgically unstable, Strict I/Os  [REMOVED] Condom Catheter 07/05/17-Criteria for Appropriate Use: Comfort Care  [REMOVED] Condom Catheter 07/07/17-Criteria for Appropriate Use: Comfort Care  Condom Catheter 07/15/17-Criteria for Appropriate Use: Strict I/Os, Comfort Care  [REMOVED] Urinary Catheter 06/24/17 2- way; [employer]-Criteria for Appropriate Use: Medically/surgically unstable    Intake & Output   Date 07/16/17 0700 - 07/17/17 0659 07/17/17 0700 - 07/18/17 0659   Shift 7489-9156 9354-2631 24 Hour Total [id number] 1900-0659 24 Hour Total   I  N  T  A  K  E   I.V.  (mL/kg/hr) 694.2  (0.9) 981. 2 1675.4         Volume (levETIRAcetam (KEPPRA) 1,500 mg in 0.9% sodium chloride IVPB) 100 100 200         Volume (valproate (DEPACON) 500 mg in 0.9% sodium chloride 50 mL IVPB) 0 100 100         Volume (ampicillin-sulbactam (UNASYN) 3 g in 0.9% sodium chloride (MBP/ADV) 100 mL) 100  100         Volume (magnesium sulfate 2 g/50 ml IVPB (premix or compounded)) 50  50         Volume (TPN ADULT - CENTRAL AA 5% D20% W/ CA + ELECTROLYTES) 444.2  444. 2         Volume (TPN ADULT - CENTRAL AA 5% D20% W/ CA + ELECTROLYTES) 0 781.2 781. 2         Volume (fat emulsion 20% (LIPOSYN, INTRALIPID) infusion 250 mL) 0  0       Shift Total  (mL/kg) 694.2  (10.5) 981.2  (14.8) 1675.4  (25.3)      O  U  T  P  U  T   Urine  (mL/kg/hr) 650  (0.8) 900 1550         Urine Output (mL) (Condom Catheter 07/15/17)        Shift Total  (mL/kg) 650  (9.8) 900  (13.6) 1550  (23.4)      NET 44.2 81.2 125.4      Weight (kg) 66.1 66.1 66.1 66.1 66.1 66.1         Readmission Risk Assessment Tool Score Medium Risk            20       Total Score        3 Relationship with PCP    3 Patient Length of Stay > 5    4 More than 1 Admission in calendar year    5 Patient Insurance is Medicare, Medicaid or Self Pay    5 Charlson Comorbidity Score        Criteria that do not apply:    Patient Living Status Expected Length of Stay 4d 4h   Actual Length of Stay 23

## 2017-07-17 NOTE — PROGRESS NOTES
Speech path   Pt is s/p admission for sz activity. Intubated and self extubated. Has not been able to resume any po since extubation due to dysphagia. He has had two rapid responses called. He has dysarthria which is new but his MRI revealed Moderate cerebral atrophy and mild chronic microvascular ischemic change/ no infarct. He is currently requiring NT suctioning. We will follow up once he is tolerating his secretions and no longer requiring NT suctioning. Discussed above with RN, Nik Marino and his wife. Dr. Bhavna Saleh is requesting ENT today to assess his TVCs.     Vita Myers, SLP

## 2017-07-17 NOTE — PROGRESS NOTES
General Daily Progress Note    Admit Date: 6/24/2017  Hospital day 7/17/2017    Subjective:seen last week and not better. O- Not able to participate for MBS per SLP.      Y  N  [] []  Abd Pain:    [] []  Nausea:  [] [] Vomiting:  [] []  Diarrhea:  [] []  Constipation:  [] []  Melena:  [] []  Hematochezia:  [] []  Tolerating Diet:    Current Facility-Administered Medications   Medication Dose Route Frequency    TPN ADULT - CENTRAL AA 5% D20% W/ CA + ELECTROLYTES   IntraVENous CONTINUOUS    TPN ADULT - CENTRAL AA 5% D20% W/ CA + ELECTROLYTES   IntraVENous CONTINUOUS    fat emulsion 20% (LIPOSYN, INTRALIPID) infusion 250 mL  250 mL IntraVENous Q MON, WED & FRI    metoprolol (LOPRESSOR) injection 5 mg  5 mg IntraVENous Q6H    ampicillin-sulbactam (UNASYN) 3 g in 0.9% sodium chloride (MBP/ADV) 100 mL  3 g IntraVENous Q6H    albuterol (PROVENTIL VENTOLIN) nebulizer solution 2.5 mg  2.5 mg Nebulization Q4H PRN    albuterol-ipratropium (DUO-NEB) 2.5 MG-0.5 MG/3 ML  3 mL Nebulization Q6H RT    levETIRAcetam (KEPPRA) 1,500 mg in 0.9% sodium chloride IVPB  1,500 mg IntraVENous Q12H    valproate (DEPACON) 500 mg in 0.9% sodium chloride 50 mL IVPB  500 mg IntraVENous Q8H    heparin (porcine) pf 300 Units  300 Units InterCATHeter PRN    LORazepam (ATIVAN) injection 1 mg  1 mg IntraVENous Q2H PRN    fentaNYL citrate (PF) injection 25-50 mcg  25-50 mcg IntraVENous Q2H PRN    hydrALAZINE (APRESOLINE) 20 mg/mL injection 10 mg  10 mg IntraVENous Q6H PRN    sodium chloride (NS) flush 10-30 mL  10-30 mL InterCATHeter PRN    sodium chloride (NS) flush 10 mL  10 mL InterCATHeter Q24H    sodium chloride (NS) flush 10 mL  10 mL InterCATHeter PRN    sodium chloride (NS) flush 10-40 mL  10-40 mL InterCATHeter Q8H    sodium chloride (NS) flush 20 mL  20 mL InterCATHeter Q24H    latanoprost (XALATAN) 0.005 % ophthalmic solution 1 Drop  1 Drop Both Eyes QPM    thiamine (B-1) 100 mg in 0.9% sodium chloride 50 mL IVPB  100 mg IntraVENous DAILY    sodium chloride (NS) flush 5-10 mL  5-10 mL IntraVENous Q8H    sodium chloride (NS) flush 5-10 mL  5-10 mL IntraVENous PRN    naloxone (NARCAN) injection 0.4 mg  0.4 mg IntraVENous PRN    ondansetron (ZOFRAN) injection 4 mg  4 mg IntraVENous Q4H PRN    bisacodyl (DULCOLAX) suppository 10 mg  10 mg Rectal DAILY PRN    acetaminophen (TYLENOL) suppository 650 mg  650 mg Rectal Q4H PRN    enoxaparin (LOVENOX) injection 40 mg  40 mg SubCUTAneous Q24H        Review of Systems  Review of systems not obtained due to patient factors. Objective:     Patient Vitals for the past 8 hrs:   BP Temp Pulse Resp SpO2   07/17/17 1526 137/62 99.7 °F (37.6 °C) 83 24 100 %   07/17/17 1434 - - - - 100 %   07/17/17 1301 147/85 100.3 °F (37.9 °C) (!) 105 28 100 %   07/17/17 1120 182/76 (!) 101.3 °F (38.5 °C) (!) 102 24 98 %   07/17/17 0927 144/90 97.6 °F (36.4 °C) 76 24 100 %     07/17 0701 - 07/17 1900  In: -   Out: 900 [Urine:900]  07/15 1901 - 07/17 0700  In: 2867.3 [I.V.:2867.3]  Out: 2100 [Urine:2100]    Physical Exam:   Visit Vitals    /62 (BP 1 Location: Left arm, BP Patient Position: At rest)    Pulse 83    Temp 99.7 °F (37.6 °C)    Resp 24    Ht 5' 8\" (1.727 m)    Wt 66.1 kg (145 lb 12.8 oz)    SpO2 100%    BMI 22.17 kg/m2     General appearance: severe distress, appears stated age  Abdomen: soft, non-tender.  Bowel sounds normal. No masses,  no organomegaly      Data Review   Recent Results (from the past 24 hour(s))   VALPROIC ACID    Collection Time: 07/17/17  4:47 AM   Result Value Ref Range    Valproic acid 61 50 - 706 ug/ml   METABOLIC PANEL, BASIC    Collection Time: 07/17/17  4:47 AM   Result Value Ref Range    Sodium 134 (L) 136 - 145 mmol/L    Potassium 3.9 3.5 - 5.1 mmol/L    Chloride 95 (L) 97 - 108 mmol/L    CO2 35 (H) 21 - 32 mmol/L    Anion gap 4 (L) 5 - 15 mmol/L    Glucose 139 (H) 65 - 100 mg/dL    BUN 15 6 - 20 MG/DL    Creatinine 0.45 (L) 0.70 - 1.30 MG/DL BUN/Creatinine ratio 33 (H) 12 - 20      GFR est AA >60 >60 ml/min/1.73m2    GFR est non-AA >60 >60 ml/min/1.73m2    Calcium 8.7 8.5 - 10.1 MG/DL   CBC W/O DIFF    Collection Time: 07/17/17  4:47 AM   Result Value Ref Range    WBC 8.9 4.1 - 11.1 K/uL    RBC 2.87 (L) 4.10 - 5.70 M/uL    HGB 9.7 (L) 12.1 - 17.0 g/dL    HCT 28.3 (L) 36.6 - 50.3 %    MCV 98.6 80.0 - 99.0 FL    MCH 33.8 26.0 - 34.0 PG    MCHC 34.3 30.0 - 36.5 g/dL    RDW 12.4 11.5 - 14.5 %    PLATELET 304 (L) 315 - 400 K/uL   GLUCOSE, POC    Collection Time: 07/17/17 11:15 AM   Result Value Ref Range    Glucose (POC) 111 (H) 65 - 100 mg/dL    Performed by Chepe De León          Assessment:     Active Problems:    Status epilepticus ([facility] (6/24/2017)      Oral phase dysphagia (7/8/2017)      Dysarthria (7/15/2017)      Dysphagia (7/15/2017)        Plan:     PEG.   D/W Dr Elisabeth Dangelo and Ms Adenike Juarez and wife

## 2017-07-17 NOTE — CONSULTS
Ears/Nose/Throat Consult    Subjective:     Date of Consultation:  July 17, 2017    Referring Physician: Dr. Vamsi Santiago    History of Present Illness:   Patient is a 80 y.o.  male who is being seen for dysphagia, dysphonia. he  was admitted to the hospital for [facility] Pt is hospital day 23 with status epilepticus complicated by respiratory failure and need for intubation. The patient self extubated and required no reintubation. Has been evaluated by GI with consideration for PEG. Is on TPN. SLP consulted, no mbs yet. Pt requiring frequent suctioning due to secretions. Has gone into afib with coughing/suctioning spells. Wife notes h/o drinking in past, nothing significantly recently. No dysphonia or dysphagia prior to admission. No issues with airway obstruction per staff. Past Medical History:   Diagnosis Date    Alcohol abuse, in remission     [facility] ([facility]       Family History   Problem Relation Age of Onset    Other Other      no strokes or seizures      Social History   Substance Use Topics    Smoking status: Never Smoker    Smokeless tobacco: Not on file    Alcohol use No      Comment: Quit drinking 10 years ago     No past surgical history on file.    Current Facility-Administered Medications   Medication Dose Route Frequency    TPN ADULT - CENTRAL AA 5% D20% W/ CA + ELECTROLYTES   IntraVENous CONTINUOUS    fat emulsion 20% (LIPOSYN, INTRALIPID) infusion 250 mL  250 mL IntraVENous Q MON, WED & FRI    metoprolol (LOPRESSOR) injection 5 mg  5 mg IntraVENous Q6H    ampicillin-sulbactam (UNASYN) 3 g in 0.9% sodium chloride (MBP/ADV) 100 mL  3 g IntraVENous Q6H    albuterol (PROVENTIL VENTOLIN) nebulizer solution 2.5 mg  2.5 mg Nebulization Q4H PRN    albuterol-ipratropium (DUO-NEB) 2.5 MG-0.5 MG/3 ML  3 mL Nebulization Q6H RT    levETIRAcetam (KEPPRA) 1,500 mg in 0.9% sodium chloride IVPB  1,500 mg IntraVENous Q12H    valproate (DEPACON) 500 mg in 0.9% sodium chloride 50 mL IVPB  500 mg IntraVENous Q8H    heparin (porcine) pf 300 Units  300 Units InterCATHeter PRN    LORazepam (ATIVAN) injection 1 mg  1 mg IntraVENous Q2H PRN    fentaNYL citrate (PF) injection 25-50 mcg  25-50 mcg IntraVENous Q2H PRN    hydrALAZINE (APRESOLINE) 20 mg/mL injection 10 mg  10 mg IntraVENous Q6H PRN    sodium chloride (NS) flush 10-30 mL  10-30 mL InterCATHeter PRN    sodium chloride (NS) flush 10 mL  10 mL InterCATHeter Q24H    sodium chloride (NS) flush 10 mL  10 mL InterCATHeter PRN    sodium chloride (NS) flush 10-40 mL  10-40 mL InterCATHeter Q8H    sodium chloride (NS) flush 20 mL  20 mL InterCATHeter Q24H    latanoprost (XALATAN) 0.005 % ophthalmic solution 1 Drop  1 Drop Both Eyes QPM    thiamine (B-1) 100 mg in 0.9% sodium chloride 50 mL IVPB  100 mg IntraVENous DAILY    sodium chloride (NS) flush 5-10 mL  5-10 mL IntraVENous Q8H    sodium chloride (NS) flush 5-10 mL  5-10 mL IntraVENous PRN    naloxone (NARCAN) injection 0.4 mg  0.4 mg IntraVENous PRN    ondansetron (ZOFRAN) injection 4 mg  4 mg IntraVENous Q4H PRN    bisacodyl (DULCOLAX) suppository 10 mg  10 mg Rectal DAILY PRN    acetaminophen (TYLENOL) suppository 650 mg  650 mg Rectal Q4H PRN    enoxaparin (LOVENOX) injection 40 mg  40 mg SubCUTAneous Q24H      Allergies   Allergen Reactions    No Known Allergies         Review of Systems:  Review of systems not obtained due to patient factors.      Objective:     Patient Vitals for the past 8 hrs:   BP Temp Pulse Resp SpO2   17 1120 182/76 (!) 101.3 °F (38.5 °C) (!) 102 24 98 %   17 0927 144/90 97.6 °F (36.4 °C) 76 24 100 %   17 0840 - - - - 100 %   17 0711 187/79 97.7 °F (36.5 °C) 77 24 100 %     Temp (24hrs), Av.6 °F (37 °C), Min:97.6 °F (36.4 °C), Max:101.3 °F (38.5 °C)    07/15 1901 -  0700  In: 2867.3 [I.V.:2867.3]  Out: 2100 [Urine:2100]    Physical Exam:   General  General Appearance- Well nourished adult in no apparent distress who is alert and cooperative. Gait- not assessed Voice- dysphonic, persistent coughing of clear secretions. HEENT  Head: Normally developed without evidence of trauma or lesions. Face:  Skin:  Normal color, texture, and turgor. There are no lesions of concern nor swelling or induration. Lips- normal.  Facial Nerve- Bilateral- function is equal and symmetrical with no deficit. Ear: Auricle- Left- Normal development without sinus pit, cyst or any other lesion. Right- Normal development without sinus pit, cyst or any other lesion  Auditory Canal (otoscopic examination deferred due to otoscope not working)    Mastoid- Left- No erythema, edema, tenderness, or protrusion of the auricle. Right- No erythema, edema, tenderness, or protrusion of the auricle. Nose: External Nose- Normally developed without lesion. Nasal trumpet in place right side Nasal Mucosa- moist and pink without erythema, edema, or lesions. Nasal septum- Caudally the septum is relatively straight without lesion. Turbinates- Bilateral- The turbinates are without hypertrophy, edema, or lesion. Sinuses-  All sinuses are nontender to percussion. Oral Cavity/OropharynxDentition- Normal dentition for age witho no evidence of discoloration, inflammation, or infection. Oral Mucosa: moist without erythema or lesions. Tongue- no lesions or palpable masses. Floor of mouth- soft without palpable masses or mucosal lesion. Palate and uvula- Palate is without cleft and the uvula is not bifid. Soft palate elevates symmetrically. Tonsils- Bilateral -Normal in size without exudates or erythema. Salivary Ducts-  Ducts appear to be normal.  Throat: Pharynx- Normal mucosal lining without erythema or mass. Larynx: difficult to examine directly. Neck:-- Trachea- midline with normal laryngeal framework with no crepitus. Salivary Glands- normal to palpation without nodules or tenderness.  Thyroid- normal to palpation without mass or irregularity. Lymph Nodes- No palpable adenopathy or masses. Range of Motion- good in all directions, with good anterior-posterior and lateral flexion and rotation. Chest and Lung Exam: Normal respiratory effort with no wheezing, retractions, or rales. Cardiovascular:, tachycardic. Normal peripheral pulses      Procedure:  Fiberoptic Laryngoscopy:  After topically decongesting the nose with afrin spray, a flexible fiberoptic endoscope was passed through the nose and into the pharynx. Nasal cavity: There was no evidence of discharge, polyps, synechea, mass or any other mucosal lesions. Nasopharynx:  No discharge , mass, or lesion. Nasal trumpet in place Base of tongue and vallecula- No evidence of lesion, erythema, or mass notes. Larynx/pharynx: vocal cords bilateral- limited mobility noted bilaterally without nodule, polyps , or lesions. Pharynx and pyriform sinus-  Lower pharynx and pyriform sinus without lesion or mass, exam limited by pooled secretions. Postcricoid-  No edema noted. Subglottis-  Limited view of the subglottis does not reveal any lesion or mass. The patient tolerated the procedure well. 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 No history of traumatic intubation, pt self extubated himself. Secretions seem to be a big problem now, consider robinul to help dry secretions. Plan is for PEG. Speech will evaluate when ready.   If airway becomes an issue and pt needs to be reintubated, trach may be considered down the road. The problem with a trach is that it won't prevent aspiration, so I would only recommend it for airway protection. Please call for any further assistance.         Signed By: Jackson Kay MD     July 17, 2017

## 2017-07-17 NOTE — PROGRESS NOTES
Responded to seeing family in the room. Provided active listening and gentle inquiry as wife shared that she sometimes has a hard time remembering to eat because they (she & pt) usually eat together.  reminded wife of good self-care and encouraged more family/friend support for *her* needs. Provided gentle inquiry to find pt and wife have been together almost 48 years.  encouraged both wife and family friend José Luis Castro) that their presence is so important. Provided assurance of keeping them in my prayers and provided prayer at family request.  Provided prayer around bedside. Wife encouraged pt to say thank you and pt seemed to comply. Wife also spoke about wanting daughter to be more involved in process -- possibly referring to family meeting? Danica Cardona will continue to follow as possible. Maite Nelson M.Div.   Palliative  Fellow

## 2017-07-17 NOTE — PROGRESS NOTES
Chart reviewed. I will try to reach out to family again tomorrow to set up family meeting. Should you have questions/concerns or the need for a bedside visit by our team, please do not hesitate to contact us at [phone]). Thank you for providing us w/ the continued opportunity to be involved in this patient's care.       Kacie Blanchard MD  Palliative Care Team

## 2017-07-18 ENCOUNTER — ANESTHESIA (OUTPATIENT)
Dept: ENDOSCOPY | Age: 82
DRG: 208 | End: 2017-07-18
Payer: MEDICARE

## 2017-07-18 ENCOUNTER — ANESTHESIA EVENT (OUTPATIENT)
Dept: ENDOSCOPY | Age: 82
DRG: 208 | End: 2017-07-18
Payer: MEDICARE

## 2017-07-18 LAB
ANION GAP BLD CALC-SCNC: 4 MMOL/L (ref 5–15)
BACTERIA SPEC CULT: ABNORMAL
BACTERIA SPEC CULT: ABNORMAL
BACTERIA SPEC CULT: NORMAL
BUN SERPL-MCNC: 14 MG/DL (ref 6–20)
BUN/CREAT SERPL: 30 (ref 12–20)
CALCIUM SERPL-MCNC: 9.2 MG/DL (ref 8.5–10.1)
CC UR VC: ABNORMAL
CHLORIDE SERPL-SCNC: 94 MMOL/L (ref 97–108)
CO2 SERPL-SCNC: 38 MMOL/L (ref 21–32)
CREAT SERPL-MCNC: 0.46 MG/DL (ref 0.7–1.3)
GLUCOSE BLD STRIP.AUTO-MCNC: 123 MG/DL (ref 65–100)
GLUCOSE BLD STRIP.AUTO-MCNC: 126 MG/DL (ref 65–100)
GLUCOSE BLD STRIP.AUTO-MCNC: 85 MG/DL (ref 65–100)
GLUCOSE BLD STRIP.AUTO-MCNC: 91 MG/DL (ref 65–100)
GLUCOSE BLD STRIP.AUTO-MCNC: 91 MG/DL (ref 65–100)
GLUCOSE SERPL-MCNC: 97 MG/DL (ref 65–100)
GRAM STN SPEC: NORMAL
LEVETIRACETAM SERPL-MCNC: 28 UG/ML (ref 10–40)
MAGNESIUM SERPL-MCNC: 1.5 MG/DL (ref 1.6–2.4)
PHOSPHATE SERPL-MCNC: 2.8 MG/DL (ref 2.6–4.7)
POTASSIUM SERPL-SCNC: 3.7 MMOL/L (ref 3.5–5.1)
SERVICE CMNT-IMP: ABNORMAL
SERVICE CMNT-IMP: NORMAL
SODIUM SERPL-SCNC: 136 MMOL/L (ref 136–145)

## 2017-07-18 PROCEDURE — 74011000250 HC RX REV CODE- 250

## 2017-07-18 PROCEDURE — 74011000258 HC RX REV CODE- 258: Performed by: INTERNAL MEDICINE

## 2017-07-18 PROCEDURE — 77030005122 HC CATH GASTMY PEG BSC -B: Performed by: INTERNAL MEDICINE

## 2017-07-18 PROCEDURE — 74011250636 HC RX REV CODE- 250/636: Performed by: INTERNAL MEDICINE

## 2017-07-18 PROCEDURE — 94640 AIRWAY INHALATION TREATMENT: CPT

## 2017-07-18 PROCEDURE — 84100 ASSAY OF PHOSPHORUS: CPT | Performed by: INTERNAL MEDICINE

## 2017-07-18 PROCEDURE — 0DH63UZ INSERTION OF FEEDING DEVICE INTO STOMACH, PERCUTANEOUS APPROACH: ICD-10-PCS | Performed by: INTERNAL MEDICINE

## 2017-07-18 PROCEDURE — 65660000000 HC RM CCU STEPDOWN

## 2017-07-18 PROCEDURE — 76060000031 HC ANESTHESIA FIRST 0.5 HR: Performed by: INTERNAL MEDICINE

## 2017-07-18 PROCEDURE — 74011000258 HC RX REV CODE- 258: Performed by: PSYCHIATRY & NEUROLOGY

## 2017-07-18 PROCEDURE — 80048 BASIC METABOLIC PNL TOTAL CA: CPT | Performed by: INTERNAL MEDICINE

## 2017-07-18 PROCEDURE — 36415 COLL VENOUS BLD VENIPUNCTURE: CPT | Performed by: INTERNAL MEDICINE

## 2017-07-18 PROCEDURE — 83735 ASSAY OF MAGNESIUM: CPT | Performed by: INTERNAL MEDICINE

## 2017-07-18 PROCEDURE — 77010033678 HC OXYGEN DAILY

## 2017-07-18 PROCEDURE — 82962 GLUCOSE BLOOD TEST: CPT

## 2017-07-18 PROCEDURE — 74011000250 HC RX REV CODE- 250: Performed by: PSYCHIATRY & NEUROLOGY

## 2017-07-18 PROCEDURE — 74011250636 HC RX REV CODE- 250/636: Performed by: PSYCHIATRY & NEUROLOGY

## 2017-07-18 PROCEDURE — 74011000250 HC RX REV CODE- 250: Performed by: INTERNAL MEDICINE

## 2017-07-18 PROCEDURE — 76040000019: Performed by: INTERNAL MEDICINE

## 2017-07-18 RX ORDER — SODIUM CHLORIDE 0.9 % (FLUSH) 0.9 %
5-10 SYRINGE (ML) INJECTION AS NEEDED
Status: ACTIVE | OUTPATIENT
Start: 2017-07-18 | End: 2017-07-18

## 2017-07-18 RX ORDER — LIDOCAINE HYDROCHLORIDE 20 MG/ML
INJECTION, SOLUTION EPIDURAL; INFILTRATION; INTRACAUDAL; PERINEURAL AS NEEDED
Status: DISCONTINUED | OUTPATIENT
Start: 2017-07-18 | End: 2017-07-18 | Stop reason: HOSPADM

## 2017-07-18 RX ORDER — GLYCOPYRROLATE 0.2 MG/ML
INJECTION INTRAMUSCULAR; INTRAVENOUS AS NEEDED
Status: DISCONTINUED | OUTPATIENT
Start: 2017-07-18 | End: 2017-07-18 | Stop reason: HOSPADM

## 2017-07-18 RX ORDER — WATER FOR INJECTION,STERILE
VIAL (ML) INJECTION
Status: COMPLETED
Start: 2017-07-18 | End: 2017-07-18

## 2017-07-18 RX ORDER — ETOMIDATE 2 MG/ML
INJECTION INTRAVENOUS AS NEEDED
Status: DISCONTINUED | OUTPATIENT
Start: 2017-07-18 | End: 2017-07-18 | Stop reason: HOSPADM

## 2017-07-18 RX ORDER — SODIUM CHLORIDE 0.9 % (FLUSH) 0.9 %
5-10 SYRINGE (ML) INJECTION EVERY 8 HOURS
Status: COMPLETED | OUTPATIENT
Start: 2017-07-18 | End: 2017-07-18

## 2017-07-18 RX ORDER — SODIUM CHLORIDE 9 MG/ML
50 INJECTION, SOLUTION INTRAVENOUS CONTINUOUS
Status: DISPENSED | OUTPATIENT
Start: 2017-07-18 | End: 2017-07-18

## 2017-07-18 RX ADMIN — VALPROATE SODIUM 500 MG: 100 INJECTION, SOLUTION INTRAVENOUS at 13:47

## 2017-07-18 RX ADMIN — ETOMIDATE 4 MG: 2 INJECTION INTRAVENOUS at 12:20

## 2017-07-18 RX ADMIN — SODIUM CHLORIDE 3 G: 900 INJECTION, SOLUTION INTRAVENOUS at 13:21

## 2017-07-18 RX ADMIN — GLYCOPYRROLATE 0.1 MG: 0.2 INJECTION INTRAMUSCULAR; INTRAVENOUS at 12:14

## 2017-07-18 RX ADMIN — LATANOPROST 1 DROP: 50 SOLUTION OPHTHALMIC at 17:11

## 2017-07-18 RX ADMIN — LEVETIRACETAM 1500 MG: 100 INJECTION, SOLUTION, CONCENTRATE INTRAVENOUS at 21:00

## 2017-07-18 RX ADMIN — FENTANYL CITRATE 50 MCG: 50 INJECTION, SOLUTION INTRAMUSCULAR; INTRAVENOUS at 14:38

## 2017-07-18 RX ADMIN — METOPROLOL TARTRATE 5 MG: 5 INJECTION INTRAVENOUS at 16:30

## 2017-07-18 RX ADMIN — GLYCOPYRROLATE 0.2 MG: 0.2 INJECTION, SOLUTION INTRAMUSCULAR; INTRAVENOUS at 16:25

## 2017-07-18 RX ADMIN — SODIUM CHLORIDE 3 G: 900 INJECTION, SOLUTION INTRAVENOUS at 17:09

## 2017-07-18 RX ADMIN — SODIUM CHLORIDE: 900 INJECTION, SOLUTION INTRAVENOUS at 12:05

## 2017-07-18 RX ADMIN — THIAMINE HYDROCHLORIDE 100 MG: 100 INJECTION, SOLUTION INTRAMUSCULAR; INTRAVENOUS at 09:00

## 2017-07-18 RX ADMIN — Medication 20 ML: at 13:49

## 2017-07-18 RX ADMIN — WATER 10 ML: 1 INJECTION INTRAMUSCULAR; INTRAVENOUS; SUBCUTANEOUS at 21:02

## 2017-07-18 RX ADMIN — Medication 10 ML: at 13:48

## 2017-07-18 RX ADMIN — SODIUM CHLORIDE 3 G: 900 INJECTION, SOLUTION INTRAVENOUS at 00:24

## 2017-07-18 RX ADMIN — METOPROLOL TARTRATE 5 MG: 5 INJECTION INTRAVENOUS at 04:09

## 2017-07-18 RX ADMIN — SODIUM CHLORIDE 3 G: 900 INJECTION, SOLUTION INTRAVENOUS at 23:47

## 2017-07-18 RX ADMIN — Medication 10 ML: at 14:36

## 2017-07-18 RX ADMIN — Medication 40 ML: at 13:48

## 2017-07-18 RX ADMIN — Medication 10 ML: at 21:01

## 2017-07-18 RX ADMIN — SODIUM CHLORIDE 3 G: 900 INJECTION, SOLUTION INTRAVENOUS at 06:45

## 2017-07-18 RX ADMIN — ETOMIDATE 4 MG: 2 INJECTION INTRAVENOUS at 12:17

## 2017-07-18 RX ADMIN — VALPROATE SODIUM 500 MG: 100 INJECTION, SOLUTION INTRAVENOUS at 05:37

## 2017-07-18 RX ADMIN — METOPROLOL TARTRATE 5 MG: 5 INJECTION INTRAVENOUS at 21:01

## 2017-07-18 RX ADMIN — LIDOCAINE HYDROCHLORIDE 60 MG: 20 INJECTION, SOLUTION EPIDURAL; INFILTRATION; INTRACAUDAL; PERINEURAL at 12:14

## 2017-07-18 RX ADMIN — ETOMIDATE 6 MG: 2 INJECTION INTRAVENOUS at 12:15

## 2017-07-18 RX ADMIN — IPRATROPIUM BROMIDE AND ALBUTEROL SULFATE 3 ML: .5; 3 SOLUTION RESPIRATORY (INHALATION) at 14:35

## 2017-07-18 RX ADMIN — Medication 10 ML: at 13:49

## 2017-07-18 RX ADMIN — VALPROATE SODIUM 500 MG: 100 INJECTION, SOLUTION INTRAVENOUS at 21:02

## 2017-07-18 RX ADMIN — GLYCOPYRROLATE 0.2 MG: 0.2 INJECTION, SOLUTION INTRAMUSCULAR; INTRAVENOUS at 08:58

## 2017-07-18 RX ADMIN — LEVETIRACETAM 1500 MG: 100 INJECTION, SOLUTION, CONCENTRATE INTRAVENOUS at 08:59

## 2017-07-18 RX ADMIN — Medication 20 ML: at 05:41

## 2017-07-18 RX ADMIN — GLYCOPYRROLATE 0.2 MG: 0.2 INJECTION, SOLUTION INTRAMUSCULAR; INTRAVENOUS at 22:00

## 2017-07-18 RX ADMIN — IPRATROPIUM BROMIDE AND ALBUTEROL SULFATE 3 ML: .5; 3 SOLUTION RESPIRATORY (INHALATION) at 08:38

## 2017-07-18 RX ADMIN — Medication 10 ML: at 21:02

## 2017-07-18 RX ADMIN — LORAZEPAM 1 MG: 2 INJECTION INTRAMUSCULAR; INTRAVENOUS at 21:03

## 2017-07-18 RX ADMIN — IPRATROPIUM BROMIDE AND ALBUTEROL SULFATE 3 ML: .5; 3 SOLUTION RESPIRATORY (INHALATION) at 01:12

## 2017-07-18 RX ADMIN — Medication 10 ML: at 09:00

## 2017-07-18 RX ADMIN — METOPROLOL TARTRATE 5 MG: 5 INJECTION INTRAVENOUS at 09:00

## 2017-07-18 RX ADMIN — IPRATROPIUM BROMIDE AND ALBUTEROL SULFATE 3 ML: .5; 3 SOLUTION RESPIRATORY (INHALATION) at 20:37

## 2017-07-18 NOTE — PROGRESS NOTES
Speech pathology note  Reviewed chart and note patient NPO for PEG placement today. Will follow up tomorrow as appropriate. Thank you.     Santa Saucedo., CCC-SLP

## 2017-07-18 NOTE — PROGRESS NOTES
Hospitalist Progress Note    NAME: Gale Delgado   :  1934   MRN:  [id number]       Assessment / Plan:  Acute encephalopathy due to status epilepticus in setting of seizure d/o:  Out of lyrica for a few days prior to admission - has also preceeded other admissions for sz. Pt continues to be below most recent baseline with worsened dysarthria bulbar weakness the last few days. More alert today with ongoing oropharyngeal sx. - CT head  no acute process  - MRI brain  with no evidence of acute infarction. No acute intracranial process. Moderate cerebral atrophy and mild chronic microvascular ischemic change. - CXR  with improved right upper lobe airspace disease and left basilar atelectasis. - appreciate neuro consult, adjusted keppra and added valproate    - holding home lyrica while NPO - will hopefully get PEG this week so this can be restarted.  Con't ativan prn seizure activity only. Fever (resolved) presumed due to aspiration pneumonitis :  Temp 99.5 7/15  - CXR nonacute as above  - UA 1+ bacteria but moderate epis - sent for culture  - sputum culture sent, will follow  - con't unasyn for possible aspiration, improving today   Dysphagia/dyarthria with resultant moderate protein calorie malnutrition:    Ordered half of TPN BAG today decreased rate Last Bag  S/P Peg tube placement had BM after placement and has good Bowel sounds  Will star Osmolite VERY slowly, watch for aspiration  - exam by ENT today with no structural reason for dysphagia. Ken recommended. - seen by speech again today, not candidate for MBS   - con't central TPN for now - I have renewed for today. Now at goal rate with stable electrolytes. Hypertension, benign/essential: more stable  - con't IV scheduled metoprolol (on po metoprolol at home)  - prn IV hydralazine  - PT/OT and speech appreciated.  Plan for [facility] on discharge.   Hyperlipidemia: holding lipitor while NPO  History of SVT:  Briefly on Pt is returning 's call, states she was left a message to call MD back, please advise. cardizem gtt this admission  - serial troponin and EKG ordered with lower chest/abd pain complaints today  Peripheral neuropathy: ran out of lyrica several days PTA.  Needs to be restarted as soon as enteral access established due to ongoing leg sx. 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  Con't thiamine supplementation. GERD: con't protonix  Acute respiratory failure (resolved) due to acute encephalopathy with chronic underlying hypercapnea: intubated initially during admission.  Duonebs changed to prn only. Pt self-extubated 6/24; finished zosyn 7/09.      DVT prophylaxis: lovenox    Code status: Full (wife is decision maker)     Subjective:     Chief Complaint / Reason for Physician Visit  Alert, no complaints today. Loud oropharyngeal noise/secretions continue. Discussed with RN events overnight. Review of Systems: confused  Symptom Y/N Comments  Symptom Y/N Comments   Fever/Chills    Chest Pain     Poor Appetite    Edema     Cough    Abdominal Pain     Sputum    Joint Pain     SOB/AL    Pruritis/Rash     Nausea/vomit    Tolerating PT/OT     Diarrhea    Tolerating Diet     Constipation    Other       Could NOT obtain due to: encephalopathy     Objective:     VITALS:   Last 24hrs VS reviewed since prior progress note.  Most recent are:  Patient Vitals for the past 24 hrs:   Temp Pulse Resp BP SpO2   07/18/17 1522 98.2 °F (36.8 °C) 83 22 154/64 98 %   07/18/17 1512 98.2 °F (36.8 °C) (!) 130 24 153/62 98 %   07/18/17 1434 - - - - 98 %   07/18/17 1307 - 61 13 116/73 100 %   07/18/17 1300 - 61 13 (!) 155/104 100 %   07/18/17 1255 - 61 14 149/69 100 %   07/18/17 1250 - 61 21 142/81 99 %   07/18/17 1245 97.6 °F (36.4 °C) 62 18 124/76 100 %   07/18/17 1237 - 64 22 160/73 98 %   07/18/17 1229 - (!) 130 26 152/68 99 %   07/18/17 1133 99.2 °F (37.3 °C) - - - -   07/18/17 1128 98.4 °F (36.9 °C) (!) 55 22 160/85 99 %   07/18/17 1053 98.4 °F (36.9 °C) (!) 107 22 176/57 99 %   07/18/17 0837 - - - - 100 %   07/18/17 0731 98.3 °F (36.8 °C) 72 22 157/66 100 %   07/18/17 0409 - 69 - 159/66 -   07/18/17 0308 98.4 °F (36.9 °C) 87 22 168/66 100 %   07/18/17 0112 - - - - 100 %   07/17/17 2319 97.7 °F (36.5 °C) 75 22 139/66 100 %   07/17/17 2143 - 79 - 157/67 100 %   07/17/17 2111 - - - - 100 %   07/17/17 1906 98.1 °F (36.7 °C) 77 22 158/70 100 %       Intake/Output Summary (Last 24 hours) at 07/18/17 1541  Last data filed at 07/18/17 1224   Gross per 24 hour   Intake          2396.38 ml   Output             4050 ml   Net         -1653.62 ml        PHYSICAL EXAM:  General: WD, WN. Alert, confused no acute distress    EENT:  EOMI. Anicteric sclerae. MMM. Gurgling from throat. Resp:  CTA bilaterally, no wheezing or rales. No accessory muscle use  CV:  Regular  rhythm,  No edema  GI:  Soft, Non distended, Non tender.  +Bowel sounds  Neurologic:  Alert and oriented X 1-2, slurred speech,   Psych:   Some insight. Not anxious nor agitated  Skin:  No rashes. No jaundice    Reviewed most current lab test results and cultures  YES  Reviewed most current radiology test results   YES  Review and summation of old records today    NO  Reviewed patient's current orders and MAR    YES  PMH/ reviewed - no change compared to H&P  ________________________________________________________________________  Care Plan discussed with:    Comments   Patient x    Family      RN x    Care Manager     Consultant                        Multidiciplinary team rounds were held today with , nursing, pharmacist and clinical coordinator. Patient's plan of care was discussed; medications were reviewed and discharge planning was addressed.      ________________________________________________________________________  Total NON critical care TIME:  25 Minutes    Total CRITICAL CARE TIME Spent:   Minutes non procedure based      Comments   >50% of visit spent in counseling and coordination of care x ________________________________________________________________________  Venkata To MD     Procedures: see electronic medical records for all procedures/Xrays and details which were not copied into this note but were reviewed prior to creation of Plan. LABS:  I reviewed today's most current labs and imaging studies.   Pertinent labs include:  Recent Labs      07/17/17 0447 07/16/17 0318   WBC  8.9  8.4   HGB  9.7*  9.4*   HCT  28.3*  27.7*   PLT  114*  152     Recent Labs      07/18/17   0435  07/17/17 0447 07/16/17 0323  07/16/17 0318   NA  136  134*   --   135*   K  3.7  3.9   --   4.0   CL  94*  95*   --   98   CO2  38*  35*   --   35*   GLU  97  139*   --   118*   BUN  14  15   --   17   CREA  0.46*  0.45*   --   0.48*   CA  9.2  8.7   --   8.5   MG  1.5*   --   1.3*   --    PHOS  2.8   --    --    --        Signed: Venkata To MD

## 2017-07-18 NOTE — PROGRESS NOTES
PCU SHIFT NURSING NOTE      Bedside shift change report given to Bashir Mayorga RN (oncoming nurse) by Juan Rivera RN (offgoing nurse). Report included the following information SBAR, Kardex, Intake/Output, MAR and Recent Results. Shift Summary:     7:10 PM  Patient resting quietly in bed with family at bedside. 1:1 sitter present. Admission Date 6/24/2017   Admission Diagnosis Status epilepticus ([address]  dysphagia   Consults IP CONSULT TO NEUROLOGY  IP CONSULT TO INTENSIVIST  IP CONSULT TO PALLIATIVE CARE - PROVIDER  IP CONSULT TO GASTROENTEROLOGY  IP CONSULT TO PALLIATIVE CARE - PROVIDER  IP CONSULT TO OTOLARYNGOLOGY        Consults   []PT   []OT   []Speech   []Case Management      [] Palliative      Cardiac Monitoring Order   []Yes   []No     IV drips   []Yes    Drip:                            Dose:  Drip:                            Dose:  Drip:                            Dose:   []No     GI Prophylaxis   []Yes   []No         DVT Prophylaxis   SCDs:  Sequential Compression Device: Bilateral     Patient Refused VTE Prophylaxis: Yes    Han stockings:         [] Medication   []Contraindicated   []None      Activity Level Activity Level: Bed Rest     Activity Assistance: Complete care   Purposeful Rounding every 1-2 hour? [x]Yes   Edouard Score  Total Score: 3   Bed Alarm (If score 3 or >)   [x]Yes   [] Refused (See signed refusal form in chart)   Klever Score  Klever Score: 12   Klever Score (if score 14 or less)   []PMT consult   []Wound Care consult      []Specialty bed   [] Nutrition consult          Needs prior to discharge:   Home O2 required:    []Yes   []No    If yes, how much O2 required?     Other:    Last Bowel Movement: Last Bowel Movement Date: 07/04/17      Influenza Vaccine Received Flu Vaccine for Current Season (usually Sept-March): Not Flu Season        Pneumonia Vaccine           Diet Active Orders   Diet    DIET NPO      LDAs           Triple Lumen IJ 06/24/17 Right Internal jugular (Active) Central Line Being Utilized Yes 7/17/2017  7:06 PM   Criteria for Appropriate Use Limited/no vessel suitable for conventional peripheral access 7/17/2017  6:19 PM   Site Assessment Clean, dry, & intact 7/17/2017  6:19 PM   Infiltration Assessment 0 7/17/2017  6:19 PM   Affected Extremity/Extremities Color distal to insertion site pink (or appropriate for race) 7/17/2017  6:19 PM   Date of Last Dressing Change 07/17/17 7/17/2017  6:19 PM   Dressing Status Clean, dry, & intact 7/17/2017  6:19 PM   Dressing Type Disk with Chlorhexadine gluconate (CHG); Transparent 7/17/2017  6:19 PM   Action Taken Open ports on tubing capped 7/17/2017  6:19 PM   Proximal Hub Color/Line Status White; Infusing;Flushed 7/17/2017  6:19 PM   Positive Blood Return (Medial Site) Yes 7/17/2017  6:19 PM   Medial Hub Color/Line Status Blue; Infusing;Flushed 7/17/2017  6:19 PM   Positive Blood Return (Lateral Site) Yes 7/17/2017  6:19 PM   Distal Hub Color/Line Status Brown;Capped;Flushed 7/17/2017  6:19 PM   Positive Blood Return (Site #3) Yes 7/17/2017  6:19 PM   External Catheter Length (cm) 0 centimeters 7/14/2017  8:09 AM   Alcohol Cap Used Yes 7/17/2017  6:19 PM              Condom Catheter 07/15/17 (Active)   Criteria for Appropriate Use Strict I/Os; Comfort Care 7/17/2017  6:32 PM   Status Draining 7/17/2017  6:32 PM   Site Condition No abnormalities 7/17/2017  6:32 PM   Drainage Tube Clipped to Bed Yes 7/17/2017  6:32 PM   Catheter Secured to Thigh Yes 7/17/2017  6:32 PM   Tamper Seal Intact Yes 7/17/2017  6:32 PM   Bag Below Bladder/Not on Floor Yes 7/17/2017  6:32 PM   Lack of Dependent Loop in Tubing Yes 7/17/2017  6:32 PM   Drainage Bag Less Than Half Full Yes 7/17/2017  6:32 PM   Sterile Solution Used for  Irrigation N/A 7/17/2017  6:32 PM   Urine Output (mL) 500 ml 7/17/2017 10:00 PM                Urinary Catheter [REMOVED] Condom Catheter 06/30/17-Criteria for Appropriate Use: Medically/surgically unstable, Strict I/Os  [REMOVED] Condom Catheter 07/05/17-Criteria for Appropriate Use: Comfort Care  [REMOVED] Condom Catheter 07/07/17-Criteria for Appropriate Use: Comfort Care  Condom Catheter 07/15/17-Criteria for Appropriate Use: Strict I/Os, Comfort Care  [REMOVED] Urinary Catheter 06/24/17 2- way; [employer]-Criteria for Appropriate Use: Medically/surgically unstable    Intake & Output   Date 07/16/17 1900 - 07/17/17 0659 07/17/17 0700 - 07/18/17 0659   Shift 4094-4733 24 Hour Total 9386-1694 0359-6193 24 Hour Total   I  N  T  A  K  E   I.V.  (mL/kg/hr) 1322.2 2016.3 1119.9  (1.4)  1119.9      Volume (levETIRAcetam (KEPPRA) 1,500 mg in 0.9% sodium chloride IVPB) 100 200 100  100      Volume (valproate (DEPACON) 500 mg in 0.9% sodium chloride 50 mL IVPB) 100 100 50  50      Volume (ampicillin-sulbactam (UNASYN) 3 g in 0.9% sodium chloride (MBP/ADV) 100 mL) 300 400 200  200      Volume (magnesium sulfate 2 g/50 ml IVPB (premix or compounded))  50         Volume (TPN ADULT - CENTRAL AA 5% D20% W/ CA + ELECTROLYTES)  444. 2         Volume (TPN ADULT - CENTRAL AA 5% D20% W/ CA + ELECTROLYTES) 822.2 822.2 707.7  707.7      Volume (fat emulsion 20% (LIPOSYN, INTRALIPID) infusion 250 mL) 0 0 13.9  13.9      Volume (TPN ADULT - CENTRAL AA 5% D20% W/ CA + ELECTROLYTES) 0 0 48.3  48.3    Shift Total  (mL/kg) 1322.2  (20) 2016.3  (30.5) 1119.9  (16.9)  1119.9  (16.9)   O  U  T  P  U  T   Urine  (mL/kg/hr) 900 1550 1725  (2.2) 500 2225      Urine Voided   350  350      Urine Output (mL) (Condom Catheter 07/15/17) 900 1550 5775 510 0392    Shift Total  (mL/kg) 900  (13.6) 1550  (23.4) 1725  (26) 500  (7.5) 2225  (33.6)   .2 466.3 -605.1 -500 -1105.1   Weight (kg) 66.1 66.1 66.3 66.3 66.3         Readmission Risk Assessment Tool Score Medium Risk            20       Total Score        3 Relationship with PCP    3 Patient Length of Stay > 5    4 More than 1 Admission in calendar year    5 Patient Insurance is Medicare, Medicaid or Self Pay    5 Charlson Comorbidity Score        Criteria that do not apply:    Patient Living Status       Expected Length of Stay 4d 4h   Actual Length of Stay 23

## 2017-07-18 NOTE — PROGRESS NOTES
Palliative Medicine     LCSW visited with Pt's wife and Pt. Pt is restless. Staff and wife are providing comfort to Pt and reorientation. Pt's wife explained they live alone in their home. Wife does not drive so she relies on friends/family to get to/from hospital. Their dtr comes daily to hospital after work. Per wife, before Pt \"got sick\" he used to go in the morning to [employer] for breakfast with his male friends, read, and manage the yard work (mowing and trimming hedges). Prior to hospitalization, pt's routine was to awaken in morning, go for walk outside (sidewalks) with walker, return to eat breakfast prepared by his spouse, do word find puzzles, and visit with friends on his front porch. Reiterated Palliative medicine's role is support. Family meeting scheduled for Friday at 12:30 pm because Friday is day spouse knows she has a guaranteed ride to hospital during the day. Spoke with RN Salima Cordero). Plan:  Palliative Medicine supportive meeting scheduled for Friday at 12:30 pm.   Pt's wife expressed goals are for restorative medical interventions in hopes pt will return to prior functioning in June. Thank you for including Palliative Medicine in Mr. Mckeon's care.       Darci Lai, [address] ([id number])  Work cell: [phone]

## 2017-07-18 NOTE — ANESTHESIA PREPROCEDURE EVALUATION
Anesthetic History               Review of Systems / Medical History  Patient summary reviewed, nursing notes reviewed and pertinent labs reviewed    Pulmonary                Comments: 7-14-17 CXR: improved RUL airspace dis and left basilar atelectasis   Neuro/Psych     seizures: poorly controlled  CVA  TIA     Cardiovascular            Dysrhythmias : SVT  Hyperlipidemia      Comments: 7-11-17 EKG: ST with freq PVCs    6-24-17 EKG: SR with first degree AV blk   GI/Hepatic/Renal               Comments: Dysphagia for PEG  Endo/Other             Other Findings   Comments: Alc abuse, in remission, 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 per fam in 5 yrs         Physical Exam    Airway  Mallampati: III  TM Distance: 4 - 6 cm  Neck ROM: normal range of motion   Mouth opening: Normal     Cardiovascular    Rhythm: regular  Rate: normal         Dental  No notable dental hx       Pulmonary      Decreased breath sounds (left greater decrease than right): bibasilar          Comments: Coarse upper airway sounds, pt cannot clear with weak cough Abdominal  GI exam deferred       Other Findings            Anesthetic Plan    ASA: 4  Anesthesia type: total IV anesthesia          Induction: Intravenous  Anesthetic plan and risks discussed with: Patient      sarita'd  Keppra this am

## 2017-07-18 NOTE — PROGRESS NOTES
PT note:    Chart reviewed and spoke with nursing. Nursing reports patient getting a PEG later today and PT can follow up after.      Gina He, PT, DPT

## 2017-07-18 NOTE — PROGRESS NOTES
Spoke with nursing and pt is pending PEG. Nursing requesting to follow up at a later time.   Will defer and continue to follow per nursing request.

## 2017-07-18 NOTE — ANESTHESIA POSTPROCEDURE EVALUATION
Post-Anesthesia Evaluation and Assessment    Patient: Rajat Winston MRN: [id number]  SSN: [id number]    YOB: 1934  Age: 80 y.o. Sex: male       Cardiovascular Function/Vital Signs  Visit Vitals    /73    Pulse 61    Temp 36.4 °C (97.6 °F)    Resp 13    Ht 5' 8\" (1.727 m)    Wt 67 kg (147 lb 9.6 oz)    SpO2 100%    BMI 22.44 kg/m2       Patient is status post MAC anesthesia for Procedure(s):  ESOPHAGOGASTRODUODENOSCOPY (EGD)  PERCUTANEOUS ENDOSCOPIC GASTROSTOMY TUBE INSERTION. Nausea/Vomiting: None    Postoperative hydration reviewed and adequate. Pain:  Pain Scale 1: Visual (07/18/17 1307)  Pain Intensity 1: 0 (07/18/17 1307)   Managed    Neurological Status:   Neuro  Neurologic State: Alert;Confused; Eyes open to stimulus (07/18/17 5997)  Orientation Level: Disoriented to situation;Disoriented to time;Oriented to person;Oriented to place (07/18/17 5299)  Cognition: Decreased attention/concentration; Follows commands; Impaired decision making; Impulsive;Poor safety awareness;Recognition of people/places (07/18/17 3178)  Speech: Delayed responses; Incomprehensible words (07/18/17 3287)  Assessment L Pupil: Brisk (07/18/17 0811)  Size L Pupil (mm): 2 (07/18/17 0811)  Assessment R Pupil: Brisk (07/18/17 0811)  Size R Pupil (mm): 2 (07/18/17 0811)  LUE Motor Response: Purposeful;Weak (07/18/17 0811)  LLE Motor Response: Purposeful;Weak (07/18/17 0811)  RUE Motor Response: Purposeful;Weak (07/18/17 8104)  RLE Motor Response: Purposeful;Weak (07/18/17 0811)   At baseline    Mental Status and Level of Consciousness: Arousable    Pulmonary Status:   O2 Device: Nasal cannula (07/18/17 1307)   Adequate oxygenation and airway patent    Complications related to anesthesia: None    Post-anesthesia assessment completed.  No concerns    Signed By: Clayton Hammond DO     July 18, 2017

## 2017-07-18 NOTE — PROGRESS NOTES
Neurology Progress Note    Patient ID:  Aretha Eisenmenger  [id number]  2 y.o.  1934    Subjective:      Patient has complaints of difficulty swallowing, and seemingly probably mildly aspirating still. He has prominent swallowing difficulties of unclear etiology since he had his seizures. He is on Depakote and Keppra now, and he has therapeutic levels and no more seizures. He is less rigid, and bradykinetic today and has bulbar weakness and dysarthria and dysphasia. We checked his neuromuscular parameters, but he probably needs endoscopy of his throat with ENT and speech states he cannot do a modified barium swallow because he is too weak and dysphagia. ENT is to see the patient today. Speech therapy feels we should just proceed with PEG tube because wife wants to feed him.   Talk to GI also and the hospitalist and we all agree just do the PEG tube so we can get started on disposition as he does not seem to have any obvious other treatable cause for his dysphagia unless ENT 5 something    Current Facility-Administered Medications   Medication Dose Route Frequency    TPN ADULT - CENTRAL AA 5% D20% W/ CA + ELECTROLYTES   IntraVENous CONTINUOUS    glycopyrrolate (ROBINUL) injection 0.2 mg  0.2 mg IntraMUSCular TID    fat emulsion 20% (LIPOSYN, INTRALIPID) infusion 250 mL  250 mL IntraVENous Q MON, WED & FRI    metoprolol (LOPRESSOR) injection 5 mg  5 mg IntraVENous Q6H    ampicillin-sulbactam (UNASYN) 3 g in 0.9% sodium chloride (MBP/ADV) 100 mL  3 g IntraVENous Q6H    albuterol (PROVENTIL VENTOLIN) nebulizer solution 2.5 mg  2.5 mg Nebulization Q4H PRN    albuterol-ipratropium (DUO-NEB) 2.5 MG-0.5 MG/3 ML  3 mL Nebulization Q6H RT    levETIRAcetam (KEPPRA) 1,500 mg in 0.9% sodium chloride IVPB  1,500 mg IntraVENous Q12H    valproate (DEPACON) 500 mg in 0.9% sodium chloride 50 mL IVPB  500 mg IntraVENous Q8H    heparin (porcine) pf 300 Units  300 Units InterCATHeter PRN    LORazepam (ATIVAN) injection 1 mg  1 mg IntraVENous Q2H PRN    fentaNYL citrate (PF) injection 25-50 mcg  25-50 mcg IntraVENous Q2H PRN    hydrALAZINE (APRESOLINE) 20 mg/mL injection 10 mg  10 mg IntraVENous Q6H PRN    sodium chloride (NS) flush 10-30 mL  10-30 mL InterCATHeter PRN    sodium chloride (NS) flush 10 mL  10 mL InterCATHeter Q24H    sodium chloride (NS) flush 10 mL  10 mL InterCATHeter PRN    sodium chloride (NS) flush 10-40 mL  10-40 mL InterCATHeter Q8H    sodium chloride (NS) flush 20 mL  20 mL InterCATHeter Q24H    latanoprost (XALATAN) 0.005 % ophthalmic solution 1 Drop  1 Drop Both Eyes QPM    thiamine (B-1) 100 mg in 0.9% sodium chloride 50 mL IVPB  100 mg IntraVENous DAILY    sodium chloride (NS) flush 5-10 mL  5-10 mL IntraVENous Q8H    sodium chloride (NS) flush 5-10 mL  5-10 mL IntraVENous PRN    naloxone (NARCAN) injection 0.4 mg  0.4 mg IntraVENous PRN    ondansetron (ZOFRAN) injection 4 mg  4 mg IntraVENous Q4H PRN    bisacodyl (DULCOLAX) suppository 10 mg  10 mg Rectal DAILY PRN    acetaminophen (TYLENOL) suppository 650 mg  650 mg Rectal Q4H PRN    enoxaparin (LOVENOX) injection 40 mg  40 mg SubCUTAneous Q24H        Review of Systems:    Review of systems not obtained due to patient factors.     Objective:     Patient Vitals for the past 8 hrs:   BP Temp Pulse Resp SpO2 Height Weight   07/17/17 1906 158/70 98.1 °F (36.7 °C) 77 22 100 % - -   07/17/17 1700 - - - - - 5' 8\" (1.727 m) 146 lb 3.2 oz (66.3 kg)   07/17/17 1526 137/62 99.7 °F (37.6 °C) 83 24 100 % - -   07/17/17 1434 - - - - 100 % - -          07/16 0701 - 07/17 1900  In: 3136.2 [I.V.:3136.2]  Out: 0284 [Urine:3275]    Lab Review   Recent Results (from the past 24 hour(s))   VALPROIC ACID    Collection Time: 07/17/17  4:47 AM   Result Value Ref Range    Valproic acid 61 50 - 731 ug/ml   METABOLIC PANEL, BASIC    Collection Time: 07/17/17  4:47 AM   Result Value Ref Range    Sodium 134 (L) 136 - 145 mmol/L    Potassium 3.9 3.5 - 5.1 mmol/L Chloride 95 (L) 97 - 108 mmol/L    CO2 35 (H) 21 - 32 mmol/L    Anion gap 4 (L) 5 - 15 mmol/L    Glucose 139 (H) 65 - 100 mg/dL    BUN 15 6 - 20 MG/DL    Creatinine 0.45 (L) 0.70 - 1.30 MG/DL    BUN/Creatinine ratio 33 (H) 12 - 20      GFR est AA >60 >60 ml/min/1.73m2    GFR est non-AA >60 >60 ml/min/1.73m2    Calcium 8.7 8.5 - 10.1 MG/DL   CBC W/O DIFF    Collection Time: 07/17/17  4:47 AM   Result Value Ref Range    WBC 8.9 4.1 - 11.1 K/uL    RBC 2.87 (L) 4.10 - 5.70 M/uL    HGB 9.7 (L) 12.1 - 17.0 g/dL    HCT 28.3 (L) 36.6 - 50.3 %    MCV 98.6 80.0 - 99.0 FL    MCH 33.8 26.0 - 34.0 PG    MCHC 34.3 30.0 - 36.5 g/dL    RDW 12.4 11.5 - 14.5 %    PLATELET 836 (L) 375 - 400 K/uL   GLUCOSE, POC    Collection Time: 07/17/17 11:15 AM   Result Value Ref Range    Glucose (POC) 111 (H) 65 - 100 mg/dL    Performed by Benjy Borges, POC    Collection Time: 07/17/17  5:44 PM   Result Value Ref Range    Glucose (POC) 121 (H) 65 - 100 mg/dL    Performed by Concha Becerra        Additional comments:I reviewed the patients new imaging test results. MRI scan        NEUROLOGICAL EXAM:    Appearance: The patient is thinly developed and nourished, provides a incoherent history and is in no acute distress. Mental Status: Oriented to time, place and person. Mood and affect confused and anxious . Patient dysarthric    Cranial Nerves:   Intact visual fields. Fundi are benign. MAURICIO, EOM's full, no nystagmus, no ptosis. Facial sensation is normal. Corneal reflexes are not tested. Facial movement is symmetric. Hearing is abnormal bilaterally. Palate is midline with normal sternocleidomastoid and trapezius muscles are normal. Tongue is midline. Patient very dysarthric and dysphasic  Neck without meningismus or bruits  Temporal arteries are not tender or enlarged    Motor:  3/5 strength in upper and lower proximal and distal muscles. Generalized normal bulk and mild increased muscle tone tone. No fasciculations. Reflexes:   Deep tendon reflexes 1+/4 and symmetrical.  No Babinski or clonus present    Sensory:   Normal to touch, pinprick and vibration and temperature decreased in both feet . Gait:  Not tested gait. Tremor:   No tremor noted. Cerebellar:  No cerebellar signs present. Neurovascular:  Normal heart sounds and regular rhythm, peripheral pulses decreased, and no carotid bruits.            Assessment:   Active Problems:    Status epilepticus ([facility] [address] (6/24/2017)      Oral phase dysphagia (7/8/2017)      Dysarthria (7/15/2017)      Dysphagia (7/15/2017)        Plan:     Patient with prominent difficulty swallowing of unclear etiology and bulbar weakness with dysarthria and dysphasia  We checked neuromuscular panels and they are unremarkable  ENT consult to evaluate today  Talked to speech therapy, GI, and hospitalist and we all feel the best plan is just to proceed with PEG tube because it does not seem like he has any clear treatable cause of his dysphagia unless ENT find something  MRI negative  Discussed with patient's wife and the patient  Very difficult case      Signed:  Deanne Morris MD  7/17/2017  3:36 PM

## 2017-07-18 NOTE — PROCEDURES
Endoscopic Gastroduodenoscopy Procedure Note  Dwight Manzo  1934  [id number]      Procedure: Endoscopic Gastroduodenoscopy with placement of a percutaneous endoscopic gastrostomy tube    Indication:  Dyphagia/odynophagia    Pre-operative Diagnosis: see indication above    Post-operative Diagnosis: see findings below    :  Suzette Krause MD    Referring Provider:  hospitalist    Anethesia/Sedation:  MAC anesthesia Propofol    Pre-Procedural Exam:      Airway: clear, Malimpati 2   Heart: RRR, without gallops or rubs  Lungs: clear bilaterally without wheezes, crackles, or rhonchi  Abdomen: soft, nontender, nondistended, bowel sounds present        Procedure Details     After infom consent was obtained for the procedure, with all risks and benefits of procedure explained the patient was taken to the endoscopy suite and placed in the left lateral decubitus position. Following sequential administration of sedation as per above, the [id number] gastroscope was inserted into the mouth and advanced under direct vision to second portion of the duodenum. A careful inspection was made as the gastroscope was withdrawn, including a retroflexed view of the proximal stomach; findings and interventions are described below. Findings/Impression:  ESOPHAGUS: The esophagus is normal. The proximal, mid, and distal portions are normal. The Z-Line is intact. Large hiatus hernia. STOMACH: The fundus on antegrade and retroflex views is normal. The body, antrum, and pylorus are normal.   DUODENUM: The bulb and second portions are normal.      Therapies:  20 Fr PEG placed by Kate Albrecht with external bolster at 2 cm    Specimens: none          Complications:   None; patient tolerated the procedure well. EBL:  None. Recommendations:  -The PEG tube may be used for feedings as soon as bowel sounds are confirmed post-procedure.   The head of the bed should be kept elevated to 30 degrees during tube feeds, and the tube should be flushed with 30 mL of water every 8 hours and after giving m, edications through the tube. Monitor the PEG site for bleeding and infection.     Discharge Disposition:

## 2017-07-18 NOTE — PERIOP NOTES
Anesthesia reports 12mg Etomidatel, 60mg Lidocaine 0.1mg Robinul benzocaine spray and 300mL NS given during procedure. Received report from anesthesia staff on vital signs and status of patient.

## 2017-07-18 NOTE — PERIOP NOTES
TRANSFER - OUT REPORT:    Verbal report given to [address] [place] [address]  on Christina Bernstein  being transferred to [facility](unit) for routine progression of care       Report consisted of patients Situation, Background, Assessment and   Recommendations(SBAR). Information from the following report(s) Procedure Summary and MAR was reviewed with the receiving nurse. Lines:   Triple Lumen IJ 06/24/17 Right Internal jugular (Active)   Central Line Being Utilized Yes 7/18/2017 10:00 AM   Criteria for Appropriate Use Limited/no vessel suitable for conventional peripheral access 7/18/2017 10:00 AM   Site Assessment Clean, dry, & intact 7/18/2017 10:00 AM   Infiltration Assessment 0 7/18/2017 10:00 AM   Affected Extremity/Extremities Color distal to insertion site pink (or appropriate for race) 7/18/2017 10:00 AM   Date of Last Dressing Change 07/17/17 7/18/2017 10:00 AM   Dressing Status Clean, dry, & intact 7/18/2017 10:00 AM   Dressing Type Disk with Chlorhexadine gluconate (CHG); Transparent 7/18/2017 10:00 AM   Action Taken Open ports on tubing capped 7/18/2017 10:00 AM   Proximal Hub Color/Line Status White; Infusing;Flushed 7/18/2017 10:00 AM   Positive Blood Return (Medial Site) Yes 7/18/2017 10:00 AM   Medial Hub Color/Line Status Blue; Infusing;Flushed 7/18/2017 10:00 AM   Positive Blood Return (Lateral Site) Yes 7/18/2017 10:00 AM   Distal Hub Color/Line Status Brown;Capped;Flushed 7/18/2017 10:00 AM   Positive Blood Return (Site #3) Yes 7/18/2017 10:00 AM   External Catheter Length (cm) 0 centimeters 7/14/2017  8:09 AM   Alcohol Cap Used Yes 7/18/2017 10:00 AM        Opportunity for questions and clarification was provided.

## 2017-07-18 NOTE — PERIOP NOTES
PEG tube inserted by Dr Mala Norris and Dr Marcell Stone catalog # [id number]  Product # [id number]  Lot # [id number]  Ref [id number]   Expiration date 12-

## 2017-07-18 NOTE — PERIOP NOTES
Peder Morning  1934  [id number]    Situation:  Verbal report received from: LAM Mahoney rn  Procedure: Procedure(s):  ESOPHAGOGASTRODUODENOSCOPY (EGD)  PERCUTANEOUS ENDOSCOPIC GASTROSTOMY TUBE INSERTION    Background:    Preoperative diagnosis: dysphagia  Postoperative diagnosis: Dysphagia, hiatal hernia    :  Dr. Citlalli Vazquez  Assistant(s): Endoscopy Technician-1: Jorge Friday  Endoscopy RN-1: Azalea Fernandez RN    Specimens: * No specimens in log *  H. Pylori  no    Assessment:  Intra-procedure medications     Anesthesia gave intra-procedure sedation and medications, see anesthesia flow sheet yes    Intravenous fluids: NS@ Grygla Sole     Vital signs stable     Abdominal assessment: round and soft  With peg tube placement    Recommendation:    Return to floor  Family or Friend n/a  Permission to share finding with family or friend n/a

## 2017-07-18 NOTE — PERIOP NOTES
TRANSFER - IN REPORT:    Verbal report received from [facility] on Ashley Mazariegos  being received from [facility](unit) for ordered procedure      Report consisted of patients Situation, Background, Assessment and   Recommendations(SBAR). Information from the following report(s) ED Summary, Intake/Output and MAR was reviewed with the receiving nurse. Opportunity for questions and clarification was provided. Assessment completed upon patients arrival to unit and care assumed.

## 2017-07-18 NOTE — PROGRESS NOTES
ADULT PROTOCOL: JET AEROSOL ASSESSMENT    Patient  Hilda Monroy     80 y.o.   male     7/18/2017  3:04 PM    Breath Sounds Pre Procedure: Right Breath Sounds: Coarse                               Left Breath Sounds: Coarse    Breath Sounds Post Procedure: Right Breath Sounds: Coarse                                 Left Breath Sounds: Coarse    Breathing pattern: Pre procedure Breathing Pattern: Tachypneic          Post procedure Breathing Pattern: Tachypneic    Heart Rate: Pre procedure Pulse: 90           Post procedure Pulse: 130 (HR goes up and down, not due to treatment )    Resp Rate: Pre procedure Respirations: 24           Post procedure Respirations: 24      Oxygen: O2 Device: Nasal cannula   Flow rate (L/min) 2     Changed: NO    SpO2: Pre procedure SpO2: 98 %  2LPM NC              Post procedure SpO2: 99 %  2LPM NC    Nebulizer Therapy: Current medications Aerosolized Medications: DuoNeb      Changed: NO}    Smoking History: Never Smoked     Problem List:   Patient Active Problem List   Diagnosis Code    Encephalopathy, unspecified G93.40    SVT (supraventricular tachycardia) (HCC) I47.1    Aphasia R47.01    CVA (cerebral vascular accident) ([facility] [address] I63.9    Seizure disorder ([address] G40.909    Seizures ([address] R56.9    Status epilepticus ([address] G40.901    Oral phase dysphagia R13.11    Dysarthria R47.1    Dysphagia R13.10       Respiratory Therapist: Clair Carrington, RT

## 2017-07-18 NOTE — H&P
Gastroenterology History and Physical    Patient: Christina Bernstein    Physician: Russ Rausch MD    Vital Signs: Blood pressure 160/85, pulse (!) 55, temperature 99.2 °F (37.3 °C), resp. rate 22, height 5' 8\" (1.727 m), weight 67 kg (147 lb 9.6 oz), SpO2 99 %. Allergies: Allergies   Allergen Reactions    No Known Allergies        Indication: dysphagia    History:  Past Medical History:   Diagnosis Date    Alcohol abuse, in remission     Seizures ([address]     History reviewed. No pertinent surgical history. Social History     Social History    Marital status:      Spouse name: N/A    Number of children: N/A    Years of education: N/A     Social History Main Topics    Smoking status: Never Smoker    Smokeless tobacco: None    Alcohol use No      Comment: Quit drinking 10 years ago   Aviva Dickerson Drug use: No    Sexual activity: Not Asked     Other Topics Concern    None     Social History Narrative      Family History   Problem Relation Age of Onset    Other Other      no strokes or seizures       Medications:   Prior to Admission medications    Medication Sig Start Date End Date Taking? Authorizing Provider   levETIRAcetam (KEPPRA) 250 mg tablet Take 1 Tab by mouth two (2) times a day. Take with the 1000mg to make 1250mg twice a day. 3/21/17   Cholo Guillen NP   cyclobenzaprine (FLEXERIL) 5 mg tablet Take 1 Tab by mouth three (3) times daily as needed for Muscle Spasm(s). 3/18/17   Yu Del Cid MD   levETIRAcetam 1,000 mg tablet Take 1 Tab by mouth two (2) times a day. 3/7/17   Maciel Bone MD   pregabalin (LYRICA) 300 mg capsule Take 1 Cap by mouth two (2) times a day. Max Daily Amount: 600 mg. 2/21/17   Estrella Blankenship MD   thiamine (B-1) 100 mg tablet Take 1 Tab by mouth daily. 2/21/17   Estrella Blankenship MD   atorvastatin (LIPITOR) 40 mg tablet Take 1 Tab by mouth daily. 2/21/17   Estrella Blankenship MD   metoprolol succinate (TOPROL-XL) 25 mg XL tablet Take  by mouth daily.     Nadia Bob MD omeprazole (PRILOSEC) 40 mg capsule Take 40 mg by mouth daily. Nadia Bob MD   aspirin (ASPIRIN) 325 mg tablet Take 1 Tab by mouth daily. 9/7/12   Shirley Pineda MD   magnesium oxide (MAG-OX) 400 mg tablet Take 400 mg by mouth daily. Naida Bob MD       Physical Exam:   HEENT: Head: Normocephalic, no lesions, without obvious abnormality.    Heart: regular rate and rhythm, S1, S2 normal, no murmur, click, rub or gallop   Lungs: chest clear, no wheezing, rales, normal symmetric air entry, Heart exam - S1, S2 normal, no murmur, no gallop, rate regular   Abdominal: Bowel sounds are normal, liver is not enlarged, spleen is not enlarged     Signed:  Dearl Holstein, MD 7/18/2017

## 2017-07-18 NOTE — PERIOP NOTES
Patient back to room via transport on stretcher. VSS and patient denies any pain at this time. Heart rhythm with narrow regularly occurring QRS preceded by regular smaller voltage complex.

## 2017-07-18 NOTE — PROGRESS NOTES
PCU SHIFT NURSING NOTE      Bedside and Verbal shift change report given to Juani Roberts RN (oncoming nurse) by Davi Murrell RN (offgoing nurse). Report included the following information SBAR, Kardex, ED Summary, Procedure Summary, Intake/Output, MAR, Recent Results, Med Rec Status, Cardiac Rhythm NSR to A-fib and Alarm Parameters . Shift Summary:         Admission Date 6/24/2017   Admission Diagnosis Status epilepticus ([address]  dysphagia   Consults IP CONSULT TO NEUROLOGY  IP CONSULT TO INTENSIVIST  IP CONSULT TO PALLIATIVE CARE - PROVIDER  IP CONSULT TO GASTROENTEROLOGY  IP CONSULT TO PALLIATIVE CARE - PROVIDER  IP CONSULT TO OTOLARYNGOLOGY        Consults   [x]PT   [x]OT   [x]Speech   [x]Case Management      [x] Palliative      Cardiac Monitoring Order   [x]Yes   []No     IV drips   []Yes    Drip:                            Dose:  Drip:                            Dose:  Drip:                            Dose:   [x]No     GI Prophylaxis   [x]Yes   []No         DVT Prophylaxis   SCDs:  Sequential Compression Device: Bilateral     Patient Refused VTE Prophylaxis: Yes    Han stockings:         [x] Medication   []Contraindicated   []None      Activity Level Activity Level: Bed Rest     Activity Assistance: Complete care   Purposeful Rounding every 1-2 hour? [x]Yes   Edouard Score  Total Score: 3   Bed Alarm (If score 3 or >)   [x]Yes   [] Refused (See signed refusal form in chart)   Klever Score  Klever Score: 12   Klever Score (if score 14 or less)   [x]PMT consult   []Wound Care consult      []Specialty bed   [x] Nutrition consult          Needs prior to discharge:   Home O2 required:    []Yes   [x]No    If yes, how much O2 required?     Other:    Last Bowel Movement: Last Bowel Movement Date: 07/04/17      Influenza Vaccine Received Flu Vaccine for Current Season (usually Sept-March): Not Flu Season        Pneumonia Vaccine           Diet Active Orders   Diet    DIET NPO      LDAs           Triple Lumen IJ 06/24/17 Right Internal jugular (Active)   Central Line Being Utilized Yes 7/18/2017 10:00 AM   Criteria for Appropriate Use Limited/no vessel suitable for conventional peripheral access 7/18/2017 10:00 AM   Site Assessment Clean, dry, & intact 7/18/2017 10:00 AM   Infiltration Assessment 0 7/18/2017 10:00 AM   Affected Extremity/Extremities Color distal to insertion site pink (or appropriate for race) 7/18/2017 10:00 AM   Date of Last Dressing Change 07/17/17 7/18/2017 10:00 AM   Dressing Status Clean, dry, & intact 7/18/2017 10:00 AM   Dressing Type Disk with Chlorhexadine gluconate (CHG); Transparent 7/18/2017 10:00 AM   Action Taken Open ports on tubing capped 7/18/2017 10:00 AM   Proximal Hub Color/Line Status White; Infusing;Flushed 7/18/2017 10:00 AM   Positive Blood Return (Medial Site) Yes 7/18/2017 10:00 AM   Medial Hub Color/Line Status Blue; Infusing;Flushed 7/18/2017 10:00 AM   Positive Blood Return (Lateral Site) Yes 7/18/2017 10:00 AM   Distal Hub Color/Line Status Brown;Capped;Flushed 7/18/2017 10:00 AM   Positive Blood Return (Site #3) Yes 7/18/2017 10:00 AM   External Catheter Length (cm) 0 centimeters 7/14/2017  8:09 AM   Alcohol Cap Used Yes 7/18/2017 10:00 AM              Condom Catheter 07/15/17 (Active)   Criteria for Appropriate Use Strict I/Os; Comfort Care 7/18/2017 10:00 AM   Status Draining 7/18/2017 10:00 AM   Site Condition No abnormalities 7/18/2017 10:00 AM   Drainage Tube Clipped to Bed Yes 7/18/2017 10:00 AM   Catheter Secured to Thigh Yes 7/18/2017 10:00 AM   Tamper Seal Intact Yes 7/18/2017 10:00 AM   Bag Below Bladder/Not on Floor Yes 7/18/2017 10:00 AM   Lack of Dependent Loop in Tubing Yes 7/18/2017 10:00 AM   Drainage Bag Less Than Half Full Yes 7/18/2017 10:00 AM   Sterile Solution Used for  Irrigation N/A 7/18/2017 10:00 AM   Urine Output (mL) 300 ml 7/18/2017 10:00 AM                Urinary Catheter [REMOVED] Condom Catheter 06/30/17-Criteria for Appropriate Use: Medically/surgically unstable, Strict I/Os  [REMOVED] Condom Catheter 07/05/17-Criteria for Appropriate Use: Comfort Care  [REMOVED] Condom Catheter 07/07/17-Criteria for Appropriate Use: Comfort Care  Condom Catheter 07/15/17-Criteria for Appropriate Use: Strict I/Os, Comfort Care  [REMOVED] Urinary Catheter 06/24/17 2- way; [employer]-Criteria for Appropriate Use: Medically/surgically unstable    Intake & Output   Date 07/17/17 0700 - 07/18/17 0659 07/18/17 0700 - 07/19/17 0659   Shift [id number] 1900-0659 24 Hour Total 4525-3734 7951-9783 24 Hour Total   I  N  T  A  K  E   I.V.  (mL/kg/hr) 1119.9  (1.4) 1406  (1.8) 2525.8  (1.6) 352.9  352.9      Volume (levETIRAcetam (KEPPRA) 1,500 mg in 0.9% sodium chloride IVPB) 100 100 200 100  100      Volume (valproate (DEPACON) 500 mg in 0.9% sodium chloride 50 mL IVPB) 50 100 150 0  0      Volume (ampicillin-sulbactam (UNASYN) 3 g in 0.9% sodium chloride (MBP/ADV) 100 mL) 200 200 400 0  0      Volume (TPN ADULT - CENTRAL AA 5% D20% W/ CA + ELECTROLYTES) 707.7  707.7         Volume (fat emulsion 20% (LIPOSYN, INTRALIPID) infusion 250 mL) 13.9 250 263.8 62.8  62.8      Volume (TPN ADULT - CENTRAL AA 5% D20% W/ CA + ELECTROLYTES) 48.3 756 804.3 190.1  190.1    Shift Total  (mL/kg) 1119.9  (16.9) 1406  (21.2) 2525.8  (38.1) 352.9  (5.3)  352.9  (5.3)   O  U  T  P  U  T   Urine  (mL/kg/hr) 1725  (2.2) 1975  (2.5) 3700  (2.3) 1075  1075      Urine Voided 350  350         Urine Occurrence(s)  2 x 2 x         Urine Output (mL) (Condom Catheter 07/15/17) 1375 1975 3350 1075  1075    Shift Total  (mL/kg) 1725  (26) 1975  (29.8) 3700  (55.8) 1075  (16.1)  1075  (16.1)   NET -605.1 -569 -1174.2 -722.1  -722.1   Weight (kg) 66.3 66.3 66.3 67 67 67         Readmission Risk Assessment Tool Score Medium Risk            20       Total Score        3 Relationship with PCP    3 Patient Length of Stay > 5    4 More than 1 Admission in calendar year    5 Patient Insurance is Medicare, Medicaid or Self Pay    5 Charlson Comorbidity Score        Criteria that do not apply:    Patient Living Status       Expected Length of Stay 4d 4h   Actual Length of Stay 24

## 2017-07-19 LAB
GLUCOSE BLD STRIP.AUTO-MCNC: 114 MG/DL (ref 65–100)
GLUCOSE BLD STRIP.AUTO-MCNC: 117 MG/DL (ref 65–100)
GLUCOSE BLD STRIP.AUTO-MCNC: 124 MG/DL (ref 65–100)
SERVICE CMNT-IMP: ABNORMAL

## 2017-07-19 PROCEDURE — 94640 AIRWAY INHALATION TREATMENT: CPT

## 2017-07-19 PROCEDURE — 74011000250 HC RX REV CODE- 250: Performed by: INTERNAL MEDICINE

## 2017-07-19 PROCEDURE — 74011250636 HC RX REV CODE- 250/636: Performed by: PSYCHIATRY & NEUROLOGY

## 2017-07-19 PROCEDURE — 82962 GLUCOSE BLOOD TEST: CPT

## 2017-07-19 PROCEDURE — 65660000000 HC RM CCU STEPDOWN

## 2017-07-19 PROCEDURE — 74011250636 HC RX REV CODE- 250/636: Performed by: INTERNAL MEDICINE

## 2017-07-19 PROCEDURE — 74011000258 HC RX REV CODE- 258: Performed by: PSYCHIATRY & NEUROLOGY

## 2017-07-19 PROCEDURE — 74011250637 HC RX REV CODE- 250/637: Performed by: INTERNAL MEDICINE

## 2017-07-19 PROCEDURE — 77010033678 HC OXYGEN DAILY

## 2017-07-19 PROCEDURE — 74011000258 HC RX REV CODE- 258: Performed by: INTERNAL MEDICINE

## 2017-07-19 PROCEDURE — 74011000250 HC RX REV CODE- 250: Performed by: PSYCHIATRY & NEUROLOGY

## 2017-07-19 RX ORDER — NITROFURANTOIN 25; 75 MG/1; MG/1
100 CAPSULE ORAL 2 TIMES DAILY WITH MEALS
Status: DISCONTINUED | OUTPATIENT
Start: 2017-07-19 | End: 2017-07-22 | Stop reason: HOSPADM

## 2017-07-19 RX ADMIN — NITROFURANTOIN MONOHYDRATE/MACROCRYSTALLINE 100 MG: 25; 75 CAPSULE ORAL at 10:13

## 2017-07-19 RX ADMIN — Medication 20 ML: at 17:19

## 2017-07-19 RX ADMIN — LEVETIRACETAM 1500 MG: 100 INJECTION, SOLUTION, CONCENTRATE INTRAVENOUS at 08:29

## 2017-07-19 RX ADMIN — IPRATROPIUM BROMIDE AND ALBUTEROL SULFATE 3 ML: .5; 3 SOLUTION RESPIRATORY (INHALATION) at 02:49

## 2017-07-19 RX ADMIN — Medication 10 ML: at 21:48

## 2017-07-19 RX ADMIN — NITROFURANTOIN MONOHYDRATE/MACROCRYSTALLINE 100 MG: 25; 75 CAPSULE ORAL at 17:19

## 2017-07-19 RX ADMIN — METOPROLOL TARTRATE 5 MG: 5 INJECTION INTRAVENOUS at 10:12

## 2017-07-19 RX ADMIN — SODIUM CHLORIDE 3 G: 900 INJECTION, SOLUTION INTRAVENOUS at 05:07

## 2017-07-19 RX ADMIN — Medication 10 ML: at 17:19

## 2017-07-19 RX ADMIN — VALPROATE SODIUM 500 MG: 100 INJECTION, SOLUTION INTRAVENOUS at 14:36

## 2017-07-19 RX ADMIN — IPRATROPIUM BROMIDE AND ALBUTEROL SULFATE 3 ML: .5; 3 SOLUTION RESPIRATORY (INHALATION) at 20:06

## 2017-07-19 RX ADMIN — METOPROLOL TARTRATE 5 MG: 5 INJECTION INTRAVENOUS at 04:05

## 2017-07-19 RX ADMIN — METOPROLOL TARTRATE 5 MG: 5 INJECTION INTRAVENOUS at 21:47

## 2017-07-19 RX ADMIN — GLYCOPYRROLATE 0.2 MG: 0.2 INJECTION, SOLUTION INTRAMUSCULAR; INTRAVENOUS at 17:18

## 2017-07-19 RX ADMIN — THIAMINE HYDROCHLORIDE 100 MG: 100 INJECTION, SOLUTION INTRAMUSCULAR; INTRAVENOUS at 08:29

## 2017-07-19 RX ADMIN — IPRATROPIUM BROMIDE AND ALBUTEROL SULFATE 3 ML: .5; 3 SOLUTION RESPIRATORY (INHALATION) at 07:39

## 2017-07-19 RX ADMIN — IPRATROPIUM BROMIDE AND ALBUTEROL SULFATE 3 ML: .5; 3 SOLUTION RESPIRATORY (INHALATION) at 13:18

## 2017-07-19 RX ADMIN — VALPROATE SODIUM 500 MG: 100 INJECTION, SOLUTION INTRAVENOUS at 06:14

## 2017-07-19 RX ADMIN — LATANOPROST 1 DROP: 50 SOLUTION OPHTHALMIC at 17:20

## 2017-07-19 RX ADMIN — ENOXAPARIN SODIUM 40 MG: 40 INJECTION SUBCUTANEOUS at 08:29

## 2017-07-19 RX ADMIN — Medication 10 ML: at 04:55

## 2017-07-19 RX ADMIN — GLYCOPYRROLATE 0.2 MG: 0.2 INJECTION, SOLUTION INTRAMUSCULAR; INTRAVENOUS at 21:47

## 2017-07-19 RX ADMIN — LEVETIRACETAM 1500 MG: 100 INJECTION, SOLUTION, CONCENTRATE INTRAVENOUS at 20:43

## 2017-07-19 RX ADMIN — VALPROATE SODIUM 500 MG: 100 INJECTION, SOLUTION INTRAVENOUS at 21:49

## 2017-07-19 RX ADMIN — METOPROLOL TARTRATE 5 MG: 5 INJECTION INTRAVENOUS at 17:18

## 2017-07-19 RX ADMIN — GLYCOPYRROLATE 0.2 MG: 0.2 INJECTION, SOLUTION INTRAMUSCULAR; INTRAVENOUS at 08:30

## 2017-07-19 NOTE — PROGRESS NOTES
PT note:    Chart reviewed and spoke with nursing. Per nursing, patient fairly lethargic after receiving ativan for agitation. Will defer PT treatment session.      Lei Dakin, PT, DPT

## 2017-07-19 NOTE — PROGRESS NOTES
Hospitalist Progress Note    NAME: Ernesto Remedies   :  1934   MRN:  [id number]       Assessment / Plan:  Acute encephalopathy due to status epilepticus in setting of seizure d/o:  Out of lyrica for a few days prior to admission - has also preceeded other admissions for sz. Pt continues to be below most recent baseline with worsened dysarthria bulbar weakness the last few days. More alert today with ongoing oropharyngeal sx.but cough seems to be stronger. Still needs suctioning.  - CT head  no acute process  - MRI brain  with no evidence of acute infarction. No acute intracranial process. Moderate cerebral atrophy and mild chronic microvascular ischemic change. - CXR  with improved right upper lobe airspace disease and left basilar atelectasis. - appreciate neuro consult, adjusted keppra and added valproate      Fever (resolved) presumed due to aspiration pneumonitis :  Temp 99.5 7/15  - CXR nonacute as above  E. Coli and enterococcus UTI by C/S,  [address] [place] [address] for antibiotic recommendation  IV Ceftriaxone and Nitrofurantoin  - con't unasyn for possible aspiration, improving today     Dysphagia/dyarthria with resultant moderate protein calorie malnutrition:    S/P Peg tube placement had BM after placement and has good Bowel sounds  Will star Osmolite VERY slowly, watch for aspiration  - exam by ENT today with no structural reason for dysphagia. Ken recommended. OFF TPN and Started TF Osmolynte on  and tolerating well  Now on 40cc/hour    Hypertension, benign/essential: more stable  - con't IV scheduled metoprolol (on po metoprolol at home)  - prn IV hydralazine  - PT/OT and speech appreciated.  Plan for [facility] on discharge.   Hyperlipidemia: holding lipitor while NPO  History of SVT:  Briefly on cardizem gtt this admission  - serial troponin and EKG ordered with lower chest/abd pain complaints today  Peripheral neuropathy: ran out of lyrica several days PTA. Krishan Velasco to be restarted as soon as enteral access established due to ongoing leg sx. 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  Con't thiamine supplementation. GERD: con't protonix  Acute respiratory failure (resolved) due to acute encephalopathy with chronic underlying hypercapnea: intubated initially during admission.  Duonebs changed to prn only. Pt self-extubated 6/24; finished zosyn 7/09.      DVT prophylaxis: lovenox    Code status: Full (wife is decision maker)     Subjective:     Chief Complaint / Reason for Physician Visit  Alert, no complaints today. Loud oropharyngeal noise/secretions but Cough seems to be . Discussed with RN events overnight. Review of Systems: confused  Symptom Y/N Comments  Symptom Y/N Comments   Fever/Chills    Chest Pain     Poor Appetite    Edema     Cough    Abdominal Pain     Sputum    Joint Pain     SOB/AL    Pruritis/Rash     Nausea/vomit    Tolerating PT/OT     Diarrhea    Tolerating Diet     Constipation    Other       Could NOT obtain due to: encephalopathy     Objective:     VITALS:   Last 24hrs VS reviewed since prior progress note. Most recent are:  Patient Vitals for the past 24 hrs:   Temp Pulse Resp BP SpO2   07/19/17 1318 - - - - 100 %   07/19/17 1100 97.6 °F (36.4 °C) 66 22 146/61 100 %   07/19/17 1012 - (!) 107 - 134/43 -   07/19/17 0739 - - - - 100 %   07/19/17 0715 97.4 °F (36.3 °C) 71 20 124/55 100 %   07/19/17 0600 - 80 - 152/86 100 %   07/19/17 0405 - 91 - 141/56 -   07/18/17 2330 98 °F (36.7 °C) 87 20 120/60 100 %   07/18/17 2245 - 76 - 116/58 100 %   07/18/17 2037 - - - - 100 %   07/18/17 1946 98.2 °F (36.8 °C) 90 20 150/84 99 %       Intake/Output Summary (Last 24 hours) at 07/19/17 1704  Last data filed at 07/19/17 1012   Gross per 24 hour   Intake             1634 ml   Output              350 ml   Net             1284 ml        PHYSICAL EXAM:  General: WD, WN. Alert, confused no acute distress    EENT:  EOMI. Anicteric sclerae. MMM.   Gurgling from throat. Resp:  CTA bilaterally, no wheezing or rales. No accessory muscle use  CV:  Regular  rhythm,  No edema  GI:  Soft, Non distended, Non tender.  +Bowel sounds  Neurologic:  Alert and oriented X 1-2, slurred speech,   Psych:   Some insight. Not anxious nor agitated  Skin:  No rashes. No jaundice    Reviewed most current lab test results and cultures  YES  Reviewed most current radiology test results   YES  Review and summation of old records today    NO  Reviewed patient's current orders and MAR    YES  PMH/SH reviewed - no change compared to H&P  ________________________________________________________________________  Care Plan discussed with:    Comments   Patient x    Family  y  WIFE Bedside   RN x    Care Manager     Consultant                        Multidiciplinary team rounds were held today with , nursing, pharmacist and clinical coordinator. Patient's plan of care was discussed; medications were reviewed and discharge planning was addressed. ________________________________________________________________________  Total NON critical care TIME:  25 Minutes    Total CRITICAL CARE TIME Spent:   Minutes non procedure based      Comments   >50% of visit spent in counseling and coordination of care x    ________________________________________________________________________  Akanksha Alvarez MD     Procedures: see electronic medical records for all procedures/Xrays and details which were not copied into this note but were reviewed prior to creation of Plan. LABS:  I reviewed today's most current labs and imaging studies.   Pertinent labs include:  Recent Labs      07/17/17   0447   WBC  8.9   HGB  9.7*   HCT  28.3*   PLT  114*     Recent Labs      07/18/17   0435  07/17/17   0447   NA  136  134*   K  3.7  3.9   CL  94*  95*   CO2  38*  35*   GLU  97  139*   BUN  14  15   CREA  0.46*  0.45*   CA  9.2  8.7   MG  1.5*   --    PHOS  2.8   --        Signed: Akanksha Alvarez, MD

## 2017-07-19 NOTE — PROGRESS NOTES
General Daily Progress Note    Admit Date: 6/24/2017  Hospital day 7/19/2017    Subjective:sleeping  O- tolerating 40cc/hr TF     Y  N  [] []  Abd Pain:    [] []  Nausea:  [] [] Vomiting:  [] []  Diarrhea:  [] []  Constipation:  [] []  Melena:  [] []  Hematochezia:  [] []  Tolerating Diet:    Current Facility-Administered Medications   Medication Dose Route Frequency    nitrofurantoin (macrocrystal-monohydrate) (MACROBID) capsule 100 mg  100 mg Oral BID WITH MEALS    glycopyrrolate (ROBINUL) injection 0.2 mg  0.2 mg IntraMUSCular TID    metoprolol (LOPRESSOR) injection 5 mg  5 mg IntraVENous Q6H    albuterol (PROVENTIL VENTOLIN) nebulizer solution 2.5 mg  2.5 mg Nebulization Q4H PRN    albuterol-ipratropium (DUO-NEB) 2.5 MG-0.5 MG/3 ML  3 mL Nebulization Q6H RT    levETIRAcetam (KEPPRA) 1,500 mg in 0.9% sodium chloride IVPB  1,500 mg IntraVENous Q12H    valproate (DEPACON) 500 mg in 0.9% sodium chloride 50 mL IVPB  500 mg IntraVENous Q8H    heparin (porcine) pf 300 Units  300 Units InterCATHeter PRN    LORazepam (ATIVAN) injection 1 mg  1 mg IntraVENous Q2H PRN    fentaNYL citrate (PF) injection 25-50 mcg  25-50 mcg IntraVENous Q2H PRN    hydrALAZINE (APRESOLINE) 20 mg/mL injection 10 mg  10 mg IntraVENous Q6H PRN    sodium chloride (NS) flush 10-30 mL  10-30 mL InterCATHeter PRN    sodium chloride (NS) flush 10 mL  10 mL InterCATHeter Q24H    sodium chloride (NS) flush 10 mL  10 mL InterCATHeter PRN    sodium chloride (NS) flush 10-40 mL  10-40 mL InterCATHeter Q8H    sodium chloride (NS) flush 20 mL  20 mL InterCATHeter Q24H    latanoprost (XALATAN) 0.005 % ophthalmic solution 1 Drop  1 Drop Both Eyes QPM    thiamine (B-1) 100 mg in 0.9% sodium chloride 50 mL IVPB  100 mg IntraVENous DAILY    sodium chloride (NS) flush 5-10 mL  5-10 mL IntraVENous Q8H    sodium chloride (NS) flush 5-10 mL  5-10 mL IntraVENous PRN    naloxone (NARCAN) injection 0.4 mg  0.4 mg IntraVENous PRN    ondansetron [facility]) injection 4 mg  4 mg IntraVENous Q4H PRN    bisacodyl (DULCOLAX) suppository 10 mg  10 mg Rectal DAILY PRN    acetaminophen (TYLENOL) suppository 650 mg  650 mg Rectal Q4H PRN    enoxaparin (LOVENOX) injection 40 mg  40 mg SubCUTAneous Q24H        Review of Systems  Review of systems not obtained due to patient factors. Objective:     Patient Vitals for the past 8 hrs:   BP Temp Pulse Resp SpO2   07/19/17 1012 134/43 - (!) 107 - -   07/19/17 0739 - - - - 100 %   07/19/17 0715 124/55 97.4 °F (36.3 °C) 71 20 100 %   07/19/17 0600 152/86 - 80 - 100 %   07/19/17 0405 141/56 - 91 - -        07/17 1901 - 07/19 0700  In: 3322.9 [I.V.:3162.9]  Out: 6988 [Urine:3700]    Physical Exam:   Visit Vitals    /43    Pulse (!) 107    Temp 97.4 °F (36.3 °C)    Resp 20    Ht 5' 8\" (1.727 m)    Wt 67 kg (147 lb 9.6 oz)    SpO2 100%    BMI 22.44 kg/m2     General appearance: cooperative, no distress, appears stated age  Abdomen: soft, non-tender.  Bowel sounds normal. No masses,  no organomegaly      Data Review   Recent Results (from the past 24 hour(s))   GLUCOSE, POC    Collection Time: 07/18/17 11:37 AM   Result Value Ref Range    Glucose (POC) 91 65 - 100 mg/dL    Performed by [employer], POC    Collection Time: 07/18/17 11:37 AM   Result Value Ref Range    Glucose (POC) 91 65 - 100 mg/dL    Performed by [employer], POC    Collection Time: 07/18/17  6:19 PM   Result Value Ref Range    Glucose (POC) 123 (H) 65 - 100 mg/dL    Performed by Lauren Whitney, POC    Collection Time: 07/18/17 11:35 PM   Result Value Ref Range    Glucose (POC) 85 65 - 100 mg/dL    Performed by Gadiel Villa    GLUCOSE, POC    Collection Time: 07/19/17  5:04 AM   Result Value Ref Range    Glucose (POC) 117 (H) 65 - 100 mg/dL    Performed by Esme Jasso (traveler)          Assessment:     Active Problems:    Status epilepticus ([address] (6/24/2017)      Oral phase dysphagia (7/8/2017)      Dysarthria (7/15/2017)      Dysphagia (7/15/2017)        Plan:     Continue tube feeding.   Thanks will see on request

## 2017-07-19 NOTE — PROGRESS NOTES
Neurology Progress Note    Patient ID:  Madeline Dietrich  [id number]  73 y.o.  1934    Subjective:      Patient has complaints of difficulty swallowing, and seemingly probably mildly aspirating still. EMTs laryngoscopy did not show any clear cause for his dysphagia or dysarthria other than poor vocal cord movement. They recommended no other intervention, and recommended PEG tube. The wife agrees that he will get a PEG probably today or tomorrow. He has prominent swallowing difficulties of unclear etiology since he had his seizures. He is on Depakote and Keppra now, and he has therapeutic levels and no more seizures. He is less rigid, and bradykinetic today and has bulbar weakness and dysarthria and dysphasia. We checked his neuromuscular parameters, but he PEG tube. He cannot do a modified barium swallow because he is too weak and dysphagia. Talk to GI also and the hospitalist and we all agree just do the PEG tube so we can get started on disposition as he does not seem to have any obvious other treatable cause for his dysphagia.     Current Facility-Administered Medications   Medication Dose Route Frequency    glycopyrrolate (ROBINUL) injection 0.2 mg  0.2 mg IntraMUSCular TID    metoprolol (LOPRESSOR) injection 5 mg  5 mg IntraVENous Q6H    ampicillin-sulbactam (UNASYN) 3 g in 0.9% sodium chloride (MBP/ADV) 100 mL  3 g IntraVENous Q6H    albuterol (PROVENTIL VENTOLIN) nebulizer solution 2.5 mg  2.5 mg Nebulization Q4H PRN    albuterol-ipratropium (DUO-NEB) 2.5 MG-0.5 MG/3 ML  3 mL Nebulization Q6H RT    levETIRAcetam (KEPPRA) 1,500 mg in 0.9% sodium chloride IVPB  1,500 mg IntraVENous Q12H    valproate (DEPACON) 500 mg in 0.9% sodium chloride 50 mL IVPB  500 mg IntraVENous Q8H    heparin (porcine) pf 300 Units  300 Units InterCATHeter PRN    LORazepam (ATIVAN) injection 1 mg  1 mg IntraVENous Q2H PRN    fentaNYL citrate (PF) injection 25-50 mcg  25-50 mcg IntraVENous Q2H PRN    hydrALAZINE (APRESOLINE) 20 mg/mL injection 10 mg  10 mg IntraVENous Q6H PRN    sodium chloride (NS) flush 10-30 mL  10-30 mL InterCATHeter PRN    sodium chloride (NS) flush 10 mL  10 mL InterCATHeter Q24H    sodium chloride (NS) flush 10 mL  10 mL InterCATHeter PRN    sodium chloride (NS) flush 10-40 mL  10-40 mL InterCATHeter Q8H    sodium chloride (NS) flush 20 mL  20 mL InterCATHeter Q24H    latanoprost (XALATAN) 0.005 % ophthalmic solution 1 Drop  1 Drop Both Eyes QPM    thiamine (B-1) 100 mg in 0.9% sodium chloride 50 mL IVPB  100 mg IntraVENous DAILY    sodium chloride (NS) flush 5-10 mL  5-10 mL IntraVENous Q8H    sodium chloride (NS) flush 5-10 mL  5-10 mL IntraVENous PRN    naloxone (NARCAN) injection 0.4 mg  0.4 mg IntraVENous PRN    ondansetron (ZOFRAN) injection 4 mg  4 mg IntraVENous Q4H PRN    bisacodyl (DULCOLAX) suppository 10 mg  10 mg Rectal DAILY PRN    acetaminophen (TYLENOL) suppository 650 mg  650 mg Rectal Q4H PRN    enoxaparin (LOVENOX) injection 40 mg  40 mg SubCUTAneous Q24H        Review of Systems:    Review of systems not obtained due to patient factors.     Objective:     Patient Vitals for the past 8 hrs:   BP Temp Pulse Resp SpO2   07/18/17 2037 - - - - 100 %   07/18/17 1522 154/64 98.2 °F (36.8 °C) 83 22 98 %   07/18/17 1512 153/62 98.2 °F (36.8 °C) (!) 130 24 98 %          07/17 0701 - 07/18 1900  In: 3932.7 [I.V.:3932.7]  Out: 5275 [Urine:5275]    Lab Review   Recent Results (from the past 24 hour(s))   GLUCOSE, POC    Collection Time: 07/17/17 11:48 PM   Result Value Ref Range    Glucose (POC) 103 (H) 65 - 100 mg/dL    Performed by Junito Fontaine    PHOSPHORUS    Collection Time: 07/18/17  4:35 AM   Result Value Ref Range    Phosphorus 2.8 2.6 - 4.7 MG/DL   MAGNESIUM    Collection Time: 07/18/17  4:35 AM   Result Value Ref Range    Magnesium 1.5 (L) 1.6 - 2.4 mg/dL   METABOLIC PANEL, BASIC    Collection Time: 07/18/17  4:35 AM   Result Value Ref Range    Sodium 136 136 - 145 mmol/L    Potassium 3.7 3.5 - 5.1 mmol/L    Chloride 94 (L) 97 - 108 mmol/L    CO2 38 (H) 21 - 32 mmol/L    Anion gap 4 (L) 5 - 15 mmol/L    Glucose 97 65 - 100 mg/dL    BUN 14 6 - 20 MG/DL    Creatinine 0.46 (L) 0.70 - 1.30 MG/DL    BUN/Creatinine ratio 30 (H) 12 - 20      GFR est AA >60 >60 ml/min/1.73m2    GFR est non-AA >60 >60 ml/min/1.73m2    Calcium 9.2 8.5 - 10.1 MG/DL   GLUCOSE, POC    Collection Time: 07/18/17  6:03 AM   Result Value Ref Range    Glucose (POC) 126 (H) 65 - 100 mg/dL    Performed by Kiko Romero    GLUCOSE, POC    Collection Time: 07/18/17 11:37 AM   Result Value Ref Range    Glucose (POC) 91 65 - 100 mg/dL    Performed by [id number], POC    Collection Time: 07/18/17 11:37 AM   Result Value Ref Range    Glucose (POC) 91 65 - 100 mg/dL    Performed by [id number], POC    Collection Time: 07/18/17  6:19 PM   Result Value Ref Range    Glucose (POC) 123 (H) 65 - 100 mg/dL    Performed by Delfin Schaefer        Additional comments:I reviewed the patients new imaging test results. MRI scan        NEUROLOGICAL EXAM:    Appearance: The patient is thinly developed and nourished, provides a incoherent history and is in no acute distress. Mental Status: Oriented to time, place and person. Mood and affect confused and anxious . Patient dysarthric    Cranial Nerves:   Intact visual fields. Fundi are benign. MAURICIO, EOM's full, no nystagmus, no ptosis. Facial sensation is normal. Corneal reflexes are not tested. Facial movement is symmetric. Hearing is abnormal bilaterally. Palate is midline with normal sternocleidomastoid and trapezius muscles are normal. Tongue is midline. Patient very dysarthric and dysphasic  Neck without meningismus or bruits  Temporal arteries are not tender or enlarged    Motor:  3/5 strength in upper and lower proximal and distal muscles. Generalized normal bulk and mild increased muscle tone tone. No fasciculations.    Reflexes:   Deep tendon reflexes 1+/4 and symmetrical.  No Babinski or clonus present    Sensory:   Normal to touch, pinprick and vibration and temperature decreased in both feet . Gait:  Not tested gait. Tremor:   No tremor noted. Cerebellar:  No cerebellar signs present. Neurovascular:  Normal heart sounds and regular rhythm, peripheral pulses decreased, and no carotid bruits.            Assessment:   Active Problems:    Status epilepticus ([address] (6/24/2017)      Oral phase dysphagia (7/8/2017)      Dysarthria (7/15/2017)      Dysphagia (7/15/2017)        Plan:     Patient with prominent difficulty swallowing of unclear etiology and bulbar weakness with dysarthria and dysphasia  We checked neuromuscular panels and they are unremarkable  ENT consult could find nothing treatable, other than poorly moving vocal cords  Talked to speech therapy, GI, and hospitalist and we all feel the best plan is just to proceed with PEG tube because it does not seem like he has any clear treatable cause of his dysphagia unless ENT find something  MRI negative  Discussed with patient's wife and the patient  Very difficult case  We will follow as needed, his seizures are well controlled and stable on therapeutic doses of medicines  Call us if we can help      Signed:  Severo Frederic, MD  7/18/2017  3:36 PM

## 2017-07-19 NOTE — PROGRESS NOTES
PCU SHIFT NURSING NOTE      1915: Bedside shift change report given to Patricia Conner RN (oncoming nurse) by Kennedy Garcia RN (offgoing nurse). Report included the following information SBAR, Kardex, Procedure Summary, Intake/Output, MAR, Recent Results, Cardiac Rhythm NSR with freq PVC's  and Alarm Parameters . Shift Summary:     1945: Patient agitated/restless in bed. Pulling at heart monitor, oxygen tubing. Attempting OOB frequently. Sitter at bedside at all times. Family at bedside presently, patient seems to recognize family. 2100: Patient given Ativan 1mg IV for continued restlessness and agitation despite repeated redirection by sitter. Sitter will remain at bedside. Will cont to closely monitor. 2200: TF Osmo 1.2 initiated per MD at 10ml/hr via PEG. TPN D/C'd at this time. Patient resting with eyes closed. VSS, respirations even and unlabored. 0400: Nasal trumpet removed from right nare per patient able to cough up secretions and use yankeur with assist.   Mouth care provided at this time. Patient becoming more awake and restless. 0600: Patient awake restless, attempting OOB. Pulling at gown and heart monitor. Unable to keep condom cath on overnight due to agitation and restlessness, absorbant pad in place for incontinence. 0700: Sitter remains at bedside. Patient restless with intermittent periods of resting quietly. VSS. NSR with PVC's per monitor.        Admission Date 6/24/2017   Admission Diagnosis Status epilepticus ([facility] [address]  dysphagia   Consults IP CONSULT TO NEUROLOGY  IP CONSULT TO INTENSIVIST  IP CONSULT TO PALLIATIVE CARE - PROVIDER  IP CONSULT TO GASTROENTEROLOGY  IP CONSULT TO PALLIATIVE CARE - PROVIDER  IP CONSULT TO OTOLARYNGOLOGY        Consults   []PT   []OT   []Speech   []Case Management      [] Palliative      Cardiac Monitoring Order   []Yes   []No     IV drips   []Yes    Drip:                            Dose:  Drip:                            Dose:  Drip: Dose:   []No     GI Prophylaxis   []Yes   []No         DVT Prophylaxis   SCDs:  Sequential Compression Device: Bilateral     Patient Refused VTE Prophylaxis: Yes    Han stockings:         [] Medication   []Contraindicated   []None      Activity Level Activity Level: Bed Rest     Activity Assistance: Complete care   Purposeful Rounding every 1-2 hour? []Yes   Edouard Score  Total Score: 3   Bed Alarm (If score 3 or >)   []Yes   [] Refused (See signed refusal form in chart)   Klever Score  Klever Score: 12   Klever Score (if score 14 or less)   []PMT consult   []Wound Care consult      []Specialty bed   [] Nutrition consult          Needs prior to discharge:   Home O2 required:    []Yes   []No    If yes, how much O2 required? Other:    Last Bowel Movement: Last Bowel Movement Date: 07/18/17      Influenza Vaccine Received Flu Vaccine for Current Season (usually Sept-March): Not Flu Season        Pneumonia Vaccine           Diet Active Orders   Diet    DIET NPO      LDAs           Triple Lumen IJ 06/24/17 Right Internal jugular (Active)   Central Line Being Utilized Yes 7/18/2017  9:00 PM   Criteria for Appropriate Use Limited/no vessel suitable for conventional peripheral access 7/18/2017  9:00 PM   Site Assessment Clean, dry, & intact 7/18/2017  9:00 PM   Infiltration Assessment 0 7/18/2017  9:00 PM   Affected Extremity/Extremities Color distal to insertion site pink (or appropriate for race) 7/18/2017  9:00 PM   Date of Last Dressing Change 07/17/17 7/18/2017  9:00 PM   Dressing Status Loose 7/18/2017  9:00 PM   Dressing Type Transparent;Disk with Chlorhexadine gluconate (CHG) 7/18/2017  9:00 PM   Action Taken Dressing changed 7/18/2017  9:00 PM   Proximal Hub Color/Line Status White;Patent; Infusing 7/18/2017  9:00 PM   Positive Blood Return (Medial Site) Yes 7/18/2017  9:00 PM   Medial Hub Color/Line Status Blue;Patent; Flushed 7/18/2017  9:00 PM   Positive Blood Return (Lateral Site) Yes 7/18/2017  9:00 PM Distal Hub Color/Line Status Brown;Patent; Flushed 7/18/2017  9:00 PM   Positive Blood Return (Site #3) Yes 7/18/2017  9:00 PM   External Catheter Length (cm) 0 centimeters 7/14/2017  8:09 AM   Alcohol Cap Used Yes 7/18/2017  9:00 PM              PEG/Gastrostomy Tube 07/18/17 (Active)   Site Assessment Clean, dry, & intact 7/18/2017  9:00 PM   Dressing Status Clean, dry, & intact 7/18/2017  9:00 PM   G Port Status Infusing 7/18/2017 10:12 PM   External Catheter Length (cm) 2 centimeters 7/18/2017 12:20 PM   Action Taken Feed set changed 7/18/2017 10:12 PM   Gastric Residual (mL) 0 ml 7/18/2017  9:00 PM   Tube Feeding/Formula Options Osmolite 1.2 7/18/2017 10:12 PM   Tube Feeding/Verify Rate (mL/hr) 30 7/19/2017  6:00 AM   Water Flush Volume (mL) 100 mL 7/19/2017  1:00 AM       Condom Catheter 07/15/17 (Active)   Criteria for Appropriate Use Strict I/Os; Comfort Care 7/18/2017  6:00 PM   Status Draining 7/18/2017  6:00 PM   Site Condition No abnormalities 7/18/2017  6:00 PM   Drainage Tube Clipped to Bed Yes 7/18/2017  6:00 PM   Catheter Secured to Thigh Yes 7/18/2017  6:00 PM   Tamper Seal Intact Yes 7/18/2017  6:00 PM   Bag Below Bladder/Not on Floor Yes 7/18/2017  6:00 PM   Lack of Dependent Loop in Tubing Yes 7/18/2017  6:00 PM   Drainage Bag Less Than Half Full Yes 7/18/2017  6:00 PM   Sterile Solution Used for  Irrigation N/A 7/18/2017  6:00 PM   Urine Output (mL) 200 ml 7/18/2017  6:00 PM                Urinary Catheter [REMOVED] Condom Catheter 06/30/17-Criteria for Appropriate Use: Medically/surgically unstable, Strict I/Os  [REMOVED] Condom Catheter 07/05/17-Criteria for Appropriate Use: Comfort Care  [REMOVED] Condom Catheter 07/07/17-Criteria for Appropriate Use: Comfort Care  Condom Catheter 07/15/17-Criteria for Appropriate Use: Strict I/Os, Comfort Care  [REMOVED] Urinary Catheter 06/24/17 2- way; [employer]-Criteria for Appropriate Use: Medically/surgically unstable    Intake & Output   Date 07/18/17 0700 - 07/19/17 0659 07/19/17 0700 - 07/20/17 0659   Shift 8766-2512 8564-7290 24 Hour Total 3037-6565 3527-0942 24 Hour Total   I  N  T  A  K  E   I.V.  (mL/kg/hr) 1406.9  (1.8) 350  (0.4) 1756.9  (1.1)         Volume (0.9% sodium chloride infusion) 300  300         Volume (levETIRAcetam (KEPPRA) 1,500 mg in 0.9% sodium chloride IVPB) 100 100 200         Volume (valproate (DEPACON) 500 mg in 0.9% sodium chloride 50 mL IVPB) 50 50 100         Volume (ampicillin-sulbactam (UNASYN) 3 g in 0.9% sodium chloride (MBP/ADV) 100 mL) 200 200 400         Volume (fat emulsion 20% (LIPOSYN, INTRALIPID) infusion 250 mL) 62.8  62.8         Volume (TPN ADULT - CENTRAL AA 5% D20% W/ CA + ELECTROLYTES) 694.1  694.1       NG/GT  160 160         Water Flush Volume (mL) (PEG/Gastrostomy Tube 07/18/17)  160 160       Shift Total  (mL/kg) 1406.9  (21) 510  (7.6) 1916.9  (28.6)      O  U  T  P  U  T   Urine  (mL/kg/hr) 1575  (2) 150  (0.2) 1725  (1.1)         Urine Voided  150 150         Urine Occurrence(s)  5 x 5 x         Urine Output (mL) (Condom Catheter 07/15/17) 1575  1575       Stool            Stool Occurrence(s)  3 x 3 x       Shift Total  (mL/kg) 1575  (23.5) 150  (2.2) 1725  (25.8)      NET -168.1 360 191.9      Weight (kg) 67 67 67 67 67 67         Readmission Risk Assessment Tool Score Medium Risk            20       Total Score        3 Relationship with PCP    3 Patient Length of Stay > 5    4 More than 1 Admission in calendar year    5 Patient Insurance is Medicare, Medicaid or Self Pay    5 Charlson Comorbidity Score        Criteria that do not apply:    Patient Living Status       Expected Length of Stay 4d 4h   Actual Length of Stay 25

## 2017-07-19 NOTE — PROGRESS NOTES
PCU SHIFT NURSING NOTE      Bedside shift change report given to  (oncoming nurse) by Chaya Myers RN (offgoing nurse). Report included the following information SBAR, Kardex, ED Summary, Procedure Summary, Intake/Output, MAR, Recent Results and Cardiac Rhythm NSR. Shift Summary:   0715: Bedside shift change report given to Chaya Myers RN (oncoming nurse) by James Herrera RN (offgoing nurse). Report included the following information SBAR, Kardex, ED Summary, Procedure Summary, Intake/Output, MAR and Recent Results. Admission Date 6/24/2017   Admission Diagnosis Status epilepticus ([facility] [address]  dysphagia   Consults IP CONSULT TO NEUROLOGY  IP CONSULT TO INTENSIVIST  IP CONSULT TO PALLIATIVE CARE - PROVIDER  IP CONSULT TO GASTROENTEROLOGY  IP CONSULT TO PALLIATIVE CARE - PROVIDER  IP CONSULT TO OTOLARYNGOLOGY        Consults   []PT   []OT   []Speech   []Case Management      [] Palliative      Cardiac Monitoring Order   []Yes   []No     IV drips   []Yes    Drip:                            Dose:  Drip:                            Dose:  Drip:                            Dose:   []No     GI Prophylaxis   []Yes   []No         DVT Prophylaxis   SCDs:  Sequential Compression Device: Bilateral     Patient Refused VTE Prophylaxis: Yes    Han stockings:         [] Medication   []Contraindicated   []None      Activity Level Activity Level: Bed Rest     Activity Assistance: Complete care   Purposeful Rounding every 1-2 hour? []Yes   Edouard Score  Total Score: 3   Bed Alarm (If score 3 or >)   []Yes   [] Refused (See signed refusal form in chart)   Klever Score  Klever Score: 12   Klever Score (if score 14 or less)   []PMT consult   []Wound Care consult      []Specialty bed   [] Nutrition consult          Needs prior to discharge:   Home O2 required:    []Yes   []No    If yes, how much O2 required?     Other:    Last Bowel Movement: Last Bowel Movement Date: 07/18/17      Influenza Vaccine Received Flu Vaccine for Current Season (usually Sept-March): Not Flu Season        Pneumonia Vaccine           Diet Active Orders   Diet    DIET NPO      LDAs           Triple Lumen IJ 06/24/17 Right Internal jugular (Active)   Central Line Being Utilized Yes 7/18/2017  9:00 PM   Criteria for Appropriate Use Limited/no vessel suitable for conventional peripheral access 7/18/2017  9:00 PM   Site Assessment Clean, dry, & intact 7/18/2017  9:00 PM   Infiltration Assessment 0 7/18/2017  9:00 PM   Affected Extremity/Extremities Color distal to insertion site pink (or appropriate for race) 7/18/2017  9:00 PM   Date of Last Dressing Change 07/17/17 7/18/2017  9:00 PM   Dressing Status Loose 7/18/2017  9:00 PM   Dressing Type Transparent;Disk with Chlorhexadine gluconate (CHG) 7/18/2017  9:00 PM   Action Taken Dressing changed 7/18/2017  9:00 PM   Proximal Hub Color/Line Status White;Patent; Infusing 7/18/2017  9:00 PM   Positive Blood Return (Medial Site) Yes 7/18/2017  9:00 PM   Medial Hub Color/Line Status Blue;Patent; Flushed 7/18/2017  9:00 PM   Positive Blood Return (Lateral Site) Yes 7/18/2017  9:00 PM   Distal Hub Color/Line Status Brown;Patent; Flushed 7/18/2017  9:00 PM   Positive Blood Return (Site #3) Yes 7/18/2017  9:00 PM   External Catheter Length (cm) 0 centimeters 7/14/2017  8:09 AM   Alcohol Cap Used Yes 7/18/2017  9:00 PM              PEG/Gastrostomy Tube 07/18/17 (Active)   Site Assessment Clean, dry, & intact 7/18/2017  9:00 PM   Dressing Status Clean, dry, & intact 7/18/2017  9:00 PM   G Port Status Infusing 7/18/2017 10:12 PM   External Catheter Length (cm) 2 centimeters 7/18/2017 12:20 PM   Action Taken Feed set changed 7/18/2017 10:12 PM   Gastric Residual (mL) 0 ml 7/18/2017  9:00 PM   Tube Feeding/Formula Options Osmolite 1.2 7/18/2017 10:12 PM   Tube Feeding/Verify Rate (mL/hr) 30 7/19/2017  6:00 AM   Water Flush Volume (mL) 100 mL 7/19/2017  1:00 AM       Condom Catheter 07/15/17 (Active)   Criteria for Appropriate Use Strict I/Os; Comfort Care 7/18/2017  6:00 PM   Status Draining 7/18/2017  6:00 PM   Site Condition No abnormalities 7/18/2017  6:00 PM   Drainage Tube Clipped to Bed Yes 7/18/2017  6:00 PM   Catheter Secured to Thigh Yes 7/18/2017  6:00 PM   Tamper Seal Intact Yes 7/18/2017  6:00 PM   Bag Below Bladder/Not on Floor Yes 7/18/2017  6:00 PM   Lack of Dependent Loop in Tubing Yes 7/18/2017  6:00 PM   Drainage Bag Less Than Half Full Yes 7/18/2017  6:00 PM   Sterile Solution Used for  Irrigation N/A 7/18/2017  6:00 PM   Urine Output (mL) 200 ml 7/18/2017  6:00 PM                Urinary Catheter [REMOVED] Condom Catheter 06/30/17-Criteria for Appropriate Use: Medically/surgically unstable, Strict I/Os  [REMOVED] Condom Catheter 07/05/17-Criteria for Appropriate Use: Comfort Care  [REMOVED] Condom Catheter 07/07/17-Criteria for Appropriate Use: Comfort Care  Condom Catheter 07/15/17-Criteria for Appropriate Use: Strict I/Os, Comfort Care  [REMOVED] Urinary Catheter 06/24/17 2- way; [employer]-Criteria for Appropriate Use: Medically/surgically unstable    Intake & Output   Date 07/18/17 0700 - 07/19/17 0659 07/19/17 0700 - 07/20/17 0659   Shift 5293-6153 9391-6198 24 Hour Total 7680-5745 7013-5189 24 Hour Total   I  N  T  A  K  E   I.V.  (mL/kg/hr) 1406.9  (1.8) 350  (0.4) 1756.9  (1.1)         Volume (0.9% sodium chloride infusion) 300  300         Volume (levETIRAcetam (KEPPRA) 1,500 mg in 0.9% sodium chloride IVPB) 100 100 200         Volume (valproate (DEPACON) 500 mg in 0.9% sodium chloride 50 mL IVPB) 50 50 100         Volume (ampicillin-sulbactam (UNASYN) 3 g in 0.9% sodium chloride (MBP/ADV) 100 mL) 200 200 400         Volume (fat emulsion 20% (LIPOSYN, INTRALIPID) infusion 250 mL) 62.8  62.8         Volume (TPN ADULT - CENTRAL AA 5% D20% W/ CA + ELECTROLYTES) 694.1  694.1       NG/GT  160 160         Water Flush Volume (mL) (PEG/Gastrostomy Tube 07/18/17)  160 160       Shift Total  (mL/kg) 1406.9  (21) 510  (7.6) 1916.9  (28.6) O  U  T  P  U  T   Urine  (mL/kg/hr) 1575  (2) 150  (0.2) 1725  (1.1)         Urine Voided  150 150         Urine Occurrence(s)  5 x 5 x         Urine Output (mL) (Condom Catheter 07/15/17) 1575  1575       Stool            Stool Occurrence(s)  3 x 3 x       Shift Total  (mL/kg) 1575  (23.5) 150  (2.2) 1725  (25.8)      NET -168.1 360 191.9      Weight (kg) 67 67 67 67 67 67         Readmission Risk Assessment Tool Score Medium Risk            20       Total Score        3 Relationship with PCP    3 Patient Length of Stay > 5    4 More than 1 Admission in calendar year    5 Patient Insurance is Medicare, Medicaid or Self Pay    5 Charlson Comorbidity Score        Criteria that do not apply:    Patient Living Status       Expected Length of Stay 4d 4h   Actual Length of Stay 25

## 2017-07-19 NOTE — PROGRESS NOTES
Follow up with pt's wife who was at bedside. Provided empathetic listening as pt shared her concerns about her daughter Chick Lucas) being squeezed between parenting duties (pt's 6 y.o. grandson) and helping with transportation and support for her parents (pt & wife.)   Provided prayer at bedside which included strength for [place]. Reviewed good self-care with . Tosha Soliz who reported she was doing a little better asking for the help that she needs.  suggested keeping a list in mind of things friends/family can do to help out when offers of help come in. Wife expressed appreciation for the visit. Anne-Marie Beltran will continue to follow up as possible. For Spiritual Care support please page [phone]). Tracey Cartagena M.Div.   Palliative  Fellow

## 2017-07-19 NOTE — PROGRESS NOTES
Occupational Therapy: OT noted PT note from this am, looking for nurse in room, sitter reported patient still very lethargic--patient not alert enough for participation in therapy at this time. Will continue to follow.   Alyson Barragan OTR/L

## 2017-07-20 LAB
25(OH)D2 SERPL-MCNC: <1 NG/ML
25(OH)D3 SERPL-MCNC: 6.5 NG/ML
25(OH)D3+25(OH)D2 SERPL-MCNC: 6.5 NG/ML
GLUCOSE BLD STRIP.AUTO-MCNC: 113 MG/DL (ref 65–100)
GLUCOSE BLD STRIP.AUTO-MCNC: 125 MG/DL (ref 65–100)
GLUCOSE BLD STRIP.AUTO-MCNC: 130 MG/DL (ref 65–100)
GLUCOSE BLD STRIP.AUTO-MCNC: 137 MG/DL (ref 65–100)
INTERPRETATION, NEU2LT: NORMAL
NEURONAL CELL AB, NEU1LT: 13 UNITS (ref 0–54)
SERVICE CMNT-IMP: ABNORMAL

## 2017-07-20 PROCEDURE — 74011250637 HC RX REV CODE- 250/637: Performed by: INTERNAL MEDICINE

## 2017-07-20 PROCEDURE — 65660000000 HC RM CCU STEPDOWN

## 2017-07-20 PROCEDURE — 92526 ORAL FUNCTION THERAPY: CPT

## 2017-07-20 PROCEDURE — 74011250637 HC RX REV CODE- 250/637: Performed by: NURSE PRACTITIONER

## 2017-07-20 PROCEDURE — 94640 AIRWAY INHALATION TREATMENT: CPT

## 2017-07-20 PROCEDURE — 82962 GLUCOSE BLOOD TEST: CPT

## 2017-07-20 PROCEDURE — 74011250636 HC RX REV CODE- 250/636: Performed by: PSYCHIATRY & NEUROLOGY

## 2017-07-20 PROCEDURE — 74011250636 HC RX REV CODE- 250/636: Performed by: INTERNAL MEDICINE

## 2017-07-20 PROCEDURE — 74011000250 HC RX REV CODE- 250: Performed by: PSYCHIATRY & NEUROLOGY

## 2017-07-20 PROCEDURE — 77010033678 HC OXYGEN DAILY

## 2017-07-20 PROCEDURE — 74011000258 HC RX REV CODE- 258: Performed by: INTERNAL MEDICINE

## 2017-07-20 PROCEDURE — 74011000250 HC RX REV CODE- 250: Performed by: INTERNAL MEDICINE

## 2017-07-20 PROCEDURE — 74011000258 HC RX REV CODE- 258: Performed by: PSYCHIATRY & NEUROLOGY

## 2017-07-20 PROCEDURE — 97110 THERAPEUTIC EXERCISES: CPT | Performed by: OCCUPATIONAL THERAPIST

## 2017-07-20 PROCEDURE — 97530 THERAPEUTIC ACTIVITIES: CPT

## 2017-07-20 PROCEDURE — 97530 THERAPEUTIC ACTIVITIES: CPT | Performed by: OCCUPATIONAL THERAPIST

## 2017-07-20 PROCEDURE — 77030018798 HC PMP KT ENTRL FED COVD -A

## 2017-07-20 RX ORDER — LANOLIN ALCOHOL/MO/W.PET/CERES
100 CREAM (GRAM) TOPICAL DAILY
Status: DISCONTINUED | OUTPATIENT
Start: 2017-07-21 | End: 2017-07-22 | Stop reason: HOSPADM

## 2017-07-20 RX ORDER — METOPROLOL TARTRATE 25 MG/1
12.5 TABLET, FILM COATED ORAL 2 TIMES DAILY
Status: DISCONTINUED | OUTPATIENT
Start: 2017-07-20 | End: 2017-07-22 | Stop reason: HOSPADM

## 2017-07-20 RX ORDER — QUETIAPINE FUMARATE 25 MG/1
25 TABLET, FILM COATED ORAL
Status: DISCONTINUED | OUTPATIENT
Start: 2017-07-20 | End: 2017-07-22 | Stop reason: HOSPADM

## 2017-07-20 RX ORDER — VALPROIC ACID 250 MG/5ML
500 SOLUTION ORAL 3 TIMES DAILY
Status: DISCONTINUED | OUTPATIENT
Start: 2017-07-20 | End: 2017-07-22 | Stop reason: HOSPADM

## 2017-07-20 RX ADMIN — Medication 10 ML: at 21:31

## 2017-07-20 RX ADMIN — LEVETIRACETAM 1500 MG: 100 SOLUTION ORAL at 21:25

## 2017-07-20 RX ADMIN — VALPROATE SODIUM 500 MG: 100 INJECTION, SOLUTION INTRAVENOUS at 05:04

## 2017-07-20 RX ADMIN — IPRATROPIUM BROMIDE AND ALBUTEROL SULFATE 3 ML: .5; 3 SOLUTION RESPIRATORY (INHALATION) at 15:06

## 2017-07-20 RX ADMIN — ENOXAPARIN SODIUM 40 MG: 40 INJECTION SUBCUTANEOUS at 09:15

## 2017-07-20 RX ADMIN — NITROFURANTOIN MONOHYDRATE/MACROCRYSTALLINE 100 MG: 25; 75 CAPSULE ORAL at 16:39

## 2017-07-20 RX ADMIN — Medication 10 ML: at 16:38

## 2017-07-20 RX ADMIN — METOPROLOL TARTRATE 5 MG: 5 INJECTION INTRAVENOUS at 04:49

## 2017-07-20 RX ADMIN — LATANOPROST 1 DROP: 50 SOLUTION OPHTHALMIC at 18:40

## 2017-07-20 RX ADMIN — VALPROIC ACID 500 MG: 250 SOLUTION ORAL at 22:55

## 2017-07-20 RX ADMIN — GLYCOPYRROLATE 0.2 MG: 0.2 INJECTION, SOLUTION INTRAMUSCULAR; INTRAVENOUS at 09:15

## 2017-07-20 RX ADMIN — QUETIAPINE FUMARATE 25 MG: 25 TABLET, FILM COATED ORAL at 21:23

## 2017-07-20 RX ADMIN — LEVETIRACETAM 1500 MG: 100 INJECTION, SOLUTION, CONCENTRATE INTRAVENOUS at 09:29

## 2017-07-20 RX ADMIN — Medication 10 ML: at 05:04

## 2017-07-20 RX ADMIN — Medication 10 ML: at 16:37

## 2017-07-20 RX ADMIN — IPRATROPIUM BROMIDE AND ALBUTEROL SULFATE 3 ML: .5; 3 SOLUTION RESPIRATORY (INHALATION) at 01:26

## 2017-07-20 RX ADMIN — NITROFURANTOIN MONOHYDRATE/MACROCRYSTALLINE 100 MG: 25; 75 CAPSULE ORAL at 09:15

## 2017-07-20 RX ADMIN — GLYCOPYRROLATE 0.2 MG: 0.2 INJECTION, SOLUTION INTRAMUSCULAR; INTRAVENOUS at 22:28

## 2017-07-20 RX ADMIN — VALPROATE SODIUM 500 MG: 100 INJECTION, SOLUTION INTRAVENOUS at 16:37

## 2017-07-20 RX ADMIN — METOPROLOL TARTRATE 12.5 MG: 25 TABLET ORAL at 21:22

## 2017-07-20 RX ADMIN — ACETAMINOPHEN 650 MG: 650 SUPPOSITORY RECTAL at 00:33

## 2017-07-20 RX ADMIN — THIAMINE HYDROCHLORIDE 100 MG: 100 INJECTION, SOLUTION INTRAMUSCULAR; INTRAVENOUS at 09:30

## 2017-07-20 RX ADMIN — Medication 20 ML: at 16:38

## 2017-07-20 RX ADMIN — IPRATROPIUM BROMIDE AND ALBUTEROL SULFATE 3 ML: .5; 3 SOLUTION RESPIRATORY (INHALATION) at 19:43

## 2017-07-20 RX ADMIN — IPRATROPIUM BROMIDE AND ALBUTEROL SULFATE 3 ML: .5; 3 SOLUTION RESPIRATORY (INHALATION) at 08:10

## 2017-07-20 RX ADMIN — GLYCOPYRROLATE 0.2 MG: 0.2 INJECTION, SOLUTION INTRAMUSCULAR; INTRAVENOUS at 16:39

## 2017-07-20 NOTE — PROGRESS NOTES
Nutrition Assessment:    INTERVENTIONS/RECOMMENDATIONS:   Enteral/Parenteral Nutrition: Modify rate, concentration, composition, and schedule: Continue Osmolite 1.2 @ 60 mL/hr; recommend increasing water flushes to 100 mL q 4 hours to better meet fluid needs (Will provide 1781 mL water)    ASSESSMENT:   Chart reviewed; patient is s/p PEG placement. TPN discontinued. Osmolite 1.2 has been increased to goal rate of 60 mL/hr. Patient is tolerating TF well without residual. Recommendations provided above to increase frequency of water flushes to better meet fluid needs. Will continue to monitor tolerance and provide further recommendations as needed. Diet Order: NPO (Osmolite 1.2 @ 60 mL/hr + 100 mL water flushes q 6 hours; provides 1728 kcal, 80g protein, 1581 mL water)  % Eaten:  No data found. Pertinent Medications: [x] Reviewed []Other: Keppra, Lopressor, Seroquel, Thiamine   Pertinent Labs: [x]Reviewed  []Other:  [phone]  Food Allergies: [x]None []Other:     Last BM: 7/18   [x]Active     []Hyperactive  []Hypoactive       [] Absent  BS  Skin:    [x] Intact   [] Incision  [] Breakdown   []Edema   []Other:    Anthropometrics: Height: 5' 8\" (172.7 cm) Weight: 67 kg (147 lb 9.6 oz)    IBW (%IBW):   ( ) UBW (%UBW):   (  %)    BMI: Body mass index is 22.44 kg/(m^2). This BMI is indicative of:  []Underweight   [x]Normal   []Overweight   [] Obesity   [] Extreme Obesity (BMI>40)  Last Weight Metrics:  Weight Loss Metrics 7/18/2017 6/21/2017 3/21/2017 3/18/2017 3/7/2017 2/25/2017 9/3/2012   Today's Wt 147 lb 9.6 oz 155 lb 166 lb 150 lb 166 lb 168 lb 192 lb   BMI 22.44 kg/m2 23.57 kg/m2 25.24 kg/m2 22.81 kg/m2 24.51 kg/m2 24.81 kg/m2 25.34 kg/m2       Estimated Nutrition Needs (Based on): 1497 Kcals/day (BMR (1345) x 1. 3AF) , 67 g (-80g (1.0-1.2 g/kg bw)) Protein  Carbohydrate:  At Least 130 g/day  Fluids: 1750 mL/day     Pt expected to meet estimated nutrient needs: [x]Yes []No    NUTRITION DIAGNOSES: Problem:  Swallowing difficulty      Etiology: related to current medical condition     Signs/Symptoms: as evidenced by NPO status, PEG Placement       NUTRITION INTERVENTIONS:   Enteral/Parenteral Nutrition: Modify rate, concentration, composition, and schedule                GOAL:   Patient will continue to tolerate TF @ goal with residual <250 mL next 2-4 days    NUTRITION MONITORING AND EVALUATION   Behavioral-Environmental Outcomes: Behavior  Food/Nutrient Intake Outcomes: Enteral/parenteral nutrition intake  Physical Signs/Symptoms Outcomes: Weight/weight change, GI profile, Electrolyte and renal profile, Glucose profile    Previous Goal Met:   [x] Met              [] Progressing Towards Goal              [] Not Progressing Towards Goal   Previous Recommendations:   [x] Implemented          [] Not Implemented          [] Not Applicable    LEARNING NEEDS (Diet, Food/Nutrient-Drug Interaction):    [x] None Identified   [] Identified and Education Provided/Documented   [] Identified and Pt declined/was not appropriate     Cultural, Voodoo, OR Ethnic Dietary Needs:    [x] None Identified   [] Identified and Addressed     [x] Interdisciplinary Care Plan Reviewed/Documented    [x] Discharge Planning: Continue current TF regimen   [x] Participated in Interdisciplinary Rounds    NUTRITION RISK:    [x] High              [] Moderate           []  Low  []  Minimal/Uncompromised      Willy Dickinson  Pager [phone]              Weekend Pager [phone]

## 2017-07-20 NOTE — PROGRESS NOTES
Brief Progress Note    Chart reviewed. Family meeting scheduled for tomorrow, 07/21/2017, @ 7319.       Should you have questions/concerns or the need for a bedside visit by our team, please do not hesitate to contact us at [phone]). Thank you for providing us w/ the continued opportunity to be involved in this patient's care.       Kaleigh Gunter MD  Palliative Care Team

## 2017-07-20 NOTE — PROGRESS NOTES
Problem: Self Care Deficits Care Plan (Adult)  Goal: *Acute Goals and Plan of Care (Insert Text)  Occupational Therapy Goals  Weekly reassessment 7/20/2017  1. Patient will perform grooming at a moderate assist seated edge of bed or without back support within 7 days  2. Patient will perform transfer to [facility] with least restrictive DME with moderate assistance within 7 days. 3. Patient will follow two step commands 80% for increased independence with ADls within 7 days. 4. Patient will perform bathing with moderate assist within 7 days. 5. Patient will perform toileting with max assist within 7 days. Weekly reassessment 7/11/2017  1. Patient will perform self-feeding with maximal assistance within 7 day(s). Discontinue 7/20/2017  2. Patient will perform transfer to [facility] with least restrictive DME with moderate assistance within 7 days. 3. Patient will be able to follow one step verbal commands with minimum verbal cues, within 7 days. Met 7/20/2017  4. Patient will perform LB ADLs with moderate assistance seated within 7 days. Discontinue 7/20/2017  5. Patient will perform toileting hygiene with moderate assistance within 7 days. Discontinue 7/20/2017    Initiated 6/30/2017, goals modified above  1. Patient will perform self-feeding with maximal assistance within 7 day(s). (change to supervision/setup 7/11/17)  2. Patient will be able to sit on EOB for 5 minutes with min assist in prep for ADLs and within 7 days. (MET 7/11/17)  3.  Patient will be able to follow one step verbal commands with max verbal cues, within 7 days (Change minimum 7/11/17)     Sandy Matthew  Patient: Ana Ball (52 y.o. male)  Date: 7/20/2017  Diagnosis: Status epilepticus ([facility] [address]  dysphagia <principal problem not specified>  Procedure(s) (LRB):  ESOPHAGOGASTRODUODENOSCOPY (EGD) (N/A)  PERCUTANEOUS ENDOSCOPIC GASTROSTOMY TUBE INSERTION (N/A) 2 Days Post-Op  Precautions: Fall      ASSESSMENT :  Based on the objective data described below, the patient presents with confusion and was perseverating over walking into the bathroom or getting on the \"big couch. \"  Pt needed redirection but pt persisted. Pt was weaned to room air this session and O2 sat was % (nurse notified that pt was weaned and pt was on continuous pulse ox at end of session and was stable). He was oriented to self and place only. Disoriented to situation but was able to communicate with therapist today. Pt has met command following goal (following one step commands) otherwise all other goals have not been met and have been adjusted. Pt has been unable to participate since previous re-eval due to inability to follow commands and or lethargy. Pt now has a PEG tube and is NPO. Pt is performing UB ADLs at a max/total assist level and lower body ADLs at a total assist level. Max assist x2 for squat pivot transfers without assist devices and pt needs therapist to place LE in the correct position for most effective stand. With attempt at sit to stands x4 today pt was attempting to take steps and walk prior to being fully upright. Pt will need SNF for rehab at discharge. Supine 146/63  Seated 113/67   Seated at bedside chair with legs elevated 140/78     Patient will benefit from skilled intervention to address the above impairments.   Patients rehabilitation potential is considered to be Fair  Factors which may influence rehabilitation potential include:   [ ]                None noted  [X]                Mental ability/status  [ ]                Medical condition  [ ]                Home/family situation and support systems  [ ]                Safety awareness  [ ]                Pain tolerance/management  [ ]                Other:        PLAN :  Recommendations and Planned Interventions:  [X]                  Self Care Training                  [X]           Therapeutic Activities  [X]                  Functional Mobility Training    [X] Cognitive Retraining  [X]                  Therapeutic Exercises           [ ]           Endurance Activities  [X]                  Balance Training                   [X]           Neuromuscular Re-Education  [ ]                  Visual/Perceptual Training     [X]      Home Safety Training  [X]                  Patient Education                 [X]           Family Training/Education  [ ]                  Other (comment):     Frequency/Duration: Patient will be followed by occupational therapy 3 times a week to address goals. Discharge Recommendations: Skilled Nursing Facility  Further Equipment Recommendations for Discharge: TBD       SUBJECTIVE:   Patient stated I want walk to the bathroom.       OBJECTIVE DATA SUMMARY:   Hospital course since last seen and reason for reevaluation: unable to participate due to mental status and lethargy; had PEG tube placed and is now NPO  Cognitive/Behavioral Status:  Neurologic State: Alert  Orientation Level: Oriented to person;Oriented to place; Disoriented to situation;Disoriented to time  Cognition: Decreased attention/concentration;Decreased command following;Poor safety awareness; Impulsive; Impaired decision making; Other (comment) (only follows one step commands)  Perception: Appears intact  Perseveration: Perseverates during conversation;Perseverates during mobility;Perseverates during ADLS  Safety/Judgement: Lack of insight into deficits  Vision/Perceptual:                           Acuity:  (wife reports pt has impaired vision; difficult to assess )          Range of Motion:     AROM: Generally decreased, functional                       Strength:     Strength: Generally decreased, functional              Coordination:  Coordination: Generally decreased, functional  Fine Motor Skills-Upper: Left Impaired;Right Impaired (key to note 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)    Gross Motor Skills-Upper: Left Impaired;Right Impaired (slow)  Tone & Sensation:     Tone: Normal  Sensation:  (unable to accurately test)                       Balance:  Sitting - Static: Fair (occasional)  Sitting - Dynamic: Fair (occasional)  Standing - Static: Constant support;Poor (heavy posterior lean and attempting to walk)  Standing - Dynamic : None     Functional Mobility and Transfers for ADLs:  Bed Mobility:  Rolling: Minimum assistance  Supine to Sit: Minimum assistance  Scooting: Maximum assistance     Transfers:  Sit to Stand: Maximum assistance;Assist x2 (flexed knees; pt attempting to walk prior to full standing)  Functional Transfers  Toilet Transfer : Maximum assistance;Assist x2     ADL Assessment:  Feeding:  (NPO and now has PEG)     Oral Facial Hygiene/Grooming: Maximum assistance; Total assistance (basic grooming max assist otherwise total )     Bathing: Total assistance     Upper Body Dressing: Total assistance     Lower Body Dressing: Total assistance     Toileting: Total assistance              ADL Intervention:  Focus on following one and two step commands. Pt was able to follow one step commands but was unable to follow two step commands this session. AROM and AAROM BUE performed and pt needed hand over hand assist at times due to cogntion. Pt would also perseverate on previous exercise. Performed sit to stands x4 in prep for functional ADLS. Pt needed therapist to manually place LE in correct position for sit to stand and max assist x2 needed for squat stance and pt was attempting to take steps and walk prior to being fully upright.   This occurred on each trial.     Squat pivot to right performed with max assist x2  Cognitive Retraining  Safety/Judgement: Lack of insight into deficits     Barthel Index:      Bathin  Bladder: 0  Bowels: 0  Groomin  Dressin  Feedin  Mobility: 0  Stairs: 0  Toilet Use: 0  Transfer (Bed to Chair and Back): 5  Total: 5         Barthel and G-code impairment scale:  Percentage of impairment CH  0% CI  1-19% CJ  20-39% CK  40-59% CL  60-79% CM  80-99% CN  100%   Barthel Score 0-100 100 99-80 79-60 59-40 20-39 1-19    0   Barthel Score 0-20 20 17-19 13-16 9-12 5-8 1-4 0      The Barthel ADL Index: Guidelines  1. The index should be used as a record of what a patient does, not as a record of what a patient could do. 2. The main aim is to establish degree of independence from any help, physical or verbal, however minor and for whatever reason. 3. The need for supervision renders the patient not independent. 4. A patient's performance should be established using the best available evidence. Asking the patient, friends/relatives and nurses are the usual sources, but direct observation and common sense are also important. However direct testing is not needed. 5. Usually the patient's performance over the preceding 24-48 hours is important, but occasionally longer periods will be relevant. 6. Middle categories imply that the patient supplies over 50 per cent of the effort. 7. Use of aids to be independent is allowed. Opal Kilgore, Barthel, D.W. (7073). Functional evaluation: the Barthel Index. [address] [facility] (14)2. Elaine Fang velasquez HUBER Godinez, Alirio Garcia., Lisa Gross., [place], [address] [place] [address] (1999). Measuring the change indisability after inpatient rehabilitation; comparison of the responsiveness of the Barthel Index and Functional [place] Measure. Journal of Neurology, Neurosurgery, and Psychiatry, 66(4), 336-342. Nelly Griffin, N.J.A, GLADYS Richey, & Freddy Gabriel M.A. (2004.) Assessment of post-stroke quality of life in cost-effectiveness studies: The usefulness of the Barthel Index and the EuroQoL-5D. Quality of Life Research, 13, 427-43      Pain:  Pain Scale 1: Numeric (0 - 10)  Pain Intensity 1: 0              Activity Tolerance:      Please refer to the flowsheet for vital signs taken during this treatment.   After treatment:   [X] Patient left in no apparent distress sitting up in recliner chair; legs elevated  [ ] Patient left in no apparent distress in bed  [X] Call bell left within reach  [X] Nursing notified  [X] Caregiver present/wife and sitter  [ ] Bed alarm activated (does not have alarm and has sitter at bedside)      COMMUNICATION/EDUCATION:   The patients plan of care was discussed with: Physical Therapist, Registered Nurse, Physician and patient and his wife. [ ]    Home safety education was provided and the patient/caregiver indicated understanding. [ ]    Patient/family have participated as able in goal setting and plan of care. [X]    family agree to work toward stated goals and plan of care. [ ]    Patient understands intent and goals of therapy, but is neutral about his/her participation. [X]    Patient is unable to participate in goal setting and plan of care. This patients plan of care is appropriate for delegation to [facility] [place].      Thank you for this referral.  Mir De Jesus, OTR/L  Time Calculation: 24 mins

## 2017-07-20 NOTE — PROGRESS NOTES
Hospitalist Progress Note    NAME: Hilda Monroy   :  1934   MRN:  [id number]       Assessment / Plan:  Acute encephalopathy due to status epilepticus in setting of seizure d/o:  Out of lyrica for a few days prior to admission - has also preceeded other admissions for sz. Pt continues to be below most recent baseline with worsened dysarthria bulbar weakness the last few days. More alert today with ongoing oropharyngeal sx.but cough seems to be stronger. Still needs suctioning.  - CT head  no acute process  - MRI brain  with no evidence of acute infarction. No acute intracranial process. Moderate cerebral atrophy and mild chronic microvascular ischemic change. - CXR  with improved right upper lobe airspace disease and left basilar atelectasis. - appreciate neuro consult, adjusted keppra and added valproate    STOP ALL BENZO's  , start Seroquel hs instead   CM to start looking for bed to SNF for discharge    Fever (resolved) presumed due to aspiration pneumonitis :  Temp 99.5 7/15  - CXR nonacute as above  E. Coli and enterococcus UTI by C/S,  [facility] for antibiotic recommendation  IV Ceftriaxone and Nitrofurantoin  - con't unasyn for possible aspiration, improving today     Dysphagia/dyarthria with resultant moderate protein calorie malnutrition:    S/P Peg tube placement had BM after placement and has good Bowel sounds  Will star Osmolite VERY slowly, watch for aspiration  - exam by ENT today with no structural reason for dysphagia. Ken recommended. OFF TPN and Started TF Osmolynte on  and tolerating well  Now on 40cc/hour    Hypertension, benign/essential: more stable  - con't IV scheduled metoprolol (on po metoprolol at home)  - prn IV hydralazine  - PT/OT and speech appreciated.  Plan for [place] SNF on discharge.   Hyperlipidemia: holding lipitor while NPO  History of SVT:  Briefly on cardizem gtt this admission  - serial troponin and EKG ordered with lower chest/abd pain complaints today  Peripheral neuropathy: ran out of lyrica several days PTA.  Needs to be restarted as soon as enteral access established due to ongoing leg sx. 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  Con't thiamine supplementation. GERD: con't protonix  Acute respiratory failure (resolved) due to acute encephalopathy with chronic underlying hypercapnea: intubated initially during admission.  Duonebs changed to prn only. Pt self-extubated 6/24; finished zosyn 7/09.      DVT prophylaxis: lovenox    Code status: Full (wife is decision maker)     Subjective:     Chief Complaint / Reason for Physician Visit  Alert, no complaints today. Loud oropharyngeal noise/secretions but Cough seems to be . Discussed with RN events overnight. Review of Systems: confused  Symptom Y/N Comments  Symptom Y/N Comments   Fever/Chills    Chest Pain     Poor Appetite    Edema     Cough    Abdominal Pain     Sputum    Joint Pain     SOB/AL    Pruritis/Rash     Nausea/vomit    Tolerating PT/OT y    Diarrhea    Tolerating Diet  Still NPO   Constipation    Other       Could NOT obtain due to: encephalopathy     Objective:     VITALS:   Last 24hrs VS reviewed since prior progress note. Most recent are:  Patient Vitals for the past 24 hrs:   Temp Pulse Resp BP SpO2   07/20/17 1129 98.4 °F (36.9 °C) 72 20 139/67 98 %   07/20/17 0800 98.4 °F (36.9 °C) 60 20 149/58 100 %   07/20/17 0449 - 89 - 145/66 -   07/20/17 0400 98.5 °F (36.9 °C) 67 20 145/66 100 %   07/20/17 0126 - - - - 100 %   07/19/17 2300 98.3 °F (36.8 °C) 68 20 121/56 100 %   07/19/17 2147 - 64 - 152/65 -   07/19/17 2008 - - - - 100 %   07/19/17 2000 98.2 °F (36.8 °C) 65 24 139/59 100 %       Intake/Output Summary (Last 24 hours) at 07/20/17 1828  Last data filed at 07/20/17 1637   Gross per 24 hour   Intake              770 ml   Output                0 ml   Net              770 ml        PHYSICAL EXAM:  General: WD, WN.  Alert, much less confused no acute distress    EENT:  EOMI. Anicteric sclerae. MMM. Gurgling from throat. Resp:  CTA bilaterally, no wheezing or rales. No accessory muscle use  CV:  Regular  rhythm,  No edema  GI:  Soft, Non distended, Non tender.  +Bowel sounds  Neurologic:  Alert and oriented X 1-2, slurred speech,   Psych:   Some insight. Not anxious nor agitated  Skin:  No rashes. No jaundice    Reviewed most current lab test results and cultures  YES  Reviewed most current radiology test results   YES  Review and summation of old records today    NO  Reviewed patient's current orders and MAR    YES  PMH/SH reviewed - no change compared to H&P  ________________________________________________________________________  Care Plan discussed with:    Comments   Patient x    Family  y  WIFE Bedside   RN x    Care Manager     Consultant  y PT                     Multidiciplinary team rounds were held today with , nursing, pharmacist and clinical coordinator. Patient's plan of care was discussed; medications were reviewed and discharge planning was addressed. ________________________________________________________________________  Total NON critical care TIME:  25 Minutes    Total CRITICAL CARE TIME Spent:   Minutes non procedure based      Comments   >50% of visit spent in counseling and coordination of care x    ________________________________________________________________________  Mark Lynn MD     Procedures: see electronic medical records for all procedures/Xrays and details which were not copied into this note but were reviewed prior to creation of Plan. LABS:  I reviewed today's most current labs and imaging studies. Pertinent labs include:  No results for input(s): WBC, HGB, HCT, PLT, HGBEXT, HCTEXT, PLTEXT, HGBEXT, HCTEXT, PLTEXT in the last 72 hours.   Recent Labs      07/18/17   0435   NA  136   K  3.7   CL  94*   CO2  38*   GLU  97   BUN  14   CREA  0.46*   CA  9.2   MG  1.5*   PHOS  2.8 Signed: Juaquin Lawrence MD

## 2017-07-20 NOTE — PROGRESS NOTES
Problem: Dysphagia (Adult)  Goal: *Acute Goals and Plan of Care (Insert Text)  Speech path reeval   1. Patient will participate with reeval of swallowing daily. Speech pathology goals  Initiated 6/30/2017, Re-eval 7/6/2017   1. Patient will participate in re-evaluation of swallow function daily continued 7/6/2017. Goal to continue        SPEECH LANGUAGE PATHOLOGY DYSPHAGIA TREATMENT: WEEKLY REASSESSMENT  Patient: Rodrigo Parisi (17 y.o. male)  Date: 7/20/2017  Diagnosis: Status epilepticus ([facility] [address]  dysphagia <principal problem not specified>  Procedure(s) (LRB):  ESOPHAGOGASTRODUODENOSCOPY (EGD) (N/A)  PERCUTANEOUS ENDOSCOPIC GASTROSTOMY TUBE INSERTION (N/A) 2 Days Post-Op  Precautions: aspiration Fall      ASSESSMENT:  Pt is more attentive and communicating more with staff. His speech continues to be severely dysarthric with fast rate, imprecise artic, low volume  and significantly reduced intelligibility. He manipulated the ice chips slowly  but little mastication if any. Slow posterior propulsion with good hyolaryngeal excursion. Delayed cough after the swallow most likely due to reduced laryngeal sensation. Did not offer any other texture due to safety/aspiration concern. His teeth were brushed as well as his tongue. He had thick coating on his tongue that was partially cleared with brushing and the [place]. He tends to sit with an open mouth posture but could close it on command and physical cues. Patient's progression toward goals since last assessment: fair       PLAN:  Goals have been updated based on progression since last assessment. Patient continues to benefit from skilled intervention to address the above impairments. Continue to follow the patient 3 times a week to address goals. Recommendations and Planned Interventions:  Continue NPO  Oral care; he is a mouth breather and on O2. Discharge Recommendations: None       SUBJECTIVE:   Patient continues with dysarthria.       OBJECTIVE:   Cognitive and Communication Status:  Neurologic State: Alert  Orientation Level: Oriented to person, Oriented to situation, Oriented to place  Cognition: Follows commands, Impaired decision making, Impulsive, Poor safety awareness  Perception: Appears intact  Perseveration: Perseverates during conversation  Safety/Judgement: Lack of insight into deficits, Decreased awareness of need for safety  Dysphagia Treatment:  Oral Assessment:     P.O. Trials:  Patient Position: upright in bed  Vocal quality prior to P.O.: Low volume  Consistency Presented: Ice chips  How Presented: Spoon;SLP-fed/presented     Bolus Acceptance: Impaired  Bolus Formation/Control: Impaired  Type of Impairment: Delayed  Propulsion: Delayed (# of seconds)  Oral Residue: None  Initiation of Swallow: Delayed (# of seconds)  Laryngeal Elevation: Decreased;Weak        Effective Modifications: None  Cues for Modifications: None     Oral Phase Severity: Moderate  Pharyngeal Phase Severity : Moderate           Pain:  Pain Scale 1: Numeric (0 - 10)  Pain Intensity 1: 0     After treatment:   [ ]                Patient left in no apparent distress sitting up in chair  [X]                Patient left in no apparent distress in bed  [X]                Call bell left within reach  [X]                Nursing notified  [ ]                Caregiver present  [ ]                Bed alarm activated      COMMUNICATION/EDUCATION:   Pt is nto able to participate wht education due to cognitive deficits  The patients plan of care including recommendations, planned interventions, and recommended diet changes were discussed with: Registered Nurse. [X]                Posted safety precautions in patient's room.      Manual Alex SLP  Time Calculation: 15 mins

## 2017-07-20 NOTE — PROGRESS NOTES
Problem: Mobility Impaired (Adult and Pediatric)  Goal: *Acute Goals and Plan of Care (Insert Text)  Physical Therapy Goals  Initiated 6/30/2017 re-evaluated on 7/20/2017   1. Patient will move from supine to sit and sit to supine , scoot up and down and roll side to side in bed with supervision/set-up within 7 day(s). 2. Patient will transfer from bed to chair and chair to bed with minimal assistance/contact guard assist using the least restrictive device within 7 day(s). 3. Patient will perform sit to stand with minimal assistance/contact guard assist within 7 day(s). 4. Patient will demonstrate intact sitting balance without support within 7 days. Revised 7/11/2017  1. Patient will move from supine to sit and sit to supine , scoot up and down and roll side to side in bed with supervision/set-up within 7 day(s). 2. Patient will transfer from bed to chair and chair to bed with contact guard assist using the least restrictive device within 7 day(s). 3. Patient will perform sit to stand with contact guard assist within 7 day(s). 4. Patient will ambulate with minimal assistance/contact guard assist for 25 feet with the least restrictive device within 7 day(s). PHYSICAL THERAPY TREATMENT: WEEKLY REASSESSMENT  Patient: Aretha Eisenmenger (1 y.o. male)  Date: 7/20/2017  Diagnosis: Status epilepticus ([facility] [address]  dysphagia <principal problem not specified>  Procedure(s) (LRB):  ESOPHAGOGASTRODUODENOSCOPY (EGD) (N/A)  PERCUTANEOUS ENDOSCOPIC GASTROSTOMY TUBE INSERTION (N/A) 2 Days Post-Op  Precautions: Fall      ASSESSMENT:  Patient received resting in bed, more alert and conversive, oriented to self and place only. Patient required min A for bed mobility. Patient with fair sitting balance although with LE extension. Patient able to stand with max A x 2 and performed SPT to the chair. Patient with flexed trunk but extension in BLE with constant posterior trunk lean in standing.  Patient performed another stand from the chair, still requiring max A x 2. Patient left sitting in the chair in the reclined position with wife and sitter present. PT continues to recommend SNF at discharge. Patient's progression toward goals since last assessment: patient met 0/5 goals and has been very confused since last session. Patient more alert and appropriate this date       PLAN:  Goals have been updated based on progression since last assessment. Patient continues to benefit from skilled intervention to address the above impairments. Continue to follow the patient 1-2 times per day/4-7 days per week to address goals. Planned Interventions:  [X]              Bed Mobility Training             [ ]       Neuromuscular Re-Education  [X]              Transfer Training                   [ ]       Orthotic/Prosthetic Training  [ ]              Gait Training                         [ ]       Modalities  [X]              Therapeutic Exercises           [ ]       Edema Management/Control  [X]              Therapeutic Activities            [X]       Patient and Family Training/Education  [ ]              Other (comment):  Discharge Recommendations: Skilled Nursing Facility  Further Equipment Recommendations for Discharge: TBD       SUBJECTIVE:   Patient stated I want to get up.       OBJECTIVE DATA SUMMARY:   Critical Behavior:  Neurologic State: Alert  Orientation Level: Oriented to person, Oriented to place, Disoriented to situation, Disoriented to time  Cognition: Decreased attention/concentration, Decreased command following, Poor safety awareness, Impulsive, Impaired decision making, Other (comment) (only follows one step commands)  Safety/Judgement: Lack of insight into deficits     Strength:   Strength: Generally decreased, functional                       Functional Mobility Training:  Bed Mobility:  Rolling: Minimum assistance  Supine to Sit: Minimum assistance     Scooting: Maximum assistance        Transfers:  Sit to Stand: Maximum assistance;Assist x2 (flexed knees; pt attempting to walk prior to full standing)           Bed to Chair: Maximum assistance;Assist x2                    Balance:  Sitting - Static: Fair (occasional)  Sitting - Dynamic: Fair (occasional)  Standing - Static: Constant support;Poor (heavy posterior lean and attempting to walk)  Standing - Dynamic : None  Ambulation/Gait Training:                                                        Pain:  Pain Scale 1: Numeric (0 - 10)  Pain Intensity 1: 0              Activity Tolerance:   Fair-VSS on RA. RN aware. On 2L O2 at rest in the bed. Please refer to the flowsheet for vital signs taken during this treatment.   After treatment:   [X]  Patient left in no apparent distress sitting up in chair  [ ]  Patient left in no apparent distress in bed  [X]  Call bell left within reach  [X]  Nursing notified  [X]  Caregiver present  [ ]  Bed alarm activated      COMMUNICATION/COLLABORATION:   The patients plan of care was discussed with: Occupational Therapist, Registered Nurse and      Lenora Tobin, PT, DPT   Time Calculation: 18 mins

## 2017-07-21 PROBLEM — Z71.89 COUNSELING REGARDING GOALS OF CARE: Status: ACTIVE | Noted: 2017-07-21

## 2017-07-21 PROBLEM — R53.81 DEBILITY: Status: ACTIVE | Noted: 2017-07-21

## 2017-07-21 PROBLEM — R53.1 WEAKNESS: Status: ACTIVE | Noted: 2017-07-21

## 2017-07-21 LAB
ANION GAP BLD CALC-SCNC: 4 MMOL/L (ref 5–15)
BUN SERPL-MCNC: 25 MG/DL (ref 6–20)
BUN/CREAT SERPL: 26 (ref 12–20)
CALCIUM SERPL-MCNC: 9 MG/DL (ref 8.5–10.1)
CHLORIDE SERPL-SCNC: 99 MMOL/L (ref 97–108)
CO2 SERPL-SCNC: 37 MMOL/L (ref 21–32)
CREAT SERPL-MCNC: 0.96 MG/DL (ref 0.7–1.3)
ERYTHROCYTE [DISTWIDTH] IN BLOOD BY AUTOMATED COUNT: 13 % (ref 11.5–14.5)
GLUCOSE BLD STRIP.AUTO-MCNC: 120 MG/DL (ref 65–100)
GLUCOSE BLD STRIP.AUTO-MCNC: 123 MG/DL (ref 65–100)
GLUCOSE SERPL-MCNC: 113 MG/DL (ref 65–100)
HCT VFR BLD AUTO: 27.4 % (ref 36.6–50.3)
HGB BLD-MCNC: 9 G/DL (ref 12.1–17)
MCH RBC QN AUTO: 33.2 PG (ref 26–34)
MCHC RBC AUTO-ENTMCNC: 32.8 G/DL (ref 30–36.5)
MCV RBC AUTO: 101.1 FL (ref 80–99)
PLATELET # BLD AUTO: 124 K/UL (ref 150–400)
POTASSIUM SERPL-SCNC: 3.4 MMOL/L (ref 3.5–5.1)
RBC # BLD AUTO: 2.71 M/UL (ref 4.1–5.7)
SERVICE CMNT-IMP: ABNORMAL
SERVICE CMNT-IMP: ABNORMAL
SODIUM SERPL-SCNC: 140 MMOL/L (ref 136–145)
WBC # BLD AUTO: 6.4 K/UL (ref 4.1–11.1)

## 2017-07-21 PROCEDURE — 77010033678 HC OXYGEN DAILY

## 2017-07-21 PROCEDURE — 74011250637 HC RX REV CODE- 250/637: Performed by: INTERNAL MEDICINE

## 2017-07-21 PROCEDURE — 65660000000 HC RM CCU STEPDOWN

## 2017-07-21 PROCEDURE — 82962 GLUCOSE BLOOD TEST: CPT

## 2017-07-21 PROCEDURE — 36415 COLL VENOUS BLD VENIPUNCTURE: CPT | Performed by: INTERNAL MEDICINE

## 2017-07-21 PROCEDURE — 94640 AIRWAY INHALATION TREATMENT: CPT

## 2017-07-21 PROCEDURE — 74011250636 HC RX REV CODE- 250/636: Performed by: INTERNAL MEDICINE

## 2017-07-21 PROCEDURE — 92523 SPEECH SOUND LANG COMPREHEN: CPT

## 2017-07-21 PROCEDURE — 74011250637 HC RX REV CODE- 250/637: Performed by: NURSE PRACTITIONER

## 2017-07-21 PROCEDURE — 85027 COMPLETE CBC AUTOMATED: CPT | Performed by: INTERNAL MEDICINE

## 2017-07-21 PROCEDURE — 74011000250 HC RX REV CODE- 250: Performed by: INTERNAL MEDICINE

## 2017-07-21 PROCEDURE — 80048 BASIC METABOLIC PNL TOTAL CA: CPT | Performed by: INTERNAL MEDICINE

## 2017-07-21 RX ORDER — PREGABALIN 75 MG/1
75 CAPSULE ORAL 2 TIMES DAILY
Status: DISCONTINUED | OUTPATIENT
Start: 2017-07-21 | End: 2017-07-22 | Stop reason: HOSPADM

## 2017-07-21 RX ADMIN — Medication 10 ML: at 22:43

## 2017-07-21 RX ADMIN — PREGABALIN 75 MG: 75 CAPSULE ORAL at 22:21

## 2017-07-21 RX ADMIN — IPRATROPIUM BROMIDE AND ALBUTEROL SULFATE 3 ML: .5; 3 SOLUTION RESPIRATORY (INHALATION) at 14:00

## 2017-07-21 RX ADMIN — METOPROLOL TARTRATE 12.5 MG: 25 TABLET ORAL at 10:16

## 2017-07-21 RX ADMIN — NITROFURANTOIN MONOHYDRATE/MACROCRYSTALLINE 100 MG: 25; 75 CAPSULE ORAL at 10:15

## 2017-07-21 RX ADMIN — METOPROLOL TARTRATE 12.5 MG: 25 TABLET ORAL at 22:23

## 2017-07-21 RX ADMIN — NITROFURANTOIN MONOHYDRATE/MACROCRYSTALLINE 100 MG: 25; 75 CAPSULE ORAL at 16:12

## 2017-07-21 RX ADMIN — Medication 40 ML: at 16:12

## 2017-07-21 RX ADMIN — VALPROIC ACID 500 MG: 250 SOLUTION ORAL at 22:32

## 2017-07-21 RX ADMIN — Medication 100 MG: at 10:15

## 2017-07-21 RX ADMIN — QUETIAPINE FUMARATE 25 MG: 25 TABLET, FILM COATED ORAL at 22:21

## 2017-07-21 RX ADMIN — Medication 10 ML: at 05:23

## 2017-07-21 RX ADMIN — VALPROIC ACID 500 MG: 250 SOLUTION ORAL at 16:18

## 2017-07-21 RX ADMIN — LEVETIRACETAM 1500 MG: 100 SOLUTION ORAL at 10:22

## 2017-07-21 RX ADMIN — LATANOPROST 1 DROP: 50 SOLUTION OPHTHALMIC at 20:04

## 2017-07-21 RX ADMIN — IPRATROPIUM BROMIDE AND ALBUTEROL SULFATE 3 ML: .5; 3 SOLUTION RESPIRATORY (INHALATION) at 08:42

## 2017-07-21 RX ADMIN — IPRATROPIUM BROMIDE AND ALBUTEROL SULFATE 3 ML: .5; 3 SOLUTION RESPIRATORY (INHALATION) at 01:11

## 2017-07-21 RX ADMIN — VALPROIC ACID 500 MG: 250 SOLUTION ORAL at 10:22

## 2017-07-21 RX ADMIN — GLYCOPYRROLATE 0.2 MG: 0.2 INJECTION, SOLUTION INTRAMUSCULAR; INTRAVENOUS at 16:12

## 2017-07-21 RX ADMIN — IPRATROPIUM BROMIDE AND ALBUTEROL SULFATE 3 ML: .5; 3 SOLUTION RESPIRATORY (INHALATION) at 19:26

## 2017-07-21 RX ADMIN — ENOXAPARIN SODIUM 40 MG: 40 INJECTION SUBCUTANEOUS at 10:15

## 2017-07-21 RX ADMIN — LEVETIRACETAM 1500 MG: 100 SOLUTION ORAL at 22:35

## 2017-07-21 RX ADMIN — PREGABALIN 75 MG: 75 CAPSULE ORAL at 14:00

## 2017-07-21 RX ADMIN — GLYCOPYRROLATE 0.2 MG: 0.2 INJECTION, SOLUTION INTRAMUSCULAR; INTRAVENOUS at 22:21

## 2017-07-21 RX ADMIN — GLYCOPYRROLATE 0.2 MG: 0.2 INJECTION, SOLUTION INTRAMUSCULAR; INTRAVENOUS at 10:16

## 2017-07-21 NOTE — PROGRESS NOTES
PCU SHIFT NURSING NOTE      Bedside and Verbal shift change report given to Belgica Narayanan RN (oncoming nurse) by Elva Gandara RN (offgoing nurse). Report included the following information SBAR, Kardex, ED Summary, Intake/Output, MAR, Recent Results and Cardiac Rhythm NSR/ST. Shift Summary:     2045: Tube feed & set changed. Residual noted 45 cc. Will monitor. 0730: Pt had uneventful night. Bedside and Verbal shift change report given to Deshaun Ramos / Mickey Orourke RN (oncoming nurse) by Belgica Narayanan RN (offgoing nurse). Report included the following information SBAR, Kardex, ED Summary, Intake/Output, MAR, Recent Results and Cardiac Rhythm NSR/ST w/ PVCs. Admission Date 6/24/2017   Admission Diagnosis Status epilepticus ([facility] [address]  dysphagia   Consults IP CONSULT TO NEUROLOGY  IP CONSULT TO INTENSIVIST  IP CONSULT TO PALLIATIVE CARE - PROVIDER  IP CONSULT TO GASTROENTEROLOGY  IP CONSULT TO PALLIATIVE CARE - PROVIDER  IP CONSULT TO OTOLARYNGOLOGY        Consults   [x]PT   [x]OT   [x]Speech   []Case Management      [] Palliative      Cardiac Monitoring Order   [x]Yes   []No     IV drips   []Yes    Drip:                            Dose:  Drip:                            Dose:  Drip:                            Dose:   [x]No     GI Prophylaxis   []Yes   [x]No         DVT Prophylaxis   SCDs:  Sequential Compression Device: Bilateral     Patient Refused VTE Prophylaxis: Yes    Han stockings:         [x] Medication   []Contraindicated   []None      Activity Level Activity Level: Bed Rest     Activity Assistance: Complete care   Purposeful Rounding every 1-2 hour?    [x]Yes   Edouard Score  Total Score: 3   Bed Alarm (If score 3 or >)   [x]Yes   [] Refused (See signed refusal form in chart)   Klever Score  Klever Score: 12   Klever Score (if score 14 or less)   [x]PMT consult   []Wound Care consult      []Specialty bed   [] Nutrition consult          Needs prior to discharge:   Home O2 required:    []Yes   [x]No    If yes, how much O2 required? Other:    Last Bowel Movement: Last Bowel Movement Date: 07/18/17      Influenza Vaccine Received Flu Vaccine for Current Season (usually Sept-March): Not Flu Season        Pneumonia Vaccine           Diet Active Orders   Diet    DIET NPO      LDAs           Triple Lumen IJ 06/24/17 Right Internal jugular (Active)   Central Line Being Utilized Yes 7/20/2017  4:37 PM   Criteria for Appropriate Use Limited/no vessel suitable for conventional peripheral access 7/20/2017  4:37 PM   Site Assessment Clean, dry, & intact 7/20/2017  4:37 PM   Infiltration Assessment 0 7/20/2017  4:37 PM   Affected Extremity/Extremities Color distal to insertion site pink (or appropriate for race) 7/20/2017  4:37 PM   Date of Last Dressing Change 07/19/17 7/20/2017  4:37 PM   Dressing Status Clean, dry, & intact 7/20/2017  4:37 PM   Dressing Type Disk with Chlorhexadine gluconate (CHG); Transparent;Tape 7/20/2017  4:37 PM   Action Taken Dressing changed 7/18/2017  9:00 PM   Proximal Hub Color/Line Status White;Flushed 7/20/2017  4:37 PM   Positive Blood Return (Medial Site) Yes 7/20/2017  4:37 PM   Medial Hub Color/Line Status Blue;Flushed 7/20/2017  4:37 PM   Positive Blood Return (Lateral Site) Yes 7/20/2017  4:37 PM   Distal Hub Color/Line Status Brown;Flushed 7/20/2017  4:37 PM   Positive Blood Return (Site #3) Yes 7/20/2017  4:37 PM   External Catheter Length (cm) 0 centimeters 7/14/2017  8:09 AM   Alcohol Cap Used Yes 7/20/2017  4:37 PM              PEG/Gastrostomy Tube 07/18/17 (Active)   Site Assessment Clean, dry, & intact 7/20/2017  4:37 PM   Dressing Status Clean, dry, & intact 7/20/2017  4:37 PM   G Port Status Infusing 7/20/2017  4:37 PM   External Catheter Length (cm) 2 centimeters 7/18/2017 12:20 PM   Action Taken Placement verified (comment) 7/20/2017  4:37 PM   Gastric Residual (mL) 42 ml 7/20/2017  7:45 PM   Tube Feeding/Formula Options Osmolite 1.2 7/20/2017  4:37 PM   Tube Feeding/Verify Rate (mL/hr) 60 7/20/2017  4:37 PM   Water Flush Volume (mL) 100 mL 7/20/2017  8:15 PM   Intake (ml) 70 ml 7/20/2017  4:37 PM   Medication Volume 40 ml 7/20/2017 10:55 PM                Urinary Catheter [REMOVED] Condom Catheter 06/30/17-Criteria for Appropriate Use: Medically/surgically unstable, Strict I/Os  [REMOVED] Condom Catheter 07/05/17-Criteria for Appropriate Use: Comfort Care  [REMOVED] Condom Catheter 07/07/17-Criteria for Appropriate Use: Comfort Care  [REMOVED] Condom Catheter 07/15/17-Criteria for Appropriate Use: Strict I/Os, Comfort Care  [REMOVED] Urinary Catheter 06/24/17 2- way; [employer]-Criteria for Appropriate Use: Medically/surgically unstable    Intake & Output   Date 07/19/17 1900 - 07/20/17 0659 07/20/17 0700 - 07/21/17 0659   Shift 2991-0584 24 Hour Total 0719-4192 2943-0236 24 Hour Total   I  N  T  A  K  E   I.V.  (mL/kg/hr) 200 200         Volume (levETIRAcetam (KEPPRA) 1,500 mg in 0.9% sodium chloride IVPB) 100 100         Volume (valproate (DEPACON) 500 mg in 0.9% sodium chloride 50 mL IVPB) 100 100       NG/ 850 470 220 690      Water Flush Volume (mL) (PEG/Gastrostomy Tube 07/18/17) 100 500 200 100 300      Medication Volume (PEG/Gastrostomy Tube 07/18/17)  50 130 120 250      Intake (ml) (PEG/Gastrostomy Tube 07/18/17)  300 140  140    Shift Total  (mL/kg) 300  (4.5) 1050  (15.7) 470  (7) 220  (3.3) 690  (10.3)   O  U  T  P  U  T   Urine  (mL/kg/hr)           Urine Occurrence(s) 3 x 6 x 3 x  3 x    Stool           Stool Occurrence(s)  2 x       Shift Total  (mL/kg)         1050 470 220 690   Weight (kg) 67 67 67 67 67         Readmission Risk Assessment Tool Score Medium Risk            20       Total Score        3 Relationship with PCP    3 Patient Length of Stay > 5    4 More than 1 Admission in calendar year    5 Patient Insurance is Medicare, Medicaid or Self Pay    5 Charlson Comorbidity Score        Criteria that do not apply:    Patient Living Status       Expected Length of Stay 4d 4h   Actual Length of Stay 26

## 2017-07-21 NOTE — CONSULTS
Palliative Medicine Consult  [place]: [phone] ([phone])    Patient Name: Malini Gallardo  YOB: 1934    Date of Initial Consult:  7/7/17  Reason for Consult: care decisions  Requesting Provider: Miryam Madera  Primary Care Physician: Sree Contreras MD      SUMMARY:   Malini Gallardo is a 80 y.o. gentleman with a past history of seizures, who was admitted on 6/24/2017 from home with a diagnosis of status epilecticus. Current medical issues leading to Palliative Medicine involvement include: AMS not related to seizures, EEGs neg for seizure activity. AMS continues, no real improvement. Neurology recommended MRI, which was not done. PALLIATIVE DIAGNOSES:   1. Acute encephalopathy  2. Acute respiratory failure requiring intubation for airway protection: self extubated 6/26,   3. Peripheral neuropathy: ran out of lyrica several days ago  4. Dysphagia   5. Weakness   6. Debility  7. Counseling regarding goals of care     PLAN:   1. Visited w/ wife today. Family meeting @ [phone] had to be postponed. Sabino Lomeli (NP), Lorenza Orantes (SW), and I were present from our team.   2. We discussed pt's condition. Wife demonstrates a good understanding of his condition. 3. I had to leave in the middle of family meeting. Please refer to Page Cochran note. 4. Communicated plan of care with: Palliative Grace DOAN (bedside RN)     GOALS OF CARE / TREATMENT PREFERENCES:   [====Goals of Care====]  GOALS OF CARE:  Patient / health care proxy stated goals:   Assumed to be for full, restorative treatment and all measures that support this.      TREATMENT PREFERENCES:   Code Status: Full Code    Advance Care Planning:  Advance Care Planning 7/2/2017   Patient's Healthcare Decision Maker is: Verbal statement (Legal Next of Kin remains as decision maker)   Primary Decision Maker Name -   Primary Decision Maker Phone Number -   Primary Decision Maker Relationship to Patient -   Secondary Decision Maker Name Caren Love Linda   Confirm Advance Directive -       Other:    The palliative care team has discussed with patient / health care proxy about goals of care / treatment preferences for patient.  [====Goals of Care====]         HISTORY:     History obtained from: chart    CHIEF COMPLAINT:  Pt does not relay complaint at this time    HPI/SUBJECTIVE:    The patient is:   [] Verbal and participatory  [x] Non-participatory due to:   No o/n events or issues. Per bedside RN, no staff concerns. Clinical Pain Assessment (nonverbal scale for severity on nonverbal patients):   [++++ Clinical Pain Assessment++++]  [++++Pain Severity++++]: Pain: 0  [++++Pain Character++++]:   [++++Pain Duration++++]:   [++++Pain Effect++++]:   [++++Pain Factors++++]:   [++++Pain Frequency++++]:   [++++Pain Location++++]:   [++++ Clinical Pain Assessment++++]     FUNCTIONAL ASSESSMENT:     Palliative Performance Scale (PPS):  PPS: 30       PSYCHOSOCIAL/SPIRITUAL SCREENING:     Advance Care Planning:  Advance Care Planning 7/2/2017   Patient's Healthcare Decision Maker is: Verbal statement (Legal Next of Kin remains as decision maker)   Primary Decision Maker Name -   Primary Decision Maker Phone Number -   Primary Decision Maker Relationship to Patient -   Secondary Decision Maker Name Wing Confer   Confirm Advance Directive -        Any spiritual / Catholic concerns:  [] Yes /  [x] No    Caregiver Burnout:  [] Yes /  [x] No /  [] No Caregiver Present      Anticipatory grief assessment:   [x] Normal  / [] Maladaptive       ESAS Anxiety: Anxiety: 3    ESAS Depression:         REVIEW OF SYSTEMS:     Positive and pertinent negative findings in ROS are noted above in HPI. The following systems were [] reviewed / [x] unable to be reviewed as noted in HPI  Other findings are noted below. Systems: constitutional, ears/nose/mouth/throat, respiratory, gastrointestinal, genitourinary, musculoskeletal, integumentary, neurologic, psychiatric, endocrine. Positive findings noted below. Modified ESAS Completed by: provider           Pain: 0   Anxiety: 3       Dyspnea: 0           Stool Occurrence(s): 1        PHYSICAL EXAM:     From RN flowsheet:  Wt Readings from Last 3 Encounters:   07/21/17 139 lb 3.2 oz (63.1 kg)   06/21/17 155 lb (70.3 kg)   03/21/17 166 lb (75.3 kg)     Blood pressure 159/49, pulse (!) 116, temperature 98.2 °F (36.8 °C), resp. rate 18, height 5' 8\" (1.727 m), weight 139 lb 3.2 oz (63.1 kg), SpO2 96 %. Pain Scale 1: Numeric (0 - 10)  Pain Intensity 1: 0  Pain Onset 1: acute  Pain Location 1: Arm  Pain Orientation 1: Mid  Pain Description 1: Aching  Pain Intervention(s) 1: Repositioned  Last bowel movement, if known:     Constitutional: elderly, confused, unable to answer questions  Eyes: pupils equal, anicteric  ENMT: no nasal discharge, moist mucous membranes  Cardiovascular: regular rhythm, distal pulses intact  Respiratory: breathing not labored, symmetric  Gastrointestinal: soft non-tender, +bowel sounds  Musculoskeletal: no deformity, no tenderness to palpation  Skin: warm, dry  Neurologic: not following commands, picking at clothes, pulling on lines  Psychiatric: delirium      HISTORY:     Active Problems:    Status epilepticus ([address] (6/24/2017)      Oral phase dysphagia (7/8/2017)      Dysarthria (7/15/2017)      Dysphagia (7/15/2017)      Past Medical History:   Diagnosis Date    Alcohol abuse, in remission     Seizures ([address]       Past Surgical History:   Procedure Laterality Date    PLACE PERCUT GASTROSTOMY TUBE  7/18/2017           Family History   Problem Relation Age of Onset    Other Other      no strokes or seizures      History reviewed, no pertinent family history.   Social History   Substance Use Topics    Smoking status: Never Smoker    Smokeless tobacco: Not on file    Alcohol use No      Comment: Quit drinking 10 years ago     Allergies   Allergen Reactions    No Known Allergies       Current Facility-Administered Medications   Medication Dose Route Frequency    pregabalin (LYRICA) capsule 75 mg  75 mg Oral BID    QUEtiapine (SEROquel) tablet 25 mg  25 mg Oral QHS    levETIRAcetam (KEPPRA) oral solution 1,500 mg  1,500 mg Per G Tube BID    metoprolol tartrate (LOPRESSOR) tablet 12.5 mg  12.5 mg Per G Tube BID    thiamine (B-1) tablet 100 mg  100 mg Per G Tube DAILY    valproic acid (as sodium salt) (DEPAKENE) 250 mg/5 mL (5 mL) oral solution 500 mg  500 mg Per G Tube TID    nitrofurantoin (macrocrystal-monohydrate) (MACROBID) capsule 100 mg  100 mg Oral BID WITH MEALS    glycopyrrolate (ROBINUL) injection 0.2 mg  0.2 mg IntraMUSCular TID    albuterol (PROVENTIL VENTOLIN) nebulizer solution 2.5 mg  2.5 mg Nebulization Q4H PRN    albuterol-ipratropium (DUO-NEB) 2.5 MG-0.5 MG/3 ML  3 mL Nebulization Q6H RT    heparin (porcine) pf 300 Units  300 Units InterCATHeter PRN    fentaNYL citrate (PF) injection 25-50 mcg  25-50 mcg IntraVENous Q2H PRN    hydrALAZINE (APRESOLINE) 20 mg/mL injection 10 mg  10 mg IntraVENous Q6H PRN    sodium chloride (NS) flush 10-30 mL  10-30 mL InterCATHeter PRN    sodium chloride (NS) flush 10 mL  10 mL InterCATHeter Q24H    sodium chloride (NS) flush 10 mL  10 mL InterCATHeter PRN    sodium chloride (NS) flush 10-40 mL  10-40 mL InterCATHeter Q8H    sodium chloride (NS) flush 20 mL  20 mL InterCATHeter Q24H    latanoprost (XALATAN) 0.005 % ophthalmic solution 1 Drop  1 Drop Both Eyes QPM    sodium chloride (NS) flush 5-10 mL  5-10 mL IntraVENous Q8H    sodium chloride (NS) flush 5-10 mL  5-10 mL IntraVENous PRN    naloxone (NARCAN) injection 0.4 mg  0.4 mg IntraVENous PRN    ondansetron (ZOFRAN) injection 4 mg  4 mg IntraVENous Q4H PRN    bisacodyl (DULCOLAX) suppository 10 mg  10 mg Rectal DAILY PRN    acetaminophen (TYLENOL) suppository 650 mg  650 mg Rectal Q4H PRN    enoxaparin (LOVENOX) injection 40 mg  40 mg SubCUTAneous Q24H          LAB AND IMAGING FINDINGS:     Lab Results   Component Value Date/Time    WBC 6.4 07/21/2017 03:48 AM    HGB 9.0 07/21/2017 03:48 AM    PLATELET 663 13/43/1591 03:48 AM     Lab Results   Component Value Date/Time    Sodium 140 07/21/2017 03:48 AM    Potassium 3.4 07/21/2017 03:48 AM    Chloride 99 07/21/2017 03:48 AM    CO2 37 07/21/2017 03:48 AM    BUN 25 07/21/2017 03:48 AM    Creatinine 0.96 07/21/2017 03:48 AM    Calcium 9.0 07/21/2017 03:48 AM    Magnesium 1.5 07/18/2017 04:35 AM    Phosphorus 2.8 07/18/2017 04:35 AM      Lab Results   Component Value Date/Time    AST (SGOT) 27 07/15/2017 03:28 AM    Alk. phosphatase 50 07/15/2017 03:28 AM    Protein, total 6.6 07/15/2017 03:28 AM    Albumin 2.9 07/15/2017 03:28 AM    Globulin 3.7 07/15/2017 03:28 AM     Lab Results   Component Value Date/Time    INR 1.2 07/02/2017 03:33 AM    Prothrombin time 11.7 07/02/2017 03:33 AM    aPTT 30.1 07/02/2017 03:33 AM      No results found for: IRON, FE, TIBC, IBCT, PSAT, FERR   Lab Results   Component Value Date/Time    pH 7.40 07/09/2017 02:29 PM    PCO2 49 07/09/2017 02:29 PM    PO2 127 07/09/2017 02:29 PM     No components found for: [place]   Lab Results   Component Value Date/Time     07/16/2017 03:23 AM    CK - MB 1.8 07/02/2017 03:33 AM                Total time: 35 min  Counseling / coordination time, spent as noted above: 20 min  > 50% counseling / coordination?: yes    Prolonged service was provided for  []30 min   []75 min in face to face time in the presence of the patient, spent as noted above. Time Start:   Time End:   Note: this can only be billed with 29525 (initial) or 55450 (follow up). If multiple start / stop times, list each separately.

## 2017-07-21 NOTE — PROGRESS NOTES
PCU SHIFT NURSING NOTE      1910: Bedside shift change report given to Gay Dhillon RN (oncoming nurse) by Chaya Myers RN (offgoing nurse). Report included the following information SBAR, Kardex, ED Summary, Procedure Summary, Intake/Output, MAR, Recent Results and Cardiac Rhythm nsr. Shift Summary:   0715: Bedside shift change report given to Chaya Myers RN (oncoming nurse) by James Herrera RN (offgoing nurse). Report included the following information SBAR, Kardex, ED Summary, Procedure Summary, Intake/Output, MAR, Recent Results and Cardiac Rhythm nsr. 0930: Speech therapy at bedside. Patient unable to swallow ice chips. Will reevaluate at another time. 1015: Spoke with Dr. Darnell Hooper and updated on patient. Patient has 1000 metoprolol due. Asked if dose could be changed to PO and put through PEG tube. Updated MD on patient's latest vitals signs. Per MD will hold morning dose of metoprolol and MD will review medications and switch to PO form to be given via PEG tube. 1500: Patient up to chair with PT/ OT. Patient is alert and conversing with family and sitter. Patient now on room air O2 sat is 97%.     Admission Date 6/24/2017   Admission Diagnosis Status epilepticus ([facility] [address]  dysphagia   Consults IP CONSULT TO NEUROLOGY  IP CONSULT TO INTENSIVIST  IP CONSULT TO PALLIATIVE CARE - PROVIDER  IP CONSULT TO GASTROENTEROLOGY  IP CONSULT TO PALLIATIVE CARE - PROVIDER  IP CONSULT TO OTOLARYNGOLOGY        Consults   [x]PT   [x]OT   [x]Speech   [x]Case Management      [] Palliative      Cardiac Monitoring Order   [x]Yes   []No     IV drips   []Yes    Drip:                            Dose:  Drip:                            Dose:  Drip:                            Dose:   [x]No     GI Prophylaxis   []Yes   [x]No         DVT Prophylaxis   SCDs:  Sequential Compression Device: Bilateral     Patient Refused VTE Prophylaxis: Yes    Han stockings:         [x] Medication   []Contraindicated   []None      Activity Level Activity Level: Bed Rest Activity Assistance: Complete care   Purposeful Rounding every 1-2 hour? [x]Yes   Edouard Score  Total Score: 3   Bed Alarm (If score 3 or >)   [x]Yes   [] Refused (See signed refusal form in chart)   Klever Score  Klever Score: 12   Klever Score (if score 14 or less)   [x]PMT consult   []Wound Care consult      []Specialty bed   [x] Nutrition consult          Needs prior to discharge:   Home O2 required:    []Yes   [x]No    If yes, how much O2 required? Other:    Last Bowel Movement: Last Bowel Movement Date: 07/18/17      Influenza Vaccine Received Flu Vaccine for Current Season (usually Sept-March): Not Flu Season        Pneumonia Vaccine           Diet Active Orders   Diet    DIET NPO      LDAs           Triple Lumen IJ 06/24/17 Right Internal jugular (Active)   Central Line Being Utilized Yes 7/20/2017  4:37 PM   Criteria for Appropriate Use Limited/no vessel suitable for conventional peripheral access 7/20/2017  4:37 PM   Site Assessment Clean, dry, & intact 7/20/2017  4:37 PM   Infiltration Assessment 0 7/20/2017  4:37 PM   Affected Extremity/Extremities Color distal to insertion site pink (or appropriate for race) 7/20/2017  4:37 PM   Date of Last Dressing Change 07/19/17 7/20/2017  4:37 PM   Dressing Status Clean, dry, & intact 7/20/2017  4:37 PM   Dressing Type Disk with Chlorhexadine gluconate (CHG); Transparent;Tape 7/20/2017  4:37 PM   Action Taken Dressing changed 7/18/2017  9:00 PM   Proximal Hub Color/Line Status White;Flushed 7/20/2017  4:37 PM   Positive Blood Return (Medial Site) Yes 7/20/2017  4:37 PM   Medial Hub Color/Line Status Blue;Flushed 7/20/2017  4:37 PM   Positive Blood Return (Lateral Site) Yes 7/20/2017  4:37 PM   Distal Hub Color/Line Status Brown;Flushed 7/20/2017  4:37 PM   Positive Blood Return (Site #3) Yes 7/20/2017  4:37 PM   External Catheter Length (cm) 0 centimeters 7/14/2017  8:09 AM   Alcohol Cap Used Yes 7/20/2017  4:37 PM              PEG/Gastrostomy Tube 07/18/17 (Active)   Site Assessment Clean, dry, & intact 7/20/2017  4:37 PM   Dressing Status Clean, dry, & intact 7/20/2017  4:37 PM   G Port Status Infusing 7/20/2017  4:37 PM   External Catheter Length (cm) 2 centimeters 7/18/2017 12:20 PM   Action Taken Placement verified (comment) 7/20/2017  4:37 PM   Gastric Residual (mL) 0 ml 7/20/2017  4:37 PM   Tube Feeding/Formula Options Osmolite 1.2 7/20/2017  4:37 PM   Tube Feeding/Verify Rate (mL/hr) 60 7/20/2017  4:37 PM   Water Flush Volume (mL) 100 mL 7/20/2017  4:37 PM   Intake (ml) 70 ml 7/20/2017  4:37 PM   Medication Volume 70 ml 7/20/2017  4:37 PM                Urinary Catheter [REMOVED] Condom Catheter 06/30/17-Criteria for Appropriate Use: Medically/surgically unstable, Strict I/Os  [REMOVED] Condom Catheter 07/05/17-Criteria for Appropriate Use: Comfort Care  [REMOVED] Condom Catheter 07/07/17-Criteria for Appropriate Use: Comfort Care  [REMOVED] Condom Catheter 07/15/17-Criteria for Appropriate Use: Strict I/Os, Comfort Care  [REMOVED] Urinary Catheter 06/24/17 2- way; [employer]-Criteria for Appropriate Use: Medically/surgically unstable    Intake & Output   Date 07/19/17 1900 - 07/20/17 0659 07/20/17 0700 - 07/21/17 0659   Shift 1412-2725 24 Hour Total 3977-6979 9711-2976 24 Hour Total   I  N  T  A  K  E   I.V.  (mL/kg/hr) 200 200         Volume (levETIRAcetam (KEPPRA) 1,500 mg in 0.9% sodium chloride IVPB) 100 100         Volume (valproate (DEPACON) 500 mg in 0.9% sodium chloride 50 mL IVPB) 100 100       NG/ 850 470  470      Water Flush Volume (mL) (PEG/Gastrostomy Tube 07/18/17) 100 500 200  200      Medication Volume (PEG/Gastrostomy Tube 07/18/17)  50 130  130      Intake (ml) (PEG/Gastrostomy Tube 07/18/17)  300 140  140    Shift Total  (mL/kg) 300  (4.5) 1050  (15.7) 470  (7)  470  (7)   O  U  T  P  U  T   Urine  (mL/kg/hr)           Urine Occurrence(s) 3 x 6 x 3 x  3 x    Stool           Stool Occurrence(s)  2 x       Shift Total  (mL/kg)         1050 470  470   Weight (kg) 67 67 67 67 67         Readmission Risk Assessment Tool Score Medium Risk            20       Total Score        3 Relationship with PCP    3 Patient Length of Stay > 5    4 More than 1 Admission in calendar year    5 Patient Insurance is Medicare, Medicaid or Self Pay    5 Charlson Comorbidity Score        Criteria that do not apply:    Patient Living Status       Expected Length of Stay 4d 4h   Actual Length of Stay 26

## 2017-07-21 NOTE — PROGRESS NOTES
Received call from [facility] at [facility] and they have received authorization and can accept patient when he is sitter free for 24 hours. Wife is aware. Sitter was discontinued at 1145am today. When patient is transferred to [facility] needs to call report to [phone] and patient will be going to [address].

## 2017-07-21 NOTE — PROGRESS NOTES
PCU SHIFT NURSING NOTE      Bedside shift change report given to Simin Walker (oncoming nurse) by Malini Dowd (offgoing nurse). Report included the following information SBAR, Kardex, Intake/Output, MAR, Recent Results and Cardiac Rhythm NSR. Shift Summary:   0730 Bedside Report received from Simin Walker [facility]. SBAR, Kardex, Intake/Output, MAR, Recent Results or Cardiac Rhythm NSR were discussed. Malini Dowd, RN assumed care of the pt.  1030 pt had 9 beats of v tach, while staff in room, just after family's arrival. Pt asymptomatic, VSS, occurring just a few minutes after scheduled dose of betablocker adm. Will continue to monitor closely. 1100 Dr Jeanie Valenzuela and Boni Peralta SLP have both been in to see pt, quite pleased with progress, case management has been consulted regarding d/c planning. 36 sitter for safety has been d/c'd. Wife currently at bedside. 1220 IDR at bedside, wife present. Plan d/c to SNF @ [place] when stable. No further needs voiced, will continue with SLP Tx and eval.  1400 pt assisted up to [facility] for a BM and void, osmar well--unsteady gait, able to bare weight, unable to stand up straight. 1530 pt assisted up to [facility] to void. 2 person max assist. Pt remains more alert, confused. Wife currently in a family meeting with Palliative care. [address] placed after repeated attempts for pt safety.   Admission Date 6/24/2017   Admission Diagnosis Status epilepticus ([facility] [address]  dysphagia   Consults IP CONSULT TO NEUROLOGY  IP CONSULT TO INTENSIVIST  IP CONSULT TO PALLIATIVE CARE - PROVIDER  IP CONSULT TO GASTROENTEROLOGY  IP CONSULT TO PALLIATIVE CARE - PROVIDER  IP CONSULT TO OTOLARYNGOLOGY        Consults   [x]PT   [x]OT   [x]Speech   [x]Case Management      [] Palliative      Cardiac Monitoring Order   [x]Yes   []No     IV drips   []Yes    Drip:                            Dose:  Drip:                            Dose:  Drip:                            Dose:   [x]No     GI Prophylaxis   [x]Yes   []No DVT Prophylaxis   SCDs:  Sequential Compression Device: Bilateral     Patient Refused VTE Prophylaxis: Yes    Han stockings:         [x] Medication   []Contraindicated   []None      Activity Level Activity Level: Up with Assistance     Activity Assistance: Complete care   Purposeful Rounding every 1-2 hour? [x]Yes   Edouard Score  Total Score: 4   Bed Alarm (If score 3 or >)   [x]Yes   [] Refused (See signed refusal form in chart)   Klever Score  Klever Score: 16   Klever Score (if score 14 or less)   [x]PMT consult   []Wound Care consult      []Specialty bed   [x] Nutrition consult          Needs prior to discharge:   Home O2 required:    []Yes   [x]No    If yes, how much O2 required? Other:    Last Bowel Movement: Last Bowel Movement Date: 07/21/17      Influenza Vaccine Received Flu Vaccine for Current Season (usually Sept-March): Not Flu Season        Pneumonia Vaccine           Diet Active Orders   Diet    DIET NPO      LDAs           Triple Lumen IJ 06/24/17 Right Internal jugular (Active)   Central Line Being Utilized Yes 7/21/2017  8:08 AM   Criteria for Appropriate Use Limited/no vessel suitable for conventional peripheral access 7/21/2017  8:08 AM   Site Assessment Clean, dry, & intact 7/21/2017  8:08 AM   Infiltration Assessment 0 7/21/2017  8:08 AM   Affected Extremity/Extremities Color distal to insertion site pink (or appropriate for race) 7/21/2017  8:08 AM   Date of Last Dressing Change 07/19/17 7/21/2017  3:00 AM   Dressing Status Clean, dry, & intact 7/21/2017  8:08 AM   Dressing Type Disk with Chlorhexadine gluconate (CHG); Transparent;Tape 7/21/2017  8:08 AM   Action Taken Open ports on tubing capped 7/21/2017  8:08 AM   Proximal Hub Color/Line Status White;Flushed;Capped 7/21/2017  8:08 AM   Positive Blood Return (Medial Site) Yes 7/21/2017  8:08 AM   Medial Hub Color/Line Status Blue;Flushed;Capped 7/21/2017  8:08 AM   Positive Blood Return (Lateral Site) Yes 7/21/2017  8:08 AM Distal Hub Color/Line Status Brown;Flushed;Capped 7/21/2017  8:08 AM   Positive Blood Return (Site #3) Yes 7/21/2017  8:08 AM   External Catheter Length (cm) 0 centimeters 7/14/2017  8:09 AM   Alcohol Cap Used Yes 7/21/2017  8:08 AM              PEG/Gastrostomy Tube 07/18/17 (Active)   Site Assessment Clean, dry, & intact 7/21/2017  8:08 AM   Dressing Status Clean, dry, & intact 7/21/2017  8:08 AM   G Port Status Infusing 7/21/2017  8:08 AM   External Catheter Length (cm) 2 centimeters 7/18/2017 12:20 PM   Action Taken Placement verified (comment) 7/21/2017  8:08 AM   Drainage Description Tan 7/21/2017  8:08 AM   Gastric Residual (mL) 35 ml 7/21/2017  8:08 AM   Tube Feeding/Formula Options Osmolite 1.2 7/21/2017  8:08 AM   Tube Feeding/Verify Rate (mL/hr) 60 7/21/2017  8:08 AM   Water Flush Volume (mL) 100 mL 7/21/2017  8:08 AM   Intake (ml) 50 ml 7/21/2017  4:05 AM   Medication Volume 40 ml 7/20/2017 10:55 PM                Urinary Catheter [REMOVED] Condom Catheter 06/30/17-Criteria for Appropriate Use: Medically/surgically unstable, Strict I/Os  [REMOVED] Condom Catheter 07/05/17-Criteria for Appropriate Use: Comfort Care  [REMOVED] Condom Catheter 07/07/17-Criteria for Appropriate Use: Comfort Care  [REMOVED] Condom Catheter 07/15/17-Criteria for Appropriate Use: Strict I/Os, Comfort Care  [REMOVED] Urinary Catheter 06/24/17 2- way; [employer]-Criteria for Appropriate Use: Medically/surgically unstable    Intake & Output   Date 07/20/17 0700 - 07/21/17 0659 07/21/17 0700 - 07/22/17 0659   Shift 9232-3800 1566-5010 24 Hour Total 9964-3511 3759-6497 24 Hour Total   I  N  T  A  K  E   NG/ 470 940 100  100      Water Flush Volume (mL) (PEG/Gastrostomy Tube 07/18/17) 200 300 500 100  100      Medication Volume (PEG/Gastrostomy Tube 07/18/17) 130 120 250         Intake (ml) (PEG/Gastrostomy Tube 07/18/17) 140 50 190       Shift Total  (mL/kg) 470  (7) 470  (7.4) 940  (14.9) 100  (1.6)  100  (1.6) O  U  T  P  U  T   Urine  (mL/kg/hr)            Urine Occurrence(s) 3 x 2 x 5 x 1 x  1 x    Shift Total  (mL/kg)          470 940 100  100   Weight (kg) 67 63.1 63.1 63.1 63.1 63.1         Readmission Risk Assessment Tool Score Medium Risk            20       Total Score        3 Relationship with PCP    3 Patient Length of Stay > 5    4 More than 1 Admission in calendar year    5 Patient Insurance is Medicare, Medicaid or Self Pay    5 Charlson Comorbidity Score        Criteria that do not apply:    Patient Living Status       Expected Length of Stay 4d 4h   Actual Length of Stay [address] Jessica FREED, RN

## 2017-07-21 NOTE — PROGRESS NOTES
Spoke with Jose Daniel Garcia at [facility] and she will start authorization from [facility] and let me know when she obtains approval. Dr Amado Macias aware.

## 2017-07-21 NOTE — PROGRESS NOTES
Report given to Ivan David RN. SBAR, Kardex, ED Summary, Procedure Summary, MAR, Recent Results or Alarm Parameters  were discussed. Ivan David RN assumed care of the pt.     David Parker

## 2017-07-21 NOTE — PROGRESS NOTES
Hospitalist Progress Note    NAME: Coral Jackson   :  1934   MRN:  [id number]       Assessment / Plan:  Acute encephalopathy due to status epilepticus in setting of seizure d/o:  Out of lyrica for a few days prior to admission - has also preceeded other admissions for sz. Pt continues to be below most recent baseline with worsened dysarthria bulbar weakness the last few days. More alert today with ongoing oropharyngeal sx.but cough seems to be stronger. Still needs suctioning.  - CT head  no acute process  - MRI brain  with no evidence of acute infarction. No acute intracranial process. Moderate cerebral atrophy and mild chronic microvascular ischemic change. - CXR  with improved right upper lobe airspace disease and left basilar atelectasis. - appreciate neuro consult, adjusted keppra and added valproate    STOP ALL BENZO's  , start Seroquel hs instead  With Excellent results  Now conversant and following commands , talking is Clear   CM to start looking for bed to SNF for discharge an Am after 24 hours off sitters    Fever (resolved) presumed due to aspiration pneumonitis :  Temp 99.5 7/15  - CXR nonacute as above  E. Coli and enterococcus UTI by C/S,  [facility] for antibiotic recommendation  IV Ceftriaxone and Nitrofurantoin  - con't unasyn for possible aspiration, improving today     Dysphagia/dyarthria with resultant moderate protein calorie malnutrition:    S/P Peg tube placement had BM after placement and has good Bowel sounds  Will star Osmolite VERY slowly, watch for aspiration  - exam by ENT today with no structural reason for dysphagia. Ken recommended. OFF TPN and Started TF Osmolynte on  and tolerating well  Now on 40cc/hour    Hypertension, benign/essential: more stable  - con't IV scheduled metoprolol (on po metoprolol at home)  - prn IV hydralazine  - PT/OT and speech appreciated.  Plan for [facility] on discharge.   Hyperlipidemia: holding lipitor while NPO  History of SVT:  Briefly on cardizem gtt this admission  - serial troponin and EKG ordered with lower chest/abd pain complaints today  Peripheral neuropathy: ran out of lyrica several days PTA.  Needs to be restarted as soon as enteral access established due to ongoing leg sx. 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  Con't thiamine supplementation. GERD: con't protonix  Acute respiratory failure (resolved) due to acute encephalopathy with chronic underlying hypercapnea: intubated initially during admission.  Duonebs changed to prn only. Pt self-extubated 6/24; finished zosyn 7/09.      DVT prophylaxis: lovenox    Code status: Full (wife is decision maker)     Subjective:     Chief Complaint / Reason for Physician Visit  Alert, no complaints today. Loud oropharyngeal noise/secretions but Cough seems to be . Discussed with RN events overnight. Review of Systems: confused  Symptom Y/N Comments  Symptom Y/N Comments   Fever/Chills    Chest Pain     Poor Appetite    Edema     Cough    Abdominal Pain     Sputum    Joint Pain     SOB/AL    Pruritis/Rash     Nausea/vomit    Tolerating PT/OT y    Diarrhea    Tolerating Diet  Still NPO   Constipation    Other       Could NOT obtain due to: encephalopathy     Objective:     VITALS:   Last 24hrs VS reviewed since prior progress note.  Most recent are:  Patient Vitals for the past 24 hrs:   Temp Pulse Resp BP SpO2   07/21/17 0842 - - - - 96 %   07/21/17 0742 98.3 °F (36.8 °C) 73 20 133/52 96 %   07/21/17 0300 98.4 °F (36.9 °C) 93 20 136/63 98 %   07/20/17 2300 98.2 °F (36.8 °C) 80 18 133/84 97 %   07/20/17 2122 - (!) 110 - 135/48 -   07/20/17 1945 98 °F (36.7 °C) 74 20 133/67 97 %   07/20/17 1900 - (!) 111 20 155/60 97 %   07/20/17 1600 98.6 °F (37 °C) 76 18 135/57 94 %   07/20/17 1129 98.4 °F (36.9 °C) 72 20 139/67 98 %       Intake/Output Summary (Last 24 hours) at 07/21/17 1104  Last data filed at 07/21/17 0430   Gross per 24 hour Intake              710 ml   Output                0 ml   Net              710 ml        PHYSICAL EXAM:  General: WD, WN. Alert, Conversant, now following commands consistently  no acute distress    EENT:  EOMI. Anicteric sclerae. MMM. Gurgling from throat. Resp:  CTA bilaterally, no wheezing or rales. No accessory muscle use  CV:  Regular  rhythm,  No edema  GI:  Soft, Non distended, Non tender.  +Bowel sounds  Neurologic:  Alert and oriented X 1-2, slurred speech,   Psych:   Some insight. Not anxious nor agitated  Skin:  No rashes. No jaundice    Reviewed most current lab test results and cultures  YES  Reviewed most current radiology test results   YES  Review and summation of old records today    NO  Reviewed patient's current orders and MAR    YES  PMH/SH reviewed - no change compared to H&P  ________________________________________________________________________  Care Plan discussed with:    Comments   Patient x    Family  y  WIFE Bedside   RN x    Care Manager     Consultant  y Speech                     Multidiciplinary team rounds were held today with , nursing, pharmacist and clinical coordinator. Patient's plan of care was discussed; medications were reviewed and discharge planning was addressed. ________________________________________________________________________  Total NON critical care TIME:  25 Minutes    Total CRITICAL CARE TIME Spent:   Minutes non procedure based      Comments   >50% of visit spent in counseling and coordination of care x    ________________________________________________________________________  Tyron Rebollar MD     Procedures: see electronic medical records for all procedures/Xrays and details which were not copied into this note but were reviewed prior to creation of Plan. LABS:  I reviewed today's most current labs and imaging studies.   Pertinent labs include:  Recent Labs      07/21/17   0348   WBC  6.4   HGB  9.0*   HCT  27.4*   PLT  124* Recent Labs      07/21/17   0348   NA  140   K  3.4*   CL  99   CO2  37*   GLU  113*   BUN  25*   CREA  0.96   CA  9.0       Signed: Sanjiv Rocha MD

## 2017-07-21 NOTE — PROGRESS NOTES
ADULT PROTOCOL: JET AEROSOL ASSESSMENT    Patient  Malini Gallardo     80 y.o.   male     7/21/2017  10:50 AM    Breath Sounds Pre Procedure: Right Breath Sounds: Diminished                               Left Breath Sounds: Diminished    Breath Sounds Post Procedure: Right Breath Sounds: Diminished                                 Left Breath Sounds: Diminished    Breathing pattern: Pre procedure Breathing Pattern: Regular          Post procedure Breathing Pattern: Regular    Heart Rate: Pre procedure Pulse: 106           Post procedure Pulse: 72    Resp Rate: Pre procedure Respirations: 20           Post procedure Respirations: 20            Cough: Pre procedure Cough: Non-productive               Post procedure Cough: Non-productive      Sputum: Pre procedure Sputum amount: Moderate  Sputum color/odor: White  Sputum consistency: Thick  Sputum method obtained: Oral tracheal                 Post procedure Sputum amount: Small  Sputum color/odor: White  Sputum consistency:  Thick  Sputum method obtained: Cough on request    Oxygen: O2 Device: Room air   room air     Changed: NO    SpO2: Pre procedure SpO2: 96 %   without oxygen              Post procedure SpO2: 97 %  without oxygen    Nebulizer Therapy: Current medications Aerosolized Medications: DuoNeb      Changed: NO    Smoking History: never    Problem List:   Patient Active Problem List   Diagnosis Code    Encephalopathy, unspecified G93.40    SVT (supraventricular tachycardia) ([facility]) I47.1    Aphasia R47.01    CVA (cerebral vascular accident) ([facility] [address] I63.9    Seizure disorder ([facility] [address] G40.909    Seizures ([facility] [address] R56.9    Status epilepticus ([facility] [address] G40.901    Oral phase dysphagia R13.11    Dysarthria R47.1    Dysphagia R13.10       Respiratory Therapist: RT Chevy

## 2017-07-21 NOTE — PROGRESS NOTES
Dear Mrs. Mckeon,    The Palliative Medicine team was consulted as part of your / your loved one's care in the hospital. Our team is a supportive service; we strive to relieve suffering and improve quality of life. The focus of our consultation was support and clarify goals of care. We reviewed advance care planning information, which includes the following:  Advance Care Planning 2017   Patient's Healthcare Decision Maker is: Legal Next of Kin   Primary Decision Maker Name Particia Comannie   Primary Decision Maker Phone Number [phone]   Primary Decision Maker Relationship to Patient Spouse   Confirm Advance Directive None   Patient Would Like to Complete Advance Directive Unable       You identified the following goals / treatment preferences for healthcare:  1. Your  values his daily routine at home and is eager to return. You shared about his daily exercise walking with walker on sidewalks, enjoyment of morning newspapwer and Word Find puzzles, and sitting on front porch visiting with friends/neighbors. 2. Follow up with Mr. Mckeon's eye doctor for exam and new glasses to assist with him returning to reading newspaper and word puzzles. 3. [facility] and endurance with hopes of improving independence and regaining swallowing function. The hope is he will be able to regain safety awareness and independence with functioning to return home. You are aware [facility] staff will assist you with exploring options if long-term care is recommended. 4. During your conversation with YIN Villa, you communicated your 's preference for attempts at resuscitation, also called Full code, with hope of returning to meaningful living, if a cardiac or respiratory arrest occurs in the future.    5. We spoke about importance of caring for yourself and utilizing resources in the community & friends to assist you as the primary caregiver for Mr. Mckeon. I provided you with information about Terri and encourage you to call [address] Of  to inquire further about how to apply for Mr. Mckeon. Because of the importance of this information, we are providing you with a printed copy to share with other healthcare providers after this hospitalization is complete. If any of the above information is incomplete or incorrect, please contact the Palliative Medicine team at [phone].     JACK Murry

## 2017-07-21 NOTE — PROGRESS NOTES
Speech LAnguage Pathology evaluation  Patient: Christ Sheppard (45 y.o. male)  Date: 7/21/2017  Primary Diagnosis: Status epilepticus ([address]  dysphagia  Procedure(s) (LRB):  ESOPHAGOGASTRODUODENOSCOPY (EGD) (N/A)  PERCUTANEOUS ENDOSCOPIC GASTROSTOMY TUBE INSERTION (N/A) 3 Days Post-Op   Precautions:   Fall    ASSESSMENT :  Based on the objective data described below, the patient presents with improved attention and calmness. He is not restless. Conversing with myself, the physician and his wife. He was requesting meds for his legs and asked for the yankauer earlier today. Requesting and initiating appropriately for his needs is new. He answered personal wh- questions appropriately. Followed one step commands well and 1 of 2 two step commands. He is [place] and required louder volume to hear and repetition of stimul. Named 5/5 body parts with extra time. He counted to 10 with only the initial cue of \"1\". His speech is mod to mod to severely dysarthric. Much better than it has been. His speech was clearer at single word level and when answering questions. Sentences and conversation remain with reduced intelligibility due to increased rate and imprecise artic. He is still coughing up secretions. He currently has a PEG for feedings. Swallowing/dysphagia can be addressed at the SNF once he is appropriate. Patient will benefit from skilled intervention to address the above impairments.   Patients rehabilitation potential is considered to be Good  Factors which may influence rehabilitation potential include:   []              None noted  [x]              Mental ability/status  [x]              Medical condition  [x]              Home/family situation and support systems  []              Safety awareness  []              Pain tolerance/management  []              Other:      PLAN :  Recommendations and Planned Interventions:  Intensive speech therapy to focus on swallowing, dysarthria and neurolinguistic deficits  Frequency/Duration: Patient will be followed by speech-language pathology 3 times a week to address goals. Discharge Recommendations: Skilled Nursing Facility     SUBJECTIVE:   Patient stated he needed medicine for his legs. He is having pain at night. OBJECTIVE:     Past Medical History:   Diagnosis Date    Alcohol abuse, in remission     Seizures ([address]      Past Surgical History:   Procedure Laterality Date    PLACE PERCUT GASTROSTOMY TUBE  7/18/2017          Prior Level of Function/Home Situation:   Home Situation  Home Environment: Apartment  # Steps to Enter: 2  One/Two Story Residence: One story  Living Alone: No  Support Systems: Family member(s)  Patient Expects to be Discharged to[de-identified] Apartment  Current DME Used/Available at Home: None  Mental Status:  Neurologic State: Alert  Orientation Level: Oriented to person, Disoriented to place, Disoriented to time, Disoriented to situation  Cognition: Decreased command following, Decreased attention/concentration  Perception: Appears intact  Perseveration: Perseverates during conversation, Perseverates during mobility, Perseverates during ADLS  Safety/Judgement: Lack of insight into deficits  Motor Speech:  Oral-Motor Structure/Motor Speech  Dysarthric Characteristics: Blended word boundaries; Imprecise  Intelligibility: Impaired  Word Intelligibility (%): 90 %  Sentence Intelligibility (%): 50 %  Overall Impairment Severity: Moderate-severe  Language Comprehension and Expression:  Auditory Comprehension  Auditory Impairment: Yes  Hearing Aid: None  Response to Basic Yes/No Questions (%): 100 %  One-Step Basic Commands (%): 100 %  Two-Step Basic Commands (%): 50 %  Interfering Components: Processing speed  Effective Techniques: Extra processing time;Repetition  Verbal Expression  Primary Mode of Expression: Verbal  Initiation: No impairment  Automatic Speech Task:  (counted to 10 with initial cue of \"1')  Naming: Impaired  Confrontation (%): 60 %  Conversation: Dysarthric;Fluent  Speech Characteristics: Word retrieval          G Codes: In compliance with CMSs Claims Based Outcome Reporting, the following G-code set was chosen for this patient based the use of the NOMS functional outcome to quantify this patient's level of 4 impairment. Using the NOMS, the patient was determined to be at level 4 for auditory comprehension which correlates with the CK= 40-59% level of severity. Based on the objective assessment provided within this note, the current, goal, and discharge g-codes are as follows:    Comprehension   Lang Comp Current Status CK= 40-59%   Lang Comp Goal Status CJ= 20-39%    Pain:  Pain Scale 1: Numeric (0 - 10)  Pain Intensity 1: 0     After treatment:   []              Patient left in no apparent distress sitting up in chair  [x]              Patient left in no apparent distress in bed  [x]              Call bell left within reach  [x]              Nursing notified  [x]              Caregiver present  []              Bed alarm activated    COMMUNICATION/EDUCATION:   The patients plan of care including recommendations and planned interventions was discussed with: Physician. [x]  Patient/family have participated as able in goal setting and plan of care. []  Patient/family agree to work toward stated goals and plan of care. []  Patient understands intent and goals of therapy, but is neutral about his/her participation. []  Patient is unable to participate in goal setting and plan of care.     Thank you for this referral.  Rob Lieberman, SLP  Time Calculation: 20 mins

## 2017-07-21 NOTE — PROGRESS NOTES
Attempted to see patient for PT session, however RN in room cleaning up patient and transferring back to bed with nursing staff. Will defer and follow up time permitting and patient availability.  Thank you    Jose De Jesus Luque, PT, DPT

## 2017-07-22 VITALS
TEMPERATURE: 98.2 F | BODY MASS INDEX: 23.07 KG/M2 | HEIGHT: 68 IN | OXYGEN SATURATION: 98 % | DIASTOLIC BLOOD PRESSURE: 69 MMHG | SYSTOLIC BLOOD PRESSURE: 123 MMHG | WEIGHT: 152.2 LBS | HEART RATE: 85 BPM | RESPIRATION RATE: 20 BRPM

## 2017-07-22 LAB
GLUCOSE BLD STRIP.AUTO-MCNC: 109 MG/DL (ref 65–100)
GLUCOSE BLD STRIP.AUTO-MCNC: 119 MG/DL (ref 65–100)
GLUCOSE BLD STRIP.AUTO-MCNC: 137 MG/DL (ref 65–100)
SERVICE CMNT-IMP: ABNORMAL

## 2017-07-22 PROCEDURE — 82962 GLUCOSE BLOOD TEST: CPT

## 2017-07-22 PROCEDURE — 74011250636 HC RX REV CODE- 250/636: Performed by: INTERNAL MEDICINE

## 2017-07-22 PROCEDURE — 74011250637 HC RX REV CODE- 250/637: Performed by: INTERNAL MEDICINE

## 2017-07-22 PROCEDURE — 74011250637 HC RX REV CODE- 250/637: Performed by: NURSE PRACTITIONER

## 2017-07-22 PROCEDURE — 74011000250 HC RX REV CODE- 250: Performed by: INTERNAL MEDICINE

## 2017-07-22 PROCEDURE — 77030018798 HC PMP KT ENTRL FED COVD -A

## 2017-07-22 PROCEDURE — 94640 AIRWAY INHALATION TREATMENT: CPT

## 2017-07-22 RX ORDER — METOPROLOL TARTRATE 25 MG/1
12.5 TABLET, FILM COATED ORAL 2 TIMES DAILY
Qty: 60 TAB | Refills: 1 | Status: SHIPPED | OUTPATIENT
Start: 2017-07-22 | End: 2017-08-01

## 2017-07-22 RX ORDER — ACETAMINOPHEN 650 MG/1
650 SUPPOSITORY RECTAL
Qty: 30 SUPPOSITORY | Refills: 1 | Status: SHIPPED | OUTPATIENT
Start: 2017-07-22 | End: 2017-08-01

## 2017-07-22 RX ORDER — QUETIAPINE FUMARATE 25 MG/1
25 TABLET, FILM COATED ORAL
Qty: 30 TAB | Refills: 1 | Status: SHIPPED | OUTPATIENT
Start: 2017-07-22 | End: 2017-07-27 | Stop reason: DRUGHIGH

## 2017-07-22 RX ORDER — IPRATROPIUM BROMIDE AND ALBUTEROL SULFATE 2.5; .5 MG/3ML; MG/3ML
3 SOLUTION RESPIRATORY (INHALATION)
Status: DISCONTINUED | OUTPATIENT
Start: 2017-07-22 | End: 2017-07-22 | Stop reason: HOSPADM

## 2017-07-22 RX ORDER — IPRATROPIUM BROMIDE AND ALBUTEROL SULFATE 2.5; .5 MG/3ML; MG/3ML
3 SOLUTION RESPIRATORY (INHALATION)
Qty: 30 NEBULE | Refills: 1 | Status: SHIPPED | OUTPATIENT
Start: 2017-07-22

## 2017-07-22 RX ORDER — LANOLIN ALCOHOL/MO/W.PET/CERES
100 CREAM (GRAM) TOPICAL DAILY
Qty: 30 TAB | Refills: 1 | Status: ON HOLD | OUTPATIENT
Start: 2017-07-22 | End: 2017-08-01

## 2017-07-22 RX ORDER — NITROFURANTOIN 25; 75 MG/1; MG/1
100 CAPSULE ORAL 2 TIMES DAILY WITH MEALS
Qty: 10 CAP | Refills: 0 | Status: SHIPPED | OUTPATIENT
Start: 2017-07-22 | End: 2017-07-27 | Stop reason: DRUGHIGH

## 2017-07-22 RX ORDER — LATANOPROST 50 UG/ML
1 SOLUTION/ DROPS OPHTHALMIC EVERY EVENING
Qty: 1 ML | Refills: 1 | Status: SHIPPED | OUTPATIENT
Start: 2017-07-22

## 2017-07-22 RX ORDER — VALPROIC ACID 250 MG/5ML
500 SOLUTION ORAL 3 TIMES DAILY
Qty: 1 BOTTLE | Refills: 1 | Status: ON HOLD | OUTPATIENT
Start: 2017-07-22 | End: 2017-08-01

## 2017-07-22 RX ADMIN — Medication 10 ML: at 06:21

## 2017-07-22 RX ADMIN — MULTIVIT AND MINERALS-FERROUS GLUCONATE 9 MG IRON/15 ML ORAL LIQUID 15 ML: at 13:33

## 2017-07-22 RX ADMIN — Medication 20 ML: at 13:34

## 2017-07-22 RX ADMIN — GLYCOPYRROLATE 0.2 MG: 0.2 INJECTION, SOLUTION INTRAMUSCULAR; INTRAVENOUS at 08:57

## 2017-07-22 RX ADMIN — NITROFURANTOIN MONOHYDRATE/MACROCRYSTALLINE 100 MG: 25; 75 CAPSULE ORAL at 08:56

## 2017-07-22 RX ADMIN — Medication 10 ML: at 13:34

## 2017-07-22 RX ADMIN — IPRATROPIUM BROMIDE AND ALBUTEROL SULFATE 3 ML: .5; 3 SOLUTION RESPIRATORY (INHALATION) at 08:25

## 2017-07-22 RX ADMIN — IPRATROPIUM BROMIDE AND ALBUTEROL SULFATE 3 ML: .5; 3 SOLUTION RESPIRATORY (INHALATION) at 02:02

## 2017-07-22 RX ADMIN — FENTANYL CITRATE 25 MCG: 50 INJECTION, SOLUTION INTRAMUSCULAR; INTRAVENOUS at 09:12

## 2017-07-22 RX ADMIN — Medication 100 MG: at 08:56

## 2017-07-22 RX ADMIN — VALPROIC ACID 500 MG: 250 SOLUTION ORAL at 10:33

## 2017-07-22 RX ADMIN — PREGABALIN 75 MG: 75 CAPSULE ORAL at 08:57

## 2017-07-22 RX ADMIN — Medication 10 ML: at 06:20

## 2017-07-22 RX ADMIN — METOPROLOL TARTRATE 12.5 MG: 25 TABLET ORAL at 08:56

## 2017-07-22 RX ADMIN — ENOXAPARIN SODIUM 40 MG: 40 INJECTION SUBCUTANEOUS at 08:56

## 2017-07-22 RX ADMIN — LEVETIRACETAM 1500 MG: 100 SOLUTION ORAL at 10:33

## 2017-07-22 NOTE — PROGRESS NOTES
PCU SHIFT NURSING NOTE      Bedside shift change report given to Tori RN and Rinku Salguero RN (oncoming nurse) by Ileana Diaz RN (offgoing nurse). Report included the following information SBAR, Kardex, Recent Results and Cardiac Rhythm NSR. Shift Summary:   1930: Bedside report given to night shift nurse. Patient trying to get out of bed. Bed alarm in place along with tele sitter. Will continue to monitor   0600: Patient has an uneventful night   0700: Report given to [facility]. Call bell in reach       Admission Date 6/24/2017   Admission Diagnosis Status epilepticus ([facility] [address]  dysphagia   Consults IP CONSULT TO NEUROLOGY  IP CONSULT TO INTENSIVIST  IP CONSULT TO PALLIATIVE CARE - PROVIDER  IP CONSULT TO GASTROENTEROLOGY  IP CONSULT TO PALLIATIVE CARE - PROVIDER  IP CONSULT TO OTOLARYNGOLOGY        Consults   []PT   []OT   []Speech   [x]Case Management      [] Palliative      Cardiac Monitoring Order   [x]Yes   []No     IV drips   []Yes    Drip:                            Dose:  Drip:                            Dose:  Drip:                            Dose:   [x]No     GI Prophylaxis   []Yes   []No         DVT Prophylaxis   SCDs:  Sequential Compression Device: Bilateral     Patient Refused VTE Prophylaxis: Yes    Han stockings:         [] Medication   []Contraindicated   []None      Activity Level Activity Level: Up with Assistance     Activity Assistance: Complete care   Purposeful Rounding every 1-2 hour? [x]Yes   Edouard Score  Total Score: 4   Bed Alarm (If score 3 or >)   [x]Yes   [] Refused (See signed refusal form in chart)   Klever Score  Klever Score: 16   Klever Score (if score 14 or less)   [x]PMT consult   []Wound Care consult      []Specialty bed   [] Nutrition consult          Needs prior to discharge:   Home O2 required:    [x]Yes   []No    If yes, how much O2 required?     Other:    Last Bowel Movement: Last Bowel Movement Date: 07/21/17      Influenza Vaccine Received Flu Vaccine for Current Season (usually Sept-March): Not Flu Season        Pneumonia Vaccine           Diet Active Orders   Diet    DIET NPO      LDAs           Triple Lumen IJ 06/24/17 Right Internal jugular (Active)   Central Line Being Utilized Yes 7/21/2017  8:08 AM   Criteria for Appropriate Use Limited/no vessel suitable for conventional peripheral access 7/21/2017  8:08 AM   Site Assessment Clean, dry, & intact 7/21/2017  8:08 AM   Infiltration Assessment 0 7/21/2017  8:08 AM   Affected Extremity/Extremities Color distal to insertion site pink (or appropriate for race) 7/21/2017  8:08 AM   Date of Last Dressing Change 07/19/17 7/21/2017  3:00 AM   Dressing Status Clean, dry, & intact 7/21/2017  8:08 AM   Dressing Type Disk with Chlorhexadine gluconate (CHG); Transparent;Tape 7/21/2017  8:08 AM   Action Taken Open ports on tubing capped 7/21/2017  8:08 AM   Proximal Hub Color/Line Status White;Flushed;Capped 7/21/2017  8:08 AM   Positive Blood Return (Medial Site) Yes 7/21/2017  8:08 AM   Medial Hub Color/Line Status Blue;Flushed;Capped 7/21/2017  8:08 AM   Positive Blood Return (Lateral Site) Yes 7/21/2017  8:08 AM   Distal Hub Color/Line Status Brown;Flushed;Capped 7/21/2017  8:08 AM   Positive Blood Return (Site #3) Yes 7/21/2017  8:08 AM   External Catheter Length (cm) 0 centimeters 7/14/2017  8:09 AM   Alcohol Cap Used Yes 7/21/2017  8:08 AM              PEG/Gastrostomy Tube 07/18/17 (Active)   Site Assessment Clean, dry, & intact 7/21/2017  8:08 AM   Dressing Status Clean, dry, & intact 7/21/2017  8:08 AM   G Port Status Infusing 7/21/2017  8:08 AM   External Catheter Length (cm) 2 centimeters 7/18/2017 12:20 PM   Action Taken Placement verified (comment) 7/21/2017  8:08 AM   Drainage Description Tan 7/21/2017  8:08 AM   Gastric Residual (mL) 35 ml 7/21/2017  8:08 AM   Tube Feeding/Formula Options Osmolite 1.2 7/21/2017  8:08 AM   Tube Feeding/Verify Rate (mL/hr) 60 7/21/2017  8:08 AM   Water Flush Volume (mL) 100 mL 7/21/2017  8:08 AM Intake (ml) 50 ml 7/21/2017  4:05 AM   Medication Volume 40 ml 7/20/2017 10:55 PM                Urinary Catheter [REMOVED] Condom Catheter 06/30/17-Criteria for Appropriate Use: Medically/surgically unstable, Strict I/Os  [REMOVED] Condom Catheter 07/05/17-Criteria for Appropriate Use: Comfort Care  [REMOVED] Condom Catheter 07/07/17-Criteria for Appropriate Use: Comfort Care  [REMOVED] Condom Catheter 07/15/17-Criteria for Appropriate Use: Strict I/Os, Comfort Care  [REMOVED] Urinary Catheter 06/24/17 2- way; Cummins-Criteria for Appropriate Use: Medically/surgically unstable    Intake & Output   Date 07/20/17 1900 - 07/21/17 0659 07/21/17 0700 - 07/22/17 0659   Shift 6757-9550 24 Hour Total 5852-0874 9588-3413 24 Hour Total   I  N  T  A  K  E   NG/ 940 100  100      Water Flush Volume (mL) (PEG/Gastrostomy Tube 07/18/17) 300 500 100  100      Medication Volume (PEG/Gastrostomy Tube 07/18/17) 120 250         Intake (ml) (PEG/Gastrostomy Tube 07/18/17) 50 190       Shift Total  (mL/kg) 470  (7.4) 940  (14.9) 100  (1.6)  100  (1.6)   O  U  T  P  U  T   Urine  (mL/kg/hr)           Urine Occurrence(s) 2 x 5 x 1 x  1 x    Shift Total  (mL/kg)         940 100  100   Weight (kg) 63.1 63.1 63.1 63.1 63.1         Readmission Risk Assessment Tool Score Medium Risk            20       Total Score        3 Relationship with PCP    3 Patient Length of Stay > 5    4 More than 1 Admission in calendar year    5 Patient Insurance is Medicare, Medicaid or Self Pay    5 Charlson Comorbidity Score        Criteria that do not apply:    Patient Living Status       Expected Length of Stay 4d 4h   Actual Length of Stay 27

## 2017-07-22 NOTE — DISCHARGE INSTRUCTIONS
HOSPITALIST DISCHARGE INSTRUCTIONS    NAME: Xiao Luis   :  1934   MRN:  [id number]     Date/Time:  2017 11:52 AM    ADMIT DATE: 2017   DISCHARGE DATE: 2017     Attending Physician: Akanksha Alvarez MD    DISCHARGE DIAGNOSIs    Acute encephalopathy , POA  Status epilepticus in setting of known  Seizure Disorder   Seizure Disorder   E. Coli and enterococcus UTI   Dysphagia/dyarthria with resultant moderate protein calorie malnutrition:    S/P Peg tube placement   Hypertension, benign/essential:   Hyperlipidemia:   History of SVT:    Peripheral neuropathy:  Remote EtOH abuse:  Family confirms no EtOH in 5 years.    GERD:   Acute respiratory failure (resolved)    Medications: Per above medication reconciliation. Pain Management: per above medications    Recommended diet:   NPO  ALL     MEDICATIONS ARE VIA PEG   OSMOLYTE  PEG feeding at 50 ml/hr, continuous,  200CC/Free water flushes every 6 hours    Recommended activity: PT/OT Eval and Treat    Wound care: None    Indwelling devices:  PEG and Chronic Cummins Catheter    Supplemental Oxygen: None    Required Lab work: Per SNF routine    Glucose management:  None    Code status: Full        Outside physician follow up:      NO BENZO's On this patient please, causes WORSE Encephalopathy    Follow-up Information     Follow up With Details Comments Contact Info    [address] [facility])  after Discharge from [address] [place] [address]      Jeanne Cortez MD Call  [facility]  [phone]                 Skilled nursing facility/ SNF MD responsible for above on discharge. Information obtained by :  I understand that if any problems occur once I am at home I am to contact my physician. I understand and acknowledge receipt of the instructions indicated above. Physician's or R.N.'s Signature                                                                  Date/Time                                                                                                                                              Patient or Repres

## 2017-07-22 NOTE — PROGRESS NOTES
Hospital to [facility] [address]                                                                        80 y.o.   male    [facility]   Room: [facility] 2 PROGRESSIVE CARE  Unit Phone# :  [phone]      [address]   [place] [address]  Dept: [phone]  Loc: [phone]                    SITUATION     Admitted:  6/24/2017         Attending Provider:  Denisse Gracia MD       Consultations:  IP CONSULT TO NEUROLOGY  IP CONSULT TO INTENSIVIST  IP CONSULT TO PALLIATIVE CARE - PROVIDER  IP CONSULT TO GASTROENTEROLOGY  IP CONSULT TO PALLIATIVE CARE - PROVIDER  IP CONSULT TO OTOLARYNGOLOGY    PCP:  Desi Figueroa MD   [phone]    Treatment Team: Attending Provider: Denisse Gracia MD; Consulting Provider: Tyler Luz.  Christopher Goodwin MD; Consulting Provider: Radha Nair NP; Consulting Provider: Chela Frias MD; Care Manager: Feliz Mckeon RN; Consulting Provider: Chacorta Barnhart RN; Consulting Provider: Teri Richmond MD    Admitting Dx:  Status epilepticus [facility])  dysphagia       Principal Problem: <principal problem not specified>    4 Days Post-Op of   Procedure(s):  ESOPHAGOGASTRODUODENOSCOPY (EGD)  PERCUTANEOUS ENDOSCOPIC GASTROSTOMY TUBE INSERTION   BY: Zuly Chew MD             ON: 7/18/2017                  Code Status: Full Code                Advance Directives:   Advance Care Planning 7/2/2017   Patient's Healthcare Decision Maker is: Legal Next of Jessy 69   Primary Decision Maker Name Evie Mark   Primary Decision Maker Phone Number [phone]   Primary Decision Maker Relationship to Patient Spouse   Confirm Advance Directive None   Patient Would Like to Complete Advance Directive Unable    (Send w/patient)   No Doesnt Have       Isolation:  There are currently no Active Isolations       MDRO: No current active infections    Pain Medications given:  Fentanyl    Last dose: t7/22/17 at  [facility] needed: no  Type of equipment:    hemodialysis  Not currently on dialysis  (Not currently on dialysis)  (Not currently on dialysis)     BACKGROUND     Allergies: Allergies   Allergen Reactions    No Known Allergies        Past Medical History:   Diagnosis Date    Alcohol abuse, in remission     Seizures ([address]        Past Surgical History:   Procedure Laterality Date    PLACE PERCUT GASTROSTOMY TUBE  7/18/2017            Prescriptions Prior to Admission   Medication Sig    levETIRAcetam (KEPPRA) 250 mg tablet Take 1 Tab by mouth two (2) times a day. Take with the 1000mg to make 1250mg twice a day.  cyclobenzaprine (FLEXERIL) 5 mg tablet Take 1 Tab by mouth three (3) times daily as needed for Muscle Spasm(s).  levETIRAcetam 1,000 mg tablet Take 1 Tab by mouth two (2) times a day.  pregabalin (LYRICA) 300 mg capsule Take 1 Cap by mouth two (2) times a day. Max Daily Amount: 600 mg.  thiamine (B-1) 100 mg tablet Take 1 Tab by mouth daily.  atorvastatin (LIPITOR) 40 mg tablet Take 1 Tab by mouth daily.  metoprolol succinate (TOPROL-XL) 25 mg XL tablet Take  by mouth daily.  omeprazole (PRILOSEC) 40 mg capsule Take 40 mg by mouth daily.  aspirin (ASPIRIN) 325 mg tablet Take 1 Tab by mouth daily.  magnesium oxide (MAG-OX) 400 mg tablet Take 400 mg by mouth daily. Hard scripts included in transfer packet no    Vaccinations:    Immunization History   Administered Date(s) Administered    Influenza Vaccine (Quad) PF 02/21/2017    Influenza Vaccine Split 09/07/2012    Pneumococcal Vaccine (Unspecified Type) 09/07/2012       Readmission Risks:    Known Risks: none        The Charlson CoMorbitiy Index tool is an evidenced based tool that has more automatic generated information.  The tool looks at many different items such as the age of the patient, how many times they were admitted in the last calendar year, current length of stay in the hospital and their diagnosis. All of these items are pulled automatically from information documented in the chart from various places and will generate a score that predicts whether a patient is at low (less than 13), medium (13-20) or high (21 or greater) risk of being readmitted.         ASSESSMENT                Temp: 98.2 °F (36.8 °C) (07/22/17 1050) Pulse (Heart Rate): 85 (07/22/17 1050)     Resp Rate: 20 (07/22/17 1050)           BP: 123/69 (07/22/17 1050)     O2 Sat (%): 98 % (07/22/17 1050)     Weight: 69 kg (152 lb 3.2 oz)    Height: 5' 8\" (172.7 cm) (07/18/17 0731)       If above not within 1 hour of discharge:    BP:_____  P:____  R:____ T:_____ O2 Sat: ___%  O2: ______    Active Orders   Diet    DIET NPO         Orientation: only aware of  time, place and person     Active Behaviors: None                                   Active Lines/Drains:  (Peg Tube / Cummins / CL or S/L?): yes    Urinary Status: Voiding, Incontinent     Last BM: Last Bowel Movement Date: 07/21/17     Skin Integrity: Intact             Mobility: Slightly limited   Weight Bearing Status: WBAT (Weight Bearing as Tolerated)                Lab Results   Component Value Date/Time    Glucose 113 07/21/2017 03:48 AM    Hemoglobin A1c 5.0 02/19/2017 02:32 AM    INR 1.2 07/02/2017 03:33 AM    INR 1.1 09/03/2012 08:50 AM    INR (POC) 1.1 02/18/2017 12:24 PM    HGB 9.0 07/21/2017 03:48 AM    HGB 9.7 07/17/2017 04:47 AM        RECOMMENDATION     See After Visit Summary (AVS) for:  · Discharge instructions  · After [address]   · Special equipment needed (entered pre-discharge by Care Management)  · Medication Reconciliation    · Follow up Appointment(s)         Report given/sent by:  Marla Lara RN                    Verbal [facility]  FAXED to:  n/a         Estimated discharge time:  7/22/2017 at [phone]

## 2017-07-22 NOTE — PROGRESS NOTES
Bedside and Verbal shift change report received from Rinku Salguero and Lena  Report included the following information SBAR, Kardex, Procedure Summary, Intake/Output, MAR, Recent Results, Med Rec Status and Cardiac Rhythm NSR.

## 2017-07-24 LAB
GLUCOSE BLD STRIP.AUTO-MCNC: 115 MG/DL (ref 65–100)
GLUCOSE BLD STRIP.AUTO-MCNC: 119 MG/DL (ref 65–100)
SERVICE CMNT-IMP: ABNORMAL
SERVICE CMNT-IMP: ABNORMAL

## 2017-07-27 ENCOUNTER — APPOINTMENT (OUTPATIENT)
Dept: ULTRASOUND IMAGING | Age: 82
DRG: 378 | End: 2017-07-27
Attending: HOSPITALIST
Payer: MEDICARE

## 2017-07-27 ENCOUNTER — APPOINTMENT (OUTPATIENT)
Dept: GENERAL RADIOLOGY | Age: 82
DRG: 378 | End: 2017-07-27
Attending: EMERGENCY MEDICINE
Payer: MEDICARE

## 2017-07-27 ENCOUNTER — APPOINTMENT (OUTPATIENT)
Dept: GENERAL RADIOLOGY | Age: 82
DRG: 378 | End: 2017-07-27
Attending: HOSPITALIST
Payer: MEDICARE

## 2017-07-27 ENCOUNTER — HOSPITAL ENCOUNTER (INPATIENT)
Age: 82
LOS: 2 days | Discharge: NURSING, REHAB OR LONG-TERM CARE | DRG: 378 | End: 2017-08-01
Attending: EMERGENCY MEDICINE | Admitting: INTERNAL MEDICINE
Payer: MEDICARE

## 2017-07-27 DIAGNOSIS — K92.2 GASTROINTESTINAL HEMORRHAGE, UNSPECIFIED GASTROINTESTINAL HEMORRHAGE TYPE: Primary | ICD-10-CM

## 2017-07-27 DIAGNOSIS — D64.9 ANEMIA, UNSPECIFIED TYPE: ICD-10-CM

## 2017-07-27 PROBLEM — N17.9 ARF (ACUTE RENAL FAILURE) (HCC): Status: ACTIVE | Noted: 2017-07-27

## 2017-07-27 PROBLEM — R19.5 HEME POSITIVE STOOL: Status: ACTIVE | Noted: 2017-07-27

## 2017-07-27 LAB
ALBUMIN SERPL BCP-MCNC: 3.1 G/DL (ref 3.5–5)
ALBUMIN/GLOB SERPL: 0.8 {RATIO} (ref 1.1–2.2)
ALP SERPL-CCNC: 65 U/L (ref 45–117)
ALT SERPL-CCNC: 22 U/L (ref 12–78)
ANION GAP BLD CALC-SCNC: 6 MMOL/L (ref 5–15)
APPEARANCE UR: CLEAR
AST SERPL W P-5'-P-CCNC: 19 U/L (ref 15–37)
ATRIAL RATE: 101 BPM
BACTERIA URNS QL MICRO: NEGATIVE /HPF
BASOPHILS # BLD AUTO: 0 K/UL (ref 0–0.1)
BASOPHILS # BLD: 0 % (ref 0–1)
BILIRUB SERPL-MCNC: 1 MG/DL (ref 0.2–1)
BILIRUB UR QL: NEGATIVE
BNP SERPL-MCNC: 657 PG/ML (ref 0–450)
BUN SERPL-MCNC: 37 MG/DL (ref 6–20)
BUN/CREAT SERPL: 22 (ref 12–20)
CALCIUM SERPL-MCNC: 8.7 MG/DL (ref 8.5–10.1)
CALCULATED R AXIS, ECG10: -43 DEGREES
CALCULATED T AXIS, ECG11: -72 DEGREES
CHLORIDE SERPL-SCNC: 97 MMOL/L (ref 97–108)
CK MB CFR SERPL CALC: 2.8 % (ref 0–2.5)
CK MB SERPL-MCNC: 1.3 NG/ML (ref 5–25)
CK SERPL-CCNC: 47 U/L (ref 39–308)
CO2 SERPL-SCNC: 37 MMOL/L (ref 21–32)
COLOR UR: ABNORMAL
CREAT SERPL-MCNC: 1.67 MG/DL (ref 0.7–1.3)
DIAGNOSIS, 93000: NORMAL
EOSINOPHIL # BLD: 0.1 K/UL (ref 0–0.4)
EOSINOPHIL NFR BLD: 2 % (ref 0–7)
EPITH CASTS URNS QL MICRO: ABNORMAL /LPF
ERYTHROCYTE [DISTWIDTH] IN BLOOD BY AUTOMATED COUNT: 13.7 % (ref 11.5–14.5)
GLOBULIN SER CALC-MCNC: 4.1 G/DL (ref 2–4)
GLUCOSE SERPL-MCNC: 98 MG/DL (ref 65–100)
GLUCOSE UR STRIP.AUTO-MCNC: NEGATIVE MG/DL
HCT VFR BLD AUTO: 22.9 % (ref 36.6–50.3)
HCT VFR BLD AUTO: 23.9 % (ref 36.6–50.3)
HEMOCCULT STL QL: POSITIVE
HGB BLD-MCNC: 7.4 G/DL (ref 12.1–17)
HGB BLD-MCNC: 7.4 G/DL (ref 12.1–17)
HGB UR QL STRIP: NEGATIVE
KETONES UR QL STRIP.AUTO: ABNORMAL MG/DL
LEUKOCYTE ESTERASE UR QL STRIP.AUTO: ABNORMAL
LYMPHOCYTES # BLD AUTO: 25 % (ref 12–49)
LYMPHOCYTES # BLD: 1.4 K/UL (ref 0.8–3.5)
MAGNESIUM SERPL-MCNC: 1.7 MG/DL (ref 1.6–2.4)
MCH RBC QN AUTO: 33.8 PG (ref 26–34)
MCHC RBC AUTO-ENTMCNC: 32.3 G/DL (ref 30–36.5)
MCV RBC AUTO: 104.6 FL (ref 80–99)
MONOCYTES # BLD: 0.9 K/UL (ref 0–1)
MONOCYTES NFR BLD AUTO: 16 % (ref 5–13)
NEUTS SEG # BLD: 3.2 K/UL (ref 1.8–8)
NEUTS SEG NFR BLD AUTO: 57 % (ref 32–75)
NITRITE UR QL STRIP.AUTO: NEGATIVE
P-R INTERVAL, ECG05: 214 MS
PH UR STRIP: 8 [PH] (ref 5–8)
PLATELET # BLD AUTO: 169 K/UL (ref 150–400)
POTASSIUM SERPL-SCNC: 3 MMOL/L (ref 3.5–5.1)
PROT SERPL-MCNC: 7.2 G/DL (ref 6.4–8.2)
PROT UR STRIP-MCNC: NEGATIVE MG/DL
Q-T INTERVAL, ECG07: 424 MS
QRS DURATION, ECG06: 90 MS
QTC CALCULATION (BEZET), ECG08: 549 MS
RBC # BLD AUTO: 2.19 M/UL (ref 4.1–5.7)
RBC #/AREA URNS HPF: ABNORMAL /HPF (ref 0–5)
SODIUM SERPL-SCNC: 140 MMOL/L (ref 136–145)
SP GR UR REFRACTOMETRY: 1.02 (ref 1–1.03)
TROPONIN I SERPL-MCNC: <0.04 NG/ML
UA: UC IF INDICATED,UAUC: ABNORMAL
UROBILINOGEN UR QL STRIP.AUTO: 4 EU/DL (ref 0.2–1)
VENTRICULAR RATE, ECG03: 101 BPM
WBC # BLD AUTO: 5.7 K/UL (ref 4.1–11.1)
WBC URNS QL MICRO: ABNORMAL /HPF (ref 0–4)

## 2017-07-27 PROCEDURE — 77030005538 HC CATH URETH FOL44 BARD -B

## 2017-07-27 PROCEDURE — 87077 CULTURE AEROBIC IDENTIFY: CPT | Performed by: EMERGENCY MEDICINE

## 2017-07-27 PROCEDURE — 93005 ELECTROCARDIOGRAM TRACING: CPT

## 2017-07-27 PROCEDURE — 73600 X-RAY EXAM OF ANKLE: CPT

## 2017-07-27 PROCEDURE — 74011250637 HC RX REV CODE- 250/637: Performed by: HOSPITALIST

## 2017-07-27 PROCEDURE — 81001 URINALYSIS AUTO W/SCOPE: CPT | Performed by: EMERGENCY MEDICINE

## 2017-07-27 PROCEDURE — 74011250636 HC RX REV CODE- 250/636: Performed by: HOSPITALIST

## 2017-07-27 PROCEDURE — 36415 COLL VENOUS BLD VENIPUNCTURE: CPT | Performed by: EMERGENCY MEDICINE

## 2017-07-27 PROCEDURE — 87086 URINE CULTURE/COLONY COUNT: CPT | Performed by: EMERGENCY MEDICINE

## 2017-07-27 PROCEDURE — 96360 HYDRATION IV INFUSION INIT: CPT

## 2017-07-27 PROCEDURE — 74011000258 HC RX REV CODE- 258: Performed by: HOSPITALIST

## 2017-07-27 PROCEDURE — 83735 ASSAY OF MAGNESIUM: CPT | Performed by: EMERGENCY MEDICINE

## 2017-07-27 PROCEDURE — 82550 ASSAY OF CK (CPK): CPT | Performed by: EMERGENCY MEDICINE

## 2017-07-27 PROCEDURE — 84484 ASSAY OF TROPONIN QUANT: CPT | Performed by: EMERGENCY MEDICINE

## 2017-07-27 PROCEDURE — 99218 HC RM OBSERVATION: CPT

## 2017-07-27 PROCEDURE — 74011250637 HC RX REV CODE- 250/637: Performed by: EMERGENCY MEDICINE

## 2017-07-27 PROCEDURE — 74011000250 HC RX REV CODE- 250: Performed by: HOSPITALIST

## 2017-07-27 PROCEDURE — 82272 OCCULT BLD FECES 1-3 TESTS: CPT | Performed by: EMERGENCY MEDICINE

## 2017-07-27 PROCEDURE — 83880 ASSAY OF NATRIURETIC PEPTIDE: CPT | Performed by: EMERGENCY MEDICINE

## 2017-07-27 PROCEDURE — 36600 WITHDRAWAL OF ARTERIAL BLOOD: CPT

## 2017-07-27 PROCEDURE — 85018 HEMOGLOBIN: CPT | Performed by: HOSPITALIST

## 2017-07-27 PROCEDURE — 74011250636 HC RX REV CODE- 250/636: Performed by: EMERGENCY MEDICINE

## 2017-07-27 PROCEDURE — 80053 COMPREHEN METABOLIC PANEL: CPT | Performed by: EMERGENCY MEDICINE

## 2017-07-27 PROCEDURE — 99285 EMERGENCY DEPT VISIT HI MDM: CPT

## 2017-07-27 PROCEDURE — 71010 XR CHEST PORT: CPT

## 2017-07-27 PROCEDURE — 93970 EXTREMITY STUDY: CPT

## 2017-07-27 PROCEDURE — 87186 SC STD MICRODIL/AGAR DIL: CPT | Performed by: EMERGENCY MEDICINE

## 2017-07-27 PROCEDURE — 76770 US EXAM ABDO BACK WALL COMP: CPT

## 2017-07-27 PROCEDURE — 85025 COMPLETE CBC W/AUTO DIFF WBC: CPT | Performed by: EMERGENCY MEDICINE

## 2017-07-27 RX ORDER — ATORVASTATIN CALCIUM 40 MG/1
40 TABLET, FILM COATED ORAL
COMMUNITY
End: 2017-09-15

## 2017-07-27 RX ORDER — ACETAMINOPHEN 325 MG/1
650 TABLET ORAL
Status: DISCONTINUED | OUTPATIENT
Start: 2017-07-27 | End: 2017-07-27

## 2017-07-27 RX ORDER — ATORVASTATIN CALCIUM 40 MG/1
40 TABLET, FILM COATED ORAL
Status: DISCONTINUED | OUTPATIENT
Start: 2017-07-27 | End: 2017-08-01 | Stop reason: HOSPADM

## 2017-07-27 RX ORDER — LATANOPROST 50 UG/ML
1 SOLUTION/ DROPS OPHTHALMIC EVERY EVENING
Status: DISCONTINUED | OUTPATIENT
Start: 2017-07-27 | End: 2017-08-01 | Stop reason: HOSPADM

## 2017-07-27 RX ORDER — OXYCODONE HCL 20 MG/ML
5 CONCENTRATE, ORAL ORAL
Status: DISCONTINUED | OUTPATIENT
Start: 2017-07-27 | End: 2017-08-01 | Stop reason: HOSPADM

## 2017-07-27 RX ORDER — SODIUM CHLORIDE 0.9 % (FLUSH) 0.9 %
5-10 SYRINGE (ML) INJECTION AS NEEDED
Status: DISCONTINUED | OUTPATIENT
Start: 2017-07-27 | End: 2017-08-01 | Stop reason: HOSPADM

## 2017-07-27 RX ORDER — DOCUSATE SODIUM 100 MG/1
100 CAPSULE, LIQUID FILLED ORAL 2 TIMES DAILY
Status: DISCONTINUED | OUTPATIENT
Start: 2017-07-27 | End: 2017-08-01 | Stop reason: HOSPADM

## 2017-07-27 RX ORDER — METOPROLOL TARTRATE 25 MG/1
12.5 TABLET, FILM COATED ORAL 2 TIMES DAILY
Status: DISCONTINUED | OUTPATIENT
Start: 2017-07-27 | End: 2017-08-01 | Stop reason: HOSPADM

## 2017-07-27 RX ORDER — POTASSIUM CHLORIDE 1.5 G/1.77G
40 POWDER, FOR SOLUTION ORAL
Status: COMPLETED | OUTPATIENT
Start: 2017-07-27 | End: 2017-07-27

## 2017-07-27 RX ORDER — PREGABALIN 300 MG/1
300 CAPSULE ORAL 2 TIMES DAILY
Status: ON HOLD | COMMUNITY
End: 2017-08-01

## 2017-07-27 RX ORDER — PANTOPRAZOLE SODIUM 40 MG/1
40 GRANULE, DELAYED RELEASE ORAL
Status: DISCONTINUED | OUTPATIENT
Start: 2017-07-28 | End: 2017-07-28

## 2017-07-27 RX ORDER — LANOLIN ALCOHOL/MO/W.PET/CERES
100 CREAM (GRAM) TOPICAL DAILY
Status: DISCONTINUED | OUTPATIENT
Start: 2017-07-28 | End: 2017-08-01 | Stop reason: HOSPADM

## 2017-07-27 RX ORDER — SODIUM CHLORIDE 9 MG/ML
75 INJECTION, SOLUTION INTRAVENOUS CONTINUOUS
Status: DISCONTINUED | OUTPATIENT
Start: 2017-07-27 | End: 2017-07-30

## 2017-07-27 RX ORDER — ACETAMINOPHEN 325 MG/1
650 TABLET ORAL
Status: DISCONTINUED | OUTPATIENT
Start: 2017-07-27 | End: 2017-08-01 | Stop reason: HOSPADM

## 2017-07-27 RX ORDER — ASPIRIN 325 MG
325 TABLET ORAL DAILY
Status: DISCONTINUED | OUTPATIENT
Start: 2017-07-28 | End: 2017-07-27

## 2017-07-27 RX ORDER — QUETIAPINE FUMARATE 25 MG/1
25 TABLET, FILM COATED ORAL
Status: DISCONTINUED | OUTPATIENT
Start: 2017-07-27 | End: 2017-08-01 | Stop reason: HOSPADM

## 2017-07-27 RX ORDER — SODIUM CHLORIDE 0.9 % (FLUSH) 0.9 %
5-10 SYRINGE (ML) INJECTION EVERY 8 HOURS
Status: DISCONTINUED | OUTPATIENT
Start: 2017-07-27 | End: 2017-08-01 | Stop reason: HOSPADM

## 2017-07-27 RX ORDER — NITROFURANTOIN 25; 75 MG/1; MG/1
100 CAPSULE ORAL 2 TIMES DAILY
Status: ON HOLD | COMMUNITY
End: 2017-08-01

## 2017-07-27 RX ORDER — CYCLOBENZAPRINE HCL 10 MG
5 TABLET ORAL
Status: DISCONTINUED | OUTPATIENT
Start: 2017-07-27 | End: 2017-08-01 | Stop reason: HOSPADM

## 2017-07-27 RX ORDER — IPRATROPIUM BROMIDE AND ALBUTEROL SULFATE 2.5; .5 MG/3ML; MG/3ML
3 SOLUTION RESPIRATORY (INHALATION)
Status: DISCONTINUED | OUTPATIENT
Start: 2017-07-27 | End: 2017-08-01 | Stop reason: HOSPADM

## 2017-07-27 RX ORDER — NALOXONE HYDROCHLORIDE 0.4 MG/ML
0.4 INJECTION, SOLUTION INTRAMUSCULAR; INTRAVENOUS; SUBCUTANEOUS AS NEEDED
Status: DISCONTINUED | OUTPATIENT
Start: 2017-07-27 | End: 2017-08-01 | Stop reason: HOSPADM

## 2017-07-27 RX ORDER — ONDANSETRON 2 MG/ML
4 INJECTION INTRAMUSCULAR; INTRAVENOUS
Status: DISCONTINUED | OUTPATIENT
Start: 2017-07-27 | End: 2017-08-01 | Stop reason: HOSPADM

## 2017-07-27 RX ORDER — VALPROIC ACID 250 MG/5ML
500 SOLUTION ORAL 3 TIMES DAILY
Status: DISCONTINUED | OUTPATIENT
Start: 2017-07-27 | End: 2017-08-01 | Stop reason: HOSPADM

## 2017-07-27 RX ORDER — CYCLOBENZAPRINE HCL 5 MG
5 TABLET ORAL
Status: ON HOLD | COMMUNITY
End: 2017-08-01

## 2017-07-27 RX ORDER — PREGABALIN 150 MG/1
300 CAPSULE ORAL 2 TIMES DAILY
Status: DISCONTINUED | OUTPATIENT
Start: 2017-07-27 | End: 2017-08-01 | Stop reason: HOSPADM

## 2017-07-27 RX ORDER — QUETIAPINE FUMARATE 25 MG/1
25 TABLET, FILM COATED ORAL
Status: ON HOLD | COMMUNITY
End: 2017-08-01

## 2017-07-27 RX ORDER — NITROFURANTOIN 25; 75 MG/1; MG/1
100 CAPSULE ORAL 2 TIMES DAILY
Status: DISCONTINUED | OUTPATIENT
Start: 2017-07-27 | End: 2017-07-27

## 2017-07-27 RX ADMIN — VALPROIC ACID 500 MG: 250 SOLUTION ORAL at 22:15

## 2017-07-27 RX ADMIN — POTASSIUM CHLORIDE 40 MEQ: 1.5 POWDER, FOR SOLUTION ORAL at 10:39

## 2017-07-27 RX ADMIN — Medication 10 ML: at 14:56

## 2017-07-27 RX ADMIN — QUETIAPINE FUMARATE 25 MG: 25 TABLET, FILM COATED ORAL at 21:10

## 2017-07-27 RX ADMIN — SODIUM CHLORIDE 500 ML: 900 INJECTION, SOLUTION INTRAVENOUS at 09:19

## 2017-07-27 RX ADMIN — LATANOPROST 1 DROP: 50 SOLUTION OPHTHALMIC at 18:30

## 2017-07-27 RX ADMIN — ATORVASTATIN CALCIUM 40 MG: 40 TABLET, FILM COATED ORAL at 21:10

## 2017-07-27 RX ADMIN — LEVETIRACETAM 1500 MG: 100 SOLUTION ORAL at 18:36

## 2017-07-27 RX ADMIN — VALPROIC ACID 500 MG: 250 SOLUTION ORAL at 17:48

## 2017-07-27 RX ADMIN — PREGABALIN 300 MG: 150 CAPSULE ORAL at 17:49

## 2017-07-27 RX ADMIN — SODIUM CHLORIDE 75 ML/HR: 900 INJECTION, SOLUTION INTRAVENOUS at 14:44

## 2017-07-27 RX ADMIN — DOCUSATE SODIUM 100 MG: 100 CAPSULE, LIQUID FILLED ORAL at 17:48

## 2017-07-27 RX ADMIN — OXYCODONE HYDROCHLORIDE 5 MG: 100 SOLUTION ORAL at 14:53

## 2017-07-27 RX ADMIN — CEFTRIAXONE 1 G: 1 INJECTION, POWDER, FOR SOLUTION INTRAMUSCULAR; INTRAVENOUS at 15:07

## 2017-07-27 RX ADMIN — METOPROLOL TARTRATE 12.5 MG: 25 TABLET ORAL at 14:45

## 2017-07-27 NOTE — PROGRESS NOTES
Readmission Assessment. Level 1 Readmission. RRAT: 20.    The patient is an 80year old male who presents to ED from [facility] with irregular heart beat. History of CVA, seizures. He was recently admitted to [facility] from 6/24 to 7/22 due to recurrent seizures, acute encephalopathy and respiratory failure with need for intubation. He was discharged to the [facility]. Prior to admission and SNF placement the patient was living at home in [place] with his wife. He is ambulatory with a walker at baseline, wife and daughter assist with transportation. Care management will continue to follow for discharge planning. Care Management Interventions  PCP Verified by CM:  Yes Arianna Schmitt)  Transition of Care Consult (CM Consult): Discharge Planning  Physical Therapy Consult: Yes  Occupational Therapy Consult: Yes  Current Support Network: [address] [place] [address]  Confirm Follow Up Transport: Other (see comment)    SHERRY Wheeler  [phone]

## 2017-07-27 NOTE — PROGRESS NOTES
Pharmacy Clarification of Prior to Admission Medication Regimen     The patient was not interviewed regarding clarification of the prior to admission medication regimen. Patient was transferred from to [facility] to [address] [place] [address]. Pharmacy called [facility], [phone], and spoke with Bharath Sibley LPN, who provided last dose administered for the patient. Information Obtained From: SNF transfer papers    Pertinent Pharmacy Findings:   Updated patients preferred outpatient pharmacy to: Pharmacy did not update the patient's outpatient pharmacy on file.  Patient receives all medications through his G-Tube.  Patient has been at [facility] since 17. PTA medication list was corrected to the following:     Prior to Admission Medications   Prescriptions Last Dose Informant Patient Reported? Taking? QUEtiapine (SEROQUEL) 25 mg tablet 2017 at 2100 Transfer Papers Yes Yes   Si mg by Per G Tube route nightly. acetaminophen (TYLENOL) 650 mg suppository Not Taking at Unknown time Transfer Papers No No   Sig: Insert 1 Suppository into rectum every four (4) hours as needed. Indications: Fever, HEADACHE DISORDER, Pain   albuterol-ipratropium (DUO-NEB) 2.5 mg-0.5 mg/3 ml nebu Not Taking at Unknown time Transfer Papers No No   Sig: 3 mL by Nebulization route every six (6) hours as needed. aspirin (ASPIRIN) 325 mg tablet 2017 at 0900 Transfer Papers Yes Yes   Si mg by Per G Tube route daily. atorvastatin (LIPITOR) 40 mg tablet 2017 at 2100 Transfer Papers Yes Yes   Si mg by Per G Tube route nightly. cyclobenzaprine (FLEXERIL) 5 mg tablet Not Taking at Unknown time Transfer Papers Yes No   Si mg by Per G Tube route three (3) times daily as needed for Muscle Spasm(s). latanoprost (XALATAN) 0.005 % ophthalmic solution 2017 at 2100 Transfer Papers No Yes   Sig: Administer 1 Drop to both eyes every evening.    levETIRAcetam (KEPPRA) 100 mg/ml soln oral solution 2017 at 2100 Transfer Papers No Yes   Sig: 15 mL by Per G Tube route two (2) times a day. metoprolol tartrate (LOPRESSOR) 25 mg tablet 2017 at 1700 Transfer Papers No Yes   Si.5 Tabs by Per G Tube route two (2) times a day. multivit w-min-ferrous gluconate (CEROVITE) 9 mg iron/15 mL oral liquid 2017 at 0900 Transfer Papers No Yes   Sig: 15 mL by Per G Tube route daily. nitrofurantoin, macrocrystal-monohydrate, (MACROBID) 100 mg capsule 2017 at 2100 Transfer Papers Yes Yes   Si mg by Per G Tube route two (2) times a day. omeprazole (PRILOSEC) 40 mg capsule 2017 at 0900 Transfer Papers Yes Yes   Si mg by Per G Tube route daily. pregabalin (LYRICA) 300 mg capsule 2017 at 0900 Transfer Papers Yes Yes   Si mg by Per G Tube route two (2) times a day. thiamine (B-1) 100 mg tablet 2017 at 0900 Transfer Papers No Yes   Si Tab by Per G Tube route daily. valproic acid, as sodium salt, (DEPAKENE) 250 mg/5 mL (5 mL) soln oral solution 2017 at 1700 Transfer Papers No Yes   Sig: 10 mL by Per G Tube route three (3) times daily.       Facility-Administered Medications: None          Thank you,  Xavier Walker CPhT  Medication History Pharmacy Technician

## 2017-07-27 NOTE — PROGRESS NOTES
Primary Nurse Nayana Coronado, RN and Kalani RAMOS, RN performed a dual skin assessment on this patient Impairment noted- see wound doc flow sheet  Klever score is 17  Patient has discoloration to inside ankles and a old healed over pressure ulcer to sacrum.

## 2017-07-27 NOTE — H&P
Hospitalist Admission Note    NAME: Dawit Jones   :  1934   MRN:  [id number]     Date/Time:  2017 12:38 PM    Patient PCP: Moon Adkins MD  ______________________________________________________________________   Assessment & Plan:  Macrocytic anemia, acute on chronic  Heme positive brown stool, possible GI bleed  Recent PEG placement 17 for dysphagia  --hgb 7.4, was 9 on . Hgb trending down since month long hospitalization last month was 14 -->12. Get iron panel, ferritin, retic. B12 high 1900  --hold aspirin. Continue PPI. Get GI consult. May need EGD and colonoscopy  --serial hgb. Place on observation status    ANNABEL Cr 1.67,  --baseline 0.5. Concern for obstructive uropathy as bladder feels distended on exam.  Check bladder scan. IVF. Avoid nephrotoxin. Renal US  --UA with possible uti. Put on rocephin. Recent hx of e. Coli and enterococcus uti. Bilateral ankle pain  Right ankle and lower leg edema  --get xrays of ankles, venous doppler US     Seizure disorder  --continue keppra, valproic acid    Hx SVT  --current in sinus to sinus tach. Continue metoprolol. Hold aspirin due to concern for GI bleed    Code: full  DVT prophylaxis: SCD  Surrogate decision maker:  wife        Subjective:   CHIEF COMPLAINT:  Irregular heart beat    HISTORY OF PRESENT ILLNESS:     Dawit Jones is a 80 y.o.  male sent from nursing home for irregular heart beat ranging from 38 to 122. Patient recently hospitalized for nearly a month with status epilepticus, encephalopathy, and uti. He is poor historian, oriented to self only. Does not know why he is here. Report some epigastric pain, no nausea or vomiting. Referred for admission for possible GI bleed. Had feeding tube place  for dysphagia. In ER, main complaint is urinary urgency but has difficulty voiding. We were asked to admit for work up and evaluation of the above problems.      Past Medical History:   Diagnosis Date    Alcohol abuse, in remission     Seizures ([facility] [address]       Past Surgical History:   Procedure Laterality Date    PLACE PERCUT GASTROSTOMY TUBE  7/18/2017          Social History   Substance Use Topics    Smoking status: Never Smoker    Smokeless tobacco: Not on file    Alcohol use No      Comment: Quit drinking 10 years ago      Drug use:        Family History   Problem Relation Age of Onset    Other Other      no strokes or seizures     Allergies   Allergen Reactions    No Known Allergies         Prior to Admission medications    Medication Sig Start Date End Date Taking? Authorizing Provider   nitrofurantoin, macrocrystal-monohydrate, (MACROBID) 100 mg capsule 100 mg by Per G Tube route two (2) times a day. Yes Nadia Bob MD   QUEtiapine (SEROQUEL) 25 mg tablet 25 mg by Per G Tube route nightly. Yes Nadia Bob MD   atorvastatin (LIPITOR) 40 mg tablet 40 mg by Per G Tube route nightly. Yes Nadia Bob MD   pregabalin (LYRICA) 300 mg capsule 300 mg by Per G Tube route two (2) times a day. Yes Nadia Bob MD   multivit w-min-ferrous gluconate (CEROVITE) 9 mg iron/15 mL oral liquid 15 mL by Per G Tube route daily. 7/22/17  Yes Carroll Pittman MD   thiamine (B-1) 100 mg tablet 1 Tab by Per G Tube route daily. 7/22/17  Yes Carroll Pittman MD   valproic acid, as sodium salt, (DEPAKENE) 250 mg/5 mL (5 mL) soln oral solution 10 mL by Per G Tube route three (3) times daily. 7/22/17  Yes Carroll Pittman MD   metoprolol tartrate (LOPRESSOR) 25 mg tablet 0.5 Tabs by Per G Tube route two (2) times a day. 7/22/17  Yes Carroll Pittman MD   levETIRAcetam (KEPPRA) 100 mg/ml soln oral solution 15 mL by Per G Tube route two (2) times a day. 7/22/17  Yes Carroll Pittman MD   latanoprost (XALATAN) 0.005 % ophthalmic solution Administer 1 Drop to both eyes every evening. 7/22/17  Yes Carroll Pittman MD   omeprazole (PRILOSEC) 40 mg capsule 40 mg by Per G Tube route daily.    Yes Phys MD Lisbeth   aspirin (ASPIRIN) 325 mg tablet 325 mg by Per G Tube route daily. 12  Yes Zoltan Bauman MD   cyclobenzaprine (FLEXERIL) 5 mg tablet 5 mg by Per G Tube route three (3) times daily as needed for Muscle Spasm(s). Nadia Bob MD   acetaminophen (TYLENOL) 650 mg suppository Insert 1 Suppository into rectum every four (4) hours as needed. Indications: Fever, HEADACHE DISORDER, Pain 17   Airam Barnhart MD   albuterol-ipratropium (DUO-NEB) 2.5 mg-0.5 mg/3 ml nebu 3 mL by Nebulization route every six (6) hours as needed. 17   Airam Barnhart MD     REVIEW OF SYSTEMS:  POSITIVE= Bold. Negative = normal text  General:  fever, chills, sweats, generalized weakness, weight loss/gain, loss of appetite  Eyes:  blurred vision, eye pain, loss of vision, diplopia  Ear Nose and Throat:  rhinorrhea, pharyngitis  Respiratory:   cough, sputum production, SOB, wheezing, AL, pleuritic pain  Cardiology:  chest pain, palpitations, orthopnea, PND, edema, syncope   Gastrointestinal:  abdominal pain, N/V, dysphagia, diarrhea, constipation, bleeding  Genitourinary:  frequency, urgency, dysuria, hematuria, incontinence  Muskuloskeletal :  arthralgia, myalgia  Hematology:  easy bruising, bleeding, lymphadenopathy  Dermatological:  rash, ulceration, pruritis  Endocrine:  hot flashes or polydipsia  Neurological:  headache, dizziness, confusion, focal weakness, paresthesia, memory loss, gait disturbance  Psychological: anxiety, depression, agitation      Objective:   VITALS:    Visit Vitals    /70    Pulse 74    Temp 99 °F (37.2 °C)    Resp 21    Ht 5' 8\" (1.727 m)    Wt 68 kg (150 lb)    SpO2 98%    BMI 22.81 kg/m2     Temp (24hrs), Av.9 °F (37.2 °C), Min:98.8 °F (37.1 °C), Max:99 °F (37.2 °C)    Body mass index is 22.81 kg/(m^2). PHYSICAL EXAM:    General:    Alert, cooperative, no distress, appears stated age.    Wet vocal quality  HEENT: Atraumatic, anicteric sclerae, pink conjunctivae     No oral ulcers, mucosa moist, throat clear. Hearing intact. Neck:  Supple, symmetrical,  thyroid: non tender  Lungs:   Clear to auscultation bilaterally. No Wheezing or Rhonchi. No rales. Chest wall:  No tenderness  No Accessory muscle use. Heart:   Regular  rhythm,  No  murmur   No gallop. 1+ edema right ankle and lower tibia. No edema on left  Abdomen:   Soft, non-tender. Not distended. Bowel sounds normal. Mass in lower abdomen (suspect distended bladder). PEG tube with dry brown crust at insertion site, small amount dried blood on dressing. No active bleeding  Extremities: No cyanosis. No clubbing  Skin:     Not pale Not Jaundiced  No rashes   Psych:  Fair insight. Not depressed. Not anxious or agitated. Neurologic: EOMs intact. No facial asymmetry. Mild dysarthria, wet vocal quality. Symmetrical strength, Alert and oriented X self only. Does not know month or year.    Peripheral pulse: Bilateral, DP, 1+  Capillary refill:  normal    IMAGING RESULTS:   []       I have personally reviewed the actual   []     CXR  []     CT scan  CXR:  CT :  EKG:   ________________________________________________________________________  Care Plan discussed with:    Comments   Patient y    Family  y    RN y    Care Manager                    Consultant:      ________________________________________________________________________  Prophylaxis:  GI PPI   DVT SCD   ________________________________________________________________________  Recommended Disposition:   Home with Family    HH/PT/OT/RN    SNF/LTC y   CHRISTINA    ________________________________________________________________________  Code Status:  Full Code y   DNR/DNI    ________________________________________________________________________  TOTAL TIME:  60 minutes      Comments    y Reviewed previous records       ______________________________________________________________________  Daivd Samson, MD      Procedures: see electronic medical records for all procedures/Xrays and details which were not copied into this note but were reviewed prior to creation of Plan.     LAB DATA REVIEWED:    Recent Results (from the past 24 hour(s))   EKG, 12 LEAD, INITIAL    Collection Time: 07/27/17  7:22 AM   Result Value Ref Range    Ventricular Rate 101 BPM    Atrial Rate 101 BPM    P-R Interval 214 ms    QRS Duration 90 ms    Q-T Interval 424 ms    QTC Calculation (Bezet) 549 ms    Calculated R Axis -43 degrees    Calculated T Axis -72 degrees    Diagnosis       Sinus tachycardia with 1st degree AV block with frequent premature   ventricular complexes  Left axis deviation  Voltage criteria for left ventricular hypertrophy  T wave abnormality, consider inferolateral ischemia  When compared with ECG of 11-JUL-2017 18:31,  T wave inversion less evident in Inferior leads  T wave inversion now evident in Lateral leads  Confirmed by Agus Jon P.VEdwar ([id number]) on 7/27/2017 10:22:59 AM     URINALYSIS W/ REFLEX CULTURE    Collection Time: 07/27/17  8:38 AM   Result Value Ref Range    Color DARK YELLOW      Appearance CLEAR CLEAR      Specific gravity 1.017 1.003 - 1.030      pH (UA) 8.0 5.0 - 8.0      Protein NEGATIVE  NEG mg/dL    Glucose NEGATIVE  NEG mg/dL    Ketone TRACE (A) NEG mg/dL    Bilirubin NEGATIVE  NEG      Blood NEGATIVE  NEG      Urobilinogen 4.0 (H) 0.2 - 1.0 EU/dL    Nitrites NEGATIVE  NEG      Leukocyte Esterase SMALL (A) NEG      WBC 5-10 0 - 4 /hpf    RBC 0-5 0 - 5 /hpf    Epithelial cells FEW FEW /lpf    Bacteria NEGATIVE  NEG /hpf    UA:UC IF INDICATED URINE CULTURE ORDERED (A) CNI     METABOLIC PANEL, COMPREHENSIVE    Collection Time: 07/27/17  9:02 AM   Result Value Ref Range    Sodium 140 136 - 145 mmol/L    Potassium 3.0 (L) 3.5 - 5.1 mmol/L    Chloride 97 97 - 108 mmol/L    CO2 37 (H) 21 - 32 mmol/L    Anion gap 6 5 - 15 mmol/L    Glucose 98 65 - 100 mg/dL    BUN 37 (H) 6 - 20 MG/DL    Creatinine 1.67 (H) 0.70 - 1.30 MG/DL    BUN/Creatinine ratio 22 (H) 12 - 20 GFR est AA 48 (L) >60 ml/min/1.73m2    GFR est non-AA 40 (L) >60 ml/min/1.73m2    Calcium 8.7 8.5 - 10.1 MG/DL    Bilirubin, total 1.0 0.2 - 1.0 MG/DL    ALT (SGPT) 22 12 - 78 U/L    AST (SGOT) 19 15 - 37 U/L    Alk. phosphatase 65 45 - 117 U/L    Protein, total 7.2 6.4 - 8.2 g/dL    Albumin 3.1 (L) 3.5 - 5.0 g/dL    Globulin 4.1 (H) 2.0 - 4.0 g/dL    A-G Ratio 0.8 (L) 1.1 - 2.2     CK W/ CKMB & INDEX    Collection Time: 07/27/17  9:02 AM   Result Value Ref Range    CK 47 39 - 308 U/L    CK - MB 1.3 <3.6 NG/ML    CK-MB Index 2.8 (H) 0 - 2.5     CBC WITH AUTOMATED DIFF    Collection Time: 07/27/17  9:02 AM   Result Value Ref Range    WBC 5.7 4.1 - 11.1 K/uL    RBC 2.19 (L) 4.10 - 5.70 M/uL    HGB 7.4 (L) 12.1 - 17.0 g/dL    HCT 22.9 (L) 36.6 - 50.3 %    .6 (H) 80.0 - 99.0 FL    MCH 33.8 26.0 - 34.0 PG    MCHC 32.3 30.0 - 36.5 g/dL    RDW 13.7 11.5 - 14.5 %    PLATELET 089 793 - 350 K/uL    NEUTROPHILS 57 32 - 75 %    LYMPHOCYTES 25 12 - 49 %    MONOCYTES 16 (H) 5 - 13 %    EOSINOPHILS 2 0 - 7 %    BASOPHILS 0 0 - 1 %    ABS. NEUTROPHILS 3.2 1.8 - 8.0 K/UL    ABS. LYMPHOCYTES 1.4 0.8 - 3.5 K/UL    ABS. MONOCYTES 0.9 0.0 - 1.0 K/UL    ABS. EOSINOPHILS 0.1 0.0 - 0.4 K/UL    ABS.  BASOPHILS 0.0 0.0 - 0.1 K/UL   TROPONIN I    Collection Time: 07/27/17  9:02 AM   Result Value Ref Range    Troponin-I, Qt. <0.04 <0.05 ng/mL   NT-PRO BNP    Collection Time: 07/27/17  9:02 AM   Result Value Ref Range    NT pro- (H) 0 - 450 PG/ML   MAGNESIUM    Collection Time: 07/27/17  9:02 AM   Result Value Ref Range    Magnesium 1.7 1.6 - 2.4 mg/dL   OCCULT BLOOD, STOOL    Collection Time: 07/27/17 10:29 AM   Result Value Ref Range    Occult blood, stool POSITIVE (A) NEG

## 2017-07-27 NOTE — IP AVS SNAPSHOT
[address] [facility] 
[phone] Patient: Geovani Pair MRN: [id number] :1934 You are allergic to the following Allergen Reactions No Known Allergies Not Noted Recent Documentation Height Weight BMI Smoking Status 1.727 m 68 kg 22.81 kg/m2 Never Smoker Emergency Contacts Name Discharge Info Relation Home Work Mobile [address] CAREGIVER [3] Spouse [3] [phone] Va Russo  Daughter [21]   [phone] About your hospitalization You were admitted on:  2017 You last received care in the:  [facility] 3 RENAL MEDICINE You were discharged on:  2017 Unit phone number:  [phone] Why you were hospitalized Your primary diagnosis was:  Not on File Your diagnoses also included:  Arf (Acute Renal Failure) (Hcc), Anemia, Heme Positive Stool Providers Seen During Your Hospitalizations Provider Role Specialty Primary office phone Portillo Hill DO Attending Provider Emergency Medicine [phone] Salas Pa MD Attending Provider Internal Medicine [phone] Indra Jordan MD Attending Provider Internal Medicine [phone] Butch Sterling MD Attending Provider Hospitalist [phone] Your Primary Care Physician (PCP) Primary Care Physician Office Phone Office Fax Phillip Logan [phone] Follow-up Information Follow up With Details Comments Contact Info Judith Mascorro MD   [address] [address] [phone] Current Discharge Medication List  
  
START taking these medications Dose & Instructions Dispensing Information Comments Morning Noon Evening Bedtime  
 acetaminophen 325 mg tablet Commonly known as:  TYLENOL Replaces:  acetaminophen 650 mg suppository Your last dose was: Your next dose is:    
   
   
 Dose:  650 mg  
2 Tabs by Per G Tube route every six (6) hours as needed. Quantity:  40 Tab Refills:  0  
     
   
   
   
  
 oxyCODONE 5 mg capsule Commonly known as:  OXYIR Your last dose was: Your next dose is:    
   
   
 Dose:  5 mg 1 Cap by Per G Tube route every four (4) hours as needed. Max Daily Amount: 30 mg.  
 Quantity:  30 Cap Refills:  0  
     
   
   
   
  
 pantoprazole 40 mg granules for oral suspension Commonly known as:  PROTONIX Replaces:  omeprazole 40 mg capsule Your last dose was: Your next dose is:    
   
   
 Dose:  40 mg  
40 mg by Per G Tube route Before breakfast and dinner. Quantity:  60 Each Refills:  1 CONTINUE these medications which have CHANGED Dose & Instructions Dispensing Information Comments Morning Noon Evening Bedtime  
 atorvastatin 40 mg tablet Commonly known as:  LIPITOR What changed:  Another medication with the same name was removed. Continue taking this medication, and follow the directions you see here. Your last dose was: Your next dose is:    
   
   
 Dose:  40 mg  
40 mg by Per G Tube route nightly. Refills:  0  
     
   
   
   
  
 cyclobenzaprine 5 mg tablet Commonly known as:  FLEXERIL What changed:  Another medication with the same name was removed. Continue taking this medication, and follow the directions you see here. Your last dose was: Your next dose is:    
   
   
 Dose:  5 mg 1 Tab by Per G Tube route three (3) times daily as needed for Muscle Spasm(s). Quantity:  40 Tab Refills:  0  
     
   
   
   
  
 nitrofurantoin (macrocrystal-monohydrate) 100 mg capsule Commonly known as:  MACROBID What changed:   
- how to take this - when to take this - Another medication with the same name was removed.  Continue taking this medication, and follow the directions you see here. Your last dose was: Your next dose is:    
   
   
 Dose:  100 mg Take 1 Cap by mouth nightly. Quantity:  30 Cap Refills:  1  
     
   
   
   
  
 pregabalin 300 mg capsule Commonly known as:  Sujata Loving What changed:   
- how to take this - Another medication with the same name was removed. Continue taking this medication, and follow the directions you see here. Your last dose was: Your next dose is:    
   
   
 Dose:  300 mg Take 1 Cap by mouth two (2) times a day. Max Daily Amount: 600 mg. Quantity:  60 Cap Refills:  1 QUEtiapine 25 mg tablet Commonly known as:  SEROquel What changed:  Another medication with the same name was removed. Continue taking this medication, and follow the directions you see here. Your last dose was: Your next dose is:    
   
   
 Dose:  25 mg  
1 Tab by Per G Tube route nightly. Quantity:  30 Tab Refills:  1  
     
   
   
   
  
 thiamine 100 mg tablet Commonly known as:  B-1 What changed:  Another medication with the same name was removed. Continue taking this medication, and follow the directions you see here. Your last dose was: Your next dose is:    
   
   
 Dose:  100 mg  
1 Tab by Per G Tube route daily. Quantity:  30 Tab Refills:  1 CONTINUE these medications which have NOT CHANGED Dose & Instructions Dispensing Information Comments Morning Noon Evening Bedtime  
 albuterol-ipratropium 2.5 mg-0.5 mg/3 ml Nebu Commonly known as:  Henok Santillan Your last dose was: Your next dose is:    
   
   
 Dose:  3 mL  
3 mL by Nebulization route every six (6) hours as needed. Quantity:  30 Nebule Refills:  1  
     
   
   
   
  
 latanoprost 0.005 % ophthalmic solution Commonly known as:  Cece Sheppard Your last dose was: Your next dose is: Dose:  1 Drop Administer 1 Drop to both eyes every evening. Quantity:  1 mL Refills:  1  
     
   
   
   
  
 levETIRAcetam 100 mg/ml Soln oral solution Commonly known as:  KEPPRA Your last dose was: Your next dose is:    
   
   
 Dose:  1500 mg  
15 mL by Per G Tube route two (2) times a day. Quantity:  1 Bottle Refills:  1  
     
   
   
   
  
 metoprolol tartrate 25 mg tablet Commonly known as:  LOPRESSOR Your last dose was: Your next dose is:    
   
   
 Dose:  12.5 mg  
0.5 Tabs by Per G Tube route two (2) times a day. Quantity:  60 Tab Refills:  1  
     
   
   
   
  
 multivit w-min-ferrous gluconate 9 mg iron/15 mL oral liquid Commonly known as:  English Mean Your last dose was: Your next dose is:    
   
   
 Dose:  15 mL  
15 mL by Per G Tube route daily. Quantity:  1 Bottle Refills:  01  
     
   
   
   
  
 valproic acid (as sodium salt) 250 mg/5 mL (5 mL) Soln oral solution Commonly known as:  Julio Cesar Goode Your last dose was: Your next dose is:    
   
   
 Dose:  500 mg  
10 mL by Per G Tube route three (3) times daily. Quantity:  1 Bottle Refills:  1 STOP taking these medications   
 acetaminophen 650 mg suppository Commonly known as:  TYLENOL Replaced by:  acetaminophen 325 mg tablet  
   
  
 aspirin 325 mg tablet Commonly known as:  ASPIRIN  
   
  
 omeprazole 40 mg capsule Commonly known as:  PRILOSEC Replaced by:  pantoprazole 40 mg granules for oral suspension Where to Get Your Medications Information on where to get these meds will be given to you by the nurse or doctor. ! Ask your nurse or doctor about these medications  
  acetaminophen 325 mg tablet  
 cyclobenzaprine 5 mg tablet  
 levETIRAcetam 100 mg/ml Soln oral solution  
 metoprolol tartrate 25 mg tablet  
 multivit w-min-ferrous gluconate 9 mg iron/15 mL oral liquid nitrofurantoin (macrocrystal-monohydrate) 100 mg capsule  
 oxyCODONE 5 mg capsule  
 pantoprazole 40 mg granules for oral suspension  
 pregabalin 300 mg capsule QUEtiapine 25 mg tablet  
 thiamine 100 mg tablet  
 valproic acid (as sodium salt) 250 mg/5 mL (5 mL) Soln oral solution Discharge Instructions HOSPITALIST DISCHARGE INSTRUCTIONS 
 
NAME: Arelis Morning :  1934 MRN:  [id number] Date/Time:  2017 12:52 PM 
 
ADMIT DATE: 2017 DISCHARGE DATE: 2017 Attending Physician: Shazia Liu MD 
 
DISCHARGE DIAGNOSIS: 
Anemia, UTI, GI Bleed, PEG placement, ANNABEL, Urinary retention, B/L Ankle pain, Seizure Disorder Medications: Per above medication reconciliation. Pain Management: per above medications Recommended diet: PEG feeding Jevity 1.5 at 50ml/hr   With 150ml free water flushes q4H. Recommended activity: Activity as tolerated Wound care: None Indwelling devices: Cummins catheter, voiding trial per SNF with care per routine in 1 week Supplemental Oxygen: None Required Lab work: Per SNF routine Glucose management:  None Code status: Full Outside physician follow up: Follow-up Information Follow up With Details Comments Contact Info Susie Duckworth MD   [address] [address] [phone] F/U PCP 
F/U GI 
 
 
 Skilled nursing facility/ SNF MD responsible for above on discharge. Information obtained by : 
I understand that if any problems occur once I am at home I am to contact my physician. I understand and acknowledge receipt of the instructions indicated above. Physician's or R.N.'s Signature                                                                  Date/Time Patient or Repres Discharge Orders None [employer] Announcement We are excited to announce that we are making your provider's discharge notes available to you in [employer]. You will see these notes when they are completed and signed by the physician that discharged you from your recent hospital stay. If you have any questions or concerns about any information you see in [employer], please call the Health Information Department where you were seen or reach out to your Primary Care Provider for more information about your plan of care. Introducing [facility] SERVICES! [employer] introduces [employer] patient portal. Now you can access parts of your medical record, email your doctor's office, and request medication refills online. 1. In your internet browser, go to [email or web address] 2. Click on the First Time User? Click Here link in the Sign In box. You will see the New Member Sign Up page. 3. Enter your [employer] Access Code exactly as it appears below. You will not need to use this code after youve completed the sign-up process. If you do not sign up before the expiration date, you must request a new code. · [employer] Access Code: [email or web address] Expires: 9/9/2017  5:34 PM 
 
4. Enter the last four digits of your Social Security Number (xxxx) and Date of Birth (mm/dd/yyyy) as indicated and click Submit. You will be taken to the next sign-up page. 5. Create a [employer] ID. This will be your [employer] login ID and cannot be changed, so think of one that is secure and easy to remember. 6. Create a [employer] password. You can change your password at any time. 7. Enter your Password Reset Question and Answer. This can be used at a later time if you forget your password. 8. Enter your e-mail address.  You will receive e-mail notification when new information is available in [employer]. 9. Click Sign Up. You can now view and download portions of your medical record. 10. Click the Download Summary menu link to download a portable copy of your medical information. If you have questions, please visit the Frequently Asked Questions section of the [employer] website. Remember, [employer] is NOT to be used for urgent needs. For medical emergencies, dial 911. Now available from your iPhone and Android! General Information Please provide this summary of care documentation to your next provider. Patient Signature:  ____________________________________________________________ Date:  ____________________________________________________________  
  
Savanna Samson Provider Signature:  ____________________________________________________________ Date:  ____________________________________________________________

## 2017-07-27 NOTE — ED NOTES
Received pt to exam room for reports of \"fast irregular\" heart rate while at [facility]. Dr. Sameer Chase at bedside.

## 2017-07-27 NOTE — PROGRESS NOTES
TRANSFER - IN REPORT:    Verbal report received from [facility] (name) on Alsiha Hartman  being received from ED (unit) for routine progression of care      Report consisted of patients Situation, Background, Assessment and   Recommendations(SBAR). Information from the following report(s) SBAR, Kardex, [facility] and Recent Results was reviewed with the receiving nurse. Opportunity for questions and clarification was provided. Report given to receiving nurse Josephine Ray.

## 2017-07-27 NOTE — ED PROVIDER NOTES
HPI Comments: Gale Delgado is a 80 y.o. Male with hx of seizures who presents via EMS to ED [facility] ED from [facility] with CC of irregular heart beat today. EMS states pt's caregiver at nursing home thought pt may be in a-fib. Per EMS, pt had intermittent HR of 118-120 en route to ED. At time of examination, pt reports mild SOB, but is otherwise without complaint. PCP: Neel Hernández MD    Hx is otherwise limited; pt is poor historian. The history is provided by the EMS personnel. The history is limited by the condition of the patient. Past Medical History:   Diagnosis Date    Alcohol abuse, in remission     Seizures ([address]        Past Surgical History:   Procedure Laterality Date    PLACE PERCUT GASTROSTOMY TUBE  7/18/2017              Family History:   Problem Relation Age of Onset    Other Other      no strokes or seizures       Social History     Social History    Marital status:      Spouse name: N/A    Number of children: N/A    Years of education: N/A     Occupational History    Not on file. Social History Main Topics    Smoking status: Never Smoker    Smokeless tobacco: Not on file    Alcohol use No      Comment: Quit drinking 10 years ago    Drug use: No    Sexual activity: Not on file     Other Topics Concern    Not on file     Social History Narrative         ALLERGIES: No known allergies    Review of Systems   Unable to perform ROS: Other       Patient Vitals for the past 12 hrs:   Temp Pulse Resp BP SpO2   07/27/17 0930 - 97 18 113/58 98 %   07/27/17 0915 - 95 21 136/50 100 %   07/27/17 0900 - (!) 102 23 130/40 98 %   07/27/17 0845 - 96 23 138/51 99 %   07/27/17 0815 - (!) 102 22 124/52 99 %   07/27/17 0800 - (!) 103 19 136/54 100 %   07/27/17 0700 98.8 °F (37.1 °C) 98 20 145/59 100 %            Physical Exam   Constitutional: He is oriented to person, place, and time. He appears well-developed and well-nourished. No distress.    HENT:   Head: Normocephalic and atraumatic. Mouth/Throat: Oropharynx is clear and moist.   Eyes: Conjunctivae and EOM are normal.   Neck: Neck supple. No JVD present. No tracheal deviation present. Cardiovascular: Normal rate and intact distal pulses. An irregular rhythm present. Exam reveals no gallop and no friction rub. No murmur heard. Pulmonary/Chest: Effort normal and breath sounds normal. No stridor. No respiratory distress. He has no wheezes. Abdominal: Soft. Bowel sounds are normal. He exhibits no distension and no mass. There is no tenderness. There is no guarding. PEG tube in epigastric area   Genitourinary: Rectal exam shows no external hemorrhoid, no internal hemorrhoid and no mass. Musculoskeletal: Normal range of motion. He exhibits no tenderness. No deformity; trace B/L lower extremity edema   Neurological: He is alert and oriented to person, place, and time. He has normal strength. No focal deficits   Skin: Skin is warm, dry and intact. No rash noted. Psychiatric: He has a normal mood and affect. His behavior is normal. Judgment and thought content normal.   Nursing note and vitals reviewed. MDM  Number of Diagnoses or Management Options  Anemia, unspecified type:   Gastrointestinal hemorrhage, unspecified gastrointestinal hemorrhage type:   Diagnosis management comments: Pt presenting for possible afib- is not in afib now. Pt denies any CP, has no increased O2 requirement. DDx includes ACS, arrhythmia, anemia, pneumonia, pulmonary edema, electrolyte abnormality. Will check labs, cardiac enzymes, ekg, CXR.         Amount and/or Complexity of Data Reviewed  Clinical lab tests: ordered and reviewed  Tests in the radiology section of CPT®: ordered and reviewed  Tests in the medicine section of CPT®: ordered and reviewed  Obtain history from someone other than the patient: yes (EMS)  Review and summarize past medical records: yes  Discuss the patient with other providers: yes (Hospitalist)  Independent visualization of images, tracings, or specimens: yes      ED Course       Procedures     EKG interpretation: 07:22  Rhythm: sinus tachycardia, with multiple PVC's and 1st degree AV block. Rate (approx.): 101; Axis: left axis deviation; NH interval: normal; QRS interval: normal ; ST/T wave: T wave inverted in lateral leads. Procedure Note - Rectal Exam:   10:31 AM  Performed by: Sheeba Yo DO  Chaperoned by: Cholo Pineda  Rectal exam performed. Brown stool was collected. Stool was sent to lab for hemoccult testing. The procedure took 1-15 minutes, and pt tolerated well. CONSULT NOTE:   11:06 AM  Sheeba Yo DO spoke with Dhruv De La Cruz MD,   Specialty: Hospitalist  Discussed pt's hx, disposition, and available diagnostic and imaging results. Reviewed care plans. Consultant will evaluate pt for admission. She requests consult to GI, will admit pt. Consult Note:  1:05 PM  Sheeba Yo DO spoke with Edouard Morley MD  Specialty: GI  Discussed pts hx, disposition, and available diagnostic and imaging results. Reviewed care plans. Consultant agrees with plans as outlined.         LABORATORY TESTS:  Recent Results (from the past 12 hour(s))   EKG, 12 LEAD, INITIAL    Collection Time: 07/27/17  7:22 AM   Result Value Ref Range    Ventricular Rate 101 BPM    Atrial Rate 101 BPM    P-R Interval 214 ms    QRS Duration 90 ms    Q-T Interval 424 ms    QTC Calculation (Bezet) 549 ms    Calculated R Axis -43 degrees    Calculated T Axis -72 degrees    Diagnosis       Sinus tachycardia with 1st degree AV block with frequent premature   ventricular complexes  Left axis deviation  Voltage criteria for left ventricular hypertrophy  T wave abnormality, consider inferolateral ischemia  When compared with ECG of 11-JUL-2017 18:31,  T wave inversion less evident in Inferior leads  T wave inversion now evident in Lateral leads  Confirmed by Alisha Bolus, P.V. ([id number]) on 7/27/2017 10:22:59 AM     URINALYSIS W/ REFLEX CULTURE    Collection Time: 07/27/17  8:38 AM   Result Value Ref Range    Color DARK YELLOW      Appearance CLEAR CLEAR      Specific gravity 1.017 1.003 - 1.030      pH (UA) 8.0 5.0 - 8.0      Protein NEGATIVE  NEG mg/dL    Glucose NEGATIVE  NEG mg/dL    Ketone TRACE (A) NEG mg/dL    Bilirubin NEGATIVE  NEG      Blood NEGATIVE  NEG      Urobilinogen 4.0 (H) 0.2 - 1.0 EU/dL    Nitrites NEGATIVE  NEG      Leukocyte Esterase SMALL (A) NEG      WBC 5-10 0 - 4 /hpf    RBC 0-5 0 - 5 /hpf    Epithelial cells FEW FEW /lpf    Bacteria NEGATIVE  NEG /hpf    UA:UC IF INDICATED URINE CULTURE ORDERED (A) CNI     METABOLIC PANEL, COMPREHENSIVE    Collection Time: 07/27/17  9:02 AM   Result Value Ref Range    Sodium 140 136 - 145 mmol/L    Potassium 3.0 (L) 3.5 - 5.1 mmol/L    Chloride 97 97 - 108 mmol/L    CO2 37 (H) 21 - 32 mmol/L    Anion gap 6 5 - 15 mmol/L    Glucose 98 65 - 100 mg/dL    BUN 37 (H) 6 - 20 MG/DL    Creatinine 1.67 (H) 0.70 - 1.30 MG/DL    BUN/Creatinine ratio 22 (H) 12 - 20      GFR est AA 48 (L) >60 ml/min/1.73m2    GFR est non-AA 40 (L) >60 ml/min/1.73m2    Calcium 8.7 8.5 - 10.1 MG/DL    Bilirubin, total 1.0 0.2 - 1.0 MG/DL    ALT (SGPT) 22 12 - 78 U/L    AST (SGOT) 19 15 - 37 U/L    Alk.  phosphatase 65 45 - 117 U/L    Protein, total 7.2 6.4 - 8.2 g/dL    Albumin 3.1 (L) 3.5 - 5.0 g/dL    Globulin 4.1 (H) 2.0 - 4.0 g/dL    A-G Ratio 0.8 (L) 1.1 - 2.2     CK W/ CKMB & INDEX    Collection Time: 07/27/17  9:02 AM   Result Value Ref Range    CK 47 39 - 308 U/L    CK - MB 1.3 <3.6 NG/ML    CK-MB Index 2.8 (H) 0 - 2.5     CBC WITH AUTOMATED DIFF    Collection Time: 07/27/17  9:02 AM   Result Value Ref Range    WBC 5.7 4.1 - 11.1 K/uL    RBC 2.19 (L) 4.10 - 5.70 M/uL    HGB 7.4 (L) 12.1 - 17.0 g/dL    HCT 22.9 (L) 36.6 - 50.3 %    .6 (H) 80.0 - 99.0 FL    MCH 33.8 26.0 - 34.0 PG    MCHC 32.3 30.0 - 36.5 g/dL    RDW 13.7 11.5 - 14.5 %    PLATELET 446 957 - 662 K/uL    NEUTROPHILS 57 32 - 75 % LYMPHOCYTES 25 12 - 49 %    MONOCYTES 16 (H) 5 - 13 %    EOSINOPHILS 2 0 - 7 %    BASOPHILS 0 0 - 1 %    ABS. NEUTROPHILS 3.2 1.8 - 8.0 K/UL    ABS. LYMPHOCYTES 1.4 0.8 - 3.5 K/UL    ABS. MONOCYTES 0.9 0.0 - 1.0 K/UL    ABS. EOSINOPHILS 0.1 0.0 - 0.4 K/UL    ABS. BASOPHILS 0.0 0.0 - 0.1 K/UL   TROPONIN I    Collection Time: 07/27/17  9:02 AM   Result Value Ref Range    Troponin-I, Qt. <0.04 <0.05 ng/mL   NT-PRO BNP    Collection Time: 07/27/17  9:02 AM   Result Value Ref Range    NT pro- (H) 0 - 450 PG/ML   MAGNESIUM    Collection Time: 07/27/17  9:02 AM   Result Value Ref Range    Magnesium 1.7 1.6 - 2.4 mg/dL   OCCULT BLOOD, STOOL    Collection Time: 07/27/17 10:29 AM   Result Value Ref Range    Occult blood, stool POSITIVE (A) NEG         IMAGING RESULTS:  CXR Results  (Last 48 hours)               07/27/17 0846  XR CHEST PORT Final result    Narrative: Indication: Dyspnea, arrhythmia       Comparison: 7/14/2017       Portable exam of the chest obtained at 834 demonstrates normal heart size. There   is no acute process in the lung fields. Minimal scarring is noted in the right   upper lobe. The osseous structures are unremarkable. Impression: No acute process. Minimal scarring right upper lobe. MEDICATIONS GIVEN:  Medications   sodium chloride 0.9 % bolus infusion 500 mL (500 mL IntraVENous New Bag 7/27/17 6212)   potassium chloride (KLOR-CON) packet 40 mEq (40 mEq Per G Tube Given 7/27/17 1039)       IMPRESSION:  1. Gastrointestinal hemorrhage, unspecified gastrointestinal hemorrhage type    2. Anemia, unspecified type        PLAN:  1. Admit to Hospitalist    ADMIT NOTE:  11:06 AM  The patient is being admitted to the hospital by Dr. Iqra Duque. The results of their tests and reasons for their admission have been discussed with the patient and/or available family. They convey agreement and understanding for the need to be admitted and for their admission diagnosis.         This note is prepared by Chris Scott, acting as Scribe for Justin Dewey DO. Justin Dewey DO: The scribe's documentation has been prepared under my direction and personally reviewed by me in its entirety. I confirm that the note above accurately reflects all work, treatment, procedures, and medical decision making performed by me.

## 2017-07-27 NOTE — ED NOTES
TRANSFER - OUT REPORT:    Verbal report given to Yola RAMOS(name) on Reyes Savoy  being transferred to Renal(unit) for routine progression of care       Report consisted of patients Situation, Background, Assessment and   Recommendations(SBAR). Information from the following report(s) SBAR, ED Summary, [facility] and Recent Results was reviewed with the receiving nurse. Lines:   Peripheral IV 07/27/17 Left External jugular (Active)   Site Assessment Clean, dry, & intact 7/27/2017  9:08 AM   Phlebitis Assessment 0 7/27/2017  9:08 AM   Infiltration Assessment 0 7/27/2017  9:08 AM   Dressing Status Clean, dry, & intact 7/27/2017  9:08 AM   Dressing Type Tape;Transparent 7/27/2017  9:08 AM   Hub Color/Line Status Pink 7/27/2017  9:08 AM        Opportunity for questions and clarification was provided.

## 2017-07-28 LAB
ANION GAP BLD CALC-SCNC: 4 MMOL/L (ref 5–15)
BUN SERPL-MCNC: 22 MG/DL (ref 6–20)
BUN/CREAT SERPL: 29 (ref 12–20)
CALCIUM SERPL-MCNC: 8.5 MG/DL (ref 8.5–10.1)
CHLORIDE SERPL-SCNC: 103 MMOL/L (ref 97–108)
CO2 SERPL-SCNC: 35 MMOL/L (ref 21–32)
CREAT SERPL-MCNC: 0.77 MG/DL (ref 0.7–1.3)
GLUCOSE SERPL-MCNC: 79 MG/DL (ref 65–100)
HCT VFR BLD AUTO: 22.3 % (ref 36.6–50.3)
HGB BLD-MCNC: 7.1 G/DL (ref 12.1–17)
POTASSIUM SERPL-SCNC: 3.4 MMOL/L (ref 3.5–5.1)
SODIUM SERPL-SCNC: 142 MMOL/L (ref 136–145)

## 2017-07-28 PROCEDURE — 74011250636 HC RX REV CODE- 250/636: Performed by: HOSPITALIST

## 2017-07-28 PROCEDURE — 74011250637 HC RX REV CODE- 250/637: Performed by: HOSPITALIST

## 2017-07-28 PROCEDURE — 97530 THERAPEUTIC ACTIVITIES: CPT | Performed by: OCCUPATIONAL THERAPIST

## 2017-07-28 PROCEDURE — G8978 MOBILITY CURRENT STATUS: HCPCS

## 2017-07-28 PROCEDURE — 97162 PT EVAL MOD COMPLEX 30 MIN: CPT

## 2017-07-28 PROCEDURE — 74011000258 HC RX REV CODE- 258: Performed by: HOSPITALIST

## 2017-07-28 PROCEDURE — G8979 MOBILITY GOAL STATUS: HCPCS

## 2017-07-28 PROCEDURE — 36415 COLL VENOUS BLD VENIPUNCTURE: CPT | Performed by: HOSPITALIST

## 2017-07-28 PROCEDURE — G8988 SELF CARE GOAL STATUS: HCPCS | Performed by: OCCUPATIONAL THERAPIST

## 2017-07-28 PROCEDURE — 77030032490 HC SLV COMPR SCD KNE COVD -B

## 2017-07-28 PROCEDURE — 85018 HEMOGLOBIN: CPT | Performed by: HOSPITALIST

## 2017-07-28 PROCEDURE — 97116 GAIT TRAINING THERAPY: CPT

## 2017-07-28 PROCEDURE — 97165 OT EVAL LOW COMPLEX 30 MIN: CPT | Performed by: OCCUPATIONAL THERAPIST

## 2017-07-28 PROCEDURE — G8987 SELF CARE CURRENT STATUS: HCPCS | Performed by: OCCUPATIONAL THERAPIST

## 2017-07-28 PROCEDURE — 99218 HC RM OBSERVATION: CPT

## 2017-07-28 PROCEDURE — 80048 BASIC METABOLIC PNL TOTAL CA: CPT | Performed by: HOSPITALIST

## 2017-07-28 PROCEDURE — 77030011943

## 2017-07-28 RX ORDER — PANTOPRAZOLE SODIUM 40 MG/1
40 GRANULE, DELAYED RELEASE ORAL
Status: DISCONTINUED | OUTPATIENT
Start: 2017-07-29 | End: 2017-08-01 | Stop reason: HOSPADM

## 2017-07-28 RX ADMIN — METOPROLOL TARTRATE 12.5 MG: 25 TABLET ORAL at 08:41

## 2017-07-28 RX ADMIN — LATANOPROST 1 DROP: 50 SOLUTION OPHTHALMIC at 18:00

## 2017-07-28 RX ADMIN — VALPROIC ACID 500 MG: 250 SOLUTION ORAL at 22:24

## 2017-07-28 RX ADMIN — DOCUSATE SODIUM 100 MG: 100 CAPSULE, LIQUID FILLED ORAL at 08:41

## 2017-07-28 RX ADMIN — METOPROLOL TARTRATE 12.5 MG: 25 TABLET ORAL at 17:51

## 2017-07-28 RX ADMIN — LEVETIRACETAM 1500 MG: 100 SOLUTION ORAL at 08:49

## 2017-07-28 RX ADMIN — Medication 100 MG: at 08:41

## 2017-07-28 RX ADMIN — Medication 10 ML: at 14:16

## 2017-07-28 RX ADMIN — MULTIVIT AND MINERALS-FERROUS GLUCONATE 9 MG IRON/15 ML ORAL LIQUID 15 ML: at 08:51

## 2017-07-28 RX ADMIN — LEVETIRACETAM 1500 MG: 100 SOLUTION ORAL at 17:51

## 2017-07-28 RX ADMIN — CEFTRIAXONE 1 G: 1 INJECTION, POWDER, FOR SOLUTION INTRAMUSCULAR; INTRAVENOUS at 17:50

## 2017-07-28 RX ADMIN — Medication 10 ML: at 05:18

## 2017-07-28 RX ADMIN — PREGABALIN 300 MG: 150 CAPSULE ORAL at 08:41

## 2017-07-28 RX ADMIN — DOCUSATE SODIUM 100 MG: 100 CAPSULE, LIQUID FILLED ORAL at 17:51

## 2017-07-28 RX ADMIN — VALPROIC ACID 500 MG: 250 SOLUTION ORAL at 18:17

## 2017-07-28 RX ADMIN — PANTOPRAZOLE SODIUM 40 MG: 40 GRANULE, DELAYED RELEASE ORAL at 08:48

## 2017-07-28 RX ADMIN — ATORVASTATIN CALCIUM 40 MG: 40 TABLET, FILM COATED ORAL at 22:20

## 2017-07-28 RX ADMIN — QUETIAPINE FUMARATE 25 MG: 25 TABLET, FILM COATED ORAL at 22:20

## 2017-07-28 RX ADMIN — PREGABALIN 300 MG: 150 CAPSULE ORAL at 17:51

## 2017-07-28 RX ADMIN — Medication 10 ML: at 22:21

## 2017-07-28 RX ADMIN — VALPROIC ACID 500 MG: 250 SOLUTION ORAL at 08:49

## 2017-07-28 NOTE — PROGRESS NOTES
Problem: Mobility Impaired (Adult and Pediatric)  Goal: *Acute Goals and Plan of Care (Insert Text)  Physical Therapy Goals  Initiated 7/28/2017  1. Patient will move from supine to sit and sit to supine , scoot up and down and roll side to side in bed with independence within 7 day(s). 2. Patient will transfer from bed to chair and chair to bed with supervision/set-up using the least restrictive device within 7 day(s). 3. Patient will perform sit to stand with supervision/set-up within 7 day(s). 4. Patient will ambulate with minimal assistance/contact guard assist for 100 feet with the least restrictive device within 7 day(s). PHYSICAL THERAPY EVALUATION  Patient: Ernesto Evangelista (44 y.o. male)  Date: 7/28/2017  Primary Diagnosis: Anemia  Heme positive stool  ARF (acute renal failure) (HCC)  Anemia  Procedure(s) (LRB):  ESOPHAGOGASTRODUODENOSCOPY (EGD) (N/A)  COLONOSCOPY (N/A)     Precautions:  Fall, PEG (NPO)       ASSESSMENT :  Based on the objective data described below, the patient presents with impaired functional mobility secondary to impaired standing balance and gait mechanics, generalized weakness, decreased activity tolerance, and limited ROM. Pt is from [facility] rehab where is has been residing following month long hospitalization. Pt received supine in bed and agreeable to PT evaluation. Pt cleared by nursing for mobility. Pt performed bed mobility with min A for LEs and directional cues. Pt with intact sitting balance while sitting on EOB ~ 2 minutes and VSS on RA. Pt performed sit<>stand transfers with min-mod A x 1-2, needing additional cueing for safe hand placement. Pt was able to ambulate 35' total with min A x 2 using RW. Pt with marked posterior lean during static stance and gait, requiring verbal and tactile cues to improve COG over KIMO. Pt with slow, shuffling steps and shortened step length during gait training.  Pt was assisted into bedside chair following gait training, requiring additional cues for safety. Pt was left with all needs met, RN aware, and chair alarm on. Recommend pt return to SNF rehab to continue to improve mobility and independence. Patient will benefit from skilled intervention to address the above impairments. Patients rehabilitation potential is considered to be Fair  Factors which may influence rehabilitation potential include:   [ ]         None noted  [X]         Mental ability/status  [X]         Medical condition  [ ]         Home/family situation and support systems  [X]         Safety awareness  [ ]         Pain tolerance/management  [ ]         Other:        PLAN :  Recommendations and Planned Interventions:  [X]           Bed Mobility Training             [ ]    Neuromuscular Re-Education  [X]           Transfer Training                   [ ]    Orthotic/Prosthetic Training  [X]           Gait Training                         [ ]    Modalities  [X]           Therapeutic Exercises           [ ]    Edema Management/Control  [X]           Therapeutic Activities            [X]    Patient and Family Training/Education  [ ]           Other (comment):     Frequency/Duration: Patient will be followed by physical therapy  4 times a week to address goals. Discharge Recommendations: [facility]  Further Equipment Recommendations for Discharge: TBD by rehab       SUBJECTIVE:   Patient stated My legs are tight.       OBJECTIVE DATA SUMMARY:   HISTORY:    Past Medical History:   Diagnosis Date    Alcohol abuse, in remission      Seizures ([address]       Past Surgical History:   Procedure Laterality Date    PLACE PERCUT GASTROSTOMY TUBE   7/18/2017           Prior Level of Function/Home Situation: Pt is from [facility] rehab. At baseline, pt ambulates with RW and lives with wife. Pt with recent month long hospital admission at [facility] prior to being transferred to [facility] rehab.   Personal factors and/or comorbidities impacting plan of care: seizures, CVA, aphasia     Home Situation  Home Environment: Skilled nursing facility  # Steps to Enter: 0  One/Two Story Residence: One story  Living Alone: No  Support Systems: Skilled nursing facility  Patient Expects to be Discharged to[de-identified] Skilled nursing facility  Current DME Used/Available at Home: Other (comment) (SNF)     EXAMINATION/PRESENTATION/DECISION MAKING:   Critical Behavior:  Neurologic State: Alert  Orientation Level: Disoriented to situation, Disoriented to time, Oriented to person, Oriented to place  Cognition: Follows commands     Hearing: Auditory  Auditory Impairment: Hard of hearing, bilateral  Skin:  Intact  Edema: None  Range Of Motion:  AROM: Generally decreased, functional           PROM: Generally decreased, functional           Strength:    Strength: Generally decreased, functional                    Tone & Sensation:   Tone: Normal              Sensation: Intact               Coordination:  Coordination: Generally decreased, functional  Vision:      Functional Mobility:  Bed Mobility:  Rolling: Minimum assistance  Supine to Sit: Minimum assistance     Scooting: Contact guard assistance  Transfers:  Sit to Stand: Minimum assistance; Moderate assistance;Assist x2; Additional time  Stand to Sit: Minimum assistance                       Balance:   Sitting: Intact  Standing: Impaired; With support  Standing - Static: Fair  Standing - Dynamic : Poor  Ambulation/Gait Training:  Distance (ft): 35 Feet (ft)  Assistive Device: Gait belt;Walker, rolling  Ambulation - Level of Assistance: Minimal assistance;Assist x2; Additional time; Adaptive equipment     Gait Description (WDL): Exceptions to WDL  Gait Abnormalities: Decreased step clearance;Shuffling gait; Path deviations; Step to gait        Base of Support: Narrowed; Center of gravity altered  Stance: Weight shift  Speed/Shannon: Pace decreased (<100 feet/min); Shuffled  Step Length: Left shortened;Right shortened        Functional Measure:  Tinetti test:      Sitting Balance: 1  Arises: 0  Attempts to Rise: 0  Immediate Standing Balance: 0  Standing Balance: 0  Nudged: 0  Eyes Closed: 0  Turn 360 Degrees - Continuous/Discontinuous: 0  Turn 360 Degrees - Steady/Unsteady: 0  Sitting Down: 0  Balance Score: 1  Indication of Gait: 0  R Step Length/Height: 0  L Step Length/Height: 0  R Foot Clearance: 1  L Foot Clearance: 1  Step Symmetry: 1  Step Continuity: 0  Path: 1  Trunk: 0  Walking Time: 1  Gait Score: 5  Total Score: 6         Tinetti Test and G-code impairment scale:  Percentage of Impairment CH     0%    CI     1-19% CJ     20-39% CK     40-59% CL     60-79% CM     80-99% CN      100%   Tinetti  Score 0-28 28 23-27 17-22 12-16 6-11 1-5 0          Tinetti Tool Score Risk of Falls  <19 = High Fall Risk  19-24 = Moderate Fall Risk  25-28 = Low Fall Risk  Tinetti ME. Performance-Oriented Assessment of Mobility Problems in Elderly Patients. Nielsen 66; [id number]. (Scoring Description: PT Bulletin Feb. 10, 1993)     Older adults: Bibi Davis et al, 2009; n = 1000 [facility] elderly evaluated with ABC, YVAN, ADL, and IADL)  · Mean YVAN score for males aged 69-68 years = 26.21(3.40)  · Mean YVAN score for females age 69-68 years = 25.16(4.30)  · Mean YVAN score for males over 80 years = 23.29(6.02)  · Mean YVAN score for females over 80 years = 17.20(8.32)         G codes: In compliance with CMSs Claims Based Outcome Reporting, the following G-code set was chosen for this patient based on their primary functional limitation being treated: The outcome measure chosen to determine the severity of the functional limitation was the Tinetti with a score of 6/28 which was correlated with the impairment scale.       · Mobility - Walking and Moving Around:               - CURRENT STATUS:    CL - 60%-79% impaired, limited or restricted               - GOAL STATUS:           CJ - 20%-39% impaired, limited or restricted               - D/C STATUS: ---------------To be determined---------------      Physical Therapy Evaluation Charge Determination   History Examination Presentation Decision-Making   HIGH Complexity :3+ comorbidities / personal factors will impact the outcome/ POC  HIGH Complexity : 4+ Standardized tests and measures addressing body structure, function, activity limitation and / or participation in recreation  MEDIUM Complexity : Evolving with changing characteristics  Other outcome measures Tinetti  HIGH       Based on the above components, the patient evaluation is determined to be of the following complexity level: MEDIUM     Pain:  Pain Scale 1: Numeric (0 - 10)  Pain Intensity 1: 0              Activity Tolerance:   Fair - pt with fatigue; VSS on RA  Please refer to the flowsheet for vital signs taken during this treatment. After treatment:   [X]         Patient left in no apparent distress sitting up in chair  [ ]         Patient left in no apparent distress in bed  [X]         Call bell left within reach  [X]         Nursing notified  [ ]         Caregiver present  [X]         Bed alarm activated      COMMUNICATION/EDUCATION:   The patients plan of care was discussed with: Physical Therapist and Registered Nurse.  [X]         Fall prevention education was provided and the patient/caregiver indicated understanding. [X]         Patient/family have participated as able in goal setting and plan of care. [X]         Patient/family agree to work toward stated goals and plan of care. [ ]         Patient understands intent and goals of therapy, but is neutral about his/her participation. [ ]         Patient is unable to participate in goal setting and plan of care.      Thank you for this referral.  Tony Mills, PT, DPT   Time Calculation: 23 mins

## 2017-07-28 NOTE — PROGRESS NOTES
Hospitalist Progress Note    NAME: Dwight Manzo   :  1934   MRN:  [id number]   LOS:   0      Assessment / Plan:  Macrocytic anemia, acute on chronic  Heme positive brown stool, possible GI bleed  Recent PEG placement 17 for dysphagia  --hgb 7.4 -->7.1 today, was 9 on . Hgb trending down since month long hospitalization last month was 14 -->12  -plan for EGD/colo per Dr. Kate Gutierrez  -holding aspirin  -PPI BID  -check iron studies     ANNABEL Cr 1.67-->0.77 resolved  Urinary retention  -baseline 0.5, bladder significantly distended on US  -continue kelly for now, will trial likely over the weekend    GNR UTI  -continue ceftriaxone    Bilateral ankle pain  Right ankle and lower leg edema  -no DVT     Seizure disorder  -continue keppra, valproic acid     Hx SVT  -current in sinus to sinus tach. Continue metoprolol. Hold aspirin due to concern for GI bleed         Code status: Full  Prophylaxis: SCD's  Recommended Disposition: SNF/LTC     Subjective:     Chief Complaint: f/u anemia  Patient feeling okay, overall tired, no abd pain, no SOB        Review of Systems:  Symptom Y/N Comments  Symptom Y/N Comments   Fever/Chills    Chest Pain     Poor Appetite    Edema     Cough    Abdominal Pain     Sputum    Joint Pain     SOB/AL    Pruritis/Rash     Nausea/vomit    Tolerating PT/OT     Diarrhea    Tolerating Diet     Constipation    Other       Could NOT obtain due to:      Objective:     VITALS:   Last 24hrs VS reviewed since prior progress note.  Most recent are:  Patient Vitals for the past 24 hrs:   Temp Pulse Resp BP SpO2   17 1414 97.5 °F (36.4 °C) 77 20 108/62 97 %   17 1131 98 °F (36.7 °C) 62 18 101/45 96 %   17 0805 97.7 °F (36.5 °C) 67 18 131/64 95 %   17 0755 - - - - 95 %   17 0420 98.2 °F (36.8 °C) 67 18 129/47 97 %   17 2253 97.1 °F (36.2 °C) 76 16 108/45 93 %   17 1915 98.1 °F (36.7 °C) 66 16 107/49 93 %       Intake/Output Summary (Last 24 hours) at 07/28/17 1659  Last data filed at 07/28/17 1506   Gross per 24 hour   Intake             1300 ml   Output             1505 ml   Net             -205 ml        PHYSICAL EXAM:  General: Alert, chronically ill apearing  EENT:  EOMI. Anicteric sclerae. Mucous membranes dry  Resp:  CTA bilaterally, no wheezing or rales. No accessory muscle use  CV:  Regular rhythm, no edema  GI:  Soft, non distended, non tender. +Bowel sounds  Neurologic:  Alert and oriented X 3, slow speech  Psych:   Fair insight, not anxious nor agitated  Skin:  No rashes, no jaundice  ________________________________________________________________________  Care Plan discussed with:    Comments   Patient x    Family  x    RN x    Care Manager x    Consultant                        Multidiciplinary team rounds were held today with , nursing, pharmacist and clinical coordinator. Patient's plan of care was discussed; medications were reviewed and discharge planning was addressed. ________________________________________________________________________  Total NON critical care TIME:  30   Minutes  ________________________________________________________________________  Maxcine MD Layo     Procedures: see electronic medical records for all procedures/Xrays and details which were not copied into this note but were reviewed prior to creation of Plan. LABS:  I reviewed today's most current labs and imaging studies.   Pertinent labs include:  Recent Labs      07/28/17   0439  07/27/17   1950  07/27/17   0902   WBC   --    --   5.7   HGB  7.1*  7.4*  7.4*   HCT  22.3*  23.9*  22.9*   PLT   --    --   169     Recent Labs      07/28/17   0439  07/27/17   0902   NA  142  140   K  3.4*  3.0*   CL  103  97   CO2  35*  37*   GLU  79  98   BUN  22*  37*   CREA  0.77  1.67*   CA  8.5  8.7   MG   --   1.7   ALB   --   3.1*   TBILI   --   1.0   SGOT   --   19   ALT   --   22       Signed: Gigi Vasques MD

## 2017-07-28 NOTE — PROGRESS NOTES
Bedside shift change report given to Duglas (oncoming nurse) by Juaquin Snyder (offgoing nurse). Report included the following information SBAR, Kardex, Intake/Output, MAR and Recent Results. Zone Phone for oncoming shift:   [phone]    Shift Summary: No complaints of pain. Pt worked with PT and OT today.      LDAs               Peripheral IV 07/27/17 Left External jugular (Active)   Site Assessment Clean, dry, & intact 7/28/2017  3:06 PM   Phlebitis Assessment 0 7/28/2017  3:06 PM   Infiltration Assessment 0 7/28/2017  3:06 PM   Dressing Status Clean, dry, & intact 7/28/2017  3:06 PM   Dressing Type Transparent;Tape 7/28/2017  3:06 PM   Hub Color/Line Status Pink;Capped 7/28/2017  3:06 PM   Alcohol Cap Used Yes 7/28/2017  3:06 PM          PEG/Gastrostomy Tube 07/18/17 (Active)   Site Assessment Clean, dry, & intact 7/28/2017  3:06 PM   Dressing Status Clean, dry, & intact 7/28/2017  3:06 PM   G Port Status Infusing 7/28/2017  3:06 PM   Gastric Residual (mL) 20 ml 7/28/2017  5:58 PM   Tube Feeding/Formula Options Jevity 1.5 7/28/2017  3:06 PM   Tube Feeding/Verify Rate (mL/hr) 50 7/28/2017  3:06 PM   Water Flush Volume (mL) 150 mL 7/28/2017  3:06 PM   Intake (ml) 1200 ml 7/28/2017  7:11 PM   Medication Volume 150 ml 7/28/2017  5:58 PM              Urinary Catheter 07/27/17 2- way-Criteria for Appropriate Use: Obstruction/retention   Intake & Output   Date 07/27/17 1900 - 07/28/17 0659 07/28/17 0700 - 07/29/17 0659   Shift 9064-8338 24 Hour Total 2856-4762 3855-6232 24 Hour Total   I  N  T  A  K  E   I.V.  (mL/kg/hr)   50  (0.1)  50      Volume (cefTRIAXone (ROCEPHIN) 1 g in 0.9% sodium chloride (MBP/ADV) 50 mL)   50  50    NG/ 732 0254 1200 2250      Water Flush Volume (mL) (PEG/Gastrostomy Tube 07/18/17)   700  700      Medication Volume (PEG/Gastrostomy Tube 07/18/17)   350  350      Intake (ml) (PEG/Gastrostomy Tube 07/18/17) 400 400  1200 1200    Shift Total  (mL/kg) 400  (5.9) 400  (5.9) 1100  (16.2) 1200  (17.6) 2300  (33.8)   O  U  T  P  U  T   Urine  (mL/kg/hr) 1280 2230 225  (0.3) 450 675      Urine Output (mL) (Urinary Catheter 07/27/17 2- way) 1280 2230 225 450 675    Shift Total  (mL/kg) 1280  (18.8) 2230  (32.8) 225  (3.3) 450  (6.6) 675  (9.9)   NET -880 -1830    Weight (kg) 68 68 68 68 68      Last Bowel Movement Last Bowel Movement Date:  (unable to obtain)   Glucose Checks [] N/A  [] AC/HS  [] Q6  Concerns:   Nutrition Active Orders   Diet    DIET NPO       Consults []PT  []OT  []Speech  []Case Management   Cardiac Monitoring []N/A []Yes Expires:

## 2017-07-28 NOTE — PROGRESS NOTES
Initial Nutrition Assessment:    INTERVENTIONS/RECOMMENDATIONS:   · Enteral/Parenteral Nutrition: Modify rate, concentration, composition, and schedule: Recommend Jevity 1.5 @ 50 mL/hr + 150 mL water flushes q 4 hours (provides 1800 kcal, 77g protein, and 1812 mL water)    ASSESSMENT:   Patient medically noted for anemia and acute renal failure. PMH for CVA, dysphagia, aphasia, and PEG placement. Current TF order exceeding patient's estimated kcal/protein needs. Recommendations provided above to meet 102% of estimated kcal needs and 100% of estimated protein needs. Noted plans for EGD and colonoscopy per GI. Monitor TF tolerance and weights. Diet Order: NPO  % Eaten:  No data found. Pertinent Medications: [x]Reviewed []Other: Atorvastatin, Colace, keppra, Lopressor, MVI, Protonix, Seroquel, Thiamine   Pertinent Labs: [x]Reviewed []Other: K+ 3.4, H/H 7.1/22.3  Food Allergies: [x]None []Other   Last BM:    [x]Active     []Hyperactive  []Hypoactive       [] Absent BS  Skin:    [x] Intact   [] Incision  [] Breakdown  [] Other:    Anthropometrics:   Height: 5' 8\" (172.7 cm) Weight: 68 kg (150 lb)   IBW (%IBW):   ( ) UBW (%UBW):   (  %)   Last Weight Metrics:  Weight Loss Metrics 7/27/2017 7/22/2017 6/21/2017 3/21/2017 3/18/2017 3/7/2017 2/25/2017   Today's Wt 150 lb 152 lb 3.2 oz 155 lb 166 lb 150 lb 166 lb 168 lb   BMI 22.81 kg/m2 23.14 kg/m2 23.57 kg/m2 25.24 kg/m2 22.81 kg/m2 24.51 kg/m2 24.81 kg/m2       BMI: Body mass index is 22.81 kg/(m^2). This BMI is indicative of:   []Underweight    [x]Normal    []Overweight    [] Obesity   [] Extreme Obesity (BMI>40)     Estimated Nutrition Needs (Based on):   1761 Kcals/day (BMR (1355) x 1. 3AF) , 68 g (-82g (1.0-1.2 g/kg bw)) Protein  Carbohydrate:  At Least 130 g/day  Fluids: 1750 mL/day (1ml/kcal)    Pt expected to meet estimated nutrient needs: [x]Yes []No    NUTRITION DIAGNOSES:   Problem:  Excessive energy intake      Etiology: related to Current TF order Signs/Symptoms: as evidenced by TF meeting 123% of estimated kcal needs      NUTRITION INTERVENTIONS:    Enteral/Parenteral Nutrition: Modify rate, concentration, composition, and schedule                GOAL:   Patient will tolerate TF @ goal next 2-4 days    LEARNING NEEDS (Diet, Food/Nutrient-Drug Interaction):    [x] None Identified   [] Identified and Education Provided/Documented   [] Identified and Pt declined/was not appropriate     Cultureal, Latter day, OR Ethnic Dietary Needs:    [x] None Identified   [] Identified and Addressed     [x] Interdisciplinary Care Plan Reviewed/Documented    [x] Discharge Planning:  Resume home TF regimen     MONITORING /EVALUATION:   Food/Nutrient Intake Outcomes: Enteral/parenteral nutrition intake  Physical Signs/Symptoms Outcomes: Weight/weight change, Electrolyte and renal profile, GI profile, Glucose profile    NUTRITION RISK:    [] High              [x] Moderate           []  Low  []  Minimal/Uncompromised    PT SEEN FOR:    []  MD Consult: []Calorie Count      []Diabetic Diet Education        []Diet Education     []Electrolyte Management     []General Nutrition Management and Supplements     []Management of Tube Feeding     []TPN Recommendations    [x]  RN Referral:  [x]MST score >=2     [x]Enteral/Parenteral Nutrition PTA     []Pregnant: Gestational DM or Multigestation     []Pressure Ulcer/Wound Care needs        []  Low BMI  []  DTR Referral       Lise Hanna  Pager [phone]                 Weekend Pager [phone]

## 2017-07-28 NOTE — CONSULTS
Gastroenterology Consult   Tiffani Gordon  1934  [id number]    Referring Physician:    Consult Date: 7/28/2017     Subjective:     Chief Complaint: hgb dropping    History of Present Illness: Tiffani Gordon is a 80 y.o. male who is seen in consultation for  hgb 7.1 with heme pos stool. HGB was 10 when PEG placed 7/18/17 and a bit higher a month ago. He is poor historian with CVA but denies abd pain,N/V/M/H. No anticoagulant. Tolerating tube feeding at [facility]. He is here with cardiac arrhythmia. No recent alcohol.   .    Past Medical History:   Diagnosis Date    Alcohol abuse, in remission     Seizures ([address]      Past Surgical History:   Procedure Laterality Date    PLACE PERCUT GASTROSTOMY TUBE  7/18/2017           Family History   Problem Relation Age of Onset    Other Other      no strokes or seizures     Social History   Substance Use Topics    Smoking status: Never Smoker    Smokeless tobacco: Not on file    Alcohol use No      Comment: Quit drinking 10 years ago      Allergies   Allergen Reactions    No Known Allergies      Current Facility-Administered Medications   Medication Dose Route Frequency    sodium chloride (NS) flush 5-10 mL  5-10 mL IntraVENous Q8H    sodium chloride (NS) flush 5-10 mL  5-10 mL IntraVENous PRN    naloxone (NARCAN) injection 0.4 mg  0.4 mg IntraVENous PRN    ondansetron (ZOFRAN) injection 4 mg  4 mg IntraVENous Q6H PRN    docusate sodium (COLACE) capsule 100 mg  100 mg Oral BID    atorvastatin (LIPITOR) tablet 40 mg  40 mg Per G Tube QHS    cyclobenzaprine (FLEXERIL) tablet 5 mg  5 mg Per G Tube TID PRN    pregabalin (LYRICA) capsule 300 mg  300 mg Oral BID    QUEtiapine (SEROquel) tablet 25 mg  25 mg Per G Tube QHS    albuterol-ipratropium (DUO-NEB) 2.5 MG-0.5 MG/3 ML  3 mL Nebulization Q6H PRN    latanoprost (XALATAN) 0.005 % ophthalmic solution 1 Drop  1 Drop Both Eyes QPM    levETIRAcetam (KEPPRA) oral solution 1,500 mg  1,500 mg Per G Tube BID    metoprolol tartrate (LOPRESSOR) tablet 12.5 mg  12.5 mg Per G Tube BID    multivit w-min-ferrous gluconate (CEROVITE) oral liquid 15 mL  15 mL Per G Tube DAILY    thiamine (B-1) tablet 100 mg  100 mg Per G Tube DAILY    valproic acid (as sodium salt) (DEPAKENE) 250 mg/5 mL (5 mL) oral solution 500 mg  500 mg Per G Tube TID    pantoprazole (PROTONIX) granules for oral suspension 40 mg  40 mg Per G Tube ACB    acetaminophen (TYLENOL) tablet 650 mg  650 mg Per G Tube Q6H PRN    oxyCODONE (ROXICODONE INTENSOL) 20 mg/mL concentrated solution 5 mg  5 mg Per G Tube Q6H PRN    cefTRIAXone (ROCEPHIN) 1 g in 0.9% sodium chloride (MBP/ADV) 50 mL  1 g IntraVENous Q24H        Review of Systems:  A detailed 10 organ review of systems is obtained with pertinent positives as listed in the History of Present Illness and Past Medical History. All others are negative. Objective:     Physical Exam:  Visit Vitals    /47 (BP 1 Location: Left arm, BP Patient Position: At rest)    Pulse 67    Temp 98.2 °F (36.8 °C)    Resp 18    Ht 5' 8\" (1.727 m)    Wt 68 kg (150 lb)    SpO2 97%    BMI 22.81 kg/m2        Skin:  Extremities and face reveal no rashes. No maldonado erythema. No telangiectasias on the chest wall. HEENT: Sclerae anicteric. Extra-occular muscles are intact. No oral ulcers. No abnormal pigmentation of the lips. The neck is supple. Cardiovascular: Regular rate and rhythm. No murmurs, gallops, or rubs. PMI nondisplaced. Carotids without bruits. Respiratory:  Comfortable breathing with no accessory muscle use. Clear breath sounds with no wheezes, rales, or rhonchi. GI:  Abdomen nondistended, soft, and nontender. Normal active bowel sounds. No enlargement of the liver or spleen. No masses palpable. Rectal:  Deferred  Musculoskeletal:  No pitting edema of the lower legs. Extremities have good range of motion. No costovertebral tenderness. Neurological:    Patient is alert and oriented.   Psychiatric: Mood appears appropriate with judgement intact. Lymphatic:  No cervical or supraclavicular adenopathy.     Laboratory:    Recent Results (from the past 24 hour(s))   EKG, 12 LEAD, INITIAL    Collection Time: 07/27/17  7:22 AM   Result Value Ref Range    Ventricular Rate 101 BPM    Atrial Rate 101 BPM    P-R Interval 214 ms    QRS Duration 90 ms    Q-T Interval 424 ms    QTC Calculation (Bezet) 549 ms    Calculated R Axis -43 degrees    Calculated T Axis -72 degrees    Diagnosis       Sinus tachycardia with 1st degree AV block with frequent premature   ventricular complexes  Left axis deviation  Voltage criteria for left ventricular hypertrophy  T wave abnormality, consider inferolateral ischemia  When compared with ECG of 11-JUL-2017 18:31,  T wave inversion less evident in Inferior leads  T wave inversion now evident in Lateral leads  Confirmed by Ana Remy P.VEdwar ([id number]) on 7/27/2017 10:22:59 AM     URINALYSIS W/ REFLEX CULTURE    Collection Time: 07/27/17  8:38 AM   Result Value Ref Range    Color DARK YELLOW      Appearance CLEAR CLEAR      Specific gravity 1.017 1.003 - 1.030      pH (UA) 8.0 5.0 - 8.0      Protein NEGATIVE  NEG mg/dL    Glucose NEGATIVE  NEG mg/dL    Ketone TRACE (A) NEG mg/dL    Bilirubin NEGATIVE  NEG      Blood NEGATIVE  NEG      Urobilinogen 4.0 (H) 0.2 - 1.0 EU/dL    Nitrites NEGATIVE  NEG      Leukocyte Esterase SMALL (A) NEG      WBC 5-10 0 - 4 /hpf    RBC 0-5 0 - 5 /hpf    Epithelial cells FEW FEW /lpf    Bacteria NEGATIVE  NEG /hpf    UA:UC IF INDICATED URINE CULTURE ORDERED (A) CNI     METABOLIC PANEL, COMPREHENSIVE    Collection Time: 07/27/17  9:02 AM   Result Value Ref Range    Sodium 140 136 - 145 mmol/L    Potassium 3.0 (L) 3.5 - 5.1 mmol/L    Chloride 97 97 - 108 mmol/L    CO2 37 (H) 21 - 32 mmol/L    Anion gap 6 5 - 15 mmol/L    Glucose 98 65 - 100 mg/dL    BUN 37 (H) 6 - 20 MG/DL    Creatinine 1.67 (H) 0.70 - 1.30 MG/DL    BUN/Creatinine ratio 22 (H) 12 - 20      GFR est AA 48 (L) >60 ml/min/1.73m2    GFR est non-AA 40 (L) >60 ml/min/1.73m2    Calcium 8.7 8.5 - 10.1 MG/DL    Bilirubin, total 1.0 0.2 - 1.0 MG/DL    ALT (SGPT) 22 12 - 78 U/L    AST (SGOT) 19 15 - 37 U/L    Alk. phosphatase 65 45 - 117 U/L    Protein, total 7.2 6.4 - 8.2 g/dL    Albumin 3.1 (L) 3.5 - 5.0 g/dL    Globulin 4.1 (H) 2.0 - 4.0 g/dL    A-G Ratio 0.8 (L) 1.1 - 2.2     CK W/ CKMB & INDEX    Collection Time: 07/27/17  9:02 AM   Result Value Ref Range    CK 47 39 - 308 U/L    CK - MB 1.3 <3.6 NG/ML    CK-MB Index 2.8 (H) 0 - 2.5     CBC WITH AUTOMATED DIFF    Collection Time: 07/27/17  9:02 AM   Result Value Ref Range    WBC 5.7 4.1 - 11.1 K/uL    RBC 2.19 (L) 4.10 - 5.70 M/uL    HGB 7.4 (L) 12.1 - 17.0 g/dL    HCT 22.9 (L) 36.6 - 50.3 %    .6 (H) 80.0 - 99.0 FL    MCH 33.8 26.0 - 34.0 PG    MCHC 32.3 30.0 - 36.5 g/dL    RDW 13.7 11.5 - 14.5 %    PLATELET 649 121 - 608 K/uL    NEUTROPHILS 57 32 - 75 %    LYMPHOCYTES 25 12 - 49 %    MONOCYTES 16 (H) 5 - 13 %    EOSINOPHILS 2 0 - 7 %    BASOPHILS 0 0 - 1 %    ABS. NEUTROPHILS 3.2 1.8 - 8.0 K/UL    ABS. LYMPHOCYTES 1.4 0.8 - 3.5 K/UL    ABS. MONOCYTES 0.9 0.0 - 1.0 K/UL    ABS. EOSINOPHILS 0.1 0.0 - 0.4 K/UL    ABS.  BASOPHILS 0.0 0.0 - 0.1 K/UL   TROPONIN I    Collection Time: 07/27/17  9:02 AM   Result Value Ref Range    Troponin-I, Qt. <0.04 <0.05 ng/mL   NT-PRO BNP    Collection Time: 07/27/17  9:02 AM   Result Value Ref Range    NT pro- (H) 0 - 450 PG/ML   MAGNESIUM    Collection Time: 07/27/17  9:02 AM   Result Value Ref Range    Magnesium 1.7 1.6 - 2.4 mg/dL   OCCULT BLOOD, STOOL    Collection Time: 07/27/17 10:29 AM   Result Value Ref Range    Occult blood, stool POSITIVE (A) NEG     HGB & HCT    Collection Time: 07/27/17  7:50 PM   Result Value Ref Range    HGB 7.4 (L) 12.1 - 17.0 g/dL    HCT 23.9 (L) 36.6 - 50.3 %   HGB & HCT    Collection Time: 07/28/17  4:39 AM   Result Value Ref Range    HGB 7.1 (L) 12.1 - 17.0 g/dL    HCT 22.3 (L) 36.6 - 50.3 % METABOLIC PANEL, BASIC    Collection Time: 07/28/17  4:39 AM   Result Value Ref Range    Sodium 142 136 - 145 mmol/L    Potassium 3.4 (L) 3.5 - 5.1 mmol/L    Chloride 103 97 - 108 mmol/L    CO2 35 (H) 21 - 32 mmol/L    Anion gap 4 (L) 5 - 15 mmol/L    Glucose 79 65 - 100 mg/dL    BUN 22 (H) 6 - 20 MG/DL    Creatinine 0.77 0.70 - 1.30 MG/DL    BUN/Creatinine ratio 29 (H) 12 - 20      GFR est AA >60 >60 ml/min/1.73m2    GFR est non-AA >60 >60 ml/min/1.73m2    Calcium 8.5 8.5 - 10.1 MG/DL         Assessment/Plan:     Active Problems:    ARF (acute renal failure) (HCC) (7/27/2017)      Anemia (7/27/2017)      Heme positive stool (7/27/2017)         Imp: anemia, heme pos stool  Plan: prep for egd/colonoscopy.  Part of hx from wife yesterday

## 2017-07-28 NOTE — PROGRESS NOTES
Bedside and Verbal shift change report given to Matra Garnett 44 (oncoming nurse) by Lorena Combs (offgoing nurse). Report included the following information SBAR, Kardex, Intake/Output and MAR. Zone Phone for oncoming shift:   [phone]    Shift Summary: none    LDAs               Peripheral IV 07/27/17 Left External jugular (Active)   Site Assessment Clean, dry, & intact 7/27/2017  7:19 PM   Phlebitis Assessment 0 7/27/2017  7:19 PM   Infiltration Assessment 0 7/27/2017  7:19 PM   Dressing Status Clean, dry, & intact 7/27/2017  7:19 PM   Dressing Type Tape;Transparent 7/27/2017  7:19 PM   Hub Color/Line Status Pink; Infusing 7/27/2017  7:19 PM                    Urinary Catheter 07/27/17 2- way-Criteria for Appropriate Use: Obstruction/retention   Intake & Output   Date 07/26/17 1900 - 07/27/17 0659(Not Admitted) 07/27/17 0700 - 07/28/17 0659   Shift 9582-9238 24 Hour Total 5817-9439 3686-3436 24 Hour Total   I  N  T  A  K  E   Shift Total  (mL/kg)        O  U  T  P  U  T   Urine   950  (1.2)  950      Urine Output (mL) (Urinary Catheter 07/27/17 2- way)   950  950    Shift Total  (mL/kg)   950  (14)  950  (14)   NET   -950  -950   Weight (kg)   68 68 68      Last Bowel Movement     Glucose Checks [x] N/A  [] AC/HS  [] Q6  Concerns:   Nutrition Active Orders   Diet    DIET NPO       Consults []PT  []OT  []Speech  []Case Management   Cardiac Monitoring []N/A [x]Yes Expires:

## 2017-07-29 LAB
ABO + RH BLD: NORMAL
ANION GAP BLD CALC-SCNC: 5 MMOL/L (ref 5–15)
BACTERIA SPEC CULT: ABNORMAL
BACTERIA SPEC CULT: ABNORMAL
BLOOD GROUP ANTIBODIES SERPL: NORMAL
BUN SERPL-MCNC: 17 MG/DL (ref 6–20)
BUN/CREAT SERPL: 27 (ref 12–20)
CALCIUM SERPL-MCNC: 8.4 MG/DL (ref 8.5–10.1)
CC UR VC: ABNORMAL
CHLORIDE SERPL-SCNC: 104 MMOL/L (ref 97–108)
CO2 SERPL-SCNC: 34 MMOL/L (ref 21–32)
CREAT SERPL-MCNC: 0.62 MG/DL (ref 0.7–1.3)
ERYTHROCYTE [DISTWIDTH] IN BLOOD BY AUTOMATED COUNT: 14.1 % (ref 11.5–14.5)
FERRITIN SERPL-MCNC: 913 NG/ML (ref 26–388)
FOLATE SERPL-MCNC: 7 NG/ML (ref 5–21)
GLUCOSE SERPL-MCNC: 117 MG/DL (ref 65–100)
HCT VFR BLD AUTO: 22.9 % (ref 36.6–50.3)
HGB BLD-MCNC: 7.4 G/DL (ref 12.1–17)
IRON SATN MFR SERPL: 15 % (ref 20–50)
IRON SERPL-MCNC: 26 UG/DL (ref 35–150)
MCH RBC QN AUTO: 34.1 PG (ref 26–34)
MCHC RBC AUTO-ENTMCNC: 32.3 G/DL (ref 30–36.5)
MCV RBC AUTO: 105.5 FL (ref 80–99)
PLATELET # BLD AUTO: 196 K/UL (ref 150–400)
POTASSIUM SERPL-SCNC: 3.3 MMOL/L (ref 3.5–5.1)
RBC # BLD AUTO: 2.17 M/UL (ref 4.1–5.7)
RETICS/RBC NFR AUTO: 3.2 % (ref 0.7–2.1)
SERVICE CMNT-IMP: ABNORMAL
SODIUM SERPL-SCNC: 143 MMOL/L (ref 136–145)
SPECIMEN EXP DATE BLD: NORMAL
TIBC SERPL-MCNC: 179 UG/DL (ref 250–450)
WBC # BLD AUTO: 5.9 K/UL (ref 4.1–11.1)

## 2017-07-29 PROCEDURE — 85027 COMPLETE CBC AUTOMATED: CPT | Performed by: INTERNAL MEDICINE

## 2017-07-29 PROCEDURE — 83540 ASSAY OF IRON: CPT | Performed by: INTERNAL MEDICINE

## 2017-07-29 PROCEDURE — 82728 ASSAY OF FERRITIN: CPT | Performed by: INTERNAL MEDICINE

## 2017-07-29 PROCEDURE — 74011250636 HC RX REV CODE- 250/636: Performed by: HOSPITALIST

## 2017-07-29 PROCEDURE — 86900 BLOOD TYPING SEROLOGIC ABO: CPT | Performed by: INTERNAL MEDICINE

## 2017-07-29 PROCEDURE — 65660000000 HC RM CCU STEPDOWN

## 2017-07-29 PROCEDURE — 80048 BASIC METABOLIC PNL TOTAL CA: CPT | Performed by: INTERNAL MEDICINE

## 2017-07-29 PROCEDURE — 99218 HC RM OBSERVATION: CPT

## 2017-07-29 PROCEDURE — 36415 COLL VENOUS BLD VENIPUNCTURE: CPT | Performed by: INTERNAL MEDICINE

## 2017-07-29 PROCEDURE — 85045 AUTOMATED RETICULOCYTE COUNT: CPT | Performed by: INTERNAL MEDICINE

## 2017-07-29 PROCEDURE — 82746 ASSAY OF FOLIC ACID SERUM: CPT | Performed by: INTERNAL MEDICINE

## 2017-07-29 PROCEDURE — 77030018798 HC PMP KT ENTRL FED COVD -A

## 2017-07-29 PROCEDURE — 74011250637 HC RX REV CODE- 250/637: Performed by: HOSPITALIST

## 2017-07-29 PROCEDURE — 74011000258 HC RX REV CODE- 258: Performed by: HOSPITALIST

## 2017-07-29 PROCEDURE — 74011250637 HC RX REV CODE- 250/637: Performed by: INTERNAL MEDICINE

## 2017-07-29 RX ORDER — DOCUSATE SODIUM 100 MG
200 CAPSULE ORAL 4 TIMES DAILY
Status: DISCONTINUED | OUTPATIENT
Start: 2017-07-29 | End: 2017-07-29

## 2017-07-29 RX ADMIN — Medication 10 ML: at 14:32

## 2017-07-29 RX ADMIN — PANTOPRAZOLE SODIUM 40 MG: 40 GRANULE, DELAYED RELEASE ORAL at 16:47

## 2017-07-29 RX ADMIN — PREGABALIN 300 MG: 150 CAPSULE ORAL at 17:26

## 2017-07-29 RX ADMIN — LEVETIRACETAM 1500 MG: 100 SOLUTION ORAL at 09:16

## 2017-07-29 RX ADMIN — METOPROLOL TARTRATE 12.5 MG: 25 TABLET ORAL at 09:12

## 2017-07-29 RX ADMIN — MULTIVIT AND MINERALS-FERROUS GLUCONATE 9 MG IRON/15 ML ORAL LIQUID 15 ML: at 09:16

## 2017-07-29 RX ADMIN — ATORVASTATIN CALCIUM 40 MG: 40 TABLET, FILM COATED ORAL at 21:22

## 2017-07-29 RX ADMIN — METOPROLOL TARTRATE 12.5 MG: 25 TABLET ORAL at 17:26

## 2017-07-29 RX ADMIN — DOCUSATE SODIUM 100 MG: 100 CAPSULE, LIQUID FILLED ORAL at 09:12

## 2017-07-29 RX ADMIN — CEFTRIAXONE 1 G: 1 INJECTION, POWDER, FOR SOLUTION INTRAMUSCULAR; INTRAVENOUS at 14:32

## 2017-07-29 RX ADMIN — Medication 10 ML: at 04:02

## 2017-07-29 RX ADMIN — DOCUSATE SODIUM 100 MG: 100 CAPSULE, LIQUID FILLED ORAL at 17:26

## 2017-07-29 RX ADMIN — VALPROIC ACID 500 MG: 250 SOLUTION ORAL at 09:16

## 2017-07-29 RX ADMIN — VALPROIC ACID 500 MG: 250 SOLUTION ORAL at 21:22

## 2017-07-29 RX ADMIN — Medication 10 ML: at 21:22

## 2017-07-29 RX ADMIN — PANTOPRAZOLE SODIUM 40 MG: 40 GRANULE, DELAYED RELEASE ORAL at 09:16

## 2017-07-29 RX ADMIN — QUETIAPINE FUMARATE 25 MG: 25 TABLET, FILM COATED ORAL at 21:22

## 2017-07-29 RX ADMIN — PREGABALIN 300 MG: 150 CAPSULE ORAL at 09:11

## 2017-07-29 RX ADMIN — LEVETIRACETAM 1500 MG: 100 SOLUTION ORAL at 17:27

## 2017-07-29 RX ADMIN — Medication 100 MG: at 09:12

## 2017-07-29 RX ADMIN — VALPROIC ACID 500 MG: 250 SOLUTION ORAL at 16:47

## 2017-07-29 RX ADMIN — Medication 10 ML: at 04:03

## 2017-07-29 RX ADMIN — LATANOPROST 1 DROP: 50 SOLUTION OPHTHALMIC at 17:26

## 2017-07-29 NOTE — PROGRESS NOTES
Brief Nutrition Note:    Pt with a recent PEG placement on 7/18 related to dysphagia. Previously receiving Jevity 1.5 krish and tolerating well. Spoke with Dr. Jane Ybarra and would like for pt to receive clear liquid nutrition via PEG tube in prep for colonoscopy and EGD on Monday 7/31. Ensure Clear product available. Ensure Clear (apple) ordered - 240 ml (1 bottle) q4h hrs (6 times per day) 0800, 1200, 1600, 2000, 2400, 0400. Yudelkaie Drones Flush with 100 ml free water each bolus feeding. Pt will then be NPO at midnight prior to procedure Monday 7/31. Ensure Clear bolus feedings will provide daily approx. 1440 kcals, 48 g protein. Anticipate Jevity 1.5 krish TF will resume post colonoscopy and EGD. RD will follow closely.     Thank you,  Robert Cardona, MS, RD  Weekend Pager - [phone]

## 2017-07-29 NOTE — PROGRESS NOTES
Problem: Patient Education: Go to Patient Education Activity  Goal: Patient/Family Education  OCCUPATIONAL THERAPY EVALUATION  Patient: Maritza Woodson (69 y.o. male)  Date: 7/28/2017  Primary Diagnosis: Anemia  Heme positive stool  ARF (acute renal failure) (HCC)  Anemia  Procedure(s) (LRB):  ESOPHAGOGASTRODUODENOSCOPY (EGD) (N/A)  COLONOSCOPY (N/A)     Precautions: G tube,  Fall      ASSESSMENT :  Based on the objective data described below, the patient presents with decreased balance, posterior lean in standing, generalized weakness, and decreased endurance impairing adls and functional mobility. Pt is functioning at generally minimal assistance to maximal assistance for self care and is assist X2 for functional mobility. Pt had been in SNF rehab prior to admission and will benefit from returning to rehab. Patient will benefit from skilled intervention to address the above impairments.   Patients rehabilitation potential is considered to be Fair  Factors which may influence rehabilitation potential include:   [ ]             None noted  [X]             Mental ability/status  [X]             Medical condition  [ ]             Home/family situation and support systems  [ ]             Safety awareness  [ ]             Pain tolerance/management  [ ]             Other:        PLAN :  Recommendations and Planned Interventions:  [X]               Self Care Training                  [X]        Therapeutic Activities  [X]               Functional Mobility Training    [X]        Cognitive Retraining  [X]               Therapeutic Exercises           [X]        Endurance Activities  [X]               Balance Training                   [X]        Neuromuscular Re-Education  [ ]               Visual/Perceptual Training     [X]   Home Safety Training  [X]               Patient Education                 [X]        Family Training/Education  [ ]               Other (comment):     Frequency/Duration: Patient will be followed by occupational therapy 3 times a week to address goals. Discharge Recommendations: Rehab / [facility]  Further Equipment Recommendations for Discharge: tbd       SUBJECTIVE:   Patient's wife was present and answered most questions. OBJECTIVE DATA SUMMARY:   HISTORY:   Past Medical History:   Diagnosis Date    Alcohol abuse, in remission      Seizures ([address]       Past Surgical History:   Procedure Laterality Date    PLACE PERCUT GASTROSTOMY TUBE   7/18/2017              Prior Level of Function/Home Situation: Pt lives with his wife at baseline and was independent in [address] and IADLs at that time, had hospitalization and went to [place] where he's been receiving rehab for about a month. Pt's wife reports that she is unhappy with pt's care in [place] [address] and had an appt with the  there to discuss. Expanded or extensive additional review of patient history:      Home Situation  Home Environment: Skilled nursing facility  # Steps to Enter: 0  One/Two Story Residence: One story  Living Alone: No  Support Systems: Skilled nursing facility  Patient Expects to be Discharged to[de-identified] Skilled nursing facility  Current DME Used/Available at Home: Other (comment) (SNF)  [X]  Right hand dominant             [ ]  Left hand dominant     EXAMINATION OF PERFORMANCE DEFICITS:  Cognitive/Behavioral Status:  Neurologic State: Alert  Orientation Level: Disoriented to situation;Disoriented to time  Cognition: Follows commands  Perception: Appears intact     Safety/Judgement: Decreased awareness of need for assistance;Decreased awareness of need for safety;Decreased insight into deficits     Skin: generally intact, G tube     Edema: none observed     Hearing:   Auditory  Auditory Impairment: Hard of hearing, bilateral     Vision/Perceptual:    Tracking:  (not formally tested)                      Acuity:  (not formally tested; vision appears functional-no complaints)    Corrective Lenses:  (none present)     Range of Motion:  BUEs:  AROM: Generally decreased, functional  PROM: Generally decreased, functional                       Strength:  BUEs:  Strength: Generally decreased, functional                 Coordination:  Coordination: Generally decreased, functional  Fine Motor Skills-Upper: Left Impaired;Right Impaired    Gross Motor Skills-Upper: Left Intact; Right Intact     Tone & Sensation:     Tone: Normal  Sensation: Intact                       Balance:  Sitting: Impaired  Sitting - Static: Good (unsupported)  Sitting - Dynamic: Fair (occasional)  Standing: Impaired; With support  Standing - Static: Fair (leans posteriorly, with cues can correct)  Standing - Dynamic : Poor     Functional Mobility and Transfers for ADLs:  Bed Mobility:  Rolling: Minimum assistance  Supine to Sit: Minimum assistance  Scooting: Contact guard assistance     Transfers:  Sit to Stand: Minimum assistance  Stand to Sit: Minimum assistance  Bed to Chair:  (sidestepped to [facility] and deferred bed to chair transfer as pt's wife  Reports that pt was sitting in chair 7 hrs this date)     ADL Assessment:  Feeding: Total assistance (G tube)     Oral Facial Hygiene/Grooming: Minimum assistance     Bathing: Maximum assistance     Upper Body Dressing: Moderate assistance     Lower Body Dressing: Maximum assistance     Toileting: Total assistance (catheter)                 ADL Intervention and task modifications:   Pt performed bed mobiltiy seated adls task and standing to side step to [facility]. Pt returned to sleeping at end of tx session.                                          Cognitive Retraining  Safety/Judgement: Decreased awareness of need for assistance;Decreased awareness of need for safety;Decreased insight into deficits     Functional Measure:  Barthel Index:      Bathin  Bladder: 0  Bowels: 5  Groomin  Dressin  Feedin  Mobility: 0  Stairs: 0  Toilet Use: 0  Transfer (Bed to Chair and Back): 5  Total: 15         Barthel and G-code impairment scale:  Percentage of impairment CH  0% CI  1-19% CJ  20-39% CK  40-59% CL  60-79% CM  80-99% CN  100%   Barthel Score 0-100 100 99-80 79-60 59-40 20-39 1-19    0   Barthel Score 0-20 20 17-19 13-16 9-12 5-8 1-4 0      The Barthel ADL Index: Guidelines  1. The index should be used as a record of what a patient does, not as a record of what a patient could do. 2. The main aim is to establish degree of independence from any help, physical or verbal, however minor and for whatever reason. 3. The need for supervision renders the patient not independent. 4. A patient's performance should be established using the best available evidence. Asking the patient, friends/relatives and nurses are the usual sources, but direct observation and common sense are also important. However direct testing is not needed. 5. Usually the patient's performance over the preceding 24-48 hours is important, but occasionally longer periods will be relevant. 6. Middle categories imply that the patient supplies over 50 per cent of the effort. 7. Use of aids to be independent is allowed. Famaurisio Mello., Barthel, D.W. ([id number]). Functional evaluation: the Barthel Index. [address] [facility] (14)2. HUBER Pinto Sa, Liliana Stephenson., Felicia Tafoya., [place], [address] [place] [address] (1999). Measuring the change indisability after inpatient rehabilitation; comparison of the responsiveness of the Barthel Index and Functional [place] Measure. Journal of Neurology, Neurosurgery, and Psychiatry, 66(4), 221-777. Saul Lopez, N.J.A, GLADYS Richey, & Irene Carrera M.A. (2004.) Assessment of post-stroke quality of life in cost-effectiveness studies: The usefulness of the Barthel Index and the EuroQoL-5D. Quality of Life Research, 13, 536-73         G codes: In compliance with CMSs Claims Based Outcome Reporting, the following G-code set was chosen for this patient based on their primary functional limitation being treated:      The outcome measure chosen to determine the severity of the functional limitation was the BArthel Index with a score of 15/100 which was correlated with the impairment scale. · Self Care:               - CURRENT STATUS:    CM - 80%-99% impaired, limited or restricted               - GOAL STATUS:           CL - 60%-79% impaired, limited or restricted               - D/C STATUS:                       ---------------To be determined---------------      Occupational Therapy Evaluation Charge Determination   History Examination Decision-Making   LOW Complexity : Brief history review  MEDIUM Complexity : 3-5 performance deficits relating to physical, cognitive , or psychosocial skils that result in activity limitations and / or participation restrictions MEDIUM Complexity : Patient may present with comorbidities that affect occupational performnce. Miniml to moderate modification of tasks or assistance (eg, physical or verbal ) with assesment(s) is necessary to enable patient to complete evaluation       Based on the above components, the patient evaluation is determined to be of the following complexity level: LOW   Pain:  Pain Scale 1: Numeric (0 - 10)  Pain Intensity 1: 0              After treatment:   [ ] Patient left in no apparent distress sitting up in chair  [X] Patient left in no apparent distress in bed  [X] Call bell left within reach  [X] Nursing notified  [X] Caregiver present  [X] Bed alarm activated      COMMUNICATION/EDUCATION:   The patients plan of care was discussed with: Registered Nurse. [ ] Home safety education was provided and the patient/caregiver indicated understanding. [X] Patient/family have participated as able in goal setting and plan of care. [ ] Patient/family agree to work toward stated goals and plan of care. [ ] Patient understands intent and goals of therapy, but is neutral about his/her participation.   [X] Patient is unable to participate in goal setting and plan of care. This patients plan of care is appropriate for delegation to [facility]. Thank you for this referral.  Dmitriy Mariee, OTR/L  Time Calculation: 24 mins                             Problem: Self Care Deficits Care Plan (Adult)  Goal: *Acute Goals and Plan of Care (Insert Text)  Occupational Therapy Goals  Initiated 2017  1. Patient will perform grooming with minimal assistance/contact guard assist within 7 day(s). 2. Patient will perform bathing with moderate assistance within 7 day(s). 3. Patient will perform upper body dressing and lower body dressing with minimal assistance/contact guard assist within 7 day(s). 4. Patient will walk into bathroom to perform toilet transfers with minimal assistance/contact guard assist within 7 day(s). 5. Patient will participate in upper extremity therapeutic exercise/activities with supervision/set-up for 10 minutes within 7 day(s). Barthel Index:      Bathin  Bladder: 0  Bowels: 5  Groomin  Dressin  Feedin  Mobility: 0  Stairs: 0  Toilet Use: 0  Transfer (Bed to Chair and Back): 5  Total: 15         Barthel and G-code impairment scale:  Percentage of impairment CH  0% CI  1-19% CJ  20-39% CK  40-59% CL  60-79% CM  80-99% CN  100%   Barthel Score 0-100 100 99-80 79-60 59-40 20-39 1-19    0   Barthel Score 0-20 20 17-19 13-16 9-12 5-8 1-4 0      The Barthel ADL Index: Guidelines  1. The index should be used as a record of what a patient does, not as a record of what a patient could do. 2. The main aim is to establish degree of independence from any help, physical or verbal, however minor and for whatever reason. 3. The need for supervision renders the patient not independent. 4. A patient's performance should be established using the best available evidence. Asking the patient, friends/relatives and nurses are the usual sources, but direct observation and common sense are also important. However direct testing is not needed.   5. Usually the patient's performance over the preceding 24-48 hours is important, but occasionally longer periods will be relevant. 6. Middle categories imply that the patient supplies over 50 per cent of the effort. 7. Use of aids to be independent is allowed. Reji Sheppard., Barthel, D.W. ([id number]). Functional evaluation: the Barthel Index. [address] [facility] (14)2. Maryann Vanda velasquez HUBER Godinez, Christian Humphrey, Ricardo Dillard., [place], [address] (1999). Measuring the change indisability after inpatient rehabilitation; comparison of the responsiveness of the Barthel Index and Functional [place] Measure. Journal of Neurology, Neurosurgery, and Psychiatry, 66(4), 260-432. Marlene Patience, N.SHANE.A, GLADYS Richey, & Rito George MJUAN ANTONIO. (2004.) Assessment of post-stroke quality of life in cost-effectiveness studies: The usefulness of the Barthel Index and the EuroQoL-5D.  [facility], 13, 599-17

## 2017-07-29 NOTE — PROGRESS NOTES
Hospitalist Progress Note    NAME: Ernesto Remedies   :  1934   MRN:  [id number]   LOS:   0      Assessment / Plan:  Macrocytic anemia, acute on chronic  Heme positive brown stool, possible GI bleed  Recent PEG placement 17 for dysphagia  -hgb stable this morning from yesterday  -plan for EGD/colo per Dr. Denisa Cardoso - will likely be Monday given weekend  -holding aspirin  -PPI BID  -check iron studies     ANNABEL resolved  Urinary retention  -baseline 0.5, bladder significantly distended on US, no evidence of BPH  -discussed voiding trial with RN    GNR UTI  -continue ceftriaxone    Bilateral ankle pain  Right ankle and lower leg edema  -no DVT     Seizure disorder  -continue keppra, valproic acid     Hx SVT  -current in sinus to sinus tach. Continue metoprolol. Hold aspirin due to concern for GI bleed         Code status: Full  Prophylaxis: SCD's  Recommended Disposition: SNF/LTC     Subjective:     Chief Complaint: f/u anemia  Doing well, denies any pain    Review of Systems:  Symptom Y/N Comments  Symptom Y/N Comments   Fever/Chills    Chest Pain     Poor Appetite    Edema     Cough    Abdominal Pain     Sputum    Joint Pain     SOB/AL    Pruritis/Rash     Nausea/vomit    Tolerating PT/OT     Diarrhea    Tolerating Diet     Constipation    Other       Could NOT obtain due to:      Objective:     VITALS:   Last 24hrs VS reviewed since prior progress note.  Most recent are:  Patient Vitals for the past 24 hrs:   Temp Pulse Resp BP SpO2   17 0726 97.7 °F (36.5 °C) 67 20 118/47 95 %   17 0324 98.7 °F (37.1 °C) 68 20 (!) 111/38 94 %   17 0107 99 °F (37.2 °C) 78 22 143/54 94 %   17 - - - 109/56 -   17 99.9 °F (37.7 °C) 66 21 107/49 94 %   17 1414 97.5 °F (36.4 °C) 77 20 108/62 97 %       Intake/Output Summary (Last 24 hours) at 17 1202  Last data filed at 17 0935   Gross per 24 hour   Intake             2120 ml   Output             1525 ml   Net 595 ml        PHYSICAL EXAM:  General: Alert, chronically ill apearing  EENT:  EOMI. Anicteric sclerae. Mucous membranes dry  Resp:  CTA bilaterally, no wheezing or rales. No accessory muscle use  CV:  Regular rhythm, no edema  GI:  Soft, non distended, non tender. +Bowel sounds  Neurologic:  Alert and oriented X 3, slow speech  Psych:   Fair insight, not anxious nor agitated  Skin:  No rashes, no jaundice  ________________________________________________________________________  Care Plan discussed with:    Comments   Patient x    Family      RN x    Care Manager     Consultant                        Multidiciplinary team rounds were held today with , nursing, pharmacist and clinical coordinator. Patient's plan of care was discussed; medications were reviewed and discharge planning was addressed. ________________________________________________________________________  Total NON critical care TIME:  20   Minutes  ________________________________________________________________________  David Shultz MD     Procedures: see electronic medical records for all procedures/Xrays and details which were not copied into this note but were reviewed prior to creation of Plan. LABS:  I reviewed today's most current labs and imaging studies.   Pertinent labs include:  Recent Labs      07/29/17   0420  07/28/17   0439  07/27/17   1950  07/27/17   0902   WBC  5.9   --    --   5.7   HGB  7.4*  7.1*  7.4*  7.4*   HCT  22.9*  22.3*  23.9*  22.9*   PLT  196   --    --   169     Recent Labs      07/29/17   0420  07/28/17   0439  07/27/17   0902   NA  143  142  140   K  3.3*  3.4*  3.0*   CL  104  103  97   CO2  34*  35*  37*   GLU  117*  79  98   BUN  17  22*  37*   CREA  0.62*  0.77  1.67*   CA  8.4*  8.5  8.7   MG   --    --   1.7   ALB   --    --   3.1*   TBILI   --    --   1.0   SGOT   --    --   19   ALT   --    --   22       Signed: David Shultz MD

## 2017-07-29 NOTE — PROGRESS NOTES
Bedside and Verbal shift change report given to [facility] (oncoming nurse) by Renetta Miner (offgoing nurse). Report included the following information SBAR, Kardex, Intake/Output, MAR, Recent Results and Med Rec Status. Zone Phone for oncoming shift:   [phone]    Shift Summary: Uneventful shift. No complaints overnight.     LDAs               Peripheral IV 07/27/17 Left External jugular (Active)   Site Assessment Clean, dry, & intact 7/29/2017  3:24 AM   Phlebitis Assessment 0 7/29/2017  3:24 AM   Infiltration Assessment 0 7/29/2017  3:24 AM   Dressing Status Clean, dry, & intact 7/29/2017  3:24 AM   Dressing Type Transparent 7/29/2017  3:24 AM   Hub Color/Line Status Pink;Flushed 7/29/2017  3:24 AM   Alcohol Cap Used Yes 7/28/2017  7:37 PM          PEG/Gastrostomy Tube 07/18/17 (Active)   Site Assessment Clean, dry, & intact 7/29/2017  3:24 AM   Dressing Status Clean, dry, & intact 7/29/2017  3:24 AM   G Port Status Infusing 7/29/2017  3:24 AM   Gastric Residual (mL) 5 ml 7/29/2017  3:24 AM   Tube Feeding/Formula Options Jevity 1.5 7/29/2017  3:24 AM   Tube Feeding/Verify Rate (mL/hr) 50 7/29/2017  3:24 AM   Water Flush Volume (mL) 150 mL 7/29/2017  3:24 AM   Intake (ml) 1200 ml 7/28/2017  7:11 PM   Medication Volume 120 ml 7/29/2017  3:24 AM              Urinary Catheter 07/27/17 2- way-Criteria for Appropriate Use: Obstruction/retention   Intake & Output   Date 07/28/17 0700 - 07/29/17 0659 07/29/17 0700 - 07/30/17 0659   Shift 4289-0609 1344-3864 24 Hour Total 3804-4382 2490-0474 24 Hour Total   I  N  T  A  K  E   I.V.  (mL/kg/hr) 50  (0.1)  50  (0)         Volume (cefTRIAXone (ROCEPHIN) 1 g in 0.9% sodium chloride (MBP/ADV) 50 mL) 50  50       NG/GT 1050 1620 2670         Water Flush Volume (mL) (PEG/Gastrostomy Tube 07/18/17)          Medication Volume (PEG/Gastrostomy Tube 07/18/17) 350 120 470         Intake (ml) (PEG/Gastrostomy Tube 07/18/17)  1200 1200       Shift Total  (mL/kg) 1100  (16.2) 1620  (23.8) 2720  (40)      O  U  T  P  U  T   Urine  (mL/kg/hr) 225  (0.3) 875  (1.1) 1100  (0.7)         Urine Output (mL) (Urinary Catheter 07/27/17 2- way)        Shift Total  (mL/kg) 225  (3.3) 875  (12.9) 1100  (16.2)       643 0747      Weight (kg) 68 68 68 68 68 68      Last Bowel Movement Last Bowel Movement Date:  (unable to obtain)   Glucose Checks [x] N/A  [] AC/HS  [] Q6  Concerns:   Nutrition Active Orders   Diet    DIET NPO       Consults [x]PT  [x]OT  []Speech  []Case Management   Cardiac Monitoring []N/A [x]Yes Expires:

## 2017-07-29 NOTE — PROGRESS NOTES
[address]  [address] [facility], [address]  [phone]                                                GI PROGRESS NOTE      NAME: Broderick Ortega   :  1934   MRN:  [id number]              Subjective:   Discussed with RN events overnight. Complaint Y/N Description   Abdominal Pain     Hematemesis     Hematochezia     Melena     Constipation     Diarrhea     Dyspepsia     Dysphagia     Jaundiced         Review of Systems:    Symptom Y/N Comments  Symptom Y/N Comments   Fever/Chills    Chest Pain     Cough    Abdominal Pain     Sputum    Joint Pain     SOB/AL    Pruritis/Rash     Nausea/vomit    Tolerating PT/OT     Diarrhea    Tolerating Diet     Constipation    Other       Could not obtain due to AMS vs intubation        Objective:     VITALS:   Last 24hrs VS reviewed since prior progress note. Most recent are:  Visit Vitals    /47    Pulse 65    Temp 98.2 °F (36.8 °C)    Resp 20    Ht 5' 8\" (1.727 m)    Wt 68 kg (150 lb)    SpO2 96%    BMI 22.81 kg/m2       Intake/Output Summary (Last 24 hours) at 17 1615  Last data filed at 17 1451   Gross per 24 hour   Intake             2120 ml   Output             1975 ml   Net              145 ml       PHYSICAL EXAM:  General: WD, WN. Alert, cooperative, no acute distress    HEENT: NC, Atraumatic. PERRLA, EOMI. Anicteric sclerae. Lungs:  CTA Bilaterally. No Wheezing/Rhonchi/Rales. Heart:  Regular  rhythm,  No murmur (), No Rubs, No Gallops  Abdomen: Soft, Non distended, Non tender.  +Bowel sounds, no HSM  Extremities: No c/c/e  Neurologic:  CN 2-12 gi, Alert and oriented X 3. No acute neurological distress   Psych:   Good insight. Not anxious nor agitated.     Lab Data Reviewed: (see below)    Medications Reviewed: (see below)    PMH/SH reviewed - no change compared to H&P  ________________________________________________________________________  Total time spent with patient: 20 minutes Critical Care Provided     Minutes non procedure based    Care Plan discussed with:  Patient    Family     RN    Care Manager    Consultant/Specialist:       >50% of visit spent in counseling and coordination of care       Recommended Disposition:   Home with Family    HH/PT/OT/RN    SNF/LTC    CHRISTINA      Code Status:  Full Code    DNR/DNI      ________________________________________________________________________  ________________________________________________________________________________________________________________________________________________  Procedures: see electronic medical records for all procedures/Xrays and details which  were not copied into this note but were reviewed prior to creation of Plan. LABS:  Recent Labs      07/29/17 0420 07/28/17 0439 07/27/17   0902   WBC  5.9   --    --   5.7   HGB  7.4*  7.1*   < >  7.4*   HCT  22.9*  22.3*   < >  22.9*   PLT  196   --    --   169    < > = values in this interval not displayed. Recent Labs      07/29/17 0420 07/28/17 0439 07/27/17   0902   NA  143  142  140   K  3.3*  3.4*  3.0*   CL  104  103  97   CO2  34*  35*  37*   BUN  17  22*  37*   CREA  0.62*  0.77  1.67*   GLU  117*  79  98   CA  8.4*  8.5  8.7   MG   --    --   1.7     Recent Labs      07/27/17   0902   SGOT  19   AP  65   TP  7.2   ALB  3.1*   GLOB  4.1*     No results for input(s): INR, PTP, APTT in the last 72 hours. No lab exists for component: INREXT   Recent Labs      07/29/17   0420   TIBC  179*   PSAT  15*   FERR  913*      Lab Results   Component Value Date/Time    Folate 7.0 07/29/2017 04:20 AM     No results for input(s): PH, PCO2, PO2 in the last 72 hours.   Recent Labs      07/27/17   0902   CPK  47   CKMB  1.3     Lab Results   Component Value Date/Time    Color DARK YELLOW 07/27/2017 08:38 AM    Appearance CLEAR 07/27/2017 08:38 AM    Specific gravity 1.017 07/27/2017 08:38 AM    Specific gravity 1.015 02/19/2017 12:42 AM    pH (UA) 8.0 07/27/2017 08:38 AM    Protein NEGATIVE  07/27/2017 08:38 AM    Glucose NEGATIVE  07/27/2017 08:38 AM    Ketone TRACE 07/27/2017 08:38 AM    Bilirubin NEGATIVE  07/27/2017 08:38 AM    Urobilinogen 4.0 07/27/2017 08:38 AM    Nitrites NEGATIVE  07/27/2017 08:38 AM    Leukocyte Esterase SMALL 07/27/2017 08:38 AM    Epithelial cells FEW 07/27/2017 08:38 AM    Bacteria NEGATIVE  07/27/2017 08:38 AM    WBC 5-10 07/27/2017 08:38 AM    RBC 0-5 07/27/2017 08:38 AM       MEDICATIONS:  Current Facility-Administered Medications   Medication Dose Route Frequency    [START ON 7/30/2017] peg 3350-electrolytes (COLYTE) 4000 mL  4,000 mL Oral ONCE    electrolytes-dextrose (PEDIALYTE) solution soln 200 mL  200 mL Oral QID    pantoprazole (PROTONIX) granules for oral suspension 40 mg  40 mg Per G Tube ACB&D    sodium chloride (NS) flush 5-10 mL  5-10 mL IntraVENous Q8H    sodium chloride (NS) flush 5-10 mL  5-10 mL IntraVENous PRN    naloxone (NARCAN) injection 0.4 mg  0.4 mg IntraVENous PRN    ondansetron (ZOFRAN) injection 4 mg  4 mg IntraVENous Q6H PRN    docusate sodium (COLACE) capsule 100 mg  100 mg Oral BID    atorvastatin (LIPITOR) tablet 40 mg  40 mg Per G Tube QHS    cyclobenzaprine (FLEXERIL) tablet 5 mg  5 mg Per G Tube TID PRN    pregabalin (LYRICA) capsule 300 mg  300 mg Oral BID    QUEtiapine (SEROquel) tablet 25 mg  25 mg Per G Tube QHS    albuterol-ipratropium (DUO-NEB) 2.5 MG-0.5 MG/3 ML  3 mL Nebulization Q6H PRN    latanoprost (XALATAN) 0.005 % ophthalmic solution 1 Drop  1 Drop Both Eyes QPM    levETIRAcetam (KEPPRA) oral solution 1,500 mg  1,500 mg Per G Tube BID    metoprolol tartrate (LOPRESSOR) tablet 12.5 mg  12.5 mg Per G Tube BID    multivit w-min-ferrous gluconate (CEROVITE) oral liquid 15 mL  15 mL Per G Tube DAILY    thiamine (B-1) tablet 100 mg  100 mg Per G Tube DAILY    valproic acid (as sodium salt) (DEPAKENE) 250 mg/5 mL (5 mL) oral solution 500 mg  500 mg Per G Tube TID    acetaminophen (TYLENOL) tablet 650 mg  650 mg Per G Tube Q6H PRN    oxyCODONE (ROXICODONE INTENSOL) 20 mg/mL concentrated solution 5 mg  5 mg Per G Tube Q6H PRN    cefTRIAXone (ROCEPHIN) 1 g in 0.9% sodium chloride (MBP/ADV) 50 mL  1 g IntraVENous Q24H            Assessment:   Patient with  hgb 7.1 with heme pos stool. HGB was 10 when PEG placed 7/18/17 and a bit higher a month ago. He is poor historian with CVA but denies abd pain,N/V/M/H. No anticoagulant. Tolerating tube feeding at [facility]. He is here with cardiac arrhythmia. No recent alcohol. We will plan on prepping  Tomorrow and doing a colonoscopy and EGD on Monday. .          Past Medical History:   Diagnosis Date   Anemia  Heme positive stools  ·     Plan:   Prep tomorrow  EGD and colonoscopy on Monday    Live Dumont MD

## 2017-07-29 NOTE — PROGRESS NOTES
Bedside and Verbal shift change report given to [facility] RN (oncoming nurse) by Steve Fam (offgoing nurse). Report included the following information SBAR, Kardex, Intake/Output and MAR.     Zone Phone for oncoming shift:   [phone]    Shift Summary: none    LDAs               Peripheral IV 07/27/17 Left External jugular (Active)   Site Assessment Clean, dry, & intact 7/29/2017  1:47 PM   Phlebitis Assessment 0 7/29/2017  1:47 PM   Infiltration Assessment 0 7/29/2017  1:47 PM   Dressing Status Clean, dry, & intact 7/29/2017  1:47 PM   Dressing Type Tape;Transparent 7/29/2017  1:47 PM   Hub Color/Line Status Pink;Capped 7/29/2017  1:47 PM   Alcohol Cap Used Yes 7/28/2017  7:37 PM          PEG/Gastrostomy Tube 07/18/17 (Active)   Site Assessment Clean, dry, & intact 7/29/2017  1:47 PM   Dressing Status Clean, dry, & intact 7/29/2017  1:47 PM   G Port Status Infusing 7/29/2017  1:47 PM   Gastric Residual (mL) 0 ml 7/29/2017  1:47 PM   Tube Feeding/Formula Options Jevity 1.5 7/29/2017  1:47 PM   Tube Feeding/Verify Rate (mL/hr) 50 7/29/2017  1:47 PM   Water Flush Volume (mL) 150 mL 7/29/2017  1:47 PM   Intake (ml) 1200 ml 7/28/2017  7:11 PM   Medication Volume 120 ml 7/29/2017  3:24 AM              Urinary Catheter 07/27/17 2- way-Criteria for Appropriate Use: Obstruction/retention   Intake & Output   Date 07/28/17 0700 - 07/29/17 0659 07/29/17 0700 - 07/30/17 0659   Shift 0543-85361859 1900-0659 24 Hour Total 4459-1732 9246-4823 24 Hour Total   I  N  T  A  K  E   I.V.  (mL/kg/hr) 50  (0.1)  50  (0)         Volume (cefTRIAXone (ROCEPHIN) 1 g in 0.9% sodium chloride (MBP/ADV) 50 mL) 50  50       NG/GT 1050 1620 2670 300  300      Water Flush Volume (mL) (PEG/Gastrostomy Tube 07/18/17)  300  300      Medication Volume (PEG/Gastrostomy Tube 07/18/17) 350 120 470         Intake (ml) (PEG/Gastrostomy Tube 07/18/17)  1200 1200       Shift Total  (mL/kg) 1100  (16.2) 1620  (23.8) 2720  (40) 300  (4.4)  300  (4.4) O  U  T  P  U  T   Urine  (mL/kg/hr) 225  (0.3) 875  (1.1) 1100  (0.7) 1100  1100      Urine Output (mL) (Urinary Catheter 07/27/17 2- way)  1100  1100    Shift Total  (mL/kg) 225  (3.3) 875  (12.9) 1100  (16.2) 1100  (16.2)  1100  (16.2)    053 0200 -800  -800   Weight (kg) 68 68 68 68 68 68      Last Bowel Movement Last Bowel Movement Date:  (unable to obtain)   Glucose Checks [x] N/A  [] AC/HS  [] Q6  Concerns:   Nutrition Active Orders   Diet    DIET NPO       Consults []PT  []OT  []Speech  []Case Management   Cardiac Monitoring []N/A [x]Yes Expires:

## 2017-07-30 LAB
HCT VFR BLD AUTO: 21.8 % (ref 36.6–50.3)
HGB BLD-MCNC: 7.3 G/DL (ref 12.1–17)

## 2017-07-30 PROCEDURE — 74011000258 HC RX REV CODE- 258: Performed by: HOSPITALIST

## 2017-07-30 PROCEDURE — 74011250636 HC RX REV CODE- 250/636: Performed by: HOSPITALIST

## 2017-07-30 PROCEDURE — 74011250637 HC RX REV CODE- 250/637: Performed by: INTERNAL MEDICINE

## 2017-07-30 PROCEDURE — 74011000250 HC RX REV CODE- 250: Performed by: INTERNAL MEDICINE

## 2017-07-30 PROCEDURE — 85018 HEMOGLOBIN: CPT | Performed by: INTERNAL MEDICINE

## 2017-07-30 PROCEDURE — 36415 COLL VENOUS BLD VENIPUNCTURE: CPT | Performed by: INTERNAL MEDICINE

## 2017-07-30 PROCEDURE — 74011250637 HC RX REV CODE- 250/637: Performed by: HOSPITALIST

## 2017-07-30 PROCEDURE — 99218 HC RM OBSERVATION: CPT

## 2017-07-30 PROCEDURE — 65660000000 HC RM CCU STEPDOWN

## 2017-07-30 RX ADMIN — POLYETHYLENE GLYCOL-3350 AND ELECTROLYTES 4000 ML: 236; 6.74; 5.86; 2.97; 22.74 POWDER, FOR SOLUTION ORAL at 15:30

## 2017-07-30 RX ADMIN — Medication 10 ML: at 15:21

## 2017-07-30 RX ADMIN — PANTOPRAZOLE SODIUM 40 MG: 40 GRANULE, DELAYED RELEASE ORAL at 16:50

## 2017-07-30 RX ADMIN — VALPROIC ACID 500 MG: 250 SOLUTION ORAL at 21:36

## 2017-07-30 RX ADMIN — MULTIVIT AND MINERALS-FERROUS GLUCONATE 9 MG IRON/15 ML ORAL LIQUID 15 ML: at 09:21

## 2017-07-30 RX ADMIN — METOPROLOL TARTRATE 12.5 MG: 25 TABLET ORAL at 17:44

## 2017-07-30 RX ADMIN — LATANOPROST 1 DROP: 50 SOLUTION OPHTHALMIC at 17:44

## 2017-07-30 RX ADMIN — LEVETIRACETAM 1500 MG: 100 SOLUTION ORAL at 09:21

## 2017-07-30 RX ADMIN — QUETIAPINE FUMARATE 25 MG: 25 TABLET, FILM COATED ORAL at 21:29

## 2017-07-30 RX ADMIN — PANTOPRAZOLE SODIUM 40 MG: 40 GRANULE, DELAYED RELEASE ORAL at 09:22

## 2017-07-30 RX ADMIN — PREGABALIN 300 MG: 150 CAPSULE ORAL at 17:44

## 2017-07-30 RX ADMIN — PREGABALIN 300 MG: 150 CAPSULE ORAL at 09:21

## 2017-07-30 RX ADMIN — VALPROIC ACID 500 MG: 250 SOLUTION ORAL at 09:21

## 2017-07-30 RX ADMIN — METOPROLOL TARTRATE 12.5 MG: 25 TABLET ORAL at 09:21

## 2017-07-30 RX ADMIN — Medication 100 MG: at 09:21

## 2017-07-30 RX ADMIN — DOCUSATE SODIUM 100 MG: 100 CAPSULE, LIQUID FILLED ORAL at 17:44

## 2017-07-30 RX ADMIN — Medication 10 ML: at 21:29

## 2017-07-30 RX ADMIN — CEFTRIAXONE 1 G: 1 INJECTION, POWDER, FOR SOLUTION INTRAMUSCULAR; INTRAVENOUS at 15:44

## 2017-07-30 RX ADMIN — ATORVASTATIN CALCIUM 40 MG: 40 TABLET, FILM COATED ORAL at 21:29

## 2017-07-30 RX ADMIN — DOCUSATE SODIUM 100 MG: 100 CAPSULE, LIQUID FILLED ORAL at 09:21

## 2017-07-30 RX ADMIN — VALPROIC ACID 500 MG: 250 SOLUTION ORAL at 16:50

## 2017-07-30 RX ADMIN — LEVETIRACETAM 1500 MG: 100 SOLUTION ORAL at 17:44

## 2017-07-30 RX ADMIN — Medication 10 ML: at 02:59

## 2017-07-30 NOTE — PROGRESS NOTES
[address], [address]  [phone]                                                GI PROGRESS NOTE      NAME: Lilian Snowden   :  1934   MRN:  [id number]              Subjective:   Discussed with RN events overnight. Complaint Y/N Description   Abdominal Pain     Hematemesis     Hematochezia     Melena     Constipation     Diarrhea     Dyspepsia     Dysphagia     Jaundiced         Review of Systems:    Symptom Y/N Comments  Symptom Y/N Comments   Fever/Chills    Chest Pain     Cough    Abdominal Pain     Sputum    Joint Pain     SOB/AL    Pruritis/Rash     Nausea/vomit    Tolerating PT/OT     Diarrhea    Tolerating Diet     Constipation    Other       Could not obtain due to AMS vs intubation        Objective:     VITALS:   Last 24hrs VS reviewed since prior progress note. Most recent are:  Visit Vitals    /54    Pulse 74    Temp 98.9 °F (37.2 °C)    Resp 20    Ht 5' 8\" (1.727 m)    Wt 68 kg (150 lb)    SpO2 96%    BMI 22.81 kg/m2       Intake/Output Summary (Last 24 hours) at 17 1407  Last data filed at 17 1200   Gross per 24 hour   Intake             1820 ml   Output             1725 ml   Net               95 ml       PHYSICAL EXAM:  General: WD, WN. Alert, cooperative, no acute distress    HEENT: NC, Atraumatic. PERRLA, EOMI. Anicteric sclerae. Lungs:  CTA Bilaterally. No Wheezing/Rhonchi/Rales. Heart:  Regular  rhythm,  No murmur (), No Rubs, No Gallops  Abdomen: Soft, Non distended, Non tender.  +Bowel sounds, no HSM  Extremities: No c/c/e  Neurologic:  CN 2-12 gi, Alert and oriented X 3. No acute neurological distress   Psych:   Good insight. Not anxious nor agitated.     Lab Data Reviewed: (see below)    Medications Reviewed: (see below)    PMH/SH reviewed - no change compared to H&P  ________________________________________________________________________  Total time spent with patient: 20 minutes Critical Care Provided     Minutes non procedure based    Care Plan discussed with:  Patient    Family     RN    Care Manager    Consultant/Specialist:       >50% of visit spent in counseling and coordination of care       Recommended Disposition:   Home with Family    HH/PT/OT/RN    SNF/LTC    CHRISTINA      Code Status:  Full Code    DNR/DNI      ________________________________________________________________________  ________________________________________________________________________________________________________________________________________________  Procedures: see electronic medical records for all procedures/Xrays and details which  were not copied into this note but were reviewed prior to creation of Plan. LABS:  Recent Labs      07/30/17   1124  07/29/17   0420   WBC   --   5.9   HGB  7.3*  7.4*   HCT  21.8*  22.9*   PLT   --   196     Recent Labs      07/29/17   0420  07/28/17   0439   NA  143  142   K  3.3*  3.4*   CL  104  103   CO2  34*  35*   BUN  17  22*   CREA  0.62*  0.77   GLU  117*  79   CA  8.4*  8.5     No results for input(s): SGOT, GPT, AP, TBIL, TP, ALB, GLOB, GGT, AML, LPSE in the last 72 hours. No lab exists for component: AMYP, HLPSE  No results for input(s): INR, PTP, APTT in the last 72 hours. No lab exists for component: INREXT, INREXT   Recent Labs      07/29/17   0420   TIBC  179*   PSAT  15*   FERR  913*      Lab Results   Component Value Date/Time    Folate 7.0 07/29/2017 04:20 AM     No results for input(s): PH, PCO2, PO2 in the last 72 hours. No results for input(s): CPK, CKMB in the last 72 hours.     No lab exists for component: TROPONINI  Lab Results   Component Value Date/Time    Color DARK YELLOW 07/27/2017 08:38 AM    Appearance CLEAR 07/27/2017 08:38 AM    Specific gravity 1.017 07/27/2017 08:38 AM    Specific gravity 1.015 02/19/2017 12:42 AM    pH (UA) 8.0 07/27/2017 08:38 AM    Protein NEGATIVE  07/27/2017 08:38 AM    Glucose NEGATIVE  07/27/2017 08:38 AM    Ketone TRACE 07/27/2017 08:38 AM    Bilirubin NEGATIVE  07/27/2017 08:38 AM    Urobilinogen 4.0 07/27/2017 08:38 AM    Nitrites NEGATIVE  07/27/2017 08:38 AM    Leukocyte Esterase SMALL 07/27/2017 08:38 AM    Epithelial cells FEW 07/27/2017 08:38 AM    Bacteria NEGATIVE  07/27/2017 08:38 AM    WBC 5-10 07/27/2017 08:38 AM    RBC 0-5 07/27/2017 08:38 AM       MEDICATIONS:  Current Facility-Administered Medications   Medication Dose Route Frequency    peg 3350-electrolytes (COLYTE) 4000 mL  4,000 mL Oral ONCE    pantoprazole (PROTONIX) granules for oral suspension 40 mg  40 mg Per G Tube ACB&D    sodium chloride (NS) flush 5-10 mL  5-10 mL IntraVENous Q8H    sodium chloride (NS) flush 5-10 mL  5-10 mL IntraVENous PRN    naloxone (NARCAN) injection 0.4 mg  0.4 mg IntraVENous PRN    ondansetron (ZOFRAN) injection 4 mg  4 mg IntraVENous Q6H PRN    docusate sodium (COLACE) capsule 100 mg  100 mg Oral BID    atorvastatin (LIPITOR) tablet 40 mg  40 mg Per G Tube QHS    cyclobenzaprine (FLEXERIL) tablet 5 mg  5 mg Per G Tube TID PRN    pregabalin (LYRICA) capsule 300 mg  300 mg Oral BID    QUEtiapine (SEROquel) tablet 25 mg  25 mg Per G Tube QHS    albuterol-ipratropium (DUO-NEB) 2.5 MG-0.5 MG/3 ML  3 mL Nebulization Q6H PRN    latanoprost (XALATAN) 0.005 % ophthalmic solution 1 Drop  1 Drop Both Eyes QPM    levETIRAcetam (KEPPRA) oral solution 1,500 mg  1,500 mg Per G Tube BID    metoprolol tartrate (LOPRESSOR) tablet 12.5 mg  12.5 mg Per G Tube BID    multivit w-min-ferrous gluconate (CEROVITE) oral liquid 15 mL  15 mL Per G Tube DAILY    thiamine (B-1) tablet 100 mg  100 mg Per G Tube DAILY    valproic acid (as sodium salt) (DEPAKENE) 250 mg/5 mL (5 mL) oral solution 500 mg  500 mg Per G Tube TID    acetaminophen (TYLENOL) tablet 650 mg  650 mg Per G Tube Q6H PRN    oxyCODONE (ROXICODONE INTENSOL) 20 mg/mL concentrated solution 5 mg  5 mg Per G Tube Q6H PRN    cefTRIAXone (ROCEPHIN) 1 g in 0.9% sodium chloride (MBP/ADV) 50 mL  1 g IntraVENous Q24H            Assessment:   Patient with  hgb 7. 4 today. We are prepping today through PEG and doing a colonoscopy and EGD on tomorrow. .          Past Medical History:   Diagnosis Date   Anemia  Heme positive stools  ·     Plan:   Prep today  EGD and colonoscopy tomorrow    Latonya Adrian MD

## 2017-07-30 NOTE — PROGRESS NOTES
Bedside and Verbal shift change report given to [id number] [facility] (oncoming nurse) by Diomedes Vargas (offgoing nurse). Report included the following information SBAR, Kardex, Intake/Output and MAR. Zone Phone for oncoming shift:   [phone]    Shift Summary: Patient took 3/4 of bowel prep by PEG tube and refused the rest. Patient is 1st Dgr Block on monitor. LDAs               Peripheral IV 07/27/17 Left External jugular (Active)   Site Assessment Clean, dry, & intact 7/30/2017  3:30 PM   Phlebitis Assessment 0 7/30/2017  3:30 PM   Infiltration Assessment 0 7/30/2017  3:30 PM   Dressing Status Clean, dry, & intact 7/30/2017  3:30 PM   Dressing Type Tape;Transparent 7/30/2017  3:30 PM   Hub Color/Line Status Pink;Capped 7/30/2017  3:30 PM   Alcohol Cap Used Yes 7/28/2017  7:37 PM          PEG/Gastrostomy Tube 07/18/17 (Active)   Site Assessment Clean, dry, & intact 7/30/2017  3:30 PM   Dressing Status Clean, dry, & intact 7/30/2017  3:30 PM   G Port Status Clamped 7/30/2017  3:30 PM   Gastric Residual (mL) 0 ml 7/30/2017  3:00 AM   Tube Feeding/Formula Options Jevity 1.5 7/29/2017  1:47 PM   Tube Feeding/Verify Rate (mL/hr) 50 7/29/2017  1:47 PM   Water Flush Volume (mL) 100 mL 7/30/2017  4:25 AM   Intake (ml) 240 ml 7/30/2017  4:25 AM   Medication Volume 120 ml 7/29/2017  7:30 PM              Urinary Catheter 07/27/17 2- way-Criteria for Appropriate Use: Obstruction/retention   Intake & Output   Date 07/29/17 1900 - 07/30/17 0659 07/30/17 0700 - 07/31/17 0659   Shift 9107-7065 24 Hour Total 0580-9470 0397-7041 24 Hour Total   I  N  T  A  K  E   P. O.   680  680      P. O.   680  680    NG/GT 1140 1440         Water Flush Volume (mL) (PEG/Gastrostomy Tube 07/18/17) 300 600         Medication Volume (PEG/Gastrostomy Tube 07/18/17) 120 120         Intake (ml) (PEG/Gastrostomy Tube 07/18/17) 720 720       Shift Total  (mL/kg) 1140  (16.8) 1440  (21.2) 680  (10)  680  (10)   O  U  T  P  U  T   Urine  (mL/kg/hr) 950 2050 325  (0.4) 325      Urine Output (mL) (Urinary Catheter 07/27/17 2- way) 950 2050 325  325    Shift Total  (mL/kg) 950  (14) 2050  (30.1) 325  (4.8)  325  (4.8)    -610 355  355   Weight (kg) 68 68 68 68 68      Last Bowel Movement Last Bowel Movement Date:  (unable to obtain)   Glucose Checks [x] N/A  [] AC/HS  [] Q6  Concerns:   Nutrition Active Orders   Diet    DIET NPO       Consults [x]PT  [x]OT  [x]Speech  []Case Management   Cardiac Monitoring []N/A [x]Yes Expires:

## 2017-07-30 NOTE — PROGRESS NOTES
Hospitalist Progress Note    NAME: Lucretia    :  1934   MRN:  [id number]   LOS:   1      Assessment / Plan:  Macrocytic anemia, acute on chronic  Heme positive brown stool, possible GI bleed  Recent PEG placement 17 for dysphagia  -hgb stable again at 7.3  -plan for EGD/colo per Dr. Castrejon Prior - will likely be tomorrow  -holding aspirin  -PPI BID  -low iron on studies     ANNABEL resolved  Urinary retention  -baseline 0.5, bladder significantly distended on US, no evidence of BPH  -discussed voiding trial with RICHARD Jacob UTI  -continue ceftriaxone    Bilateral ankle pain  Right ankle and lower leg edema  -no DVT     Seizure disorder  -continue keppra, valproic acid     Hx SVT  -current in sinus to sinus tach. Continue metoprolol. Hold aspirin due to concern for GI bleed         Code status: Full  Prophylaxis: SCD's  Recommended Disposition: SNF/LTC     Subjective:     Chief Complaint: f/u anemia  Having some joint pains otherwise feeling well    Review of Systems:  Symptom Y/N Comments  Symptom Y/N Comments   Fever/Chills    Chest Pain     Poor Appetite    Edema     Cough    Abdominal Pain     Sputum    Joint Pain     SOB/AL    Pruritis/Rash     Nausea/vomit    Tolerating PT/OT     Diarrhea    Tolerating Diet     Constipation    Other       Could NOT obtain due to:      Objective:     VITALS:   Last 24hrs VS reviewed since prior progress note.  Most recent are:  Patient Vitals for the past 24 hrs:   Temp Pulse Resp BP SpO2   17 1100 98.9 °F (37.2 °C) 74 20 131/54 96 %   17 0839 99 °F (37.2 °C) 64 20 129/53 94 %   17 0237 98.9 °F (37.2 °C) 70 22 110/45 94 %   17 2222 98 °F (36.7 °C) 78 22 (!) 112/38 92 %   17 99.4 °F (37.4 °C) 63 21 118/50 92 %   17 1535 98.2 °F (36.8 °C) 65 20 122/47 96 %       Intake/Output Summary (Last 24 hours) at 17 1356  Last data filed at 17 1200   Gross per 24 hour   Intake             1820 ml   Output             1725 ml Net               95 ml        PHYSICAL EXAM:  General: Alert, chronically ill apearing  EENT:  EOMI. Anicteric sclerae. Mucous membranes dry  Resp:  CTA bilaterally, no wheezing or rales. No accessory muscle use  CV:  Regular rhythm, no edema  GI:  Soft, non distended, non tender. +Bowel sounds  Neurologic:  Alert and oriented X 3, slow speech  Psych:   Fair insight, not anxious nor agitated  Skin:  No rashes, no jaundice  ________________________________________________________________________  Care Plan discussed with:    Comments   Patient x    Family      RN     Care Manager     Consultant                        Multidiciplinary team rounds were held today with , nursing, pharmacist and clinical coordinator. Patient's plan of care was discussed; medications were reviewed and discharge planning was addressed. ________________________________________________________________________  Total NON critical care TIME:  20   Minutes  ________________________________________________________________________  Prosper Faye MD     Procedures: see electronic medical records for all procedures/Xrays and details which were not copied into this note but were reviewed prior to creation of Plan. LABS:  I reviewed today's most current labs and imaging studies.   Pertinent labs include:  Recent Labs      07/30/17   1124  07/29/17 0420 07/28/17 0439   WBC   --   5.9   --    HGB  7.3*  7.4*  7.1*   HCT  21.8*  22.9*  22.3*   PLT   --   196   --      Recent Labs      07/29/17 0420 07/28/17 0439   NA  143  142   K  3.3*  3.4*   CL  104  103   CO2  34*  35*   GLU  117*  79   BUN  17  22*   CREA  0.62*  0.77   CA  8.4*  8.5       Signed: Prosper Faye MD

## 2017-07-31 ENCOUNTER — ANESTHESIA EVENT (OUTPATIENT)
Dept: ENDOSCOPY | Age: 82
DRG: 378 | End: 2017-07-31
Payer: MEDICARE

## 2017-07-31 ENCOUNTER — ANESTHESIA (OUTPATIENT)
Dept: ENDOSCOPY | Age: 82
DRG: 378 | End: 2017-07-31
Payer: MEDICARE

## 2017-07-31 LAB
ANION GAP BLD CALC-SCNC: 4 MMOL/L (ref 5–15)
BUN SERPL-MCNC: 11 MG/DL (ref 6–20)
BUN/CREAT SERPL: 23 (ref 12–20)
CALCIUM SERPL-MCNC: 8 MG/DL (ref 8.5–10.1)
CHLORIDE SERPL-SCNC: 102 MMOL/L (ref 97–108)
CO2 SERPL-SCNC: 32 MMOL/L (ref 21–32)
CREAT SERPL-MCNC: 0.48 MG/DL (ref 0.7–1.3)
ERYTHROCYTE [DISTWIDTH] IN BLOOD BY AUTOMATED COUNT: 14.1 % (ref 11.5–14.5)
GLUCOSE SERPL-MCNC: 78 MG/DL (ref 65–100)
HCT VFR BLD AUTO: 22.8 % (ref 36.6–50.3)
HGB BLD-MCNC: 7.5 G/DL (ref 12.1–17)
MCH RBC QN AUTO: 33.5 PG (ref 26–34)
MCHC RBC AUTO-ENTMCNC: 32.9 G/DL (ref 30–36.5)
MCV RBC AUTO: 101.8 FL (ref 80–99)
PLATELET # BLD AUTO: 182 K/UL (ref 150–400)
POTASSIUM SERPL-SCNC: 3.2 MMOL/L (ref 3.5–5.1)
RBC # BLD AUTO: 2.24 M/UL (ref 4.1–5.7)
SODIUM SERPL-SCNC: 138 MMOL/L (ref 136–145)
WBC # BLD AUTO: 5.2 K/UL (ref 4.1–11.1)

## 2017-07-31 PROCEDURE — 80048 BASIC METABOLIC PNL TOTAL CA: CPT | Performed by: INTERNAL MEDICINE

## 2017-07-31 PROCEDURE — 76060000032 HC ANESTHESIA 0.5 TO 1 HR: Performed by: INTERNAL MEDICINE

## 2017-07-31 PROCEDURE — 74011250636 HC RX REV CODE- 250/636

## 2017-07-31 PROCEDURE — 0DJ08ZZ INSPECTION OF UPPER INTESTINAL TRACT, VIA NATURAL OR ARTIFICIAL OPENING ENDOSCOPIC: ICD-10-PCS | Performed by: INTERNAL MEDICINE

## 2017-07-31 PROCEDURE — 74011250637 HC RX REV CODE- 250/637: Performed by: HOSPITALIST

## 2017-07-31 PROCEDURE — 74011250637 HC RX REV CODE- 250/637: Performed by: INTERNAL MEDICINE

## 2017-07-31 PROCEDURE — 76040000007: Performed by: INTERNAL MEDICINE

## 2017-07-31 PROCEDURE — 77030013992 HC SNR POLYP ENDOSC BSC -B: Performed by: INTERNAL MEDICINE

## 2017-07-31 PROCEDURE — 36415 COLL VENOUS BLD VENIPUNCTURE: CPT | Performed by: INTERNAL MEDICINE

## 2017-07-31 PROCEDURE — 74011250636 HC RX REV CODE- 250/636: Performed by: INTERNAL MEDICINE

## 2017-07-31 PROCEDURE — 88305 TISSUE EXAM BY PATHOLOGIST: CPT | Performed by: INTERNAL MEDICINE

## 2017-07-31 PROCEDURE — 85027 COMPLETE CBC AUTOMATED: CPT | Performed by: INTERNAL MEDICINE

## 2017-07-31 PROCEDURE — 74011000250 HC RX REV CODE- 250

## 2017-07-31 PROCEDURE — 74011000258 HC RX REV CODE- 258: Performed by: HOSPITALIST

## 2017-07-31 PROCEDURE — 74011250636 HC RX REV CODE- 250/636: Performed by: HOSPITALIST

## 2017-07-31 PROCEDURE — 51798 US URINE CAPACITY MEASURE: CPT

## 2017-07-31 PROCEDURE — 97116 GAIT TRAINING THERAPY: CPT

## 2017-07-31 PROCEDURE — 65660000000 HC RM CCU STEPDOWN

## 2017-07-31 PROCEDURE — 0DBK8ZX EXCISION OF ASCENDING COLON, VIA NATURAL OR ARTIFICIAL OPENING ENDOSCOPIC, DIAGNOSTIC: ICD-10-PCS | Performed by: INTERNAL MEDICINE

## 2017-07-31 PROCEDURE — 77030005518 HC CATH URETH FOL 2W BARD -B

## 2017-07-31 PROCEDURE — 77030018798 HC PMP KT ENTRL FED COVD -A

## 2017-07-31 RX ORDER — PROPOFOL 10 MG/ML
INJECTION, EMULSION INTRAVENOUS AS NEEDED
Status: DISCONTINUED | OUTPATIENT
Start: 2017-07-31 | End: 2017-07-31 | Stop reason: HOSPADM

## 2017-07-31 RX ORDER — DEXTROMETHORPHAN/PSEUDOEPHED 2.5-7.5/.8
1.2 DROPS ORAL
Status: DISCONTINUED | OUTPATIENT
Start: 2017-07-31 | End: 2017-07-31 | Stop reason: HOSPADM

## 2017-07-31 RX ORDER — FLUMAZENIL 0.1 MG/ML
0.2 INJECTION INTRAVENOUS
Status: DISCONTINUED | OUTPATIENT
Start: 2017-07-31 | End: 2017-07-31 | Stop reason: HOSPADM

## 2017-07-31 RX ORDER — SODIUM CHLORIDE 0.9 % (FLUSH) 0.9 %
5-10 SYRINGE (ML) INJECTION EVERY 8 HOURS
Status: COMPLETED | OUTPATIENT
Start: 2017-07-31 | End: 2017-07-31

## 2017-07-31 RX ORDER — SODIUM CHLORIDE 9 MG/ML
50 INJECTION, SOLUTION INTRAVENOUS CONTINUOUS
Status: DISPENSED | OUTPATIENT
Start: 2017-07-31 | End: 2017-07-31

## 2017-07-31 RX ORDER — SODIUM CHLORIDE 9 MG/ML
50 INJECTION, SOLUTION INTRAVENOUS CONTINUOUS
Status: DISCONTINUED | OUTPATIENT
Start: 2017-07-31 | End: 2017-08-01 | Stop reason: HOSPADM

## 2017-07-31 RX ORDER — ATROPINE SULFATE 0.1 MG/ML
0.5 INJECTION INTRAVENOUS
Status: DISCONTINUED | OUTPATIENT
Start: 2017-07-31 | End: 2017-07-31 | Stop reason: HOSPADM

## 2017-07-31 RX ORDER — NALOXONE HYDROCHLORIDE 0.4 MG/ML
0.4 INJECTION, SOLUTION INTRAMUSCULAR; INTRAVENOUS; SUBCUTANEOUS
Status: DISCONTINUED | OUTPATIENT
Start: 2017-07-31 | End: 2017-07-31 | Stop reason: HOSPADM

## 2017-07-31 RX ORDER — LIDOCAINE HYDROCHLORIDE 20 MG/ML
INJECTION, SOLUTION EPIDURAL; INFILTRATION; INTRACAUDAL; PERINEURAL AS NEEDED
Status: DISCONTINUED | OUTPATIENT
Start: 2017-07-31 | End: 2017-07-31 | Stop reason: HOSPADM

## 2017-07-31 RX ORDER — SODIUM CHLORIDE 0.9 % (FLUSH) 0.9 %
5-10 SYRINGE (ML) INJECTION AS NEEDED
Status: ACTIVE | OUTPATIENT
Start: 2017-07-31 | End: 2017-07-31

## 2017-07-31 RX ORDER — SODIUM CHLORIDE, SODIUM LACTATE, POTASSIUM CHLORIDE, CALCIUM CHLORIDE 600; 310; 30; 20 MG/100ML; MG/100ML; MG/100ML; MG/100ML
INJECTION, SOLUTION INTRAVENOUS
Status: DISCONTINUED | OUTPATIENT
Start: 2017-07-31 | End: 2017-07-31 | Stop reason: HOSPADM

## 2017-07-31 RX ORDER — EPINEPHRINE 0.1 MG/ML
1 INJECTION INTRACARDIAC; INTRAVENOUS
Status: DISCONTINUED | OUTPATIENT
Start: 2017-07-31 | End: 2017-07-31 | Stop reason: HOSPADM

## 2017-07-31 RX ADMIN — Medication 100 MG: at 08:34

## 2017-07-31 RX ADMIN — Medication 10 ML: at 05:32

## 2017-07-31 RX ADMIN — METOPROLOL TARTRATE 12.5 MG: 25 TABLET ORAL at 18:34

## 2017-07-31 RX ADMIN — PROPOFOL 100 MG: 10 INJECTION, EMULSION INTRAVENOUS at 11:22

## 2017-07-31 RX ADMIN — Medication 10 ML: at 15:43

## 2017-07-31 RX ADMIN — MULTIVIT AND MINERALS-FERROUS GLUCONATE 9 MG IRON/15 ML ORAL LIQUID 15 ML: at 08:34

## 2017-07-31 RX ADMIN — METOPROLOL TARTRATE 12.5 MG: 25 TABLET ORAL at 08:34

## 2017-07-31 RX ADMIN — PROPOFOL 60 MG: 10 INJECTION, EMULSION INTRAVENOUS at 11:30

## 2017-07-31 RX ADMIN — VALPROIC ACID 500 MG: 250 SOLUTION ORAL at 15:42

## 2017-07-31 RX ADMIN — DOCUSATE SODIUM 100 MG: 100 CAPSULE, LIQUID FILLED ORAL at 08:34

## 2017-07-31 RX ADMIN — CEFTRIAXONE 1 G: 1 INJECTION, POWDER, FOR SOLUTION INTRAMUSCULAR; INTRAVENOUS at 15:42

## 2017-07-31 RX ADMIN — PREGABALIN 300 MG: 150 CAPSULE ORAL at 18:33

## 2017-07-31 RX ADMIN — LATANOPROST 1 DROP: 50 SOLUTION OPHTHALMIC at 18:34

## 2017-07-31 RX ADMIN — SODIUM CHLORIDE, SODIUM LACTATE, POTASSIUM CHLORIDE, CALCIUM CHLORIDE: 600; 310; 30; 20 INJECTION, SOLUTION INTRAVENOUS at 11:18

## 2017-07-31 RX ADMIN — PANTOPRAZOLE SODIUM 40 MG: 40 GRANULE, DELAYED RELEASE ORAL at 15:42

## 2017-07-31 RX ADMIN — LEVETIRACETAM 1500 MG: 100 SOLUTION ORAL at 18:33

## 2017-07-31 RX ADMIN — ATORVASTATIN CALCIUM 40 MG: 40 TABLET, FILM COATED ORAL at 21:53

## 2017-07-31 RX ADMIN — Medication 10 ML: at 21:58

## 2017-07-31 RX ADMIN — Medication 10 ML: at 15:42

## 2017-07-31 RX ADMIN — LEVETIRACETAM 1500 MG: 100 SOLUTION ORAL at 08:35

## 2017-07-31 RX ADMIN — SODIUM CHLORIDE 50 ML/HR: 900 INJECTION, SOLUTION INTRAVENOUS at 11:21

## 2017-07-31 RX ADMIN — LIDOCAINE HYDROCHLORIDE 40 MG: 20 INJECTION, SOLUTION EPIDURAL; INFILTRATION; INTRACAUDAL; PERINEURAL at 11:15

## 2017-07-31 RX ADMIN — PREGABALIN 300 MG: 150 CAPSULE ORAL at 08:34

## 2017-07-31 RX ADMIN — VALPROIC ACID 500 MG: 250 SOLUTION ORAL at 08:35

## 2017-07-31 RX ADMIN — VALPROIC ACID 500 MG: 250 SOLUTION ORAL at 21:53

## 2017-07-31 RX ADMIN — PANTOPRAZOLE SODIUM 40 MG: 40 GRANULE, DELAYED RELEASE ORAL at 08:34

## 2017-07-31 RX ADMIN — DOCUSATE SODIUM 100 MG: 100 CAPSULE, LIQUID FILLED ORAL at 18:34

## 2017-07-31 NOTE — PROCEDURES
Endoscopic Gastroduodenoscopy Procedure Note  Mitesh Dhillon  1934  [id number]      Procedure: Endoscopic Gastroduodenoscopy --diagnostic    Indication:  Iron deficiency anemia, Occult blood in stool with possible UGI origin    Pre-operative Diagnosis: see indication above    Post-operative Diagnosis: see findings below    :  Cesar Lundborg, MD    Referring Provider:  hospitalist    Anethesia/Sedation:  MAC anesthesia Propofol    Pre-Procedural Exam:      Airway: clear, Malimpati 2   Heart: RRR, without gallops or rubs  Lungs: clear bilaterally without wheezes, crackles, or rhonchi  Abdomen: soft, nontender, nondistended, bowel sounds present        Procedure Details     After infom consent was obtained for the procedure, with all risks and benefits of procedure explained the patient was taken to the endoscopy suite and placed in the left lateral decubitus position. Following sequential administration of sedation as per above, the [id number] gastroscope was inserted into the mouth and advanced under direct vision to second portion of the duodenum. A careful inspection was made as the gastroscope was withdrawn, including a retroflexed view of the proximal stomach; findings and interventions are described below. Findings/Impression:  ESOPHAGUS: The esophagus is normal. The proximal, mid, and distal portions are normal. The Z-Line is intact. Small hiatus hernia. STOMACH: The fundus on antegrade and retroflex views is normal. The body, antrum, and pylorus are normal. Normal PEG bolster. DUODENUM: The bulb and second portions are normal.      Therapies:  none    Specimens: none          Complications:   None; patient tolerated the procedure well. EBL:  None.         Recommendations:  -colonoscopy    Discharge Disposition:

## 2017-07-31 NOTE — ROUTINE PROCESS
Hilda [facility]  1934  [id number]    Situation:  Verbal report received from: Stella Lutz RN  Procedure: Procedure(s):  ESOPHAGOGASTRODUODENOSCOPY (EGD)  COLONOSCOPY  ENDOSCOPIC POLYPECTOMY    Background:    Preoperative diagnosis: Anemia  Postoperative diagnosis: hiatal hernia; normal PEG tube placement; pan diverticulosis;  colon polyp    :  Dr. Jessica Romero  Assistant(s): Endoscopy Technician-1: Richard Parra  Endoscopy RN-1: Chrissie Diaz    Specimens:   ID Type Source Tests Collected by Time Destination   1 : proximal ascending colon polyp for pathology Preservative Colon, Ascending  Edy Gaitan MD 7/31/2017 1145 Pathology     H. Pylori  no    Assessment:  Intra-procedure medications       Anesthesia gave intra-procedure sedation and medications, see anesthesia flow sheet yes    Intravenous fluids: NS@ KVO     Vital signs stable       Abdominal assessment: round and soft       Recommendation:  Discharge patient per MD order  .   Return to floor yes room 3264  Family or Friend   Wife here  Permission to share finding with family or friend yes

## 2017-07-31 NOTE — PROCEDURES
Colonoscopy Operative Report  Reyes Savoy  1934  [id number]      Procedure Type:   Colonoscopy with polypectomy (snare cautery)     Indications:     Iron deficiency anemia    Pre-operative Diagnosis: see indication above    Post-operative Diagnosis:  See findings below    : Ubaldo Danielson MD    Referring Provider: hospitalist    Sedation:  MAC anesthesia Propofol    Pre-Procedural Exam:      Airway: clear, Malimpati 2   Heart: RRR, without gallops or rubs  Lungs: clear bilaterally without wheezes, crackles, or rhonchi  Abdomen: soft, nontender, nondistended, bowel sounds present     Procedure Details:  After informed consent was obtained with all risks and benefits of procedure explained and preoperative exam completed, the patient was taken to the endoscopy suite and placed in the left lateral decubitus position. Upon sequential sedation as per above, a digital rectal exam was performed. The Olympus videocolonoscope [id number] was inserted in the rectum and carefully advanced to the cecum, which was identified by the ileocecal valve. The quality of preparation was fair. The colonoscope was slowly withdrawn with careful evaluation between folds. Retoflexion in the rectum was completed. Findings/Impression: Rectum:     - grade 2 hemorrhoids  Sigmoid:     - diverticulosis  Descending Colon:     - Excavated lesions:     - Diverticulosis  Transverse Colon:     - Excavated lesions:     - Diverticulosis  Ascending Colon:     - Excavated lesions:     - Diverticulosis    - Protruding lesions:     -Sessile Polyp(s) size 4 mm removed by polypectomy (snare cautery)  Cecum:   Normal              Specimen Removed:  Small polyp    Complications: None. EBL:  None. Recommendations: --Follow clinical symptoms and laboratory studies for evidence of rebleeding. , -no definite source of bleeding found . Resume tube feeding, follow H&H,  Resume normal medication(s).        Discharge Disposition:  Home in the company of a  when able to ambulate.

## 2017-07-31 NOTE — PROGRESS NOTES
Bedside and Verbal shift change report given to [id number] [facility] (oncoming nurse) by [place] (offgoing nurse). Report included the following information SBAR, Kardex, Intake/Output and MAR. Zone Phone for oncoming shift:   [phone]    Shift Summary: Patient walked zazueta with PT. LDAs               Peripheral IV 07/27/17 Left External jugular (Active)   Site Assessment Clean, dry, & intact 7/31/2017  2:36 PM   Phlebitis Assessment 0 7/31/2017  2:36 PM   Infiltration Assessment 0 7/31/2017  2:36 PM   Dressing Status Clean, dry, & intact 7/31/2017  2:36 PM   Dressing Type Tape;Transparent 7/31/2017  2:36 PM   Hub Color/Line Status Pink 7/31/2017  2:36 PM   Alcohol Cap Used Yes 7/28/2017  7:37 PM          PEG/Gastrostomy Tube 07/18/17 (Active)   Site Assessment Clean, dry, & intact 7/31/2017  2:36 PM   Dressing Status Clean, dry, & intact 7/31/2017  2:36 PM   G Port Status Infusing 7/31/2017  2:36 PM   Gastric Residual (mL) 0 ml 7/31/2017  2:36 PM   Tube Feeding/Formula Options Jevity 1.5 7/31/2017  2:36 PM   Tube Feeding/Verify Rate (mL/hr) 50 7/31/2017  2:36 PM   Water Flush Volume (mL) 100 mL 7/30/2017  4:25 AM   Intake (ml) 240 ml 7/30/2017  4:25 AM   Medication Volume 120 ml 7/29/2017  7:30 PM              [REMOVED] Urinary Catheter 07/27/17 2- way-Criteria for Appropriate Use: Obstruction/retention  Urinary Catheter 07/31/17 2- way-Criteria for Appropriate Use: Obstruction/retention   Intake & Output   Date 07/30/17 1900 - 07/31/17 0659 07/31/17 0700 - 08/01/17 0659   Shift 9959-7287 24 Hour Total 9212-2209 2332-7472 24 Hour Total   I  N  T  A  K  E   P. O.  680         P. O.  680       I.V.  (mL/kg/hr)   250  (0.3)  250      Volume (lactated Ringers infusion)   250  250    Shift Total  (mL/kg)  680  (10) 250  (3.7)  250  (3.7)   O  U  T  P  U  T   Urine  (mL/kg/hr) 700 1025 650  (0.8) 450 1100      Urine Output (mL) ([REMOVED] Urinary Catheter 07/27/17 2- way) 700 1025 650  650      Urine Output (mL) (Urinary Catheter 07/31/17 2- way)    450 450    Shift Total  (mL/kg) 700  (10.3) 1025  (15.1) 650  (9.6) 450  (6.6) 1100  (16.2)   NET -700 -345 -400 -450 -850   Weight (kg) 68 68 68 68 68      Last Bowel Movement Last Bowel Movement Date: 07/31/17   Glucose Checks [] N/A  [] AC/HS  [] Q6  Concerns:   Nutrition Active Orders   There are no active orders of the following type(s): Diet.        Consults [x]PT  [x]OT  []Speech  []Case Management   Cardiac Monitoring []N/A [x]Yes Expires:

## 2017-07-31 NOTE — PROGRESS NOTES
Chart reviewed. Will contact [facility] to see if they are an option for d/c planning if pt chooses. Will discuss with pt//family    09  [facility] contacted family and they want pt to return when stable. Will need [employer] Auth. Please advise when d/c is 2-3 days out.   Thanks

## 2017-07-31 NOTE — PROGRESS NOTES
Patient unable to void since 1400 when kelly was removed. Patient bladder scanned 368 mL remain in patient's bladder, Dr. Warren Chang notified by phone, telephone order with read back given to kelly patient.

## 2017-07-31 NOTE — ANESTHESIA POSTPROCEDURE EVALUATION
Post-Anesthesia Evaluation and Assessment    Patient: Katie Gil MRN: [id number]  SSN: [id number]    YOB: 1934  Age: 80 y.o. Sex: male       Cardiovascular Function/Vital Signs  Visit Vitals    BP 98/57    Pulse (!) 56    Temp 36.6 °C (97.8 °F)    Resp 18    Ht 5' 8\" (1.727 m)    Wt 68 kg (150 lb)    SpO2 97%    BMI 22.81 kg/m2       Patient is status post total IV anesthesia anesthesia for Procedure(s):  ESOPHAGOGASTRODUODENOSCOPY (EGD)  COLONOSCOPY  ENDOSCOPIC POLYPECTOMY. Nausea/Vomiting: None    Postoperative hydration reviewed and adequate. Pain:  Pain Scale 1: Numeric (0 - 10) (07/31/17 1114)  Pain Intensity 1: 0 (07/31/17 1114)   Managed    Neurological Status:   Neuro  Neurologic State: Alert; Appropriate for age;Confused (07/31/17 0754)  Orientation Level: Disoriented to time;Oriented to person;Oriented to place;Oriented to situation (07/31/17 0754)  Cognition: Follows commands (07/31/17 0754)  Speech: Clear (07/31/17 0754)  LUE Motor Response: Tremorous (07/31/17 0754)  LLE Motor Response: Weak (07/31/17 0754)  RUE Motor Response: Tremorous (07/31/17 0754)  RLE Motor Response: Weak (07/31/17 0754)   At baseline    Mental Status and Level of Consciousness: Arousable    Pulmonary Status:   O2 Device: CO2 nasal cannula (07/31/17 1147)   Adequate oxygenation and airway patent    Complications related to anesthesia: None    Post-anesthesia assessment completed.  No concerns    Signed By: Derik Martinez MD     July 31, 2017

## 2017-07-31 NOTE — H&P
Gastroenterology History and Physical    Patient: Geovani Valenzuela    Physician: Julia Patel MD    Vital Signs: Blood pressure 132/58, pulse 60, temperature 97.9 °F (36.6 °C), resp. rate 18, height 5' 8\" (1.727 m), weight 68 kg (150 lb), SpO2 97 %. Allergies: Allergies   Allergen Reactions    No Known Allergies        Indication: heme pos stool and anemia    History:  Past Medical History:   Diagnosis Date    Alcohol abuse, in remission     Seizures ([address]       Past Surgical History:   Procedure Laterality Date    PLACE PERCUT GASTROSTOMY TUBE  7/18/2017           Social History     Social History    Marital status:      Spouse name: N/A    Number of children: N/A    Years of education: N/A     Social History Main Topics    Smoking status: Never Smoker    Smokeless tobacco: Never Used    Alcohol use No      Comment: Quit drinking 10 years ago   [facility] Drug use: No    Sexual activity: Not Asked     Other Topics Concern    None     Social History Narrative      Family History   Problem Relation Age of Onset    Other Other      no strokes or seizures       Medications:   Prior to Admission medications    Medication Sig Start Date End Date Taking? Authorizing Provider   nitrofurantoin, macrocrystal-monohydrate, (MACROBID) 100 mg capsule 100 mg by Per G Tube route two (2) times a day. Yes Nadia Bob MD   QUEtiapine (SEROQUEL) 25 mg tablet 25 mg by Per G Tube route nightly. Yes Nadia Bob MD   atorvastatin (LIPITOR) 40 mg tablet 40 mg by Per G Tube route nightly. Yes Nadia Bob MD   pregabalin (LYRICA) 300 mg capsule 300 mg by Per G Tube route two (2) times a day. Yes Nadia Bob MD   multivit w-min-ferrous gluconate (CEROVITE) 9 mg iron/15 mL oral liquid 15 mL by Per G Tube route daily. 7/22/17  Yes Anh Leon MD   thiamine (B-1) 100 mg tablet 1 Tab by Per G Tube route daily.  7/22/17  Yes Anh Leon MD   valproic acid, as sodium salt, (DEPAKENE) 250 mg/5 mL (5 mL) soln oral solution 10 mL by Per G Tube route three (3) times daily. 7/22/17  Yes Sandie Mason MD   metoprolol tartrate (LOPRESSOR) 25 mg tablet 0.5 Tabs by Per G Tube route two (2) times a day. 7/22/17  Yes Sandie Mason MD   levETIRAcetam (KEPPRA) 100 mg/ml soln oral solution 15 mL by Per G Tube route two (2) times a day. 7/22/17  Yes Sandie Mason MD   latanoprost (XALATAN) 0.005 % ophthalmic solution Administer 1 Drop to both eyes every evening. 7/22/17  Yes Sandie Mason MD   omeprazole (PRILOSEC) 40 mg capsule 40 mg by Per G Tube route daily. Yes Nadia Bob MD   aspirin (ASPIRIN) 325 mg tablet 325 mg by Per G Tube route daily. 9/7/12  Yes Tico Arshad MD   cyclobenzaprine (FLEXERIL) 5 mg tablet 5 mg by Per G Tube route three (3) times daily as needed for Muscle Spasm(s). Nadia Bob MD   acetaminophen (TYLENOL) 650 mg suppository Insert 1 Suppository into rectum every four (4) hours as needed. Indications: Fever, HEADACHE DISORDER, Pain 7/22/17   Sandie Mason MD   albuterol-ipratropium (DUO-NEB) 2.5 mg-0.5 mg/3 ml nebu 3 mL by Nebulization route every six (6) hours as needed. 7/22/17   Sandie Mason MD       Physical Exam:   HEENT: Head: Normocephalic, no lesions, without obvious abnormality.    Heart: regular rate and rhythm, S1, S2 normal, no murmur, click, rub or gallop   Lungs: chest clear, no wheezing, rales, normal symmetric air entry, Heart exam - S1, S2 normal, no murmur, no gallop, rate regular   Abdominal: Bowel sounds are normal, liver is not enlarged, spleen is not enlarged     Signed:  Lilly Iglesias MD 7/31/2017

## 2017-07-31 NOTE — PROGRESS NOTES
Hospitalist Progress Note    NAME: Reyes Savoy   :  1934   MRN:  [id number]   LOS:   2      Assessment / Plan:  Macrocytic anemia, acute on chronic  Heme positive brown stool, possible GI bleed  Recent PEG placement 17 for dysphagia  -had drop of hgb 9-->7 within one week after discharge, no obvious bleeding  -B12, folate normal, reticulocyte count high, iron studies suggesting chronic disease  -hgb stable again at 7.5, has been stable since admission, not given transfusion  -EGD negative, colonoscopy with polyp and diverticulosis, no clear source of bleeding  -holding aspirin  -PPI BID     ANNABEL resolved  Urinary retention  -baseline 0.5, bladder significantly distended on US, no evidence of BPH  -discussed voiding trial with RN    Mel Suh UTI  -continue ceftriaxone    Bilateral ankle pain  Right ankle and lower leg edema  -no DVT     Seizure disorder  -continue keppra, valproic acid     Hx SVT  -current in sinus to sinus tach. Continue metoprolol. Hold aspirin due to concern for GI bleed         Code status: Full  Prophylaxis: SCD's  Recommended Disposition: SNF/LTC     Subjective:     Chief Complaint: f/u anemia  Doing well today, wife at bedside, returned from EGD colo    Review of Systems:  Symptom Y/N Comments  Symptom Y/N Comments   Fever/Chills    Chest Pain     Poor Appetite    Edema     Cough    Abdominal Pain     Sputum    Joint Pain     SOB/AL    Pruritis/Rash     Nausea/vomit    Tolerating PT/OT     Diarrhea    Tolerating Diet     Constipation    Other       Could NOT obtain due to:      Objective:     VITALS:   Last 24hrs VS reviewed since prior progress note.  Most recent are:  Patient Vitals for the past 24 hrs:   Temp Pulse Resp BP SpO2   17 1238 - (!) 56 - 132/54 97 %   17 1236 - (!) 58 - 128/58 98 %   17 1232 - (!) 57 10 135/61 99 %   17 1230 - (!) 56 13 147/79 99 %   17 1224 - (!) 58 14 143/81 98 %   17 1219 - (!) 58 13 (!) 137/96 98 % 07/31/17 1216 - (!) 59 16 132/87 98 %   07/31/17 1213 - (!) 58 16 (!) 141/97 100 %   07/31/17 1208 - 61 19 133/78 99 %   07/31/17 1205 - 61 15 143/85 98 %   07/31/17 1200 98 °F (36.7 °C) 61 16 132/76 95 %   07/31/17 1156 - (!) 54 18 113/59 98 %   07/31/17 1152 - 63 18 110/56 98 %   07/31/17 1147 - (!) 56 18 98/57 97 %   07/31/17 1145 - 62 19 96/46 97 %   07/31/17 1114 97.8 °F (36.6 °C) (!) 58 18 122/60 100 %   07/31/17 0737 97.9 °F (36.6 °C) 60 18 132/58 97 %   07/31/17 0320 97.8 °F (36.6 °C) 68 18 113/59 98 %   07/30/17 2337 97.6 °F (36.4 °C) (!) 56 18 105/45 95 %   07/30/17 1919 98.6 °F (37 °C) 70 20 133/61 94 %   07/30/17 1511 98.5 °F (36.9 °C) 69 20 119/48 98 %       Intake/Output Summary (Last 24 hours) at 07/31/17 1401  Last data filed at 07/31/17 1130   Gross per 24 hour   Intake              250 ml   Output              700 ml   Net             -450 ml        PHYSICAL EXAM:  General: Alert, chronically ill apearing  EENT:  EOMI. Anicteric sclerae. Mucous membranes dry  Resp:  CTA bilaterally, no wheezing or rales. No accessory muscle use  CV:  Regular rhythm, no edema  GI:  Soft, non distended, non tender. +Bowel sounds  Neurologic:  Alert and oriented X 3, slow speech  Psych:   Fair insight, not anxious nor agitated  Skin:  No rashes, no jaundice  ________________________________________________________________________  Care Plan discussed with:    Comments   Patient x    Family  z    RN z    Care Manager x    Consultant                        Multidiciplinary team rounds were held today with , nursing, pharmacist and clinical coordinator. Patient's plan of care was discussed; medications were reviewed and discharge planning was addressed.      ________________________________________________________________________  Total NON critical care TIME:  20   Minutes  ________________________________________________________________________  Es Grimaldo MD     Procedures: see electronic medical records for all procedures/Xrays and details which were not copied into this note but were reviewed prior to creation of Plan. LABS:  I reviewed today's most current labs and imaging studies.   Pertinent labs include:  Recent Labs      07/31/17   0404  07/30/17   1124  07/29/17   0420   WBC  5.2   --   5.9   HGB  7.5*  7.3*  7.4*   HCT  22.8*  21.8*  22.9*   PLT  182   --   196     Recent Labs      07/31/17   0404  07/29/17   0420   NA  138  143   K  3.2*  3.3*   CL  102  104   CO2  32  34*   GLU  78  117*   BUN  11  17   CREA  0.48*  0.62*   CA  8.0*  8.4*       Signed: Juan Jose Harrison MD

## 2017-07-31 NOTE — PROGRESS NOTES
Problem: Mobility Impaired (Adult and Pediatric)  Goal: *Acute Goals and Plan of Care (Insert Text)  Physical Therapy Goals  Initiated 7/28/2017  1. Patient will move from supine to sit and sit to supine , scoot up and down and roll side to side in bed with independence within 7 day(s). 2. Patient will transfer from bed to chair and chair to bed with supervision/set-up using the least restrictive device within 7 day(s). 3. Patient will perform sit to stand with supervision/set-up within 7 day(s). 4. Patient will ambulate with minimal assistance/contact guard assist for 100 feet with the least restrictive device within 7 day(s). PHYSICAL THERAPY TREATMENT  Patient: Christina Bernstein (2 y.o. male)  Date: 7/31/2017  Diagnosis: Anemia  Heme positive stool  ARF (acute renal failure) (HCC)  Anemia  Anemia <principal problem not specified>  Procedure(s) (LRB):  ESOPHAGOGASTRODUODENOSCOPY (EGD) (N/A)  COLONOSCOPY (N/A)  ENDOSCOPIC POLYPECTOMY (N/A) Day of Surgery  Precautions: Fall      ASSESSMENT:  Pt received sitting in bedside chair and agreeable to PT intervention. Pt cleared by nursing for mobility. Pt transferred sit<>stand with min-mod A x 1, needing additional cueing to shift weight forward during transfer and initial standing. Pt continues to demonstrate posterior lean/LOB with weight shifted back onto heels, needing cueing to shift weight forward in standing. Posterior lean has improved slightly since last therapy session. Pt ambulated 190' with min A x 1 using RW, demonstrating slow gait speed, shuffling steps, and decreased step clearance and length PENNY throughout. Pt required increased education during directional changes to stay within frame of RW. Pt was provided with extensive education and cueing to improve gait speed and step length/height, however pt without improvements and insistent that it was his first time up ambulating with RW (educated that pt ambulated on Friday with walker).  Pt performed standing exercises as described below with instruction, minimal cueing, and RW support/external assist. Pt stating fatigue and requesting to rest. Pt was assisted back into bedside chair and left with all needs met and RN and wife present. Recommend pt discharge back to SNF rehab to improve overall functional mobility and safety prior to returning home. Progression toward goals:  [ ]    Improving appropriately and progressing toward goals  [X]    Improving slowly and progressing toward goals  [ ]    Not making progress toward goals and plan of care will be adjusted       PLAN:  Patient continues to benefit from skilled intervention to address the above impairments. Continue treatment per established plan of care. Discharge Recommendations:  [facility]  Further Equipment Recommendations for Discharge:  TBD by rehab       SUBJECTIVE:   Patient stated I need something to cover my back.       OBJECTIVE DATA SUMMARY:   Critical Behavior:  Neurologic State: Alert, Appropriate for age, Confused  Orientation Level: Disoriented to time, Oriented to person, Oriented to place, Oriented to situation  Cognition: Follows commands  Safety/Judgement: Decreased awareness of need for assistance, Decreased awareness of need for safety, Decreased insight into deficits  Functional Mobility Training:  Bed Mobility:           Scooting: Stand-by asssistance; Additional time        Transfers:  Sit to Stand: Minimum assistance; Moderate assistance;Assist x1;Additional time  Stand to Sit: Minimum assistance                             Balance:  Sitting: Intact  Sitting - Static: Good (unsupported)  Sitting - Dynamic: Good (unsupported)  Standing: Impaired; With support (RW support)  Standing - Static: Fair  Standing - Dynamic : Fair  Ambulation/Gait Training:  Distance (ft): 190 Feet (ft)  Assistive Device: Gait belt;Walker, rolling  Ambulation - Level of Assistance: Minimal assistance;Assist x1;Additional time; Adaptive equipment        Gait Abnormalities: Decreased step clearance;Shuffling gait        Base of Support: Narrowed; Center of gravity altered     Speed/Shannon: Pace decreased (<100 feet/min); Shuffled  Step Length: Left shortened;Right shortened  Therapeutic Exercises:   Mini-squats 10x with RW support and min-CGA  Hip abd/add in standing with RW support and CGA 5x BLE  Standing hip flexion with RW support and CGA 10x BLE  Standing step forward/backward with RW support and CGA 10x BLE  Pain:  Pain Scale 1: Numeric (0 - 10)  Pain Intensity 1: 0              Activity Tolerance:   Improving - increased gait distance; increased fatigue following intervention  Please refer to the flowsheet for vital signs taken during this treatment.   After treatment:   [X]    Patient left in no apparent distress sitting up in chair  [ ]    Patient left in no apparent distress in bed  [X]    Call bell left within reach  [X]    Nursing notified  [X]    Caregiver present  [ ]    Bed alarm activated      COMMUNICATION/COLLABORATION:   The patients plan of care was discussed with: Physical Therapist and Registered Nurse     Antonino Salazar PT, DPT   Time Calculation: 18 mins

## 2017-07-31 NOTE — PROGRESS NOTES
Physical Therapy Note    Chart reviewed. Attempted to see pt this AM for PT intervention. Noted pt is off the floor at endoscopy for procedure. Will defer and continue to follow.     Thank you,  Екатерина Ahuja, PT, DPT

## 2017-07-31 NOTE — ANESTHESIA PREPROCEDURE EVALUATION
Anesthetic History   No history of anesthetic complications            Review of Systems / Medical History  Patient summary reviewed, nursing notes reviewed and pertinent labs reviewed    Pulmonary        Sleep apnea        Comments: 7-14-17 CXR: improved RUL airspace dis and left basilar atelectasis   Neuro/Psych     seizures: poorly controlled  CVA      Comments: Aphasia  Hx Encephalopathy Cardiovascular            Dysrhythmias : SVT  Hyperlipidemia    Exercise tolerance: <4 METS  Comments: 7-11-17 EKG: ST with freq PVCs    6-24-17 EKG: SR with first degree AV blk   GI/Hepatic/Renal               Comments: Dysphagia  Hx Heme positive stool Endo/Other        Anemia     Other Findings   Comments: Alc abuse, in remission, no alc per fam in 5 yrs  Weakness  Debility           Physical Exam    Airway  Mallampati: I  TM Distance: 4 - 6 cm  Neck ROM: normal range of motion   Mouth opening: Normal     Cardiovascular    Rhythm: regular  Rate: normal         Dental      Comments: Multiple missing teeth   Pulmonary      Decreased breath sounds (left greater decrease than right): bibasilar          Comments: Coarse upper airway sounds, pt cannot clear with weak cough Abdominal  GI exam deferred       Other Findings            Anesthetic Plan    ASA: 3  Anesthesia type: total IV anesthesia          Induction: Intravenous  Anesthetic plan and risks discussed with: Patient

## 2017-07-31 NOTE — PROGRESS NOTES
Occupational Therapy  Chart reviewed. Pt is currently off the floor for procedure. Will continue to follow.  Gonzella Runner, OT

## 2017-07-31 NOTE — PROGRESS NOTES
Bedside and Verbal shift change report given to [place] (oncoming nurse) by Lora Reyes (offgoing nurse). Report included the following information SBAR, Kardex, Intake/Output, MAR and Recent Results. Zone Phone for oncoming shift:   [phone]    Shift Summary: Patient rested quietly throughout the night. No C/O pain voiced. LDAs               Peripheral IV 07/27/17 Left External jugular (Active)   Site Assessment Clean, dry, & intact 7/31/2017  3:21 AM   Phlebitis Assessment 0 7/31/2017  3:21 AM   Infiltration Assessment 0 7/31/2017  3:21 AM   Dressing Status Clean, dry, & intact 7/31/2017  3:21 AM   Dressing Type Tape;Transparent 7/31/2017  3:21 AM   Hub Color/Line Status Pink;Flushed 7/31/2017  3:21 AM   Alcohol Cap Used Yes 7/28/2017  7:37 PM          PEG/Gastrostomy Tube 07/18/17 (Active)   Site Assessment Clean, dry, & intact 7/31/2017  3:21 AM   Dressing Status Clean, dry, & intact 7/31/2017  3:21 AM   G Port Status Clamped 7/31/2017  3:21 AM   Gastric Residual (mL) 0 ml 7/30/2017  3:00 AM   Tube Feeding/Formula Options Jevity 1.5 7/29/2017  1:47 PM   Tube Feeding/Verify Rate (mL/hr) 50 7/29/2017  1:47 PM   Water Flush Volume (mL) 100 mL 7/30/2017  4:25 AM   Intake (ml) 240 ml 7/30/2017  4:25 AM   Medication Volume 120 ml 7/29/2017  7:30 PM              Urinary Catheter 07/27/17 2- way-Criteria for Appropriate Use: Obstruction/retention   Intake & Output   Date 07/30/17 0700 - 07/31/17 0659 07/31/17 0700 - 08/01/17 0659   Shift 6732-6953 8500-8334 24 Hour Total 9379-9391 3082-3965 24 Hour Total   I  N  T  A  K  E   P. O. 680  680         P. O. 680  680       Shift Total  (mL/kg) 680  (10)  680  (10)      O  U  T  P  U  T   Urine  (mL/kg/hr) 325  (0.4) 700 1025         Urine Output (mL) (Urinary Catheter 07/27/17 2- way)        Shift Total  (mL/kg) 325  (4.8) 700  (10.3) 1025  (15.1)       -982 -206      Weight (kg) 68 68 68 68 68 68      Last Bowel Movement Last Bowel Movement Date: 07/30/17   Glucose Checks [x] N/A  [] AC/HS  [] Q6  Concerns:   Nutrition Active Orders   Diet    DIET NPO       Consults [x]PT  [x]OT  []Speech  []Case Management   Cardiac Monitoring []N/A [x]Yes Expires: 48 hours

## 2017-07-31 NOTE — ROUTINE PROCESS
TRANSFER - OUT REPORT:    Verbal report given to Dean Bland RN on Xiao Luis  being transferred to [facility] for routine post - op       Report consisted of patients Situation, Background, Assessment and   Recommendations. Information from the following reports Procedure Summary, Intake/Output and MAR was reviewed with the receiving nurse. Lines:   Peripheral IV 07/27/17 Left External jugular (Active)   Site Assessment Clean, dry, & intact 7/31/2017 12:05 PM   Phlebitis Assessment 0 7/31/2017 12:05 PM   Infiltration Assessment 0 7/31/2017 12:05 PM   Dressing Status Clean, dry, & intact 7/31/2017 12:05 PM   Dressing Type Transparent 7/31/2017 12:05 PM   Hub Color/Line Status Capped 7/31/2017 12:05 PM   Alcohol Cap Used Yes 7/28/2017  7:37 PM        Opportunity for questions and clarification was provided.

## 2017-07-31 NOTE — PERIOP NOTES
Endoscope was pre-cleaned at bedside immediately following procedure by [facility], endo tech.

## 2017-08-01 VITALS
RESPIRATION RATE: 16 BRPM | TEMPERATURE: 98.5 F | BODY MASS INDEX: 22.73 KG/M2 | OXYGEN SATURATION: 98 % | HEART RATE: 61 BPM | WEIGHT: 150 LBS | DIASTOLIC BLOOD PRESSURE: 50 MMHG | HEIGHT: 68 IN | SYSTOLIC BLOOD PRESSURE: 115 MMHG

## 2017-08-01 LAB
ACHR AB SER-SCNC: 0.05 NMOL/L (ref 0–0.24)
ACHR BLOCK AB SER-ACNC: 17 % (ref 0–25)
ACHR MOD AB/ACHR TOTAL SFR SER: <12 % (ref 0–20)
HCT VFR BLD AUTO: 22.6 % (ref 36.6–50.3)
HGB BLD-MCNC: 7.3 G/DL (ref 12.1–17)
STRIA MUS AB TITR SER IF: NEGATIVE {TITER}

## 2017-08-01 PROCEDURE — 74011250637 HC RX REV CODE- 250/637: Performed by: HOSPITALIST

## 2017-08-01 PROCEDURE — 85018 HEMOGLOBIN: CPT | Performed by: INTERNAL MEDICINE

## 2017-08-01 PROCEDURE — 77030018798 HC PMP KT ENTRL FED COVD -A

## 2017-08-01 PROCEDURE — 36415 COLL VENOUS BLD VENIPUNCTURE: CPT | Performed by: INTERNAL MEDICINE

## 2017-08-01 PROCEDURE — 74011250637 HC RX REV CODE- 250/637: Performed by: INTERNAL MEDICINE

## 2017-08-01 RX ORDER — PREGABALIN 300 MG/1
300 CAPSULE ORAL 2 TIMES DAILY
Qty: 60 CAP | Refills: 1 | Status: ON HOLD | OUTPATIENT
Start: 2017-08-01 | End: 2017-09-15 | Stop reason: DRUGHIGH

## 2017-08-01 RX ORDER — ACETAMINOPHEN 325 MG/1
650 TABLET ORAL
Qty: 40 TAB | Refills: 0 | Status: ON HOLD | OUTPATIENT
Start: 2017-08-01 | End: 2017-09-15 | Stop reason: DRUGHIGH

## 2017-08-01 RX ORDER — LANOLIN ALCOHOL/MO/W.PET/CERES
100 CREAM (GRAM) TOPICAL DAILY
Qty: 30 TAB | Refills: 1 | Status: ON HOLD | OUTPATIENT
Start: 2017-08-01 | End: 2017-09-15 | Stop reason: DRUGHIGH

## 2017-08-01 RX ORDER — PANTOPRAZOLE SODIUM 40 MG/1
40 GRANULE, DELAYED RELEASE ORAL
Qty: 60 EACH | Refills: 1 | Status: ON HOLD | OUTPATIENT
Start: 2017-08-01 | End: 2017-09-15 | Stop reason: DRUGHIGH

## 2017-08-01 RX ORDER — QUETIAPINE FUMARATE 25 MG/1
25 TABLET, FILM COATED ORAL
Qty: 30 TAB | Refills: 1 | Status: ON HOLD | OUTPATIENT
Start: 2017-08-01 | End: 2017-09-15 | Stop reason: DRUGHIGH

## 2017-08-01 RX ORDER — NITROFURANTOIN 25; 75 MG/1; MG/1
100 CAPSULE ORAL
Qty: 30 CAP | Refills: 1 | Status: SHIPPED | OUTPATIENT
Start: 2017-08-01 | End: 2017-08-24

## 2017-08-01 RX ORDER — CYCLOBENZAPRINE HCL 5 MG
5 TABLET ORAL
Qty: 40 TAB | Refills: 0 | Status: ON HOLD | OUTPATIENT
Start: 2017-08-01 | End: 2017-09-15 | Stop reason: DRUGHIGH

## 2017-08-01 RX ORDER — METOPROLOL TARTRATE 25 MG/1
12.5 TABLET, FILM COATED ORAL 2 TIMES DAILY
Qty: 60 TAB | Refills: 1 | Status: ON HOLD | OUTPATIENT
Start: 2017-08-01 | End: 2017-09-15 | Stop reason: DRUGHIGH

## 2017-08-01 RX ORDER — VALPROIC ACID 250 MG/5ML
500 SOLUTION ORAL 3 TIMES DAILY
Qty: 1 BOTTLE | Refills: 1 | Status: ON HOLD | OUTPATIENT
Start: 2017-08-01 | End: 2017-09-15 | Stop reason: DRUGHIGH

## 2017-08-01 RX ORDER — OXYCODONE HYDROCHLORIDE 5 MG/1
5 CAPSULE ORAL
Qty: 30 CAP | Refills: 0 | Status: ON HOLD | OUTPATIENT
Start: 2017-08-01 | End: 2017-08-24

## 2017-08-01 RX ADMIN — LEVETIRACETAM 1500 MG: 100 SOLUTION ORAL at 08:07

## 2017-08-01 RX ADMIN — PREGABALIN 300 MG: 150 CAPSULE ORAL at 08:06

## 2017-08-01 RX ADMIN — MULTIVIT AND MINERALS-FERROUS GLUCONATE 9 MG IRON/15 ML ORAL LIQUID 15 ML: at 08:06

## 2017-08-01 RX ADMIN — DOCUSATE SODIUM 100 MG: 100 CAPSULE, LIQUID FILLED ORAL at 08:07

## 2017-08-01 RX ADMIN — METOPROLOL TARTRATE 12.5 MG: 25 TABLET ORAL at 08:06

## 2017-08-01 RX ADMIN — Medication 10 ML: at 05:38

## 2017-08-01 RX ADMIN — VALPROIC ACID 500 MG: 250 SOLUTION ORAL at 09:38

## 2017-08-01 RX ADMIN — PANTOPRAZOLE SODIUM 40 MG: 40 GRANULE, DELAYED RELEASE ORAL at 08:06

## 2017-08-01 RX ADMIN — Medication 100 MG: at 08:06

## 2017-08-01 NOTE — PROGRESS NOTES
All in agreement with d/c today back to [facility] at 2p via [facility] ambulance. Please call report to [phone], send emar, d/c instructions, Rx for narcotics if any, facesheet, and ambulance form. I spoke with wife who was in agreement.

## 2017-08-01 NOTE — PROGRESS NOTES
Bedside and Verbal shift change report given to Chuckie Copeland (oncoming nurse) by Elena Key (offgoing nurse). Report included the following information SBAR, Kardex, Intake/Output, MAR and Recent Results. Zone Phone for oncoming shift:   [phone]    Shift Summary: Patient rested quietly throughout the night. No  C/O pain voiced.      LDAs               Peripheral IV 07/27/17 Left External jugular (Active)   Site Assessment Clean, dry, & intact 7/31/2017  8:10 PM   Phlebitis Assessment 0 7/31/2017  8:10 PM   Infiltration Assessment 0 7/31/2017  8:10 PM   Dressing Status Clean, dry, & intact 7/31/2017  8:10 PM   Dressing Type Tape;Transparent 7/31/2017  8:10 PM   Hub Color/Line Status Pink;Flushed 7/31/2017  8:10 PM   Alcohol Cap Used Yes 7/28/2017  7:37 PM          PEG/Gastrostomy Tube 07/18/17 (Active)   Site Assessment Clean, dry, & intact 7/31/2017  8:10 PM   Dressing Status Clean, dry, & intact 7/31/2017  8:10 PM   G Port Status Infusing 7/31/2017  8:10 PM   Gastric Residual (mL) 0 ml 7/31/2017  2:36 PM   Tube Feeding/Formula Options Jevity 1.5 7/31/2017  8:10 PM   Tube Feeding/Verify Rate (mL/hr) 50 7/31/2017  8:10 PM   Water Flush Volume (mL) 150 mL 7/31/2017  8:10 PM   Intake (ml) 240 ml 7/30/2017  4:25 AM   Medication Volume 120 ml 7/29/2017  7:30 PM              [REMOVED] Urinary Catheter 07/27/17 2- way-Criteria for Appropriate Use: Obstruction/retention  Urinary Catheter 07/31/17 2- way-Criteria for Appropriate Use: Obstruction/retention   Intake & Output   Date 07/31/17 0700 - 08/01/17 0659 08/01/17 0700 - 08/02/17 0659   Shift [id number] 1900-0659 24 Hour Total 0700-1859 1900-0659 24 Hour Total   I  N  T  A  K  E   I.V.  (mL/kg/hr) 250  (0.3)  250         Volume (lactated Ringers infusion) 250  250       NG/GT  150 150         Water Flush Volume (mL) (PEG/Gastrostomy Tube 07/18/17)  150 150       Shift Total  (mL/kg) 250  (3.7) 150  (2.2) 400  (5.9)      O  U  T  P  U  T   Urine  (mL/kg/hr) 650  (0.8) 450 1100 Urine Output (mL) ([REMOVED] Urinary Catheter 07/27/17 2- way) 650  650         Urine Output (mL) (Urinary Catheter 07/31/17 2- way)  450 450       Shift Total  (mL/kg) 650  (9.6) 450  (6.6) 1100  (16.2)      NET -400 -300 -700      Weight (kg) 68 68 68 68 68 68      Last Bowel Movement Last Bowel Movement Date: 07/31/17   Glucose Checks [x] N/A  [] AC/HS  [] Q6  Concerns:   Nutrition Active Orders   There are no active orders of the following type(s): Diet.        Consults [x]PT  [x]OT  []Speech  []Case Management   Cardiac Monitoring []N/A [x]Yes Expires: 48 hours

## 2017-08-01 NOTE — PROGRESS NOTES
Nutrition Assessment:    INTERVENTIONS/RECOMMENDATIONS:   Enteral/Parenteral Nutrition:  (Continue current TF regimen)    ASSESSMENT:   Chart reviewed; patient is s/p EGD and colonoscopy. He had been receiving clear liquids via ensure clear and his PEG tube for prep prior to procedures. Now resumed recommended TF at goal; meeting 102% of estimated kcal needs and 100% of estimated protein needs. Low K+ in need of repletion. Will monitor progress and TF tolerance; provide further recommendations as needed. Diet Order: NPO (Jevity 1.5 @ 50 mL/hr + 150 mL water flushes q 4 hours; provides 1800 kcal, 77g protein, 1812 mL water)  % Eaten:  No data found. Pertinent Medications: [x] Reviewed []Other: Atorvastatin, Colace, Keppra, Lopressor, Cerovite, Protonix, Lyrica, Seroquel, Thiamine   Pertinent Labs: [x]Reviewed  []Other: K+ 3.2, H/H 7.3/22.6  Food Allergies: [x]None []Other:     Last BM: 7/31  [x]Active     []Hyperactive  []Hypoactive       [] Absent  BS  Skin:    [x] Intact   [] Incision  [] Breakdown   []Edema   []Other:    Anthropometrics: Height: 5' 8\" (172.7 cm) Weight: 68 kg (150 lb)    IBW (%IBW):   ( ) UBW (%UBW):   (  %)    BMI: Body mass index is 22.81 kg/(m^2). This BMI is indicative of:  []Underweight   [x]Normal   []Overweight   [] Obesity   [] Extreme Obesity (BMI>40)  Last Weight Metrics:  Weight Loss Metrics 7/27/2017 7/22/2017 6/21/2017 3/21/2017 3/18/2017 3/7/2017 2/25/2017   Today's Wt 150 lb 152 lb 3.2 oz 155 lb 166 lb 150 lb 166 lb 168 lb   BMI 22.81 kg/m2 23.14 kg/m2 23.57 kg/m2 25.24 kg/m2 22.81 kg/m2 24.51 kg/m2 24.81 kg/m2       Estimated Nutrition Needs (Based on): 1761 Kcals/day (BMR (1355) x 1. 3AF) , 68 g (-82g (1.0-1.2 g/kg bw)) Protein  Carbohydrate:  At Least 130 g/day  Fluids: 1750 mL/day     Pt expected to meet estimated nutrient needs: [x]Yes []No    NUTRITION DIAGNOSES:   Problem:  Altered nutrition-related lab values      Etiology: related to current medical condition Signs/Symptoms: as evidenced by low K+, H/H    Previous diagnosis regarding excessive energy intake is resolved; TF @ recommended goal and meeting 102% of estimated kcal needs     NUTRITION INTERVENTIONS:    Enteral/Parenteral Nutrition:  (Continue current TF regimen)                GOAL:   Patient will tolerate TF @ goal wtih residual <250 mL and electrolytes WNL next 2-4 days    NUTRITION MONITORING AND EVALUATION   Food/Nutrient Intake Outcomes: Enteral/parenteral nutrition intake  Physical Signs/Symptoms Outcomes: Weight/weight change, GI profile, Electrolyte and renal profile    Previous Goal Met:   [x] Met              [] Progressing Towards Goal              [] Not Progressing Towards Goal   Previous Recommendations:   [x] Implemented          [] Not Implemented          [] Not Applicable    LEARNING NEEDS (Diet, Food/Nutrient-Drug Interaction):    [x] None Identified   [] Identified and Education Provided/Documented   [] Identified and Pt declined/was not appropriate     Cultural, Moravian, OR Ethnic Dietary Needs:    [x] None Identified   [] Identified and Addressed     [x] Interdisciplinary Care Plan Reviewed/Documented    [x] Discharge Planning: Resume home TF Regimen   [] Participated in Interdisciplinary Rounds    NUTRITION RISK:    [x] High     to         [x] Moderate           []  Low  []  Minimal/Uncompromised      [facility]  Pager [phone]              Weekend Pager [phone]

## 2017-08-01 NOTE — DISCHARGE SUMMARY
Hospitalist Discharge Summary     Patient ID:  Alexandra Chan  [id number]  83 y.o.  1934    PCP on record: Chandan Joe MD    Admit date: 7/27/2017  Discharge date and time: 8/1/2017      DISCHARGE DIAGNOSIS:    Anemia, UTI, GI Bleed, PEG placement, ANNABEL, Urinary retention, B/L Ankle pain, Seizure Disorder      CONSULTATIONS:  IP CONSULT TO GASTROENTEROLOGY    Excerpted HPI from H&P of Diamond Blackman MD:  Alexandra Chan is a 80 y.o.  male sent from nursing home for irregular heart beat ranging from 38 to 122. Patient recently hospitalized for nearly a month with status epilepticus, encephalopathy, and uti. He is poor historian, oriented to self only. Does not know why he is here. Report some epigastric pain, no nausea or vomiting. Referred for admission for possible GI bleed. Had feeding tube place 7/18 for dysphagia. In ER, main complaint is urinary urgency but has difficulty voiding. We were asked to admit for work up and evaluation of the above problems. ______________________________________________________________________  DISCHARGE SUMMARY/HOSPITAL COURSE:  for full details see H&P, daily progress notes, labs, consult notes. Macrocytic anemia, acute on chronic  Heme positive brown stool, possible GI bleed  Recent PEG placement 7/18/17 for dysphagia  -had drop of hgb 9-->7 within one week after discharge, no obvious bleeding  -B12, folate normal, reticulocyte count high, iron studies suggesting chronic disease  -hgb stable again at 7.5, has been stable since admission, not given transfusion  -EGD negative, colonoscopy with polyp and diverticulosis, no clear source of bleeding  -holding aspirin  -PPI BID   ANNABEL resolved  Urinary retention  -baseline 0.5, bladder significantly distended on US, no evidence of BPH  -discussed voiding trial with RN  Diamante/Klebsiella UTI  -completed ceftriaxone, D/c on prophylactic Macrobid QHS.   Bilateral ankle pain  Right ankle and lower leg edema  -no DVT  Seizure disorder  -continue keppra, valproic acid  Hx SVT  -current in sinus to sinus tach.  Continue metoprolol.  Hold aspirin due to concern for GI bleed  Code status: Full  Prophylaxis: SCD's  Recommended Disposition: SNF/LTC    D/c To SNF and F/U with PCP and GI as outpatient  _______________________________________________________________________  Patient seen and examined by me on discharge day. Pertinent Findings:  Gen:    Not in distress  Chest: Coarse BS  CVS:   Regular rhythm. No edema  Abd:  Soft, not distended, not tender  Neuro:  Alert, GCS [id number]  _______________________________________________________________________  DISCHARGE MEDICATIONS:   Current Discharge Medication List      START taking these medications    Details   acetaminophen (TYLENOL) 325 mg tablet 2 Tabs by Per G Tube route every six (6) hours as needed. Qty: 40 Tab, Refills: 0      pantoprazole (PROTONIX) 40 mg granules for oral suspension 40 mg by Per G Tube route Before breakfast and dinner. Qty: 60 Each, Refills: 1      oxyCODONE (OXYIR) 5 mg capsule 1 Cap by Per G Tube route every four (4) hours as needed. Max Daily Amount: 30 mg.  Qty: 30 Cap, Refills: 0         CONTINUE these medications which have CHANGED    Details   valproic acid, as sodium salt, (DEPAKENE) 250 mg/5 mL (5 mL) soln oral solution 10 mL by Per G Tube route three (3) times daily. Qty: 1 Bottle, Refills: 1      thiamine (B-1) 100 mg tablet 1 Tab by Per G Tube route daily. Qty: 30 Tab, Refills: 1      QUEtiapine (SEROQUEL) 25 mg tablet 1 Tab by Per G Tube route nightly. Qty: 30 Tab, Refills: 1      pregabalin (LYRICA) 300 mg capsule Take 1 Cap by mouth two (2) times a day. Max Daily Amount: 600 mg. Qty: 60 Cap, Refills: 1      nitrofurantoin, macrocrystal-monohydrate, (MACROBID) 100 mg capsule Take 1 Cap by mouth nightly.   Qty: 30 Cap, Refills: 1      multivit w-min-ferrous gluconate (CEROVITE) 9 mg iron/15 mL oral liquid 15 mL by Per G Tube route daily. Qty: 1 Bottle, Refills: 01      metoprolol tartrate (LOPRESSOR) 25 mg tablet 0.5 Tabs by Per G Tube route two (2) times a day. Qty: 60 Tab, Refills: 1      levETIRAcetam (KEPPRA) 100 mg/ml soln oral solution 15 mL by Per G Tube route two (2) times a day. Qty: 1 Bottle, Refills: 1      cyclobenzaprine (FLEXERIL) 5 mg tablet 1 Tab by Per G Tube route three (3) times daily as needed for Muscle Spasm(s). Qty: 40 Tab, Refills: 0         CONTINUE these medications which have NOT CHANGED    Details   atorvastatin (LIPITOR) 40 mg tablet 40 mg by Per G Tube route nightly. latanoprost (XALATAN) 0.005 % ophthalmic solution Administer 1 Drop to both eyes every evening. Qty: 1 mL, Refills: 1      albuterol-ipratropium (DUO-NEB) 2.5 mg-0.5 mg/3 ml nebu 3 mL by Nebulization route every six (6) hours as needed. Qty: 30 Nebule, Refills: 1         STOP taking these medications       omeprazole (PRILOSEC) 40 mg capsule Comments:   Reason for Stopping:         aspirin (ASPIRIN) 325 mg tablet Comments:   Reason for Stopping:         acetaminophen (TYLENOL) 650 mg suppository Comments:   Reason for Stopping:               My Recommended Diet, Activity, Wound Care, and follow-up labs are listed in the patient's Discharge Insturctions which I have personally completed and reviewed.     _______________________________________________________________________  DISPOSITION:    Home with Family:    Home with HH/PT/OT/RN:    SNF/LTC: y   CHRISTINA:    OTHER:        Condition at Discharge:  Stable  _______________________________________________________________________  Follow up with:   PCP : Steffi Pedro MD  Follow-up Information     [facility]   [address] [place], [place] [id number] [phone]        F/U PCP  F/U GI        Total time in minutes spent coordinating this discharge (includes going over instructions, follow-up, prescriptions, and preparing report for sign off to her PCP) :  35 minutes    Signed:  Vann Schilder, MD

## 2017-08-01 NOTE — PROGRESS NOTES
TRANSFER - OUT REPORT:    Verbal report given to Stephanie (name) on Karen Close  being discharged to [facility]unit) for routine progression of care       Report consisted of patients Situation, Background, Assessment and   Recommendations(SBAR). Information from the following report(s) SBAR, Kardex, Intake/Output, MAR, Recent Results and Cardiac Rhythm . was reviewed with the receiving nurse. Lines:       Opportunity for questions and clarification was provided.       Patient transported with:  -robin  -pt belongings  -discharge instructions  -hard scripts for medications  -ambulance form  -facesheet

## 2017-08-01 NOTE — DISCHARGE INSTRUCTIONS
HOSPITALIST DISCHARGE INSTRUCTIONS    NAME: Ashley Mazariegos   :  1934   MRN:  [id number]     Date/Time:  2017 12:52 PM    ADMIT DATE: 2017   DISCHARGE DATE: 2017     Attending Physician: Mary Anne Bruce MD    DISCHARGE DIAGNOSIS:  Anemia, UTI, GI Bleed, PEG placement, ANNABEL, Urinary retention, B/L Ankle pain, Seizure Disorder      Medications: Per above medication reconciliation. Pain Management: per above medications    Recommended diet: PEG feeding Jevity 1.5 at 50ml/hr   With 150ml free water flushes q4H. Recommended activity: Activity as tolerated    Wound care: None    Indwelling devices: Cummins catheter, voiding trial per SNF with care per routine in 1 week    Supplemental Oxygen: None    Required Lab work: Per SNF routine    Glucose management:  None    Code status: Full        Outside physician follow up: Follow-up Information     Follow up With Details Comments 1209 Josephine Trevino MD   [facility]  [phone]          F/U PCP  F/U GI       Skilled nursing facility/ SNF MD responsible for above on discharge. Information obtained by :  I understand that if any problems occur once I am at home I am to contact my physician. I understand and acknowledge receipt of the instructions indicated above.                                                                                                                                            Physician's or R.N.'s Signature                                                                  Date/Time                                                                                                                                              Patient or Repres

## 2017-08-02 NOTE — ADT AUTH CERT NOTES
Anemia, Iron Deficiency or Unspecified - Care Day 3 (7/31/2017) by Gopal Eisenberg RN        Review Status Review Entered       Completed 8/2/2017       Details              Care Day: 3 Care Date: 7/31/2017 Level of Care: Telemetry       Guideline Day 2        Clinical Status       (X) * Hemodynamic stability       8/2/2017 12:37 PM EDT by Rosy Mancini         98.9, 70, 22, 94%, 110/45              ( ) * Active blood loss absent       (X) * Signs and symptoms of anemia improved or absent       ( ) * Hgb level acceptable and stable       ( ) * Underlying disorder absent or controlled       ( ) * Discharge plans and education understood              Activity       ( ) * Ambulatory [G]              Routes       ( ) * Oral hydration, medications, and diet              Interventions       (X) Hgb/Hct       8/2/2017 12:37 PM EDT by Rosy Mancini         H&H 7.5, 22.8                     8/2/2017 12:37 PM EDT by Rosy Mancini       Subject: Additional Clinical Information       Add'l Labs: k 3.2, creat 0.48, ca 8.0Meds: lipitor, rocephin ivpb q d, keppra 1500 mg 2 x d, lopressor, protonix, B1, valproic acid 500 mg 3 x d (all per g tube), ns @ 50 ml/h                                   * Milestone              Additional Notes       COLONOSCOPY       Pre-procedure Diagnoses:          Iron deficiency anemia secondary to blood loss (chronic) [D50.0]                 Post-procedure Diagnoses:         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 , -no definite source of bleeding found .  Resume tube feeding, follow H&H,  Resume normal medication(s).                          EGD       Pre-procedure Diagnoses:         Iron deficiency anemia secondary to blood loss (chronic) [D50.0]                 Post-procedure Diagnoses:         Iron deficiency anemia secondary to blood loss (chronic) [D50.0]                 Procedures:         UPPER GI ENDOSCOPY,DIAGNOSIS [[id number]]              Findings/Impression:       ESOPHAGUS: The esophagus is normal. The proximal, mid, and distal portions are normal. The Z-Line is intact.  Small hiatus hernia.       STOMACH: The fundus on antegrade and retroflex views is normal. The body, antrum, and pylorus are normal. Normal PEG bolster.         DUODENUM: The bulb and second portions are normal              INTERNAL MED       Assessment / Plan:       Macrocytic anemia, acute on chronic       Heme positive brown stool, possible GI bleed       Recent PEG placement 7/18/17 for dysphagia       -had drop of hgb 9-->7 within one week after discharge, no obvious bleeding       -B12, folate normal, reticulocyte count high, iron studies suggesting chronic disease       -hgb stable again at 7.5, has been stable since admission, not given transfusion       -EGD negative, colonoscopy with polyp and diverticulosis, no clear source of bleeding       -holding aspirin       -PPI BID                ANNABEL resolved       Urinary retention       -baseline 0.5, bladder significantly distended on US, no evidence of BPH       -discussed voiding trial with RN               Ecoli UTI       -continue ceftriaxone               Bilateral ankle pain       Right ankle and lower leg edema       -no DVT                Seizure disorder       -continue keppra, valproic acid                Hx SVT       -current in sinus to sinus tach.  Continue metoprolol.  Hold aspirin due to concern for GI bleed                                Code status: Full       Prophylaxis: SCD's       Recommended Disposition: SNF/LTC           Anemia, Iron Deficiency or Unspecified - Care Day 2 (7/30/2017) by Jacqueline Cook RN        Review Status Review Entered       Completed 8/2/2017       Details              Care Day: 2 Care Date: 7/30/2017 Level of Care: Telemetry       Guideline Day 2        Clinical Status       (X) * Hemodynamic stability       8/2/2017 12:29 PM EDT by Jose F Yeung         98.9, 110/45, 22, 70, 94%              ( ) * Active blood loss absent       (X) * Signs and symptoms of anemia improved or absent       ( ) * Hgb level acceptable and stable       ( ) * Underlying disorder absent or controlled       ( ) * Discharge plans and education understood              Activity       ( ) * Ambulatory [G]              Routes       ( ) * Oral hydration, medications, and diet              Interventions       (X) Hgb/Hct       8/2/2017 12:29 PM EDT by Jose F Yeung         H&H 7.3, 21.8                     8/2/2017 12:29 PM EDT by Jose F Yeung       Subject: Additional Clinical Information       Meds: colyte 4000 ml once via peg tube, lipitor, rocephin ivpb q d, colace, keppra 1500 mg 2xd per peg tube, protonix, lyrica, B1, valproic acid 3 x d per peg tube                                   * Milestone              Additional Notes       Upgraded to inpatient per R1 on 7/29 7/30       Assessment / Plan:       Macrocytic anemia, acute on chronic       Heme positive brown stool, possible GI bleed       Recent PEG placement 7/18/17 for dysphagia       -hgb stable again at 7.3       -plan for EGD/colo per Dr. CHÁVEZ - will likely be tomorrow       -holding aspirin       -PPI BID       -low iron on studies                ANNABEL resolved       Urinary retention       -baseline 0.5, bladder significantly distended on US, no evidence of BPH       -discussed voiding trial with RN               Ecoli UTI       -continue ceftriaxone               Bilateral ankle pain       Right ankle and lower leg edema       -no DVT                Seizure disorder       -continue keppra, valproic acid                Hx SVT       -current in sinus to sinus tach.  Continue metoprolol.  Hold aspirin due to concern for GI bleed                                Code status: Full       Prophylaxis: SCD's       Recommended Disposition: SNF/LTC              GI  Assessment:       Patient with  hgb 7. 4 today.                 We are prepping today through PEG and doing a colonoscopy and EGD on tomorrow. .                            Past Medical History:       Diagnosis Date       Anemia       Heme positive stools       ·                 Plan:       Prep today       EGD and colonoscopy tomorrow

## 2017-08-06 NOTE — DISCHARGE SUMMARY
Hospitalist Discharge Summary     Patient ID:  Sneha Cedillo  [id number]  4 y.o.  1934    PCP on record: Arleth Linda MD    Admit date: 6/24/2017  Discharge date and time: 7/22/2017      DISCHARGE DIAGNOSIS:    Acute encephalopathy , POA, much improved on Discharge  Status epilepticus in setting of known  Seizure Disorder   Seizure Disorder   E. Coli and enterococcus UTI   Dysphagia/dyarthria, felt likely due to prolonged illness and generalized weakness and   Resultant moderate protein calorie malnutrition:    S/P Peg tube placement   Hypertension, benign/essential:   Hyperlipidemia:   History of SVT:    Peripheral neuropathy:  Remote EtOH abuse:  Family confirms no EtOH in 5 years.    GERD:   Acute respiratory failure (resolved)      CONSULTATIONS:  IP CONSULT TO NEUROLOGY  IP CONSULT TO GASTROENTEROLOGY  IP CONSULT TO OTOLARYNGOLOGY    Excerpted HPI from H&P of Jovana Bhakta MD:   80 y.o.    male:  Known seizure diosorder first in 2012, maintained on keppra, no seizures until Feb 2012  Admitted 2/18 to 2/22/2017 with recurrent seizure                        Had run out of lyrica 3 days prior(mail order package was late)                        keppra increased to 1000 mg BID                        No further seizures                        MRI brain with no acute findings, atrophy and small vessel ichemic changes                        Bumped troponin to 0.88, seen by Dr Jered James, quickly came back to normal                                              Echo LVEF 55 to 60% with no wall motion changes, normal RV size and function  Other wise doing well with no recurrent seizures until today                        Wife denies seizures at home since discharge, but keppa was increased to 1250 mg PID for unclear reasons  Ran out of lyrica Monday(no refills)  Not feeling sick the past few days, no fevers, chills, headaches, abnormal behavior or MS changes  Well yesterday at bedtime after eating a normal dinner  awake today and was confused \"not himself\", wife unaware of any seizures overnight                        Pt was talking and not having any clear seizure activity                        Kept saying he could not move his bowels and that he needed to go the ED  EMS was called     ED confused, then started having recurrent seizure activity                        Not like his typical tonic clonic seizures                        Looking off to the right side with nystagmus to the right and downward                        Right arm was repeatedly flexing  This happened repeatedly and pt never woke up  Intubated and started on propafol  Loaded with phenytoin and keppra  Tele neuro saw  Head CT and CTA head and neck negative  We were called to admit the patient    ______________________________________________________________________  DISCHARGE SUMMARY/HOSPITAL COURSE:  for full details see H&P, daily progress notes, labs, consult notes. Acute encephalopathy due to status epilepticus in setting of seizure d/o:  Out of lyrica for a few days prior to admission - has also preceeded other admissions for sz. Pt continues to be below most recent baseline with worsened dysarthria bulbar weakness the last few days. [facility] alert today with ongoing oropharyngeal sx.but cough seems to be stronger.  - CT head 6/24 no acute process  - MRI brain 7/13 with no evidence of acute infarction. No acute intracranial process. Moderate cerebral atrophy and mild chronic microvascular ischemic change. - CXR 7/14 with improved right upper lobe airspace disease and left basilar atelectasis.   - appreciate neuro consult, adjusted keppra and added valproate 7/14   STOP ALL BENZO's  this was contributing to his Sluggishness and encephalopathy  Discontinuation of Benzo cleared patient's mental status, start Seroquel hs instead  With Excellent results  On Discharge is conversant and following commands , talking is Clear   CM to start looking for bed to SNF for discharge an Am after 24 hours off sitters     Fever (resolved) presumed due to aspiration pneumonitis 7/14:  Temp 99.5 7/15  - CXR nonacute as above  E. Coli and enterococcus UTI by C/S,  [facility] for antibiotic recommendation  IV Ceftriaxone and Nitrofurantoin  - con't unasyn for possible aspiration, improving today      Dysphagia/dyarthria with resultant moderate protein calorie malnutrition:    S/P Peg tube placement had BM after placement and has good Bowel sounds  Will star Osmolite VERY slowly, watch for aspiration  - exam by ENT today with no structural reason for dysphagia. Ken recommended. OFF TPN and Started TF Osmolynte on 7/18 and tolerating well  Now on 50cc/hour     Hypertension, benign/essential: more stable  - con't IV scheduled metoprolol (on po metoprolol at home)  - prn IV hydralazine  - PT/OT and speech appreciated.  Plan for [facility] SNF on discharge. Hyperlipidemia: holding lipitor while NPO  History of SVT:  Briefly on cardizem gtt this admission  - serial troponin and EKG ordered with lower chest/abd pain complaints today  Peripheral neuropathy: ran out of lyrica several days PTA.  Needs to be restarted as soon as enteral access established due to ongoing leg sx. 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  Con't thiamine supplementation. GERD: con't protonix  Acute respiratory failure (resolved) due to acute encephalopathy with chronic underlying hypercapnea: intubated initially during admission.  Duonebs changed to prn only. Pt self-extubated 6/24; finished zosyn 7/09.      DVT prophylaxis: lovenox    Code status: Full (wife is decision maker)        _______________________________________________________________________  Patient seen and examined by me on discharge day. Pertinent Findings:  Gen:    Not in distress  Chest: Clear lungs  CVS:   Regular rhythm.   No edema  Abd:  Soft, not distended, not tender  Neuro:  Alert, Ox3  _______________________________________________________________________  DISCHARGE MEDICATIONS:   Discharge Medication List as of 7/22/2017  1:53 PM      START taking these medications    Details   latanoprost (XALATAN) 0.005 % ophthalmic solution Administer 1 Drop to both eyes every evening., Normal, Disp-1 mL, R-1      albuterol-ipratropium (DUO-NEB) 2.5 mg-0.5 mg/3 ml nebu 3 mL by Nebulization route every six (6) hours as needed., Normal, Disp-30 Nebule, R-1      acetaminophen (TYLENOL) 650 mg suppository Insert 1 Suppository into rectum every four (4) hours as needed. Indications: Fever, HEADACHE DISORDER, Pain, Normal, Disp-30 Suppository, R-1      QUEtiapine (SEROQUEL) 25 mg tablet Take 1 Tab by mouth nightly., Normal, Disp-30 Tab, R-1      nitrofurantoin, macrocrystal-monohydrate, (MACROBID) 100 mg capsule Take 1 Cap by mouth two (2) times daily (with meals). , Normal, Disp-10 Cap, R-0      multivit w-min-ferrous gluconate (CEROVITE) 9 mg iron/15 mL oral liquid 15 mL by Per G Tube route daily. , Normal, Disp-1 Bottle, R-01      !! thiamine (B-1) 100 mg tablet 1 Tab by Per G Tube route daily. , Normal, Disp-30 Tab, R-1      valproic acid, as sodium salt, (DEPAKENE) 250 mg/5 mL (5 mL) soln oral solution 10 mL by Per G Tube route three (3) times daily. , Normal, Disp-1 Bottle, R-1      metoprolol tartrate (LOPRESSOR) 25 mg tablet 0.5 Tabs by Per G Tube route two (2) times a day., Normal, Disp-60 Tab, R-1      levETIRAcetam (KEPPRA) 100 mg/ml soln oral solution 15 mL by Per G Tube route two (2) times a day., Print, Disp-1 Bottle, R-1       !! - Potential duplicate medications found. Please discuss with provider. CONTINUE these medications which have NOT CHANGED    Details   cyclobenzaprine (FLEXERIL) 5 mg tablet Take 1 Tab by mouth three (3) times daily as needed for Muscle Spasm(s). , Normal, Disp-20 Tab, R-0      pregabalin (LYRICA) 300 mg capsule Take 1 Cap by mouth two (2) times a day. Max Daily Amount: 600 mg., Print, Disp-60 Cap, R-0      !! thiamine (B-1) 100 mg tablet Take 1 Tab by mouth daily. , Print, Disp-30 Tab, R-0      atorvastatin (LIPITOR) 40 mg tablet Take 1 Tab by mouth daily. , Print, Disp-30 Tab, R-0      omeprazole (PRILOSEC) 40 mg capsule Take 40 mg by mouth daily. , Historical Med      aspirin (ASPIRIN) 325 mg tablet Take 1 Tab by mouth daily. OTC, 325 mg       !! - Potential duplicate medications found. Please discuss with provider. STOP taking these medications       levETIRAcetam (KEPPRA) 250 mg tablet Comments:   Reason for Stopping:   DOSE hAs changed        levETIRAcetam 1,000 mg tablet Comments:   Reason for Stopping:         metoprolol succinate (TOPROL-XL) 25 mg XL tablet Comments:   Reason for Stopping: Being Given BID via NGTUBE        magnesium oxide (MAG-OX) 400 mg tablet Comments:   Reason for Stopping:               My Recommended Diet, Activity, Wound Care, and follow-up labs are listed in the patient's Discharge Insturctions which I have personally completed and reviewed.     _______________________________________________________________________  DISPOSITION:    Home with Family:    Home with HH/PT/OT/RN:    SNF/LTC: y   CHRISTINA:    OTHER:        Condition at Discharge:  Stable  _______________________________________________________________________  Follow up with:   PCP : Maria C Vásquez MD  Follow-up Information     Follow up With Details Comments [address] [facility])  after Discharge from [facility]      Maria C Vásquez MD Call  [facility] [address]  [phone]                Total time in minutes spent coordinating this discharge (includes going over instructions, follow-up, prescriptions, and preparing report for sign off to her PCP) :  30 minutes    Signed:  Sandie Mason MD

## 2017-08-19 ENCOUNTER — HOSPITAL ENCOUNTER (INPATIENT)
Age: 82
LOS: 5 days | Discharge: NURSING, REHAB OR LONG-TERM CARE | DRG: 698 | End: 2017-08-24
Attending: EMERGENCY MEDICINE | Admitting: INTERNAL MEDICINE
Payer: MEDICARE

## 2017-08-19 ENCOUNTER — APPOINTMENT (OUTPATIENT)
Dept: CT IMAGING | Age: 82
DRG: 698 | End: 2017-08-19
Attending: EMERGENCY MEDICINE
Payer: MEDICARE

## 2017-08-19 ENCOUNTER — APPOINTMENT (OUTPATIENT)
Dept: GENERAL RADIOLOGY | Age: 82
DRG: 698 | End: 2017-08-19
Attending: EMERGENCY MEDICINE
Payer: MEDICARE

## 2017-08-19 DIAGNOSIS — R47.1 DYSARTHRIA: Primary | ICD-10-CM

## 2017-08-19 DIAGNOSIS — G93.40 ENCEPHALOPATHY, UNSPECIFIED: ICD-10-CM

## 2017-08-19 DIAGNOSIS — G40.909 SEIZURE DISORDER (HCC): ICD-10-CM

## 2017-08-19 DIAGNOSIS — N39.0 URINARY TRACT INFECTION WITHOUT HEMATURIA, SITE UNSPECIFIED: ICD-10-CM

## 2017-08-19 PROBLEM — A41.9 SEPSIS (HCC): Status: ACTIVE | Noted: 2017-08-19

## 2017-08-19 LAB
ALBUMIN SERPL-MCNC: 3.3 G/DL (ref 3.5–5)
ALBUMIN/GLOB SERPL: 0.7 {RATIO} (ref 1.1–2.2)
ALP SERPL-CCNC: 75 U/L (ref 45–117)
ALT SERPL-CCNC: 14 U/L (ref 12–78)
AMMONIA PLAS-SCNC: 14 UMOL/L
ANION GAP SERPL CALC-SCNC: 7 MMOL/L (ref 5–15)
APPEARANCE UR: ABNORMAL
APTT PPP: 31 SEC (ref 22.1–32.5)
AST SERPL-CCNC: 12 U/L (ref 15–37)
BACTERIA URNS QL MICRO: NEGATIVE /HPF
BASOPHILS # BLD: 0 K/UL (ref 0–0.1)
BASOPHILS NFR BLD: 0 % (ref 0–1)
BILIRUB SERPL-MCNC: 0.4 MG/DL (ref 0.2–1)
BILIRUB UR QL: NEGATIVE
BUN SERPL-MCNC: 13 MG/DL (ref 6–20)
BUN/CREAT SERPL: 19 (ref 12–20)
CALCIUM SERPL-MCNC: 8.9 MG/DL (ref 8.5–10.1)
CHLORIDE SERPL-SCNC: 95 MMOL/L (ref 97–108)
CO2 SERPL-SCNC: 34 MMOL/L (ref 21–32)
COLOR UR: ABNORMAL
CREAT SERPL-MCNC: 0.67 MG/DL (ref 0.7–1.3)
EOSINOPHIL # BLD: 0 K/UL (ref 0–0.4)
EOSINOPHIL NFR BLD: 0 % (ref 0–7)
EPITH CASTS URNS QL MICRO: ABNORMAL /LPF
ERYTHROCYTE [DISTWIDTH] IN BLOOD BY AUTOMATED COUNT: 13.4 % (ref 11.5–14.5)
GLOBULIN SER CALC-MCNC: 5 G/DL (ref 2–4)
GLUCOSE SERPL-MCNC: 139 MG/DL (ref 65–100)
GLUCOSE UR STRIP.AUTO-MCNC: NEGATIVE MG/DL
HCT VFR BLD AUTO: 34.1 % (ref 36.6–50.3)
HGB BLD-MCNC: 11.1 G/DL (ref 12.1–17)
HGB UR QL STRIP: ABNORMAL
HYALINE CASTS URNS QL MICRO: ABNORMAL /LPF (ref 0–5)
INR PPP: 1.1 (ref 0.9–1.1)
KETONES UR QL STRIP.AUTO: ABNORMAL MG/DL
LACTATE SERPL-SCNC: 1.8 MMOL/L (ref 0.4–2)
LACTATE SERPL-SCNC: 4.1 MMOL/L (ref 0.4–2)
LEUKOCYTE ESTERASE UR QL STRIP.AUTO: ABNORMAL
LYMPHOCYTES # BLD: 0.9 K/UL (ref 0.8–3.5)
LYMPHOCYTES NFR BLD: 13 % (ref 12–49)
MCH RBC QN AUTO: 33.7 PG (ref 26–34)
MCHC RBC AUTO-ENTMCNC: 32.6 G/DL (ref 30–36.5)
MCV RBC AUTO: 103.6 FL (ref 80–99)
MONOCYTES # BLD: 0.4 K/UL (ref 0–1)
MONOCYTES NFR BLD: 6 % (ref 5–13)
NEUTS SEG # BLD: 5.5 K/UL (ref 1.8–8)
NEUTS SEG NFR BLD: 81 % (ref 32–75)
NITRITE UR QL STRIP.AUTO: NEGATIVE
PH UR STRIP: 8 [PH] (ref 5–8)
PLATELET # BLD AUTO: 114 K/UL (ref 150–400)
POTASSIUM SERPL-SCNC: 3.7 MMOL/L (ref 3.5–5.1)
PROT SERPL-MCNC: 8.3 G/DL (ref 6.4–8.2)
PROT UR STRIP-MCNC: 100 MG/DL
PROTHROMBIN TIME: 11.6 SEC (ref 9–11.1)
RBC # BLD AUTO: 3.29 M/UL (ref 4.1–5.7)
RBC #/AREA URNS HPF: >100 /HPF (ref 0–5)
SODIUM SERPL-SCNC: 136 MMOL/L (ref 136–145)
SP GR UR REFRACTOMETRY: 1.02 (ref 1–1.03)
THERAPEUTIC RANGE,PTTT: NORMAL SECS (ref 58–77)
UROBILINOGEN UR QL STRIP.AUTO: 1 EU/DL (ref 0.2–1)
WBC # BLD AUTO: 6.8 K/UL (ref 4.1–11.1)
WBC URNS QL MICRO: >100 /HPF (ref 0–4)

## 2017-08-19 PROCEDURE — 36415 COLL VENOUS BLD VENIPUNCTURE: CPT | Performed by: EMERGENCY MEDICINE

## 2017-08-19 PROCEDURE — 80177 DRUG SCRN QUAN LEVETIRACETAM: CPT | Performed by: EMERGENCY MEDICINE

## 2017-08-19 PROCEDURE — 83605 ASSAY OF LACTIC ACID: CPT | Performed by: INTERNAL MEDICINE

## 2017-08-19 PROCEDURE — 83605 ASSAY OF LACTIC ACID: CPT | Performed by: EMERGENCY MEDICINE

## 2017-08-19 PROCEDURE — 82140 ASSAY OF AMMONIA: CPT | Performed by: INTERNAL MEDICINE

## 2017-08-19 PROCEDURE — 74011250636 HC RX REV CODE- 250/636: Performed by: INTERNAL MEDICINE

## 2017-08-19 PROCEDURE — 74011250636 HC RX REV CODE- 250/636: Performed by: EMERGENCY MEDICINE

## 2017-08-19 PROCEDURE — 87086 URINE CULTURE/COLONY COUNT: CPT | Performed by: EMERGENCY MEDICINE

## 2017-08-19 PROCEDURE — 71010 XR CHEST PORT: CPT

## 2017-08-19 PROCEDURE — 74011000258 HC RX REV CODE- 258: Performed by: INTERNAL MEDICINE

## 2017-08-19 PROCEDURE — 99283 EMERGENCY DEPT VISIT LOW MDM: CPT

## 2017-08-19 PROCEDURE — 74011250637 HC RX REV CODE- 250/637: Performed by: INTERNAL MEDICINE

## 2017-08-19 PROCEDURE — 85610 PROTHROMBIN TIME: CPT | Performed by: EMERGENCY MEDICINE

## 2017-08-19 PROCEDURE — 87040 BLOOD CULTURE FOR BACTERIA: CPT | Performed by: EMERGENCY MEDICINE

## 2017-08-19 PROCEDURE — 85730 THROMBOPLASTIN TIME PARTIAL: CPT | Performed by: EMERGENCY MEDICINE

## 2017-08-19 PROCEDURE — 87077 CULTURE AEROBIC IDENTIFY: CPT | Performed by: EMERGENCY MEDICINE

## 2017-08-19 PROCEDURE — 87186 SC STD MICRODIL/AGAR DIL: CPT | Performed by: EMERGENCY MEDICINE

## 2017-08-19 PROCEDURE — 74011000258 HC RX REV CODE- 258: Performed by: EMERGENCY MEDICINE

## 2017-08-19 PROCEDURE — 96374 THER/PROPH/DIAG INJ IV PUSH: CPT

## 2017-08-19 PROCEDURE — 65660000000 HC RM CCU STEPDOWN

## 2017-08-19 PROCEDURE — 80053 COMPREHEN METABOLIC PANEL: CPT | Performed by: EMERGENCY MEDICINE

## 2017-08-19 PROCEDURE — 81001 URINALYSIS AUTO W/SCOPE: CPT | Performed by: EMERGENCY MEDICINE

## 2017-08-19 PROCEDURE — 85025 COMPLETE CBC W/AUTO DIFF WBC: CPT | Performed by: EMERGENCY MEDICINE

## 2017-08-19 PROCEDURE — 70450 CT HEAD/BRAIN W/O DYE: CPT

## 2017-08-19 RX ORDER — OXYCODONE HYDROCHLORIDE 5 MG/1
5 TABLET ORAL
Status: DISCONTINUED | OUTPATIENT
Start: 2017-08-19 | End: 2017-08-20

## 2017-08-19 RX ORDER — LANOLIN ALCOHOL/MO/W.PET/CERES
100 CREAM (GRAM) TOPICAL DAILY
Status: DISCONTINUED | OUTPATIENT
Start: 2017-08-20 | End: 2017-08-24 | Stop reason: HOSPADM

## 2017-08-19 RX ORDER — MORPHINE SULFATE 10 MG/ML
2 INJECTION, SOLUTION INTRAMUSCULAR; INTRAVENOUS
Status: DISCONTINUED | OUTPATIENT
Start: 2017-08-19 | End: 2017-08-20

## 2017-08-19 RX ORDER — LATANOPROST 50 UG/ML
1 SOLUTION/ DROPS OPHTHALMIC EVERY EVENING
Status: DISCONTINUED | OUTPATIENT
Start: 2017-08-19 | End: 2017-08-24 | Stop reason: HOSPADM

## 2017-08-19 RX ORDER — METOPROLOL TARTRATE 25 MG/1
12.5 TABLET, FILM COATED ORAL 2 TIMES DAILY
Status: DISCONTINUED | OUTPATIENT
Start: 2017-08-19 | End: 2017-08-24 | Stop reason: HOSPADM

## 2017-08-19 RX ORDER — ENOXAPARIN SODIUM 100 MG/ML
40 INJECTION SUBCUTANEOUS EVERY 24 HOURS
Status: DISCONTINUED | OUTPATIENT
Start: 2017-08-19 | End: 2017-08-24 | Stop reason: HOSPADM

## 2017-08-19 RX ORDER — ACETAMINOPHEN 325 MG/1
650 TABLET ORAL
Status: DISCONTINUED | OUTPATIENT
Start: 2017-08-19 | End: 2017-08-24 | Stop reason: HOSPADM

## 2017-08-19 RX ORDER — VALPROIC ACID 250 MG/5ML
500 SOLUTION ORAL 3 TIMES DAILY
Status: DISCONTINUED | OUTPATIENT
Start: 2017-08-19 | End: 2017-08-24 | Stop reason: HOSPADM

## 2017-08-19 RX ORDER — SODIUM CHLORIDE 9 MG/ML
100 INJECTION, SOLUTION INTRAVENOUS CONTINUOUS
Status: DISCONTINUED | OUTPATIENT
Start: 2017-08-19 | End: 2017-08-24 | Stop reason: HOSPADM

## 2017-08-19 RX ORDER — LABETALOL HYDROCHLORIDE 5 MG/ML
10 INJECTION, SOLUTION INTRAVENOUS
Status: DISCONTINUED | OUTPATIENT
Start: 2017-08-19 | End: 2017-08-24 | Stop reason: HOSPADM

## 2017-08-19 RX ORDER — PREGABALIN 150 MG/1
300 CAPSULE ORAL 2 TIMES DAILY
Status: DISCONTINUED | OUTPATIENT
Start: 2017-08-19 | End: 2017-08-20

## 2017-08-19 RX ORDER — ONDANSETRON 2 MG/ML
4 INJECTION INTRAMUSCULAR; INTRAVENOUS
Status: DISCONTINUED | OUTPATIENT
Start: 2017-08-19 | End: 2017-08-24 | Stop reason: HOSPADM

## 2017-08-19 RX ORDER — ATORVASTATIN CALCIUM 40 MG/1
40 TABLET, FILM COATED ORAL
Status: DISCONTINUED | OUTPATIENT
Start: 2017-08-19 | End: 2017-08-24 | Stop reason: HOSPADM

## 2017-08-19 RX ORDER — PANTOPRAZOLE SODIUM 40 MG/1
40 GRANULE, DELAYED RELEASE ORAL
Status: DISCONTINUED | OUTPATIENT
Start: 2017-08-19 | End: 2017-08-24 | Stop reason: HOSPADM

## 2017-08-19 RX ORDER — LORAZEPAM 2 MG/ML
2 INJECTION INTRAMUSCULAR
Status: DISCONTINUED | OUTPATIENT
Start: 2017-08-19 | End: 2017-08-20

## 2017-08-19 RX ORDER — QUETIAPINE FUMARATE 25 MG/1
25 TABLET, FILM COATED ORAL
Status: DISCONTINUED | OUTPATIENT
Start: 2017-08-19 | End: 2017-08-24 | Stop reason: HOSPADM

## 2017-08-19 RX ADMIN — METOPROLOL TARTRATE 12.5 MG: 25 TABLET ORAL at 22:48

## 2017-08-19 RX ADMIN — QUETIAPINE FUMARATE 25 MG: 25 TABLET, FILM COATED ORAL at 21:46

## 2017-08-19 RX ADMIN — SODIUM CHLORIDE 100 ML/HR: 900 INJECTION, SOLUTION INTRAVENOUS at 21:16

## 2017-08-19 RX ADMIN — VALPROIC ACID 500 MG: 250 SOLUTION ORAL at 22:42

## 2017-08-19 RX ADMIN — SODIUM CHLORIDE 2040 ML: 900 INJECTION, SOLUTION INTRAVENOUS at 16:52

## 2017-08-19 RX ADMIN — ATORVASTATIN CALCIUM 40 MG: 40 TABLET, FILM COATED ORAL at 21:46

## 2017-08-19 RX ADMIN — CEFTRIAXONE 1 G: 1 INJECTION, POWDER, FOR SOLUTION INTRAMUSCULAR; INTRAVENOUS at 16:51

## 2017-08-19 RX ADMIN — PREGABALIN 300 MG: 150 CAPSULE ORAL at 22:44

## 2017-08-19 RX ADMIN — SODIUM CHLORIDE 500 ML: 900 INJECTION, SOLUTION INTRAVENOUS at 15:14

## 2017-08-19 RX ADMIN — PANTOPRAZOLE SODIUM 40 MG: 40 GRANULE, DELAYED RELEASE ORAL at 22:43

## 2017-08-19 NOTE — IP AVS SNAPSHOT
[address] [facility] 
[phone] Patient: Fátima Wiseman MRN: [id number] :1934 You are allergic to the following Allergen Reactions No Known Allergies Not Noted Recent Documentation Height Weight BMI Smoking Status 1.727 m 65.6 kg 22 kg/m2 Never Smoker Unresulted Labs Order Current Status Christina Smoke In process CULTURE, BLOOD Preliminary result CULTURE, BLOOD, PAIRED Preliminary result Emergency Contacts Name Discharge Info Relation Home Work Mobile [address] CAREGIVER [3] Spouse [3] [phone] Naga Estevez  Daughter [21]   [phone] About your hospitalization You were admitted on:  2017 You last received care in the:  [facility] 3 RENAL MEDICINE You were discharged on:  2017 Unit phone number:  [phone] Why you were hospitalized Your primary diagnosis was:  Not on File Your diagnoses also included:  Sepsis (Hcc), Encephalopathy, Unspecified, Dysphagia, Uti (Urinary Tract Infection) Providers Seen During Your Hospitalizations Provider Role Specialty Primary office phone Devonte Elizondo DO Attending Provider Emergency Medicine [phone] Devyn Serrano MD Attending Provider Hospitalist [phone] Sandeep Davidson MD Attending Provider Internal Medicine [phone] Your Primary Care Physician (PCP) Primary Care Physician Office Phone Office Fax Lyndon Jay [phone] Follow-up Information Follow up With Details Comments Contact Info [address] [facility]   1300 [facility] 
[address] [place] [address] [phone] Viki Rhodes MD In 1 week  1 [facility] 1001 [facility] [address] [phone]  Zev Zuniga On 2017 3:20pm  hospital follow-up [address] Alexander Pink Current Discharge Medication List  
  
START taking these medications Dose & Instructions Dispensing Information Comments Morning Noon Evening Bedtime  
 ampicillin 250 mg/5 mL suspension Commonly known as:  PRINCIPEN Your last dose was: Your next dose is:    
   
   
 Dose:  500 mg Take 10 mL by mouth every six (6) hours for 6 days. Quantity:  240 mL Refills:  0 CONTINUE these medications which have NOT CHANGED Dose & Instructions Dispensing Information Comments Morning Noon Evening Bedtime  
 acetaminophen 325 mg tablet Commonly known as:  TYLENOL Your last dose was: Your next dose is:    
   
   
 Dose:  650 mg  
2 Tabs by Per G Tube route every six (6) hours as needed. Quantity:  40 Tab Refills:  0  
     
   
   
   
  
 albuterol-ipratropium 2.5 mg-0.5 mg/3 ml Nebu Commonly known as:  Diania Comes Your last dose was: Your next dose is:    
   
   
 Dose:  3 mL  
3 mL by Nebulization route every six (6) hours as needed. Quantity:  30 Nebule Refills:  1  
     
   
   
   
  
 atorvastatin 40 mg tablet Commonly known as:  LIPITOR Your last dose was: Your next dose is:    
   
   
 Dose:  40 mg  
40 mg by Per G Tube route nightly. Refills:  0  
     
   
   
   
  
 cyclobenzaprine 5 mg tablet Commonly known as:  FLEXERIL Your last dose was: Your next dose is:    
   
   
 Dose:  5 mg 1 Tab by Per G Tube route three (3) times daily as needed for Muscle Spasm(s). Quantity:  40 Tab Refills:  0  
     
   
   
   
  
 latanoprost 0.005 % ophthalmic solution Commonly known as:  Sammy Grossman Your last dose was: Your next dose is:    
   
   
 Dose:  1 Drop Administer 1 Drop to both eyes every evening. Quantity:  1 mL Refills:  1 levETIRAcetam 100 mg/ml Soln oral solution Commonly known as:  KEPPRA Your last dose was: Your next dose is:    
   
   
 Dose:  1500 mg  
15 mL by Per G Tube route two (2) times a day. Quantity:  1 Bottle Refills:  1  
     
   
   
   
  
 metoprolol tartrate 25 mg tablet Commonly known as:  LOPRESSOR Your last dose was: Your next dose is:    
   
   
 Dose:  12.5 mg  
0.5 Tabs by Per G Tube route two (2) times a day. Quantity:  60 Tab Refills:  1  
     
   
   
   
  
 multivit w-min-ferrous gluconate 9 mg iron/15 mL oral liquid Commonly known as:  Ardath Kettle Your last dose was: Your next dose is:    
   
   
 Dose:  15 mL  
15 mL by Per G Tube route daily. Quantity:  1 Bottle Refills:  01  
     
   
   
   
  
 oxyCODONE 5 mg capsule Commonly known as:  OXYIR Your last dose was: Your next dose is:    
   
   
 Dose:  5 mg 1 Cap by Per G Tube route every four (4) hours as needed. Max Daily Amount: 30 mg.  
 Quantity:  5 Cap Refills:  0  
     
   
   
   
  
 pantoprazole 40 mg granules for oral suspension Commonly known as:  PROTONIX Your last dose was: Your next dose is:    
   
   
 Dose:  40 mg  
40 mg by Per G Tube route Before breakfast and dinner. Quantity:  60 Each Refills:  1  
     
   
   
   
  
 pregabalin 300 mg capsule Commonly known as:  Ez James Your last dose was: Your next dose is:    
   
   
 Dose:  300 mg Take 1 Cap by mouth two (2) times a day. Max Daily Amount: 600 mg. Quantity:  60 Cap Refills:  1 QUEtiapine 25 mg tablet Commonly known as:  SEROquel Your last dose was: Your next dose is:    
   
   
 Dose:  25 mg  
1 Tab by Per G Tube route nightly. Quantity:  30 Tab Refills:  1  
     
   
   
   
  
 thiamine 100 mg tablet Commonly known as:  B-1 Your last dose was: Your next dose is: Dose:  100 mg  
1 Tab by Per G Tube route daily. Quantity:  30 Tab Refills:  1  
     
   
   
   
  
 valproic acid (as sodium salt) 250 mg/5 mL (5 mL) Soln oral solution Commonly known as:  Carine Santiago Your last dose was: Your next dose is:    
   
   
 Dose:  500 mg  
10 mL by Per G Tube route three (3) times daily. Quantity:  1 Bottle Refills:  1 STOP taking these medications   
 nitrofurantoin (macrocrystal-monohydrate) 100 mg capsule Commonly known as:  MACROBID Where to Get Your Medications Information on where to get these meds will be given to you by the nurse or doctor. ! Ask your nurse or doctor about these medications  
  ampicillin 250 mg/5 mL suspension  
 oxyCODONE 5 mg capsule Discharge Instructions HOSPITALIST DISCHARGE INSTRUCTIONS 
 
NAME: Hilda Monroy :  1934 MRN:  [id number] Date/Time:  2017 12:21 PM 
 
ADMIT DATE: 2017 DISCHARGE DATE: 2017 Attending Physician: Maria T Beard MD 
 
DISCHARGE DIAGNOSIS: 
 
 
UTI Encephalopathy Seizure disorder Medications: Per above medication reconciliation. Pain Management: per above medications Recommended diet: NPO, PEG feeding - TF Jevity 1.5 krish 50 cc/hr Recommended activity: Activity as tolerated Wound care: None Indwelling devices: Cummins catheter changed , follow with urology in 1 week for free voiding trial  
 
Supplemental Oxygen: None Required Lab work: Per [facility] routine Glucose management:  None Code status: Full Outside physician follow up: Follow-up Information Follow up With Details Comments Contact Info [address] [facility]   [address] [phone] Damian Alvarez MD In 1 week  [place] [address] 
[phone] Shine Villeda MD In 1 week  [address] [place] [address] [address] [address] [phone] Skilled nursing facility/ SNF MD responsible for above on discharge. Information obtained by : 
I understand that if any problems occur once I am at home I am to contact my physician. I understand and acknowledge receipt of the instructions indicated above. Physician's or R.N.'s Signature                                                                  Date/Time Patient or Repres Discharge Orders None AquaHydrate Announcement We are excited to announce that we are making your provider's discharge notes available to you in AquaHydrate. You will see these notes when they are completed and signed by the physician that discharged you from your recent hospital stay. If you have any questions or concerns about any information you see in AquaHydrate, please call the Health Information Department where you were seen or reach out to your Primary Care Provider for more information about your plan of care. Introducing [facility]! Livier Coon introduces AquaHydrate patient portal. Now you can access parts of your medical record, email your doctor's office, and request medication refills online. 1. In your internet browser, go to [email or web address] 2. Click on the First Time User? Click Here link in the Sign In box. You will see the New Member Sign Up page. 3. Enter your AquaHydrate Access Code exactly as it appears below. You will not need to use this code after youve completed the sign-up process. If you do not sign up before the expiration date, you must request a new code.  
 
· AquaHydrate Access Code: [email or web address] 
 Expires: 9/9/2017  5:34 PM 
 
4. Enter the last four digits of your Social Security Number (xxxx) and Date of Birth (mm/dd/yyyy) as indicated and click Submit. You will be taken to the next sign-up page. 5. Create a InnSania ID. This will be your InnSania login ID and cannot be changed, so think of one that is secure and easy to remember. 6. Create a InnSania password. You can change your password at any time. 7. Enter your Password Reset Question and Answer. This can be used at a later time if you forget your password. 8. Enter your e-mail address. You will receive e-mail notification when new information is available in [address] 9. Click Sign Up. You can now view and download portions of your medical record. 10. Click the Download Summary menu link to download a portable copy of your medical information. If you have questions, please visit the Frequently Asked Questions section of the Inn[employer] website. Remember, InnSania is NOT to be used for urgent needs. For medical emergencies, dial 911. Now available from your iPhone and Android! General Information Please provide this summary of care documentation to your next provider. Patient Signature:  ____________________________________________________________ Date:  ____________________________________________________________  
  
Johnathan Cool Provider Signature:  ____________________________________________________________ Date:  ____________________________________________________________

## 2017-08-19 NOTE — H&P
Hospitalist Admission Note    NAME: Raghu Zepeda   :  1934   MRN:  [id number]     Date/Time:  2017 5:03 PM    Patient PCP: Hunter Hayes MD  ________________________________________________________________________    My assessment of this patient's clinical condition and my plan of care is as follows. Assessment / Plan:  Sepsis POA: fever, tachycardia, encephalopathy, acidosis, UTI, start IVF and ABX. UTI: recurrent and associated to Cummins Cath, will start CTX, f/u cultures, was D/c on prophylaxis with Macrobid last admission this did not worked will get ID evaluation. Acute encephalopathy: due to Infection, patient may have some degree of dementia. Monitor. Acidosis: c./w  IVF and monitor. PEG placement 17 for dysphagia: c/w tube feedings and monitor  Urinary retention: last admission and was D/C with Cummins cath, should try voiding trial once more oriented. Seizure disorder  continue keppra, valproic acid  Hx SVT current in sinus to sinus tach.  Continue metoprolol.    Code status: Full  Prophylaxis: SCD's  Recommended Disposition: SNF/LTC  Surrogate Decision Maker: wife Gene [phone] and DTR Christianne Haines [phone]  Baseline: lives in [address] [place] [address] Street:   CHIEF COMPLAINT: Confused unable to provide a meaningful history    HISTORY OF PRESENT ILLNESS:     Raghu Zepeda is a 80 y.o.  male  with significant PMHx for SZ, [place] [address] ED with cc of slurred speech and right sided weakness. Per EMS pt was found after GLF at nursing home. Per family pt is interactive and normal speech at baseline. Patient specifically denies fever, chills, nausea, vomiting, diarrhea, or headache. At this time patient is confused, unable to provide a meaningful history, data collected from chart and family in the room. We were asked to admit for work up and evaluation of the above problems.      Past Medical History:   Diagnosis Date    Alcohol abuse, in remission     Seizures [facility])         Past Surgical History:   Procedure Laterality Date    COLONOSCOPY N/A 7/31/2017    COLONOSCOPY performed by Bere Osullivan MD at [address] [facility]  7/31/2017         PLACE PERCUT GASTROSTOMY TUBE  7/18/2017         UPPER GI ENDOSCOPY,DIAGNOSIS  7/31/2017            Social History   Substance Use Topics    Smoking status: Never Smoker    Smokeless tobacco: Never Used    Alcohol use No      Comment: Quit drinking 10 years ago        Family History   Problem Relation Age of Onset    Other Other      no strokes or seizures    Cancer Mother      Allergies   Allergen Reactions    No Known Allergies         Prior to Admission medications    Medication Sig Start Date End Date Taking? Authorizing Provider   acetaminophen (TYLENOL) 325 mg tablet 2 Tabs by Per G Tube route every six (6) hours as needed. 8/1/17   Nataliia Cerrato MD   valproic acid, as sodium salt, (DEPAKENE) 250 mg/5 mL (5 mL) soln oral solution 10 mL by Per G Tube route three (3) times daily. 8/1/17   Nataliia Cerrato MD   thiamine (B-1) 100 mg tablet 1 Tab by Per G Tube route daily. 8/1/17   Nataliia Cerrato MD   QUEtiapine (SEROQUEL) 25 mg tablet 1 Tab by Per G Tube route nightly. 8/1/17   Nataliia Cerrato MD   pregabalin (LYRICA) 300 mg capsule Take 1 Cap by mouth two (2) times a day. Max Daily Amount: 600 mg. 8/1/17   Nataliia Cerrato MD   pantoprazole (PROTONIX) 40 mg granules for oral suspension 40 mg by Per G Tube route Before breakfast and dinner. 8/1/17   Nataliia Cerrato MD   nitrofurantoin, macrocrystal-monohydrate, (MACROBID) 100 mg capsule Take 1 Cap by mouth nightly. 8/1/17   Nataliia Cerrato MD   multivit w-min-ferrous gluconate (CEROVITE) 9 mg iron/15 mL oral liquid 15 mL by Per G Tube route daily. 8/1/17   Nataliia Cerrato MD   metoprolol tartrate (LOPRESSOR) 25 mg tablet 0.5 Tabs by Per G Tube route two (2) times a day.  8/1/17   Nataliia Cerrato MD levETIRAcetam (KEPPRA) 100 mg/ml soln oral solution 15 mL by Per G Tube route two (2) times a day. 8/1/17   Loraine Senior MD   cyclobenzaprine (FLEXERIL) 5 mg tablet 1 Tab by Per G Tube route three (3) times daily as needed for Muscle Spasm(s). 8/1/17   Loraine Senior MD   oxyCODONE (OXYIR) 5 mg capsule 1 Cap by Per G Tube route every four (4) hours as needed. Max Daily Amount: 30 mg. 8/1/17   Loraine Senior MD   atorvastatin (LIPITOR) 40 mg tablet 40 mg by Per G Tube route nightly. Nadia Bob MD   latanoprost (XALATAN) 0.005 % ophthalmic solution Administer 1 Drop to both eyes every evening. 7/22/17   Joanne Krause MD   albuterol-ipratropium (DUO-NEB) 2.5 mg-0.5 mg/3 ml nebu 3 mL by Nebulization route every six (6) hours as needed. 7/22/17   Joanne Krause MD       REVIEW OF SYSTEMS:     I am not able to complete the review of systems because:    The patient is intubated and sedated   y The patient has altered mental status due to his acute medical problems    The patient has baseline aphasia from prior stroke(s)    The patient has baseline dementia and is not reliable historian    The patient is in acute medical distress and unable to provide information           Total of 12 systems reviewed as follows:       POSITIVE= underlined text  Negative = text not underlined  General:  fever, chills, sweats, generalized weakness, weight loss/gain,      loss of appetite   Eyes:    blurred vision, eye pain, loss of vision, double vision  ENT:    rhinorrhea, pharyngitis   Respiratory:   cough, sputum production, SOB, AL, wheezing, pleuritic pain   Cardiology:   chest pain, palpitations, orthopnea, PND, edema, syncope   Gastrointestinal:  abdominal pain , N/V, diarrhea, dysphagia, constipation, bleeding   Genitourinary:  frequency, urgency, dysuria, hematuria, incontinence   Muskuloskeletal :  arthralgia, myalgia, back pain  Hematology:  easy bruising, nose or gum bleeding, lymphadenopathy Dermatological: rash, ulceration, pruritis, color change / jaundice  Endocrine:   hot flashes or polydipsia   Neurological:  headache, dizziness, confusion, focal weakness, paresthesia,     Speech difficulties, memory loss, gait difficulty  Psychological: Feelings of anxiety, depression, agitation    Objective:   VITALS:    Visit Vitals    BP (!) 155/97    Pulse 91    Temp (!) 101 °F (38.3 °C)    Resp 20    Wt 68 kg (150 lb)    SpO2 96%    BMI 22.81 kg/m2       PHYSICAL EXAM:    General:    Alert, confused, no distress, appears stated age. HEENT: Atraumatic, anicteric sclerae, pink conjunctivae     No oral ulcers, mucosa moist, throat clear, dentition poor  Neck:  Supple, symmetrical,  thyroid: non tender  Lungs:   Coarse bS. No Wheezing or Rhonchi. No rales. Chest wall:  No tenderness  No Accessory muscle use. Heart:   Regular  rhythm,  No  murmur   No edema  Abdomen:   Soft, non-tender. Not distended. Bowel sounds normal, PEG tube in place  Extremities: No cyanosis. No clubbing,      Skin turgor normal, Capillary refill normal, Radial pulse 2+  Skin:     Not pale. Not Jaundiced  No rashes   Psych:  Poor  insight. Not depressed. Not anxious or agitated.   Neurologic: Awake, oriented x0, GCS [id number]    _______________________________________________________________________  Care Plan discussed with:    Comments   Patient y    Family  y    RN y    Care Manager                    Consultant:      _______________________________________________________________________  Expected  Disposition:   Home with Family    HH/PT/OT/RN    SNF/LTC y   [de-identified]    ________________________________________________________________________  TOTAL TIME:  61 Minutes    Critical Care Provided     Minutes non procedure based      Comments    y Reviewed previous records   >50% of visit spent in counseling and coordination of care y Discussion with patient and/or family and questions answered ________________________________________________________________________  Signed: Silvina Winter MD    Procedures: see electronic medical records for all procedures/Xrays and details which were not copied into this note but were reviewed prior to creation of Plan. LAB DATA REVIEWED:    Recent Results (from the past 24 hour(s))   CBC WITH AUTOMATED DIFF    Collection Time: 08/19/17  2:44 PM   Result Value Ref Range    WBC 6.8 4.1 - 11.1 K/uL    RBC 3.29 (L) 4.10 - 5.70 M/uL    HGB 11.1 (L) 12.1 - 17.0 g/dL    HCT 34.1 (L) 36.6 - 50.3 %    .6 (H) 80.0 - 99.0 FL    MCH 33.7 26.0 - 34.0 PG    MCHC 32.6 30.0 - 36.5 g/dL    RDW 13.4 11.5 - 14.5 %    PLATELET 755 (L) 303 - 400 K/uL    NEUTROPHILS 81 (H) 32 - 75 %    LYMPHOCYTES 13 12 - 49 %    MONOCYTES 6 5 - 13 %    EOSINOPHILS 0 0 - 7 %    BASOPHILS 0 0 - 1 %    ABS. NEUTROPHILS 5.5 1.8 - 8.0 K/UL    ABS. LYMPHOCYTES 0.9 0.8 - 3.5 K/UL    ABS. MONOCYTES 0.4 0.0 - 1.0 K/UL    ABS. EOSINOPHILS 0.0 0.0 - 0.4 K/UL    ABS. BASOPHILS 0.0 0.0 - 0.1 K/UL   METABOLIC PANEL, COMPREHENSIVE    Collection Time: 08/19/17  2:44 PM   Result Value Ref Range    Sodium 136 136 - 145 mmol/L    Potassium 3.7 3.5 - 5.1 mmol/L    Chloride 95 (L) 97 - 108 mmol/L    CO2 34 (H) 21 - 32 mmol/L    Anion gap 7 5 - 15 mmol/L    Glucose 139 (H) 65 - 100 mg/dL    BUN 13 6 - 20 MG/DL    Creatinine 0.67 (L) 0.70 - 1.30 MG/DL    BUN/Creatinine ratio 19 12 - 20      GFR est AA >60 >60 ml/min/1.73m2    GFR est non-AA >60 >60 ml/min/1.73m2    Calcium 8.9 8.5 - 10.1 MG/DL    Bilirubin, total 0.4 0.2 - 1.0 MG/DL    ALT (SGPT) 14 12 - 78 U/L    AST (SGOT) 12 (L) 15 - 37 U/L    Alk.  phosphatase 75 45 - 117 U/L    Protein, total 8.3 (H) 6.4 - 8.2 g/dL    Albumin 3.3 (L) 3.5 - 5.0 g/dL    Globulin 5.0 (H) 2.0 - 4.0 g/dL    A-G Ratio 0.7 (L) 1.1 - 2.2     LACTIC ACID    Collection Time: 08/19/17  2:44 PM   Result Value Ref Range    Lactic acid 4.1 (HH) 0.4 - 2.0 MMOL/L   URINALYSIS W/ RFLX MICROSCOPIC    Collection Time: 08/19/17  2:44 PM   Result Value Ref Range    Color DARK YELLOW      Appearance CLOUDY (A) CLEAR      Specific gravity 1.020 1.003 - 1.030      pH (UA) 8.0 5.0 - 8.0      Protein 100 (A) NEG mg/dL    Glucose NEGATIVE  NEG mg/dL    Ketone TRACE (A) NEG mg/dL    Bilirubin NEGATIVE  NEG      Blood LARGE (A) NEG      Urobilinogen 1.0 0.2 - 1.0 EU/dL    Nitrites NEGATIVE  NEG      Leukocyte Esterase LARGE (A) NEG      WBC >100 (H) 0 - 4 /hpf    RBC >100 (H) 0 - 5 /hpf    Epithelial cells FEW FEW /lpf    Bacteria NEGATIVE  NEG /hpf    Hyaline cast 2-5 0 - 5 /lpf   PROTHROMBIN TIME + INR    Collection Time: 08/19/17  3:14 PM   Result Value Ref Range    INR 1.1 0.9 - 1.1      Prothrombin time 11.6 (H) 9.0 - 11.1 sec   PTT    Collection Time: 08/19/17  3:14 PM   Result Value Ref Range    aPTT 31.0 22.1 - 32.5 sec    aPTT, therapeutic range     58.0 - 77.0 SECS

## 2017-08-19 NOTE — ED NOTES
TRANSFER - OUT REPORT:    Verbal report given to [facility], RN (name) on Alexandra Chan  being transferred to Renal(unit) for routine progression of care       Report consisted of patients Situation, Background, Assessment and   Recommendations(SBAR). Information from the following report(s) SBAR, Kardex, ED Summary, Intake/Output, MAR, Recent Results and Med Rec Status was reviewed with the receiving nurse. Lines:   Peripheral IV 08/19/17 Right Arm (Active)   Site Assessment Clean, dry, & intact 8/19/2017  3:18 PM   Phlebitis Assessment 0 8/19/2017  3:18 PM   Infiltration Assessment 0 8/19/2017  3:18 PM   Dressing Status Clean, dry, & intact 8/19/2017  3:18 PM   Dressing Type Transparent 8/19/2017  3:18 PM   Hub Color/Line Status Pink 8/19/2017  3:18 PM        Opportunity for questions and clarification was provided.       Patient transported with:   [facility]

## 2017-08-19 NOTE — ED PROVIDER NOTES
HPI Comments: Lilian Snowden, 80 y.o. male with significant PMHx for SZ, presents EMS to [address] [facility] ED with cc of slurred speech and possible right-sided facial droop. Per EMS pt was found after GLF at nursing home. Fall was unwitnessed, unknown how long patient was on the floor or how he fell. Per family pt is interactive and normal speech at baseline. Family states that they visited him yesterday and he was at his mental baseline. Per family, patient appears confused at this time, and is unable to recognize them. Pt has been able to move all extremities spontaneously, but was unable to follow commands per nursing and EMS. PCP: Issa Kimble MD    Social history significant for: - Tobacco, - EtOH, - Illicit Drug Use    HPI is limited due to mental status of patient. The history is provided by the EMS personnel. The history is limited by the condition of the patient. No  was used. Past Medical History:   Diagnosis Date    Alcohol abuse, in remission     Seizures ([facility] [address]        Past Surgical History:   Procedure Laterality Date    COLONOSCOPY N/A 7/31/2017    COLONOSCOPY performed by Lanny Govea MD at [address] [place] [address]  7/31/2017         PLACE PERCUT GASTROSTOMY TUBE  7/18/2017         UPPER GI ENDOSCOPY,DIAGNOSIS  7/31/2017              Family History:   Problem Relation Age of Onset    Other Other      no strokes or seizures       Social History     Social History    Marital status:      Spouse name: N/A    Number of children: N/A    Years of education: N/A     Occupational History    Not on file.      Social History Main Topics    Smoking status: Never Smoker    Smokeless tobacco: Never Used    Alcohol use No      Comment: Quit drinking 10 years ago   Asim Tenorio Drug use: No    Sexual activity: Not on file     Other Topics Concern    Not on file     Social History Narrative         ALLERGIES: No known allergies    Review of Systems   Unable to perform ROS: Mental status change       Vitals:    08/19/17 1429 08/19/17 1441 08/19/17 1600 08/19/17 1615   BP: (!) 156/91  (!) 141/97 (!) 155/97   Pulse: 90  98 91   Resp: 23  25 20   Temp: (!) 100.6 °F (38.1 °C) (!) 101 °F (38.3 °C)     SpO2: 94%  96% 96%   Weight: 68 kg (150 lb)               Physical Exam   Constitutional: He appears well-developed and well-nourished. No distress. HENT:   Head: Normocephalic and atraumatic. Mouth/Throat: Oropharynx is clear and moist.   Eyes: Conjunctivae and EOM are normal.   Neck: Neck supple. No JVD present. No tracheal deviation present. Cardiovascular: Normal rate, regular rhythm and intact distal pulses. Exam reveals no gallop and no friction rub. No murmur heard. Pulmonary/Chest: Effort normal and breath sounds normal. No stridor. No respiratory distress. He has no wheezes. Abdominal: Soft. Bowel sounds are normal. He exhibits no distension and no mass. There is no tenderness. There is no guarding. Musculoskeletal: Normal range of motion. He exhibits no edema or tenderness. No deformity   Neurological: He is alert. He has normal strength. No focal deficits  Nonverbal  Eyes deviated to the left  Not responding to commands  Moving all extremities spontaneously  unintelligible speech  No peripheral edema    Skin: Skin is warm, dry and intact. No rash noted. Nursing note and vitals reviewed. MDM  Number of Diagnoses or Management Options  Dysarthria:   Urinary tract infection without hematuria, site unspecified:   Diagnosis management comments: Pt presenting with new onset dysarthria, pt was last seen normal yesterday. DDx includes pneumonia, uti, CVA, electrolyte abnormality, dahiana, dehydration. Will check labs, CXR, ua, head CT. Likely admit.         Amount and/or Complexity of Data Reviewed  Clinical lab tests: ordered and reviewed  Tests in the radiology section of CPT®: ordered and reviewed  Review and summarize past medical records: yes  Discuss the patient with other providers: yes (hospitalist)      ED Course       Procedures     CONSULT NOTE:   4:30 PM  Doug Saleem DO spoke with Dr. Geri Thomas,   Specialty: Hospitalist  Discussed pt's hx, disposition, and available diagnostic and imaging results. Reviewed care plans. Consultant will evaluate pt for admission. Written by Livan Galdamez ED Scribe, as dictated by Doug Saleem DO.    LABORATORY TESTS:  Recent Results (from the past 12 hour(s))   CBC WITH AUTOMATED DIFF    Collection Time: 08/19/17  2:44 PM   Result Value Ref Range    WBC 6.8 4.1 - 11.1 K/uL    RBC 3.29 (L) 4.10 - 5.70 M/uL    HGB 11.1 (L) 12.1 - 17.0 g/dL    HCT 34.1 (L) 36.6 - 50.3 %    .6 (H) 80.0 - 99.0 FL    MCH 33.7 26.0 - 34.0 PG    MCHC 32.6 30.0 - 36.5 g/dL    RDW 13.4 11.5 - 14.5 %    PLATELET 755 (L) 800 - 400 K/uL    NEUTROPHILS 81 (H) 32 - 75 %    LYMPHOCYTES 13 12 - 49 %    MONOCYTES 6 5 - 13 %    EOSINOPHILS 0 0 - 7 %    BASOPHILS 0 0 - 1 %    ABS. NEUTROPHILS 5.5 1.8 - 8.0 K/UL    ABS. LYMPHOCYTES 0.9 0.8 - 3.5 K/UL    ABS. MONOCYTES 0.4 0.0 - 1.0 K/UL    ABS. EOSINOPHILS 0.0 0.0 - 0.4 K/UL    ABS. BASOPHILS 0.0 0.0 - 0.1 K/UL   METABOLIC PANEL, COMPREHENSIVE    Collection Time: 08/19/17  2:44 PM   Result Value Ref Range    Sodium 136 136 - 145 mmol/L    Potassium 3.7 3.5 - 5.1 mmol/L    Chloride 95 (L) 97 - 108 mmol/L    CO2 34 (H) 21 - 32 mmol/L    Anion gap 7 5 - 15 mmol/L    Glucose 139 (H) 65 - 100 mg/dL    BUN 13 6 - 20 MG/DL    Creatinine 0.67 (L) 0.70 - 1.30 MG/DL    BUN/Creatinine ratio 19 12 - 20      GFR est AA >60 >60 ml/min/1.73m2    GFR est non-AA >60 >60 ml/min/1.73m2    Calcium 8.9 8.5 - 10.1 MG/DL    Bilirubin, total 0.4 0.2 - 1.0 MG/DL    ALT (SGPT) 14 12 - 78 U/L    AST (SGOT) 12 (L) 15 - 37 U/L    Alk.  phosphatase 75 45 - 117 U/L    Protein, total 8.3 (H) 6.4 - 8.2 g/dL    Albumin 3.3 (L) 3.5 - 5.0 g/dL    Globulin 5.0 (H) 2.0 - 4.0 g/dL    A-G Ratio 0.7 (L) 1.1 - 2.2     LACTIC ACID Collection Time: 08/19/17  2:44 PM   Result Value Ref Range    Lactic acid 4.1 (HH) 0.4 - 2.0 MMOL/L   URINALYSIS W/ RFLX MICROSCOPIC    Collection Time: 08/19/17  2:44 PM   Result Value Ref Range    Color DARK YELLOW      Appearance CLOUDY (A) CLEAR      Specific gravity 1.020 1.003 - 1.030      pH (UA) 8.0 5.0 - 8.0      Protein 100 (A) NEG mg/dL    Glucose NEGATIVE  NEG mg/dL    Ketone TRACE (A) NEG mg/dL    Bilirubin NEGATIVE  NEG      Blood LARGE (A) NEG      Urobilinogen 1.0 0.2 - 1.0 EU/dL    Nitrites NEGATIVE  NEG      Leukocyte Esterase LARGE (A) NEG      WBC >100 (H) 0 - 4 /hpf    RBC >100 (H) 0 - 5 /hpf    Epithelial cells FEW FEW /lpf    Bacteria NEGATIVE  NEG /hpf    Hyaline cast 2-5 0 - 5 /lpf   PROTHROMBIN TIME + INR    Collection Time: 08/19/17  3:14 PM   Result Value Ref Range    INR 1.1 0.9 - 1.1      Prothrombin time 11.6 (H) 9.0 - 11.1 sec   PTT    Collection Time: 08/19/17  3:14 PM   Result Value Ref Range    aPTT 31.0 22.1 - 32.5 sec    aPTT, therapeutic range     58.0 - 77.0 SECS       IMAGING RESULTS:  CT HEAD WO CONT   Final Result   INDICATION:   Dysarthria and acute altered mental status      EXAM:  HEAD CT WITHOUT CONTRAST     COMPARISON:  6/24/2017, MR brain 7/13/2017     TECHNIQUE:  Routine noncontrast axial head CT was performed. Coronal and  sagittal multiplanar reconstructions were obtained. CT dose reduction was  achieved through use of a standardized protocol tailored for this examination  and automatic exposure control for dose modulation.      FINDINGS:     The ventricles and cortical sulci are within normal limits.     No evidence for acute intracranial hemorrhage, midline shift, or mass effect.   Gray-white matter differentiation is preserved.     The basal cisterns are patent.     The visualized paranasal sinuses and mastoid air cells are clear.     No calvarial abnormality.     IMPRESSION  IMPRESSION:     No evidence for acute intracranial abnormality   XR CHEST PORT (Results Pending)       MEDICATIONS GIVEN:  Medications   cefTRIAXone (ROCEPHIN) 1 g in 0.9% sodium chloride (MBP/ADV) 50 mL (not administered)   sodium chloride 0.9 % bolus infusion 2,040 mL (not administered)   sodium chloride 0.9 % bolus infusion 500 mL (500 mL IntraVENous New Bag 8/19/17 6785)       IMPRESSION:  1. Dysarthria    2. Urinary tract infection without hematuria, site unspecified        PLAN:  1. Admit to hospitalist  Admit Note:  4:31 PM  Pt is being admitted by Dr. Blair Arias. The results of their tests and reason(s) for their admission have been discussed with pt and/or available family. They convey agreement and understanding for the need to be admitted and for admission diagnosis. This note is prepared by Maco Allen, acting as a Scribe for Akiko Pate & DO Kedar. JAYLONEighty [employer], DO: The scribe's documentation has been prepared under my direction and personally reviewed by me in its entirety. I confirm that the notes above accurately reflects all work, treatment, procedures, and medical decision making performed by me.

## 2017-08-19 NOTE — ED NOTES
Pt presents today via ems from [facility], for slurred speech and right sided facial droop starting at 0800 during shift change.  Pt has uti

## 2017-08-20 LAB
ALBUMIN SERPL-MCNC: 2.6 G/DL (ref 3.5–5)
ALBUMIN/GLOB SERPL: 0.6 {RATIO} (ref 1.1–2.2)
ALP SERPL-CCNC: 67 U/L (ref 45–117)
ALT SERPL-CCNC: 11 U/L (ref 12–78)
ANION GAP SERPL CALC-SCNC: 7 MMOL/L (ref 5–15)
AST SERPL-CCNC: 12 U/L (ref 15–37)
BASOPHILS # BLD: 0 K/UL (ref 0–0.1)
BASOPHILS NFR BLD: 0 % (ref 0–1)
BILIRUB SERPL-MCNC: 0.7 MG/DL (ref 0.2–1)
BUN SERPL-MCNC: 8 MG/DL (ref 6–20)
BUN/CREAT SERPL: 17 (ref 12–20)
CALCIUM SERPL-MCNC: 8.4 MG/DL (ref 8.5–10.1)
CHLORIDE SERPL-SCNC: 102 MMOL/L (ref 97–108)
CO2 SERPL-SCNC: 27 MMOL/L (ref 21–32)
CREAT SERPL-MCNC: 0.47 MG/DL (ref 0.7–1.3)
EOSINOPHIL # BLD: 0 K/UL (ref 0–0.4)
EOSINOPHIL NFR BLD: 0 % (ref 0–7)
ERYTHROCYTE [DISTWIDTH] IN BLOOD BY AUTOMATED COUNT: 13.2 % (ref 11.5–14.5)
EST. AVERAGE GLUCOSE BLD GHB EST-MCNC: ABNORMAL MG/DL
GLOBULIN SER CALC-MCNC: 4.5 G/DL (ref 2–4)
GLUCOSE SERPL-MCNC: 96 MG/DL (ref 65–100)
HBA1C MFR BLD: <3.5 % (ref 4.2–6.3)
HCT VFR BLD AUTO: 32.9 % (ref 36.6–50.3)
HGB BLD-MCNC: 10.7 G/DL (ref 12.1–17)
LYMPHOCYTES # BLD: 1.2 K/UL (ref 0.8–3.5)
LYMPHOCYTES NFR BLD: 15 % (ref 12–49)
MAGNESIUM SERPL-MCNC: 1.5 MG/DL (ref 1.6–2.4)
MCH RBC QN AUTO: 33.6 PG (ref 26–34)
MCHC RBC AUTO-ENTMCNC: 32.5 G/DL (ref 30–36.5)
MCV RBC AUTO: 103.5 FL (ref 80–99)
MONOCYTES # BLD: 1 K/UL (ref 0–1)
MONOCYTES NFR BLD: 13 % (ref 5–13)
NEUTS SEG # BLD: 5.5 K/UL (ref 1.8–8)
NEUTS SEG NFR BLD: 72 % (ref 32–75)
PLATELET # BLD AUTO: 88 K/UL (ref 150–400)
POTASSIUM SERPL-SCNC: 3.3 MMOL/L (ref 3.5–5.1)
PROT SERPL-MCNC: 7.1 G/DL (ref 6.4–8.2)
RBC # BLD AUTO: 3.18 M/UL (ref 4.1–5.7)
SODIUM SERPL-SCNC: 136 MMOL/L (ref 136–145)
TSH SERPL DL<=0.05 MIU/L-ACNC: 0.3 UIU/ML (ref 0.36–3.74)
WBC # BLD AUTO: 7.7 K/UL (ref 4.1–11.1)

## 2017-08-20 PROCEDURE — 74011250636 HC RX REV CODE- 250/636: Performed by: INTERNAL MEDICINE

## 2017-08-20 PROCEDURE — 74011250637 HC RX REV CODE- 250/637: Performed by: PSYCHIATRY & NEUROLOGY

## 2017-08-20 PROCEDURE — 83735 ASSAY OF MAGNESIUM: CPT | Performed by: INTERNAL MEDICINE

## 2017-08-20 PROCEDURE — 85025 COMPLETE CBC W/AUTO DIFF WBC: CPT | Performed by: INTERNAL MEDICINE

## 2017-08-20 PROCEDURE — 65660000000 HC RM CCU STEPDOWN

## 2017-08-20 PROCEDURE — 84443 ASSAY THYROID STIM HORMONE: CPT | Performed by: INTERNAL MEDICINE

## 2017-08-20 PROCEDURE — 74011250637 HC RX REV CODE- 250/637: Performed by: INTERNAL MEDICINE

## 2017-08-20 PROCEDURE — 36415 COLL VENOUS BLD VENIPUNCTURE: CPT | Performed by: INTERNAL MEDICINE

## 2017-08-20 PROCEDURE — 74011000258 HC RX REV CODE- 258: Performed by: INTERNAL MEDICINE

## 2017-08-20 PROCEDURE — 74011000250 HC RX REV CODE- 250: Performed by: INTERNAL MEDICINE

## 2017-08-20 PROCEDURE — 77030018798 HC PMP KT ENTRL FED COVD -A

## 2017-08-20 PROCEDURE — 83036 HEMOGLOBIN GLYCOSYLATED A1C: CPT | Performed by: INTERNAL MEDICINE

## 2017-08-20 PROCEDURE — 80053 COMPREHEN METABOLIC PANEL: CPT | Performed by: INTERNAL MEDICINE

## 2017-08-20 RX ORDER — POTASSIUM CHLORIDE 7.45 MG/ML
10 INJECTION INTRAVENOUS
Status: COMPLETED | OUTPATIENT
Start: 2017-08-20 | End: 2017-08-20

## 2017-08-20 RX ORDER — PREGABALIN 150 MG/1
300 CAPSULE ORAL EVERY 12 HOURS
Status: DISCONTINUED | OUTPATIENT
Start: 2017-08-20 | End: 2017-08-24 | Stop reason: HOSPADM

## 2017-08-20 RX ADMIN — METOPROLOL TARTRATE 12.5 MG: 25 TABLET ORAL at 09:55

## 2017-08-20 RX ADMIN — VALPROIC ACID 500 MG: 250 SOLUTION ORAL at 22:09

## 2017-08-20 RX ADMIN — LEVETIRACETAM 1500 MG: 100 SOLUTION ORAL at 00:15

## 2017-08-20 RX ADMIN — LEVETIRACETAM 1500 MG: 100 SOLUTION ORAL at 17:37

## 2017-08-20 RX ADMIN — PANTOPRAZOLE SODIUM 40 MG: 40 GRANULE, DELAYED RELEASE ORAL at 10:05

## 2017-08-20 RX ADMIN — SODIUM CHLORIDE 100 ML/HR: 900 INJECTION, SOLUTION INTRAVENOUS at 13:40

## 2017-08-20 RX ADMIN — POTASSIUM CHLORIDE 10 MEQ: 10 INJECTION, SOLUTION INTRAVENOUS at 13:36

## 2017-08-20 RX ADMIN — ENOXAPARIN SODIUM 40 MG: 40 INJECTION SUBCUTANEOUS at 17:40

## 2017-08-20 RX ADMIN — ATORVASTATIN CALCIUM 40 MG: 40 TABLET, FILM COATED ORAL at 22:08

## 2017-08-20 RX ADMIN — LATANOPROST 1 DROP: 50 SOLUTION OPHTHALMIC at 17:40

## 2017-08-20 RX ADMIN — Medication 100 MG: at 09:55

## 2017-08-20 RX ADMIN — CEFTRIAXONE 1 G: 1 INJECTION, POWDER, FOR SOLUTION INTRAMUSCULAR; INTRAVENOUS at 17:38

## 2017-08-20 RX ADMIN — PREGABALIN 300 MG: 150 CAPSULE ORAL at 17:39

## 2017-08-20 RX ADMIN — QUETIAPINE FUMARATE 25 MG: 25 TABLET, FILM COATED ORAL at 22:08

## 2017-08-20 RX ADMIN — PANTOPRAZOLE SODIUM 40 MG: 40 GRANULE, DELAYED RELEASE ORAL at 17:38

## 2017-08-20 RX ADMIN — METOPROLOL TARTRATE 12.5 MG: 25 TABLET ORAL at 17:39

## 2017-08-20 RX ADMIN — POTASSIUM CHLORIDE 10 MEQ: 10 INJECTION, SOLUTION INTRAVENOUS at 15:15

## 2017-08-20 RX ADMIN — VALPROIC ACID 500 MG: 250 SOLUTION ORAL at 17:37

## 2017-08-20 RX ADMIN — ACETAMINOPHEN 650 MG: 325 TABLET ORAL at 04:56

## 2017-08-20 RX ADMIN — VALPROIC ACID 500 MG: 250 SOLUTION ORAL at 09:56

## 2017-08-20 RX ADMIN — LEVETIRACETAM 1500 MG: 100 SOLUTION ORAL at 10:05

## 2017-08-20 RX ADMIN — PREGABALIN 300 MG: 150 CAPSULE ORAL at 09:55

## 2017-08-20 NOTE — PROGRESS NOTES

## 2017-08-20 NOTE — PROGRESS NOTES
Bedside and Verbal shift change report given to [place] (oncoming nurse) by Dar Berrios RN (offgoing nurse). Report included the following information SBAR, Kardex, Intake/Output, MAR, Recent Results and Med Rec Status. Zone Phone for oncoming shift: [phone]    Shift Summary: Pt admitted from ED. Very confused, admission data not completed. Family will be available this AM to provide the information. Pt is a very hard stick,IV  Infiltrated and tried several times with no success. Peg feeding not started per [facility], supervisor not able to get the ordered feeding from the kitchen.  Will wait till  Morning to get the feeding     LDAs                     PEG/Gastrostomy Tube 07/18/17 (Active)              Urinary Catheter 08/19/17 [employer]-Criteria for Appropriate Use: Obstruction/retention   Intake & Output   Date 08/19/17 0700 - 08/20/17 0659 08/20/17 0700 - 08/21/17 0659   Shift 2262-6835 7957-7680 24 Hour Total 4408-6733 3073-8191 24 Hour Total   I  N  T  A  K  E   Shift Total  (mL/kg)         O  U  T  P  U  T   Urine  (mL/kg/hr)  1675 1675         Urine Output (mL) (Urinary Catheter 08/19/17 [employer]ins)  1675 1675       Shift Total  (mL/kg)  1675  (24.6) 1675  (24.6)      NET  -1675 -1675      Weight (kg) 68 68 68 68 68 68      Last Bowel Movement Last Bowel Movement Date:  (unable to assess ( pt very confused))   Glucose Checks [] N/A  [] AC/HS  [] Q6  Concerns:   Nutrition Active Orders   Diet    DIET NPO       Consults []PT  []OT  []Speech  []Case Management   Cardiac Monitoring []N/A []Yes Expires:

## 2017-08-20 NOTE — PROGRESS NOTES
Hospitalist Progress Note    NAME: Madeline Dietrich   :  1934   MRN:  [id number]       Interim Hospital Summary: 80 y.o. male whom presented on 2017 with      Assessment / Plan:  Acute onset confusion with fall  Sepsis secondary to UTI  Chronic urinary retention  Seizure disorder  Dementia  Dysphagia  hypokalemia    PLAN   Patient was admitted with a fall, wife tells me , he was normal at 5 pm when she saw him at the nursing home, later she saw him in the ER , major change, confused, sleepy , could not talk , garbelled speech , out of it. He was moving his arms and legs  He was admitted with sepsis and UTI on rocephin. Today no leucocytois, most of the labs look okay   Ct head and CT angiogram of head and neck unremarkable in ED  He had actually had a full meal by mouth last afternoon , dysphagia was resolving . I think he either had a seizure or a massive stroke  Shall get a MRI head now  Discussed plan with wife and daughter . We can replace pottasssium iv     Also discontinue   Lyrica, morphine and ativan ? Someone gave iv ativan last night . Miranda Resendez Code status: Full  Prophylaxis: Hep SQ  Recommended Disposition: SNF/LTC     Subjective:     Chief Complaint / Reason for Physician Visit  \"\". Discussed with RN events overnight. Review of Systems: unobtainable  Symptom Y/N Comments  Symptom Y/N Comments   Fever/Chills    Chest Pain     Poor Appetite    Edema     Cough    Abdominal Pain     Sputum    Joint Pain     SOB/AL    Pruritis/Rash     Nausea/vomit    Tolerating PT/OT     Diarrhea    Tolerating Diet     Constipation    Other       Could NOT obtain due to:      Objective:     VITALS:   Last 24hrs VS reviewed since prior progress note.  Most recent are:  Patient Vitals for the past 24 hrs:   Temp Pulse Resp BP SpO2   17 0741 98.5 °F (36.9 °C) 98 20 120/58 96 %   17 0425 100.4 °F (38 °C) 87 21 127/66 92 %   17 2257 99.3 °F (37.4 °C) 78 22 - 94 %   08/19/17 2247 - - - 139/71 -   08/19/17 2140 98.7 °F (37.1 °C) - 20 146/68 97 %   08/19/17 1615 - 91 20 (!) 155/97 96 %   08/19/17 1600 - 98 25 (!) 141/97 96 %   08/19/17 1441 (!) 101 °F (38.3 °C) - - - -   08/19/17 1429 (!) 100.6 °F (38.1 °C) 90 23 (!) 156/91 94 %       Intake/Output Summary (Last 24 hours) at 08/20/17 1152  Last data filed at 08/20/17 0430   Gross per 24 hour   Intake                0 ml   Output             1675 ml   Net            -1675 ml        PHYSICAL EXAM:  General: arousable but still confused, speech is garbelled , family at bedside, children and wife,  no acute distress  on peg feeding, has a kelly draining clear urine  EENT:  EOMI. Anicteric sclerae. MMM, has a facial nerve involvement on the right side , drooling a little  Resp:  CTA bilaterally, no wheezing or rales. No accessory muscle use  CV:  Regular  rhythm,  No edema  GI:  Soft, Non distended, Non tender.  +Bowel sounds, peg in place   Neuro , sleeping , drowsy, can be woken up , does not follow any instruction , speech is garbelled, cannot evaluate anything else,   Skin:  No rashes. No jaundice    Reviewed most current lab test results and cultures  YES  Reviewed most current radiology test results   YES  Review and summation of old records today    NO  Reviewed patient's current orders and MAR    YES  PMH/SH reviewed - no change compared to H&P  ________________________________________________________________________  Care Plan discussed with:    Comments   Patient     Family  y    RN y    Care Manager     Consultant                        Multidiciplinary team rounds were held today with , nursing, pharmacist and clinical coordinator. Patient's plan of care was discussed; medications were reviewed and discharge planning was addressed.      ________________________________________________________________________  Total NON critical care TIME: 45Minutes    Total CRITICAL CARE TIME Spent: Minutes non procedure based      Comments   >50% of visit spent in counseling and coordination of care y    ________________________________________________________________________  Ashley Baker MD     Procedures: see electronic medical records for all procedures/Xrays and details which were not copied into this note but were reviewed prior to creation of Plan. LABS:  I reviewed today's most current labs and imaging studies.   Pertinent labs include:  Recent Labs      08/20/17   0438  08/19/17   1444   WBC  7.7  6.8   HGB  10.7*  11.1*   HCT  32.9*  34.1*   PLT  88*  114*     Recent Labs      08/20/17   0438  08/19/17   1514  08/19/17   1444   NA  136   --   136   K  3.3*   --   3.7   CL  102   --   95*   CO2  27   --   34*   GLU  96   --   139*   BUN  8   --   13   CREA  0.47*   --   0.67*   CA  8.4*   --   8.9   MG  1.5*   --    --    ALB  2.6*   --   3.3*   TBILI  0.7   --   0.4   SGOT  12*   --   12*   ALT  11*   --   14   INR   --   1.1   --

## 2017-08-20 NOTE — PROGRESS NOTES
Initial Nutrition Assessment:    INTERVENTIONS/RECOMMENDATIONS:   · Initiate continuous TF via PEG  · Jevity 1.5 @ 20 ml/hr increasing by 10 ml/hr q 4 hrs to goal rate of 50 ml/hr + 150 ml water flush q 4 hrs (1800 kcal, 77g protein, 1812 mL water)  · Keep HOB 30 degrees or greater  · Replete K+  · May need vit D3, low vit D lab on 7/16    ASSESSMENT:   Chart reviewed, medically noted for acute encephalopathy, s/p PEG 7/18/17. Pt was seen by RD earlier this month for TF management. TF recommendations shown above. Noted for low K+, needs repleting. Vit D low on 7/16, may need supplementation. Will monitor TF intake and tolerance. Past Medical History:   Diagnosis Date    Alcohol abuse, in remission     Seizures ([facility] [address]        Diet Order: NPO  % Eaten:  No data found. Pertinent Medications: [x]Reviewed []Other (PPI, seroquel, thiamine)  Pertinent Labs: [x]Reviewed []Other (K+ 3.3, Mg 1.5)  Food Allergies: [x]NKFA  []Other   Last BM:      Skin:    [x] Intact   [] Incision  [] Breakdown  [] Other: Wt Readings from Last 30 Encounters:   08/19/17 68 kg (150 lb)   07/27/17 68 kg (150 lb)   07/22/17 69 kg (152 lb 3.2 oz)   06/21/17 70.3 kg (155 lb)   03/21/17 75.3 kg (166 lb)   03/18/17 68 kg (150 lb)   03/07/17 75.3 kg (166 lb)   02/25/17 76.2 kg (168 lb)   02/18/17 85.5 kg (188 lb 7.9 oz)   09/03/12 87.1 kg (192 lb)       Anthropometrics:   Height:   Weight: 68 kg (150 lb)   IBW (%IBW):   ( ) UBW (%UBW):   (  %)   Last Weight Metrics:  Weight Loss Metrics 8/19/2017 7/27/2017 7/22/2017 6/21/2017 3/21/2017 3/18/2017 3/7/2017   Today's Wt 150 lb 150 lb 152 lb 3.2 oz 155 lb 166 lb 150 lb 166 lb   BMI 22.81 kg/m2 22.81 kg/m2 23.14 kg/m2 23.57 kg/m2 25.24 kg/m2 22.81 kg/m2 24.51 kg/m2       BMI: Body mass index is 22.81 kg/(m^2).     This BMI is indicative of:   []Underweight    [x]Normal    []Overweight    [] Obesity   [] Extreme Obesity (BMI>40)     Estimated Nutrition Needs (Based on):   1750 Kcals/day (MSJ: 1346 x 1.3) , 70 g (1 g/kg) Protein  Carbohydrate:  At Least 130 g/day  Fluids: 1750 mL/day (1ml/kcal) or per primary team    NUTRITION DIAGNOSES:   Problem:  No nutritional diagnosis at this time      Etiology: related to       Signs/Symptoms: as evidenced by        NUTRITION INTERVENTIONS:    Enteral/Parenteral Nutrition: Initiate enteral nutrition                GOAL:   meet TF goal rate of 50 ml/hr in 1-3 days    LEARNING NEEDS (Diet, Food/Nutrient-Drug Interaction):    [x] None Identified   [] Identified and Education Provided/Documented   [] Identified and Pt declined/was not appropriate     Cultureal, Yarsani, OR Ethnic Dietary Needs:    [x] None Identified   [] Identified and Addressed     [x] Interdisciplinary Care Plan Reviewed/Documented    [x] Discharge Planning:   Continue current TF regimen    MONITORING /EVALUATION:      Food/Nutrient Intake Outcomes: Enteral/parenteral nutrition intake  Physical Signs/Symptoms Outcomes: Weight/weight change, Electrolyte and renal profile, GI profile, Glucose profile, GI    NUTRITION RISK:    [] High              [x] Moderate           []  Low  []  Minimal/Uncompromised    PT SEEN FOR:    [x]  MD Consult: []Calorie Count      []Diabetic Diet Education        []Diet Education     []Electrolyte Management     []General Nutrition Management and Supplements     [x]Management of Tube Feeding     []TPN Recommendations    []  RN Referral:  []MST score >=2     []Enteral/Parenteral Nutrition PTA     []Pregnant: Gestational DM or Multigestation     []Pressure Ulcer/Wound Care needs        []  Low BMI  []  DTR Referral       [employer], RDN  Pager [phone]  Weekend Pager [phone]

## 2017-08-20 NOTE — PROGRESS NOTES
Consult for nuerology called 945 am. Awaiting return call. Called for tube feeding from dietary 948 am.   Patient is without IV night nurse unable to obtain IV paging MD to notify. 24# IV obtained in right wrist IV fluid started.  Tube feeding started at 30 mL an hour with flush Q 4. 11:00

## 2017-08-20 NOTE — PROGRESS NOTES
Primary Nurse Chalo Arciniega RN and Mando RN performed a dual skin assessment on this patient No impairment noted  Klever score is 16

## 2017-08-20 NOTE — CONSULTS
NEUROLOGY NOTE     DATE OF CONSULTATION: 8/20/2017    CONSULTED BY: Praveen Almeida MD    Chief Complaint   Patient presents with    Altered mental status     Pt. Marielos Conv. 0800 shift change noted slurred speech and right sided weakness. Pt. has a UTI        Reason for Consult  I have been asked to see the patient in neurological consultation to render advice and opinion regarding altered mental status    HISTORY OF PRESENT ILLNESS  Raghu Zepeda is a 80 y.o. male who presents to the hospital because of Altered mental status. The history cannot be obtained from the patient and no family is around. History obtained by chart review. The patient came is a nursing home resident and according to EMS was found on the floor after ground-level fall. According to the family, the patient is interactive and has normal speech at baseline. The patient was admitted for further workup. On blood work and UA, patient had UTI and is on antibiotics. The patient did have a CT scan of the head at the time of admission and that was normal.  Ammonia level is 14 and Keppra level is pending. I am seeing the patient in the past admission in June when he presented because of incomprehensible speech. His symptoms did improve with time. His seizure medications were adjusted at that time.         ROS  A ten system review of constitutional, cardiovascular, respiratory, musculoskeletal, endocrine, skin, SHEENT, genitourinary, psychiatric and neurologic systems was obtained and is unremarkable except as stated in HPI     PMH  Past Medical History:   Diagnosis Date    Alcohol abuse, in remission     Seizures ([facility] [address]        FH  Family History   Problem Relation Age of Onset    Other Other      no strokes or seizures    Cancer Mother        SH  Social History     Social History    Marital status:      Spouse name: N/A    Number of children: N/A    Years of education: N/A     Social History Main Topics    Smoking status: Never Smoker    Smokeless tobacco: Never Used    Alcohol use No      Comment: Quit drinking 10 years ago    Drug use: No    Sexual activity: Not Asked     Other Topics Concern    None     Social History Narrative       ALLERGIES  Allergies   Allergen Reactions    No Known Allergies        PHYSICAL EXAM  EXAMINATION:   Patient Vitals for the past 24 hrs:   Temp Pulse Resp BP SpO2   08/20/17 1334 97.6 °F (36.4 °C) 60 18 122/54 96 %   08/20/17 0741 98.5 °F (36.9 °C) 98 20 120/58 96 %   08/20/17 0425 100.4 °F (38 °C) 87 21 127/66 92 %   08/19/17 2257 99.3 °F (37.4 °C) 78 22 - 94 %   08/19/17 2247 - - - 139/71 -   08/19/17 2140 98.7 °F (37.1 °C) - 20 146/68 97 %   08/19/17 1615 - 91 20 (!) 155/97 96 %   08/19/17 1600 - 98 25 (!) 141/97 96 %   08/19/17 1441 (!) 101 °F (38.3 °C) - - - -   08/19/17 1429 (!) 100.6 °F (38.1 °C) 90 23 (!) 156/91 94 %        General:   General appearance: Pt is in no acute distress   Distal pulses are preserved  Fundoscopic exam: attempted    Neurological Examination:   Mental Status: Patient is drowsy and on stimulation has incomprehensible speech. He follows very simple commands and multiple repetitions. Cannot assess fund of knowledge, attention, short term recall, comprehension and insight. Cranial Nerves: Pupils are equal and reactive to light, face is symmetric    Motor: No focal weakness on either side. Sensation: Does not respond to painful stimuli    Reflexes: 1+ in the upper extremities and absent in lower extremities    Coordination/Cerebellar: Unable to assess    Gait: Unable to assess secondary to mental status    Skin: No significant bruising or lacerations.     LAB DATA REVIEWED:    Results for orders placed or performed during the hospital encounter of 08/19/17   CULTURE, BLOOD, PAIRED   Result Value Ref Range    Special Requests: NO SPECIAL REQUESTS      Culture result: NO GROWTH AFTER 14 HOURS     CBC WITH AUTOMATED DIFF   Result Value Ref Range    WBC 6.8 4.1 - 11.1 K/uL    RBC 3.29 (L) 4.10 - 5.70 M/uL    HGB 11.1 (L) 12.1 - 17.0 g/dL    HCT 34.1 (L) 36.6 - 50.3 %    .6 (H) 80.0 - 99.0 FL    MCH 33.7 26.0 - 34.0 PG    MCHC 32.6 30.0 - 36.5 g/dL    RDW 13.4 11.5 - 14.5 %    PLATELET 118 (L) 583 - 400 K/uL    NEUTROPHILS 81 (H) 32 - 75 %    LYMPHOCYTES 13 12 - 49 %    MONOCYTES 6 5 - 13 %    EOSINOPHILS 0 0 - 7 %    BASOPHILS 0 0 - 1 %    ABS. NEUTROPHILS 5.5 1.8 - 8.0 K/UL    ABS. LYMPHOCYTES 0.9 0.8 - 3.5 K/UL    ABS. MONOCYTES 0.4 0.0 - 1.0 K/UL    ABS. EOSINOPHILS 0.0 0.0 - 0.4 K/UL    ABS. BASOPHILS 0.0 0.0 - 0.1 K/UL   METABOLIC PANEL, COMPREHENSIVE   Result Value Ref Range    Sodium 136 136 - 145 mmol/L    Potassium 3.7 3.5 - 5.1 mmol/L    Chloride 95 (L) 97 - 108 mmol/L    CO2 34 (H) 21 - 32 mmol/L    Anion gap 7 5 - 15 mmol/L    Glucose 139 (H) 65 - 100 mg/dL    BUN 13 6 - 20 MG/DL    Creatinine 0.67 (L) 0.70 - 1.30 MG/DL    BUN/Creatinine ratio 19 12 - 20      GFR est AA >60 >60 ml/min/1.73m2    GFR est non-AA >60 >60 ml/min/1.73m2    Calcium 8.9 8.5 - 10.1 MG/DL    Bilirubin, total 0.4 0.2 - 1.0 MG/DL    ALT (SGPT) 14 12 - 78 U/L    AST (SGOT) 12 (L) 15 - 37 U/L    Alk.  phosphatase 75 45 - 117 U/L    Protein, total 8.3 (H) 6.4 - 8.2 g/dL    Albumin 3.3 (L) 3.5 - 5.0 g/dL    Globulin 5.0 (H) 2.0 - 4.0 g/dL    A-G Ratio 0.7 (L) 1.1 - 2.2     LACTIC ACID   Result Value Ref Range    Lactic acid 4.1 (HH) 0.4 - 2.0 MMOL/L   URINALYSIS W/ RFLX MICROSCOPIC   Result Value Ref Range    Color DARK YELLOW      Appearance CLOUDY (A) CLEAR      Specific gravity 1.020 1.003 - 1.030      pH (UA) 8.0 5.0 - 8.0      Protein 100 (A) NEG mg/dL    Glucose NEGATIVE  NEG mg/dL    Ketone TRACE (A) NEG mg/dL    Bilirubin NEGATIVE  NEG      Blood LARGE (A) NEG      Urobilinogen 1.0 0.2 - 1.0 EU/dL    Nitrites NEGATIVE  NEG      Leukocyte Esterase LARGE (A) NEG      WBC >100 (H) 0 - 4 /hpf    RBC >100 (H) 0 - 5 /hpf    Epithelial cells FEW FEW /lpf    Bacteria NEGATIVE NEG /hpf    Hyaline cast 2-5 0 - 5 /lpf   PROTHROMBIN TIME + INR   Result Value Ref Range    INR 1.1 0.9 - 1.1      Prothrombin time 11.6 (H) 9.0 - 11.1 sec   PTT   Result Value Ref Range    aPTT 31.0 22.1 - 32.5 sec    aPTT, therapeutic range     58.0 - 77.0 SECS   LACTIC ACID   Result Value Ref Range    Lactic acid 1.8 0.4 - 2.0 MMOL/L   AMMONIA   Result Value Ref Range    Ammonia 14 <32 UMOL/L   CBC WITH AUTOMATED DIFF   Result Value Ref Range    WBC 7.7 4.1 - 11.1 K/uL    RBC 3.18 (L) 4.10 - 5.70 M/uL    HGB 10.7 (L) 12.1 - 17.0 g/dL    HCT 32.9 (L) 36.6 - 50.3 %    .5 (H) 80.0 - 99.0 FL    MCH 33.6 26.0 - 34.0 PG    MCHC 32.5 30.0 - 36.5 g/dL    RDW 13.2 11.5 - 14.5 %    PLATELET 88 (L) 493 - 400 K/uL    NEUTROPHILS 72 32 - 75 %    LYMPHOCYTES 15 12 - 49 %    MONOCYTES 13 5 - 13 %    EOSINOPHILS 0 0 - 7 %    BASOPHILS 0 0 - 1 %    ABS. NEUTROPHILS 5.5 1.8 - 8.0 K/UL    ABS. LYMPHOCYTES 1.2 0.8 - 3.5 K/UL    ABS. MONOCYTES 1.0 0.0 - 1.0 K/UL    ABS. EOSINOPHILS 0.0 0.0 - 0.4 K/UL    ABS. BASOPHILS 0.0 0.0 - 0.1 K/UL   MAGNESIUM   Result Value Ref Range    Magnesium 1.5 (L) 1.6 - 2.4 mg/dL   METABOLIC PANEL, COMPREHENSIVE   Result Value Ref Range    Sodium 136 136 - 145 mmol/L    Potassium 3.3 (L) 3.5 - 5.1 mmol/L    Chloride 102 97 - 108 mmol/L    CO2 27 21 - 32 mmol/L    Anion gap 7 5 - 15 mmol/L    Glucose 96 65 - 100 mg/dL    BUN 8 6 - 20 MG/DL    Creatinine 0.47 (L) 0.70 - 1.30 MG/DL    BUN/Creatinine ratio 17 12 - 20      GFR est AA >60 >60 ml/min/1.73m2    GFR est non-AA >60 >60 ml/min/1.73m2    Calcium 8.4 (L) 8.5 - 10.1 MG/DL    Bilirubin, total 0.7 0.2 - 1.0 MG/DL    ALT (SGPT) 11 (L) 12 - 78 U/L    AST (SGOT) 12 (L) 15 - 37 U/L    Alk.  phosphatase 67 45 - 117 U/L    Protein, total 7.1 6.4 - 8.2 g/dL    Albumin 2.6 (L) 3.5 - 5.0 g/dL    Globulin 4.5 (H) 2.0 - 4.0 g/dL    A-G Ratio 0.6 (L) 1.1 - 2.2     HEMOGLOBIN A1C WITH EAG   Result Value Ref Range    Hemoglobin A1c <3.5 (L) 4.2 - 6.3 %    Est. average glucose Cannot be calculated mg/dL   TSH 3RD GENERATION   Result Value Ref Range    TSH 0.30 (L) 0.36 - 3.74 uIU/mL        Imaging review:  2017  CT scan of the head without contrast  No evidence for acute intracranial abnormality    HOME MEDS  Prior to Admission Medications   Prescriptions Last Dose Informant Patient Reported? Taking? QUEtiapine (SEROQUEL) 25 mg tablet Unknown at Unknown time  No No   Si Tab by Per G Tube route nightly. acetaminophen (TYLENOL) 325 mg tablet Unknown at Unknown time  No No   Si Tabs by Per G Tube route every six (6) hours as needed. albuterol-ipratropium (DUO-NEB) 2.5 mg-0.5 mg/3 ml nebu Unknown at Unknown time Transfer Papers No No   Sig: 3 mL by Nebulization route every six (6) hours as needed. atorvastatin (LIPITOR) 40 mg tablet Unknown at Unknown time Transfer Papers Yes No   Si mg by Per G Tube route nightly. cyclobenzaprine (FLEXERIL) 5 mg tablet Unknown at Unknown time  No No   Si Tab by Per G Tube route three (3) times daily as needed for Muscle Spasm(s). latanoprost (XALATAN) 0.005 % ophthalmic solution Unknown at Unknown time Transfer Papers No No   Sig: Administer 1 Drop to both eyes every evening. levETIRAcetam (KEPPRA) 100 mg/ml soln oral solution Unknown at Unknown time  No No   Sig: 15 mL by Per G Tube route two (2) times a day. metoprolol tartrate (LOPRESSOR) 25 mg tablet Unknown at Unknown time  No No   Si.5 Tabs by Per G Tube route two (2) times a day. multivit w-min-ferrous gluconate (CEROVITE) 9 mg iron/15 mL oral liquid Unknown at Unknown time  No No   Sig: 15 mL by Per G Tube route daily. nitrofurantoin, macrocrystal-monohydrate, (MACROBID) 100 mg capsule Unknown at Unknown time  No No   Sig: Take 1 Cap by mouth nightly. oxyCODONE (OXYIR) 5 mg capsule Unknown at Unknown time  No No   Si Cap by Per G Tube route every four (4) hours as needed.  Max Daily Amount: 30 mg.   pantoprazole (PROTONIX) 40 mg granules for oral suspension Unknown at Unknown time  No No   Si mg by Per G Tube route Before breakfast and dinner. pregabalin (LYRICA) 300 mg capsule Unknown at Unknown time  No No   Sig: Take 1 Cap by mouth two (2) times a day. Max Daily Amount: 600 mg.   thiamine (B-1) 100 mg tablet Unknown at Unknown time  No No   Si Tab by Per G Tube route daily. valproic acid, as sodium salt, (DEPAKENE) 250 mg/5 mL (5 mL) soln oral solution Unknown at Unknown time  No No   Sig: 10 mL by Per G Tube route three (3) times daily. Facility-Administered Medications: None       CURRENT MEDS  Current Facility-Administered Medications   Medication Dose Route Frequency    potassium chloride 10 mEq in 100 ml IVPB  10 mEq IntraVENous Q1H    atorvastatin (LIPITOR) tablet 40 mg  40 mg Per G Tube QHS    latanoprost (XALATAN) 0.005 % ophthalmic solution 1 Drop  1 Drop Both Eyes QPM    levETIRAcetam (KEPPRA) oral solution 1,500 mg  1,500 mg Per G Tube BID    metoprolol tartrate (LOPRESSOR) tablet 12.5 mg  12.5 mg Per G Tube BID    pantoprazole (PROTONIX) granules for oral suspension 40 mg  40 mg Per G Tube ACB&D    QUEtiapine (SEROquel) tablet 25 mg  25 mg Per G Tube QHS    thiamine (B-1) tablet 100 mg  100 mg Per G Tube DAILY    valproic acid (as sodium salt) (DEPAKENE) 250 mg/5 mL (5 mL) oral solution 500 mg  500 mg Per G Tube TID    0.9% sodium chloride infusion  100 mL/hr IntraVENous CONTINUOUS    cefTRIAXone (ROCEPHIN) 1 g in 0.9% sodium chloride (MBP/ADV) 50 mL  1 g IntraVENous Q24H    enoxaparin (LOVENOX) injection 40 mg  40 mg SubCUTAneous Q24H       IMPRESSION:  Tiffani Gordon is a 80 y.o. male who presents with altered mental status. The patient does have history of seizures and does take Keppra 1500 mg p.o. twice daily, Depakote 500 mg p.o. 3 times daily and Lyrica 300 mg p.o. twice daily for seizures. He does also have UTI and is on antibiotics. No focal neurological deficits.     1. Encephalopathy/altered mental status secondary to postictal state versus UTI  2. History of seizures    RECOMMENDATIONS:  1. Depakote level and Keppra level  2. Resume Lyrica 300 mg p.o. twice daily  3. Continue Depakote 500 mg p.o. 3 times daily and Keppra 1500 mg p.o. twice daily  4. EEG to rule out nonconvulsive status epilepticus  5. Treatment of UTI as per primary team  6. MRI brain without contrast  7. Continue to follow    Thank you very much for this consultation. We will follow up on the above studies and give further recommendations as indicated.       Arcenio Duarte MD  Neurologist

## 2017-08-20 NOTE — PROGRESS NOTES
Hospitalist Progress Note    NAME: Mukesh Levi   :  1934   MRN:  [id number]       Assessment / Plan:  Sepsis POA in settings of UTI POA due to indwelling Cummins   Lactic acidosis POA, resolved   Fever/tachycardia resolved  Cummins since last admission due to urinary retention   BC + bacilus species 1/- likely contamination, will repeat   UC:  Possible gram + cocci   Empiric CTX day 3 pending UC, will add vanco to cover gram + in urine    Last admission pt was DC on prophylaxis with Macrobid     Afib / SVT   HR controlled   Continue BB    Hypokalemia / hypomagnesemia   Supplement as needed     Acute encephalopathy POA  Likely multifactorial. Neurology consider post ictal state. Last admission when he had seizures it took 5 days for him to start improving with MS. Head CT/ MRI:  negative   Avoid oversedation  Speech:  NPO with TF via PEG. [id number] Saige Doherty for occasional bite of applesauce or tsp amount of honey thick liquids only if patient fully alert and HE is requesting     Seizure disorder  Neurology help appreciated: cont Lyrica/Keppra/depakote   EEG to r/o non convulsive status epilepticus     PEG placement 17 for dysphagia  c/w tube feedings and monitor    Urinary retention  last admission and was D/C with Cummins cath  May consider voiding trial once more oriented.       Code status: Full  Surrogate Decision Maker: wife Gene [phone] and DTR Makayla [phone]  Prophylaxis: SCD's      Body mass index is 22.81 kg/(m^2). Baseline:  lives in [place], [place] , communicative   Recommended Disposition: back to [facility]     [facility]       Subjective:     Chief Complaint / Reason for Physician Visit: following UTI / encephalopathy/ sepsis  MS: per neurology slightly better then yesterday, keeps eyes open       Discussed with RN events overnight.      Review of Systems:  Symptom Y/N Comments  Symptom Y/N Comments   Fever/Chills    Chest Pain     Poor Appetite    Edema     Cough    Abdominal Pain     Sputum Joint Pain     SOB/AL    Pruritis/Rash     Nausea/vomit    Tolerating PT/OT     Diarrhea    Tolerating Diet     Constipation    Other       Could NOT obtain due to: AMS      Objective:     VITALS:   Last 24hrs VS reviewed since prior progress note. Most recent are:  Patient Vitals for the past 24 hrs:   Temp Pulse Resp BP SpO2   08/20/17 1334 97.6 °F (36.4 °C) 60 18 122/54 96 %   08/20/17 0741 98.5 °F (36.9 °C) 98 20 120/58 96 %   08/20/17 0425 100.4 °F (38 °C) 87 21 127/66 92 %   08/19/17 2257 99.3 °F (37.4 °C) 78 22 - 94 %   08/19/17 2247 - - - 139/71 -   08/19/17 2140 98.7 °F (37.1 °C) - 20 146/68 97 %   08/19/17 1615 - 91 20 (!) 155/97 96 %   08/19/17 1600 - 98 25 (!) 141/97 96 %   08/19/17 1441 (!) 101 °F (38.3 °C) - - - -   08/19/17 1429 (!) 100.6 °F (38.1 °C) 90 23 (!) 156/91 94 %       Intake/Output Summary (Last 24 hours) at 08/20/17 1347  Last data filed at 08/20/17 0430   Gross per 24 hour   Intake                0 ml   Output             1675 ml   Net            -1675 ml        PHYSICAL EXAM:  General: WD, WN. Drowsy, eyes open but not maintain eye contact. No acute distress    EENT:  EOMI. Anicteric sclerae. MMM  Resp:  CTA bilaterally, no wheezing or rales. No accessory muscle use  CV:  Regular  rhythm,  No edema  GI:  Soft, Non distended, Non tender.  +Bowel sounds  Neurologic:  Alert and oriented X 0, not following commands    Psych:   Poor  insight. Not anxious nor agitated  Skin:  No rashes.   No jaundice    Reviewed most current lab test results and cultures  YES  Reviewed most current radiology test results   YES  Review and summation of old records today    NO  Reviewed patient's current orders and MAR    YES  PMH/SH reviewed - no change compared to H&P  ________________________________________________________________________  Care Plan discussed with:    Comments   Patient     Family      RN y    Care Manager y    Consultant  y Neurology                      Multidiciplinary team rounds were held today with , nursing, pharmacist and clinical coordinator. Patient's plan of care was discussed; medications were reviewed and discharge planning was addressed. ________________________________________________________________________  Total NON critical care TIME:  35   Minutes    Total CRITICAL CARE TIME Spent:   Minutes non procedure based      Comments   >50% of visit spent in counseling and coordination of care     ________________________________________________________________________  Livia Baker MD     Procedures: see electronic medical records for all procedures/Xrays and details which were not copied into this note but were reviewed prior to creation of Plan. LABS:  I reviewed today's most current labs and imaging studies.   Pertinent labs include:  Recent Labs      08/20/17   0438  08/19/17   1444   WBC  7.7  6.8   HGB  10.7*  11.1*   HCT  32.9*  34.1*   PLT  88*  114*     Recent Labs      08/20/17   0438  08/19/17   1514  08/19/17   1444   NA  136   --   136   K  3.3*   --   3.7   CL  102   --   95*   CO2  27   --   34*   GLU  96   --   139*   BUN  8   --   13   CREA  0.47*   --   0.67*   CA  8.4*   --   8.9   MG  1.5*   --    --    ALB  2.6*   --   3.3*   TBILI  0.7   --   0.4   SGOT  12*   --   12*   ALT  11*   --   14   INR   --   1.1   --        Signed: Livia Baker MD

## 2017-08-21 ENCOUNTER — APPOINTMENT (OUTPATIENT)
Dept: MRI IMAGING | Age: 82
DRG: 698 | End: 2017-08-21
Attending: INTERNAL MEDICINE
Payer: MEDICARE

## 2017-08-21 LAB
ANION GAP SERPL CALC-SCNC: 5 MMOL/L (ref 5–15)
BUN SERPL-MCNC: 10 MG/DL (ref 6–20)
BUN/CREAT SERPL: 22 (ref 12–20)
CALCIUM SERPL-MCNC: 8.5 MG/DL (ref 8.5–10.1)
CHLORIDE SERPL-SCNC: 103 MMOL/L (ref 97–108)
CO2 SERPL-SCNC: 30 MMOL/L (ref 21–32)
CREAT SERPL-MCNC: 0.45 MG/DL (ref 0.7–1.3)
GLUCOSE SERPL-MCNC: 98 MG/DL (ref 65–100)
LACTATE SERPL-SCNC: 1.7 MMOL/L (ref 0.4–2)
LEVETIRACETAM SERPL-MCNC: 29 UG/ML (ref 10–40)
MAGNESIUM SERPL-MCNC: 1.5 MG/DL (ref 1.6–2.4)
POTASSIUM SERPL-SCNC: 3.8 MMOL/L (ref 3.5–5.1)
SODIUM SERPL-SCNC: 138 MMOL/L (ref 136–145)
VALPROATE SERPL-MCNC: 76 UG/ML (ref 50–100)

## 2017-08-21 PROCEDURE — 74011250636 HC RX REV CODE- 250/636: Performed by: HOSPITALIST

## 2017-08-21 PROCEDURE — 77030018798 HC PMP KT ENTRL FED COVD -A

## 2017-08-21 PROCEDURE — 74011250637 HC RX REV CODE- 250/637: Performed by: HOSPITALIST

## 2017-08-21 PROCEDURE — 97161 PT EVAL LOW COMPLEX 20 MIN: CPT

## 2017-08-21 PROCEDURE — 83605 ASSAY OF LACTIC ACID: CPT | Performed by: INTERNAL MEDICINE

## 2017-08-21 PROCEDURE — 70553 MRI BRAIN STEM W/O & W/DYE: CPT

## 2017-08-21 PROCEDURE — 80048 BASIC METABOLIC PNL TOTAL CA: CPT | Performed by: HOSPITALIST

## 2017-08-21 PROCEDURE — 65660000000 HC RM CCU STEPDOWN

## 2017-08-21 PROCEDURE — 74011250637 HC RX REV CODE- 250/637: Performed by: INTERNAL MEDICINE

## 2017-08-21 PROCEDURE — A9576 INJ PROHANCE MULTIPACK: HCPCS | Performed by: HOSPITALIST

## 2017-08-21 PROCEDURE — 74011000258 HC RX REV CODE- 258: Performed by: INTERNAL MEDICINE

## 2017-08-21 PROCEDURE — 74011250637 HC RX REV CODE- 250/637: Performed by: PSYCHIATRY & NEUROLOGY

## 2017-08-21 PROCEDURE — 83735 ASSAY OF MAGNESIUM: CPT | Performed by: HOSPITALIST

## 2017-08-21 PROCEDURE — 74011250636 HC RX REV CODE- 250/636: Performed by: INTERNAL MEDICINE

## 2017-08-21 PROCEDURE — 80164 ASSAY DIPROPYLACETIC ACD TOT: CPT | Performed by: PSYCHIATRY & NEUROLOGY

## 2017-08-21 PROCEDURE — 92610 EVALUATE SWALLOWING FUNCTION: CPT

## 2017-08-21 PROCEDURE — 74011250636 HC RX REV CODE- 250/636: Performed by: EMERGENCY MEDICINE

## 2017-08-21 PROCEDURE — 36415 COLL VENOUS BLD VENIPUNCTURE: CPT | Performed by: INTERNAL MEDICINE

## 2017-08-21 RX ORDER — MAGNESIUM SULFATE HEPTAHYDRATE 40 MG/ML
2 INJECTION, SOLUTION INTRAVENOUS
Status: COMPLETED | OUTPATIENT
Start: 2017-08-21 | End: 2017-08-21

## 2017-08-21 RX ORDER — POTASSIUM CHLORIDE 1.5 G/1.77G
40 POWDER, FOR SOLUTION ORAL
Status: COMPLETED | OUTPATIENT
Start: 2017-08-21 | End: 2017-08-21

## 2017-08-21 RX ORDER — VANCOMYCIN 1.75 GRAM/500 ML IN 0.9 % SODIUM CHLORIDE INTRAVENOUS
1750
Status: COMPLETED | OUTPATIENT
Start: 2017-08-21 | End: 2017-08-21

## 2017-08-21 RX ORDER — VANCOMYCIN HYDROCHLORIDE
1250 EVERY 12 HOURS
Status: DISCONTINUED | OUTPATIENT
Start: 2017-08-22 | End: 2017-08-24

## 2017-08-21 RX ADMIN — LEVETIRACETAM 1500 MG: 100 SOLUTION ORAL at 18:21

## 2017-08-21 RX ADMIN — POTASSIUM CHLORIDE 40 MEQ: 1.5 POWDER, FOR SOLUTION ORAL at 15:57

## 2017-08-21 RX ADMIN — MAGNESIUM SULFATE HEPTAHYDRATE 2 G: 40 INJECTION, SOLUTION INTRAVENOUS at 05:44

## 2017-08-21 RX ADMIN — VALPROIC ACID 500 MG: 250 SOLUTION ORAL at 23:14

## 2017-08-21 RX ADMIN — LEVETIRACETAM 1500 MG: 100 SOLUTION ORAL at 09:59

## 2017-08-21 RX ADMIN — PANTOPRAZOLE SODIUM 40 MG: 40 GRANULE, DELAYED RELEASE ORAL at 09:59

## 2017-08-21 RX ADMIN — VALPROIC ACID 500 MG: 250 SOLUTION ORAL at 15:59

## 2017-08-21 RX ADMIN — PANTOPRAZOLE SODIUM 40 MG: 40 GRANULE, DELAYED RELEASE ORAL at 15:57

## 2017-08-21 RX ADMIN — QUETIAPINE FUMARATE 25 MG: 25 TABLET, FILM COATED ORAL at 23:14

## 2017-08-21 RX ADMIN — LATANOPROST 1 DROP: 50 SOLUTION OPHTHALMIC at 17:08

## 2017-08-21 RX ADMIN — PREGABALIN 300 MG: 150 CAPSULE ORAL at 17:01

## 2017-08-21 RX ADMIN — CEFTRIAXONE 1 G: 1 INJECTION, POWDER, FOR SOLUTION INTRAMUSCULAR; INTRAVENOUS at 17:08

## 2017-08-21 RX ADMIN — METOPROLOL TARTRATE 12.5 MG: 25 TABLET ORAL at 17:04

## 2017-08-21 RX ADMIN — SODIUM CHLORIDE 100 ML/HR: 900 INJECTION, SOLUTION INTRAVENOUS at 13:29

## 2017-08-21 RX ADMIN — VANCOMYCIN HYDROCHLORIDE 1750 MG: 10 INJECTION, POWDER, LYOPHILIZED, FOR SOLUTION INTRAVENOUS at 16:13

## 2017-08-21 RX ADMIN — VALPROIC ACID 500 MG: 250 SOLUTION ORAL at 17:02

## 2017-08-21 RX ADMIN — ENOXAPARIN SODIUM 40 MG: 40 INJECTION SUBCUTANEOUS at 17:02

## 2017-08-21 RX ADMIN — GADOTERIDOL 15 ML: 279.3 INJECTION, SOLUTION INTRAVENOUS at 08:13

## 2017-08-21 RX ADMIN — Medication 100 MG: at 09:59

## 2017-08-21 RX ADMIN — VALPROIC ACID 500 MG: 250 SOLUTION ORAL at 09:59

## 2017-08-21 RX ADMIN — METOPROLOL TARTRATE 12.5 MG: 25 TABLET ORAL at 09:59

## 2017-08-21 RX ADMIN — PREGABALIN 300 MG: 150 CAPSULE ORAL at 05:45

## 2017-08-21 RX ADMIN — ATORVASTATIN CALCIUM 40 MG: 40 TABLET, FILM COATED ORAL at 23:14

## 2017-08-21 NOTE — PROGRESS NOTES
Pharmacy Automatic Renal Dosing Protocol - Antimicrobials    Indication for Antimicrobials: UTI  Current Regimen of Each Antimicrobial (Start Day & Day of Therapy):  Vancomycin 1750 mg x 1, followed by 1250 mg IV Q12H (Start 17 - Day 1)  CTX 1g IV Q24H (Start  - Day 3  Significant cultures:     17 Blood - Bacillus, not anthracis  - Prelim  17 Urine - GPC - Prelim    CAPD, Intermittent Hemodialysis or Renal Replacement Therapy: NA  Paralysis, amputations, malnutrition: None noted  Recent Labs      17   0439  17   0438  17   1444   CREA  0.45*  0.47*  0.67*   BUN  10  8  13   WBC   --   7.7  6.8     Temp (24hrs), Av.1 °F (36.7 °C), Min:97.7 °F (36.5 °C), Max:98.6 °F (37 °C)    Creatinine Clearance (ml/min): ~78 ml/min    Goal Vancomycin Level(s): ~15 initial; then 10-15 if strictly UTI    Impression/Plan:     Will add vancomycin therapy to ceftriaxone due to GPC in urine cx. Pharmacy will follow daily and adjust doses of monitored medications as appropriate for renal function and/or serum levels.     Thank you,  Yobani Young, JUANJOD

## 2017-08-21 NOTE — CONSULTS
NEUROLOGY NOTE     DATE OF CONSULTATION: 8/21/2017    CONSULTED BY: Melissa Bautista MD    Chief Complaint   Patient presents with    Altered mental status     Pt. Marielos Conv. 0800 shift change noted slurred speech and right sided weakness. Pt. has a UTI        SUBJECTIVE:  Minimal improvement in altered mental status. Yesterday he was drowsy but today he is a week. Still continues to have incomprehensible speech. MRI brain negative. HISTORY OF PRESENT ILLNESS  Alisha Hartman is a 80 y.o. male who presents to the hospital because of Altered mental status. The history cannot be obtained from the patient and no family is around. History obtained by chart review. The patient came is a nursing home resident and according to EMS was found on the floor after ground-level fall. According to the family, the patient is interactive and has normal speech at baseline. The patient was admitted for further workup. On blood work and UA, patient had UTI and is on antibiotics. The patient did have a CT scan of the head at the time of admission and that was normal.  Ammonia level is 14 and Keppra level is pending. I am seeing the patient in the past admission in June when he presented because of incomprehensible speech. His symptoms did improve with time. His seizure medications were adjusted at that time.     ROS  A ten system review of constitutional, cardiovascular, respiratory, musculoskeletal, endocrine, skin, SHEENT, genitourinary, psychiatric and neurologic systems was obtained and is unremarkable except as stated in HPI     PMH  Past Medical History:   Diagnosis Date    Alcohol abuse, in remission     Seizures ([address]        FH  Family History   Problem Relation Age of Onset    Other Other      no strokes or seizures    Cancer Mother        SH  Social History     Social History    Marital status:      Spouse name: N/A    Number of children: N/A    Years of education: N/A     Social History Main Topics    Smoking status: Never Smoker    Smokeless tobacco: Never Used    Alcohol use No      Comment: Quit drinking 10 years ago    Drug use: No    Sexual activity: Not Asked     Other Topics Concern    None     Social History Narrative       ALLERGIES  Allergies   Allergen Reactions    No Known Allergies        PHYSICAL EXAM  EXAMINATION:   Patient Vitals for the past 24 hrs:   Temp Pulse Resp BP SpO2   08/21/17 1213 98.1 °F (36.7 °C) 64 20 129/57 100 %   08/21/17 0300 97.7 °F (36.5 °C) 67 19 123/53 99 %   08/21/17 0003 97.8 °F (36.6 °C) 62 21 115/57 93 %   08/20/17 2057 98.6 °F (37 °C) 64 20 117/48 92 %   08/20/17 1529 98.8 °F (37.1 °C) 71 18 125/48 96 %   08/20/17 1334 97.6 °F (36.4 °C) 60 18 122/54 96 %        General:   General appearance: Pt is in no acute distress   Distal pulses are preserved    Neurological Examination:   Mental Status: Patient is awake and has incomprehensible speech. He follows very simple commands and multiple repetitions. Cannot assess fund of knowledge, attention, short term recall, comprehension and insight. Cranial Nerves: Pupils are equal and reactive to light, face is symmetric    Motor: No focal weakness on either side. Sensation: Does not respond to painful stimuli    Coordination/Cerebellar: Unable to assess    Gait: Unable to assess secondary to mental status    Skin: No significant bruising or lacerations. LAB DATA REVIEWED:    Results for orders placed or performed during the hospital encounter of 08/19/17   CULTURE, BLOOD, PAIRED   Result Value Ref Range    Special Requests: NO SPECIAL REQUESTS      Culture result: (A)       BACILLUS SPECIES, NOT ANTHRACIS GROWING IN 1 OF 4 BOTTLES DRAWN (L HAND SITE)    Culture result:        PRELIMINARY REPORT OF  GRAM VARIABLE RODS  GROWING IN 1 OF 4 BOTTLES DRAWN  CALLED TO AND READ BACK BY  [address] Barron Campo, RN ON 8/20/17 AT 2128 (C/ [address]).  MS      Culture result: REMAINING BOTTLE(S) HAS/HAVE NO GROWTH SO FAR CULTURE, URINE   Result Value Ref Range    Special Requests: NO SPECIAL REQUESTS      [place] Count >100,000  COLONIES/mL        Culture result: CHECKING FOR POSSIBLE GRAM POSITIVE COCCI (A)     CBC WITH AUTOMATED DIFF   Result Value Ref Range    WBC 6.8 4.1 - 11.1 K/uL    RBC 3.29 (L) 4.10 - 5.70 M/uL    HGB 11.1 (L) 12.1 - 17.0 g/dL    HCT 34.1 (L) 36.6 - 50.3 %    .6 (H) 80.0 - 99.0 FL    MCH 33.7 26.0 - 34.0 PG    MCHC 32.6 30.0 - 36.5 g/dL    RDW 13.4 11.5 - 14.5 %    PLATELET 315 (L) 920 - 400 K/uL    NEUTROPHILS 81 (H) 32 - 75 %    LYMPHOCYTES 13 12 - 49 %    MONOCYTES 6 5 - 13 %    EOSINOPHILS 0 0 - 7 %    BASOPHILS 0 0 - 1 %    ABS. NEUTROPHILS 5.5 1.8 - 8.0 K/UL    ABS. LYMPHOCYTES 0.9 0.8 - 3.5 K/UL    ABS. MONOCYTES 0.4 0.0 - 1.0 K/UL    ABS. EOSINOPHILS 0.0 0.0 - 0.4 K/UL    ABS. BASOPHILS 0.0 0.0 - 0.1 K/UL   METABOLIC PANEL, COMPREHENSIVE   Result Value Ref Range    Sodium 136 136 - 145 mmol/L    Potassium 3.7 3.5 - 5.1 mmol/L    Chloride 95 (L) 97 - 108 mmol/L    CO2 34 (H) 21 - 32 mmol/L    Anion gap 7 5 - 15 mmol/L    Glucose 139 (H) 65 - 100 mg/dL    BUN 13 6 - 20 MG/DL    Creatinine 0.67 (L) 0.70 - 1.30 MG/DL    BUN/Creatinine ratio 19 12 - 20      GFR est AA >60 >60 ml/min/1.73m2    GFR est non-AA >60 >60 ml/min/1.73m2    Calcium 8.9 8.5 - 10.1 MG/DL    Bilirubin, total 0.4 0.2 - 1.0 MG/DL    ALT (SGPT) 14 12 - 78 U/L    AST (SGOT) 12 (L) 15 - 37 U/L    Alk.  phosphatase 75 45 - 117 U/L    Protein, total 8.3 (H) 6.4 - 8.2 g/dL    Albumin 3.3 (L) 3.5 - 5.0 g/dL    Globulin 5.0 (H) 2.0 - 4.0 g/dL    A-G Ratio 0.7 (L) 1.1 - 2.2     LACTIC ACID   Result Value Ref Range    Lactic acid 4.1 (HH) 0.4 - 2.0 MMOL/L   URINALYSIS W/ RFLX MICROSCOPIC   Result Value Ref Range    Color DARK YELLOW      Appearance CLOUDY (A) CLEAR      Specific gravity 1.020 1.003 - 1.030      pH (UA) 8.0 5.0 - 8.0      Protein 100 (A) NEG mg/dL    Glucose NEGATIVE  NEG mg/dL    Ketone TRACE (A) NEG mg/dL    Bilirubin NEGATIVE  NEG      Blood LARGE (A) NEG      Urobilinogen 1.0 0.2 - 1.0 EU/dL    Nitrites NEGATIVE  NEG      Leukocyte Esterase LARGE (A) NEG      WBC >100 (H) 0 - 4 /hpf    RBC >100 (H) 0 - 5 /hpf    Epithelial cells FEW FEW /lpf    Bacteria NEGATIVE  NEG /hpf    Hyaline cast 2-5 0 - 5 /lpf   PROTHROMBIN TIME + INR   Result Value Ref Range    INR 1.1 0.9 - 1.1      Prothrombin time 11.6 (H) 9.0 - 11.1 sec   PTT   Result Value Ref Range    aPTT 31.0 22.1 - 32.5 sec    aPTT, therapeutic range     58.0 - 77.0 SECS   LEVETIRACETAM (KEPPRA)   Result Value Ref Range    Levetiracetam (Keppra) 29.0 10.0 - 40.0 ug/mL   LACTIC ACID   Result Value Ref Range    Lactic acid 1.8 0.4 - 2.0 MMOL/L   AMMONIA   Result Value Ref Range    Ammonia 14 <32 UMOL/L   CBC WITH AUTOMATED DIFF   Result Value Ref Range    WBC 7.7 4.1 - 11.1 K/uL    RBC 3.18 (L) 4.10 - 5.70 M/uL    HGB 10.7 (L) 12.1 - 17.0 g/dL    HCT 32.9 (L) 36.6 - 50.3 %    .5 (H) 80.0 - 99.0 FL    MCH 33.6 26.0 - 34.0 PG    MCHC 32.5 30.0 - 36.5 g/dL    RDW 13.2 11.5 - 14.5 %    PLATELET 88 (L) 538 - 400 K/uL    NEUTROPHILS 72 32 - 75 %    LYMPHOCYTES 15 12 - 49 %    MONOCYTES 13 5 - 13 %    EOSINOPHILS 0 0 - 7 %    BASOPHILS 0 0 - 1 %    ABS. NEUTROPHILS 5.5 1.8 - 8.0 K/UL    ABS. LYMPHOCYTES 1.2 0.8 - 3.5 K/UL    ABS. MONOCYTES 1.0 0.0 - 1.0 K/UL    ABS. EOSINOPHILS 0.0 0.0 - 0.4 K/UL    ABS.  BASOPHILS 0.0 0.0 - 0.1 K/UL   MAGNESIUM   Result Value Ref Range    Magnesium 1.5 (L) 1.6 - 2.4 mg/dL   METABOLIC PANEL, COMPREHENSIVE   Result Value Ref Range    Sodium 136 136 - 145 mmol/L    Potassium 3.3 (L) 3.5 - 5.1 mmol/L    Chloride 102 97 - 108 mmol/L    CO2 27 21 - 32 mmol/L    Anion gap 7 5 - 15 mmol/L    Glucose 96 65 - 100 mg/dL    BUN 8 6 - 20 MG/DL    Creatinine 0.47 (L) 0.70 - 1.30 MG/DL    BUN/Creatinine ratio 17 12 - 20      GFR est AA >60 >60 ml/min/1.73m2    GFR est non-AA >60 >60 ml/min/1.73m2    Calcium 8.4 (L) 8.5 - 10.1 MG/DL    Bilirubin, total 0.7 0.2 - 1.0 MG/DL    ALT (SGPT) 11 (L) 12 - 78 U/L    AST (SGOT) 12 (L) 15 - 37 U/L    Alk. phosphatase 67 45 - 117 U/L    Protein, total 7.1 6.4 - 8.2 g/dL    Albumin 2.6 (L) 3.5 - 5.0 g/dL    Globulin 4.5 (H) 2.0 - 4.0 g/dL    A-G Ratio 0.6 (L) 1.1 - 2.2     HEMOGLOBIN A1C WITH EAG   Result Value Ref Range    Hemoglobin A1c <3.5 (L) 4.2 - 6.3 %    Est. average glucose Cannot be calculated mg/dL   TSH 3RD GENERATION   Result Value Ref Range    TSH 0.30 (L) 0.36 - 3.74 uIU/mL   LACTIC ACID   Result Value Ref Range    Lactic acid 1.7 0.4 - 2.0 MMOL/L   METABOLIC PANEL, BASIC   Result Value Ref Range    Sodium 138 136 - 145 mmol/L    Potassium 3.8 3.5 - 5.1 mmol/L    Chloride 103 97 - 108 mmol/L    CO2 30 21 - 32 mmol/L    Anion gap 5 5 - 15 mmol/L    Glucose 98 65 - 100 mg/dL    BUN 10 6 - 20 MG/DL    Creatinine 0.45 (L) 0.70 - 1.30 MG/DL    BUN/Creatinine ratio 22 (H) 12 - 20      GFR est AA >60 >60 ml/min/1.73m2    GFR est non-AA >60 >60 ml/min/1.73m2    Calcium 8.5 8.5 - 10.1 MG/DL   MAGNESIUM   Result Value Ref Range    Magnesium 1.5 (L) 1.6 - 2.4 mg/dL   VALPROIC ACID   Result Value Ref Range    Valproic acid 76 50 - 100 ug/ml        Imaging review:  2017  CT scan of the head without contrast  No evidence for acute intracranial abnormality    HOME MEDS  Prior to Admission Medications   Prescriptions Last Dose Informant Patient Reported? Taking? QUEtiapine (SEROQUEL) 25 mg tablet Unknown at Unknown time  No No   Si Tab by Per G Tube route nightly. acetaminophen (TYLENOL) 325 mg tablet Unknown at Unknown time  No No   Si Tabs by Per G Tube route every six (6) hours as needed. albuterol-ipratropium (DUO-NEB) 2.5 mg-0.5 mg/3 ml nebu Unknown at Unknown time Transfer Papers No No   Sig: 3 mL by Nebulization route every six (6) hours as needed. atorvastatin (LIPITOR) 40 mg tablet Unknown at Unknown time Transfer Papers Yes No   Si mg by Per G Tube route nightly.    cyclobenzaprine (FLEXERIL) 5 mg tablet Unknown at Unknown time  No No   Si Tab by Per G Tube route three (3) times daily as needed for Muscle Spasm(s). latanoprost (XALATAN) 0.005 % ophthalmic solution Unknown at Unknown time Transfer Papers No No   Sig: Administer 1 Drop to both eyes every evening. levETIRAcetam (KEPPRA) 100 mg/ml soln oral solution Unknown at Unknown time  No No   Sig: 15 mL by Per G Tube route two (2) times a day. metoprolol tartrate (LOPRESSOR) 25 mg tablet Unknown at Unknown time  No No   Si.5 Tabs by Per G Tube route two (2) times a day. multivit w-min-ferrous gluconate (CEROVITE) 9 mg iron/15 mL oral liquid Unknown at Unknown time  No No   Sig: 15 mL by Per G Tube route daily. nitrofurantoin, macrocrystal-monohydrate, (MACROBID) 100 mg capsule Unknown at Unknown time  No No   Sig: Take 1 Cap by mouth nightly. oxyCODONE (OXYIR) 5 mg capsule Unknown at Unknown time  No No   Si Cap by Per G Tube route every four (4) hours as needed. Max Daily Amount: 30 mg.   pantoprazole (PROTONIX) 40 mg granules for oral suspension Unknown at Unknown time  No No   Si mg by Per G Tube route Before breakfast and dinner. pregabalin (LYRICA) 300 mg capsule Unknown at Unknown time  No No   Sig: Take 1 Cap by mouth two (2) times a day. Max Daily Amount: 600 mg.   thiamine (B-1) 100 mg tablet Unknown at Unknown time  No No   Si Tab by Per G Tube route daily. valproic acid, as sodium salt, (DEPAKENE) 250 mg/5 mL (5 mL) soln oral solution Unknown at Unknown time  No No   Sig: 10 mL by Per G Tube route three (3) times daily.       Facility-Administered Medications: None       CURRENT MEDS  Current Facility-Administered Medications   Medication Dose Route Frequency    pregabalin (LYRICA) capsule 300 mg  300 mg Oral Q12H    atorvastatin (LIPITOR) tablet 40 mg  40 mg Per G Tube QHS    latanoprost (XALATAN) 0.005 % ophthalmic solution 1 Drop  1 Drop Both Eyes QPM    levETIRAcetam (KEPPRA) oral solution 1,500 mg 1,500 mg Per G Tube BID    metoprolol tartrate (LOPRESSOR) tablet 12.5 mg  12.5 mg Per G Tube BID    pantoprazole (PROTONIX) granules for oral suspension 40 mg  40 mg Per G Tube ACB&D    QUEtiapine (SEROquel) tablet 25 mg  25 mg Per G Tube QHS    thiamine (B-1) tablet 100 mg  100 mg Per G Tube DAILY    valproic acid (as sodium salt) (DEPAKENE) 250 mg/5 mL (5 mL) oral solution 500 mg  500 mg Per G Tube TID    0.9% sodium chloride infusion  100 mL/hr IntraVENous CONTINUOUS    cefTRIAXone (ROCEPHIN) 1 g in 0.9% sodium chloride (MBP/ADV) 50 mL  1 g IntraVENous Q24H    enoxaparin (LOVENOX) injection 40 mg  40 mg SubCUTAneous Q24H       IMPRESSION:  Rajat Winston is a 80 y.o. male who presents with altered mental status. The patient does have history of seizures and does take Keppra 1500 mg p.o. twice daily, Depakote 500 mg p.o. 3 times daily and Lyrica 300 mg p.o. twice daily for seizures. He does also have UTI and is on antibiotics. No focal neurological deficits. 1.  Encephalopathy/altered mental status secondary to postictal state versus UTI  2. History of seizures    RECOMMENDATIONS:  1. Depakote level and Keppra level - therapeutic  2. Continue Lyrica 300 mg p.o. twice daily  3. Continue Depakote 500 mg p.o. 3 times daily and Keppra 1500 mg p.o. twice daily  4. EEG to rule out nonconvulsive status epilepticus  5. Treatment of UTI as per primary team  6. MRI brain without contrast - Normal  7. Continue to follow    Thank you very much for this consultation. We will follow up on the above studies and give further recommendations as indicated.       Sourav Hart MD  Neurologist

## 2017-08-21 NOTE — PROGRESS NOTES
Bedside and Verbal shift change report given to [id number] [facility] (oncoming nurse) by Heber Young (offgoing nurse). Report included the following information SBAR, Kardex, Intake/Output and MAR. Zone Phone for oncoming shift:   [phone]    Shift Summary: none    LDAs               Peripheral IV 08/20/17 Right Wrist (Active)   Site Assessment Clean, dry, & intact 8/20/2017  8:08 PM   Phlebitis Assessment 0 8/20/2017  8:08 PM   Infiltration Assessment 0 8/20/2017  8:08 PM   Dressing Status Clean, dry, & intact 8/20/2017  8:08 PM   Dressing Type Tape;Transparent 8/20/2017  8:08 PM   Hub Color/Line Status Yellow; Infusing 8/20/2017  8:08 PM          PEG/Gastrostomy Tube 07/18/17 (Active)              Urinary Catheter 08/19/17 [employer]-Criteria for Appropriate Use: Obstruction/retention   Intake & Output   Date 08/19/17 1900 - 08/20/17 0659 08/20/17 0700 - 08/21/17 0659   Shift 3476-4443 24 Hour Total 0875-9167 9265-8115 24 Hour Total   I  N  T  A  K  E   Shift Total  (mL/kg)        O  U  T  P  U  T   Urine  (mL/kg/hr) 1675 1675 800  (1)  800      Urine Output (mL) (Urinary Catheter 08/19/17 [employer]) 1675 1675 800  800    Shift Total  (mL/kg) 1675  (24.6) 1675  (24.6) 800  (11.8)  800  (11.8)   NET -1675 -1675 -800  -800   Weight (kg) 68 68 68 68 68      Last Bowel Movement Last Bowel Movement Date:  (unable to assess ( pt very confused))   Glucose Checks [x] N/A  [] AC/HS  [] Q6  Concerns:   Nutrition Active Orders   Diet    DIET NPO       Consults [x]PT  [x]OT  []Speech  []Case Management   Cardiac Monitoring []N/A [x]Yes Expires:

## 2017-08-21 NOTE — PROGRESS NOTES
Bedside and Verbal shift change report given to [place] (oncoming nurse) by Alejandro Harada (offgoing nurse). Report included the following information SBAR, Kardex, Intake/Output, MAR and Recent Results. Zone Phone for oncoming shift:   [phone]    Shift Summary: Patient rested quietly throughout the night. No signs of pain noted. LDAs               Peripheral IV 08/20/17 Right Wrist (Active)   Site Assessment Clean, dry, & intact 8/20/2017  8:08 PM   Phlebitis Assessment 0 8/20/2017  8:08 PM   Infiltration Assessment 0 8/20/2017  8:08 PM   Dressing Status Clean, dry, & intact 8/20/2017  8:08 PM   Dressing Type Tape;Transparent 8/20/2017  8:08 PM   Hub Color/Line Status Yellow; Infusing 8/20/2017  8:08 PM          PEG/Gastrostomy Tube 07/18/17 (Active)              Urinary Catheter 08/19/17 [employer]-Criteria for Appropriate Use: Obstruction/retention   Intake & Output   Date 08/19/17 1900 - 08/20/17 0659 08/20/17 0700 - 08/21/17 0659   Shift 8493-5633 24 Hour Total 1589-9459 4392-3796 24 Hour Total   I  N  T  A  K  E   Shift Total  (mL/kg)        O  U  T  P  U  T   Urine  (mL/kg/hr) 1675 1675 800  (1) 750 1550      Urine Output (mL) (Urinary Catheter 08/19/17 [employer]) 1675 1675     Shift Total  (mL/kg) 1675  (24.6) 1675  (24.6) 800  (11.8) 750  (11) 1550  (22.8)   NET -1675 -1675 -800 -750 -1550   Weight (kg) 68 68 68 68 68      Last Bowel Movement Last Bowel Movement Date:  (FAITH)   Glucose Checks [x] N/A  [] AC/HS  [] Q6  Concerns:   Nutrition Active Orders   Diet    DIET NPO       Consults [x]PT  [x]OT  []Speech  []Case Management   Cardiac Monitoring []N/A [x]Yes Expires: 72 hours

## 2017-08-21 NOTE — PROGRESS NOTES
Bedside and Verbal shift change report given to [id number] [facility] (oncoming nurse) by Patricia Roa (offgoing nurse). Report included the following information SBAR, Kardex, Intake/Output and MAR. Zone Phone for oncoming shift:   [phone]    Shift Summary: none    LDAs               Peripheral IV 08/20/17 Right Wrist (Active)   Site Assessment Clean, dry, & intact 8/21/2017  2:59 PM   Phlebitis Assessment 0 8/21/2017  2:59 PM   Infiltration Assessment 0 8/21/2017  2:59 PM   Dressing Status Clean, dry, & intact 8/21/2017  2:59 PM   Dressing Type Tape;Transparent 8/21/2017  2:59 PM   Hub Color/Line Status Yellow; Infusing 8/21/2017  2:59 PM          PEG/Gastrostomy Tube 07/18/17 (Active)   Site Assessment Clean, dry, & intact 8/21/2017  2:59 PM   Dressing Status Clean, dry, & intact 8/21/2017  2:59 PM   G Port Status Infusing 8/21/2017  2:59 PM   Gastric Residual (mL) 0 ml 8/21/2017  7:54 AM   Tube Feeding/Formula Options Jevity 1.5 8/21/2017  7:54 AM   Modular Nutrients Fiber 8/21/2017  7:54 AM   Tube Feeding/Verify Rate (mL/hr) 50 8/21/2017  7:54 AM   Water Flush Volume (mL) 150 mL 8/21/2017  7:54 AM   Medication Volume 50 ml 8/20/2017  8:08 PM              Urinary Catheter 08/19/17 [employer]-Criteria for Appropriate Use: Obstruction/retention   Intake & Output   Date 08/20/17 0700 - 08/21/17 0659 08/21/17 0700 - 08/22/17 0659   Shift [id number] 1900-0659 24 Hour Total 0700-1859 1900-0659 24 Hour Total   I  N  T  A  K  E   NG/GT  350 350 150  150      Water Flush Volume (mL) (PEG/Gastrostomy Tube 07/18/17)  300 300 150  150      Medication Volume (PEG/Gastrostomy Tube 07/18/17)  50 50       Shift Total  (mL/kg)  350  (5.1) 350  (5.1) 150  (2.2)  150  (2.2)   O  U  T  P  U  T   Urine  (mL/kg/hr) 800  (1) 1500  (1.8) 2300  (1.4) 250  250      Urine Output (mL) (Urinary Catheter 08/19/17 [employer]) 800 1500 2300 250  250    Shift Total  (mL/kg) 800  (11.8) 1500  (22) 2300  (33.8) 250  (3.7)  250  (3.7)   NET -800 -1150 -1950 -100  -100 Weight (kg) 68 68 68 68 68 68      Last Bowel Movement Last Bowel Movement Date:  (FAITH)   Glucose Checks [x] N/A  [] AC/HS  [] Q6  Concerns:   Nutrition Active Orders   Diet    DIET NPO       Consults [x]PT  [x]OT  [x]Speech  []Case Management   Cardiac Monitoring []N/A [x]Yes Expires:

## 2017-08-21 NOTE — PROGRESS NOTES
Patient converted to a-fib. Patient asymptomatic at this time. On-call hospitalist Dr. Lauro Guy notified. Orders put in.

## 2017-08-21 NOTE — PROGRESS NOTES
Problem: Dysphagia (Adult)  Goal: *Acute Goals and Plan of Care (Insert Text)  Speech pathology goals initiated 8/21/2017   1. Patient will tolerate tsp amounts of honey thick liquids and bites of pureed snacks free of s/s aspiration within 7 days  [address] EVALUATION  Patient: Dawit Jones (39 y.o. male)  Date: 8/21/2017  Primary Diagnosis: Sepsis [facility])        Precautions:aspiration         ASSESSMENT :  Patient alert but no command following. Speech is clear but mostly nonsensical speech. Note MRI negative. Based on the objective data described below, the patient presents with mild-mod oral and suspected at least mod pharyngeal dysphagia. Inconsistent bolus acceptance, initially with poor object recognition, no attempt to accept but with tactile cues and spoon to lips, he eventually opened. With further trials, active acceptance continued. Timely posterior propulsion with purees. Pharyngeal swallow initiation suspected to be delayed and hyolaryngeal elevation/excursion reduced via palpation. No overt s/s aspiration with thin, puree or honey thick liquids; however, multiple discoordinated swallows indicative of pharyngeal residue. Spoke with rehab manager at [facility] who was able to tell me that patient was not on a PO diet but only taking puree and HTL trials with SLP. Additionally, when patient was discharged here back at the end of last month, he was npo with TF. Patient will benefit from skilled intervention to address the above impairments.   Patients rehabilitation potential is considered to be Guarded  Factors which may influence rehabilitation potential include:   [ ]            None noted  [ ]            Mental ability/status  [ ]            Medical condition  [ ]            Home/family situation and support systems  [ ]            Safety awareness  [ ]            Pain tolerance/management  [ ]            Other:        PLAN :  Recommendations and Planned Interventions:  -- NPO with TF via PEG.   -- Ok for occasional bite of applesauce or tsp amount of honey thick liquids only if patient fully alert and HE is requesting   -- will follow 1x/wk during hospitalization. Recommend returning to SNF to continue tx with SLP. Patient was not on PO diet pta and only taking puree and honey thick liquid trials with SLP     Frequency/Duration: Patient will be followed by speech-language pathology 1 time a week to address goals. Discharge Recommendations: Skilled Nursing Facility       SUBJECTIVE:   Patient alert, confused. OBJECTIVE:       Past Medical History:   Diagnosis Date    Alcohol abuse, in remission      Seizures ([facility] [address]       Past Surgical History:   Procedure Laterality Date    COLONOSCOPY N/A 7/31/2017     COLONOSCOPY performed by Althea Tanner MD at [address] [place] [address]   7/31/2017          PLACE PERCUT GASTROSTOMY TUBE   7/18/2017          UPPER GI ENDOSCOPY,DIAGNOSIS   7/31/2017           Prior Level of Function/Home Situation:      Diet prior to admission: HTL and puree trials with SLP at NH, PEG TF  Current Diet:  Npo with TF   Cognitive and Communication Status:  Neurologic State: Alert, Drowsy, Confused  Orientation Level: Unable to verbalize  Cognition: No command following     Perseveration: No perseveration noted  Safety/Judgement: Not assessed  Oral Assessment:  Oral Assessment  Labial: Other (comment) (didnt follow command)  Dentition: Intact  Oral Hygiene:  (clean)  Lingual:  (no command following)  Velum: Unable to visualize  Mandible: No impairment  P.O. Trials:  Patient Position:  (up in bed)  Vocal quality prior to P.O.: No impairment  Consistency Presented: Thin liquid;Puree; Honey thick liquid; Ice chips  How Presented: SLP-fed/presented;Cup/sip;Spoon     Bolus Acceptance: Impaired  Bolus Formation/Control: No impairment     Propulsion: No impairment  Oral Residue: None  Initiation of Swallow: Delayed (# of seconds)  Laryngeal Elevation: Decreased  Aspiration Signs/Symptoms: None  Pharyngeal Phase Characteristics: Multiple swallows; Suspected pharyngeal residue              Oral Phase Severity: Mild-moderate  Pharyngeal Phase Severity : Moderate     NOMS:   The NOMS functional outcome measure was used to quantify this patient's level of swallowing impairment. Based on the NOMS, the patient was determined to be at level 2 for swallow function      G Codes: In compliance with CMSs Claims Based Outcome Reporting, the following G-code set was chosen for this patient based the use of the NOMS functional outcome to quantify this patient's level of swallowing impairment. Using the NOMS, the patient was determined to be at level 1 for swallow function which correlates with the CM= 80-99% level of severity. Based on the objective assessment provided within this note, the current, goal, and discharge g-codes are as follows:     Swallow  Swallowing:   Swallow Current Status CM= 80-99%   Swallow Goal Status CM= 80-99%         NOMS Swallowing Levels:  Level 1 (CN): NPO  Level 2 (CM): NPO but takes consistency in therapy  Level 3 (CL): Takes less than 50% of nutrition p.o. and continues with nonoral feedings; and/or safe with mod cues; and/or max diet restriction  Level 4 (CK): Safe swallow but needs mod cues; and/or mod diet restriction; and/or still requires some nonoral feeding/supplements  Level 5 (CJ): Safe swallow with min diet restriction; and/or needs min cues  Level 6 (CI): Independent with p.o.; rare cues; usually self cues; may need to avoid some foods or needs extra time  Level 7 ([address] [place] [address]): Independent for all p.o.  DEON. (2003). National Outcomes Measurement System (NOMS): Adult Speech-Language Pathology User's Guide.            Pain:  Pain Scale 1: FLACC        After treatment:   [ ]            Patient left in no apparent distress sitting up in chair  [X]            Patient left in no apparent distress in bed  [X]            Call bell left within reach  [ ]            Nursing notified  [ ]            Caregiver present  [X]            Bed alarm activated      COMMUNICATION/EDUCATION:   The patients plan of care including recommendations, planned interventions, and recommended diet changes were discussed with: Physical Therapist and Registered Nurse. [ ]            Posted safety precautions in patient's room. [X]            Patient/family have participated as able in goal setting and plan of care. [X]            Patient/family agree to work toward stated goals and plan of care. [ ]            Patient understands intent and goals of therapy, but is neutral about his/her participation. [ ]            Patient is unable to participate in goal setting and plan of care.      Thank you for this referral.  Nai Velazquez M.S. CCC-SLP  Time Calculation: 18 mins

## 2017-08-21 NOTE — PROGRESS NOTES
Micro lab called and stated patient's blood cultures growing Gram Variable Rods in 1 out of 4 bottles drawn from left hand. On-call hospitalist paged, awaiting callback. [id number]: On-call hospitalist Dr. Elmer Yuen returned call and notified of patient's blood cultures. No new orders received.

## 2017-08-21 NOTE — PROGRESS NOTES
Occupational Therapy    Acknowledge orders and completed chart review. Attempted to see patient for OT evaluation. MD present and completing assessment.   Will follow up for OT eval.    Thank you,    Navya Mares OTR/L

## 2017-08-21 NOTE — PROGRESS NOTES
Problem: Mobility Impaired (Adult and Pediatric)  Goal: *Acute Goals and Plan of Care (Insert Text)  Physical Therapy Goals  Initiated 8/21/2017  1. Patient will move from supine to sit and sit to supine in bed with moderate assistance within 7 day(s). 2. Patient will transfer from bed to chair and chair to bed with maximum assistance using the least restrictive device within 7 day(s). 3. Patient will perform sit to stand with maximal assistance within 7 day(s). 4. Patient will tolerate seated edge of bed x 10 minutes with supervision within 7 days in prep for OOB transfers  Will set ambulation goals as appropriate  PHYSICAL THERAPY EVALUATION  Patient: Hilda Monroy (40 y.o. male)  Date: 8/21/2017  Primary Diagnosis: Sepsis [facility])        Precautions:   Fall      ASSESSMENT :  Based on the objective data described below, the patient presents with decreased functional mobility, poor command following, inability to communicate at this time limiting activity performance. Patient received supine in bed. Patient unable to communicate appropriately at this time. Patient stated \"9:30\" repeatedly during session. Patient required max assist x 2 to complete supine<>sit edge of bed. Patient able to maintain seated edge of bed while holding onto bed rail with L UE with min-mod assist.  Felt it was not safe to attempt standing at this time. Assisted back to bed. Patient with left head turn preference initially, but upon returning to supine position patient was able to turn head to the right to look at rehab technician in the room. Patient will benefit from PT to progress mobility as tolerated. D/C rec: return to prior setting. .     Patient will benefit from skilled intervention to address the above impairments.   Patients rehabilitation potential is considered to be Fair  Factors which may influence rehabilitation potential include:   [ ]         None noted  [ ]         Mental ability/status  [X]         Medical condition  [ ]         Home/family situation and support systems  [ ]         Safety awareness  [ ]         Pain tolerance/management  [ ]         Other:        PLAN :  Recommendations and Planned Interventions:  [X]           Bed Mobility Training             [X]    Neuromuscular Re-Education  [X]           Transfer Training                   [ ]    Orthotic/Prosthetic Training  [X]           Gait Training                         [ ]    Modalities  [X]           Therapeutic Exercises           [ ]    Edema Management/Control  [X]           Therapeutic Activities            [X]    Patient and Family Training/Education  [ ]           Other (comment):     Frequency/Duration: Patient will be followed by physical therapy  3 times a week to address goals.   Discharge Recommendations: [facility]  Further Equipment Recommendations for Discharge: TBD        SUBJECTIVE:   Patient stated 9:30.      OBJECTIVE DATA SUMMARY:   HISTORY:    Past Medical History:   Diagnosis Date    Alcohol abuse, in remission      Seizures ([facility] [address]       Past Surgical History:   Procedure Laterality Date    COLONOSCOPY N/A 7/31/2017     COLONOSCOPY performed by Julia Patel MD at [address] [place] [address]   7/31/2017          PLACE PERCUT GASTROSTOMY TUBE   7/18/2017          UPPER GI ENDOSCOPY,DIAGNOSIS   7/31/2017           Prior Level of Function/Home Situation: from SNF  Personal factors and/or comorbidities impacting plan of care:      Home Situation  Home Environment: Rehabilitation facility  Support Systems: Family member(s), Skilled nursing facility  Patient Expects to be Discharged to[de-identified] Rehabilitation facility     EXAMINATION/PRESENTATION/DECISION MAKING:   Critical Behavior:  Neurologic State: Alert, Drowsy, Confused  Orientation Level: Unable to verbalize  Cognition: No command following  Safety/Judgement: Not assessed     Range Of Motion:  AROM: Grossly decreased, non-functional                       Strength: Strength: Grossly decreased, non-functional                    Tone & Sensation:                                  Coordination:     Vision:      Functional Mobility:  Bed Mobility:     Supine to Sit: Maximum assistance;Assist x2  Sit to Supine: Maximum assistance;Assist x2  Scooting: Maximum assistance;Assist x2  Transfers:  Sit to Stand:  (not tested)                          Balance:   Sitting: Impaired  Sitting - Static: Good (unsupported)  Sitting - Dynamic: Fair (occasional)  Ambulation/Gait Training:                             Therapeutic Exercises:         Functional Measure:  Barthel Index:      Bathin  Bladder: 0  Bowels: 0  Groomin  Dressin  Feedin  Mobility: 0  Stairs: 0  Toilet Use: 0  Transfer (Bed to Chair and Back): 5  Total: 5         Barthel and G-code impairment scale:  Percentage of impairment CH  0% CI  1-19% CJ  20-39% CK  40-59% CL  60-79% CM  80-99% CN  100%   Barthel Score 0-100 100 99-80 79-60 59-40 20-39 1-19    0   Barthel Score 0-20 20 17-19 13-16 9-12 5-8 1-4 0      The Barthel ADL Index: Guidelines  1. The index should be used as a record of what a patient does, not as a record of what a patient could do. 2. The main aim is to establish degree of independence from any help, physical or verbal, however minor and for whatever reason. 3. The need for supervision renders the patient not independent. 4. A patient's performance should be established using the best available evidence. Asking the patient, friends/relatives and nurses are the usual sources, but direct observation and common sense are also important. However direct testing is not needed. 5. Usually the patient's performance over the preceding 24-48 hours is important, but occasionally longer periods will be relevant. 6. Middle categories imply that the patient supplies over 50 per cent of the effort. 7. Use of aids to be independent is allowed. Warren Newman., Barthel, D.W. (1102).  Functional evaluation: the Barthel Index. [facility] (14)2. HUBER Cobb, Christin Archer., Nusrat Loza., Terence, [address] (1999). Measuring the change indisability after inpatient rehabilitation; comparison of the responsiveness of the Barthel Index and Functional [place] Measure. Journal of Neurology, Neurosurgery, and Psychiatry, 66(4), 465-520. ANGELIC Nance, GLADYS Angel, & Erwin Jones M.A. (2004.) Assessment of post-stroke quality of life in cost-effectiveness studies: The usefulness of the Barthel Index and the EuroQoL-5D. Quality of Life Research, 13, 990-01            G codes: In compliance with CMSs Claims Based Outcome Reporting, the following G-code set was chosen for this patient based on their primary functional limitation being treated: The outcome measure chosen to determine the severity of the functional limitation was the Barthel with a score of 5/100 which was correlated with the impairment scale. · Mobility - Walking and Moving Around:               - CURRENT STATUS:    CM - 80%-99% impaired, limited or restricted               - GOAL STATUS:           CL - 60%-79% impaired, limited or restricted               - D/C STATUS:                       ---------------To be determined---------------       Based on the above components, the patient evaluation is determined to be of the following complexity level: LOW      Pain:  Pain Scale 1: FLACC                 Activity Tolerance:   Fair. Limited by poor command following and communication  Please refer to the flowsheet for vital signs taken during this treatment.   After treatment:   [ ]         Patient left in no apparent distress sitting up in chair  [X]         Patient left in no apparent distress in bed  [X]         Call bell left within reach  [X]         Nursing notified  [ ]         Caregiver present  [X]         Bed alarm activated      COMMUNICATION/EDUCATION:   The patients plan of care was discussed with: Occupational Therapist and Registered Nurse.  [X]         Fall prevention education was provided and the patient/caregiver indicated understanding. [X]         Patient/family have participated as able in goal setting and plan of care. [X]         Patient/family agree to work toward stated goals and plan of care. [ ]         Patient understands intent and goals of therapy, but is neutral about his/her participation. [ ]         Patient is unable to participate in goal setting and plan of care.      Thank you for this referral.  Jose De Jesus Luque, PT, DPT   Time Calculation: 12 mins

## 2017-08-22 LAB
ANION GAP SERPL CALC-SCNC: 5 MMOL/L (ref 5–15)
BUN SERPL-MCNC: 11 MG/DL (ref 6–20)
BUN/CREAT SERPL: 28 (ref 12–20)
CALCIUM SERPL-MCNC: 8.5 MG/DL (ref 8.5–10.1)
CHLORIDE SERPL-SCNC: 101 MMOL/L (ref 97–108)
CO2 SERPL-SCNC: 32 MMOL/L (ref 21–32)
CREAT SERPL-MCNC: 0.39 MG/DL (ref 0.7–1.3)
GLUCOSE SERPL-MCNC: 110 MG/DL (ref 65–100)
LACTATE SERPL-SCNC: 0.8 MMOL/L (ref 0.4–2)
MAGNESIUM SERPL-MCNC: 1.6 MG/DL (ref 1.6–2.4)
POTASSIUM SERPL-SCNC: 3.5 MMOL/L (ref 3.5–5.1)
SODIUM SERPL-SCNC: 138 MMOL/L (ref 136–145)

## 2017-08-22 PROCEDURE — 36600 WITHDRAWAL OF ARTERIAL BLOOD: CPT

## 2017-08-22 PROCEDURE — 97166 OT EVAL MOD COMPLEX 45 MIN: CPT

## 2017-08-22 PROCEDURE — 74011250636 HC RX REV CODE- 250/636: Performed by: HOSPITALIST

## 2017-08-22 PROCEDURE — 92526 ORAL FUNCTION THERAPY: CPT

## 2017-08-22 PROCEDURE — 83605 ASSAY OF LACTIC ACID: CPT | Performed by: INTERNAL MEDICINE

## 2017-08-22 PROCEDURE — 80048 BASIC METABOLIC PNL TOTAL CA: CPT | Performed by: HOSPITALIST

## 2017-08-22 PROCEDURE — 83735 ASSAY OF MAGNESIUM: CPT | Performed by: HOSPITALIST

## 2017-08-22 PROCEDURE — 74011250637 HC RX REV CODE- 250/637: Performed by: PSYCHIATRY & NEUROLOGY

## 2017-08-22 PROCEDURE — 36569 INSJ PICC 5 YR+ W/O IMAGING: CPT | Performed by: HOSPITALIST

## 2017-08-22 PROCEDURE — 77030018786 HC NDL GD F/USND BARD -B

## 2017-08-22 PROCEDURE — 74011250637 HC RX REV CODE- 250/637: Performed by: INTERNAL MEDICINE

## 2017-08-22 PROCEDURE — 74011000258 HC RX REV CODE- 258: Performed by: INTERNAL MEDICINE

## 2017-08-22 PROCEDURE — C1751 CATH, INF, PER/CENT/MIDLINE: HCPCS

## 2017-08-22 PROCEDURE — 77030018719 HC DRSG PTCH ANTIMIC J&J -A

## 2017-08-22 PROCEDURE — 65660000000 HC RM CCU STEPDOWN

## 2017-08-22 PROCEDURE — 77030018798 HC PMP KT ENTRL FED COVD -A

## 2017-08-22 PROCEDURE — 74011250636 HC RX REV CODE- 250/636: Performed by: INTERNAL MEDICINE

## 2017-08-22 PROCEDURE — 36415 COLL VENOUS BLD VENIPUNCTURE: CPT | Performed by: INTERNAL MEDICINE

## 2017-08-22 PROCEDURE — 06HY33Z INSERTION OF INFUSION DEVICE INTO LOWER VEIN, PERCUTANEOUS APPROACH: ICD-10-PCS | Performed by: HOSPITALIST

## 2017-08-22 PROCEDURE — 76937 US GUIDE VASCULAR ACCESS: CPT

## 2017-08-22 PROCEDURE — 97530 THERAPEUTIC ACTIVITIES: CPT

## 2017-08-22 RX ORDER — HEPARIN 100 UNIT/ML
300 SYRINGE INTRAVENOUS AS NEEDED
Status: DISCONTINUED | OUTPATIENT
Start: 2017-08-22 | End: 2017-08-24 | Stop reason: HOSPADM

## 2017-08-22 RX ORDER — SODIUM CHLORIDE 0.9 % (FLUSH) 0.9 %
10 SYRINGE (ML) INJECTION EVERY 24 HOURS
Status: DISCONTINUED | OUTPATIENT
Start: 2017-08-22 | End: 2017-08-24 | Stop reason: HOSPADM

## 2017-08-22 RX ORDER — SODIUM CHLORIDE 0.9 % (FLUSH) 0.9 %
10-40 SYRINGE (ML) INJECTION EVERY 8 HOURS
Status: DISCONTINUED | OUTPATIENT
Start: 2017-08-22 | End: 2017-08-24 | Stop reason: HOSPADM

## 2017-08-22 RX ORDER — LORAZEPAM 2 MG/ML
0.5 INJECTION INTRAMUSCULAR ONCE
Status: COMPLETED | OUTPATIENT
Start: 2017-08-22 | End: 2017-08-22

## 2017-08-22 RX ORDER — SODIUM CHLORIDE 0.9 % (FLUSH) 0.9 %
10-30 SYRINGE (ML) INJECTION AS NEEDED
Status: DISCONTINUED | OUTPATIENT
Start: 2017-08-22 | End: 2017-08-24 | Stop reason: HOSPADM

## 2017-08-22 RX ADMIN — ATORVASTATIN CALCIUM 40 MG: 40 TABLET, FILM COATED ORAL at 21:16

## 2017-08-22 RX ADMIN — LORAZEPAM 0.5 MG: 2 INJECTION, SOLUTION INTRAMUSCULAR; INTRAVENOUS at 15:26

## 2017-08-22 RX ADMIN — METOPROLOL TARTRATE 12.5 MG: 25 TABLET ORAL at 17:48

## 2017-08-22 RX ADMIN — LEVETIRACETAM 1500 MG: 100 SOLUTION ORAL at 08:46

## 2017-08-22 RX ADMIN — QUETIAPINE FUMARATE 25 MG: 25 TABLET, FILM COATED ORAL at 21:16

## 2017-08-22 RX ADMIN — VALPROIC ACID 500 MG: 250 SOLUTION ORAL at 21:23

## 2017-08-22 RX ADMIN — Medication 100 MG: at 08:47

## 2017-08-22 RX ADMIN — LATANOPROST 1 DROP: 50 SOLUTION OPHTHALMIC at 17:55

## 2017-08-22 RX ADMIN — PREGABALIN 300 MG: 150 CAPSULE ORAL at 17:48

## 2017-08-22 RX ADMIN — VANCOMYCIN HYDROCHLORIDE 1250 MG: 10 INJECTION, POWDER, LYOPHILIZED, FOR SOLUTION INTRAVENOUS at 13:51

## 2017-08-22 RX ADMIN — Medication 10 ML: at 21:16

## 2017-08-22 RX ADMIN — Medication 10 ML: at 13:51

## 2017-08-22 RX ADMIN — ENOXAPARIN SODIUM 40 MG: 40 INJECTION SUBCUTANEOUS at 17:48

## 2017-08-22 RX ADMIN — LEVETIRACETAM 1500 MG: 100 SOLUTION ORAL at 17:47

## 2017-08-22 RX ADMIN — VALPROIC ACID 500 MG: 250 SOLUTION ORAL at 08:47

## 2017-08-22 RX ADMIN — PANTOPRAZOLE SODIUM 40 MG: 40 GRANULE, DELAYED RELEASE ORAL at 08:47

## 2017-08-22 RX ADMIN — CEFTRIAXONE 1 G: 1 INJECTION, POWDER, FOR SOLUTION INTRAMUSCULAR; INTRAVENOUS at 17:45

## 2017-08-22 RX ADMIN — METOPROLOL TARTRATE 12.5 MG: 25 TABLET ORAL at 08:47

## 2017-08-22 RX ADMIN — SODIUM CHLORIDE 100 ML/HR: 900 INJECTION, SOLUTION INTRAVENOUS at 21:15

## 2017-08-22 RX ADMIN — PANTOPRAZOLE SODIUM 40 MG: 40 GRANULE, DELAYED RELEASE ORAL at 17:48

## 2017-08-22 RX ADMIN — PREGABALIN 300 MG: 150 CAPSULE ORAL at 05:17

## 2017-08-22 RX ADMIN — VALPROIC ACID 500 MG: 250 SOLUTION ORAL at 17:47

## 2017-08-22 NOTE — PROGRESS NOTES
07:46- Paged Dr. Deny Bills to notify MD that pt lost IV access during the night. MD to put in orders for PICC line. 15:20- Pt becoming extremely agitated. Pt stating he needs to go to his porch. Nurse manager, myself and tech at bedside. Pt attempting to get out of bed. Paged Dr. Deny Bills to notify. MD to put in one time dose of ativan.

## 2017-08-22 NOTE — PROGRESS NOTES
Bedside and Verbal shift change report given to Chuckie Copeland (oncoming nurse) by Janina Osborn (offgoing nurse). Report included the following information SBAR, Kardex, Intake/Output, MAR and Recent Results. Zone Phone for oncoming shift:   [phone]    Shift Summary: Patient rested quietly throughout the night. No C/O pain noted. LDAs               Peripheral IV 08/20/17 Right Wrist (Active)   Site Assessment Clean, dry, & intact 8/21/2017  8:27 PM   Phlebitis Assessment 0 8/21/2017  8:27 PM   Infiltration Assessment 0 8/21/2017  8:27 PM   Dressing Status Clean, dry, & intact 8/21/2017  8:27 PM   Dressing Type Tape;Transparent 8/21/2017  8:27 PM   Hub Color/Line Status Yellow; Infusing 8/21/2017  8:27 PM          PEG/Gastrostomy Tube 07/18/17 (Active)   Site Assessment Clean, dry, & intact 8/21/2017  8:27 PM   Dressing Status Clean, dry, & intact 8/21/2017  8:27 PM   G Port Status Infusing 8/21/2017  8:27 PM   Gastric Residual (mL) 0 ml 8/21/2017  8:27 PM   Tube Feeding/Formula Options Jevity 1.5 8/21/2017  8:27 PM   Modular Nutrients Fiber 8/21/2017  8:27 PM   Tube Feeding/Verify Rate (mL/hr) 50 8/21/2017  8:27 PM   Water Flush Volume (mL) 150 mL 8/21/2017  8:27 PM   Medication Volume 50 ml 8/20/2017  8:08 PM              Urinary Catheter 08/19/17 Cummins-Criteria for Appropriate Use: Obstruction/retention   Intake & Output   Date 08/21/17 0700 - 08/22/17 0659 08/22/17 0700 - 08/23/17 0659   Shift 8792-5060 7695-1161 24 Hour Total 0700-1859 1900-0659 24 Hour Total   I  N  T  A  K  E   NG/ 150 300         Water Flush Volume (mL) (PEG/Gastrostomy Tube 07/18/17) 150 150 300       Shift Total  (mL/kg) 150  (2.2) 150  (2.2) 300  (4.4)      O  U  T  P  U  T   Urine  (mL/kg/hr) 250  (0.3)  250         Urine Output (mL) (Urinary Catheter 08/19/17 Cummins) 250  250       Shift Total  (mL/kg) 250  (3.7)  250  (3.7)      NET -100 150 50      Weight (kg) 68 68 68 68 68 68      Last Bowel Movement Last Bowel Movement Date:  (FAITH) Glucose Checks [x] N/A  [] AC/HS  [] Q6  Concerns:   Nutrition Active Orders   Diet    DIET NPO       Consults [x]PT  [x]OT  []Speech  []Case Management   Cardiac Monitoring []N/A [x]Yes Expires: 48 hours

## 2017-08-22 NOTE — PROGRESS NOTES
Patient lost IV access this AM. Multiple attempts by multiple nurses without success to obtain new access and lab work. On call hospitalist Dr. Emilie Robb notified. Also informed Dr. Emilie Robb that patient has morning Vanc due, and are unable to infuse at this time.  Dr. Emilie Robb gave order for arterial stick to obtain lab work, and stated to wait for attending to address access issue in the AM.

## 2017-08-22 NOTE — PROGRESS NOTES
Nutrition Assessment:    INTERVENTIONS/RECOMMENDATIONS:   Enteral/Parenteral Nutrition: Initiate enteral nutrition:  Continue Jevity 1.5 krish TF via PEG tube at goal rate 50 ml/hr. Will monitor Na+ for adjustments in free water. RD ordered a new weight to determine if pt meeting 100% energy needs. ASSESSMENT:   Chart reviewed; checked TF rate at bedside. Pt is currently receiving Jevity 1.5 @ 50 ml/hr (goal) + 100 ml free water q4h. Free water flushes recently decreased from 150 ml/hr to 100 ml/hr q4h. Per nursing note, pt lost IV access during the night, but new orders for PICC placement. Pt med noted on current admission for metabolic encephalopathy secondary to UTI. Pt has a PEG placed 7/18/17. Weight hx documented indicates no weight loss. Current weight 150 lbs, weight on 7/27 = 150 lbs. Will order new weight to confirm. Electrolytes are stable. Diet Order: NPO  % Eaten:  No data found. Pertinent Medications: [x] Reviewed []Other:  Pertinent Labs: [x]Reviewed  []Other: stable, K+ and Mg+ have been repleted  Food Allergies: [x]None []Other:     Last BM:    [x]Active     []Hyperactive  []Hypoactive       [] Absent  BS  Skin:    [x] Intact   [] Incision  [] Breakdown   []Edema   []Other:    Anthropometrics: Height: 5' 8\" (172.7 cm) Weight: 68 kg (150 lb)    IBW (%IBW):   ( ) UBW (%UBW):   (  %)    BMI: Body mass index is 22.81 kg/(m^2). This BMI is indicative of:  []Underweight   [x]Normal   []Overweight   [] Obesity   [] Extreme Obesity (BMI>40)  Last Weight Metrics:  Weight Loss Metrics 8/22/2017 7/27/2017 7/22/2017 6/21/2017 3/21/2017 3/18/2017 3/7/2017   Today's Wt 150 lb 150 lb 152 lb 3.2 oz 155 lb 166 lb 150 lb 166 lb   BMI 22.81 kg/m2 22.81 kg/m2 23.14 kg/m2 23.57 kg/m2 25.24 kg/m2 22.81 kg/m2 24.51 kg/m2       Estimated Nutrition Needs (Based on): 1750 Kcals/day (MSJ: 1346 x 1.3) , 70 g (1 g/kg) Protein  Carbohydrate:  At Least 130 g/day  Fluids: 9974-4108 mL/day     Pt expected to meet estimated nutrient needs: [x]Yes: based on estimated energy needs []No    NUTRITION DIAGNOSES:   Problem:  No nutritional diagnosis at this time        NUTRITION INTERVENTIONS:    Enteral/Parenteral Nutrition: Initiate enteral nutrition                GOAL:   Pt will continue to meet TF goal rate at 50 ml/hr next 2-4 days    NUTRITION MONITORING AND EVALUATION      Food/Nutrient Intake Outcomes: Enteral/parenteral nutrition intake  Physical Signs/Symptoms Outcomes: Weight/weight change, Electrolyte and renal profile, GI profile, Glucose profile, GI    Previous Goal Met:   [x] Met              [] Progressing Towards Goal              [] Not Progressing Towards Goal   Previous Recommendations:   [x] Implemented          [] Not Implemented          [] Not Applicable    LEARNING NEEDS (Diet, Food/Nutrient-Drug Interaction):    [x] None Identified   [] Identified and Education Provided/Documented   [] Identified and Pt declined/was not appropriate     Cultural, Buddhism, OR Ethnic Dietary Needs:    [x] None Identified   [] Identified and Addressed     [x] Interdisciplinary Care Plan Reviewed/Documented    [x] Discharge Planning:  Resume home TF regimen; will obtain new weight to monitor if TF needs any adjustments.    [] Participated in Interdisciplinary Rounds    NUTRITION RISK:    [] High              [x] Moderate           []  Low  []  Minimal/Uncompromised      Kirk Guzman, [address]  Pager [phone]  Weekend Pager [phone]

## 2017-08-22 NOTE — PROGRESS NOTES
Acknowledged PICC insertion order. Left message to pt's  wife 's phone ( home phone) to obtain the consent. Waiting for her call back. Notified to primary nurse. Ke Aponte RN. AGBRIELLA. CMSRN. PICC nurse.  Vascular Access Team

## 2017-08-22 NOTE — PROGRESS NOTES
Hospitalist Progress Note    NAME: Madeline Dietrich   :  1934   MRN:  [id number]       Assessment / Plan:  Sepsis POA in settings of UTI POA due to indwelling Cummins   Lactic acidosis POA, resolved   Fever/tachycardia resolved  Cummins since last admission due to urinary retention   BC + bacilus species - likely contamination, will repeat   UC:  Possible gram + cocci   Empiric CTX day 4 pending UC + vanco     Last admission pt was DC on prophylaxis with Macrobid     APfib / PSVT   HR controlled   Continue BB  AC: not a candidate at present - high risk of falling. No prior h/o TIA/stroke     Hypokalemia / hypomagnesemia   Supplement as needed     Acute encephalopathy POA  Likely multifactorial. Neurology consider post ictal state. Last admission when he had seizures it took 5 days for him to start improving with MS. Head CT/ MRI:  negative   Avoid oversedation  Speech:  NPO with TF via PEG. Radames Spinner for occasional bite of applesauce or tsp amount of honey thick liquids only if patient fully alert and HE is requesting     Seizure disorder  Neurology help appreciated: cont Lyrica/Keppra/depakote   EEG to r/o non convulsive status epilepticus     PEG placement 17 for dysphagia  c/w tube feedings and monitor    Urinary retention  last admission and was D/C with Cummins cath  May consider voiding trial once more mobile        Code status: Full  Surrogate Decision Maker: wife Gene [phone] and DTR Makayla [phone]  Prophylaxis: SCD's      Body mass index is 22.81 kg/(m^2). Baseline:  lives in [place], [place] , communicative   Recommended Disposition: back to [facility]     : called wife to update, left massage     Cummins , POA      Subjective:     Chief Complaint / Reason for Physician Visit: following UTI / encephalopathy/ sepsis  MS:  More awake today, maintain eye contact but not following commands or answering questions.    Lost IV access, unable to start peripheral     Discussed with RN events overnight. Review of Systems:  Symptom Y/N Comments  Symptom Y/N Comments   Fever/Chills    Chest Pain     Poor Appetite    Edema     Cough    Abdominal Pain     Sputum    Joint Pain     SOB/AL    Pruritis/Rash     Nausea/vomit    Tolerating PT/OT     Diarrhea    Tolerating Diet     Constipation    Other       Could NOT obtain due to: AMS      Objective:     VITALS:   Last 24hrs VS reviewed since prior progress note. Most recent are:  Patient Vitals for the past 24 hrs:   Temp Pulse Resp BP SpO2   08/22/17 1215 98.3 °F (36.8 °C) 65 18 143/73 97 %   08/22/17 0748 98 °F (36.7 °C) 69 19 146/75 98 %   08/22/17 0032 97.8 °F (36.6 °C) 84 16 123/69 98 %   08/21/17 1930 97.9 °F (36.6 °C) 70 18 138/79 96 %   08/21/17 1531 98.3 °F (36.8 °C) 70 20 142/70 95 %       Intake/Output Summary (Last 24 hours) at 08/22/17 1309  Last data filed at 08/22/17 1208   Gross per 24 hour   Intake              500 ml   Output              800 ml   Net             -300 ml        PHYSICAL EXAM:  General: WD, WN. More awake today, maintain eye contact but not following commands or answering questions. No acute distress    EENT:  EOMI. Anicteric sclerae. MMM  Resp:  CTA bilaterally, no wheezing or rales. No accessory muscle use  CV:  Regular  rhythm,  No edema  GI:  Soft, Non distended, Non tender.  +Bowel sounds  Neurologic:  Alert and oriented X 0, not following commands    Psych:   Poor  insight. Not anxious nor agitated  Skin:  No rashes.   No jaundice    Reviewed most current lab test results and cultures  YES  Reviewed most current radiology test results   YES  Review and summation of old records today    NO  Reviewed patient's current orders and MAR    YES  PMH/SH reviewed - no change compared to H&P  ________________________________________________________________________  Care Plan discussed with:    Comments   Patient     Family      RN y    Care Manager y    Consultant  y Neurology                      Multidiciplinary team rounds were held today with , nursing, pharmacist and clinical coordinator. Patient's plan of care was discussed; medications were reviewed and discharge planning was addressed. ________________________________________________________________________  Total NON critical care TIME:  35   Minutes    Total CRITICAL CARE TIME Spent:   Minutes non procedure based      Comments   >50% of visit spent in counseling and coordination of care     ________________________________________________________________________  Maria T Beard MD     Procedures: see electronic medical records for all procedures/Xrays and details which were not copied into this note but were reviewed prior to creation of Plan. LABS:  I reviewed today's most current labs and imaging studies.   Pertinent labs include:  Recent Labs      08/20/17   0438  08/19/17   1444   WBC  7.7  6.8   HGB  10.7*  11.1*   HCT  32.9*  34.1*   PLT  88*  114*     Recent Labs      08/22/17   0610  08/21/17   0439  08/20/17   0438  08/19/17   1514  08/19/17   1444   NA  138  138  136   --   136   K  3.5  3.8  3.3*   --   3.7   CL  101  103  102   --   95*   CO2  32  30  27   --   34*   GLU  110*  98  96   --   139*   BUN  11  10  8   --   13   CREA  0.39*  0.45*  0.47*   --   0.67*   CA  8.5  8.5  8.4*   --   8.9   MG  1.6  1.5*  1.5*   --    --    ALB   --    --   2.6*   --   3.3*   TBILI   --    --   0.7   --   0.4   SGOT   --    --   12*   --   12*   ALT   --    --   11*   --   14   INR   --    --    --   1.1   --        Signed: Maria T Beard MD

## 2017-08-22 NOTE — PROGRESS NOTES
Problem: Dysphagia (Adult)  Goal: *Acute Goals and Plan of Care (Insert Text)  Speech pathology goals initiated 8/21/2017   1. Patient will tolerate tsp amounts of honey thick liquids and bites of pureed snacks free of s/s aspiration within 7 days Updated 8/22/2017 with SLP only  [id number] [facility]  Patient: Mitesh Dhillon (74 y.o. male)  Date: 8/22/2017  Diagnosis: Sepsis ([address] <principal problem not specified>       Precautions: ASPIRATION Fall      ASSESSMENT:  Patient alert but no command following, perseverating on \"alright\" during session. He actively accepted single ice chip and bite of applesauce off spoon. Mildly delayed posterior propulsion. Delayed swallow initiation, reduced hyolaryngeal elevation/excursion via palpation, multiple audible swallows indicative of pharyngeal residue. Wet vocal quality, repeated throat clearing and coughing following both single ice chip and single tsp of applesauce. He is showing overt s/s aspiration with PO bedside which is worse than yesterday (yesterday he tolerated tsp honey thick and puree). As MD stated, on last admission patient took several days for mentation to improve post seizure. Note RN's provider message last night indicating family wanted swallow video done. This is NOT appropriate at this time given overt s/s aspiration bedside with ice chip and puree, altered mental status with no command following. Progression toward goals:  [ ]         Improving appropriately and progressing toward goals  [X]         Improving slowly and progressing toward goals  [ ]         Not making progress toward goals and plan of care will be adjusted       PLAN:  Recommendations and Planned Interventions:  -- Continue NPO with TF via PEG  -- oral care frequently  -- Do not recommend any further offerings of puree or tsp honey thick liquids outside of tx  -- little to add until mentation is back to baseline.  He is highly inappropriate for consideration of a mbs right now       Patient continues to benefit from skilled intervention to address the above impairments. Continue treatment per established plan of care. Discharge Recommendations:  Skilled Nursing Facility       SUBJECTIVE:   Patient stated Ga Bautista. OBJECTIVE:   Cognitive and Communication Status:  Neurologic State: Alert  Orientation Level: Unable to verbalize  Cognition: No command following     Perseveration: Perseverates during conversation  Safety/Judgement: Not assessed  Dysphagia Treatment:  Oral Assessment:  Oral Assessment  Labial: Other (comment) (no command following)  Dentition: Intact  Oral Hygiene:  (wfl)  Lingual: Other (comment) (no command following)  Velum: Unable to visualize  Mandible: No impairment  P.O. Trials:  Patient Position:  (up in bed)  Vocal quality prior to P.O.: No impairment  Consistency Presented: Ice chips;Puree  How Presented: SLP-fed/presented;Spoon     Bolus Acceptance: No impairment  Bolus Formation/Control: Impaired  Type of Impairment: Delayed  Propulsion: Delayed (# of seconds)  Oral Residue: None  Initiation of Swallow: Delayed (# of seconds)  Laryngeal Elevation: Decreased  Aspiration Signs/Symptoms: Change vocal quality;Clear throat;Delayed cough/throat clear;Strong cough  Pharyngeal Phase Characteristics: Multiple swallows; Suspected pharyngeal residue  Effective Modifications: None           Oral Phase Severity: Mild  Pharyngeal Phase Severity : Severe              Pain:  Pain Scale 1: Adult Nonverbal Pain Scale        After treatment:   [ ]              Patient left in no apparent distress sitting up in chair  [X]              Patient left in no apparent distress in bed  [X]              Call bell left within reach  [X]              Nursing notified  [ ]              Caregiver present  [ ]              Bed alarm activated      COMMUNICATION/EDUCATION:      The patients plan of care including recommendations, planned interventions, and recommended diet changes were discussed with: Registered Nurse.   Erick Argueta M.S. CCC-SLP  Time Calculation: 13 mins

## 2017-08-22 NOTE — PROGRESS NOTES
Problem: Self Care Deficits Care Plan (Adult)  Goal: *Acute Goals and Plan of Care (Insert Text)  Occupational Therapy Goals  Initiated 2017  1. Patient will perform upper body dressing with minimal assistance within 7 day(s). 2. Patient will perform grooming sitting EOB with supervision/set-up within 7 day(s). 3. Patient will perform toilet transfers with supervision/set-up within 7 day(s). 4. Patient will perform all aspects of toileting with moderate assistance within 7 day(s). 5. Patient will participate in upper extremity therapeutic exercise/activities with supervision/set-up for 8 minutes within 7 day(s). 6. Patient will utilize energy conservation techniques during functional activities with verbal cues within 7 day(s). OCCUPATIONAL THERAPY EVALUATION  Patient: Ernesto Evangelista (58 y.o. male)  Date: 2017  Primary Diagnosis: Sepsis [facility])        Precautions:  Fall      ASSESSMENT :  Based on the objective data described below, the patient presents with impaired cognition, functional balance and functional mobility necessary in ADL tasks s/p GLF at nursing home. Patient oriented to first/last name, unable to answer  and at end of session reported he was in the hospital.  Repeating name through out session with perseverating on \"getting a car. \"  Poor historian for PLOF however patient demonstrating overall fair functional mobility during evaluation he most likely was ambulation and able to participate with ADL tasks. Poor command follow with verbal and tactile cues, needing additional time and gestures. Bed mob and sit <.>stand with min A, unable to follow commands for SPT. Total A for grooming secondary to poor command following and unable to identify comb/its use. Left in supine with HOB elevated with bed alarm. Recommend SNF for additional OT services once medically stable. Patient will benefit from skilled intervention to address the above impairments.   Patients rehabilitation potential is considered to be Fair  Factors which may influence rehabilitation potential include:   [ ]             None noted  [X]             Mental ability/status  [ ]             Medical condition  [ ]             Home/family situation and support systems  [ ]             Safety awareness  [ ]             Pain tolerance/management  [ ]             Other:        PLAN :  Recommendations and Planned Interventions:  [X]               Self Care Training                  [X]        Therapeutic Activities  [X]               Functional Mobility Training    [ ]        Cognitive Retraining  [X]               Therapeutic Exercises           [X]        Endurance Activities  [X]               Balance Training                   [ ]        Neuromuscular Re-Education  [ ]               Visual/Perceptual Training     [X]   Home Safety Training  [X]               Patient Education                 [X]        Family Training/Education  [ ]               Other (comment):     Frequency/Duration: Patient will be followed by occupational therapy 3 times a week to address goals. Discharge Recommendations: [facility]  Further Equipment Recommendations for Discharge: TBD       SUBJECTIVE:   Patient stated Casper Stain, Casper Stain.       OBJECTIVE DATA SUMMARY:   HISTORY:   Past Medical History:   Diagnosis Date    Alcohol abuse, in remission      Seizures ([facility] [address]       Past Surgical History:   Procedure Laterality Date    COLONOSCOPY N/A 7/31/2017     COLONOSCOPY performed by Bee Smith MD at [address] [place] [address]   7/31/2017          PLACE PERCUT GASTROSTOMY TUBE   7/18/2017          UPPER GI ENDOSCOPY,DIAGNOSIS   7/31/2017              Prior Level of Function/Home Situation: Admitted from SNF with GLF.         Home Situation  Home Environment: Skilled nursing facility  Support Systems: Skilled nursing facility, Spouse/Significant Other/Partner  Patient Expects to be Discharged to[de-identified] Rehabilitation facility  [X]  Right hand dominant             [ ]  Left hand dominant     EXAMINATION OF PERFORMANCE DEFICITS:  Cognitive/Behavioral Status:  Neurologic State: Alert  Orientation Level: Oriented to person;Disoriented to time;Disoriented to situation;Disoriented to place  Cognition:  (impaired command following)     Perseveration: Perseverates during conversation        Skin:visible uppers intact     Edema: uppers none     Hearing:        Vision/Perceptual:         Acuity:  (unable to assess)          Range of Motion:  AROM: Generally decreased, functional        Strength:  Strength: Generally decreased, functional        Coordination:     Fine Motor Skills-Upper: Left Intact; Right Intact    Gross Motor Skills-Upper: Left Intact; Right Intact     Tone & Sensation:  Tone: BUE normal  Sensation: intact to touch        Balance:  Sitting: Impaired  Sitting - Static: Good (unsupported)  Sitting - Dynamic: Fair (occasional)  Standing: Impaired  Standing - Static: Constant support; Fair  Standing - Dynamic : Poor     Functional Mobility and Transfers for ADLs:  Bed Mobility:  Supine to Sit: Minimum assistance  Sit to Supine: Minimum assistance  Scooting: Moderate assistance     Transfers:  Sit to Stand: Minimum assistance  Stand to Sit: Minimum assistance     ADL Assessment:  Feeding:  (Feeding tube)  Oral Facial Hygiene/Grooming: Total assistance  Bathing: Total assistance  Upper Body Dressing: Total assistance  Lower Body Dressing: Total assistance  Toileting: Total assistance        ADL Intervention and task modifications:     Grooming  Washing Hands: Total assistance (dependent), attempted sitting EOB     Verbal and tactile cues for bed mob, sit <>stand and grooming tasks in sitting.          Functional Measure:  Barthel Index:      Bathin  Bladder: 0  Bowels: 0  Groomin  Dressin  Feedin  Mobility: 0  Stairs: 0  Toilet Use: 0  Transfer (Bed to Chair and Back): 5  Total: 5 Barthel and G-code impairment scale:  Percentage of impairment CH  0% CI  1-19% CJ  20-39% CK  40-59% CL  60-79% CM  80-99% CN  100%   Barthel Score 0-100 100 99-80 79-60 59-40 20-39 1-19    0   Barthel Score 0-20 20 17-19 13-16 9-12 5-8 1-4 0      The Barthel ADL Index: Guidelines  1. The index should be used as a record of what a patient does, not as a record of what a patient could do. 2. The main aim is to establish degree of independence from any help, physical or verbal, however minor and for whatever reason. 3. The need for supervision renders the patient not independent. 4. A patient's performance should be established using the best available evidence. Asking the patient, friends/relatives and nurses are the usual sources, but direct observation and common sense are also important. However direct testing is not needed. 5. Usually the patient's performance over the preceding 24-48 hours is important, but occasionally longer periods will be relevant. 6. Middle categories imply that the patient supplies over 50 per cent of the effort. 7. Use of aids to be independent is allowed. Opal Vilchis., Barthel, D.W. (6954). Functional evaluation: the Barthel Index. [facility] (14)2. Elaine Fang velasquez HUBER Godinez, Alirio Garcia., Lisapaulo Gross.[facility], [address] [place] [address] (1999). Measuring the change indisability after inpatient rehabilitation; comparison of the responsiveness of the Barthel Index and Functional [place] Measure. Journal of Neurology, Neurosurgery, and Psychiatry, 66(4), 738-558. Nelly Griffin, N.J.A, GLADYS Richey, & Freddy Gabriel, M.A. (2004.) Assessment of post-stroke quality of life in cost-effectiveness studies: The usefulness of the Barthel Index and the EuroQoL-5D. Quality of Life Research, 13, 889-42            G codes:   In compliance with CMSs Claims Based Outcome Reporting, the following G-code set was chosen for this patient based on their primary functional limitation being treated: The outcome measure chosen to determine the severity of the functional limitation was the barthel Index with a score of 5/100 which was correlated with the impairment scale. · Self Care:               - CURRENT STATUS:    CM - 80%-99% impaired, limited or restricted               - GOAL STATUS:           CL - 60%-79% impaired, limited or restricted               - D/C STATUS:                       ---------------To be determined---------------      Occupational Therapy Evaluation Charge Determination   History Examination Decision-Making   MEDIUM Complexity : Expanded review of history including physical, cognitive and psychosocial  history  MEDIUM Complexity : 3-5 performance deficits relating to physical, cognitive , or psychosocial skils that result in activity limitations and / or participation restrictions MEDIUM Complexity : Patient may present with comorbidities that affect occupational performnce. Miniml to moderate modification of tasks or assistance (eg, physical or verbal ) with assesment(s) is necessary to enable patient to complete evaluation       Based on the above components, the patient evaluation is determined to be of the following complexity level: MEDIUM  Pain:  Pain Scale 1: Adult Nonverbal Pain Scale        Activity Tolerance:   Fair, limited secondary to poor command following. After treatment:   [ ] Patient left in no apparent distress sitting up in chair  [X] Patient left in no apparent distress in bed  [X] Call bell left within reach  [X] Nursing notified  [ ] Caregiver present  [X] Bed alarm activated      COMMUNICATION/EDUCATION:   The patients plan of care was discussed with: Physical Therapist, Registered Nurse and Patient. [X] Home safety education was provided and the patient/caregiver indicated understanding. [X] Patient/family have participated as able in goal setting and plan of care.   [X] Patient/family agree to work toward stated goals and plan of care. [ ] Patient understands intent and goals of therapy, but is neutral about his/her participation. [ ] Patient is unable to participate in goal setting and plan of care. This patients plan of care is appropriate for delegation to ELIZABETH.      Thank you for this referral.  Emanuel Springer OT  Time Calculation: 19 mins

## 2017-08-22 NOTE — PROGRESS NOTES
Pressure Ulcer Prevention Alert Received for Klever < 14 (moderate risk).        Care Plan/Interventions for Nursin. Complete Klever Pressure Ulcer Risk Scale and use sub scores to identify appropriate interventions. 2. Perform Assessment: skin, changes in LOC, visual cues for pain, monitor skin under medical devices  3. Respond to Reduced Sensory Perception: changes in LOC, check visual cues for pain, float heels, suspension boots, pressure redistribution bed/mattress/chair cushion, turning and reposition approximately every 2 hours (pillows & wedges), pad between skin to skin, turn & reposition  4. Manage Moisture: absorbent under pads, internal / external urinary device, internal /  external fecal device, minimize layers, contain wound drainage, access need for specialty bed, limit adult briefs, maintain skin hydration (lotion/cream), moisture barrier, offer toileting every hour  5. Promote Activity: increase time out of bed, chair cushion, PT/OT evaluation, trapeze to reposition, pressure redistribution bed/mattress/chair  6. Address Reduced Mobility: float heels / suspension boot, HOB 30 degrees or less, pressure redistribution bed/mattress/cushion, PT / OT evaluation, turn and reposition approximately every 2 hours (pillows & wedges)  7. Promote Nutrition: document food / fluid / supplement intake, encourage/assist with meals as needed  8. Reduce Friction and Shear: transferring/repositioning devices (lift/draw sheet), lift team/ patient mobility team, feet elevated on foot rest, minimize layers, foam dressing / transparent film / skin sealants, protective barrier creams and emollients, transfer aides (board, Leonor lift, ceiling lift, stand assist), HOB 30 degrees or less, trapeze to reposition.   Wound Care Team

## 2017-08-22 NOTE — PROGRESS NOTES
Pharmacy Medication Reconciliation     The patient came from [facility]. Allergy Update: no updates    Recommendations/Findings: The following amendments were made to the patient's active medication list on file at [facility]:   1) Additions: none    2) Deletions: none    3) Changes: none      -Clarified PTA med list with Transfer papers from [facility] [place] [facility] [place] [address]. PTA list in  was current with the transfer paper from the NH. PTA medication list was corrected to the following:     Prior to Admission Medications   Prescriptions Last Dose Informant Patient Reported? Taking? QUEtiapine (SEROQUEL) 25 mg tablet   No Yes   Si Tab by Per G Tube route nightly. acetaminophen (TYLENOL) 325 mg tablet   No Yes   Si Tabs by Per G Tube route every six (6) hours as needed. albuterol-ipratropium (DUO-NEB) 2.5 mg-0.5 mg/3 ml nebu  Transfer Papers No Yes   Sig: 3 mL by Nebulization route every six (6) hours as needed. atorvastatin (LIPITOR) 40 mg tablet  Transfer Papers Yes Yes   Si mg by Per G Tube route nightly. cyclobenzaprine (FLEXERIL) 5 mg tablet   No Yes   Si Tab by Per G Tube route three (3) times daily as needed for Muscle Spasm(s). latanoprost (XALATAN) 0.005 % ophthalmic solution  Transfer Papers No Yes   Sig: Administer 1 Drop to both eyes every evening. levETIRAcetam (KEPPRA) 100 mg/ml soln oral solution   No Yes   Sig: 15 mL by Per G Tube route two (2) times a day. metoprolol tartrate (LOPRESSOR) 25 mg tablet   No Yes   Si.5 Tabs by Per G Tube route two (2) times a day. multivit w-min-ferrous gluconate (CEROVITE) 9 mg iron/15 mL oral liquid   No Yes   Sig: 15 mL by Per G Tube route daily. nitrofurantoin, macrocrystal-monohydrate, (MACROBID) 100 mg capsule   No Yes   Sig: Take 1 Cap by mouth nightly. oxyCODONE (OXYIR) 5 mg capsule   No Yes   Si Cap by Per G Tube route every four (4) hours as needed.  Max Daily Amount: 30 mg.   pantoprazole (PROTONIX) 40 mg granules for oral suspension   No Yes   Si mg by Per G Tube route Before breakfast and dinner. pregabalin (LYRICA) 300 mg capsule   No Yes   Sig: Take 1 Cap by mouth two (2) times a day. Max Daily Amount: 600 mg.   thiamine (B-1) 100 mg tablet   No Yes   Si Tab by Per G Tube route daily. valproic acid, as sodium salt, (DEPAKENE) 250 mg/5 mL (5 mL) soln oral solution   No Yes   Sig: 10 mL by Per G Tube route three (3) times daily.       Facility-Administered Medications: None          Thank you,  Samir Moseley, PHARMD

## 2017-08-22 NOTE — PROGRESS NOTES
Bedside shift change report given to Eliza Cisneros (oncoming nurse) by Sheela Reilly (offgoing nurse). Report included the following information SBAR, Kardex, Intake/Output, MAR, Recent Results and Cardiac Rhythm . [facility] Phone for oncoming shift:   [phone]    Shift Summary: Pt given one time dose of ativan for agitation. LDAs         PICC Double Lumen 00/06/17 Right;Basilic (Active)   Central Line Being Utilized No 8/22/2017  1:13 PM   Criteria for Appropriate Use Long term IV/antibiotic administration 8/22/2017  1:13 PM   Site Assessment Clean;Dry 8/22/2017  1:13 PM   Phlebitis Assessment 0 8/22/2017  1:13 PM   Infiltration Assessment 0 8/22/2017  1:13 PM   Arm Circumference (cm) 29 cm 8/22/2017  1:13 PM   Date of Last Dressing Change 08/22/17 8/22/2017  1:13 PM   Dressing Status Clean, dry, & intact;New;Occlusive 8/22/2017  1:13 PM   Action Taken Other (comment) 8/22/2017  1:13 PM   External Catheter Length (cm) 39 centimeters 8/22/2017  1:13 PM   Dressing Type Disk with Chlorhexadine gluconate (CHG); Stabilization/securement device;Transparent 8/22/2017  1:13 PM   Hub Color/Line Status Purple;Capped;Flushed;Patent 8/22/2017  1:13 PM   Positive Blood Return (Site #1) Yes 8/22/2017  1:13 PM   Hub Color/Line Status Red;Capped;Flushed;Patent 8/22/2017  1:13 PM   Positive Blood Return (Site #2) Yes 8/22/2017  1:13 PM   Alcohol Cap Used Yes 8/22/2017  1:13 PM                PEG/Gastrostomy Tube 07/18/17 (Active)   Site Assessment Clean, dry, & intact 8/22/2017  7:48 AM   Dressing Status Clean, dry, & intact 8/22/2017  7:48 AM   G Port Status Infusing 8/22/2017  7:48 AM   Gastric Residual (mL) 5 ml 8/22/2017  8:47 AM   Tube Feeding/Formula Options Jevity 1.5 8/22/2017  7:48 AM   Modular Nutrients Fiber 8/22/2017  7:48 AM   Tube Feeding/Verify Rate (mL/hr) 50 8/22/2017  7:48 AM   Water Flush Volume (mL) 100 mL 8/22/2017  7:48 AM   Intake (ml) 2200 ml 8/22/2017  7:00 PM   Medication Volume 50 ml 8/22/2017  5:45 PM              Urinary Catheter 08/19/17 Cummins-Criteria for Appropriate Use: Obstruction/retention   Intake & Output   Date 08/21/17 1900 - 08/22/17 0659 08/22/17 0700 - 08/23/17 0659   Shift 8720-5977 24 Hour Total 1611-9622 3650-2365 24 Hour Total   I  N  T  A  K  E   P. O.   0  0      P. O.   0  0    I.V.  (mL/kg/hr)   300  (0.4)  300      Volume (cefTRIAXone (ROCEPHIN) 1 g in 0.9% sodium chloride (MBP/ADV) 50 mL)   50  50      Volume (vancomycin (VANCOCIN) 1250 mg in  ml infusion)   250  250    NG/  2400      Water Flush Volume (mL) (PEG/Gastrostomy Tube 07/18/17) 300 450 100  100      Medication Volume (PEG/Gastrostomy Tube 07/18/17)   100  100      Intake (ml) (PEG/Gastrostomy Tube 07/18/17)    2200 2200    Shift Total  (mL/kg) 300  (4.4) 450  (6.6) 500  (7.6) 2200  (33.5) 2700  (41.1)   O  U  T  P  U  T   Urine  (mL/kg/hr) 800 1050 700  (0.9)  700      Urine Output (mL) (Urinary Catheter 08/19/17 Cummins) 800 1050 700  700    Shift Total  (mL/kg) 800  (11.8) 1050  (15.4) 700  (10.7)  700  (10.7)   NET -500 -600 -200 2200 2000   Weight (kg) 68 68 65.6 65.6 65.6      Last Bowel Movement Last Bowel Movement Date:  (FAITH)   Glucose Checks [] N/A  [] AC/HS  [] Q6  Concerns:   Nutrition Active Orders   Diet    DIET NPO       Consults []PT  []OT  []Speech  []Case Management   Cardiac Monitoring []N/A []Yes Expires:

## 2017-08-22 NOTE — OP NOTES
PICC (Peripherally Inserted Central Catheter) line insertion  procedure note :     Procedure explained to patient's wife along with risks and benefits  and patient's wife agreed to proceed. Informed consent obtained from  Patient's wife. Pt has AMS. Patient teaching completed. Timeout completed. Pre-procedure assessment done. Maximum sterile barrier precautions observed throughout procedure. Lidocaine 1%  3.0    ml sq given prior to cannulation. Cannulated brachial  vein using ultrasound guidance and modified seldinger technique. Inserted 5  [place] double  lumen PICC to right arm using BasisCode Tip Location System and  [address]. Pt has    sinus   rhythm. PICC tip location was confirmed by 3 CG tip positioning system, indicating tall P wave and no negative deflection before P wave which would indicate that the PICC tip is properly placed in the distal SVC or at the [place]. PICC tip location was  confirmed by 2 PICC nurses and 3CG printout placed on patient's chart. Blood return verified and flushed with 20 ml normal saline in each port. Sterile dressing applied with biopatch, statLock and occlusive dressing as per protocol. Curos caps applied to each port. Patient tolerated procedure well with minimal blood loss ( less than 5 ml.)   Patient education material provided. PICC procedure performed by  :  Kassandra Walker RN. PICC nurse  Assisted by : Ernesto Rojas RN  PICC nurse  Reason for access : reliable access / MD order/  Long term IV medication administration / Poor vascular access / / Vesicant IV medication infusion  Complications related to insertion  : none  X-Ray : not applicable  Notified primary nurse    RN  that  PICC line can be used.    Total Trimmed Length :  39   cm   External Length : 0  cm   PICC line site arm circumference:  29    cm   PICC catheter occupies  9   % of vein  Type of PICC: Bard Solo Power PICC   Ref # :     [id number]     Lot # : [id number]   Expiration Date :    2018-10-31     Jimena Dixon RN. BSN. GABRIELLA,CMSRN. Clinician IV .  PICC Nurse, Vascular Access Team.

## 2017-08-22 NOTE — PROGRESS NOTES
Pt was at [facility] on 7/22 with seizures and 8/1 with SVT. Per 8/1 CRM note: The patient is an 80year old male. History of CVA, seizures. He was recently admitted to [facility] from 6/24 to 7/22 due to recurrent seizures, acute encephalopathy and respiratory failure with need for intubation. He was discharged to the [facility]. Prior to admission and SNF placement([rior to July) the patient was living at home in [place] with his wife. He is ambulatory with a walker at baseline, wife and daughter assist with transportation. Care management will continue to follow for discharge planning. 1300 Tuesday  Pt admitted with UTI, is a full code, has been seen in past by Palliative Care, has Sergio, PCP Dr Tiesha Partida, has multiple pieces of DME, and has no AD on chart. Await therapy recommendations. Please order Palliative Care to follow up with pt//wife to discuss plan of care//goals with 3 admissions in 1 month. Has a 12/21 karen't with Dr Mariah Kwon. 1600  Chart cclinked to [place] SNF who can accept but will need auth prior to admission    Care Management Interventions  PCP Verified by CM:  Yes  Palliative Care Consult (Criteria: CHF and RRAT>21): Yes  Mode of Transport at Discharge: BLS  Transition of Care Consult (CM Consult): [address]: No  Discharge Durable Medical Equipment: Yes  Physical Therapy Consult: Yes  Occupational Therapy Consult: Yes  Speech Therapy Consult: Yes  Current Support Network: Nursing Facility  Confirm Follow Up Transport: Other (see comment)  Plan discussed with Pt/Family/Caregiver: Yes  Freedom of Choice Offered: Yes  Discharge Location  Discharge Placement: Skilled nursing facility

## 2017-08-23 LAB
ANION GAP SERPL CALC-SCNC: 4 MMOL/L (ref 5–15)
BUN SERPL-MCNC: 10 MG/DL (ref 6–20)
BUN/CREAT SERPL: 23 (ref 12–20)
CALCIUM SERPL-MCNC: 8.4 MG/DL (ref 8.5–10.1)
CHLORIDE SERPL-SCNC: 103 MMOL/L (ref 97–108)
CO2 SERPL-SCNC: 34 MMOL/L (ref 21–32)
CREAT SERPL-MCNC: 0.44 MG/DL (ref 0.7–1.3)
GLUCOSE SERPL-MCNC: 109 MG/DL (ref 65–100)
POTASSIUM SERPL-SCNC: 3.5 MMOL/L (ref 3.5–5.1)
SODIUM SERPL-SCNC: 141 MMOL/L (ref 136–145)

## 2017-08-23 PROCEDURE — 74011250637 HC RX REV CODE- 250/637: Performed by: INTERNAL MEDICINE

## 2017-08-23 PROCEDURE — 74011250636 HC RX REV CODE- 250/636: Performed by: INTERNAL MEDICINE

## 2017-08-23 PROCEDURE — 80048 BASIC METABOLIC PNL TOTAL CA: CPT | Performed by: HOSPITALIST

## 2017-08-23 PROCEDURE — 65660000000 HC RM CCU STEPDOWN

## 2017-08-23 PROCEDURE — 36415 COLL VENOUS BLD VENIPUNCTURE: CPT | Performed by: HOSPITALIST

## 2017-08-23 PROCEDURE — 74011250637 HC RX REV CODE- 250/637: Performed by: PSYCHIATRY & NEUROLOGY

## 2017-08-23 PROCEDURE — 87040 BLOOD CULTURE FOR BACTERIA: CPT | Performed by: HOSPITALIST

## 2017-08-23 PROCEDURE — 77030018798 HC PMP KT ENTRL FED COVD -A

## 2017-08-23 PROCEDURE — 74011000258 HC RX REV CODE- 258: Performed by: INTERNAL MEDICINE

## 2017-08-23 PROCEDURE — 74011250636 HC RX REV CODE- 250/636: Performed by: HOSPITALIST

## 2017-08-23 RX ADMIN — PANTOPRAZOLE SODIUM 40 MG: 40 GRANULE, DELAYED RELEASE ORAL at 08:57

## 2017-08-23 RX ADMIN — ENOXAPARIN SODIUM 40 MG: 40 INJECTION SUBCUTANEOUS at 18:45

## 2017-08-23 RX ADMIN — LEVETIRACETAM 1500 MG: 100 SOLUTION ORAL at 09:04

## 2017-08-23 RX ADMIN — PREGABALIN 300 MG: 150 CAPSULE ORAL at 06:22

## 2017-08-23 RX ADMIN — PANTOPRAZOLE SODIUM 40 MG: 40 GRANULE, DELAYED RELEASE ORAL at 18:42

## 2017-08-23 RX ADMIN — Medication 10 ML: at 21:10

## 2017-08-23 RX ADMIN — Medication 10 ML: at 14:00

## 2017-08-23 RX ADMIN — VALPROIC ACID 500 MG: 250 SOLUTION ORAL at 21:10

## 2017-08-23 RX ADMIN — VALPROIC ACID 500 MG: 250 SOLUTION ORAL at 18:43

## 2017-08-23 RX ADMIN — METOPROLOL TARTRATE 12.5 MG: 25 TABLET ORAL at 08:56

## 2017-08-23 RX ADMIN — LATANOPROST 1 DROP: 50 SOLUTION OPHTHALMIC at 18:56

## 2017-08-23 RX ADMIN — SODIUM CHLORIDE 100 ML/HR: 900 INJECTION, SOLUTION INTRAVENOUS at 10:28

## 2017-08-23 RX ADMIN — PREGABALIN 300 MG: 150 CAPSULE ORAL at 18:44

## 2017-08-23 RX ADMIN — QUETIAPINE FUMARATE 25 MG: 25 TABLET, FILM COATED ORAL at 21:10

## 2017-08-23 RX ADMIN — VANCOMYCIN HYDROCHLORIDE 1250 MG: 10 INJECTION, POWDER, LYOPHILIZED, FOR SOLUTION INTRAVENOUS at 02:00

## 2017-08-23 RX ADMIN — ATORVASTATIN CALCIUM 40 MG: 40 TABLET, FILM COATED ORAL at 21:09

## 2017-08-23 RX ADMIN — Medication 100 MG: at 08:56

## 2017-08-23 RX ADMIN — VALPROIC ACID 500 MG: 250 SOLUTION ORAL at 09:04

## 2017-08-23 RX ADMIN — VANCOMYCIN HYDROCHLORIDE 1250 MG: 10 INJECTION, POWDER, LYOPHILIZED, FOR SOLUTION INTRAVENOUS at 15:41

## 2017-08-23 RX ADMIN — Medication 10 ML: at 06:23

## 2017-08-23 RX ADMIN — LEVETIRACETAM 1500 MG: 100 SOLUTION ORAL at 18:43

## 2017-08-23 RX ADMIN — SODIUM CHLORIDE 100 ML/HR: 900 INJECTION, SOLUTION INTRAVENOUS at 23:16

## 2017-08-23 RX ADMIN — CEFTRIAXONE 1 G: 1 INJECTION, POWDER, FOR SOLUTION INTRAMUSCULAR; INTRAVENOUS at 18:45

## 2017-08-23 RX ADMIN — METOPROLOL TARTRATE 12.5 MG: 25 TABLET ORAL at 18:44

## 2017-08-23 NOTE — PROGRESS NOTES
PICC site and dressing check 24 hours post insertion :     PICC line site and dressing clean, dry and intact. No complications noted. Jimena Dixon RN. BSN. CMSRN. GABRIELLA, PICC Nurse.  Vascular Access Team

## 2017-08-23 NOTE — PROGRESS NOTES
Hospitalist Progress Note    NAME: Edmond Smith   :  1934   MRN:  [id number]       Assessment / Plan:  Sepsis POA in settings of UTI POA due to indwelling Cummins   Lactic acidosis POA, resolved   Bacteremia is contamination   Fever/tachycardia resolved  Cummins since last admission due to urinary retention   BC + bacilus species - likely contamination  UC:  Final pending   Empiric CTX day 5 pending UC + vanco     Last admission pt was DC on prophylaxis with Macrobid     APfib / PSVT   HR controlled   Continue BB  AC: not a candidate at present - high risk of falling. No prior h/o TIA/stroke     Hypokalemia / hypomagnesemia   Supplement as needed     Acute encephalopathy POA  Likely multifactorial:delirium +/- post ictal   H/o slow recovery s/p seizures in the past ( 4-5 days)   Head CT/ MRI:  negative   Avoid oversedation  Speech:  NPO with TF via PEG. Tamia Fontaine for occasional bite of applesauce or tsp amount of honey thick liquids only if patient fully alert and HE is requesting     Seizure disorder  Neurology help appreciated: cont Lyrica/Keppra/depakote     PEG placement 17 for dysphagia  c/w tube feedings and monitor    Urinary retention  last admission and was D/C with Cummins cath  May consider voiding trial once more mobile        Code status: Full  Surrogate Decision Maker: wife Gene [phone] and DTR Makayla [phone]  Prophylaxis: SCD's      Body mass index is 22 kg/(m^2). Baseline:  lives in [place], [place] , communicative   Recommended Disposition: back to NH hopefully soon 1-2 days     : called wife to update, left massage   : called wife to update, left massage. Called dtr and updated her on plan of care     Cummins , POA   PICC      Subjective:     Chief Complaint / Reason for Physician Visit: following UTI / encephalopathy/ sepsis  MS: continue to improve. aao 1-2 today, answering simple questions      Discussed with RN events overnight.      Review of Systems:  Symptom Y/N Comments  Symptom Y/N Comments   Fever/Chills n   Chest Pain n    Poor Appetite    Edema     Cough    Abdominal Pain n    Sputum    Joint Pain     SOB/AL n   Pruritis/Rash     Nausea/vomit n   Tolerating PT/OT     Diarrhea    Tolerating Diet     Constipation    Other       Could NOT obtain due to:      Objective:     VITALS:   Last 24hrs VS reviewed since prior progress note. Most recent are:  Patient Vitals for the past 24 hrs:   Temp Pulse Resp BP SpO2   08/23/17 1210 98.2 °F (36.8 °C) 70 18 155/89 96 %   08/23/17 0725 98.4 °F (36.9 °C) 62 18 166/80 97 %   08/23/17 0413 97.8 °F (36.6 °C) 67 16 157/73 97 %   08/22/17 2320 97.5 °F (36.4 °C) 61 18 159/77 96 %   08/22/17 1917 97.4 °F (36.3 °C) 71 18 132/81 97 %   08/22/17 1510 97.4 °F (36.3 °C) 83 18 164/82 97 %       Intake/Output Summary (Last 24 hours) at 08/23/17 1340  Last data filed at 08/23/17 0725   Gross per 24 hour   Intake             4225 ml   Output             2750 ml   Net             1475 ml        PHYSICAL EXAM:  General: WD, WN. Continue to improve. No acute distress    EENT:  EOMI. Anicteric sclerae. MMM  Resp:  CTA bilaterally, no wheezing or rales. No accessory muscle use  CV:  Regular  rhythm,  No edema  GI:  Soft, Non distended, Non tender.  +Bowel sounds  Neurologic:  Alert and oriented X 1-2, communicative, following commands    Psych:   Poor insight. Not anxious nor agitated  Skin:  No rashes.   No jaundice    Reviewed most current lab test results and cultures  YES  Reviewed most current radiology test results   YES  Review and summation of old records today    NO  Reviewed patient's current orders and MAR    YES  PMH/SH reviewed - no change compared to H&P  ________________________________________________________________________  Care Plan discussed with:    Comments   Patient y    Family      RN y    Care Manager y    Consultant  y Neurology                      Multidiciplinary team rounds were held today with case manager, nursing, pharmacist and clinical coordinator. Patient's plan of care was discussed; medications were reviewed and discharge planning was addressed. ________________________________________________________________________  Total NON critical care TIME:  35   Minutes    Total CRITICAL CARE TIME Spent:   Minutes non procedure based      Comments   >50% of visit spent in counseling and coordination of care     ________________________________________________________________________  Jenaro Mckinnon MD     Procedures: see electronic medical records for all procedures/Xrays and details which were not copied into this note but were reviewed prior to creation of Plan. LABS:  I reviewed today's most current labs and imaging studies. Pertinent labs include:  No results for input(s): WBC, HGB, HCT, PLT, HGBEXT, HCTEXT, PLTEXT, HGBEXT, HCTEXT, PLTEXT in the last 72 hours.   Recent Labs      08/23/17   0145  08/22/17   0610  08/21/17   0439   NA  141  138  138   K  3.5  3.5  3.8   CL  103  101  103   CO2  34*  32  30   GLU  109*  110*  98   BUN  10  11  10   CREA  0.44*  0.39*  0.45*   CA  8.4*  8.5  8.5   MG   --   1.6  1.5*       Signed: Jenaro Mckinnon MD

## 2017-08-23 NOTE — PROGRESS NOTES
Bedside shift change report given to Vasile Joshi (oncoming nurse) by Osvaldo Shore (offgoing nurse). Report included the following information SBAR, Kardex, Intake/Output, MAR and Recent Results. Zone Phone for oncoming shift:   [phone]    Shift Summary: Pt rested quietly through night. Only restless when needing to have bowel movement. LDAs         PICC Double Lumen 47/30/53 Right;Basilic (Active)   Central Line Being Utilized No 8/23/2017  4:17 AM   Criteria for Appropriate Use Limited/no vessel suitable for conventional peripheral access 8/23/2017  4:17 AM   Site Assessment Clean, dry, & intact 8/23/2017  4:17 AM   Phlebitis Assessment 0 8/23/2017  4:17 AM   Infiltration Assessment 0 8/23/2017  4:17 AM   Arm Circumference (cm) 29 cm 8/22/2017  1:13 PM   Date of Last Dressing Change 08/22/17 8/23/2017  4:17 AM   Dressing Status Clean, dry, & intact 8/23/2017  4:17 AM   Action Taken Blood drawn 8/23/2017  4:17 AM   External Catheter Length (cm) 39 centimeters 8/22/2017  1:13 PM   Dressing Type Disk with Chlorhexadine gluconate (CHG); Transparent 8/23/2017  4:17 AM   Hub Color/Line Status Purple;Flushed;Capped; Infusing 8/23/2017  4:17 AM   Positive Blood Return (Site #1) Yes 8/23/2017  4:17 AM   Hub Color/Line Status Red;Flushed;Capped 8/23/2017  4:17 AM   Positive Blood Return (Site #2) Yes 8/23/2017  4:17 AM   Alcohol Cap Used Yes 8/23/2017  4:17 AM                PEG/Gastrostomy Tube 07/18/17 (Active)   Site Assessment Clean, dry, & intact 8/23/2017  4:17 AM   Dressing Status Clean, dry, & intact 8/23/2017  4:17 AM   G Port Status Infusing 8/23/2017  4:17 AM   Action Taken Placement verified (comment) 8/22/2017  7:48 PM   Gastric Residual (mL) 0 ml 8/23/2017  6:40 AM   Tube Feeding/Formula Options Jevity 1.5 8/23/2017  4:17 AM   Modular Nutrients Fiber 8/23/2017  4:17 AM   Tube Feeding/Verify Rate (mL/hr) 50 8/23/2017  4:17 AM   Water Flush Volume (mL) 100 mL 8/23/2017  4:17 AM   Intake (ml) 2200 ml 8/22/2017 7:00 PM   Medication Volume 50 ml 8/23/2017  6:40 AM              Urinary Catheter 08/19/17 Cummins-Criteria for Appropriate Use: Obstruction/retention   Intake & Output   Date 08/22/17 0700 - 08/23/17 0659 08/23/17 0700 - 08/24/17 0659   Shift 5526-2073 4723-9813 24 Hour Total 0782-4743 4427-7219 24 Hour Total   I  N  T  A  K  E   P. O. 0  0         P. O. 0  0       I.V.  (mL/kg/hr) 300  (0.4) 1200  (1.5) 1500  (1)         I.V.  1200 1200         Volume (cefTRIAXone (ROCEPHIN) 1 g in 0.9% sodium chloride (MBP/ADV) 50 mL) 50  50         Volume (vancomycin (VANCOCIN) 1250 mg in  ml infusion) 250  250       NG/ 2500 2700         Water Flush Volume (mL) (PEG/Gastrostomy Tube 07/18/17) 100 200 300         Medication Volume (PEG/Gastrostomy Tube 07/18/17) 100 100 200         Intake (ml) (PEG/Gastrostomy Tube 07/18/17)  2200 2200       Shift Total  (mL/kg) 500  (7.6) 3700  (56.4) 4200  (64)      O  U  T  P  U  T   Urine  (mL/kg/hr) 700  (0.9) 2050  (2.6) 2750  (1.7)         Urine Output (mL) (Urinary Catheter 08/19/17 Cummins) 700 2050 2750       Stool            Stool Occurrence(s)  1 x 1 x       Shift Total  (mL/kg) 700  (10.7) 2050  (31.2) 2750  (41.9)      NET -200 1650 1450      Weight (kg) 65.6 65.6 65.6 65.6 65.6 65.6      Last Bowel Movement Last Bowel Movement Date: 08/23/17   Glucose Checks [x] N/A  [] AC/HS  [] Q6  Concerns:   Nutrition Active Orders   Diet    DIET NPO       Consults [x]PT  [x]OT  [x]Speech  [x]Case Management   Cardiac Monitoring []N/A [x]Yes Expires:48 hrs

## 2017-08-23 NOTE — PROGRESS NOTES
Bedside and Verbal shift change report given to Charu Suarez RN (oncoming nurse) by 1637 MEG Huber RN (offgoing nurse). Report included the following information SBAR, Kardex and Cardiac Rhythm . Artist Hazy Zone Phone for oncoming shift:   [phone]    Shift Summary: Patient had an uneventful shift. LDAs         PICC Double Lumen 05/89/73 Right;Basilic (Active)   Central Line Being Utilized Yes 8/23/2017  4:00 PM   Criteria for Appropriate Use Limited/no vessel suitable for conventional peripheral access 8/23/2017  4:00 PM   Site Assessment Clean, dry, & intact 8/23/2017  4:00 PM   Phlebitis Assessment 0 8/23/2017  4:00 PM   Infiltration Assessment 0 8/23/2017  4:00 PM   Arm Circumference (cm) 29 cm 8/22/2017  1:13 PM   Date of Last Dressing Change 08/22/17 8/23/2017  7:25 AM   Dressing Status Clean, dry, & intact 8/23/2017  4:00 PM   Action Taken Blood drawn 8/23/2017  4:17 AM   External Catheter Length (cm) 39 centimeters 8/22/2017  1:13 PM   Dressing Type Disk with Chlorhexadine gluconate (CHG) 8/23/2017  4:00 PM   Hub Color/Line Status Purple; Infusing 8/23/2017  4:00 PM   Positive Blood Return (Site #1) Yes 8/23/2017  4:00 PM   Hub Color/Line Status Red;Capped;Flushed 8/23/2017  4:00 PM   Positive Blood Return (Site #2) Yes 8/23/2017  4:00 PM   Alcohol Cap Used Yes 8/23/2017  4:00 PM                PEG/Gastrostomy Tube 07/18/17 (Active)   Site Assessment Clean, dry, & intact 8/23/2017  4:00 PM   Dressing Status Clean, dry, & intact 8/23/2017  4:00 PM   G Port Status Infusing 8/23/2017  4:00 PM   Action Taken Placement verified (comment) 8/23/2017  7:25 AM   Gastric Residual (mL) 0 ml 8/23/2017  7:25 AM   Tube Feeding/Formula Options Jevity 1.5 8/23/2017  4:00 PM   Modular Nutrients Fiber 8/23/2017  4:00 PM   Tube Feeding/Verify Rate (mL/hr) 50 8/23/2017  4:00 PM   Water Flush Volume (mL) 100 mL 8/23/2017  4:00 PM   Intake (ml) 2200 ml 8/22/2017  7:00 PM   Medication Volume 75 ml 8/23/2017  7:25 AM              Urinary Catheter 08/19/17 Cummins-Criteria for Appropriate Use: Obstruction/retention   Intake & Output   Date 08/22/17 1900 - 08/23/17 0659 08/23/17 0700 - 08/24/17 0659   Shift 3610-9630 24 Hour Total 6593-3866 7502-5894 24 Hour Total   I  N  T  A  K  E   P. O.  0         P. O.  0       I.V.  (mL/kg/hr) 1200 1500         I.V. 1200 1200         Volume (cefTRIAXone (ROCEPHIN) 1 g in 0.9% sodium chloride (MBP/ADV) 50 mL)  50         Volume (vancomycin (VANCOCIN) 1250 mg in  ml infusion)  250       NG/GT 2500 2700 275  275      Water Flush Volume (mL) (PEG/Gastrostomy Tube 07/18/17) 200 300 200  200      Medication Volume (PEG/Gastrostomy Tube 07/18/17) 100 200 75  75      Intake (ml) (PEG/Gastrostomy Tube 07/18/17) 2200 2200       Shift Total  (mL/kg) 3700  (56.4) 4200  (64) 275  (4.2)  275  (4.2)   O  U  T  P  U  T   Urine  (mL/kg/hr) 2050 2750 1475  (1.9)  1475      Urine Voided   1475  1475      Urine Output (mL) (Urinary Catheter 08/19/17 Cummins) 2050 2750       Stool           Stool Occurrence(s) 1 x 1 x       Shift Total  (mL/kg) 2050  (31.2) 2750  (41.9) 1475  (22.5)  1475  (22.5)   NET 1650 1450 -1200  -1200   Weight (kg) 65.6 65.6 65.6 65.6 65.6      Last Bowel Movement Last Bowel Movement Date: 08/23/17   Glucose Checks [x] N/A  [] AC/HS  [] Q6  Concerns:   Nutrition Active Orders   Diet    DIET NPO       Consults []PT  []OT  []Speech  []Case Management   Cardiac Monitoring []N/A [x]Yes Expires:

## 2017-08-24 VITALS
HEART RATE: 68 BPM | RESPIRATION RATE: 19 BRPM | HEIGHT: 68 IN | OXYGEN SATURATION: 96 % | SYSTOLIC BLOOD PRESSURE: 157 MMHG | TEMPERATURE: 97.8 F | BODY MASS INDEX: 21.93 KG/M2 | WEIGHT: 144.7 LBS | DIASTOLIC BLOOD PRESSURE: 87 MMHG

## 2017-08-24 LAB
ANION GAP SERPL CALC-SCNC: 3 MMOL/L (ref 5–15)
BACTERIA SPEC CULT: ABNORMAL
BACTERIA SPEC CULT: ABNORMAL
BUN SERPL-MCNC: 8 MG/DL (ref 6–20)
BUN/CREAT SERPL: 21 (ref 12–20)
CALCIUM SERPL-MCNC: 8.1 MG/DL (ref 8.5–10.1)
CC UR VC: ABNORMAL
CHLORIDE SERPL-SCNC: 104 MMOL/L (ref 97–108)
CO2 SERPL-SCNC: 34 MMOL/L (ref 21–32)
CREAT SERPL-MCNC: 0.39 MG/DL (ref 0.7–1.3)
DATE LAST DOSE: ABNORMAL
GLUCOSE SERPL-MCNC: 106 MG/DL (ref 65–100)
POTASSIUM SERPL-SCNC: 3.2 MMOL/L (ref 3.5–5.1)
REPORTED DOSE,DOSE: ABNORMAL UNITS
REPORTED DOSE/TIME,TMG: ABNORMAL
SERVICE CMNT-IMP: ABNORMAL
SODIUM SERPL-SCNC: 141 MMOL/L (ref 136–145)
VANCOMYCIN TROUGH SERPL-MCNC: 10.6 UG/ML (ref 5–10)

## 2017-08-24 PROCEDURE — 80048 BASIC METABOLIC PNL TOTAL CA: CPT | Performed by: HOSPITALIST

## 2017-08-24 PROCEDURE — 74011250637 HC RX REV CODE- 250/637: Performed by: HOSPITALIST

## 2017-08-24 PROCEDURE — 74011250637 HC RX REV CODE- 250/637: Performed by: PSYCHIATRY & NEUROLOGY

## 2017-08-24 PROCEDURE — 36415 COLL VENOUS BLD VENIPUNCTURE: CPT | Performed by: HOSPITALIST

## 2017-08-24 PROCEDURE — 80202 ASSAY OF VANCOMYCIN: CPT | Performed by: HOSPITALIST

## 2017-08-24 PROCEDURE — 74011250636 HC RX REV CODE- 250/636: Performed by: HOSPITALIST

## 2017-08-24 PROCEDURE — 74011250637 HC RX REV CODE- 250/637: Performed by: INTERNAL MEDICINE

## 2017-08-24 PROCEDURE — 97535 SELF CARE MNGMENT TRAINING: CPT

## 2017-08-24 PROCEDURE — 74011250636 HC RX REV CODE- 250/636: Performed by: INTERNAL MEDICINE

## 2017-08-24 RX ORDER — VANCOMYCIN/0.9 % SOD CHLORIDE 1.5G/250ML
1500 PLASTIC BAG, INJECTION (ML) INTRAVENOUS EVERY 12 HOURS
Status: DISCONTINUED | OUTPATIENT
Start: 2017-08-25 | End: 2017-08-24 | Stop reason: HOSPADM

## 2017-08-24 RX ORDER — POTASSIUM CHLORIDE 1.5 G/1.77G
40 POWDER, FOR SOLUTION ORAL
Status: COMPLETED | OUTPATIENT
Start: 2017-08-24 | End: 2017-08-24

## 2017-08-24 RX ORDER — OXYCODONE HYDROCHLORIDE 5 MG/1
5 CAPSULE ORAL
Qty: 5 CAP | Refills: 0 | Status: SHIPPED | OUTPATIENT
Start: 2017-08-24 | End: 2017-09-15

## 2017-08-24 RX ADMIN — PANTOPRAZOLE SODIUM 40 MG: 40 GRANULE, DELAYED RELEASE ORAL at 17:12

## 2017-08-24 RX ADMIN — PANTOPRAZOLE SODIUM 40 MG: 40 GRANULE, DELAYED RELEASE ORAL at 09:29

## 2017-08-24 RX ADMIN — POTASSIUM CHLORIDE 40 MEQ: 1.5 POWDER, FOR SOLUTION ORAL at 13:25

## 2017-08-24 RX ADMIN — ENOXAPARIN SODIUM 40 MG: 40 INJECTION SUBCUTANEOUS at 17:12

## 2017-08-24 RX ADMIN — Medication 10 ML: at 05:11

## 2017-08-24 RX ADMIN — METOPROLOL TARTRATE 12.5 MG: 25 TABLET ORAL at 09:29

## 2017-08-24 RX ADMIN — METOPROLOL TARTRATE 12.5 MG: 25 TABLET ORAL at 17:13

## 2017-08-24 RX ADMIN — PREGABALIN 300 MG: 150 CAPSULE ORAL at 17:12

## 2017-08-24 RX ADMIN — Medication 100 MG: at 09:30

## 2017-08-24 RX ADMIN — VALPROIC ACID 500 MG: 250 SOLUTION ORAL at 09:29

## 2017-08-24 RX ADMIN — Medication 10 ML: at 13:26

## 2017-08-24 RX ADMIN — VANCOMYCIN HYDROCHLORIDE 1250 MG: 10 INJECTION, POWDER, LYOPHILIZED, FOR SOLUTION INTRAVENOUS at 02:39

## 2017-08-24 RX ADMIN — LEVETIRACETAM 1500 MG: 100 SOLUTION ORAL at 17:12

## 2017-08-24 RX ADMIN — LATANOPROST 1 DROP: 50 SOLUTION OPHTHALMIC at 17:19

## 2017-08-24 RX ADMIN — VALPROIC ACID 500 MG: 250 SOLUTION ORAL at 17:12

## 2017-08-24 RX ADMIN — PREGABALIN 300 MG: 150 CAPSULE ORAL at 05:11

## 2017-08-24 RX ADMIN — LEVETIRACETAM 1500 MG: 100 SOLUTION ORAL at 09:29

## 2017-08-24 RX ADMIN — VANCOMYCIN HYDROCHLORIDE 1250 MG: 10 INJECTION, POWDER, LYOPHILIZED, FOR SOLUTION INTRAVENOUS at 14:09

## 2017-08-24 NOTE — PROGRESS NOTES
Bedside shift change report given to [facility] (oncoming nurse) by Ricky Underwood (offgoing nurse). Report included the following information SBAR, Kardex, Intake/Output, MAR and Recent Results. Zone Phone for oncoming shift:   [phone]    Shift Summary: Pt rested quietly through night. No issues with pain. LDAs         PICC Double Lumen 12/22/47 Right;Basilic (Active)   Central Line Being Utilized Yes 8/24/2017  7:49 AM   Criteria for Appropriate Use Limited/no vessel suitable for conventional peripheral access 8/24/2017  7:49 AM   Site Assessment Clean, dry, & intact 8/24/2017  7:49 AM   Phlebitis Assessment 0 8/24/2017  7:49 AM   Infiltration Assessment 0 8/24/2017  7:49 AM   Arm Circumference (cm) 29 cm 8/22/2017  1:13 PM   Date of Last Dressing Change 08/22/17 8/24/2017  7:49 AM   Dressing Status Clean, dry, & intact 8/24/2017  7:49 AM   Action Taken Open ports on tubing capped 8/24/2017  7:49 AM   External Catheter Length (cm) 39 centimeters 8/22/2017  1:13 PM   Dressing Type Disk with Chlorhexadine gluconate (CHG); Transparent 8/24/2017  7:49 AM   Hub Color/Line Status Purple 8/24/2017  7:49 AM   Positive Blood Return (Site #1) Yes 8/24/2017  7:49 AM   Hub Color/Line Status Red 8/24/2017  7:49 AM   Positive Blood Return (Site #2) Yes 8/24/2017  7:49 AM   Alcohol Cap Used Yes 8/24/2017  7:49 AM                PEG/Gastrostomy Tube 07/18/17 (Active)   Site Assessment Clean, dry, & intact 8/24/2017  7:49 AM   Dressing Status Clean, dry, & intact 8/24/2017  7:49 AM   G Port Status Infusing 8/24/2017  7:49 AM   Action Taken Placement verified (comment) 8/23/2017  7:41 PM   Gastric Residual (mL) 0 ml 8/24/2017  2:22 AM   Tube Feeding/Formula Options Jevity 1.5 8/24/2017  7:49 AM   Modular Nutrients Fiber 8/24/2017  2:22 AM   Tube Feeding/Verify Rate (mL/hr) 50 8/24/2017  2:22 AM   Water Flush Volume (mL) 100 mL 8/24/2017  2:22 AM   Intake (ml) 2200 ml 8/22/2017  7:00 PM   Medication Volume 50 ml 8/24/2017  2:22 AM Urinary Catheter 08/19/17 Cummins-Criteria for Appropriate Use: Obstruction/retention   Intake & Output   Date 08/23/17 0700 - 08/24/17 0659 08/24/17 0700 - 08/25/17 0659   Shift 6102-6755 1903-3143 24 Hour Total 8076-8607 4302-3599 24 Hour Total   I  N  T  A  K  E   I.V.  (mL/kg/hr)  1200  (1.5) 1200  (0.8)         I.V.  1200 1200       NG/ 150 425         Water Flush Volume (mL) (PEG/Gastrostomy Tube 07/18/17) 200 100 300         Medication Volume (PEG/Gastrostomy Tube 07/18/17) 75 50 125       Shift Total  (mL/kg) 275  (4.2) 1350  (20.6) 1625  (24.8)      O  U  T  P  U  T   Urine  (mL/kg/hr) 1475  (1.9) 2500  (3.2) 3975  (2.5) 500  500      Urine Voided 1475  1475         Urine Output (mL) (Urinary Catheter 08/19/17 Cummins)  2500 2500 500  500    Stool            Stool Occurrence(s)  3 x 3 x       Shift Total  (mL/kg) 1475  (22.5) 2500  (38.1) 3975  (60.6) 500  (7.6)  500  (7.6)   NET -1200 -1150 -2350 -500  -500   Weight (kg) 65.6 65.6 65.6 65.6 65.6 65.6      Last Bowel Movement Last Bowel Movement Date: 08/23/17   Glucose Checks [x] N/A  [] AC/HS  [] Q6  Concerns:   Nutrition Active Orders   Diet    DIET NPO       Consults [x]PT  [x]OT  []Speech  [x]Case Management   Cardiac Monitoring []N/A [x]Yes Expires:48 hrs

## 2017-08-24 NOTE — PROGRESS NOTES
Visited by Kim Last Partner 8/24/17.     Jose Marcial, ABEL, [facility], [address]     Paging Service  [phone]

## 2017-08-24 NOTE — DISCHARGE INSTRUCTIONS
HOSPITALIST DISCHARGE INSTRUCTIONS    NAME: Christ Sheppard   :  1934   MRN:  [id number]     Date/Time:  2017 12:21 PM    ADMIT DATE: 2017   DISCHARGE DATE: 2017     Attending Physician: Yennifer Bella MD    DISCHARGE DIAGNOSIS:      UTI  Encephalopathy  Seizure disorder       Medications: Per above medication reconciliation. Pain Management: per above medications    Recommended diet: NPO, PEG feeding - TF Jevity 1.5 krish 50 cc/hr     Recommended activity: Activity as tolerated    Wound care: None    Indwelling devices: Cummins catheter changed , follow with urology in 1 week for free voiding trial     Supplemental Oxygen: None    Required Lab work: Per SNF routine    Glucose management:  None    Code status: Full        Outside physician follow up: Follow-up Information     Follow up With Details Comments [facility]   [address] [facility]  [address] [place] [address]      Molly Downs MD In 1 week  [facility]  [address] [facility]  [facility]      Sarbjit Trevino MD In 1 week  [facility]  [phone]                 Skilled nursing facility/ SNF MD responsible for above on discharge. Information obtained by :  I understand that if any problems occur once I am at home I am to contact my physician. I understand and acknowledge receipt of the instructions indicated above.                                                                                                                                            Physician's or R.N.'s Signature                                                                  Date/Time                                                                                                                                              Patient or Repres

## 2017-08-24 NOTE — DISCHARGE SUMMARY
Hospitalist Discharge Summary     Patient ID:  Rodrigo Parisi  [id number]  38 y.o.  1934    PCP on record: Catalina Orozco MD    Admit date: 8/19/2017  Discharge date and time: 8/24/2017      DISCHARGE DIAGNOSIS:    Sepsis POA in settings of UTI POA due to indwelling Cumimns   Lactic acidosis POA, resolved   Bacteremia is contamination   PAfib / PSVT   Hypokalemia   hypomagnesemia   Aute encephalopathy POA resolved   Seizure disorder  PEG placement 7/18/17 for dysphagia  Urinary retention   Code status: Full        CONSULTATIONS:  IP CONSULT TO NEUROLOGY    Excerpted HPI from H&P of Gabriel Fuller MD:    HISTORY OF PRESENT ILLNESS:     Rodrigo Parisi is a 80 y.o.  male  with significant PMHx for SZ, presents EMS to [address] [facility] ED with cc of slurred speech and right sided weakness. Per EMS pt was found after GLF at nursing home. Per family pt is interactive and normal speech at baseline. Patient specifically denies fever, chills, nausea, vomiting, diarrhea, or headache. At this time patient is confused, unable to provide a meaningful history, data collected from chart and family in the room. We were asked to admit for work up and evaluation of the above problems. ______________________________________________________________________  DISCHARGE SUMMARY/HOSPITAL COURSE:  for full details see H&P, daily progress notes, labs, consult notes.      Sepsis POA in settings of UTI POA due to indwelling Cummins   Lactic acidosis POA, resolved   Bacteremia is contamination   Fever/tachycardia resolved  Cummins since last admission due to urinary retention, changed 8/19  --> urology OP follow up for free voiding trial   [facility] 8/19 + bacilus species 1/4- likely contamination, repeated 8/23 NTD   UC: Enterococcus faecalis + proteus - ampicillin pan sensitive   CTX x 5 days --> ampicillin to complete 10 days,  vanco 8/21 - OK to dc        PAfib / PSVT   HR controlled   Continue BB  AC: not a candidate at present - high risk of falling. No prior h/o TIA/stroke - re assess as needed      Hypokalemia / hypomagnesemia   Resolved      Acute encephalopathy POA resolved   Likely multifactorial:delirium +/- post ictal. Back to baseline now   H/o slow recovery s/p seizures in the past ( 4-5 days)   Head CT/ MRI:  negative   Avoid oversedation  Speech:  NPO with TF via PEG. Ok for occasional bite of applesauce or tsp amount of honey thick liquids only if patient fully alert and HE is requesting      Seizure disorder  Neurology help appreciated: cont Lyrica/Keppra/depakote      PEG placement 7/18/17 for dysphagia  c/w tube feedings and monitor     Urinary retention  last admission and was D/C with Cummins cath  Cummins changed on admission 8/19   Follow with urology OP 1-2 weeks for free voiding trial         Code status: Full  Surrogate Decision Maker: wife Gene [phone] and DTR Makayla [phone]  Prophylaxis:lovenox         Body mass index is 22 kg/(m^2). Baseline:  lives in [place], [place] , communicative   Recommended Disposition: back to NH  this is pts 3rd re admission in 1 month. D/w pts dtr, family wants to continue agressive care and full code. If re admitted soon may consider palliative care consult      8/22: called wife to update, left massage   8/23: called wife to update, left massage. Called dtr and updated her on plan of care, all questions were answered. She will communicate everything to her mother.       Cummins 8/19, POA , indwelling   PICC 8/22, dc 8/24        _______________________________________________________________________  Patient seen and examined by me on discharge day. PHYSICAL EXAM:  General:                    WD, WN. Continue to improve. No acute distress    EENT:                                  EOMI. Anicteric sclerae. MMM  Resp:                                   CTA bilaterally, no wheezing or rales.   No accessory muscle use  CV:                                      Regular rhythm,  No edema  GI:                                       Soft, Non distended, Non tender.  +Bowel sounds  Neurologic:                Alert and oriented X 1-2, communicative, following commands    Psych:                       Poor insight. Not anxious nor agitated  Skin:                                    No rashes. No jaundice  _______________________________________________________________________  DISCHARGE MEDICATIONS:   Current Discharge Medication List      START taking these medications    Details   ampicillin (PRINCIPEN) 250 mg/5 mL suspension Take 10 mL by mouth every six (6) hours for 6 days. Qty: 240 mL, Refills: 0         CONTINUE these medications which have CHANGED    Details   oxyCODONE (OXYIR) 5 mg capsule 1 Cap by Per G Tube route every four (4) hours as needed. Max Daily Amount: 30 mg.  Qty: 5 Cap, Refills: 0         CONTINUE these medications which have NOT CHANGED    Details   acetaminophen (TYLENOL) 325 mg tablet 2 Tabs by Per G Tube route every six (6) hours as needed. Qty: 40 Tab, Refills: 0      valproic acid, as sodium salt, (DEPAKENE) 250 mg/5 mL (5 mL) soln oral solution 10 mL by Per G Tube route three (3) times daily. Qty: 1 Bottle, Refills: 1      thiamine (B-1) 100 mg tablet 1 Tab by Per G Tube route daily. Qty: 30 Tab, Refills: 1      QUEtiapine (SEROQUEL) 25 mg tablet 1 Tab by Per G Tube route nightly. Qty: 30 Tab, Refills: 1      pregabalin (LYRICA) 300 mg capsule Take 1 Cap by mouth two (2) times a day. Max Daily Amount: 600 mg. Qty: 60 Cap, Refills: 1      pantoprazole (PROTONIX) 40 mg granules for oral suspension 40 mg by Per G Tube route Before breakfast and dinner. Qty: 60 Each, Refills: 1      multivit w-min-ferrous gluconate (CEROVITE) 9 mg iron/15 mL oral liquid 15 mL by Per G Tube route daily. Qty: 1 Bottle, Refills: 01      metoprolol tartrate (LOPRESSOR) 25 mg tablet 0.5 Tabs by Per G Tube route two (2) times a day.   Qty: 60 Tab, Refills: 1      levETIRAcetam (KEPPRA) 100 mg/ml soln oral solution 15 mL by Per G Tube route two (2) times a day. Qty: 1 Bottle, Refills: 1      cyclobenzaprine (FLEXERIL) 5 mg tablet 1 Tab by Per G Tube route three (3) times daily as needed for Muscle Spasm(s). Qty: 40 Tab, Refills: 0      atorvastatin (LIPITOR) 40 mg tablet 40 mg by Per G Tube route nightly. latanoprost (XALATAN) 0.005 % ophthalmic solution Administer 1 Drop to both eyes every evening. Qty: 1 mL, Refills: 1      albuterol-ipratropium (DUO-NEB) 2.5 mg-0.5 mg/3 ml nebu 3 mL by Nebulization route every six (6) hours as needed. Qty: 30 Nebule, Refills: 1         STOP taking these medications       nitrofurantoin, macrocrystal-monohydrate, (MACROBID) 100 mg capsule Comments:   Reason for Stopping:               My Recommended Diet, Activity, Wound Care, and follow-up labs are listed in the patient's Discharge Insturctions which I have personally completed and reviewed.     _______________________________________________________________________  DISPOSITION:    Home with Family:    Home with HH/PT/OT/RN:    SNF/LTC: y   CHRISTINA:    OTHER:        Condition at Discharge:  Stable  _______________________________________________________________________  Follow up with:   PCP : Ruby Atkinson MD  Follow-up Information     Follow up With Details Comments [address] [facility])   [address]      Deborah Plaza MD In 1 week  [facility]  [address] [place] [address],[address]  [facility] [address]      Ruby Atkinson MD In 1 week  [address]  [phone]                Total time in minutes spent coordinating this discharge (includes going over instructions, follow-up, prescriptions, and preparing report for sign off to her PCP) :  > 30  minutes    Signed:  Basia Casillas MD

## 2017-08-24 NOTE — PROGRESS NOTES
Pharmacy Automatic Renal Dosing Protocol - Antimicrobials    Indication for Antimicrobials: UTI  Current Regimen of Each Antimicrobial (Start Day & Day of Therapy):  Vancomycin 1750 mg x 1, followed by 1250 mg IV Q12H (Start 17 - Day 3)  CTX 1g IV Q24H (Start  - Day 5)    Significant cultures:   17 Blood - Bacillus, not anthracis , other bottles NG - Prelim   17 Urine - checking for possible GPC - Prelim    CAPD, Intermittent Hemodialysis or Renal Replacement Therapy: NA  Paralysis, amputations, malnutrition: None noted  Recent Labs      17   0158  17   0145  17   0610   CREA  0.39*  0.44*  0.39*   BUN  8  10  11     Temp (24hrs), Av.9 °F (36.6 °C), Min:97.3 °F (36.3 °C), Max:98.4 °F (36.9 °C)    Creatinine Clearance (ml/min): ~78 ml/min    Goal Vancomycin Level(s): ~15 initial; then 10-15 if strictly UTI    Impression/Plan:   Vancomycin trough 10.6 mcg/ml on 1250 mg Q12H, extrapolated to 9.7 mcg/ml. Dose adjusted to 1500 mg Q12H with anticipated trough of 11.6 mcg/ml. Pharmacy will follow daily and adjust doses of monitored medications as appropriate for renal function and/or serum levels.     Thank you,  Claude Hudson, [facility]

## 2017-08-24 NOTE — PROGRESS NOTES
Problem: Self Care Deficits Care Plan (Adult)  Goal: *Acute Goals and Plan of Care (Insert Text)  Occupational Therapy Goals  Initiated 8/22/2017  1. Patient will perform upper body dressing with minimal assistance within 7 day(s). 2. Patient will perform grooming sitting EOB with supervision/set-up within 7 day(s). 3. Patient will perform toilet transfers with supervision/set-up within 7 day(s). 4. Patient will perform all aspects of toileting with moderate assistance within 7 day(s). 5. Patient will participate in upper extremity therapeutic exercise/activities with supervision/set-up for 8 minutes within 7 day(s). 6. Patient will utilize energy conservation techniques during functional activities with verbal cues within 7 day(s). OCCUPATIONAL THERAPY TREATMENT  Patient: Alexandra Chan (32 y.o. male)  Date: 8/24/2017  Diagnosis: Sepsis ([facility] [address] <principal problem not specified>       Precautions: Fall      ASSESSMENT:  Nursing cleared for therapy. Patient with increased alertness and orientation from previous session. Patient oriented to name and place with increased conversation. Provided education on toileting transfers, completed at [facility] with min A. Patient with impaired vision needing additional verbal cues for safety. Demonstrated ability to pull up socks while sitting EOB with good sitting balance. Recommend SNF to maximize indep. Patient was living at [facility], per , plans to return tonight. Progression toward goals:  [X]       Improving appropriately and progressing toward goals  [ ]       Improving slowly and progressing toward goals  [ ]       Not making progress toward goals and plan of care will be adjusted       PLAN:  Patient continues to benefit from skilled intervention to address the above impairments. Continue treatment per established plan of care.   Discharge Recommendations:  Skilled Nursing Facility  Further Equipment Recommendations for Discharge:  TBD SNF SUBJECTIVE:   Patient stated I am doing well today, how are you?       OBJECTIVE DATA SUMMARY:   Cognitive/Behavioral Status:  Neurologic State: Alert;Confused  Orientation Level: Oriented to person;Oriented to place; Disoriented to time;Disoriented to situation  Cognition: Follows commands; Impulsive              Functional Mobility and Transfers for ADLs:  Bed Mobility:  Supine to Sit: Supervision  Sit to Supine: Supervision  Scooting: Supervision     Transfers:  Sit to Stand: Supervision  Functional Transfers  Bathroom Mobility: Minimum assistance  Toilet Transfer : Minimum assistance; Adaptive equipment     Balance:  Sitting: Intact  Sitting - Static: Good (unsupported)  Sitting - Dynamic: Good (unsupported)  Standing: Impaired  Standing - Static: Constant support; Fair  Standing - Dynamic : Fair     ADL Intervention:    Reorientation training for time and situation. Provided education on toileting transfers with verbal and tactile cues secondary to vision deficits. Amb to bathroom at [facility] [place] with min A. Patient with impaired vision needing additional verbal cues for safety. Demonstrated ability to pull up socks while sitting EOB with good sitting balance. Returned to supine with bed alarm. (Telesitter being reset, nursing working on this). Patient denies pain. Activity Tolerance:   Fair at [facility] [place] level for toileting training.  Denies dizziness     After treatment:   [ ] Patient left in no apparent distress sitting up in chair  [X] Patient left in no apparent distress in bed  [X] Call bell left within reach  [X] Nursing notified  [ ] Caregiver present  [ ] Bed alarm activated      COMMUNICATION/COLLABORATION:   The patients plan of care was discussed with: Registered Nurse and      Merlin Lang, OT  Time Calculation: 29 mins

## 2017-08-24 NOTE — PROGRESS NOTES
Await therapy recommendations. Please order Palliative Care to follow up with pt//wife to discuss plan of care//goals with 3 admissions in 1 month. Has a 12/21 karen't with Dr Keanu Castro.      I spoke with wife whose intent if for pt to return to [facility]. She has no association with Irasema Chávez(per MD note). Chart cclinked to [facility] SNF who can accept but will need auth prior to admission     124  MD is aware we do not have auth and can not d/c to [facility] until Giselle Perez is received. 1445  All in agreement with d/c today to [facility]. Please call report to [phone], send emar, d/c instructions, Rx for narcotics if any, facesheet, and ambulance form. The earliest ambulance is 5:30p. I left a VM for the wife updating her of the above.

## 2017-08-24 NOTE — PROGRESS NOTES
Hospitalist Progress Note    NAME: Christ Sheppard   :  1934   MRN:  [id number]       Assessment / Plan:  Sepsis POA in settings of UTI POA due to indwelling Cummins   Lactic acidosis POA, resolved   Bacteremia is contamination   Fever/tachycardia resolved  Cummins since last admission due to urinary retention, changed   --> urology OP follow up for free voiding trial   Orvis Pears  + bacilus species - likely contamination, repeated  NTD   UC: Enterococcus faecalis + proteus - pan sensitive   CTX x 5 days --> ampicillin to complete 10 days,  vanco  - OK to dc       PAfib / PSVT   HR controlled   Continue BB  AC: not a candidate at present - high risk of falling. No prior h/o TIA/stroke     Hypokalemia / hypomagnesemia   Supplement as needed     Acute encephalopathy POA resolved   Likely multifactorial:delirium +/- post ictal. Back to baseline now   H/o slow recovery s/p seizures in the past ( 4-5 days)   Head CT/ MRI:  negative   Avoid oversedation  Speech:  NPO with TF via PEG. [place] for occasional bite of applesauce or tsp amount of honey thick liquids only if patient fully alert and HE is requesting     Seizure disorder  Neurology help appreciated: cont Lyrica/Keppra/depakote     PEG placement 17 for dysphagia  c/w tube feedings and monitor    Urinary retention  last admission and was D/C with Cummins cath  Cummins changed on admission    May consider voiding trial once more mobile and AB completed        Code status: Full  Surrogate Decision Maker: wife Gene [phone] and DTR Makayla [phone]  Prophylaxis: SCD's      Body mass index is 22 kg/(m^2). Baseline:  lives in [place], [place] , communicative   Recommended Disposition: back to NH hopefully soon pending full UC     : called wife to update, left massage   : called wife to update, left massage.  Called dtr and updated her on plan of care     Cummins , POA   PICC      Subjective:     Chief Complaint / Reason for Physician Visit: following UTI / encephalopathy/ sepsis  MS: remain awake/alert/communicative; likely back to his baseline     Discussed with RN events overnight. Review of Systems:  Symptom Y/N Comments  Symptom Y/N Comments   Fever/Chills n   Chest Pain n    Poor Appetite    Edema     Cough    Abdominal Pain n    Sputum    Joint Pain     SOB/AL n   Pruritis/Rash     Nausea/vomit n   Tolerating PT/OT     Diarrhea    Tolerating Diet     Constipation    Other       Could NOT obtain due to:      Objective:     VITALS:   Last 24hrs VS reviewed since prior progress note. Most recent are:  Patient Vitals for the past 24 hrs:   Temp Pulse Resp BP SpO2   08/24/17 1154 97.5 °F (36.4 °C) 64 18 171/73 98 %   08/24/17 0817 97.3 °F (36.3 °C) 70 18 155/74 100 %   08/24/17 0249 98 °F (36.7 °C) 82 18 142/76 96 %   08/23/17 2238 98.2 °F (36.8 °C) 74 18 140/71 97 %   08/23/17 1953 98.4 °F (36.9 °C) 80 18 174/73 100 %   08/23/17 1550 97.4 °F (36.3 °C) 66 16 164/68 97 %   08/23/17 1210 98.2 °F (36.8 °C) 70 18 155/89 96 %       Intake/Output Summary (Last 24 hours) at 08/24/17 1204  Last data filed at 08/24/17 0749   Gross per 24 hour   Intake             1450 ml   Output             4475 ml   Net            -3025 ml        PHYSICAL EXAM:  General: WD, WN. Continue to improve. No acute distress    EENT:  EOMI. Anicteric sclerae. MMM  Resp:  CTA bilaterally, no wheezing or rales. No accessory muscle use  CV:  Regular  rhythm,  No edema  GI:  Soft, Non distended, Non tender.  +Bowel sounds  Neurologic:  Alert and oriented X 1-2, communicative, following commands    Psych:   Poor insight. Not anxious nor agitated  Skin:  No rashes.   No jaundice    Reviewed most current lab test results and cultures  YES  Reviewed most current radiology test results   YES  Review and summation of old records today    NO  Reviewed patient's current orders and MAR    YES  PMH/SH reviewed - no change compared to H&P  ________________________________________________________________________  Care Plan discussed with:    Comments   Patient y    Family      RN y    Care Manager     Consultant                        Multidiciplinary team rounds were held today with , nursing, pharmacist and clinical coordinator. Patient's plan of care was discussed; medications were reviewed and discharge planning was addressed. ________________________________________________________________________  Total NON critical care TIME:  25   Minutes    Total CRITICAL CARE TIME Spent:   Minutes non procedure based      Comments   >50% of visit spent in counseling and coordination of care     ________________________________________________________________________  Leanna Jung MD     Procedures: see electronic medical records for all procedures/Xrays and details which were not copied into this note but were reviewed prior to creation of Plan. LABS:  I reviewed today's most current labs and imaging studies. Pertinent labs include:  No results for input(s): WBC, HGB, HCT, PLT, HGBEXT, HCTEXT, PLTEXT, HGBEXT, HCTEXT, PLTEXT in the last 72 hours.   Recent Labs      08/24/17   0158  08/23/17   0145  08/22/17   0610   NA  141  141  138   K  3.2*  3.5  3.5   CL  104  103  101   CO2  34*  34*  32   GLU  106*  109*  110*   BUN  8  10  11   CREA  0.39*  0.44*  0.39*   CA  8.1*  8.4*  8.5   MG   --    --   1.6       Signed: Leanna Jung MD

## 2017-08-25 LAB
BACTERIA SPEC CULT: ABNORMAL
SERVICE CMNT-IMP: ABNORMAL

## 2017-08-29 LAB
BACTERIA SPEC CULT: NORMAL
SERVICE CMNT-IMP: NORMAL

## 2017-09-06 ENCOUNTER — TELEPHONE (OUTPATIENT)
Dept: NEUROLOGY | Age: 82
End: 2017-09-06

## 2017-09-06 NOTE — TELEPHONE ENCOUNTER
Spoke with patient's wife Ms. Sarah Jones, wife states patient is in a nursing home and she does not known what to tell me right. Explain I was calling to cancel his December 21, 2017 appointment due Dr. Erasto Aviles will be in a meeting. Patient wife states she understands.

## 2017-09-15 ENCOUNTER — HOSPITAL ENCOUNTER (INPATIENT)
Age: 82
LOS: 7 days | Discharge: NURSING, REHAB OR LONG-TERM CARE | DRG: 871 | End: 2017-09-22
Attending: EMERGENCY MEDICINE | Admitting: HOSPITALIST
Payer: MEDICARE

## 2017-09-15 ENCOUNTER — APPOINTMENT (OUTPATIENT)
Dept: GENERAL RADIOLOGY | Age: 82
DRG: 871 | End: 2017-09-15
Attending: EMERGENCY MEDICINE
Payer: MEDICARE

## 2017-09-15 ENCOUNTER — APPOINTMENT (OUTPATIENT)
Dept: CT IMAGING | Age: 82
DRG: 871 | End: 2017-09-15
Attending: EMERGENCY MEDICINE
Payer: MEDICARE

## 2017-09-15 DIAGNOSIS — G40.901 STATUS EPILEPTICUS (HCC): ICD-10-CM

## 2017-09-15 DIAGNOSIS — G93.40 ACUTE ENCEPHALOPATHY: ICD-10-CM

## 2017-09-15 DIAGNOSIS — R41.0 DELIRIUM: Primary | ICD-10-CM

## 2017-09-15 DIAGNOSIS — R13.10 DYSPHAGIA, UNSPECIFIED TYPE: ICD-10-CM

## 2017-09-15 DIAGNOSIS — J18.9 PNEUMONIA OF RIGHT UPPER LOBE DUE TO INFECTIOUS ORGANISM: ICD-10-CM

## 2017-09-15 DIAGNOSIS — R53.81 DEBILITY: ICD-10-CM

## 2017-09-15 DIAGNOSIS — R47.02 DYSPHASIA: ICD-10-CM

## 2017-09-15 DIAGNOSIS — Z71.89 COUNSELING REGARDING GOALS OF CARE: ICD-10-CM

## 2017-09-15 DIAGNOSIS — R41.0 CONFUSION: ICD-10-CM

## 2017-09-15 DIAGNOSIS — R53.1 WEAKNESS: ICD-10-CM

## 2017-09-15 LAB
ALBUMIN SERPL-MCNC: 3.2 G/DL (ref 3.5–5)
ALBUMIN/GLOB SERPL: 0.6 {RATIO} (ref 1.1–2.2)
ALP SERPL-CCNC: 68 U/L (ref 45–117)
ALT SERPL-CCNC: 22 U/L (ref 12–78)
ANION GAP SERPL CALC-SCNC: 7 MMOL/L (ref 5–15)
APPEARANCE UR: CLEAR
AST SERPL-CCNC: 22 U/L (ref 15–37)
ATRIAL RATE: 103 BPM
BACTERIA URNS QL MICRO: ABNORMAL /HPF
BASOPHILS # BLD: 0 K/UL (ref 0–0.1)
BASOPHILS NFR BLD: 0 % (ref 0–1)
BILIRUB SERPL-MCNC: 0.6 MG/DL (ref 0.2–1)
BILIRUB UR QL: NEGATIVE
BUN SERPL-MCNC: 16 MG/DL (ref 6–20)
BUN/CREAT SERPL: 24 (ref 12–20)
CALCIUM SERPL-MCNC: 8.9 MG/DL (ref 8.5–10.1)
CALCULATED P AXIS, ECG09: 24 DEGREES
CALCULATED R AXIS, ECG10: -43 DEGREES
CALCULATED T AXIS, ECG11: 32 DEGREES
CHLORIDE SERPL-SCNC: 96 MMOL/L (ref 97–108)
CO2 SERPL-SCNC: 33 MMOL/L (ref 21–32)
COLOR UR: ABNORMAL
CREAT SERPL-MCNC: 0.66 MG/DL (ref 0.7–1.3)
DIAGNOSIS, 93000: NORMAL
EOSINOPHIL # BLD: 0 K/UL (ref 0–0.4)
EOSINOPHIL NFR BLD: 0 % (ref 0–7)
EPITH CASTS URNS QL MICRO: ABNORMAL /LPF
ERYTHROCYTE [DISTWIDTH] IN BLOOD BY AUTOMATED COUNT: 12.5 % (ref 11.5–14.5)
GLOBULIN SER CALC-MCNC: 5.2 G/DL (ref 2–4)
GLUCOSE BLD STRIP.AUTO-MCNC: 106 MG/DL (ref 65–100)
GLUCOSE BLD STRIP.AUTO-MCNC: 121 MG/DL (ref 65–100)
GLUCOSE BLD STRIP.AUTO-MCNC: 154 MG/DL (ref 65–100)
GLUCOSE SERPL-MCNC: 96 MG/DL (ref 65–100)
GLUCOSE UR STRIP.AUTO-MCNC: NEGATIVE MG/DL
HCT VFR BLD AUTO: 36.3 % (ref 36.6–50.3)
HGB BLD-MCNC: 12.3 G/DL (ref 12.1–17)
HGB UR QL STRIP: ABNORMAL
INR PPP: 1.2 (ref 0.9–1.1)
KETONES UR QL STRIP.AUTO: NEGATIVE MG/DL
LACTATE SERPL-SCNC: 1.6 MMOL/L (ref 0.4–2)
LEUKOCYTE ESTERASE UR QL STRIP.AUTO: ABNORMAL
LYMPHOCYTES # BLD: 1.2 K/UL (ref 0.8–3.5)
LYMPHOCYTES NFR BLD: 10 % (ref 12–49)
MCH RBC QN AUTO: 33.2 PG (ref 26–34)
MCHC RBC AUTO-ENTMCNC: 33.9 G/DL (ref 30–36.5)
MCV RBC AUTO: 97.8 FL (ref 80–99)
MONOCYTES # BLD: 1.3 K/UL (ref 0–1)
MONOCYTES NFR BLD: 11 % (ref 5–13)
NEUTS SEG # BLD: 9.4 K/UL (ref 1.8–8)
NEUTS SEG NFR BLD: 79 % (ref 32–75)
NITRITE UR QL STRIP.AUTO: NEGATIVE
P-R INTERVAL, ECG05: 252 MS
PH UR STRIP: 8 [PH] (ref 5–8)
PLATELET # BLD AUTO: 108 K/UL (ref 150–400)
POTASSIUM SERPL-SCNC: 3.8 MMOL/L (ref 3.5–5.1)
PROT SERPL-MCNC: 8.4 G/DL (ref 6.4–8.2)
PROT UR STRIP-MCNC: ABNORMAL MG/DL
PROTHROMBIN TIME: 12.6 SEC (ref 9–11.1)
Q-T INTERVAL, ECG07: 394 MS
QRS DURATION, ECG06: 84 MS
QTC CALCULATION (BEZET), ECG08: 516 MS
RBC # BLD AUTO: 3.71 M/UL (ref 4.1–5.7)
RBC #/AREA URNS HPF: ABNORMAL /HPF (ref 0–5)
SERVICE CMNT-IMP: ABNORMAL
SODIUM SERPL-SCNC: 136 MMOL/L (ref 136–145)
SP GR UR REFRACTOMETRY: 1.02 (ref 1–1.03)
UROBILINOGEN UR QL STRIP.AUTO: 4 EU/DL (ref 0.2–1)
VENTRICULAR RATE, ECG03: 103 BPM
WBC # BLD AUTO: 11.9 K/UL (ref 4.1–11.1)
WBC URNS QL MICRO: ABNORMAL /HPF (ref 0–4)

## 2017-09-15 PROCEDURE — 71010 XR CHEST PORT: CPT

## 2017-09-15 PROCEDURE — 74011250636 HC RX REV CODE- 250/636: Performed by: HOSPITALIST

## 2017-09-15 PROCEDURE — 85025 COMPLETE CBC W/AUTO DIFF WBC: CPT | Performed by: EMERGENCY MEDICINE

## 2017-09-15 PROCEDURE — 99285 EMERGENCY DEPT VISIT HI MDM: CPT

## 2017-09-15 PROCEDURE — 70450 CT HEAD/BRAIN W/O DYE: CPT

## 2017-09-15 PROCEDURE — 96367 TX/PROPH/DG ADDL SEQ IV INF: CPT

## 2017-09-15 PROCEDURE — 87086 URINE CULTURE/COLONY COUNT: CPT | Performed by: HOSPITALIST

## 2017-09-15 PROCEDURE — 36415 COLL VENOUS BLD VENIPUNCTURE: CPT | Performed by: EMERGENCY MEDICINE

## 2017-09-15 PROCEDURE — 93005 ELECTROCARDIOGRAM TRACING: CPT

## 2017-09-15 PROCEDURE — 87077 CULTURE AEROBIC IDENTIFY: CPT | Performed by: HOSPITALIST

## 2017-09-15 PROCEDURE — 65660000000 HC RM CCU STEPDOWN

## 2017-09-15 PROCEDURE — 74011000258 HC RX REV CODE- 258: Performed by: PSYCHIATRY & NEUROLOGY

## 2017-09-15 PROCEDURE — 77030011943

## 2017-09-15 PROCEDURE — 85610 PROTHROMBIN TIME: CPT | Performed by: EMERGENCY MEDICINE

## 2017-09-15 PROCEDURE — 96365 THER/PROPH/DIAG IV INF INIT: CPT

## 2017-09-15 PROCEDURE — 74011000258 HC RX REV CODE- 258: Performed by: EMERGENCY MEDICINE

## 2017-09-15 PROCEDURE — 74011250637 HC RX REV CODE- 250/637: Performed by: HOSPITALIST

## 2017-09-15 PROCEDURE — 97530 THERAPEUTIC ACTIVITIES: CPT | Performed by: OCCUPATIONAL THERAPIST

## 2017-09-15 PROCEDURE — 92610 EVALUATE SWALLOWING FUNCTION: CPT

## 2017-09-15 PROCEDURE — 97161 PT EVAL LOW COMPLEX 20 MIN: CPT | Performed by: PHYSICAL THERAPIST

## 2017-09-15 PROCEDURE — 77030005518 HC CATH URETH FOL 2W BARD -B

## 2017-09-15 PROCEDURE — 81001 URINALYSIS AUTO W/SCOPE: CPT | Performed by: EMERGENCY MEDICINE

## 2017-09-15 PROCEDURE — 87040 BLOOD CULTURE FOR BACTERIA: CPT | Performed by: EMERGENCY MEDICINE

## 2017-09-15 PROCEDURE — 74011250636 HC RX REV CODE- 250/636: Performed by: EMERGENCY MEDICINE

## 2017-09-15 PROCEDURE — 74011250636 HC RX REV CODE- 250/636: Performed by: PSYCHIATRY & NEUROLOGY

## 2017-09-15 PROCEDURE — 97166 OT EVAL MOD COMPLEX 45 MIN: CPT | Performed by: OCCUPATIONAL THERAPIST

## 2017-09-15 PROCEDURE — 83605 ASSAY OF LACTIC ACID: CPT | Performed by: EMERGENCY MEDICINE

## 2017-09-15 PROCEDURE — 80053 COMPREHEN METABOLIC PANEL: CPT | Performed by: EMERGENCY MEDICINE

## 2017-09-15 PROCEDURE — 87186 SC STD MICRODIL/AGAR DIL: CPT | Performed by: HOSPITALIST

## 2017-09-15 PROCEDURE — 82962 GLUCOSE BLOOD TEST: CPT

## 2017-09-15 PROCEDURE — 74011250636 HC RX REV CODE- 250/636

## 2017-09-15 PROCEDURE — 77030037878 HC DRSG MEPILEX >48IN BORD MOLN -B

## 2017-09-15 RX ORDER — CYCLOBENZAPRINE HCL 10 MG
5 TABLET ORAL
Status: DISCONTINUED | OUTPATIENT
Start: 2017-09-15 | End: 2017-09-22 | Stop reason: HOSPADM

## 2017-09-15 RX ORDER — PANTOPRAZOLE SODIUM 40 MG/1
40 GRANULE, DELAYED RELEASE ORAL
COMMUNITY

## 2017-09-15 RX ORDER — ACETAMINOPHEN 325 MG/1
650 TABLET ORAL
Status: DISCONTINUED | OUTPATIENT
Start: 2017-09-15 | End: 2017-09-22 | Stop reason: HOSPADM

## 2017-09-15 RX ORDER — LORAZEPAM 2 MG/ML
INJECTION INTRAMUSCULAR
Status: COMPLETED
Start: 2017-09-15 | End: 2017-09-15

## 2017-09-15 RX ORDER — LORAZEPAM 2 MG/ML
1 INJECTION INTRAMUSCULAR ONCE
Status: COMPLETED | OUTPATIENT
Start: 2017-09-15 | End: 2017-09-15

## 2017-09-15 RX ORDER — SODIUM CHLORIDE 0.9 % (FLUSH) 0.9 %
5-10 SYRINGE (ML) INJECTION AS NEEDED
Status: DISCONTINUED | OUTPATIENT
Start: 2017-09-15 | End: 2017-09-22 | Stop reason: HOSPADM

## 2017-09-15 RX ORDER — LEVOFLOXACIN 5 MG/ML
750 INJECTION, SOLUTION INTRAVENOUS EVERY 24 HOURS
Status: COMPLETED | OUTPATIENT
Start: 2017-09-16 | End: 2017-09-20

## 2017-09-15 RX ORDER — QUETIAPINE FUMARATE 25 MG/1
25 TABLET, FILM COATED ORAL
Status: DISCONTINUED | OUTPATIENT
Start: 2017-09-15 | End: 2017-09-22 | Stop reason: HOSPADM

## 2017-09-15 RX ORDER — LATANOPROST 50 UG/ML
1 SOLUTION/ DROPS OPHTHALMIC
Status: DISCONTINUED | OUTPATIENT
Start: 2017-09-15 | End: 2017-09-22 | Stop reason: HOSPADM

## 2017-09-15 RX ORDER — QUETIAPINE FUMARATE 25 MG/1
25 TABLET, FILM COATED ORAL
COMMUNITY
End: 2017-10-14

## 2017-09-15 RX ORDER — SODIUM CHLORIDE 9 MG/ML
50 INJECTION, SOLUTION INTRAVENOUS CONTINUOUS
Status: DISPENSED | OUTPATIENT
Start: 2017-09-15 | End: 2017-09-16

## 2017-09-15 RX ORDER — LEVOFLOXACIN 5 MG/ML
750 INJECTION, SOLUTION INTRAVENOUS
Status: COMPLETED | OUTPATIENT
Start: 2017-09-15 | End: 2017-09-17

## 2017-09-15 RX ORDER — ACETAMINOPHEN 325 MG/1
650 TABLET ORAL
Status: ON HOLD | COMMUNITY
End: 2017-10-14

## 2017-09-15 RX ORDER — IPRATROPIUM BROMIDE AND ALBUTEROL SULFATE 2.5; .5 MG/3ML; MG/3ML
3 SOLUTION RESPIRATORY (INHALATION)
Status: DISCONTINUED | OUTPATIENT
Start: 2017-09-15 | End: 2017-09-22 | Stop reason: HOSPADM

## 2017-09-15 RX ORDER — METOPROLOL SUCCINATE 100 MG/1
12.5 TABLET, EXTENDED RELEASE ORAL 2 TIMES DAILY
COMMUNITY
End: 2017-09-27 | Stop reason: DRUGHIGH

## 2017-09-15 RX ORDER — HALOPERIDOL 5 MG/ML
2 INJECTION INTRAMUSCULAR
Status: DISCONTINUED | OUTPATIENT
Start: 2017-09-15 | End: 2017-09-22 | Stop reason: HOSPADM

## 2017-09-15 RX ORDER — MOXIFLOXACIN HYDROCHLORIDE 400 MG/1
400 TABLET ORAL DAILY
COMMUNITY
End: 2017-09-22

## 2017-09-15 RX ORDER — PREGABALIN 300 MG/1
300 CAPSULE ORAL 2 TIMES DAILY
COMMUNITY

## 2017-09-15 RX ORDER — LEVETIRACETAM 100 MG/ML
1500 SOLUTION ORAL 2 TIMES DAILY
COMMUNITY
End: 2017-10-14

## 2017-09-15 RX ORDER — LANOLIN ALCOHOL/MO/W.PET/CERES
100 CREAM (GRAM) TOPICAL DAILY
COMMUNITY
End: 2017-10-14

## 2017-09-15 RX ORDER — ENOXAPARIN SODIUM 100 MG/ML
40 INJECTION SUBCUTANEOUS EVERY 24 HOURS
Status: DISCONTINUED | OUTPATIENT
Start: 2017-09-15 | End: 2017-09-19

## 2017-09-15 RX ORDER — LABETALOL HYDROCHLORIDE 5 MG/ML
5 INJECTION, SOLUTION INTRAVENOUS
Status: DISCONTINUED | OUTPATIENT
Start: 2017-09-15 | End: 2017-09-22 | Stop reason: HOSPADM

## 2017-09-15 RX ORDER — VALPROIC ACID 250 MG/5ML
500 SOLUTION ORAL 3 TIMES DAILY
COMMUNITY

## 2017-09-15 RX ORDER — VALPROIC ACID 250 MG/5ML
500 SOLUTION ORAL 3 TIMES DAILY
Status: DISCONTINUED | OUTPATIENT
Start: 2017-09-15 | End: 2017-09-22 | Stop reason: HOSPADM

## 2017-09-15 RX ORDER — PREGABALIN 150 MG/1
300 CAPSULE ORAL 2 TIMES DAILY
Status: DISCONTINUED | OUTPATIENT
Start: 2017-09-15 | End: 2017-09-22 | Stop reason: HOSPADM

## 2017-09-15 RX ORDER — METOPROLOL TARTRATE 25 MG/1
12.5 TABLET, FILM COATED ORAL 2 TIMES DAILY
Status: DISCONTINUED | OUTPATIENT
Start: 2017-09-15 | End: 2017-09-22 | Stop reason: HOSPADM

## 2017-09-15 RX ORDER — PANTOPRAZOLE SODIUM 40 MG/1
40 GRANULE, DELAYED RELEASE ORAL
Status: DISCONTINUED | OUTPATIENT
Start: 2017-09-15 | End: 2017-09-22 | Stop reason: HOSPADM

## 2017-09-15 RX ORDER — LANOLIN ALCOHOL/MO/W.PET/CERES
100 CREAM (GRAM) TOPICAL DAILY
Status: DISCONTINUED | OUTPATIENT
Start: 2017-09-15 | End: 2017-09-22 | Stop reason: HOSPADM

## 2017-09-15 RX ORDER — SODIUM CHLORIDE 0.9 % (FLUSH) 0.9 %
5-10 SYRINGE (ML) INJECTION EVERY 8 HOURS
Status: DISCONTINUED | OUTPATIENT
Start: 2017-09-15 | End: 2017-09-22 | Stop reason: HOSPADM

## 2017-09-15 RX ORDER — ACETAMINOPHEN 650 MG/1
650 SUPPOSITORY RECTAL
Status: DISCONTINUED | OUTPATIENT
Start: 2017-09-15 | End: 2017-09-22 | Stop reason: HOSPADM

## 2017-09-15 RX ORDER — HALOPERIDOL 5 MG/ML
2 INJECTION INTRAMUSCULAR
Status: DISCONTINUED | OUTPATIENT
Start: 2017-09-15 | End: 2017-09-15

## 2017-09-15 RX ORDER — CYCLOBENZAPRINE HCL 5 MG
5 TABLET ORAL
COMMUNITY

## 2017-09-15 RX ORDER — GUAIFENESIN 100 MG/5ML
81 LIQUID (ML) ORAL DAILY
Status: DISCONTINUED | OUTPATIENT
Start: 2017-09-15 | End: 2017-09-22 | Stop reason: HOSPADM

## 2017-09-15 RX ADMIN — LEVETIRACETAM 1500 MG: 100 SOLUTION ORAL at 22:01

## 2017-09-15 RX ADMIN — ASPIRIN 81 MG CHEWABLE TABLET 81 MG: 81 TABLET CHEWABLE at 09:00

## 2017-09-15 RX ADMIN — SODIUM CHLORIDE 1815 ML: 900 INJECTION, SOLUTION INTRAVENOUS at 04:30

## 2017-09-15 RX ADMIN — LORAZEPAM 1 MG: 2 INJECTION INTRAMUSCULAR at 15:00

## 2017-09-15 RX ADMIN — VALPROIC ACID 500 MG: 250 SOLUTION ORAL at 22:01

## 2017-09-15 RX ADMIN — VALPROIC ACID 500 MG: 250 SOLUTION ORAL at 09:24

## 2017-09-15 RX ADMIN — VALPROIC ACID 500 MG: 250 SOLUTION ORAL at 17:07

## 2017-09-15 RX ADMIN — SODIUM CHLORIDE 100 ML/HR: 900 INJECTION, SOLUTION INTRAVENOUS at 13:06

## 2017-09-15 RX ADMIN — METOPROLOL TARTRATE 12.5 MG: 25 TABLET, FILM COATED ORAL at 17:06

## 2017-09-15 RX ADMIN — HALOPERIDOL LACTATE 2 MG: 5 INJECTION, SOLUTION INTRAMUSCULAR at 15:26

## 2017-09-15 RX ADMIN — LEVOFLOXACIN 750 MG: 5 INJECTION, SOLUTION INTRAVENOUS at 07:06

## 2017-09-15 RX ADMIN — LORAZEPAM 1 MG: 2 INJECTION INTRAMUSCULAR; INTRAVENOUS at 15:00

## 2017-09-15 RX ADMIN — PIPERACILLIN SODIUM,TAZOBACTAM SODIUM 3.38 G: 3; .375 INJECTION, POWDER, FOR SOLUTION INTRAVENOUS at 05:38

## 2017-09-15 RX ADMIN — PANTOPRAZOLE SODIUM 40 MG: 40 GRANULE, DELAYED RELEASE ORAL at 17:09

## 2017-09-15 RX ADMIN — HALOPERIDOL LACTATE 2 MG: 5 INJECTION, SOLUTION INTRAMUSCULAR at 09:42

## 2017-09-15 RX ADMIN — LEVETIRACETAM 1000 MG: 100 INJECTION, SOLUTION, CONCENTRATE INTRAVENOUS at 15:00

## 2017-09-15 RX ADMIN — Medication 10 ML: at 13:07

## 2017-09-15 RX ADMIN — METOPROLOL TARTRATE 12.5 MG: 25 TABLET, FILM COATED ORAL at 09:04

## 2017-09-15 RX ADMIN — PREGABALIN 300 MG: 150 CAPSULE ORAL at 17:07

## 2017-09-15 RX ADMIN — QUETIAPINE FUMARATE 25 MG: 25 TABLET ORAL at 21:57

## 2017-09-15 RX ADMIN — PIPERACILLIN SODIUM,TAZOBACTAM SODIUM 3.38 G: 3; .375 INJECTION, POWDER, FOR SOLUTION INTRAVENOUS at 17:07

## 2017-09-15 RX ADMIN — Medication 10 ML: at 21:58

## 2017-09-15 RX ADMIN — LEVETIRACETAM 1500 MG: 100 SOLUTION ORAL at 09:29

## 2017-09-15 RX ADMIN — PIPERACILLIN SODIUM,TAZOBACTAM SODIUM 3.38 G: 3; .375 INJECTION, POWDER, FOR SOLUTION INTRAVENOUS at 13:06

## 2017-09-15 NOTE — PROGRESS NOTES
Initial Nutrition Assessment:    INTERVENTIONS/RECOMMENDATIONS:   · Enteral/Parenteral Nutrition: Initiate enteral nutrition: Continue Jevity 1.5 @ 50 mL/hr + 100 mL water flushes q 4 hours at this time; provides 1800 kcal, 77g protein, and 1512 mL water    ASSESSMENT:   Patient medically noted for acute encephalopathy, pneumonia, and seizure disorder. PMH for CVA. Patient working with therapy at time of visit. Spoke to wife at bedside who reports it was eating by mouth PTA; SLP notes indicate a pureed diet with supervision. SLP currently recommending NPO status. Possible plans for MBS next week if mental status improves. Chart review shows patient has lost 11# in <4 weeks and 14.2% x 3 months. Wife indicated his PEG was not being utilized once PO was started which may have contributed to weight loss if PO intake was not sufficient. Continue TF as ordered. May need to have fluid flushes increased once IVF discontinued. Monitor tolerance, ability to advance diet, and weights. Will provide further recommendations as needed. Diet Order: NPO  % Eaten:  No data found. Pertinent Medications: [x]Reviewed []Other: Keppra, Lopressor, Cerovite, Protonix, Seroquel, Thiamine, IVF  Pertinent Labs: [x]Reviewed []Other:   Food Allergies: [x]None []Other   Last BM:    [x]Active     []Hyperactive  []Hypoactive       [] Absent BS  Skin:    [x] Intact   [] Incision  [] Breakdown  [] Other:    Anthropometrics:   Height: 5' 8\" (172.7 cm) Weight: 60.5 kg (133 lb 6.1 oz)   IBW (%IBW): 69.9 kg (154 lb) ( ) UBW (%UBW):   (  %)   Last Weight Metrics:  Weight Loss Metrics 9/15/2017 8/22/2017 7/27/2017 7/22/2017 6/21/2017 3/21/2017 3/18/2017   Today's Wt 133 lb 6.1 oz 144 lb 11.2 oz 150 lb 152 lb 3.2 oz 155 lb 166 lb 150 lb   BMI 20.28 kg/m2 22 kg/m2 22.81 kg/m2 23.14 kg/m2 23.57 kg/m2 25.24 kg/m2 22.81 kg/m2       BMI: Body mass index is 20.28 kg/(m^2).     This BMI is indicative of:   []Underweight    [x]Normal    []Overweight    [] Obesity   [] Extreme Obesity (BMI>40)     Estimated Nutrition Needs (Based on):   1907 Kcals/day (BMR (1275) x 1.3AF +250kcal) , 60 g (-72g (1.0-1.2 g/kg bw)) Protein  Carbohydrate:  At Least 130 g/day  Fluids: 1900 mL/day (1ml/kcal)    Pt expected to meet estimated nutrient needs: [x]Yes []No    NUTRITION DIAGNOSES:   Problem:  Swallowing difficulty      Etiology: related to hx CVA     Signs/Symptoms: as evidenced by PEG dependent at this time      NUTRITION INTERVENTIONS:    Enteral/Parenteral Nutrition: Initiate enteral nutrition                GOAL:   Patient will tolerate TF @ goal next 2-4 days    LEARNING NEEDS (Diet, Food/Nutrient-Drug Interaction):    [x] None Identified   [] Identified and Education Provided/Documented   [] Identified and Pt declined/was not appropriate     Cultureal, Mormon, OR Ethnic Dietary Needs:    [x] None Identified   [] Identified and Addressed     [x] Interdisciplinary Care Plan Reviewed/Documented    [x] Discharge Planning: TBD pending ability to advance diet/PO intake; continue current TF regimen at this time       MONITORING /EVALUATION:   Behavioral-Environmental Outcomes: Behavior  Food/Nutrient Intake Outcomes: Enteral/parenteral nutrition intake  Physical Signs/Symptoms Outcomes: Weight/weight change, GI profile, Electrolyte and renal profile, Glucose profile    NUTRITION RISK:    [x] High              [] Moderate           []  Low  []  Minimal/Uncompromised    PT SEEN FOR:    [x]  MD Consult: []Calorie Count      []Diabetic Diet Education        []Diet Education     []Electrolyte Management     []General Nutrition Management and Supplements     [x]Management of Tube Feeding     []TPN Recommendations    [x]  RN Referral:  [x]MST score >=2     []Enteral/Parenteral Nutrition PTA     []Pregnant: Gestational DM or Multigestation     []Pressure Ulcer/Wound Care needs        []  Low BMI  []  DTR Referral       Ralph Hadley  Pager [phone]                 Weekend Pager [phone]

## 2017-09-15 NOTE — ED NOTES
Patient attempting to climb out of the bed. Pt was placing his legs through the side rails and attempting to sit up.

## 2017-09-15 NOTE — PROGRESS NOTES
Attempted to call for report x2 at extension [phone], awaiting return call. Spoke with ER nurse, and will have primary nurse call back to unit.

## 2017-09-15 NOTE — H&P
Hospitalist Admission Note    NAME: Sneha Cedillo   :  1934   MRN:  [id number]     Date/Time:  9/15/2017 8:04 AM    Patient PCP: Arleth Linda MD   SOURCE OF ADMISSION: [facility]; attending physician there is Dr. Maged Dubon.  ______________________________________________________________________   Assessment & Plan:  Right upper lobe pneumonia, POA  Sepsis, POA  --met sepsis criteria due to tachycardia and tachypnea. CXR with small infiltrate in RUL read as atelectasis or PNA. Not hypoxic or febrile. WBC 11.9, lactic 1.6  --continue levaquin and zosyn given recent hospitalization    Probable urinary tract infection due to chronic indwelling Cummins for urinary retention  --This has been a recurrent problem with his recent prior hospitalizations. Cummins catheter was changed in the emergency room. On abx as above. Add urine culture    Severe dysarthria, POA  Right arm weakness, ? left leg weakness  Possible acute stroke  --appears to be new onset with right arm weakness and possibly leg weakness concerning for a possible stroke. Has had several MRIs this year; last MRI in August   was normal, showing no stroke. Was has been taken off of aspirin in   July due to concern for anemia and heme-positive stool. However,   esophagogastroduodenoscopy and colonoscopy at that time did not   show any signs of active bleeding or gastritis. Colonoscopy showed   diverticulosis and benign neoplasm of the colon. Given the concern for   possible acute stroke, will restart him back on aspirin.   --CT head negative. Code S not called in ER. 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 Will continue. No sign of seizure    History of alcohol abuse  --continue MVI, thiamine. Thiamine likely can be stopped at discharge if he remains institutionalized and not drinking.     Hx heme positive stool and anemia  --continue PPI    Dysphagia  --continue tubefeed. For now order Jevity 1.5 50ml/hr with water flush 100ml q4h. Consult nutrition service. Code status is FULL CODE.    DVT prophylaxis with Lovenox. This is fifth hospitalization since February. He has been seen by Palliative Care in July. Body mass index is 20.28 kg/(m^2). Surrogate decision maker:  Wife Kevin Martin        Subjective:   CHIEF COMPLAINT:   Altered mental status, slurred speech, and confusion. HISTORY OF PRESENT ILLNESS:     Edmond Smith is a 80 y.o.  male with past medical history significant for seizure disorder,    urinary retention with chronic kelly, and dysphasia s/p PEG 7/17, who is sent from [facility] for the above symptoms. On evaluation, the patient was sleeping but arousable, and became   alert. However, his speech is extremely slurred and history could not   be obtained due to this. The paperwork from the nursing home   indicates that he was sent because the patient was noted to be very   confused and speech is very slurred constantly, tried to ambulate   independently but unable to and continues to fall. He was diagnosed with pneumonia   Yesterday and was scheduled to start Avelox 400 mg today. It is reported that   at baseline the patient is alert and oriented and follows instructions. In the ER the patient had a rectal temperature of 99.8, mild tachycardia   with heart rate of 101, respiratory rate 26, and O2 saturation 97% on   room air. Code Purple was called. Chest x-ray showed right upper lobe   atelectasis versus pneumonia and old granulomatous disease. Urinalysis was consistent with UTI. Urine was observed and Kelly   catheter was changed in the emergency room. The patient was given   Zosyn and Levaquin, and referred for admission. The patient was last hospitalized in August with sepsis and UTI   growing out Enterococcus faecalis and Proteus.     We were asked to admit for work up and evaluation of the above problems. Past Medical History:   Diagnosis Date    Alcohol abuse, in remission     Seizures ([address]       Past Surgical History:   Procedure Laterality Date    COLONOSCOPY N/A 7/31/2017    COLONOSCOPY performed by Cheryl Sutton MD at [facility]  7/31/2017         PLACE PERCUT GASTROSTOMY TUBE  7/18/2017         UPPER GI ENDOSCOPY,DIAGNOSIS  7/31/2017          Social History   Substance Use Topics    Smoking status: Never Smoker    Smokeless tobacco: Never Used    Alcohol use No      Comment: Quit drinking 10 years ago        Family History   Problem Relation Age of Onset    Other Other      no strokes or seizures    Cancer Mother      Allergies   Allergen Reactions    No Known Allergies         Prior to Admission medications    Medication Sig Start Date End Date Taking? Authorizing Provider   multivitamin (MULTI-DELYN) liqd 15 mL by Gastrostomy Tube route daily. Yes Historical Provider   acetaminophen (TYLENOL) 325 mg tablet 2 Tabs by Per G Tube route every six (6) hours as needed. 8/1/17  Yes Jw Mason MD   valproic acid, as sodium salt, (DEPAKENE) 250 mg/5 mL (5 mL) soln oral solution 10 mL by Per G Tube route three (3) times daily. 8/1/17  Yes Jw Mason MD   thiamine (B-1) 100 mg tablet 1 Tab by Per G Tube route daily. 8/1/17  Yes Jw Mason MD   QUEtiapine (SEROQUEL) 25 mg tablet 1 Tab by Per G Tube route nightly. 8/1/17  Yes Jw Mason MD   pregabalin (LYRICA) 300 mg capsule Take 1 Cap by mouth two (2) times a day. Max Daily Amount: 600 mg. 8/1/17  Yes Jw Mason MD   pantoprazole (PROTONIX) 40 mg granules for oral suspension 40 mg by Per G Tube route Before breakfast and dinner. 8/1/17  Yes Jw Mason MD   multivit w-min-ferrous gluconate (CEROVITE) 9 mg iron/15 mL oral liquid 15 mL by Per G Tube route daily.  8/1/17  Yes Jw Mason MD metoprolol tartrate (LOPRESSOR) 25 mg tablet 0.5 Tabs by Per G Tube route two (2) times a day. 17  Yes Cedric Cervantes MD   levETIRAcetam (KEPPRA) 100 mg/ml soln oral solution 15 mL by Per G Tube route two (2) times a day. 17  Yes Cedric Cervantes MD   cyclobenzaprine (FLEXERIL) 5 mg tablet 1 Tab by Per G Tube route three (3) times daily as needed for Muscle Spasm(s). 17  Yes Cedric Cervantes MD   latanoprost (XALATAN) 0.005 % ophthalmic solution Administer 1 Drop to both eyes every evening. 17  Yes Simin Lake MD   albuterol-ipratropium (DUO-NEB) 2.5 mg-0.5 mg/3 ml nebu 3 mL by Nebulization route every six (6) hours as needed. 17  Yes Simin Lake MD     REVIEW OF SYSTEMS:  POSITIVE= Bold. Negative = normal text  Unable to obtain due to severe dysarthria      Objective:   VITALS:    Visit Vitals    /75    Pulse 80    Temp 99.8 °F (37.7 °C)    Resp 21    Ht 5' 8\" (1.727 m)    Wt 60.5 kg (133 lb 6.1 oz)    SpO2 99%    BMI 20.28 kg/m2     Temp (24hrs), Av.8 °F (37.7 °C), Min:99.8 °F (37.7 °C), Max:99.8 °F (37.7 °C)    Body mass index is 20.28 kg/(m^2). PHYSICAL EXAM:    General:    Thin male, sleeping but arousable and alert, has lost puppy look, no distress, appears stated age. Follows some basic command but not all commands. Mildly restless, trying to sit up  HEENT: Atraumatic, anicteric sclerae, pink conjunctivae     No oral ulcers, mucosa moist, throat clear. Hearing intact. Neck:  Supple, symmetrical,  thyroid: non tender  Lungs:   Poor inspiratory effort. No Wheezing or Rhonchi. No rales. Chest wall:  No tenderness  No Accessory muscle use. Heart:   Regular  rhythm,  No  murmur   No gallop. No edema. Abdomen:   Voluntary guarding, PEG tube without redness or discharge at insertion site, non-tender. Not distended. Bowel sounds normal. No masses  Extremities: No cyanosis. No clubbing.   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 Not depressed. Mildly restless trying to sit up. Neurologic: EOMs intact. No facial asymmetry. Speech severely slurred. Left arm 5/5 with normal . Not moving right arm, right fingers contracted in closed fist position. Not moving legs to command, negative babinski. Able to use right leg a little to scoot into sitting position. Symmetrical strength, Alert and oriented X 3. Peripheral pulse: Bilateral, Radial, 2+  Capillary refill:  normal    IMAGING RESULTS:   []       I have personally reviewed the actual   []     CXR  []     CT scan  CXR:  CT :  EKG:   ________________________________________________________________________  Care Plan discussed with:    Comments   Patient     Family      RN y    Care Manager                    Consultant:      ________________________________________________________________________  Prophylaxis:  GI PPI   DVT lovenox   ________________________________________________________________________  Recommended Disposition:   Home with Family    HH/PT/OT/RN    SNF/LTC y   CHRISTINA    ________________________________________________________________________  Code Status:  Full Code y   DNR/DNI    ________________________________________________________________________  TOTAL TIME:  50 minutes      Comments    y Reviewed previous records   ______________________________________________________________________  Liv Oliver MD      Procedures: see electronic medical records for all procedures/Xrays and details which were not copied into this note but were reviewed prior to creation of Plan.     LAB DATA REVIEWED:    Recent Results (from the past 24 hour(s))   EKG, 12 LEAD, INITIAL    Collection Time: 09/15/17  3:21 AM   Result Value Ref Range    Ventricular Rate 103 BPM    Atrial Rate 103 BPM    P-R Interval 252 ms    QRS Duration 84 ms    Q-T Interval 394 ms    QTC Calculation (Bezet) 516 ms    Calculated P Axis 24 degrees Calculated R Axis -43 degrees    Calculated T Axis 32 degrees    Diagnosis       Sinus tachycardia with 1st degree AV block with premature atrial complexes  Left axis deviation  Nonspecific T wave abnormality  When compared with ECG of 27-JUL-2017 07:22,  premature ventricular complexes are no longer present  premature atrial complexes are now present  T wave inversion no longer evident in Inferior leads  Nonspecific T wave abnormality has replaced inverted T waves in Lateral leads     GLUCOSE, POC    Collection Time: 09/15/17  3:40 AM   Result Value Ref Range    Glucose (POC) 154 (H) 65 - 100 mg/dL    Performed by Janina Mock    CULTURE, BLOOD    Collection Time: 09/15/17  3:50 AM   Result Value Ref Range    Special Requests: NO SPECIAL REQUESTS      Culture result: NO GROWTH AFTER 2 HOURS     LACTIC ACID    Collection Time: 09/15/17  4:16 AM   Result Value Ref Range    Lactic acid 1.6 0.4 - 2.0 MMOL/L   CBC WITH AUTOMATED DIFF    Collection Time: 09/15/17  4:18 AM   Result Value Ref Range    WBC 11.9 (H) 4.1 - 11.1 K/uL    RBC 3.71 (L) 4.10 - 5.70 M/uL    HGB 12.3 12.1 - 17.0 g/dL    HCT 36.3 (L) 36.6 - 50.3 %    MCV 97.8 80.0 - 99.0 FL    MCH 33.2 26.0 - 34.0 PG    MCHC 33.9 30.0 - 36.5 g/dL    RDW 12.5 11.5 - 14.5 %    PLATELET 702 (L) 051 - 400 K/uL    NEUTROPHILS 79 (H) 32 - 75 %    LYMPHOCYTES 10 (L) 12 - 49 %    MONOCYTES 11 5 - 13 %    EOSINOPHILS 0 0 - 7 %    BASOPHILS 0 0 - 1 %    ABS. NEUTROPHILS 9.4 (H) 1.8 - 8.0 K/UL    ABS. LYMPHOCYTES 1.2 0.8 - 3.5 K/UL    ABS. MONOCYTES 1.3 (H) 0.0 - 1.0 K/UL    ABS. EOSINOPHILS 0.0 0.0 - 0.4 K/UL    ABS.  BASOPHILS 0.0 0.0 - 0.1 K/UL   METABOLIC PANEL, COMPREHENSIVE    Collection Time: 09/15/17  4:18 AM   Result Value Ref Range    Sodium 136 136 - 145 mmol/L    Potassium 3.8 3.5 - 5.1 mmol/L    Chloride 96 (L) 97 - 108 mmol/L    CO2 33 (H) 21 - 32 mmol/L    Anion gap 7 5 - 15 mmol/L    Glucose 96 65 - 100 mg/dL    BUN 16 6 - 20 MG/DL    Creatinine 0.66 (L) 0.70 - 1.30 MG/DL    BUN/Creatinine ratio 24 (H) 12 - 20      GFR est AA >60 >60 ml/min/1.73m2    GFR est non-AA >60 >60 ml/min/1.73m2    Calcium 8.9 8.5 - 10.1 MG/DL    Bilirubin, total 0.6 0.2 - 1.0 MG/DL    ALT (SGPT) 22 12 - 78 U/L    AST (SGOT) 22 15 - 37 U/L    Alk.  phosphatase 68 45 - 117 U/L    Protein, total 8.4 (H) 6.4 - 8.2 g/dL    Albumin 3.2 (L) 3.5 - 5.0 g/dL    Globulin 5.2 (H) 2.0 - 4.0 g/dL    A-G Ratio 0.6 (L) 1.1 - 2.2     CULTURE, BLOOD    Collection Time: 09/15/17  4:18 AM   Result Value Ref Range    Special Requests: NO SPECIAL REQUESTS      Culture result: NO GROWTH AFTER 2 HOURS     PROTHROMBIN TIME + INR    Collection Time: 09/15/17  4:22 AM   Result Value Ref Range    INR 1.2 (H) 0.9 - 1.1      Prothrombin time 12.6 (H) 9.0 - 11.1 sec   URINALYSIS W/ RFLX MICROSCOPIC    Collection Time: 09/15/17  5:50 AM   Result Value Ref Range    Color YELLOW/STRAW      Appearance CLEAR CLEAR      Specific gravity 1.016 1.003 - 1.030      pH (UA) 8.0 5.0 - 8.0      Protein TRACE (A) NEG mg/dL    Glucose NEGATIVE  NEG mg/dL    Ketone NEGATIVE  NEG mg/dL    Bilirubin NEGATIVE  NEG      Blood TRACE (A) NEG      Urobilinogen 4.0 (H) 0.2 - 1.0 EU/dL    Nitrites NEGATIVE  NEG      Leukocyte Esterase SMALL (A) NEG      WBC 10-20 0 - 4 /hpf    RBC 10-20 0 - 5 /hpf    Epithelial cells FEW FEW /lpf    Bacteria 2+ (A) NEG /hpf

## 2017-09-15 NOTE — PROGRESS NOTES
Speech pathology note  Patient currently receiving EEG at bedside. Will follow up later today for swallowing evaluation. Thank you.     Derrell Lauren., CCC-SLP

## 2017-09-15 NOTE — IP AVS SNAPSHOT
[address] [facility] 
[phone] Patient: Reyes Savoy MRN: [id number] :1934 You are allergic to the following Allergen Reactions No Known Allergies Not Noted Immunizations Administered for This Admission Name Date Influenza Vaccine (Quad) PF 2017 Recent Documentation Height Weight BMI Smoking Status 1.727 m 61.1 kg 20.47 kg/m2 Never Smoker Emergency Contacts Name Discharge Info Relation Home Work Mobile [address] CAREGIVER [3] Spouse [3] [phone] Richar Unger  Daughter [21]   [phone] About your hospitalization You were admitted on:  September 15, 2017 You last received care in the:  [facility] 3 [facility] You were discharged on:  2017 Unit phone number:  [phone] Why you were hospitalized Your primary diagnosis was:  Not on File Your diagnoses also included:  Uti (Urinary Tract Infection), Acute Encephalopathy, Hcap (Healthcare-Associated Pneumonia), Confusion, Dysphasia, Counseling Regarding Advanced Care Planning And Goals Of Care Providers Seen During Your Hospitalizations Provider Role Specialty Primary office phone Kristen Felder MD Attending Provider Emergency Medicine [phone] Melecio Valencia MD Attending Provider Internal Medicine [phone] Harris Mensah MD Attending Provider Internal Medicine [phone] Cheli Liriano MD Attending Provider Internal Medicine [phone] Your Primary Care Physician (PCP) Primary Care Physician Office Phone Office Fax Redmond Favre [phone] Follow-up Information Follow up With Details Comments Contact Info [facility] EMERGENCY DEPT   [address] 
[phone]  [address] ([facility])   [address] [facility] 
 [address] [facility] [address] [phone] Shine Villeda MD   1 [facility] 100[address] [facility] [address] [phone] Current Discharge Medication List  
  
START taking these medications Dose & Instructions Dispensing Information Comments Morning Noon Evening Bedtime  
 aspirin 81 mg chewable tablet Your last dose was: Your next dose is:    
   
   
 Dose:  81 mg  
1 Tab by Per G Tube route daily. Quantity:  30 Tab Refills:  0 CONTINUE these medications which have CHANGED Dose & Instructions Dispensing Information Comments Morning Noon Evening Bedtime  
 acetaminophen 325 mg tablet Commonly known as:  TYLENOL What changed:  Another medication with the same name was removed. Continue taking this medication, and follow the directions you see here. Your last dose was: Your next dose is:    
   
   
 Dose:  650 mg  
650 mg by PEG Tube route every six (6) hours as needed for Pain. Refills:  0  
     
   
   
   
  
 CEROVITE 9 mg iron/15 mL oral liquid Generic drug:  multivit w-min-ferrous gluconate What changed:  Another medication with the same name was removed. Continue taking this medication, and follow the directions you see here. Your last dose was: Your next dose is:    
   
   
 Dose:  15 mL  
15 mL by PEG Tube route daily. Refills:  0  
     
   
   
   
  
 cyclobenzaprine 5 mg tablet Commonly known as:  FLEXERIL What changed:  Another medication with the same name was removed. Continue taking this medication, and follow the directions you see here. Your last dose was: Your next dose is:    
   
   
 Dose:  5 mg  
5 mg by PEG Tube route three (3) times daily as needed for Muscle Spasm(s). Refills:  0 KEPPRA 100 mg/mL solution Generic drug:  levETIRAcetam  
 What changed:  Another medication with the same name was removed. Continue taking this medication, and follow the directions you see here. Your last dose was: Your next dose is:    
   
   
 Dose:  1500 mg Take 1,500 mg by mouth two (2) times a day. Refills:  0 LYRICA 300 mg capsule Generic drug:  pregabalin What changed:  Another medication with the same name was removed. Continue taking this medication, and follow the directions you see here. Your last dose was: Your next dose is:    
   
   
 Dose:  300 mg  
300 mg by Per G Tube route two (2) times a day. Refills:  0 PROTONIX 40 mg granules for oral suspension Generic drug:  pantoprazole What changed:  Another medication with the same name was removed. Continue taking this medication, and follow the directions you see here. Your last dose was: Your next dose is:    
   
   
 Dose:  40 mg  
40 mg by PEG Tube route Before breakfast and dinner. Refills:  0 QUEtiapine 25 mg tablet Commonly known as:  SEROquel What changed:  Another medication with the same name was removed. Continue taking this medication, and follow the directions you see here. Your last dose was: Your next dose is:    
   
   
 Dose:  25 mg  
25 mg by PEG Tube route nightly. Refills:  0  
     
   
   
   
  
 valproic acid (as sodium salt) 250 mg/5 mL (5 mL) Soln oral solution Commonly known as:  [employer] What changed:  Another medication with the same name was removed. Continue taking this medication, and follow the directions you see here. Your last dose was: Your next dose is:    
   
   
 Dose:  500 mg  
500 mg by PEG Tube route three (3) times daily. Refills:  0  
     
   
   
   
  
 VITAMIN B-1 100 mg tablet Generic drug:  thiamine What changed:  Another medication with the same name was removed.  Continue taking this medication, and follow the directions you see here. Your last dose was: Your next dose is:    
   
   
 Dose:  100 mg  
100 mg by PEG Tube route daily. Refills:  0 CONTINUE these medications which have NOT CHANGED Dose & Instructions Dispensing Information Comments Morning Noon Evening Bedtime  
 albuterol-ipratropium 2.5 mg-0.5 mg/3 ml Nebu Commonly known as:  Luz Marina Left Your last dose was: Your next dose is:    
   
   
 Dose:  3 mL  
3 mL by Nebulization route every six (6) hours as needed. Quantity:  30 Nebule Refills:  1  
     
   
   
   
  
 latanoprost 0.005 % ophthalmic solution Commonly known as:  Collin Gil Your last dose was: Your next dose is:    
   
   
 Dose:  1 Drop Administer 1 Drop to both eyes every evening. Quantity:  1 mL Refills:  1  
     
   
   
   
  
 metoprolol succinate 100 mg tablet Commonly known as:  TOPROL-XL Your last dose was: Your next dose is:    
   
   
 Dose:  12.5 mg  
12.5 mg by PEG Tube route two (2) times a day. Refills:  0 Kyler Grajeda Generic drug:  multivitamin Your last dose was: Your next dose is:    
   
   
 Dose:  15 mL  
15 mL by PEG Tube route daily. Refills:  0 STOP taking these medications AVELOX 400 mg tablet Generic drug:  moxifloxacin  
   
  
 metoprolol tartrate 25 mg tablet Commonly known as:  LOPRESSOR Where to Get Your Medications Information on where to get these meds will be given to you by the nurse or doctor. ! Ask your nurse or doctor about these medications  
  aspirin 81 mg chewable tablet Discharge Instructions HOSPITALIST DISCHARGE INSTRUCTIONS 
 
NAME: Malini Gallardo :  1934 MRN:  [id number] Date/Time:  2017 12:08 PM 
 
ADMIT DATE: 9/15/2017 DISCHARGE DATE: 9/22/2017 DISCHARGE DIAGNOSIS: 
Active Problems: 
  UTI (urinary tract infection) (8/19/2017) Acute encephalopathy (9/15/2017) HCAP (healthcare-associated pneumonia) (9/15/2017) Confusion (9/20/2017) Dysphasia (9/20/2017) Counseling regarding advanced care planning and goals of care (9/20/2017) MEDICATIONS: 
As per medication reconciliation  list 
· It is important that you take the medication exactly as they are prescribed. · Keep your medication in the bottles provided by the pharmacist and keep a list of the medication names, dosages, and times to be taken in your wallet. · Do not take other medications without consulting your doctor. Pain Management: per above medications What to do at [facility] Recommended diet:  NPO, PEG tube feeding-  Jevity 1.5 50ml/hr with water flush 100ml q4h Recommended activity: Activity as tolerated If you have questions regarding the hospital related prescriptions or hospital related issues please call [facility]/[employer] girish Goodwin at . Follow Up: 
Dr. Silvia Trevino MD  you are to call and set up an appointment to see them in 7-10 days. As needed Transition to Hospice care once pt/family ready with next deterioration Information obtained by : 
I understand that if any problems occur once I am at home I am to contact my physician. I understand and acknowledge receipt of the instructions indicated above. Physician's or R.N.'s Signature                                                                  Date/Time Patient or Representative Signature                                                          Date/Time Discharge Orders None [employer] Announcement We are excited to announce that we are making your provider's discharge notes available to you in [employer]. You will see these notes when they are completed and signed by the physician that discharged you from your recent hospital stay. If you have any questions or concerns about any information you see in [employer], please call the Health Information Department where you were seen or reach out to your Primary Care Provider for more information about your plan of care. Introducing [facility]! Jamie Mao introduces [employer] patient portal. Now you can access parts of your medical record, email your doctor's office, and request medication refills online. 1. In your internet browser, go to [email or web address] 2. Click on the First Time User? Click Here link in the Sign In box. You will see the New Member Sign Up page. 3. Enter your [employer] Access Code exactly as it appears below. You will not need to use this code after youve completed the sign-up process. If you do not sign up before the expiration date, you must request a new code. · [employer] Access Code: [email or web address] Expires: 12/9/2017  3:38 PM 
 
4. Enter the last four digits of your Social Security Number (xxxx) and Date of Birth (mm/dd/yyyy) as indicated and click Submit. You will be taken to the next sign-up page. 5. Create a [employer] ID. This will be your [employer] login ID and cannot be changed, so think of one that is secure and easy to remember. 6. Create a [employer] password. You can change your password at any time. 7. Enter your Password Reset Question and Answer. This can be used at a later time if you forget your password. 8. Enter your e-mail address. You will receive e-mail notification when new information is available in [address] 9. Click Sign Up. You can now view and download portions of your medical record. 10. Click the Download Summary menu link to download a portable copy of your medical information. If you have questions, please visit the Frequently Asked Questions section of the [employer] website. Remember, [employer] is NOT to be used for urgent needs. For medical emergencies, dial 911. Now available from your iPhone and Android! General Information Please provide this summary of care documentation to your next provider. Patient Signature:  ____________________________________________________________ Date:  ____________________________________________________________  
  
Rexann Ports Provider Signature:  ____________________________________________________________ Date:  ____________________________________________________________

## 2017-09-15 NOTE — ED NOTES
TRANSFER - OUT REPORT:    Verbal report given to RICHARD Mullen (name) on Malini Sami  being transferred to [facility] (unit) for routine progression of care       Report consisted of patients Situation, Background, Assessment and   Recommendations(SBAR). Information from the following report(s) SBAR, Kardex and ED Summary was reviewed with the receiving nurse. Lines:   Peripheral IV 09/15/17 Left Antecubital (Active)   Site Assessment Clean, dry, & intact 9/15/2017  4:28 AM   Phlebitis Assessment 0 9/15/2017  4:28 AM   Infiltration Assessment 0 9/15/2017  4:28 AM   Dressing Status Clean, dry, & intact 9/15/2017  4:28 AM   Hub Color/Line Status Pink;Flushed 9/15/2017  4:28 AM       Peripheral IV 09/15/17 Left Wrist (Active)   Site Assessment Clean, dry, & intact 9/15/2017  4:28 AM   Phlebitis Assessment 0 9/15/2017  4:28 AM   Infiltration Assessment 0 9/15/2017  4:28 AM   Dressing Status Clean, dry, & intact 9/15/2017  4:28 AM   Hub Color/Line Status Blue 9/15/2017  4:28 AM        Opportunity for questions and clarification was provided.       Patient transported with:   [employer]

## 2017-09-15 NOTE — ED TRIAGE NOTES
Patient arrived via EMS from [facility] center with complaints from staff of mental changes/speech changes; EMS reports patient was diagnosed with pneumonia yesterday and has not been started yet on medications. Patient is tachycardic and feels hot to touch upon arrival to ED. Patient has baseline mentation of being oriented to self and birthday only. Patient is oriented to self only according to EMS at this time. Patient has incomprehensible speech POA to ED.

## 2017-09-15 NOTE — PROGRESS NOTES
Pharmacy Clarification of the Prior to Admission Medication Regimen Retrospective to the Admission Medication Reconciliation       Patient was transferred from [facility] to [facility], with a current med list. Pharmacy called [facility], [phone], and spoke with Torrie Cleaning, who transferred MHT to nursing [facility], but was unable to speak with anyone. MHT called the SNF back, and spoke with [facility], who was able to verify the last doses administered for the patient. Information Obtained From: Transfer Papers, LPN from [facility]    Recommendations/Findings: The following amendments were made to the patient's active medication list on file at [facility]:     1) Additions: NONE    2) Removals: NONE    3) Changes: NONE    4) Pertinent Pharmacy Findings:   Updated patients preferred outpatient pharmacy to: Pharmacy did not update the outpatient pharmacy due to patient living at a SNF    moxifloxacin (AVELOX) 400 mg tablet: LPN from [facility] stated that the patient has this agent at the [facility], but has not yet started this regimen as of 9/15/2017.  cyclobenzaprine (FLEXERIL) 5 mg tablet: LPN from [facility] stated that the patient has this agent at the SNF, but is not currently taking. PTA medication list was corrected to the following:     Prior to Admission Medications   Prescriptions Last Dose Informant Patient Reported? Taking? QUEtiapine (SEROQUEL) 25 mg tablet 2017 at 2100 Transfer Papers Yes Yes   Si mg by PEG Tube route nightly. acetaminophen (TYLENOL) 325 mg tablet 9/15/2017 at 0000 Transfer Papers Yes Yes   Si mg by PEG Tube route every six (6) hours as needed for Pain. albuterol-ipratropium (DUO-NEB) 2.5 mg-0.5 mg/3 ml nebu 2017 at 0900 Transfer Papers No Yes   Sig: 3 mL by Nebulization route every six (6) hours as needed.    cyclobenzaprine (FLEXERIL) 5 mg tablet Not Taking at Unknown time Transfer Papers Yes No   Si mg by PEG Tube route three (3) times daily as needed for Muscle Spasm(s). latanoprost (XALATAN) 0.005 % ophthalmic solution 2017 at 0900 Transfer Papers No Yes   Sig: Administer 1 Drop to both eyes every evening. levETIRAcetam (KEPPRA) 100 mg/mL solution 2017 at 1700 Transfer Papers Yes Yes   Sig: Take 1,500 mg by mouth two (2) times a day. metoprolol succinate (TOPROL-XL) 100 mg tablet 2017 at 2100 Transfer Papers Yes Yes   Si.5 mg by PEG Tube route two (2) times a day. moxifloxacin (AVELOX) 400 mg tablet Not Taking at Unknown time Other Yes No   Sig: Take 400 mg by PEG tube daily. multivit w-min-ferrous gluconate (CEROVITE) 9 mg iron/15 mL oral liquid 2017 at 0900 Transfer Papers Yes Yes   Sig: 15 mL by PEG Tube route daily. multivitamin (MULTI-DELYN) liqd 2017 at 0900 Transfer Papers Yes Yes   Sig: 15 mL by PEG Tube route daily. pantoprazole (PROTONIX) 40 mg granules for oral suspension 2017 at 1700 Transfer Papers Yes Yes   Si mg by PEG Tube route Before breakfast and dinner. pregabalin (LYRICA) 300 mg capsule 2017 at Unknown time Transfer Papers Yes Yes   Si mg by Per G Tube route two (2) times a day. thiamine (VITAMIN B-1) 100 mg tablet 2017 at 0900 Transfer Papers Yes Yes   Si mg by PEG Tube route daily. valproic acid, as sodium salt, (DEPAKENE) 250 mg/5 mL (5 mL) soln oral solution 2017 at 2100 Transfer Papers Yes Yes   Si mg by PEG Tube route three (3) times daily.       Facility-Administered Medications: None         Thank you,  Page Plascencia [facility]  Medication History Pharmacy Technician

## 2017-09-15 NOTE — ED NOTES
Attempted to call report to Renal. Spoke with Sebas. Unable to take report at this time. Will call ED to obtain report.

## 2017-09-15 NOTE — PROGRESS NOTES
physical Therapy EVALUATION  Patient: Arjun Lilly (89 y.o. male)  Date: 9/15/2017  Primary Diagnosis: HCAP (healthcare-associated pneumonia)  UTI (urinary tract infection)  Acute encephalopathy        Precautions: fall       ASSESSMENT :  Based on the objective data described below, the patient presents with impaired mobility and gait due to confusion/UTI. Patient is unable to follow commands and became agitated when we attempted to mobilize him out of the bed. He then started trying to climb over the bed rails. Additional staff came to help settle him down. Mobility is not a good idea until he is able to follow commands or more pleasantly confused. Now it is too agitating for him and not safe for him to be up OOB. Will follow up on Monday to see how he is doing. Patient will benefit from skilled intervention to address the above impairments. Patients rehabilitation potential is considered to be Fair  Factors which may influence rehabilitation potential include:   []         None noted  [x]         Mental ability/status  []         Medical condition  []         Home/family situation and support systems  []         Safety awareness  []         Pain tolerance/management  []         Other:      PLAN :  Recommendations and Planned Interventions:  [x]           Bed Mobility Training             []    Neuromuscular Re-Education  [x]           Transfer Training                   []    Orthotic/Prosthetic Training  [x]           Gait Training                         []    Modalities  []           Therapeutic Exercises           []    Edema Management/Control  []           Therapeutic Activities            [x]    Patient and Family Training/Education  []           Other (comment):    Frequency/Duration: Patient will be followed by physical therapy  5 times a week to address goals.   Discharge Recommendations: Elmer Ponce  Further Equipment Recommendations for Discharge: none     SUBJECTIVE:   Patient stated Unable to verbalize.     OBJECTIVE DATA SUMMARY:   HISTORY:    Past Medical History:   Diagnosis Date    Alcohol abuse, in remission     Seizures ([facility] [address]      Past Surgical History:   Procedure Laterality Date    COLONOSCOPY N/A 2017    COLONOSCOPY performed by Margarito Dubose MD at [address] [place] [address]  2017         PLACE PERCUT GASTROSTOMY TUBE  2017         UPPER GI ENDOSCOPY,DIAGNOSIS  2017          Prior Level of Function/Home Situation: Was at a SNF and using a RW and a wheelchair. Personal factors and/or comorbidities impacting plan of care:     Home Situation  Home Environment: Skilled nursing facility  Patient Expects to be Discharged to[de-identified] Skilled nursing facility    EXAMINATION/PRESENTATION/DECISION MAKING:   Critical Behavior:  Neurologic State: Alert, Confused, Postictal  Orientation Level: Unable to verbalize  Cognition: Decreased attention/concentration, No command following  Safety/Judgement: Not assessed  Hearing: Auditory  Auditory Impairment: None  Skin:  Per nursing. Edema: per nursing. Range Of Motion:  AROM: Within functional limits           PROM: Within functional limits           Strength:    Strength: Within functional limits                    Tone & Sensation:   Tone: Normal              Sensation: Intact               Coordination:  Coordination: Within functional limits  Vision:      Functional Mobility:  Bed Mobility:      Independent with moving in the bed but not with commands. Transfers:   NA due to safety concerns. Ambulation/Gait Training:                                                      NA due to safety concerns.                    Functional Measure:  Barthel Index:    Bathin  Bladder: 0  Bowels: 0  Groomin  Dressin  Feedin  Mobility: 0  Stairs: 0  Toilet Use: 0  Transfer (Bed to Chair and Back): 0  Total: 0       Barthel and G-code impairment scale:  Percentage of impairment CH  0% CI  1-19% CJ  20-39% CK  40-59% CL  60-79% CM  80-99% CN  100%   Barthel Score 0-100 100 99-80 79-60 59-40 20-39 1-19   0   Barthel Score 0-20 20 17-19 13-16 9-12 5-8 1-4 0      The Barthel ADL Index: Guidelines  1. The index should be used as a record of what a patient does, not as a record of what a patient could do. 2. The main aim is to establish degree of independence from any help, physical or verbal, however minor and for whatever reason. 3. The need for supervision renders the patient not independent. 4. A patient's performance should be established using the best available evidence. Asking the patient, friends/relatives and nurses are the usual sources, but direct observation and common sense are also important. However direct testing is not needed. 5. Usually the patient's performance over the preceding 24-48 hours is important, but occasionally longer periods will be relevant. 6. Middle categories imply that the patient supplies over 50 per cent of the effort. 7. Use of aids to be independent is allowed. Gage Wolf., Barthel, D.W. (1233). Functional evaluation: the Barthel Index. [facility] (14)2. Lula Libman [place], RAFAELJHERIBERTO, Abiel Davies., Katelin Panchal., [facility], [address] (1999). Measuring the change indisability after inpatient rehabilitation; comparison of the responsiveness of the Barthel Index and Functional [place] Measure. Journal of Neurology, Neurosurgery, and Psychiatry, 66(4), 696-505. Lon Teran, N.J.A, GLADYS Richey, & Flaca Austin, M.A. (2004.) Assessment of post-stroke quality of life in cost-effectiveness studies: The usefulness of the Barthel Index and the EuroQoL-5D. Quality of Life Research, 13, 352-04       G codes: In compliance with CMSs Claims Based Outcome Reporting, the following G-code set was chosen for this patient based on their primary functional limitation being treated:     The outcome measure chosen to determine the severity of the functional limitation was the Barthel with a score of 0/0 which was correlated with the impairment scale. ? Mobility - Walking and Moving Around:     - CURRENT STATUS: CN - 100% impaired, limited or restricted    - GOAL STATUS: CM - 80%-99% impaired, limited or restricted    - D/C STATUS:  ---------------To be determined---------------      Pain:Pain Scale 1: Adult Nonverbal Pain Scale  Pain Intensity 1: 0              Activity Tolerance:   Becomes agitated. Please refer to the flowsheet for vital signs taken during this treatment. After treatment:   []         Patient left in no apparent distress sitting up in chair  [x]         Patient left in no apparent distress in bed  [x]         Call bell left within reach  [x]         Nursing notified  []         Caregiver present  []         Bed alarm activated    COMMUNICATION/EDUCATION:   The patients plan of care was discussed with: Occupational Therapist and Registered Nurse.  []         Fall prevention education was provided and the patient/caregiver indicated understanding. []         Patient/family have participated as able in goal setting and plan of care. []         Patient/family agree to work toward stated goals and plan of care. []         Patient understands intent and goals of therapy, but is neutral about his/her participation. [x]         Patient is unable to participate in goal setting and plan of care.     Thank you for this referral.  Collins Bob, PT   Time Calculation: 16 mins

## 2017-09-15 NOTE — PROGRESS NOTES
Chart jose'd to [facility] who can accept back if [facility] author[facility]. I've asked them to submit for that today in case pt is ready on Monday. The wife apparently would like a different SNF. I'll talk with her that pt would need to return to [facility] and she can arrange a different SNF from there. I will place a list of SNFs in the room.

## 2017-09-15 NOTE — ED PROVIDER NOTES
HPI Comments: Clarice Garcia, 80 y.o. Male with PMHx of seizures presents via EMS to the ED from [facility] due to sudden mentation and speech changes at 2200. EMS reports the pt was diagnosed with PNA yesterday but has not been started on any medications yet. EMS reports he is normally oriented to self and place but is only oriented to self now. EMS reports HR of 138 at the facility    PCP: Lucero Barahona MD    Social history significant for: - Tobacco, - EtOH, - Illicit Drug Use    HPI is limited due to acuity of pt's condition  Written by Henok Garcia ED Scribe, as dictated by Len Medel MD.      The history is provided by the EMS personnel. No  was used. Past Medical History:   Diagnosis Date    Alcohol abuse, in remission     Seizures ([address]        Past Surgical History:   Procedure Laterality Date    COLONOSCOPY N/A 7/31/2017    COLONOSCOPY performed by Meseret Barrera MD at [address] [place] [address]  7/31/2017         PLACE PERCUT GASTROSTOMY TUBE  7/18/2017         UPPER GI ENDOSCOPY,DIAGNOSIS  7/31/2017              Family History:   Problem Relation Age of Onset    Other Other      no strokes or seizures    Cancer Mother        Social History     Social History    Marital status:      Spouse name: N/A    Number of children: N/A    Years of education: N/A     Occupational History    Not on file. Social History Main Topics    Smoking status: Never Smoker    Smokeless tobacco: Never Used    Alcohol use No      Comment: Quit drinking 10 years ago   [facility] Drug use: No    Sexual activity: Not on file     Other Topics Concern    Not on file     Social History Narrative         ALLERGIES: No known allergies    Review of Systems   Unable to perform ROS: Mental status change       Vitals:    09/15/17 0326   Weight: 60.5 kg (133 lb 6.1 oz)            Physical Exam   Constitutional: He is oriented to person, place, and time. He appears well-developed and well-nourished. Pt is laying with mouth open, grunting only. HENT:   Head: Normocephalic and atraumatic. Mouth/Throat: Oropharynx is clear and moist.   Eyes: Conjunctivae and EOM are normal. Pupils are equal, round, and reactive to light. Right eye exhibits no discharge. Left eye exhibits no discharge. Neck: Normal range of motion. Neck supple. Cardiovascular: Normal rate and normal heart sounds. No murmur heard. Pulmonary/Chest: Effort normal and breath sounds normal. No respiratory distress. He has no wheezes. He has no rales. Abdominal: Soft. Bowel sounds are normal. He exhibits no distension. There is no tenderness. Musculoskeletal: Normal range of motion. RUE weakness with 2/5 strength   Neurological: He is alert and oriented to person, place, and time. No cranial nerve deficit. He exhibits normal muscle tone. Skin: Skin is warm and dry. No rash noted. He is not diaphoretic. Nursing note and vitals reviewed. MDM  Number of Diagnoses or Management Options  Diagnosis management comments: Acute neurological change greater than 4 hours PTA with concurrent PNA. DDx: sepsis, electrolyte abnormality, stroke. Amount and/or Complexity of Data Reviewed  Clinical lab tests: ordered and reviewed  Tests in the radiology section of CPT®: ordered and reviewed  Tests in the medicine section of CPT®: ordered and reviewed  Obtain history from someone other than the patient: yes (EMS)  Review and summarize past medical records: yes  Independent visualization of images, tracings, or specimens: yes    Patient Progress  Patient progress: stable    ED Course       Procedures    EKG interpretation: (Preliminary) 0321  Rhythm: sinus tachycardia, 1st degree AV block and PAC's; and irregular. Rate (approx.): 103; Axis: left axis deviation; WV interval: normal; QRS interval: normal ; ST/T wave: diffuse T wave flattening; Other findings: abnormal ekg.     CONSULT NOTE: 5:41 AM  Yuridia Bazan MD spoke with Dr. Jake Lozoya,   Specialty: Hospitalist  Discussed pt's hx, disposition, and available diagnostic and imaging results. Reviewed care plans. Consultant will evaluate pt for admission. Written by Henna Diaz ED Scribe, as dictated by Yuridia Bazan MD.    LABORATORY TESTS:  Recent Results (from the past 12 hour(s))   EKG, 12 LEAD, INITIAL    Collection Time: 09/15/17  3:21 AM   Result Value Ref Range    Ventricular Rate 103 BPM    Atrial Rate 103 BPM    P-R Interval 252 ms    QRS Duration 84 ms    Q-T Interval 394 ms    QTC Calculation (Bezet) 516 ms    Calculated P Axis 24 degrees    Calculated R Axis -43 degrees    Calculated T Axis 32 degrees    Diagnosis       Sinus tachycardia with 1st degree AV block with premature atrial complexes  Left axis deviation  Nonspecific T wave abnormality  When compared with ECG of 27-JUL-2017 07:22,  premature ventricular complexes are no longer present  premature atrial complexes are now present  T wave inversion no longer evident in Inferior leads  Nonspecific T wave abnormality has replaced inverted T waves in Lateral leads     GLUCOSE, POC    Collection Time: 09/15/17  3:40 AM   Result Value Ref Range    Glucose (POC) 154 (H) 65 - 100 mg/dL    Performed by Candy Boyer    LACTIC ACID    Collection Time: 09/15/17  4:16 AM   Result Value Ref Range    Lactic acid 1.6 0.4 - 2.0 MMOL/L   CBC WITH AUTOMATED DIFF    Collection Time: 09/15/17  4:18 AM   Result Value Ref Range    WBC 11.9 (H) 4.1 - 11.1 K/uL    RBC 3.71 (L) 4.10 - 5.70 M/uL    HGB 12.3 12.1 - 17.0 g/dL    HCT 36.3 (L) 36.6 - 50.3 %    MCV 97.8 80.0 - 99.0 FL    MCH 33.2 26.0 - 34.0 PG    MCHC 33.9 30.0 - 36.5 g/dL    RDW 12.5 11.5 - 14.5 %    PLATELET 117 (L) 475 - 400 K/uL    NEUTROPHILS 79 (H) 32 - 75 %    LYMPHOCYTES 10 (L) 12 - 49 %    MONOCYTES 11 5 - 13 %    EOSINOPHILS 0 0 - 7 %    BASOPHILS 0 0 - 1 %    ABS. NEUTROPHILS 9.4 (H) 1.8 - 8.0 K/UL    ABS. LYMPHOCYTES 1.2 0.8 - 3.5 K/UL    ABS. MONOCYTES 1.3 (H) 0.0 - 1.0 K/UL    ABS. EOSINOPHILS 0.0 0.0 - 0.4 K/UL    ABS. BASOPHILS 0.0 0.0 - 0.1 K/UL   METABOLIC PANEL, COMPREHENSIVE    Collection Time: 09/15/17  4:18 AM   Result Value Ref Range    Sodium 136 136 - 145 mmol/L    Potassium 3.8 3.5 - 5.1 mmol/L    Chloride 96 (L) 97 - 108 mmol/L    CO2 33 (H) 21 - 32 mmol/L    Anion gap 7 5 - 15 mmol/L    Glucose 96 65 - 100 mg/dL    BUN 16 6 - 20 MG/DL    Creatinine 0.66 (L) 0.70 - 1.30 MG/DL    BUN/Creatinine ratio 24 (H) 12 - 20      GFR est AA >60 >60 ml/min/1.73m2    GFR est non-AA >60 >60 ml/min/1.73m2    Calcium 8.9 8.5 - 10.1 MG/DL    Bilirubin, total 0.6 0.2 - 1.0 MG/DL    ALT (SGPT) 22 12 - 78 U/L    AST (SGOT) 22 15 - 37 U/L    Alk. phosphatase 68 45 - 117 U/L    Protein, total 8.4 (H) 6.4 - 8.2 g/dL    Albumin 3.2 (L) 3.5 - 5.0 g/dL    Globulin 5.2 (H) 2.0 - 4.0 g/dL    A-G Ratio 0.6 (L) 1.1 - 2.2     PROTHROMBIN TIME + INR    Collection Time: 09/15/17  4:22 AM   Result Value Ref Range    INR 1.2 (H) 0.9 - 1.1      Prothrombin time 12.6 (H) 9.0 - 11.1 sec       IMAGING RESULTS:  XR CHEST PORT   Final Result   EXAM:  XR CHEST PORT     INDICATION:  Slurred speech and confusion, EtOH use.     COMPARISON: Portable chest on 8/19/2017     TECHNIQUE: Portable semiupright chest AP view     FINDINGS: Cardiac monitoring wires overlie the thorax. The cardiomediastinal and  hilar contours are within normal limits. The pulmonary vasculature is within  normal limits.      Right upper lobe airspace opacity is subtle in the. Right hilar calcified lymph  node is unchanged. Left lung is clear. Osteopenia is unchanged. No displaced rib  fracture.     IMPRESSION  IMPRESSION:     Right upper lobe atelectasis versus pneumonia. Old granulomatous disease. Recommend PA and lateral chest radiographs when the patient can better tolerate.    CT HEAD WO CONT   Final Result   EXAM:  CT HEAD WO CONT     INDICATION: Slurred speech and confusion.     COMPARISON: CT head on 8/19/2017 at 6/24/2017. MRI brain on 8/21/2017.     TECHNIQUE: Noncontrast head CT. Coronal and sagittal reformats. CT dose  reduction was achieved through the use of a standardized protocol tailored for  this examination and automatic exposure control for dose modulation.     FINDINGS: The ventricles and sulci are age-appropriate without hydrocephalus. There is no mass effect or midline shift. There is no intracranial hemorrhage or  extra-axial fluid collection. There is no abnormal area of decreased density to  suggest infarct. Vascular calcifications are unchanged. Basal ganglia  calcifications are unchanged.     The calvarium is intact. The visualized paranasal sinuses and mastoid air cells  are clear.     IMPRESSION  IMPRESSION:      No acute intracranial abnormality on this noncontrast head CT. No change. MEDICATIONS GIVEN:  Medications   sodium chloride (NS) flush 5-10 mL (not administered)   levoFLOXacin (LEVAQUIN) 750 mg in D5W IVPB (not administered)   piperacillin-tazobactam (ZOSYN) 3.375 g in 0.9% sodium chloride (MBP/ADV) 100 mL (not administered)   levoFLOXacin (LEVAQUIN) 750 mg in D5W IVPB (not administered)   piperacillin-tazobactam (ZOSYN) 3.375 g in 0.9% sodium chloride (MBP/ADV) 100 mL (not administered)   sodium chloride 0.9 % bolus infusion 1,815 mL (1,815 mL IntraVENous New Bag 9/15/17 0430)       IMPRESSION:  1. Delirium    2. Pneumonia of right upper lobe due to infectious organism        PLAN:  1. Admit    Admit Note:  5:42 AM  Pt is being admitted by Dr. Lady Nunez. The results of their tests and reason(s) for their admission have been discussed with pt and/or available family. They convey agreement and understanding for the need to be admitted and for admission diagnosis.     This note is prepared by Tj Espinosa, acting as a Scribe for Alden Resendiz MD.    Alden Resendiz MD: The scribe's documentation has been prepared under my direction and personally reviewed by me in its entirety. I confirm that the notes above accurately reflects all work, treatment, procedures, and medical decision making performed by me.

## 2017-09-15 NOTE — ED NOTES
Bedside shift change report given to Efraín Caballero (oncoming nurse) by Gigi Rodriguez RN (offgoing nurse). Report included the following information SBAR, Kardex, ED Summary and MAR.

## 2017-09-15 NOTE — PROGRESS NOTES
Problem: Self Care Deficits Care Plan (Adult)  Goal: *Acute Goals and Plan of Care (Insert Text)  Occupational Therapy Goals  Initiated 9/15/2017  1. Patient will perform grooming with minimal assistance/contact guard assist within 7 day(s). 2. Patient will perform lower body dressing with moderate assistance within 7 day(s). 3. Patient will perform toilet transfers with minimal assistance/contact guard assist within 7 day(s). 4. Patient will perform all aspects of toileting with moderate assistance within 7 day(s). 5. Patient will participate in upper extremity therapeutic exercise/activities with supervision/set-up for 10 minutes within 7 day(s). 6. Patient will utilize energy conservation techniques during functional activities with verbal and visual cues within 7 day(s). OCCUPATIONAL THERAPY EVALUATION  Patient: Madeline Dietrich (72 y.o. male)  Date: 9/15/2017  Primary Diagnosis: HCAP (healthcare-associated pneumonia)  UTI (urinary tract infection)  Acute encephalopathy        Precautions:  Fall, Bed Alarm      ASSESSMENT :  Based on the objective data described below, the patient presents sitting up in bed and mumbling incoherent words. He demonstrated impaired cognition and no command following, agitated and combative affect and poor safety awareness. He has 5/5 strength in UEs and LEs and appears to be mobile based on his mobility in bed. Pt has been at [address] [facility] for approx 1.5 months and per pt's wife was amb with RW and starting to perform ADLs. Recommend return to SNF at discharge. Patient will benefit from skilled intervention to address the above impairments.   Patients rehabilitation potential is considered to be Guarded  Factors which may influence rehabilitation potential include:   [ ]             None noted  [X]             Mental ability/status  [X]             Medical condition  [ ]             Home/family situation and support systems  [X]             Safety awareness  [ ]             Pain tolerance/management  [ ]             Other:        PLAN :  Recommendations and Planned Interventions:  [X]               Self Care Training                  [X]        Therapeutic Activities  [X]               Functional Mobility Training    [X]        Cognitive Retraining  [X]               Therapeutic Exercises           [X]        Endurance Activities  [X]               Balance Training                   [X]        Neuromuscular Re-Education  [X]               Visual/Perceptual Training     [X]   Home Safety Training  [X]               Patient Education                 [X]        Family Training/Education  [ ]               Other (comment):     Frequency/Duration: Patient will be followed by occupational therapy 3 times a week to address goals. Discharge Recommendations: Skilled Nursing Facility  Further Equipment Recommendations for Discharge: TBD       SUBJECTIVE:   Patient mumbling works that are unable to be understood      OBJECTIVE DATA SUMMARY:   HISTORY:   Past Medical History:   Diagnosis Date    Alcohol abuse, in remission      Seizures ([facility] [address]       Past Surgical History:   Procedure Laterality Date    COLONOSCOPY N/A 7/31/2017     COLONOSCOPY performed by Alice White MD at [address] [place] [address]   7/31/2017          PLACE PERCUT GASTROSTOMY TUBE   7/18/2017          UPPER GI ENDOSCOPY,DIAGNOSIS   7/31/2017              Prior Level of Function/Home Situation: Pt has been at [facility] for approx 1.5 months and per pt's wife was amb with RW and starting to perform ADLs.     Home Situation  Home Environment: Skilled nursing facility  Patient Expects to be Discharged to[de-identified] Skilled nursing facility  [ ]  Right hand dominant   [ ]  Left hand dominant     EXAMINATION OF PERFORMANCE DEFICITS:  Cognitive/Behavioral Status:  Neurologic State: Alert;Confused  Orientation Level: Unable to verbalize (pt only mumbles)  Cognition: Decreased attention/concentration; No command following        Safety/Judgement: Decreased awareness of environment;Decreased awareness of need for assistance;Decreased awareness of need for safety;Decreased insight into deficits; Fall prevention     Hearing: Auditory  Auditory Impairment: None     Range of Motion:  AROM: Within functional limits  PROM: Within functional limits                       Strength:  Strength: Within functional limits                 Coordination:  Coordination: Within functional limits  Fine Motor Skills-Upper: Left Impaired;Right Impaired    Gross Motor Skills-Upper: Left Impaired;Right Impaired     Tone & Sensation:  Tone: Normal                          Balance:  Sitting: Intact (in bed)     Functional Mobility and Transfers for ADLs:  Bed Mobility:  Min A to maintain safety in bed, but moving freely without assist  Transfers:  Sit to Stand:  (unsafe to attempt due to imp cognition)  Toilet Transfer : Total assistance      Assisted nursing x30 minutes to maintain pt's safety in bed due to agitation and combative behavior trying to climb out of bed. ADL Assessment:  Feeding: Total assistance     Oral Facial Hygiene/Grooming: Total assistance     Bathing: Total assistance     Upper Body Dressing: Total assistance     Lower Body Dressing: Total assistance     Toileting: Total assistance     Cognitive Retraining  Safety/Judgement: Decreased awareness of environment;Decreased awareness of need for assistance;Decreased awareness of need for safety;Decreased insight into deficits; Fall prevention     Functional Measure:  Barthel Index:      Bathin  Bladder: 0  Bowels: 0  Groomin  Dressin  Feedin  Mobility: 0  Stairs: 0  Toilet Use: 0  Transfer (Bed to Chair and Back): 0  Total: 0         Barthel and G-code impairment scale:  Percentage of impairment CH  0% CI  1-19% CJ  20-39% CK  40-59% CL  60-79% CM  80-99% CN  100%   Barthel Score 0-100 100 99-80 79-60 59-40 20-39 1-19    0 Barthel Score 0-20 20 17-19 13-16 9-12 5-8 1-4 0      The Barthel ADL Index: Guidelines  1. The index should be used as a record of what a patient does, not as a record of what a patient could do. 2. The main aim is to establish degree of independence from any help, physical or verbal, however minor and for whatever reason. 3. The need for supervision renders the patient not independent. 4. A patient's performance should be established using the best available evidence. Asking the patient, friends/relatives and nurses are the usual sources, but direct observation and common sense are also important. However direct testing is not needed. 5. Usually the patient's performance over the preceding 24-48 hours is important, but occasionally longer periods will be relevant. 6. Middle categories imply that the patient supplies over 50 per cent of the effort. 7. Use of aids to be independent is allowed. Kimmie Charles, Barthel, D.W. (3227). Functional evaluation: the Barthel Index. [facility] (14)2. Joceline Atkinson velasquez HUBER Godinez, Geri Juan., [place], [address] [place] [address] (1999). Measuring the change indisability after inpatient rehabilitation; comparison of the responsiveness of the Barthel Index and Functional [place] Measure. Journal of Neurology, Neurosurgery, and Psychiatry, 66(4), 642-933. Tyrel Yanez, N.J.A, GLADYS Richey, & Francisco Bautista, M.A. (2004.) Assessment of post-stroke quality of life in cost-effectiveness studies: The usefulness of the Barthel Index and the EuroQoL-5D. Quality of Life Research, 13, 845-16         G codes: In compliance with CMSs Claims Based Outcome Reporting, the following G-code set was chosen for this patient based on their primary functional limitation being treated: The outcome measure chosen to determine the severity of the functional limitation was the Barthel Index with a score of 0/100 which was correlated with the impairment scale.       · Self Care:  - CURRENT STATUS:    CN - 100% impaired, limited or restricted               - GOAL STATUS:           CM - 80%-99% impaired, limited or restricted               - D/C STATUS:                       ---------------To be determined---------------      Occupational Therapy Evaluation Charge Determination   History Examination Decision-Making   MEDIUM Complexity : Expanded review of history including physical, cognitive and psychosocial  history  HIGH Complexity : 5 or more performance deficits relating to physical, cognitive , or psychosocial skils that result in activity limitations and / or participation restrictions HIGH Complexity : Patient presents with comorbidities that affect occupational performance. Signifigant modification of tasks or assistance (eg, physical or verbal) with assessment (s) is necessary to enable patient to complete evaluation       Based on the above components, the patient evaluation is determined to be of the following complexity level: MEDIUM  Pain:  Pain Scale 1: Adult Nonverbal Pain Scale  Pain Intensity 1: 0              Activity Tolerance:   Poor  Please refer to the flowsheet for vital signs taken during this treatment. After treatment:   [ ] Patient left in no apparent distress sitting up in chair  [X] Patient left in bed  [X] Call bell left within reach  [X] Nursing notified and present  [X] Caregiver present - pt's wife and son-in-law  [ ] Bed alarm activated      COMMUNICATION/EDUCATION:   The patients plan of care was discussed with: Physical Therapist, Registered Nurse and Certified Nursing Assistant/Patient Care Technician.  [X] Home safety education was provided and the caregiver indicated understanding.  [X] Family have participated as able in goal setting and plan of care. [X] Family agree to work toward stated goals and plan of care. [ ] Patient understands intent and goals of therapy, but is neutral about his/her participation.   [X] Patient is unable to participate in goal setting and plan of care. This patients plan of care is appropriate for delegation to ELIZABETH.      Thank you for this referral.  Lelo Palacios OT  Time Calculation: 38 mins

## 2017-09-15 NOTE — ED NOTES
Patient became very agitated and began to kick and try to climb out of bed. Hospitalist notified, Order being placed for Haldol.

## 2017-09-15 NOTE — H&P
Called and spoke to wife Alfonso Lopez. She wasn't aware of his transfer from nursing home. Report his speech usually get slurred when he is having a seizure. Does not have tonic clonic seizure but has staring spells with mouth twitching and slurred speech during a seizure. Will get EEG.     Terrie Trotter MD

## 2017-09-15 NOTE — CONSULTS
DATE OF CONSULTATION: 9/15/2017    CONSULTED BY: [place]ty [place] MD    Chief Complaint   Patient presents with    Altered mental status     pt arrived via EMS from [facility] center with complaints from staff of mental changes/speech changes; EMS reports patient was diagnosed with pneumonia yesterday and has not been started yet on medications        Reason for Consult  HPI  Patient is here for AMS. He has a history of Partial Complex Seizures, and Partial Complex Status. These are usually associated with UTI, he has a UTI and is in Partial Complex Status. EEG confirms this. E is on Keppra , Depakote and Lyrica.  He gets it all by PEG Tube      ROS  Not obtainable    PMH  Past Medical History:   Diagnosis Date    Alcohol abuse, in remission     Seizures (HCC)        FH  Family History   Problem Relation Age of Onset    Other Other      no strokes or seizures    Cancer Mother        SH  Social History     Social History    Marital status:      Spouse name: N/A    Number of children: N/A    Years of education: N/A     Social History Main Topics    Smoking status: Never Smoker    Smokeless tobacco: Never Used    Alcohol use No      Comment: Quit drinking 10 years ago    Drug use: No    Sexual activity: Not Asked     Other Topics Concern    None     Social History Narrative       ALLERGIES  Allergies   Allergen Reactions    No Known Allergies        PHYSICAL EXAM  EXAMINATION:   Patient Vitals for the past 24 hrs:   Temp Pulse Resp BP SpO2   09/15/17 1024 97.7 °F (36.5 °C) 84 22 156/76 96 %   09/15/17 0845 - 86 23 145/87 99 %   09/15/17 0830 - 83 22 148/70 99 %   09/15/17 0815 - 78 22 135/59 99 %   09/15/17 0800 - 80 21 133/57 99 %   09/15/17 0745 - 85 25 146/66 98 %   09/15/17 0730 - 72 15 136/66 99 %   09/15/17 0715 - 82 17 149/78 98 %   09/15/17 0700 - 80 21 141/75 99 %   09/15/17 0649 - 77 21 136/64 99 %   09/15/17 0543 - 81 23 - 98 %   09/15/17 0504 - 87 24 - 97 %   09/15/17 0430 - 81 25 - 96 %   09/15/17 0343 - 89 23 126/59 96 %   09/15/17 0342 99.8 °F (37.7 °C) (!) 101 26 126/59 97 %        General:   Physical Exam   CONSTITUTIONAL:Non verbal, awake, follows no commands   Head: EEG Cap in place   ENT: Oropharynx is clear and moist. No oropharyngeal exudate. EYES: Conjunctivae and EOM are normal. Pupils are equal, round, and reactive to light. Fundoscopic exam normal   NECK: Normal range of motion. Neck supple. No thyromegaly present. Carotids w/o bruit  CARDIOVASCULARr: Normal rate and regular rhythm. No murmur heard. RESPIRATORY: Effort normal and breath sounds normal.   MUSCULOSKELETAL:Moves all extl    LYMPH:   No Cervical adenopathyor. SKIN:  No significant bruising or lacerations. Neurological Examination:   Mental Status:  t. Non verbal, awake, follows no commands     Cranial Nerves: Visual fields are full. PERRL, Extraocular movements are full. Tongue midline. Motor: Moves all ext    Sensation: Normal to pain    Reflexes: DTRs 2+ throughout. Plantar responses downgoing. Coordination/Cerebellar:NT    Gait: NT           Imaging review:      HOME MEDS  Prior to Admission Medications   Prescriptions Last Dose Informant Patient Reported? Taking? QUEtiapine (SEROQUEL) 25 mg tablet 2017 at 2100 Transfer Papers Yes Yes   Si mg by PEG Tube route nightly. acetaminophen (TYLENOL) 325 mg tablet 9/15/2017 at 0000 Transfer Papers Yes Yes   Si mg by PEG Tube route every six (6) hours as needed for Pain. albuterol-ipratropium (DUO-NEB) 2.5 mg-0.5 mg/3 ml nebu 2017 at 0900 Transfer Papers No Yes   Sig: 3 mL by Nebulization route every six (6) hours as needed. cyclobenzaprine (FLEXERIL) 5 mg tablet Not Taking at Unknown time Transfer Papers Yes No   Si mg by PEG Tube route three (3) times daily as needed for Muscle Spasm(s).    latanoprost (XALATAN) 0.005 % ophthalmic solution 2017 at 0900 Transfer Papers No Yes   Sig: Administer 1 Drop to both eyes every evening. levETIRAcetam (KEPPRA) 100 mg/mL solution 2017 at 1700 Transfer Papers Yes Yes   Sig: Take 1,500 mg by mouth two (2) times a day. metoprolol succinate (TOPROL-XL) 100 mg tablet 2017 at 2100 Transfer Papers Yes Yes   Si.5 mg by PEG Tube route two (2) times a day. moxifloxacin (AVELOX) 400 mg tablet Not Taking at Unknown time Other Yes No   Si mg by PEG Tube route daily. multivit w-min-ferrous gluconate (CEROVITE) 9 mg iron/15 mL oral liquid 2017 at 0900 Transfer Papers Yes Yes   Sig: 15 mL by PEG Tube route daily. multivitamin (MULTI-DELYN) liqd 2017 at 0900 Transfer Papers Yes Yes   Sig: 15 mL by PEG Tube route daily. pantoprazole (PROTONIX) 40 mg granules for oral suspension 2017 at 1700 Transfer Papers Yes Yes   Si mg by PEG Tube route Before breakfast and dinner. pregabalin (LYRICA) 300 mg capsule 2017 at Unknown time Transfer Papers Yes Yes   Si mg by Per G Tube route two (2) times a day. thiamine (VITAMIN B-1) 100 mg tablet 2017 at 0900 Transfer Papers Yes Yes   Si mg by PEG Tube route daily. valproic acid, as sodium salt, (DEPAKENE) 250 mg/5 mL (5 mL) soln oral solution 2017 at 2100 Transfer Papers Yes Yes   Si mg by PEG Tube route three (3) times daily.       Facility-Administered Medications: None       CURRENT MEDS  Current Facility-Administered Medications   Medication Dose Route Frequency    [START ON 2017] levoFLOXacin (LEVAQUIN) 750 mg in D5W IVPB  750 mg IntraVENous Q24H    piperacillin-tazobactam (ZOSYN) 3.375 g in 0.9% sodium chloride (MBP/ADV) 100 mL  3.375 g IntraVENous Q8H    sodium chloride (NS) flush 5-10 mL  5-10 mL IntraVENous Q8H    0.9% sodium chloride infusion  100 mL/hr IntraVENous CONTINUOUS    aspirin chewable tablet 81 mg  81 mg Per G Tube DAILY    enoxaparin (LOVENOX) injection 40 mg  40 mg SubCUTAneous Q24H    levETIRAcetam (KEPPRA) oral solution 1,500 mg  1,500 mg Per G Tube Q12H    metoprolol tartrate (LOPRESSOR) tablet 12.5 mg  12.5 mg Per G Tube BID    multivit w-min-ferrous gluconate (CEROVITE) oral liquid 15 mL  15 mL Per G Tube DAILY    pantoprazole (PROTONIX) granules for oral suspension 40 mg  40 mg Per G Tube ACB&D    pregabalin (LYRICA) capsule 300 mg  300 mg Oral BID    QUEtiapine (SEROquel) tablet 25 mg  25 mg Per G Tube QHS    thiamine (B-1) tablet 100 mg  100 mg Per G Tube DAILY    valproic acid (as sodium salt) (DEPAKENE) 250 mg/5 mL (5 mL) oral solution 500 mg  500 mg Per G Tube TID    latanoprost (XALATAN) 0.005 % ophthalmic solution 1 Drop  1 Drop Both Eyes QHS       IMPRESSION:RECOMMENDATIONS:  Complex Partial Status, will bolus with 1000 mg Keppra IV now, await drug levels, in the past this status usually will not break until UTI controlled. Thank you very much for this consultation. We will follow up on the above studies and give further recommendations as indicated. Leonard Remy. Ramez Hendrix MD  Neurologist    This note will not be viewable in [address]

## 2017-09-15 NOTE — ED NOTES
Spoke with Zulma Salcido who transferred the call to [facility]. Phone rang without answer.

## 2017-09-15 NOTE — ROUTINE PROCESS

## 2017-09-15 NOTE — PROGRESS NOTES
Problem: Dysphagia (Adult)  Goal: *Acute Goals and Plan of Care (Insert Text)  Speech pathology goals  Initiated 9/15/2017  1. Patient will participate in daily re-evaluation of swallow function   [address] EVALUATION  Patient: Gilbert Pereyra (80 y.o. male)  Date: 9/15/2017  Primary Diagnosis: HCAP (healthcare-associated pneumonia)  UTI (urinary tract infection)  Acute encephalopathy        Precautions: swallow         ASSESSMENT :  Based on the objective data described below, the patient presents with severe oropharyngeal dysphagia related to significant confusion. Patient alert, sitting upright in bed. Patient with unintelligible, perseverative verbalizations. Patient also with no command following despite max verbal/visual/tactile cues. When offered scant ice chip via spoon, patient with slow bolus acceptance, however patient did close lips around spoon to accept bolus. Once bolus in oral cavity, patient with no manipulation, posterior propulsion, or swallow initiation despite max verbal/visual/tactile cues and tongue base massage. Patient with strong coughing with scant ice chip. Patient at high risk for aspiration secondary to cognitive status. Wife present at bedside and reported patient is not at baseline level of cognitive or motor speech function. Wife reported patient began eating purees with supervision and meds crushed in puree prior to admission. SLP who had been working with patient had recommended MBS. Discussed with wife that MBS could be completed in hospital next week if patient returns to baseline level of cognitive function. Wife in agreement. Patient will benefit from skilled intervention to address the above impairments.   Patients rehabilitation potential is considered to be Fair  Factors which may influence rehabilitation potential include:   [ ]            None noted  [X]            Mental ability/status  [X]            Medical condition  [ ] Home/family situation and support systems  [ ]            Safety awareness  [ ]            Pain tolerance/management  [ ]            Other:        PLAN :  Recommendations and Planned Interventions:  --NPO with oral care if patient allows  --SLP to follow for re-evaluation of swallow function next week, and MBS if mental status returns to baseline  Frequency/Duration: Patient will be followed by speech-language pathology 4 times a week to address goals. Discharge Recommendations: To Be Determined       SUBJECTIVE:   Patient with no intelligible verbalizations. OBJECTIVE:       Past Medical History:   Diagnosis Date    Alcohol abuse, in remission      Seizures ([facility] [address]       Past Surgical History:   Procedure Laterality Date    COLONOSCOPY N/A 7/31/2017     COLONOSCOPY performed by Althea Tanner MD at [address] [place] [address]   7/31/2017          PLACE PERCUT GASTROSTOMY TUBE   7/18/2017          UPPER GI ENDOSCOPY,DIAGNOSIS   7/31/2017           Prior Level of Function/Home Situation:      Diet prior to admission: NPO with PEG and puree snacks with strict supervision  Current Diet:  NPO with PEG   Cognitive and Communication Status:  Neurologic State: Alert, Confused, Postictal  Orientation Level: Unable to verbalize  Cognition: Decreased attention/concentration, No command following        Safety/Judgement: Not assessed  Oral Assessment:  Oral Assessment  Labial: Other (comment) (unable to assess; poor command following)  P.O.  Trials:  Patient Position: upright in bed  Vocal quality prior to P.O.: No impairment  Consistency Presented: Ice chips  How Presented: Spoon;SLP-fed/presented  How Much: 1 (scant ice chip)  Bolus Acceptance: Impaired (slow)  Bolus Formation/Control: Impaired  Type of Impairment: Incomplete;Poor;Mastication  Propulsion: Absent     Initiation of Swallow: Absent  Laryngeal Elevation: Absent  Aspiration Signs/Symptoms: Strong cough                          NOMS: The NOMS functional outcome measure was used to quantify this patient's level of swallowing impairment. Based on the NOMS, the patient was determined to be at level 1 for swallow function      G Codes: In compliance with CMSs Claims Based Outcome Reporting, the following G-code set was chosen for this patient based the use of the NOMS functional outcome to quantify this patient's level of swallowing impairment. Using the NOMS, the patient was determined to be at level 1 for swallow function which correlates with the CN= 100% level of severity. Based on the objective assessment provided within this note, the current, goal, and discharge g-codes are as follows:     Swallow  Swallowing:   Swallow Current Status CN= 100%   Swallow Goal Status CL= 60-79%         NOMS Swallowing Levels:  Level 1 (CN): NPO  Level 2 (CM): NPO but takes consistency in therapy  Level 3 (CL): Takes less than 50% of nutrition p.o. and continues with nonoral feedings; and/or safe with mod cues; and/or max diet restriction  Level 4 (CK): Safe swallow but needs mod cues; and/or mod diet restriction; and/or still requires some nonoral feeding/supplements  Level 5 (CJ): Safe swallow with min diet restriction; and/or needs min cues  Level 6 (CI): Independent with p.o.; rare cues; usually self cues; may need to avoid some foods or needs extra time  Level 7 ([address] [place] [address]): Independent for all p.o.  DEON. (2003). National Outcomes Measurement System (NOMS): Adult Speech-Language Pathology User's Guide.            Pain:  Pain Scale 1: Visual  Pain Intensity 1: 0     After treatment:   [ ]            Patient left in no apparent distress sitting up in chair  [X]            Patient left in no apparent distress in bed  [X]            Call bell left within reach  [X]            Nursing notified  [X]            Caregiver present  [ ]            Bed alarm activated      COMMUNICATION/EDUCATION:   The patients plan of care including recommendations, planned interventions, and recommended diet changes were discussed with: Registered Nurse. [X]            Patient/family have participated as able in goal setting and plan of care. [X]            Patient/family agree to work toward stated goals and plan of care. [ ]            Patient understands intent and goals of therapy, but is neutral about his/her participation. [ ]            Patient is unable to participate in goal setting and plan of care.      Thank you for this referral.  Zach Harmon, FABIANO  Time Calculation: 15 mins

## 2017-09-15 NOTE — ED NOTES
ER MD aware of patient dysphagia screen and history of dysphagia prior to coming to ED noted to patient medical chart sent with paperwork from facility.

## 2017-09-15 NOTE — ED NOTES
[facility] . Assumed care of patient at this time. Patient placed in the position of comfort at this time . Bed in low position, side rails up. Call bell with patient/family at this time. Instructed patient on awaiting MD/PA-C assessment, Patient/family verbalizes understanding. Relayed to patient/family to call for any needs. Placed on Monitor x 3 with alarms on.

## 2017-09-16 LAB
ANION GAP SERPL CALC-SCNC: 5 MMOL/L (ref 5–15)
BUN SERPL-MCNC: 11 MG/DL (ref 6–20)
BUN/CREAT SERPL: 26 (ref 12–20)
CALCIUM SERPL-MCNC: 8.4 MG/DL (ref 8.5–10.1)
CHLORIDE SERPL-SCNC: 102 MMOL/L (ref 97–108)
CHOLEST SERPL-MCNC: 97 MG/DL
CO2 SERPL-SCNC: 32 MMOL/L (ref 21–32)
CREAT SERPL-MCNC: 0.43 MG/DL (ref 0.7–1.3)
EST. AVERAGE GLUCOSE BLD GHB EST-MCNC: NORMAL MG/DL
GLUCOSE BLD STRIP.AUTO-MCNC: 100 MG/DL (ref 65–100)
GLUCOSE BLD STRIP.AUTO-MCNC: 114 MG/DL (ref 65–100)
GLUCOSE SERPL-MCNC: 95 MG/DL (ref 65–100)
HBA1C MFR BLD: 4.2 % (ref 4.2–6.3)
HDLC SERPL-MCNC: 51 MG/DL
HDLC SERPL: 1.9 {RATIO} (ref 0–5)
LDLC SERPL CALC-MCNC: 38.8 MG/DL (ref 0–100)
LIPID PROFILE,FLP: NORMAL
POTASSIUM SERPL-SCNC: 3.4 MMOL/L (ref 3.5–5.1)
SERVICE CMNT-IMP: ABNORMAL
SERVICE CMNT-IMP: NORMAL
SODIUM SERPL-SCNC: 139 MMOL/L (ref 136–145)
TRIGL SERPL-MCNC: 36 MG/DL (ref ?–150)
VLDLC SERPL CALC-MCNC: 7.2 MG/DL

## 2017-09-16 PROCEDURE — 80061 LIPID PANEL: CPT | Performed by: HOSPITALIST

## 2017-09-16 PROCEDURE — 36415 COLL VENOUS BLD VENIPUNCTURE: CPT | Performed by: HOSPITALIST

## 2017-09-16 PROCEDURE — 74011000258 HC RX REV CODE- 258: Performed by: EMERGENCY MEDICINE

## 2017-09-16 PROCEDURE — 65660000000 HC RM CCU STEPDOWN

## 2017-09-16 PROCEDURE — 74011250636 HC RX REV CODE- 250/636: Performed by: HOSPITALIST

## 2017-09-16 PROCEDURE — 82962 GLUCOSE BLOOD TEST: CPT

## 2017-09-16 PROCEDURE — 83036 HEMOGLOBIN GLYCOSYLATED A1C: CPT | Performed by: HOSPITALIST

## 2017-09-16 PROCEDURE — 74011250636 HC RX REV CODE- 250/636: Performed by: INTERNAL MEDICINE

## 2017-09-16 PROCEDURE — 74011000258 HC RX REV CODE- 258: Performed by: INTERNAL MEDICINE

## 2017-09-16 PROCEDURE — 74011250636 HC RX REV CODE- 250/636: Performed by: EMERGENCY MEDICINE

## 2017-09-16 PROCEDURE — 74011250637 HC RX REV CODE- 250/637: Performed by: HOSPITALIST

## 2017-09-16 PROCEDURE — 77030011943

## 2017-09-16 PROCEDURE — 77030018798 HC PMP KT ENTRL FED COVD -A

## 2017-09-16 PROCEDURE — 74011000250 HC RX REV CODE- 250: Performed by: HOSPITALIST

## 2017-09-16 PROCEDURE — 74011250636 HC RX REV CODE- 250/636

## 2017-09-16 PROCEDURE — 80048 BASIC METABOLIC PNL TOTAL CA: CPT | Performed by: EMERGENCY MEDICINE

## 2017-09-16 RX ORDER — FUROSEMIDE 10 MG/ML
20 INJECTION INTRAMUSCULAR; INTRAVENOUS ONCE
Status: COMPLETED | OUTPATIENT
Start: 2017-09-16 | End: 2017-09-16

## 2017-09-16 RX ORDER — FUROSEMIDE 10 MG/ML
INJECTION INTRAMUSCULAR; INTRAVENOUS
Status: COMPLETED
Start: 2017-09-16 | End: 2017-09-16

## 2017-09-16 RX ADMIN — LEVOFLOXACIN 750 MG: 5 INJECTION, SOLUTION INTRAVENOUS at 06:51

## 2017-09-16 RX ADMIN — METOPROLOL TARTRATE 12.5 MG: 25 TABLET, FILM COATED ORAL at 08:11

## 2017-09-16 RX ADMIN — Medication 100 MG: at 08:10

## 2017-09-16 RX ADMIN — PANTOPRAZOLE SODIUM 40 MG: 40 GRANULE, DELAYED RELEASE ORAL at 09:38

## 2017-09-16 RX ADMIN — HALOPERIDOL LACTATE 2 MG: 5 INJECTION, SOLUTION INTRAMUSCULAR at 01:08

## 2017-09-16 RX ADMIN — FUROSEMIDE 20 MG: 10 INJECTION INTRAMUSCULAR; INTRAVENOUS at 02:24

## 2017-09-16 RX ADMIN — ENOXAPARIN SODIUM 40 MG: 40 INJECTION SUBCUTANEOUS at 08:11

## 2017-09-16 RX ADMIN — QUETIAPINE FUMARATE 25 MG: 25 TABLET ORAL at 22:24

## 2017-09-16 RX ADMIN — PANTOPRAZOLE SODIUM 40 MG: 40 GRANULE, DELAYED RELEASE ORAL at 16:54

## 2017-09-16 RX ADMIN — VALPROIC ACID 500 MG: 250 SOLUTION ORAL at 16:54

## 2017-09-16 RX ADMIN — LEVETIRACETAM 1500 MG: 100 SOLUTION ORAL at 08:12

## 2017-09-16 RX ADMIN — PREGABALIN 300 MG: 150 CAPSULE ORAL at 08:10

## 2017-09-16 RX ADMIN — PIPERACILLIN SODIUM,TAZOBACTAM SODIUM 3.38 G: 3; .375 INJECTION, POWDER, FOR SOLUTION INTRAVENOUS at 09:43

## 2017-09-16 RX ADMIN — METOPROLOL TARTRATE 12.5 MG: 25 TABLET, FILM COATED ORAL at 17:12

## 2017-09-16 RX ADMIN — SODIUM CHLORIDE 100 ML/HR: 900 INJECTION, SOLUTION INTRAVENOUS at 01:06

## 2017-09-16 RX ADMIN — PIPERACILLIN SODIUM,TAZOBACTAM SODIUM 4.5 G: 4; .5 INJECTION, POWDER, FOR SOLUTION INTRAVENOUS at 16:54

## 2017-09-16 RX ADMIN — VALPROIC ACID 500 MG: 250 SOLUTION ORAL at 22:24

## 2017-09-16 RX ADMIN — HALOPERIDOL LACTATE 2 MG: 5 INJECTION, SOLUTION INTRAMUSCULAR at 03:25

## 2017-09-16 RX ADMIN — VALPROIC ACID 500 MG: 250 SOLUTION ORAL at 08:09

## 2017-09-16 RX ADMIN — ASPIRIN 81 MG CHEWABLE TABLET 81 MG: 81 TABLET CHEWABLE at 08:10

## 2017-09-16 RX ADMIN — LEVETIRACETAM 1500 MG: 100 SOLUTION ORAL at 22:24

## 2017-09-16 RX ADMIN — PREGABALIN 300 MG: 150 CAPSULE ORAL at 17:12

## 2017-09-16 RX ADMIN — LATANOPROST 1 DROP: 50 SOLUTION OPHTHALMIC at 22:00

## 2017-09-16 RX ADMIN — FUROSEMIDE 20 MG: 10 INJECTION, SOLUTION INTRAMUSCULAR; INTRAVENOUS at 02:24

## 2017-09-16 RX ADMIN — Medication 10 ML: at 13:33

## 2017-09-16 RX ADMIN — HALOPERIDOL LACTATE 2 MG: 5 INJECTION, SOLUTION INTRAMUSCULAR at 21:41

## 2017-09-16 RX ADMIN — Medication 10 ML: at 06:51

## 2017-09-16 RX ADMIN — MULTIVIT AND MINERALS-FERROUS GLUCONATE 9 MG IRON/15 ML ORAL LIQUID 15 ML: at 09:38

## 2017-09-16 RX ADMIN — Medication 10 ML: at 22:27

## 2017-09-16 RX ADMIN — PIPERACILLIN SODIUM,TAZOBACTAM SODIUM 3.38 G: 3; .375 INJECTION, POWDER, FOR SOLUTION INTRAVENOUS at 02:26

## 2017-09-16 NOTE — PROGRESS NOTES
NEUROLOGY DAILY PROGRESS NOTE     Chief Complaint   Patient presents with    Altered mental status     pt arrived via EMS from [facility] center with complaints from staff of mental changes/speech changes; EMS reports patient was diagnosed with pneumonia yesterday and has not been started yet on medications        Subjective  According to the wife, he is doing better today than yesterday    ROS:  A ten system review of constitutional, cardiovascular, respiratory, musculoskeletal, endocrine, skin, SHEENT, genitourinary, psychiatric and neurologic systems was obtained and is unremarkable except as stated in HPI     EXAMINATION:   Patient Vitals for the past 24 hrs:   Temp Pulse Resp BP SpO2   09/16/17 1056 97.5 °F (36.4 °C) 65 18 121/72 98 %   09/16/17 0757 97.7 °F (36.5 °C) 66 21 127/72 99 %   09/16/17 0329 97.5 °F (36.4 °C) 76 22 124/77 97 %   09/15/17 2226 98 °F (36.7 °C) 69 20 140/68 96 %   09/15/17 1911 98.8 °F (37.1 °C) 68 18 109/58 99 %   09/15/17 1542 98.1 °F (36.7 °C) (!) 106 21 143/58 94 %        General:   General appearance: Pt is in no acute distress   Distal pulses are preserved    Neurological Examination:   Mental Status: Pt is awake but does not follow commands for me. Cranial Nerves:  PERRL, Extraocular movements are full. Facial sensation intact V1- V3. Facial movement intact, symmetric. Hearing intact to conversation. P    Motor: Strength is 5/5 in all 4 ext. Normal tone. No atrophy.      LAB DATA REVIEWED:    Results for orders placed or performed during the hospital encounter of 09/15/17   CULTURE, BLOOD   Result Value Ref Range    Special Requests: NO SPECIAL REQUESTS      Culture result: NO GROWTH 1 DAY     CULTURE, BLOOD   Result Value Ref Range    Special Requests: NO SPECIAL REQUESTS      Culture result: NO GROWTH 1 DAY     CBC WITH AUTOMATED DIFF   Result Value Ref Range    WBC 11.9 (H) 4.1 - 11.1 K/uL    RBC 3.71 (L) 4.10 - 5.70 M/uL    HGB 12.3 12.1 - 17.0 g/dL    HCT 36.3 (L) 36.6 - 50.3 %    MCV 97.8 80.0 - 99.0 FL    MCH 33.2 26.0 - 34.0 PG    MCHC 33.9 30.0 - 36.5 g/dL    RDW 12.5 11.5 - 14.5 %    PLATELET 550 (L) 071 - 400 K/uL    NEUTROPHILS 79 (H) 32 - 75 %    LYMPHOCYTES 10 (L) 12 - 49 %    MONOCYTES 11 5 - 13 %    EOSINOPHILS 0 0 - 7 %    BASOPHILS 0 0 - 1 %    ABS. NEUTROPHILS 9.4 (H) 1.8 - 8.0 K/UL    ABS. LYMPHOCYTES 1.2 0.8 - 3.5 K/UL    ABS. MONOCYTES 1.3 (H) 0.0 - 1.0 K/UL    ABS. EOSINOPHILS 0.0 0.0 - 0.4 K/UL    ABS. BASOPHILS 0.0 0.0 - 0.1 K/UL   METABOLIC PANEL, COMPREHENSIVE   Result Value Ref Range    Sodium 136 136 - 145 mmol/L    Potassium 3.8 3.5 - 5.1 mmol/L    Chloride 96 (L) 97 - 108 mmol/L    CO2 33 (H) 21 - 32 mmol/L    Anion gap 7 5 - 15 mmol/L    Glucose 96 65 - 100 mg/dL    BUN 16 6 - 20 MG/DL    Creatinine 0.66 (L) 0.70 - 1.30 MG/DL    BUN/Creatinine ratio 24 (H) 12 - 20      GFR est AA >60 >60 ml/min/1.73m2    GFR est non-AA >60 >60 ml/min/1.73m2    Calcium 8.9 8.5 - 10.1 MG/DL    Bilirubin, total 0.6 0.2 - 1.0 MG/DL    ALT (SGPT) 22 12 - 78 U/L    AST (SGOT) 22 15 - 37 U/L    Alk.  phosphatase 68 45 - 117 U/L    Protein, total 8.4 (H) 6.4 - 8.2 g/dL    Albumin 3.2 (L) 3.5 - 5.0 g/dL    Globulin 5.2 (H) 2.0 - 4.0 g/dL    A-G Ratio 0.6 (L) 1.1 - 2.2     LACTIC ACID   Result Value Ref Range    Lactic acid 1.6 0.4 - 2.0 MMOL/L   URINALYSIS W/ RFLX MICROSCOPIC   Result Value Ref Range    Color YELLOW/STRAW      Appearance CLEAR CLEAR      Specific gravity 1.016 1.003 - 1.030      pH (UA) 8.0 5.0 - 8.0      Protein TRACE (A) NEG mg/dL    Glucose NEGATIVE  NEG mg/dL    Ketone NEGATIVE  NEG mg/dL    Bilirubin NEGATIVE  NEG      Blood TRACE (A) NEG      Urobilinogen 4.0 (H) 0.2 - 1.0 EU/dL    Nitrites NEGATIVE  NEG      Leukocyte Esterase SMALL (A) NEG      WBC 10-20 0 - 4 /hpf    RBC 10-20 0 - 5 /hpf    Epithelial cells FEW FEW /lpf    Bacteria 2+ (A) NEG /hpf   PROTHROMBIN TIME + INR   Result Value Ref Range    INR 1.2 (H) 0.9 - 1.1      Prothrombin time 12.6 (H) 9.0 - 45.3 sec   METABOLIC PANEL, BASIC   Result Value Ref Range    Sodium 139 136 - 145 mmol/L    Potassium 3.4 (L) 3.5 - 5.1 mmol/L    Chloride 102 97 - 108 mmol/L    CO2 32 21 - 32 mmol/L    Anion gap 5 5 - 15 mmol/L    Glucose 95 65 - 100 mg/dL    BUN 11 6 - 20 MG/DL    Creatinine 0.43 (L) 0.70 - 1.30 MG/DL    BUN/Creatinine ratio 26 (H) 12 - 20      GFR est AA >60 >60 ml/min/1.73m2    GFR est non-AA >60 >60 ml/min/1.73m2    Calcium 8.4 (L) 8.5 - 10.1 MG/DL   LIPID PANEL   Result Value Ref Range    LIPID PROFILE          Cholesterol, total 97 <200 MG/DL    Triglyceride 36 <150 MG/DL    HDL Cholesterol 51 MG/DL    LDL, calculated 38.8 0 - 100 MG/DL    VLDL, calculated 7.2 MG/DL    CHOL/HDL Ratio 1.9 0 - 5.0     HEMOGLOBIN A1C WITH EAG   Result Value Ref Range    Hemoglobin A1c 4.2 4.2 - 6.3 %    Est. average glucose Cannot be calculated mg/dL   GLUCOSE, POC   Result Value Ref Range    Glucose (POC) 154 (H) 65 - 100 mg/dL    Performed by ROSE LOAIZA    GLUCOSE, POC   Result Value Ref Range    Glucose (POC) 106 (H) 65 - 100 mg/dL    [facility] by [facility]    GLUCOSE, POC   Result Value Ref Range    Glucose (POC) 121 (H) 65 - 100 mg/dL    Performed by Danny Lee (PCT)    GLUCOSE, POC   Result Value Ref Range    Glucose (POC) 114 (H) 65 - 100 mg/dL    Performed by Sudarshan Kat (PCT)    EKG, 12 LEAD, INITIAL   Result Value Ref Range    Ventricular Rate 103 BPM    Atrial Rate 103 BPM    P-R Interval 252 ms    QRS Duration 84 ms    Q-T Interval 394 ms    QTC Calculation (Bezet) 516 ms    Calculated P Axis 24 degrees    Calculated R Axis -43 degrees    Calculated T Axis 32 degrees    Diagnosis       Sinus tachycardia with 1st degree AV block with premature atrial complexes  Left axis deviation  Confirmed by Allean Lombard ([id number]) on 9/15/2017 8:43:16 AM         Last Lipid:    Lab Results   Component Value Date/Time    LDL, calculated 38.8 09/16/2017 04:23 AM     HbA1c:   Lab Results Component Value Date/Time    Hemoglobin A1c 4.2 09/16/2017 04:23 AM       Imaging Review  MRI brain pending    MEDICATIONS:  Current Facility-Administered Medications   Medication Dose Route Frequency    piperacillin-tazobactam (ZOSYN) 4.5 g in 0.9% sodium chloride (MBP/ADV) 100 mL  4.5 g IntraVENous Q8H    levoFLOXacin (LEVAQUIN) 750 mg in D5W IVPB  750 mg IntraVENous Q24H    sodium chloride (NS) flush 5-10 mL  5-10 mL IntraVENous Q8H    aspirin chewable tablet 81 mg  81 mg Per G Tube DAILY    enoxaparin (LOVENOX) injection 40 mg  40 mg SubCUTAneous Q24H    levETIRAcetam (KEPPRA) oral solution 1,500 mg  1,500 mg Per G Tube Q12H    metoprolol tartrate (LOPRESSOR) tablet 12.5 mg  12.5 mg Per G Tube BID    multivit w-min-ferrous gluconate (CEROVITE) oral liquid 15 mL  15 mL Per G Tube DAILY    pantoprazole (PROTONIX) granules for oral suspension 40 mg  40 mg Per G Tube ACB&D    pregabalin (LYRICA) capsule 300 mg  300 mg Oral BID    QUEtiapine (SEROquel) tablet 25 mg  25 mg Per G Tube QHS    thiamine (B-1) tablet 100 mg  100 mg Per G Tube DAILY    valproic acid (as sodium salt) (DEPAKENE) 250 mg/5 mL (5 mL) oral solution 500 mg  500 mg Per G Tube TID    latanoprost (XALATAN) 0.005 % ophthalmic solution 1 Drop  1 Drop Both Eyes QHS       Assessment/Plan  1. Status epilepticus  2. UTI  3. Pneumonia    - Continue valproic acid 500 mg po tid  - Continue lyrica 300 mg po bid  - Cont Keppra 1500 mg po bid  - Treatment of  UTI and PNA as per primary team.      Signed:  Cleve Walker MD  Neurologist      This note will not be viewable in [address]

## 2017-09-16 NOTE — PROGRESS NOTES
Pharmacy Automatic Renal Dosing Protocol - Antimicrobials    Indication for Antimicrobials: HCAP    Current Regimen of Each Antimicrobial (Start Day & Day of Therapy):  Zosyn 3.375 g IV every 8 hours (Start 9/15- day 2)  Levofloxacin 750 mg IV every 24 hours (Start 9/15- day 2)    Significant cultures:   9/15 blood cx: NG x 1 day- preliminary  9/15 urine cx (kelly): pending    CAPD, Intermittent Hemodialysis or Renal Replacement Therapy: no  Paralysis, amputations, malnutrition: no  Recent Labs      17   0423  09/15/17   0418   CREA  0.43*  0.66*   BUN  11  16   WBC   --   11.9*     Temp (24hrs), Av °F (36.7 °C), Min:97.5 °F (36.4 °C), Max:98.8 °F (37.1 °C)    Creatinine Clearance (ml/min): ~72 mL/min (using SCr=0.7)      Impression/Plan:   -Pip-tazo dose adjusted to 4.5 g IV every 8 hours per indication   -Levofloxacin dosing is appropriate for indication and renal function        Pharmacy will follow daily and adjust doses of monitored medications as appropriate for renal function and/or serum levels.     Thank you,  Cris Catherine, PHARMD

## 2017-09-16 NOTE — PROGRESS NOTES
Bedside and Verbal shift change report given to Leighann Titus RN (oncoming nurse) by Chalino Williamson (offgoing nurse). Report included the following information SBAR, Kardex, Intake/Output and MAR. Zone Phone for oncoming shift:   [phone]    Shift Summary: none    LDAs               Peripheral IV 09/15/17 Left Antecubital (Active)   Site Assessment Clean, dry, & intact 9/16/2017 12:26 PM   Phlebitis Assessment 0 9/16/2017 12:26 PM   Infiltration Assessment 0 9/16/2017 12:26 PM   Dressing Status Clean, dry, & intact 9/16/2017 12:26 PM   Dressing Type Tape;Transparent 9/16/2017 12:26 PM   Hub Color/Line Status Pink 9/16/2017 12:26 PM       Peripheral IV 09/15/17 Left Wrist (Active)   Site Assessment Clean, dry, & intact 9/16/2017 12:26 PM   Phlebitis Assessment 0 9/16/2017 12:26 PM   Infiltration Assessment 0 9/16/2017 12:26 PM   Dressing Status Clean, dry, & intact 9/16/2017 12:26 PM   Dressing Type Tape;Transparent 9/16/2017 12:26 PM   Hub Color/Line Status Blue 9/16/2017 12:26 PM          PEG/Gastrostomy Tube 07/18/17 (Active)   Site Assessment Clean, dry, & intact 9/16/2017  8:11 AM   Dressing Status Clean, dry, & intact 9/16/2017  8:11 AM   G Port Status Infusing 9/16/2017  8:11 AM   Gastric Residual (mL) 0 ml 9/16/2017  8:11 AM   Tube Feeding/Formula Options Jevity 1.5 9/16/2017  8:11 AM   Tube Feeding/Verify Rate (mL/hr) 50 9/16/2017  8:11 AM   Water Flush Volume (mL) 100 mL 9/15/2017  7:11 PM   Intake (ml) 881 ml 9/16/2017  6:00 AM   Medication Volume 100 ml 9/15/2017  5:21 PM              Urinary Catheter 09/15/17 Cummins-Criteria for Appropriate Use: Obstruction/retention   Intake & Output   Date 09/15/17 0700 - 09/16/17 0659 09/16/17 0700 - 09/17/17 0659   Shift 4202-3697 9541-9517 24 Hour Total [id number] 1900-0659 24 Hour Total   I  N  T  A  K  E   I.V.  (mL/kg/hr)  1138.3  (1.5) 1138.3  (0.8)         Volume (0.9% sodium chloride infusion)  938.3 938. 3         Volume (piperacillin-tazobactam (ZOSYN) 3.375 g in 0.9% sodium chloride (MBP/ADV) 100 mL)  200 200       NG/ 739 9966         Water Flush Volume (mL) (PEG/Gastrostomy Tube 07/18/17) 100 100 200         Medication Volume (PEG/Gastrostomy Tube 07/18/17) 100  100         Intake (ml) (PEG/Gastrostomy Tube 07/18/17)  881 881       Shift Total  (mL/kg) 200  (3.3) 2119.3  (34) 2319.3  (37.2)      O  U  T  P  U  T   Urine  (mL/kg/hr) 400  (0.6) 1900  (2.5) 2300  (1.5) 700  700      Urine Voided  300 300         Urine Output (mL) (Urinary Catheter 09/15/17 Cummins) 400 1600 2000 700  700    Shift Total  (mL/kg) 400  (6.6) 1900  (30.4) 2300  (36.9) 700  (11.2)  700  (11.2)   NET -200 219.3 19.3 -700  -700   Weight (kg) 60.5 62.4 62.4 62.4 62.4 62.4      Last Bowel Movement     Glucose Checks [] N/A  [] AC/HS  [x] Q6  Concerns:   Nutrition Active Orders   Diet    DIET NPO       Consults []PT  []OT  []Speech  []Case Management   Cardiac Monitoring []N/A [x]Yes Expires:

## 2017-09-16 NOTE — PROGRESS NOTES
Hospitalist Progress Note    NAME: Arjun Lilly   :  1934   MRN:  [id number]       Assessment / Plan:  Right upper lobe pneumonia, POA  Sepsis, POA with tachycardia and tachypnea. CXR with small infiltrate in RUL read as atelectasis or PNA. Not hypoxic or febrile. WBC 11.9, lactic 1.6  --continue levaquin and zosyn given recent hospitalization  Follow BC, no growth so far     Probable urinary tract infection due to chronic indwelling Cummins for urinary retention  --This has been a recurrent problem with his recent prior hospitalizations. Cummins catheter was changed in the emergency room. On abx as above. Follow urine culture     Severe dysarthria, POA  Right arm weakness, ? left leg weakness  Possible acute stroke/encephaloptahy  --appears to be new onset with right arm weakness and possibly leg weakness concerning for a possible stroke. Has had several MRIs this year; last MRI in August   was normal, showing no stroke. Has been taken off of aspirin in   July due to concern for anemia and heme-positive stool. However, EGD and colonoscopy at that time did not show any signs of active bleeding or gastritis. Colonoscopy showed diverticulosis and benign neoplasm of the colon. Given the concern for possible acute stroke, will restart him back on aspirin.   --CT head negative. Get MRI brain, strict npo, speech pathology consult  Per wife pt was trying to get of bed earlier today, was moving all ext, sitter at bedside.     Seizure disorder with history of status epilepticus twice this year   --on Keppra and depakote. Will continue. No sign of seizure  Appreciate neurology consult     History of alcohol abuse  --continue MVI, thiamine. Thiamine likely can be stopped at discharge if he remains institutionalized and not drinking.     Hx heme positive stool and anemia  --continue PPI     Dysphagia  --continue tubefeed. For now order Jevity 1.5 50ml/hr with water flush 100ml q4h.   Consult nutrition service.     Code status is FULL CODE.    DVT prophylaxis with Lovenox.     This is fifth hospitalization since February. He has been seen by Palliative Care in July. Body mass index is 20.28 kg/(m^2).     Surrogate decision maker:  Wife Wesly English      Body mass index is 20.92 kg/(m^2). Subjective:     Chief Complaint / Reason for Physician Visit  \"sleeping\". Discussed with RN events overnight. Review of Systems:  Symptom Y/N Comments  Symptom Y/N Comments   Fever/Chills    Chest Pain     Poor Appetite    Edema     Cough    Abdominal Pain     Sputum    Joint Pain     SOB/AL    Pruritis/Rash     Nausea/vomit    Tolerating PT/OT     Diarrhea    Tolerating Diet     Constipation    Other       Could NOT obtain due to: encephalopathy     Objective:     VITALS:   Last 24hrs VS reviewed since prior progress note. Most recent are:  Patient Vitals for the past 24 hrs:   Temp Pulse Resp BP SpO2   09/16/17 1056 97.5 °F (36.4 °C) 65 18 121/72 98 %   09/16/17 0757 97.7 °F (36.5 °C) 66 21 127/72 99 %   09/16/17 0329 97.5 °F (36.4 °C) 76 22 124/77 97 %   09/15/17 2226 98 °F (36.7 °C) 69 20 140/68 96 %   09/15/17 1911 98.8 °F (37.1 °C) 68 18 109/58 99 %   09/15/17 1542 98.1 °F (36.7 °C) (!) 106 21 143/58 94 %       Intake/Output Summary (Last 24 hours) at 09/16/17 1303  Last data filed at 09/16/17 0391   Gross per 24 hour   Intake          2319.33 ml   Output             2550 ml   Net          -230.67 ml        PHYSICAL EXAM:  General: WD, WN.no acute distress    EENT:    Resp:  CTA bilaterally, no wheezing or rales. No accessory muscle use  CV:  Regular  rhythm,  No edema  GI:  Soft, Non distended, Non tender.  +Bowel sounds  Neurologic:  sleeping  Psych:   Per sitter was agitated  Skin:  No rashes.   No jaundice    Reviewed most current lab test results and cultures  YES  Reviewed most current radiology test results   YES  Review and summation of old records today    NO  Reviewed patient's current orders and [facility] YES  PMH/SH reviewed - no change compared to H&P  ________________________________________________________________________  Care Plan discussed with:    Comments   Patient     Family  x Wife in room   RN c    Care Manager     Consultant                        Multidiciplinary team rounds were held today with , nursing, pharmacist and clinical coordinator. Patient's plan of care was discussed; medications were reviewed and discharge planning was addressed. ________________________________________________________________________  Total NON critical care TIME:  25   Minutes    Total CRITICAL CARE TIME Spent:   Minutes non procedure based      Comments   >50% of visit spent in counseling and coordination of care     ________________________________________________________________________  Marcellus Noriega MD     Procedures: see electronic medical records for all procedures/Xrays and details which were not copied into this note but were reviewed prior to creation of Plan. LABS:  I reviewed today's most current labs and imaging studies.   Pertinent labs include:  Recent Labs      09/15/17   0418   WBC  11.9*   HGB  12.3   HCT  36.3*   PLT  108*     Recent Labs      09/16/17   0423  09/15/17   0422  09/15/17   0418   NA  139   --   136   K  3.4*   --   3.8   CL  102   --   96*   CO2  32   --   33*   GLU  95   --   96   BUN  11   --   16   CREA  0.43*   --   0.66*   CA  8.4*   --   8.9   ALB   --    --   3.2*   TBILI   --    --   0.6   SGOT   --    --   22   ALT   --    --   22   INR   --   1.2*   --        Signed: Marcellus Noriega MD

## 2017-09-16 NOTE — PROCEDURES
[facility]   [address] [address]   EEG       Name:  Raul Capone   MR#:  [id number]   :  1934   Account #:  [de-identified]    Date of Procedure:  09/15/2017   Date of Adm:  09/15/2017       PROCEDURE: Awake EEG. DESCRIPTION OF PROCEDURE: Electrodes were applied in   accordance with International 10/20 system for electrode placement. The EEG was reviewed in both bipolar and referential montages. The   background consists of 6-7 Hz slow-wave activity, there is a fair   amount of high voltage slow-wave activity most prominent in the   temporal lobes bilaterally. This is of the 4-5 Hz range. Photic   stimulation was not done. IMPRESSION: This is an abnormal electroencephalogram with high   voltage slow-wave activity in all leads, but most prominent in the   temporal leads and slightly more prominent in the left temporal leads. Clinical correlation is advised.         MD Gabino Sanchez / Christiane.Carlos Manuel   D:  09/15/2017   11:44   T:  09/15/2017   23:00   Job #:  [id number]

## 2017-09-17 LAB
ANION GAP SERPL CALC-SCNC: 5 MMOL/L (ref 5–15)
BUN SERPL-MCNC: 11 MG/DL (ref 6–20)
BUN/CREAT SERPL: 24 (ref 12–20)
CALCIUM SERPL-MCNC: 8.8 MG/DL (ref 8.5–10.1)
CHLORIDE SERPL-SCNC: 101 MMOL/L (ref 97–108)
CO2 SERPL-SCNC: 35 MMOL/L (ref 21–32)
CREAT SERPL-MCNC: 0.45 MG/DL (ref 0.7–1.3)
GLUCOSE BLD STRIP.AUTO-MCNC: 106 MG/DL (ref 65–100)
GLUCOSE BLD STRIP.AUTO-MCNC: 115 MG/DL (ref 65–100)
GLUCOSE BLD STRIP.AUTO-MCNC: 116 MG/DL (ref 65–100)
GLUCOSE BLD STRIP.AUTO-MCNC: 120 MG/DL (ref 65–100)
GLUCOSE BLD STRIP.AUTO-MCNC: 130 MG/DL (ref 65–100)
GLUCOSE SERPL-MCNC: 88 MG/DL (ref 65–100)
POTASSIUM SERPL-SCNC: 3.5 MMOL/L (ref 3.5–5.1)
SERVICE CMNT-IMP: ABNORMAL
SODIUM SERPL-SCNC: 141 MMOL/L (ref 136–145)

## 2017-09-17 PROCEDURE — 74011250636 HC RX REV CODE- 250/636: Performed by: INTERNAL MEDICINE

## 2017-09-17 PROCEDURE — 74011000258 HC RX REV CODE- 258: Performed by: INTERNAL MEDICINE

## 2017-09-17 PROCEDURE — 74011250636 HC RX REV CODE- 250/636: Performed by: HOSPITALIST

## 2017-09-17 PROCEDURE — 77030018798 HC PMP KT ENTRL FED COVD -A

## 2017-09-17 PROCEDURE — 80048 BASIC METABOLIC PNL TOTAL CA: CPT | Performed by: EMERGENCY MEDICINE

## 2017-09-17 PROCEDURE — 65660000000 HC RM CCU STEPDOWN

## 2017-09-17 PROCEDURE — 82962 GLUCOSE BLOOD TEST: CPT

## 2017-09-17 PROCEDURE — 74011250636 HC RX REV CODE- 250/636: Performed by: EMERGENCY MEDICINE

## 2017-09-17 PROCEDURE — 36415 COLL VENOUS BLD VENIPUNCTURE: CPT | Performed by: EMERGENCY MEDICINE

## 2017-09-17 PROCEDURE — 74011250637 HC RX REV CODE- 250/637: Performed by: HOSPITALIST

## 2017-09-17 RX ADMIN — Medication 10 ML: at 14:56

## 2017-09-17 RX ADMIN — Medication 100 MG: at 08:54

## 2017-09-17 RX ADMIN — PANTOPRAZOLE SODIUM 40 MG: 40 GRANULE, DELAYED RELEASE ORAL at 08:54

## 2017-09-17 RX ADMIN — VALPROIC ACID 500 MG: 250 SOLUTION ORAL at 16:44

## 2017-09-17 RX ADMIN — ASPIRIN 81 MG CHEWABLE TABLET 81 MG: 81 TABLET CHEWABLE at 08:54

## 2017-09-17 RX ADMIN — Medication 10 ML: at 05:49

## 2017-09-17 RX ADMIN — PIPERACILLIN SODIUM,TAZOBACTAM SODIUM 4.5 G: 4; .5 INJECTION, POWDER, FOR SOLUTION INTRAVENOUS at 16:47

## 2017-09-17 RX ADMIN — MULTIVIT AND MINERALS-FERROUS GLUCONATE 9 MG IRON/15 ML ORAL LIQUID 15 ML: at 08:54

## 2017-09-17 RX ADMIN — LEVETIRACETAM 1500 MG: 100 SOLUTION ORAL at 08:54

## 2017-09-17 RX ADMIN — ENOXAPARIN SODIUM 40 MG: 40 INJECTION SUBCUTANEOUS at 08:54

## 2017-09-17 RX ADMIN — METOPROLOL TARTRATE 12.5 MG: 25 TABLET, FILM COATED ORAL at 17:36

## 2017-09-17 RX ADMIN — QUETIAPINE FUMARATE 25 MG: 25 TABLET ORAL at 23:57

## 2017-09-17 RX ADMIN — LATANOPROST 1 DROP: 50 SOLUTION OPHTHALMIC at 22:00

## 2017-09-17 RX ADMIN — PREGABALIN 300 MG: 150 CAPSULE ORAL at 08:54

## 2017-09-17 RX ADMIN — PREGABALIN 300 MG: 150 CAPSULE ORAL at 17:36

## 2017-09-17 RX ADMIN — PIPERACILLIN SODIUM,TAZOBACTAM SODIUM 4.5 G: 4; .5 INJECTION, POWDER, FOR SOLUTION INTRAVENOUS at 09:05

## 2017-09-17 RX ADMIN — LEVOFLOXACIN 750 MG: 5 INJECTION, SOLUTION INTRAVENOUS at 05:45

## 2017-09-17 RX ADMIN — PIPERACILLIN SODIUM,TAZOBACTAM SODIUM 4.5 G: 4; .5 INJECTION, POWDER, FOR SOLUTION INTRAVENOUS at 01:19

## 2017-09-17 RX ADMIN — METOPROLOL TARTRATE 12.5 MG: 25 TABLET, FILM COATED ORAL at 08:54

## 2017-09-17 RX ADMIN — VALPROIC ACID 500 MG: 250 SOLUTION ORAL at 08:53

## 2017-09-17 RX ADMIN — PANTOPRAZOLE SODIUM 40 MG: 40 GRANULE, DELAYED RELEASE ORAL at 16:44

## 2017-09-17 RX ADMIN — HALOPERIDOL LACTATE 2 MG: 5 INJECTION, SOLUTION INTRAMUSCULAR at 05:45

## 2017-09-17 NOTE — PROGRESS NOTES
Chart ecin'd to [facility] who can accept back if Jolene authorizes. Snf says that [facility] was discharging the pt from the SNF and wife is appealing. Doubt SNF will reaccept pt.

## 2017-09-17 NOTE — PROGRESS NOTES
Hospitalist Progress Note    NAME: Dwight Manzo   :  1934   MRN:  [id number]       Assessment / Plan:  Right upper lobe pneumonia, POA  Sepsis, POA with tachycardia and tachypnea. CXR with small infiltrate in RUL read as atelectasis or PNA. Not hypoxic or febrile. --continue levaquin and zosyn given recent hospitalization  Follow BC, no growth so far     Urinary tract infection due to chronic indwelling Cummins for urinary retention POA  --This has been a recurrent problem with his recent prior hospitalizations. Cummins catheter was changed in the emergency room. On abx as above. Urine cx GNR     Severe dysarthria, POA/encephaloptahy, Seizure disorder with history of status epilepticus twice this year   --CT head negative. Get MRI brain-pending   --on Keppra and depakote. Will continue. No sign of seizure  Appreciate neurology consult     History of alcohol abuse  --continue MVI, thiamine. Thiamine likely can be stopped at discharge if he remains institutionalized and not drinking.     Hx heme positive stool and anemia  --continue PPI     Dysphagia  --continue tubefeed. For now order Jevity 1.5 50ml/hr with water flush 100ml q4h. Consult nutrition service.     Code status is FULL CODE.    DVT prophylaxis with Lovenox.     This is fifth hospitalization since February. He has been seen by Palliative Care in July. Body mass index is 20.28 kg/(m^2).     Surrogate decision maker:  Wife Khushboo Wilson      Body mass index is 21.12 kg/(m^2). Subjective:     Chief Complaint / Reason for Physician Visit  \"confused\". Discussed with RN events overnight.      Review of Systems:  Symptom Y/N Comments  Symptom Y/N Comments   Fever/Chills    Chest Pain     Poor Appetite    Edema     Cough    Abdominal Pain     Sputum    Joint Pain     SOB/AL    Pruritis/Rash     Nausea/vomit    Tolerating PT/OT     Diarrhea    Tolerating Diet     Constipation    Other       Could NOT obtain due to: encephalopathy     Objective: VITALS:   Last 24hrs VS reviewed since prior progress note. Most recent are:  Patient Vitals for the past 24 hrs:   Temp Pulse Resp BP SpO2   09/17/17 1122 97.5 °F (36.4 °C) 84 18 140/75 96 %   09/17/17 0720 97.5 °F (36.4 °C) 84 18 134/59 99 %   09/17/17 0310 97.4 °F (36.3 °C) 60 20 107/61 99 %   09/16/17 2319 98 °F (36.7 °C) 62 18 104/45 93 %   09/16/17 1941 98.7 °F (37.1 °C) 70 16 98/49 94 %   09/16/17 1458 97.8 °F (36.6 °C) 71 18 138/80 97 %       Intake/Output Summary (Last 24 hours) at 09/17/17 1314  Last data filed at 09/17/17 1200   Gross per 24 hour   Intake              400 ml   Output             1001 ml   Net             -601 ml        PHYSICAL EXAM:  General: WD, WN.no acute distress    EENT:  EOMI, no icterus/erythema  Resp:  CTA bilaterally, no wheezing or rales. No accessory muscle use  CV:  Regular  rhythm,  No edema  GI:  Soft, Non distended, Non tender.  +Bowel sounds  Neurologic:  Awake, moving all ext  Psych:   Per sitter agitation  Skin:  No rashes. No jaundice    Reviewed most current lab test results and cultures  YES  Reviewed most current radiology test results   YES  Review and summation of old records today    NO  Reviewed patient's current orders and MAR    YES  PMH/ reviewed - no change compared to H&P  ________________________________________________________________________  Care Plan discussed with:    Comments   Patient     Family      RN x    Care Manager     Consultant                        Multidiciplinary team rounds were held today with , nursing, pharmacist and clinical coordinator. Patient's plan of care was discussed; medications were reviewed and discharge planning was addressed.      ________________________________________________________________________  Total NON critical care TIME:  25   Minutes    Total CRITICAL CARE TIME Spent:   Minutes non procedure based      Comments   >50% of visit spent in counseling and coordination of care ________________________________________________________________________  Justin Swenson MD     Procedures: see electronic medical records for all procedures/Xrays and details which were not copied into this note but were reviewed prior to creation of Plan. LABS:  I reviewed today's most current labs and imaging studies.   Pertinent labs include:  Recent Labs      09/15/17   0418   WBC  11.9*   HGB  12.3   HCT  36.3*   PLT  108*     Recent Labs      09/17/17   0318  09/16/17   0423  09/15/17   0422  09/15/17   0418   NA  141  139   --   136   K  3.5  3.4*   --   3.8   CL  101  102   --   96*   CO2  35*  32   --   33*   GLU  88  95   --   96   BUN  11  11   --   16   CREA  0.45*  0.43*   --   0.66*   CA  8.8  8.4*   --   8.9   ALB   --    --    --   3.2*   TBILI   --    --    --   0.6   SGOT   --    --    --   22   ALT   --    --    --   22   INR   --    --   1.2*   --        Signed: Justin Swenson MD

## 2017-09-18 LAB
ANION GAP SERPL CALC-SCNC: 5 MMOL/L (ref 5–15)
BACTERIA SPEC CULT: ABNORMAL
BUN SERPL-MCNC: 11 MG/DL (ref 6–20)
BUN/CREAT SERPL: 19 (ref 12–20)
CALCIUM SERPL-MCNC: 8.7 MG/DL (ref 8.5–10.1)
CC UR VC: ABNORMAL
CHLORIDE SERPL-SCNC: 98 MMOL/L (ref 97–108)
CO2 SERPL-SCNC: 36 MMOL/L (ref 21–32)
CREAT SERPL-MCNC: 0.58 MG/DL (ref 0.7–1.3)
ERYTHROCYTE [DISTWIDTH] IN BLOOD BY AUTOMATED COUNT: 12.3 % (ref 11.5–14.5)
GLUCOSE BLD STRIP.AUTO-MCNC: 102 MG/DL (ref 65–100)
GLUCOSE BLD STRIP.AUTO-MCNC: 106 MG/DL (ref 65–100)
GLUCOSE BLD STRIP.AUTO-MCNC: 121 MG/DL (ref 65–100)
GLUCOSE BLD STRIP.AUTO-MCNC: 134 MG/DL (ref 65–100)
GLUCOSE SERPL-MCNC: 111 MG/DL (ref 65–100)
HCT VFR BLD AUTO: 34.1 % (ref 36.6–50.3)
HGB BLD-MCNC: 11.4 G/DL (ref 12.1–17)
MAGNESIUM SERPL-MCNC: 1.5 MG/DL (ref 1.6–2.4)
MCH RBC QN AUTO: 32.9 PG (ref 26–34)
MCHC RBC AUTO-ENTMCNC: 33.4 G/DL (ref 30–36.5)
MCV RBC AUTO: 98.6 FL (ref 80–99)
PHOSPHATE SERPL-MCNC: 3.2 MG/DL (ref 2.6–4.7)
PLATELET # BLD AUTO: 119 K/UL (ref 150–400)
POTASSIUM SERPL-SCNC: 3.3 MMOL/L (ref 3.5–5.1)
RBC # BLD AUTO: 3.46 M/UL (ref 4.1–5.7)
SERVICE CMNT-IMP: ABNORMAL
SODIUM SERPL-SCNC: 139 MMOL/L (ref 136–145)
WBC # BLD AUTO: 3.7 K/UL (ref 4.1–11.1)

## 2017-09-18 PROCEDURE — 92526 ORAL FUNCTION THERAPY: CPT

## 2017-09-18 PROCEDURE — 74011250636 HC RX REV CODE- 250/636: Performed by: INTERNAL MEDICINE

## 2017-09-18 PROCEDURE — 74011250637 HC RX REV CODE- 250/637: Performed by: INTERNAL MEDICINE

## 2017-09-18 PROCEDURE — 77030018798 HC PMP KT ENTRL FED COVD -A

## 2017-09-18 PROCEDURE — 84100 ASSAY OF PHOSPHORUS: CPT | Performed by: INTERNAL MEDICINE

## 2017-09-18 PROCEDURE — 82962 GLUCOSE BLOOD TEST: CPT

## 2017-09-18 PROCEDURE — 74011250636 HC RX REV CODE- 250/636: Performed by: EMERGENCY MEDICINE

## 2017-09-18 PROCEDURE — 85027 COMPLETE CBC AUTOMATED: CPT | Performed by: INTERNAL MEDICINE

## 2017-09-18 PROCEDURE — 74011000258 HC RX REV CODE- 258: Performed by: INTERNAL MEDICINE

## 2017-09-18 PROCEDURE — 65660000000 HC RM CCU STEPDOWN

## 2017-09-18 PROCEDURE — 97116 GAIT TRAINING THERAPY: CPT

## 2017-09-18 PROCEDURE — 74011250637 HC RX REV CODE- 250/637: Performed by: HOSPITALIST

## 2017-09-18 PROCEDURE — 74011250636 HC RX REV CODE- 250/636: Performed by: HOSPITALIST

## 2017-09-18 PROCEDURE — 76450000000

## 2017-09-18 PROCEDURE — 83735 ASSAY OF MAGNESIUM: CPT | Performed by: INTERNAL MEDICINE

## 2017-09-18 PROCEDURE — 36415 COLL VENOUS BLD VENIPUNCTURE: CPT | Performed by: INTERNAL MEDICINE

## 2017-09-18 PROCEDURE — 80048 BASIC METABOLIC PNL TOTAL CA: CPT | Performed by: INTERNAL MEDICINE

## 2017-09-18 RX ORDER — POTASSIUM CHLORIDE 1.5 G/1.77G
20 POWDER, FOR SOLUTION ORAL
Status: COMPLETED | OUTPATIENT
Start: 2017-09-18 | End: 2017-09-18

## 2017-09-18 RX ORDER — LANOLIN ALCOHOL/MO/W.PET/CERES
400 CREAM (GRAM) TOPICAL 2 TIMES DAILY
Status: COMPLETED | OUTPATIENT
Start: 2017-09-18 | End: 2017-09-19

## 2017-09-18 RX ORDER — POTASSIUM CHLORIDE 750 MG/1
20 TABLET, FILM COATED, EXTENDED RELEASE ORAL
Status: DISCONTINUED | OUTPATIENT
Start: 2017-09-18 | End: 2017-09-18

## 2017-09-18 RX ADMIN — LEVETIRACETAM 1500 MG: 100 SOLUTION ORAL at 08:34

## 2017-09-18 RX ADMIN — QUETIAPINE FUMARATE 25 MG: 25 TABLET ORAL at 22:47

## 2017-09-18 RX ADMIN — LEVETIRACETAM 1500 MG: 100 SOLUTION ORAL at 22:46

## 2017-09-18 RX ADMIN — PIPERACILLIN SODIUM,TAZOBACTAM SODIUM 4.5 G: 4; .5 INJECTION, POWDER, FOR SOLUTION INTRAVENOUS at 17:12

## 2017-09-18 RX ADMIN — Medication 10 ML: at 00:00

## 2017-09-18 RX ADMIN — PANTOPRAZOLE SODIUM 40 MG: 40 GRANULE, DELAYED RELEASE ORAL at 08:34

## 2017-09-18 RX ADMIN — ASPIRIN 81 MG CHEWABLE TABLET 81 MG: 81 TABLET CHEWABLE at 08:45

## 2017-09-18 RX ADMIN — PIPERACILLIN SODIUM,TAZOBACTAM SODIUM 4.5 G: 4; .5 INJECTION, POWDER, FOR SOLUTION INTRAVENOUS at 08:35

## 2017-09-18 RX ADMIN — POTASSIUM CHLORIDE 20 MEQ: 1.5 POWDER, FOR SOLUTION ORAL at 15:17

## 2017-09-18 RX ADMIN — PREGABALIN 300 MG: 150 CAPSULE ORAL at 08:43

## 2017-09-18 RX ADMIN — PANTOPRAZOLE SODIUM 40 MG: 40 GRANULE, DELAYED RELEASE ORAL at 17:11

## 2017-09-18 RX ADMIN — Medication 400 MG: at 15:17

## 2017-09-18 RX ADMIN — PIPERACILLIN SODIUM,TAZOBACTAM SODIUM 4.5 G: 4; .5 INJECTION, POWDER, FOR SOLUTION INTRAVENOUS at 01:25

## 2017-09-18 RX ADMIN — LATANOPROST 1 DROP: 50 SOLUTION OPHTHALMIC at 22:46

## 2017-09-18 RX ADMIN — METOPROLOL TARTRATE 12.5 MG: 25 TABLET, FILM COATED ORAL at 08:46

## 2017-09-18 RX ADMIN — LEVETIRACETAM 1500 MG: 100 SOLUTION ORAL at 00:04

## 2017-09-18 RX ADMIN — Medication 10 ML: at 05:14

## 2017-09-18 RX ADMIN — Medication 10 ML: at 22:47

## 2017-09-18 RX ADMIN — PREGABALIN 300 MG: 150 CAPSULE ORAL at 17:12

## 2017-09-18 RX ADMIN — VALPROIC ACID 500 MG: 250 SOLUTION ORAL at 00:05

## 2017-09-18 RX ADMIN — VALPROIC ACID 500 MG: 250 SOLUTION ORAL at 08:41

## 2017-09-18 RX ADMIN — LEVOFLOXACIN 750 MG: 5 INJECTION, SOLUTION INTRAVENOUS at 05:13

## 2017-09-18 RX ADMIN — METOPROLOL TARTRATE 12.5 MG: 25 TABLET, FILM COATED ORAL at 17:12

## 2017-09-18 RX ADMIN — Medication 10 ML: at 13:27

## 2017-09-18 RX ADMIN — VALPROIC ACID 500 MG: 250 SOLUTION ORAL at 17:11

## 2017-09-18 RX ADMIN — Medication 100 MG: at 08:41

## 2017-09-18 RX ADMIN — VALPROIC ACID 500 MG: 250 SOLUTION ORAL at 22:47

## 2017-09-18 RX ADMIN — MULTIVIT AND MINERALS-FERROUS GLUCONATE 9 MG IRON/15 ML ORAL LIQUID 15 ML: at 08:34

## 2017-09-18 RX ADMIN — ENOXAPARIN SODIUM 40 MG: 40 INJECTION SUBCUTANEOUS at 08:35

## 2017-09-18 NOTE — PROGRESS NOTES
Problem: Dysphagia (Adult)  Goal: *Acute Goals and Plan of Care (Insert Text)  Speech pathology goals  Initiated 9/15/2017  1. Patient will participate in daily re-evaluation of swallow function   [id number] [address] PA[address]  Patient: Elaine Dunbar (57 y.o. male)  Date: 9/18/2017  Diagnosis: HCAP (healthcare-associated pneumonia)  UTI (urinary tract infection)  Acute encephalopathy <principal problem not specified>       Precautions:  Fall, Bed Alarm      ASSESSMENT:  Patient upright in bed, alert but unable to tell me his name. He did not follow simple commands despite modeling. He had audible pharyngeal secretions. Suspect he is aspirating his own secretions. Patient accepted 2 bites of applesauce and demonstrated delayed posterior propulsion. Swallow initiation is suspected to be delayed and hyolaryngeal elevation/excursion reduced via palpation. Multiple swallows indicative of pharyngeal residue. After the 1st bite, patient's vocal quality improved; however, after second bite, patient with delayed cough. Discussed case with wife bedside. Discussed how currently, patient showing s/s of aspiration even with puree, which SLP at NH previously recommended for snacks only. Also discussed MBS (which SLP at NH recommended be completed mid September). Patient is not currently appropriate for mbs consideration given poor secretion management, overt s/s aspiration with puree, mentation and current PNA. Wife understanding of rationale for not proceeding with test.  Wife is exhausted from daily visits to both NH and hospital since March. She is concerned patient will leave and then get re-hospitalized.      Progression toward goals:  [ ]         Improving appropriately and progressing toward goals  [ ]         Improving slowly and progressing toward goals  [ ]         Not making progress toward goals and plan of care will be adjusted       PLAN:  Recommendations and Planned Interventions:  --Continue NPO with TF via PEG  -- Frequent oral care  -- Recommend further discussion with wife regarding goals of care. Given extent of his dysphagia with poor recovery thus far, do not anticipate functional gains. -- will f/u at end of the week should mentation significantly improve; otherwise little to add currently given inability to participate in any type of swallow exercise        Patient continues to benefit from skilled intervention to address the above impairments. Continue treatment per established plan of care. Discharge Recommendations:  HH vs Consideration of Hospice        SUBJECTIVE:   Patient alert, pleasantly confused. Wife bedside      OBJECTIVE:   Cognitive and Communication Status:  Neurologic State: Alert, Confused  Orientation Level: Disoriented X4  Cognition: Decreased command following        Safety/Judgement: Decreased awareness of environment, Decreased awareness of need for assistance, Decreased awareness of need for safety, Decreased insight into deficits, Fall prevention  Dysphagia Treatment:  Oral Assessment:  Oral Assessment  Labial:  (unable to formally assess)  Oral Hygiene:  (wfl)  Mandible: No impairment  P.O. Trials:  Patient Position:  (upright in bed)  Vocal quality prior to P.O.: Wet  Consistency Presented: Aure  How Presented: Spoon     Bolus Acceptance: No impairment  Bolus Formation/Control: Impaired  Type of Impairment: Delayed  Propulsion: Delayed (# of seconds)  Oral Residue: None  Initiation of Swallow: Delayed (# of seconds)  Laryngeal Elevation: Decreased;Weak  Aspiration Signs/Symptoms: Clear throat  Pharyngeal Phase Characteristics: Multiple swallows; Suspected pharyngeal residue  Effective Modifications: None           Oral Phase Severity: Moderate  Pharyngeal Phase Severity : Severe              Pain:  Pain Scale 1: FLACC  Pain Intensity 1: 0     After treatment:   [ ]              Patient left in no apparent distress sitting up in chair  [X]              Patient left in no apparent distress in bed  [X]              Call bell left within reach  [X]              Nursing notified  [X]              Caregiver present  [ ]              Bed alarm activated      COMMUNICATION/EDUCATION:      The patients plan of care including recommendations, planned interventions, and recommended diet changes were discussed with: Registered Nurse. [ ]              Posted safety precautions in patient's room.      Brown Rico SLP  Time Calculation: 16 mins

## 2017-09-18 NOTE — PROGRESS NOTES
Bedside and Verbal shift change report given to [id number] [facility] (oncoming nurse) by Steve Fam (offgoing nurse). Report included the following information SBAR, Kardex, Intake/Output and MAR.

## 2017-09-18 NOTE — PROGRESS NOTES
Problem: Mobility Impaired (Adult and Pediatric)  Goal: *Acute Goals and Plan of Care (Insert Text)  Physical Therapy Goals  9/15/2017  1. Patient will move from supine to sit and sit to supine , scoot up and down and roll side to side in bed with minimal assistance/contact guard assist within 7 day(s). 2. Patient will transfer from bed to chair and chair to bed with minimal assistance/contact guard assist using the least restrictive device within 7 day(s). 3. Patient will perform sit to stand with minimal assistance/contact guard assist within 7 day(s). 4. Patient will ambulate with minimal assistance/contact guard assist for 50 feet with the least restrictive device within 7 day(s). PHYSICAL THERAPY TREATMENT  Patient: Alisha Hartman (56 y.o. male)  Date: 9/18/2017  Diagnosis: HCAP (healthcare-associated pneumonia)  UTI (urinary tract infection)  Acute encephalopathy <principal problem not specified>       Precautions: Fall, Bed Alarm      ASSESSMENT:  Patient more alert today and following some commands, but continues to need max cues for sequencing throughout mobility. Patient able to perform bed mobility with min assist, then stood with min assist x 2 and verbal cues to push up from bed. Patient ambulated 20 ft using RW with mod assist for walker management and verbal cues for upright posture. Patient left up in chair at end of session with sitter present. Of note, patient needs cues to attend to right side at times and some assist initially for right  on RW. Recommend SNF at discharge. Will continue to follow to progress mobility. Progression toward goals:  [ ]    Improving appropriately and progressing toward goals  [X]    Improving slowly and progressing toward goals  [ ]    Not making progress toward goals and plan of care will be adjusted       PLAN:  Patient continues to benefit from skilled intervention to address the above impairments.   Continue treatment per established plan of care.  Discharge Recommendations:  Skilled Nursing Facility  Further Equipment Recommendations for Discharge:  none       SUBJECTIVE:   Patient mumbled throughout and difficult to understand. OBJECTIVE DATA SUMMARY:   Critical Behavior:  Neurologic State: Alert, Confused  Orientation Level: Disoriented to place, Disoriented to situation, Disoriented to time, Oriented to person  Cognition: Follows commands, Impaired decision making, Impulsive  Safety/Judgement: Decreased awareness of environment, Decreased awareness of need for assistance, Decreased awareness of need for safety, Decreased insight into deficits, Fall prevention  Functional Mobility Training:  Bed Mobility:     Supine to Sit: Minimum assistance; Additional time;Assist x1  Sit to Supine:  (NT - OOB to chair)           Transfers:  Sit to Stand: Minimum assistance;Assist x2 (verbal cues for hand placement)  Stand to Sit: Minimum assistance;Assist x1                             Balance:  Sitting: Intact; With support  Standing: Impaired; With support  Standing - Static: Fair  Standing - Dynamic : Fair  Ambulation/Gait Training:  Distance (ft): 20 Feet (ft)  Assistive Device: Gait belt;Walker, rolling  Ambulation - Level of Assistance: Moderate assistance; Additional time;Assist x1     Gait Description (WDL): Exceptions to WDL  Gait Abnormalities: Decreased step clearance;Shuffling gait        Base of Support: Widened     Speed/Shannon: Shuffled; Slow  Step Length: Left shortened;Right shortened              Pain:  Pain Scale 1: FLACC  Pain Intensity 1: 0              Activity Tolerance:   VSS  Please refer to the flowsheet for vital signs taken during this treatment.   After treatment:   [X]    Patient left in no apparent distress sitting up in chair  [ ]    Patient left in no apparent distress in bed  [X]    Call bell left within reach  [X]    Nursing notified  [X]    Sitter present  [ ]    Bed alarm activated      COMMUNICATION/COLLABORATION:   The patients plan of care was discussed with: Registered Nurse     Nayana Gordillo, PT   Time Calculation: 17 mins

## 2017-09-18 NOTE — PROGRESS NOTES
Chart ecin'd to [facility] SNF who can accept back if [facility] authorizes.  Snf says that [facility] was discharging the pt from the SNF and wife is appealing. Doubt SNF will reaccept pt. Updates ecin'd to them    09  Please order Palliative Care consult so continue to follow pt and assess for goals//plan of care as this is 5th admission since July. Readmission due to encephalopathy, recurrent UTI secondary to chronic kelly, and RUL PNA    1030  Palliative Care order placed per discussion with Dr Helen Quiroz.

## 2017-09-18 NOTE — PROGRESS NOTES
Bedside and Verbal shift change report given to [place] (oncoming nurse) by Harjinder Bloom (offgoing nurse). Report included the following information SBAR, Kardex, Intake/Output, MAR and Recent Results. Zone Phone for oncoming shift:   [id number]    Shift Summary: Patient rested quietly throughout the night. No C/O pain noted. LDAs               Peripheral IV 09/17/17 Right Hand (Active)   Site Assessment Clean, dry, & intact 9/18/2017  4:06 AM   Phlebitis Assessment 0 9/18/2017  4:06 AM   Infiltration Assessment 0 9/18/2017  4:06 AM   Dressing Status Clean, dry, & intact 9/18/2017  4:06 AM   Dressing Type Tape;Transparent 9/18/2017  4:06 AM   Hub Color/Line Status Blue; Infusing 9/18/2017  4:06 AM          PEG/Gastrostomy Tube 07/18/17 (Active)   Site Assessment Clean, dry, & intact 9/18/2017  4:06 AM   Dressing Status Clean, dry, & intact 9/18/2017  4:06 AM   G Port Status Infusing 9/18/2017  4:06 AM   Gastric Residual (mL) 0 ml 9/17/2017  3:04 PM   Tube Feeding/Formula Options Jevity 1.5 9/18/2017  4:06 AM   Tube Feeding/Verify Rate (mL/hr) 50 9/18/2017  4:06 AM   Water Flush Volume (mL) 100 mL 9/18/2017  4:06 AM   Intake (ml) 600 ml 9/17/2017  7:11 PM   Medication Volume 100 ml 9/15/2017  5:21 PM              Urinary Catheter 09/15/17 Cummins-Criteria for Appropriate Use: Obstruction/retention   Intake & Output   Date 09/17/17 0700 - 09/18/17 0659 09/18/17 0700 - 09/19/17 0659   Shift 9167-7397 8890-1979 24 Hour Total 7660-7617 8516-1508 24 Hour Total   I  N  T  A  K  E   NG/ 800 900         Water Flush Volume (mL) (PEG/Gastrostomy Tube 07/18/17) 100 200 300         Intake (ml) (PEG/Gastrostomy Tube 07/18/17)  600 600       Shift Total  (mL/kg) 100  (1.6) 800  (12.7) 900  (14.3)      O  U  T  P  U  T   Urine  (mL/kg/hr) 550  (0.7) 800  (1.1) 1350  (0.9)         Urine Output (mL) (Urinary Catheter 09/15/17 Cummins)        Stool 1  1         Stool 1  1       Shift Total  (mL/kg) 551  (8.7) 800  (12.7) 1351  (21.4)      NET -451 0 -451      Weight (kg) 63 63 63 63 63 63      Last Bowel Movement Last Bowel Movement Date: 09/17/17   Glucose Checks [] N/A  [] AC/HS  [x] Q6  Concerns:   Nutrition Active Orders   Diet    DIET NPO       Consults [x]PT  [x]OT  []Speech  []Case Management   Cardiac Monitoring []N/A [x]Yes Expires: 48 hours

## 2017-09-18 NOTE — PROGRESS NOTES
Brief Progress Note    Consult received. Chart reviewed. We plan to visit w/ pt tomorrow. Full, initial consult note to follow. Should you have questions/concerns or the need for a bedside visit by our team, please do not hesitate to contact us at [phone]). Thank you for providing us w/ the opportunity to be involved in this patient's care.       Sergio Hernandez MD  Palliative Care Team

## 2017-09-18 NOTE — PROGRESS NOTES
Hospitalist Progress Note    NAME: Reyes Savoy   :  1934   MRN:  [id number]       Assessment / Plan:  Right upper lobe pneumonia, POA  Sepsis, POA with tachycardia and tachypnea. CXR with small infiltrate in RUL read as atelectasis or PNA. Not hypoxic or febrile. --continue levaquin and zosyn given recent hospitalization  Follow BC, no growth so far     Urinary tract infection due to chronic indwelling Cummins for urinary retention POA  --This has been a recurrent problem with his recent prior hospitalizations. Cummins catheter was changed in the emergency room. On abx as above. Urine cx Ecoli     Dysarthria, POA/encephaloptahy, Seizure disorder with history of status epilepticus twice this year, remains confused  Secondary to ?post ictal/infection vs worsening of baseline cognitive deficit  --CT head negative. Get MRI brain-pending   --on Keppra and depakote. Will continue. No sign of seizure  Appreciate neurology consult     History of alcohol abuse  --continue MVI, thiamine. Thiamine likely can be stopped at discharge if he remains institutionalized and not drinking.     Hx heme positive stool and anemia  --continue PPI   Hb stable     Dysphagia  --continue tubefeed. Jevity 1.5 50ml/hr with water flush 100ml q4h. Consult nutrition service. Hypokalemia/hypomagnesemia, replete       Code status is FULL CODE.    DVT prophylaxis with Lovenox.     This is fifth hospitalization since February. He has been seen by Palliative Care in July. Body mass index is 20.28 kg/(m^2).     Surrogate decision maker:  Wife Anu Freeman: SNF once stable. Palliative care consult requested for goals of  Care, dw CM      Body mass index is 19.92 kg/(m^2). Subjective:     Chief Complaint / Reason for Physician Visit  \"confused\". Discussed with RN events overnight.      Review of Systems:  Symptom Y/N Comments  Symptom Y/N Comments   Fever/Chills    Chest Pain     Poor Appetite    Edema     Cough    Abdominal Pain     Sputum    Joint Pain     SOB/AL    Pruritis/Rash     Nausea/vomit    Tolerating PT/OT     Diarrhea    Tolerating Diet     Constipation    Other       Could NOT obtain due to: encephalopathy     Objective:     VITALS:   Last 24hrs VS reviewed since prior progress note. Most recent are:  Patient Vitals for the past 24 hrs:   Temp Pulse Resp BP SpO2   09/18/17 1055 98.3 °F (36.8 °C) 77 16 138/71 97 %   09/18/17 0714 97.6 °F (36.4 °C) 74 18 117/67 98 %   09/18/17 0404 97.8 °F (36.6 °C) 65 18 104/56 98 %   09/17/17 2256 98.5 °F (36.9 °C) 77 18 113/58 96 %   09/17/17 1911 97.8 °F (36.6 °C) 69 18 118/68 97 %   09/17/17 1506 98.2 °F (36.8 °C) 88 18 120/70 97 %       Intake/Output Summary (Last 24 hours) at 09/18/17 1408  Last data filed at 09/18/17 0406   Gross per 24 hour   Intake              900 ml   Output             1350 ml   Net             -450 ml        PHYSICAL EXAM:  General: WD, WN.alert, awake no acute distress    EENT:  EOMI, no icterus/erythema  Resp:  CTA bilaterally, no wheezing or rales. No accessory muscle use  CV:  Regular  rhythm,  No edema  GI:  Soft, Non distended, Non tender.  +Bowel sounds  Neurologic:  Awake, moving all ext, mumbels  Psych:   Per sitter no agitation  Skin:  No rashes. No jaundice    Reviewed most current lab test results and cultures  YES  Reviewed most current radiology test results   YES  Review and summation of old records today    NO  Reviewed patient's current orders and MAR    YES  PMH/ reviewed - no change compared to H&P  ________________________________________________________________________  Care Plan discussed with:    Comments   Patient     Family      RN x    Care Manager     Consultant  x                      Multidiciplinary team rounds were held today with , nursing, pharmacist and clinical coordinator. Patient's plan of care was discussed; medications were reviewed and discharge planning was addressed. ________________________________________________________________________  Total NON critical care TIME:  25   Minutes    Total CRITICAL CARE TIME Spent:   Minutes non procedure based      Comments   >50% of visit spent in counseling and coordination of care     ________________________________________________________________________  Bridger De MD     Procedures: see electronic medical records for all procedures/Xrays and details which were not copied into this note but were reviewed prior to creation of Plan. LABS:  I reviewed today's most current labs and imaging studies.   Pertinent labs include:  Recent Labs      09/18/17 0414   WBC  3.7*   HGB  11.4*   HCT  34.1*   PLT  119*     Recent Labs      09/18/17   0414  09/17/17   0318  09/16/17   0423   NA  139  141  139   K  3.3*  3.5  3.4*   CL  98  101  102   CO2  36*  35*  32   GLU  111*  88  95   BUN  11  11  11   CREA  0.58*  0.45*  0.43*   CA  8.7  8.8  8.4*   MG  1.5*   --    --    PHOS  3.2   --    --        Signed: Bridger De MD

## 2017-09-18 NOTE — PROGRESS NOTES
Nutrition Assessment:    INTERVENTIONS/RECOMMENDATIONS:   Enteral/Parenteral Nutrition:  (Continue current TF regimen)    ASSESSMENT:   Chart reviewed; medically noted for pneumonia, UTI, acute encephalopathy, and status epilepticus. Patient tolerating TF @ goal without residual. Remains NPO at this time; SLP following. Low K+ and Mg in need of repletion. Will monitor ability to advance diet and need for TF adjustments. Diet Order: NPO (Jevity 1.5 @ 50 mL/hr + 100 mL water flush q 4 hours; provides 1800kcal, 77g protein, 1512 mL water)  % Eaten:  No data found. Pertinent Medications: [x] Reviewed []Other: Keppra, Lopressor, Cerovite, Protonix, Seroquel, Thiamine   Pertinent Labs: [x]Reviewed  []Other: K+ 3.3, Mg 1.5  Food Allergies: [x]None []Other:     Last BM: 9/17   [x]Active     []Hyperactive  []Hypoactive       [] Absent  BS  Skin:    [x] Intact   [] Incision  [] Breakdown   []Edema   []Other:    Anthropometrics: Height: 5' 8\" (172.7 cm) Weight: 63 kg (138 lb 14.2 oz)    IBW (%IBW): 69.9 kg (154 lb) ( ) UBW (%UBW):   (  %)    BMI: Body mass index is 21.12 kg/(m^2). This BMI is indicative of:  []Underweight   [x]Normal   []Overweight   [] Obesity   [] Extreme Obesity (BMI>40)  Last Weight Metrics:  Weight Loss Metrics 9/17/2017 8/22/2017 7/27/2017 7/22/2017 6/21/2017 3/21/2017 3/18/2017   Today's Wt 138 lb 14.2 oz 144 lb 11.2 oz 150 lb 152 lb 3.2 oz 155 lb 166 lb 150 lb   BMI 21.12 kg/m2 22 kg/m2 22.81 kg/m2 23.14 kg/m2 23.57 kg/m2 25.24 kg/m2 22.81 kg/m2       Estimated Nutrition Needs (Based on): 0499 Kcals/day (BMR (1305) x 1. 3AF) , 63 g (-76g (1.0-1.2 g/kg bw)) Protein  Carbohydrate:  At Least 130 g/day  Fluids: 1700 mL/day     Pt expected to meet estimated nutrient needs: [x]Yes []No    NUTRITION DIAGNOSES:   Problem:  Swallowing difficulty      Etiology: related to hx CVA     Signs/Symptoms: as evidenced by PEG dependent at this time      NUTRITION INTERVENTIONS:    Enteral/Parenteral Nutrition: (Continue current TF regimen)                GOAL:   Patient will continue to tolerate TF @ goal with residual <250 mL next 2-4 days    NUTRITION MONITORING AND EVALUATION   Behavioral-Environmental Outcomes: Behavior  Food/Nutrient Intake Outcomes: Enteral/parenteral nutrition intake  Physical Signs/Symptoms Outcomes: Weight/weight change, Electrolyte and renal profile    Previous Goal Met:   [x] Met              [] Progressing Towards Goal              [] Not Progressing Towards Goal   Previous Recommendations:   [x] Implemented          [] Not Implemented          [] Not Applicable    LEARNING NEEDS (Diet, Food/Nutrient-Drug Interaction):    [x] None Identified   [] Identified and Education Provided/Documented   [] Identified and Pt declined/was not appropriate     Cultural, Restorationist, OR Ethnic Dietary Needs:    [x] None Identified   [] Identified and Addressed     [x] Interdisciplinary Care Plan Reviewed/Documented    [x] Discharge Planning: TBD pending ability to advance diet/tolerate PO; continue current TF regimen at this time   [] Participated in Interdisciplinary Rounds    NUTRITION RISK:    [x] High              [] Moderate           []  Low  []  Minimal/Uncompromised      Samuel Garcia  Pager [phone]              Weekend Pager [phone]

## 2017-09-19 ENCOUNTER — APPOINTMENT (OUTPATIENT)
Dept: MRI IMAGING | Age: 82
DRG: 871 | End: 2017-09-19
Attending: INTERNAL MEDICINE
Payer: MEDICARE

## 2017-09-19 LAB
ANION GAP SERPL CALC-SCNC: 4 MMOL/L (ref 5–15)
BUN SERPL-MCNC: 12 MG/DL (ref 6–20)
BUN/CREAT SERPL: 23 (ref 12–20)
CALCIUM SERPL-MCNC: 8.5 MG/DL (ref 8.5–10.1)
CHLORIDE SERPL-SCNC: 100 MMOL/L (ref 97–108)
CO2 SERPL-SCNC: 34 MMOL/L (ref 21–32)
CREAT SERPL-MCNC: 0.53 MG/DL (ref 0.7–1.3)
ERYTHROCYTE [DISTWIDTH] IN BLOOD BY AUTOMATED COUNT: 12.1 % (ref 11.5–14.5)
GLUCOSE BLD STRIP.AUTO-MCNC: 109 MG/DL (ref 65–100)
GLUCOSE BLD STRIP.AUTO-MCNC: 118 MG/DL (ref 65–100)
GLUCOSE BLD STRIP.AUTO-MCNC: 88 MG/DL (ref 65–100)
GLUCOSE SERPL-MCNC: 109 MG/DL (ref 65–100)
HCT VFR BLD AUTO: 32.9 % (ref 36.6–50.3)
HGB BLD-MCNC: 11.1 G/DL (ref 12.1–17)
MAGNESIUM SERPL-MCNC: 1.5 MG/DL (ref 1.6–2.4)
MCH RBC QN AUTO: 33.1 PG (ref 26–34)
MCHC RBC AUTO-ENTMCNC: 33.7 G/DL (ref 30–36.5)
MCV RBC AUTO: 98.2 FL (ref 80–99)
PHOSPHATE SERPL-MCNC: 3.1 MG/DL (ref 2.6–4.7)
PLATELET # BLD AUTO: 115 K/UL (ref 150–400)
POTASSIUM SERPL-SCNC: 3.5 MMOL/L (ref 3.5–5.1)
RBC # BLD AUTO: 3.35 M/UL (ref 4.1–5.7)
SERVICE CMNT-IMP: ABNORMAL
SERVICE CMNT-IMP: ABNORMAL
SERVICE CMNT-IMP: NORMAL
SODIUM SERPL-SCNC: 138 MMOL/L (ref 136–145)
WBC # BLD AUTO: 2.9 K/UL (ref 4.1–11.1)

## 2017-09-19 PROCEDURE — 80048 BASIC METABOLIC PNL TOTAL CA: CPT | Performed by: INTERNAL MEDICINE

## 2017-09-19 PROCEDURE — 74011250636 HC RX REV CODE- 250/636: Performed by: INTERNAL MEDICINE

## 2017-09-19 PROCEDURE — 65660000000 HC RM CCU STEPDOWN

## 2017-09-19 PROCEDURE — 36415 COLL VENOUS BLD VENIPUNCTURE: CPT | Performed by: INTERNAL MEDICINE

## 2017-09-19 PROCEDURE — 83735 ASSAY OF MAGNESIUM: CPT | Performed by: INTERNAL MEDICINE

## 2017-09-19 PROCEDURE — 74011250637 HC RX REV CODE- 250/637: Performed by: HOSPITALIST

## 2017-09-19 PROCEDURE — 74011250636 HC RX REV CODE- 250/636: Performed by: EMERGENCY MEDICINE

## 2017-09-19 PROCEDURE — 74011250637 HC RX REV CODE- 250/637: Performed by: INTERNAL MEDICINE

## 2017-09-19 PROCEDURE — 82962 GLUCOSE BLOOD TEST: CPT

## 2017-09-19 PROCEDURE — 74011250636 HC RX REV CODE- 250/636: Performed by: HOSPITALIST

## 2017-09-19 PROCEDURE — 70551 MRI BRAIN STEM W/O DYE: CPT

## 2017-09-19 PROCEDURE — 74011000258 HC RX REV CODE- 258: Performed by: INTERNAL MEDICINE

## 2017-09-19 PROCEDURE — 97116 GAIT TRAINING THERAPY: CPT | Performed by: PHYSICAL THERAPIST

## 2017-09-19 PROCEDURE — 85027 COMPLETE CBC AUTOMATED: CPT | Performed by: INTERNAL MEDICINE

## 2017-09-19 PROCEDURE — 84100 ASSAY OF PHOSPHORUS: CPT | Performed by: INTERNAL MEDICINE

## 2017-09-19 RX ORDER — MAGNESIUM SULFATE 1 G/100ML
1 INJECTION INTRAVENOUS ONCE
Status: COMPLETED | OUTPATIENT
Start: 2017-09-19 | End: 2017-09-19

## 2017-09-19 RX ORDER — ENOXAPARIN SODIUM 100 MG/ML
30 INJECTION SUBCUTANEOUS EVERY 24 HOURS
Status: DISCONTINUED | OUTPATIENT
Start: 2017-09-20 | End: 2017-09-21

## 2017-09-19 RX ADMIN — LEVOFLOXACIN 750 MG: 5 INJECTION, SOLUTION INTRAVENOUS at 05:24

## 2017-09-19 RX ADMIN — ASPIRIN 81 MG CHEWABLE TABLET 81 MG: 81 TABLET CHEWABLE at 10:14

## 2017-09-19 RX ADMIN — PREGABALIN 300 MG: 150 CAPSULE ORAL at 10:14

## 2017-09-19 RX ADMIN — LEVETIRACETAM 1500 MG: 100 SOLUTION ORAL at 20:33

## 2017-09-19 RX ADMIN — Medication 10 ML: at 05:24

## 2017-09-19 RX ADMIN — LATANOPROST 1 DROP: 50 SOLUTION OPHTHALMIC at 21:13

## 2017-09-19 RX ADMIN — MAGNESIUM SULFATE HEPTAHYDRATE 1 G: 1 INJECTION, SOLUTION INTRAVENOUS at 11:46

## 2017-09-19 RX ADMIN — Medication 100 MG: at 10:14

## 2017-09-19 RX ADMIN — VALPROIC ACID 500 MG: 250 SOLUTION ORAL at 21:11

## 2017-09-19 RX ADMIN — PANTOPRAZOLE SODIUM 40 MG: 40 GRANULE, DELAYED RELEASE ORAL at 10:14

## 2017-09-19 RX ADMIN — PIPERACILLIN SODIUM,TAZOBACTAM SODIUM 4.5 G: 4; .5 INJECTION, POWDER, FOR SOLUTION INTRAVENOUS at 16:24

## 2017-09-19 RX ADMIN — METOPROLOL TARTRATE 12.5 MG: 25 TABLET, FILM COATED ORAL at 17:04

## 2017-09-19 RX ADMIN — PREGABALIN 300 MG: 150 CAPSULE ORAL at 17:05

## 2017-09-19 RX ADMIN — Medication 10 ML: at 15:21

## 2017-09-19 RX ADMIN — QUETIAPINE FUMARATE 25 MG: 25 TABLET ORAL at 21:13

## 2017-09-19 RX ADMIN — Medication 400 MG: at 10:14

## 2017-09-19 RX ADMIN — PIPERACILLIN SODIUM,TAZOBACTAM SODIUM 4.5 G: 4; .5 INJECTION, POWDER, FOR SOLUTION INTRAVENOUS at 10:13

## 2017-09-19 RX ADMIN — MULTIVIT AND MINERALS-FERROUS GLUCONATE 9 MG IRON/15 ML ORAL LIQUID 15 ML: at 10:16

## 2017-09-19 RX ADMIN — PIPERACILLIN SODIUM,TAZOBACTAM SODIUM 4.5 G: 4; .5 INJECTION, POWDER, FOR SOLUTION INTRAVENOUS at 00:55

## 2017-09-19 RX ADMIN — VALPROIC ACID 500 MG: 250 SOLUTION ORAL at 10:15

## 2017-09-19 RX ADMIN — METOPROLOL TARTRATE 12.5 MG: 25 TABLET, FILM COATED ORAL at 10:14

## 2017-09-19 RX ADMIN — Medication 10 ML: at 21:13

## 2017-09-19 RX ADMIN — LEVETIRACETAM 1500 MG: 100 SOLUTION ORAL at 10:15

## 2017-09-19 RX ADMIN — PANTOPRAZOLE SODIUM 40 MG: 40 GRANULE, DELAYED RELEASE ORAL at 17:04

## 2017-09-19 RX ADMIN — ENOXAPARIN SODIUM 40 MG: 40 INJECTION SUBCUTANEOUS at 10:13

## 2017-09-19 RX ADMIN — VALPROIC ACID 500 MG: 250 SOLUTION ORAL at 17:05

## 2017-09-19 NOTE — PROGRESS NOTES
Problem: Falls - Risk of  Goal: *Absence of Falls  Document Lorena Fall Risk and appropriate interventions in the flowsheet.    Outcome: Progressing Towards Goal  Fall Risk Interventions:  Mobility Interventions: Bed/chair exit alarm, Patient to call before getting OOB     Mentation Interventions: Adequate sleep, hydration, pain control     Medication Interventions: Bed/chair exit alarm, Patient to call before getting OOB     Elimination Interventions: Bed/chair exit alarm     History of Falls Interventions: Bed/chair exit alarm, Room close to nurse's station

## 2017-09-19 NOTE — PROGRESS NOTES
Brief Progress Note    We were not able to visit w/ pt today. Our team will reach out to family to set up family meeting as soon as we can. Full, initial consult note to follow. Should you have questions/concerns or the need for a bedside visit by our team, please do not hesitate to contact us at [phone] Thank you for providing us w/ the opportunity to be involved in this patient's care.       Wendy Evans MD  Palliative Care Team

## 2017-09-19 NOTE — PROGRESS NOTES
Problem: Falls - Risk of  Goal: *Absence of Falls  Document Lorena Fall Risk and appropriate interventions in the flowsheet.    Outcome: Progressing Towards Goal  Fall Risk Interventions:  Mobility Interventions: Bed/chair exit alarm, Communicate number of staff needed for ambulation/transfer, OT consult for ADLs, Patient to call before getting OOB, PT Consult for mobility concerns, PT Consult for assist device competence, Utilize walker, cane, or other assitive device     Mentation Interventions: Bed/chair exit alarm, Door open when patient unattended, Evaluate medications/consider consulting pharmacy, Family/sitter at bedside, Increase mobility, More frequent rounding, Reorient patient     Medication Interventions: Bed/chair exit alarm, Evaluate medications/consider consulting pharmacy, Patient to call before getting OOB     Elimination Interventions: Bed/chair exit alarm, Call light in reach, Patient to call for help with toileting needs, Toileting schedule/hourly rounds     History of Falls Interventions: Bed/chair exit alarm, Consult care management for discharge planning, Door open when patient unattended, Evaluate medications/consider consulting pharmacy

## 2017-09-19 NOTE — PROGRESS NOTES
Bedside and Verbal shift change report given to Maddie RAMOS (oncoming nurse) by [id number] [facility] (offgoing nurse). Report included the following information SBAR, Kardex, Intake/Output, MAR and Recent Results. Zone Phone for oncoming shift:   [id number]    Shift Summary: Patient rested throughout the night. No C/O pain voiced.      LDAs               Peripheral IV 09/17/17 Right Hand (Active)   Site Assessment Clean, dry, & intact 9/19/2017  3:32 AM   Phlebitis Assessment 0 9/19/2017  3:32 AM   Infiltration Assessment 0 9/19/2017  3:32 AM   Dressing Status Clean, dry, & intact 9/19/2017  3:32 AM   Dressing Type Tape;Transparent 9/19/2017  3:32 AM   Hub Color/Line Status Blue;Capped;Flushed 9/19/2017  3:32 AM          PEG/Gastrostomy Tube 07/18/17 (Active)   Site Assessment Clean, dry, & intact 9/19/2017  3:32 AM   Dressing Status Clean, dry, & intact 9/19/2017  3:32 AM   G Port Status Infusing 9/19/2017  3:32 AM   Gastric Residual (mL) 0 ml 9/19/2017  3:32 AM   Tube Feeding/Formula Options Jevity 1.5 9/19/2017  3:32 AM   Tube Feeding/Verify Rate (mL/hr) 50 9/19/2017  3:32 AM   Water Flush Volume (mL) 100 mL 9/19/2017  3:32 AM   Intake (ml) 600 ml 9/18/2017  7:00 PM   Medication Volume 100 ml 9/15/2017  5:21 PM              Urinary Catheter 09/15/17 Cummins-Criteria for Appropriate Use: Obstruction/retention   Intake & Output   Date 09/18/17 0700 - 09/19/17 0659 09/19/17 0700 - 09/20/17 0659   Shift [id number] 1900-0659 24 Hour Total 6589-2754 7871-8607 24 Hour Total   I  N  T  A  K  E   I.V.  (mL/kg/hr)  200 200         I.V.  200 200       NG/ 800 900         Water Flush Volume (mL) (PEG/Gastrostomy Tube 07/18/17) 100 200 300         Intake (ml) (PEG/Gastrostomy Tube 07/18/17)  600 600       Shift Total  (mL/kg) 100  (1.7) 1000  (16.8) 1100  (18.5)      O  U  T  P  U  T   Urine  (mL/kg/hr) 800  (1.1) 750 1550         Urine Output (mL) (Urinary Catheter 09/15/17 Cummins)        Shift Total  (mL/kg) 800  (13.5) 750  (12.6) 1550  (26.1)      NET -700 250 -450      Weight (kg) 59.4 59.4 59.4 59.4 59.4 59.4      Last Bowel Movement Last Bowel Movement Date: 09/17/17   Glucose Checks [] N/A  [] AC/HS  [x] Q6  Concerns:   Nutrition Active Orders   Diet    DIET NPO       Consults [x]PT  [x]OT  []Speech  []Case Management   Cardiac Monitoring []N/A [x]Yes Expires: 48 hours

## 2017-09-19 NOTE — PROGRESS NOTES
Problem: Mobility Impaired (Adult and Pediatric)  Goal: *Acute Goals and Plan of Care (Insert Text)  Physical Therapy Goals  9/15/2017  1. Patient will move from supine to sit and sit to supine , scoot up and down and roll side to side in bed with minimal assistance/contact guard assist within 7 day(s). 2. Patient will transfer from bed to chair and chair to bed with minimal assistance/contact guard assist using the least restrictive device within 7 day(s). 3. Patient will perform sit to stand with minimal assistance/contact guard assist within 7 day(s). 4. Patient will ambulate with minimal assistance/contact guard assist for 50 feet with the least restrictive device within 7 day(s). PHYSICAL THERAPY TREATMENT  Patient: Maritza Woodson (73 y.o. male)  Date: 9/19/2017  Diagnosis: HCAP (healthcare-associated pneumonia)  UTI (urinary tract infection)  Acute encephalopathy <principal problem not specified>       Precautions: Fall, Bed Alarm      ASSESSMENT:  Patient with improvement in mobility today. Patient's wife present throughout treatment. Bed mobility with min assist x 1-2. Patient able to sit EOB without assistance. Sit to stand with min assist x 2. Balance fair. Patient ambulated 29' with rolling walker and mod assist x2 (min assist initially). Patient begins to lean to right as he fatigues. Up in chair after ambulating. Assisted patient in performing R UE and B LE strengthening exercises. Patient tries to talk throughout treatment but speech garbled. Follows commands with tactile cues. Cues required to attend to right side. Per patient's wife, patient was ambulating and speaking clearly up until last Thursday. Recommend SNF at discharge.   Progression toward goals:  [ ]    Improving appropriately and progressing toward goals  [X]    Improving slowly and progressing toward goals  [ ]    Not making progress toward goals and plan of care will be adjusted       PLAN:  Patient continues to benefit from skilled intervention to address the above impairments. Continue treatment per established plan of care. Discharge Recommendations:  Skilled Nursing Facility  Further Equipment Recommendations for Discharge:  TBD       SUBJECTIVE:   Patient nods head \"yes\" when asked if he would like to get OOB. OBJECTIVE DATA SUMMARY:   Critical Behavior:  Neurologic State: Alert  Orientation Level: Disoriented X4  Cognition: Impaired decision making  Safety/Judgement: Decreased awareness of environment, Decreased awareness of need for assistance, Decreased awareness of need for safety, Decreased insight into deficits, Fall prevention  Functional Mobility Training:  Bed Mobility:     Supine to Sit: Minimum assistance;Assist x1              Transfers:  Sit to Stand: Minimum assistance;Assist x2  Stand to Sit: Minimum assistance;Assist x2                             Balance:  Sitting: Impaired  Sitting - Static: Good (unsupported)  Sitting - Dynamic: Fair (occasional)  Standing: Impaired  Standing - Static: Fair  Standing - Dynamic : Fair (declines as he fatigues)  Ambulation/Gait Training:  Distance (ft): 34 Feet (ft)  Assistive Device: Gait belt;Walker, rolling  Ambulation - Level of Assistance: Minimal assistance; Moderate assistance;Assist x2        Gait Abnormalities: Decreased step clearance;Shuffling gait; Path deviations        Base of Support: Narrowed     Speed/Shannon: Shuffled; Slow  Step Length: Left shortened;Right shortened                                                       Therapeutic Exercises:   Strengthening exercises right UE, B LE's. Pain:  Pain Scale 1: Adult Nonverbal Pain Scale  Pain Intensity 1: 0              Activity Tolerance:   fair  Please refer to the flowsheet for vital signs taken during this treatment.   After treatment:   [X]    Patient left in no apparent distress sitting up in chair  [ ]    Patient left in no apparent distress in bed  [X]    Call bell left within reach  [X]    Nursing notified  [X]    Caregiver present  [ ]    Bed alarm activated      COMMUNICATION/COLLABORATION:   The patients plan of care was discussed with: Registered Nurse     Arlette Palomo PT   Time Calculation: 15 mins

## 2017-09-19 NOTE — PROGRESS NOTES
Hospitalist Progress Note    NAME: Edmond Smith   :  1934   MRN:  [id number]       Assessment / Plan:  Hypomagnesemia POA  IV mag x 1 today  Mag in AM    Right upper lobe pneumonia, POA  Sepsis, POA with tachycardia and tachypnea. CXR with small infiltrate in RUL read as atelectasis or PNA. Not hypoxic or febrile. Complete  levaquin and zosyn given recent hospitalization  Follow BC, no growth so far     Urinary tract infection due to chronic indwelling Cummins for urinary retention POA  --This has been a recurrent problem with his recent prior hospitalizations. Cummins catheter was changed in the emergency room. On abx as above. Urine cx Ecoli     Dysarthria, POA/encephaloptahy, Seizure disorder with history of status epilepticus twice this year, remains confused  Secondary to ?post ictal/infection vs worsening of baseline cognitive deficit  --CT head negative. Get MRI brain- noted- diffuse restriction in L hemisphere cortical- due to seizures?   --on Keppra and depakote. Will continue. No sign of seizure  Appreciate neurology consult     History of alcohol abuse  --continue MVI, thiamine. Thiamine likely can be stopped at discharge if he remains institutionalized and not drinking.     Hx heme positive stool and anemia  --continue PPI   Hb stable     Dysphagia  --continue tubefeed. Jevity 1.5 50ml/hr with water flush 100ml q4h. Consult nutrition service. Hypokalemia/hypomagnesemia, replete       Code status is FULL CODE.    DVT prophylaxis with Lovenox.     This is fifth hospitalization since February. He has been seen by Palliative Care in July. Body mass index is 20.28 kg/(m^2).     Surrogate decision maker:  Wife Zackery Jon: SNF once stable. Palliative care consult noted- awaiting family meeting on wednesday  AVINASH working on DC planning    Body mass index is 19.92 kg/(m^2). Subjective:     Chief Complaint / Reason for Physician Visit  \"confused\". Discussed with RN events overnight. Review of Systems:  Symptom Y/N Comments  Symptom Y/N Comments   Fever/Chills    Chest Pain     Poor Appetite    Edema     Cough    Abdominal Pain     Sputum    Joint Pain     SOB/AL    Pruritis/Rash     Nausea/vomit    Tolerating PT/OT     Diarrhea    Tolerating Diet     Constipation    Other       Could NOT obtain due to: encephalopathy     Objective:     VITALS:   Last 24hrs VS reviewed since prior progress note. Most recent are:  Patient Vitals for the past 24 hrs:   Temp Pulse Resp BP SpO2   09/19/17 1513 97.5 °F (36.4 °C) 90 18 (!) 140/91 94 %   09/19/17 1113 97.6 °F (36.4 °C) 71 16 151/78 97 %   09/19/17 0834 97.4 °F (36.3 °C) 73 16 151/76 -   09/19/17 0204 98 °F (36.7 °C) 69 16 111/55 91 %   09/18/17 2246 98 °F (36.7 °C) 75 18 149/79 94 %   09/18/17 2123 98.9 °F (37.2 °C) 74 18 156/81 95 %       Intake/Output Summary (Last 24 hours) at 09/19/17 1831  Last data filed at 09/19/17 2835   Gross per 24 hour   Intake             1700 ml   Output             1300 ml   Net              400 ml        PHYSICAL EXAM:  General: WD, WN.alert, awake no acute distress    EENT:  EOMI, no icterus/erythema  Resp:  CTA bilaterally, no wheezing or rales. No accessory muscle use  CV:  Regular  rhythm,  No edema  GI:  Soft, Non distended, Non tender.  +Bowel sounds  Neurologic:  Awake, moving all ext, mumbels  Psych:   Per sitter no agitation  Skin:  No rashes.   No jaundice    Reviewed most current lab test results and cultures  YES  Reviewed most current radiology test results   YES  Review and summation of old records today    NO  Reviewed patient's current orders and MAR    YES  PMH/SH reviewed - no change compared to H&P  ________________________________________________________________________  Care Plan discussed with:    Comments   Patient x    Family      RN x    Care Manager [employer]                        Multidiciplinary team rounds were held today with , nursing, pharmacist and clinical coordinator. Patient's plan of care was discussed; medications were reviewed and discharge planning was addressed. ________________________________________________________________________  Total NON critical care TIME: 16   Minutes    Total CRITICAL CARE TIME Spent:   Minutes non procedure based      Comments   >50% of visit spent in counseling and coordination of care     ________________________________________________________________________  Galdino Trejo MD     Procedures: see electronic medical records for all procedures/Xrays and details which were not copied into this note but were reviewed prior to creation of Plan. LABS:  I reviewed today's most current labs and imaging studies.   Pertinent labs include:  Recent Labs      09/19/17 0153 09/18/17 0414   WBC  2.9*  3.7*   HGB  11.1*  11.4*   HCT  32.9*  34.1*   PLT  115*  119*     Recent Labs      09/19/17 0153 09/18/17 0414 09/17/17 0318   NA  138  139  141   K  3.5  3.3*  3.5   CL  100  98  101   CO2  34*  36*  35*   GLU  109*  111*  88   BUN  12  11  11   CREA  0.53*  0.58*  0.45*   CA  8.5  8.7  8.8   MG  1.5*  1.5*   --    PHOS  3.1  3.2   --        Signed: Galdino Trejo MD

## 2017-09-19 NOTE — PROGRESS NOTES
Palliative Medicine     LCSW called pt's wife/nok medical surrogate Vinayak Zapien) to schedule meeting. Left msg. Also left Palliative contact information in Pt's room and visited briefly with wife and Pt. Plan:  Palliative meeting at 11:15 am on Wed (tomorrow). Thank you for including Palliative Medicine in Mr. Mckeno's care.       Elva Valiente, [address] ([id number])  Work cell: [phone]

## 2017-09-20 PROBLEM — Z71.89 COUNSELING REGARDING ADVANCED CARE PLANNING AND GOALS OF CARE: Status: ACTIVE | Noted: 2017-09-20

## 2017-09-20 PROBLEM — R41.0 CONFUSION: Status: ACTIVE | Noted: 2017-09-20

## 2017-09-20 PROBLEM — R47.02 DYSPHASIA: Status: ACTIVE | Noted: 2017-09-20

## 2017-09-20 LAB
ANION GAP SERPL CALC-SCNC: 4 MMOL/L (ref 5–15)
BUN SERPL-MCNC: 11 MG/DL (ref 6–20)
BUN/CREAT SERPL: 20 (ref 12–20)
CALCIUM SERPL-MCNC: 8.8 MG/DL (ref 8.5–10.1)
CHLORIDE SERPL-SCNC: 101 MMOL/L (ref 97–108)
CO2 SERPL-SCNC: 36 MMOL/L (ref 21–32)
CREAT SERPL-MCNC: 0.54 MG/DL (ref 0.7–1.3)
GLUCOSE BLD STRIP.AUTO-MCNC: 130 MG/DL (ref 65–100)
GLUCOSE SERPL-MCNC: 94 MG/DL (ref 65–100)
MAGNESIUM SERPL-MCNC: 1.7 MG/DL (ref 1.6–2.4)
POTASSIUM SERPL-SCNC: 3.7 MMOL/L (ref 3.5–5.1)
SERVICE CMNT-IMP: ABNORMAL
SODIUM SERPL-SCNC: 141 MMOL/L (ref 136–145)

## 2017-09-20 PROCEDURE — 97116 GAIT TRAINING THERAPY: CPT

## 2017-09-20 PROCEDURE — 74011250636 HC RX REV CODE- 250/636: Performed by: INTERNAL MEDICINE

## 2017-09-20 PROCEDURE — 74011250637 HC RX REV CODE- 250/637: Performed by: HOSPITALIST

## 2017-09-20 PROCEDURE — 92526 ORAL FUNCTION THERAPY: CPT

## 2017-09-20 PROCEDURE — 80048 BASIC METABOLIC PNL TOTAL CA: CPT | Performed by: EMERGENCY MEDICINE

## 2017-09-20 PROCEDURE — 36415 COLL VENOUS BLD VENIPUNCTURE: CPT | Performed by: EMERGENCY MEDICINE

## 2017-09-20 PROCEDURE — 82962 GLUCOSE BLOOD TEST: CPT

## 2017-09-20 PROCEDURE — 97535 SELF CARE MNGMENT TRAINING: CPT | Performed by: OCCUPATIONAL THERAPIST

## 2017-09-20 PROCEDURE — 65660000000 HC RM CCU STEPDOWN

## 2017-09-20 PROCEDURE — 77030018798 HC PMP KT ENTRL FED COVD -A

## 2017-09-20 PROCEDURE — 74011000250 HC RX REV CODE- 250: Performed by: HOSPITALIST

## 2017-09-20 PROCEDURE — 83735 ASSAY OF MAGNESIUM: CPT | Performed by: EMERGENCY MEDICINE

## 2017-09-20 PROCEDURE — 74011000258 HC RX REV CODE- 258: Performed by: INTERNAL MEDICINE

## 2017-09-20 PROCEDURE — 97530 THERAPEUTIC ACTIVITIES: CPT | Performed by: OCCUPATIONAL THERAPIST

## 2017-09-20 PROCEDURE — 97110 THERAPEUTIC EXERCISES: CPT

## 2017-09-20 RX ADMIN — VALPROIC ACID 500 MG: 250 SOLUTION ORAL at 18:04

## 2017-09-20 RX ADMIN — LATANOPROST 1 DROP: 50 SOLUTION OPHTHALMIC at 22:12

## 2017-09-20 RX ADMIN — LEVETIRACETAM 1500 MG: 100 SOLUTION ORAL at 21:13

## 2017-09-20 RX ADMIN — ENOXAPARIN SODIUM 30 MG: 30 INJECTION SUBCUTANEOUS at 10:23

## 2017-09-20 RX ADMIN — PANTOPRAZOLE SODIUM 40 MG: 40 GRANULE, DELAYED RELEASE ORAL at 10:25

## 2017-09-20 RX ADMIN — PIPERACILLIN SODIUM,TAZOBACTAM SODIUM 4.5 G: 4; .5 INJECTION, POWDER, FOR SOLUTION INTRAVENOUS at 18:04

## 2017-09-20 RX ADMIN — Medication 10 ML: at 15:05

## 2017-09-20 RX ADMIN — Medication 10 ML: at 21:15

## 2017-09-20 RX ADMIN — LEVOFLOXACIN 750 MG: 5 INJECTION, SOLUTION INTRAVENOUS at 06:16

## 2017-09-20 RX ADMIN — QUETIAPINE FUMARATE 25 MG: 25 TABLET ORAL at 21:14

## 2017-09-20 RX ADMIN — VALPROIC ACID 500 MG: 250 SOLUTION ORAL at 10:24

## 2017-09-20 RX ADMIN — PREGABALIN 300 MG: 150 CAPSULE ORAL at 10:22

## 2017-09-20 RX ADMIN — LEVETIRACETAM 1500 MG: 100 SOLUTION ORAL at 10:24

## 2017-09-20 RX ADMIN — MULTIVIT AND MINERALS-FERROUS GLUCONATE 9 MG IRON/15 ML ORAL LIQUID 15 ML: at 10:24

## 2017-09-20 RX ADMIN — PIPERACILLIN SODIUM,TAZOBACTAM SODIUM 4.5 G: 4; .5 INJECTION, POWDER, FOR SOLUTION INTRAVENOUS at 00:18

## 2017-09-20 RX ADMIN — Medication 100 MG: at 10:22

## 2017-09-20 RX ADMIN — PANTOPRAZOLE SODIUM 40 MG: 40 GRANULE, DELAYED RELEASE ORAL at 18:04

## 2017-09-20 RX ADMIN — METOPROLOL TARTRATE 12.5 MG: 25 TABLET, FILM COATED ORAL at 18:04

## 2017-09-20 RX ADMIN — VALPROIC ACID 500 MG: 250 SOLUTION ORAL at 21:12

## 2017-09-20 RX ADMIN — METOPROLOL TARTRATE 12.5 MG: 25 TABLET, FILM COATED ORAL at 10:22

## 2017-09-20 RX ADMIN — Medication 10 ML: at 06:20

## 2017-09-20 RX ADMIN — PREGABALIN 300 MG: 150 CAPSULE ORAL at 18:04

## 2017-09-20 RX ADMIN — PIPERACILLIN SODIUM,TAZOBACTAM SODIUM 4.5 G: 4; .5 INJECTION, POWDER, FOR SOLUTION INTRAVENOUS at 10:33

## 2017-09-20 RX ADMIN — ASPIRIN 81 MG CHEWABLE TABLET 81 MG: 81 TABLET CHEWABLE at 10:22

## 2017-09-20 NOTE — CONSULTS
Palliative Medicine Consult  [place]: [phone] ([phone])    Patient Name: Aretha Eisenmenger  YOB: 1934    Date of Initial Consult: 09/20/2017  Reason for Consult: care decisions  Requesting Provider: Dr. Natalie Louise   Primary Care Physician: Ceferino Rosario MD      SUMMARY:   Aretha Eisenmenger is a 80 y.o. gentleman w/ PMH most significant for h/o dysphagia s/p PEG placement in 07/2017, seizure d/o, continued Cummins, h/o EtOH abuse who was admitted on 9/15/2017 from [facility] for [facility]. Current medical issues leading to Palliative Medicine involvement include: care decisions in setting of  PMH  AMS  Palliative dx's below     PALLIATIVE DIAGNOSES:   1. Confusion  2. Dysphasia  3. Dysphagia  4. Weakness  5. Debility  6. Counseling regarding goals of care and advance care planning     PLAN:   Brief Summary of Plan  -visited w/ pt in his rm. Wife Matt Hogue) at bedside.   -family meeting @ [phone] held. Elieser Delacruz (VÍCTOR) and I were present from our team  -goals of care and advance care planning confirmed  -we will continue to establish therapeutic alliance as well as provide psychosocial support    1. Goals of Care- confirmed  Wife wishes for full, restorative treatment and all measures that support this. This is w/ understanding that the above is w/ potential limitations d/t his condition. 2. Advance Care Planning- pt's code status is FULL CODE. Active EMR order reflects this. Pt does not have AMD.  Wife is surrogate decision maker. They have 1 dtr, Jovany Omer. [place] sheet completed and placed on paper chart. 3. Information Sharing-   Wife is familiar w/ our team and its role. Elieser Delacruz and I visited w/ them in 07/2017 during that hospital stay. Wife has a good understanding of pt's condition. She was aware of pneumonia and \"bladder infxn\". With permission, we provided additional details. She maintains hope that pt will continue to get better.   She understands that he will most likely not return to level of fxn he had previously. She is hoping that he is able to feed himself; dress himself; and walk about as he did before. We discussed goals of care. Confirmed as noted above. Separate from this, code status was discussed. Wife relays that she would want to allow him to have a natural death; HOWEVER, she states that she does not want the responsibility of making this decision. She is hoping that pt gets to a point where he can make known his wishes. She understands that he might not be able to do this. She also clearly understands that the default for code status is FULL CODE, and this is maintained. She relays that her dtr has relayed wishes for the above as code status. [facility] After continue discussion, she is aware that there is a set number of days one can be in rehab. She understands that if she wishes for him to go to another rehab (and they have days available), then she should d/w CM. Please also refer to Fred Valentino note for additional details. Questions and concerns addressed. Supportive listening provided throughout the discussion. 4. Psychosocial Support- We will continue to support patient and family during this difficult hospitalization    5. Spiritual- at this time, there are no apparent spiritual needs or concerns. 6. Symptom Management- at this time, there does not appear to be symptom management for which our assistance is needed. I have discussed with Dr. Oumar Whitaker, pt's primary medical team attending. Time and input appreciated. I have discussed with our palliative care IDT. Thank you for this consult that has provided us with the opportunity to be involved in this patient's care. We will continue to follow along with you.   Should you have any questions or concerns prior to our next visit at the bedside, please do not hesitate to contact us at [phone]). Lisseth Perez MD  Palliative Care Team     GOALS OF CARE / TREATMENT PREFERENCES:   [====Goals of Care====]  GOALS OF CARE:  Patient / health care proxy stated goals: full, restorative treatment and all measures that support this      TREATMENT PREFERENCES:   Code Status: Full Code    Advance Care Planning:  Advance Care Planning 9/19/2017   Patient's Healthcare Decision Maker is: Legal Next of Jessy Copeland   Primary Decision Maker Name Kenneth Lee   Primary Decision Maker Phone Number [phone]   Primary Decision Maker Relationship to Patient Spouse   Confirm Advance Directive None   Patient Would Like to Complete Advance Directive -       Other:    The palliative care team has discussed with patient / health care proxy about goals of care / treatment preferences for patient.  [====Goals of Care====]     HISTORY:     History obtained from: wife, EMR    CHIEF COMPLAINT: pt does not communicate cc    HPI/SUBJECTIVE:    The patient is:   [x] Verbal and participatory   extremely limited in participation  [] Non-participatory due to:     Pt is an 79 y/o AAM w/ PMH noted above who presented to our ED w/ AMS. At time of admission, pt was sleeping but was easily aroused and became alert. Pt's speech was noted to be slurred. Paperwork from NH indicated reason for pt's being brought to the ED was confusion, slurred speech, and fall. Pt was dx'ed w/ pneumonia the day prior to ED presentation. In ED, rectal temp 99.8, , RR 26, O2 sat 97% on RA. Code Purple was called. CXR showed RUL atelectasis vs. pneumonia  UA c/w UTI. Cummins was changed in ED.   Pt was given Zosyn and Levaquin     Clinical Pain Assessment (nonverbal scale for severity on nonverbal patients):    [++++ Clinical Pain Assessment++++]  [++++Pain Severity++++]: Pain: 0  [++++Pain Character++++]:   [++++Pain Duration++++]:   [++++Pain Effect++++]:   [++++Pain Factors++++]:   [++++Pain Frequency++++]:   [++++Pain Location++++]: [++++ Clinical Pain Assessment++++]  Duration: for how long has pt been experiencing pain (e.g., 2 days, 1 month, years)  Frequency: how often pain is an issue (e.g., several times per day, once every few days, constant)     FUNCTIONAL ASSESSMENT:     Palliative Performance Scale (PPS):  PPS: 40       PSYCHOSOCIAL/SPIRITUAL SCREENING:     Advance Care Planning:  Advance Care Planning 9/19/2017   Patient's Healthcare Decision Maker is: Legal Next of Jessy Copeland   Primary Decision Maker Name Junior Mehta   Primary Decision Maker Phone Number [phone]   Primary Decision Maker Relationship to Patient Spouse   Confirm Advance Directive None   Patient Would Like to Complete Advance Directive -        Any spiritual / Congregation concerns:  [] Yes /  [x] No    Caregiver Burnout:  [] Yes /  [x] No /  [] No Caregiver Present      Anticipatory grief assessment:   [x] Normal  / [] Maladaptive       ESAS Anxiety:      ESAS Depression:          REVIEW OF SYSTEMS:     Positive and pertinent negative findings in ROS are noted above in HPI. The following systems were [] reviewed / [x] unable to be reviewed as noted in HPI  Other findings are noted below. Systems: constitutional, ears/nose/mouth/throat, respiratory, gastrointestinal, genitourinary, musculoskeletal, integumentary, neurologic, psychiatric, endocrine. Positive findings noted below. Modified ESAS Completed by: provider           Pain: 0           Dyspnea: 0                    PHYSICAL EXAM:     From RN flowsheet:  Wt Readings from Last 3 Encounters:   09/20/17 135 lb 5.8 oz (61.4 kg)   08/22/17 144 lb 11.2 oz (65.6 kg)   07/27/17 150 lb (68 kg)     Blood pressure 147/78, pulse 74, temperature 98.3 °F (36.8 °C), resp. rate 16, height 5' 8\" (1.727 m), weight 135 lb 5.8 oz (61.4 kg), SpO2 98 %.     Pain Scale 1: Numeric (0 - 10)  Pain Intensity 1: 0                   Gen: pleasant, sitting up in bed, in NAD  Care companion at bedside  HEENT: NC/AT, MMM  Respiratory: breathing not labored, symmetric  Musculoskeletal: contracture of R fingers in fist  Skin: warm, dry  Neurologic: awake, alert  Good movement and fair coordination of LUE to do oral suction  Psychiatric: difficult to assess given pt's condition  No obvious signs of agitation  Unable to determine if pt has capacity regarding current medical treatment     HISTORY:     Active Problems:    UTI (urinary tract infection) (8/19/2017)      Acute encephalopathy (9/15/2017)      HCAP (healthcare-associated pneumonia) (9/15/2017)      Confusion (9/20/2017)      Dysphasia (9/20/2017)      Counseling regarding advanced care planning and goals of care (9/20/2017)      Past Medical History:   Diagnosis Date    Alcohol abuse, in remission     Seizures ([facility] [address]       Past Surgical History:   Procedure Laterality Date    COLONOSCOPY N/A 7/31/2017    COLONOSCOPY performed by Shonda Thomas MD at [address] [place] [address]  7/31/2017         PLACE PERCUT GASTROSTOMY TUBE  7/18/2017         UPPER GI ENDOSCOPY,DIAGNOSIS  7/31/2017           Family History   Problem Relation Age of Onset    Other Other      no strokes or seizures    Cancer Mother       History reviewed, no pertinent family history.   Social History   Substance Use Topics    Smoking status: Never Smoker    Smokeless tobacco: Never Used    Alcohol use No      Comment: Quit drinking 10 years ago     Allergies   Allergen Reactions    No Known Allergies       Current Facility-Administered Medications   Medication Dose Route Frequency    enoxaparin (LOVENOX) injection 30 mg  30 mg SubCUTAneous Q24H    piperacillin-tazobactam (ZOSYN) 4.5 g in 0.9% sodium chloride (MBP/ADV) 100 mL  4.5 g IntraVENous Q8H    sodium chloride (NS) flush 5-10 mL  5-10 mL IntraVENous PRN    sodium chloride (NS) flush 5-10 mL  5-10 mL IntraVENous Q8H    sodium chloride (NS) flush 5-10 mL  5-10 mL IntraVENous PRN    acetaminophen (TYLENOL) tablet 650 mg  650 mg Oral Q4H PRN    Or  acetaminophen (TYLENOL) solution 650 mg  650 mg Per NG tube Q4H PRN    Or    acetaminophen (TYLENOL) suppository 650 mg  650 mg Rectal Q4H PRN    aspirin chewable tablet 81 mg  81 mg Per G Tube DAILY    labetalol (NORMODYNE;TRANDATE) injection 5 mg  5 mg IntraVENous Q10MIN PRN    acetaminophen (TYLENOL) tablet 650 mg  650 mg Per G Tube Q6H PRN    cyclobenzaprine (FLEXERIL) tablet 5 mg  5 mg Per G Tube TID PRN    levETIRAcetam (KEPPRA) oral solution 1,500 mg  1,500 mg Per G Tube Q12H    metoprolol tartrate (LOPRESSOR) tablet 12.5 mg  12.5 mg Per G Tube BID    multivit w-min-ferrous gluconate (CEROVITE) oral liquid 15 mL  15 mL Per G Tube DAILY    pantoprazole (PROTONIX) granules for oral suspension 40 mg  40 mg Per G Tube ACB&D    pregabalin (LYRICA) capsule 300 mg  300 mg Oral BID    QUEtiapine (SEROquel) tablet 25 mg  25 mg Per G Tube QHS    thiamine (B-1) tablet 100 mg  100 mg Per G Tube DAILY    valproic acid (as sodium salt) (DEPAKENE) 250 mg/5 mL (5 mL) oral solution 500 mg  500 mg Per G Tube TID    albuterol-ipratropium (DUO-NEB) 2.5 MG-0.5 MG/3 ML  3 mL Nebulization Q6H PRN    latanoprost (XALATAN) 0.005 % ophthalmic solution 1 Drop  1 Drop Both Eyes QHS    haloperidol lactate (HALDOL) injection 2 mg  2 mg IntraVENous Q4H PRN          LAB AND IMAGING FINDINGS:     Lab Results   Component Value Date/Time    WBC 2.9 09/19/2017 01:53 AM    HGB 11.1 09/19/2017 01:53 AM    PLATELET 625 92/51/6088 01:53 AM     Lab Results   Component Value Date/Time    Sodium 141 09/20/2017 03:00 AM    Potassium 3.7 09/20/2017 03:00 AM    Chloride 101 09/20/2017 03:00 AM    CO2 36 09/20/2017 03:00 AM    BUN 11 09/20/2017 03:00 AM    Creatinine 0.54 09/20/2017 03:00 AM    Calcium 8.8 09/20/2017 03:00 AM    Magnesium 1.7 09/20/2017 03:00 AM    Phosphorus 3.1 09/19/2017 01:53 AM      Lab Results   Component Value Date/Time    AST (SGOT) 22 09/15/2017 04:18 AM    Alk.  phosphatase 68 09/15/2017 04:18 AM    Protein, total 8.4 09/15/2017 04:18 AM    Albumin 3.2 09/15/2017 04:18 AM    Globulin 5.2 09/15/2017 04:18 AM     Lab Results   Component Value Date/Time    INR 1.2 09/15/2017 04:22 AM    Prothrombin time 12.6 09/15/2017 04:22 AM    aPTT 31.0 08/19/2017 03:14 PM      Lab Results   Component Value Date/Time    Iron 26 07/29/2017 04:20 AM    TIBC 179 07/29/2017 04:20 AM    Iron % saturation 15 07/29/2017 04:20 AM    Ferritin 913 07/29/2017 04:20 AM      Lab Results   Component Value Date/Time    pH 7.40 07/09/2017 02:29 PM    PCO2 49 07/09/2017 02:29 PM    PO2 127 07/09/2017 02:29 PM     No components found for: [place]   Lab Results   Component Value Date/Time    CK 47 07/27/2017 09:02 AM    CK - MB 1.3 07/27/2017 09:02 AM                Total time: 60 min  Counseling / coordination time, spent as noted above: 45 min  > 50% counseling / coordination?: yes    Prolonged service was provided for  []30 min   []75 min in face to face time in the presence of the patient, spent as noted above. Time Start:   Time End:   Note: this can only be billed with 71707 (initial) or 42548 (follow up). If multiple start / stop times, list each separately.

## 2017-09-20 NOTE — PROGRESS NOTES
Palliative Medicine Psychosocial Assessment  [place]: [phone] ([phone])  [facility]: [phone] ([phone])      Sources of Information:    [x]Patient  [x]Family  [x]Staff  [x]Medical Record    Medical/Physical Functioning:   Active Problems:    UTI (urinary tract infection) (8/19/2017)      Acute encephalopathy (9/15/2017)      HCAP (healthcare-associated pneumonia) (9/15/2017)        Advance Care Planning:  Advance Care Planning 9/19/2017   Patient's Healthcare Decision Maker is: Legal Next of Jessy 69   Primary Decision Maker Name Sharda Barnes   Primary [address]  Phone Number [phone]   Primary Decision Maker Relationship to Patient Spouse   Confirm Advance Directive None   Patient Would Like to Complete Advance Directive -   Pt also has one child, Jocelyne Garcia, [phone]    Mental Status:    [x]Alert  []Lethargic  []Unresponsive  Oriented to:  [x]Person  []Place  []Time  []Situation      Barriers to Learning:    []Language  []Developmental  [x]Cognitive  []Altered Mental Status  [x]Visual/Hearing Impairment -needs new eye glasses []Unable to Read/Write  []Motivational   []No Barriers Identified  []Other:    Relationship Status:  []Single  [x]  []Significant Other/Life Partner  []  []  []      Living Circumstances:  []Lives Alone  []Family/Significant Other in Household  []Roommates  []Children in the Home  []Paid Caregivers  []Assisted Living Facility/Group [facility]  []Homeless  []Incarcerated  []Environmental/Care Concerns  []Transportation Issues  [] Issues  []Domestic Violence []Other:    ED [facility] hospitalizations in 2017:  2/18-2/21/17 discharged home with [facility]  3/18 ED visit  6/24-7/22/17 discharged to [place] for SNF Rehab  7/27-8/1/17 discharged to [place] for SNF rehab  8/19-8/24/17 discharged to [place] for SNF rehab  9/15-current      Support System:    []Strong  [x]Fair  []Limited  One daughter who also works  Spouse is primary caregiver and spends days with Pt at either Rehab (6 min drive from home) or at hospital (16 miles from home). Spouse does NOT drive so relies on friends or dtr for transportation    Financial/Legal Concerns:    []Uninsured  []Limited Income/Resources  []Non-Citizen  []Utility Issues  []Food Insecurity []No Concerns Identified  []Financial POA:    []Other:    Mandaen/Spiritual/Existential: refer to spiritual care  []Strong Sense of Spirituality  []Involved in [address]  []Request  Visit  []Expressing Leontine Plater  []No Concerns Identified    Coping with Illness:         Patient: Family/Caregiver:   Understanding and Acceptance of Illness/Prognosis  [] [x]understanding but not acceptance of holistic situation   Strong Sense of Resilience [] [x]   Self Reflection [] [x]   Engaged Support System [x] [x]fair   Does not Readily Discuss Illness [] []   Denial of Terminal Status [] []   Anger [] []   Depression [] []   Anxiety/Fear [] [x]   Bargaining [] [x]   Recent Diagnosis/Prognosis [] [x]Pt got sick in March and declining over summer. Hopes Pt will \"get better\" to return to more indep functioning (although never will be back to prior baseline) AND be able to make his own decisions about goc, including code status   Difficulties with Body Image []? Peg tube, unable to assess how effects him.  Pt indep. uses suction []   Loss of Identity [x][x][x][x][x][x][x]aspects--used to go out with his friends, do word finds and read the newspaper [x]shifting more into caregiver role and than wife role   Excessive Substance Use [] []   Mental Health History [] []   Enmeshed Relationships [] []   History of Loss [] []   Anticipatory Grief [] [x]increased cg responsibility is willing accepted but is \"rough/awful\" as requires depending on others for rides and journeying with Pt as goes from rehab to hospital, vis versa   Concern for Complicated Grief [] []no to low   Suicidal Ideation or Plan [] [] Unable to assess [] []                  Narrative: Dr. Blake Arguelles MD and this LCSW met with Pt and Pt's wife/nok medical surrogate Josué John). Pt has sitter at bedside. Pt's wife shared thoughts about these multiple readmissions and rehab process since July. She desires for Pt to continue with goc of full medical interventions to prolong life in hopes Pt will \"get better. \" Pt's wife said Pt was to return home on Fri from SNF but then UTI brought Pt to hospital. Pt's wife is wondering if home would be a better setting for Pt because \"familiar environment at home. \" She hopes this would progress Pt to doing more things for himself such as dressing, using bathroom, and walking. Per wife, Pt was ambulating and doing some self grooming and dressing indep. at SNF but had 2 falls. Per wife, she has not followed up on recommendations to look into Medicaid for assistance with Pt's care. If Pt goes home, wife would be primary caregiver and their dtr would assist. Wife asked her questions about the kelly catheter and peg tube feeds. She understands a C RN would help with education but that she would be responsible for Pt's daily care related to these items. Pt's wife verbalized concern about Pt's \"mind\" and uncertainty of what cognition he may recover. Pt's wife did not recall last time Pt was at home. Based on hospitalizations, it looks like it was in June 2017. Palliative team reviewed Pt's goc since July conversation. Wife confirmed Pt's wish would be to have full restorative medical interventions and understands interventions may be limited based on Pt's condition. Pt's wife is uncertain regarding Pt's wish related to cardiac or respiratory arrest. Education provided about resuscitation process or option to allow for natural dying/DNAR. She stated a few times, \"I don't want to make that decision [for him]. I hope he recovers [cognition] so he can tell us his wishes. \" As pt's wife, wife would want to protect pt from resuscitation but she doesn't want responsibility of that decision. Per wife, her dtr thinks Pt would want \"full measures to prolong life. \" Attempted to explore but Pt's wife was withdrawing so encouraged wife to speak with their dtr as they think about goc, brianda. Code status. Offered to have meeting with dtr to share facts and information if would be helpful. Wife did not respond. Wife verbalized understanding that without decision on code status then full resuscitative measures will be attempted if cardiac or respiratory arrest.    Spoke with Walter Cuevas (AVINASH). Goals/Plan:   Full restorative medical interventions to prolong life and family hopes that Pt will get better (cognitively) over time. Full code  Wife is uncertain if she wants Pt to return to SNF for rehab or go home with her providing daily personal care needs. Thank you for including Palliative Medicine in Mr. Mckeon's care.       Mackenzie Johnson, [address] ([phone])  Work cell: [phone]

## 2017-09-20 NOTE — PROGRESS NOTES
Problem: Mobility Impaired (Adult and Pediatric)  Goal: *Acute Goals and Plan of Care (Insert Text)  Physical Therapy Goals  9/15/2017  1. Patient will move from supine to sit and sit to supine , scoot up and down and roll side to side in bed with minimal assistance/contact guard assist within 7 day(s). 2. Patient will transfer from bed to chair and chair to bed with minimal assistance/contact guard assist using the least restrictive device within 7 day(s). 3. Patient will perform sit to stand with minimal assistance/contact guard assist within 7 day(s). 4. Patient will ambulate with minimal assistance/contact guard assist for 50 feet with the least restrictive device within 7 day(s). PHYSICAL THERAPY TREATMENT  Patient: Dawit Jones (2 y.o. male)  Date: 9/20/2017  Diagnosis: HCAP (healthcare-associated pneumonia)  UTI (urinary tract infection)  Acute encephalopathy <principal problem not specified>       Precautions: Fall, Bed Alarm      ASSESSMENT:  Patient is cooperative with PT, understands most commands and participates as he can with therapy today. Pt is [place] and has some trouble processing. Pts speech is garbled and has thick sputum which affects his speech as well. Some perseveration noted. Pt is very limited in his mobility and safety. According to his wife he was doing quite well at NH until he fell and now much worse. He is able to ambulate with mod A x 2 30 ft today in room with mod to max VC and manual assist to  R side of walker and drive walker. Very narrow base and posterior lean. Wife present every day since March and does not drive. Dependent on family to bring her here. PT/OT spoke with Dr. Liza Teran re patient. Apparently no change in code status.   Progression toward goals:  [ ]    Improving appropriately and progressing toward goals  [X]    Improving slowly and progressing toward goals  [ ]    Not making progress toward goals and plan of care will be adjusted       PLAN:  Patient continues to benefit from skilled intervention to address the above impairments. Continue treatment per established plan of care. Discharge Recommendations:  Rehab and Located within [facility]  Further Equipment Recommendations for Discharge:         SUBJECTIVE:   Patient stated 'ok, my arm?  (to raise).       OBJECTIVE DATA SUMMARY:   Critical Behavior:  Neurologic State: Alert, Confused  Orientation Level: Disoriented Kaity Florian past few days but said September today when asked month  Cognition: Impaired decision making  Safety/Judgement: Decreased awareness of environment, Decreased awareness of need for assistance, Decreased awareness of need for safety, Decreased insight into deficits, Fall prevention  Functional Mobility Training:  Bed Mobility:     Supine to Sit: Minimum assistance              Transfers:  Sit to Stand: Assist x2; Moderate assistance  Stand to Sit: Minimum assistance                             Balance:  Sitting - Static: Fair (occasional)  Sitting - Dynamic: Fair (occasional)  Standing: Impaired  Standing - Static: Occassional  Standing - Dynamic : Poor  Ambulation/Gait Training:  Distance (ft): 30 Feet (ft)  Assistive Device: Gait belt;Walker, rolling; Other (comment) (HHA)                                                             Stairs:                       Neuro Re-Education:  R side abnormal tone and very limited strenght, UE not functional  Therapeutic Exercises:   Chair ex with pt and wife needed much direction and verbal and visual cues  Knee extension (pt lifted thigh continually)  Ankle pumps  Arm raises with L  As PT/OT leaving room wife said \"Come on sweet pea were going to kick those legs up\"  Pain:  Pain Scale 1: Numeric (0 - 10)  Pain Intensity 1: 0    pt unable to accurately report level of pain           Activity Tolerance:   fair  Please refer to the flowsheet for vital signs taken during this treatment.   After treatment:   [X]    Patient left in no apparent distress sitting up in chair  [ ]    Patient left in no apparent distress in bed  [X]    Call bell left within reach  [X]    Nursing notified  [X]    Caregiver present  [ ]    Bed alarm activated      COMMUNICATION/COLLABORATION:   The patients plan of care was discussed with: Occupational Therapist, Registered Nurse and Physician     Brain Shepherd, PT   Time Calculation: 30 mins

## 2017-09-20 NOTE — PROGRESS NOTES
Spiritual Care Assessment/Progress Notes    Maritza Woodson [id number]  [id number]    1934  80 y.o.  male    Patient Telephone Number: [phone] (home)   Evangelical Affiliation: [facility]   Language: English   Extended Emergency Contact Information  Primary Emergency Contact: Alexander Peck Spencer Phone: [phone]  Relation: Spouse  Secondary Emergency Contact: [address]  Mobile Phone: [phone]  Relation: Daughter   Patient Active Problem List    Diagnosis Date Noted    Confusion 09/20/2017    Dysphasia 09/20/2017    Counseling regarding advanced care planning and goals of care 09/20/2017    Acute encephalopathy 09/15/2017    HCAP (healthcare-associated pneumonia) 09/15/2017    Sepsis ([address] 08/19/2017    UTI (urinary tract infection) 08/19/2017    ARF (acute renal failure) ([address] 07/27/2017    Anemia 07/27/2017    Heme positive stool 07/27/2017    Weakness 07/21/2017    Debility 07/21/2017    Counseling regarding goals of care 07/21/2017    Dysarthria 07/15/2017    Dysphagia 07/15/2017    Oral phase dysphagia 07/08/2017    Status epilepticus ([address] 06/24/2017    Seizures ([address] 02/18/2017    Encephalopathy, unspecified 09/03/2012    SVT (supraventricular tachycardia) ([address] 09/03/2012    Aphasia 09/03/2012    CVA (cerebral vascular accident) ([address] 09/03/2012    Seizure disorder ([address] 09/03/2012        Date: 9/20/2017       Level of Evangelical/Spiritual Activity: (according to wife)  []         Involved in niles tradition/spiritual practice    []         Not involved in niles tradition/spiritual practice  [x]         Spiritually oriented    []         Claims no spiritual orientation    []         seeking spiritual identity  []         Feels alienated from Latter day practice/tradition  []         Feels angry about Latter day practice/tradition  [x]         Spirituality/Latter day tradition is a resource for coping at this time.   [] Not able to assess due to medical condition    Services Provided Today:  []         crisis intervention    []         reading Scriptures  [x]         spiritual assessment    []         prayer  [x]         empathic listening/emotional support  []         rites and rituals (cite in comments)  []         life review     []         Latter-day support  []         theological development   []         advocacy  []         ethical dialog     []         blessing  []         bereavement support    [x]         support to family  []         anticipatory grief support   []         help with AMD  []         spiritual guidance    []         meditation      Spiritual Care Needs  [x]         Emotional Support  []         Spiritual/Faith Care  []         Loss/Adjustment  []         Advocacy/Referral                /Ethics  []         No needs expressed at               this time  []         Other: (note in               comments)  [address]  []         Follow up visits with               pt/family  []         Provide materials  []         Schedule sacraments  []         Contact Community               Clergy  [x]         Follow up as needed  []         Other: (note in               comments)     Comments:   Responded to Palliative Medicine team information that pt's wife remembered this  and length of stay. Wife Kimberly Vieira) was at bedside as  entered. Pt has been coughing up a lot of stuff and she was helping pt use the suction. Provided listening presence as Mrs. Mckeon shared about how things have been. We spoke about healthy self-care as Mrs. Cristy Gu mentioned that she had missed taking her medication at one day recently. Velvet Harris shared that sometimes getting appropriate meals is difficult. Velvet Harris also shared that pt's siblings have generally not been involved in seeing him or his care, but she tries not to let it bother her. Provided assurance of prayer. Will continue to follow as able.   For Spiritual Care services please page [phone]). Adela Hughes M.Div.   Palliative  Fellow

## 2017-09-20 NOTE — PROGRESS NOTES
Bedside shift change report given to Glenn Parker (oncoming nurse) by Butch Guzman (offgoing nurse). Report included the following information SBAR, Kardex, Intake/Output and MAR. Zone Phone for oncoming shift:   [phone]    Shift Summary:     LDAs               Peripheral IV 09/17/17 Right Hand (Active)   Site Assessment Clean, dry, & intact 9/20/2017  3:05 PM   Phlebitis Assessment 0 9/20/2017  3:05 PM   Infiltration Assessment 0 9/20/2017  3:05 PM   Dressing Status Clean, dry, & intact 9/20/2017  3:05 PM   Dressing Type Transparent 9/20/2017  3:05 PM   Hub Color/Line Status Blue 9/20/2017  3:05 PM          PEG/Gastrostomy Tube 07/18/17 (Active)   Site Assessment Clean, dry, & intact 9/20/2017  3:05 PM   Dressing Status Clean, dry, & intact 9/20/2017  3:05 PM   G Port Status Infusing 9/19/2017 10:41 PM   Gastric Residual (mL) 0 ml 9/20/2017  3:05 PM   Tube Feeding/Formula Options Jevity 1.5 9/20/2017  3:05 PM   Tube Feeding/Verify Rate (mL/hr) 50 9/20/2017  3:05 PM   Water Flush Volume (mL) 100 mL 9/20/2017  3:05 PM   Intake (ml) 600 ml 9/19/2017 10:41 PM   Medication Volume 100 ml 9/19/2017 10:41 PM              Urinary Catheter 09/15/17 Cummins-Criteria for Appropriate Use: Obstruction/retention   Intake & Output   Date 09/19/17 1900 - 09/20/17 0659 09/20/17 0700 - 09/21/17 0659   Shift 5096-3883 24 Hour Total 6211-0221 5782-3511 24 Hour Total   I  N  T  A  K  E   P. O.  0 0  0      P. O.  0 0  0    NG/ 900 200  200      Water Flush Volume (mL) (PEG/Gastrostomy Tube 07/18/17) 100 200 200  200      Medication Volume (PEG/Gastrostomy Tube 07/18/17) 100 100         Intake (ml) (PEG/Gastrostomy Tube 07/18/17) 600 600       Shift Total  (mL/kg) 800  (13) 900  (14.7) 200  (3.3)  200  (3.3)   O  U  T  P  U  T   Urine  (mL/kg/hr) 1850 2400 1000  (1.4)  1000      Urine Output (mL) (Urinary Catheter 09/15/17 Cummins) 1850 2400 1000  1000    Shift Total  (mL/kg) 1850  (30.1) 2400  (39.1) 1000  (16.3)  1000  (16.3)   NET -1050 -1500 -800  -800   Weight (kg) 61.4 61.4 61.4 61.4 61.4      Last Bowel Movement Last Bowel Movement Date: 09/17/17   Glucose Checks [] N/A  [] AC/HS  [x] Q6  Concerns:   Nutrition Active Orders   Diet    DIET NPO       Consults []PT  []OT  []Speech  []Case Management   Cardiac Monitoring []N/A [x]Yes Expires:

## 2017-09-20 NOTE — PROGRESS NOTES
Problem: Falls - Risk of  Goal: *Absence of Falls  Document Lorena Fall Risk and appropriate interventions in the flowsheet.    Outcome: Progressing Towards Goal  Fall Risk Interventions:  Mobility Interventions: Patient to call before getting OOB     Mentation Interventions: Bed/chair exit alarm     Medication Interventions: Bed/chair exit alarm, Patient to call before getting OOB     Elimination Interventions: Call light in reach     History of Falls Interventions: Bed/chair exit alarm

## 2017-09-20 NOTE — PROGRESS NOTES
Bedside and Verbal shift change report given to laquita Barrett (oncoming nurse) by Jaky Mejia (offgoing nurse). Report included the following information SBAR, Procedure Summary, Intake/Output, MAR, Recent Results and Cardiac Rhythm !st degree av block.

## 2017-09-20 NOTE — PROGRESS NOTES
Palliative Care meeting with wife at 11:15 today. Updates sent to [facility] to assess for Friday admission if stable.

## 2017-09-20 NOTE — PROGRESS NOTES
Problem: Self Care Deficits Care Plan (Adult)  Goal: *Acute Goals and Plan of Care (Insert Text)  Occupational Therapy Goals  Initiated 9/15/2017  1. Patient will perform grooming with minimal assistance/contact guard assist within 7 day(s). 2. Patient will perform lower body dressing with moderate assistance within 7 day(s). 3. Patient will perform toilet transfers with minimal assistance/contact guard assist within 7 day(s). 4. Patient will perform all aspects of toileting with moderate assistance within 7 day(s). 5. Patient will participate in upper extremity therapeutic exercise/activities with supervision/set-up for 10 minutes within 7 day(s). 6. Patient will utilize energy conservation techniques during functional activities with verbal and visual cues within 7 day(s). OCCUPATIONAL THERAPY TREATMENT  Patient: Geovani Valenzuela (60 y.o. male)  Date: 9/20/2017  Diagnosis: HCAP (healthcare-associated pneumonia)  UTI (urinary tract infection)  Acute encephalopathy <principal problem not specified>       Precautions: Fall, Bed Alarm  Chart, occupational therapy assessment, plan of care, and goals were reviewed. ASSESSMENT:  Patient continues to be significantly limited in all areas, but participates in therapy sessions. He had a difficult time attention to the right side (spatial and body) and perservated during exercises. He requires total assist to place his right hand on the handle of the walker, both prior to functional mobility and during (as his hand would fall off the handle). Patient's wife was present during this treatment session and seems to have limited insight into the extent of his deficits. Patient will continue to benefit from skilled OT treatment to maximize independence and safety in basic self-care.   Progression toward goals:  [ ]          Improving appropriately and progressing toward goals  [X]          Improving slowly and progressing toward goals  [ ]          Not making progress toward goals and plan of care will be adjusted       PLAN:  Patient continues to benefit from skilled intervention to address the above impairments. Continue treatment per established plan of care. Discharge Recommendations:  [facility]  Further Equipment Recommendations for Discharge:  Defer to facility       SUBJECTIVE:   Patient with limited vocalizations throughout, and most were unintelligible. OBJECTIVE DATA SUMMARY:   Cognitive/Behavioral Status:  Neurologic State: Alert;Confused     Cognition: Decreased attention/concentration;Decreased command following; Impaired decision making;Poor safety awareness  Perception: Cues to attend right visual field;Cues to attend to right side of body  Perseveration: Perseverates during mobility;Perseverates during ADLS  Safety/Judgement: Decreased awareness of environment;Decreased insight into deficits; Decreased awareness of need for safety;Decreased awareness of need for assistance      Functional Mobility and Transfers for ADLs:              Bed Mobility:     Supine to Sit: Minimum assistance                      Transfers:  Sit to Stand: Assist x2; Moderate assistance        Balance:  Sitting: Impaired  Sitting - Static: Fair (occasional)  Sitting - Dynamic: Fair (occasional)  Standing: Impaired  Standing - Static: Occassional  Standing - Dynamic : Poor      ADL Intervention:        Toileting  Bladder Hygiene:  (N/A - kelly)  Bowel Hygiene: Total assistance (dependent) (to clean a small amount of stool)  Clothing Management: Maximum assistance      Lower Body Dressing  Socks: Total assistance     Cognitive Retraining  Safety/Judgement: Decreased awareness of environment;Decreased insight into deficits; Decreased awareness of need for safety;Decreased awareness of need for assistance     Attempted to have him complete some exercises to increase strength and endurance.   He required Mod to Max A throughout due to perseveration and difficulty following commands. Pain:  Pain Scale 1: Numeric (0 - 10)  Pain Intensity 1: 0                 Activity Tolerance:    Poor  Please refer to the flowsheet for vital signs taken during this treatment.   After treatment:   [X]  Patient left in no apparent distress sitting up in chair  [ ]  Patient left in no apparent distress in bed  [X]  Call bell left within reach  [X]  Nursing notified  [X]  Caregiver present  [ ]  Bed alarm activated      COMMUNICATION/COLLABORATION:   The patients plan of care was discussed with: Physical Therapist, Registered Nurse and Carlito Quesada OTR/L  Time Calculation: 28 mins

## 2017-09-20 NOTE — PROGRESS NOTES
Hospitalist Progress Note    NAME: Coral Jackson   :  1934   MRN:  [id number]       Assessment / Plan:  Hypomagnesemia POA- replenished  S/p IV mag x 1       Right upper lobe pneumonia, POA  Sepsis, POA with tachycardia and tachypnea. CXR with small infiltrate in RUL read as atelectasis or PNA. Not hypoxic or febrile. Complete  levaquin and zosyn given recent hospitalization  Follow BC, no growth so far     Urinary tract infection due to chronic indwelling Cummins for urinary retention POA  --This has been a recurrent problem with his recent prior hospitalizations. Cummins catheter was changed in the emergency room. On abx as above. Urine cx Ecoli     Dysarthria, POA/encephaloptahy, Seizure disorder with history of status epilepticus twice this year, remains confused  Secondary to ?post ictal/infection vs worsening of baseline cognitive deficit  --CT head negative. Get MRI brain- noted- diffuse restriction in L hemisphere cortical- due to seizures?   --on Keppra and depakote. Will continue. No sign of seizure  Appreciate neurology consult     History of alcohol abuse  --continue MVI, thiamine. Thiamine likely can be stopped at discharge if he remains institutionalized and not drinking.     Hx heme positive stool and anemia  --continue PPI   Hb stable     Dysphagia  --continue tubefeed. Jevity 1.5 50ml/hr with water flush 100ml q4h. Consult nutrition service. Hypokalemia/hypomagnesemia, replete       Code status is FULL CODE.    DVT prophylaxis with Lovenox.     This is fifth hospitalization since February. He has been seen by Palliative Care in July. Body mass index is 20.28 kg/(m^2).     Surrogate decision maker:  Wife Melony Roberts: SNF once stable.  Palliative care consult noted- family meeting today- Pt's wife wants restorative care, Full code & wants to consider SNF for rehab if authorized, but is aware that if insurance doesn't authorize then will need to consider home health options  CM working on DC planning    Body mass index is 20.58 kg/(m^2). Subjective:     Chief Complaint / Reason for Physician Visit  \"confused\". Discussed with RN events overnight. Review of Systems:  Symptom Y/N Comments  Symptom Y/N Comments   Fever/Chills    Chest Pain     Poor Appetite    Edema     Cough    Abdominal Pain     Sputum    Joint Pain     SOB/AL    Pruritis/Rash     Nausea/vomit    Tolerating PT/OT     Diarrhea    Tolerating Diet     Constipation    Other       Could NOT obtain due to: encephalopathy     Objective:     VITALS:   Last 24hrs VS reviewed since prior progress note. Most recent are:  Patient Vitals for the past 24 hrs:   Temp Pulse Resp BP SpO2   09/20/17 1508 98.2 °F (36.8 °C) 77 16 130/89 97 %   09/20/17 1233 - 74 - - 98 %   09/20/17 1158 98.3 °F (36.8 °C) 72 16 147/78 97 %   09/20/17 0755 98 °F (36.7 °C) 73 16 142/77 94 %   09/20/17 0400 98 °F (36.7 °C) 60 16 155/61 95 %   09/19/17 2209 97.8 °F (36.6 °C) 64 18 116/54 91 %   09/19/17 1959 98.2 °F (36.8 °C) 62 18 134/62 95 %   09/19/17 1933 97.4 °F (36.3 °C) 63 18 126/70 99 %       Intake/Output Summary (Last 24 hours) at 09/20/17 1656  Last data filed at 09/20/17 1505   Gross per 24 hour   Intake             1000 ml   Output             2625 ml   Net            -1625 ml        PHYSICAL EXAM:  General: WD, WN.alert, awake no acute distress    EENT:  EOMI, no icterus/erythema  Resp:  CTA bilaterally, no wheezing or rales. No accessory muscle use  CV:  Regular  rhythm,  No edema  GI:  Soft, Non distended, Non tender.  +Bowel sounds  Neurologic:  Awake, moving all ext, mumbels  Psych:   Per sitter no agitation  Skin:  No rashes.   No jaundice    Reviewed most current lab test results and cultures  YES  Reviewed most current radiology test results   YES  Review and summation of old records today    NO  Reviewed patient's current orders and MAR    YES  PMH/SH reviewed - no change compared to H&P  ________________________________________________________________________  Care Plan discussed with:    Comments   Patient x    Family      RN x    Care Manager x Ramon Burrows   Consultant  x Dr Maritza Irwin (Palliative)                     Multidiciplinary team rounds were held today with , nursing, pharmacist and clinical coordinator. Patient's plan of care was discussed; medications were reviewed and discharge planning was addressed. ________________________________________________________________________  Total NON critical care TIME: 16   Minutes    Total CRITICAL CARE TIME Spent:   Minutes non procedure based      Comments   >50% of visit spent in counseling and coordination of care     ________________________________________________________________________  Jennifer Littlejohn MD     Procedures: see electronic medical records for all procedures/Xrays and details which were not copied into this note but were reviewed prior to creation of Plan. LABS:  I reviewed today's most current labs and imaging studies.   Pertinent labs include:  Recent Labs      09/19/17 0153 09/18/17 0414   WBC  2.9*  3.7*   HGB  11.1*  11.4*   HCT  32.9*  34.1*   PLT  115*  119*     Recent Labs      09/20/17   0300  09/19/17 0153 09/18/17   0414   NA  141  138  139   K  3.7  3.5  3.3*   CL  101  100  98   CO2  36*  34*  36*   GLU  94  109*  111*   BUN  11  12  11   CREA  0.54*  0.53*  0.58*   CA  8.8  8.5  8.7   MG  1.7  1.5*  1.5*   PHOS   --   3.1  3.2       Signed: Jennifer Littlejohn MD

## 2017-09-20 NOTE — PROGRESS NOTES
Problem: Dysphagia (Adult)  Goal: *Acute Goals and Plan of Care (Insert Text)  Speech pathology goals  Initiated 9/15/2017  1. Patient will participate in daily re-evaluation of swallow function   [id number] [facility] TREATMENT  Patient: Clarice Garcia (31 y.o. male)  Date: 9/20/2017  Diagnosis: HCAP (healthcare-associated pneumonia)  UTI (urinary tract infection)  Acute encephalopathy <principal problem not specified>       Precautions:  Fall, Bed Alarm      ASSESSMENT:  Pt is currently still not handling his secretions well and has a very wet, gurgly vocal quality. He could clear it with much cueing and expectorating and use of the [place]. He was given one ice chip which he masticated for a lengthy period of time. Slow oral transit /propulsion noted. Mild swallow delay is suspected with weak hyolaryngeal excursion noted. He swallowed 5-6 times after one ice chip which could indicate pharyngeal residue. He coughed immediately after the swallow indicating laryngeal penetration or possibly aspiration. In the [facility] home he had started limited po. This does not look at all possible at this point. The pt's wife is hopeful that he will get well enough to make medical decisions for himself per Palliative notes. Wife and I discussed that his swallowing may not recover which is totally possible given his advanced age and comorbidities. She made no comment. Progression toward goals:  [ ]         Improving appropriately and progressing toward goals  [ ]         Improving slowly and progressing toward goals  [x ]         Not making progress toward goals and plan of care will be adjusted       PLAN:  Recommendations and Planned Interventions:  NPO to continue and use of PEG only. Why is he still so thin with TF? Patient continues to benefit from skilled intervention to address the above impairments. Continue treatment per established plan of care.   Discharge Recommendations:  None       SUBJECTIVE: Patient stated I don't know. \" when asked his name. OBJECTIVE:   Cognitive and Communication Status:  Neurologic State: Alert  Orientation Level: Disoriented X4 (he needed cues for his name. \"CHARISSA\" when asked his name)  Cognition: Decreased attention/concentration, Decreased command following, Impaired decision making, Poor safety awareness  Perception: Cues to attend right visual field, Cues to attend to right side of body  Perseveration: Perseverates during mobility, Perseverates during ADLS  Safety/Judgement: Decreased awareness of environment, Decreased insight into deficits, Decreased awareness of need for safety, Decreased awareness of need for assistance  Dysphagia Treatment:  Oral Assessment:     P.O. Trials:  Patient Position: upright in bed  Vocal quality prior to P.O.: Wet (cleared it after much cueing to cough and clear his throat of copious clear secretions. )  Consistency Presented: Ice chips  How Presented: SLP-fed/presented;Spoon     Bolus Acceptance: No impairment  Bolus Formation/Control: Impaired  Type of Impairment: Delayed  Propulsion: Delayed (# of seconds)  Oral Residue: None  Initiation of Swallow: Delayed (# of seconds)  Laryngeal Elevation: Decreased  Aspiration Signs/Symptoms: Strong cough  Pharyngeal Phase Characteristics: Multiple swallows; Suspected pharyngeal residue              Oral Phase Severity: Moderate  Pharyngeal Phase Severity : Severe          Pain:  Pain Scale 1: Numeric (0 - 10)  Pain Intensity 1: 0     After treatment:   [ ]              Patient left in no apparent distress sitting up in chair  [X]              Patient left in no apparent distress in bed  [X]              Call bell left within reach  [X]              Nursing notified  [ ]              Caregiver present  [ ]              Bed alarm activated      COMMUNICATION/EDUCATION:   The patients plan of care including recommendations, planned interventions, and recommended diet changes were discussed with: Registered Nurse. [ ]              Posted safety precautions in patient's room.      Ankit Chu, SLP  Time Calculation: 8 mins

## 2017-09-20 NOTE — PROGRESS NOTES
Problem: Falls - Risk of  Goal: *Absence of Falls  Document Lorena Fall Risk and appropriate interventions in the flowsheet. Outcome: Progressing Towards Goal  Fall Risk Interventions:  Mobility Interventions: Patient to call before getting OOB     Mentation Interventions: Bed/chair exit alarm, Adequate sleep, hydration, pain control     Medication Interventions: Patient to call before getting OOB, Teach patient to arise slowly     Elimination Interventions: Call light in reach, Toileting schedule/hourly rounds     History of Falls Interventions:  Investigate reason for fall

## 2017-09-21 LAB
ANION GAP SERPL CALC-SCNC: 6 MMOL/L (ref 5–15)
BACTERIA SPEC CULT: NORMAL
BACTERIA SPEC CULT: NORMAL
BUN SERPL-MCNC: 13 MG/DL (ref 6–20)
BUN/CREAT SERPL: 25 (ref 12–20)
CALCIUM SERPL-MCNC: 8.5 MG/DL (ref 8.5–10.1)
CHLORIDE SERPL-SCNC: 101 MMOL/L (ref 97–108)
CO2 SERPL-SCNC: 33 MMOL/L (ref 21–32)
CREAT SERPL-MCNC: 0.51 MG/DL (ref 0.7–1.3)
GLUCOSE BLD STRIP.AUTO-MCNC: 100 MG/DL (ref 65–100)
GLUCOSE BLD STRIP.AUTO-MCNC: 102 MG/DL (ref 65–100)
GLUCOSE BLD STRIP.AUTO-MCNC: 84 MG/DL (ref 65–100)
GLUCOSE BLD STRIP.AUTO-MCNC: 95 MG/DL (ref 65–100)
GLUCOSE SERPL-MCNC: 103 MG/DL (ref 65–100)
POTASSIUM SERPL-SCNC: 3.6 MMOL/L (ref 3.5–5.1)
SERVICE CMNT-IMP: ABNORMAL
SERVICE CMNT-IMP: NORMAL
SODIUM SERPL-SCNC: 140 MMOL/L (ref 136–145)

## 2017-09-21 PROCEDURE — 77030018798 HC PMP KT ENTRL FED COVD -A

## 2017-09-21 PROCEDURE — 82962 GLUCOSE BLOOD TEST: CPT

## 2017-09-21 PROCEDURE — 80048 BASIC METABOLIC PNL TOTAL CA: CPT | Performed by: EMERGENCY MEDICINE

## 2017-09-21 PROCEDURE — 97110 THERAPEUTIC EXERCISES: CPT

## 2017-09-21 PROCEDURE — 65660000000 HC RM CCU STEPDOWN

## 2017-09-21 PROCEDURE — 97116 GAIT TRAINING THERAPY: CPT

## 2017-09-21 PROCEDURE — 74011250637 HC RX REV CODE- 250/637: Performed by: HOSPITALIST

## 2017-09-21 PROCEDURE — 36415 COLL VENOUS BLD VENIPUNCTURE: CPT | Performed by: EMERGENCY MEDICINE

## 2017-09-21 PROCEDURE — 74011250636 HC RX REV CODE- 250/636: Performed by: INTERNAL MEDICINE

## 2017-09-21 RX ORDER — ENOXAPARIN SODIUM 100 MG/ML
40 INJECTION SUBCUTANEOUS EVERY 24 HOURS
Status: DISCONTINUED | OUTPATIENT
Start: 2017-09-22 | End: 2017-09-22 | Stop reason: HOSPADM

## 2017-09-21 RX ADMIN — LEVETIRACETAM 1500 MG: 100 SOLUTION ORAL at 21:22

## 2017-09-21 RX ADMIN — METOPROLOL TARTRATE 12.5 MG: 25 TABLET, FILM COATED ORAL at 10:24

## 2017-09-21 RX ADMIN — Medication 10 ML: at 21:23

## 2017-09-21 RX ADMIN — VALPROIC ACID 500 MG: 250 SOLUTION ORAL at 10:30

## 2017-09-21 RX ADMIN — ENOXAPARIN SODIUM 30 MG: 30 INJECTION SUBCUTANEOUS at 10:28

## 2017-09-21 RX ADMIN — VALPROIC ACID 500 MG: 250 SOLUTION ORAL at 21:21

## 2017-09-21 RX ADMIN — PREGABALIN 300 MG: 150 CAPSULE ORAL at 10:23

## 2017-09-21 RX ADMIN — LATANOPROST 1 DROP: 50 SOLUTION OPHTHALMIC at 21:23

## 2017-09-21 RX ADMIN — ASPIRIN 81 MG CHEWABLE TABLET 81 MG: 81 TABLET CHEWABLE at 10:24

## 2017-09-21 RX ADMIN — Medication 10 ML: at 05:35

## 2017-09-21 RX ADMIN — VALPROIC ACID 500 MG: 250 SOLUTION ORAL at 19:11

## 2017-09-21 RX ADMIN — MULTIVIT AND MINERALS-FERROUS GLUCONATE 9 MG IRON/15 ML ORAL LIQUID 15 ML: at 10:30

## 2017-09-21 RX ADMIN — Medication 10 ML: at 14:49

## 2017-09-21 RX ADMIN — LEVETIRACETAM 1500 MG: 100 SOLUTION ORAL at 10:30

## 2017-09-21 RX ADMIN — PANTOPRAZOLE SODIUM 40 MG: 40 GRANULE, DELAYED RELEASE ORAL at 10:30

## 2017-09-21 RX ADMIN — PANTOPRAZOLE SODIUM 40 MG: 40 GRANULE, DELAYED RELEASE ORAL at 19:10

## 2017-09-21 RX ADMIN — Medication 100 MG: at 10:24

## 2017-09-21 RX ADMIN — PREGABALIN 300 MG: 150 CAPSULE ORAL at 19:06

## 2017-09-21 RX ADMIN — METOPROLOL TARTRATE 12.5 MG: 25 TABLET, FILM COATED ORAL at 19:06

## 2017-09-21 RX ADMIN — QUETIAPINE FUMARATE 25 MG: 25 TABLET ORAL at 21:22

## 2017-09-21 NOTE — PROGRESS NOTES
Bedside shift change report given to Maddie RN (oncoming nurse) by Suman Galvan RN (offgoing nurse). Report included the following information SBAR, Kardex, MAR, Recent Results and Cardiac Rhythm NSR, 1 degree AVB.

## 2017-09-21 NOTE — PROGRESS NOTES
Problem: Mobility Impaired (Adult and Pediatric)  Goal: *Acute Goals and Plan of Care (Insert Text)  Physical Therapy Goals  9/15/2017  1. Patient will move from supine to sit and sit to supine , scoot up and down and roll side to side in bed with minimal assistance/contact guard assist within 7 day(s). 2. Patient will transfer from bed to chair and chair to bed with minimal assistance/contact guard assist using the least restrictive device within 7 day(s). 3. Patient will perform sit to stand with minimal assistance/contact guard assist within 7 day(s). 4. Patient will ambulate with minimal assistance/contact guard assist for 50 feet with the least restrictive device within 7 day(s). PHYSICAL THERAPY TREATMENT  Patient: Ernesto Evangelista (83 y.o. male)  Date: 9/21/2017  Diagnosis: HCAP (healthcare-associated pneumonia)  UTI (urinary tract infection)  Acute encephalopathy <principal problem not specified>       Precautions: Fall, Bed Alarm      ASSESSMENT:  Patient is somewhat better today in terms of following directions, more clear speech, however his physical mobility is same or worse today. Pt found wet in bed (though has a kelly in? ? ) Patient is agreeable to PT and does his best to do as asked by PT and wife. Pt is very unstable, needs min to mod assist to ambulate + 1 for IV. He requires mod VCs to guide walker, and is impulsvie though very kind and cooperative. Pt participated better today with chair exercises and seems to understand their importance. Wife with Palliative care now. Progression toward goals:  [ ]    Improving appropriately and progressing toward goals  [X]    Improving slowly and progressing toward goals  [ ]    Not making progress toward goals and plan of care will be adjusted       PLAN:  Patient continues to benefit from skilled intervention to address the above impairments. Continue treatment per established plan of care.   Discharge Recommendations:  Rehab and Skilled Nursing Facility  Further Equipment Recommendations for Discharge:  rolling walker       SUBJECTIVE:   Patient stated Romeo Bolivar.       OBJECTIVE DATA SUMMARY:   Critical Behavior:  Neurologic State: Sleeping, Eyes open to voice  Orientation Level: Disoriented X4  Cognition: Follows commands, Decreased attention/concentration  Safety/Judgement: Decreased awareness of environment, Decreased insight into deficits, Decreased awareness of need for safety, Decreased awareness of need for assistance  Functional Mobility Training:  Bed Mobility:  Rolling: Additional time;Minimum assistance  Supine to Sit: Minimum assistance              Transfers:  Sit to Stand: Minimum assistance (IV, feeding tube , max ceuing)  Stand to Sit: Minimum assistance                             Balance:  Sitting: Impaired  Sitting - Static: Fair (occasional)  Sitting - Dynamic: Fair (occasional)  Standing: Impaired  Standing - Static: Fair  Standing - Dynamic : Poor  Ambulation/Gait Training:  Distance (ft): 22 Feet (ft)  Assistive Device: Walker, rolling  Ambulation - Level of Assistance: Moderate assistance        Gait Abnormalities: Decreased step clearance; Path deviations; Shuffling gait  Right Side Weight Bearing: Full  Left Side Weight Bearing: Full  Base of Support: Narrowed     Speed/Shannon: Shuffled                                  Stairs:                       Neuro Re-Education:     Therapeutic Exercises:   Knee extensions x 10 , pt counted with PT - 2 reps  L UE biceps curls - pt counted  L shoulder raises to 90 deg  Ankle pumps  Pain:  Pain Scale 1: Adult Nonverbal Pain Scale                 Activity Tolerance:   Fair, VSS  Please refer to the flowsheet for vital signs taken during this treatment.   After treatment:   [X]    Patient left in no apparent distress sitting up in chair  [ ]    Patient left in no apparent distress in bed  [X]    Call bell left within reach  [X]    Nursing notified  [X]    Caregiver present  [ ]    Bed alarm activated COMMUNICATION/COLLABORATION:   The patients plan of care was discussed with: Certified [id number], PT   Time Calculation: 19 mins

## 2017-09-21 NOTE — PROGRESS NOTES
Palliative Medicine     This LCSW visited with pt's wife/nok medical surrogate Blancajamie Riggsens). Provided active listening. Assisted her with facilitating a call she got about Pt by communicating that Pt's wife is acting as Pt's nok medical surrogate of Pt. PT came in to work with Pt. With assistance he is ambulating with walker. Spoke with RN (Maddie). Refer to this author's note from 9/20/17 for psychosocial assessment. Goals/Plan:   Full restorative medical interventions to prolong life and family hopes that Pt will get better (cognitively) over time. Full code  Wife is uncertain if she wants Pt to return to SNF for rehab or go home with her providing daily personal care needs; CM working on dispo. Thank you for including Palliative Medicine in Mr. Mckeno's care.       Elva Valiente, [address] ([id number])  Work cell: [phone]

## 2017-09-21 NOTE — PROGRESS NOTES
SW met with pt spouse to discuss discharge plans. SW made pt aware of the possible challenges with managing pt care at home by herself. Spouse concerned that she will not be able to care for the pt at home. SW informed her that the pt could return to [facility] at discharge as they have received auth and could accept her. Spouse wanted SW to speak with pt daughter. SW spoke with pt daughter who is in agreement with pt returning to [facility] as she feels it will be too much for the spouse to manage. CM will assist with discharge planned for tomorrow.     Mendez Jerez, MSW  Ext [phone]

## 2017-09-21 NOTE — PROGRESS NOTES
Bedside and Verbal shift change report given to Yadira Singh (oncoming nurse) by Jonathan Perez (offgoing nurse). Report included the following information SBAR, Kardex, Procedure Summary, Intake/Output, MAR and Recent Results.

## 2017-09-21 NOTE — PROGRESS NOTES
Bedside and Verbal shift change report given to Ila Garcia (oncoming nurse) by Lora Wilson (offgoing nurse). Report included the following information SBAR, Procedure Summary, Intake/Output, MAR, Recent Results and Cardiac Rhythm NSR/!st degree av block.

## 2017-09-21 NOTE — PROGRESS NOTES
Nutrition Assessment:    INTERVENTIONS/RECOMMENDATIONS:   Enteral/Parenteral Nutrition:  (Continue current TF regimen)    ASSESSMENT:   Chart reviewed; continues to tolerate TF at goal. Currently meeting >100% of estimated kcal needs and 100% of estimated protein needs. Remains NPO per SLP recommendation; unlikely that swallow function will return. Palliative care following. Will continue to monitor TF tolerance and plan of care. Diet Order: NPO (PEG: Jevity 1.5 @ 50 mL/hr + 100 mL water flushes q 4 hours; provides 1800 kcal, 77g protein, 1512 mL water x 24 hours)  % Eaten:  Patient Vitals for the past 72 hrs:   % Diet Eaten   09/19/17 0848 0 %     Pertinent Medications: [x] Reviewed []Other: Keppra, Lopressor, Cerovite, Protonix, Seroquel, Thiamine   Pertinent Labs: [x]Reviewed  []Other:  Food Allergies: [x]None []Other:     Last BM: 9/20   [x]Active     []Hyperactive  []Hypoactive       [] Absent  BS  Skin:    [x] Intact   [] Incision  [] Breakdown   []Edema   []Other:    Anthropometrics: Height: 5' 8\" (172.7 cm) Weight: 63 kg (139 lb)    IBW (%IBW): 69.9 kg (154 lb) ( ) UBW (%UBW):   (  %)    BMI: Body mass index is 21.13 kg/(m^2). This BMI is indicative of:  []Underweight   [x]Normal   []Overweight   [] Obesity   [] Extreme Obesity (BMI>40)  Last Weight Metrics:  Weight Loss Metrics 9/21/2017 8/22/2017 7/27/2017 7/22/2017 6/21/2017 3/21/2017 3/18/2017   Today's Wt 139 lb 144 lb 11.2 oz 150 lb 152 lb 3.2 oz 155 lb 166 lb 150 lb   BMI 21.13 kg/m2 22 kg/m2 22.81 kg/m2 23.14 kg/m2 23.57 kg/m2 25.24 kg/m2 22.81 kg/m2       Estimated Nutrition Needs (Based on): 1696 Kcals/day (BMR (1305) x 1. 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     Signs/Symptoms: as evidenced by PEG dependent at this time      NUTRITION INTERVENTIONS:    Enteral/Parenteral Nutrition:  (Continue current TF regimen)                GOAL:   Patient will continue to tolerate TF @ goal with residual <250 mL next 2-4 days    NUTRITION MONITORING AND EVALUATION   Behavioral-Environmental Outcomes: Behavior  Food/Nutrient Intake Outcomes: Enteral/parenteral nutrition intake  Physical Signs/Symptoms Outcomes: Weight/weight change, Electrolyte and renal profile, GI profile, Glucose profile    Previous Goal Met:   [x] Met              [] Progressing Towards Goal              [] Not Progressing Towards Goal   Previous Recommendations:   [x] Implemented          [] Not Implemented          [] Not Applicable    LEARNING NEEDS (Diet, Food/Nutrient-Drug Interaction):    [x] None Identified   [] Identified and Education Provided/Documented   [] Identified and Pt declined/was not appropriate     Cultural, Sikhism, OR Ethnic Dietary Needs:    [x] None Identified   [] Identified and Addressed     [x] Interdisciplinary Care Plan Reviewed/Documented    [x] Discharge Planning: Continue current TF regimen   [] Participated in Interdisciplinary Rounds    NUTRITION RISK:    [x] High              [] Moderate           []  Low  []  Minimal/Uncompromised      [facility]  Pager [phone]              Weekend Pager [phone]

## 2017-09-21 NOTE — PROGRESS NOTES
Problem: Falls - Risk of  Goal: *Absence of Falls  Document Lorena Fall Risk and appropriate interventions in the flowsheet.    Outcome: Progressing Towards Goal  Fall Risk Interventions:  Mobility Interventions: Bed/chair exit alarm, Patient to call before getting OOB     Mentation Interventions: Adequate sleep, hydration, pain control, Door open when patient unattended, Evaluate medications/consider consulting pharmacy     Medication Interventions: Bed/chair exit alarm, Patient to call before getting OOB     Elimination Interventions: Call light in reach     History of Falls Interventions: Bed/chair exit alarm, Door open when patient unattended, Evaluate medications/consider consulting pharmacy

## 2017-09-21 NOTE — PROGRESS NOTES
Hospitalist Progress Note    NAME: Judi Austin   :  1934   MRN:  [id number]       Assessment / Plan:  Hypomagnesemia POA- replenished  S/p IV mag x 1       Right upper lobe pneumonia, POA  Sepsis, POA with tachycardia and tachypnea. CXR with small infiltrate in RUL read as atelectasis or PNA. Not hypoxic or febrile. Complete  levaquin and zosyn given recent hospitalization  Follow BC, no growth so far     Urinary tract infection due to chronic indwelling Cummins for urinary retention POA  --This has been a recurrent problem with his recent prior hospitalizations. Cummins catheter was changed in the emergency room. On abx as above. Urine cx Ecoli     Dysarthria, POA/encephaloptahy, Seizure disorder with history of status epilepticus twice this year, remains confused  Secondary to ?post ictal/infection vs worsening of baseline cognitive deficit  --CT head negative. Get MRI brain- noted- diffuse restriction in L hemisphere cortical- due to seizures?   --on Keppra and depakote. Will continue. No sign of seizure  Appreciate neurology consult     History of alcohol abuse  --continue MVI, thiamine. Thiamine likely can be stopped at discharge if he remains institutionalized and not drinking.     Hx heme positive stool and anemia  --continue PPI   Hb stable     Dysphagia  --continue tubefeed. Jevity 1.5 50ml/hr with water flush 100ml q4h. Consult nutrition service. Hypokalemia/hypomagnesemia, replete       Code status is FULL CODE.    DVT prophylaxis with Lovenox.     This is fifth hospitalization since February. He has been seen by Palliative Care in July. Body mass index is 20.28 kg/(m^2).     Surrogate decision maker:  Wife Portia Joseph: SNF once stable.  Palliative care consult noted- family meeting done - Pt's wife wants restorative care, Full code & wants to consider SNF for rehab if authorized, but is aware that if insurance doesn't authorize then will need to consider home health options  [facility] & wife/family ok with the SNF choice. Otherwise wife open to [facility] PT/OT/SN attempt with transition to Hospice in future as per today's discussion    Body mass index is 21.13 kg/(m^2). Subjective:     Chief Complaint / Reason for Physician Visit  \"confused\". Discussed with RN events overnight. Review of Systems:  Symptom Y/N Comments  Symptom Y/N Comments   Fever/Chills    Chest Pain     Poor Appetite    Edema     Cough    Abdominal Pain     Sputum    Joint Pain     SOB/AL    Pruritis/Rash     Nausea/vomit    Tolerating PT/OT     Diarrhea    Tolerating Diet     Constipation    Other       Could NOT obtain due to: encephalopathy     Objective:     VITALS:   Last 24hrs VS reviewed since prior progress note. Most recent are:  Patient Vitals for the past 24 hrs:   Temp Pulse Resp BP SpO2   09/21/17 1525 97.3 °F (36.3 °C) 87 20 123/70 99 %   09/21/17 1139 98.4 °F (36.9 °C) 73 20 138/75 98 %   09/21/17 1130 98.4 °F (36.9 °C) 72 20 136/70 97 %   09/21/17 0753 98.5 °F (36.9 °C) 72 24 135/69 99 %   09/21/17 0412 97.1 °F (36.2 °C) 71 22 121/62 99 %   09/20/17 2352 97.6 °F (36.4 °C) 62 22 116/64 96 %   09/20/17 1939 97.5 °F (36.4 °C) 70 20 132/76 96 %       Intake/Output Summary (Last 24 hours) at 09/21/17 1624  Last data filed at 09/21/17 0900   Gross per 24 hour   Intake             1000 ml   Output             1325 ml   Net             -325 ml        PHYSICAL EXAM:  General: WD, WN.alert, awake no acute distress    EENT:  EOMI, no icterus/erythema  Resp:  CTA bilaterally, no wheezing or rales. No accessory muscle use  CV:  Regular  rhythm,  No edema  GI:  Soft, Non distended, Non tender.  +Bowel sounds  Neurologic:  Awake, moving all ext, mumbels  Psych:   Per sitter no agitation  Skin:  No rashes.   No jaundice    Reviewed most current lab test results and cultures  YES  Reviewed most current radiology test results   YES  Review and summation of old records today    NO  Reviewed patient's current orders and MAR    YES  PMH/SH reviewed - no change compared to H&P  ________________________________________________________________________  Care Plan discussed with:    Comments   Patient x    Family  x Wife at bedside   RN x    Care Manager x Wally   Consultant                        Multidiciplinary team rounds were held today with , nursing, pharmacist and clinical coordinator. Patient's plan of care was discussed; medications were reviewed and discharge planning was addressed. ________________________________________________________________________  Total NON critical care TIME: 26   Minutes    Total CRITICAL CARE TIME Spent:   Minutes non procedure based      Comments   >50% of visit spent in counseling and coordination of care x Discharge planning, goal of care discussion   ________________________________________________________________________  Teodora Dash MD     Procedures: see electronic medical records for all procedures/Xrays and details which were not copied into this note but were reviewed prior to creation of Plan. LABS:  I reviewed today's most current labs and imaging studies.   Pertinent labs include:  Recent Labs      09/19/17   0153   WBC  2.9*   HGB  11.1*   HCT  32.9*   PLT  115*     Recent Labs      09/21/17   0542  09/20/17   0300  09/19/17   0153   NA  140  141  138   K  3.6  3.7  3.5   CL  101  101  100   CO2  33*  36*  34*   GLU  103*  94  109*   BUN  13  11  12   CREA  0.51*  0.54*  0.53*   CA  8.5  8.8  8.5   MG   --   1.7  1.5*   PHOS   --    --   3.1       Signed: Teodora Dash MD

## 2017-09-22 VITALS
WEIGHT: 134.6 LBS | HEIGHT: 68 IN | SYSTOLIC BLOOD PRESSURE: 118 MMHG | BODY MASS INDEX: 20.4 KG/M2 | OXYGEN SATURATION: 93 % | DIASTOLIC BLOOD PRESSURE: 63 MMHG | HEART RATE: 74 BPM | RESPIRATION RATE: 18 BRPM | TEMPERATURE: 98 F

## 2017-09-22 LAB
ANION GAP SERPL CALC-SCNC: 4 MMOL/L (ref 5–15)
BUN SERPL-MCNC: 17 MG/DL (ref 6–20)
BUN/CREAT SERPL: 33 (ref 12–20)
CALCIUM SERPL-MCNC: 8.6 MG/DL (ref 8.5–10.1)
CHLORIDE SERPL-SCNC: 100 MMOL/L (ref 97–108)
CO2 SERPL-SCNC: 36 MMOL/L (ref 21–32)
CREAT SERPL-MCNC: 0.52 MG/DL (ref 0.7–1.3)
GLUCOSE SERPL-MCNC: 111 MG/DL (ref 65–100)
POTASSIUM SERPL-SCNC: 3.7 MMOL/L (ref 3.5–5.1)
SODIUM SERPL-SCNC: 140 MMOL/L (ref 136–145)

## 2017-09-22 PROCEDURE — 90686 IIV4 VACC NO PRSV 0.5 ML IM: CPT | Performed by: INTERNAL MEDICINE

## 2017-09-22 PROCEDURE — 90471 IMMUNIZATION ADMIN: CPT

## 2017-09-22 PROCEDURE — 36415 COLL VENOUS BLD VENIPUNCTURE: CPT | Performed by: EMERGENCY MEDICINE

## 2017-09-22 PROCEDURE — 80048 BASIC METABOLIC PNL TOTAL CA: CPT | Performed by: EMERGENCY MEDICINE

## 2017-09-22 PROCEDURE — 74011250636 HC RX REV CODE- 250/636: Performed by: INTERNAL MEDICINE

## 2017-09-22 PROCEDURE — 74011250637 HC RX REV CODE- 250/637: Performed by: HOSPITALIST

## 2017-09-22 RX ORDER — GUAIFENESIN 100 MG/5ML
81 LIQUID (ML) ORAL DAILY
Qty: 30 TAB | Refills: 0 | Status: SHIPPED
Start: 2017-09-22 | End: 2017-09-27

## 2017-09-22 RX ADMIN — METOPROLOL TARTRATE 12.5 MG: 25 TABLET, FILM COATED ORAL at 09:39

## 2017-09-22 RX ADMIN — Medication 100 MG: at 09:39

## 2017-09-22 RX ADMIN — Medication 10 ML: at 05:20

## 2017-09-22 RX ADMIN — PANTOPRAZOLE SODIUM 40 MG: 40 GRANULE, DELAYED RELEASE ORAL at 09:39

## 2017-09-22 RX ADMIN — ASPIRIN 81 MG CHEWABLE TABLET 81 MG: 81 TABLET CHEWABLE at 09:38

## 2017-09-22 RX ADMIN — MULTIVIT AND MINERALS-FERROUS GLUCONATE 9 MG IRON/15 ML ORAL LIQUID 15 ML: at 09:38

## 2017-09-22 RX ADMIN — LEVETIRACETAM 1500 MG: 100 SOLUTION ORAL at 09:39

## 2017-09-22 RX ADMIN — VALPROIC ACID 500 MG: 250 SOLUTION ORAL at 09:38

## 2017-09-22 RX ADMIN — INFLUENZA VIRUS VACCINE 0.5 ML: 15; 15; 15; 15 SUSPENSION INTRAMUSCULAR at 09:39

## 2017-09-22 RX ADMIN — PREGABALIN 300 MG: 150 CAPSULE ORAL at 09:39

## 2017-09-22 RX ADMIN — ENOXAPARIN SODIUM 40 MG: 40 INJECTION SUBCUTANEOUS at 09:41

## 2017-09-22 NOTE — DISCHARGE SUMMARY
Hospitalist Discharge Summary     Patient ID:  Alisha Hartman  [id number]  73 y.o.  1934    PCP on record: Kayley Ackerman MD    Admit date: 9/15/2017  Discharge date and time: 9/22/2017      DISCHARGE DIAGNOSIS:    Right upper lobe pneumonia, POA  Sepsis, POA - resolved  Urinary tract infection due to chronic indwelling Kelly for chronic urinary retention POA - completed therapy, cont kelly  Dysarthria, POA- MRI Brain this admission - Diffuse L cerebral hemispheric changes consistent with seizure activity this admission, cont ASA for now  Acute Encephalopathy POA- resolved, back to baseline  Seizure disorder with history of status epilepticus - cont meds  History of alcohol abuse  Dysphagia- cont peg feedings till transition to hospice in future- wife not able to decide on Hospice care at this moment- thinking about it in near future if rehab attempt fails  FULL code for now- palliative saw pt & family this admission    CONSULTATIONS:  IP CONSULT TO NEUROLOGY  IP CONSULT TO PALLIATIVE CARE - PROVIDER    Excerpted HPI from H&P of Liv Oliver MD:  [age] y.o.  male with past medical history significant for seizure disorder,    urinary retention with chronic kelly, and dysphasia s/p PEG 7/17, who is sent from [facility] for the above symptoms.     On evaluation, the patient was sleeping but arousable, and became   alert. However, his speech is extremely slurred and history could not   be obtained due to this. The paperwork from the nursing home   indicates that he was sent because the patient was noted to be very   confused and speech is very slurred constantly, tried to ambulate   independently but unable to and continues to fall. He was diagnosed with pneumonia   Yesterday and was scheduled to start Avelox 400 mg today.  It is reported that   at baseline the patient is alert and oriented and follows instructions.      In the ER the patient had a rectal temperature of 99.8, mild tachycardia   with heart rate of 101, respiratory rate 26, and O2 saturation 97% on   room air. Code Purple was called. Chest x-ray showed right upper lobe   atelectasis versus pneumonia and old granulomatous disease. Urinalysis was consistent with UTI. Urine was observed and Cummins   catheter was changed in the emergency room. The patient was given   Zosyn and Levaquin, and referred for admission.     The patient was last hospitalized in August with sepsis and UTI   growing out Enterococcus faecalis and Proteus.     We were asked to admit for work up and evaluation of the above problems. \"    ______________________________________________________________________  DISCHARGE SUMMARY/HOSPITAL COURSE:  for full details see H&P, daily progress notes, labs, consult notes. Hypomagnesemia POA- replenished  S/p IV mag x 1 9/19        Right upper lobe pneumonia, POA  Sepsis, POA with tachycardia and tachypnea.  CXR with small infiltrate in RUL read as atelectasis or PNA.  Not hypoxic or febrile.      Complete  levaquin and zosyn given recent hospitalization  Follow BC, no growth so far      Urinary tract infection due to chronic indwelling Cummins for urinary retention POA  --This has been a recurrent problem with his recent prior hospitalizations. Cummins catheter was changed in the emergency room.  On abx as above.    Urine cx Ecoli      Dysarthria, POA/encephaloptahy, Seizure disorder with history of status epilepticus twice this year, remains confused  Secondary to ?post ictal/infection vs worsening of baseline cognitive deficit  --CT head negative. Get MRI brain- noted- diffuse restriction in L hemisphere cortical- due to seizures?   --on Keppra and depakote.  Will continue.  No sign of seizure  Appreciate neurology consult      History of alcohol abuse  --continue MVI, thiamine.  Thiamine likely can be stopped at discharge if he remains institutionalized and not drinking.      Hx heme positive stool and anemia  --continue PPI   Hb stable      Dysphagia  --continue tubefeed. Jevity 1.5 50ml/hr with water flush 100ml q4h.  Consult nutrition service. Hypokalemia/hypomagnesemia, replete         Code status is FULL CODE.    DVT prophylaxis with Lovenox.      This is fifth hospitalization since February. He has been seen by Palliative Care in July. Body mass index is 20.28 kg/(m^2).     Surrogate decision maker:  Wife Maria G Promise: SNF once stable. Palliative care consult noted- family meeting done 9/20- Pt's wife wants restorative care, Full code & wants to consider SNF for rehab if authorized, but is aware that if insurance doesn't authorize then will need to consider home health options  CM working on [facility] once insurance auth obtained & wife/family ok with the SNF choice. Otherwise wife open to [place] PT/OT/SN attempt with transition to Hospice in future as per today's discussion        _______________________________________________________________________  Patient seen and examined by me on discharge day. Pertinent Findings:  Gen:    Not in distress  Chest: Clear lungs  CVS:   Regular rhythm. No edema  Abd:  Soft, not distended, not tender  Neuro:  Alert, oriented x1, aphasic speech  _______________________________________________________________________  DISCHARGE MEDICATIONS:   Current Discharge Medication List      START taking these medications    Details   aspirin 81 mg chewable tablet 1 Tab by Per G Tube route daily. Qty: 30 Tab, Refills: 0         CONTINUE these medications which have NOT CHANGED    Details   multivitamin (MULTI-DELYN) liqd 15 mL by PEG Tube route daily. acetaminophen (TYLENOL) 325 mg tablet 650 mg by PEG Tube route every six (6) hours as needed for Pain.      levETIRAcetam (KEPPRA) 100 mg/mL solution Take 1,500 mg by mouth two (2) times a day. metoprolol succinate (TOPROL-XL) 100 mg tablet 12.5 mg by PEG Tube route two (2) times a day.       multivit w-min-ferrous gluconate (CEROVITE) 9 mg iron/15 mL oral liquid 15 mL by PEG Tube route daily. pantoprazole (PROTONIX) 40 mg granules for oral suspension 40 mg by PEG Tube route Before breakfast and dinner. pregabalin (LYRICA) 300 mg capsule 300 mg by Per G Tube route two (2) times a day. QUEtiapine (SEROQUEL) 25 mg tablet 25 mg by PEG Tube route nightly. thiamine (VITAMIN B-1) 100 mg tablet 100 mg by PEG Tube route daily. valproic acid, as sodium salt, (DEPAKENE) 250 mg/5 mL (5 mL) soln oral solution 500 mg by PEG Tube route three (3) times daily. latanoprost (XALATAN) 0.005 % ophthalmic solution Administer 1 Drop to both eyes every evening. Qty: 1 mL, Refills: 1      albuterol-ipratropium (DUO-NEB) 2.5 mg-0.5 mg/3 ml nebu 3 mL by Nebulization route every six (6) hours as needed. Qty: 30 Nebule, Refills: 1      cyclobenzaprine (FLEXERIL) 5 mg tablet 5 mg by PEG Tube route three (3) times daily as needed for Muscle Spasm(s). STOP taking these medications       moxifloxacin (AVELOX) 400 mg tablet Comments:   Reason for Stopping:         metoprolol tartrate (LOPRESSOR) 25 mg tablet Comments:   Reason for Stopping:               My Recommended Diet, Activity, Wound Care, and follow-up labs are listed in the patient's Discharge Insturctions which I have personally completed and reviewed.     _______________________________________________________________________  DISPOSITION:    Home with Family:    Home with HH/PT/OT/RN:    SNF/LTC: x   CHRISTINA:    OTHER:        Condition at Discharge:  Stable  _______________________________________________________________________  Follow up with:   PCP : Hunter Hayes MD  Follow-up Information     Follow up With Details Comments Contact Info    [facility] EMERGENCY DEPT   [address] [place] [id number]  [facility]   [address]      Hunter Hayes MD   [address] [facility]  [phone]                Total time in minutes spent coordinating this discharge (includes going over instructions, follow-up, prescriptions, and preparing report for sign off to her PCP) :  26 minutes    Signed:  Kary Giraldo MD

## 2017-09-22 NOTE — DISCHARGE INSTRUCTIONS
HOSPITALIST DISCHARGE INSTRUCTIONS    NAME: Katie Gil   :  1934   MRN:  [id number]     Date/Time:  2017 12:08 PM    ADMIT DATE: 9/15/2017     DISCHARGE DATE: 2017     DISCHARGE DIAGNOSIS:  Right upper lobe pneumonia, POA - ABx therapy completed in hospital  Sepsis, POA - resolved  Urinary tract infection due to chronic indwelling Kelly for chronic urinary retention POA - completed therapy, cont kelly  Dysarthria, POA- MRI Brain this admission - Diffuse L cerebral hemispheric changes consistent with seizure activity this admission, cont ASA for now  Acute Encephalopathy POA- resolved, back to baseline  Seizure disorder with history of status epilepticus - cont meds  History of alcohol abuse  Dysphagia- cont peg feedings till transition to hospice in future- wife not able to decide on Hospice care at this moment- thinking about it in near future if rehab attempt fails  FULL code for now- palliative saw pt & family this admission    Active Problems:    UTI (urinary tract infection) (2017)      Acute encephalopathy (9/15/2017)      HCAP (healthcare-associated pneumonia) (9/15/2017)      Confusion (2017)      Dysphasia (2017)      Counseling regarding advanced care planning and goals of care (2017)         MEDICATIONS:  As per medication reconciliation  list  · It is important that you take the medication exactly as they are prescribed. · Keep your medication in the bottles provided by the pharmacist and keep a list of the medication names, dosages, and times to be taken in your wallet. · Do not take other medications without consulting your doctor.      Pain Management: per above medications    What to do at Home    Recommended diet:  NPO, PEG tube feeding-  Jevity 1.5 50ml/hr with water flush 100ml q4h    Recommended activity: Activity as tolerated    If you have questions regarding the hospital related prescriptions or hospital related issues please call [facility] at . Follow Up:  Dr. Susie Duckworth MD  you are to call and set up an appointment to see them in 7-10 days. As needed  Transition to Hospice care once pt/family ready with next deterioration      Information obtained by :  I understand that if any problems occur once I am at home I am to contact my physician. I understand and acknowledge receipt of the instructions indicated above.                                                                                                                                            Physician's or R.N.'s Signature                                                                  Date/Time                                                                                                                                              Patient or Representative Signature                                                          Date/Time

## 2017-09-22 NOTE — PROGRESS NOTES
All in agreement with d/c today to [facility] at 1230 via [facility] ambulance. Please call report to [phone], send emar, d/c instructions, facesheet, ambulance form, and Rx for narcotics if any.   Thanks

## 2017-09-22 NOTE — PROGRESS NOTES
Problem: Falls - Risk of  Goal: *Absence of Falls  Document Lorena Fall Risk and appropriate interventions in the flowsheet.    Outcome: Progressing Towards Goal  Fall Risk Interventions:  Mobility Interventions: Bed/chair exit alarm     Mentation Interventions: Adequate sleep, hydration, pain control     Medication Interventions: Bed/chair exit alarm     Elimination Interventions: Call light in reach     History of Falls Interventions: Bed/chair exit alarm

## 2017-09-22 NOTE — PROGRESS NOTES
[facility] to [address] [place] [address]                                                                        80 y.o.   male    Heidi Cosme   Room: [address] [place] [address] 3 MED TELE  Unit Phone# :  [phone]      [address]  hospitals [address] [facility]  [address] [address]  Dept: [phone]  Loc: [phone]                    SITUATION     Admitted:  9/15/2017         Attending Provider:  Yakov Lima MD       Consultations:  IP CONSULT TO NEUROLOGY  IP CONSULT TO PALLIATIVE CARE - PROVIDER    PCP:  Esther Mejias MD   [phone]    Treatment Team: Attending Provider: Yakov Lima MD; Hospitalist: Piotr Chaudhry MD; Consulting Provider: Deven Ovalles MD; Care Manager: Lluvia Gee RN; Care Manager: Migel Chan RN; Consulting Provider: Mara Phillips MD    Admitting Dx:  HCAP (healthcare-associated pneumonia)  UTI (urinary tract infection)  Acute encephalopathy       Principal Problem: <principal problem not specified>    * No surgery found * of      BY: * Surgery not found *             ON: * No surgery found *                  Code Status: Full Code                Advance Directives:   Advance Care Planning 9/19/2017   Patient's Healthcare Decision Maker is: Legal Next of Jessy 69   Primary Decision Maker Name Sharda Barnes   Primary Decision Maker Phone Number [phone]   Primary Decision Maker Relationship to Patient Spouse   Confirm Advance Directive None   Patient Would Like to Complete Advance Directive -    (Send w/patient)   No Doesnt Have       Isolation:  There are currently no Active Isolations       MDRO: No current active infections    Pain Medications given:  n/a    Last dose: n/a    Special Equipment needed: no  Type of equipment:      (Not currently on dialysis)     BACKGROUND     Allergies:   Allergies   Allergen Reactions    No Known Allergies        Past Medical History:   Diagnosis Date    Alcohol abuse, in remission     Seizures [facility])        Past Surgical History:   Procedure Laterality Date    COLONOSCOPY N/A 7/31/2017    COLONOSCOPY performed [facility] Percy Enriquez MD at [facility]  7/31/2017         PLACE PERCUT GASTROSTOMY TUBE  7/18/2017         UPPER GI ENDOSCOPY,DIAGNOSIS  7/31/2017            Prescriptions Prior to Admission   Medication Sig    multivitamin (MULTI-DELYN) liqd 15 mL by PEG Tube route daily.  acetaminophen (TYLENOL) 325 mg tablet 650 mg by PEG Tube route every six (6) hours as needed for Pain.  levETIRAcetam (KEPPRA) 100 mg/mL solution Take 1,500 mg by mouth two (2) times a day.  metoprolol succinate (TOPROL-XL) 100 mg tablet 12.5 mg by PEG Tube route two (2) times a day.  multivit w-min-ferrous gluconate (CEROVITE) 9 mg iron/15 mL oral liquid 15 mL by PEG Tube route daily.  pantoprazole (PROTONIX) 40 mg granules for oral suspension 40 mg by PEG Tube route Before breakfast and dinner.  pregabalin (LYRICA) 300 mg capsule 300 mg by Per G Tube route two (2) times a day.  QUEtiapine (SEROQUEL) 25 mg tablet 25 mg by PEG Tube route nightly.  thiamine (VITAMIN B-1) 100 mg tablet 100 mg by PEG Tube route daily.  valproic acid, as sodium salt, (DEPAKENE) 250 mg/5 mL (5 mL) soln oral solution 500 mg by PEG Tube route three (3) times daily.  latanoprost (XALATAN) 0.005 % ophthalmic solution Administer 1 Drop to both eyes every evening.  albuterol-ipratropium (DUO-NEB) 2.5 mg-0.5 mg/3 ml nebu 3 mL by Nebulization route every six (6) hours as needed.  cyclobenzaprine (FLEXERIL) 5 mg tablet 5 mg by PEG Tube route three (3) times daily as needed for Muscle Spasm(s).  moxifloxacin (AVELOX) 400 mg tablet 400 mg by PEG Tube route daily.        Hard scripts included in transfer packet no    Vaccinations:    Immunization History   Administered Date(s) Administered    Influenza Vaccine (Quad) PF 02/21/2017, 09/22/2017    Influenza Vaccine Split 09/07/2012    ZZZ-RETIRED (DO NOT USE) Pneumococcal Vaccine (Unspecified Type) 09/07/2012       Readmission Risks:    Known Risks: n/a        The Charlson CoMorbitiy Index tool is an evidenced based tool that has more automatic generated information. The tool looks at many different items such as the age of the patient, how many times they were admitted in the last calendar year, current length of stay in the hospital and their diagnosis. All of these items are pulled automatically from information documented in the chart from various places and will generate a score that predicts whether a patient is at low (less than 13), medium (13-20) or high (21 or greater) risk of being readmitted. ASSESSMENT                Temp: 98 °F (36.7 °C) (09/22/17 1143) Pulse (Heart Rate): 74 (09/22/17 1143)     Resp Rate: 18 (09/22/17 1143)           BP: 118/63 (09/22/17 1143)     O2 Sat (%): 93 % (09/22/17 1143)     Weight: 61.1 kg (134 lb 9.6 oz)    Height: 5' 8\" (172.7 cm) (09/15/17 0342)       If above not within 1 hour of discharge:    BP:_____  P:____  R:____ T:_____ O2 Sat: ___%  O2: ______    Active Orders   Diet    DIET NPO         Orientation: disoriented     Active Behaviors: None                                   Active Lines/Drains:  (Peg Tube / Cummins / CL or S/L?): yes    Urinary Status: Cummins     Last BM: Last Bowel Movement Date: 09/21/17     Skin Integrity: Excoriation (sacrum)             Mobility: Slightly limited   Weight Bearing Status: WBAT (Weight Bearing as Tolerated)      Gait Training  Assistive Device: Walker, rolling  Ambulation - Level of Assistance:  Moderate assistance  Distance (ft): 22 Feet (ft)         Lab Results   Component Value Date/Time    Glucose 111 09/22/2017 02:49 AM    Hemoglobin A1c 4.2 09/16/2017 04:23 AM    INR 1.2 09/15/2017 04:22 AM    INR 1.1 08/19/2017 03:14 PM    HGB 11.1 09/19/2017 01:53 AM    HGB 11.4 09/18/2017 04:14 AM        RECOMMENDATION     See After Visit Summary (AVS) for:  · Discharge instructions  · After [address]   · Special equipment needed (entered pre-discharge by Care Management)  · Medication Reconciliation    · Follow up Appointment(s)         Report given/sent by:  Manuelito Colbert                    Verbal report given to: Rock Nicole  FAXED to:  [phone]         Estimated discharge time:  9/22/2017 at 1230

## 2017-09-22 NOTE — PROGRESS NOTES
Bedside shift change report given to Chan Bustamante  (oncoming nurse) by Will Ribeiro RN (offgoing nurse). Report included the following information Kardex, MAR and Recent Results.     Zone Phone for oncoming shift:   [phone]    Shift Summary: Resting quietly    LDAs               Peripheral IV 09/17/17 Right Hand (Active)   Site Assessment Clean, dry, & intact 9/22/2017  1:58 AM   Phlebitis Assessment 0 9/22/2017  1:58 AM   Infiltration Assessment 0 9/22/2017  1:58 AM   Dressing Status Clean, dry, & intact 9/22/2017  1:58 AM   Dressing Type Transparent 9/22/2017  1:58 AM   Hub Color/Line Status Blue;Capped 9/22/2017  1:58 AM   Alcohol Cap Used Yes 9/22/2017  1:58 AM          PEG/Gastrostomy Tube 07/18/17 (Active)   Site Assessment Clean, dry, & intact 9/21/2017  4:00 PM   Dressing Status Clean, dry, & intact 9/21/2017  4:00 PM   G Port Status Infusing 9/21/2017  4:00 PM   Gastric Residual (mL) 0 ml 9/21/2017  9:00 AM   Tube Feeding/Formula Options Jevity 1.5 9/21/2017  4:00 PM   Tube Feeding/Verify Rate (mL/hr) 50 9/21/2017  4:00 PM   Water Flush Volume (mL) 100 mL 9/21/2017  4:00 PM   Intake (ml) 400 ml 9/21/2017  4:00 PM   Medication Volume 100 ml 9/21/2017  4:00 PM              Urinary Catheter 09/15/17 Cummins-Criteria for Appropriate Use: Obstruction/retention   Intake & Output   Date 09/21/17 0700 - 09/22/17 0659 09/22/17 0700 - 09/23/17 0659   Shift 8674-2053 2359-6466 24 Hour Total 0700-1859 1900-0659 24 Hour Total   I  N  T  A  K  E   NG/  700         Water Flush Volume (mL) (PEG/Gastrostomy Tube 07/18/17) 200  200         Medication Volume (PEG/Gastrostomy Tube 07/18/17) 100  100         Intake (ml) (PEG/Gastrostomy Tube 07/18/17) 400  400       Shift Total  (mL/kg) 700  (11.1)  700  (11.1)      O  U  T  P  U  T   Urine  (mL/kg/hr) 850  (1.1) 600 1450         Urine Output (mL) (Urinary Catheter 09/15/17 Cummins)        Shift Total  (mL/kg) 850  (13.5) 600  (9.5) 1450  (23)      NET -150 -600 -750      Weight (kg) 63 63 63 63 63 63      Last Bowel Movement Last Bowel Movement Date: 09/21/17   Glucose Checks [] N/A  [] AC/HS  [] Q6  Concerns:   Nutrition Active Orders   Diet    DIET NPO       Consults []PT  []OT  []Speech  []Case Management   Cardiac Monitoring []N/A [x]Yes Expires:

## 2017-09-27 ENCOUNTER — HOSPITAL ENCOUNTER (INPATIENT)
Age: 82
LOS: 16 days | Discharge: HOSPICE | DRG: 177 | End: 2017-10-14
Attending: EMERGENCY MEDICINE | Admitting: INTERNAL MEDICINE
Payer: MEDICARE

## 2017-09-27 ENCOUNTER — APPOINTMENT (OUTPATIENT)
Dept: GENERAL RADIOLOGY | Age: 82
DRG: 177 | End: 2017-09-27
Attending: EMERGENCY MEDICINE
Payer: MEDICARE

## 2017-09-27 ENCOUNTER — APPOINTMENT (OUTPATIENT)
Dept: CT IMAGING | Age: 82
DRG: 177 | End: 2017-09-27
Attending: EMERGENCY MEDICINE
Payer: MEDICARE

## 2017-09-27 DIAGNOSIS — F10.20 ALCOHOLISM (HCC): ICD-10-CM

## 2017-09-27 DIAGNOSIS — G93.40 ENCEPHALOPATHY: ICD-10-CM

## 2017-09-27 DIAGNOSIS — Z71.89 COUNSELING REGARDING ADVANCED CARE PLANNING AND GOALS OF CARE: ICD-10-CM

## 2017-09-27 DIAGNOSIS — R53.1 WEAKNESS: ICD-10-CM

## 2017-09-27 DIAGNOSIS — G40.909 SEIZURE DISORDER (HCC): Primary | ICD-10-CM

## 2017-09-27 DIAGNOSIS — R53.81 DEBILITY: ICD-10-CM

## 2017-09-27 DIAGNOSIS — R45.1 AGITATION: ICD-10-CM

## 2017-09-27 DIAGNOSIS — Z78.9 FREQUENT HOSPITAL ADMISSIONS: ICD-10-CM

## 2017-09-27 DIAGNOSIS — G93.40 ACUTE ENCEPHALOPATHY: ICD-10-CM

## 2017-09-27 DIAGNOSIS — J18.9 PNEUMONIA OF RIGHT LUNG DUE TO INFECTIOUS ORGANISM, UNSPECIFIED PART OF LUNG: ICD-10-CM

## 2017-09-27 DIAGNOSIS — J69.0 ASPIRATION PNEUMONIA OF RIGHT UPPER LOBE DUE TO REGURGITATED FOOD (HCC): ICD-10-CM

## 2017-09-27 LAB
ALBUMIN SERPL-MCNC: 3 G/DL (ref 3.5–5)
ALBUMIN/GLOB SERPL: 0.7 {RATIO} (ref 1.1–2.2)
ALP SERPL-CCNC: 54 U/L (ref 45–117)
ALT SERPL-CCNC: 29 U/L (ref 12–78)
AMMONIA PLAS-SCNC: 11 UMOL/L
AMPHET UR QL SCN: NEGATIVE
ANION GAP SERPL CALC-SCNC: 9 MMOL/L (ref 5–15)
APPEARANCE UR: CLEAR
AST SERPL-CCNC: 32 U/L (ref 15–37)
BACTERIA URNS QL MICRO: NEGATIVE /HPF
BARBITURATES UR QL SCN: NEGATIVE
BASOPHILS # BLD: 0 K/UL (ref 0–0.1)
BASOPHILS NFR BLD: 0 % (ref 0–1)
BENZODIAZ UR QL: NEGATIVE
BILIRUB SERPL-MCNC: 0.8 MG/DL (ref 0.2–1)
BILIRUB UR QL: NEGATIVE
BUN SERPL-MCNC: 18 MG/DL (ref 6–20)
BUN/CREAT SERPL: 25 (ref 12–20)
CALCIUM SERPL-MCNC: 8.7 MG/DL (ref 8.5–10.1)
CANNABINOIDS UR QL SCN: NEGATIVE
CHLORIDE SERPL-SCNC: 92 MMOL/L (ref 97–108)
CK SERPL-CCNC: 326 U/L (ref 39–308)
CO2 SERPL-SCNC: 31 MMOL/L (ref 21–32)
COCAINE UR QL SCN: NEGATIVE
COLOR UR: ABNORMAL
CREAT SERPL-MCNC: 0.73 MG/DL (ref 0.7–1.3)
DRUG SCRN COMMENT,DRGCM: NORMAL
EOSINOPHIL # BLD: 0 K/UL (ref 0–0.4)
EOSINOPHIL NFR BLD: 1 % (ref 0–7)
EPITH CASTS URNS QL MICRO: ABNORMAL /LPF
ERYTHROCYTE [DISTWIDTH] IN BLOOD BY AUTOMATED COUNT: 12.2 % (ref 11.5–14.5)
GGT SERPL-CCNC: 32 U/L (ref 15–85)
GLOBULIN SER CALC-MCNC: 4.5 G/DL (ref 2–4)
GLUCOSE BLD STRIP.AUTO-MCNC: 141 MG/DL (ref 65–100)
GLUCOSE SERPL-MCNC: 81 MG/DL (ref 65–100)
GLUCOSE UR STRIP.AUTO-MCNC: NEGATIVE MG/DL
HCT VFR BLD AUTO: 33.3 % (ref 36.6–50.3)
HGB BLD-MCNC: 11.6 G/DL (ref 12.1–17)
HGB UR QL STRIP: ABNORMAL
KETONES UR QL STRIP.AUTO: NEGATIVE MG/DL
LACTATE SERPL-SCNC: 1.4 MMOL/L (ref 0.4–2)
LACTATE SERPL-SCNC: 2.7 MMOL/L (ref 0.4–2)
LEUKOCYTE ESTERASE UR QL STRIP.AUTO: ABNORMAL
LYMPHOCYTES # BLD: 1.8 K/UL (ref 0.8–3.5)
LYMPHOCYTES NFR BLD: 30 % (ref 12–49)
MAGNESIUM SERPL-MCNC: 1.6 MG/DL (ref 1.6–2.4)
MCH RBC QN AUTO: 33.9 PG (ref 26–34)
MCHC RBC AUTO-ENTMCNC: 34.8 G/DL (ref 30–36.5)
MCV RBC AUTO: 97.4 FL (ref 80–99)
METHADONE UR QL: NEGATIVE
MONOCYTES # BLD: 1.1 K/UL (ref 0–1)
MONOCYTES NFR BLD: 19 % (ref 5–13)
NEUTS SEG # BLD: 2.9 K/UL (ref 1.8–8)
NEUTS SEG NFR BLD: 50 % (ref 32–75)
NITRITE UR QL STRIP.AUTO: NEGATIVE
OPIATES UR QL: NEGATIVE
PCP UR QL: NEGATIVE
PH UR STRIP: 7 [PH] (ref 5–8)
PLATELET # BLD AUTO: 200 K/UL (ref 150–400)
POTASSIUM SERPL-SCNC: 3.1 MMOL/L (ref 3.5–5.1)
PROT SERPL-MCNC: 7.5 G/DL (ref 6.4–8.2)
PROT UR STRIP-MCNC: NEGATIVE MG/DL
RBC # BLD AUTO: 3.42 M/UL (ref 4.1–5.7)
RBC #/AREA URNS HPF: ABNORMAL /HPF (ref 0–5)
SERVICE CMNT-IMP: ABNORMAL
SODIUM SERPL-SCNC: 132 MMOL/L (ref 136–145)
SP GR UR REFRACTOMETRY: 1.01 (ref 1–1.03)
TROPONIN I SERPL-MCNC: <0.04 NG/ML
TSH SERPL DL<=0.05 MIU/L-ACNC: 1.58 UIU/ML (ref 0.36–3.74)
UROBILINOGEN UR QL STRIP.AUTO: 1 EU/DL (ref 0.2–1)
VALPROATE SERPL-MCNC: 18 UG/ML (ref 50–100)
VIT B12 SERPL-MCNC: >2000 PG/ML (ref 211–911)
WBC # BLD AUTO: 5.8 K/UL (ref 4.1–11.1)
WBC URNS QL MICRO: ABNORMAL /HPF (ref 0–4)

## 2017-09-27 PROCEDURE — 87086 URINE CULTURE/COLONY COUNT: CPT | Performed by: INTERNAL MEDICINE

## 2017-09-27 PROCEDURE — 71010 XR CHEST PORT: CPT

## 2017-09-27 PROCEDURE — 80164 ASSAY DIPROPYLACETIC ACD TOT: CPT | Performed by: EMERGENCY MEDICINE

## 2017-09-27 PROCEDURE — 80053 COMPREHEN METABOLIC PANEL: CPT | Performed by: EMERGENCY MEDICINE

## 2017-09-27 PROCEDURE — 87040 BLOOD CULTURE FOR BACTERIA: CPT | Performed by: EMERGENCY MEDICINE

## 2017-09-27 PROCEDURE — 74011250636 HC RX REV CODE- 250/636: Performed by: EMERGENCY MEDICINE

## 2017-09-27 PROCEDURE — 77030010520

## 2017-09-27 PROCEDURE — 51702 INSERT TEMP BLADDER CATH: CPT

## 2017-09-27 PROCEDURE — 96365 THER/PROPH/DIAG IV INF INIT: CPT

## 2017-09-27 PROCEDURE — 96366 THER/PROPH/DIAG IV INF ADDON: CPT

## 2017-09-27 PROCEDURE — 82962 GLUCOSE BLOOD TEST: CPT

## 2017-09-27 PROCEDURE — 74011000258 HC RX REV CODE- 258: Performed by: EMERGENCY MEDICINE

## 2017-09-27 PROCEDURE — 93005 ELECTROCARDIOGRAM TRACING: CPT

## 2017-09-27 PROCEDURE — 77030010541

## 2017-09-27 PROCEDURE — 96367 TX/PROPH/DG ADDL SEQ IV INF: CPT

## 2017-09-27 PROCEDURE — 95816 EEG AWAKE AND DROWSY: CPT | Performed by: PSYCHIATRY & NEUROLOGY

## 2017-09-27 PROCEDURE — 82607 VITAMIN B-12: CPT | Performed by: PSYCHIATRY & NEUROLOGY

## 2017-09-27 PROCEDURE — 99285 EMERGENCY DEPT VISIT HI MDM: CPT

## 2017-09-27 PROCEDURE — 96361 HYDRATE IV INFUSION ADD-ON: CPT

## 2017-09-27 PROCEDURE — 74011250636 HC RX REV CODE- 250/636: Performed by: INTERNAL MEDICINE

## 2017-09-27 PROCEDURE — 74011000250 HC RX REV CODE- 250: Performed by: INTERNAL MEDICINE

## 2017-09-27 PROCEDURE — 81001 URINALYSIS AUTO W/SCOPE: CPT | Performed by: EMERGENCY MEDICINE

## 2017-09-27 PROCEDURE — 96375 TX/PRO/DX INJ NEW DRUG ADDON: CPT

## 2017-09-27 PROCEDURE — 74011000258 HC RX REV CODE- 258: Performed by: INTERNAL MEDICINE

## 2017-09-27 PROCEDURE — 36415 COLL VENOUS BLD VENIPUNCTURE: CPT | Performed by: EMERGENCY MEDICINE

## 2017-09-27 PROCEDURE — 77030029684 HC NEB SM VOL KT MONA -A

## 2017-09-27 PROCEDURE — 77030005514 HC CATH URETH FOL14 BARD -A

## 2017-09-27 PROCEDURE — 74011250637 HC RX REV CODE- 250/637: Performed by: INTERNAL MEDICINE

## 2017-09-27 PROCEDURE — 83735 ASSAY OF MAGNESIUM: CPT | Performed by: EMERGENCY MEDICINE

## 2017-09-27 PROCEDURE — 82550 ASSAY OF CK (CPK): CPT | Performed by: EMERGENCY MEDICINE

## 2017-09-27 PROCEDURE — 83605 ASSAY OF LACTIC ACID: CPT | Performed by: EMERGENCY MEDICINE

## 2017-09-27 PROCEDURE — 96376 TX/PRO/DX INJ SAME DRUG ADON: CPT

## 2017-09-27 PROCEDURE — 77030011641 HC PASTE OST ADH BMS -A

## 2017-09-27 PROCEDURE — 82140 ASSAY OF AMMONIA: CPT | Performed by: PSYCHIATRY & NEUROLOGY

## 2017-09-27 PROCEDURE — 84443 ASSAY THYROID STIM HORMONE: CPT | Performed by: EMERGENCY MEDICINE

## 2017-09-27 PROCEDURE — 70450 CT HEAD/BRAIN W/O DYE: CPT

## 2017-09-27 PROCEDURE — 84484 ASSAY OF TROPONIN QUANT: CPT | Performed by: EMERGENCY MEDICINE

## 2017-09-27 PROCEDURE — 82977 ASSAY OF GGT: CPT | Performed by: PSYCHIATRY & NEUROLOGY

## 2017-09-27 PROCEDURE — 80307 DRUG TEST PRSMV CHEM ANLYZR: CPT | Performed by: EMERGENCY MEDICINE

## 2017-09-27 PROCEDURE — 96368 THER/DIAG CONCURRENT INF: CPT

## 2017-09-27 PROCEDURE — 85025 COMPLETE CBC W/AUTO DIFF WBC: CPT | Performed by: EMERGENCY MEDICINE

## 2017-09-27 RX ORDER — IPRATROPIUM BROMIDE AND ALBUTEROL SULFATE 2.5; .5 MG/3ML; MG/3ML
3 SOLUTION RESPIRATORY (INHALATION)
Status: DISCONTINUED | OUTPATIENT
Start: 2017-09-27 | End: 2017-09-30

## 2017-09-27 RX ORDER — LANOLIN ALCOHOL/MO/W.PET/CERES
100 CREAM (GRAM) TOPICAL DAILY
Status: DISCONTINUED | OUTPATIENT
Start: 2017-09-28 | End: 2017-10-14 | Stop reason: HOSPADM

## 2017-09-27 RX ORDER — SODIUM CHLORIDE 0.9 % (FLUSH) 0.9 %
5-10 SYRINGE (ML) INJECTION AS NEEDED
Status: DISCONTINUED | OUTPATIENT
Start: 2017-09-27 | End: 2017-09-27

## 2017-09-27 RX ORDER — MORPHINE SULFATE 4 MG/ML
2 INJECTION, SOLUTION INTRAMUSCULAR; INTRAVENOUS
Status: DISCONTINUED | OUTPATIENT
Start: 2017-09-27 | End: 2017-10-09

## 2017-09-27 RX ORDER — HALOPERIDOL 2 MG/1
2 TABLET ORAL
COMMUNITY
End: 2017-10-14

## 2017-09-27 RX ORDER — GUAIFENESIN 100 MG/5ML
81 LIQUID (ML) ORAL DAILY
Status: DISCONTINUED | OUTPATIENT
Start: 2017-09-28 | End: 2017-10-14 | Stop reason: HOSPADM

## 2017-09-27 RX ORDER — ONDANSETRON 2 MG/ML
4 INJECTION INTRAMUSCULAR; INTRAVENOUS
Status: DISCONTINUED | OUTPATIENT
Start: 2017-09-27 | End: 2017-10-14 | Stop reason: HOSPADM

## 2017-09-27 RX ORDER — SODIUM CHLORIDE AND POTASSIUM CHLORIDE .9; .15 G/100ML; G/100ML
SOLUTION INTRAVENOUS CONTINUOUS
Status: DISCONTINUED | OUTPATIENT
Start: 2017-09-27 | End: 2017-10-02

## 2017-09-27 RX ORDER — METOPROLOL TARTRATE 25 MG/1
12.5 TABLET, FILM COATED ORAL 2 TIMES DAILY
COMMUNITY
End: 2017-10-14

## 2017-09-27 RX ORDER — LORAZEPAM 2 MG/ML
0.5 INJECTION INTRAMUSCULAR ONCE
Status: COMPLETED | OUTPATIENT
Start: 2017-09-27 | End: 2017-09-27

## 2017-09-27 RX ORDER — LEVOFLOXACIN 5 MG/ML
750 INJECTION, SOLUTION INTRAVENOUS EVERY 24 HOURS
Status: DISCONTINUED | OUTPATIENT
Start: 2017-09-28 | End: 2017-10-02

## 2017-09-27 RX ORDER — CEPHALEXIN 250 MG/5ML
10 POWDER, FOR SUSPENSION ORAL EVERY 8 HOURS
COMMUNITY
End: 2017-10-14

## 2017-09-27 RX ORDER — HALOPERIDOL 2 MG/1
2 TABLET ORAL
Status: DISCONTINUED | OUTPATIENT
Start: 2017-09-27 | End: 2017-10-09

## 2017-09-27 RX ORDER — LORAZEPAM 2 MG/ML
1 INJECTION INTRAMUSCULAR
Status: COMPLETED | OUTPATIENT
Start: 2017-09-27 | End: 2017-09-27

## 2017-09-27 RX ORDER — PANTOPRAZOLE SODIUM 40 MG/1
40 GRANULE, DELAYED RELEASE ORAL
Status: DISCONTINUED | OUTPATIENT
Start: 2017-09-28 | End: 2017-10-14 | Stop reason: HOSPADM

## 2017-09-27 RX ORDER — LEVOFLOXACIN 5 MG/ML
750 INJECTION, SOLUTION INTRAVENOUS
Status: COMPLETED | OUTPATIENT
Start: 2017-09-27 | End: 2017-09-27

## 2017-09-27 RX ORDER — GUAIFENESIN 100 MG/5ML
81 LIQUID (ML) ORAL DAILY
COMMUNITY
End: 2017-10-14

## 2017-09-27 RX ORDER — CYCLOBENZAPRINE HCL 10 MG
5 TABLET ORAL
Status: DISCONTINUED | OUTPATIENT
Start: 2017-09-27 | End: 2017-10-14 | Stop reason: HOSPADM

## 2017-09-27 RX ORDER — ATORVASTATIN CALCIUM 40 MG/1
40 TABLET, FILM COATED ORAL
COMMUNITY
End: 2017-10-14

## 2017-09-27 RX ORDER — ACETAMINOPHEN 325 MG/1
650 TABLET ORAL
Status: DISCONTINUED | OUTPATIENT
Start: 2017-09-27 | End: 2017-10-13

## 2017-09-27 RX ORDER — OXYCODONE HYDROCHLORIDE 5 MG/1
5 CAPSULE ORAL
COMMUNITY
End: 2017-10-14

## 2017-09-27 RX ORDER — LATANOPROST 50 UG/ML
1 SOLUTION/ DROPS OPHTHALMIC EVERY EVENING
Status: DISCONTINUED | OUTPATIENT
Start: 2017-09-27 | End: 2017-10-14 | Stop reason: HOSPADM

## 2017-09-27 RX ORDER — METOPROLOL TARTRATE 25 MG/1
12.5 TABLET, FILM COATED ORAL 2 TIMES DAILY
Status: DISCONTINUED | OUTPATIENT
Start: 2017-09-27 | End: 2017-10-09

## 2017-09-27 RX ORDER — ENOXAPARIN SODIUM 100 MG/ML
40 INJECTION SUBCUTANEOUS EVERY 24 HOURS
Status: DISCONTINUED | OUTPATIENT
Start: 2017-09-27 | End: 2017-09-27 | Stop reason: DRUGHIGH

## 2017-09-27 RX ORDER — PREGABALIN 100 MG/1
300 CAPSULE ORAL 2 TIMES DAILY
Status: DISCONTINUED | OUTPATIENT
Start: 2017-09-27 | End: 2017-10-14 | Stop reason: HOSPADM

## 2017-09-27 RX ORDER — ENOXAPARIN SODIUM 100 MG/ML
30 INJECTION SUBCUTANEOUS EVERY 24 HOURS
Status: DISCONTINUED | OUTPATIENT
Start: 2017-09-27 | End: 2017-09-28 | Stop reason: DRUGHIGH

## 2017-09-27 RX ORDER — LORAZEPAM 2 MG/ML
2 INJECTION INTRAMUSCULAR
Status: DISCONTINUED | OUTPATIENT
Start: 2017-09-27 | End: 2017-10-01

## 2017-09-27 RX ORDER — LORAZEPAM 2 MG/ML
0.5 INJECTION INTRAMUSCULAR
Status: COMPLETED | OUTPATIENT
Start: 2017-09-27 | End: 2017-09-27

## 2017-09-27 RX ORDER — LORAZEPAM 2 MG/ML
0.5 INJECTION INTRAMUSCULAR
Status: DISCONTINUED | OUTPATIENT
Start: 2017-09-27 | End: 2017-09-27

## 2017-09-27 RX ORDER — QUETIAPINE FUMARATE 25 MG/1
25 TABLET, FILM COATED ORAL
Status: DISCONTINUED | OUTPATIENT
Start: 2017-09-27 | End: 2017-10-01

## 2017-09-27 RX ADMIN — LORAZEPAM 0.5 MG: 2 INJECTION INTRAMUSCULAR; INTRAVENOUS at 18:55

## 2017-09-27 RX ADMIN — PREGABALIN 300 MG: 100 CAPSULE ORAL at 21:55

## 2017-09-27 RX ADMIN — VALPROATE SODIUM 500 MG: 100 INJECTION, SOLUTION INTRAVENOUS at 21:53

## 2017-09-27 RX ADMIN — METOPROLOL TARTRATE 12.5 MG: 25 TABLET ORAL at 21:56

## 2017-09-27 RX ADMIN — LEVETIRACETAM 1000 MG: 100 INJECTION, SOLUTION, CONCENTRATE INTRAVENOUS at 18:59

## 2017-09-27 RX ADMIN — PIPERACILLIN SODIUM,TAZOBACTAM SODIUM 3.38 G: 3; .375 INJECTION, POWDER, FOR SOLUTION INTRAVENOUS at 17:30

## 2017-09-27 RX ADMIN — IPRATROPIUM BROMIDE AND ALBUTEROL SULFATE 3 ML: .5; 3 SOLUTION RESPIRATORY (INHALATION) at 21:56

## 2017-09-27 RX ADMIN — LEVOFLOXACIN 750 MG: 5 INJECTION, SOLUTION INTRAVENOUS at 19:24

## 2017-09-27 RX ADMIN — ENOXAPARIN SODIUM 30 MG: 30 INJECTION SUBCUTANEOUS at 23:08

## 2017-09-27 RX ADMIN — LORAZEPAM 0.5 MG: 2 INJECTION INTRAMUSCULAR; INTRAVENOUS at 16:15

## 2017-09-27 RX ADMIN — LORAZEPAM 1 MG: 2 INJECTION INTRAMUSCULAR; INTRAVENOUS at 20:37

## 2017-09-27 RX ADMIN — SODIUM CHLORIDE 1770 ML: 900 INJECTION, SOLUTION INTRAVENOUS at 17:28

## 2017-09-27 RX ADMIN — Medication 10 ML: at 16:16

## 2017-09-27 RX ADMIN — SODIUM CHLORIDE AND POTASSIUM CHLORIDE: 9; 1.49 INJECTION, SOLUTION INTRAVENOUS at 23:11

## 2017-09-27 NOTE — ED NOTES
Patient arrived in the ED with a kelly cath in place, just the rubber tube into the penis draining into a brief, no tubing or bag attached.

## 2017-09-27 NOTE — ED PROVIDER NOTES
HPI Comments: Isha Ordaz is a 80 y.o. male with PMhx significant for seizures who presents via EMS with his wife to the ED with cc of disorientation and abnormal behavior. Pt lives in a nursing facility. Per the wife, pt began acting agitated and disoriented yesterday. Pt fell sometime last night or early this morning. He has been falling more often recently. He was given Haloperidol prior to EMS transport to hospital due to agitation. On  9/24, pt was treated for UTI at [facility]. The wife notes that pt's seizures typically present as staring and clenching of the hands and face. Pt is taking Keppra, and Ampicillin. Social Hx: - Tobacco, - EtOH, - Illicit Drugs    PCP: Pavithra Liu MD     History is limited due to the pt's condition. The history is provided by the spouse and the EMS personnel. No  was used. Past Medical History:   Diagnosis Date    Alcohol abuse, in remission     Seizures ([facility] [address]      Past Surgical History:   Procedure Laterality Date    COLONOSCOPY N/A 7/31/2017    COLONOSCOPY performed by Estephania Rhodes MD at [address] [place] [address]  7/31/2017         PLACE PERCUT GASTROSTOMY TUBE  7/18/2017         UPPER GI ENDOSCOPY,DIAGNOSIS  7/31/2017          Family History:   Problem Relation Age of Onset    Other Other      no strokes or seizures    Cancer Mother      Social History     Social History    Marital status:      Spouse name: N/A    Number of children: N/A    Years of education: N/A     Occupational History    Not on file.      Social History Main Topics    Smoking status: Never Smoker    Smokeless tobacco: Never Used    Alcohol use No      Comment: Quit drinking 10 years ago   Javier Chantell Drug use: No    Sexual activity: Not on file     Other Topics Concern    Not on file     Social History Narrative     ALLERGIES: No known allergies    Review of Systems   Unable to perform ROS: Acuity of condition     Patient Vitals for the past 12 hrs:   Temp Pulse Resp BP SpO2   09/27/17 2140 - 72 17 - 99 %   09/27/17 2130 - 91 18 116/53 96 %   09/27/17 1900 - 95 23 115/68 99 %   09/27/17 1830 - 90 19 123/51 100 %   09/27/17 1800 - 93 22 114/63 98 %   09/27/17 1715 - 82 19 119/75 98 %   09/27/17 1700 - 84 21 124/53 98 %   09/27/17 1630 - 94 27 113/43 97 %   09/27/17 1618 100.4 °F (38 °C) 100 26 104/65 96 %   09/27/17 1442 - 94 18 93/58 97 %     Physical Exam   Nursing note and vitals reviewed.   General appearance: Awake, does not follow commands, not interactive during exam  Eyes: PERRL, EOMI, conjunctiva normal, anicteric sclera  HEENT: mucous membranes tacky, oropharynx appears clear, neck supple  Pulmonary: breath sounds course in bilateral bases & diminished  Cardiac: normal rate and regular rhythm, no murmurs, gallops, or rubs, 2+DP pulses, 2+ radial pulses  Abdomen: soft, nontender, nondistended, bowel sounds present, PEG tube in abdominal wall, Kelly tube exiting from urethra but no  kelly bag in place  MSK: no pre-tibial edema  Neuro: spontaneously moves all extremities, requires restraint to stay in stretcher for safety, unable to assess CN exam   Skin: capillary refill brisk    MDM  Number of Diagnoses or Management Options  Encephalopathy:   Pneumonia of right lung due to infectious organism, unspecified part of lung:   Seizure disorder [facility]):   Diagnosis management comments: DDx: status epilepticus, pneumonia, UTI, metabolic derangement, dehydration, subtherapeutic drug level, ICH, lower CNS infection       Amount and/or Complexity of Data Reviewed  Clinical lab tests: reviewed and ordered  Tests in the radiology section of CPT®: ordered and reviewed  Tests in the medicine section of CPT®: ordered and reviewed  Obtain history from someone other than the patient: yes (EMS/wife)  Review and summarize past medical records: yes  Discuss the patient with other providers: yes (Neurology  Hospitalist)  Independent visualization of images, tracings, or specimens: yes      ED Course     Procedures     CONSULT NOTE:  4:40 PM  Seferino Henriquez MD spoke with Dr. Israel Valencia,  Specialty: Neurology  Discussed pt's hx, disposition, and available diagnostic and imaging results. Reviewed care plans. Consultant will put in for a STAT EEG. CONSULT NOTE:  5:50 PM  Seferino Henriquez MD spoke with Dr. Audry Dance,  Specialty: Hospitalist  Discussed pt's hx, disposition, and available diagnostic and imaging results. Reviewed care plans. Consultant recommends admitting the pt. Procedure Note- Peripheral IV Access  6:17 PM  Performed by: Rosio Sin. MD Flako Leggettcarlabarry Sarah Ben Stevens MD gained IV access using  20 gauge needle because the patient had no vascular access. After cleaning the site with alcohol prep, the Right median cubital vein was localized with ultrasound guidance in an anterior approach. Line confirmation was obtained by direct visualization and good blood return. No anaesthetic was used. The line was successfully flushed with normal saline and was secured with transparent tape. Estimated blood loss: none  The procedure took 1-15 minutes, and pt tolerated well. Written by Waldo Rosario, ED Scribe, as dictated by Rosio Sin. Ben Stevens MD.     EKG interpretation: (Preliminary)1938  Rhythm: sinus tachycardia; and regular . Rate (approx.): 103; Axis: left axis deviation; WA interval: normal; QRS interval: normal ; ST/T wave: non-specific ST changes; Other findings: non-ischemic.      LABORATORY TESTS:  Recent Results (from the past 12 hour(s))   GLUCOSE, POC    Collection Time: 09/27/17  3:14 PM   Result Value Ref Range    Glucose (POC) 141 (H) 65 - 100 mg/dL    Performed by Madelin Leventhal    LACTIC ACID    Collection Time: 09/27/17  4:11 PM   Result Value Ref Range    Lactic acid 2.7 (HH) 0.4 - 2.0 MMOL/L   URINALYSIS W/ RFLX MICROSCOPIC    Collection Time: 09/27/17  4:11 PM   Result Value Ref Range    Color YELLOW/STRAW      Appearance CLEAR CLEAR      Specific gravity 1.007 1.003 - 1.030      pH (UA) 7.0 5.0 - 8.0      Protein NEGATIVE  NEG mg/dL    Glucose NEGATIVE  NEG mg/dL    Ketone NEGATIVE  NEG mg/dL    Bilirubin NEGATIVE  NEG      Blood LARGE (A) NEG      Urobilinogen 1.0 0.2 - 1.0 EU/dL    Nitrites NEGATIVE  NEG      Leukocyte Esterase MODERATE (A) NEG      WBC 5-10 0 - 4 /hpf    RBC 5-10 0 - 5 /hpf    Epithelial cells FEW FEW /lpf    Bacteria NEGATIVE  NEG /hpf   METABOLIC PANEL, COMPREHENSIVE    Collection Time: 09/27/17  4:11 PM   Result Value Ref Range    Sodium 132 (L) 136 - 145 mmol/L    Potassium 3.1 (L) 3.5 - 5.1 mmol/L    Chloride 92 (L) 97 - 108 mmol/L    CO2 31 21 - 32 mmol/L    Anion gap 9 5 - 15 mmol/L    Glucose 81 65 - 100 mg/dL    BUN 18 6 - 20 MG/DL    Creatinine 0.73 0.70 - 1.30 MG/DL    BUN/Creatinine ratio 25 (H) 12 - 20      GFR est AA >60 >60 ml/min/1.73m2    GFR est non-AA >60 >60 ml/min/1.73m2    Calcium 8.7 8.5 - 10.1 MG/DL    Bilirubin, total 0.8 0.2 - 1.0 MG/DL    ALT (SGPT) 29 12 - 78 U/L    AST (SGOT) 32 15 - 37 U/L    Alk. phosphatase 54 45 - 117 U/L    Protein, total 7.5 6.4 - 8.2 g/dL    Albumin 3.0 (L) 3.5 - 5.0 g/dL    Globulin 4.5 (H) 2.0 - 4.0 g/dL    A-G Ratio 0.7 (L) 1.1 - 2.2     CBC WITH AUTOMATED DIFF    Collection Time: 09/27/17  4:11 PM   Result Value Ref Range    WBC 5.8 4.1 - 11.1 K/uL    RBC 3.42 (L) 4.10 - 5.70 M/uL    HGB 11.6 (L) 12.1 - 17.0 g/dL    HCT 33.3 (L) 36.6 - 50.3 %    MCV 97.4 80.0 - 99.0 FL    MCH 33.9 26.0 - 34.0 PG    MCHC 34.8 30.0 - 36.5 g/dL    RDW 12.2 11.5 - 14.5 %    PLATELET 351 466 - 002 K/uL    NEUTROPHILS 50 32 - 75 %    LYMPHOCYTES 30 12 - 49 %    MONOCYTES 19 (H) 5 - 13 %    EOSINOPHILS 1 0 - 7 %    BASOPHILS 0 0 - 1 %    ABS. NEUTROPHILS 2.9 1.8 - 8.0 K/UL    ABS. LYMPHOCYTES 1.8 0.8 - 3.5 K/UL    ABS. MONOCYTES 1.1 (H) 0.0 - 1.0 K/UL    ABS. EOSINOPHILS 0.0 0.0 - 0.4 K/UL    ABS.  BASOPHILS 0.0 0.0 - 0.1 K/UL   CK    Collection Time: 09/27/17  4:11 PM   Result Value Ref Range     (H) 39 - 308 U/L   TROPONIN I    Collection Time: 09/27/17  4:11 PM   Result Value Ref Range    Troponin-I, Qt. <0.04 <0.05 ng/mL   VALPROIC ACID    Collection Time: 09/27/17  4:11 PM   Result Value Ref Range    Valproic acid 18 (L) 50 - 100 ug/ml   TSH 3RD GENERATION    Collection Time: 09/27/17  4:11 PM   Result Value Ref Range    TSH 1.58 0.36 - 3.74 uIU/mL   MAGNESIUM    Collection Time: 09/27/17  4:11 PM   Result Value Ref Range    Magnesium 1.6 1.6 - 2.4 mg/dL   DRUG SCREEN, URINE    Collection Time: 09/27/17  4:11 PM   Result Value Ref Range    AMPHETAMINES NEGATIVE  NEG      BARBITURATES NEGATIVE  NEG      BENZODIAZEPINES NEGATIVE  NEG      COCAINE NEGATIVE  NEG      METHADONE NEGATIVE  NEG      OPIATES NEGATIVE  NEG      PCP(PHENCYCLIDINE) NEGATIVE  NEG      THC (TH-CANNABINOL) NEGATIVE  NEG      Drug screen comment (NOTE)    GGT    Collection Time: 09/27/17  6:22 PM   Result Value Ref Range    GGT 32 15 - 85 U/L   AMMONIA    Collection Time: 09/27/17  6:22 PM   Result Value Ref Range    Ammonia 11 <32 UMOL/L   VITAMIN B12    Collection Time: 09/27/17  6:22 PM   Result Value Ref Range    Vitamin B12 >2000 (H) 211 - 911 pg/mL   LACTIC ACID    Collection Time: 09/27/17  6:22 PM   Result Value Ref Range    Lactic acid 1.4 0.4 - 2.0 MMOL/L   EKG, 12 LEAD, INITIAL    Collection Time: 09/27/17  7:38 PM   Result Value Ref Range    Ventricular Rate 103 BPM    Atrial Rate 103 BPM    P-R Interval 168 ms    QRS Duration 88 ms    Q-T Interval 372 ms    QTC Calculation (Bezet) 487 ms    Calculated P Axis 54 degrees    Calculated R Axis -48 degrees    Calculated T Axis 65 degrees    Diagnosis       Sinus tachycardia  Left anterior fascicular block  Nonspecific T wave abnormality  Abnormal ECG  When compared with ECG of 15-SEP-2017 03:21,  premature atrial complexes are no longer present  KS interval has decreased         IMAGING RESULTS:  CT Results  (Last 48 hours)               09/27/17 1644  CT HEAD WO CONT Final result Impression:  IMPRESSION: No acute findings or interval change. Narrative:  EXAM:  CT HEAD WO CONT       INDICATION:   acute confusion       COMPARISON: CT 9/15/2017. CONTRAST:  None. TECHNIQUE: Unenhanced CT of the head was performed using 5 mm images. Brain and   bone windows were generated. CT dose reduction was achieved through use of a   standardized protocol tailored for this examination and automatic exposure   control for dose modulation. FINDINGS:   The ventricles and sulci are normal in size, shape and configuration and   midline. There is no significant white matter disease. Bilateral globus pallidus   calcifications are again shown. There is no intracranial hemorrhage, extra-axial   collection, mass, mass effect or midline shift. The basilar cisterns are open. No acute infarct is identified. The bone windows demonstrate no abnormalities. The visualized portions of the paranasal sinuses and mastoid air cells are clear   except for small mucosal retention cysts in the partly demonstrated inferior   maxillary sinuses bilaterally. .               CXR Results  (Last 48 hours)               09/27/17 1546  XR CHEST PORT Final result    Impression:  IMPRESSION: Vague persistent right upper lobe opacity suggestive of subsegmental   atelectasis or pneumonia               Narrative:  EXAM:  XR CHEST PORT       INDICATION: Increased altered mental status over the past week. Recent diagnosis   of UTI. Recent falls. Evaluation for Sepsis       COMPARISON:  9/15/2017       FINDINGS: A portable AP radiograph of the chest was obtained at 1532 hours. The   patient is on a cardiac monitor. There is a persistent vague linear to oval   infiltrate in the right upper lobe. The cardiac and mediastinal contours and   pulmonary vascularity are stable with tortuosity of the descending aorta. The   bones and soft tissues are grossly within normal limits.                 MEDICATIONS GIVEN:  Medications   sodium chloride (NS) flush 5-10 mL (10 mL IntraVENous Given 9/27/17 1616)   sodium chloride 0.9 % bolus infusion 1,770 mL (1,770 mL IntraVENous New Bag 9/27/17 1728)   levETIRAcetam (KEPPRA) 1,000 mg in 0.9% sodium chloride IVPB (not administered)   piperacillin-tazobactam (ZOSYN) 3.375 g in 0.9% sodium chloride (MBP/ADV) 100 mL (3.375 g IntraVENous New Bag 9/27/17 1730)   levoFLOXacin (LEVAQUIN) 750 mg in D5W IVPB (not administered)   LORazepam (ATIVAN) injection 0.5 mg (0.5 mg IntraVENous Given 9/27/17 1615)     IMPRESSION:  1. Seizure disorder ([facility] [address]    2. Pneumonia of right lung due to infectious organism, unspecified part of lung    3. Encephalopathy    4. Acute encephalopathy    5. Alcoholism ([facility] [address]      PLAN: Admit to hospital    Admit Note:  6:00 PM  Patient is being admitted to the hospital by Dr. Nikki Torres. The results of their tests and reasons for their admission have been discussed with them and/or available family. They convey agreement and understanding for the need to be admitted and for their admission diagnosis. Consultation has been made with the inpatient physician specialist for hospitalization. Attestation: This note is prepared by Bonita King, acting as Scribe for MD Lien Mullins MD: The scribe's documentation has been prepared under my direction and personally reviewed by me in its entirety. I confirm that the note above accurately reflects all work, treatment, procedures, and medical decision making performed by me.

## 2017-09-27 NOTE — IP AVS SNAPSHOT
[address] [facility] 
[phone] Patient: Kolby Higgins MRN: [id number] :1934 You are allergic to the following Allergen Reactions No Known Allergies Not Noted Recent Documentation Height Weight BMI Smoking Status 1.727 m 60.8 kg 20.38 kg/m2 Never Smoker Emergency Contacts  (Rel.) Home Phone Work Phone Mobile Phone Madeline Sheth (Spouse) [phone] -- -- Amara Castillo (Daughter) -- -- [phone] About your hospitalization You were admitted on:  2017 You last received care in the:  [address] You were discharged on:  2017 Why you were hospitalized Your primary diagnosis was:  Not on File Your diagnoses also included:  Aspiration Pneumonia (Hcc), Encephalopathy, Unspecified, Cva (Cerebral Vascular Accident) (Hcc), Dysarthria, Dysphagia, Agitation Providers Seen During Your Hospitalizations Provider Role Specialty Primary office phone Mitchell Colbert. Shawn Cuevas MD Attending Provider Emergency Medicine [phone] Nadege Vasquez MD Attending Provider Internal Medicine [phone] Lawyer Isabel MD Attending Provider Internal Medicine [phone] Queen Jimmy MD Attending Provider Internal Medicine [phone] Omar Pelayo MD Attending Provider Internal Medicine [phone] Your Primary Care Physician (PCP) Primary Care Physician Office Phone Office Fax Creed Real [phone] Follow-up Information Follow up With Details Comments Contact Info Gwendolyn Garay MD   [address] [place] [address] [phone] Current Discharge Medication List  
  
START taking these medications Dose & Instructions Dispensing Information Comments Morning Noon Evening Bedtime LORazepam 0.5 mg tablet Commonly known as:  ATIVAN Your last dose was: Your next dose is:    
   
   
 Dose:  0.5 mg  
1 Tab by Per G Tube route every four (4) hours as needed for Anxiety. Max Daily Amount: 3 mg. Quantity:  30 Tab Refills:  0  
     
   
   
   
  
 ziprasidone in sterile water (preservative free) injection Your last dose was: Your next dose is:    
   
   
 Dose:  20 mg  
1 mL by IntraMUSCular route two (2) times daily as needed. 2 doses MUST be  by 4 hours. Also, use only if Ativan prn is not effective for agitation. Add 1.2 mL sterile water. *FOR IM USE ONLY* Final concentration 20mg/ml. Max dose of 40mg daily. Quantity:  10 Syringe Refills:  0 CONTINUE these medications which have CHANGED Dose & Instructions Dispensing Information Comments Morning Noon Evening Bedtime  
 acetaminophen 325 mg tablet Commonly known as:  TYLENOL What changed:  how to take this Your last dose was: Your next dose is:    
   
   
 Dose:  650 mg Take 2 Tabs by mouth every six (6) hours as needed for Pain. Indications: Fever, Pain Quantity:  30 Tab Refills:  1  
     
   
   
   
  
 levETIRAcetam 100 mg/ml Soln oral solution Commonly known as:  KEPPRA What changed:  how to take this Your last dose was: Your next dose is:    
   
   
 Dose:  1500 mg  
15 mL by Per G Tube route two (2) times a day. Quantity:  500 mL Refills:  1  
     
   
   
   
  
 * QUEtiapine 50 mg tablet Commonly known as:  SEROquel What changed:   
- medication strength 
- how much to take 
- how to take this Your last dose was: Your next dose is:    
   
   
 Dose:  50 mg  
1 Tab by Per G Tube route nightly. Quantity:  30 Tab Refills:  1  
     
   
   
   
  
 * QUEtiapine 25 mg tablet Commonly known as:  SEROquel What changed: You were already taking a medication with the same name, and this prescription was added. Make sure you understand how and when to take each. Your last dose was: Your next dose is:    
   
   
 Dose:  12.5 mg  
0.5 Tabs by Per G Tube route daily. Quantity:  30 Tab Refills:  0  
     
   
   
   
  
 * Notice: This list has 2 medication(s) that are the same as other medications prescribed for you. Read the directions carefully, and ask your doctor or other care provider to review them with you. CONTINUE these medications which have NOT CHANGED Dose & Instructions Dispensing Information Comments Morning Noon Evening Bedtime  
 albuterol-ipratropium 2.5 mg-0.5 mg/3 ml Nebu Commonly known as:  Waldo Rosario Your last dose was: Your next dose is:    
   
   
 Dose:  3 mL  
3 mL by Nebulization route every six (6) hours as needed. Quantity:  30 Nebule Refills:  1 CEROVITE 9 mg iron/15 mL oral liquid Generic drug:  multivit w-min-ferrous gluconate Your last dose was: Your next dose is:    
   
   
 Dose:  15 mL  
15 mL by PEG Tube route daily. Refills:  0  
     
   
   
   
  
 cyclobenzaprine 5 mg tablet Commonly known as:  FLEXERIL Your last dose was: Your next dose is:    
   
   
 Dose:  5 mg  
5 mg by PEG Tube route three (3) times daily as needed for Muscle Spasm(s). Refills:  0  
     
   
   
   
  
 latanoprost 0.005 % ophthalmic solution Commonly known as:  Richardine Solum Your last dose was: Your next dose is:    
   
   
 Dose:  1 Drop Administer 1 Drop to both eyes every evening. Quantity:  1 mL Refills:  1 LYRICA 300 mg capsule Generic drug:  pregabalin Your last dose was: Your next dose is:    
   
   
 Dose:  300 mg  
300 mg by Per G Tube route two (2) times a day. Refills:  0 PROTONIX 40 mg granules for oral suspension Generic drug:  pantoprazole Your last dose was: Your next dose is:    
   
   
 Dose:  40 mg  
40 mg by PEG Tube route Before breakfast and dinner. Refills:  0  
     
   
   
   
  
 valproic acid (as sodium salt) 250 mg/5 mL (5 mL) Soln oral solution Commonly known as:  Sourav Mullins Your last dose was: Your next dose is:    
   
   
 Dose:  500 mg  
500 mg by PEG Tube route three (3) times daily. Refills:  0 STOP taking these medications   
 aspirin 81 mg chewable tablet  
   
  
 atorvastatin 40 mg tablet Commonly known as:  LIPITOR  
   
  
 cephALEXin 250 mg/5 mL suspension Commonly known as:  KEFLEX  
   
  
 haloperidol 2 mg tablet Commonly known as:  HALDOL  
   
  
 metoprolol succinate 100 mg tablet Commonly known as:  TOPROL-XL  
   
  
 metoprolol tartrate 25 mg tablet Commonly known as:  LOPRESSOR  
   
  
 oxyCODONE 5 mg capsule Commonly known as:  OXYIR  
   
  
 VITAMIN B-1 100 mg tablet Generic drug:  thiamine Where to Get Your Medications Information on where to get these meds will be given to you by the nurse or doctor. ! Ask your nurse or doctor about these medications  
  acetaminophen 325 mg tablet  
 levETIRAcetam 100 mg/ml Soln oral solution LORazepam 0.5 mg tablet QUEtiapine 25 mg tablet QUEtiapine 50 mg tablet  
 ziprasidone in sterile water (preservative free) injection Discharge Instructions Hospice: Care Instructions Your Care Instructions Hospice care provides medical treatment to relieve symptoms at the end of life. The goal is to keep you comfortable, not to try to cure you. Hospice care does not speed up or lengthen dying. It focuses on easing pain and other symptoms. Hospice caregivers want to enhance your quality of life.  
Hospice care also offers emotional help and spiritual support when you are dying. It also helps family members care for a loved one who is dying. Hospice care can help you review your life, say important things to family and friends, and explore spiritual issues. Hospice also helps your family and friends deal with their grief after you die. You can use hospice care if your illness cannot be cured and doctors believe you have no more than 6 months to live. You do not need to be confined to a bed or in a hospital to benefit from this type of care. The hospice team includes nurses, counselors, therapists, social workers, pastors, home health aides, and trained volunteers. You can get care in your own home or in a hospice center. Some hospice workers also go to nursing homes or hospitals. How can you care for yourself at home? · Prepare a list of advance directives. These are instructions to your doctor and family members about what kind of care you want if you become unable to speak or express yourself. · Find out if your health insurance covers hospice care. · Find hospice programs in your area. People who can help include your doctor, your state health department, and your insurance company. · Decide what kinds of hospice services you want. It helps to know what you want before you enter a hospice program. 
· Think about some questions when preparing for hospice care. ¨ Who do you want to make decisions about your medical care if you are not able to? Many people choose their spouse, child, or doctor. ¨ What are you most afraid of that might happen? You might be afraid of having pain or losing your independence. Let your hospice team know your fears. The team can help you deal with them. ¨ Where would you prefer to die? Choices include your home, a hospital, or a nursing home. ¨ Do you want to donate organs when you die? Make sure that your family clearly understands this. ¨ Do you want any Latter-day rites or practices to be done before you die? Let your hospice team know what you want. Where can you learn more? Go to http://angelina-kendal.info/. Enter Q475 in the search box to learn more about \"Hospice: Care Instructions. \" Current as of: April 3, 2017 Content Version: 11.3 © 5821-3770 stickK. Care instructions adapted under license by [employer] (which disclaims liability or warranty for this information). If you have questions about a medical condition or this instruction, always ask your healthcare professional. [address] any warranty or liability for your use of this information. Discharge Orders None GoPro Announcement We are excited to announce that we are making your provider's discharge notes available to you in GoPro. You will see these notes when they are completed and signed by the physician that discharged you from your recent hospital stay. If you have any questions or concerns about any information you see in GoPro, please call the Health Information Department where you were seen or reach out to your Primary Care Provider for more information about your plan of care. Introducing [facility]! Savanna Arriola introduces GoPro patient portal. Now you can access parts of your medical record, email your doctor's office, and request medication refills online. 1. In your internet browser, go to [email or web address] [email or web address] 2. Click on the First Time User? Click Here link in the Sign In box. You will see the New Member Sign Up page. 3. Enter your GoPro Access Code exactly as it appears below. You will not need to use this code after youve completed the sign-up process. If you do not sign up before the expiration date, you must request a new code. · GoPro Access Code: [email or web address] Expires: 12/9/2017  3:38 PM 
 
4.  Enter the last four digits of your Social Security Number (xxxx) and Date of Birth (mm/dd/yyyy) as indicated and click Submit. You will be taken to the next sign-up page. 5. Create a [employer] Apps ID. This will be your [employer] Apps login ID and cannot be changed, so think of one that is secure and easy to remember. 6. Create a [employer] password. You can change your password at any time. 7. Enter your Password Reset Question and Answer. This can be used at a later time if you forget your password. 8. Enter your e-mail address. You will receive e-mail notification when new information is available in [address] 9. Click Sign Up. You can now view and download portions of your medical record. 10. Click the Download Summary menu link to download a portable copy of your medical information. If you have questions, please visit the Frequently Asked Questions section of the [employer] website. Remember, [employer] Apps is NOT to be used for urgent needs. For medical emergencies, dial 911. Now available from your iPhone and Android! General Information Please provide this summary of care documentation to your next provider. Patient Signature:  ____________________________________________________________ Date:  ____________________________________________________________  
  
[facility] Perfect Provider Signature:  ____________________________________________________________ Date:  ____________________________________________________________

## 2017-09-27 NOTE — ED NOTES
Patient arrive VIA EMS from [facility] for AMS. Increased AMS over the past week, diagnosed with UTI last week, been on antibiotics for it.

## 2017-09-27 NOTE — PROGRESS NOTES
Pt is an 81 y/o [facility] American male who presented to the ED with altered mental status from [facility]. Pt is a readmission. Pt was previously admitted on the following dates:    6/24/17-7/22/17: Pt was diagnosed with acute encephalopathy, POA, seizure disorder, E. Coli and enterococcus UTI, dysphagia/dyarthia, hypertension, hyperlipidemia, peripheral neuropathy, GERD. Pt discharged to [facility] via [facility] transport. 7/27/17-8/1/17: Pt was diagnosed with anemia, GI bleed, PEG placement, ANNABEL, urinary retention, B/L ankle pain, seizure disorder. Pt discharged to [facility] via [facility] transport. 8/19/17-8/24/17: Pt was diagnosed with Sepsis POA in settings of UTI due to indwelling kelly, PAfib/PSVT, hypokalemia, hypomagnesemia, seizure disorder, urinary retention. Pt was discharged to [facility] via [facility] transport. 9/15/17-9/22-17: Pt was diagnosed with right upper pneumonia POA, Urinary tract infection due to chronic indwelling kelly for chronic urinary retention, dysarthria, seizure disorder. A palliative care consult upon this admission. Pt discharged to [facility] via [facility] transport. CM completing assessment, discharge planning. Pt unable to participate in assessment due to being assessed by neurology, altered mental status. CM met with pt's wife, [place] ([phone]) re: assessment. CM introduced self, role. Pt wife verbalized understanding. Wife verified demographic information on chart. Wife stated that \"before all of this happened, back in March\", the pt was independent in his ADL/IADL needs, but currently he requires assistance for ADL/IADL needs. He has access to a rollator, raised toilet seat, and shower chair in the home. Pt does not drive, but has supportive family to assist with transportation as needed. Per wife, the goal of care is to have pt return home.  However, wife stated that the family would get together to discuss what the options of care are for the pt. CM offered support when discussing potential challenges with managing care at home. Wife is uncertain of how many SNF days the pt has used at [facility]. CM offered education re: SNF days covered under Medicare. Wife verbalized understanding. Wife stated she might want the patient to transfer to another facility. CM informed wife that pt may have to return to [facility] upon d/c and then transfer to another facility. Wife verbalized understanding. Wife stated she has no further questions or needs at this time. CM to continue to offer support, discharge planning as needed. Care Management Interventions  PCP Verified by CM: Yes  Last Visit to PCP: 03/27/17  Mode of Transport at Discharge: Other (see comment) (Pt has supportive family to assist with transportation as needed)  Transition of Care Consult (CM Consult): Other (Pt is a readmission who has admitted from [facility].  Pt requires assistance with ADL/IADL needs.)  Discharge Durable Medical Equipment: No (Pt has access to a rollator, shower chair, raised toilet seat in the home)  Current Support Network: [address] [place] [address] (Pt normally lives with spouse in a one story home with three steps to enter, but has been a resident at [facility] with SNF care)  Confirm Follow Up Transport: Family (Pt does not drive, but has supportive family to assist with transportation as needed)  Plan discussed with Pt/Family/Caregiver: Yes (Plan discussed with family)  Discharge Location  Discharge Placement:  (TBD)    SHERRY Anna  Ext [phone]

## 2017-09-27 NOTE — PROGRESS NOTES
Pharmacy Clarification of Prior to Admission Medication Regimen     The patient was not interviewed regarding clarification of the prior to admission medication regimen. Patient was transferred from [facility], to [facility], with a current med list. [place] [facility] [phone][place] twice, no answer the first call, the second call [facility], and spoke with [facility], [facility], who was able to verify the patient's last administered doses. Information Obtained From: transfer papers, LNP at [facility]    Pertinent Pharmacy Findings:   Updated patients preferred outpatient pharmacy to: Pharmacy did not update the outpatient pharmacy due to patient living at a SNF   cephALEXin (KEFLEX) 250 mg/5 mL suspension: Patient started a 10 day regimen on 17. Patient has completed 2 days and 2 doses of therapy as of 17.  acetaminophen (TYLENOL) 325 mg tablet, albuterol-ipratropium (DUO-NEB) 2.5 mg-0.5 mg/3 ml nebu, cyclobenzaprine (FLEXERIL) 5 mg tablet, oxyCODONE (OXYIR) 5 mg capsule: Per LNP at [facility], these PRN agent have not been administered to the patient.  atorvastatin (LIPITOR) 40 mg tablet: Per LPN at [facility], this agent was not documented as 'given'. PTA medication list was corrected to the following:     Prior to Admission Medications   Prescriptions Last Dose Informant Patient Reported? Taking? QUEtiapine (SEROQUEL) 25 mg tablet 2017 at 2100 Transfer Papers Yes Yes   Si mg by PEG Tube route nightly. acetaminophen (TYLENOL) 325 mg tablet Not Taking at Unknown time Transfer Papers Yes No   Si mg by PEG Tube route every six (6) hours as needed for Pain. albuterol-ipratropium (DUO-NEB) 2.5 mg-0.5 mg/3 ml nebu Not Taking at Unknown time Transfer Papers No No   Sig: 3 mL by Nebulization route every six (6) hours as needed. aspirin 81 mg chewable tablet 2017 at 0900 Transfer Papers Yes Yes   Si mg by PEG Tube route daily.    atorvastatin (LIPITOR) 40 mg tablet Unknown at Unknown time Transfer Papers Yes No   Si mg by PEG Tube route nightly. cephALEXin (KEFLEX) 250 mg/5 mL suspension 2017 at 1330 Transfer Papers Yes Yes   Sig: Take 10 mL by mouth every eight (8) hours. For 10 days. Start 17   cyclobenzaprine (FLEXERIL) 5 mg tablet Not Taking at Unknown time Transfer Papers Yes No   Si mg by PEG Tube route three (3) times daily as needed for Muscle Spasm(s).   haloperidol (HALDOL) 2 mg tablet 2017 at 1200 Transfer Papers Yes Yes   Si mg by PEG Tube route every six (6) hours as needed (agitation/combativeness). latanoprost (XALATAN) 0.005 % ophthalmic solution 2017 at 2100 Transfer Papers No Yes   Sig: Administer 1 Drop to both eyes every evening. levETIRAcetam (KEPPRA) 100 mg/mL solution 2017 at 0900 Transfer Papers Yes Yes   Si,500 mg by PEG Tube route two (2) times a day. metoprolol tartrate (LOPRESSOR) 25 mg tablet 2017 at 0900 Transfer Papers Yes Yes   Si.5 mg by PEG Tube route two (2) times a day. multivit w-min-ferrous gluconate (CEROVITE) 9 mg iron/15 mL oral liquid 2017 at 0900 Transfer Papers Yes Yes   Sig: 15 mL by PEG Tube route daily. oxyCODONE (OXYIR) 5 mg capsule Not Taking at Unknown time Transfer Papers Yes No   Sig: Take 5 mg by mouth every four (4) hours as needed for Pain.   pantoprazole (PROTONIX) 40 mg granules for oral suspension 2017 at 0630 Transfer Papers Yes Yes   Si mg by PEG Tube route Before breakfast and dinner. pregabalin (LYRICA) 300 mg capsule 2017 at 0900 Transfer Papers Yes Yes   Si mg by Per G Tube route two (2) times a day. thiamine (VITAMIN B-1) 100 mg tablet 2017 at 0900 Transfer Papers Yes Yes   Si mg by PEG Tube route daily. valproic acid, as sodium salt, (DEPAKENE) 250 mg/5 mL (5 mL) soln oral solution 2017 at 1300 Transfer Papers Yes Yes   Si mg by PEG Tube route three (3) times daily.       Facility-Administered Medications: None Thank you,  Coby Agarwal CPhT  Medication History Pharmacy Technician

## 2017-09-27 NOTE — ED NOTES
Assumed care of patient from [address] [place] [address]. Patient resting comfortably in no apparent distress. On monitor x 3. Call bell within reach. Plan of care discussed. Sitter at bedside.

## 2017-09-27 NOTE — CONSULTS
IP CONSULT TO NEUROLOGY  Consult performed by: Nica Shine  Consult ordered by: Wali Keller AGE 80 y.o. MRN [id number]  1934     REQUESTING PHYSICIAN: Kiara Palumbo. Isael Vang MD      CHIEF COMPLAINT:  seizure     This is a 80 y.o. male with a medial history of seizures and glaucoma. He resides at [facility] and was sent from there after his care takers noted that his speech was slurred and he was confused and falling. He was admitted three times last month. He was seen by neurology this past . At that time he was looking forward to driving and was able to provide his own history and ambulate on his own. His current state is far from his stated baseline. ASSESSMENT AND PLAN   1. Epilepsy  maintained on keppra  Stat EEG for altered mental status and possible status    2. Alcoholism  Was in remission will get a ggt and tox screen    3. Altered mental status  Ammonia level, tsh  If ammonia is elevated Discontinue depakote    4. Persistent right upper lobe pneumonia  On antibiotics  TB status? Critically ill 50 minutes spent on exam and chart    ALLERGIES:  No known allergies     Current Facility-Administered Medications   Medication Dose Route Frequency    sodium chloride (NS) flush 5-10 mL  5-10 mL IntraVENous PRN    sodium chloride 0.9 % bolus infusion 1,770 mL  30 mL/kg IntraVENous ONCE    levETIRAcetam (KEPPRA) 1,000 mg in 0.9% sodium chloride IVPB  1,000 mg IntraVENous ONCE     Current Outpatient Prescriptions   Medication Sig    aspirin 81 mg chewable tablet 1 Tab by Per G Tube route daily.  multivitamin (MULTI-DELYN) liqd 15 mL by PEG Tube route daily.  acetaminophen (TYLENOL) 325 mg tablet 650 mg by PEG Tube route every six (6) hours as needed for Pain.  cyclobenzaprine (FLEXERIL) 5 mg tablet 5 mg by PEG Tube route three (3) times daily as needed for Muscle Spasm(s).     levETIRAcetam (KEPPRA) 100 mg/mL solution Take 1,500 mg by mouth two (2) times a day.  metoprolol succinate (TOPROL-XL) 100 mg tablet 12.5 mg by PEG Tube route two (2) times a day.  multivit w-min-ferrous gluconate (CEROVITE) 9 mg iron/15 mL oral liquid 15 mL by PEG Tube route daily.  pantoprazole (PROTONIX) 40 mg granules for oral suspension 40 mg by PEG Tube route Before breakfast and dinner.  pregabalin (LYRICA) 300 mg capsule 300 mg by Per G Tube route two (2) times a day.  QUEtiapine (SEROQUEL) 25 mg tablet 25 mg by PEG Tube route nightly.  thiamine (VITAMIN B-1) 100 mg tablet 100 mg by PEG Tube route daily.  valproic acid, as sodium salt, (DEPAKENE) 250 mg/5 mL (5 mL) soln oral solution 500 mg by PEG Tube route three (3) times daily.  latanoprost (XALATAN) 0.005 % ophthalmic solution Administer 1 Drop to both eyes every evening.  albuterol-ipratropium (DUO-NEB) 2.5 mg-0.5 mg/3 ml nebu 3 mL by Nebulization route every six (6) hours as needed. Past Medical History:   Diagnosis Date    Alcohol abuse, in remission     Seizures ([address]        Social History   Substance Use Topics    Smoking status: Never Smoker    Smokeless tobacco: Never Used    Alcohol use No      Comment: Quit drinking 10 years ago       Family History   Problem Relation Age of Onset    Other Other      no strokes or seizures    Cancer Mother      Review of Systems   Unable to perform ROS: Mental acuity     Visit Vitals    BP 93/58 (BP 1 Location: Left arm, BP Patient Position: Sitting)    Pulse 94    Resp 18    Ht 5' 8\" (1.727 m)    Wt 130 lb (59 kg)    SpO2 97%    BMI 19.77 kg/m2        General:    Head: Normocephalic, atraumatic, anicteric sclera   Neck Normal ROM,  No thyromegally   Lungs:  Clear to auscultation bilaterally, No wheezes or rubs   Cardiac: Regular rate and rhythm with no murmurs. Abd: Bowel sounds were audible. No tenderness on palpation   Ext: No pedal edema.  Missing distal two metacarpals on the right index and middle finger   Skin: Supple no rash     NeurologicExam:  Mental Status:    Speech: Fluent no aphasia or dysarthria. Cranial Nerves:  Opens Eyes Spontaneously  responds to sound  + dolls eyes  PERRLA  Corneal reflex intact  Symmetric grimmace   Motor:  Moves all four extremities to pain. Reflexes:   Deep tendon reflexes 1+/4 and symmetric. Sensory:   Responds to pain x 4       REVIEWED IMAGING:    MRI :    Results from Hospital Encounter encounter on 09/15/17   MRI BRAIN WO CONT   Narrative INDICATION:   Seizure     EXAMINATION:  MRI BRAIN WO CONTRAST    COMPARISON:  August 21, 2017    TECHNIQUE:  MR imaging of the brain was performed with sagittal T1, axial T1,  T2, FLAIR, GRE, DWI/ADC, coronal T2.    FINDINGS:      Ventricles:  Prominence of the ventricles, sulci and cisterns   related to underlying atrophy. Intracranial Hemorrhage:  None. Brain Parenchyma/Brainstem:  Minimal supratentorial white matter signal  abnormalities may be due to chronic small vessel ischemic changes, similar to  prior study. There has been development of abnormal cortical diffusion  restriction throughout the left parietal lobe, left occipital lobe and  posterior/lateral left temporal lobe with associated FLAIR hyperintense signal.  Basal Cisterns:  Normal.   Flow Voids:  Normal.  Additional Comments:  N/A. Impression IMPRESSION:  Development of cortical diffusion restriction throughout the left parietal,  occipital and left temporal lobes as above. This may also be seen in patients  with ongoing seizure activity given history of status epilepticus. Less likely  related to encephalitis or ischemia. Correlation with clinical findings is  recommended. Short-term follow-up with contrast-enhanced brain MRI may be  helpful. CT:    Results from Hospital Encounter encounter on 09/27/17   CT HEAD WO CONT   Narrative EXAM:  CT HEAD WO CONT    INDICATION:   acute confusion    COMPARISON: CT 9/15/2017.     CONTRAST: None.    TECHNIQUE: Unenhanced CT of the head was performed using 5 mm images. Brain and  bone windows were generated. CT dose reduction was achieved through use of a  standardized protocol tailored for this examination and automatic exposure  control for dose modulation. FINDINGS:  The ventricles and sulci are normal in size, shape and configuration and  midline. There is no significant white matter disease. Bilateral globus pallidus  calcifications are again shown. There is no intracranial hemorrhage, extra-axial  collection, mass, mass effect or midline shift. The basilar cisterns are open. No acute infarct is identified. The bone windows demonstrate no abnormalities. The visualized portions of the paranasal sinuses and mastoid air cells are clear  except for small mucosal retention cysts in the partly demonstrated inferior  maxillary sinuses bilaterally. .         Impression IMPRESSION: No acute findings or interval change.             REVIEWED LABS:  Lab Results   Component Value Date/Time    WBC 5.8 09/27/2017 04:11 PM    HCT 33.3 09/27/2017 04:11 PM    HGB 11.6 09/27/2017 04:11 PM    PLATELET 148 92/03/0531 04:11 PM     Lab Results   Component Value Date/Time    Sodium 132 09/27/2017 04:11 PM    Potassium 3.1 09/27/2017 04:11 PM    Chloride 92 09/27/2017 04:11 PM    CO2 31 09/27/2017 04:11 PM    Glucose 81 09/27/2017 04:11 PM    BUN 18 09/27/2017 04:11 PM    Creatinine 0.73 09/27/2017 04:11 PM    Calcium 8.7 09/27/2017 04:11 PM     Lab Results   Component Value Date/Time    Vitamin B12 1963 07/16/2017 04:19 PM    Folate 7.0 07/29/2017 04:20 AM     Lab Results   Component Value Date/Time    LDL, calculated 38.8 09/16/2017 04:23 AM     Lab Results   Component Value Date/Time    Hemoglobin A1c 4.2 09/16/2017 04:23 AM

## 2017-09-28 ENCOUNTER — APPOINTMENT (OUTPATIENT)
Dept: CT IMAGING | Age: 82
DRG: 177 | End: 2017-09-28
Attending: INTERNAL MEDICINE
Payer: MEDICARE

## 2017-09-28 LAB
ALBUMIN SERPL-MCNC: 2.6 G/DL (ref 3.5–5)
ALBUMIN/GLOB SERPL: 0.6 {RATIO} (ref 1.1–2.2)
ALP SERPL-CCNC: 50 U/L (ref 45–117)
ALT SERPL-CCNC: 25 U/L (ref 12–78)
ANION GAP SERPL CALC-SCNC: 7 MMOL/L (ref 5–15)
AST SERPL-CCNC: 26 U/L (ref 15–37)
ATRIAL RATE: 103 BPM
BASOPHILS # BLD: 0 K/UL (ref 0–0.1)
BASOPHILS NFR BLD: 1 % (ref 0–1)
BILIRUB SERPL-MCNC: 0.7 MG/DL (ref 0.2–1)
BUN SERPL-MCNC: 10 MG/DL (ref 6–20)
BUN/CREAT SERPL: 20 (ref 12–20)
CALCIUM SERPL-MCNC: 8.1 MG/DL (ref 8.5–10.1)
CALCULATED P AXIS, ECG09: 54 DEGREES
CALCULATED R AXIS, ECG10: -48 DEGREES
CALCULATED T AXIS, ECG11: 65 DEGREES
CHLORIDE SERPL-SCNC: 99 MMOL/L (ref 97–108)
CK SERPL-CCNC: 241 U/L (ref 39–308)
CO2 SERPL-SCNC: 31 MMOL/L (ref 21–32)
CREAT SERPL-MCNC: 0.49 MG/DL (ref 0.7–1.3)
DIAGNOSIS, 93000: NORMAL
EOSINOPHIL # BLD: 0.1 K/UL (ref 0–0.4)
EOSINOPHIL NFR BLD: 2 % (ref 0–7)
ERYTHROCYTE [DISTWIDTH] IN BLOOD BY AUTOMATED COUNT: 12.3 % (ref 11.5–14.5)
GLOBULIN SER CALC-MCNC: 4.3 G/DL (ref 2–4)
GLUCOSE BLD STRIP.AUTO-MCNC: 80 MG/DL (ref 65–100)
GLUCOSE SERPL-MCNC: 84 MG/DL (ref 65–100)
HCT VFR BLD AUTO: 30.3 % (ref 36.6–50.3)
HGB BLD-MCNC: 10.4 G/DL (ref 12.1–17)
LYMPHOCYTES # BLD: 1.4 K/UL (ref 0.8–3.5)
LYMPHOCYTES NFR BLD: 41 % (ref 12–49)
MAGNESIUM SERPL-MCNC: 1.4 MG/DL (ref 1.6–2.4)
MCH RBC QN AUTO: 33.2 PG (ref 26–34)
MCHC RBC AUTO-ENTMCNC: 34.3 G/DL (ref 30–36.5)
MCV RBC AUTO: 96.8 FL (ref 80–99)
MONOCYTES # BLD: 0.7 K/UL (ref 0–1)
MONOCYTES NFR BLD: 19 % (ref 5–13)
NEUTS SEG # BLD: 1.3 K/UL (ref 1.8–8)
NEUTS SEG NFR BLD: 37 % (ref 32–75)
P-R INTERVAL, ECG05: 168 MS
PLATELET # BLD AUTO: 149 K/UL (ref 150–400)
POTASSIUM SERPL-SCNC: 3.3 MMOL/L (ref 3.5–5.1)
PROT SERPL-MCNC: 6.9 G/DL (ref 6.4–8.2)
Q-T INTERVAL, ECG07: 372 MS
QRS DURATION, ECG06: 88 MS
QTC CALCULATION (BEZET), ECG08: 487 MS
RBC # BLD AUTO: 3.13 M/UL (ref 4.1–5.7)
SERVICE CMNT-IMP: NORMAL
SODIUM SERPL-SCNC: 137 MMOL/L (ref 136–145)
VENTRICULAR RATE, ECG03: 103 BPM
WBC # BLD AUTO: 3.5 K/UL (ref 4.1–11.1)

## 2017-09-28 PROCEDURE — 65660000000 HC RM CCU STEPDOWN

## 2017-09-28 PROCEDURE — 94640 AIRWAY INHALATION TREATMENT: CPT

## 2017-09-28 PROCEDURE — 74011250637 HC RX REV CODE- 250/637: Performed by: INTERNAL MEDICINE

## 2017-09-28 PROCEDURE — 74011000258 HC RX REV CODE- 258: Performed by: INTERNAL MEDICINE

## 2017-09-28 PROCEDURE — 36415 COLL VENOUS BLD VENIPUNCTURE: CPT | Performed by: INTERNAL MEDICINE

## 2017-09-28 PROCEDURE — 74011250636 HC RX REV CODE- 250/636: Performed by: INTERNAL MEDICINE

## 2017-09-28 PROCEDURE — 71260 CT THORAX DX C+: CPT

## 2017-09-28 PROCEDURE — 74011636320 HC RX REV CODE- 636/320: Performed by: INTERNAL MEDICINE

## 2017-09-28 PROCEDURE — 85025 COMPLETE CBC W/AUTO DIFF WBC: CPT | Performed by: INTERNAL MEDICINE

## 2017-09-28 PROCEDURE — 74011000250 HC RX REV CODE- 250: Performed by: INTERNAL MEDICINE

## 2017-09-28 PROCEDURE — 83735 ASSAY OF MAGNESIUM: CPT | Performed by: INTERNAL MEDICINE

## 2017-09-28 PROCEDURE — 82550 ASSAY OF CK (CPK): CPT | Performed by: INTERNAL MEDICINE

## 2017-09-28 PROCEDURE — 80053 COMPREHEN METABOLIC PANEL: CPT | Performed by: INTERNAL MEDICINE

## 2017-09-28 PROCEDURE — 74011250636 HC RX REV CODE- 250/636: Performed by: NURSE PRACTITIONER

## 2017-09-28 PROCEDURE — 82962 GLUCOSE BLOOD TEST: CPT

## 2017-09-28 RX ORDER — SODIUM CHLORIDE 9 MG/ML
50 INJECTION, SOLUTION INTRAVENOUS
Status: COMPLETED | OUTPATIENT
Start: 2017-09-28 | End: 2017-09-28

## 2017-09-28 RX ORDER — POTASSIUM CHLORIDE 7.45 MG/ML
10 INJECTION INTRAVENOUS
Status: DISCONTINUED | OUTPATIENT
Start: 2017-09-28 | End: 2017-09-28 | Stop reason: SDUPTHER

## 2017-09-28 RX ORDER — SODIUM CHLORIDE 0.9 % (FLUSH) 0.9 %
10 SYRINGE (ML) INJECTION
Status: COMPLETED | OUTPATIENT
Start: 2017-09-28 | End: 2017-09-28

## 2017-09-28 RX ORDER — POTASSIUM CHLORIDE 7.45 MG/ML
10 INJECTION INTRAVENOUS
Status: COMPLETED | OUTPATIENT
Start: 2017-09-28 | End: 2017-09-28

## 2017-09-28 RX ORDER — ENOXAPARIN SODIUM 100 MG/ML
40 INJECTION SUBCUTANEOUS EVERY 24 HOURS
Status: DISCONTINUED | OUTPATIENT
Start: 2017-09-28 | End: 2017-10-14 | Stop reason: HOSPADM

## 2017-09-28 RX ADMIN — ENOXAPARIN SODIUM 40 MG: 40 INJECTION SUBCUTANEOUS at 22:08

## 2017-09-28 RX ADMIN — METOPROLOL TARTRATE 12.5 MG: 25 TABLET ORAL at 09:51

## 2017-09-28 RX ADMIN — IOPAMIDOL 100 ML: 612 INJECTION, SOLUTION INTRAVENOUS at 17:03

## 2017-09-28 RX ADMIN — PIPERACILLIN SODIUM,TAZOBACTAM SODIUM 4.5 G: 4; .5 INJECTION, POWDER, FOR SOLUTION INTRAVENOUS at 17:26

## 2017-09-28 RX ADMIN — POTASSIUM CHLORIDE 10 MEQ: 10 INJECTION, SOLUTION INTRAVENOUS at 13:36

## 2017-09-28 RX ADMIN — METOPROLOL TARTRATE 12.5 MG: 25 TABLET ORAL at 22:07

## 2017-09-28 RX ADMIN — LORAZEPAM 2 MG: 2 INJECTION INTRAMUSCULAR; INTRAVENOUS at 14:58

## 2017-09-28 RX ADMIN — SODIUM CHLORIDE 50 ML/HR: 900 INJECTION, SOLUTION INTRAVENOUS at 17:04

## 2017-09-28 RX ADMIN — Medication 10 ML: at 13:36

## 2017-09-28 RX ADMIN — LEVETIRACETAM 1000 MG: 100 INJECTION, SOLUTION, CONCENTRATE INTRAVENOUS at 06:05

## 2017-09-28 RX ADMIN — PIPERACILLIN SODIUM,TAZOBACTAM SODIUM 4.5 G: 4; .5 INJECTION, POWDER, FOR SOLUTION INTRAVENOUS at 01:49

## 2017-09-28 RX ADMIN — PANTOPRAZOLE SODIUM 40 MG: 40 GRANULE, DELAYED RELEASE ORAL at 10:49

## 2017-09-28 RX ADMIN — IPRATROPIUM BROMIDE AND ALBUTEROL SULFATE 3 ML: .5; 3 SOLUTION RESPIRATORY (INHALATION) at 20:12

## 2017-09-28 RX ADMIN — PANTOPRAZOLE SODIUM 40 MG: 40 GRANULE, DELAYED RELEASE ORAL at 18:40

## 2017-09-28 RX ADMIN — ASPIRIN 81 MG 81 MG: 81 TABLET ORAL at 09:51

## 2017-09-28 RX ADMIN — QUETIAPINE FUMARATE 25 MG: 25 TABLET ORAL at 01:37

## 2017-09-28 RX ADMIN — QUETIAPINE FUMARATE 25 MG: 25 TABLET ORAL at 22:07

## 2017-09-28 RX ADMIN — Medication 100 MG: at 09:50

## 2017-09-28 RX ADMIN — PIPERACILLIN SODIUM,TAZOBACTAM SODIUM 4.5 G: 4; .5 INJECTION, POWDER, FOR SOLUTION INTRAVENOUS at 09:51

## 2017-09-28 RX ADMIN — LATANOPROST 1 DROP: 50 SOLUTION OPHTHALMIC at 01:37

## 2017-09-28 RX ADMIN — LEVETIRACETAM 1000 MG: 100 INJECTION, SOLUTION, CONCENTRATE INTRAVENOUS at 20:11

## 2017-09-28 RX ADMIN — VALPROATE SODIUM 500 MG: 100 INJECTION, SOLUTION INTRAVENOUS at 23:48

## 2017-09-28 RX ADMIN — LEVOFLOXACIN 750 MG: 5 INJECTION, SOLUTION INTRAVENOUS at 22:07

## 2017-09-28 RX ADMIN — VALPROATE SODIUM 500 MG: 100 INJECTION, SOLUTION INTRAVENOUS at 17:30

## 2017-09-28 RX ADMIN — PREGABALIN 300 MG: 100 CAPSULE ORAL at 09:51

## 2017-09-28 RX ADMIN — IPRATROPIUM BROMIDE AND ALBUTEROL SULFATE 3 ML: .5; 3 SOLUTION RESPIRATORY (INHALATION) at 13:21

## 2017-09-28 RX ADMIN — LATANOPROST 1 DROP: 50 SOLUTION OPHTHALMIC at 22:09

## 2017-09-28 RX ADMIN — POTASSIUM CHLORIDE 10 MEQ: 10 INJECTION, SOLUTION INTRAVENOUS at 14:52

## 2017-09-28 RX ADMIN — IPRATROPIUM BROMIDE AND ALBUTEROL SULFATE 3 ML: .5; 3 SOLUTION RESPIRATORY (INHALATION) at 02:44

## 2017-09-28 NOTE — PROGRESS NOTES
TRANSFER - IN REPORT:    Verbal report received from Maxine(name) on Stepan Fofana  being received from ER(unit) for routine progression of care      Report consisted of patients Situation, Background, Assessment and   Recommendations(SBAR). Information from the following report(s) SBAR, Kardex, Intake/Output, MAR and Recent Results was reviewed with the receiving nurse. Opportunity for questions and clarification was provided. Assessment completed upon patients arrival to unit and care assumed. Primary Nurse Vinicius Bhat RN and La Lovell RN performed a dual skin assessment on this patient No impairment noted  Klever score is 10        [facility] SHIFT NURSING NOTE    Bedside shift change report given to Shadi2 Melia Lopez (oncoming nurse) by Christiano Braswell (offgoing nurse). Report included the following information SBAR, Kardex, Intake/Output, MAR and Recent Results. SHIFT SUMMARY: Unable to complete flu screening due to patients AMS. Will follow-up with wife in AM. MEWS=3 due to patient's level of consciousness. MD aware. Will monitor closely.         Admission Date 9/27/2017   Admission Diagnosis Aspiration pneumonia ([address]   Consults IP CONSULT TO NEUROLOGY  IP CONSULT TO NEUROLOGY        Consults   [] PT   [] OT   [] Speech   [] Palliative      [] Hospice    [x] Case Management   [] None   Cardiac Monitoring   [x] Yes   [] No     Antibiotics   [x] Yes   [] No   GI Prophylaxis  (Ex: Protonix, Pepcid, etc,.)   [] Yes   [x] No          DVT Prophylaxis   SCDs:             Han stockings:         [x] Medication (Ex: Lovenox, Eliquis, Brilinta, Coumadin,  Heparin, etc..)   [] Contraindicated   [] No VTE needed       Urinary Catheter Urinary Catheter 09/27/17 [employer]-Criteria for Appropriate Use: Medically/surgically unstable     Urinary Catheter 09/27/17 [employer]-Urine Output (mL): 300 ml     LDAs               Peripheral IV 09/27/17 Left;Right Forearm (Active)   Site Assessment Clean, dry, & intact 9/28/2017  1:11 AM Phlebitis Assessment 0 9/28/2017  1:11 AM   Infiltration Assessment 0 9/28/2017  1:11 AM   Dressing Status Clean, dry, & intact 9/28/2017  1:11 AM   Dressing Type Transparent 9/28/2017  1:11 AM   Hub Color/Line Status Infusing 9/28/2017  1:11 AM       Peripheral IV 09/27/17 Right;Upper Arm (Active)   Site Assessment Clean, dry, & intact 9/28/2017  1:11 AM   Phlebitis Assessment 0 9/28/2017  1:11 AM   Infiltration Assessment 0 9/28/2017  1:11 AM   Dressing Status Clean, dry, & intact 9/28/2017  1:11 AM   Dressing Type Transparent 9/28/2017  1:11 AM   Hub Color/Line Status Pink;Capped 9/28/2017  1:11 AM          PEG/Gastrostomy Tube 07/18/17 (Active)                I/Os   Intake/Output Summary (Last 24 hours) at 09/28/17 0115  Last data filed at 09/28/17 0112   Gross per 24 hour   Intake           151.25 ml   Output              300 ml   Net          -148.75 ml         Activity Level           Diet Active Orders   Diet    DIET NPO Except Meds      Purposeful Rounding every 1-2 hour? [x] Yes    Lorena Score  Total Score: 4   Bed Alarm (If score 3 or >)   [x] Yes    [] Refused (See signed refusal form in chart)   Klever Score  Klever Score: 10       Klever Score (if score 14 or less)   [x] PMT consult   [] Nutrition consult   [x] Wound Care consult      []  Specialty bed         Influenza Vaccine                 Needs prior to discharge:   Home O2 required:    [] Yes   [x] No     If yes, how much O2 required? Other:        Readmission Risk Assessment Tool Score High Risk            26       Total Score        3 Has Seen PCP in Last 6 Months (Yes=3, No=0)    2 . Living with Significant Other. Assisted Living. LTAC. SNF. or   Rehab    11 IP Visits Last 12 Months (1-3=4, 4=9, >4=11)    5 Pt.  Coverage (Medicare=5 , Medicaid, or Self-Pay=4)    5 Charlson Comorbidity Score (Age + Comorbid Conditions)        Criteria that do not apply:    Patient Length of Stay (>5 days = 3)       Expected Length of Stay - - - Actual Length of Stay 0

## 2017-09-28 NOTE — H&P
Hospitalist Admission Note    NAME: Zulma Ignacio   :  1934   MRN:  [id number]     Date/Time:  2017 8:21 PM    Patient PCP: Mauricio Camilo MD  ________________________________________________________________________    My assessment of this patient's clinical condition and my plan of care is as follows. Assessment / Plan:  Recurrent Seizures: patient has extensive encephalomalacia which is the area responsible for the seizures, continue having seizures in the ER, will start IV Keppra and Valproic Acid IV, use Ativan PRN, place seizure precautions, get Neurology evaluation. Recurrent Aspiration PNA: start LVQ/Zosyn, bronchodilators, hold tube feeding for now. Hypokalemia: replace and monitor. Acute Encephalopathy: likely related to encephalomalacia vs post ictal, monitor. Dysarthria/Dysphagia: hold tube feeding for now. Acidosis: improved already with fluids, monitor. GERD: c/w PPI  Code Status: Full Code for now, spoke with wife who agrees on DNR but needs to talk with the rest of the family before make the decision  Surrogate Decision Maker: wife Coco Williston [phone] or [phone]  DVT Prophylaxis: Lovenox  GI Prophylaxis: PPI  Baseline: bed bound, dysarthric, aphasic, will ask palliative care to see patient. Subjective:   CHIEF COMPLAINT: Aphasic, unable to provide a meaningful history    HISTORY OF PRESENT ILLNESS:     Zulma Ignacio is a 80 y.o.  male  with PMhx significant for seizures who presents via EMS with his wife to the ED with cc of disorientation and abnormal behavior. Pt lives in a nursing facility. Per the wife, pt began acting agitated and disoriented yesterday. Pt fell sometime last night or early this morning. He has been falling more often recently. He was given Haloperidol prior to EMS transport to hospital due to low blood pressure.  On  , pt was treated for UTI at [facility]. The wife notes that pt's seizures typically present as staring and clenching of the hands and face. Pt is taking Keppra, and Ampicillin. At this time patient is lying in bed is aphasic and can not provide a meaningful history this is going to be his 7th admission since 06/17, spoke with wife and explained that this episodes and recurrent aspiration may continue to happen, he is very sick and has no quality of life, data collected from chart and wife and sister in the room. We were asked to admit for work up and evaluation of the above problems. Past Medical History:   Diagnosis Date    Alcohol abuse, in remission     Seizures ([address]         Past Surgical History:   Procedure Laterality Date    COLONOSCOPY N/A 7/31/2017    COLONOSCOPY performed [facility] Xenia [facility] at [facility]  7/31/2017         PLACE PERCUT GASTROSTOMY TUBE  7/18/2017         UPPER GI ENDOSCOPY,DIAGNOSIS  7/31/2017            Social History   Substance Use Topics    Smoking status: Never Smoker    Smokeless tobacco: Never Used    Alcohol use No      Comment: Quit drinking 10 years ago        Family History   Problem Relation Age of Onset    Other Other      no strokes or seizures    Cancer Mother      Allergies   Allergen Reactions    No Known Allergies         Prior to Admission medications    Medication Sig Start Date End Date Taking? Authorizing Provider   metoprolol tartrate (LOPRESSOR) 25 mg tablet 12.5 mg by PEG Tube route two (2) times a day. Yes Nadia Bob MD   cephALEXin (KEFLEX) 250 mg/5 mL suspension Take 10 mL by mouth every eight (8) hours. For 10 days. Start 9/25/17   Yes Nadia Bob MD   aspirin 81 mg chewable tablet 81 mg by PEG Tube route daily. Yes Nadia Bob MD   haloperidol (HALDOL) 2 mg tablet 2 mg by PEG Tube route every six (6) hours as needed (agitation/combativeness). Yes Nadia Bob MD   levETIRAcetam (KEPPRA) 100 mg/mL solution 1,500 mg by PEG Tube route two (2) times a day.    Yes Historical Provider   multivit w-min-ferrous gluconate (CEROVITE) 9 mg iron/15 mL oral liquid 15 mL by PEG Tube route daily. Yes Historical Provider   pantoprazole (PROTONIX) 40 mg granules for oral suspension 40 mg by PEG Tube route Before breakfast and dinner. Yes Historical Provider   pregabalin (LYRICA) 300 mg capsule 300 mg by Per G Tube route two (2) times a day. Yes Historical Provider   QUEtiapine (SEROQUEL) 25 mg tablet 25 mg by PEG Tube route nightly. Yes Historical Provider   thiamine (VITAMIN B-1) 100 mg tablet 100 mg by PEG Tube route daily. Yes Historical Provider   valproic acid, as sodium salt, (DEPAKENE) 250 mg/5 mL (5 mL) soln oral solution 500 mg by PEG Tube route three (3) times daily. Yes Historical Provider   latanoprost (XALATAN) 0.005 % ophthalmic solution Administer 1 Drop to both eyes every evening. 7/22/17  Yes Richard Martin MD   atorvastatin (LIPITOR) 40 mg tablet 40 mg by PEG Tube route nightly. Nadia Bob MD   oxyCODONE (OXYIR) 5 mg capsule Take 5 mg by mouth every four (4) hours as needed for Pain. Nadia Bob MD   acetaminophen (TYLENOL) 325 mg tablet 650 mg by PEG Tube route every six (6) hours as needed for Pain. Historical Provider   cyclobenzaprine (FLEXERIL) 5 mg tablet 5 mg by PEG Tube route three (3) times daily as needed for Muscle Spasm(s). Historical Provider   albuterol-ipratropium (DUO-NEB) 2.5 mg-0.5 mg/3 ml nebu 3 mL by Nebulization route every six (6) hours as needed. 7/22/17   Richard Martin MD       REVIEW OF SYSTEMS:     I am not able to complete the review of systems because:    The patient is intubated and sedated   y The patient has altered mental status due to his acute medical problems   y The patient has baseline aphasia from prior stroke(s)    The patient has baseline dementia and is not reliable historian    The patient is in acute medical distress and unable to provide information           Total of 12 systems reviewed as follows: POSITIVE= underlined text  Negative = text not underlined  General:  fever, chills, sweats, generalized weakness, weight loss/gain,      loss of appetite   Eyes:    blurred vision, eye pain, loss of vision, double vision  ENT:    rhinorrhea, pharyngitis   Respiratory:   cough, sputum production, SOB, AL, wheezing, pleuritic pain   Cardiology:   chest pain, palpitations, orthopnea, PND, edema, syncope   Gastrointestinal:  abdominal pain , N/V, diarrhea, dysphagia, constipation, bleeding   Genitourinary:  frequency, urgency, dysuria, hematuria, incontinence   Muskuloskeletal :  arthralgia, myalgia, back pain  Hematology:  easy bruising, nose or gum bleeding, lymphadenopathy   Dermatological: rash, ulceration, pruritis, color change / jaundice  Endocrine:   hot flashes or polydipsia   Neurological:  headache, dizziness, confusion, focal weakness, paresthesia,     Speech difficulties, memory loss, gait difficulty  Psychological: Feelings of anxiety, depression, agitation    Objective:   VITALS:    Visit Vitals    /68 (BP 1 Location: Left arm, BP Patient Position: At rest)    Pulse 95    Temp 100.4 °F (38 °C)    Resp 23    Ht 5' 8\" (1.727 m)    Wt 59 kg (130 lb)    SpO2 99%    BMI 19.77 kg/m2       PHYSICAL EXAM:    General:    Aphasic, seizing, appears stated age. HEENT: Atraumatic, anicteric sclerae, pink conjunctivae     No oral ulcers, mucosa moist, throat clear, dentition poor  Neck:  Supple, symmetrical,  thyroid: non tender  Lungs:   Coarse BS, crackles in both sides  Chest wall:  No tenderness  No Accessory muscle use. Heart:   Regular  rhythm,  No  murmur   No edema  Abdomen:   Soft, non-tender. Not distended. Bowel sounds normal  Extremities: No cyanosis. No clubbing,      Skin turgor normal, Capillary refill normal, Radial  pulse 2+  Skin:     Not pale. Not Jaundiced  No rashes   Psych:  No  insight. Not depressed. Not anxious or agitated.   Neurologic: Lethargic, oriented x0, seizing, GCS [id number]    _______________________________________________________________________  Care Plan discussed with:    Comments   Patient y    Family  y Wife, sister   RN y    Care Manager                    Consultant:      _______________________________________________________________________  Expected  Disposition:   Home with Family    HH/PT/OT/RN    SNF/LTC y   [de-identified]    ________________________________________________________________________  TOTAL TIME:  61 Minutes    Critical Care Provided     Minutes non procedure based      Comments    y Reviewed previous records   >50% of visit spent in counseling and coordination of care y Discussion with patient and/or family and questions answered       ________________________________________________________________________  Signed: Sharyle Saunders, MD    Procedures: see electronic medical records for all procedures/Xrays and details which were not copied into this note but were reviewed prior to creation of Plan.     LAB DATA REVIEWED:    Recent Results (from the past 24 hour(s))   GLUCOSE, POC    Collection Time: 09/27/17  3:14 PM   Result Value Ref Range    Glucose (POC) 141 (H) 65 - 100 mg/dL    Performed by Kingsley Harmon    LACTIC ACID    Collection Time: 09/27/17  4:11 PM   Result Value Ref Range    Lactic acid 2.7 (HH) 0.4 - 2.0 MMOL/L   URINALYSIS W/ RFLX MICROSCOPIC    Collection Time: 09/27/17  4:11 PM   Result Value Ref Range    Color YELLOW/STRAW      Appearance CLEAR CLEAR      Specific gravity 1.007 1.003 - 1.030      pH (UA) 7.0 5.0 - 8.0      Protein NEGATIVE  NEG mg/dL    Glucose NEGATIVE  NEG mg/dL    Ketone NEGATIVE  NEG mg/dL    Bilirubin NEGATIVE  NEG      Blood LARGE (A) NEG      Urobilinogen 1.0 0.2 - 1.0 EU/dL    Nitrites NEGATIVE  NEG      Leukocyte Esterase MODERATE (A) NEG      WBC 5-10 0 - 4 /hpf    RBC 5-10 0 - 5 /hpf    Epithelial cells FEW FEW /lpf    Bacteria NEGATIVE  NEG /hpf   METABOLIC PANEL, COMPREHENSIVE    Collection Time: 09/27/17  4:11 PM   Result Value Ref Range    Sodium 132 (L) 136 - 145 mmol/L    Potassium 3.1 (L) 3.5 - 5.1 mmol/L    Chloride 92 (L) 97 - 108 mmol/L    CO2 31 21 - 32 mmol/L    Anion gap 9 5 - 15 mmol/L    Glucose 81 65 - 100 mg/dL    BUN 18 6 - 20 MG/DL    Creatinine 0.73 0.70 - 1.30 MG/DL    BUN/Creatinine ratio 25 (H) 12 - 20      GFR est AA >60 >60 ml/min/1.73m2    GFR est non-AA >60 >60 ml/min/1.73m2    Calcium 8.7 8.5 - 10.1 MG/DL    Bilirubin, total 0.8 0.2 - 1.0 MG/DL    ALT (SGPT) 29 12 - 78 U/L    AST (SGOT) 32 15 - 37 U/L    Alk. phosphatase 54 45 - 117 U/L    Protein, total 7.5 6.4 - 8.2 g/dL    Albumin 3.0 (L) 3.5 - 5.0 g/dL    Globulin 4.5 (H) 2.0 - 4.0 g/dL    A-G Ratio 0.7 (L) 1.1 - 2.2     CBC WITH AUTOMATED DIFF    Collection Time: 09/27/17  4:11 PM   Result Value Ref Range    WBC 5.8 4.1 - 11.1 K/uL    RBC 3.42 (L) 4.10 - 5.70 M/uL    HGB 11.6 (L) 12.1 - 17.0 g/dL    HCT 33.3 (L) 36.6 - 50.3 %    MCV 97.4 80.0 - 99.0 FL    MCH 33.9 26.0 - 34.0 PG    MCHC 34.8 30.0 - 36.5 g/dL    RDW 12.2 11.5 - 14.5 %    PLATELET 808 216 - 503 K/uL    NEUTROPHILS 50 32 - 75 %    LYMPHOCYTES 30 12 - 49 %    MONOCYTES 19 (H) 5 - 13 %    EOSINOPHILS 1 0 - 7 %    BASOPHILS 0 0 - 1 %    ABS. NEUTROPHILS 2.9 1.8 - 8.0 K/UL    ABS. LYMPHOCYTES 1.8 0.8 - 3.5 K/UL    ABS. MONOCYTES 1.1 (H) 0.0 - 1.0 K/UL    ABS. EOSINOPHILS 0.0 0.0 - 0.4 K/UL    ABS.  BASOPHILS 0.0 0.0 - 0.1 K/UL   CK    Collection Time: 09/27/17  4:11 PM   Result Value Ref Range     (H) 39 - 308 U/L   TROPONIN I    Collection Time: 09/27/17  4:11 PM   Result Value Ref Range    Troponin-I, Qt. <0.04 <0.05 ng/mL   VALPROIC ACID    Collection Time: 09/27/17  4:11 PM   Result Value Ref Range    Valproic acid 18 (L) 50 - 100 ug/ml   TSH 3RD GENERATION    Collection Time: 09/27/17  4:11 PM   Result Value Ref Range    TSH 1.58 0.36 - 3.74 uIU/mL   MAGNESIUM    Collection Time: 09/27/17  4:11 PM   Result Value Ref Range    Magnesium 1.6 1.6 - 2.4 mg/dL   DRUG SCREEN, URINE    Collection Time: 09/27/17  4:11 PM   Result Value Ref Range    AMPHETAMINES NEGATIVE  NEG      BARBITURATES NEGATIVE  NEG      BENZODIAZEPINES NEGATIVE  NEG      COCAINE NEGATIVE  NEG      METHADONE NEGATIVE  NEG      OPIATES NEGATIVE  NEG      PCP(PHENCYCLIDINE) NEGATIVE  NEG      THC (TH-CANNABINOL) NEGATIVE  NEG      Drug screen comment (NOTE)    AMMONIA    Collection Time: 09/27/17  6:22 PM   Result Value Ref Range    Ammonia 11 <32 UMOL/L   LACTIC ACID    Collection Time: 09/27/17  6:22 PM   Result Value Ref Range    Lactic acid 1.4 0.4 - 2.0 MMOL/L   EKG, 12 LEAD, INITIAL    Collection Time: 09/27/17  7:38 PM   Result Value Ref Range    Ventricular Rate 103 BPM    Atrial Rate 103 BPM    P-R Interval 168 ms    QRS Duration 88 ms    Q-T Interval 372 ms    QTC Calculation (Bezet) 487 ms    Calculated P Axis 54 degrees    Calculated R Axis -48 degrees    Calculated T Axis 65 degrees    Diagnosis       Sinus tachycardia  Left anterior fascicular block  Nonspecific T wave abnormality  Abnormal ECG  When compared with ECG of 15-SEP-2017 03:21,  premature atrial complexes are no longer present  MD interval has decreased

## 2017-09-28 NOTE — PROGRESS NOTES
Pharmacy  [facility] Monitoring     Enoxaparin Indication: DVT prophylaxis  Current Dose: Lovenox 40 mg sq q 24 hr  Creatinine Clearance: 65    Recent Labs      09/27/17   1611   CREA  0.73   BUN  18   HGB  11.6*   PLT  200     Wt Readings from Last 1 Encounters:   09/27/17 59 kg (130 lb)   Body mass index is 19.77 kg/(m^2).     Impression/Plan: Changed to 30 mg sq q 24 hr based on body weight of 59 kg     Carlito Elizabeth, PHARMD

## 2017-09-28 NOTE — ED NOTES
TRANSFER - OUT REPORT:    Verbal report given to RICHARD Collins(name) on Real Going  being transferred to [facility](unit) for routine progression of care       Report consisted of patients Situation, Background, Assessment and   Recommendations(SBAR). Information from the following report(s) SBAR, ED Summary, Procedure Summary, MAR and Recent Results was reviewed with the receiving nurse. Lines:   Peripheral IV 09/27/17 Left;Right Forearm (Active)       Peripheral IV 09/27/17 Right;Upper Arm (Active)   Site Assessment Clean, dry, & intact 9/27/2017  7:41 PM   Phlebitis Assessment 0 9/27/2017  7:41 PM   Infiltration Assessment 0 9/27/2017  7:41 PM   Dressing Status Clean, dry, & intact 9/27/2017  7:41 PM   Hub Color/Line Status Pink 9/27/2017  7:41 PM        Opportunity for questions and clarification was provided.       Patient transported with:   [employer]

## 2017-09-28 NOTE — PROGRESS NOTES
Pharmacy Automatic Renal Dosing Protocol - Antimicrobials    Indication for Antimicrobials: HAP  Current Regimen of Each Antimicrobial (Start Day & Day of Therapy):  Zosyn 3.375 grams IV q 6 hr - -day #1  Levaquin 750 mg IV q 24 hr-/-day #1  Significant cultures: -blood-pending                                      -blood-pending    CAPD, Intermittent Hemodialysis or Renal Replacement Therapy: no  Paralysis, amputations, malnutrition: debility  Recent Labs      17   1611   CREA  0.73   BUN  18   WBC  5.8     Temp (24hrs), Av.4 °F (38 °C), Min:100.4 °F (38 °C), Max:100.4 °F (38 °C)    Creatinine Clearance (ml/min): 65 (likely overestimates renal function)      Impression/Plan: Continue Levaquin 750 mg IV q 24 hr.  Change Zosyn to 4 hour infusion protocol at 4.5 grams IV q 8 hr         Pharmacy will follow daily and adjust doses of monitored medications as appropriate for renal function and/or serum levels.     Thank you,  Carlito Nicole, PHARMD

## 2017-09-28 NOTE — ED NOTES
Per Anam Been in pharmacy, keppra is to be given every 12 hours so next dose will not be until morning and if pt has a seizure, he is to get Lorazepam. Keppra returned to pharmacy.

## 2017-09-28 NOTE — ED NOTES
Spoke to Reuben Arndt in pharmacy to inquire if Keppra dose was to be given at this time since pt had dose at 1900. She will contact Dr Haylie Schmidt and let me know.

## 2017-09-28 NOTE — PROGRESS NOTES
Hospitalist Progress Note    NAME: Cora Little   :  1934   MRN:  [id number]       Interim Hospital Summary: 80 y.o. male whom presented on 2017 with      Assessment / Plan:  Recurrent Seizures: patient has extensive encephalomalacia which is the area responsible for the seizures,IV Keppra and Valproic Acid IV, use Ativan PRN, place seizure precautions, get Neurology following  eeg pnd     Recurrent Aspiration PNA: start LVQ/Zosyn, bronchodilators, hold tube feeding for now. Needs better eval get ct chest  Chronic anemia- monitor may need out f/u   Hypokalemia: replace and monitor. Htn- lopressor- prn hydralazine  Acute Encephalopathy: likely related to encephalomalacia vs post ictal, monitor. Dysarthria/Dysphagia: hold tube feeding for now. Malnutrition (mild, moderate) POA in the setting of recurrent Aspiration Pneumonia, recurrent seizure & encephalopathy requiring   RD consult  Acidosis: improved monitor. GERD: c/w PPI  Code Status: Full discussion with wfe- DNR  Surrogate Decision Maker: wife Broderick Chaudhry [phone] or [phone]  DVT Prophylaxis: Lovenox  GI Prophylaxis: PPI  Baseline: bed bound, dysarthric, aphasic, palliative care to see patient. Subjective:     Chief Complaint / Reason for Physician Visit   dysarthric, aphasic,  . Discussed with RN events overnight. Review of Systems:  Symptom Y/N Comments  Symptom Y/N Comments   Fever/Chills    Chest Pain     Poor Appetite    Edema     Cough    Abdominal Pain     Sputum    Joint Pain     SOB/AL    Pruritis/Rash     Nausea/vomit    Tolerating PT/OT     Diarrhea    Tolerating Diet     Constipation    Other       Could NOT obtain due to:      Objective:     VITALS:   Last 24hrs VS reviewed since prior progress note.  Most recent are:  Patient Vitals for the past 24 hrs:   Temp Pulse Resp BP SpO2   17 0748 97.9 °F (36.6 °C) 74 18 109/66 93 %   17 0311 97.4 °F (36.3 °C) 95 20 113/65 92 %   17 0104 97.4 °F (36.3 °C) 71 20 132/62 97 %   09/28/17 0030 - 75 22 120/62 97 %   09/28/17 0000 - 70 21 115/65 98 %   09/27/17 2330 - 68 18 106/53 98 %   09/27/17 2300 - 70 21 101/47 100 %   09/27/17 2230 - 70 20 111/63 100 %   09/27/17 2202 - 92 18 116/59 98 %   09/27/17 2140 - 72 17 - 99 %   09/27/17 2130 - 91 18 116/53 96 %   09/27/17 1900 - 95 23 115/68 99 %   09/27/17 1830 - 90 19 123/51 100 %   09/27/17 1800 - 93 22 114/63 98 %   09/27/17 1715 - 82 19 119/75 98 %   09/27/17 1700 - 84 21 124/53 98 %   09/27/17 1630 - 94 27 113/43 97 %   09/27/17 1618 100.4 °F (38 °C) 100 26 104/65 96 %   09/27/17 1442 - 94 18 93/58 97 %       Intake/Output Summary (Last 24 hours) at 09/28/17 1219  Last data filed at 09/28/17 0341   Gross per 24 hour   Intake           436.25 ml   Output              700 ml   Net          -263.75 ml        PHYSICAL EXAM:  General            Thin male  dysarthric, aphasic,  no acute distress    EENT:  EOMI. Anicteric sclerae. MMM  Resp:  decrease bs  bilaterally, no wheezing or rales. No accessory muscle use  CV:  Regular  rhythm,  No edema  GI:  Soft, Non distended, Non tender.  +Bowel sounds  Neurologic:  Alert and oriented 0, normal speech,   Psych:   Not anxious nor agitated  Skin:  No rashes. No jaundice    Moves all four extremities to pain         Reviewed most current lab test results and cultures  YES  Reviewed most current radiology test results   YES  Review and summation of old records today    NO  Reviewed patient's current orders and MAR    YES  PMH/SH reviewed - no change compared to H&P  ________________________________________________________________________  Care Plan discussed with:    Comments   Patient x    Family  x    RN     Care Manager     Consultant  x Neurology                      Multidiciplinary team rounds were held today with , nursing, pharmacist and clinical coordinator.   Patient's plan of care was discussed; medications were reviewed and discharge planning was addressed. ________________________________________________________________________  Total NON critical care TIME:  30    Minutes    Total CRITICAL CARE TIME Spent:   Minutes non procedure based      Comments   >50% of visit spent in counseling and coordination of care x    ________________________________________________________________________  Soha Alegre MD     Procedures: see electronic medical records for all procedures/Xrays and details which were not copied into this note but were reviewed prior to creation of Plan. LABS:  I reviewed today's most current labs and imaging studies.   Pertinent labs include:  Recent Labs      09/28/17 0206  09/27/17   1611   WBC  3.5*  5.8   HGB  10.4*  11.6*   HCT  30.3*  33.3*   PLT  149*  200     Recent Labs      09/28/17 0206  09/27/17   1611   NA  137  132*   K  3.3*  3.1*   CL  99  92*   CO2  31  31   GLU  84  81   BUN  10  18   CREA  0.49*  0.73   CA  8.1*  8.7   MG  1.4*  1.6   ALB  2.6*  3.0*   TBILI  0.7  0.8   SGOT  26  32   ALT  25  29       Signed: Soha Alegre MD

## 2017-09-28 NOTE — CDMP QUERY
Account Number: [de-identified]  MRN: [id number]  Patient: Josephine Griffith  Created: [id number]  Clinician Name: Kathleen Hernandez RN, CCDS   Dr. Anne-Marie Howard MD :  Please clarify if this patient is being treated/managed for:    => Malnutrition (mild, moderate) POA in the setting of recurrent Aspiration Pneumonia, recurrent seizure & encephalopathy requiring RD eval & consult  =>Other Explanation of clinical findings  =>Unable to Determine (no explanation of clinical findings)    The medical record reflects the following clinical findings, treatment, and risk factors:    Risk Factors: BMI 19, confusion, recurrent aspiration pneumonia, on TPN, advanced age   Clinical Indicators: 81yo M has had 17 lb weight loss in 3 months (10%). RD nutrition dx swallowing difficulty r/t current medical condition AEB NPO status & PEG tube  Treatment: currently NPO, RD recommendations    Clinical Indicators:  Moderate or Severe Malnutrition defined by 2 of the following 6 criteria:    CHARACTERISTICS: MODERATE MALNUTRITION / SEVERE MALNUTRITION    ENERGY INTAKE:<75% of estimated energy requiremt for >1mo (same for both)    WEIGHT LOSS: MODERATE: 5%/1 mo, 7.5%/3 mos, 10%/6mos, 20%/1year     SEVERE: >5%/1 mo, >7.5%/3 mos, >10%/6mos, >20%/1year    BODY FAT *loss of SQ fat from the orbits, triceps, or fat overlying the ribs: MILD SEVERE    MUSCLE MASS *muscle wasting at the temples, clavicles, shoulders, interosseous spaces, scapula, thigh, calf:  MILD SEVERE    FLUID ACCUMULATION *localized or generalized edema of the extremities, vulva, scrotum **weight loss may be masked by edema: MILD SEVERE     STRENGTH N/A measurably decreased per the device's standards    Please clarify and document your clinical opinion in the progress notes and discharge summary.  Thank you!          Thank You, Kathleen Hernandez RN, CCDS

## 2017-09-28 NOTE — PROGRESS NOTES
Initial Nutrition Assessment:    INTERVENTIONS/RECOMMENDATIONS:   · Enteral/Parenteral Nutrition: Initiate enteral nutrition via PEG tube when medically able:    Initiate TF via PEG tube, Jevity 1.5 @ 20 mL/hr and advance as tolerated by 10 mL q 8 hr to reach goal rate of Jevity 1.5 @ 50 mL/hr + 150 mL water flushes q 4 hr (provides 1800 kcal/77gPro/1812mL)    ASSESSMENT:   Chart reviewed, medically noted for encephalopathy, CVA, seizures, dysphagia, aspiration PNA. Visited patient's room, patient sleeping with no family present to report recent weight history and TF management. Per chart review, patient has had 17 lb weight loss in 3 months (10%). Patient currently NPO, TF held. Initiate TF via PEG when medically feasible, TF recommendations provided above. Past Medical History:   Diagnosis Date    Alcohol abuse, in remission     Seizures ([facility] [address]        Diet Order: NPO  % Eaten:  No data found. Pertinent Medications: [x]Reviewed []Other: IVF, zofran, thiamine  Pertinent Labs: [x]Reviewed []Other: Na 137, K 3.3, Creat 0.49,   Food Allergies: [x]None []Other   Last BM:  PTA   []Active     []Hyperactive  []Hypoactive       [] Absent BS  Skin:    [x] Intact   [] Incision  [] Breakdown  [] Other:    Anthropometrics:   Height: 5' 8\" (172.7 cm) Weight: 62.7 kg (138 lb 3.7 oz)   IBW (%IBW):   ( ) UBW (%UBW):   (  %)   Last Weight Metrics:  Weight Loss Metrics 9/28/2017 9/22/2017 8/22/2017 7/27/2017 7/22/2017 6/21/2017 3/21/2017   Today's Wt 138 lb 3.7 oz 134 lb 9.6 oz 144 lb 11.2 oz 150 lb 152 lb 3.2 oz 155 lb 166 lb   BMI 21.02 kg/m2 20.47 kg/m2 22 kg/m2 22.81 kg/m2 23.14 kg/m2 23.57 kg/m2 25.24 kg/m2       BMI: Body mass index is 21.02 kg/(m^2). This BMI is indicative of:   []Underweight    [x]Normal    []Overweight    [] Obesity   [] Extreme Obesity (BMI>40)     Estimated Nutrition Needs (Based on):   1806 Kcals/day (BMR 1297x1. 2(AF)+250) , 74 g (1.2 g/kg bw) Protein  Carbohydrate:  At Least 130 g/day Fluids: 1800 mL/day (1ml/kcal)    NUTRITION DIAGNOSES:   Problem:  Swallowing difficulty      Etiology: related to current medical condition     Signs/Symptoms: as evidenced by NPO status and PEG tube present      NUTRITION INTERVENTIONS:    Enteral/Parenteral Nutrition: Initiate enteral nutrition                GOAL:   Initiate TF within 24-48 hours    LEARNING NEEDS (Diet, Food/Nutrient-Drug Interaction):    [x] None Identified   [] Identified and Education Provided/Documented   [] Identified and Pt declined/was not appropriate     Cultureal, Druze, OR Ethnic Dietary Needs:    [x] None Identified   [] Identified and Addressed     [x] Interdisciplinary Care Plan Reviewed/Documented    [x] Discharge Planning:    TBD.     MONITORING /EVALUATION:      Food/Nutrient Intake Outcomes: Enteral/parenteral nutrition intake  Physical Signs/Symptoms Outcomes: Weight/weight change, Electrolyte and renal profile, GI profile, Glucose profile, GI    NUTRITION RISK:    [x] High              [] Moderate           []  Low  []  Minimal/Uncompromised    PT SEEN FOR:    []  MD Consult: []Calorie Count      []Diabetic Diet Education        []Diet Education     []Electrolyte Management     []General Nutrition Management and Supplements     []Management of Tube Feeding     []TPN Recommendations    [x]  RN Referral:  [x]MST score >=2     [x]Enteral/Parenteral Nutrition PTA     []Pregnant: Gestational DM or Multigestation     []Pressure Ulcer/Wound Care needs        []  Low BMI  []  DTR Referral       Matt [employer] V  Dietetic Intern

## 2017-09-28 NOTE — PROGRESS NOTES
Pressure Ulcer Prevention Alert Received for Klever < 14 (moderate risk).     Wound Care nurse ordered a specialty bed for this patient. Please place him on the bed upon it's arrival to the unit.      Care Plan/Interventions for Nursin. Complete Klever Pressure Ulcer Risk Scale and use sub scores to identify appropriate interventions. 2. Perform Assessment: skin, changes in LOC, visual cues for pain, monitor skin under medical devices  3. Respond to Reduced Sensory Perception: changes in LOC, check visual cues for pain, float heels, suspension boots, pressure redistribution bed/mattress/chair cushion, turning and reposition approximately every 2 hours (pillows & wedges), pad between skin to skin, turn & reposition  4. Manage Moisture: absorbent under pads, internal / external urinary device, internal /  external fecal device, minimize layers, contain wound drainage, access need for specialty bed, limit adult briefs, maintain skin hydration (lotion/cream), moisture barrier, offer toileting every hour  5. Promote Activity: increase time out of bed, chair cushion, PT/OT evaluation, trapeze to reposition, pressure redistribution bed/mattress/chair  6. Address Reduced Mobility: float heels / suspension boot, HOB 30 degrees or less, pressure redistribution bed/mattress/cushion, PT / OT evaluation, turn and reposition approximately every 2 hours (pillows & wedges)  7. Promote Nutrition: document food / fluid / supplement intake, encourage/assist with meals as needed  8. Reduce Friction and Shear: transferring/repositioning devices (lift/draw sheet), lift team/ patient mobility team, feet elevated on foot rest, minimize layers, foam dressing / transparent film / skin sealants, protective barrier creams and emollients, transfer aides (board, Leonor lift, ceiling lift, stand assist), HOB 30 degrees or less, trapeze to reposition.   Wound Care Team

## 2017-09-28 NOTE — PROGRESS NOTES
Received bedside report from [facility] office for the consult but the MD colbert is somebody else. Will try to contact them tomorrow.

## 2017-09-29 LAB
ANION GAP SERPL CALC-SCNC: 5 MMOL/L (ref 5–15)
BACTERIA SPEC CULT: NORMAL
BASOPHILS # BLD: 0 K/UL (ref 0–0.1)
BASOPHILS NFR BLD: 0 % (ref 0–1)
BUN SERPL-MCNC: 5 MG/DL (ref 6–20)
BUN/CREAT SERPL: 9 (ref 12–20)
CALCIUM SERPL-MCNC: 8.7 MG/DL (ref 8.5–10.1)
CC UR VC: NORMAL
CHLORIDE SERPL-SCNC: 100 MMOL/L (ref 97–108)
CO2 SERPL-SCNC: 33 MMOL/L (ref 21–32)
CREAT SERPL-MCNC: 0.54 MG/DL (ref 0.7–1.3)
EOSINOPHIL # BLD: 0.4 K/UL (ref 0–0.4)
EOSINOPHIL NFR BLD: 10 % (ref 0–7)
ERYTHROCYTE [DISTWIDTH] IN BLOOD BY AUTOMATED COUNT: 12.2 % (ref 11.5–14.5)
GLUCOSE BLD STRIP.AUTO-MCNC: 103 MG/DL (ref 65–100)
GLUCOSE BLD STRIP.AUTO-MCNC: 128 MG/DL (ref 65–100)
GLUCOSE BLD STRIP.AUTO-MCNC: 85 MG/DL (ref 65–100)
GLUCOSE BLD STRIP.AUTO-MCNC: 96 MG/DL (ref 65–100)
GLUCOSE SERPL-MCNC: 82 MG/DL (ref 65–100)
HCT VFR BLD AUTO: 31.5 % (ref 36.6–50.3)
HGB BLD-MCNC: 10.5 G/DL (ref 12.1–17)
LYMPHOCYTES # BLD: 1.4 K/UL (ref 0.8–3.5)
LYMPHOCYTES NFR BLD: 39 % (ref 12–49)
MAGNESIUM SERPL-MCNC: 1.5 MG/DL (ref 1.6–2.4)
MCH RBC QN AUTO: 33.3 PG (ref 26–34)
MCHC RBC AUTO-ENTMCNC: 33.3 G/DL (ref 30–36.5)
MCV RBC AUTO: 100 FL (ref 80–99)
MONOCYTES # BLD: 0.5 K/UL (ref 0–1)
MONOCYTES NFR BLD: 13 % (ref 5–13)
NEUTS SEG # BLD: 1.3 K/UL (ref 1.8–8)
NEUTS SEG NFR BLD: 38 % (ref 32–75)
PLATELET # BLD AUTO: 174 K/UL (ref 150–400)
POTASSIUM SERPL-SCNC: 3.5 MMOL/L (ref 3.5–5.1)
RBC # BLD AUTO: 3.15 M/UL (ref 4.1–5.7)
SERVICE CMNT-IMP: ABNORMAL
SERVICE CMNT-IMP: ABNORMAL
SERVICE CMNT-IMP: NORMAL
SODIUM SERPL-SCNC: 138 MMOL/L (ref 136–145)
WBC # BLD AUTO: 3.5 K/UL (ref 4.1–11.1)

## 2017-09-29 PROCEDURE — 65660000000 HC RM CCU STEPDOWN

## 2017-09-29 PROCEDURE — 80048 BASIC METABOLIC PNL TOTAL CA: CPT | Performed by: INTERNAL MEDICINE

## 2017-09-29 PROCEDURE — 74011250636 HC RX REV CODE- 250/636: Performed by: INTERNAL MEDICINE

## 2017-09-29 PROCEDURE — 77030036554

## 2017-09-29 PROCEDURE — 82962 GLUCOSE BLOOD TEST: CPT

## 2017-09-29 PROCEDURE — 74011250637 HC RX REV CODE- 250/637: Performed by: INTERNAL MEDICINE

## 2017-09-29 PROCEDURE — 94640 AIRWAY INHALATION TREATMENT: CPT

## 2017-09-29 PROCEDURE — 83735 ASSAY OF MAGNESIUM: CPT | Performed by: NURSE PRACTITIONER

## 2017-09-29 PROCEDURE — 74011000250 HC RX REV CODE- 250: Performed by: INTERNAL MEDICINE

## 2017-09-29 PROCEDURE — 36415 COLL VENOUS BLD VENIPUNCTURE: CPT | Performed by: INTERNAL MEDICINE

## 2017-09-29 PROCEDURE — 85025 COMPLETE CBC W/AUTO DIFF WBC: CPT | Performed by: INTERNAL MEDICINE

## 2017-09-29 PROCEDURE — 74011000258 HC RX REV CODE- 258: Performed by: INTERNAL MEDICINE

## 2017-09-29 PROCEDURE — 74011250636 HC RX REV CODE- 250/636: Performed by: NURSE PRACTITIONER

## 2017-09-29 PROCEDURE — 74011250637 HC RX REV CODE- 250/637: Performed by: NURSE PRACTITIONER

## 2017-09-29 RX ORDER — VALPROIC ACID 250 MG/5ML
500 SOLUTION ORAL EVERY 8 HOURS
Status: DISCONTINUED | OUTPATIENT
Start: 2017-09-29 | End: 2017-10-14 | Stop reason: HOSPADM

## 2017-09-29 RX ORDER — MAGNESIUM SULFATE HEPTAHYDRATE 40 MG/ML
2 INJECTION, SOLUTION INTRAVENOUS ONCE
Status: COMPLETED | OUTPATIENT
Start: 2017-09-29 | End: 2017-09-29

## 2017-09-29 RX ADMIN — IPRATROPIUM BROMIDE AND ALBUTEROL SULFATE 3 ML: .5; 3 SOLUTION RESPIRATORY (INHALATION) at 21:14

## 2017-09-29 RX ADMIN — VALPROIC ACID 500 MG: 250 SOLUTION ORAL at 22:05

## 2017-09-29 RX ADMIN — VALPROATE SODIUM 500 MG: 100 INJECTION, SOLUTION INTRAVENOUS at 05:19

## 2017-09-29 RX ADMIN — ENOXAPARIN SODIUM 40 MG: 40 INJECTION SUBCUTANEOUS at 22:06

## 2017-09-29 RX ADMIN — MAGNESIUM SULFATE HEPTAHYDRATE 2 G: 40 INJECTION, SOLUTION INTRAVENOUS at 14:51

## 2017-09-29 RX ADMIN — Medication 100 MG: at 08:29

## 2017-09-29 RX ADMIN — PREGABALIN 300 MG: 100 CAPSULE ORAL at 08:29

## 2017-09-29 RX ADMIN — SODIUM CHLORIDE AND POTASSIUM CHLORIDE: 9; 1.49 INJECTION, SOLUTION INTRAVENOUS at 22:11

## 2017-09-29 RX ADMIN — LEVOFLOXACIN 750 MG: 5 INJECTION, SOLUTION INTRAVENOUS at 20:38

## 2017-09-29 RX ADMIN — IPRATROPIUM BROMIDE AND ALBUTEROL SULFATE 3 ML: .5; 3 SOLUTION RESPIRATORY (INHALATION) at 08:13

## 2017-09-29 RX ADMIN — METOPROLOL TARTRATE 12.5 MG: 25 TABLET ORAL at 20:44

## 2017-09-29 RX ADMIN — IPRATROPIUM BROMIDE AND ALBUTEROL SULFATE 3 ML: .5; 3 SOLUTION RESPIRATORY (INHALATION) at 02:18

## 2017-09-29 RX ADMIN — LEVETIRACETAM 1000 MG: 100 INJECTION, SOLUTION, CONCENTRATE INTRAVENOUS at 08:28

## 2017-09-29 RX ADMIN — PANTOPRAZOLE SODIUM 40 MG: 40 GRANULE, DELAYED RELEASE ORAL at 18:29

## 2017-09-29 RX ADMIN — ASPIRIN 81 MG 81 MG: 81 TABLET ORAL at 08:29

## 2017-09-29 RX ADMIN — PIPERACILLIN SODIUM,TAZOBACTAM SODIUM 4.5 G: 4; .5 INJECTION, POWDER, FOR SOLUTION INTRAVENOUS at 08:29

## 2017-09-29 RX ADMIN — METOPROLOL TARTRATE 12.5 MG: 25 TABLET ORAL at 08:29

## 2017-09-29 RX ADMIN — VALPROIC ACID 500 MG: 250 SOLUTION ORAL at 14:51

## 2017-09-29 RX ADMIN — LEVETIRACETAM 1500 MG: 100 SOLUTION ORAL at 20:43

## 2017-09-29 RX ADMIN — PIPERACILLIN SODIUM,TAZOBACTAM SODIUM 4.5 G: 4; .5 INJECTION, POWDER, FOR SOLUTION INTRAVENOUS at 01:14

## 2017-09-29 RX ADMIN — QUETIAPINE FUMARATE 25 MG: 25 TABLET ORAL at 20:45

## 2017-09-29 RX ADMIN — PREGABALIN 300 MG: 100 CAPSULE ORAL at 20:45

## 2017-09-29 RX ADMIN — IPRATROPIUM BROMIDE AND ALBUTEROL SULFATE 3 ML: .5; 3 SOLUTION RESPIRATORY (INHALATION) at 13:57

## 2017-09-29 RX ADMIN — HALOPERIDOL 2 MG: 2 TABLET ORAL at 05:15

## 2017-09-29 RX ADMIN — LATANOPROST 1 DROP: 50 SOLUTION OPHTHALMIC at 20:45

## 2017-09-29 RX ADMIN — PIPERACILLIN SODIUM,TAZOBACTAM SODIUM 4.5 G: 4; .5 INJECTION, POWDER, FOR SOLUTION INTRAVENOUS at 16:01

## 2017-09-29 RX ADMIN — HALOPERIDOL 2 MG: 2 TABLET ORAL at 23:06

## 2017-09-29 NOTE — PROGRESS NOTES
Attempted to see patient for OT evaluation, patient was very drowsy and demonstrating very minimal command following receiving haldol. Per nursing patient has not been follow commands for her as well. Evaluation aborted due to patient inability to actively participate. OT will follow back Saturday or Monday for OT evaluation, if he is able to participate.

## 2017-09-29 NOTE — PROGRESS NOTES
[facility] SHIFT NURSING NOTE    Bedside and Verbal shift change report given to  (oncoming nurse) by  Jose Miguel Li nurse). Report included the following information Kardex. SHIFT SUMMARY: Pt s line in right ac not flushing and unable to use ,pt had several IV meds one due at 1900 . Several attempts to start a new line > 5 ans we were unable ,so all meds are running late . I will pass on in bedside report and leave message for Md that pt really needs a PICC line if possible . telesitter in room ,pt does awaken and try to sit up ,and is combative at times ,sitter PRN . As per bedside report pt is NPO orders for tube feeds though . will have to clarify with Md in am   Finger stick checked and was 80 ,meds given per peg and flushed with ginger ale   Sitter remains in room pt continues to have moments of sitting up in bed very fast and swings his legs off bed ,also attempts to pull all lines ,does not respond to telesitter   0500 several attempts to restart IV line as left IV site started leaking ,NPO, HOB  elevated 30 degrees . Haldol given this am pt very agitated and grabbing sitters  Hand.   No seizure activity        Admission Date 9/27/2017   Admission Diagnosis Aspiration pneumonia ([address]   Consults IP CONSULT TO NEUROLOGY  IP CONSULT TO NEUROLOGY  IP CONSULT TO UROLOGY        Consults   [] PT   [] OT   [] Speech   [] Palliative      [] Hospice    [] Case Management   [] None   Cardiac Monitoring   [x] Yes   [] No     Antibiotics   [x] Yes   [] No   GI Prophylaxis  (Ex: Protonix, Pepcid, etc,.)   [] Yes   [] No          DVT Prophylaxis   SCDs:             Han stockings:         [x] Medication (Ex: Lovenox, Eliquis, Brilinta, Coumadin,  Heparin, etc..)   [] Contraindicated   [] No VTE needed       Urinary Catheter Urinary Catheter 09/27/17 Cummins-Criteria for Appropriate Use: Medically/surgically unstable     Urinary Catheter 09/27/17 Cummins-Urine Output (mL): 300 ml     LDAs               Peripheral IV 09/27/17 Left Wrist (Active)   Site Assessment Clean, dry, & intact 9/28/2017  8:25 PM   Phlebitis Assessment 0 9/28/2017  8:25 PM   Infiltration Assessment 0 9/28/2017  8:25 PM   Dressing Status Clean, dry, & intact 9/28/2017  3:30 PM   Dressing Type Transparent;Tape 9/28/2017  3:30 PM   Hub Color/Line Status Infusing 9/28/2017  3:30 PM       Peripheral IV 09/27/17 Right;Upper Arm (Active)   Site Assessment Other (Comment) 9/28/2017  8:25 PM   Phlebitis Assessment 0 9/28/2017  3:30 PM   Infiltration Assessment 0 9/28/2017  3:30 PM   Dressing Status Clean, dry, & intact 9/28/2017  3:30 PM   Dressing Type Transparent;Tape 9/28/2017  3:30 PM   Hub Color/Line Status Pink;Flushed 9/28/2017  3:30 PM          PEG/Gastrostomy Tube 07/18/17 (Active)       PEG/Gastrostomy Tube (Active)   Site Assessment Clean, dry, & intact 9/28/2017  3:30 PM   Dressing Status Clean, dry, & intact 9/28/2017  3:30 PM   G Port Status Clamped 9/28/2017  7:52 AM   Gastric Residual (mL) 10 ml 9/28/2017  3:30 PM                I/Os   Intake/Output Summary (Last 24 hours) at 09/28/17 2304  Last data filed at 09/28/17 8259   Gross per 24 hour   Intake           436.25 ml   Output             1000 ml   Net          -563.75 ml         Activity Level Activity Level: Bed Rest     Activity Assistance: Complete care   Diet Active Orders   Diet    DIET NPO Except Meds      Purposeful Rounding every 1-2 hour? [x] Yes    Lorena Score  Total Score: 4   Bed Alarm (If score 3 or >)   [] Yes    [] Refused (See signed refusal form in chart)   Klever Score  Klever Score: 12       Klever Score (if score 14 or less)   [] PMT consult   [] Nutrition consult   [] Wound Care consult      [x]  Specialty bed         Influenza Vaccine Received Flu Vaccine for Current Season (usually Sept-March): Yes (9/22/17 see medical record)               Needs prior to discharge:   Home O2 required:    [] Yes   [x] No     If yes, how much O2 required?     Other:        Readmission Risk Assessment Tool Score High Risk            26       Total Score        3 Has Seen PCP in Last 6 Months (Yes=3, No=0)    2 . Living with Significant Other. Assisted Living. LTAC. SNF. or   Rehab    11 IP Visits Last 12 Months (1-3=4, 4=9, >4=11)    5 Pt.  Coverage (Medicare=5 , Medicaid, or Self-Pay=4)    5 Charlson Comorbidity Score (Age + Comorbid Conditions)        Criteria that do not apply:    Patient Length of Stay (>5 days = 3)       Expected Length of Stay 5d 21h   Actual Length of Stay 0

## 2017-09-29 NOTE — PROGRESS NOTES
Physical Therapy Note    Orders received. Chart reviewed. Nursing cleared pt for PT evaluation. Attempted to see patient for PT evaluation, however upon arrival, pt very drowsy and with poor command following. RN reporting that patient received Haldol this date and has not been following commands well for nursing since admission. Evaluation aborted as pt not able to actively participate at this time. Will follow back tomorrow as able and appropriate for PT evaluation.     Thank you,  Sera Hutchinson, PT, DPT  Total time: 5 minutes

## 2017-09-29 NOTE — PROGRESS NOTES
Chief Complaint: altered mental status  Confused but more verbal. Wife is in there room. Scheduled for departure toa  Skilled nursing facility. Discussed case with wife. Assesment and Plan    1. Epilepsy  Continue  keppra     2. Alcoholism  GGT 32   tox screen clear.      3. Altered mental status  Ammonia 11  Tsh 1.59  B12 over 2000     4.  Persistent right upper lobe pneumonia  On antibiotics        Allergies  No known allergies     Medications  Current Facility-Administered Medications   Medication Dose Route Frequency    magnesium sulfate 2 g/50 ml IVPB (premix or compounded)  2 g IntraVENous ONCE    levETIRAcetam (KEPPRA) oral solution 1,500 mg  1,500 mg Per G Tube BID    valproic acid (as sodium salt) (DEPAKENE) 250 mg/5 mL (5 mL) oral solution 500 mg  500 mg Per G Tube Q8H    enoxaparin (LOVENOX) injection 40 mg  40 mg SubCUTAneous Q24H    aspirin chewable tablet 81 mg  81 mg Per G Tube DAILY    cyclobenzaprine (FLEXERIL) tablet 5 mg  5 mg Per G Tube TID PRN    haloperidol (HALDOL) tablet 2 mg  2 mg Per G Tube Q6H PRN    latanoprost (XALATAN) 0.005 % ophthalmic solution 1 Drop  1 Drop Both Eyes QPM    metoprolol tartrate (LOPRESSOR) tablet 12.5 mg  12.5 mg Per G Tube BID    pantoprazole (PROTONIX) granules for oral suspension 40 mg  40 mg Per G Tube ACB&D    pregabalin (LYRICA) capsule 300 mg  300 mg Oral BID    QUEtiapine (SEROquel) tablet 25 mg  25 mg Per G Tube QHS    thiamine (B-1) tablet 100 mg  100 mg Per G Tube DAILY    levoFLOXacin (LEVAQUIN) 750 mg in D5W IVPB  750 mg IntraVENous Q24H    albuterol-ipratropium (DUO-NEB) 2.5 MG-0.5 MG/3 ML  3 mL Nebulization Q6H RT    0.9% sodium chloride with KCl 20 mEq/L infusion   IntraVENous CONTINUOUS    acetaminophen (TYLENOL) tablet 650 mg  650 mg Per G Tube Q4H PRN    ondansetron (ZOFRAN) injection 4 mg  4 mg IntraVENous Q6H PRN    morphine injection 2 mg  2 mg IntraVENous Q4H PRN    LORazepam (ATIVAN) injection 2 mg  2 mg IntraVENous Q2H PRN    piperacillin-tazobactam (ZOSYN) 4.5 g in 0.9% sodium chloride (MBP/ADV) 100 mL  4.5 g IntraVENous Q8H        Medical History  Past Medical History:   Diagnosis Date    Alcohol abuse, in remission     Seizures (HCC)      Review of Systems   Unable to perform ROS: Dementia     Exam:    Visit Vitals    /71 (BP 1 Location: Right arm, BP Patient Position: At rest)    Pulse 93    Temp 97.8 °F (36.6 °C)    Resp 18    Ht 5' 8\" (1.727 m)    Wt 133 lb 13.1 oz (60.7 kg)    SpO2 92%    BMI 20.35 kg/m2      General:  disheveled confused   Head: Normocephalic, atraumatic, anicteric sclera   Neck Normal ROM,  No thyromegally   Lungs:  Clear to auscultation bilaterally, No wheezes or rubs   Cardiac: Regular rate and rhythm with no murmurs. Abd: Bowel sounds were audible. No tenderness on palpation   Ext: No pedal edema. Missing distal two metacarpals on the right index and middle finger   Skin: Supple no rash      NeurologicExam:  Mental Status:  confused   Speech: Fluent dysarthric   Cranial Nerves:  Opens Eyes Spontaneously  Follows simple comands  EOMI  PERRLA  Corneal reflex intact  Symmetric grimmace   Motor:  Moves all four extremities to pain. Reflexes:   Deep tendon reflexes 1+/4 and symmetric. Sensory:   Responds to pain x 4     Imaging    CT Results (most recent):    Results from Hospital Encounter encounter on 09/27/17   CT CHEST W CONT   Narrative INDICATION: Acute resp illness, immunocompromised, CXR multiple or diffuse or  confluent opacities    COMPARISON: Radiographs 9/27/2017. Head and neck CTA 6/24/2017    TECHNIQUE:  Following the uneventful intravenous administration of 100 cc  Isovue-300, 5 mm axial images were obtained through the chest. Coronal and  sagittal reconstructions were generated. CT dose reduction was achieved through  use of a standardized protocol tailored for this examination and automatic  exposure control for dose modulation.      FINDINGS:    THYROID: No nodule. MEDIASTINUM: No mass or lymphadenopathy. MINI: No mass or lymphadenopathy. Calcified lymph nodes are seen in the  subcarinal region. THORACIC AORTA: No dissection or aneurysm. MAIN PULMONARY ARTERY: Normal in caliber. TRACHEA/BRONCHI: Secretions are seen in the posterior trachea. ESOPHAGUS: No wall thickening or dilatation. Debris is seen within the mid  esophagus. HEART: Mildly enlarged. CAD. PLEURA: No effusion or pneumothorax. LUNGS: There is an unchanged area of partial consolidation in the posterior  right upper lobe with internal bronchiectasis near the oblique fissure. This is  unchanged from 6/24/2017 likely represents scarring. An incidental pneumatocele  is seen in the right upper lobe. Mild atelectasis is seen in the bilateral lung  bases. There is an unchanged 3 mm pulmonary nodule in the posterior lateral left  upper lobe is likely incidental.  INCIDENTALLY IMAGED UPPER ABDOMEN: A percutaneous gastrostomy tube is in place. BONES: No destructive bone lesion. Impression IMPRESSION:    1. Unchanged area of scarring in the posterior upper lobe. 2. Mild bibasilar atelectasis. MRI Results (most recent):    Results from [place] encounter on 09/15/17   MRI BRAIN WO CONT   Narrative INDICATION:   Seizure     EXAMINATION:  MRI BRAIN WO CONTRAST    COMPARISON:  August 21, 2017    TECHNIQUE:  MR imaging of the brain was performed with sagittal T1, axial T1,  T2, FLAIR, GRE, DWI/ADC, coronal T2.    FINDINGS:      Ventricles:  Prominence of the ventricles, sulci and cisterns   related to underlying atrophy. Intracranial Hemorrhage:  None. Brain Parenchyma/Brainstem:  Minimal supratentorial white matter signal  abnormalities may be due to chronic small vessel ischemic changes, similar to  prior study.  There has been development of abnormal cortical diffusion  restriction throughout the left parietal lobe, left occipital lobe and  posterior/lateral left temporal lobe with associated FLAIR hyperintense signal.  Basal Cisterns:  Normal.   Flow Voids:  Normal.  Additional Comments:  N/A. Impression IMPRESSION:  Development of cortical diffusion restriction throughout the left parietal,  occipital and left temporal lobes as above. This may also be seen in patients  with ongoing seizure activity given history of status epilepticus. Less likely  related to encephalitis or ischemia. Correlation with clinical findings is  recommended. Short-term follow-up with contrast-enhanced brain MRI may be  helpful.           .  Lab Review    Lab Results   Component Value Date/Time    WBC 3.5 09/29/2017 04:38 AM    HCT 31.5 09/29/2017 04:38 AM    HGB 10.5 09/29/2017 04:38 AM    PLATELET 008 59/98/7719 04:38 AM       Lab Results   Component Value Date/Time    Sodium 138 09/29/2017 04:38 AM    Potassium 3.5 09/29/2017 04:38 AM    Chloride 100 09/29/2017 04:38 AM    CO2 33 09/29/2017 04:38 AM    Glucose 82 09/29/2017 04:38 AM    BUN 5 09/29/2017 04:38 AM    Creatinine 0.54 09/29/2017 04:38 AM    Calcium 8.7 09/29/2017 04:38 AM       No components found for: TROPQUANT    No results found for: STEVEN      Lab Results   Component Value Date/Time    Hemoglobin A1c 4.2 09/16/2017 04:23 AM        Lab Results   Component Value Date/Time    Vitamin B12 >2000 09/27/2017 06:22 PM    Folate 7.0 07/29/2017 04:20 AM       No results found for: STEVEN, Vena Maha, XBANA    Lab Results   Component Value Date/Time    Cholesterol, total 97 09/16/2017 04:23 AM    HDL Cholesterol 51 09/16/2017 04:23 AM    LDL, calculated 38.8 09/16/2017 04:23 AM    VLDL, calculated 7.2 09/16/2017 04:23 AM    Triglyceride 36 09/16/2017 04:23 AM    CHOL/HDL Ratio 1.9 09/16/2017 04:23 AM

## 2017-09-29 NOTE — PROGRESS NOTES
1718: no word from [address]  [facility] yet. NP indicating that pt may not be stable yet. 1515:  [facility] declined to accept pt - no reason apparent on ref. Ref with updates sent to [facility] via [facility] in event bed cannot be found at another facility. Pt is medically stable for dc today but wife does not wish for him to return to [facility]. CM provided FOC - wife's choices were: 1 - [facility]; 2 -  [facility]. Refs sent via [facility]. Authorization will need to be obtained from insur before pt can be discharged. Wife is aware that if bed cannot be found in timely manner, pt may have to return to [place] and she can work on transferring to a different SNF.     Areli Jin, SHERRY

## 2017-09-29 NOTE — CONSULTS
Urology Consult    Subjective:     Date of Consultation:  September 29, 2017    Referring Physician: Laverne Mosqueda    Reason for Consultation:  Chronic catheter    History of Present Illness:   Patient is a 80 y.o.  male who is being seen for evaluation of chronic Cummins catheter. He was admitted to the hospital for Aspiration pneumonia ([address] He has h/o seizure d/o and glaucoma, at assisted living and has chronic Cummins. Patient had IV Keppra and other neuroleptics started for seizures on admission. Unable to obtain any hx from patient as he is post-ictal & aphasic currently. Nurse reports patient tugging on catheter.  asked to eval chronic catheter for possible removal.    Past Medical History:   Diagnosis Date    Alcohol abuse, in remission     Seizures ([address]       Past Surgical History:   Procedure Laterality Date    COLONOSCOPY N/A 7/31/2017    COLONOSCOPY performed by Darryle Pou, MD at [address] [place] [address]  7/31/2017         PLACE PERCUT GASTROSTOMY TUBE  7/18/2017         UPPER GI ENDOSCOPY,DIAGNOSIS  7/31/2017           Family History   Problem Relation Age of Onset    Other Other      no strokes or seizures    Cancer Mother       Social History   Substance Use Topics    Smoking status: Never Smoker    Smokeless tobacco: Never Used    Alcohol use No      Comment: Quit drinking 10 years ago     Allergies   Allergen Reactions    No Known Allergies       Prior to Admission medications    Medication Sig Start Date End Date Taking? Authorizing Provider   metoprolol tartrate (LOPRESSOR) 25 mg tablet 12.5 mg by PEG Tube route two (2) times a day. Yes Nadia Bob MD   cephALEXin (KEFLEX) 250 mg/5 mL suspension Take 10 mL by mouth every eight (8) hours. For 10 days. Start 9/25/17   Yes Nadia Bob MD   aspirin 81 mg chewable tablet 81 mg by PEG Tube route daily.    Yes Nadia Bob MD   haloperidol (HALDOL) 2 mg tablet 2 mg by PEG Tube route every six (6) hours as needed (agitation/combativeness). Yes Nadia Bob MD   levETIRAcetam (KEPPRA) 100 mg/mL solution 1,500 mg by PEG Tube route two (2) times a day. Yes Historical Provider   multivit w-min-ferrous gluconate (CEROVITE) 9 mg iron/15 mL oral liquid 15 mL by PEG Tube route daily. Yes Historical Provider   pantoprazole (PROTONIX) 40 mg granules for oral suspension 40 mg by PEG Tube route Before breakfast and dinner. Yes Historical Provider   pregabalin (LYRICA) 300 mg capsule 300 mg by Per G Tube route two (2) times a day. Yes Historical Provider   QUEtiapine (SEROQUEL) 25 mg tablet 25 mg by PEG Tube route nightly. Yes Historical Provider   thiamine (VITAMIN B-1) 100 mg tablet 100 mg by PEG Tube route daily. Yes Historical Provider   valproic acid, as sodium salt, (DEPAKENE) 250 mg/5 mL (5 mL) soln oral solution 500 mg by PEG Tube route three (3) times daily. Yes Historical Provider   latanoprost (XALATAN) 0.005 % ophthalmic solution Administer 1 Drop to both eyes every evening. 7/22/17  Yes Sophia Mejia MD   atorvastatin (LIPITOR) 40 mg tablet 40 mg by PEG Tube route nightly. Nadia Bob MD   oxyCODONE (OXYIR) 5 mg capsule Take 5 mg by mouth every four (4) hours as needed for Pain. Nadia Bob MD   acetaminophen (TYLENOL) 325 mg tablet 650 mg by PEG Tube route every six (6) hours as needed for Pain. Historical Provider   cyclobenzaprine (FLEXERIL) 5 mg tablet 5 mg by PEG Tube route three (3) times daily as needed for Muscle Spasm(s). Historical Provider   albuterol-ipratropium (DUO-NEB) 2.5 mg-0.5 mg/3 ml nebu 3 mL by Nebulization route every six (6) hours as needed.  7/22/17   Sophia Mejia MD         Review of Systems:  Unable to obtain due to aphasia    Objective:     Patient Vitals for the past 8 hrs:   BP Temp Pulse Resp SpO2   09/29/17 0814 - - - - 95 %   09/29/17 0737 125/71 97.8 °F (36.6 °C) 93 18 96 %   09/29/17 0321 118/71 97.4 °F (36.3 °C) - 18 95 %   09/29/17 0218 - - - - 95 % Temp (24hrs), Av.1 °F (36.7 °C), Min:97.4 °F (36.3 °C), Max:98.8 °F (37.1 °C)      Intake and Output:    1901 -  0700  In: 2844 [I.V.:2815]  Out: 4350 [[id number]]    Physical Exam:            General:    appears stated age                     Skin:  Normal.                HEENT:  NCAT        Throat/Neck:  mucous membranes moist   Lymph nodes:  not examined                 Lungs:  sym chest rise      Cardiovascular:  Regular rate and rhythm             Abdomen[de-identified]  soft, non-tender. Bowel sounds normal. No masses,  no organomegaly             Genitalia:  Cummins draining yellow urine          Extremities:  contracted UEs bilat       Assessment:     Active Problems:    Encephalopathy, unspecified (9/3/2012)      CVA (cerebral vascular accident) ([address] (9/3/2012)      Seizure disorder ([address] (9/3/2012)      Dysarthria (7/15/2017)      Dysphagia (7/15/2017)      Aspiration pneumonia ([address] (2017)          Patient with chronic catheter    Plan:     Unsure of etiology of catheter, could be prostatic enlargement vs. Poor detrusor contractility, no hx available. Due to AMS and patient's neuroleptics (Keppra, Lyrica) and oxycodone, has high chance of failing void trial.  Would either continue Cummins or could be removed and nursing performs IC x4 daily to prevent patient from causing iatrogenic trauma from indwelling catheter. Outpatient w/u for ?urinary retention can be performed should patient & family decide to pursue.     Signed By: Hemalatha George MD                         2017

## 2017-09-29 NOTE — PROGRESS NOTES
Hospitalist Progress Note    NAME: Isha Ordaz   :  1934   MRN:  [id number]       Interim Hospital Summary: 80 y.o. male whom presented on 2017 with      Assessment / Plan:  Recurrent Seizures  Acute Encephalopathy  - Neuro following; patient has extensive encephalomalacia which is the area responsible for the seizures. - continue with Keppra, seroquel, & valporic acid  - Head CT: No acute findings or interval change.  - NH4 11    ? Recurrent Aspiration PNA  - continue with LVQ/Zosyn. Will complete total of 7 days ABT. - continue with duoneb  - CT of chest: Unchanged area of scarring in the posterior upper lobe. Mild bibasilar atelectasis. Hypokalemia  - replace PRN    Hypertension  - Continue with BB  - prn hydralazine     Dysarthria  Malnutrition (mild, moderate)   - resume tube feeding; no residual. Tolerating tube feeding w/o difficulty     GERD: c/w PPI    Chronic kelly; seen by  (will continue with kelly)    Code Status: Full discussion with wfe- DNR  Surrogate Decision Maker: wife Mahogany Remy [phone] or [phone]  DVT Prophylaxis: Lovenox  GI Prophylaxis: PPI  Baseline: bed bound, dysarthric, aphasic, palliative care to see patient. Recommended Disposition: SNF/LTC     Subjective:     Chief Complaint / Reason for Physician Visit  Nonverbal, sitter at bedside, tends to pick at things. Discussed with RN events overnight. Review of Systems:  Symptom Y/N Comments  Symptom Y/N Comments   Fever/Chills    Chest Pain     Poor Appetite    Edema     Cough    Abdominal Pain     Sputum    Joint Pain     SOB/AL    Pruritis/Rash     Nausea/vomit    Tolerating PT/OT     Diarrhea    Tolerating Diet     Constipation    Other       Could NOT obtain due to:      Objective:     VITALS:   Last 24hrs VS reviewed since prior progress note.  Most recent are:  Patient Vitals for the past 24 hrs:   Temp Pulse Resp BP SpO2   17 1358 - - - - 92 %   17 0814 - - - - 95 %   17 0737 97.8 °F (36.6 °C) 93 18 125/71 96 %   09/29/17 0321 97.4 °F (36.3 °C) - 18 118/71 95 %   09/29/17 0218 - - - - 95 %   09/28/17 2309 - 73 18 98/65 94 %   09/28/17 2300 98.8 °F (37.1 °C) 70 20 98/52 93 %   09/28/17 2137 - - - 130/85 -   09/28/17 2013 - - - - 94 %   09/28/17 1912 98.1 °F (36.7 °C) 88 18 - 94 %       Intake/Output Summary (Last 24 hours) at 09/29/17 1536  Last data filed at 09/29/17 1522   Gross per 24 hour   Intake          2878.75 ml   Output             1400 ml   Net          1478.75 ml        PHYSICAL EXAM:  General: Ill appearing, no acute distress    EENT:  EOMI. Anicteric sclerae. MMM  Resp:  rhonchi through out,  No accessory muscle use  CV:  Regular  rhythm,  No edema  GI:  Soft, Non distended, Non tender.  +Bowel sounds, PEG tube in place; asked nursing staff apply abd binder to prevent pulling  Neurologic:  Respond to painful stimuli only, none verbal  Psych:   No insight  Skin:  No rashes. No jaundice    Reviewed most current lab test results and cultures  YES  Reviewed most current radiology test results   YES  Review and summation of old records today    NO  Reviewed patient's current orders and MAR    YES  PMH/ reviewed - no change compared to H&P  ________________________________________________________________________  Care Plan discussed with:    Comments   Patient y    Family  y    RN y    Care Manager y    Consultant                       y Multidiciplinary team rounds were held today with , nursing, pharmacist and clinical coordinator. Patient's plan of care was discussed; medications were reviewed and discharge planning was addressed.      ________________________________________________________________________  Total NON critical care TIME:  30  Minutes    Total CRITICAL CARE TIME Spent:   Minutes non procedure based      Comments   >50% of visit spent in counseling and coordination of care ________________________________________________________________________  Yovany Adams NP     Procedures: see electronic medical records for all procedures/Xrays and details which were not copied into this note but were reviewed prior to creation of Plan. LABS:  I reviewed today's most current labs and imaging studies.   Pertinent labs include:  Recent Labs      09/29/17 0438 09/28/17 0206 09/27/17   1611   WBC  3.5*  3.5*  5.8   HGB  10.5*  10.4*  11.6*   HCT  31.5*  30.3*  33.3*   PLT  174  149*  200     Recent Labs      09/29/17 0438 09/28/17   0206  09/27/17   1611   NA  138  137  132*   K  3.5  3.3*  3.1*   CL  100  99  92*   CO2  33*  31  31   GLU  82  84  81   BUN  5*  10  18   CREA  0.54*  0.49*  0.73   CA  8.7  8.1*  8.7   MG  1.5*  1.4*  1.6   ALB   --   2.6*  3.0*   TBILI   --   0.7  0.8   SGOT   --   26  32   ALT   --   25  29       Signed: )Otoniel Clark NP

## 2017-09-30 LAB
ANION GAP SERPL CALC-SCNC: 6 MMOL/L (ref 5–15)
BUN SERPL-MCNC: 4 MG/DL (ref 6–20)
BUN/CREAT SERPL: 7 (ref 12–20)
CALCIUM SERPL-MCNC: 8.5 MG/DL (ref 8.5–10.1)
CHLORIDE SERPL-SCNC: 103 MMOL/L (ref 97–108)
CO2 SERPL-SCNC: 31 MMOL/L (ref 21–32)
CREAT SERPL-MCNC: 0.55 MG/DL (ref 0.7–1.3)
GLUCOSE BLD STRIP.AUTO-MCNC: 104 MG/DL (ref 65–100)
GLUCOSE BLD STRIP.AUTO-MCNC: 107 MG/DL (ref 65–100)
GLUCOSE BLD STRIP.AUTO-MCNC: 125 MG/DL (ref 65–100)
GLUCOSE BLD STRIP.AUTO-MCNC: 130 MG/DL (ref 65–100)
GLUCOSE SERPL-MCNC: 128 MG/DL (ref 65–100)
POTASSIUM SERPL-SCNC: 4 MMOL/L (ref 3.5–5.1)
SERVICE CMNT-IMP: ABNORMAL
SODIUM SERPL-SCNC: 140 MMOL/L (ref 136–145)

## 2017-09-30 PROCEDURE — 74011250637 HC RX REV CODE- 250/637: Performed by: INTERNAL MEDICINE

## 2017-09-30 PROCEDURE — 74011250637 HC RX REV CODE- 250/637: Performed by: NURSE PRACTITIONER

## 2017-09-30 PROCEDURE — 82962 GLUCOSE BLOOD TEST: CPT

## 2017-09-30 PROCEDURE — 80048 BASIC METABOLIC PNL TOTAL CA: CPT | Performed by: INTERNAL MEDICINE

## 2017-09-30 PROCEDURE — 74011250636 HC RX REV CODE- 250/636: Performed by: INTERNAL MEDICINE

## 2017-09-30 PROCEDURE — 36415 COLL VENOUS BLD VENIPUNCTURE: CPT | Performed by: INTERNAL MEDICINE

## 2017-09-30 PROCEDURE — 77030018798 HC PMP KT ENTRL FED COVD -A

## 2017-09-30 PROCEDURE — 74011000258 HC RX REV CODE- 258: Performed by: INTERNAL MEDICINE

## 2017-09-30 PROCEDURE — 94640 AIRWAY INHALATION TREATMENT: CPT

## 2017-09-30 PROCEDURE — 65660000000 HC RM CCU STEPDOWN

## 2017-09-30 PROCEDURE — 74011000250 HC RX REV CODE- 250: Performed by: INTERNAL MEDICINE

## 2017-09-30 RX ORDER — IPRATROPIUM BROMIDE AND ALBUTEROL SULFATE 2.5; .5 MG/3ML; MG/3ML
3 SOLUTION RESPIRATORY (INHALATION)
Status: DISCONTINUED | OUTPATIENT
Start: 2017-10-01 | End: 2017-10-14 | Stop reason: HOSPADM

## 2017-09-30 RX ADMIN — VALPROIC ACID 500 MG: 250 SOLUTION ORAL at 06:15

## 2017-09-30 RX ADMIN — LEVETIRACETAM 1500 MG: 100 SOLUTION ORAL at 20:15

## 2017-09-30 RX ADMIN — VALPROIC ACID 500 MG: 250 SOLUTION ORAL at 21:53

## 2017-09-30 RX ADMIN — PREGABALIN 300 MG: 100 CAPSULE ORAL at 20:16

## 2017-09-30 RX ADMIN — LATANOPROST 1 DROP: 50 SOLUTION OPHTHALMIC at 20:20

## 2017-09-30 RX ADMIN — VALPROIC ACID 500 MG: 250 SOLUTION ORAL at 16:52

## 2017-09-30 RX ADMIN — PIPERACILLIN SODIUM,TAZOBACTAM SODIUM 4.5 G: 4; .5 INJECTION, POWDER, FOR SOLUTION INTRAVENOUS at 23:45

## 2017-09-30 RX ADMIN — ASPIRIN 81 MG 81 MG: 81 TABLET ORAL at 08:57

## 2017-09-30 RX ADMIN — METOPROLOL TARTRATE 12.5 MG: 25 TABLET ORAL at 08:57

## 2017-09-30 RX ADMIN — IPRATROPIUM BROMIDE AND ALBUTEROL SULFATE 3 ML: .5; 3 SOLUTION RESPIRATORY (INHALATION) at 02:58

## 2017-09-30 RX ADMIN — IPRATROPIUM BROMIDE AND ALBUTEROL SULFATE 3 ML: .5; 3 SOLUTION RESPIRATORY (INHALATION) at 14:52

## 2017-09-30 RX ADMIN — PANTOPRAZOLE SODIUM 40 MG: 40 GRANULE, DELAYED RELEASE ORAL at 16:52

## 2017-09-30 RX ADMIN — PIPERACILLIN SODIUM,TAZOBACTAM SODIUM 4.5 G: 4; .5 INJECTION, POWDER, FOR SOLUTION INTRAVENOUS at 16:52

## 2017-09-30 RX ADMIN — PIPERACILLIN SODIUM,TAZOBACTAM SODIUM 4.5 G: 4; .5 INJECTION, POWDER, FOR SOLUTION INTRAVENOUS at 00:28

## 2017-09-30 RX ADMIN — METOPROLOL TARTRATE 12.5 MG: 25 TABLET ORAL at 20:16

## 2017-09-30 RX ADMIN — IPRATROPIUM BROMIDE AND ALBUTEROL SULFATE 3 ML: .5; 3 SOLUTION RESPIRATORY (INHALATION) at 20:17

## 2017-09-30 RX ADMIN — LEVOFLOXACIN 750 MG: 5 INJECTION, SOLUTION INTRAVENOUS at 20:16

## 2017-09-30 RX ADMIN — PREGABALIN 300 MG: 100 CAPSULE ORAL at 08:56

## 2017-09-30 RX ADMIN — QUETIAPINE FUMARATE 25 MG: 25 TABLET ORAL at 20:17

## 2017-09-30 RX ADMIN — PIPERACILLIN SODIUM,TAZOBACTAM SODIUM 4.5 G: 4; .5 INJECTION, POWDER, FOR SOLUTION INTRAVENOUS at 08:58

## 2017-09-30 RX ADMIN — LEVETIRACETAM 1500 MG: 100 SOLUTION ORAL at 08:57

## 2017-09-30 RX ADMIN — PANTOPRAZOLE SODIUM 40 MG: 40 GRANULE, DELAYED RELEASE ORAL at 08:57

## 2017-09-30 RX ADMIN — ENOXAPARIN SODIUM 40 MG: 40 INJECTION SUBCUTANEOUS at 23:18

## 2017-09-30 RX ADMIN — Medication 100 MG: at 08:57

## 2017-09-30 NOTE — PROGRESS NOTES
[facility] SHIFT NURSING NOTE    Bedside and Verbal shift change report given to  (oncoming nurse) by  Jalil waters). Report included the following information Kardex. SHIFT SUMMARY: Agitated attempts to pull kelly grabbing tight on tube ,3 staff members including a male attempts to calm and reassure pt . Attempts to get OOB ,Haldol 2 mg via peg given will continue to observe and monitor . Sitter at bedside   0600 Awake but calm no agitation ,but pulling off sheets and throwing on floor ,sitter at bedside .         Admission Date 9/27/2017   Admission Diagnosis Aspiration pneumonia ([address]   Consults IP CONSULT TO NEUROLOGY  IP CONSULT TO NEUROLOGY  IP CONSULT TO UROLOGY        Consults   [] PT   [] OT   [] Speech   [] Palliative      [] Hospice    [] Case Management   [] None   Cardiac Monitoring   [x] Yes   [] No     Antibiotics   [] Yes   [] No   GI Prophylaxis  (Ex: Protonix, Pepcid, etc,.)   [] Yes   [] No          DVT Prophylaxis   SCDs:             Han stockings:         [x] Medication (Ex: Lovenox, Eliquis, Brilinta, Coumadin,  Heparin, etc..)   [] Contraindicated   [] No VTE needed       Urinary Catheter Urinary Catheter 09/27/17 Kelly-Criteria for Appropriate Use: Obstruction/retention     Urinary Catheter 09/27/17 Kelly-Urine Output (mL): 625 ml     LDAs               Peripheral IV 09/29/17 Left;Upper Arm (Active)   Site Assessment Clean, dry, & intact 9/29/2017  8:00 AM   Phlebitis Assessment 0 9/29/2017  8:00 AM   Infiltration Assessment 0 9/29/2017  8:00 AM   Dressing Status Clean, dry, & intact 9/29/2017  8:00 AM   Dressing Type Transparent 9/29/2017  8:00 AM   Hub Color/Line Status Infusing 9/29/2017  8:00 AM          PEG/Gastrostomy Tube 07/18/17 (Active)       PEG/Gastrostomy Tube (Active)   Site Assessment Clean, dry, & intact 9/29/2017  1:46 PM   Dressing Status Clean, dry, & intact 9/29/2017  1:46 PM   G Port Status Infusing 9/29/2017  1:46 PM   Action Taken Feed set changed;Placement verified (comment) 9/29/2017  1:46 PM   Gastric Residual (mL) 0 ml 9/29/2017  6:36 PM   Tube Feeding/Formula Options Jevity 1.5 9/29/2017  1:46 PM   Tube Feeding/Verify Rate (mL/hr) 40 9/29/2017  6:36 PM   Water Flush Volume (mL) 200 mL 9/29/2017  1:46 PM   Intake (ml) 80 ml 9/29/2017  6:36 PM                I/Os   Intake/Output Summary (Last 24 hours) at 09/29/17 2319  Last data filed at 09/29/17 1837   Gross per 24 hour   Intake             2195 ml   Output             1400 ml   Net              795 ml         Activity Level Activity Level: Bed Rest     Activity Assistance: Complete care   Diet Active Orders   Diet    DIET NPO Except Meds      Purposeful Rounding every 1-2 hour? [x] Yes    Lorena Score  Total Score: 4   Bed Alarm (If score 3 or >)   [] Yes    [] Refused (See signed refusal form in chart)   Klever Score  Klever Score: 12       Klever Score (if score 14 or less)   [] PMT consult   [] Nutrition consult   [] Wound Care consult      []  Specialty bed         Influenza Vaccine Received Flu Vaccine for Current Season (usually Sept-March): Yes (9/22/17 see medical record)               Needs prior to discharge:   Home O2 required:    [] Yes   [x] No     If yes, how much O2 required? Other:    Last Bowel Movement Date:  (unknown )   Readmission Risk Assessment Tool Score High Risk            26       Total Score        3 Has Seen PCP in Last 6 Months (Yes=3, No=0)    2 . Living with Significant Other. Assisted Living. LTAC. SNF. or   Rehab    11 IP Visits Last 12 Months (1-3=4, 4=9, >4=11)    5 Pt.  Coverage (Medicare=5 , Medicaid, or Self-Pay=4)    5 Charlson Comorbidity Score (Age + Comorbid Conditions)        Criteria that do not apply:    Patient Length of Stay (>5 days = 3)       Expected Length of Stay 5d 21h   Actual Length of Stay 1

## 2017-09-30 NOTE — PROGRESS NOTES
Hospitalist Progress Note    NAME: Svetlana Sahni   :  1934   MRN:  [id number]       Interim Hospital Summary: 80 y.o. male whom presented on 2017 with      Assessment / Plan:  Recurrent Seizures  Acute Encephalopathy (orient to self only)  - Neuro following; patient has extensive encephalomalacia which is the area responsible for the seizures. - continue with Keppra, seroquel, & valporic acid  - Head CT: No acute findings or interval change.  - NH4 11    Recurrent Aspiration PNA  - continue with LVQ/Zosyn. Will complete total of 7 days ABT. - continue with duoneb  - CT of chest: Unchanged area of scarring in the posterior upper lobe. Mild bibasilar atelectasis.     Hypokalemia  - replace PRN     Hypertension  - Continue with BB  - prn hydralazine      Dysarthria  Malnutrition (mild, moderate)   - resume tube feeding; no residual. Tolerating tube feeding w/o difficulty     GERD: c/w PPI     Chronic kelly; seen by  (will continue with kelly)     Code Status: Full discussion with wfe- DNR  Surrogate Decision Maker: wife Martha Lutz [phone] or [phone]  DVT Prophylaxis: Lovenox  GI Prophylaxis: PPI  Baseline: bed bound, dysarthric, aphasic, palliative care to see patient.        Recommended Disposition: SNF/LTC  Pt needs to be sitter free before return to SNF       Subjective:     Chief Complaint / Reason for Physician Visit  \"I am Juma\". Otherwise, pt remain confused, but much alert today. Discussed with RN events overnight. Review of Systems:  Symptom Y/N Comments  Symptom Y/N Comments   Fever/Chills    Chest Pain     Poor Appetite    Edema     Cough    Abdominal Pain     Sputum    Joint Pain     SOB/AL    Pruritis/Rash     Nausea/vomit    Tolerating PT/OT     Diarrhea    Tolerating Diet     Constipation    Other       Could NOT obtain due to:      Objective:     VITALS:   Last 24hrs VS reviewed since prior progress note.  Most recent are:  Patient Vitals for the past 24 hrs:   Temp Pulse Resp BP SpO2   09/30/17 1101 97.9 °F (36.6 °C) 79 18 135/66 99 %   09/30/17 0708 97.8 °F (36.6 °C) 89 18 134/71 97 %   09/30/17 0450 98 °F (36.7 °C) 84 20 113/65 100 %   09/30/17 0257 - - - - 95 %   09/29/17 2302 98 °F (36.7 °C) (!) 55 18 128/54 96 %   09/29/17 2114 - - - - 92 %   09/29/17 1757 97.6 °F (36.4 °C) 73 18 127/77 100 %       Intake/Output Summary (Last 24 hours) at 09/30/17 1401  Last data filed at 09/30/17 1317   Gross per 24 hour   Intake          2303.75 ml   Output             2825 ml   Net          -521.25 ml        PHYSICAL EXAM:  General: WD, WN. Alert, no acute distress  EENT:  EOMI. Anicteric sclerae. MMM  Resp:  CTA in apex with decreased breath sounds at bases, no wheezing or rales. No accessory muscle use  CV:  Regular  rhythm,  No edema  GI:  Soft, Non distended, Non tender.  +Bowel sounds, PEG tube in place; tends to pull lines, PEG tube covered with abdominal binder  Neurologic:  Alert and oriented X self only, speech difficult to understand   Psych:   Poor insight. gets agitated easily  Skin:  No rashes. No jaundice    Reviewed most current lab test results and cultures  YES  Reviewed most current radiology test results   YES  Review and summation of old records today    NO  Reviewed patient's current orders and MAR    YES  PMH/ reviewed - no change compared to H&P  ________________________________________________________________________  Care Plan discussed with:    Comments   Patient y    Family      RN y    Care Manager     Consultant                        Multidiciplinary team rounds were held today with , nursing, pharmacist and clinical coordinator. Patient's plan of care was discussed; medications were reviewed and discharge planning was addressed.      ________________________________________________________________________  Total NON critical care TIME:  20   Minutes    Total CRITICAL CARE TIME Spent:   Minutes non procedure based      Comments   >50% of visit spent in counseling and coordination of care     ________________________________________________________________________  Ceola Harada, NP     Procedures: see electronic medical records for all procedures/Xrays and details which were not copied into this note but were reviewed prior to creation of Plan. LABS:  I reviewed today's most current labs and imaging studies.   Pertinent labs include:  Recent Labs      09/29/17 0438 09/28/17   0206 09/27/17   1611   WBC  3.5*  3.5*  5.8   HGB  10.5*  10.4*  11.6*   HCT  31.5*  30.3*  33.3*   PLT  174  149*  200     Recent Labs      09/30/17   0413  09/29/17   0438  09/28/17   0206  09/27/17   1611   NA  140  138  137  132*   K  4.0  3.5  3.3*  3.1*   CL  103  100  99  92*   CO2  31  33*  31  31   GLU  128*  82  84  81   BUN  4*  5*  10  18   CREA  0.55*  0.54*  0.49*  0.73   CA  8.5  8.7  8.1*  8.7   MG   --   1.5*  1.4*  1.6   ALB   --    --   2.6*  3.0*   TBILI   --    --   0.7  0.8   SGOT   --    --   26  32   ALT   --    --   25  29       Signed: )Otoniel Clark, NP

## 2017-10-01 LAB
ANION GAP SERPL CALC-SCNC: 8 MMOL/L (ref 5–15)
BUN SERPL-MCNC: 5 MG/DL (ref 6–20)
BUN/CREAT SERPL: 10 (ref 12–20)
CALCIUM SERPL-MCNC: 8.9 MG/DL (ref 8.5–10.1)
CHLORIDE SERPL-SCNC: 101 MMOL/L (ref 97–108)
CO2 SERPL-SCNC: 30 MMOL/L (ref 21–32)
CREAT SERPL-MCNC: 0.52 MG/DL (ref 0.7–1.3)
GLUCOSE BLD STRIP.AUTO-MCNC: 107 MG/DL (ref 65–100)
GLUCOSE BLD STRIP.AUTO-MCNC: 117 MG/DL (ref 65–100)
GLUCOSE BLD STRIP.AUTO-MCNC: 89 MG/DL (ref 65–100)
GLUCOSE SERPL-MCNC: 106 MG/DL (ref 65–100)
MAGNESIUM SERPL-MCNC: 1.4 MG/DL (ref 1.6–2.4)
POTASSIUM SERPL-SCNC: 4.5 MMOL/L (ref 3.5–5.1)
SERVICE CMNT-IMP: ABNORMAL
SERVICE CMNT-IMP: ABNORMAL
SERVICE CMNT-IMP: NORMAL
SODIUM SERPL-SCNC: 139 MMOL/L (ref 136–145)

## 2017-10-01 PROCEDURE — 65270000029 HC RM PRIVATE

## 2017-10-01 PROCEDURE — 74011250636 HC RX REV CODE- 250/636: Performed by: NURSE PRACTITIONER

## 2017-10-01 PROCEDURE — 74011250636 HC RX REV CODE- 250/636: Performed by: INTERNAL MEDICINE

## 2017-10-01 PROCEDURE — 51798 US URINE CAPACITY MEASURE: CPT

## 2017-10-01 PROCEDURE — 80048 BASIC METABOLIC PNL TOTAL CA: CPT | Performed by: INTERNAL MEDICINE

## 2017-10-01 PROCEDURE — 74011250637 HC RX REV CODE- 250/637: Performed by: INTERNAL MEDICINE

## 2017-10-01 PROCEDURE — 36415 COLL VENOUS BLD VENIPUNCTURE: CPT | Performed by: INTERNAL MEDICINE

## 2017-10-01 PROCEDURE — 83735 ASSAY OF MAGNESIUM: CPT | Performed by: INTERNAL MEDICINE

## 2017-10-01 PROCEDURE — 82962 GLUCOSE BLOOD TEST: CPT

## 2017-10-01 PROCEDURE — 74011000258 HC RX REV CODE- 258: Performed by: INTERNAL MEDICINE

## 2017-10-01 PROCEDURE — 74011250637 HC RX REV CODE- 250/637: Performed by: NURSE PRACTITIONER

## 2017-10-01 RX ORDER — QUETIAPINE FUMARATE 25 MG/1
25 TABLET, FILM COATED ORAL
Status: DISCONTINUED | OUTPATIENT
Start: 2017-10-02 | End: 2017-10-08

## 2017-10-01 RX ORDER — QUETIAPINE FUMARATE 25 MG/1
25 TABLET, FILM COATED ORAL 3 TIMES DAILY
Status: DISCONTINUED | OUTPATIENT
Start: 2017-10-01 | End: 2017-10-01

## 2017-10-01 RX ORDER — HALOPERIDOL 5 MG/ML
1 INJECTION INTRAMUSCULAR ONCE
Status: COMPLETED | OUTPATIENT
Start: 2017-10-01 | End: 2017-10-01

## 2017-10-01 RX ORDER — MAGNESIUM SULFATE HEPTAHYDRATE 40 MG/ML
2 INJECTION, SOLUTION INTRAVENOUS ONCE
Status: COMPLETED | OUTPATIENT
Start: 2017-10-01 | End: 2017-10-01

## 2017-10-01 RX ADMIN — PIPERACILLIN SODIUM,TAZOBACTAM SODIUM 4.5 G: 4; .5 INJECTION, POWDER, FOR SOLUTION INTRAVENOUS at 11:57

## 2017-10-01 RX ADMIN — MAGNESIUM SULFATE HEPTAHYDRATE 2 G: 40 INJECTION, SOLUTION INTRAVENOUS at 09:29

## 2017-10-01 RX ADMIN — HALOPERIDOL 2 MG: 2 TABLET ORAL at 10:02

## 2017-10-01 RX ADMIN — ENOXAPARIN SODIUM 40 MG: 40 INJECTION SUBCUTANEOUS at 23:31

## 2017-10-01 RX ADMIN — VALPROIC ACID 500 MG: 250 SOLUTION ORAL at 06:09

## 2017-10-01 RX ADMIN — ASPIRIN 81 MG 81 MG: 81 TABLET ORAL at 09:40

## 2017-10-01 RX ADMIN — LEVETIRACETAM 1500 MG: 100 SOLUTION ORAL at 21:01

## 2017-10-01 RX ADMIN — Medication 100 MG: at 09:40

## 2017-10-01 RX ADMIN — SODIUM CHLORIDE AND POTASSIUM CHLORIDE: 9; 1.49 INJECTION, SOLUTION INTRAVENOUS at 16:25

## 2017-10-01 RX ADMIN — SODIUM CHLORIDE AND POTASSIUM CHLORIDE: 9; 1.49 INJECTION, SOLUTION INTRAVENOUS at 06:09

## 2017-10-01 RX ADMIN — PIPERACILLIN SODIUM,TAZOBACTAM SODIUM 4.5 G: 4; .5 INJECTION, POWDER, FOR SOLUTION INTRAVENOUS at 16:16

## 2017-10-01 RX ADMIN — HALOPERIDOL LACTATE 1 MG: 5 INJECTION, SOLUTION INTRAMUSCULAR at 10:46

## 2017-10-01 RX ADMIN — QUETIAPINE FUMARATE 25 MG: 25 TABLET ORAL at 16:16

## 2017-10-01 RX ADMIN — PREGABALIN 300 MG: 100 CAPSULE ORAL at 21:00

## 2017-10-01 RX ADMIN — PANTOPRAZOLE SODIUM 40 MG: 40 GRANULE, DELAYED RELEASE ORAL at 09:40

## 2017-10-01 RX ADMIN — LEVOFLOXACIN 750 MG: 5 INJECTION, SOLUTION INTRAVENOUS at 21:01

## 2017-10-01 RX ADMIN — METOPROLOL TARTRATE 12.5 MG: 25 TABLET ORAL at 09:40

## 2017-10-01 RX ADMIN — VALPROIC ACID 500 MG: 250 SOLUTION ORAL at 21:01

## 2017-10-01 RX ADMIN — LEVETIRACETAM 1500 MG: 100 SOLUTION ORAL at 09:39

## 2017-10-01 RX ADMIN — VALPROIC ACID 500 MG: 250 SOLUTION ORAL at 15:15

## 2017-10-01 RX ADMIN — LATANOPROST 1 DROP: 50 SOLUTION OPHTHALMIC at 21:01

## 2017-10-01 RX ADMIN — PANTOPRAZOLE SODIUM 40 MG: 40 GRANULE, DELAYED RELEASE ORAL at 16:15

## 2017-10-01 RX ADMIN — PREGABALIN 300 MG: 100 CAPSULE ORAL at 09:42

## 2017-10-01 RX ADMIN — PIPERACILLIN SODIUM,TAZOBACTAM SODIUM 4.5 G: 4; .5 INJECTION, POWDER, FOR SOLUTION INTRAVENOUS at 23:31

## 2017-10-01 NOTE — CONSULTS
Patient  is a 80 y.o.  male  with a diagnosis of Seizure disorder & ETOH Use admitted with AMS. He  lives in a nursing facility. According to the ER report  Th pt began acting agitated and disoriented and  Pt fell. He has been falling more often recently according to his wife. He has been at the hospital since 9/27. A psych consult was requested to help treat agitation at this present time & on discharge. Patient has a long h/o ETOH use. He quit drinking 10 years ago. His wife is supportive & has been visiting  with him in the hospital. Records do not note a past psych history. MSE: Patient presents confused. He was lying in bed. He appears to be frail & withdrawn although staff report intermittent episodes of agitation. He is not  Able to answer questions due to his confused/sedated state. Rec: It appears he is responding to the haldol 2 mg q 6hrs prn. Will continuing current dosing to target agitation.  Defer using benzodiazepam.

## 2017-10-01 NOTE — PROGRESS NOTES
Patient requested to urinate. Able to produce 25mL of yellow urine, PVR reveals 135mL. Primary nurse aware.

## 2017-10-01 NOTE — PROGRESS NOTES
[facility] SHIFT NURSING NOTE    Bedside and Verbal shift change report given to  (oncoming nurse) by  Julienne waters). Report included the following information Kardex. SHIFT SUMMARY:         Admission Date 9/27/2017   Admission Diagnosis Aspiration pneumonia ([address]   Consults IP CONSULT TO NEUROLOGY  IP CONSULT TO NEUROLOGY  IP CONSULT TO UROLOGY        Consults   [] PT   [] OT   [] Speech   [] Palliative      [] Hospice    [] Case Management   [] None   Cardiac Monitoring   [x] Yes   [] No     Antibiotics   [x] Yes   [] No   GI Prophylaxis  (Ex: Protonix, Pepcid, etc,.)   [] Yes   [] No          DVT Prophylaxis   SCDs:             Han stockings:         [x] Medication (Ex: Lovenox, Eliquis, Brilinta, Coumadin,  Heparin, etc..)   [] Contraindicated   [] No VTE needed       Urinary Catheter Urinary Catheter 09/27/17 Cummins-Criteria for Appropriate Use: Obstruction/retention     Urinary Catheter 09/27/17 Cummins-Urine Output (mL): 1000 ml     LDAs               Peripheral IV 09/29/17 Left;Upper Arm (Active)   Site Assessment Clean, dry, & intact 9/30/2017  8:16 PM   Phlebitis Assessment 0 9/30/2017  8:16 PM   Infiltration Assessment 0 9/30/2017  8:16 PM   Dressing Status Clean, dry, & intact 9/30/2017  8:16 PM   Dressing Type Tape;Transparent 9/30/2017  8:16 PM   Hub Color/Line Status Blue; Infusing;Flushed;Patent 9/30/2017  8:16 PM          PEG/Gastrostomy Tube 07/18/17 (Active)       PEG/Gastrostomy Tube (Active)   Site Assessment Clean, dry, & intact 9/30/2017  8:16 PM   Dressing Status Clean, dry, & intact 9/30/2017  8:16 PM   G Port Status Infusing 9/30/2017  8:16 PM   Action Taken Placement verified (comment) 9/30/2017  8:16 PM   Gastric Residual (mL) 5 ml 9/30/2017  8:16 PM   Tube Feeding/Formula Options Jevity 1.5 9/30/2017  8:16 PM   Tube Feeding/Verify Rate (mL/hr) 50 9/30/2017  8:16 PM   Water Flush Volume (mL) 150 mL 9/30/2017  8:16 PM   Intake (ml) 200 ml 9/30/2017  8:16 PM                I/Os Intake/Output Summary (Last 24 hours) at 09/30/17 2050  Last data filed at 09/30/17 2016   Gross per 24 hour   Intake          3208.75 ml   Output             2825 ml   Net           383.75 ml         Activity Level Activity Level: Bed Rest     Activity Assistance: Complete care   Diet Active Orders   Diet    DIET NPO Except Meds      Purposeful Rounding every 1-2 hour? [] Yes    Lorena Score  Total Score: 4   Bed Alarm (If score 3 or >)   [] Yes    [] Refused (See signed refusal form in chart)   Klever Score  Klever Score: 13       Klever Score (if score 14 or less)   [] PMT consult   [] Nutrition consult   [] Wound Care consult      []  Specialty bed         Influenza Vaccine Received Flu Vaccine for Current Season (usually Sept-March): Yes (9/22/17 see medical record)               Needs prior to discharge:   Home O2 required:    [] Yes   [x] No     If yes, how much O2 required? Other:    Last Bowel Movement Date:  (unknown )   Readmission Risk Assessment Tool Score High Risk            26       Total Score        3 Has Seen PCP in Last 6 Months (Yes=3, No=0)    2 . Living with Significant Other. Assisted Living. LTAC. SNF. or   Rehab    11 IP Visits Last 12 Months (1-3=4, 4=9, >4=11)    5 Pt.  Coverage (Medicare=5 , Medicaid, or Self-Pay=4)    5 Charlson Comorbidity Score (Age + Comorbid Conditions)        Criteria that do not apply:    Patient Length of Stay (>5 days = 3)       Expected Length of Stay 5d 21h   Actual Length of Stay 2

## 2017-10-01 NOTE — PROGRESS NOTES
ADULT PROTOCOL: JET AEROSOL ASSESSMENT    Patient  Isha Ordaz     80 y.o.   male     9/30/2017  8:49 PM    Breath Sounds Pre Procedure: Right Breath Sounds: Diminished                               Left Breath Sounds: Diminished    Breath Sounds Post Procedure: Right Breath Sounds: Diminished                                 Left Breath Sounds: Diminished    Breathing pattern: Pre procedure Breathing Pattern: Regular          Post procedure Breathing Pattern: Regular    Heart Rate: Pre procedure Pulse: 75           Post procedure Pulse: 83    Resp Rate: Pre procedure Respirations: 20           Post procedure Respirations: 20    Peak Flow: Pre bronchodilator             Post bronchodilator       FVC/FEV1:  n/a    Incentive Spirometry:             Cough: Pre procedure Cough: Non-productive, Congested               Post procedure Cough: Congested    Suctioned: NO    Sputum: Pre procedure                   Post procedure      Oxygen: O2 Device: Room air   RA     Changed: NO    SpO2: Pre procedure SpO2: 95 %   without oxygen              Post procedure SpO2: 96 %  without oxygen    Nebulizer Therapy: Current medications Aerosolized Medications: DuoNeb      Changed: YES, change frequency of Duoneb from Q6 to Q6 PRN per protocol    Smoking History: n/a    Problem List:   Patient Active Problem List   Diagnosis Code    Encephalopathy, unspecified G93.40    SVT (supraventricular tachycardia) ([facility]) I47.1    Aphasia R47.01    CVA (cerebral vascular accident) ([facility] I63.9    Seizure disorder ([facility] G40.909    Seizures ([facility] R56.9    Status epilepticus ([facility] G40.901    Oral phase dysphagia R13.11    Dysarthria R47.1    Dysphagia R13.10    Weakness R53.1    Debility R53.81    Counseling regarding goals of care Z71.89    ARF (acute renal failure) ([facility]) N17.9    Anemia D64.9    Heme positive stool R19.5    Sepsis ([facility] [facility] A41.9    UTI (urinary tract infection) N39.0    Acute encephalopathy G93.40    HCAP (healthcare-associated pneumonia) J18.9    Confusion R41.0    Dysphasia R47.02    Counseling regarding advanced care planning and goals of care Z71.89    Aspiration pneumonia ([facility] [address] J69.0       Respiratory Therapist: Miguel A Ramsay, RT

## 2017-10-01 NOTE — PROGRESS NOTES
Problem: Falls - Risk of  Goal: *Absence of Falls  Document Lorena Fall Risk and appropriate interventions in the flowsheet.    Outcome: Progressing Towards Goal  Fall Risk Interventions:        Mentation Interventions: Bed/chair exit alarm, Room close to nurse's station, Adequate sleep, hydration, pain control, Evaluate medications/consider consulting pharmacy, Family/sitter at bedside, More frequent rounding, Reorient patient     Medication Interventions: Bed/chair exit alarm, Evaluate medications/consider consulting pharmacy, Patient to call before getting OOB, Teach patient to arise slowly     Elimination Interventions: Bed/chair exit alarm, Call light in reach, Patient to call for help with toileting needs, Toileting schedule/hourly rounds     History of Falls Interventions: Bed/chair exit alarm, Door open when patient unattended, Investigate reason for fall, Room close to nurse's station, Evaluate medications/consider consulting pharmacy

## 2017-10-01 NOTE — PROGRESS NOTES
Hospitalist Progress Note    NAME: Cora Little   :  1934   MRN:  [id number]       Interim Hospital Summary: 80 y.o. male whom presented on 2017 with      Assessment / Plan:  Recurrent Seizures  Acute Encephalopathy (orient to self only)  - Neuro following; patient has extensive encephalomalacia which is the area responsible for the seizures. - continue with Keppra & valporic acid  - Head CT: No acute findings or interval change.  - NH4 11  - Seroquel increased to TID; pt gets aggressive with staffs, attempts to pull all lines, and not following commends. Required haldol to calm the patient. - Kelly cath removed by nursing staff. Pt was attempt to pull kelly out. Check post residual; straight cath prn if post residual greater than 400. Please check bladder scan q8hr if pt doesn't void     Recurrent Aspiration PNA  - continue with LVQ/Zosyn.  Will complete total of 7 days ABT. - continue with duoneb  - CT of chest: Unchanged area of scarring in the posterior upper lobe. Mild bibasilar atelectasis.     Hypokalemia  - replace PRN      Hypertension  - Continue with BB  - prn hydralazine      Dysarthria  Malnutrition (mild, moderate)   - resume tube feeding; no residual. Tolerating tube feeding w/o difficulty      GERD: c/w PPI         Code Status: Full discussion with wfe- DNR  Surrogate Decision Maker: wife Broderick Chaudhry [phone] or [phone]  DVT Prophylaxis: Lovenox  GI Prophylaxis: PPI  Baseline: bed bound, dysarthric, aphasic, palliative care to see patient.          Recommended Disposition: SNF/LTC  Pt needs to be sitter free before return to SNF. D/c telemetry, pt constantly pulls the leads. May transfer to medicine floor. Subjective:     Chief Complaint / Reason for Physician Visit  \"I am Delynn January. \"   [place] to himself only. Otherwise, confused and have been aggressive with the staff. Discussed with RN events overnight.      Review of Systems:  Symptom Y/N Comments  Symptom Y/N Comments   Fever/Chills    Chest Pain     Poor Appetite    Edema     Cough    Abdominal Pain     Sputum    Joint Pain     SOB/AL    Pruritis/Rash     Nausea/vomit    Tolerating PT/OT     Diarrhea    Tolerating Diet     Constipation    Other       Could NOT obtain due to:      Objective:     VITALS:   Last 24hrs VS reviewed since prior progress note. Most recent are:  Patient Vitals for the past 24 hrs:   Temp Pulse Resp BP SpO2   10/01/17 1106 97.4 °F (36.3 °C) 94 18 148/89 95 %   10/01/17 0727 97.3 °F (36.3 °C) (!) 102 20 (!) 155/93 100 %   10/01/17 0302 97.6 °F (36.4 °C) 87 18 157/76 100 %   09/30/17 2334 97.4 °F (36.3 °C) 78 18 138/73 96 %   09/30/17 2017 - - - - 95 %   09/30/17 2008 98.3 °F (36.8 °C) 66 18 136/74 96 %   09/30/17 1449 97.8 °F (36.6 °C) 84 18 127/71 98 %       Intake/Output Summary (Last 24 hours) at 10/01/17 1234  Last data filed at 10/01/17 1141   Gross per 24 hour   Intake             3545 ml   Output             3400 ml   Net              145 ml        PHYSICAL EXAM:  General: Ill appearing Alert, uncooperative, no acute distress    EENT:  EOMI. Anicteric sclerae. MMM  Resp:  CTA in apex with decreased breath sounds at bases, no wheezing or rales. No accessory muscle use  CV:  Regular  rhythm,  No edema  GI:  Soft, Non distended, Non tender.  +Bowel sounds  Neurologic:  Alert and oriented X self,  Speech difficult to understand   Psych:   Poor insight. easily gets anxious, agitated, and aggressive  Skin:  No rashes.   No jaundice    Reviewed most current lab test results and cultures  YES  Reviewed most current radiology test results   YES  Review and summation of old records today    NO  Reviewed patient's current orders and MAR    YES  PMH/SH reviewed - no change compared to H&P  ________________________________________________________________________  Care Plan discussed with:    Comments   Patient y    Family      RN y    Care Manager     Consultant                        Multidiciplinary team rounds were held today with , nursing, pharmacist and clinical coordinator. Patient's plan of care was discussed; medications were reviewed and discharge planning was addressed. ________________________________________________________________________  Total NON critical care TIME: 30   Minutes    Total CRITICAL CARE TIME Spent:   Minutes non procedure based      Comments   >50% of visit spent in counseling and coordination of care     ________________________________________________________________________  Gabriella Mendez NP     Procedures: see electronic medical records for all procedures/Xrays and details which were not copied into this note but were reviewed prior to creation of Plan. LABS:  I reviewed today's most current labs and imaging studies.   Pertinent labs include:  Recent Labs      09/29/17   0438   WBC  3.5*   HGB  10.5*   HCT  31.5*   PLT  174     Recent Labs      10/01/17   0612  09/30/17   0413  09/29/17   0438   NA  139  140  138   K  4.5  4.0  3.5   CL  101  103  100   CO2  30  31  33*   GLU  106*  128*  82   BUN  5*  4*  5*   CREA  0.52*  0.55*  0.54*   CA  8.9  8.5  8.7   MG  1.4*   --   1.5*       Signed: )Otoniel Clark NP

## 2017-10-02 LAB
ANION GAP SERPL CALC-SCNC: 8 MMOL/L (ref 5–15)
BUN SERPL-MCNC: 6 MG/DL (ref 6–20)
BUN/CREAT SERPL: 10 (ref 12–20)
CALCIUM SERPL-MCNC: 8.4 MG/DL (ref 8.5–10.1)
CHLORIDE SERPL-SCNC: 104 MMOL/L (ref 97–108)
CO2 SERPL-SCNC: 30 MMOL/L (ref 21–32)
CREAT SERPL-MCNC: 0.63 MG/DL (ref 0.7–1.3)
GLUCOSE BLD STRIP.AUTO-MCNC: 97 MG/DL (ref 65–100)
GLUCOSE SERPL-MCNC: 106 MG/DL (ref 65–100)
POTASSIUM SERPL-SCNC: 4.1 MMOL/L (ref 3.5–5.1)
SERVICE CMNT-IMP: NORMAL
SODIUM SERPL-SCNC: 142 MMOL/L (ref 136–145)

## 2017-10-02 PROCEDURE — 82962 GLUCOSE BLOOD TEST: CPT

## 2017-10-02 PROCEDURE — 74011250637 HC RX REV CODE- 250/637: Performed by: NURSE PRACTITIONER

## 2017-10-02 PROCEDURE — 80048 BASIC METABOLIC PNL TOTAL CA: CPT | Performed by: INTERNAL MEDICINE

## 2017-10-02 PROCEDURE — 74011250637 HC RX REV CODE- 250/637: Performed by: INTERNAL MEDICINE

## 2017-10-02 PROCEDURE — 65270000015 HC RM PRIVATE ONCOLOGY

## 2017-10-02 PROCEDURE — 74011000258 HC RX REV CODE- 258: Performed by: INTERNAL MEDICINE

## 2017-10-02 PROCEDURE — 74011250637 HC RX REV CODE- 250/637: Performed by: PSYCHIATRY & NEUROLOGY

## 2017-10-02 PROCEDURE — 36415 COLL VENOUS BLD VENIPUNCTURE: CPT | Performed by: INTERNAL MEDICINE

## 2017-10-02 PROCEDURE — 74011250636 HC RX REV CODE- 250/636: Performed by: INTERNAL MEDICINE

## 2017-10-02 RX ORDER — SODIUM CHLORIDE 9 MG/ML
25 INJECTION, SOLUTION INTRAVENOUS AS NEEDED
Status: DISCONTINUED | OUTPATIENT
Start: 2017-10-02 | End: 2017-10-14 | Stop reason: HOSPADM

## 2017-10-02 RX ORDER — AMOXICILLIN AND CLAVULANATE POTASSIUM 600; 42.9 MG/5ML; MG/5ML
500 POWDER, FOR SUSPENSION ORAL EVERY 8 HOURS
Status: COMPLETED | OUTPATIENT
Start: 2017-10-02 | End: 2017-10-03

## 2017-10-02 RX ADMIN — SODIUM CHLORIDE 25 ML: 900 INJECTION, SOLUTION INTRAVENOUS at 09:29

## 2017-10-02 RX ADMIN — VALPROIC ACID 500 MG: 250 SOLUTION ORAL at 16:04

## 2017-10-02 RX ADMIN — PREGABALIN 300 MG: 100 CAPSULE ORAL at 21:37

## 2017-10-02 RX ADMIN — PREGABALIN 300 MG: 100 CAPSULE ORAL at 08:59

## 2017-10-02 RX ADMIN — ASPIRIN 81 MG 81 MG: 81 TABLET ORAL at 09:01

## 2017-10-02 RX ADMIN — VALPROIC ACID 500 MG: 250 SOLUTION ORAL at 06:14

## 2017-10-02 RX ADMIN — Medication 100 MG: at 09:02

## 2017-10-02 RX ADMIN — METOPROLOL TARTRATE 12.5 MG: 25 TABLET ORAL at 21:37

## 2017-10-02 RX ADMIN — LEVETIRACETAM 1500 MG: 100 SOLUTION ORAL at 09:07

## 2017-10-02 RX ADMIN — AMOXICILLIN AND CLAVULANATE POTASSIUM 500 MG: 600; 42.9 SUSPENSION ORAL at 11:57

## 2017-10-02 RX ADMIN — VALPROIC ACID 500 MG: 250 SOLUTION ORAL at 21:37

## 2017-10-02 RX ADMIN — LEVETIRACETAM 1500 MG: 100 SOLUTION ORAL at 21:37

## 2017-10-02 RX ADMIN — QUETIAPINE FUMARATE 25 MG: 25 TABLET ORAL at 21:37

## 2017-10-02 RX ADMIN — PANTOPRAZOLE SODIUM 40 MG: 40 GRANULE, DELAYED RELEASE ORAL at 16:30

## 2017-10-02 RX ADMIN — HALOPERIDOL 2 MG: 2 TABLET ORAL at 21:37

## 2017-10-02 RX ADMIN — HALOPERIDOL 2 MG: 2 TABLET ORAL at 09:39

## 2017-10-02 RX ADMIN — METOPROLOL TARTRATE 12.5 MG: 25 TABLET ORAL at 09:01

## 2017-10-02 NOTE — PROGRESS NOTES
Bedside shift change report given to [facility] (oncoming nurse) by Leandro Plascencia (offgoing nurse). Report included the following information SBAR, Kardex, Intake/Output, MAR, Recent Results and Cardiac Rhythm no tele ordered. 9:45 AM Asim Denis called on pt. Pt became aggressive and attempted to punch nurse and tech and trying to get OOB. Haldol given. Tube Feed will be held for 1 hour or until pt does not pull line anymore. Pt ABX will be given at a later time as well. Will continue to monitor. 10:06 AM Paged MD to discuss pt situation. NP to come see pt. Called psychiatrist to see if they could reevaluate. Left message on office phone to please return my call regarding this pt.    10:52 AM Pt now calm. Peg tube feeding restarted. NP changed ABX to peg tube instead of IV.      11:51 AM TRANSFER - OUT REPORT:    Verbal report given to Adela Andrews (name) on Erika Sham  being transferred to medical oncology (unit) for routine progression of care       Report consisted of patients Situation, Background, Assessment and   Recommendations(SBAR). Information from the following report(s) SBAR, Kardex, Intake/Output, MAR, Recent Results and Med Rec Status was reviewed with the receiving nurse. Lines:   Peripheral IV 09/29/17 Left;Upper Arm (Active)   Site Assessment Clean, dry, & intact 10/2/2017  8:01 AM   Phlebitis Assessment 0 10/2/2017  8:01 AM   Infiltration Assessment 0 10/2/2017  8:01 AM   Dressing Status Clean, dry, & intact 10/2/2017  8:01 AM   Dressing Type Transparent;Tape 10/2/2017  8:01 AM   Hub Color/Line Status Blue; Infusing;Patent 10/2/2017  8:01 AM   Alcohol Cap Used No 10/2/2017  8:01 AM        Opportunity for questions and clarification was provided.       Patient transported with:   [employer]

## 2017-10-02 NOTE — PROGRESS NOTES
Patient remains confused, agitated, and inappropriate for PT evaluation. Will f/u x1 more visit ahead.      Chica Garay, PT, DPT, Sangeetha Fortune

## 2017-10-02 NOTE — PROGRESS NOTES
Oncology Nursing Communication Tool      7:53 PM  10/2/2017     Bedside and Verbal shift change report given to Kerry Lara RN (incoming nurse) by Tate Diaz RN (outgoing nurse) on Nehemias Ta a 80 y.o. male who was admitted on 9/27/2017  2:35 PM. Report included the following information SBAR, Kardex, Intake/Output, Accordion and Recent Results. Significant changes during shift: Bolus feeds. All meds through PEG. Issues for physician to address: Discharge disposition            Code Status: DNR     Infections: No current active infections     Allergies: No known allergies     Current diet: DIET NPO Except Meds  DIET TUBE FEEDING Jevity: 250 ml 5 x per day (6 am, 9 am, 12 pm, 3 pm, 6pm) + 75 ml before and after bolus       Pain Controlled [x] yes [] no   Bowel Movement [] yes [x] no   Last Bowel Movement (date) unknown            Vital Signs:   Patient Vitals for the past 12 hrs:   Temp Pulse Resp BP SpO2   10/02/17 1508 98 °F (36.7 °C) 74 20 138/64 100 %   10/02/17 1332 98.5 °F (36.9 °C) 76 20 148/90 99 %   10/02/17 1100 98.3 °F (36.8 °C) 74 16 124/62 97 %      Intake & Output:     Intake/Output Summary (Last 24 hours) at 10/02/17 1953  Last data filed at 10/02/17 1552   Gross per 24 hour   Intake              525 ml   Output              210 ml   Net              315 ml      Laboratory Results:     Recent Results (from the past 12 hour(s))   GLUCOSE, POC    Collection Time: 10/02/17 11:17 AM   Result Value Ref Range    Glucose (POC) 97 65 - 100 mg/dL    Performed by [address] for questions and clarifications were given to the incoming nurse. Patient's bed is in low position, side rails x2, door open PRN, call bell within reach and patient not in distress.       Tate Diaz RN

## 2017-10-02 NOTE — PROGRESS NOTES
Hospitalist Progress Note    NAME: Parker Cabrera   :  1934   MRN:  [id number]       Interim Hospital Summary: 80 y.o. male whom presented on 2017 with      Assessment / Plan:  Recurrent Seizures  Acute Encephalopathy (orient to self only)  Aggressive behavior  - Neuro following; patient has extensive encephalomalacia which is the area responsible for the seizures. - continue with Keppra & valporic acid  - Head CT: No acute findings or interval change.  - NH4 11  - Seroquel increased to TID yesterday, but was changed back to qhs per psychiatry. Pt has been receiving haldol regularly. Psychiatry following.  - Kelly cath removed by nursing staff on 10/1/2017 when pt attempt to pull kelly out. Incontinent of urine right now     Recurrent Aspiration PNA  - will complete total of 7 days ABT as of today  - continue with duoneb  - CT of chest: Unchanged area of scarring in the posterior upper lobe. Mild bibasilar atelectasis.     Hypokalemia  - replace PRN      Hypertension  - Continue with BB  - prn hydralazine      Dysarthria  Malnutrition (mild, moderate)   - resume tube feeding; no residual. Tolerating tube feeding w/o difficulty      GERD: c/w PPI          Code Status: Full discussion with wfe- DNR  Surrogate Decision Maker: wife Fahad Carter [phone] or [phone]  DVT Prophylaxis: Lovenox  GI Prophylaxis: PPI  Baseline: bed bound, dysarthric, aphasic, palliative care to see patient.          Recommended Disposition: SNF/LTC  Pt needs to be sitter free before return to SNF. transfer to medicine floor. Subjective:     Chief Complaint / Reason for Physician Visit  \"I am Kate Gunnam. \" Remain confused, gets agitated easily. Discussed with RN events overnight.      Review of Systems:  Symptom Y/N Comments  Symptom Y/N Comments   Fever/Chills    Chest Pain     Poor Appetite    Edema     Cough    Abdominal Pain     Sputum    Joint Pain     SOB/AL    Pruritis/Rash     Nausea/vomit    Tolerating PT/OT Diarrhea    Tolerating Diet     Constipation    Other       Could NOT obtain due to:      Objective:     VITALS:   Last 24hrs VS reviewed since prior progress note. Most recent are:  Patient Vitals for the past 24 hrs:   Temp Pulse Resp BP SpO2   10/02/17 1508 98 °F (36.7 °C) 74 20 138/64 100 %   10/02/17 1332 98.5 °F (36.9 °C) 76 20 148/90 99 %   10/02/17 1100 98.3 °F (36.8 °C) 74 16 124/62 97 %   10/02/17 0746 97.5 °F (36.4 °C) 73 18 123/67 99 %   10/02/17 0300 97.9 °F (36.6 °C) 74 20 97/56 95 %   10/01/17 2307 97.7 °F (36.5 °C) 76 20 (!) 86/44 98 %   10/01/17 2057 97.9 °F (36.6 °C) 70 - 97/40 -   10/01/17 1945 100 °F (37.8 °C) 70 20 101/52 96 %   10/01/17 1604 - 82 - - -   10/01/17 1537 97.9 °F (36.6 °C) (!) 50 18 128/62 97 %       Intake/Output Summary (Last 24 hours) at 10/02/17 1510  Last data filed at 10/02/17 1102   Gross per 24 hour   Intake             2610 ml   Output              580 ml   Net             2030 ml        PHYSICAL EXAM:  General: Ill appearing, Alert, uncooperative, no acute distress    EENT:  EOMI. Anicteric sclerae. MMM  Resp:  CTA in apex with decreased breath sounds at bases, no wheezing or rales. No accessory muscle use  CV:  Regular  rhythm,  No edema  GI:  Soft, Non distended, Non tender.  +Bowel sounds  Neurologic:  Alert and oriented X self only, speech difficult to understand  Psych:   Poor insight. gets anxious & agitate easily  Skin:  No rashes.   No jaundice    Reviewed most current lab test results and cultures  YES  Reviewed most current radiology test results   YES  Review and summation of old records today    NO  Reviewed patient's current orders and MAR    YES  PMH/SH reviewed - no change compared to H&P  ________________________________________________________________________  Care Plan discussed with:    Comments   Patient y    Family  y    RN y    Care Manager y    Consultant                       y Multidiciplinary team rounds were held today with , nursing, pharmacist and clinical coordinator. Patient's plan of care was discussed; medications were reviewed and discharge planning was addressed. ________________________________________________________________________  Total NON critical care TIME:  30  Minutes    Total CRITICAL CARE TIME Spent:   Minutes non procedure based      Comments   >50% of visit spent in counseling and coordination of care     ________________________________________________________________________  Cj Samaniego NP     Procedures: see electronic medical records for all procedures/Xrays and details which were not copied into this note but were reviewed prior to creation of Plan. LABS:  I reviewed today's most current labs and imaging studies. Pertinent labs include:  No results for input(s): WBC, HGB, HCT, PLT, HGBEXT, HCTEXT, PLTEXT in the last 72 hours.   Recent Labs      10/02/17   0322  10/01/17   0612  09/30/17   0413   NA  142  139  140   K  4.1  4.5  4.0   CL  104  101  103   CO2  30  30  31   GLU  106*  106*  128*   BUN  6  5*  4*   CREA  0.63*  0.52*  0.55*   CA  8.4*  8.9  8.5   MG   --   1.4*   --        Signed: )Otoniel Clark NP

## 2017-10-02 NOTE — PROGRESS NOTES
[address] SHIFT NURSING NOTE    Bedside shift change report given to [address] [facility] (oncoming nurse) by Salome Landaverde RN (offgoing nurse). Report included the following information SBAR, MAR and Cardiac Rhythm Non tele. SHIFT SUMMARY: Pt got irritated this morning and started pulling at kelly. MD was notified and new orders were received. Pt still has a sitter present. Kelly was removed this am and he had been voiding bladder scan at 1700 showed 95. Admission Date 9/27/2017   Admission Diagnosis Aspiration pneumonia ([address]   Consults IP CONSULT TO NEUROLOGY  IP CONSULT TO NEUROLOGY  IP CONSULT TO UROLOGY  IP CONSULT TO PSYCHIATRY        Consults   [] PT   [] OT   [] Speech   [] Palliative      [] Hospice    [x] Case Management   [] None   Cardiac Monitoring   [x] Yes   [x] No     Antibiotics   [x] Yes   [] No   GI Prophylaxis  (Ex: Protonix, Pepcid, etc,.)   [] Yes   [] No          DVT Prophylaxis   SCDs:             Han stockings:         [] Medication (Ex: Lovenox, Eliquis, Brilinta, Coumadin,  Heparin, etc..)   [] Contraindicated   [] No VTE needed       Urinary Catheter [REMOVED] Urinary Catheter 09/27/17 Kelly-Criteria for Appropriate Use: Obstruction/retention     [REMOVED] Urinary Catheter 09/27/17 Kelly-Urine Output (mL): 600 ml     LDAs               Peripheral IV 09/29/17 Left;Upper Arm (Active)   Site Assessment Clean, dry, & intact 10/1/2017  7:45 PM   Phlebitis Assessment 0 10/1/2017  7:45 PM   Infiltration Assessment 0 10/1/2017  7:45 PM   Dressing Status Clean, dry, & intact 10/1/2017  7:45 PM   Dressing Type Tape;Transparent 10/1/2017  7:45 PM   Hub Color/Line Status Blue;Patent; Infusing 10/1/2017  7:45 PM          PEG/Gastrostomy Tube 07/18/17 (Active)       PEG/Gastrostomy Tube (Active)   Site Assessment Clean, dry, & intact 10/1/2017  7:45 PM   Dressing Status Clean, dry, & intact 10/1/2017  7:45 PM   G Port Status Infusing 10/1/2017  7:45 PM   Action Taken Placement verified (comment) 10/1/2017 7:45 PM   Drainage Description Tan 10/1/2017  7:57 AM   Gastric Residual (mL) 3 ml 10/1/2017  7:45 PM   Tube Feeding/Formula Options Jevity 1.5 10/1/2017  7:45 PM   Tube Feeding/Verify Rate (mL/hr) 50 10/1/2017  7:45 PM   Water Flush Volume (mL) 150 mL 10/1/2017  7:45 PM   Intake (ml) 600 ml 10/1/2017  7:45 PM   Medication Volume 100 ml 10/1/2017  3:15 PM                I/Os   Intake/Output Summary (Last 24 hours) at 10/01/17 2042  Last data filed at 10/01/17 1945   Gross per 24 hour   Intake          3611.25 ml   Output             2795 ml   Net           816.25 ml         Activity Level Activity Level: Bed Rest     Activity Assistance: Complete care   Diet Active Orders   Diet    DIET NPO Except Meds      Purposeful Rounding every 1-2 hour? [x] Yes    Lorena Score  Total Score: 4   Bed Alarm (If score 3 or >)   [x] Yes    [] Refused (See signed refusal form in chart)   Klever Score  Klever Score: 14       Klever Score (if score 14 or less)   [] PMT consult   [] Nutrition consult   [] Wound Care consult      [x]  Specialty bed         Influenza Vaccine Received Flu Vaccine for Current Season (usually Sept-March): Yes (9/22/17 see medical record)               Needs prior to discharge:   Home O2 required:    [x] Yes   [] No     If yes, how much O2 required? Other:    Last Bowel Movement Date:  (unknown )   Readmission Risk Assessment Tool Score High Risk            26       Total Score        3 Has Seen PCP in Last 6 Months (Yes=3, No=0)    2 . Living with Significant Other. Assisted Living. LTAC. SNF. or   Rehab    11 IP Visits Last 12 Months (1-3=4, 4=9, >4=11)    5 Pt.  Coverage (Medicare=5 , Medicaid, or Self-Pay=4)    5 Charlson Comorbidity Score (Age + Comorbid Conditions)        Criteria that do not apply:    Patient Length of Stay (>5 days = 3)       Expected Length of Stay 5d 21h   Actual Length of Stay 3

## 2017-10-02 NOTE — PROGRESS NOTES
Problem: Falls - Risk of  Goal: *Absence of Falls  Document Lorena Fall Risk and appropriate interventions in the flowsheet.    Outcome: Progressing Towards Goal  Fall Risk Interventions:        Mentation Interventions: Bed/chair exit alarm, Adequate sleep, hydration, pain control, Door open when patient unattended, Family/sitter at bedside, More frequent rounding     Medication Interventions: Bed/chair exit alarm, Patient to call before getting OOB, Teach patient to arise slowly     Elimination Interventions: Bed/chair exit alarm, Call light in reach, Toilet paper/wipes in reach, Patient to call for help with toileting needs     History of Falls Interventions: Bed/chair exit alarm, Door open when patient unattended, Evaluate medications/consider consulting pharmacy

## 2017-10-02 NOTE — PROGRESS NOTES
Attempted to see patient for OT evaluation, but nursing reports she had just called a [employer] [place] on him due to continued agitation and confusion. Nursing further reports patient has been consistently confused and unable follow commands. No change in patient's mental status since this OT attempted to see him for evaluation on 9/29. Will complete OT orders at this time due to patient being unable to actively participate in therapy.

## 2017-10-02 NOTE — PROGRESS NOTES
Nutrition Assessment:    INTERVENTIONS/RECOMMENDATIONS:   Addendum: HILDA Kamila requested switch pt to bolus TF due to pt pulling on lines. Recs shown below. · Jevity: 250 ml 5 x per day (6 am, 9 am, 12 pm, 3 pm, 6pm) + 75 ml before and after bolus   · 1875 kcal 80 g pro and 1700 ml free water    Continue current TF order    ASSESSMENT:   Chart reviewed. Pt tolerating TF well. Per pump history, pt has received ~83% of ordered TF in the past 48 hrs. Will continue to monitor TF intake and tolerance. Diet Order: NPO (Jevity 1.5 @ 50 ml/hr + 150 ml H2O q 4)  % Eaten:  No data found. Pertinent Medications: [x]Reviewed: PPI, thiamine,   Pertinent Labs: [x]Reviewed:   Food Allergies: [x]NKFA  []Other   Last BM:  9/30  Edema:      []RUE   []LUE   []RLE   []LLE      Pressure Ulcer:      [] Stage I   [] Stage II   [] Stage III   [] Stage IV      Anthropometrics: Height: 5' 8\" (172.7 cm) Weight: 60.8 kg (134 lb)    IBW (%IBW):   ( ) UBW (%UBW):   (  %)    BMI: Body mass index is 20.37 kg/(m^2). This BMI is indicative of:  []Underweight   [x]Normal   []Overweight   [] Obesity   [] Extreme Obesity (BMI>40)  Last Weight Metrics:  Weight Loss Metrics 10/2/2017 9/22/2017 8/22/2017 7/27/2017 7/22/2017 6/21/2017 3/21/2017   Today's Wt 134 lb 134 lb 9.6 oz 144 lb 11.2 oz 150 lb 152 lb 3.2 oz 155 lb 166 lb   BMI 20.37 kg/m2 20.47 kg/m2 22 kg/m2 22.81 kg/m2 23.14 kg/m2 23.57 kg/m2 25.24 kg/m2       Estimated Nutrition Needs (Based on): 1806 Kcals/day (BMR 1297x1. 2(AF)+250) , 74 g (1.2 g/kg bw) Protein  Carbohydrate:  At Least 130 g/day  Fluids: 1806 mL/day or per primary team    NUTRITION DIAGNOSES:   Problem:  Swallowing difficulty      Etiology: related to current medical condition     Signs/Symptoms: as evidenced by NPO status and PEG tube present      NUTRITION INTERVENTIONS:    Enteral/Parenteral Nutrition: Initiate enteral nutrition                GOAL:   continue to meet >80% of ordered TF in 2-4 days    NUTRITION MONITORING AND EVALUATION      Food/Nutrient Intake Outcomes: Enteral/parenteral nutrition intake  Physical Signs/Symptoms Outcomes: Weight/weight change, Electrolyte and renal profile, GI profile, Glucose profile, GI    Previous Goal Met:   [x] Met              [] Progressing Towards Goal              [] Not Progressing Towards Goal   Previous Recommendations:   [x] Implemented          [] Not Implemented          [] Not Applicable    LEARNING NEEDS (Diet, Food/Nutrient-Drug Interaction):    [x] None Identified   [] Identified and Education Provided/Documented   [] Identified and Pt declined/was not appropriate     Cultural, Scientologist, OR Ethnic Dietary Needs:    [x] None Identified   [] Identified and Addressed     [x] Interdisciplinary Care Plan Reviewed/Documented    [x] Discharge Planning: TBD, likely TF   [] Participated in Interdisciplinary Rounds    NUTRITION RISK:    [x] High      to        [x] Moderate           []  Low  []  Minimal/Uncompromised      Sharonda Rojo RDN  Pager [phone]  Weekend Pager [phone]

## 2017-10-03 LAB
BACTERIA SPEC CULT: NORMAL
BACTERIA SPEC CULT: NORMAL
GLUCOSE BLD STRIP.AUTO-MCNC: 121 MG/DL (ref 65–100)
GLUCOSE BLD STRIP.AUTO-MCNC: 80 MG/DL (ref 65–100)
GLUCOSE BLD STRIP.AUTO-MCNC: 88 MG/DL (ref 65–100)
SERVICE CMNT-IMP: ABNORMAL
SERVICE CMNT-IMP: NORMAL

## 2017-10-03 PROCEDURE — 95816 EEG AWAKE AND DROWSY: CPT | Performed by: PSYCHIATRY & NEUROLOGY

## 2017-10-03 PROCEDURE — 65270000015 HC RM PRIVATE ONCOLOGY

## 2017-10-03 PROCEDURE — 82962 GLUCOSE BLOOD TEST: CPT

## 2017-10-03 PROCEDURE — 74011250636 HC RX REV CODE- 250/636: Performed by: INTERNAL MEDICINE

## 2017-10-03 PROCEDURE — 74011250637 HC RX REV CODE- 250/637: Performed by: INTERNAL MEDICINE

## 2017-10-03 PROCEDURE — 74011250637 HC RX REV CODE- 250/637: Performed by: NURSE PRACTITIONER

## 2017-10-03 PROCEDURE — 74011250637 HC RX REV CODE- 250/637: Performed by: PSYCHIATRY & NEUROLOGY

## 2017-10-03 RX ADMIN — HALOPERIDOL 2 MG: 2 TABLET ORAL at 08:47

## 2017-10-03 RX ADMIN — VALPROIC ACID 500 MG: 250 SOLUTION ORAL at 14:58

## 2017-10-03 RX ADMIN — METOPROLOL TARTRATE 12.5 MG: 25 TABLET ORAL at 08:55

## 2017-10-03 RX ADMIN — VALPROIC ACID 500 MG: 250 SOLUTION ORAL at 21:21

## 2017-10-03 RX ADMIN — ASPIRIN 81 MG 81 MG: 81 TABLET ORAL at 08:55

## 2017-10-03 RX ADMIN — PREGABALIN 300 MG: 100 CAPSULE ORAL at 08:51

## 2017-10-03 RX ADMIN — ENOXAPARIN SODIUM 40 MG: 40 INJECTION SUBCUTANEOUS at 04:07

## 2017-10-03 RX ADMIN — PREGABALIN 300 MG: 100 CAPSULE ORAL at 21:21

## 2017-10-03 RX ADMIN — LEVETIRACETAM 1500 MG: 100 SOLUTION ORAL at 21:20

## 2017-10-03 RX ADMIN — PANTOPRAZOLE SODIUM 40 MG: 40 GRANULE, DELAYED RELEASE ORAL at 17:27

## 2017-10-03 RX ADMIN — METOPROLOL TARTRATE 12.5 MG: 25 TABLET ORAL at 21:21

## 2017-10-03 RX ADMIN — Medication 100 MG: at 08:51

## 2017-10-03 RX ADMIN — PANTOPRAZOLE SODIUM 40 MG: 40 GRANULE, DELAYED RELEASE ORAL at 08:52

## 2017-10-03 RX ADMIN — QUETIAPINE FUMARATE 25 MG: 25 TABLET ORAL at 21:21

## 2017-10-03 RX ADMIN — AMOXICILLIN AND CLAVULANATE POTASSIUM 500 MG: 600; 42.9 SUSPENSION ORAL at 04:07

## 2017-10-03 RX ADMIN — VALPROIC ACID 500 MG: 250 SOLUTION ORAL at 05:15

## 2017-10-03 RX ADMIN — LEVETIRACETAM 1500 MG: 100 SOLUTION ORAL at 08:46

## 2017-10-03 RX ADMIN — HALOPERIDOL 2 MG: 2 TABLET ORAL at 22:11

## 2017-10-03 NOTE — PROGRESS NOTES
Palliative Medicine     Spoke with RN Corinne Waller), pt was restless this morning pulling at tubes. Pt is calm this afternoon. He is sleeping in bed when [address] and this LCSW visited with Pt's wife/nok medical surrogate Antonio Singh). We provided active listening as Pt's wife shared briefly about her and Pt's journeys since last [address] hospitalization. Per wife, Pt was doing well with rehab before his fall and then had [facility] visit for UTI (changed kelly and Pt returned to rehab). LCSW acknowledged there are concerns about Pt's overall condition to discuss with family and assure they have medical questions answered as they prepare for future decisions. LCSW suggested family meeting with Dr. Teri Sanches MD for tomorrow at time when dtr could be present as wife and dtr supporting each other as making medical decisions for Pt. Also, Pt's wife is going to reach out to Pt's siblings to attend Wed mtg too. Plan:  Palliative Medicine family meeting with Pt's wife/nok medical surrogate and Pt's dtr on Wed at 5 pm.  DNAR  Continue with full medical interventions in hopes of recovery/management of current illness. Pt's wife is working with CM on discharge plan; wife is going to apply for Medicaid. Thank you for including Palliative Medicine in Mr. Mckeon's care.       Nu Porras, [address] ([id number])  Work cell: [phone]

## 2017-10-03 NOTE — PROGRESS NOTES
Hospitalist Progress Note    NAME: Radhames Saleh   :  1934   MRN:  [id number]       Assessment / Plan:  Recurrent acute encephalopathy:  Unclear etiology for progressive decline over the last few months, has not made it home and has been institutionalized for the last few months with recurrent monthly admissions. In the past, recurrent seizures have been felt to be etiology for encephalopathy.  - CT head with unchanged area of scarring in the posterior upper lobe. Mild bibasilar atelectasis. - reconsult neurology to see if any additional recommendations. Repeat EEG ordered. - will try to d/c sitter  - con't seroquel for agitation  - PT/OT as tolerated  - palliative care consulted - Pt not able to participate for therapy and with significant clinical decline this year. ?will they consider hospice. Seizure d/o:  Had been stable on lyrica and valproic acid in the past  - con't lyrica and VPA  - neuro consult as above  - CT head as above  - EEG ordered as above  Hypokalemia:  - replacing  - check K, mag in AM - nursing having difficulty with drawing labs this morning  Aspiration pneumonitis:  - CT chest with unchanged area of scarring in the posterior upper lobe. Mild bibasilar atelectasis. - completed 7 days Abx on 10/2  - continue with duonebs  Hypertension, benign/essential: more stable  - con't lopressor  - prn IV hydralazine  History of SVT  Peripheral neuropathy: lyrica as above  Remote EtOH abuse:  Family confirmed no EtOH in 5 years  Dysphagia/dyarthria and GERD:  S/p PEG placement     - con't feeds as tolerated  - con't protonix      Code Status: DNR  Surrogate Decision Maker: wife Sharmila Montilla [phone] or [phone]  DVT Prophylaxis: Lovenox     Subjective:     Chief Complaint / Reason for Physician Visit  Confused, oriented to self only. Discussed with RN events overnight.      Review of Systems:  Symptom Y/N Comments  Symptom Y/N Comments   Fever/Chills    Chest Pain     Poor Appetite Edema     Cough    Abdominal Pain     Sputum    Joint Pain     SOB/AL    Pruritis/Rash     Nausea/vomit    Tolerating PT/OT     Diarrhea    Tolerating Diet     Constipation    Other       Could NOT obtain due to: encephaloopathy     Objective:     VITALS:   Last 24hrs VS reviewed since prior progress note. Most recent are:  Patient Vitals for the past 24 hrs:   Temp Pulse Resp BP SpO2   10/03/17 0815 97.7 °F (36.5 °C) 89 18 128/69 96 %   10/02/17 1508 98 °F (36.7 °C) 74 20 138/64 100 %   10/02/17 1332 98.5 °F (36.9 °C) 76 20 148/90 99 %   10/02/17 1100 98.3 °F (36.8 °C) 74 16 124/62 97 %       Intake/Output Summary (Last 24 hours) at 10/03/17 0859  Last data filed at 10/03/17 0530   Gross per 24 hour   Intake              900 ml   Output               60 ml   Net              840 ml        PHYSICAL EXAM:  General: WD, WN. Alert, cooperative, no acute distress    EENT:  EOMI. Anicteric sclerae. MMM  Resp:  CTA bilaterally, no wheezing or rales. No accessory muscle use  CV:  Regular rhythm,  No edema  GI:  Soft, mildly distended, Non tender.  +Bowel sounds  Neurologic:  Alert and oriented X 1, normal speech,   Psych:   Poor insight. Not anxious nor agitated  Skin:  No rashes. No jaundice    Reviewed most current lab test results and cultures  YES  Reviewed most current radiology test results   YES  Review and summation of old records today    NO  Reviewed patient's current orders and MAR    YES  PMH/SH reviewed - no change compared to H&P  ________________________________________________________________________  Care Plan discussed with:    Comments   Patient x    Family      RN x    Care Manager x    Consultant                        Multidiciplinary team rounds were held today with , nursing, pharmacist and clinical coordinator. Patient's plan of care was discussed; medications were reviewed and discharge planning was addressed. ________________________________________________________________________  Total NON critical care TIME:  35 Minutes    Total CRITICAL CARE TIME Spent:   Minutes non procedure based      Comments   >50% of visit spent in counseling and coordination of care x    ________________________________________________________________________  Juana Gtz MD     Procedures: see electronic medical records for all procedures/Xrays and details which were not copied into this note but were reviewed prior to creation of Plan. LABS:  I reviewed today's most current labs and imaging studies. Pertinent labs include:  No results for input(s): WBC, HGB, HCT, PLT, HGBEXT, HCTEXT, PLTEXT in the last 72 hours.   Recent Labs      10/02/17   0322  10/01/17   0612   NA  142  139   K  4.1  4.5   CL  104  101   CO2  30  30   GLU  106*  106*   BUN  6  5*   CREA  0.63*  0.52*   CA  8.4*  8.9   MG   --   1.4*       Signed: Juana Gtz MD

## 2017-10-03 NOTE — PROGRESS NOTES
Spiritual Care Assessment/Progress Notes    Leesa Celeste [id number]  [id number]    1934  80 y.o.  male    Patient Telephone Number: [phone] (home)   Episcopalian Affiliation: Rachel Garcia   Language: English   Extended Emergency Contact Information  Primary Emergency Contact:  [address] [place] Phone: [phone]  Relation: Spouse  Secondary Emergency Contact: [address]  Mobile Phone: [phone]  Relation: Daughter   Patient Active Problem List    Diagnosis Date Noted    Aspiration pneumonia ([address] 09/27/2017    Confusion 09/20/2017    Dysphasia 09/20/2017    Counseling regarding advanced care planning and goals of care 09/20/2017    Acute encephalopathy 09/15/2017    HCAP (healthcare-associated pneumonia) 09/15/2017    Sepsis ([address] 08/19/2017    UTI (urinary tract infection) 08/19/2017    ARF (acute renal failure) ([address] 07/27/2017    Anemia 07/27/2017    Heme positive stool 07/27/2017    Weakness 07/21/2017    Debility 07/21/2017    Counseling regarding goals of care 07/21/2017    Dysarthria 07/15/2017    Dysphagia 07/15/2017    Oral phase dysphagia 07/08/2017    Status epilepticus ([address] 06/24/2017    Seizures ([address] 02/18/2017    Encephalopathy, unspecified 09/03/2012    SVT (supraventricular tachycardia) ([address] 09/03/2012    Aphasia 09/03/2012    CVA (cerebral vascular accident) ([address] 09/03/2012    Seizure disorder ([address] 09/03/2012        Date: 10/3/2017       Level of Episcopalian/Spiritual Activity:  []         Involved in niles tradition/spiritual practice    []         Not involved in niles tradition/spiritual practice  [x]         Spiritually oriented    []         Claims no spiritual orientation    []         seeking spiritual identity  []         Feels alienated from Rastafari practice/tradition  []         Feels angry about Rastafari practice/tradition  [x]         Spirituality/Rastafari tradition is a resource for coping at this time. []         Not able to assess due to medical condition    Services Provided Today:  []         crisis intervention    []         reading Scriptures  [x]         spiritual assessment    []         prayer  [x]         empathic listening/emotional support  []         rites and rituals (cite in comments)  []         life review     []         Methodist support  []         theological development   []         advocacy  []         ethical dialog     []         blessing  []         bereavement support    [x]         support to family  []         anticipatory grief support   []         help with AMD  []         spiritual guidance    []         meditation      Spiritual Care Needs  [x]         Emotional Support  []         Spiritual/Advent Care  []         Loss/Adjustment  []         Advocacy/Referral                /Ethics  []         No needs expressed at               this time  []         Other: (note in               comments)  [address]  []         Follow up visits with               pt/family  []         Provide materials  []         Schedule sacraments  []         Contact Community               Clergy  [x]         Follow up as needed  []         Other: (note in               comments)     Comments:   Joint visit with Palliative VÍCTOR Dickey to this pt/family well known to the Palliative Medicine team.  Provided listening presence to wife and encouragement to look after her own needs. Family meeting was coordinated with daughter for tomorrow at 5:00pm.  Mrs. Franko Clark also shared that she will be inviting pt's siblings to also be involved in this meeting.  will follow as able. Pattricia Leventhal, M.Div.   Palliative  Fellow

## 2017-10-03 NOTE — PROGRESS NOTES
Physical Therapy Note    Attempted to follow up for PT evaluation, however nursing reporting that patient remains inappropriate for PT evaluation. Will sign off as pt has been inappropriate x 3 days. Please re-consult if patient becomes more appropriate for active participation in acute PT evaluation.     Thank you,  Gillian Chaudhry, PT, DPT

## 2017-10-03 NOTE — CONSULTS
DATE OF CONSULTATION: 10/3/2017    CONSULTED BY: Israel Dominguez MD    Chief Complaint   Patient presents with    Altered mental status     increased over the past week, diagnosed with UTI last week    Fall     patient has been falling recently       Reason for Consult  I have been asked to see the patient in neurological consultation to render advice and opinion regarding [facility]  Sergio Spence is a 80 y.o. male who presents to the hospital because of AMS    ROS  A ten system review of constitutional, cardiovascular, respiratory, musculoskeletal, endocrine, skin, SHEENT, genitourinary, psychiatric and neurologic systems was obtained and is unremarkable except as stated in HPI     PMH  Past Medical History:   Diagnosis Date    Alcohol abuse, in remission     Seizures ([address]        FH  Family History   Problem Relation Age of Onset    Other Other      no strokes or seizures    Cancer Mother        SH  Social History     Social History    Marital status:      Spouse name: N/A    Number of children: N/A    Years of education: N/A     Social History Main Topics    Smoking status: Never Smoker    Smokeless tobacco: Never Used    Alcohol use No      Comment: Quit drinking 10 years ago   [address] Drug use: No    Sexual activity: Not Asked     Other Topics Concern    None     Social History Narrative       ALLERGIES  Allergies   Allergen Reactions    No Known Allergies        PHYSICAL EXAM  EXAMINATION:   Patient Vitals for the past 24 hrs:   Temp Pulse Resp BP SpO2   10/03/17 1706 98.1 °F (36.7 °C) 67 14 104/67 100 %   10/03/17 1415 97.5 °F (36.4 °C) 75 18 96/43 100 %   10/03/17 0815 97.7 °F (36.5 °C) 89 18 128/69 96 %        Neurological Examination:   Mental Status: He is awake and will answer questions with confused speech    Cranial Nerves:, Extraocular movements are full, Cannot check visual fields, face is symmetric, neck supple. Motor:  Withdraws all 4 ext to pain    Sensation: See above    Reflexes: DTRs trace throughout. Plantar responses downgoing. Coordination/Cerebellar: NT    Gait: NT           Imaging review:  MRI Results (most recent):    Results from Hospital Encounter encounter on 09/15/17   MRI BRAIN WO CONT   Narrative INDICATION:   Seizure     EXAMINATION:  MRI BRAIN WO CONTRAST    COMPARISON:  2017    TECHNIQUE:  MR imaging of the brain was performed with sagittal T1, axial T1,  T2, FLAIR, GRE, DWI/ADC, coronal T2.    FINDINGS:      Ventricles:  Prominence of the ventricles, sulci and cisterns   related to underlying atrophy. Intracranial Hemorrhage:  None. Brain Parenchyma/Brainstem:  Minimal supratentorial white matter signal  abnormalities may be due to chronic small vessel ischemic changes, similar to  prior study. There has been development of abnormal cortical diffusion  restriction throughout the left parietal lobe, left occipital lobe and  posterior/lateral left temporal lobe with associated FLAIR hyperintense signal.  Basal Cisterns:  Normal.   Flow Voids:  Normal.  Additional Comments:  N/A. Impression IMPRESSION:  Development of cortical diffusion restriction throughout the left parietal,  occipital and left temporal lobes as above. This may also be seen in patients  with ongoing seizure activity given history of status epilepticus. Less likely  related to encephalitis or ischemia. Correlation with clinical findings is  recommended. Short-term follow-up with contrast-enhanced brain MRI may be  helpful. HOME MEDS  Prior to Admission Medications   Prescriptions Last Dose Informant Patient Reported? Taking? QUEtiapine (SEROQUEL) 25 mg tablet 2017 at 2100 Transfer Papers Yes Yes   Si mg by PEG Tube route nightly. acetaminophen (TYLENOL) 325 mg tablet Not Taking at Unknown time Transfer Papers Yes No   Si mg by PEG Tube route every six (6) hours as needed for Pain.    albuterol-ipratropium (DUO-NEB) 2.5 mg-0.5 mg/3 ml nebu Not Taking at Unknown time Transfer Papers No No   Sig: 3 mL by Nebulization route every six (6) hours as needed. aspirin 81 mg chewable tablet 2017 at 0900 Transfer Papers Yes Yes   Si mg by PEG Tube route daily. atorvastatin (LIPITOR) 40 mg tablet Unknown at Unknown time Transfer Papers Yes No   Si mg by PEG Tube route nightly. cephALEXin (KEFLEX) 250 mg/5 mL suspension 2017 at 1330 Transfer Papers Yes Yes   Sig: Take 10 mL by mouth every eight (8) hours. For 10 days. Start 17   cyclobenzaprine (FLEXERIL) 5 mg tablet Not Taking at Unknown time Transfer Papers Yes No   Si mg by PEG Tube route three (3) times daily as needed for Muscle Spasm(s).   haloperidol (HALDOL) 2 mg tablet 2017 at 1200 Transfer Papers Yes Yes   Si mg by PEG Tube route every six (6) hours as needed (agitation/combativeness). latanoprost (XALATAN) 0.005 % ophthalmic solution 2017 at 2100 Transfer Papers No Yes   Sig: Administer 1 Drop to both eyes every evening. levETIRAcetam (KEPPRA) 100 mg/mL solution 2017 at 0900 Transfer Papers Yes Yes   Si,500 mg by PEG Tube route two (2) times a day. metoprolol tartrate (LOPRESSOR) 25 mg tablet 2017 at 0900 Transfer Papers Yes Yes   Si.5 mg by PEG Tube route two (2) times a day. multivit w-min-ferrous gluconate (CEROVITE) 9 mg iron/15 mL oral liquid 2017 at 0900 Transfer Papers Yes Yes   Sig: 15 mL by PEG Tube route daily. oxyCODONE (OXYIR) 5 mg capsule Not Taking at Unknown time Transfer Papers Yes No   Sig: Take 5 mg by mouth every four (4) hours as needed for Pain.   pantoprazole (PROTONIX) 40 mg granules for oral suspension 2017 at 0630 Transfer Papers Yes Yes   Si mg by PEG Tube route Before breakfast and dinner. pregabalin (LYRICA) 300 mg capsule 2017 at 0900 Transfer Papers Yes Yes   Si mg by Per G Tube route two (2) times a day.    thiamine (VITAMIN B-1) 100 mg tablet 2017 at 0900 Transfer Papers Yes Yes   Si mg by PEG Tube route daily. valproic acid, as sodium salt, (DEPAKENE) 250 mg/5 mL (5 mL) soln oral solution 2017 at 1300 Transfer Papers Yes Yes   Si mg by PEG Tube route three (3) times daily. Facility-Administered Medications: None       CURRENT MEDS  Current Facility-Administered Medications   Medication Dose Route Frequency    QUEtiapine (SEROquel) tablet 25 mg  25 mg Per G Tube QHS    levETIRAcetam (KEPPRA) oral solution 1,500 mg  1,500 mg Per G Tube BID    valproic acid (as sodium salt) (DEPAKENE) 250 mg/5 mL (5 mL) oral solution 500 mg  500 mg Per G Tube Q8H    enoxaparin (LOVENOX) injection 40 mg  40 mg SubCUTAneous Q24H    aspirin chewable tablet 81 mg  81 mg Per G Tube DAILY    latanoprost (XALATAN) 0.005 % ophthalmic solution 1 Drop  1 Drop Both Eyes QPM    metoprolol tartrate (LOPRESSOR) tablet 12.5 mg  12.5 mg Per G Tube BID    pantoprazole (PROTONIX) granules for oral suspension 40 mg  40 mg Per G Tube ACB&D    pregabalin (LYRICA) capsule 300 mg  300 mg Oral BID    thiamine (B-1) tablet 100 mg  100 mg Per G Tube DAILY       IMPRESSION:RECOMMENDATIONS:  Real Petty is a 80 y.o. male who has had a considerable mental status deterioration over the last 3 months. I suspect he has damaged his temporal and parietal lobes bilaterally fron temporal lobe status epilepticus, abnormal diffusion weighted MRI c/w that. I will have EEG done first in AM and repeat his MRI Brain with contrast and check his anticonvulsant levels. I those tests do not suggest a reversible cause for dementia, I think we are out of options     Thank you very much for this consultation. We will follow up on the above studies and give further recommendations as indicated. Kris Caicedo. Ledora Bernheim MD  Neurologist    This note will not be viewable in [address]

## 2017-10-03 NOTE — PROGRESS NOTES
Oncology Nursing Communication Tool      7:52 AM  10/3/2017     Bedside shift change report given to Ho Das RN (incoming nurse) by Tiarra Vail RN (outgoing nurse) on Shanae Baum a 80 y.o. male who was admitted on 9/27/2017  2:35 PM. Report included the following information SBAR, Kardex, Intake/Output, MAR, Accordion and Med Rec Status. Significant changes during shift:   Large, soft BMx1  Admin PRN haldol for restlessness  Tolerating enteral feeds well  Order placed per protocol to wound care regarding breakdown to sacrum      Issues for physician to address:   UTO AM lab draws as pt is a difficult stick (x2 nurses attempted without success), nurse made aware in report             Code Status: DNR     Infections: No current active infections     Allergies: No known allergies     Current diet: DIET NPO Except Meds  DIET TUBE FEEDING Jevity: 250 ml 5 x per day (6 am, 9 am, 12 pm, 3 pm, 6pm) + 75 ml before and after bolus       Pain Controlled [x] yes [] no   Bowel Movement [x] yes [] no   Last Bowel Movement (date)     10/3            Vital Signs:   No data found. Intake & Output:     Intake/Output Summary (Last 24 hours) at 10/03/17 0752  Last data filed at 10/03/17 0530   Gross per 24 hour   Intake              900 ml   Output              260 ml   Net              640 ml      Laboratory Results:     Recent Results (from the past 12 hour(s))   GLUCOSE, POC    Collection Time: 10/03/17  6:33 AM   Result Value Ref Range    Glucose (POC) 121 (H) 65 - 100 mg/dL    Performed by [facility] for questions and clarifications were given to the incoming nurse. Patient's bed is in low position, side rails x2, door open PRN, call bell within reach and patient not in distress.       Tiarra Vail RN

## 2017-10-03 NOTE — WOUND CARE
Wound care consult from staff nurse for \" redness to sacrum\" and staff nurse charted skin tear to sacrum. Patient is a 79 y/o AAM admitted dx: aspiration pne and   Past Medical History:   Diagnosis Date    Alcohol abuse, in remission     Seizures ([facility] [address]      Past Surgical History:   Procedure Laterality Date    COLONOSCOPY N/A 7/31/2017    COLONOSCOPY performed by Bam Mejia MD at [address] [place] [address]  7/31/2017         PLACE PERCUT GASTROSTOMY TUBE  7/18/2017         UPPER GI ENDOSCOPY,DIAGNOSIS  7/31/2017          Patient sitting up in bed, alert and confused with sitter in room and pt's wife. Patient had kelly removed secondary to \"pulling at it\" and PEG tube is under an abdominal binder to prevent him from pulling at it. Patient incontinent of urin and stool and wearing nothing but an adult diaper in bed. He is malnourished, weak and currently kept in bed. Patient on a specialty bed - [facility] air mattress. Patient has MASD from incontinence to sacrum with some partial thickness skin loss. Foam sacral dressing was soaked with urine and removed. Patient has some scattered pink epithelial tissue from healed old partial thickness skin injuries. WOUND  CONDITIONS    Wound Sacral/coccyx (Active) MASD   DRESSING STATUS Removed; Saturated 10/3/2017  2:31 PM   DRESSING TYPE Foam 10/3/2017  2:31 PM   Non-Pressure Injury Partial thickness (epider/derm) 10/3/2017  2:31 PM   Condition of Base [place] 10/3/2017  2:31 PM   Condition of Edges Open 10/3/2017  2:31 PM   Tissue Type Pink 10/3/2017  2:31 PM   Tissue Type Percent Pink 100 10/3/2017  2:31 PM   Drainage Amount  None 10/3/2017  2:31 PM   Wound Odor None 10/3/2017  2:31 PM   Periwound Skin Condition Intact 10/3/2017  2:31 PM   Cleansing and Cleansing Agents  Soap and water 10/3/2017  2:31 PM   Dressing Type Applied Zinc based paste 10/3/2017  2:31 PM   Procedure Tolerated Well 10/3/2017  2:31 PM   Number of days:0           Recommend: Sacral/buttocks MASD: check for urinary and fecal incontinence q2 hrs. Cleanse with soap and water, pat skin dry and apply Sensicare zinc based cream to protect. NO foam dressings.     Joslyn Ormond, RN, CWON, zone ph# [phone]

## 2017-10-03 NOTE — PROGRESS NOTES
Oncology Nursing Communication Tool      7:03 PM  10/3/2017     Bedside and Verbal shift change report given to Filomena Diaz RN (incoming nurse) by Amada Seip (outgoing nurse) on Parker Cabrera a 80 y.o. male who was admitted on 9/27/2017  2:35 PM. Report included the following information SBAR, Kardex, Intake/Output, MAR, Accordion and Recent Results. Significant changes during shift: Sitter discontinued. Tele sitter and bed alarm in place. Pallative care consult today. RN paged EEG to inquire about status of order however did not receive return call. Dr. Amol Muniz at bedside during shift change and advised. RN left with evening tube feeding infusing on pump. Oncoming RN aware and will discontinue when feeding and flush are complete. Oncoming RN informed that feeding is 250mL with 75mL flush before and after.           Issues for physician to address: none          Code Status: DNR     Infections: No current active infections     Allergies: No known allergies     Current diet: DIET NPO Except Meds  DIET TUBE FEEDING Jevity: 250 ml 5 x per day (6 am, 9 am, 12 pm, 3 pm, 6pm) + 75 ml before and after bolus       Pain Controlled [x] yes [] no   Bowel Movement [x] yes [] no   Last Bowel Movement (date)     10/3/17            Vital Signs:   Patient Vitals for the past 12 hrs:   Temp Pulse Resp BP SpO2   10/03/17 1706 98.1 °F (36.7 °C) 67 14 104/67 100 %   10/03/17 1415 97.5 °F (36.4 °C) 75 18 96/43 100 %   10/03/17 0815 97.7 °F (36.5 °C) 89 18 128/69 96 %      Intake & Output:     Intake/Output Summary (Last 24 hours) at 10/03/17 1903  Last data filed at 10/03/17 1630   Gross per 24 hour   Intake             1755 ml   Output              150 ml   Net             1605 ml      Laboratory Results:     Recent Results (from the past 12 hour(s))   GLUCOSE, POC    Collection Time: 10/03/17 12:12 PM   Result Value Ref Range    Glucose (POC) 80 65 - 100 mg/dL    Performed by Kodi Noriega Opportunity for questions and clarifications were given to the incoming nurse. Patient's bed is in low position, side rails x2, door open PRN, call bell within reach and patient not in distress.       Nirav Macario

## 2017-10-03 NOTE — PROGRESS NOTES
Dr. Sarah Downing informed that RNs were unable to collect morning blood collection for BMP and ammonia levels. Per Dr. Sarah Downing it is ok to not collect them today and we will collect them tomorrow morning. RN will continue to monitor. 1200:  Pallative care at bedside for consult with patient and his wife. 1630:  RN paged EEG at [phone] to inquire about EEG order for today. RN is currently awaiting return call.

## 2017-10-04 ENCOUNTER — APPOINTMENT (OUTPATIENT)
Dept: MRI IMAGING | Age: 82
DRG: 177 | End: 2017-10-04
Attending: PSYCHIATRY & NEUROLOGY
Payer: MEDICARE

## 2017-10-04 LAB
ALBUMIN SERPL-MCNC: 3.1 G/DL (ref 3.5–5)
ALBUMIN/GLOB SERPL: 0.6 {RATIO} (ref 1.1–2.2)
ALP SERPL-CCNC: 58 U/L (ref 45–117)
ALT SERPL-CCNC: 18 U/L (ref 12–78)
ANION GAP SERPL CALC-SCNC: 4 MMOL/L (ref 5–15)
AST SERPL-CCNC: 15 U/L (ref 15–37)
BASOPHILS # BLD: 0 K/UL (ref 0–0.1)
BASOPHILS NFR BLD: 0 % (ref 0–1)
BILIRUB SERPL-MCNC: 0.5 MG/DL (ref 0.2–1)
BUN SERPL-MCNC: 11 MG/DL (ref 6–20)
BUN/CREAT SERPL: 20 (ref 12–20)
CALCIUM SERPL-MCNC: 9.4 MG/DL (ref 8.5–10.1)
CHLORIDE SERPL-SCNC: 100 MMOL/L (ref 97–108)
CO2 SERPL-SCNC: 36 MMOL/L (ref 21–32)
CREAT SERPL-MCNC: 0.56 MG/DL (ref 0.7–1.3)
EOSINOPHIL # BLD: 0.4 K/UL (ref 0–0.4)
EOSINOPHIL NFR BLD: 10 % (ref 0–7)
ERYTHROCYTE [DISTWIDTH] IN BLOOD BY AUTOMATED COUNT: 12.1 % (ref 11.5–14.5)
GLOBULIN SER CALC-MCNC: 5.3 G/DL (ref 2–4)
GLUCOSE BLD STRIP.AUTO-MCNC: 100 MG/DL (ref 65–100)
GLUCOSE BLD STRIP.AUTO-MCNC: 104 MG/DL (ref 65–100)
GLUCOSE BLD STRIP.AUTO-MCNC: 108 MG/DL (ref 65–100)
GLUCOSE BLD STRIP.AUTO-MCNC: 73 MG/DL (ref 65–100)
GLUCOSE BLD STRIP.AUTO-MCNC: 93 MG/DL (ref 65–100)
GLUCOSE SERPL-MCNC: 97 MG/DL (ref 65–100)
HCT VFR BLD AUTO: 38.4 % (ref 36.6–50.3)
HGB BLD-MCNC: 13.2 G/DL (ref 12.1–17)
LYMPHOCYTES # BLD: 1.4 K/UL (ref 0.8–3.5)
LYMPHOCYTES NFR BLD: 35 % (ref 12–49)
MAGNESIUM SERPL-MCNC: 1.5 MG/DL (ref 1.6–2.4)
MCH RBC QN AUTO: 34.3 PG (ref 26–34)
MCHC RBC AUTO-ENTMCNC: 34.4 G/DL (ref 30–36.5)
MCV RBC AUTO: 99.7 FL (ref 80–99)
MONOCYTES # BLD: 0.6 K/UL (ref 0–1)
MONOCYTES NFR BLD: 15 % (ref 5–13)
NEUTS SEG # BLD: 1.6 K/UL (ref 1.8–8)
NEUTS SEG NFR BLD: 40 % (ref 32–75)
PLATELET # BLD AUTO: 197 K/UL (ref 150–400)
POTASSIUM SERPL-SCNC: 4.2 MMOL/L (ref 3.5–5.1)
PROT SERPL-MCNC: 8.4 G/DL (ref 6.4–8.2)
RBC # BLD AUTO: 3.85 M/UL (ref 4.1–5.7)
SERVICE CMNT-IMP: ABNORMAL
SERVICE CMNT-IMP: ABNORMAL
SERVICE CMNT-IMP: NORMAL
SODIUM SERPL-SCNC: 140 MMOL/L (ref 136–145)
WBC # BLD AUTO: 3.9 K/UL (ref 4.1–11.1)

## 2017-10-04 PROCEDURE — 65270000015 HC RM PRIVATE ONCOLOGY

## 2017-10-04 PROCEDURE — 70553 MRI BRAIN STEM W/O & W/DYE: CPT

## 2017-10-04 PROCEDURE — 80053 COMPREHEN METABOLIC PANEL: CPT | Performed by: INTERNAL MEDICINE

## 2017-10-04 PROCEDURE — 77030018798 HC PMP KT ENTRL FED COVD -A

## 2017-10-04 PROCEDURE — 74011250636 HC RX REV CODE- 250/636: Performed by: INTERNAL MEDICINE

## 2017-10-04 PROCEDURE — 85025 COMPLETE CBC W/AUTO DIFF WBC: CPT | Performed by: INTERNAL MEDICINE

## 2017-10-04 PROCEDURE — 36415 COLL VENOUS BLD VENIPUNCTURE: CPT | Performed by: INTERNAL MEDICINE

## 2017-10-04 PROCEDURE — 74011250637 HC RX REV CODE- 250/637: Performed by: NURSE PRACTITIONER

## 2017-10-04 PROCEDURE — 82962 GLUCOSE BLOOD TEST: CPT

## 2017-10-04 PROCEDURE — 76450000000

## 2017-10-04 PROCEDURE — 74011250637 HC RX REV CODE- 250/637: Performed by: INTERNAL MEDICINE

## 2017-10-04 PROCEDURE — 74011250637 HC RX REV CODE- 250/637: Performed by: PSYCHIATRY & NEUROLOGY

## 2017-10-04 PROCEDURE — 83735 ASSAY OF MAGNESIUM: CPT | Performed by: INTERNAL MEDICINE

## 2017-10-04 PROCEDURE — A9576 INJ PROHANCE MULTIPACK: HCPCS | Performed by: INTERNAL MEDICINE

## 2017-10-04 RX ORDER — SODIUM CHLORIDE 0.9 % (FLUSH) 0.9 %
SYRINGE (ML) INJECTION
Status: COMPLETED
Start: 2017-10-04 | End: 2017-10-04

## 2017-10-04 RX ORDER — MAGNESIUM SULFATE HEPTAHYDRATE 40 MG/ML
2 INJECTION, SOLUTION INTRAVENOUS ONCE
Status: COMPLETED | OUTPATIENT
Start: 2017-10-04 | End: 2017-10-04

## 2017-10-04 RX ADMIN — VALPROIC ACID 500 MG: 250 SOLUTION ORAL at 05:39

## 2017-10-04 RX ADMIN — LEVETIRACETAM 1500 MG: 100 SOLUTION ORAL at 09:22

## 2017-10-04 RX ADMIN — METOPROLOL TARTRATE 12.5 MG: 25 TABLET ORAL at 09:26

## 2017-10-04 RX ADMIN — VALPROIC ACID 500 MG: 250 SOLUTION ORAL at 23:03

## 2017-10-04 RX ADMIN — ENOXAPARIN SODIUM 40 MG: 40 INJECTION SUBCUTANEOUS at 23:02

## 2017-10-04 RX ADMIN — METOPROLOL TARTRATE 12.5 MG: 25 TABLET ORAL at 23:02

## 2017-10-04 RX ADMIN — ENOXAPARIN SODIUM 40 MG: 40 INJECTION SUBCUTANEOUS at 00:39

## 2017-10-04 RX ADMIN — Medication 100 MG: at 09:26

## 2017-10-04 RX ADMIN — PANTOPRAZOLE SODIUM 40 MG: 40 GRANULE, DELAYED RELEASE ORAL at 18:02

## 2017-10-04 RX ADMIN — HALOPERIDOL 2 MG: 2 TABLET ORAL at 18:01

## 2017-10-04 RX ADMIN — PREGABALIN 300 MG: 100 CAPSULE ORAL at 09:22

## 2017-10-04 RX ADMIN — MORPHINE SULFATE 2 MG: 4 INJECTION, SOLUTION INTRAMUSCULAR; INTRAVENOUS at 17:59

## 2017-10-04 RX ADMIN — PREGABALIN 300 MG: 100 CAPSULE ORAL at 23:01

## 2017-10-04 RX ADMIN — GADOTERIDOL 12 ML: 279.3 INJECTION, SOLUTION INTRAVENOUS at 19:39

## 2017-10-04 RX ADMIN — MAGNESIUM SULFATE HEPTAHYDRATE 2 G: 40 INJECTION, SOLUTION INTRAVENOUS at 13:21

## 2017-10-04 RX ADMIN — LEVETIRACETAM 1500 MG: 100 SOLUTION ORAL at 23:01

## 2017-10-04 RX ADMIN — ASPIRIN 81 MG 81 MG: 81 TABLET ORAL at 09:26

## 2017-10-04 RX ADMIN — VALPROIC ACID 500 MG: 250 SOLUTION ORAL at 14:53

## 2017-10-04 RX ADMIN — QUETIAPINE FUMARATE 25 MG: 25 TABLET ORAL at 23:03

## 2017-10-04 RX ADMIN — Medication 10 ML: at 13:18

## 2017-10-04 RX ADMIN — PANTOPRAZOLE SODIUM 40 MG: 40 GRANULE, DELAYED RELEASE ORAL at 09:26

## 2017-10-04 NOTE — PROGRESS NOTES
Hospitalist Progress Note    NAME: Geno Oakley   :  1934   MRN:  [id number]       Assessment / Plan:  Recurrent acute encephalopathy, suspect due to damaged temporal/parietal lobes bilaterally from recent temporal lobe status epilepticus:  Unclear etiology for progressive decline over the last few months, has not made it home and has been institutionalized for the last few months with recurrent monthly admissions. In the past, recurrent seizures have been felt to be etiology for encephalopathy.  - CT head with unchanged area of scarring in the posterior upper lobe. Mild bibasilar atelectasis. - appreciate reconsult by neurology  - MRI brain pending, EEG pending  - con't seroquel for agitation. Sitter d/c'd 10/3.  - PT/OT reconsulted  - palliative care consulted, meeting this afternoon  Seizure d/o:  Had been stable on lyrica and valproic acid in the past  - con't lyrica and VPA  - neuro consulted as above  - CT head as above  - EEG and MRI pending as above  Aspiration pneumonitis:  No PNA seen on CT  - CT chest  with unchanged area of scarring in the posterior upper lobe. Mild bibasilar atelectasis. - completed 7 days Abx on 10/2  - continue with duonebs  Hypertension, benign/essential: more stable  - con't lopressor  - prn IV hydralazine  History of SVT  Peripheral neuropathy:  lyrica as above  Remote EtOH abuse:  Family confirmed no EtOH in 5 years  Dysphagia/dyarthria and GERD:  S/p PEG placement     - con't feeds as tolerated  - con't protonix  Hypokalemia, resolved    Code Status: DNR  Surrogate Decision Maker: wife Eliot Alba [phone] or [phone]  DVT Prophylaxis: Lovenox     Subjective:     Chief Complaint / Reason for Physician Visit  Calm this morning. No acute complaints. Discussed with RN events overnight.      Review of Systems:  Symptom Y/N Comments  Symptom Y/N Comments   Fever/Chills    Chest Pain     Poor Appetite    Edema     Cough    Abdominal Pain     Sputum    Joint Pain     SOB/AL    Pruritis/Rash     Nausea/vomit    Tolerating PT/OT     Diarrhea    Tolerating Diet     Constipation    Other       Could NOT obtain due to: encephalopathy     Objective:     VITALS:   Last 24hrs VS reviewed since prior progress note. Most recent are:  Patient Vitals for the past 24 hrs:   Temp Pulse Resp BP SpO2   10/03/17 2338 98.5 °F (36.9 °C) 77 16 128/83 100 %   10/03/17 2045 97.9 °F (36.6 °C) 94 18 133/73 96 %   10/03/17 1706 98.1 °F (36.7 °C) 67 14 104/67 100 %   10/03/17 1415 97.5 °F (36.4 °C) 75 18 96/43 100 %   10/03/17 0815 97.7 °F (36.5 °C) 89 18 128/69 96 %       Intake/Output Summary (Last 24 hours) at 10/04/17 0803  Last data filed at 10/04/17 3175   Gross per 24 hour   Intake             1630 ml   Output              100 ml   Net             1530 ml        PHYSICAL EXAM:  General: WD, WN. Alert, cooperative with some simple commands, no acute distress    EENT:  EOMI. Anicteric sclerae. MMM  Resp:  CTA bilaterally, no wheezing or rales. No accessory muscle use  CV:  Regular rhythm,  No edema  GI:  Soft, Non distended, Non tender.  +Bowel sounds  Neurologic:  Alert and oriented X 1, single-word speech,   Psych:   Poor insight. Not anxious nor agitated  Skin:  No rashes. No jaundice    Reviewed most current lab test results and cultures  YES  Reviewed most current radiology test results   YES  Review and summation of old records today    NO  Reviewed patient's current orders and MAR    YES  PMH/SH reviewed - no change compared to H&P  ________________________________________________________________________  Care Plan discussed with:    Comments   Patient x    Family      RN     Care Manager     Consultant                        Multidiciplinary team rounds were held today with , nursing, pharmacist and clinical coordinator. Patient's plan of care was discussed; medications were reviewed and discharge planning was addressed. ________________________________________________________________________  Total NON critical care TIME:  25 Minutes    Total CRITICAL CARE TIME Spent:   Minutes non procedure based      Comments   >50% of visit spent in counseling and coordination of care x    ________________________________________________________________________  Suzanne Canavan, MD     Procedures: see electronic medical records for all procedures/Xrays and details which were not copied into this note but were reviewed prior to creation of Plan. LABS:  I reviewed today's most current labs and imaging studies. Pertinent labs include:  No results for input(s): WBC, HGB, HCT, PLT, HGBEXT, HCTEXT, PLTEXT in the last 72 hours.   Recent Labs      10/02/17   0322   NA  142   K  4.1   CL  104   CO2  30   GLU  106*   BUN  6   CREA  0.63*   CA  8.4*       Signed: Suzanne Canavan, MD

## 2017-10-04 NOTE — PROGRESS NOTES
.                  Oncology Nursing Communication Tool      5:16 PM  10/4/2017     Bedside shift change report given to Brittni Lorenz RN (incoming nurse) by Luis E Kemp RN (outgoing nurse) on Kee Heller a 80 y.o. male who was admitted on 9/27/2017  2:35 PM. Report included the following information SBAR, Kardex, Intake/Output, MAR, Accordion and Recent Results.          Significant changes during shift: no significant change; Tele sitter in room; MRI      Issues for physician to address: None at this time            Code Status: DNR     Infections: No current active infections     Allergies: No known allergies     Current diet: DIET NPO Except Meds  DIET TUBE FEEDING Jevity: 200 ml 6 x per day (6 am, 9 am, 12 pm, 3 pm, 6 pm, 9 pm) + 75 ml before and after bolus       Pain Controlled [x] yes [] no   Bowel Movement [] yes [x] no   Last Bowel Movement (date)             Vital Signs:   Patient Vitals for the past 12 hrs:   Temp Pulse Resp BP SpO2   10/04/17 1518 97.9 °F (36.6 °C) 70 16 102/54 99 %   10/04/17 0810 97.4 °F (36.3 °C) 68 16 114/55 100 %      Intake & Output:     Intake/Output Summary (Last 24 hours) at 10/04/17 1716  Last data filed at 10/04/17 1518   Gross per 24 hour   Intake             2100 ml   Output                0 ml   Net             2100 ml      Laboratory Results:     Recent Results (from the past 12 hour(s))   GLUCOSE, POC    Collection Time: 10/04/17  6:34 AM   Result Value Ref Range    Glucose (POC) 108 (H) 65 - 100 mg/dL    Performed by Oh Tomlinson    MAGNESIUM    Collection Time: 10/04/17 11:19 AM   Result Value Ref Range    Magnesium 1.5 (L) 1.6 - 2.4 mg/dL   CBC WITH AUTOMATED DIFF    Collection Time: 10/04/17 11:19 AM   Result Value Ref Range    WBC 3.9 (L) 4.1 - 11.1 K/uL    RBC 3.85 (L) 4.10 - 5.70 M/uL    HGB 13.2 12.1 - 17.0 g/dL    HCT 38.4 36.6 - 50.3 %    MCV 99.7 (H) 80.0 - 99.0 FL    MCH 34.3 (H) 26.0 - 34.0 PG    MCHC 34.4 30.0 - 36.5 g/dL    RDW 12.1 11.5 - 14.5 % PLATELET 643 375 - 622 K/uL    NEUTROPHILS 40 32 - 75 %    LYMPHOCYTES 35 12 - 49 %    MONOCYTES 15 (H) 5 - 13 %    EOSINOPHILS 10 (H) 0 - 7 %    BASOPHILS 0 0 - 1 %    ABS. NEUTROPHILS 1.6 (L) 1.8 - 8.0 K/UL    ABS. LYMPHOCYTES 1.4 0.8 - 3.5 K/UL    ABS. MONOCYTES 0.6 0.0 - 1.0 K/UL    ABS. EOSINOPHILS 0.4 0.0 - 0.4 K/UL    ABS. BASOPHILS 0.0 0.0 - 0.1 K/UL   METABOLIC PANEL, COMPREHENSIVE    Collection Time: 10/04/17 11:19 AM   Result Value Ref Range    Sodium 140 136 - 145 mmol/L    Potassium 4.2 3.5 - 5.1 mmol/L    Chloride 100 97 - 108 mmol/L    CO2 36 (H) 21 - 32 mmol/L    Anion gap 4 (L) 5 - 15 mmol/L    Glucose 97 65 - 100 mg/dL    BUN 11 6 - 20 MG/DL    Creatinine 0.56 (L) 0.70 - 1.30 MG/DL    BUN/Creatinine ratio 20 12 - 20      GFR est AA >60 >60 ml/min/1.73m2    GFR est non-AA >60 >60 ml/min/1.73m2    Calcium 9.4 8.5 - 10.1 MG/DL    Bilirubin, total 0.5 0.2 - 1.0 MG/DL    ALT (SGPT) 18 12 - 78 U/L    AST (SGOT) 15 15 - 37 U/L    Alk. phosphatase 58 45 - 117 U/L    Protein, total 8.4 (H) 6.4 - 8.2 g/dL    Albumin 3.1 (L) 3.5 - 5.0 g/dL    Globulin 5.3 (H) 2.0 - 4.0 g/dL    A-G Ratio 0.6 (L) 1.1 - 2.2     GLUCOSE, POC    Collection Time: 10/04/17 12:47 PM   Result Value Ref Range    Glucose (POC) 100 65 - 100 mg/dL    Performed by Clementine Barnhart               Opportunity for questions and clarifications were given to the incoming nurse. Patient's bed is in low position, side rails x2, door open PRN, call bell within reach and patient not in distress.       Kimberly Quiroz RN

## 2017-10-04 NOTE — PROCEDURES
[id number]   EEG       Name:  Milad Franz   MR#:  [id number]   :  1934   Account #:  [de-identified]    Date of Procedure:  10/04/2017   Date of Adm:  2017       PROCEDURE IDENTIFICATION: EEG    DESCRIPTION OF PROCEDURE: Electrodes were applied in   accordance with [employer] 10/20 system electrode placement. The   EEG was reviewed in both bipolar and referential montages. DESCRIPTION OF FINDINGS: The background consists of symmetric   7-8 Hz activity, which attenuated with eye opening. Photic stimulation   produced a symmetric occipital drive. No seizures or interictal   hallmarks of epilepsy are noted on the study. IMPRESSION: This is an abnormal encephalogram due to at 8 Hz   slow-wave activity as the background. This can be caused by toxic   and/or metabolic encephalopathies. The absence of seizures on this   study does not exclude a diagnosis of epilepsy. Clinical correlation   advised.         MD Dayami Adair / Graciela.[email or web address]   D:  10/04/2017   08:17   T:  10/04/2017   12:27   Job #:  [id number]

## 2017-10-04 NOTE — PROGRESS NOTES
Bedside and Verbal shift change report given to [facility] (oncoming nurse) by [facility] (offgoing nurse). Report included the following information SBAR, Kardex, Procedure Summary, Intake/Output, MAR, Recent Results and Med Rec Status.

## 2017-10-04 NOTE — PROGRESS NOTES
Oncology Nursing Communication Tool      7:07 AM  10/4/2017     Bedside shift change report given to Ronaldo Shelton RN (incoming nurse) by Devon Chambers (outgoing nurse) on Kee Heller a 80 y.o. male who was admitted on 9/27/2017  2:35 PM. Report included the following information SBAR, Kardex, Intake/Output, MAR and Recent Results.          Significant changes during shift: none      Issues for physician to address: none            Code Status: DNR     Infections: No current active infections     Allergies: No known allergies     Current diet: DIET NPO Except Meds  DIET TUBE FEEDING Jevity: 250 ml 5 x per day (6 am, 9 am, 12 pm, 3 pm, 6pm) + 75 ml before and after bolus       Pain Controlled [x] yes [] no   Bowel Movement [] yes [x] no   Last Bowel Movement (date)     10/3/17            Vital Signs:   Patient Vitals for the past 12 hrs:   Temp Pulse Resp BP SpO2   10/03/17 2338 98.5 °F (36.9 °C) 77 16 128/83 100 %   10/03/17 2045 97.9 °F (36.6 °C) 94 18 133/73 96 %      Intake & Output:     Intake/Output Summary (Last 24 hours) at 10/04/17 9747  Last data filed at 10/04/17 0608   Gross per 24 hour   Intake             1630 ml   Output              100 ml   Net             1530 ml      Laboratory Results:     Recent Results (from the past 12 hour(s))   GLUCOSE, POC    Collection Time: 10/03/17  7:21 PM   Result Value Ref Range    Glucose (POC) 88 65 - 100 mg/dL    Performed by Najma Morales    GLUCOSE, POC    Collection Time: 10/04/17 12:38 AM   Result Value Ref Range    Glucose (POC) 73 65 - 100 mg/dL    Performed by Oh Tomlinson    GLUCOSE, POC    Collection Time: 10/04/17  2:44 AM   Result Value Ref Range    Glucose (POC) 104 (H) 65 - 100 mg/dL    Performed by Oh Tomlinson    GLUCOSE, POC    Collection Time: 10/04/17  6:34 AM   Result Value Ref Range    Glucose (POC) 108 (H) 65 - 100 mg/dL    Performed by Oh Tomlinson               Opportunity for questions and clarifications were given to the incoming nurse. Patient's bed is in low position, side rails x2, door open PRN, call bell within reach and patient not in distress.       Any Mejai

## 2017-10-04 NOTE — ROUTINE PROCESS

## 2017-10-04 NOTE — PROGRESS NOTES
BS check q6h was 73. Administered 4 oz of orange juice via peg tube. It is noted that there are no hypoglycemic protocol medications ordered on patient MAR. BS checked 15 minutes later was WNL at 104.     On call MD paged

## 2017-10-04 NOTE — PROGRESS NOTES
Brief Nutrition Note    Chart reviewed. Nutrition consulted due to adjusting TF because of borderline low BG. Fasting BG shown below. No PMH of DM. Will add an additional bolus at night to shorten duration between night and morning boluses. Jevity 1.5, 200 ml 6x per day (6 am, 9 am, 12 pm, 3 pm, 6 pm, 9 pm)    Results for Claudene Severe (MRN [id number]) as of 10/4/2017 12:43   Ref.  Range 10/3/2017 12:12 10/3/2017 19:21 10/4/2017 00:38 10/4/2017 02:44 10/4/2017 06:34   GLUCOSE,FAST - POC Latest Ref Range: 65 - 100 mg/dL 80 88 73 104 (H) 108 (H)     Odette Guerin RDN  Pager: [phone]

## 2017-10-04 NOTE — PROGRESS NOTES
MRI screening completed with patient's wife, Irineo Bosworth. Electronic signature is not available. Form printed and faxed to MRI department.

## 2017-10-05 LAB
GLUCOSE BLD STRIP.AUTO-MCNC: 118 MG/DL (ref 65–100)
GLUCOSE BLD STRIP.AUTO-MCNC: 155 MG/DL (ref 65–100)
GLUCOSE BLD STRIP.AUTO-MCNC: 59 MG/DL (ref 65–100)
GLUCOSE BLD STRIP.AUTO-MCNC: 75 MG/DL (ref 65–100)
GLUCOSE BLD STRIP.AUTO-MCNC: 98 MG/DL (ref 65–100)
SERVICE CMNT-IMP: ABNORMAL
SERVICE CMNT-IMP: NORMAL
SERVICE CMNT-IMP: NORMAL

## 2017-10-05 PROCEDURE — 74011000250 HC RX REV CODE- 250

## 2017-10-05 PROCEDURE — G8979 MOBILITY GOAL STATUS: HCPCS

## 2017-10-05 PROCEDURE — 74011000250 HC RX REV CODE- 250: Performed by: INTERNAL MEDICINE

## 2017-10-05 PROCEDURE — 74011250636 HC RX REV CODE- 250/636: Performed by: INTERNAL MEDICINE

## 2017-10-05 PROCEDURE — G8978 MOBILITY CURRENT STATUS: HCPCS

## 2017-10-05 PROCEDURE — 74011250637 HC RX REV CODE- 250/637: Performed by: NURSE PRACTITIONER

## 2017-10-05 PROCEDURE — 74011250637 HC RX REV CODE- 250/637: Performed by: PSYCHIATRY & NEUROLOGY

## 2017-10-05 PROCEDURE — 74011250637 HC RX REV CODE- 250/637: Performed by: INTERNAL MEDICINE

## 2017-10-05 PROCEDURE — 82962 GLUCOSE BLOOD TEST: CPT

## 2017-10-05 PROCEDURE — 97166 OT EVAL MOD COMPLEX 45 MIN: CPT

## 2017-10-05 PROCEDURE — 65270000015 HC RM PRIVATE ONCOLOGY

## 2017-10-05 PROCEDURE — 97162 PT EVAL MOD COMPLEX 30 MIN: CPT

## 2017-10-05 PROCEDURE — 36415 COLL VENOUS BLD VENIPUNCTURE: CPT | Performed by: PSYCHIATRY & NEUROLOGY

## 2017-10-05 PROCEDURE — 80177 DRUG SCRN QUAN LEVETIRACETAM: CPT | Performed by: PSYCHIATRY & NEUROLOGY

## 2017-10-05 RX ORDER — DEXTROSE 50 % IN WATER (D50W) INTRAVENOUS SYRINGE
Status: COMPLETED
Start: 2017-10-05 | End: 2017-10-05

## 2017-10-05 RX ADMIN — VALPROIC ACID 500 MG: 250 SOLUTION ORAL at 15:21

## 2017-10-05 RX ADMIN — LEVETIRACETAM 1500 MG: 100 SOLUTION ORAL at 09:35

## 2017-10-05 RX ADMIN — PANTOPRAZOLE SODIUM 40 MG: 40 GRANULE, DELAYED RELEASE ORAL at 09:36

## 2017-10-05 RX ADMIN — VALPROIC ACID 500 MG: 250 SOLUTION ORAL at 21:09

## 2017-10-05 RX ADMIN — PREGABALIN 300 MG: 100 CAPSULE ORAL at 09:36

## 2017-10-05 RX ADMIN — LATANOPROST 1 DROP: 50 SOLUTION OPHTHALMIC at 23:27

## 2017-10-05 RX ADMIN — LEVETIRACETAM 1500 MG: 100 SOLUTION ORAL at 20:28

## 2017-10-05 RX ADMIN — Medication 100 MG: at 09:36

## 2017-10-05 RX ADMIN — PANTOPRAZOLE SODIUM 40 MG: 40 GRANULE, DELAYED RELEASE ORAL at 15:37

## 2017-10-05 RX ADMIN — ACETAMINOPHEN 650 MG: 325 TABLET ORAL at 20:28

## 2017-10-05 RX ADMIN — DEXTROSE MONOHYDRATE 25 G: 25 INJECTION, SOLUTION INTRAVENOUS at 07:08

## 2017-10-05 RX ADMIN — QUETIAPINE FUMARATE 25 MG: 25 TABLET ORAL at 20:28

## 2017-10-05 RX ADMIN — ASPIRIN 81 MG 81 MG: 81 TABLET ORAL at 09:36

## 2017-10-05 RX ADMIN — METOPROLOL TARTRATE 12.5 MG: 25 TABLET ORAL at 21:00

## 2017-10-05 RX ADMIN — ENOXAPARIN SODIUM 40 MG: 40 INJECTION SUBCUTANEOUS at 23:25

## 2017-10-05 RX ADMIN — METOPROLOL TARTRATE 12.5 MG: 25 TABLET ORAL at 09:42

## 2017-10-05 RX ADMIN — PREGABALIN 300 MG: 100 CAPSULE ORAL at 20:28

## 2017-10-05 RX ADMIN — VALPROIC ACID 500 MG: 250 SOLUTION ORAL at 06:42

## 2017-10-05 NOTE — PROGRESS NOTES
Oncology Nursing Communication Tool      7:21 PM  10/5/2017     Bedside and Verbal shift change report given to [facility], RN (incoming nurse) by Jing Gray (outgoing nurse) on Radhames Saleh a 80 y.o. male who was admitted on 9/27/2017  2:35 PM. Report included the following information SBAR, Kardex, Procedure Summary, Intake/Output, MAR, Accordion and Recent Results. Significant changes during shift: None to note      Issues for physician to address: None to note          Code Status: DNR     Infections: No current active infections     Allergies: No known allergies     Current diet: DIET NPO Except Meds  DIET TUBE FEEDING       Pain Controlled [x] yes [] no   Bowel Movement [] yes [x] no   Last Bowel Movement (date)             Vital Signs:   Patient Vitals for the past 12 hrs:   Temp Pulse Resp BP SpO2   10/05/17 1910 97.5 °F (36.4 °C) (!) 54 16 120/77 100 %   10/05/17 1415 97.6 °F (36.4 °C) (!) 58 18 102/48 98 %   10/05/17 0942 - 80 - 110/65 -   10/05/17 0745 97.8 °F (36.6 °C) 68 16 112/51 100 %      Intake & Output:     Intake/Output Summary (Last 24 hours) at 10/05/17 1921  Last data filed at 10/05/17 1538   Gross per 24 hour   Intake             1240 ml   Output                0 ml   Net             1240 ml      Laboratory Results:     Recent Results (from the past 12 hour(s))   GLUCOSE, POC    Collection Time: 10/05/17  7:31 AM   Result Value Ref Range    Glucose (POC) 155 (H) 65 - 100 mg/dL    Performed by Siddhartha 68, POC    Collection Time: 10/05/17 12:22 PM   Result Value Ref Range    Glucose (POC) 98 65 - 100 mg/dL    Performed by [address] [place] [address] for questions and clarifications were given to the incoming nurse. Patient's bed is in low position, side rails x2, door open PRN, call bell within reach and patient not in distress.       Jing Gray

## 2017-10-05 NOTE — PROGRESS NOTES
Pt. Was able to participate with [facility]  Sending referrals to [facility], [facility], [facility] and [facility] Shaneka Streeter was going to try to get auth, but wife does not want pt. To return there. [place] will try for auth. Hopefully, pt. Will get auth.  And be able to go on Fri.-- Verito ChicasRN,[facility]--[phone]

## 2017-10-05 NOTE — PROGRESS NOTES
Hospitalist Progress Note    NAME: Afshin Abrams   :  1934   MRN:  [id number]       Assessment / Plan:  Recurrent acute encephalopathy, suspect due to damaged temporal/parietal lobes bilaterally from recent temporal lobe status epilepticus:  Unclear etiology for progressive decline over the last few months, has not made it home and has been institutionalized for the last few months with recurrent monthly admissions.  In the past, recurrent seizures have been felt to be etiology for encephalopathy.  - CT head with unchanged area of scarring in the posterior upper lobe. Mild bibasilar atelectasis. - MRI brain 10/3 with No change. Left cerebral cortical diffusion restriction, with no enhancement. - EEG 10/3 abnormal encephalogram due to at 8 Hz slow-wave activity as the background. This can be caused by toxic and/or metabolic encephalopathies. - appreciate reconsult by neurology, no additional recommendations  - con't seroquel for agitation. Sitter d/c'd 10/3.  - PT/OT as tolerated, CM looking into if Pt has SNF days still available or if he has maximized his benefit  - palliative care consult appreciated, supporting wife/dtr and assisting with Medicaid application  Hypoglycemia:  Intermittent hypoglycemia, glucose down to 59 this morning  - nutrition re-consulted for assistance with adjustments in tube feeds  - okay for OJ in tube if low glucose  Seizure d/o:  Had been stable on lyrica and valproic acid in the past  - con't lyrica and VPA  - neuro consulted as above  - CT head and MRI as above  - EEG as above  Aspiration pneumonitis:  No PNA seen on CT  - CT chest  with unchanged area of scarring in the posterior upper lobe. Mild bibasilar atelectasis.   - completed 7 days Abx on 10/2  - continue with duonebs  Hypertension, benign/essential: more stable  - con't lopressor  - prn IV hydralazine  History of SVT  Peripheral neuropathy:  lyrica as above  Remote EtOH abuse:  Family confirmed no EtOH in 5 years  Dysphagia/dyarthria and GERD:  S/p PEG placement 6/17    - con't feeds as tolerated  - con't protonix  Hypokalemia, resolved      Code Status: DNR  Surrogate Decision Maker: wife Sunday Naif [phone] or [phone]  DVT Prophylaxis: Lovenox     Subjective:     Chief Complaint / Reason for Physician Visit  Alert, answers some questions. No acute complains. Still with mild confusion and overall debility. Discussed with RN events overnight. Review of Systems:  Symptom Y/N Comments  Symptom Y/N Comments   Fever/Chills    Chest Pain     Poor Appetite    Edema     Cough    Abdominal Pain     Sputum    Joint Pain     SOB/AL    Pruritis/Rash     Nausea/vomit    Tolerating PT/OT     Diarrhea    Tolerating Diet     Constipation    Other       Could NOT obtain due to: encephalopathy     Objective:     VITALS:   Last 24hrs VS reviewed since prior progress note. Most recent are:  Patient Vitals for the past 24 hrs:   Temp Pulse Resp BP SpO2   10/05/17 0745 97.8 °F (36.6 °C) 68 16 112/51 100 %   10/04/17 2255 98 °F (36.7 °C) 63 16 121/61 98 %   10/04/17 1518 97.9 °F (36.6 °C) 70 16 102/54 99 %       Intake/Output Summary (Last 24 hours) at 10/05/17 0830  Last data filed at 10/05/17 0230   Gross per 24 hour   Intake             2875 ml   Output                0 ml   Net             2875 ml        PHYSICAL EXAM:  General: WD, WN. Alert, cooperative, no acute distress    EENT:  EOMI. Anicteric sclerae. MMM  Resp:  CTA bilaterally, no wheezing or rales. No accessory muscle use  CV:  Regular rhythm,  No edema  GI:  Soft, Non distended, Non tender.  +Bowel sounds  Neurologic:  Alert and oriented X 1-2, normal speech (mostly single word answers)   Psych:   Poor insight. Not anxious nor agitated  Skin:  No rashes.   No jaundice    Reviewed most current lab test results and cultures  YES  Reviewed most current radiology test results   YES  Review and summation of old records today    NO  Reviewed patient's current orders and MAR    YES  PMH/SH reviewed - no change compared to H&P  ________________________________________________________________________  Care Plan discussed with:    Comments   Patient x    Family  x Wife on 10/4   RN x    Care Manager x    Consultant                        Multidiciplinary team rounds were held today with , nursing, pharmacist and clinical coordinator. Patient's plan of care was discussed; medications were reviewed and discharge planning was addressed. ________________________________________________________________________  Total NON critical care TIME:  25 Minutes    Total CRITICAL CARE TIME Spent:   Minutes non procedure based      Comments   >50% of visit spent in counseling and coordination of care x    ________________________________________________________________________  Sincere Rhodes MD     Procedures: see electronic medical records for all procedures/Xrays and details which were not copied into this note but were reviewed prior to creation of Plan. LABS:  I reviewed today's most current labs and imaging studies.   Pertinent labs include:  Recent Labs      10/04/17   1119   WBC  3.9*   HGB  13.2   HCT  38.4   PLT  197     Recent Labs      10/04/17   1119   NA  140   K  4.2   CL  100   CO2  36*   GLU  97   BUN  11   CREA  0.56*   CA  9.4   MG  1.5*   ALB  3.1*   TBILI  0.5   SGOT  15   ALT  18       Signed: Sincere Rhodes MD

## 2017-10-05 NOTE — PROGRESS NOTES
Problem: Mobility Impaired (Adult and Pediatric)  Goal: *Acute Goals and Plan of Care (Insert Text)  Physical Therapy Goals  Initiated 10/5/2017  1. Patient will move from supine to sit and sit to supine , scoot up and down and roll side to side in bed with modified independence within 7 day(s). 2. Patient will transfer from bed to chair and chair to bed with moderate assistance using the least restrictive device within 7 day(s). 3. Patient will perform sit to stand with minimal assistance/contact guard assist within 7 day(s). 4. Patient will ambulate with moderate assistance for 25 feet with the least restrictive device within 7 day(s). PHYSICAL THERAPY EVALUATION  Patient: Radhames Saleh (25 y.o. male)  Date: 10/5/2017  Primary Diagnosis: Aspiration pneumonia ([facility] [address]        Precautions:  Fall, Seizure      ASSESSMENT :  Based on the objective data described below, the patient presents with impaired functional mobility secondary to impaired balance, generalized weakness, impaired mentation, decreased command following, ROM limitations, gait impairments, and decreased activity tolerance following admission for aspiration PNA. Pt received supine in bed with RN present and agreeable to therapy evaluation. Pt cleared by nursing for mobility. Pt able to follow ~ 75% of commands, however needed nearly constant cueing to attend to task and for proper technique during bed mobility and transfers. Bed mobility performed with min A x 2 and sit<>stand transfers performed with mod A x 2. He needed max A x 2 to maintain standing as he demonstrated poor standing balance overall with significant posterior lean with increased trunk flexion. He was able to side step at EOB with max A x 2 using HHA x 2 for ~ 3', however needed additional cueing to progress RLE>LLE. He was returned supine in bed following therapy evaluation and left with all needs met, RN aware, and bed alarm on.  Noted all linens, brief, and gown changed during session as pt was saturated in urine; RN assisted. Recommend pt discharge to SNF to address functional impairments. Patient will benefit from skilled intervention to address the above impairments. Patients rehabilitation potential is considered to be Guarded  Factors which may influence rehabilitation potential include:   [ ]         None noted  [X]         Mental ability/status  [X]         Medical condition  [ ]         Home/family situation and support systems  [X]         Safety awareness  [ ]         Pain tolerance/management  [ ]         Other:        PLAN :  Recommendations and Planned Interventions:  [X]           Bed Mobility Training             [ ]    Neuromuscular Re-Education  [X]           Transfer Training                   [ ]    Orthotic/Prosthetic Training  [X]           Gait Training                         [ ]    Modalities  [X]           Therapeutic Exercises           [ ]    Edema Management/Control  [X]           Therapeutic Activities            [X]    Patient and Family Training/Education  [ ]           Other (comment):     Frequency/Duration: Patient will be followed by physical therapy  3 times a week to address goals. Discharge Recommendations: [facility]  Further Equipment Recommendations for Discharge: TBD by rehab       SUBJECTIVE:   Patient stated Karen Newberry.       OBJECTIVE DATA SUMMARY:   HISTORY:    Past Medical History:   Diagnosis Date    Alcohol abuse, in remission      Seizures ([facility] [address]       Past Surgical History:   Procedure Laterality Date    COLONOSCOPY N/A 7/31/2017     COLONOSCOPY performed by Ken Orantes MD at [address] [place] [address]   7/31/2017          PLACE PERCUT GASTROSTOMY TUBE   7/18/2017          UPPER GI ENDOSCOPY,DIAGNOSIS   7/31/2017           Prior Level of Function/Home Situation: Pt is a poor historian and unable to provide PLOF, however pt chart, pt was residing at [facility] for rehab PTA.   Personal factors and/or comorbidities impacting plan of care: seizure     Home Situation  Home Environment: Long term care  One/Two Story Residence: One story  Living Alone: No  Support Systems: Spouse/Significant Other/Partner  Patient Expects to be Discharged to[de-identified] Other (comment)  Current DME Used/Available at Home: Other (comment) (unknown)     EXAMINATION/PRESENTATION/DECISION MAKING:   Critical Behavior:  Neurologic State: Alert, Confused  Orientation Level: Oriented to person, Disoriented to time, Disoriented to situation, Disoriented to place  Cognition: Follows commands, Impaired decision making     Hearing: Auditory  Auditory Impairment: None  Skin:  Intact  Edema: None  Range Of Motion:  AROM: Generally decreased, functional           PROM: Generally decreased, functional           Strength:    Strength: Generally decreased, functional                    Tone & Sensation:   Tone: Normal                              Coordination:  Coordination: Generally decreased, functional  Vision:      Functional Mobility:  Bed Mobility:  Rolling: Minimum assistance;Assist x2  Supine to Sit: Minimum assistance;Assist x2  Sit to Supine: Minimum assistance;Assist x2     Transfers:  Sit to Stand: Moderate assistance;Assist x2  Stand to Sit: Moderate assistance                       Balance:   Sitting: Impaired  Sitting - Static: Fair (occasional)  Sitting - Dynamic: Fair (occasional)  Standing: Impaired; With support  Standing - Static: Poor  Standing - Dynamic : Poor  Ambulation/Gait Training:  Distance (ft): 3 Feet (ft) (side steps at EOB)  Assistive Device: Gait belt (HHA x 2)  Ambulation - Level of Assistance: Maximum assistance;Assist x2; Additional time     Gait Description (WDL): Exceptions to WDL  Gait Abnormalities: Decreased step clearance;Shuffling gait; Step to gait; Ataxic        Base of Support: Widened     Speed/Shannon: Pace decreased (<100 feet/min); Shuffled; Slow  Step Length: Left shortened;Right shortened        Functional Measure:  Barthel Index:      Bathin  Bladder: 0  Bowels: 0  Groomin  Dressin  Feedin  Mobility: 0  Stairs: 0  Toilet Use: 0  Transfer (Bed to Chair and Back): 0  Total: 0         Barthel and G-code impairment scale:  Percentage of impairment CH  0% CI  1-19% CJ  20-39% CK  40-59% CL  60-79% CM  80-99% CN  100%   Barthel Score 0-100 100 99-80 79-60 59-40 20-39 1-19    0   Barthel Score 0-20 20 17-19 13-16 9-12 5-8 1-4 0      The Barthel ADL Index: Guidelines  1. The index should be used as a record of what a patient does, not as a record of what a patient could do. 2. The main aim is to establish degree of independence from any help, physical or verbal, however minor and for whatever reason. 3. The need for supervision renders the patient not independent. 4. A patient's performance should be established using the best available evidence. Asking the patient, friends/relatives and nurses are the usual sources, but direct observation and common sense are also important. However direct testing is not needed. 5. Usually the patient's performance over the preceding 24-48 hours is important, but occasionally longer periods will be relevant. 6. Middle categories imply that the patient supplies over 50 per cent of the effort. 7. Use of aids to be independent is allowed. Harlee Cowden., Barthel DARMANDO. (7728). Functional evaluation: the Barthel Index. [address] [facility] (14)2. Alex Tenorio velasquez HUBER Godinez, Binh Shaw, Sherwin Syed.[address] (). Measuring the change indisability after inpatient rehabilitation; comparison of the responsiveness of the Barthel Index and Functional [place] Measure. Journal of Neurology, Neurosurgery, and Psychiatry, 66(4), 269-320. Meg Walker, N.J.A, GORDO RicheyJ.ALEXIA, & Dharmesh Melgar M.A. (2004.) Assessment of post-stroke quality of life in cost-effectiveness studies: The usefulness of the Barthel Index and the EuroQoL-5D.  Quality of Life Research, 13, 427-43            G codes: In compliance with CMSs Claims Based Outcome Reporting, the following G-code set was chosen for this patient based on their primary functional limitation being treated: The outcome measure chosen to determine the severity of the functional limitation was the Barthel index with a score of 0/100 which was correlated with the impairment scale. · Mobility - Walking and Moving Around:               - CURRENT STATUS:    CN - 100% impaired, limited or restricted               - GOAL STATUS:           CK - 40%-59% impaired, limited or restricted               - D/C STATUS:                       ---------------To be determined---------------      Physical Therapy Evaluation Charge Determination   History Examination Presentation Decision-Making   MEDIUM  Complexity : 1-2 comorbidities / personal factors will impact the outcome/ POC  HIGH Complexity : 4+ Standardized tests and measures addressing body structure, function, activity limitation and / or participation in recreation  MEDIUM Complexity : Evolving with changing characteristics  Other outcome measures Barthel Index HIGH       Based on the above components, the patient evaluation is determined to be of the following complexity level: MEDIUM     Pain:   Pt without pain complaints. Activity Tolerance:   Good/fair - pt without complaints; appeared fatigued  Please refer to the flowsheet for vital signs taken during this treatment.   After treatment:   [ ]         Patient left in no apparent distress sitting up in chair  [X]         Patient left in no apparent distress in bed  [X]         Call bell left within reach  [X]         Nursing notified  [ ]         Caregiver present  [X]         Bed alarm activated      COMMUNICATION/EDUCATION:   The patients plan of care was discussed with: Physical Therapist, Occupational Therapist and Registered Nurse.  [X]         Fall prevention education was provided and the patient/caregiver indicated understanding. [X]         Patient/family have participated as able in goal setting and plan of care. [X]         Patient/family agree to work toward stated goals and plan of care. [ ]         Patient understands intent and goals of therapy, but is neutral about his/her participation. [ ]         Patient is unable to participate in goal setting and plan of care.      Thank you for this referral.  La Alvarez, PT, DPT   Time Calculation: 11 mins

## 2017-10-05 NOTE — DIABETES MGMT
DTC Progress Note    Recommendations/ Comments: Noted pt with intermittent hypoglycemia and pt was previously receiving bolus TF. Per RD note, pt was transitioned back to continuous TF for improved BG control. Pt BG now stable. Will continue to follow as needed. Chart reviewed on Cora Little for hypoglycemia. Patient is a 80 y.o. male with no known history of diabetes. A1c:   Lab Results   Component Value Date/Time    Hemoglobin A1c 4.2 09/16/2017 04:23 AM    Hemoglobin A1c <3.5 08/20/2017 04:38 AM       Recent Glucose Results:   Lab Results   Component Value Date/Time    GLUCPOC 98 10/05/2017 12:22 PM    GLUCPOC 155 (H) 10/05/2017 07:31 AM    GLUCPOC 59 (L) 10/05/2017 07:03 AM        Lab Results   Component Value Date/Time    Creatinine 0.56 10/04/2017 11:19 AM     Estimated Creatinine Clearance: 87.5 mL/min (based on Cr of 0.56). Active Orders   Diet    DIET NPO Except Meds        PO intake: No data found. Current hospital DM medication: none    Will continue to follow as needed.     Thank you    Severiano Flores, [address] [place] [address], [address]  Office: [phone]

## 2017-10-05 NOTE — CONSULTS
PROGRESS NOTE : 10/5/2017        Chief Complaint   Patient presents with    Altered mental status     increased over the past week, diagnosed with UTI last week    Fall     patient has been falling recently           [address] is a 80 y.o. male who presents to the hospital because of AMS, this began in July and has progressed rapidly. SVETLANA de la rosa reviewed all of his records and interviewed his wife. He has developed Temporal Lobe epilepsy, retrospectivly as part of the general rapidly progressive degenerative brain disease. ROS  Not Obtainable    PMH  Past Medical History:   Diagnosis Date    Alcohol abuse, in remission     Seizures ([address]        FH  Family History   Problem Relation Age of Onset    Other Other      no strokes or seizures    Cancer Mother        SH  Social History     Social History    Marital status:      Spouse name: N/A    Number of children: N/A    Years of education: N/A     Social History Main Topics    Smoking status: Never Smoker    Smokeless tobacco: Never Used    Alcohol use No      Comment: Quit drinking 10 years ago   [facility] Drug use: No    Sexual activity: Not Asked     Other Topics Concern    None     Social History Narrative       ALLERGIES  Allergies   Allergen Reactions    No Known Allergies      No current facility-administered medications on file prior to encounter. Current Outpatient Prescriptions on File Prior to Encounter   Medication Sig Dispense Refill    levETIRAcetam (KEPPRA) 100 mg/mL solution 1,500 mg by PEG Tube route two (2) times a day.  multivit w-min-ferrous gluconate (CEROVITE) 9 mg iron/15 mL oral liquid 15 mL by PEG Tube route daily.  pantoprazole (PROTONIX) 40 mg granules for oral suspension 40 mg by PEG Tube route Before breakfast and dinner.  pregabalin (LYRICA) 300 mg capsule 300 mg by Per G Tube route two (2) times a day.       QUEtiapine (SEROQUEL) 25 mg tablet 25 mg by PEG Tube route nightly.  thiamine (VITAMIN B-1) 100 mg tablet 100 mg by PEG Tube route daily.  valproic acid, as sodium salt, (DEPAKENE) 250 mg/5 mL (5 mL) soln oral solution 500 mg by PEG Tube route three (3) times daily.  latanoprost (XALATAN) 0.005 % ophthalmic solution Administer 1 Drop to both eyes every evening. 1 mL 1    acetaminophen (TYLENOL) 325 mg tablet 650 mg by PEG Tube route every six (6) hours as needed for Pain.  cyclobenzaprine (FLEXERIL) 5 mg tablet 5 mg by PEG Tube route three (3) times daily as needed for Muscle Spasm(s).  albuterol-ipratropium (DUO-NEB) 2.5 mg-0.5 mg/3 ml nebu 3 mL by Nebulization route every six (6) hours as needed. 30 Nebule 1       PHYSICAL EXAM  EXAMINATION:   Patient Vitals for the past 24 hrs:   Temp Pulse Resp BP SpO2   10/05/17 1415 97.6 °F (36.4 °C) (!) 58 18 102/48 98 %   10/05/17 0942 - 80 - 110/65 -   10/05/17 0745 97.8 °F (36.6 °C) 68 16 112/51 100 %   10/04/17 2255 98 °F (36.7 °C) 63 16 121/61 98 %        Neurological Examination:   Mental Status: He is awake and will answer questions with confused speech    Cranial Nerves:, Extraocular movements are full, Cannot check visual fields, face is symmetric, neck supple. Motor: Withdraws all 4 ext to pain    Sensation: See above    Reflexes: DTRs trace throughout. Plantar responses downgoing. Coordination/Cerebellar: NT    Gait: NT        Imaging review:  MRI Results (most recent): This SmartLink has not been configured with any valid records. MRI Results (most recent):   FINDINGS:    There is no change of the abnormal cortical diffusion restriction throughout the  left parietal lobe, left occipital lobe and posterolateral left temporal lobe  with associated FLAIR hyperintense signal. As before, this pattern may be seen  in patients with seizure activity, unlikely encephalitis or ischemia given the  stability and lack of enhancement.      Enhancement is normal. There are no new sites of diffusion. There is no mass,  midline shift, hydrocephalus or extra-axial fluid collection.     IMPRESSION  IMPRESSION:   1. No change. 2. Left cerebral cortical diffusion restriction, with no enhancement, as  discussed.       EEG 10/3/17  IMPRESSION: This is an abnormal encephalogram due to at 8 Hz   slow-wave activity as the background. This can be caused by toxic   and/or metabolic encephalopathies. The absence of seizures on this   study does not exclude a diagnosis of epilepsy. Clinical correlation   advised.       HOME MEDS  Prior to Admission Medications   Prescriptions Last Dose Informant Patient Reported? Taking? QUEtiapine (SEROQUEL) 25 mg tablet 2017 at 2100 Transfer Papers Yes Yes   Si mg by PEG Tube route nightly. acetaminophen (TYLENOL) 325 mg tablet Not Taking at Unknown time Transfer Papers Yes No   Si mg by PEG Tube route every six (6) hours as needed for Pain. albuterol-ipratropium (DUO-NEB) 2.5 mg-0.5 mg/3 ml nebu Not Taking at Unknown time Transfer Papers No No   Sig: 3 mL by Nebulization route every six (6) hours as needed. aspirin 81 mg chewable tablet 2017 at 0900 Transfer Papers Yes Yes   Si mg by PEG Tube route daily. atorvastatin (LIPITOR) 40 mg tablet Unknown at Unknown time Transfer Papers Yes No   Si mg by PEG Tube route nightly. cephALEXin (KEFLEX) 250 mg/5 mL suspension 2017 at 1330 Transfer Papers Yes Yes   Sig: Take 10 mL by mouth every eight (8) hours. For 10 days. Start 17   cyclobenzaprine (FLEXERIL) 5 mg tablet Not Taking at Unknown time Transfer Papers Yes No   Si mg by PEG Tube route three (3) times daily as needed for Muscle Spasm(s).   haloperidol (HALDOL) 2 mg tablet 2017 at 1200 Transfer Papers Yes Yes   Si mg by PEG Tube route every six (6) hours as needed (agitation/combativeness).    latanoprost (XALATAN) 0.005 % ophthalmic solution 2017 at 2100 Transfer Papers No Yes   Sig: Administer 1 Drop to both eyes every evening. levETIRAcetam (KEPPRA) 100 mg/mL solution 2017 at 0900 Transfer Papers Yes Yes   Si,500 mg by PEG Tube route two (2) times a day. metoprolol tartrate (LOPRESSOR) 25 mg tablet 2017 at 0900 Transfer Papers Yes Yes   Si.5 mg by PEG Tube route two (2) times a day. multivit w-min-ferrous gluconate (CEROVITE) 9 mg iron/15 mL oral liquid 2017 at 0900 Transfer Papers Yes Yes   Sig: 15 mL by PEG Tube route daily. oxyCODONE (OXYIR) 5 mg capsule Not Taking at Unknown time Transfer Papers Yes No   Sig: Take 5 mg by mouth every four (4) hours as needed for Pain.   pantoprazole (PROTONIX) 40 mg granules for oral suspension 2017 at 0630 Transfer Papers Yes Yes   Si mg by PEG Tube route Before breakfast and dinner. pregabalin (LYRICA) 300 mg capsule 2017 at 0900 Transfer Papers Yes Yes   Si mg by Per G Tube route two (2) times a day. thiamine (VITAMIN B-1) 100 mg tablet 2017 at 0900 Transfer Papers Yes Yes   Si mg by PEG Tube route daily. valproic acid, as sodium salt, (DEPAKENE) 250 mg/5 mL (5 mL) soln oral solution 2017 at 1300 Transfer Papers Yes Yes   Si mg by PEG Tube route three (3) times daily.       Facility-Administered Medications: None       CURRENT MEDS  Current Facility-Administered Medications   Medication Dose Route Frequency    QUEtiapine (SEROquel) tablet 25 mg  25 mg Per G Tube QHS    levETIRAcetam (KEPPRA) oral solution 1,500 mg  1,500 mg Per G Tube BID    valproic acid (as sodium salt) (DEPAKENE) 250 mg/5 mL (5 mL) oral solution 500 mg  500 mg Per G Tube Q8H    enoxaparin (LOVENOX) injection 40 mg  40 mg SubCUTAneous Q24H    aspirin chewable tablet 81 mg  81 mg Per G Tube DAILY    latanoprost (XALATAN) 0.005 % ophthalmic solution 1 Drop  1 Drop Both Eyes QPM    metoprolol tartrate (LOPRESSOR) tablet 12.5 mg  12.5 mg Per G Tube BID    pantoprazole (PROTONIX) granules for oral suspension 40 mg  40 mg Per G Tube ACB&D    pregabalin (LYRICA) capsule 300 mg  300 mg Oral BID    thiamine (B-1) tablet 100 mg  100 mg Per G Tube DAILY       IMPRESSION:RECOMMENDATIONS:  Edyta Pinto is a 80 y.o. male who has had a considerable mental status deterioration over the last 3 months. After , reviewing all of his admissions, I think in retrospect this is one of the rapidly progressive dementias which include Hali Coombe, Frontotemporal Dementia and Lewy Body Dementia, these are the major rapidly progressive dementias. Of the three,  Frontotemporal Dementia seems most associated with seizures. None are treatable. I discussed this with Mrs. Marcano I explained to her that we could possibly make a diagnosis with brain biopsy but that in view these are not treatable diseases I did not recommend it. Mark Mcconnell. Jordi Saldaña MD  Neurologist    This note will not be viewable in [address]

## 2017-10-05 NOTE — PROGRESS NOTES
Problem: Self Care Deficits Care Plan (Adult)  Goal: *Acute Goals and Plan of Care (Insert Text)  Occupational Therapy Goals  Initiated 10/5/2017  1. Patient will perform upper body dressing with minimal assistance/contact guard assist within 7 day(s). 2. Patient will perform grooming at EOB with supervision/set-up within 7 day(s). 3. Patient will perform bathing with moderate assistance within 7 day(s). 4. Patient will participate in upper extremity therapeutic exercise/activities with minimal assistance/contact guard assist for 5 minutes within 7 day(s). OCCUPATIONAL THERAPY EVALUATION  Patient: Shanae Baum (80 y.o. male)  Date: 10/5/2017  Primary Diagnosis: Aspiration pneumonia (HCC)        Precautions:Fall, Seizure      ASSESSMENT :  Based on the objective data described below, the patient presents with overall decreased functional mobility, generalized weakness, and impaired cognition impacting ability to complete basic ADLs. Per chart review, pt was participating in SNF rehab prior to this admission, but has been in and out of [facility] for several months. Pt was received supine in bed, able to follow simple commands from PT/OT with extensive cueing, agreeable to mobility to EOB. Pt is poor historian, answered most questions with \"Juma Linda\" and unable to accurately answer questions regarding PLOF. Pt able to sit EOB with Min Ax2, STS with Mod Ax2, however requiring MaxAx2 with HHA to maintain standing and take 2-3 side steps to [facility], with significant posterior lean and max verbal cueing to remain standing. Discovered pt had incontinent episode once EOB and req'd total A for malina hygiene, however was able to wash face at EOB with max verbal/tactile cueing. Pt with overall decreased cognition and awareness of environment. Only oriented to person at this time. Recommend pt d/c to SNF for further rehab to maximize functional independence and decrease burden of care on family.       Patient will benefit from skilled intervention to address the above impairments. Patients rehabilitation potential is considered to be Fair  Factors which may influence rehabilitation potential include:   [ ]             None noted  [X]             Mental ability/status  [X]             Medical condition  [X]             Home/family situation and support systems  [X]             Safety awareness  [ ]             Pain tolerance/management  [ ]             Other:        PLAN :  Recommendations and Planned Interventions:  [X]               Self Care Training                  [X]        Therapeutic Activities  [X]               Functional Mobility Training    [X]        Cognitive Retraining  [X]               Therapeutic Exercises           [X]        Endurance Activities  [X]               Balance Training                   [ ]        Neuromuscular Re-Education  [ ]               Visual/Perceptual Training     [X]   Home Safety Training  [X]               Patient Education                 [X]        Family Training/Education  [ ]               Other (comment):     Frequency/Duration: Patient will be followed by occupational therapy 3 times a week to address goals. Discharge Recommendations: Elmer Ponce  Further Equipment Recommendations for Discharge: [id number]       SUBJECTIVE:   Patient stated Carlatish Palma.       OBJECTIVE DATA SUMMARY:   HISTORY:   Past Medical History:   Diagnosis Date    Alcohol abuse, in remission      Seizures ([facility] [address]       Past Surgical History:   Procedure Laterality Date    COLONOSCOPY N/A 7/31/2017     COLONOSCOPY performed by Harish Quiros MD at [address] [place] [address]   7/31/2017          PLACE PERCUT GASTROSTOMY TUBE   7/18/2017          UPPER GI ENDOSCOPY,DIAGNOSIS   7/31/2017              Prior Level of Function/Home Situation: Most recently coming from SNF; per chart review pt was largely independent with ADLs ~1 year ago, started to decline in function in the spring. Home Situation  Home Environment: Long term care  One/Two Story Residence: One story  Living Alone: No  Support Systems: Spouse/Significant Other/Partner  Patient Expects to be Discharged to[de-identified] Other (comment)  Current DME Used/Available at Home: Other (comment) (unknown)     EXAMINATION OF PERFORMANCE DEFICITS:  Cognitive/Behavioral Status:  Neurologic State: Alert;Confused  Orientation Level: Oriented to person;Disoriented to place; Disoriented to situation;Disoriented to time  Cognition: Decreased attention/concentration;Decreased command following; Impaired decision making        Skin: Intact BUE     Edema: None noted BUE     Hearing: Auditory  Auditory Impairment: None     Vision/Perceptual:            Acuity:  (unable to assess)  - noted some difficulty with tracking stimuli across room; asked pt about vision but pt unable to verbally respond with meaningful input (\"Juma Linda\")        Range of Motion:  AROM: Generally decreased, functional- BUE  PROM: Generally decreased, functional- BUE     Strength:  Strength: Generally decreased, functional- BUE        Coordination:  Coordination: Generally decreased, functional- BUE             Tone & Sensation:  Tone: Normal- BUE       Sensation- Normal - BUE     Balance:  Sitting: Impaired  Sitting - Static: Fair (occasional)  Sitting - Dynamic: Fair (occasional)  Standing: Impaired; With support  Standing - Static: Poor  Standing - Dynamic : Poor     Functional Mobility and Transfers for ADLs:  Bed Mobility:  Rolling: Minimum assistance;Assist x2  Supine to Sit: Minimum assistance;Assist x2  Sit to Supine: Minimum assistance;Assist x2     Transfers:  Sit to Stand:  Moderate assistance;Assist x2  Stand to Sit: Moderate assistance     ADL Assessment:     Feeding- Total A (peg tube)  Dressing- Max-Total A (able to don hospital gown with Max A)  Bathing- Total A (inferred from dressing)  Toileting- Total A- incontinent, total A for hygiene      ADL Intervention and task modifications:        Grooming  Washing Face: Supervision/set-up (verbal and tactile cues)        Upper Body Dressing Assistance  Hospital Gown: Maximum assistance     Toileting  Bladder Hygiene: Total assistance (dependent)  Clothing Management: Total assistance (dependent)           Functional Measure:  Barthel Index:      Bathin  Bladder: 0  Bowels: 0  Groomin  Dressin  Feedin  Mobility: 0  Stairs: 0  Toilet Use: 0  Transfer (Bed to Chair and Back): 0  Total: 0         Barthel and G-code impairment scale:  Percentage of impairment CH  0% CI  1-19% CJ  20-39% CK  40-59% CL  60-79% CM  80-99% CN  100%   Barthel Score 0-100 100 99-80 79-60 59-40 20-39 1-19    0   Barthel Score 0-20 20 17-19 13-16 9-12 5-8 1-4 0      The Barthel ADL Index: Guidelines  1. The index should be used as a record of what a patient does, not as a record of what a patient could do. 2. The main aim is to establish degree of independence from any help, physical or verbal, however minor and for whatever reason. 3. The need for supervision renders the patient not independent. 4. A patient's performance should be established using the best available evidence. Asking the patient, friends/relatives and nurses are the usual sources, but direct observation and common sense are also important. However direct testing is not needed. 5. Usually the patient's performance over the preceding 24-48 hours is important, but occasionally longer periods will be relevant. 6. Middle categories imply that the patient supplies over 50 per cent of the effort. 7. Use of aids to be independent is allowed. Dustin Dorman., Barthel, D.W. (1897). Functional evaluation: the Barthel Index. [facility] (14)2. Pari Armijo velasquez HUBER Godinez, Nguyễn Ashley., Elyse Arnold., Terence, [address] Measuring the change indisability after inpatient rehabilitation; comparison of the responsiveness of the Barthel Index and Functional [place] Measure. Journal of Neurology, Neurosurgery, and Psychiatry, 66(4), 186-781. ANGELIC Valdez, GLADYS Richey, & Sanna Hicks M.A. (2004.) Assessment of post-stroke quality of life in cost-effectiveness studies: The usefulness of the Barthel Index and the EuroQoL-5D. Quality of Life Research, 13, 382-08         G codes: In compliance with CMSs Claims Based Outcome Reporting, the following G-code set was chosen for this patient based on their primary functional limitation being treated: The outcome measure chosen to determine the severity of the functional limitation was the Barthel Index with a score of 0/100 which was correlated with the impairment scale. · Self Care:               - CURRENT STATUS:    CN - 100% impaired, limited or restricted               - GOAL STATUS:           CM - 80%-99% impaired, limited or restricted               - D/C STATUS:                       ---------------To be determined---------------      Occupational Therapy Evaluation Charge Determination   History Examination Decision-Making   MEDIUM Complexity : Expanded review of history including physical, cognitive and psychosocial  history  MEDIUM Complexity : 3-5 performance deficits relating to physical, cognitive , or psychosocial skils that result in activity limitations and / or participation restrictions MEDIUM Complexity : Patient may present with comorbidities that affect occupational performnce. Miniml to moderate modification of tasks or assistance (eg, physical or verbal ) with assesment(s) is necessary to enable patient to complete evaluation       Based on the above components, the patient evaluation is determined to be of the following complexity level: MEDIUM  Pain:      Activity Tolerance:   Fair tolerance for minimal OOB activity (side stepping to [facility]). No s/s of SOB or extreme fatigue.       After treatment:   [ ] Patient left in no apparent distress sitting up in chair  [X] Patient left in no apparent distress in bed  [X] Call bell left within reach  [X] Nursing notified  [ ] Caregiver present  [X] Bed alarm activated      COMMUNICATION/EDUCATION:   The patients plan of care was discussed with: Physical Therapist, Registered Nurse and patient. [ ] Home safety education was provided and the patient/caregiver indicated understanding. [ ] Patient/family have participated as able in goal setting and plan of care. [ ] Patient/family agree to work toward stated goals and plan of care. [ ] Patient understands intent and goals of therapy, but is neutral about his/her participation. [X] Patient is unable to participate in goal setting and plan of care. This patients plan of care is appropriate for delegation to [facility]. Thank you for this referral.  GABRIEL Carlos           Regarding student involvement in patient care:  A student participated in this treatment session. Per CMS Medicare statements and AOTA guidelines I certify that the following was true:  1. I was present and directly observed the entire session. 2. I made all skilled judgments and clinical decisions regarding care. 3. I am the practitioner responsible for assessment, treatment, and documentation.

## 2017-10-05 NOTE — PROGRESS NOTES
Danyelle  Worker    Dr. Richmond Mccain MD and this LCSW met with Pt's wife/nok medical surrogate, Pt's dtr Chinedu Dailey) and Pt's sister Sneha Douglas) following wife and sister's conversation with hospitalist. Pt's wife was quiet throughout most of meeting except she did request neurologist to provide expert opinion regarding Pt's likelihood of being able to do some ADLs independently such as walking, dressing himself, etc. Wife stated Pt was doing these activities well as the rehab up until his fall in Sept. (last month) and hasn't returned. Wife spends daytime with  were he is at, be it the hospital or snf facility. Sister verbalized she is processing what prior doctor shared. Per sister, Pt said \"I am better. \" Team acknowledged it is good news of Pt verbalizing a good state. All family present agree Pt does not have capacity to make complex medical decisions related to his goc and treatment plan. Dr. Richmond Mccain MD provided education about scope of medical treatment in general with explanation of limitation based on Pt's condition and pt's wishes (acknowledged that burden of responsibility falls to Pt's wife and family to tell health team his wishes). Dr. Richmond Mccain also answered dtr's question about aspiration risk still being present when someone has feeding tube and provided a detailed explanation. Family is hopeful Pt will return to his prior independent functioning of march 2017 but verbalized realism that it is appearing less likely as time progresses. Pt's wife is looking to neurologist as specialist of seizures to provide her with prognosis. Full restorative medical interventions. From hospitalist conversation, family understands Pt may be at or nearing rehab day limit for insurance; Wife is expecting to know from [facility] tomorrow. Wife has started on medicaid application provided by  but has a few questions. From 10/3/17 note: Spoke with RICHARD Nguyen), pt was restless this morning pulling at tubes.  Pt is calm this afternoon. He is sleeping in bed when [address] and this LCSW visited with Pt's wife/nok medical surrogate Jose Stanley). We provided active listening as Pt's wife shared briefly about her and Pt's journeys since last ED [facility] hospitalization. Per wife, Pt was doing well with rehab before his fall (Sept. 2017 [facility] hospitalization) and then had [facility] visit for UTI (changed kelly and Pt returned to rehab). LCSW acknowledged there are concerns about Pt's overall condition to discuss with family and assure they have medical questions answered as they prepare for future decisions. Palliative Medicine family meeting with Pt's wife/nok medical surrogate and Pt's dtr on Wed at 5 pm.      PLAN:  DNAR  Continue with full medical interventions in hopes of recovery/management of current illness. Pt's wife is working with CM on discharge plan; wife is going to apply for Medicaid. Palliative team will visit with Pt's nok medical surrogate and family again tomorrow at 5 pm. Will recommend completion of DDNR order. Thank you for including Palliative Medicine in Mr. Mckeon's care.       Yarelis Fisher, [address] ([address]32)  Work cell: [phone]

## 2017-10-05 NOTE — CONSULTS
Palliative Medicine Consult  Buddy: [phone] ([phone])    Patient Name: Keena Wu  YOB: 1934    Date of Initial Consult: 10/04/2017  Reason for Consult: care decisions  Requesting Provider: Dr. Toby Lima   Primary Care Physician: Ricardo Gomez MD      SUMMARY:   Keena Wu is a 80 y.o. gentleman with PMH most significant for recurrent seizures, dysphagia s/p PEG tube placement who was admitted on 9/27/2017 for AMS. Current medical issues leading to Palliative Medicine involvement include: care decisions in setting of  Seizures  Aspiration pneumonitis  dysphagia  Debility  Weakness  Frequent admissions     PALLIATIVE DIAGNOSES:   1. Debility  2. Weakness  3. Frequent admissions  4. Counseling regarding goals of care     PLAN:   Brief Summary of Plan  -visited w/ pt in his rm. Wife Christopher Sanchez) and sister Annelle Jeans) at bedside. Dtr Spike Jimenez) arrived later.    -scheduled family meeting for 1700 was delayed d/t dtr's arriving late  Family meeting held  [facility] ([address] [facility]) and I were present from our team  -goals of care discussed  -F/U family meeting @ 65 tomorrow (10/05/2017)  -we will continue to establish therapeutic alliance as well as provide psychosocial support    1. Goals of Care- confirmed  Family wishes for full, restorative treatment and all measures that support this w/ exception of NO to intubation if respiratory distress or failure outside of cardiopulmonary arrest.   This is w/ understanding that what can be provided medically could be limited by pt's condition. 2. Advance Care Planning- per active EMR order, pt's code status is DNAR/AND. No AMD on file. Surrogate decision maker is wife. 3. Information Sharing-   We discussed pt's current condition and events that have led to where we are now (over the past few months). We spoke about neurology's involvement as well as plan of care. Family demonstrated fair understanding of pt's condition.       It was confirmed what family's wishes are regarding range of medical tx. See #1. Code status was not discussed. Wife has received M/A application from . She has started to complete some of it. We discussed dispo planning options. Family verbalized understanding. 4. Psychosocial Support- We will continue to support patient and family during this difficult hospitalization    5. Spiritual- at this time, there are no apparent spiritual needs or concerns. 6. Symptom Management- at this time, there does not appear to be symptom management for which our assistance is needed. I have discussed with Dr. Kahlil Quiles. Time and input appreciated. I have discussed with our palliative care IDT. Thank you for this consult that has provided us with the opportunity to be involved in this patient's care. We will continue to follow along with you. Should you have any questions or concerns prior to our next visit at the bedside, please do not hesitate to contact us at [phone]). Arlette Barnes MD  Palliative Care Team     GOALS OF CARE / TREATMENT PREFERENCES:   [====Goals of Care====]  GOALS OF CARE:  Patient / health care proxy stated goals: full, restorative treatment and all measures that support this w/ exception of NO to intubation if respiratory distress or failure outside of cardiopulmonary arrest.   This is w/ understanding that what can be provided medically could be limited by pt's condition. TREATMENT PREFERENCES:   Code Status: DNR   Pt's code status is DO NOT ATTEMPT RESUSCITATION (DNAR) / ALLOW NATURAL DEATH (AND):     If pt has cardiopulmonary arrest, then   NO to chest compressions  NO to cardiac shock  NO to ACLS meds  NO to intubation      Advance Care Planning:  Advance Care Planning 9/28/2017   Patient's Healthcare Decision Maker is: Legal Next of Jessy 69   Primary Decision Maker Name Kat Newman   Primary Decision Maker Phone Number [phone]   Primary Decision Maker Relationship to Patient Spouse   Secondary Decision Maker Name Wei Angel   Secondary Decision Maker Phone Number [phone]   Secondary Decision Maker Relationship to Patient Adult child   Confirm Advance Directive None   Patient Would Like to Complete Advance Directive -       Other:    The palliative care team has discussed with patient / health care proxy about goals of care / treatment preferences for patient.  [====Goals of Care====]         HISTORY:     History obtained from: chart, family    CHIEF COMPLAINT: none at this time  \"I feel better\"    HPI/SUBJECTIVE:    The patient is:   Verbal but very limited in conversation  [] Verbal and participatory  [] Non-participatory due to:     Pt is an 79 y/o AAM w/ PMH noted above who presented to our ED via EMS w/ his wife w/ disorientation and abnl behavior. Pt apparently began having agitation the day prior to ED presentation. Pt also apparently fell sometime the night before. Pt has had frequent falls. Pt was given Haldol prior to EMS transport. He was tx'ed from [facility] [place] at [facility] on 09/24. Pt's seizures present typically w/ his staring and clenching of the hands and face. At time of this hospital admission, pt was aphasic and was unable to meaningfully communicate. This is apparently his 7th hospital admission since 06/2017.       Clinical Pain Assessment (nonverbal scale for severity on nonverbal patients):   [++++ Clinical Pain Assessment++++]  [++++Pain Severity++++]: Pain: 0  [++++Pain Character++++]:   [++++Pain Duration++++]:   [++++Pain Effect++++]:   [++++Pain Factors++++]:   [++++Pain Frequency++++]:   [++++Pain Location++++]:   [++++ Clinical Pain Assessment++++]  Duration: for how long has pt been experiencing pain (e.g., 2 days, 1 month, years)  Frequency: how often pain is an issue (e.g., several times per day, once every few days, constant)     FUNCTIONAL ASSESSMENT:     Palliative Performance Scale (PPS):  PPS: 30 PSYCHOSOCIAL/SPIRITUAL SCREENING:     Advance Care Planning:  Advance Care Planning 9/28/2017   Patient's Healthcare Decision Maker is: Legal Next of Jessy Copeland   Primary Decision Maker Name Rika Parrish   Primary Decision Maker Phone Number [phone]   Primary Decision Maker Relationship to Patient Spouse   Secondary Decision Maker Name Lula Rivera   Secondary Decision Maker Phone Number [phone]   Secondary Decision Maker Relationship to Patient Adult child   Confirm Advance Directive None   Patient Would Like to Complete Advance Directive -        Any spiritual / Jain concerns:  [] Yes /  [x] No    Caregiver Burnout:  [] Yes /  [x] No /  [] No Caregiver Present      Anticipatory grief assessment:   [x] Normal  / [] Maladaptive       ESAS Anxiety:      ESAS Depression:          REVIEW OF SYSTEMS:     Positive and pertinent negative findings in ROS are noted above in HPI. The following systems were [x] reviewed / [] unable to be reviewed as noted in HPI   But limited ROS    Other findings are noted below. Systems: constitutional, ears/nose/mouth/throat, respiratory, gastrointestinal, genitourinary, musculoskeletal, integumentary, neurologic, psychiatric, endocrine. Positive findings noted below. Modified ESAS Completed by: provider           Pain: 0           Dyspnea: 0           Stool Occurrence(s): 1        PHYSICAL EXAM:     From RN flowsheet:  Wt Readings from Last 3 Encounters:   10/04/17 134 lb 0.6 oz (60.8 kg)   09/22/17 134 lb 9.6 oz (61.1 kg)   08/22/17 144 lb 11.2 oz (65.6 kg)     Blood pressure 102/54, pulse 70, temperature 97.9 °F (36.6 °C), resp. rate 16, height 5' 8\" (1.727 m), weight 134 lb 0.6 oz (60.8 kg), SpO2 99 %.     Pain Scale 1: Adult Nonverbal Pain Scale  Pain Intensity 1: 0                 Gen: pt is sitting up in bed, in NAD  HEENT: NC/AT  Respiratory: breathing not labored, symmetric  Skin: warm, dry  Neurologic: awake, alert, follows basic commands  Speaks a few words in response to questions  Psychiatric: blunted affect  no apparent hallucinations  No obvious signs of agitation  Per Kellee criteria, pt is unable to demonstrate capacity regarding current medical treatment. HISTORY:     Active Problems:    Encephalopathy, unspecified (9/3/2012)      CVA (cerebral vascular accident) ([facility] [address] (9/3/2012)      Seizure disorder ([facility] [address] (9/3/2012)      Dysarthria (7/15/2017)      Dysphagia (7/15/2017)      Aspiration pneumonia ([facility] [address] (9/27/2017)      Past Medical History:   Diagnosis Date    Alcohol abuse, in remission     Seizures ([facility] [address]       Past Surgical History:   Procedure Laterality Date    COLONOSCOPY N/A 7/31/2017    COLONOSCOPY performed by Xenia Soriano MD at [address] [place] [address]  7/31/2017         PLACE PERCUT GASTROSTOMY TUBE  7/18/2017         UPPER GI ENDOSCOPY,DIAGNOSIS  7/31/2017           Family History   Problem Relation Age of Onset    Other Other      no strokes or seizures    Cancer Mother       History reviewed, no pertinent family history.   Social History   Substance Use Topics    Smoking status: Never Smoker    Smokeless tobacco: Never Used    Alcohol use No      Comment: Quit drinking 10 years ago     Allergies   Allergen Reactions    No Known Allergies       Current Facility-Administered Medications   Medication Dose Route Frequency    0.9% sodium chloride infusion 25 mL  25 mL IntraVENous PRN    QUEtiapine (SEROquel) tablet 25 mg  25 mg Per G Tube QHS    albuterol-ipratropium (DUO-NEB) 2.5 MG-0.5 MG/3 ML  3 mL Nebulization Q6H PRN    levETIRAcetam (KEPPRA) oral solution 1,500 mg  1,500 mg Per G Tube BID    valproic acid (as sodium salt) (DEPAKENE) 250 mg/5 mL (5 mL) oral solution 500 mg  500 mg Per G Tube Q8H    enoxaparin (LOVENOX) injection 40 mg  40 mg SubCUTAneous Q24H    aspirin chewable tablet 81 mg  81 mg Per G Tube DAILY    cyclobenzaprine (FLEXERIL) tablet 5 mg  5 mg Per G Tube TID PRN    haloperidol (HALDOL) tablet 2 mg  2 mg Per G Tube Q6H PRN    latanoprost (XALATAN) 0.005 % ophthalmic solution 1 Drop  1 Drop Both Eyes QPM    metoprolol tartrate (LOPRESSOR) tablet 12.5 mg  12.5 mg Per G Tube BID    pantoprazole (PROTONIX) granules for oral suspension 40 mg  40 mg Per G Tube ACB&D    pregabalin (LYRICA) capsule 300 mg  300 mg Oral BID    thiamine (B-1) tablet 100 mg  100 mg Per G Tube DAILY    acetaminophen (TYLENOL) tablet 650 mg  650 mg Per G Tube Q4H PRN    ondansetron (ZOFRAN) injection 4 mg  4 mg IntraVENous Q6H PRN    morphine injection 2 mg  2 mg IntraVENous Q4H PRN          LAB AND IMAGING FINDINGS:     Lab Results   Component Value Date/Time    WBC 3.9 10/04/2017 11:19 AM    HGB 13.2 10/04/2017 11:19 AM    PLATELET 128 70/92/1533 11:19 AM     Lab Results   Component Value Date/Time    Sodium 140 10/04/2017 11:19 AM    Potassium 4.2 10/04/2017 11:19 AM    Chloride 100 10/04/2017 11:19 AM    CO2 36 10/04/2017 11:19 AM    BUN 11 10/04/2017 11:19 AM    Creatinine 0.56 10/04/2017 11:19 AM    Calcium 9.4 10/04/2017 11:19 AM    Magnesium 1.5 10/04/2017 11:19 AM    Phosphorus 3.1 09/19/2017 01:53 AM      Lab Results   Component Value Date/Time    AST (SGOT) 15 10/04/2017 11:19 AM    Alk.  phosphatase 58 10/04/2017 11:19 AM    Protein, total 8.4 10/04/2017 11:19 AM    Albumin 3.1 10/04/2017 11:19 AM    Globulin 5.3 10/04/2017 11:19 AM    GGT 32 09/27/2017 06:22 PM     Lab Results   Component Value Date/Time    INR 1.2 09/15/2017 04:22 AM    Prothrombin time 12.6 09/15/2017 04:22 AM    aPTT 31.0 08/19/2017 03:14 PM      Lab Results   Component Value Date/Time    Iron 26 07/29/2017 04:20 AM    TIBC 179 07/29/2017 04:20 AM    Iron % saturation 15 07/29/2017 04:20 AM    Ferritin 913 07/29/2017 04:20 AM      Lab Results   Component Value Date/Time    pH 7.40 07/09/2017 02:29 PM    PCO2 49 07/09/2017 02:29 PM    PO2 127 07/09/2017 02:29 PM     No components found for: [place]   Lab Results   Component Value Date/Time     09/28/2017 02:06 AM    CK - MB 1.3 07/27/2017 09:02 AM                Total time: 70 min  Counseling / coordination time, spent as noted above: 50 min  > 50% counseling / coordination?: yes    Prolonged service was provided for  []30 min   []75 min in face to face time in the presence of the patient, spent as noted above. Time Start:   Time End:   Note: this can only be billed with 32764 (initial) or 91601 (follow up). If multiple start / stop times, list each separately.

## 2017-10-05 NOTE — PROGRESS NOTES
Nutrition Assessment:    INTERVENTIONS/RECOMMENDATIONS:   Jevity 1.5 @ 55 ml/hr + 150 ml H2O q 4   Provides: 1815 kcal, 77g pro and 1820 ml free water  Monitor BG    ASSESSMENT:   Chart reviewed. An additional evening bolus @ 9pm was added yesterday due to low BG however pt had another low BG this morning (BG results shown below). RN was unsure when morning bolus was administered due to shift change. Discussed with Dr. Harrison Bueno and she is okay with transition back to continuous TF to better control BG. Results for Holly Gant (MRN [id number]) as of 10/5/2017 08:48   Ref. Range 10/4/2017 18:24 10/5/2017 00:56 10/5/2017 06:08 10/5/2017 07:03 10/5/2017 07:31   GLUCOSE,FAST - POC Latest Ref Range: 65 - 100 mg/dL 93 118 (H) 75 59 (L) 155 (H)       Diet Order: NPO (Jevity 1.5, 200 ml 6x per day @ 6am, 9am, 12pm, 3pm, 6pm, 9pm)  % Eaten:  Patient Vitals for the past 72 hrs:   % Diet Eaten   10/02/17 1028 0 %     Pertinent Medications: [x]Reviewed: PPI, seroquel, thiamine  Pertinent Labs: [x]Reviewed:  B this am  Food Allergies: [x]NKFA  []Other   Last BM:  10/3  Edema:      []RUE   []LUE   []RLE   []LLE      Pressure Ulcer:      [] Stage I   [] Stage II   [] Stage III   [] Stage IV      Anthropometrics: Height: 5' 8\" (172.7 cm) Weight: 60.8 kg (134 lb 0.6 oz)    IBW (%IBW):   ( ) UBW (%UBW):   (  %)    BMI: Body mass index is 20.38 kg/(m^2). This BMI is indicative of:  []Underweight   [x]Normal   []Overweight   [] Obesity   [] Extreme Obesity (BMI>40)  Last Weight Metrics:  Weight Loss Metrics 10/4/2017 2017 2017 2017 2017 2017 3/21/2017   Today's Wt 134 lb 0.6 oz 134 lb 9.6 oz 144 lb 11.2 oz 150 lb 152 lb 3.2 oz 155 lb 166 lb   BMI 20.38 kg/m2 20.47 kg/m2 22 kg/m2 22.81 kg/m2 23.14 kg/m2 23.57 kg/m2 25.24 kg/m2       Estimated Nutrition Needs (Based on): 1806 Kcals/day (BMR 1297x1. 2(AF)+250) , 74 g (1.2 g/kg bw) Protein  Carbohydrate:  At Least 130 g/day  Fluids: 1806 mL/day or per primary team    NUTRITION DIAGNOSES:   Problem:  Swallowing difficulty      Etiology: related to current medical condition     Signs/Symptoms: as evidenced by NPO status and PEG tube present      NUTRITION INTERVENTIONS:    Enteral/Parenteral Nutrition: Modify rate, concentration, composition, and schedule                GOAL:   continue to meet >80% of ordered TF in 2-4 days    NUTRITION MONITORING AND EVALUATION      Food/Nutrient Intake Outcomes: Enteral/parenteral nutrition intake  Physical Signs/Symptoms Outcomes: Weight/weight change, Electrolyte and renal profile, GI profile, Glucose profile, GI    Previous Goal Met:   [x] Met              [] Progressing Towards Goal              [] Not Progressing Towards Goal   Previous Recommendations:   [x] Implemented          [] Not Implemented          [] Not Applicable    LEARNING NEEDS (Diet, Food/Nutrient-Drug Interaction):    [] None Identified   [] Identified and Education Provided/Documented   [] Identified and Pt declined/was not appropriate     Cultural, Denominational, OR Ethnic Dietary Needs:    [x] None Identified   [] Identified and Addressed     [x] Interdisciplinary Care Plan Reviewed/Documented    [x] Discharge Planning: continue TF   [] Participated in Interdisciplinary Rounds    NUTRITION RISK:    [] High              [x] Moderate           []  Low  []  Minimal/Uncompromised      Tiny Jones RDN  Pager [phone]  Weekend Pager [phone]

## 2017-10-05 NOTE — PROGRESS NOTES
Palliative Medicine     LCSW made supportive visit with Pt's wife/nok medical surrogate and also spoke with CM Cooper Chavez). Long-term plan which wife is working with CM:  Pt and spouse have financial savings which allow for private pay at a nursing facility or alternative residence that also accommodates peg tube needs. Pt's wife is uncertain about caring for Pt at their home with assistance from a private duty aide. Pt's wife is aware of option to protect part of community spouse assets through submission of document with [employer]. Palliative team met with pt's family today. Wife shared about her conversation with Dr. Jeff Hussein MD and Dr. Giovana Domínguez answered questions of dtr and sister. Education about CPR facts and all three present in agreement with DNAR decision. Pt's wife wants to ensure Pt's siblings all on board before signing DDNR order to protect pt outside of this hospitalization. Provided with blank DDNR order to review. Thank you for including Palliative Medicine in Mr. Mckeon's care.       Baldomero Hopkins, [address] ([id number])  Work cell: [phone]

## 2017-10-05 NOTE — CONSULTS
PROGRESS NOTE : 10/5/2017        Chief Complaint   Patient presents with    Altered mental status     increased over the past week, diagnosed with UTI last week    Fall     patient has been falling recently           [address] is a 80 y.o. male who presents to the hospital because of AMS    ROS  Not Obtainable    PMH  Past Medical History:   Diagnosis Date    Alcohol abuse, in remission     Seizures ([address]        FH  Family History   Problem Relation Age of Onset    Other Other      no strokes or seizures    Cancer Mother        SH  Social History     Social History    Marital status:      Spouse name: N/A    Number of children: N/A    Years of education: N/A     Social History Main Topics    Smoking status: Never Smoker    Smokeless tobacco: Never Used    Alcohol use No      Comment: Quit drinking 10 years ago   [place] [employer] Drug use: No    Sexual activity: Not Asked     Other Topics Concern    None     Social History Narrative       ALLERGIES  Allergies   Allergen Reactions    No Known Allergies      No current facility-administered medications on file prior to encounter. Current Outpatient Prescriptions on File Prior to Encounter   Medication Sig Dispense Refill    levETIRAcetam (KEPPRA) 100 mg/mL solution 1,500 mg by PEG Tube route two (2) times a day.  multivit w-min-ferrous gluconate (CEROVITE) 9 mg iron/15 mL oral liquid 15 mL by PEG Tube route daily.  pantoprazole (PROTONIX) 40 mg granules for oral suspension 40 mg by PEG Tube route Before breakfast and dinner.  pregabalin (LYRICA) 300 mg capsule 300 mg by Per G Tube route two (2) times a day.  QUEtiapine (SEROQUEL) 25 mg tablet 25 mg by PEG Tube route nightly.  thiamine (VITAMIN B-1) 100 mg tablet 100 mg by PEG Tube route daily.  valproic acid, as sodium salt, (DEPAKENE) 250 mg/5 mL (5 mL) soln oral solution 500 mg by PEG Tube route three (3) times daily.       latanoprost (XALATAN) 0.005 % ophthalmic solution Administer 1 Drop to both eyes every evening. 1 mL 1    acetaminophen (TYLENOL) 325 mg tablet 650 mg by PEG Tube route every six (6) hours as needed for Pain.  cyclobenzaprine (FLEXERIL) 5 mg tablet 5 mg by PEG Tube route three (3) times daily as needed for Muscle Spasm(s).  albuterol-ipratropium (DUO-NEB) 2.5 mg-0.5 mg/3 ml nebu 3 mL by Nebulization route every six (6) hours as needed. 30 Nebule 1       PHYSICAL EXAM  EXAMINATION:   Patient Vitals for the past 24 hrs:   Temp Pulse Resp BP SpO2   10/05/17 0745 97.8 °F (36.6 °C) 68 16 112/51 100 %   10/04/17 2255 98 °F (36.7 °C) 63 16 121/61 98 %   10/04/17 1518 97.9 °F (36.6 °C) 70 16 102/54 99 %        Neurological Examination:   Mental Status: He is awake and will answer questions with confused speech    Cranial Nerves:, Extraocular movements are full, Cannot check visual fields, face is symmetric, neck supple. Motor: Withdraws all 4 ext to pain    Sensation: See above    Reflexes: DTRs trace throughout. Plantar responses downgoing. Coordination/Cerebellar: NT    Gait: NT        Imaging review:  MRI Results (most recent): This SmartLink has not been configured with any valid records. MRI Results (most recent): FINDINGS:    There is no change of the abnormal cortical diffusion restriction throughout the  left parietal lobe, left occipital lobe and posterolateral left temporal lobe  with associated FLAIR hyperintense signal. As before, this pattern may be seen  in patients with seizure activity, unlikely encephalitis or ischemia given the  stability and lack of enhancement.      Enhancement is normal. There are no new sites of diffusion. There is no mass,  midline shift, hydrocephalus or extra-axial fluid collection.     IMPRESSION  IMPRESSION:   1. No change.   2. Left cerebral cortical diffusion restriction, with no enhancement, as  discussed.       EEG 10/3/17  IMPRESSION: This is an abnormal encephalogram due to at 8 Hz   slow-wave activity as the background. This can be caused by toxic   and/or metabolic encephalopathies. The absence of seizures on this   study does not exclude a diagnosis of epilepsy. Clinical correlation   advised.       HOME MEDS  Prior to Admission Medications   Prescriptions Last Dose Informant Patient Reported? Taking? QUEtiapine (SEROQUEL) 25 mg tablet 2017 at 2100 Transfer Papers Yes Yes   Si mg by PEG Tube route nightly. acetaminophen (TYLENOL) 325 mg tablet Not Taking at Unknown time Transfer Papers Yes No   Si mg by PEG Tube route every six (6) hours as needed for Pain. albuterol-ipratropium (DUO-NEB) 2.5 mg-0.5 mg/3 ml nebu Not Taking at Unknown time Transfer Papers No No   Sig: 3 mL by Nebulization route every six (6) hours as needed. aspirin 81 mg chewable tablet 2017 at 0900 Transfer Papers Yes Yes   Si mg by PEG Tube route daily. atorvastatin (LIPITOR) 40 mg tablet Unknown at Unknown time Transfer Papers Yes No   Si mg by PEG Tube route nightly. cephALEXin (KEFLEX) 250 mg/5 mL suspension 2017 at 1330 Transfer Papers Yes Yes   Sig: Take 10 mL by mouth every eight (8) hours. For 10 days. Start 17   cyclobenzaprine (FLEXERIL) 5 mg tablet Not Taking at Unknown time Transfer Papers Yes No   Si mg by PEG Tube route three (3) times daily as needed for Muscle Spasm(s).   haloperidol (HALDOL) 2 mg tablet 2017 at 1200 Transfer Papers Yes Yes   Si mg by PEG Tube route every six (6) hours as needed (agitation/combativeness). latanoprost (XALATAN) 0.005 % ophthalmic solution 2017 at 2100 Transfer Papers No Yes   Sig: Administer 1 Drop to both eyes every evening. levETIRAcetam (KEPPRA) 100 mg/mL solution 2017 at 0900 Transfer Papers Yes Yes   Si,500 mg by PEG Tube route two (2) times a day.    metoprolol tartrate (LOPRESSOR) 25 mg tablet 2017 at 0900 Transfer Papers Yes Yes   Si.5 mg by PEG Tube route two (2) times a day. multivit w-min-ferrous gluconate (CEROVITE) 9 mg iron/15 mL oral liquid 2017 at 0900 Transfer Papers Yes Yes   Sig: 15 mL by PEG Tube route daily. oxyCODONE (OXYIR) 5 mg capsule Not Taking at Unknown time Transfer Papers Yes No   Sig: Take 5 mg by mouth every four (4) hours as needed for Pain.   pantoprazole (PROTONIX) 40 mg granules for oral suspension 2017 at 0630 Transfer Papers Yes Yes   Si mg by PEG Tube route Before breakfast and dinner. pregabalin (LYRICA) 300 mg capsule 2017 at 0900 Transfer Papers Yes Yes   Si mg by Per G Tube route two (2) times a day. thiamine (VITAMIN B-1) 100 mg tablet 2017 at 0900 Transfer Papers Yes Yes   Si mg by PEG Tube route daily. valproic acid, as sodium salt, (DEPAKENE) 250 mg/5 mL (5 mL) soln oral solution 2017 at 1300 Transfer Papers Yes Yes   Si mg by PEG Tube route three (3) times daily. Facility-Administered Medications: None       CURRENT MEDS  Current Facility-Administered Medications   Medication Dose Route Frequency    QUEtiapine (SEROquel) tablet 25 mg  25 mg Per G Tube QHS    levETIRAcetam (KEPPRA) oral solution 1,500 mg  1,500 mg Per G Tube BID    valproic acid (as sodium salt) (DEPAKENE) 250 mg/5 mL (5 mL) oral solution 500 mg  500 mg Per G Tube Q8H    enoxaparin (LOVENOX) injection 40 mg  40 mg SubCUTAneous Q24H    aspirin chewable tablet 81 mg  81 mg Per G Tube DAILY    latanoprost (XALATAN) 0.005 % ophthalmic solution 1 Drop  1 Drop Both Eyes QPM    metoprolol tartrate (LOPRESSOR) tablet 12.5 mg  12.5 mg Per G Tube BID    pantoprazole (PROTONIX) granules for oral suspension 40 mg  40 mg Per G Tube ACB&D    pregabalin (LYRICA) capsule 300 mg  300 mg Oral BID    thiamine (B-1) tablet 100 mg  100 mg Per G Tube DAILY       IMPRESSION:RECOMMENDATIONS:  Zulma Ignacio is a 80 y.o. male who has had a considerable mental status deterioration over the last 3 months.  I suspect he has damaged his temporal and parietal lobes bilaterally fron temporal lobe status epilepticus, abnormal diffusion weighted MRI c/w that. Repeat MRI unchanged, EEG just slow c/w globally compromised brain, anticonvulsant levels pending. I do not think this is reversible. Gniny Hodge. Lexi Rosales MD  Neurologist    This note will not be viewable in [address]

## 2017-10-06 PROCEDURE — 74011250637 HC RX REV CODE- 250/637: Performed by: INTERNAL MEDICINE

## 2017-10-06 PROCEDURE — 65270000015 HC RM PRIVATE ONCOLOGY

## 2017-10-06 PROCEDURE — 74011250637 HC RX REV CODE- 250/637: Performed by: PSYCHIATRY & NEUROLOGY

## 2017-10-06 PROCEDURE — 97116 GAIT TRAINING THERAPY: CPT | Performed by: PHYSICAL THERAPIST

## 2017-10-06 PROCEDURE — 74011250636 HC RX REV CODE- 250/636: Performed by: INTERNAL MEDICINE

## 2017-10-06 PROCEDURE — 74011250637 HC RX REV CODE- 250/637: Performed by: NURSE PRACTITIONER

## 2017-10-06 PROCEDURE — 97530 THERAPEUTIC ACTIVITIES: CPT | Performed by: PHYSICAL THERAPIST

## 2017-10-06 RX ADMIN — Medication 100 MG: at 08:45

## 2017-10-06 RX ADMIN — PANTOPRAZOLE SODIUM 40 MG: 40 GRANULE, DELAYED RELEASE ORAL at 15:30

## 2017-10-06 RX ADMIN — LATANOPROST 1 DROP: 50 SOLUTION OPHTHALMIC at 23:44

## 2017-10-06 RX ADMIN — ENOXAPARIN SODIUM 40 MG: 40 INJECTION SUBCUTANEOUS at 23:25

## 2017-10-06 RX ADMIN — QUETIAPINE FUMARATE 25 MG: 25 TABLET ORAL at 23:24

## 2017-10-06 RX ADMIN — VALPROIC ACID 500 MG: 250 SOLUTION ORAL at 23:44

## 2017-10-06 RX ADMIN — LEVETIRACETAM 1500 MG: 100 SOLUTION ORAL at 23:25

## 2017-10-06 RX ADMIN — LEVETIRACETAM 1500 MG: 100 SOLUTION ORAL at 08:47

## 2017-10-06 RX ADMIN — VALPROIC ACID 500 MG: 250 SOLUTION ORAL at 15:29

## 2017-10-06 RX ADMIN — ASPIRIN 81 MG 81 MG: 81 TABLET ORAL at 08:45

## 2017-10-06 RX ADMIN — METOPROLOL TARTRATE 12.5 MG: 25 TABLET ORAL at 08:45

## 2017-10-06 RX ADMIN — METOPROLOL TARTRATE 12.5 MG: 25 TABLET ORAL at 23:24

## 2017-10-06 RX ADMIN — PREGABALIN 300 MG: 100 CAPSULE ORAL at 08:43

## 2017-10-06 RX ADMIN — PREGABALIN 300 MG: 100 CAPSULE ORAL at 23:24

## 2017-10-06 RX ADMIN — VALPROIC ACID 500 MG: 250 SOLUTION ORAL at 06:42

## 2017-10-06 RX ADMIN — PANTOPRAZOLE SODIUM 40 MG: 40 GRANULE, DELAYED RELEASE ORAL at 08:42

## 2017-10-06 RX ADMIN — HALOPERIDOL 2 MG: 2 TABLET ORAL at 23:36

## 2017-10-06 NOTE — PROGRESS NOTES
Problem: Mobility Impaired (Adult and Pediatric)  Goal: *Acute Goals and Plan of Care (Insert Text)  Physical Therapy Goals  Initiated 10/5/2017  1. Patient will move from supine to sit and sit to supine , scoot up and down and roll side to side in bed with modified independence within 7 day(s). 2. Patient will transfer from bed to chair and chair to bed with moderate assistance using the least restrictive device within 7 day(s). 3. Patient will perform sit to stand with minimal assistance/contact guard assist within 7 day(s). 4. Patient will ambulate with moderate assistance for 25 feet with the least restrictive device within 7 day(s). PHYSICAL THERAPY TREATMENT  Patient: Real Petty (11 y.o. male)  Date: 10/6/2017  Diagnosis: Aspiration pneumonia ([facility] [address] <principal problem not specified>       Precautions: Fall, Seizure  Chart, physical therapy assessment, plan of care and goals were reviewed. ASSESSMENT:  Patient continues to wake up. Today he is able to follow visual cues for UE and LE ex in the bed. Came to sitting EOB slowly with min assist of 1. Sat EOB and scooted forward to get his feet on the floor. Able to sit for a few minutes and maintain balance. Stood with HHA of 2. Posterior lean. Ambulated 4 feet forward to the window and able to put his hands on the sill and come fairly upright but then he announced he was urinating and needed to have a BM. Nursing was changing his sheets and able to get a [facility] for him and we hurriedly got him back and sitting on the [facility]. Required assist of 2 to hold him up for clean up and then he pivoted back to the bed. He returned to supine with minimal assist of 2 and was able to lift himself and reposition in the bed. Follow short direct commands but continues with confusion and poor motor planning. Requires assist of 2 for all OOB activities for safety as he is unpredictable in his mobility and has a posterior lean.  Not safe to be up in the chair so the bed was placed in the chair position. Progression toward goals:  [ ]      Improving appropriately and progressing toward goals  [X]      Improving slowly and progressing toward goals  [ ]      Not making progress toward goals and plan of care will be adjusted       PLAN:  Patient continues to benefit from skilled intervention to address the above impairments. Continue treatment per established plan of care. Discharge Recommendations:  Skilled Nursing Facility  Further Equipment Recommendations for Discharge:  none       SUBJECTIVE:   Patient stated I need to poopoo.       OBJECTIVE DATA SUMMARY:   Critical Behavior:  Neurologic State: Alert, Confused  Orientation Level: Oriented to person  Cognition: Decreased attention/concentration     Functional Mobility Training:  Bed Mobility:  Rolling: Minimum assistance;Assist x1;Additional time  Supine to Sit: Minimum assistance; Additional time;Assist x1  Sit to Supine: Moderate assistance;Assist x2; Additional time                       Transfers:  Sit to Stand: Moderate assistance;Assist x2; Additional time  Stand to Sit: Moderate assistance;Assist x2; Additional time                             Balance:  Sitting: Impaired  Sitting - Static: Good (unsupported)  Sitting - Dynamic: Fair (occasional)  Standing: Impaired  Standing - Static: Constant support; Fair  Standing - Dynamic : Poor  Ambulation/Gait Training:  Distance (ft): 5 Feet (ft)  Assistive Device: Gait belt (HHA of 2)  Ambulation - Level of Assistance: Assist x2;Maximum assistance        Gait Abnormalities: Decreased step clearance; Other (posterior lean)        Base of Support: Widened     Speed/Shannon: Shuffled  Step Length: Right shortened;Left shortened                                              Therapeutic Exercises:   5 x biceps curls, UE elevation, heel slides bilat. Pain:  Pain Scale 1: Numeric (0 - 10)  Pain Intensity 1: 0              Activity Tolerance:   Good.    Please refer to the flowsheet for vital signs taken during this treatment.   After treatment:   [ ] Patient left in no apparent distress sitting up in chair  [X] Patient left in no apparent distress in bed  [X] Call bell left within reach  [X] Nursing notified  [ ] Caregiver present  [X] Bed alarm activated      COMMUNICATION/COLLABORATION:   The patients plan of care was discussed with: Registered Nurse, Rehabilitation Attendant and Certified Nursing Assistant/Patient Care Technician     Laly Julian, PT   Time Calculation: 41 mins

## 2017-10-06 NOTE — PROGRESS NOTES
Oncology Nursing Communication Tool      7:38 PM  10/6/2017     Bedside and Verbal shift change report given to RICHARD Hill (incoming nurse) by Homar Bill (outgoing nurse) on Argenis Wynne a 80 y.o. male who was admitted on 9/27/2017  2:35 PM. Report included the following information SBAR, Kardex, Procedure Summary, Intake/Output, MAR, Accordion and Recent Results. Significant changes during shift: pt became more mobile today. Issues for physician to address: same as above. Code Status: DNR     Infections: No current active infections     Allergies: No known allergies     Current diet: DIET NPO Except Meds  DIET TUBE FEEDING       Pain Controlled [x] yes [] no   Bowel Movement [x] yes [] no   Last Bowel Movement (date) 10/6/17            Vital Signs:   Patient Vitals for the past 12 hrs:   Temp Pulse Resp BP SpO2   10/06/17 1330 98.3 °F (36.8 °C) 68 18 117/73 -   10/06/17 0815 97.7 °F (36.5 °C) 85 18 128/66 100 %      Intake & Output:     Intake/Output Summary (Last 24 hours) at 10/06/17 1938  Last data filed at 10/06/17 1530   Gross per 24 hour   Intake              400 ml   Output                0 ml   Net              400 ml      Laboratory Results:   No results found for this or any previous visit (from the past 12 hour(s)). Opportunity for questions and clarifications were given to the incoming nurse. Patient's bed is in low position, side rails x2, door open PRN, call bell within reach and patient not in distress.       Homar Bill

## 2017-10-06 NOTE — PROGRESS NOTES
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 Mild bibasilar atelectasis. - MRI brain 10/3 with No change. Left cerebral cortical diffusion restriction, with no enhancement. - EEG 10/3 abnormal encephalogram due to at 8 Hz slow-wave activity as the background. This can be caused by toxic and/or metabolic encephalopathies. - appreciate reconsult by neurology, per notes \"feel this is one of the rapidly progressive dementias which include Kristi Ras, frontotemporal dementia and Lewy Body dementia. Of the three, frontotemporal dementia seems most associated with seizures. None are treatable. \"  This has been discussed with wife/family. - con't seroquel  - PT/OT as tolerated, CM looking into if Pt has SNF days still available or if he has maximized his benefit  Hypoglycemia:  Intermittent hypoglycemia, better with continuous feeds  Seizure d/o:  Had been stable on lyrica and valproic acid in the past  - con't lyrica and VPA  - neuro consulted as above  - CT head and MRI as above  - EEG as above  Aspiration pneumonitis:  No PNA seen on CT  - CT chest  with unchanged area of scarring in the posterior upper lobe. Mild bibasilar atelectasis.   - completed 7 days Abx on 10/2  - continue with duonebs  Hypertension, benign/essential: more stable  - con't lopressor  - prn IV hydralazine  History of SVT  Peripheral neuropathy:  lyrica as above  Remote EtOH abuse:  Family confirmed no EtOH in 5 years  Dysphagia/dyarthria and GERD:  S/p PEG placement     - con't feeds as tolerated  - con't protonix  Hypokalemia, resolved       Code Status: DNR  Surrogate Decision Maker: wife Anup Smith [phone] or [phone]  DVT Prophylaxis: Lovenox     Subjective:     Chief Complaint / Reason for Physician Visit  Awake but remains confused. Discussed with RN events overnight. Review of Systems:  Symptom Y/N Comments  Symptom Y/N Comments   Fever/Chills    Chest Pain     Poor Appetite    Edema     Cough    Abdominal Pain     Sputum    Joint Pain     SOB/AL    Pruritis/Rash     Nausea/vomit    Tolerating PT/OT     Diarrhea    Tolerating Diet     Constipation    Other       Could NOT obtain due to: confusion     Objective:     VITALS:   Last 24hrs VS reviewed since prior progress note. Most recent are:  Patient Vitals for the past 24 hrs:   Temp Pulse Resp BP SpO2   10/06/17 0815 97.7 °F (36.5 °C) 85 18 128/66 100 %   10/05/17 1910 97.5 °F (36.4 °C) (!) 54 16 120/77 100 %   10/05/17 1415 97.6 °F (36.4 °C) (!) 58 18 102/48 98 %       Intake/Output Summary (Last 24 hours) at 10/06/17 1023  Last data filed at 10/05/17 1538   Gross per 24 hour   Intake              250 ml   Output                0 ml   Net              250 ml        PHYSICAL EXAM:  General: WD, WN. Alert, cooperative, no acute distress    EENT:  EOMI. Anicteric sclerae. MMM  Resp:  CTA bilaterally, no wheezing or rales. No accessory muscle use  CV:  Regular rhythm,  No edema  GI:  Soft, Non distended, Non tender.  +Bowel sounds  Neurologic:  Alert and oriented X 1, slurred speech  Psych:   Poor insight. Not anxious nor agitated  Skin:  No rashes.   No jaundice    Reviewed most current lab test results and cultures  YES  Reviewed most current radiology test results   YES  Review and summation of old records today    NO  Reviewed patient's current orders and MAR    YES  PMH/SH reviewed - no change compared to H&P  ________________________________________________________________________  Care Plan discussed with:    Comments   Patient x    Family      RN     Care Manager x    Consultant  x                      Multidiciplinary team rounds were held today with , nursing, pharmacist and clinical coordinator. Patient's plan of care was discussed; medications were reviewed and discharge planning was addressed. ________________________________________________________________________  Total NON critical care TIME:  25 Minutes    Total CRITICAL CARE TIME Spent:   Minutes non procedure based      Comments   >50% of visit spent in counseling and coordination of care x    ________________________________________________________________________  Mardell Cowden, MD     Procedures: see electronic medical records for all procedures/Xrays and details which were not copied into this note but were reviewed prior to creation of Plan. LABS:  I reviewed today's most current labs and imaging studies.   Pertinent labs include:  Recent Labs      10/04/17   1119   WBC  3.9*   HGB  13.2   HCT  38.4   PLT  197     Recent Labs      10/04/17   1119   NA  140   K  4.2   CL  100   CO2  36*   GLU  97   BUN  11   CREA  0.56*   CA  9.4   MG  1.5*   ALB  3.1*   TBILI  0.5   SGOT  15   ALT  18       Signed: Mardell Cowden, MD

## 2017-10-06 NOTE — CONSULTS
Palliative Medicine Consult  Buddy: [phone] ([phone])    Patient Name: Zulma Ignacio  YOB: 1934    Date of Initial Consult: 10/04/2017  Reason for Consult: care decisions  Requesting Provider: Dr. Tim Cerrato   Primary Care Physician: Mauricio Camilo MD      SUMMARY:   Zulma Ignacio is a 80 y.o. gentleman with PMH most significant for recurrent seizures, dysphagia s/p PEG tube placement who was admitted on 9/27/2017 for AMS. Current medical issues leading to Palliative Medicine involvement include: care decisions in setting of  Seizures  Aspiration pneumonitis  dysphagia  Debility  Weakness  Frequent admissions     PALLIATIVE DIAGNOSES:   1. Debility  2. Weakness  3. Frequent admissions  4. Counseling regarding goals of care and advance care planning     PLAN:   Brief Summary of Plan  -visited w/ pt in his rm. Wife Angelica Davila), sister Selin Horn), and dtr Wu Joiner) at bedside  -scheduled family meeting for 1700 was held a few min later  Miky Caroline (VÍCTOR) and I were present from our team  -we will continue to establish therapeutic alliance as well as provide psychosocial support    1. Goals of Care- confirmed  Family wishes for full, restorative treatment and all measures that support this w/ exception of NO to intubation if respiratory distress or failure outside of cardiopulmonary arrest.   This is w/ understanding that what can be provided medically could be limited by pt's condition. 2. Advance Care Planning- per active EMR order, pt's code status is DNAR/AND. No AMD on file. Surrogate decision maker is wife. 3. Information Sharing-   10/05/2017  We discussed pt's condition. We also went over what Dr. Lexi Rosales had shared w/ pt's wife. Family understands that pt's condition is unlikely to improve. They are adamant about not considering brain bx. We discussed \"DDNR\" form. Wife does not wish to sign until it is discussed with all the family.     Original forms were given to dtr, who took them on behalf of wife. Family will continue to work w/ CM on dispo planning. Please refer to Deepa Judd note from earlier today. Questions and concerns addressed. Supportive listening provided throughout family meeting. 10/04/2017  We discussed pt's current condition and events that have led to where we are now (over the past few months). We spoke about neurology's involvement as well as plan of care. Family demonstrated fair understanding of pt's condition. It was confirmed what family's wishes are regarding range of medical tx. See #1. Code status was not discussed. Wife has received M/A application from . She has started to complete some of it. We discussed dispo planning options. Family verbalized understanding. 4. Psychosocial Support- We will continue to support patient and family during this difficult hospitalization    5. Spiritual- at this time, there are no apparent spiritual needs or concerns. 6. Symptom Management- at this time, there does not appear to be symptom management for which our assistance is needed. I have discussed with Dr. Ginny Neville, pt's primary medical team attending. Time and input appreciated. I have discussed with Laura Blanchard, pt's bedside RN. Time and input appreciated. I have discussed with our palliative care IDT. Thank you for this consult that has provided us with the opportunity to be involved in this patient's care. We will continue to follow along with you. Should you have any questions or concerns prior to our next visit at the bedside, please do not hesitate to contact us at [phone]     Denys Gomez MD  Palliative Care Team     GOALS OF CARE / TREATMENT PREFERENCES:   [====Goals of Care====]  GOALS OF CARE:  Patient / health care proxy stated goals: full, restorative treatment and all measures that support this w/ exception of NO to intubation if respiratory distress or failure outside of cardiopulmonary arrest.   This is w/ understanding that what can be provided medically could be limited by pt's condition. TREATMENT PREFERENCES:   Code Status: DNR   Pt's code status is DO NOT ATTEMPT RESUSCITATION (DNAR) / ALLOW NATURAL DEATH (AND): If pt has cardiopulmonary arrest, then   NO to chest compressions  NO to cardiac shock  NO to ACLS meds  NO to intubation      Advance Care Planning:  Advance Care Planning 9/28/2017   Patient's Healthcare Decision Maker is: Legal Next of Jessy 69   Primary Decision Maker Name Lucinda Rivers   Primary Decision Maker Phone Number [phone]   Primary Decision Maker Relationship to Patient Spouse   Secondary Decision Maker Name Neel Camejo   Secondary Decision Maker Phone Number [phone]   Secondary Decision Maker Relationship to Patient Adult child   Confirm Advance Directive None   Patient Would Like to Complete Advance Directive -       Other:    The palliative care team has discussed with patient / health care proxy about goals of care / treatment preferences for patient.  [====Goals of Care====]         HISTORY:     History obtained from: chart, family, pt, bedside RN    CHIEF COMPLAINT: none at this time      HPI/SUBJECTIVE:    The patient is:   Verbal but very limited in conversation  [] Verbal and participatory  [] Non-participatory due to:     No o/n events or issues. Pt says he has had a good day. Family notes the same. Per bedside RN, no staff concerns at this time. Pt is an 81 y/o AAM w/ PMH noted above who presented to our ED via EMS w/ his wife w/ disorientation and abnl behavior. Pt apparently began having agitation the day prior to ED presentation. Pt also apparently fell sometime the night before. Pt has had frequent falls. Pt was given Haldol prior to EMS transport. He was tx'ed from UTI at [facility] on 09/24. Pt's seizures present typically w/ his staring and clenching of the hands and face.      At time of this hospital admission, pt was aphasic and was unable to meaningfully communicate. This is apparently his 7th hospital admission since 06/2017. Clinical Pain Assessment (nonverbal scale for severity on nonverbal patients):   [++++ Clinical Pain Assessment++++]  [++++Pain Severity++++]: Pain: 0  [++++Pain Character++++]:   [++++Pain Duration++++]:   [++++Pain Effect++++]:   [++++Pain Factors++++]:   [++++Pain Frequency++++]:   [++++Pain Location++++]:   [++++ Clinical Pain Assessment++++]  Duration: for how long has pt been experiencing pain (e.g., 2 days, 1 month, years)  Frequency: how often pain is an issue (e.g., several times per day, once every few days, constant)     FUNCTIONAL ASSESSMENT:     Palliative Performance Scale (PPS):  PPS: 30       PSYCHOSOCIAL/SPIRITUAL SCREENING:     Advance Care Planning:  Advance Care Planning 9/28/2017   Patient's Healthcare Decision Maker is: Legal Next of Jessy 69   Primary Decision Maker Name Albino Gonzalez   Primary Decision Maker Phone Number [phone]   Primary Decision Maker Relationship to Patient Spouse   Secondary Decision Maker Name Ted Peraza   Secondary Decision Maker Phone Number [phone]   Secondary Decision Maker Relationship to Patient Adult child   Confirm Advance Directive None   Patient Would Like to Complete Advance Directive -        Any spiritual / Yarsani concerns:  [] Yes /  [x] No    Caregiver Burnout:  [] Yes /  [x] No /  [] No Caregiver Present      Anticipatory grief assessment:   [x] Normal  / [] Maladaptive       ESAS Anxiety:      ESAS Depression:          REVIEW OF SYSTEMS:     Positive and pertinent negative findings in ROS are noted above in HPI. The following systems were [x] reviewed / [] unable to be reviewed as noted in HPI   But limited ROS    Other findings are noted below.   Systems: constitutional, ears/nose/mouth/throat, respiratory, gastrointestinal, genitourinary, musculoskeletal, integumentary, neurologic, psychiatric, endocrine. Positive findings noted below. Modified ESAS Completed by: provider           Pain: 0           Dyspnea: 0           Stool Occurrence(s): 1        PHYSICAL EXAM:     From RN flowsheet:  Wt Readings from Last 3 Encounters:   10/04/17 134 lb 0.6 oz (60.8 kg)   09/22/17 134 lb 9.6 oz (61.1 kg)   08/22/17 144 lb 11.2 oz (65.6 kg)     Blood pressure 120/77, pulse (!) 54, temperature 97.5 °F (36.4 °C), resp. rate 16, height 5' 8\" (1.727 m), weight 134 lb 0.6 oz (60.8 kg), SpO2 100 %. Pain Scale 1: Numeric (0 - 10)  Pain Intensity 1: 0                 Gen: pleasant, sitting up in bed, in NAD  HEENT: NC/AT  Respiratory: breathing not labored, symmetric  Skin: warm, dry  Neurologic: awake, alert, follows basic commands  Speaks a few words in response to questions  Psychiatric: blunted affect  no apparent hallucinations  No obvious signs of agitation  Per Kellee criteria, pt is unable to demonstrate capacity regarding current medical treatment. HISTORY:     Active Problems:    Encephalopathy, unspecified (9/3/2012)      CVA (cerebral vascular accident) ([address] (9/3/2012)      Seizure disorder ([address] (9/3/2012)      Dysarthria (7/15/2017)      Dysphagia (7/15/2017)      Aspiration pneumonia ([address] (9/27/2017)      Past Medical History:   Diagnosis Date    Alcohol abuse, in remission     Seizures ([address]       Past Surgical History:   Procedure Laterality Date    COLONOSCOPY N/A 7/31/2017    COLONOSCOPY performed by Mary Lou Nation MD at [address] [place] [address]  7/31/2017         PLACE PERCUT GASTROSTOMY TUBE  7/18/2017         UPPER GI ENDOSCOPY,DIAGNOSIS  7/31/2017           Family History   Problem Relation Age of Onset    Other Other      no strokes or seizures    Cancer Mother       History reviewed, no pertinent family history.   Social History   Substance Use Topics    Smoking status: Never Smoker    Smokeless tobacco: Never Used    Alcohol use No      Comment: Quit drinking 10 years ago     Allergies   Allergen Reactions    No Known Allergies       Current Facility-Administered Medications   Medication Dose Route Frequency    0.9% sodium chloride infusion 25 mL  25 mL IntraVENous PRN    QUEtiapine (SEROquel) tablet 25 mg  25 mg Per G Tube QHS    albuterol-ipratropium (DUO-NEB) 2.5 MG-0.5 MG/3 ML  3 mL Nebulization Q6H PRN    levETIRAcetam (KEPPRA) oral solution 1,500 mg  1,500 mg Per G Tube BID    valproic acid (as sodium salt) (DEPAKENE) 250 mg/5 mL (5 mL) oral solution 500 mg  500 mg Per G Tube Q8H    enoxaparin (LOVENOX) injection 40 mg  40 mg SubCUTAneous Q24H    aspirin chewable tablet 81 mg  81 mg Per G Tube DAILY    cyclobenzaprine (FLEXERIL) tablet 5 mg  5 mg Per G Tube TID PRN    haloperidol (HALDOL) tablet 2 mg  2 mg Per G Tube Q6H PRN    latanoprost (XALATAN) 0.005 % ophthalmic solution 1 Drop  1 Drop Both Eyes QPM    metoprolol tartrate (LOPRESSOR) tablet 12.5 mg  12.5 mg Per G Tube BID    pantoprazole (PROTONIX) granules for oral suspension 40 mg  40 mg Per G Tube ACB&D    pregabalin (LYRICA) capsule 300 mg  300 mg Oral BID    thiamine (B-1) tablet 100 mg  100 mg Per G Tube DAILY    acetaminophen (TYLENOL) tablet 650 mg  650 mg Per G Tube Q4H PRN    ondansetron (ZOFRAN) injection 4 mg  4 mg IntraVENous Q6H PRN    morphine injection 2 mg  2 mg IntraVENous Q4H PRN          LAB AND IMAGING FINDINGS:     Lab Results   Component Value Date/Time    WBC 3.9 10/04/2017 11:19 AM    HGB 13.2 10/04/2017 11:19 AM    PLATELET 216 28/28/7212 11:19 AM     Lab Results   Component Value Date/Time    Sodium 140 10/04/2017 11:19 AM    Potassium 4.2 10/04/2017 11:19 AM    Chloride 100 10/04/2017 11:19 AM    CO2 36 10/04/2017 11:19 AM    BUN 11 10/04/2017 11:19 AM    Creatinine 0.56 10/04/2017 11:19 AM    Calcium 9.4 10/04/2017 11:19 AM    Magnesium 1.5 10/04/2017 11:19 AM    Phosphorus 3.1 09/19/2017 01:53 AM      Lab Results   Component Value Date/Time    AST (SGOT) 15 10/04/2017 11:19 AM    Alk. phosphatase 58 10/04/2017 11:19 AM    Protein, total 8.4 10/04/2017 11:19 AM    Albumin 3.1 10/04/2017 11:19 AM    Globulin 5.3 10/04/2017 11:19 AM    GGT 32 09/27/2017 06:22 PM     Lab Results   Component Value Date/Time    INR 1.2 09/15/2017 04:22 AM    Prothrombin time 12.6 09/15/2017 04:22 AM    aPTT 31.0 08/19/2017 03:14 PM      Lab Results   Component Value Date/Time    Iron 26 07/29/2017 04:20 AM    TIBC 179 07/29/2017 04:20 AM    Iron % saturation 15 07/29/2017 04:20 AM    Ferritin 913 07/29/2017 04:20 AM      Lab Results   Component Value Date/Time    pH 7.40 07/09/2017 02:29 PM    PCO2 49 07/09/2017 02:29 PM    PO2 127 07/09/2017 02:29 PM     No components found for: [place]   Lab Results   Component Value Date/Time     09/28/2017 02:06 AM    CK - MB 1.3 07/27/2017 09:02 AM                Total time: 55 min  Counseling / coordination time, spent as noted above: 40 min  > 50% counseling / coordination?: yes    Prolonged service was provided for  []30 min   []75 min in face to face time in the presence of the patient, spent as noted above. Time Start:   Time End:   Note: this can only be billed with 60485 (initial) or 47820 (follow up). If multiple start / stop times, list each separately.

## 2017-10-06 NOTE — PROGRESS NOTES
Brief Progress Note    Chart reviewed. D/w Roderick Rodriguez pt's bedside RN. Time and input appreciated. If wife is ready to sign \"DDNR\" form, please inform our team or a provider (if it is over the weekend) so it can be signed and completed. Should you have questions/concerns or the need for a bedside visit by our team, please do not hesitate to contact us at [phone] Thank you for providing us w/ the continued opportunity to be involved in this patient's care.       Raji Medeiros MD  Palliative Care Team

## 2017-10-06 NOTE — PROGRESS NOTES
Explained to pt's. Wife that we are still waiting for auth. From [facility]. It may come in on Sat. -- Told her that, if he goes to [facility], he may not get to stay long, or [employer] may deny him. I talked to her about private adult homes, since pt. cannot qualify for Medicaid,  until they do a spend down. I told her that it is hard to find an adult home that will take a PEG tube, but [facility] will. I gave her the address and phone no. and advised her to call him and visit. She states she will go on Sat. I told her he had an opening ,as of yesterday, and encouraged her to check the place out. CM will follow up. NATALIE Russell,[facility]--[phone]

## 2017-10-07 PROCEDURE — 74011250636 HC RX REV CODE- 250/636: Performed by: INTERNAL MEDICINE

## 2017-10-07 PROCEDURE — 65270000015 HC RM PRIVATE ONCOLOGY

## 2017-10-07 PROCEDURE — 74011250637 HC RX REV CODE- 250/637: Performed by: NURSE PRACTITIONER

## 2017-10-07 PROCEDURE — 74011250637 HC RX REV CODE- 250/637: Performed by: PSYCHIATRY & NEUROLOGY

## 2017-10-07 PROCEDURE — 74011250637 HC RX REV CODE- 250/637: Performed by: INTERNAL MEDICINE

## 2017-10-07 RX ORDER — HALOPERIDOL 5 MG/ML
5 INJECTION INTRAMUSCULAR ONCE
Status: COMPLETED | OUTPATIENT
Start: 2017-10-07 | End: 2017-10-07

## 2017-10-07 RX ADMIN — VALPROIC ACID 500 MG: 250 SOLUTION ORAL at 21:37

## 2017-10-07 RX ADMIN — ASPIRIN 81 MG 81 MG: 81 TABLET ORAL at 08:33

## 2017-10-07 RX ADMIN — VALPROIC ACID 500 MG: 250 SOLUTION ORAL at 06:32

## 2017-10-07 RX ADMIN — PANTOPRAZOLE SODIUM 40 MG: 40 GRANULE, DELAYED RELEASE ORAL at 15:30

## 2017-10-07 RX ADMIN — MORPHINE SULFATE 2 MG: 4 INJECTION, SOLUTION INTRAMUSCULAR; INTRAVENOUS at 01:30

## 2017-10-07 RX ADMIN — VALPROIC ACID 500 MG: 250 SOLUTION ORAL at 15:30

## 2017-10-07 RX ADMIN — LATANOPROST 1 DROP: 50 SOLUTION OPHTHALMIC at 21:36

## 2017-10-07 RX ADMIN — Medication 100 MG: at 08:34

## 2017-10-07 RX ADMIN — METOPROLOL TARTRATE 12.5 MG: 25 TABLET ORAL at 08:34

## 2017-10-07 RX ADMIN — METOPROLOL TARTRATE 12.5 MG: 25 TABLET ORAL at 21:36

## 2017-10-07 RX ADMIN — LEVETIRACETAM 1500 MG: 100 SOLUTION ORAL at 08:33

## 2017-10-07 RX ADMIN — PREGABALIN 300 MG: 100 CAPSULE ORAL at 21:36

## 2017-10-07 RX ADMIN — HALOPERIDOL LACTATE 5 MG: 5 INJECTION, SOLUTION INTRAMUSCULAR at 20:49

## 2017-10-07 RX ADMIN — MORPHINE SULFATE 2 MG: 4 INJECTION, SOLUTION INTRAMUSCULAR; INTRAVENOUS at 20:37

## 2017-10-07 RX ADMIN — PANTOPRAZOLE SODIUM 40 MG: 40 GRANULE, DELAYED RELEASE ORAL at 08:34

## 2017-10-07 RX ADMIN — LEVETIRACETAM 1500 MG: 100 SOLUTION ORAL at 21:36

## 2017-10-07 RX ADMIN — HALOPERIDOL 2 MG: 2 TABLET ORAL at 21:36

## 2017-10-07 RX ADMIN — ENOXAPARIN SODIUM 40 MG: 40 INJECTION SUBCUTANEOUS at 23:06

## 2017-10-07 RX ADMIN — PREGABALIN 300 MG: 100 CAPSULE ORAL at 08:33

## 2017-10-07 RX ADMIN — QUETIAPINE FUMARATE 25 MG: 25 TABLET ORAL at 21:36

## 2017-10-07 NOTE — PROGRESS NOTES
Oncology Nursing Communication Tool      7:21 PM  10/7/2017     Bedside shift change report given to RICHARD Hill (incoming nurse) by Farida Keith (outgoing nurse) on Geno Oakley a 80 y.o. male who was admitted on 9/27/2017  2:35 PM. Report included the following information SBAR, Kardex, Intake/Output, MAR and Recent Results. Significant changes during shift:       Issues for physician to address:             Code Status: DNR     Infections: No current active infections     Allergies: No known allergies     Current diet: DIET NPO Except Meds  DIET TUBE FEEDING       Pain Controlled [] yes [] no   Bowel Movement [] yes [] no   Last Bowel Movement (date)     10/7/17            Vital Signs:   Patient Vitals for the past 12 hrs:   Temp Pulse Resp BP SpO2   10/07/17 1330 97.7 °F (36.5 °C) 69 18 146/66 95 %   10/07/17 0836 - 69 - 116/66 -   10/07/17 0800 97.7 °F (36.5 °C) 61 18 101/53 96 %      Intake & Output:     Intake/Output Summary (Last 24 hours) at 10/07/17 1921  Last data filed at 10/07/17 1600   Gross per 24 hour   Intake              520 ml   Output                0 ml   Net              520 ml      Laboratory Results:   No results found for this or any previous visit (from the past 12 hour(s)). Opportunity for questions and clarifications were given to the incoming nurse. Patient's bed is in low position, side rails x2, door open PRN, call bell within reach and patient not in distress.       Farida Keith

## 2017-10-07 NOTE — PROGRESS NOTES
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 Mild bibasilar atelectasis. - MRI brain 10/3 with No change.  Left cerebral cortical diffusion restriction, with no enhancement. - EEG 10/3 abnormal encephalogram due to at 8 Hz slow-wave activity as the background. This can be caused by toxic and/or metabolic encephalopathies. - appreciate reconsult by neurology, per notes \"feel this is one of the rapidly progressive dementias which include John [place], frontotemporal dementia and Lewy Body dementia. Of the three, frontotemporal dementia seems most associated with seizures. None are treatable. \"  This has been discussed with wife/family. - con't seroquel  - PT/OT as tolerated, CM looking into if Pt has SNF days still available or if he has maximized his benefit. Have asked them to check today. If no SNF days, wife has been advised that she will need to private pay for group home. He does not currently qualify for Medicaid based on finances. Hypoglycemia:  Intermittent hypoglycemia, better with continuous feeds  Seizure d/o:  Had been stable on lyrica and valproic acid in the past  - con't lyrica and VPA  - neuro consulted as above  - CT head and MRI as above  - EEG as above  Aspiration pneumonitis:  No PNA seen on CT  - CT chest  with unchanged area of scarring in the posterior upper lobe. Mild bibasilar atelectasis.   - completed 7 days Abx on 10/2  - continue with duonebs  Hypertension, benign/essential: more stable  - con't lopressor  - prn IV hydralazine  History of SVT  Peripheral neuropathy:  lyrica as above  Remote EtOH abuse:  Family confirmed no EtOH in 5 years  Dysphagia/dyarthria and GERD:  S/p PEG placement 6/17    - con't feeds as tolerated  - con't protonix  Hypokalemia, [employer]  Code Status: DNR  Surrogate Decision Maker: wife Rex Rubio [phone] or [phone]  DVT Prophylaxis: Lovenox     Subjective:     Chief Complaint / Reason for Physician Visit  Awake, answers some simple questions. Discussed with RN events overnight. Review of Systems:  Symptom Y/N Comments  Symptom Y/N Comments   Fever/Chills    Chest Pain     Poor Appetite    Edema     Cough    Abdominal Pain     Sputum    Joint Pain     SOB/AL    Pruritis/Rash     Nausea/vomit    Tolerating PT/OT     Diarrhea    Tolerating Diet     Constipation    Other       Could NOT obtain due to: encephalopathy     Objective:     VITALS:   Last 24hrs VS reviewed since prior progress note. Most recent are:  Patient Vitals for the past 24 hrs:   Temp Pulse Resp BP SpO2   10/07/17 0836 - 69 - 116/66 -   10/07/17 0800 97.7 °F (36.5 °C) 61 18 101/53 96 %   10/06/17 2347 98.3 °F (36.8 °C) 69 18 103/51 96 %   10/06/17 2056 97.5 °F (36.4 °C) 73 18 141/66 97 %   10/06/17 1330 98.3 °F (36.8 °C) 68 18 117/73 -       Intake/Output Summary (Last 24 hours) at 10/07/17 1254  Last data filed at 10/06/17 2056   Gross per 24 hour   Intake              300 ml   Output                0 ml   Net              300 ml        PHYSICAL EXAM:  General: WD, WN. Alert, cooperative with some simple commands, no acute distress    EENT:  EOMI. Anicteric sclerae. MMM  Resp:  CTA bilaterally, no wheezing or rales. No accessory muscle use  CV:  Regular rhythm,  No edema  GI:  Soft, Non distended, Non tender.  +Bowel sounds  Neurologic:  Alert and oriented X 2, simple speech intact,   Psych:   Poor insight. Not anxious nor agitated  Skin:  No rashes.   No jaundice    Reviewed most current lab test results and cultures  YES  Reviewed most current radiology test results   YES  Review and summation of old records today    NO  Reviewed patient's current orders and MAR    YES  PMH/SH reviewed - no change compared to H&P  ________________________________________________________________________  Care Plan discussed with:    Comments   Patient x    Family      RN     Care Manager x    Consultant                        Multidiciplinary team rounds were held today with , nursing, pharmacist and clinical coordinator. Patient's plan of care was discussed; medications were reviewed and discharge planning was addressed. ________________________________________________________________________  Total NON critical care TIME:  20 Minutes    Total CRITICAL CARE TIME Spent:   Minutes non procedure based      Comments   >50% of visit spent in counseling and coordination of care x    ________________________________________________________________________  Faiza Desouza MD     Procedures: see electronic medical records for all procedures/Xrays and details which were not copied into this note but were reviewed prior to creation of Plan. LABS:  I reviewed today's most current labs and imaging studies. Pertinent labs include:  No results for input(s): WBC, HGB, HCT, PLT, HGBEXT, HCTEXT, PLTEXT in the last 72 hours. No results for input(s): NA, K, CL, CO2, GLU, BUN, CREA, CA, MG, PHOS, ALB, TBIL, TBILI, SGOT, ALT, INR in the last 72 hours.     No lab exists for component: INREXT    Signed: Faiza Desouza MD

## 2017-10-08 LAB — LEVETIRACETAM SERPL-MCNC: 98.9 UG/ML (ref 10–40)

## 2017-10-08 PROCEDURE — 65270000015 HC RM PRIVATE ONCOLOGY

## 2017-10-08 PROCEDURE — 77030018798 HC PMP KT ENTRL FED COVD -A

## 2017-10-08 PROCEDURE — 74011250636 HC RX REV CODE- 250/636: Performed by: INTERNAL MEDICINE

## 2017-10-08 PROCEDURE — 74011250637 HC RX REV CODE- 250/637: Performed by: INTERNAL MEDICINE

## 2017-10-08 PROCEDURE — 74011250637 HC RX REV CODE- 250/637: Performed by: NURSE PRACTITIONER

## 2017-10-08 RX ORDER — HALOPERIDOL 5 MG/ML
5 INJECTION INTRAMUSCULAR ONCE
Status: COMPLETED | OUTPATIENT
Start: 2017-10-08 | End: 2017-10-08

## 2017-10-08 RX ORDER — QUETIAPINE FUMARATE 25 MG/1
25 TABLET, FILM COATED ORAL
Status: DISCONTINUED | OUTPATIENT
Start: 2017-10-08 | End: 2017-10-08

## 2017-10-08 RX ORDER — QUETIAPINE FUMARATE 25 MG/1
50 TABLET, FILM COATED ORAL
Status: DISCONTINUED | OUTPATIENT
Start: 2017-10-08 | End: 2017-10-14 | Stop reason: HOSPADM

## 2017-10-08 RX ORDER — HALOPERIDOL 5 MG/ML
2 INJECTION INTRAMUSCULAR
Status: DISCONTINUED | OUTPATIENT
Start: 2017-10-08 | End: 2017-10-09

## 2017-10-08 RX ADMIN — ENOXAPARIN SODIUM 40 MG: 40 INJECTION SUBCUTANEOUS at 22:05

## 2017-10-08 RX ADMIN — QUETIAPINE FUMARATE 50 MG: 25 TABLET ORAL at 20:17

## 2017-10-08 RX ADMIN — MORPHINE SULFATE 2 MG: 4 INJECTION, SOLUTION INTRAMUSCULAR; INTRAVENOUS at 03:08

## 2017-10-08 RX ADMIN — HALOPERIDOL 2 MG: 2 TABLET ORAL at 20:36

## 2017-10-08 RX ADMIN — VALPROIC ACID 500 MG: 250 SOLUTION ORAL at 15:54

## 2017-10-08 RX ADMIN — HALOPERIDOL 2 MG: 2 TABLET ORAL at 12:53

## 2017-10-08 RX ADMIN — HALOPERIDOL LACTATE 2 MG: 5 INJECTION, SOLUTION INTRAMUSCULAR at 19:58

## 2017-10-08 RX ADMIN — ASPIRIN 81 MG 81 MG: 81 TABLET ORAL at 08:09

## 2017-10-08 RX ADMIN — METOPROLOL TARTRATE 12.5 MG: 25 TABLET ORAL at 20:18

## 2017-10-08 RX ADMIN — LEVETIRACETAM 1500 MG: 100 SOLUTION ORAL at 20:28

## 2017-10-08 RX ADMIN — LEVETIRACETAM 1500 MG: 100 SOLUTION ORAL at 08:09

## 2017-10-08 RX ADMIN — METOPROLOL TARTRATE 12.5 MG: 25 TABLET ORAL at 08:09

## 2017-10-08 RX ADMIN — LATANOPROST 1 DROP: 50 SOLUTION OPHTHALMIC at 21:12

## 2017-10-08 RX ADMIN — VALPROIC ACID 500 MG: 250 SOLUTION ORAL at 21:12

## 2017-10-08 RX ADMIN — PANTOPRAZOLE SODIUM 40 MG: 40 GRANULE, DELAYED RELEASE ORAL at 08:09

## 2017-10-08 RX ADMIN — Medication 100 MG: at 08:09

## 2017-10-08 RX ADMIN — PREGABALIN 300 MG: 100 CAPSULE ORAL at 08:09

## 2017-10-08 RX ADMIN — PREGABALIN 300 MG: 100 CAPSULE ORAL at 20:17

## 2017-10-08 RX ADMIN — VALPROIC ACID 500 MG: 250 SOLUTION ORAL at 06:26

## 2017-10-08 RX ADMIN — HALOPERIDOL LACTATE 5 MG: 5 INJECTION, SOLUTION INTRAMUSCULAR at 15:03

## 2017-10-08 RX ADMIN — MORPHINE SULFATE 2 MG: 4 INJECTION, SOLUTION INTRAMUSCULAR; INTRAVENOUS at 00:42

## 2017-10-08 RX ADMIN — MORPHINE SULFATE 2 MG: 4 INJECTION, SOLUTION INTRAMUSCULAR; INTRAVENOUS at 20:28

## 2017-10-08 RX ADMIN — MORPHINE SULFATE 2 MG: 4 INJECTION, SOLUTION INTRAMUSCULAR; INTRAVENOUS at 12:52

## 2017-10-08 RX ADMIN — HALOPERIDOL 2 MG: 2 TABLET ORAL at 03:01

## 2017-10-08 RX ADMIN — MORPHINE SULFATE 2 MG: 4 INJECTION, SOLUTION INTRAMUSCULAR; INTRAVENOUS at 17:27

## 2017-10-08 RX ADMIN — PANTOPRAZOLE SODIUM 40 MG: 40 GRANULE, DELAYED RELEASE ORAL at 15:54

## 2017-10-08 NOTE — PROGRESS NOTES
Oncology Nursing Communication Tool      7:35 PM  10/8/2017     Bedside shift change report given to RICHARD Hill (incoming nurse) by Boo Dueñas (outgoing nurse) on [facility] a 80 y.o. male who was admitted on 9/27/2017  2:35 PM. Report included the following information SBAR, Kardex, Intake/Output, MAR and Recent Results. Significant changes during shift:       Issues for physician to address:             Code Status: DNR     Infections: No current active infections     Allergies: No known allergies     Current diet: DIET NPO Except Meds  DIET TUBE FEEDING       Pain Controlled [] yes [] no   Bowel Movement [] yes [] no   Last Bowel Movement (date)                 Vital Signs:   No data found. Intake & Output:     Intake/Output Summary (Last 24 hours) at 10/08/17 1935  Last data filed at 10/08/17 1500   Gross per 24 hour   Intake              530 ml   Output                0 ml   Net              530 ml      Laboratory Results:   No results found for this or any previous visit (from the past 12 hour(s)). Opportunity for questions and clarifications were given to the incoming nurse. Patient's bed is in low position, side rails x2, door open PRN, call bell within reach and patient not in distress.       Boo Dueñas

## 2017-10-08 NOTE — PROGRESS NOTES
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 Mild bibasilar atelectasis. - MRI brain 10/3 with No change.  Left cerebral cortical diffusion restriction, with no enhancement. - EEG 10/3 abnormal encephalogram due to at 8 Hz slow-wave activity as the background. This can be caused by toxic and/or metabolic encephalopathies. - appreciate reconsult by neurology, per notes \"feel this is one of the rapidly progressive dementias which include Ninetta Messing, frontotemporal dementia and Lewy Body dementia. Of the three, frontotemporal dementia seems most associated with seizures. None are treatable. \"  This has been discussed with wife/family. - increase seroquel  - haldol adjusted  - PT/OT as tolerated, CM looking into if Pt has SNF days still available or if he has maximized his benefit. Have asked them to check today. If no SNF days, wife has been advised that she will need to private pay for group home. He does not currently qualify for Medicaid based on finances. Seizure d/o:  Had been stable on lyrica and valproic acid in the past.  No e/o new seizure with encephalopathy. - con't lyrica and VPA  - neuro consulted as above  - CT head and MRI as above. EEG as above. Aspiration pneumonitis:  No PNA seen on CT  - CT chest  with unchanged area of scarring in the posterior upper lobe. Mild bibasilar atelectasis.   - completed 7 days Abx on 10/2  - continue with duonebs  Hypoglycemia:  Intermittent hypoglycemia, better with continuous feeds  Hypertension, benign/essential: more stable  - con't lopressor  - prn IV hydralazine  History of SVT  Peripheral neuropathy:  lyrica as above  Remote EtOH abuse:  Family confirmed no EtOH in 5 years  Dysphagia/dyarthria and GERD:  S/p PEG placement 6/17    - con't feeds as tolerated  - con't protonix  Hypokalemia, resolved        Code Status: DNR  Surrogate Decision Maker: wife River Mendez [id number] or [phone]  DVT Prophylaxis: Lovenox     Subjective:     Chief Complaint / Reason for Physician Visit  Calm but confused. Discussed with RN events overnight. Review of Systems:  Symptom Y/N Comments  Symptom Y/N Comments   Fever/Chills    Chest Pain     Poor Appetite    Edema     Cough    Abdominal Pain     Sputum    Joint Pain     SOB/AL    Pruritis/Rash     Nausea/vomit    Tolerating PT/OT     Diarrhea    Tolerating Diet     Constipation    Other       Could NOT obtain due to: encephalopathy     Objective:     VITALS:   Last 24hrs VS reviewed since prior progress note. Most recent are:  Patient Vitals for the past 24 hrs:   Temp Pulse Resp BP SpO2   10/08/17 0624 97.7 °F (36.5 °C) 64 16 120/63 93 %   10/07/17 2305 97.5 °F (36.4 °C) 71 18 126/61 97 %   10/07/17 2003 97.5 °F (36.4 °C) 80 19 139/63 100 %   10/07/17 1330 97.7 °F (36.5 °C) 69 18 146/66 95 %       Intake/Output Summary (Last 24 hours) at 10/08/17 1048  Last data filed at 10/07/17 2230   Gross per 24 hour   Intake              542 ml   Output                0 ml   Net              542 ml        PHYSICAL EXAM:  General: WD, WN. Alert, not cooperative, no acute distress    EENT:  EOMI. Anicteric sclerae. MMM  Resp:  CTA bilaterally, no wheezing or rales. No accessory muscle use  CV:  Regular rhythm,  No edema  GI:  Soft, Non distended, Non tender.  +Bowel sounds  Neurologic:  Alert and oriented X 1, minimal single-word speech,   Psych:   No insight. Not anxious nor agitated  Skin:  No rashes.   No jaundice    Reviewed most current lab test results and cultures  YES  Reviewed most current radiology test results   YES  Review and summation of old records today NO  Reviewed patient's current orders and MAR    YES  PMH/SH reviewed - no change compared to H&P  ________________________________________________________________________  Care Plan discussed with:    Comments   Patient x    Family      RN x    Care Manager     Consultant                        Multidiciplinary team rounds were held today with , nursing, pharmacist and clinical coordinator. Patient's plan of care was discussed; medications were reviewed and discharge planning was addressed. ________________________________________________________________________  Total NON critical care TIME:  20 Minutes    Total CRITICAL CARE TIME Spent:   Minutes non procedure based      Comments   >50% of visit spent in counseling and coordination of care x    ________________________________________________________________________  Sacha Diaz MD     Procedures: see electronic medical records for all procedures/Xrays and details which were not copied into this note but were reviewed prior to creation of Plan. LABS:  I reviewed today's most current labs and imaging studies. Pertinent labs include:  No results for input(s): WBC, HGB, HCT, PLT, HGBEXT, HCTEXT, PLTEXT in the last 72 hours. No results for input(s): NA, K, CL, CO2, GLU, BUN, CREA, CA, MG, PHOS, ALB, TBIL, TBILI, SGOT, ALT, INR in the last 72 hours.     No lab exists for component: INREXT    Signed: Sacha Diaz MD

## 2017-10-08 NOTE — PROGRESS NOTES
1300:  Patient increasing agitation, trying to get out of bed, becoming combative with staff attempting to keep him from getting up. Hitting and kicking at staff, patient had been incontinent of urine in brief. Brief changed, patient cleaned, repositioned in bed. PRN doses of Haldol and Morphine needed for patient safety. 1500:  Patient again combative, confused, attempting to get up, worse than before. Patient cleaned and repositioned, no effect. Order obtained from Dr. Isha Carl for IM Haldol, dose given. Patient finally calmed. Wife and family arrived to room and sat with patient for about an hour.

## 2017-10-09 PROBLEM — R45.1 AGITATION: Status: ACTIVE | Noted: 2017-10-09

## 2017-10-09 LAB
ANION GAP SERPL CALC-SCNC: 6 MMOL/L (ref 5–15)
BASOPHILS # BLD: 0 K/UL (ref 0–0.1)
BASOPHILS NFR BLD: 0 % (ref 0–1)
BUN SERPL-MCNC: 27 MG/DL (ref 6–20)
BUN/CREAT SERPL: 35 (ref 12–20)
CALCIUM SERPL-MCNC: 8.8 MG/DL (ref 8.5–10.1)
CHLORIDE SERPL-SCNC: 97 MMOL/L (ref 97–108)
CO2 SERPL-SCNC: 34 MMOL/L (ref 21–32)
CREAT SERPL-MCNC: 0.78 MG/DL (ref 0.7–1.3)
EOSINOPHIL # BLD: 0.3 K/UL (ref 0–0.4)
EOSINOPHIL NFR BLD: 7 % (ref 0–7)
ERYTHROCYTE [DISTWIDTH] IN BLOOD BY AUTOMATED COUNT: 12.2 % (ref 11.5–14.5)
GLUCOSE SERPL-MCNC: 94 MG/DL (ref 65–100)
HCT VFR BLD AUTO: 32.4 % (ref 36.6–50.3)
HGB BLD-MCNC: 10.7 G/DL (ref 12.1–17)
LYMPHOCYTES # BLD: 1.5 K/UL (ref 0.8–3.5)
LYMPHOCYTES NFR BLD: 43 % (ref 12–49)
MAGNESIUM SERPL-MCNC: 1.6 MG/DL (ref 1.6–2.4)
MCH RBC QN AUTO: 32.6 PG (ref 26–34)
MCHC RBC AUTO-ENTMCNC: 33 G/DL (ref 30–36.5)
MCV RBC AUTO: 98.8 FL (ref 80–99)
MONOCYTES # BLD: 0.6 K/UL (ref 0–1)
MONOCYTES NFR BLD: 16 % (ref 5–13)
NEUTS SEG # BLD: 1.3 K/UL (ref 1.8–8)
NEUTS SEG NFR BLD: 34 % (ref 32–75)
PLATELET # BLD AUTO: 110 K/UL (ref 150–400)
POTASSIUM SERPL-SCNC: 4.3 MMOL/L (ref 3.5–5.1)
RBC # BLD AUTO: 3.28 M/UL (ref 4.1–5.7)
SODIUM SERPL-SCNC: 137 MMOL/L (ref 136–145)
WBC # BLD AUTO: 3.7 K/UL (ref 4.1–11.1)

## 2017-10-09 PROCEDURE — 74011250637 HC RX REV CODE- 250/637: Performed by: INTERNAL MEDICINE

## 2017-10-09 PROCEDURE — 74011250637 HC RX REV CODE- 250/637: Performed by: NURSE PRACTITIONER

## 2017-10-09 PROCEDURE — 85025 COMPLETE CBC W/AUTO DIFF WBC: CPT | Performed by: INTERNAL MEDICINE

## 2017-10-09 PROCEDURE — 36415 COLL VENOUS BLD VENIPUNCTURE: CPT | Performed by: INTERNAL MEDICINE

## 2017-10-09 PROCEDURE — 74011250636 HC RX REV CODE- 250/636: Performed by: INTERNAL MEDICINE

## 2017-10-09 PROCEDURE — 65270000015 HC RM PRIVATE ONCOLOGY

## 2017-10-09 PROCEDURE — 74011250636 HC RX REV CODE- 250/636: Performed by: PHYSICAL MEDICINE & REHABILITATION

## 2017-10-09 PROCEDURE — 80048 BASIC METABOLIC PNL TOTAL CA: CPT | Performed by: INTERNAL MEDICINE

## 2017-10-09 PROCEDURE — 83735 ASSAY OF MAGNESIUM: CPT | Performed by: INTERNAL MEDICINE

## 2017-10-09 RX ORDER — HALOPERIDOL 5 MG/ML
2 INJECTION INTRAMUSCULAR
Status: DISCONTINUED | OUTPATIENT
Start: 2017-10-09 | End: 2017-10-11

## 2017-10-09 RX ORDER — QUETIAPINE FUMARATE 25 MG/1
12.5 TABLET, FILM COATED ORAL DAILY
Status: DISCONTINUED | OUTPATIENT
Start: 2017-10-09 | End: 2017-10-14 | Stop reason: HOSPADM

## 2017-10-09 RX ORDER — METOPROLOL TARTRATE 25 MG/1
6.25 TABLET, FILM COATED ORAL 2 TIMES DAILY
Status: DISCONTINUED | OUTPATIENT
Start: 2017-10-09 | End: 2017-10-09

## 2017-10-09 RX ORDER — HALOPERIDOL 5 MG/ML
5 INJECTION INTRAMUSCULAR ONCE
Status: COMPLETED | OUTPATIENT
Start: 2017-10-09 | End: 2017-10-09

## 2017-10-09 RX ORDER — HALOPERIDOL 5 MG/ML
1 INJECTION INTRAMUSCULAR ONCE
Status: COMPLETED | OUTPATIENT
Start: 2017-10-09 | End: 2017-10-09

## 2017-10-09 RX ADMIN — PANTOPRAZOLE SODIUM 40 MG: 40 GRANULE, DELAYED RELEASE ORAL at 07:58

## 2017-10-09 RX ADMIN — LEVETIRACETAM 1500 MG: 100 SOLUTION ORAL at 07:58

## 2017-10-09 RX ADMIN — HALOPERIDOL LACTATE 2 MG: 5 INJECTION, SOLUTION INTRAMUSCULAR at 15:27

## 2017-10-09 RX ADMIN — VALPROIC ACID 500 MG: 250 SOLUTION ORAL at 05:15

## 2017-10-09 RX ADMIN — HALOPERIDOL 2 MG: 2 TABLET ORAL at 02:36

## 2017-10-09 RX ADMIN — Medication 100 MG: at 08:38

## 2017-10-09 RX ADMIN — VALPROIC ACID 500 MG: 250 SOLUTION ORAL at 22:37

## 2017-10-09 RX ADMIN — PREGABALIN 300 MG: 100 CAPSULE ORAL at 11:32

## 2017-10-09 RX ADMIN — QUETIAPINE FUMARATE 12.5 MG: 25 TABLET ORAL at 11:32

## 2017-10-09 RX ADMIN — HALOPERIDOL LACTATE 2 MG: 5 INJECTION, SOLUTION INTRAMUSCULAR at 09:01

## 2017-10-09 RX ADMIN — VALPROIC ACID 500 MG: 250 SOLUTION ORAL at 14:54

## 2017-10-09 RX ADMIN — SODIUM CHLORIDE 500 ML: 900 INJECTION, SOLUTION INTRAVENOUS at 08:54

## 2017-10-09 RX ADMIN — HALOPERIDOL LACTATE 5 MG: 5 INJECTION, SOLUTION INTRAMUSCULAR at 12:00

## 2017-10-09 RX ADMIN — MORPHINE SULFATE 2 MG: 4 INJECTION, SOLUTION INTRAMUSCULAR; INTRAVENOUS at 03:56

## 2017-10-09 RX ADMIN — HALOPERIDOL LACTATE 2 MG: 5 INJECTION, SOLUTION INTRAMUSCULAR at 04:04

## 2017-10-09 RX ADMIN — SODIUM CHLORIDE 500 ML: 900 INJECTION, SOLUTION INTRAVENOUS at 11:20

## 2017-10-09 RX ADMIN — HALOPERIDOL LACTATE 2 MG: 5 INJECTION, SOLUTION INTRAMUSCULAR at 23:01

## 2017-10-09 RX ADMIN — PANTOPRAZOLE SODIUM 40 MG: 40 GRANULE, DELAYED RELEASE ORAL at 15:34

## 2017-10-09 RX ADMIN — PREGABALIN 300 MG: 100 CAPSULE ORAL at 22:38

## 2017-10-09 RX ADMIN — ASPIRIN 81 MG 81 MG: 81 TABLET ORAL at 08:38

## 2017-10-09 RX ADMIN — HALOPERIDOL LACTATE 1 MG: 5 INJECTION, SOLUTION INTRAMUSCULAR at 21:20

## 2017-10-09 RX ADMIN — HALOPERIDOL 2 MG: 2 TABLET ORAL at 08:49

## 2017-10-09 RX ADMIN — CYCLOBENZAPRINE HYDROCHLORIDE 5 MG: 10 TABLET, FILM COATED ORAL at 11:32

## 2017-10-09 RX ADMIN — LATANOPROST 1 DROP: 50 SOLUTION OPHTHALMIC at 22:37

## 2017-10-09 RX ADMIN — LEVETIRACETAM 1500 MG: 100 SOLUTION ORAL at 22:37

## 2017-10-09 RX ADMIN — ENOXAPARIN SODIUM 40 MG: 40 INJECTION SUBCUTANEOUS at 22:37

## 2017-10-09 RX ADMIN — QUETIAPINE FUMARATE 50 MG: 25 TABLET ORAL at 22:37

## 2017-10-09 NOTE — PROGRESS NOTES
Music Therapy Assessment    Afshin Ramp [id number]  [id number]    1934  80 y.o.  male    Patient Telephone Number: [phone] (home)   P[employer]alism Affiliation: Lilibeth Castaneda   Language: English   Extended Emergency Contact Information  Primary Emergency Contact: [address] Phone: [phone]  Relation: Spouse  Secondary Emergency Contact: [address]  Mobile Phone: [phone]  Relation: Daughter   Patient Active Problem List    Diagnosis Date Noted    Aspiration pneumonia ([address] 09/27/2017    Confusion 09/20/2017    Dysphasia 09/20/2017    Counseling regarding advanced care planning and goals of care 09/20/2017    Acute encephalopathy 09/15/2017    HCAP (healthcare-associated pneumonia) 09/15/2017    Sepsis ([address] 08/19/2017    UTI (urinary tract infection) 08/19/2017    ARF (acute renal failure) ([address] 07/27/2017    Anemia 07/27/2017    Heme positive stool 07/27/2017    Weakness 07/21/2017    Debility 07/21/2017    Counseling regarding goals of care 07/21/2017    Dysarthria 07/15/2017    Dysphagia 07/15/2017    Oral phase dysphagia 07/08/2017    Status epilepticus ([address] 06/24/2017    Seizures ([address] 02/18/2017    Encephalopathy, unspecified 09/03/2012    SVT (supraventricular tachycardia) ([address] 09/03/2012    Aphasia 09/03/2012    CVA (cerebral vascular accident) ([address] 09/03/2012    Seizure disorder ([address] 09/03/2012        Date: 10/9/2017       Mental Status:   [x] Alert [  ] Forgetful [x]  Confused  [  ] Minimally responsive    Communication Status: [  ] Impaired Speech [  ] Nonverbal     Physical Status:   [  ] Oxygen in use  [  ] Hard of Hearing [  ] Vision Impaired  [  ] Ambulatory  [  ] Ambulatory with assistance [  ] Non-ambulatory -N/A    Music Preferences, Background: The patient's spouse said the pt likes [place], [address]     Clinical Problem addressed: Decrease agitation, increase relaxation, support pt/family coping, spiritual support. Goal(s) met in session:  Physical/Pain management (Scale of 1-10):    Pre-session rating: N/A: Please see Session Observations below. Post-session rating: N/A: Please see Session Observations below. [x] Increased relaxation   [  ] Regulated breathing patterns  [x] Decreased muscle tension   [x] Decreased agitation     Emotional/Psychological:  [  ] Increased self-expression   [  ] Decreased aggressive behavior   [  ] Decreased sadness   [  ] Discussed healthy coping skills     [  ] Improved mood    [  ] Decreased withdrawn behavior     Social:  [  ] Decreased feelings of isolation/loneliness [  ] Positive social interaction   [x] Provided support and/or comfort for family/friends    Spiritual:  [x] Spiritual support    [  ] Expressed peace  [  ] Expressed niles    [  ] Discussed beliefs    Techniques Utilized (Check all that apply):   [  ] Procedural support MT [x] Music for relaxation [x] Patient preferred music  [  ] Kenna analysis  [  ] Song choice  [  ] Music for validation  [  ] Entrainment  [  ] Progressive muscle relax. [  ] Guided visualization  [  ] Moe Huntley  [  ] Patient instrument playing [  ] Hany Clancy writing  [  ] Clinton Lopez along   [  ] Rodriguez Hasten  [  ] Sensory stimulation  [  ] Active Listening  [x] Music for spiritual support [  ] Making of CDs as gifts    Session Observations:  Referral from [facility] . The patient (pt) was observed to be agitated with restless movement. His spouse (sp) Elizabeth Cruz was sitting at bedside. She held the pt's hand and spoke words of comfort to him. The pt's agitation decreased in response to this. This music therapist (MT) greeted the pt's sp and offered music therapy. The pt's sp shared the pt's music preferences. The MT sat at bedside and softly sang and played Nadia Starks with jose enrique. The pt increased relaxation in response to the music, as evidenced by (AEB) closing his eyes and his agitation ceasing.  The pt's sp also increased relaxation, AEB muscle tension in her shoulders decreasing and then ceasing. She expressed enjoyment in the music. The MT softly sang and played [facility] with guitar at a slow tempo. The pt increased relaxation, AEB lying his head on his pillow. The pt's sp nodded her head to the beat as she listened to the music. The MT softly sang and played How Great Thou Art with guitar next. The pt appeared to fall asleep during this song. The pt's sp expressed gratitude for the music, saying the MT played several of the pt's favorite songs. Will follow as able.     KASIA BrownBC (Music Therapist-Board Certified)  Spiritual Care Department  Referral-based service
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
## 2017-10-09 NOTE — PROGRESS NOTES
Oncology Nursing Communication Tool      7:26 PM  10/9/2017     Bedside and Verbal shift change report given to RICHARD Hill (incoming nurse) by Maru Vieyra (outgoing nurse) on Mita Michele a 80 y.o. male who was admitted on 9/27/2017  2:35 PM. Report included the following information SBAR, Kardex, Intake/Output, MAR, Accordion and Recent Results. Significant changes during shift: restless and combative       Issues for physician to address: Agitation            Code Status: DNR     Infections: No current active infections     Allergies: No known allergies     Current diet: DIET NPO Except Meds  DIET TUBE FEEDING       Pain Controlled [x] yes [] no   Bowel Movement [] yes [x] no   Last Bowel Movement (date)     10/07/17            Vital Signs:   Patient Vitals for the past 12 hrs:   Temp Pulse Resp BP SpO2   10/09/17 1400 98.1 °F (36.7 °C) 77 16 104/41 98 %   10/09/17 0837 97.3 °F (36.3 °C) 64 18 90/50 90 %   10/09/17 0830 97.3 °F (36.3 °C) 64 18 (!) 86/54 90 %      Intake & Output:     Intake/Output Summary (Last 24 hours) at 10/09/17 1926  Last data filed at 10/09/17 1505   Gross per 24 hour   Intake             1360 ml   Output                0 ml   Net             1360 ml      Laboratory Results:   No results found for this or any previous visit (from the past 12 hour(s)). Opportunity for questions and clarifications were given to the incoming nurse. Patient's bed is in low position, side rails x2, door open PRN, call bell within reach and patient not in distress.       Maru Vieyra

## 2017-10-09 NOTE — PROGRESS NOTES
Hospitalist Progress Note    NAME: Aimee Alexis   :  1934   MRN:  [id number]       Assessment / Plan:  Hypotension in setting of benign HTN:  Lower blood pressure this morning. WBC remains that same; no fever or other s/o infection. 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 Calm currently. - CT head with unchanged area of scarring in the posterior upper lobe. Mild bibasilar atelectasis. - MRI brain 10/3 with No change.  Left cerebral cortical diffusion restriction, with no enhancement. - EEG 10/3 abnormal encephalogram due to at 8 Hz slow-wave activity as the background. This can be caused by toxic and/or metabolic encephalopathies. - appreciate reconsult by neurology, per notes \"feel this is one of the rapidly progressive dementias which include Ninetta Messing, frontotemporal dementia and Lewy Body dementia. Of the three, frontotemporal dementia seems most associated with seizures. None are treatable. \"  This has been discussed with wife/family. - con't increased seroquel at night. Add low-dose seroquel during daytime. Con't haldol if needed.  - PT/OT as tolerated, CM finalizing dispo plan - either to [address] [place] [address] for 1-2 weeks pending insurance authorization [facility] Seizure d/o:  Had been stable on lyrica and valproic acid in the past.  No e/o new seizure with encephalopathy. - con't lyrica and VPA  - neuro consult as above  - CT head and MRI as above. EEG as above. Aspiration pneumonitis:  No PNA seen on CT  - CT chest  with unchanged area of scarring in the posterior upper lobe. Mild bibasilar atelectasis.   - completed 7 days Abx on 10/2  - continue with duonebs  Hypoglycemia:  Intermittent hypoglycemia, better with continuous feeds  History of SVT  Peripheral neuropathy:  lyrica as above  Remote EtOH abuse:  Family confirmed no EtOH in 5 years  Dysphagia/dyarthria and GERD:  S/p PEG placement 6/17    - con't feeds as tolerated  - con't protonix  Hypokalemia, resolved        Code Status: DNR  Surrogate Decision Maker: wife Owens Merlin [phone] or [phone]  DVT Prophylaxis: Lovenox     Subjective:     Chief Complaint / Reason for Physician Visit  Calm and arousable, but remains confused. Discussed with RN events overnight. Review of Systems:  Symptom Y/N Comments  Symptom Y/N Comments   Fever/Chills    Chest Pain     Poor Appetite    Edema     Cough    Abdominal Pain     Sputum    Joint Pain     SOB/AL    Pruritis/Rash     Nausea/vomit    Tolerating PT/OT     Diarrhea    Tolerating Diet     Constipation    Other       Could NOT obtain due to: encephalopathy     Objective:     VITALS:   Last 24hrs VS reviewed since prior progress note. Most recent are:  Patient Vitals for the past 24 hrs:   Temp Pulse Resp BP SpO2   10/09/17 0837 97.3 °F (36.3 °C) 64 18 90/50 90 %   10/09/17 0830 97.3 °F (36.3 °C) 64 18 (!) 86/54 90 %   10/08/17 2353 97.3 °F (36.3 °C) (!) 58 18 93/50 94 %       Intake/Output Summary (Last 24 hours) at 10/09/17 0943  Last data filed at 10/09/17 0640   Gross per 24 hour   Intake             1260 ml   Output                0 ml   Net             1260 ml        PHYSICAL EXAM:  General: WD, WN. Alert, not cooperative, no acute distress    EENT:  EOMI. Anicteric sclerae. MMM  Resp:  CTA bilaterally, no wheezing or rales. No accessory muscle use  CV:  Regular rhythm,  No edema  GI:  Soft, Non distended, Non tender.  +Bowel sounds  Neurologic:  Alert and oriented X 0-1, very minimal single-word speech,   Psych:   No insight. Not anxious nor agitated  Skin:  No rashes.   No jaundice    Reviewed most current lab test results and cultures  YES  Reviewed most current radiology test results   YES  Review and summation of old records today    NO  Reviewed patient's current orders and MAR    YES  PMH/SH reviewed - no change compared to H&P  ________________________________________________________________________  Care Plan discussed with:    Comments   Patient x    Family      RN x    Care Manager x    Consultant                        Multidiciplinary team rounds were held today with , nursing, pharmacist and clinical coordinator. Patient's plan of care was discussed; medications were reviewed and discharge planning was addressed. ________________________________________________________________________  Total NON critical care TIME:  30 Minutes    Total CRITICAL CARE TIME Spent:   Minutes non procedure based      Comments   >50% of visit spent in counseling and coordination of care x    ________________________________________________________________________  Tiburcio Pisano MD     Procedures: see electronic medical records for all procedures/Xrays and details which were not copied into this note but were reviewed prior to creation of Plan. LABS:  I reviewed today's most current labs and imaging studies.   Pertinent labs include:  Recent Labs      10/09/17   0507   WBC  3.7*   HGB  10.7*   HCT  32.4*   PLT  110*     Recent Labs      10/09/17   0507   NA  137   K  4.3   CL  97   CO2  34*   GLU  94   BUN  27*   CREA  0.78   CA  8.8   MG  1.6       Signed: Tiburcio Pisano MD

## 2017-10-09 NOTE — PROGRESS NOTES
Spoke with Mrs. Mckeon. She did not go to see Linda Horton last Fri. Because she had a death in the family. She knows we are still waiting for [facility]. Per eugene from [facility], he will probably be approved for a short stay. I explained this to wife and suggested that she may want to go ahead and start her spend down for Medicaid, and get him to the private adult home. This is half the cost of a nsg. Home for LTC and this is one of the only ones that will take a pt. With a PEG. She states she will get her dtr. To pick her up and they will go to check out [address]. Another issue is pt's. Behavior the past 2 days. He is being given Haldol IM to help with his agitation , and has a telesitter. He did seem calm  With his wife at the bedside just now. CM will follow and assist with placement.  Molly Hughes R,ZW,[phone]--[phone]

## 2017-10-09 NOTE — PROGRESS NOTES
LAURIE Mustian contacted by Melody Fan of [address] [place] [address]; Ms. Flora Carrion informed [facility] that patient grabbed the right hand of his sitter and twisted it. RICARDOE notified Nursing Supervisor, Danny Kulkarni, who is going to the unit to obtain additional information. LAURIE requested additional security rounding on that unit, Nursing Supervisor to contact them at this time. LAURIE spoke with Nurse Manager Meet Finnegan who is aware of event and did report that patient received Haldol. RICARDOE informed Nurse Manager of discussion with Nursing Supervisor. Patient to continue to have sitter. Daphne Sanchez with Risk Management will receive notification of event.

## 2017-10-09 NOTE — PROGRESS NOTES
Nutrition Assessment:    INTERVENTIONS/RECOMMENDATIONS:   · If pt does not receive at least 80% of ordered TF next follow-up, will increase rate. · Minimize time off pump    ASSESSMENT:   Chart reviewed. Per pump history pt has received ~77% of ordered TF in the past 3 days. Pt tolerating TF well. BG today 94 mg/dl. Diet Order:  (Jev 1.5 @ 55 + 150 q 4 hrs)  % Eaten:  Patient Vitals for the past 72 hrs:   % Diet Eaten   10/06/17 1530 0 %     Pertinent Medications: [x]Reviewed: Keppra, PPI, seroquel, thiamine  Pertinent Labs: [x]Reviewed:   Food Allergies: [x]NKFA  []Other   Last BM:  10/8  Edema:      []RUE   []LUE   []RLE   []LLE      Pressure Ulcer:      [] Stage I   [] Stage II   [] Stage III   [] Stage IV      Anthropometrics: Height: 5' 8\" (172.7 cm) Weight: 60.8 kg (134 lb 0.6 oz)    IBW (%IBW):   ( ) UBW (%UBW):   (  %)    BMI: Body mass index is 20.38 kg/(m^2). This BMI is indicative of:  []Underweight   [x]Normal   []Overweight   [] Obesity   [] Extreme Obesity (BMI>40)  Last Weight Metrics:  Weight Loss Metrics 10/4/2017 9/22/2017 8/22/2017 7/27/2017 7/22/2017 6/21/2017 3/21/2017   Today's Wt 134 lb 0.6 oz 134 lb 9.6 oz 144 lb 11.2 oz 150 lb 152 lb 3.2 oz 155 lb 166 lb   BMI 20.38 kg/m2 20.47 kg/m2 22 kg/m2 22.81 kg/m2 23.14 kg/m2 23.57 kg/m2 25.24 kg/m2       Estimated Nutrition Needs (Based on): 1806 Kcals/day (BMR 1297x1. 2(AF)+250) , 74 g (1.2 g/kg bw) Protein  Carbohydrate:  At Least 130 g/day  Fluids: 1806 mL/day or per primary team    NUTRITION DIAGNOSES:   Problem:  Swallowing difficulty      Etiology: related to current medical condition     Signs/Symptoms: as evidenced by NPO status and PEG tube present      NUTRITION INTERVENTIONS:    Enteral/Parenteral Nutrition: Other (continue current TF)                GOAL:   continue to meet >80% of ordered TF in 2-4 days    NUTRITION MONITORING AND EVALUATION      Food/Nutrient Intake Outcomes: Enteral/parenteral nutrition intake  Physical Signs/Symptoms Outcomes: Weight/weight change, Electrolyte and renal profile, GI profile, Glucose profile, GI    Previous Goal Met:   [] Met              [x] Progressing Towards Goal              [] Not Progressing Towards Goal   Previous Recommendations:   [x] Implemented          [] Not Implemented          [] Not Applicable    LEARNING NEEDS (Diet, Food/Nutrient-Drug Interaction):    [x] None Identified   [] Identified and Education Provided/Documented   [] Identified and Pt declined/was not appropriate     Cultural, Religion, OR Ethnic Dietary Needs:    [x] None Identified   [] Identified and Addressed     [x] Interdisciplinary Care Plan Reviewed/Documented    [x] Discharge Planning: Continue current TF order   [] Participated in Interdisciplinary Rounds    NUTRITION RISK:    [] High              [x] Moderate           []  Low  []  Minimal/Uncompromised      Laverne Valenzuela RDN  Pager [phone]  Weekend Pager [phone]

## 2017-10-09 NOTE — PROGRESS NOTES
Responded to alert from  colleague to check in on this family; met Ms. Mahogany Remy in the oncology waiting room. Provided active listening as Mahogany Remy shared the challenged with the day in, day out attention she has been giving Mr. Mckeon. Ms. Mahogany Remy shared that they had cake and ice cream on Friday for her birthday. Had previously spoken about going to [place] and asked Ms. Mahogany Remy if she would like me to walk her over there. We walked and talked a little; spent time in [address] [place] [address]. Ms. Mahogany Remy is struggling and shared her concerns and efforts to get/keep pt's siblings involved in his care so they don't second guess the care decisions moving forward. Ms. Mahogany Remy does not want to make those same decisions for her  that she had to make for her mother.  reminded Ms. Mahogany Remy to do things for herself and have a list of simple things people can do when they ask. We spoke for a time in the chapel, then this  stepped out to give Ms. Mahogany Remy some time alone with her thoughts. Keegan will continue follow up as able/needed. Jose Angel Nielsen M.Div.   Palliative  Fellow

## 2017-10-09 NOTE — PROGRESS NOTES
Patient manual blood pressure was 90/50. Dr. Nehemias Lutz was paged and ordered a 500 mL bolus of normal saline and discontinued the morphine.

## 2017-10-10 LAB
ANION GAP SERPL CALC-SCNC: 4 MMOL/L (ref 5–15)
BASOPHILS # BLD: 0 K/UL (ref 0–0.1)
BASOPHILS NFR BLD: 0 % (ref 0–1)
BUN SERPL-MCNC: 19 MG/DL (ref 6–20)
BUN/CREAT SERPL: 37 (ref 12–20)
CALCIUM SERPL-MCNC: 9.8 MG/DL (ref 8.5–10.1)
CHLORIDE SERPL-SCNC: 101 MMOL/L (ref 97–108)
CO2 SERPL-SCNC: 33 MMOL/L (ref 21–32)
CREAT SERPL-MCNC: 0.52 MG/DL (ref 0.7–1.3)
EOSINOPHIL # BLD: 0.1 K/UL (ref 0–0.4)
EOSINOPHIL NFR BLD: 3 % (ref 0–7)
ERYTHROCYTE [DISTWIDTH] IN BLOOD BY AUTOMATED COUNT: 12.4 % (ref 11.5–14.5)
GLUCOSE SERPL-MCNC: 92 MG/DL (ref 65–100)
HCT VFR BLD AUTO: 36.7 % (ref 36.6–50.3)
HGB BLD-MCNC: 12.2 G/DL (ref 12.1–17)
LYMPHOCYTES # BLD: 1.4 K/UL (ref 0.8–3.5)
LYMPHOCYTES NFR BLD: 29 % (ref 12–49)
MCH RBC QN AUTO: 32.7 PG (ref 26–34)
MCHC RBC AUTO-ENTMCNC: 33.2 G/DL (ref 30–36.5)
MCV RBC AUTO: 98.4 FL (ref 80–99)
MONOCYTES # BLD: 0.6 K/UL (ref 0–1)
MONOCYTES NFR BLD: 14 % (ref 5–13)
NEUTS SEG # BLD: 2.5 K/UL (ref 1.8–8)
NEUTS SEG NFR BLD: 54 % (ref 32–75)
PLATELET # BLD AUTO: 102 K/UL (ref 150–400)
POTASSIUM SERPL-SCNC: 4.2 MMOL/L (ref 3.5–5.1)
RBC # BLD AUTO: 3.73 M/UL (ref 4.1–5.7)
SODIUM SERPL-SCNC: 138 MMOL/L (ref 136–145)
WBC # BLD AUTO: 4.7 K/UL (ref 4.1–11.1)

## 2017-10-10 PROCEDURE — 74011250636 HC RX REV CODE- 250/636: Performed by: EMERGENCY MEDICINE

## 2017-10-10 PROCEDURE — 74011250636 HC RX REV CODE- 250/636: Performed by: INTERNAL MEDICINE

## 2017-10-10 PROCEDURE — 80048 BASIC METABOLIC PNL TOTAL CA: CPT | Performed by: INTERNAL MEDICINE

## 2017-10-10 PROCEDURE — 74011250637 HC RX REV CODE- 250/637: Performed by: INTERNAL MEDICINE

## 2017-10-10 PROCEDURE — 36415 COLL VENOUS BLD VENIPUNCTURE: CPT | Performed by: INTERNAL MEDICINE

## 2017-10-10 PROCEDURE — 74011250637 HC RX REV CODE- 250/637: Performed by: NURSE PRACTITIONER

## 2017-10-10 PROCEDURE — 65270000015 HC RM PRIVATE ONCOLOGY

## 2017-10-10 PROCEDURE — 74011250636 HC RX REV CODE- 250/636: Performed by: PHYSICAL MEDICINE & REHABILITATION

## 2017-10-10 PROCEDURE — 74011000250 HC RX REV CODE- 250: Performed by: EMERGENCY MEDICINE

## 2017-10-10 PROCEDURE — 85025 COMPLETE CBC W/AUTO DIFF WBC: CPT | Performed by: INTERNAL MEDICINE

## 2017-10-10 RX ADMIN — PANTOPRAZOLE SODIUM 40 MG: 40 GRANULE, DELAYED RELEASE ORAL at 09:02

## 2017-10-10 RX ADMIN — QUETIAPINE FUMARATE 12.5 MG: 25 TABLET ORAL at 09:00

## 2017-10-10 RX ADMIN — LEVETIRACETAM 1500 MG: 100 SOLUTION ORAL at 09:02

## 2017-10-10 RX ADMIN — HALOPERIDOL LACTATE 2 MG: 5 INJECTION, SOLUTION INTRAMUSCULAR at 18:41

## 2017-10-10 RX ADMIN — VALPROIC ACID 500 MG: 250 SOLUTION ORAL at 16:39

## 2017-10-10 RX ADMIN — ENOXAPARIN SODIUM 40 MG: 40 INJECTION SUBCUTANEOUS at 22:38

## 2017-10-10 RX ADMIN — PREGABALIN 300 MG: 100 CAPSULE ORAL at 09:02

## 2017-10-10 RX ADMIN — VALPROIC ACID 500 MG: 250 SOLUTION ORAL at 22:38

## 2017-10-10 RX ADMIN — VALPROIC ACID 500 MG: 250 SOLUTION ORAL at 05:35

## 2017-10-10 RX ADMIN — QUETIAPINE FUMARATE 50 MG: 25 TABLET ORAL at 20:36

## 2017-10-10 RX ADMIN — HALOPERIDOL LACTATE 2 MG: 5 INJECTION, SOLUTION INTRAMUSCULAR at 23:39

## 2017-10-10 RX ADMIN — WATER 20 MG: 1 INJECTION INTRAMUSCULAR; INTRAVENOUS; SUBCUTANEOUS at 00:43

## 2017-10-10 RX ADMIN — Medication 100 MG: at 09:02

## 2017-10-10 RX ADMIN — ASPIRIN 81 MG 81 MG: 81 TABLET ORAL at 09:01

## 2017-10-10 RX ADMIN — LEVETIRACETAM 1500 MG: 100 SOLUTION ORAL at 20:35

## 2017-10-10 RX ADMIN — PANTOPRAZOLE SODIUM 40 MG: 40 GRANULE, DELAYED RELEASE ORAL at 16:39

## 2017-10-10 RX ADMIN — PREGABALIN 300 MG: 100 CAPSULE ORAL at 20:35

## 2017-10-10 RX ADMIN — HALOPERIDOL LACTATE 2 MG: 5 INJECTION, SOLUTION INTRAMUSCULAR at 07:27

## 2017-10-10 RX ADMIN — LATANOPROST 1 DROP: 50 SOLUTION OPHTHALMIC at 21:00

## 2017-10-10 NOTE — PROGRESS NOTES
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 Mild bibasilar atelectasis. - MRI brain 10/3 with No change.  Left cerebral cortical diffusion restriction, with no enhancement. - EEG 10/3 abnormal encephalogram due to at 8 Hz slow-wave activity as the background. This can be caused by toxic and/or metabolic encephalopathies. - appreciate reconsult by neurology, per notes \"feel this is one of the rapidly progressive dementias which include Correne Cables, frontotemporal dementia and Lewy Body dementia. Of the three, frontotemporal dementia seems most associated with seizures. None are treatable. \"  This has been discussed with wife/family. - con't increased seroquel at night. Add low-dose seroquel during daytime. Con't haldol if needed.  - PT/OT as tolerated, CM finalizing dispo plan - either to [address] for 1-2 weeks pending insurance authorization or LTC at [facility] (group home that will accept PEG patients). - Perla also awaiting family input  - Palliative on board, appreciate the recs -- need further recs in regards to optimizing medication for agitation. Haldol 2mg q6hrs does not seem to be sufficient, nor Seroquel 50mg. Code ATLAS was called on 10/10 at 7am due to agitation. Seizure d/o:  Had been stable on lyrica and valproic acid in the past.  No e/o new seizure with encephalopathy. - con't lyrica and VPA  - neuro consult as above  - CT head and MRI as above.  EEG as above.     Aspiration pneumonitis:  No PNA seen on CT  - CT chest  with unchanged area of scarring in the posterior upper lobe. Mild bibasilar atelectasis. - completed 7 days Abx on 10/2  - continue with duonebs    Hypoglycemia:  Intermittent hypoglycemia, better with continuous feeds  History of SVT  Peripheral neuropathy:  lyrica as above  Remote EtOH abuse:  Family confirmed no EtOH in 5 years  Dysphagia/dyarthria and GERD:  S/p PEG placement 6/17    - con't feeds as tolerated  - con't protonix  Hypokalemia, resolved        Code Status: DNR  Surrogate Decision Maker: wife Tavares Coats [phone] or [phone]  DVT Prophylaxis: Lovenox     Subjective:     Chief Complaint / Reason for Physician Visit  Advanced dementia, unable to participate in meaningful conversation. Discussed with RN events overnight. Review of Systems:  Symptom Y/N Comments  Symptom Y/N Comments   Fever/Chills    Chest Pain     Poor Appetite    Edema     Cough    Abdominal Pain     Sputum    Joint Pain     SOB/AL    Pruritis/Rash     Nausea/vomit    Tolerating PT/OT     Diarrhea    Tolerating Diet     Constipation    Other       Could NOT obtain due to: Advanced dementia     Objective:     VITALS:   Last 24hrs VS reviewed since prior progress note. Most recent are:  Patient Vitals for the past 24 hrs:   Temp Pulse Resp BP SpO2   10/10/17 0743 97.9 °F (36.6 °C) 66 16 103/60 97 %   10/10/17 0533 97.6 °F (36.4 °C) 88 18 (!) 133/99 93 %   10/09/17 2344 97.6 °F (36.4 °C) (!) 115 18 - 94 %   10/09/17 2012 98 °F (36.7 °C) - 16 152/87 -   10/09/17 1400 98.1 °F (36.7 °C) 77 16 104/41 98 %       Intake/Output Summary (Last 24 hours) at 10/10/17 1006  Last data filed at 10/10/17 0743   Gross per 24 hour   Intake              730 ml   Output                0 ml   Net              730 ml        PHYSICAL EXAM:  General:                    WD, WN. Alert, not cooperative, no acute distress    EENT:                       EOMI. Anicteric sclerae. MMM  Resp:                       CTA bilaterally, no wheezing or rales.   No accessory muscle use  CV: Regular rhythm,  No edema  GI:                            Soft, Non distended, Non tender.  +Bowel sounds  Neurologic:               Alert and oriented X 0-1, very minimal single-word speech, does not follow any commands, no meaningful dialogue  Psych:                      No insight. Not anxious nor agitated  Skin:                         No rashes. No jaundice    Reviewed most current lab test results and cultures  YES  Reviewed most current radiology test results   YES  Review and summation of old records today    NO  Reviewed patient's current orders and MAR    YES  PMH/SH reviewed - no change compared to H&P  ________________________________________________________________________  Care Plan discussed with:    Comments   Patient y    Family      RN y    Care Manager     Consultant                        Multidiciplinary team rounds were held today with , nursing, pharmacist and clinical coordinator. Patient's plan of care was discussed; medications were reviewed and discharge planning was addressed. ________________________________________________________________________  Total NON critical care TIME:  30   Minutes    Total CRITICAL CARE TIME Spent:   Minutes non procedure based      Comments   >50% of visit spent in counseling and coordination of care y    ________________________________________________________________________  Simin Hubbard MD     Procedures: see electronic medical records for all procedures/Xrays and details which were not copied into this note but were reviewed prior to creation of Plan. LABS:  I reviewed today's most current labs and imaging studies.   Pertinent labs include:  Recent Labs      10/10/17   0442  10/09/17   0507   WBC  4.7  3.7*   HGB  12.2  10.7*   HCT  36.7  32.4*   PLT  102*  110*     Recent Labs      10/10/17   0442  10/09/17   0507   NA  138  137   K  4.2  4.3   CL  101  97   CO2  33*  34*   GLU  92  94   BUN  19  27*   CREA  0.52* 0. 78   CA  9.8  8.8   MG   --   1.6       Signed: Krista Posey MD

## 2017-10-10 NOTE — PROGRESS NOTES
Palliative Medicine     LCSW made supportive, follow up visit with Pt's wife Mehrdad Pena). Per wife, Pt was agitated yesterday and today and appears to calm with her voice and presence cueing him. Provided active listening as spouse shared about caregiving thoughts & emotions and her processing of Pt's neurological diagnosis as progressive. Validated and normalized grief of lost future plans in half-way and affirmed importance of self care and socialization. Pt's wife is working with CM on discharge plan; wife and dtr are touring an [employer]. When LCSW and Pt's spouse returned to room there were 3 nurses with Pt as he was trying to get out of bed. Telesitter in room. Pt relaxed with spouse's cueing. Thank you for including Palliative Medicine in Mr. Mckeon's care.       Yamel Hassan, [address] ([id number])  Work cell: [phone]

## 2017-10-10 NOTE — PROGRESS NOTES
Oncology Nursing Communication Tool      7:15 AM  10/10/2017     Bedside shift change report given to Darryl Villanueva, [facility] (incoming nurse) by Ruiz Bains, RICHARD (outgoing nurse) on Lauren Lozano a 80 y.o. male who was admitted on 9/27/2017  2:35 PM. Report included the following information SBAR, Kardex, Intake/Output, MAR and Med Rec Status. Significant changes during shift: Increased agitation and AMS,  Patient punching, kicking, squeezing, and bending fingers/ hands backward. Called hospitalist on call for additional orders X2. Haldol IV and Geodon IM administered as ordered.       Issues for physician to address: see above            Code Status: DNR     Infections: No current active infections     Allergies: No known allergies     Current diet: DIET NPO Except Meds  DIET TUBE FEEDING       Pain Controlled [] yes [x] no   Bowel Movement [] yes [x] no   Last Bowel Movement (date)     10/8            Vital Signs:   Patient Vitals for the past 12 hrs:   Temp Pulse Resp BP SpO2   10/10/17 0533 (P) 97.6 °F (36.4 °C) 88 18 (!) (P) 133/99 93 %   10/09/17 2344 97.6 °F (36.4 °C) (!) 115 18 - 94 %   10/09/17 2012 98 °F (36.7 °C) - 16 152/87 -      Intake & Output:     Intake/Output Summary (Last 24 hours) at 10/10/17 0715  Last data filed at 10/10/17 0208   Gross per 24 hour   Intake              780 ml   Output                0 ml   Net              780 ml      Laboratory Results:     Recent Results (from the past 12 hour(s))   CBC WITH AUTOMATED DIFF    Collection Time: 10/10/17  4:42 AM   Result Value Ref Range    WBC 4.7 4.1 - 11.1 K/uL    RBC 3.73 (L) 4.10 - 5.70 M/uL    HGB 12.2 12.1 - 17.0 g/dL    HCT 36.7 36.6 - 50.3 %    MCV 98.4 80.0 - 99.0 FL    MCH 32.7 26.0 - 34.0 PG    MCHC 33.2 30.0 - 36.5 g/dL    RDW 12.4 11.5 - 14.5 %    PLATELET 957 (L) 910 - 400 K/uL    NEUTROPHILS 54 32 - 75 %    LYMPHOCYTES 29 12 - 49 %    MONOCYTES 14 (H) 5 - 13 %    EOSINOPHILS 3 0 - 7 %    BASOPHILS 0 0 - 1 % ABS. NEUTROPHILS 2.5 1.8 - 8.0 K/UL    ABS. LYMPHOCYTES 1.4 0.8 - 3.5 K/UL    ABS. MONOCYTES 0.6 0.0 - 1.0 K/UL    ABS. EOSINOPHILS 0.1 0.0 - 0.4 K/UL    ABS. BASOPHILS 0.0 0.0 - 0.1 K/UL   METABOLIC PANEL, BASIC    Collection Time: 10/10/17  4:42 AM   Result Value Ref Range    Sodium 138 136 - 145 mmol/L    Potassium 4.2 3.5 - 5.1 mmol/L    Chloride 101 97 - 108 mmol/L    CO2 33 (H) 21 - 32 mmol/L    Anion gap 4 (L) 5 - 15 mmol/L    Glucose 92 65 - 100 mg/dL    BUN 19 6 - 20 MG/DL    Creatinine 0.52 (L) 0.70 - 1.30 MG/DL    BUN/Creatinine ratio 37 (H) 12 - 20      GFR est AA >60 >60 ml/min/1.73m2    GFR est non-AA >60 >60 ml/min/1.73m2    Calcium 9.8 8.5 - 10.1 MG/DL              Opportunity for questions and clarifications were given to the incoming nurse. Patient's bed is in low position, side rails x2, door open PRN, call bell within reach and patient not in distress.       Rukhsana Innocent, RICHARD
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
## 2017-10-10 NOTE — PROGRESS NOTES
OT note:  OT reviewed chart and spoke with nursing and pt has been more agitated today and is not following commands.   Nursing feels that pt is not appropriate for tx this am and checked back with nursing in pm and pt was finally resting and asked not to wake pt at this time and will continue to follow

## 2017-10-10 NOTE — PROGRESS NOTES
Oncology Nursing Communication Tool      7:11 PM  10/10/2017     Bedside shift change report given to Laverne Washburn RN (incoming nurse) by Karen Nelson RN (outgoing nurse) on Svetlana Filter a 80 y.o. male who was admitted on 9/27/2017  2:35 PM. Report included the following information SBAR and Kardex. Significant changes during shift: slept for 5 hours today. Issues for physician to address: medication regimen for agitation            Code Status: DNR     Infections: No current active infections     Allergies: No known allergies     Current diet: DIET NPO Except Meds  DIET TUBE FEEDING       Pain Controlled [] yes [] no   Bowel Movement [] yes [] no   Last Bowel Movement (date)                  Vital Signs:   Patient Vitals for the past 12 hrs:   Temp Pulse Resp BP SpO2   10/10/17 0743 97.9 °F (36.6 °C) 66 16 103/60 97 %      Intake & Output:     Intake/Output Summary (Last 24 hours) at 10/10/17 1911  Last data filed at 10/10/17 1535   Gross per 24 hour   Intake              810 ml   Output                0 ml   Net              810 ml      Laboratory Results:   No results found for this or any previous visit (from the past 12 hour(s)). Opportunity for questions and clarifications were given to the incoming nurse. Patient's bed is in low position, side rails x2, door open PRN, call bell within reach and patient not in distress.       Karen Nelson RN

## 2017-10-10 NOTE — PROGRESS NOTES
Problem: Falls - Risk of  Goal: *Absence of Falls  Document Lorena Fall Risk and appropriate interventions in the flowsheet.    Outcome: Progressing Towards Goal  Fall Risk Interventions:        Mentation Interventions: Bed/chair exit alarm     Medication Interventions: Bed/chair exit alarm     Elimination Interventions: Bed/chair exit alarm     History of Falls Interventions: Bed/chair exit alarm

## 2017-10-10 NOTE — PROGRESS NOTES
Physical Therapy Note    Chart reviewed. Spoke with nursing and pt has been more agitated today and is not following commands. Nursing feels that pt is not appropriate for tx this am and checked back with nursing in pm and pt was finally resting and asked not to wake pt at this time and will continue to follow.     Thank you,  Teresita Rowland, PT, DPT

## 2017-10-11 PROCEDURE — 74011250637 HC RX REV CODE- 250/637: Performed by: INTERNAL MEDICINE

## 2017-10-11 PROCEDURE — 65270000015 HC RM PRIVATE ONCOLOGY

## 2017-10-11 PROCEDURE — 74011250637 HC RX REV CODE- 250/637: Performed by: NURSE PRACTITIONER

## 2017-10-11 PROCEDURE — 74011250636 HC RX REV CODE- 250/636: Performed by: INTERNAL MEDICINE

## 2017-10-11 PROCEDURE — 74011250636 HC RX REV CODE- 250/636: Performed by: PHYSICAL MEDICINE & REHABILITATION

## 2017-10-11 PROCEDURE — 74011250636 HC RX REV CODE- 250/636: Performed by: EMERGENCY MEDICINE

## 2017-10-11 PROCEDURE — 74011000250 HC RX REV CODE- 250: Performed by: PHYSICAL MEDICINE & REHABILITATION

## 2017-10-11 PROCEDURE — 74011000250 HC RX REV CODE- 250: Performed by: EMERGENCY MEDICINE

## 2017-10-11 RX ORDER — HALOPERIDOL 5 MG/ML
2 INJECTION INTRAMUSCULAR ONCE
Status: COMPLETED | OUTPATIENT
Start: 2017-10-11 | End: 2017-10-11

## 2017-10-11 RX ORDER — LORAZEPAM 2 MG/ML
1 INJECTION INTRAMUSCULAR ONCE
Status: COMPLETED | OUTPATIENT
Start: 2017-10-11 | End: 2017-10-11

## 2017-10-11 RX ADMIN — VALPROIC ACID 500 MG: 250 SOLUTION ORAL at 23:17

## 2017-10-11 RX ADMIN — LEVETIRACETAM 1500 MG: 100 SOLUTION ORAL at 20:20

## 2017-10-11 RX ADMIN — WATER 20 MG: 1 INJECTION INTRAMUSCULAR; INTRAVENOUS; SUBCUTANEOUS at 06:18

## 2017-10-11 RX ADMIN — LEVETIRACETAM 1500 MG: 100 SOLUTION ORAL at 08:26

## 2017-10-11 RX ADMIN — QUETIAPINE FUMARATE 12.5 MG: 25 TABLET ORAL at 08:27

## 2017-10-11 RX ADMIN — WATER 20 MG: 1 INJECTION INTRAMUSCULAR; INTRAVENOUS; SUBCUTANEOUS at 19:34

## 2017-10-11 RX ADMIN — HALOPERIDOL LACTATE 2 MG: 5 INJECTION, SOLUTION INTRAMUSCULAR at 05:22

## 2017-10-11 RX ADMIN — Medication 100 MG: at 08:27

## 2017-10-11 RX ADMIN — QUETIAPINE FUMARATE 50 MG: 25 TABLET ORAL at 20:20

## 2017-10-11 RX ADMIN — HALOPERIDOL LACTATE 2 MG: 5 INJECTION, SOLUTION INTRAMUSCULAR at 22:44

## 2017-10-11 RX ADMIN — PREGABALIN 300 MG: 100 CAPSULE ORAL at 23:17

## 2017-10-11 RX ADMIN — LORAZEPAM 1 MG: 2 INJECTION INTRAMUSCULAR; INTRAVENOUS at 22:43

## 2017-10-11 RX ADMIN — VALPROIC ACID 500 MG: 250 SOLUTION ORAL at 08:16

## 2017-10-11 RX ADMIN — ENOXAPARIN SODIUM 40 MG: 40 INJECTION SUBCUTANEOUS at 23:17

## 2017-10-11 RX ADMIN — VALPROIC ACID 500 MG: 250 SOLUTION ORAL at 17:22

## 2017-10-11 RX ADMIN — PREGABALIN 300 MG: 100 CAPSULE ORAL at 08:27

## 2017-10-11 RX ADMIN — ASPIRIN 81 MG 81 MG: 81 TABLET ORAL at 08:27

## 2017-10-11 RX ADMIN — PANTOPRAZOLE SODIUM 40 MG: 40 GRANULE, DELAYED RELEASE ORAL at 17:22

## 2017-10-11 RX ADMIN — PANTOPRAZOLE SODIUM 40 MG: 40 GRANULE, DELAYED RELEASE ORAL at 08:27

## 2017-10-11 RX ADMIN — LATANOPROST 1 DROP: 50 SOLUTION OPHTHALMIC at 21:00

## 2017-10-11 NOTE — PROGRESS NOTES
Patient woke up at 0445 restless and combative. Attempts to talk to him and calm him down failed. Haldol given at 0515. Sitter and RN stayed with patient until 6 am, haldol did not seem to have any effect. 0600 RN called Dr Anusha Bill, received a one time dose for 20 mg geodon IM. Given at .
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
## 2017-10-11 NOTE — PROGRESS NOTES
2315: Patient woke up and became increasingly combative, two PCT's and one RN were in the room. 2330: paged on call MD for Dr Dorothy Barnhart to request medication. 2333: Spoke to Dr Ben Mason, orders received to give haldol early (next dose not due until 0100). Stated if that did not work to call her back and she would order Geodon. 2339: Gave haldol IM. Patient finally settled down and went to sleep at approximately 0015.

## 2017-10-11 NOTE — PROGRESS NOTES
Physical Therapy Note    Chart reviewed. Spoke with nursing. Pt currently asleep after receiving haldol and geodon overnight due to being combative and agitated. Pt is currently not appropriate for PT intervention, therefore will defer and continue to follow. Thank you,  Dayami Pro, PT, DPT    14:46 PM: Attempted to follow back to see patient for PT intervention, however nursing reporting that patient is still sleeping and requests that PT defer to allow pt to sleep. Will follow back tomorrow as appropriate.

## 2017-10-11 NOTE — PROGRESS NOTES
Oncology Nursing Communication Tool      7:05 PM  10/11/2017     Bedside and Verbal shift change report given to Ayse Butterfield RN (incoming nurse) by Annie Rasheed RN (outgoing nurse) on Mary Lou Em a 80 y.o. male who was admitted on 9/27/2017  2:35 PM. Report included the following information SBAR, Kardex, Intake/Output, MAR, Accordion and Recent Results. Significant changes during shift: Patient quiet and calm throughout day with Geodon. Unable to collect UA/C&S      Issues for physician to address: Discharge disposition            Code Status: DNR     Infections: No current active infections     Allergies: No known allergies     Current diet: DIET NPO Except Meds  DIET TUBE FEEDING       Pain Controlled [x] yes [] no   Bowel Movement [] yes [x] no   Last Bowel Movement (date) 10/8/17            Vital Signs:   Patient Vitals for the past 12 hrs:   Temp Pulse Resp BP SpO2   10/11/17 1455 97.5 °F (36.4 °C) 66 18 125/62 100 %   10/11/17 0717 97.8 °F (36.6 °C) 96 18 103/75 98 %      Intake & Output:     Intake/Output Summary (Last 24 hours) at 10/11/17 1905  Last data filed at 10/11/17 1559   Gross per 24 hour   Intake              600 ml   Output                0 ml   Net              600 ml      Laboratory Results:   No results found for this or any previous visit (from the past 12 hour(s)). Opportunity for questions and clarifications were given to the incoming nurse. Patient's bed is in low position, side rails x2, door open PRN, call bell within reach and patient not in distress.       Annie Rasheed RN

## 2017-10-11 NOTE — PROGRESS NOTES
ADULT PROTOCOL: JET AEROSOL  REASSESSMENT    Patient  Afshin Abrams     80 y.o.   male     10/11/2017  8:35 AM    Breath Sounds Pre Procedure: Right Breath Sounds: Diminished                               Left Breath Sounds: Diminished    Breath Sounds Post Procedure: Right Breath Sounds: Diminished                                 Left Breath Sounds: Diminished    Breathing pattern: Pre procedure Breathing Pattern: Regular          Post procedure Breathing Pattern: Regular    Heart Rate: Pre procedure Pulse: 75           Post procedure Pulse: 83    Resp Rate: Pre procedure Respirations: 20           Post procedure Respirations: 20              Cough: Pre procedure Cough: Non-productive, Congested               Post procedure Cough: Congested      Oxygen: Room air     Changed: No    SpO2: Pre procedure SpO2: 95 %                 Post procedure SpO2: 96 %     Nebulizer Therapy: Current medications Aerosolized Medications: DuoNeb      Changed: NO (PRN Q6)        Problem List:   Patient Active Problem List   Diagnosis Code    Encephalopathy, unspecified G93.40    SVT (supraventricular tachycardia) ([facility]) I47.1    Aphasia R47.01    CVA [facility]cerebral vascular accident) [facility] [address] I63.9    Seizure disorder ([facility]) G40.909    Seizures ([facility]) R56.9    [facility] G40.901    Oral phase dysphagia R13.11    Dysarthria R47.1    Dysphagia R13.10    Weakness R53.1    Debility R53.81    Counseling regarding goals of care Z71.89    ARF (acute renal failure) ([facility]) N17.9    Anemia D64.9    Heme positive stool R19.5    Sepsis ([facility]) A41.9    UTI (urinary tract infection) N39.0    Acute encephalopathy G93.40    HCAP (healthcare-associated pneumonia) J18.9    Confusion R41.0    Dysphasia R47.02    Counseling regarding advanced care planning and goals of care Z71.89    Aspiration pneumonia ([facility] J69.0    Agitation R45.1       Respiratory Therapist: Blair Rodriguez RT

## 2017-10-11 NOTE — PROGRESS NOTES
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 Mild bibasilar atelectasis. - MRI brain 10/3 with No change.  Left cerebral cortical diffusion restriction, with no enhancement. - EEG 10/3 abnormal encephalogram due to at 8 Hz slow-wave activity as the background. This can be caused by toxic and/or metabolic encephalopathies. - appreciate reconsult by neurology, per notes \"feel this is one of the rapidly progressive dementias which include Nadya Caller, frontotemporal dementia and Lewy Body dementia. Of the three, frontotemporal dementia seems most associated with seizures. None are treatable. \"  This has been discussed with wife/family. - con't increased seroquel at night.  Add low-dose seroquel during daytime.  Con't haldol if needed.  - PT/OT as tolerated, CM finalizing dispo plan - either to [address] for 1-2 weeks pending insurance authorization or LTC at [facility] (group [place] that will accept PEG patients).    - Perla also awaiting family input  - Palliative on board, appreciate the recs -- need further recs in regards to optimizing medication for agitation. Haldol 2mg q6hrs does not seem to be sufficient, nor Seroquel 50mg. Code ATLAS was called on 10/10 at 7am due to agitation.   - Spoke with Dr. Jose Martin Medley in regards to pts sedative medications as it seems pt was again very combative, agitated and disruptive over the past 24 hours to the point where other pts and families are being disturbed.  Therefore plan is to discontinue Haldol, as it has seemingly little affect on the pt, and to start on Geodon q12hrs PRN, and continue Seroquel as dosed currently. Seizure d/o:  Had been stable on lyrica and valproic acid in the past.  No e/o new seizure with encephalopathy. - con't lyrica and VPA  - neuro consult as above  - CT head and MRI as above.  EEG as above.     Aspiration pneumonitis:  No PNA seen on CT  - CT chest 9/28 with unchanged area of scarring in the posterior upper lobe. Mild bibasilar atelectasis. - completed 7 days Abx on 10/2  - continue with duonebs     Hypoglycemia:  Intermittent hypoglycemia, better with continuous feeds  History of SVT  Peripheral neuropathy:  lyrica as above  Remote EtOH abuse:  Family confirmed no EtOH in 5 years  Dysphagia/dyarthria and GERD:  S/p PEG placement 6/17    - con't feeds as tolerated  - con't protonix  Hypokalemia, resolved        Code Status: DNR  Surrogate Decision Maker: wife Rajeev Martines [phone] or [phone]  DVT Prophylaxis: Lovenox     Subjective:     Chief Complaint / Reason for Physician Visit  Unable to participate. Discussed with RN events overnight. Review of Systems:  Symptom Y/N Comments  Symptom Y/N Comments   Fever/Chills    Chest Pain     Poor Appetite    Edema     Cough    Abdominal Pain     Sputum    Joint Pain     SOB/AL    Pruritis/Rash     Nausea/vomit    Tolerating PT/OT     Diarrhea    Tolerating Diet     Constipation    Other       Could NOT obtain due to: Advanced dementia     Objective:     VITALS:   Last 24hrs VS reviewed since prior progress note.  Most recent are:  Patient Vitals for the past 24 hrs:   Temp Pulse Resp BP SpO2   10/11/17 0717 97.8 °F (36.6 °C) 96 18 103/75 98 %   10/10/17 1922 97.6 °F (36.4 °C) 66 16 (!) 85/49 92 %       Intake/Output Summary (Last 24 hours) at 10/11/17 1149  Last data filed at 10/11/17 0759   Gross per 24 hour   Intake              600 ml   Output                0 ml   Net              600 ml        PHYSICAL EXAM:  General:                    Awake, not cooperative, no acute distress    EENT:                       EOMI. Anicteric sclerae. MMM  Resp:                       CTA bilaterally, no wheezing or rales.  No accessory muscle use  CV:                           Regular rhythm,  No edema  GI:                            Soft, Non distended, Non tender.  +Bowel sounds  Neurologic:               Alert and oriented X 0-1, very minimal single-word speech, does not follow any commands, no meaningful dialogue  Psych:                      No insight. Not anxious nor agitated  Skin:                         No rashes.  No jaundice    Reviewed most current lab test results and cultures  YES  Reviewed most current radiology test results   YES  Review and summation of old records today    NO  Reviewed patient's current orders and MAR    YES  PMH/SH reviewed - no change compared to H&P  ________________________________________________________________________  Care Plan discussed with:    Comments   Patient y    Family  y    RN     Care Manager y    Consultant  y                      Multidiciplinary team rounds were held today with , nursing, pharmacist and clinical coordinator. Patient's plan of care was discussed; medications were reviewed and discharge planning was addressed. ________________________________________________________________________  Total NON critical care TIME:  30   Minutes    Total CRITICAL CARE TIME Spent:   Minutes non procedure based      Comments   >50% of visit spent in counseling and coordination of care     ________________________________________________________________________  Sebastian Saravia MD     Procedures: see electronic medical records for all procedures/Xrays and details which were not copied into this note but were reviewed prior to creation of Plan. LABS:  I reviewed today's most current labs and imaging studies.   Pertinent labs include:  Recent Labs      10/10/17   0442  10/09/17   0507   WBC  4.7  3.7*   HGB 12.2  10.7*   HCT  36.7  32.4*   PLT  102*  110*     Recent Labs      10/10/17   0442  10/09/17   0507   NA  138  137   K  4.2  4.3   CL  101  97   CO2  33*  34*   GLU  92  94   BUN  19  27*   CREA  0.52*  0.78   CA  9.8  8.8   MG   --   1.6       Signed: Jazmine Serra MD

## 2017-10-11 NOTE — PROGRESS NOTES
Palliative Medicine     IDT with Palliative team this morning. This week pt experiencing agitation symptoms. Dr. Romi Jett and this LCSW visited Pt. Wife not yet arrived for the day. Staff  at pt's bedside; pt sleeping with snoring sounds. Will attempt to revisit later today as able. Thank you for including Palliative Medicine in Mr. Mckeon's care.       Marina Kellogg, [address] ([id number])  Work cell: [phone]

## 2017-10-11 NOTE — PROGRESS NOTES
Oncology Nursing Communication Tool      8:07 AM  10/11/2017     Bedside shift change report given to Rola Richey RN (incoming nurse) by Gurjit Jones (outgoing nurse) on Isha Days a 80 y.o. male who was admitted on 9/27/2017  2:35 PM. Report included the following information SBAR, Kardex, Intake/Output, MAR and Recent Results. Significant changes during shift: restless, combative and agitated, received haldol x2 and geodon x1      Issues for physician to address: medications to control agitation            Code Status: DNR     Infections: No current active infections     Allergies: No known allergies     Current diet: DIET NPO Except Meds  DIET TUBE FEEDING       Pain Controlled [x] yes [] no   Bowel Movement [] yes [x] no   Last Bowel Movement (date)     10/19/17            Vital Signs:   Patient Vitals for the past 12 hrs:   Temp Pulse Resp BP SpO2   10/11/17 0717 97.8 °F (36.6 °C) 96 18 103/75 98 %      Intake & Output:     Intake/Output Summary (Last 24 hours) at 10/11/17 0807  Last data filed at 10/11/17 0201   Gross per 24 hour   Intake              450 ml   Output                0 ml   Net              450 ml      Laboratory Results:   No results found for this or any previous visit (from the past 12 hour(s)). Opportunity for questions and clarifications were given to the incoming nurse. Patient's bed is in low position, side rails x2, door open PRN, call bell within reach and patient not in distress.       Gurjit Jones

## 2017-10-12 PROCEDURE — 74011250637 HC RX REV CODE- 250/637: Performed by: INTERNAL MEDICINE

## 2017-10-12 PROCEDURE — 74011000250 HC RX REV CODE- 250: Performed by: PHYSICAL MEDICINE & REHABILITATION

## 2017-10-12 PROCEDURE — 74011250636 HC RX REV CODE- 250/636: Performed by: PHYSICAL MEDICINE & REHABILITATION

## 2017-10-12 PROCEDURE — 65270000015 HC RM PRIVATE ONCOLOGY

## 2017-10-12 PROCEDURE — 74011250637 HC RX REV CODE- 250/637: Performed by: NURSE PRACTITIONER

## 2017-10-12 RX ORDER — LORAZEPAM 2 MG/ML
0.5 INJECTION INTRAMUSCULAR
Status: DISCONTINUED | OUTPATIENT
Start: 2017-10-12 | End: 2017-10-13

## 2017-10-12 RX ORDER — LORAZEPAM 2 MG/ML
0.5 INJECTION INTRAMUSCULAR
Status: DISCONTINUED | OUTPATIENT
Start: 2017-10-12 | End: 2017-10-12

## 2017-10-12 RX ADMIN — LORAZEPAM 0.5 MG: 2 INJECTION INTRAMUSCULAR; INTRAVENOUS at 18:19

## 2017-10-12 RX ADMIN — QUETIAPINE FUMARATE 12.5 MG: 25 TABLET ORAL at 09:30

## 2017-10-12 RX ADMIN — Medication 100 MG: at 09:30

## 2017-10-12 RX ADMIN — CYCLOBENZAPRINE HYDROCHLORIDE 5 MG: 10 TABLET, FILM COATED ORAL at 12:53

## 2017-10-12 RX ADMIN — ASPIRIN 81 MG 81 MG: 81 TABLET ORAL at 09:30

## 2017-10-12 RX ADMIN — VALPROIC ACID 500 MG: 250 SOLUTION ORAL at 21:26

## 2017-10-12 RX ADMIN — PREGABALIN 300 MG: 100 CAPSULE ORAL at 09:29

## 2017-10-12 RX ADMIN — VALPROIC ACID 500 MG: 250 SOLUTION ORAL at 15:04

## 2017-10-12 RX ADMIN — PREGABALIN 300 MG: 100 CAPSULE ORAL at 21:23

## 2017-10-12 RX ADMIN — LEVETIRACETAM 1500 MG: 100 SOLUTION ORAL at 09:29

## 2017-10-12 RX ADMIN — PANTOPRAZOLE SODIUM 40 MG: 40 GRANULE, DELAYED RELEASE ORAL at 09:29

## 2017-10-12 RX ADMIN — VALPROIC ACID 500 MG: 250 SOLUTION ORAL at 06:56

## 2017-10-12 RX ADMIN — LEVETIRACETAM 1500 MG: 100 SOLUTION ORAL at 21:23

## 2017-10-12 RX ADMIN — QUETIAPINE FUMARATE 50 MG: 25 TABLET ORAL at 21:23

## 2017-10-12 RX ADMIN — PANTOPRAZOLE SODIUM 40 MG: 40 GRANULE, DELAYED RELEASE ORAL at 18:18

## 2017-10-12 RX ADMIN — WATER 20 MG: 1 INJECTION INTRAMUSCULAR; INTRAVENOUS; SUBCUTANEOUS at 06:26

## 2017-10-12 NOTE — PROGRESS NOTES
Patient received IM Geodon at 1. Patient slept until 2145, then became restless, agitated and combative. RN asked PCT to stay in room with patient as he was trying to climb out of bed. Dr Lawyer Yi paged at [phone]. Spoke to Dr Lawyer Yi at [phone], received orders for Haldol 2 mg IV and Ativan 1 mg IV. Medications given IV at 2240 with the assistance of one PCT and one additional RN. Patient went to sleep at 2300     Patient woke up at 0610 combative and restless, gave Geodon 20 mg IM at 0630 with the help of one PCT. Patient asleep at 0650.
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
## 2017-10-12 NOTE — PROGRESS NOTES
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 Mild bibasilar atelectasis. - MRI brain 10/3 with No change.  Left cerebral cortical diffusion restriction, with no enhancement. - EEG 10/3 abnormal encephalogram due to at 8 Hz slow-wave activity as the background. This can be caused by toxic and/or metabolic encephalopathies. - appreciate reconsult by neurology, per notes \"feel this is one of the rapidly progressive dementias which include Junnie Edge, frontotemporal dementia and Lewy Body dementia. Of the three, frontotemporal dementia seems most associated with seizures. None are treatable. \"  This has been discussed with wife/family. - con't increased seroquel at night.  Add low-dose seroquel during daytime.  Con't haldol if needed.  - PT/OT as tolerated, CM finalizing dispo plan - either to [address] for 1-2 weeks pending insurance authorization or LTC at [facility] ([facility] [place] that will accept PEG patients).    - Perla also awaiting family input  - Palliative on board, appreciate the recs -- need further recs in regards to optimizing medication for agitation. Haldol 2mg q6hrs does not seem to be sufficient, nor Seroquel 50mg. Code ATLAS was called on 10/10 at 7am due to agitation.   - Spoke with Dr. Josephine Antonio in regards to pts sedative medications as it seems pt was again very combative, agitated and disruptive over the past 24 hours to the point where other pts and families are being disturbed.  Therefore plan is to discontinue Haldol, as it has seemingly little affect on the pt, and to start on Geodon q12hrs PRN, and continue Seroquel as dosed currently.    -Pt continues to be agitated at night [does well during the day] creating trouble for the staff as well the other patients. Will start Ativan q6hrs. Discussed with Dr. Alex Palafox. Seizure d/o:  Had been stable on lyrica and valproic acid in the past.  No e/o new seizure with encephalopathy. - con't lyrica and VPA  - neuro consult as above  - CT head and MRI as above.  EEG as above.      Aspiration pneumonitis:  No PNA seen on CT  - CT chest 9/28 with unchanged area of scarring in the posterior upper lobe. Mild bibasilar atelectasis. - completed 7 days Abx on 10/2  - continue with duonebs      Hypoglycemia:  Intermittent hypoglycemia, better with continuous feeds  History of SVT  Peripheral neuropathy:  lyrica as above  Remote EtOH abuse:  Family confirmed no EtOH in 5 years  Dysphagia/dyarthria and GERD:  S/p PEG placement 6/17    - con't feeds as tolerated  - con't protonix  Hypokalemia, resolved        Code Status: DNR  Surrogate Decision Maker: wife Anup Smith [phone] or [phone]  DVT Prophylaxis: Lovenox     Subjective:     Chief Complaint / Reason for Physician Visit  Demented. Discussed with RN events overnight. Review of Systems:  Symptom Y/N Comments  Symptom Y/N Comments   Fever/Chills    Chest Pain     Poor Appetite    Edema     Cough    Abdominal Pain     Sputum    Joint Pain     SOB/AL    Pruritis/Rash     Nausea/vomit    Tolerating PT/OT     Diarrhea    Tolerating Diet     Constipation    Other       Could NOT obtain due to: Demented     Objective:     VITALS:   Last 24hrs VS reviewed since prior progress note.  Most recent are:  Patient Vitals for the past 24 hrs:   Temp Pulse Resp BP SpO2   10/12/17 1427 98.2 °F (36.8 °C) 84 16 107/57 90 %   10/12/17 0703 97.8 °F (36.6 °C) 78 16 (!) 82/46 94 %   10/11/17 2032 97.9 °F (36.6 °C) 85 20 (!) 81/49 92 %       Intake/Output Summary (Last 24 hours) at 10/12/17 1842  Last data filed at 10/12/17 0247   Gross per 24 hour   Intake              300 ml   Output                0 ml   Net              300 ml        PHYSICAL EXAM:  General:                    Awake, not cooperative, no acute distress    EENT:                       EOMI. Anicteric sclerae. MMM  Resp:                       CTA bilaterally, no wheezing or rales.  No accessory muscle use  CV:                           Regular rhythm,  No edema  GI:                            Soft, Non distended, Non tender.  +Bowel sounds  Neurologic:               Alert and oriented X 0-1, very minimal single-word speech, does not follow any commands, no meaningful dialogue  Psych:                      No insight. Not anxious nor agitated  Skin:                         No rashes.  No jaundice    Reviewed most current lab test results and cultures  YES  Reviewed most current radiology test results   YES  Review and summation of old records today    NO  Reviewed patient's current orders and MAR    YES  PMH/ reviewed - no change compared to H&P  ________________________________________________________________________  Care Plan discussed with:    Comments   Patient y    [facility]  y                      Multidiciplinary team rounds were held today with , nursing, pharmacist and clinical coordinator. Patient's plan of care was discussed; medications were reviewed and discharge planning was addressed.      ________________________________________________________________________  Total NON critical care TIME:  20   Minutes    Total CRITICAL CARE TIME Spent:   Minutes non procedure based      Comments   >50% of visit spent in counseling and coordination of care     ________________________________________________________________________  Katherine Montero MD     Procedures: see electronic medical records for all procedures/Xrays and details which were not copied into this note but were reviewed prior to creation of Plan. LABS:  I reviewed today's most current labs and imaging studies.   Pertinent labs include:  Recent Labs      10/10/17   0442   WBC  4.7   HGB  12.2   HCT  36.7   PLT  102*     Recent Labs      10/10/17   0442   NA  138   K  4.2   CL  101   CO2  33*   GLU  92   BUN  19   CREA  0.52*   CA  9.8       Signed: Kavita Ochoa MD

## 2017-10-12 NOTE — PROGRESS NOTES
OT note:    Chart reviewed. Spoke with nursing. RN reporting that patient is inappropriate for therapy intervention as he has been agitated, restless, and not following commands. Pt has required ativan, geodon, and haldol due to agitation, which has made pt lethargic at times as well. Pt has been inappropriate for therapy intervention x 3 days, therefore will sign off.  Please re-consult once patient is more appropriate for active participation in acute OT      Thank you,

## 2017-10-12 NOTE — PROGRESS NOTES
Physical Therapy Note    Chart reviewed. Spoke with nursing. RN reporting that patient is inappropriate for therapy intervention as he has been agitated, restless, and not following commands. Pt has required ativan, geodon, and haldol due to agitation, which has made pt lethargic at times as well. Pt has been inappropriate for therapy intervention x 4 days, therefore will sign off. Please re-consult once patient is more appropriate for active participation in acute PT.     Thank you,  Subhash Huddleston, PT, DPT

## 2017-10-12 NOTE — PROGRESS NOTES
Problem: Falls - Risk of  Goal: *Absence of Falls  Document Lorena Fall Risk and appropriate interventions in the flowsheet.    Outcome: Progressing Towards Goal  Fall Risk Interventions:        Mentation Interventions: Family/sitter at bedside     Medication Interventions: Evaluate medications/consider consulting pharmacy     Elimination Interventions: Bed/chair exit alarm, Toileting schedule/hourly rounds     History of Falls Interventions: Door open when patient unattended, Bed/chair exit alarm, Evaluate medications/consider consulting pharmacy

## 2017-10-12 NOTE — PROGRESS NOTES
Oncology Nursing Communication Tool      7:38 AM  10/12/2017     Bedside shift change report given to Aicha RN (incoming nurse) by Holley Stern (outgoing nurse) on Marguerite Correa a 80 y.o. male who was admitted on 9/27/2017  2:35 PM. Report included the following information SBAR, Kardex, Intake/Output, MAR and Recent Results. Significant changes during shift: combative and restless, had to page on call MD for order of haldol and ativan at 2200. Issues for physician to address: medications to manage combativeness            Code Status: DNR     Infections: No current active infections     Allergies: No known allergies     Current diet: DIET NPO Except Meds  DIET TUBE FEEDING       Pain Controlled [x] yes [] no   Bowel Movement [] yes [x] no   Last Bowel Movement (date)     10/9/17            Vital Signs:   Patient Vitals for the past 12 hrs:   Temp Pulse Resp BP SpO2   10/12/17 0703 97.8 °F (36.6 °C) 78 16 (!) 82/46 94 %   10/11/17 2032 97.9 °F (36.6 °C) 85 20 (!) 81/49 92 %      Intake & Output:     Intake/Output Summary (Last 24 hours) at 10/12/17 0738  Last data filed at 10/12/17 0247   Gross per 24 hour   Intake              600 ml   Output                0 ml   Net              600 ml      Laboratory Results:   No results found for this or any previous visit (from the past 12 hour(s)). Opportunity for questions and clarifications were given to the incoming nurse. Patient's bed is in low position, side rails x2, door open PRN, call bell within reach and patient not in distress.       Holley Stern

## 2017-10-12 NOTE — PROGRESS NOTES
Oncology Nursing Communication Tool      6:54 PM  10/12/2017     Bedside shift change report given to Bruce RN (incoming nurse) by Aneta Adorno RN (outgoing nurse) on Kolby Higgins a 80 y.o. male who was admitted on 9/27/2017  2:35 PM. Report included the following information SBAR, Kardex, Intake/Output, MAR, Accordion and Recent Results. Significant changes during shift: No issue      Issues for physician to address: No issue at this time            Code Status: DNR     Infections: No current active infections     Allergies: No known allergies     Current diet: DIET NPO Except Meds  DIET TUBE FEEDING       Pain Controlled [x] yes [] no   Bowel Movement [] yes [x] no   Last Bowel Movement (date) 10/10/17            Vital Signs:   Patient Vitals for the past 12 hrs:   Temp Pulse Resp BP SpO2   10/12/17 1427 98.2 °F (36.8 °C) 84 16 107/57 90 %   10/12/17 0703 97.8 °F (36.6 °C) 78 16 (!) 82/46 94 %      Intake & Output:     Intake/Output Summary (Last 24 hours) at 10/12/17 1854  Last data filed at 10/12/17 0247   Gross per 24 hour   Intake              300 ml   Output                0 ml   Net              300 ml      Laboratory Results:   No results found for this or any previous visit (from the past 12 hour(s)). Opportunity for questions and clarifications were given to the incoming nurse. Patient's bed is in low position, side rails x2, door open PRN, call bell within reach and patient not in distress.       Aneta Adorno RN

## 2017-10-13 LAB
GLUCOSE BLD STRIP.AUTO-MCNC: 61 MG/DL (ref 65–100)
GLUCOSE BLD STRIP.AUTO-MCNC: 97 MG/DL (ref 65–100)
SERVICE CMNT-IMP: ABNORMAL
SERVICE CMNT-IMP: NORMAL

## 2017-10-13 PROCEDURE — 74011250636 HC RX REV CODE- 250/636: Performed by: GENERAL ACUTE CARE HOSPITAL

## 2017-10-13 PROCEDURE — 74011250637 HC RX REV CODE- 250/637: Performed by: GENERAL ACUTE CARE HOSPITAL

## 2017-10-13 PROCEDURE — 74011250636 HC RX REV CODE- 250/636: Performed by: PHYSICAL MEDICINE & REHABILITATION

## 2017-10-13 PROCEDURE — 74011250637 HC RX REV CODE- 250/637: Performed by: NURSE PRACTITIONER

## 2017-10-13 PROCEDURE — 74011250637 HC RX REV CODE- 250/637: Performed by: INTERNAL MEDICINE

## 2017-10-13 PROCEDURE — 82962 GLUCOSE BLOOD TEST: CPT

## 2017-10-13 PROCEDURE — 74011000258 HC RX REV CODE- 258: Performed by: GENERAL ACUTE CARE HOSPITAL

## 2017-10-13 PROCEDURE — 65270000015 HC RM PRIVATE ONCOLOGY

## 2017-10-13 PROCEDURE — 74011250636 HC RX REV CODE- 250/636: Performed by: INTERNAL MEDICINE

## 2017-10-13 RX ORDER — VANCOMYCIN 1.75 GRAM/500 ML IN 0.9 % SODIUM CHLORIDE INTRAVENOUS
1750 ONCE
Status: COMPLETED | OUTPATIENT
Start: 2017-10-13 | End: 2017-10-14

## 2017-10-13 RX ORDER — LORAZEPAM 2 MG/ML
.5-1 INJECTION INTRAMUSCULAR
Status: DISCONTINUED | OUTPATIENT
Start: 2017-10-13 | End: 2017-10-14 | Stop reason: HOSPADM

## 2017-10-13 RX ORDER — ACETAMINOPHEN 120 MG/1
120 SUPPOSITORY RECTAL
Status: DISCONTINUED | OUTPATIENT
Start: 2017-10-13 | End: 2017-10-13

## 2017-10-13 RX ORDER — ACETAMINOPHEN 650 MG/1
650 SUPPOSITORY RECTAL
Status: DISCONTINUED | OUTPATIENT
Start: 2017-10-13 | End: 2017-10-14 | Stop reason: HOSPADM

## 2017-10-13 RX ADMIN — Medication 100 MG: at 10:24

## 2017-10-13 RX ADMIN — LATANOPROST 1 DROP: 50 SOLUTION OPHTHALMIC at 21:21

## 2017-10-13 RX ADMIN — PREGABALIN 300 MG: 100 CAPSULE ORAL at 10:19

## 2017-10-13 RX ADMIN — ENOXAPARIN SODIUM 40 MG: 40 INJECTION SUBCUTANEOUS at 23:25

## 2017-10-13 RX ADMIN — PANTOPRAZOLE SODIUM 40 MG: 40 GRANULE, DELAYED RELEASE ORAL at 16:30

## 2017-10-13 RX ADMIN — VALPROIC ACID 500 MG: 250 SOLUTION ORAL at 16:30

## 2017-10-13 RX ADMIN — ASPIRIN 81 MG 81 MG: 81 TABLET ORAL at 10:23

## 2017-10-13 RX ADMIN — LEVETIRACETAM 1500 MG: 100 SOLUTION ORAL at 21:19

## 2017-10-13 RX ADMIN — LEVETIRACETAM 1500 MG: 100 SOLUTION ORAL at 10:20

## 2017-10-13 RX ADMIN — QUETIAPINE FUMARATE 50 MG: 25 TABLET ORAL at 21:19

## 2017-10-13 RX ADMIN — ACETAMINOPHEN 650 MG: 325 TABLET ORAL at 15:08

## 2017-10-13 RX ADMIN — PREGABALIN 300 MG: 100 CAPSULE ORAL at 21:19

## 2017-10-13 RX ADMIN — LORAZEPAM 1 MG: 2 INJECTION INTRAMUSCULAR; INTRAVENOUS at 15:06

## 2017-10-13 RX ADMIN — LORAZEPAM 0.5 MG: 2 INJECTION INTRAMUSCULAR; INTRAVENOUS at 10:14

## 2017-10-13 RX ADMIN — QUETIAPINE FUMARATE 12.5 MG: 25 TABLET ORAL at 10:23

## 2017-10-13 RX ADMIN — LORAZEPAM 0.5 MG: 2 INJECTION INTRAMUSCULAR; INTRAVENOUS at 02:40

## 2017-10-13 RX ADMIN — CEFEPIME HYDROCHLORIDE 2 G: 2 INJECTION, POWDER, FOR SOLUTION INTRAVENOUS at 21:20

## 2017-10-13 RX ADMIN — VALPROIC ACID 500 MG: 250 SOLUTION ORAL at 06:10

## 2017-10-13 RX ADMIN — PANTOPRAZOLE SODIUM 40 MG: 40 GRANULE, DELAYED RELEASE ORAL at 10:24

## 2017-10-13 RX ADMIN — ACETAMINOPHEN 650 MG: 650 SUPPOSITORY RECTAL at 21:19

## 2017-10-13 RX ADMIN — ENOXAPARIN SODIUM 40 MG: 40 INJECTION SUBCUTANEOUS at 00:27

## 2017-10-13 RX ADMIN — VALPROIC ACID 500 MG: 250 SOLUTION ORAL at 21:21

## 2017-10-13 NOTE — PROGRESS NOTES
Mrs. Manan Sewell was with pt. I asked her if she had checked with Pastor Rabago to see if he still had a bed for pt. She had not. She asked when he was being discharged and I told her that we had to have a place to send him. I gave her the no.for Mylinda Roles Mehul.--[phone], and she called. He told her that he would hold the bed until pt. was discharged. I, then, called Dr. Lexa Perry. He stated he would plan for discharge on Sat. I asked about hospice, but he told me to call Dr. Alida Perdomo. I have paged him. ---  I had discussed hospice before, with wife and dtr. ,when giving DC planning options,  and they seemed ok with this. I explained that hospice nurses could help to manage his meds ,to keep him comfortable. --I called Pastor Rabago and he is out of town, but will be back Sat. Afternoon. He could take pt. late Sat. He wants to use [facility]. I will just need an order and can send referral today. I will check with Dr. Lexa Perry to see if pt. will have bolus or pump feeding thro. PEG. Hospice may need to order a pump. Pt. will need amb. Transport for Sat. -- Nikki Chapman        I have spoken with dtr. Surekha Mcneill Nova--[phone], and explained the benefits of hospice--providing the pump, feedings, and meds. , and having a nurse to help manage symptoms. --[facility] care of pump. Surekha Mcneill  is ok with going ahead with hospice. They will contact Mrs. Mckeon today. I  Have set up amb. Transport for 4pm on Sat . For pt. To go to [facility]. Packet is with bedside chart. Will leave message for weekend CM. -- Walt Zepeda-- Weekend CM GE.--[phone]

## 2017-10-13 NOTE — PROGRESS NOTES
HYPOGLYCEMIC EPISODE DOCUMENTATION    Patient with hypoglycemic episode(s) at 1020(time) on 10/13/17(date). BG value(s) pre-treatment 64    Was patient symptomatic?  [] yes, [x] no  Patient was treated with the following rescue medications/treatments: [] D50                [] Glucose tablets                [] Glucagon                [x] 8oz juice                [] 6oz reg soda                [] 8oz low fat milk  BG value post-treatment: 97    Once BG treated and value greater than 80mg/dl, pt was provided with the following:  [] snack  [x] meal ---> PEG feed

## 2017-10-13 NOTE — PROGRESS NOTES
Oncology Nursing Communication Tool      2:37 PM  10/13/2017     Bedside shift change report given to Chad Alcantara RN (incoming nurse) by Elvia Merrill RN (outgoing nurse) on Edenilson Ruiz a 80 y.o. male who was admitted on 9/27/2017  2:35 PM. Report included the following information SBAR, Kardex, Intake/Output, MAR, Accordion and Recent Results. Significant changes during shift: No significant change; Binder placed around abdomen to brace PEG tube feed; Ativan given x1 for restless and agitation      Issues for physician to address: No issue at this time Thank You          Code Status: DNR     Infections: No current active infections     Allergies: No known allergies     Current diet: DIET NPO Except Meds  DIET TUBE FEEDING       Pain Controlled [x] yes [] no   Bowel Movement [x] yes [] no   Last Bowel Movement (date)     10/13/17            Vital Signs:   Patient Vitals for the past 12 hrs:   Temp Pulse Resp BP SpO2   10/13/17 1420 (!) 101.7 °F (38.7 °C) (!) 109 18 138/63 93 %   10/13/17 0900 98.2 °F (36.8 °C) 78 18 97/55 -      Intake & Output:     Intake/Output Summary (Last 24 hours) at 10/13/17 1437  Last data filed at 10/13/17 0900   Gross per 24 hour   Intake              850 ml   Output                0 ml   Net              850 ml      Laboratory Results:     Recent Results (from the past 12 hour(s))   GLUCOSE, POC    Collection Time: 10/13/17 10:16 AM   Result Value Ref Range    Glucose (POC) 61 (L) 65 - 100 mg/dL    Performed by Uzma Núñez    GLUCOSE, POC    Collection Time: 10/13/17 10:40 AM   Result Value Ref Range    Glucose (POC) 97 65 - 100 mg/dL    Performed by [address] [place] [address] questions and clarifications were given to the incoming nurse. Patient's bed is in low position, side rails x2, door open PRN, call bell within reach and patient not in distress.       Elvia Merrill RN

## 2017-10-13 NOTE — PROGRESS NOTES
RN noted patients temp to be 101. 3. Previous RN has administered tylenol at 1508. RN has placed bilateral axillary ice packs on patient to help bring temp down as well as removed large blankets from patient. RN will continue to monitor. 1850:  RN reassessed patients temp. Temp now 101.9 axillary. RN page Dr. Sam Deutsch to inform. 1856:  Dr. Sam Deutsch returned page. RN advised of above. Dr. Sam Deutsch advised RN to order tylenol 120mg suppository q 4 hrs PRN for fever and discontinue other tylenol order. RN verified by readback and placed orders.

## 2017-10-13 NOTE — PROGRESS NOTES
Verbal report received from Bruce, RN(name) on [facility]. Report consisted of patients Situation, Background, Assessment and   Recommendations(SBAR). Information from the following report(s) SBAR, Kardex, MAR, Accordion and Recent Results was reviewed with the receiving nurse. Opportunity for questions and clarification was provided.

## 2017-10-13 NOTE — PROGRESS NOTES
Oncology Nursing Communication Tool      7:21 PM  10/13/2017     Bedside and Verbal shift change report given to [facility], RN (incoming nurse) by Marcie King (outgoing nurse) on Minerva Mina a 80 y.o. male who was admitted on 9/27/2017  2:35 PM. Report included the following information SBAR, Kardex, Intake/Output, MAR, Accordion and Recent Results. Significant changes during shift: Pt has fever. See note. Oncoming RN to monitor and medicate with tylenol suppository after verified from pharmacy. Issues for physician to address: Fever          Code Status: DNR     Infections: No current active infections     Allergies: No known allergies     Current diet: DIET NPO Except Meds  DIET TUBE FEEDING       Pain Controlled [x] yes [] no   Bowel Movement [x] yes [] no   Last Bowel Movement (date)     10/13/17            Vital Signs:   Patient Vitals for the past 12 hrs:   Temp Pulse Resp BP SpO2   10/13/17 1850 (!) 101.9 °F (38.8 °C) - - - -   10/13/17 1640 (!) 101.3 °F (38.5 °C) - - - -   10/13/17 1420 (!) 101.7 °F (38.7 °C) (!) 109 18 138/63 93 %   10/13/17 0900 98.2 °F (36.8 °C) 78 18 97/55 -      Intake & Output:     Intake/Output Summary (Last 24 hours) at 10/13/17 1921  Last data filed at 10/13/17 1639   Gross per 24 hour   Intake             1030 ml   Output                0 ml   Net             1030 ml      Laboratory Results:     Recent Results (from the past 12 hour(s))   GLUCOSE, POC    Collection Time: 10/13/17 10:16 AM   Result Value Ref Range    Glucose (POC) 61 (L) 65 - 100 mg/dL    Performed by Trevin Matthew    GLUCOSE, POC    Collection Time: 10/13/17 10:40 AM   Result Value Ref Range    Glucose (POC) 97 65 - 100 mg/dL    Performed by Ileana Waldrop for questions and clarifications were given to the incoming nurse. Patient's bed is in low position, side rails x2, door open PRN, call bell within reach and patient not in distress.       Dea Kidd Santa Murphy

## 2017-10-13 NOTE — PROGRESS NOTES
Oncology Nursing Communication Tool      7:27 AM  10/13/2017     Bedside shift change report given to Bruce RN (incoming nurse) by Ajay Amezcua RN (outgoing nurse) on Erika Keen a 80 y.o. male who was admitted on 9/27/2017  2:35 PM. Report included the following information JANINEAR, Joshua Calderon and Recent Results. Significant changes during shift: One dose PRN ativan      Issues for physician to address: None            Code Status: DNR     Infections: No current active infections     Allergies: No known allergies     Current diet: DIET NPO Except Meds  DIET TUBE FEEDING       Pain Controlled [x] yes [] no   Bowel Movement [x] yes [] no   Last Bowel Movement (date)     10/13/17            Vital Signs:   Patient Vitals for the past 12 hrs:   Temp Pulse Resp BP SpO2   10/12/17 2322 98.9 °F (37.2 °C) 74 18 96/67 94 %   10/12/17 2053 - 76 16 - -      Intake & Output:     Intake/Output Summary (Last 24 hours) at 10/13/17 0727  Last data filed at 10/13/17 0615   Gross per 24 hour   Intake              700 ml   Output                0 ml   Net              700 ml      Laboratory Results:   No results found for this or any previous visit (from the past 12 hour(s)). Opportunity for questions and clarifications were given to the incoming nurse. Patient's bed is in low position, side rails x2, door open PRN, call bell within reach and patient not in distress.       Ajay Amezcua RN

## 2017-10-13 NOTE — PROGRESS NOTES
Patient continuous tube feeds initiated this afternoon at 1530 post receiving PRN Ativan dose. 16 oz orange juice given to patient through PEG tube related to tube feeding being disconnected while patient agitated, pulling at lines and gown, and throwing legs over bed. Rate continuous at 55, with 150mL flush q4h. PEG tube dressing changed 10/12/17 after patient pulled tube feeding out. Abdominal binder now in place. Tube feeds running and sitter at bedside.

## 2017-10-13 NOTE — PROGRESS NOTES
Nutrition Assessment:    Addendum: Per conversation with Ruddy Hernandez, pt being discharged this weekend. My recommendation it to keep pt on continuous TF due to pt experienced low BG during bolus trial with 5-6 boluses per day. Current TF order is Jevity 1.5 @ 55 ml/hr + 150 ml water flush q 4 hrs (1980 kcal, 85 g pro and 1900 ml free water)     INTERVENTIONS/RECOMMENDATIONS:   Continue current TF order  If TF are off for multiple hours consider check BG, pt had low BG when on bolus TF    ASSESSMENT:   Chart reviewed. Pt sleeping during time of visit with sitter in room. TF not running due to pt became agitated and disconnected feeding tube from PEG yesterday. MD's adjusted meds. Per pump history, pt only received 50% of ordered nutrition in the past 72 hrs. More than likely less than 50% because RN reports that after tube was disconnected formula was running in bed. Now that medication has been adjusted, hopefully pt can receive >80% of ordered TF. Will continue to monitor. Diet Order:  (Jev 1.5 @ 55 + 150 q 4 hrs)  % Eaten:  No data found. Pertinent Medications: [x]Reviewed: Keppra, PPI, seroquel,   Pertinent Labs: [x]Reviewed:   Food Allergies: [x]NKFA  []Other   Last BM:  10/12  Edema:      []RUE   []LUE   []RLE   []LLE      Pressure Ulcer:      [] Stage I   [] Stage II   [] Stage III   [] Stage IV      Anthropometrics: Height: 5' 8\" (172.7 cm) Weight: 60.8 kg (134 lb 0.6 oz)    IBW (%IBW):   ( ) UBW (%UBW):   (  %)    BMI: Body mass index is 20.38 kg/(m^2).     This BMI is indicative of:  []Underweight   [x]Normal   []Overweight   [] Obesity   [] Extreme Obesity (BMI>40)  Last Weight Metrics:  Weight Loss Metrics 10/12/2017 9/22/2017 8/22/2017 7/27/2017 7/22/2017 6/21/2017 3/21/2017   Today's Wt 134 lb 0.6 oz 134 lb 9.6 oz 144 lb 11.2 oz 150 lb 152 lb 3.2 oz 155 lb 166 lb   BMI 20.38 kg/m2 20.47 kg/m2 22 kg/m2 22.81 kg/m2 23.14 kg/m2 23.57 kg/m2 25.24 kg/m2       Estimated Nutrition Needs (Based on): 1803 Kcals/day (BMR 1297x1. 2(AF)+250) , 74 g (1.2 g/kg bw) Protein  Carbohydrate:  At Least 130 g/day  Fluids: 1806 mL/day or per primary team    NUTRITION DIAGNOSES:   Problem:  Swallowing difficulty      Etiology: related to current medical condition     Signs/Symptoms: as evidenced by NPO status and PEG tube present      NUTRITION INTERVENTIONS:    Enteral/Parenteral Nutrition: Other (continue current TF)                GOAL:   continue to meet >80% of ordered TF in 2-4 days    NUTRITION MONITORING AND EVALUATION      Food/Nutrient Intake Outcomes: Enteral/parenteral nutrition intake  Physical Signs/Symptoms Outcomes: Weight/weight change, Electrolyte and renal profile, GI profile, Glucose profile, GI    Previous Goal Met:   [] Met              [x] Progressing Towards Goal              [] Not Progressing Towards Goal   Previous Recommendations:   [] Implemented          [] Not Implemented          [x] Not Applicable    LEARNING NEEDS (Diet, Food/Nutrient-Drug Interaction):    [x] None Identified   [] Identified and Education Provided/Documented   [] Identified and Pt declined/was not appropriate     Cultural, Voodoo, OR Ethnic Dietary Needs:    [x] None Identified   [] Identified and Addressed     [x] Interdisciplinary Care Plan Reviewed/Documented    [x] Discharge Planning: Continue TF   [] Participated in Interdisciplinary Rounds    NUTRITION RISK:    [] High              [x] Moderate           []  Low  []  Minimal/Uncompromised      Mao Morgan RDN  Pager [phone]  Weekend Pager [phone]

## 2017-10-13 NOTE — PROGRESS NOTES
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 Mild bibasilar atelectasis. - MRI brain 10/3 with No change.  Left cerebral cortical diffusion restriction, with no enhancement. - EEG 10/3 abnormal encephalogram due to at 8 Hz slow-wave activity as the background. This can be caused by toxic and/or metabolic encephalopathies. - appreciate reconsult by neurology, per notes \"feel this is one of the rapidly progressive dementias which include Elenor Nilam, frontotemporal dementia and Lewy Body dementia. Of the three, frontotemporal dementia seems most associated with seizures. None are treatable. \"  This has been discussed with wife/family. - con't increased seroquel at night.  Add low-dose seroquel during daytime.  Con't haldol if needed.  - PT/OT as tolerated, CM finalizing dispo plan - either to [address] for 1-2 weeks pending insurance authorization or LTC at [facility] (group [place] that will accept PEG patients).    - Perla also awaiting family input  - Palliative on board, appreciate the recs -- need further recs in regards to optimizing medication for agitation. Haldol 2mg q6hrs does not seem to be sufficient, nor Seroquel 50mg.  Code ATLAS was called on 10/10 at 7am due to agitation.   - Spoke with Dr. Nicole Guillen in regards to pts sedative medications as it seems pt was again very combative, agitated and disruptive over the past 24 hours to the point where other pts and families are being disturbed. Therefore plan is to discontinue Haldol, as it has seemingly little affect on the pt, and to start on Geodon q12hrs PRN, and continue Seroquel as dosed currently.    -Pt continues to be agitated at night as of 10/12 [does well during the day] creating trouble for the staff as well the other patients. Will start Ativan q6hrs. Discussed with Dr. Gia damico. -As of 10/13 no reports of agitation with the pt overnight due to the sedation provided by Ativan. Pt at risk of pulling out PEG tube so binder needs to be in place as it was before, and sedation needs to be continued. Spoke with CM who has been speaking with the daughter and it seems they are now agreeable to try to get the bed at [facility] but need an update on whether it is even available. If not, pt needs to go to long term care and then a place that accepts pts with PEG; and they will have to pay for this out of pocket. Will also discuss with Dr. Gia damico in regards to Hospice consultation.     Seizure d/o:  Had been stable on lyrica and valproic acid in the past.  No e/o new seizure with encephalopathy. - con't lyrica and VPA  - neuro consult as above  - CT head and MRI as above.  EEG as above.      Aspiration pneumonitis:  No PNA seen on CT  - CT chest 9/28 with unchanged area of scarring in the posterior upper lobe. Mild bibasilar atelectasis. - completed 7 days Abx on 10/2  - continue with duonebs      Hypoglycemia:  Intermittent hypoglycemia, better with continuous feeds  History of SVT  Peripheral neuropathy:  lyrica as above  Remote EtOH abuse:  Family confirmed no EtOH in 5 years  Dysphagia/dyarthria and GERD:  S/p PEG placement 6/17    - con't feeds as tolerated  - con't protonix  Hypokalemia, resolved        Code Status: DNR  Surrogate Decision Maker: wife Sharmila Montilla [phone] or [phone]  DVT Prophylaxis: Lovenox     Subjective:     Chief Complaint / Reason for Physician Visit  Dementia.   Discussed with RN events overnight. Review of Systems:  Symptom Y/N Comments  Symptom Y/N Comments   Fever/Chills    Chest Pain     Poor Appetite    Edema     Cough    Abdominal Pain     Sputum    Joint Pain     SOB/AL    Pruritis/Rash     Nausea/vomit    Tolerating PT/OT     Diarrhea    Tolerating Diet     Constipation    Other       Could NOT obtain due to: Dementia     Objective:     VITALS:   Last 24hrs VS reviewed since prior progress note. Most recent are:  Patient Vitals for the past 24 hrs:   Temp Pulse Resp BP SpO2   10/13/17 0900 98.2 °F (36.8 °C) 78 18 97/55 -   10/12/17 2322 98.9 °F (37.2 °C) 74 18 96/67 94 %   10/12/17 2053 - 76 16 - -   10/12/17 1427 98.2 °F (36.8 °C) 84 16 107/57 90 %       Intake/Output Summary (Last 24 hours) at 10/13/17 1026  Last data filed at 10/13/17 0615   Gross per 24 hour   Intake              700 ml   Output                0 ml   Net              700 ml        PHYSICAL EXAM:  General:                    Awake, not cooperative, no acute distress    EENT:                       EOMI. Anicteric sclerae.  MMM  Resp:                       CTA bilaterally, no wheezing or rales.  No accessory muscle use  CV:                           Regular rhythm,  No edema  GI:                            Soft, Non distended, Non tender.  +Bowel sounds, PEG in place - no binder noted  Neurologic:               Alert and oriented X 0-1, very minimal single-word speech, does not follow any commands, no meaningful dialogue  Psych:                      No insight. Not anxious nor agitated  Skin:                         No rashes.  No jaundice    Reviewed most current lab test results and cultures  YES  Reviewed most current radiology test results   YES  Review and summation of old records today    NO  Reviewed patient's current orders and MAR    YES  PMH/SH reviewed - no change compared to H&P  ________________________________________________________________________  Care Plan discussed with:    Comments   Patient y Family  y    RN y    Care Manager y    Consultant  y                      Multidiciplinary team rounds were held today with , nursing, pharmacist and clinical coordinator. Patient's plan of care was discussed; medications were reviewed and discharge planning was addressed. ________________________________________________________________________  Total NON critical care TIME:  30   Minutes    Total CRITICAL CARE TIME Spent:   Minutes non procedure based      Comments   >50% of visit spent in counseling and coordination of care     ________________________________________________________________________  Peace Garcia MD     Procedures: see electronic medical records for all procedures/Xrays and details which were not copied into this note but were reviewed prior to creation of Plan. LABS:  I reviewed today's most current labs and imaging studies. Pertinent labs include:  No results for input(s): WBC, HGB, HCT, PLT, HGBEXT, HCTEXT, PLTEXT in the last 72 hours. No results for input(s): NA, K, CL, CO2, GLU, BUN, CREA, CA, MG, PHOS, ALB, TBIL, TBILI, SGOT, ALT, INR in the last 72 hours.     No lab exists for component: INREXT    Signed: Peace Garcia MD

## 2017-10-13 NOTE — PROGRESS NOTES
Palliative Medicine     Spoke with CM Luly Reveles). LCSW visited Pt's wife/nok medical surrogate. Pt's wife confirmed plan for going to [facility] with hospice services to support Pt in his comfort/dignity as nears end of life. Pt's wife is hesitant about signing DDNR so asked if we could call Pt's dtr Elanagiselle Mcmahan, [phone]). LCSW and Pt's wife called Whit Lopez (dtr) and she said they reviewed the DDNR order following last week's family meeting and family is in agreement of protective DNAR for Pt. Pt's dtr had question about if Pt choked and LCSW said of course, people would respond to obstruction in airway. Clarified DNAR is when heart or breathing stop suddenly. Pt's dtr spoke privately with Pt's wife and then wife verbalized comfort with signing DDNR order because recognizes it is important as pt prepares for dc from hospital. DDNR order signed. Pt's dtr verbalized plan for hospice information session versus a decision for Pt to enroll in hospice. She is not able to meet tonight or tomorrow because she is working but would be available on Sunday. Pt's wife/nok medical surrogate wants family to be on board with pt's care plan and therefore desires for info session before plan of enrollment. Pt's wife understands hospice is beneficial to support Pt in [facility] setting. CM plans to speak with dtr. Oriignal DDNR orders placed on chart; PLEASE SEND one with Pt at discharge. Copies provided wife. Thank you for including Palliative Medicine in Mr. Mckeon's care.       Gayatri Dickey, [address] ([id number])  Work cell: [phone]
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
## 2017-10-14 VITALS
OXYGEN SATURATION: 99 % | SYSTOLIC BLOOD PRESSURE: 96 MMHG | RESPIRATION RATE: 18 BRPM | BODY MASS INDEX: 20.31 KG/M2 | HEART RATE: 66 BPM | HEIGHT: 68 IN | TEMPERATURE: 98.7 F | WEIGHT: 134.04 LBS | DIASTOLIC BLOOD PRESSURE: 61 MMHG

## 2017-10-14 PROCEDURE — 74011250637 HC RX REV CODE- 250/637: Performed by: NURSE PRACTITIONER

## 2017-10-14 PROCEDURE — 74011250637 HC RX REV CODE- 250/637: Performed by: INTERNAL MEDICINE

## 2017-10-14 PROCEDURE — 74011000250 HC RX REV CODE- 250: Performed by: PHYSICAL MEDICINE & REHABILITATION

## 2017-10-14 PROCEDURE — 74011250636 HC RX REV CODE- 250/636: Performed by: PHYSICAL MEDICINE & REHABILITATION

## 2017-10-14 PROCEDURE — 74011250636 HC RX REV CODE- 250/636: Performed by: INTERNAL MEDICINE

## 2017-10-14 RX ORDER — LORAZEPAM 0.5 MG/1
0.5 TABLET ORAL
Qty: 30 TAB | Refills: 0 | Status: SHIPPED | OUTPATIENT
Start: 2017-10-14

## 2017-10-14 RX ORDER — ACETAMINOPHEN 325 MG/1
650 TABLET ORAL
Qty: 30 TAB | Refills: 1 | Status: SHIPPED | OUTPATIENT
Start: 2017-10-14

## 2017-10-14 RX ORDER — QUETIAPINE FUMARATE 50 MG/1
50 TABLET, FILM COATED ORAL
Qty: 30 TAB | Refills: 1 | Status: SHIPPED | OUTPATIENT
Start: 2017-10-14

## 2017-10-14 RX ORDER — QUETIAPINE FUMARATE 25 MG/1
12.5 TABLET, FILM COATED ORAL DAILY
Qty: 30 TAB | Refills: 0 | Status: SHIPPED | OUTPATIENT
Start: 2017-10-14

## 2017-10-14 RX ADMIN — CYCLOBENZAPRINE HYDROCHLORIDE 5 MG: 10 TABLET, FILM COATED ORAL at 10:50

## 2017-10-14 RX ADMIN — VALPROIC ACID 500 MG: 250 SOLUTION ORAL at 16:07

## 2017-10-14 RX ADMIN — PANTOPRAZOLE SODIUM 40 MG: 40 GRANULE, DELAYED RELEASE ORAL at 10:49

## 2017-10-14 RX ADMIN — VANCOMYCIN HYDROCHLORIDE 1750 MG: 10 INJECTION, POWDER, LYOPHILIZED, FOR SOLUTION INTRAVENOUS at 01:19

## 2017-10-14 RX ADMIN — PREGABALIN 300 MG: 100 CAPSULE ORAL at 10:50

## 2017-10-14 RX ADMIN — WATER 20 MG: 1 INJECTION INTRAMUSCULAR; INTRAVENOUS; SUBCUTANEOUS at 16:05

## 2017-10-14 RX ADMIN — VALPROIC ACID 500 MG: 250 SOLUTION ORAL at 06:24

## 2017-10-14 RX ADMIN — Medication 100 MG: at 10:50

## 2017-10-14 RX ADMIN — QUETIAPINE FUMARATE 12.5 MG: 25 TABLET ORAL at 10:50

## 2017-10-14 RX ADMIN — ASPIRIN 81 MG 81 MG: 81 TABLET ORAL at 10:51

## 2017-10-14 RX ADMIN — LEVETIRACETAM 1500 MG: 100 SOLUTION ORAL at 10:49

## 2017-10-14 NOTE — PROGRESS NOTES
Oncology Nursing Communication Tool      7:32 AM  10/14/2017     Bedside shift change report given to Naheed Washington RN (incoming nurse) by Marcel Shook (outgoing nurse) on Janamanda Cast a 80 y.o. male who was admitted on 9/27/2017  2:35 PM. Report included the following information SBAR, Kardex, Intake/Output, MAR and Recent Results. Significant changes during shift: None      Issues for physician to address: discharge            Code Status: DNR     Infections: No current active infections     Allergies: No known allergies     Current diet: DIET NPO Except Meds  DIET TUBE FEEDING       Pain Controlled [x] yes [] no   Bowel Movement [x] yes [] no   Last Bowel Movement (date)     10/14/17            Vital Signs:   Patient Vitals for the past 12 hrs:   Temp Pulse Resp BP SpO2   10/14/17 0641 98.7 °F (37.1 °C) (!) 58 18 124/56 95 %   10/14/17 0138 98.8 °F (37.1 °C) (!) 108 16 121/51 95 %      Intake & Output:     Intake/Output Summary (Last 24 hours) at 10/14/17 0732  Last data filed at 10/14/17 0015   Gross per 24 hour   Intake              850 ml   Output                0 ml   Net              850 ml      Laboratory Results:   No results found for this or any previous visit (from the past 12 hour(s)). Opportunity for questions and clarifications were given to the incoming nurse. Patient's bed is in low position, side rails x2, door open PRN, call bell within reach and patient not in distress.       Marcel Shook

## 2017-10-14 NOTE — PROGRESS NOTES
Pharmacy Automatic Renal Dosing Protocol - Antimicrobials    Indication for Antimicrobials: UTI, ? Aspiration Pneumonia    Current Regimen of Each Antimicrobial:  Vancomycin 1750 mg x 1(Start Date 10/13; Day # 1)    Goal Level:  10-15    Measured / Extrapolated Vancomycin Level:     Significant Cultures: NG    Paralysis, amputations, malnutrition:     Labs:  No results for input(s): CREA, BUN, WBC in the last 72 hours. Temp (24hrs), Av.3 °F (37.9 °C), Min:98.2 °F (36.8 °C), Max:101.9 °F (38.8 °C)      Creatinine Clearance (mL/min) or Dialysis: No      Impression/Plan: Vancomycin 1750 mg x 1, physician only want a one time dose and will discuss continuation with family in am. Cefepime adjusted to 2 grams Q8H per renal dosing protocol. Pharmacy will follow daily and adjust medications as appropriate for renal function and/or serum levels. Thank you,  Petar Collado, [facility]          Recommended duration of therapy  http://[facility]/[place]/[facility]/Pharmacy/Clinical%20Companion/Duration%20of%20ABX%20therapy. docx    Renal Dosing  http://[facility]/[place]/[facility]/Pharmacy/Clinical%20Companion/Renal%[id number]. pdf

## 2017-10-14 NOTE — PROGRESS NOTES
CM contacted Ann Marie Christian to make are of anticipated D/C for today. Ms. Mika Tsang voiced understanding of information received. At this time there are no further CM coordination of care needs. Pt will D/C at 4PM today to Mr. [facility] home and home hospice will provide in home support and care.     Jimbo Florence, JACQUELINEW  Ext # [phone]

## 2017-10-14 NOTE — PROGRESS NOTES
Patient cleared for discharge to group home. Prescriptions and paperwork sent with medical transplant.

## 2017-10-14 NOTE — DISCHARGE SUMMARY
Hospitalist Discharge Summary     Patient ID:  Leesa Celeste  [id number]  89 y.o.  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  male  with PMhx significant for seizures who presents via EMS with his wife to the ED with cc of disorientation and abnormal behavior. Pt lives in a nursing facility. Per the wife, pt began acting agitated and disoriented yesterday. Pt fell sometime last night or early this morning. He has been falling more often recently. He was given Haloperidol prior to EMS transport to hospital due to low blood pressure. On  9/24, pt was treated for UTI at [facility]. The wife notes that pt's seizures typically present as staring and clenching of the hands and face. Pt is taking Keppra, and Ampicillin. At this time patient is lying in bed is aphasic and can not provide a meaningful history this is going to be his 7th admission since 06/17, spoke with wife and explained that this episodes and recurrent aspiration may continue to happen, he is very sick and has no quality of life, data collected from chart and wife and sister in the room. We were asked to admit for work up and evaluation of the above problems.    ______________________________________________________________________  DISCHARGE SUMMARY/HOSPITAL COURSE:  for full details see H&P, daily progress notes, labs, consult notes. 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 Mild bibasilar atelectasis. - MRI brain 10/3 with No change.  Left cerebral cortical diffusion restriction, with no enhancement. - EEG 10/3 abnormal encephalogram due to at 8 Hz slow-wave activity as the background. This can be caused by toxic and/or metabolic encephalopathies. - appreciate reconsult by neurology, per notes \"feel this is one of the rapidly progressive dementias which include Kadie Rex, frontotemporal dementia and Lewy Body dementia. Of the three, frontotemporal dementia seems most associated with seizures. None are treatable. \"  This has been discussed with wife/family. - con't increased seroquel at night.  Add low-dose seroquel during daytime. - Spoke with Dr. Cuauhtemoc Padilla in regards to pts sedative medications as it seems pt was again very combative, agitated and disruptive over the past 24 hours to the point where other pts and families are being disturbed. Therefore plan is to discontinue Haldol, as it has seemingly little affect on the pt, and to start on Geodon q12hrs PRN, and continue Seroquel as dosed currently.    -Pt continues to be agitated at night as of 10/12 [does well during the day] creating trouble for the staff as well the other patients. Will start Ativan q6hrs. Discussed with Dr. Cuauhtemoc Padilla.    -As of 10/13 no reports of agitation with the pt overnight due to the sedation provided by Ativan. Pt at risk of pulling out PEG tube so binder needs to be in place as it was before, and sedation needs to be continued. Spoke with CM who has been speaking with the daughter and it seems they are now agreeable to try to get the bed at [facility] but need an update on whether it is even available. If not, pt needs to go to long term care and then a place that accepts pts with PEG; and they will have to pay for this out of pocket. Will also discuss with Dr. Sonia Lamas in regards to Hospice consultation.  -Spoke with pt's daughter Nancy Du and wife Brinda Kawasaki in regards to discharge planning. Pt to be discharged to [facility] this afternoon at 4pm. Discussed about pt's fever yesterday and informed the family that further testing, blood work, and antibiotics would most likely not be beneficial in terms of the overall health. Stated that Hospice care, which was discussed yesterday, would be the most appropriate. Both Miss Nancy Du and Brinda Kawasaki agreed with the plan and discharge. -DC on Tylenol PRN for fever and pain. Given RX for Ativan, Geodon and Seroquel. These meds to be reviewed by Hospice team to determine if titration is needed.      Seizure d/o:  Had been stable on lyrica and valproic acid in the past.  No e/o new seizure with encephalopathy. - con't lyrica and VPA  - neuro consult as above  - CT head and MRI as above.  EEG as above.      Aspiration pneumonitis:  No PNA seen on CT  - CT chest 9/28 with unchanged area of scarring in the posterior upper lobe. Mild bibasilar atelectasis.   - completed 7 days Abx on 10/2      Hypoglycemia:  Intermittent hypoglycemia, better with continuous feeds  History of SVT  Peripheral neuropathy:  lyrica  Remote EtOH abuse:  Family confirmed no EtOH in 5 years  Dysphagia/dyarthria and GERD:  S/p PEG placement 6/17  - pump to be provided by Hospice team for feeding    _______________________________________________________________________  Patient seen and examined by me on discharge day. Pertinent Findings:  Gen:    Not in distress  Chest: Clear lungs  CVS:   Regular rhythm. No edema  Abd:  Soft, not distended, not tender  Neuro:  Alert, oriented x0  _______________________________________________________________________  DISCHARGE MEDICATIONS:   Current Discharge Medication List      START taking these medications    Details   LORazepam (ATIVAN) 0.5 mg tablet 1 Tab by Per G Tube route every four (4) hours as needed for Anxiety. Max Daily Amount: 3 mg. Qty: 30 Tab, Refills: 0      ziprasidone in sterile water (preservative free) injection 1 mL by IntraMUSCular route two (2) times daily as needed. 2 doses MUST be  by 4 hours. Also, use only if Ativan prn is not effective for agitation. Add 1.2 mL sterile water. *FOR IM USE ONLY* Final concentration 20mg/ml. Max dose of 40mg daily. Qty: 10 Syringe, Refills: 0         CONTINUE these medications which have CHANGED    Details   acetaminophen (TYLENOL) 325 mg tablet Take 2 Tabs by mouth every six (6) hours as needed for Pain. Indications: Fever, Pain  Qty: 30 Tab, Refills: 1      !! QUEtiapine (SEROQUEL) 50 mg tablet 1 Tab by Per G Tube route nightly. Qty: 30 Tab, Refills: 1      levETIRAcetam (KEPPRA) 100 mg/ml soln oral solution 15 mL by Per G Tube route two (2) times a day. Qty: 500 mL, Refills: 1      !! QUEtiapine (SEROQUEL) 25 mg tablet 0.5 Tabs by Per G Tube route daily. Qty: 30 Tab, Refills: 0       !! - Potential duplicate medications found. Please discuss with provider. CONTINUE these medications which have NOT CHANGED    Details   multivit w-min-ferrous gluconate (CEROVITE) 9 mg iron/15 mL oral liquid 15 mL by PEG Tube route daily. pantoprazole (PROTONIX) 40 mg granules for oral suspension 40 mg by PEG Tube route Before breakfast and dinner. pregabalin (LYRICA) 300 mg capsule 300 mg by Per G Tube route two (2) times a day.       valproic acid, as sodium salt, (DEPAKENE) 250 mg/5 mL (5 mL) soln oral solution 500 mg by PEG Tube route three (3) times daily. latanoprost (XALATAN) 0.005 % ophthalmic solution Administer 1 Drop to both eyes every evening. Qty: 1 mL, Refills: 1      cyclobenzaprine (FLEXERIL) 5 mg tablet 5 mg by PEG Tube route three (3) times daily as needed for Muscle Spasm(s). albuterol-ipratropium (DUO-NEB) 2.5 mg-0.5 mg/3 ml nebu 3 mL by Nebulization route every six (6) hours as needed. Qty: 30 Nebule, Refills: 1         STOP taking these medications       metoprolol tartrate (LOPRESSOR) 25 mg tablet Comments:   Reason for Stopping:         cephALEXin (KEFLEX) 250 mg/5 mL suspension Comments:   Reason for Stopping:         aspirin 81 mg chewable tablet Comments:   Reason for Stopping:         haloperidol (HALDOL) 2 mg tablet Comments:   Reason for Stopping:         thiamine (VITAMIN B-1) 100 mg tablet Comments:   Reason for Stopping:         atorvastatin (LIPITOR) 40 mg tablet Comments:   Reason for Stopping:         oxyCODONE (OXYIR) 5 mg capsule Comments:   Reason for Stopping:         metoprolol succinate (TOPROL-XL) 100 mg tablet Comments:   Reason for Stopping:               My Recommended Diet, Activity, Wound Care, and follow-up labs are listed in the patient's Discharge Insturctions which I have personally completed and reviewed.     _______________________________________________________________________  DISPOSITION:    Home with Family:    Home with HH/PT/OT/RN:    SNF/LTC:    CHRISTINA:    OTHER: y       Condition at Discharge:  Stable  _______________________________________________________________________  Follow up with:   PCP : Brando Amanda MD  Follow-up Information     [facility] up With Details [facility] MD   [address] [place] [id number] [phone]                Total time in minutes spent coordinating this discharge (includes going over instructions, follow-up, prescriptions, and preparing report for sign off to her PCP) :  45 minutes    Signed:  Simin Hubbard MD

## 2017-10-14 NOTE — DISCHARGE INSTRUCTIONS
Hospice: Care Instructions  Your Care Instructions  Hospice care provides medical treatment to relieve symptoms at the end of life. The goal is to keep you comfortable, not to try to cure you. Hospice care does not speed up or lengthen dying. It focuses on easing pain and other symptoms. Hospice caregivers want to enhance your quality of life. Hospice care also offers emotional help and spiritual support when you are dying. It also helps family members care for a loved one who is dying. Hospice care can help you review your life, say important things to family and friends, and explore spiritual issues. Hospice also helps your family and friends deal with their grief after you die. You can use hospice care if your illness cannot be cured and doctors believe you have no more than 6 months to live. You do not need to be confined to a bed or in a hospital to benefit from this type of care. The hospice team includes nurses, counselors, therapists, social workers, pastors, home health aides, and trained volunteers. You can get care in your own home or in a hospice center. Some hospice workers also go to nursing homes or hospitals. How can you care for yourself at home? · Prepare a list of advance directives. These are instructions to your doctor and family members about what kind of care you want if you become unable to speak or express yourself. · Find out if your health insurance covers hospice care. · Find hospice programs in your area. People who can help include your doctor, your state health department, and your insurance company. · Decide what kinds of hospice services you want. It helps to know what you want before you enter a hospice program.  · Think about some questions when preparing for hospice care. ¨ Who do you want to make decisions about your medical care if you are not able to? Many people choose their spouse, child, or doctor. ¨ What are you most afraid of that might happen?  You might be afraid of having pain or losing your independence. Let your hospice team know your fears. The team can help you deal with them. ¨ Where would you prefer to die? Choices include your home, a hospital, or a nursing home. ¨ Do you want to donate organs when you die? Make sure that your family clearly understands this. ¨ Do you want any Evangelical rites or practices to be done before you die? Let your hospice team know what you want. Where can you learn more? Go to http://angelina-kendal.info/. Enter A077 in the search box to learn more about \"Hospice: Care Instructions. \"  Current as of: April 3, 2017  Content Version: 11.3  © 2546-0529 [employer]. Care instructions adapted under license by [employer] (which disclaims liability or warranty for this information). If you have questions about a medical condition or this instruction, always ask your healthcare professional. [address] any warranty or liability for your use of this information.

## 2018-10-27 NOTE — INTERDISCIPLINARY ROUNDS
Cardiopulmonary Care Interdisciplinary rounds were held today to discuss patient plan of care and outcomes. The following members were present: PT, NP/Physician, Pharmacy, Nursing, Nutritionist and Case Management. Plan of Care: Continue current treatment plan, sitter as needed, start TF. From [place], family wants to change facility upon DC- today or tomorrow? 27-Oct-2018 04:06

## 2018-11-18 NOTE — PROGRESS NOTES
Report called into [facility] to [facility]. Report consisted of SBAR, MAR and recent results. An opportunity for questions and clarification was provided for. 1822 Patient via ambulance to [facility]. A copy of discharge instructions, MAR and prescriptions were sent with patient. 45 45

## 2019-11-11 NOTE — PROGRESS NOTES
[facility]  Pulmonary, Critical Care, and Sleep Medicine    Name: Ashley Mazariegos MRN: [id number]   : 1934 Hospital: [address] [facility]   Date: 2017        IMPRESSION:   · Acute respiratory failure - airway protection  · Seizures with status epilepticus  · HTN  · H/O SVT  · Peripheral neuropathy  · Right upper lobe scar/infiltrate - no tobacco history per records - has been present since at least February of this year, may be present to some degree on chest X-ray from       PLAN:   · Ventilator support - does well on SBT but neurologic status precludes extubation  · Sedation as needed  · Antiepileptics  · Neurology consult  · Antihypertensives  · DVT/GI prophylaxis     Subjective/Interval History:   I have reviewed the flowsheet and previous days notes. The patient is unable to give any meaningful history or review of systems because the patient is: Intubated/unresponsive - admitted overnight with seizures, intubated for recurrent seizures.   No seizure activity seen here, but he is unresponsive off of sedation     The patient is critically ill on:      Mechanical ventilation     Review of Systems   Unable to perform ROS: Intubated     Objective:   Vital Signs:    Visit Vitals    /62 (BP 1 Location: Right arm, BP Patient Position: At rest)    Pulse 71    Temp 98 °F (36.7 °C)    Resp 22    Wt 72.4 kg (159 lb 9.8 oz)    SpO2 100%    BMI 24.27 kg/m2       O2 Device: Endotracheal tube, Ventilator       Temp (24hrs), Av.4 °F (36.9 °C), Min:98 °F (36.7 °C), Max:98.8 °F (37.1 °C)       Intake/Output:   Last shift:         Last 3 shifts: 1901 -  07  In: 1016.2 [I.V.:1016.2]  Out: 429 [Urine:429]    Intake/Output Summary (Last 24 hours) at 17 0852  Last data filed at 17 0600   Gross per 24 hour   Intake          1016.15 ml   Output              429 ml   Net           587.15 ml     Hemodynamics:   PAP:   CO:     Wedge:   CI: CVP:    SVR:       PVR:       Ventilator Settings:  Mode Rate Tidal Volume Pressure FiO2 PEEP   Spontaneous   500 ml  5 cm H2O 35 % 6 cm H20     Peak airway pressure: 12 cm H2O    Minute ventilation: 8.51 l/min       Physical Exam   Constitutional: He is intubated. HENT:   Head: Normocephalic and atraumatic. Mouth/Throat: No oropharyngeal exudate. Eyes: No scleral icterus. Cardiovascular: Normal rate and regular rhythm. Pulmonary/Chest: He is intubated. No respiratory distress. He has no wheezes. He has no rales. Abdominal: Soft. Bowel sounds are normal. He exhibits no distension. There is no tenderness. Musculoskeletal: He exhibits no edema. Neurological: He is unresponsive. Skin: Skin is warm and dry.      Data:     Current Facility-Administered Medications   Medication Dose Route Frequency    chlorhexidine (PERIDEX) 0.12 % mouthwash 15 mL  15 mL Oral BID    mupirocin (BACTROBAN) 2 % ointment   Both Nostrils BID    propofol (DIPRIVAN) infusion  5-50 mcg/kg/min IntraVENous TITRATE    sodium chloride (NS) flush 5-10 mL  5-10 mL IntraVENous Q8H    enoxaparin (LOVENOX) injection 40 mg  40 mg SubCUTAneous Q24H    aspirin (ASA) suppository 300 mg  300 mg Rectal DAILY    pantoprazole (PROTONIX) 40 mg in sodium chloride 0.9 % 10 mL injection  40 mg IntraVENous DAILY    0.9% sodium chloride infusion  75 mL/hr IntraVENous CONTINUOUS    levETIRAcetam (KEPPRA) 1,500 mg in 0.9% sodium chloride IVPB  1,500 mg IntraVENous Q12H                Labs:  Recent Labs      06/25/17   0415  06/24/17   1230   WBC  14.1*  6.2   HGB  12.0*  14.3   HCT  34.9*  40.8   PLT  96*  115*     Recent Labs      06/25/17   0415  06/24/17   1230   NA  136  137   K  4.4  3.6   CL  103  102   CO2  26  28   GLU  123*  105*   BUN  17  13   CREA  1.04  0.98   CA  8.2*  9.6   ALB  3.4*  4.4   TBILI  1.4*  1.1*   SGOT  14*  16   ALT  15  19     Recent Labs      06/24/17   1639   PH  7.47*   PCO2  34*   PO2  212*   HCO3  24   FIO2 50     Imaging:  I have personally reviewed the patients radiographs and have reviewed the reports:  ?chronic right upper lobe scar/infiltrate        Total critical care time exclusive of procedures: 35 minutes  Iraida Horowitz MD complains of pain/discomfort

## 2022-11-05 NOTE — ED NOTES
Consultation - [address] Gastroenterology  Tere Buck 80 y o  male MRN: [id number]  Unit/Bed#: S -01 Encounter: [id number]        HPI  Physician Requesting Consult: Vazquez Morales MD  Reason for Consult / Principal Problem:  Abnormal LFT, acute cholecystitis with cholecystectomy placement  Hx and PE limited by:   HPI: Tere Buck is a 80y o  year old male who presents with right upper quadrant pain and back pain, patient has a multiple comorbidity including acute cholecystitis, status post cholecystitis tube placement by IR  in September 2022, currently tube is draining purulent material, wife frequently flush the tube, tube was changed in the past   LFT is slightly abnormal, patient pain improved after getting Dilaudid, he is complaining lower extremity pain and swelling but no abdominal pain currently, denies any nausea, no vomiting, he is feeling hungry and want to eat, he was seen by surgeon , surgery was deferred until January  IR consult was placed for changing cholecystectomy tube  Patient is very frustrated and anxious because of the ongoing pain, no melena, no rectal bleeding      ROS  Review of Systems   Constitutional: Positive for activity change, appetite change and chills  Negative for diaphoresis, fatigue and fever  HENT: Negative  Eyes: Negative  Respiratory: Negative  Cardiovascular: Positive for leg swelling  Negative for chest pain and palpitations  Gastrointestinal: Positive for abdominal pain  Negative for blood in stool, constipation, diarrhea, nausea, rectal pain and vomiting  Genitourinary: Negative  Musculoskeletal: Positive for arthralgias and joint swelling  Skin: Negative  Hematological: Negative  Psychiatric/Behavioral: The patient is nervous/anxious          Historical Information   Past Medical History:   Diagnosis Date   • Anemia    • Arthritis    • Atrial fibrillation ([facility] [address] )    • Basal cell carcinoma 03/22/2022    Tip of Nose   • CHF Pt attempting to provide urine sample with urinal in bed at this time. (congestive heart failure) (HCC)    • Diabetes mellitus ([facility] [address] )     Niddm   • DVT (deep vein thrombosis) in pregnancy 1966    not in pregnancy   • DVT (deep venous thrombosis) ([facility] [address] ) 1966   • Dyslipidemia    • GERD (gastroesophageal reflux disease)    • Hyperlipidemia    • Hypertension    • Hypomagnesemia 10/19/2022   • Irregular heart beat     Afib   • Morbid obesity due to excess calories ([facility] [address] )     Resolved 9/2/2014    • Pulmonary embolism (HCC)    • Sepsis ([facility] )    • Squamous cell skin cancer 07/30/2020    Left posterior scalp   • Visual impairment      Past Surgical History:   Procedure Laterality Date   • AORTIC VALVE REPLACEMENT     • CARDIAC DEFIBRILLATOR PLACEMENT  04/2014   • CARDIAC SURGERY  02/2014    AVR   • COLONOSCOPY     • INSERT / REPLACE / REMOVE PACEMAKER     • IR CHOLECYSTOSTOMY TUBE CHECK/CHANGE/REPOSITION/REINSERTION/UPSIZE  10/13/2022   • IR CHOLECYSTOSTOMY TUBE CHECK/CHANGE/REPOSITION/REINSERTION/UPSIZE  10/19/2022   • IR CHOLECYSTOSTOMY TUBE CHECK/CHANGE/REPOSITION/REINSERTION/UPSIZE  10/27/2022   • IR CHOLECYSTOSTOMY TUBE PLACEMENT  9/1/2022   • JOINT REPLACEMENT Left     LTKR   • MOHS SURGERY  07/30/2020    Left posterior scalp, Dr Tony Garvey   • [address]  04/18/2022    BCC Tip of Nose- Dr Tony Garvey   • CA LIGATE/STRIP LONG [address] SEP-FEM JUNC Right 8/17/2018    Procedure: LEG PERFORATED INJECTION SCLEROTHERAPY;  Surgeon: Lurena Alpers, MD;  Location: [facility]  MAIN OR;  Service: Vascular   • REPLACEMENT TOTAL KNEE Right    • TOTAL KNEE ARTHROPLASTY Left    • VASCULAR SURGERY     • VENA CAVA FILTER PLACEMENT      Interruption inferior vena cava, Josue filter, placement   • WISDOM TOOTH EXTRACTION       Social History   Social History     Substance and Sexual Activity   Alcohol Use Not Currently    Comment: no alcohol in 28 yrs     Social History     Substance and Sexual Activity   Drug Use Never     Social History     Tobacco Use   Smoking Status Never Smoker   Smokeless Tobacco Never Used     Family History   Problem Relation Age of Onset   • Arthritis Mother    • Stroke Mother    • Arthritis Father    • Cancer Father    • Arthritis Daughter        Meds/Allergies   Current Facility-Administered Medications   Medication Dose Route Frequency Provider Last Rate Last Admin   • acetaminophen (TYLENOL) tablet 650 mg  650 mg Oral Q6H [address] Sabiha Schimke, DO   650 mg at 11/05/22 1151   • cefTRIAXone (ROCEPHIN) IVPB (premix in dextrose) 2,000 mg 50 mL  2,000 mg Intravenous Q24H Sabiha Schashanti,  mL/hr at 11/04/22 2220 2,000 mg at 11/04/22 2220   • colchicine (COLCRYS) tablet 0 6 mg  0 6 mg Oral Daily PRN Emanuel Machuca, DO   0 6 mg at 11/05/22 0405   • HYDROmorphone HCl (DILAUDID) injection 0 2 mg  0 2 mg Intravenous Q4H PRN Sabiha Lofton, DO   0 2 mg at 11/05/22 1241   • metroNIDAZOLE (FLAGYL) IVPB (premix) 500 mg 100 mL  500 mg Intravenous Q8H Sabiha Lofton,  mL/hr at 11/05/22 1058 500 mg at 11/05/22 1058   • pantoprazole (PROTONIX) EC tablet 40 mg  40 mg Oral Early Morning Sabiha Lofton, DO   40 mg at 11/05/22 4174   • sodium chloride (PF) 0 9 % injection 10 mL  10 mL Intracatheter Daily Sabiha Lofton, DO   10 mL at 11/05/22 0472   • sotalol (BETAPACE) tablet 80 mg  80 mg Oral Daily Sabiha Lofton, DO   80 mg at 11/05/22 9480         Allergies   Allergen Reactions   • Tramadol Other (See Comments)     intolerance       Objective       Intake/Output Summary (Last 24 hours) at 11/5/2022 1340  Last data filed at 11/5/2022 1300  Gross per 24 hour   Intake 110 ml   Output --   Net 110 ml       Invasive Devices:   Peripheral IV 11/04/22 Right Antecubital (Active)   Site Assessment Clean;Dry; Intact 11/05/22 0900   Dressing Type Transparent 11/05/22 0900   Line Status Blood return noted; Flushed 11/05/22 0900   Dressing Status Clean;Dry; Intact 11/05/22 0900   Dressing Change Due 11/08/22 11/05/22 0900   Reason Not Rotated Not due 11/05/22 0900       Physical Exam  Physical Exam  Constitutional:       Appearance: Normal appearance  HENT:      Head: Normocephalic  Nose: Nose normal       Mouth/Throat:      Mouth: Mucous membranes are moist    Eyes:      Extraocular Movements: Extraocular movements intact  Conjunctiva/sclera: Conjunctivae normal       Pupils: Pupils are equal, round, and reactive to light  Cardiovascular:      Rate and Rhythm: Normal rate and regular rhythm  Pulses: Normal pulses  Pulmonary:      Effort: Pulmonary effort is normal       Breath sounds: Normal breath sounds  Abdominal:      General: Abdomen is flat  Bowel sounds are normal  There is no distension  Palpations: Abdomen is soft  Tenderness: There is no abdominal tenderness  Comments: Cholecystectomy tube site appear normal, no redness or discharge, tube is draining purulent  bilious material   Musculoskeletal:         General: Swelling present  Cervical back: Normal range of motion  Skin:     General: Skin is warm  Neurological:      General: No focal deficit present  Mental Status: He is alert  Psychiatric:         Mood and Affect: Mood normal          Lab Results: I have personally reviewed pertinent reports      Recent Results (from the past 24 hour(s))   ECG 12 lead    Collection Time: 11/04/22  3:13 PM   Result Value Ref Range    Ventricular Rate 115 BPM    Atrial Rate 115 BPM    CT Interval 160 ms    QRSD Interval 146 ms    QT Interval 404 ms    QTC Interval 558 ms    P Axis  degrees    QRS Axis 138 degrees    T Wave Axis 34 degrees   CBC and differential    Collection Time: 11/04/22  3:35 PM   Result Value Ref Range    WBC 8 60 4 31 - 10 16 Thousand/uL    RBC 3 70 (L) 3 88 - 5 62 Million/uL    Hemoglobin 10 0 (L) 12 0 - 17 0 g/dL    Hematocrit 32 1 (L) 36 5 - 49 3 %    MCV 87 82 - 98 fL    MCH 27 0 26 8 - 34 3 pg    MCHC 31 2 (L) 31 4 - 37 4 g/dL    RDW 18 6 (H) 11 6 - 15 1 %    MPV 10 4 8 9 - 12 7 fL    Platelets 523 482 - 200 Thousands/uL    nRBC 0 /100 WBCs    Neutrophils Relative 66 43 - 75 %    Immat GRANS % 1 0 - 2 %    Lymphocytes Relative 18 14 - 44 %    Monocytes Relative 12 4 - 12 %    Eosinophils Relative 2 0 - 6 %    Basophils Relative 1 0 - 1 %    Neutrophils Absolute 5 79 1 85 - 7 62 Thousands/µL    Immature Grans Absolute 0 04 0 00 - 0 20 Thousand/uL    Lymphocytes Absolute 1 54 0 60 - 4 47 Thousands/µL    Monocytes Absolute 1 02 0 17 - 1 22 Thousand/µL    Eosinophils Absolute 0 16 0 00 - 0 61 Thousand/µL    Basophils Absolute 0 05 0 00 - 0 10 Thousands/µL   Comprehensive metabolic panel    Collection Time: 11/04/22  3:35 PM   Result Value Ref Range    Sodium 135 135 - 147 mmol/L    Potassium 4 0 3 5 - 5 3 mmol/L    Chloride 97 96 - 108 mmol/L    CO2 28 21 - 32 mmol/L    ANION GAP 10 4 - 13 mmol/L    BUN 32 (H) 5 - 25 mg/dL    Creatinine 1 62 (H) 0 60 - 1 30 mg/dL    Glucose 128 65 - 140 mg/dL    Calcium 9 2 8 4 - 10 2 mg/dL     (H) 13 - 39 U/L    ALT 65 (H) 7 - 52 U/L    Alkaline Phosphatase 131 (H) 34 - 104 U/L    Total Protein 7 7 6 4 - 8 4 g/dL    Albumin 3 7 3 5 - 5 0 g/dL    Total Bilirubin 1 80 (H) 0 20 - 1 00 mg/dL    eGFR 37 ml/min/1 73sq m   Lactic acid    Collection Time: 11/04/22  3:35 PM   Result Value Ref Range    LACTIC ACID 1 7 0 5 - 2 0 mmol/L   Procalcitonin    Collection Time: 11/04/22  3:35 PM   Result Value Ref Range    Procalcitonin 0 07 <=0 25 ng/ml   Protime-INR    Collection Time: 11/04/22  3:35 PM   Result Value Ref Range    Protime 35 3 (H) 11 6 - 14 5 seconds    INR 3 49 (H) 0 84 - 1 19   APTT    Collection Time: 11/04/22  3:35 PM   Result Value Ref Range    PTT 52 (H) 23 - 37 seconds   Blood culture #1    Collection Time: 11/04/22  3:35 PM    Specimen: Arm, Left; Blood   Result Value Ref Range    Blood Culture Received in Microbiology Lab  Culture in Progress      Blood culture #2    Collection Time: 11/04/22  3:35 PM    Specimen: Arm, Right; Blood   Result Value Ref Range    Blood Culture Received in Microbiology Lab  Culture in Progress      Lipase    Collection Time: 11/04/22  3:35 PM   Result Value Ref Range    Lipase 42 11 - 82 u/L   FLU/RSV/COVID - if FLU/RSV clinically relevant    Collection Time: 11/04/22  3:35 PM    Specimen: Nose; Nares   Result Value Ref Range    SARS-CoV-2 Negative Negative    INFLUENZA A PCR Negative Negative    INFLUENZA B PCR Negative Negative    RSV PCR Negative Negative   B-Type Natriuretic Peptide(BNP) [facility], [facility], [facility] Camp[facility] Only    Collection Time: 11/04/22  3:35 PM   Result Value Ref Range     (H) 0 - 100 pg/mL   HS Troponin 0hr (reflex protocol)    Collection Time: 11/04/22  3:39 PM   Result Value Ref Range    hs TnI 0hr 10 "Refer to ACS Flowchart"- see link ng/L   UA w Reflex to Microscopic w Reflex to Culture    Collection Time: 11/04/22  5:04 PM    Specimen: Urine, Straight Cath   Result Value Ref Range    Color, UA Yellow     Clarity, UA Clear     Specific [place], UA 1 030 1 003 - 1 030    pH, UA 6 0 4 5, 5 0, 5 5, 6 0, 6 5, 7 0, 7 5, 8 0    Leukocytes, UA Negative Negative    Nitrite, UA Negative Negative    Protein, UA Trace (A) Negative mg/dl    Glucose, UA Negative Negative mg/dl    Ketones, UA Negative Negative mg/dl    Urobilinogen, UA <2 0 <2 0 mg/dl mg/dl    Bilirubin, UA Negative Negative    Occult Blood, UA Negative Negative   Urine Microscopic    Collection Time: 11/04/22  5:04 PM   Result Value Ref Range    RBC, UA 1-2 None Seen, 1-2 /hpf    WBC, UA 1-2 None Seen, 1-2 /hpf    Epithelial Cells None Seen None Seen, Occasional /hpf    Bacteria, UA None Seen None Seen, Occasional /hpf    Hyaline Casts, UA 3-5 (A) None Seen /lpf   HS Troponin I 2hr    Collection Time: 11/04/22  5:51 PM   Result Value Ref Range    hs TnI 2hr 10 "Refer to ACS Flowchart"- see link ng/L    Delta 2hr hsTnI 0 <20 ng/L   Ammonia    Collection Time: 11/04/22  5:51 PM   Result Value Ref Range    Ammonia 24 18 - 72 umol/L   Comprehensive metabolic panel    Collection Time: 11/05/22  7:54 AM   Result Value Ref Range    Sodium 137 135 - 147 mmol/L    Potassium 3 7 3 5 - 5 3 mmol/L    Chloride 100 96 - 108 mmol/L    CO2 28 21 - 32 mmol/L    ANION GAP 9 4 - 13 mmol/L    BUN 36 (H) 5 - 25 mg/dL    Creatinine 1 75 (H) 0 60 - 1 30 mg/dL    Glucose 118 65 - 140 mg/dL    Calcium 8 9 8 4 - 10 2 mg/dL    Corrected Calcium 9 5 8 3 - 10 1 mg/dL    AST 63 (H) 13 - 39 U/L    ALT 50 7 - 52 U/L    Alkaline Phosphatase 107 (H) 34 - 104 U/L    Total Protein 6 8 6 4 - 8 4 g/dL    Albumin 3 2 (L) 3 5 - 5 0 g/dL    Total Bilirubin 1 92 (H) 0 20 - 1 00 mg/dL    eGFR 34 ml/min/1 73sq m   CBC and differential    Collection Time: 11/05/22  7:54 AM   Result Value Ref Range    WBC 9 25 4 31 - 10 16 Thousand/uL    RBC 3 05 (L) 3 88 - 5 62 Million/uL    Hemoglobin 8 3 (L) 12 0 - 17 0 g/dL    Hematocrit 26 2 (L) 36 5 - 49 3 %    MCV 86 82 - 98 fL    MCH 27 2 26 8 - 34 3 pg    MCHC 31 7 31 4 - 37 4 g/dL    RDW 18 6 (H) 11 6 - 15 1 %    MPV 9 6 8 9 - 12 7 fL    Platelets 077 636 - 189 Thousands/uL    nRBC 0 /100 WBCs    Neutrophils Relative 68 43 - 75 %    Immat GRANS % 1 0 - 2 %    Lymphocytes Relative 16 14 - 44 %    Monocytes Relative 14 (H) 4 - 12 %    Eosinophils Relative 1 0 - 6 %    Basophils Relative 0 0 - 1 %    Neutrophils Absolute 6 36 1 85 - 7 62 Thousands/µL    Immature Grans Absolute 0 05 0 00 - 0 20 Thousand/uL    Lymphocytes Absolute 1 45 0 60 - 4 47 Thousands/µL    Monocytes Absolute 1 28 (H) 0 17 - 1 22 Thousand/µL    Eosinophils Absolute 0 07 0 00 - 0 61 Thousand/µL    Basophils Absolute 0 04 0 00 - 0 10 Thousands/µL   Protime-INR    Collection Time: 11/05/22  7:54 AM   Result Value Ref Range    Protime 39 7 (H) 11 6 - 14 5 seconds    INR 4 06 (H) 0 84 - 1 19       Imaging Studies: I have personally reviewed pertinent films in PACS  XR chest 1 view portable    Result Date: 11/4/2022  Narrative: CHEST INDICATION:   AMS, with suspected abdominal infection/pneumonia   COMPARISON:  Chest radiograph and CT October 18, 2022   Correlation with chest CT (November 4, 2022 at 16:37) EXAM PERFORMED/VIEWS:  XR CHEST PORTABLE FINDINGS:  Left-sided chest wall intracardiac device is identified  Leads are intact  Heart shadow is enlarged but unchanged from prior exam   Sternotomy wires  The lungs are clear  No pneumothorax or pleural effusion  Osseous structures appear within normal limits for patient age  Impression: No acute cardiopulmonary disease  Workstation performed: [id number]     XR chest 1 view portable    Result Date: 10/18/2022  Narrative: CHEST RADIOGRAPH INDICATION:   cough  COMPARISON:  Chest radiograph 8/29/2022  EXAM PERFORMED/VIEWS:  XR CHEST PORTABLE  Technique: AP portable semierect  Images:  1 FINDINGS:  Lines/Tubes/Devices: Left chest wall pacer with leads overlying the right atrium and right ventricle  Left atrial exclusion device  Median sternotomy wires  Mediastinum: Cardiomediastinal silhouette appears within normal limits  Lungs: Pulmonary vascular congestion without edema  No focal consolidation  Pleura: No pleural effusion  No pneumothorax within the limitations of a portable exam  Bones: Osseous structures appear within normal limits for patient age  Impression: Stable mild central pulmonary vascular congestion and cardiomegaly  Workstation performed: [id number]     CT head without contrast    Result Date: 11/4/2022  Narrative: CT BRAIN - WITHOUT CONTRAST INDICATION:   AMS  COMPARISON:  CT head 2/25/2014  TECHNIQUE:  CT examination of the brain was performed  In addition to axial images, sagittal and coronal 2D reformatted images were created and submitted for interpretation  Radiation dose length product (DLP) for this visit:  1041 mGy-cm   This examination, like all CT scans performed in the [place], was performed utilizing techniques to minimize radiation dose exposure, including the use of iterative reconstruction and automated exposure control  IMAGE QUALITY:  Diagnostic  FINDINGS: PARENCHYMA: Decreased attenuation is noted in periventricular and subcortical white matter demonstrating an appearance that is statistically most likely to represent mild microangiopathic change; this appearance is similar when compared to most recent prior examination  Unchanged cerebral cortical atrophy  No CT signs of acute infarction  No intracranial mass, mass effect or midline shift  No acute parenchymal hemorrhage  VENTRICLES AND EXTRA-AXIAL SPACES:  Normal for the patient's age  VISUALIZED ORBITS AND PARANASAL SINUSES:  Unremarkable  CALVARIUM AND EXTRACRANIAL SOFT TISSUES:  Normal      Impression: No acute intracranial abnormality  Workstation performed: [id number]     CT chest abdomen pelvis w contrast    Result Date: 11/4/2022  Narrative: CT CHEST, ABDOMEN AND PELVIS WITH IV CONTRAST INDICATION:   Abdominal abscess/infection suspected AMS, with suspected abdominal infection/pneumonia  COMPARISON:  None  TECHNIQUE: CT examination of the chest, abdomen and pelvis was performed  Axial, sagittal, and coronal 2D reformatted images were created from the source data and submitted for interpretation  Radiation dose length product (DLP) for this visit:  1334 mGy-cm   This examination, like all CT scans performed in the [facility], was performed utilizing techniques to minimize radiation dose exposure, including the use of iterative reconstruction and automated exposure control  IV Contrast:  100 mL of iohexol (OMNIPAQUE) Enteric Contrast: Enteric contrast was administered  FINDINGS: CHEST LUNGS:  No acute findings  No endotracheal or endobronchial lesion  PLEURA:  Unremarkable  HEART/GREAT VESSELS: Unchanged cardiomegaly  Dual-lead cardiac device remains in place  No pericardial effusion  Aortic valve replacement  Left atrial exclusion device  Coronary artery calcifications  No thoracic aortic aneurysm  MEDIASTINUM AND HANG:  Small hiatal hernia   CHEST WALL AND LOWER NECK: Unremarkable  ABDOMEN LIVER/BILIARY TREE:  Unchanged hepatomegaly  GALLBLADDER:  Transhepatic percutaneous cholecystostomy tube remains in place  Moderately of abdominal wall musculature thickening at the site of tube placement with mild surrounding fat stranding similar in appearance to the prior study  Extensive cholelithiasis with persistent gallbladder wall thickening pericholecystic fat stranding  Resolved intraluminal focus of gas  SPLEEN:  Unremarkable  PANCREAS:  Unremarkable  ADRENAL GLANDS:  Unremarkable  KIDNEYS/URETERS:  One or more simple renal cyst(s) is noted  Otherwise unremarkable kidneys  No hydronephrosis  STOMACH AND BOWEL:  There is colonic diverticulosis without evidence of acute diverticulitis  APPENDIX:  No findings to suggest appendicitis  ABDOMINOPELVIC CAVITY:  No ascites  No pneumoperitoneum  No lymphadenopathy  VESSELS:  Unremarkable for patient's age  PELVIS REPRODUCTIVE ORGANS:  Prostamegaly URINARY BLADDER:  Unremarkable  ABDOMINAL WALL/INGUINAL REGIONS:  Unchanged, uncomplicated fat-containing umbilical hernia OSSEOUS STRUCTURES:  Unchanged chronic L4 compression deformity status post median sternotomy  Impression: No acute findings in the chest abdomen or pelvis  Transhepatic percutaneous cholecystostomy tube remains in place  Persistent gallbladder inflammatory changes  Workstation performed: [id number]     PE Study with CT Abdomen and Pelvis with contrast    Result Date: 10/18/2022  Narrative: CT PULMONARY ANGIOGRAM OF THE CHEST AND CT ABDOMEN AND PELVIS WITH INTRAVENOUS CONTRAST INDICATION:   shortness of breath/  COMPARISON: CT chest/abdomen/pelvis 8/29/2022  TECHNIQUE:  CT examination of the chest, abdomen and pelvis was performed  Thin section CT angiographic technique was used in the chest in order to evaluate for pulmonary embolus and coronal 3D MIP postprocessing was performed on the acquisition scanner   Axial, sagittal, and coronal 2D reformatted images were created from the source data and submitted for interpretation  Radiation dose length product (DLP) for this visit:  1901 mGy-cm   This examination, like all CT scans performed in the [facility], was performed utilizing techniques to minimize radiation dose exposure, including the use of iterative reconstruction and automated exposure control  IV Contrast:  100 mL of iohexol (OMNIPAQUE) Enteric Contrast: None  FINDINGS: PULMONARY ARTERIAL TREE:  No filling defects to suggest acute or chronic pulmonary embolism in the entire pulmonary arterial system  Normal caliber main pulmonary artery  LARGE AIRWAYS: Small amount of secretions in the distal trachea  LUNGS:  No consolidation or edema  No suspicious mass or nodule  Scattered tiny calcified granulomata  PLEURA:  No pneumothorax  No pleural effusion  HEART: Cardiomegaly  Aortic valve prosthesis  Left atrial exclusion device  Moderate coronary artery calcification  No pericardial effusion or thickening  VESSELS: Normal caliber thoracic aorta with mild atherosclerotic plaque  MEDIASTINUM AND HANG:  Small hiatal hernia noted  No lymphadenopathy  CHEST WALL AND LOWER NECK:   Partially visualized left chest wall AICD with leads in the right atrium and right ventricle  ABDOMEN: LIVER: Hepatomegaly measuring 21 cm in maximum craniocaudal dimension, unchanged  Normal morphology  No suspicious lesion  BILIARY: No intrahepatic biliary ductal dilatation  Normal caliber common bile duct  GALLBLADDER: Percutaneous transhepatic cholecystostomy tube in place with locking loop in the gallbladder fundus  Inflammatory stranding is seen along the course of the catheter and there is a small amount of noncircumscribed fluid splaying the rectus abdominis musculature where traversed by the catheter (series 305/33) likely representing phlegmon  The gallbladder is distended despite drainage measuring 4 8 cm transverse  Innumerable tiny calcified stones   Severe diffuse wall thickening with pericholecystic inflammatory fat stranding  There is a single punctate focus of air which appears to be intraluminal rather than intramural  SPLEEN: Within normal limits  No suspicious lesion  Normal spleen size  PANCREAS: Pancreatic parenchyma is within normal limits  No main pancreatic ductal dilatation  No peripancreatic inflammation  ADRENAL GLANDS: Within normal limits  KIDNEYS/URETERS: Normal size and position  Symmetric enhancement  There is a 4 2 cm simple cysts at the upper pole right kidney, as well as multiple bilateral smaller circumscribed renal hypodensities that are too small to accurately characterize, which are statistically benign findings requiring no further workup or follow-up (Reference: Radiology 2019 292:3, [id number])  No suspicious solid mass  No calcified stones or hydronephrosis  Ureters within normal limits  STOMACH AND BOWEL: Stomach is grossly within normal limits  Normal caliber small bowel  Normal caliber large bowel  Colonic diverticulosis without acute diverticulitis  No evidence of active small or large bowel inflammatory process  APPENDIX: Normal appendix  ABDOMINOPELVIC CAVITY: Inflammatory fat stranding associated with the gallbladder and cholecystostomy tube as described  No focal collection  No ascites  No intraperitoneal free air  No bulky lymphadenopathy  No retroperitoneal hematoma  VESSELS: No abdominal aortic aneurysm  The portal veins are patent  The celiac, SMA, and FRED are patent  PELVIS REPRODUCTIVE ORGANS:  Enlarged prostate  Symmetric seminal vesicles  URINARY BLADDER:  Within normal limits  No calculi  ABDOMINAL WALL/INGUINAL REGIONS:  Small fat-containing umbilical hernia  Percutaneous cholecystostomy catheter as described above  BONES:  Severe multilevel degenerative changes in the spine  No acute fracture or destructive osseous lesion  Severe chronic compression of the L4 vertebral body  Vertebral body height and alignment otherwise maintained  Median sternotomy wires  Impression: 1  No filling defect to suggest acute or chronic pulmonary embolism in the entire pulmonary arterial system  Measured RV/LV ratio is within normal limits at less than 0 9   2   Cholelithiasis with distended gallbladder and inflammation of the gallbladder wall and adjacent fat despite appropriately positioned cholecystostomy tube, likely related to obstructed catheter  There are inflammatory changes about the catheter tract with probable phlegmon splaying the rectus abdominis musculature without discrete collection/abscess  The study was marked in EPIC for significant notification  Workstation performed: [id number]     IR cholecystostomy tube check/change/reposition/reinsertion/upsize    Result Date: 10/27/2022  Narrative: PROCEDURE: Cholecystostomy tube exchange Procedural Personnel Attending physician(s): Dr Monty Estrada Pre-procedure diagnosis: Cholecystitis Post-procedure diagnosis: Same Indication: Patient noted to have leakage around cholecystostomy tube insertion site and no output through the drain  PROCEDURE SUMMARY: - Cholecystostomy tube exchange under fluoroscopic guidance PROCEDURE DETAILS: Pre-procedure Consent: Existing informed consent was utilized and time-out was performed prior to the procedure  Preparation: The site was prepared and draped using maximal sterile barrier technique including cutaneous antisepsis  Anesthesia/sedation Level of anesthesia/sedation: Local lidocaine Cholecystostomy tube exchange Initial imaging was performed  Local anesthesia was administered  Existing cholecystostomy tube was removed over an Amplatz wire and a 10-[place] catheter advanced over the wire with pigtail loop forming within the gallbladder lumen  Contrast was injected through the new tube to confirm proper positioning  Catheter was secured to skin using 0 Prolene suture   Contrast Contrast agent: Omnipaque Contrast volume (mL): 10 Radiation Dose Fluoroscopy time (minutes): 1 1 Reference air kerma (mGy): 137 Additional Details Estimated blood loss (mL): None Complications: No immediate complications  Impression: 1  Cholecystostomy tube was in proper position within gallbladder lumen  2   Cholecystostomy tube exchange using 10-[place]ic catheter  3   Multiple filling defects within gallbladder consistent with stones  Cystic duct and common bile duct were patent  Plan: - Flush catheter with 10 mL normal saline twice a day  - Patient has been ordered physical therapy by Dr Dorian Brenner in preparation for cholecystectomy  If patient is found to be not a surgical candidate, patient may benefit from percutaneous gallstone removal  Workstation performed: [id number]     IR cholecystostomy tube check/change/reposition/reinsertion/upsize    Result Date: 10/20/2022  Narrative: Cholecystostomy tube check and exchange  Clinical History: 60-year-old male status post right cholecystostomy tube placement, here for abdominal pain and sepsis  Contrast: 20 cc of Omnipaque 350 Fluoro time: 3 1mins Number of Images: Multiple Radiation dose: 519 mGy Conscious sedation time: 0 Technique: The patient was brought to the interventional radiology suite and placed supine on the table  The right upper abdomen was prepped and draped in the usual sterile fashion  1% lidocaine was used for local anesthesia  Contrast injected through the catheter confirmed appropriate position within the gallbladder  Next approximately 75 cc of purulent fluid was aspirated from the tube  The pigtail locking mechanism was released and the skin suture was removed  An Mister Spexson wire was advanced through the catheter and curled in the gallbladder  Due to encrustation the wire was unable to be advanced out the endhole of the catheter  A Glidewire was exchanged and advanced out the end hole curled in the gallbladder  Next an Amplatz wire was advanced through the catheter, out the end hole and curled in the gallbladder   The catheter was removed over the wire  A new 10 [employer] [place] all-purpose drainage catheter was advanced into the gallbladder  The pigtail locking mechanism was engaged and a single suture was placed to secure the catheter to the skin  Contrast injected through the catheter confirmed appropriate position  A sterile dressing was applied  Impression: Impression: 1  Technically successful cholecystostomy tube check and exchange  Workstation performed: [id number]     IR cholecystostomy tube check/change/reposition/reinsertion/upsize    Result Date: 10/13/2022  Narrative: IR CHOLECYSTOSTOMY TUBE CHECK/CHANGE/REPOSITION/REINSERTION/UPSIZE PROCEDURE: Tube cholecystogram CLINICAL INDICATION: Patient with history of acute cholecystitis status post percutaneous cholecystostomy tube placement 9/1/2022 with clinical improvement; cholecystectomy deferred until the patient is medically optimized; evaluate cystic duct patency COMPARISON: Previous CT and ultrasound 8/29/2022; cholecystostomy tube placement 9/1/2022 PERFORMING PHYSICIAN: Hiro James MD ASSISTANT PHYSICIAN: None MEDICATIONS: None SEDATION TIME: None CONTRAST: 10 mL of iohexol (OMNIPAQUE) FLUOROSCOPY TIME: 1 4 MINFL RADIATION DOSE: 187 mGy   (air Kerma)  NUMBER OF IMAGES: 4 TECHNIQUE: The patient was brought to the procedure room and a time-out was performed utilizing universal protocol  The patient was identified verbally and via wrist band  The patient reports output of approximately 50 mL 10 turbid brown fluid daily from the cholecystostomy tube  The patient was placed supine on the procedure table   radiograph demonstrates the previously placed 10-Macedonian percutaneous cholecystostomy tube overlying the right upper quadrant of the abdomen  No radiopaque gallstones are noted  Contrast injected under real-time fluoroscopic visualization opacifying a slightly contracted gallbladder with numerous filling defects consistent with gallstones  No filling of the cystic duct noted  Consequently, the tube was not capped  As the tube has been  in place for only 6 weeks, is not felt necessary to exchange it at this point  Patient will be scheduled in another 6-8 weeks for exchange  The patient tolerated the procedure well without difficulty or complication encountered and left the section in satisfactory stable condition  FINDINGS: As above  Impression: Percutaneous cholecystostomy tube in satisfactory position  Multiple gallstones  Cystic duct is not opacified  Tube was left in place and the patient will be scheduled for tube change in approximately 6-8 weeks (3 months from the original placement)  Tentative cholecystectomy planned January 2023  Workstation performed: [id number]           Assessment:  [de-identified] year male with abnormal LFT, history of gallstone which complicated with  acute cholecystitis, status post cholecystectomy tube was placed in September 2022 now admitted with abdominal pain and lower extremity pain abdominal pain is resolved, no fever or chills, cholecystostomy tube is draining purulent  bilious material, very unlikely common bile duct obstruction, LFT is slightly abnormal but overall LFT is trending    Plan:   Will continue with IV antibiotic, will advanced to regular diet, no need for MRCP or ERCP for now, will monitor LFT, will consult IR for possible tube replacement and also surgical follow-up, care of plan was discussed with family and hospitalist team Dr Rissa Martinez

## 2023-01-01 NOTE — PROGRESS NOTES
Mom would like a phone call from the nurse to further discuss this.   NEUROLOGY PROGRESS NOTE     CC: Seizures    SUBJECTIVE  No new issues. Patient more alert and partially oriented today. He did have some bilateral arm and facial twitching today. Patient Still complains of Bilateral leg pain he said it feels weak and needles sticking him. Lyrica is on hold while patient is NPO. Patient was given ativan last night for agitation no new seizures noted. ROS  A ten system review of constitutional, cardiovascular, respiratory, musculoskeletal, endocrine, skin, SHEENT, genitourinary, psychiatric and neurologic systems was obtained and is unremarkable except as stated in HPI     PHYSICAL EXAM  EXAMINATION:   Patient Vitals for the past 24 hrs:   Temp Pulse Resp BP SpO2   07/12/17 1122 97.5 °F (36.4 °C) 98 18 (!) 178/92 98 %   07/12/17 0856 98 °F (36.7 °C) - - - -   07/12/17 0720 - 92 18 (!) 179/99 99 %   07/12/17 0331 98.7 °F (37.1 °C) 73 20 139/76 96 %   07/11/17 2351 98.6 °F (37 °C) 73 18 149/73 96 %   07/11/17 2049 - - - - 96 %   07/11/17 1938 - (!) 104 - - -   07/11/17 1841 98.3 °F (36.8 °C) (!) 126 20 138/68 94 %   07/11/17 1442 98.7 °F (37.1 °C) 99 18 158/79 98 %   07/11/17 1340 - - - - 96 %   07/11/17 1205 97.9 °F (36.6 °C) 95 19 (!) 179/94 97 %        General:   General appearance: Pt is in no acute distress   Distal pulses are preserved    Neurological Examination:   Mental Status: alert and oriented to partial date and location    Cranial Nerves: Pupils equal and reactive to light. Face symmetric. Tongue is midline,  bilateral facial twitching noted    Motor: Moves all 4 extremities symmetrically to pain, slight tremor of both upper extremities    Sensation: Withdraws to pain ×4    Coordination/Cerebellar: Unable to assess    Gait: Unable to assess    Skin: No significant bruising or lacerations.     LAB DATA REVIEWED:    Results for orders placed or performed during the hospital encounter of 06/24/17   CULTURE, BLOOD   Result Value Ref Range    Special Requests: NO SPECIAL REQUESTS      Culture result: NO GROWTH 6 DAYS     CULTURE, BLOOD   Result Value Ref Range    Special Requests: NO SPECIAL REQUESTS      Culture result: (A)       STAPHYLOCOCCUS CAPITIS  GROWING IN 1 OF 2 BOTTLES DRAWN  (SITE = C LINE)      Culture result: (A)       PRELIMINARY REPORT OF  GRAM POSITIVE COCCI  IN CLUSTERS  GROWING IN 1 OF 2 BOTTLES DRAWN  CALLED TO AND READ BACK BY  FIGUEROA RODRIGUES RN [facility] AT 2013 ON 6/27/2017. [place] [id number]      Culture result: REMAINING BOTTLE(S) HAS/HAVE NO GROWTH IN 5 DAYS         Susceptibility    Staphylococcus capitis - EDGARD     Ampicillin/sulbactam ($) <=8/4 Susceptible ug/mL     Cefazolin ($) <=4 Susceptible ug/mL     Ciprofloxacin ($) <=1 Susceptible ug/mL     Clindamycin ($) <=0.5 Susceptible ug/mL     Daptomycin ($$$$$) <=0.5 Susceptible ug/mL     Erythromycin ($$$$) <=0.5 Susceptible ug/mL     Gentamicin ($) <=4 Susceptible ug/mL     Levofloxacin ($) <=1 Susceptible ug/mL     Linezolid ($$$$$) <=1 Susceptible ug/mL     Oxacillin <=0.25 Susceptible ug/mL     Rifampin ($$$$)* <=1 Susceptible ug/mL      * Rifampin is not to be used for mono-therapy. Tetracycline <=4 Susceptible ug/mL     Trimeth-Sulfamethoxa <=0.5/9.5 Susceptible ug/mL     Vancomycin ($) 0.5 Susceptible ug/mL   CBC WITH AUTOMATED DIFF   Result Value Ref Range    WBC 6.2 4.1 - 11.1 K/uL    RBC 4.28 4.10 - 5.70 M/uL    HGB 14.3 12.1 - 17.0 g/dL    HCT 40.8 36.6 - 50.3 %    MCV 95.3 80.0 - 99.0 FL    MCH 33.4 26.0 - 34.0 PG    MCHC 35.0 30.0 - 36.5 g/dL    RDW 12.4 11.5 - 14.5 %    PLATELET 888 (L) 961 - 400 K/uL    NEUTROPHILS 65 32 - 75 %    LYMPHOCYTES 23 12 - 49 %    MONOCYTES 12 5 - 13 %    EOSINOPHILS 0 0 - 7 %    BASOPHILS 0 0 - 1 %    ABS. NEUTROPHILS 4.0 1.8 - 8.0 K/UL    ABS. LYMPHOCYTES 1.4 0.8 - 3.5 K/UL    ABS. MONOCYTES 0.8 0.0 - 1.0 K/UL    ABS. EOSINOPHILS 0.0 0.0 - 0.4 K/UL    ABS.  BASOPHILS 0.0 0.0 - 0.1 K/UL   METABOLIC PANEL, COMPREHENSIVE   Result Value Ref Range    Sodium 137 136 - 145 mmol/L Potassium 3.6 3.5 - 5.1 mmol/L    Chloride 102 97 - 108 mmol/L    CO2 28 21 - 32 mmol/L    Anion gap 7 5 - 15 mmol/L    Glucose 105 (H) 65 - 100 mg/dL    BUN 13 6 - 20 MG/DL    Creatinine 0.98 0.70 - 1.30 MG/DL    BUN/Creatinine ratio 13 12 - 20      GFR est AA >60 >60 ml/min/1.73m2    GFR est non-AA >60 >60 ml/min/1.73m2    Calcium 9.6 8.5 - 10.1 MG/DL    Bilirubin, total 1.1 (H) 0.2 - 1.0 MG/DL    ALT (SGPT) 19 12 - 78 U/L    AST (SGOT) 16 15 - 37 U/L    Alk.  phosphatase 83 45 - 117 U/L    Protein, total 8.9 (H) 6.4 - 8.2 g/dL    Albumin 4.4 3.5 - 5.0 g/dL    Globulin 4.5 (H) 2.0 - 4.0 g/dL    A-G Ratio 1.0 (L) 1.1 - 2.2     TROPONIN I   Result Value Ref Range    Troponin-I, Qt. <0.04 <0.05 ng/mL   CK W/ REFLX CKMB   Result Value Ref Range     39 - 308 U/L   LEVETIRACETAM (KEPPRA)   Result Value Ref Range    Levetiracetam (Keppra) 36.6 10.0 - 40.0 ug/mL   URINALYSIS W/MICROSCOPIC   Result Value Ref Range    Color YELLOW/STRAW      Appearance CLOUDY (A) CLEAR      Specific gravity 1.019 1.003 - 1.030      pH (UA) 7.0 5.0 - 8.0      Protein NEGATIVE  NEG mg/dL    Glucose NEGATIVE  NEG mg/dL    Ketone TRACE (A) NEG mg/dL    Blood NEGATIVE  NEG      Urobilinogen 1.0 0.2 - 1.0 EU/dL    Nitrites NEGATIVE  NEG      Leukocyte Esterase NEGATIVE  NEG      WBC 0-4 0 - 4 /hpf    RBC 0-5 0 - 5 /hpf    Epithelial cells FEW FEW /lpf    Bacteria NEGATIVE  NEG /hpf    Hyaline cast 0-2 0 - 5 /lpf   BILIRUBIN, CONFIRM   Result Value Ref Range    Bilirubin UA, confirm NEGATIVE  NEG     CK W/ CKMB & INDEX   Result Value Ref Range     39 - 308 U/L    CK - MB 1.6 <3.6 NG/ML    CK-MB Index 0.7 0 - 2.5     LACTIC ACID, PLASMA   Result Value Ref Range    Lactic acid 2.2 (HH) 0.4 - 2.0 MMOL/L   BLOOD GAS, ARTERIAL   Result Value Ref Range    pH 7.47 (H) 7.35 - 7.45      PCO2 34 (L) 35.0 - 45.0 mmHg    PO2 212 (H) 80 - 100 mmHg    O2  (H) 92 - 97 %    BICARBONATE 24 22 - 26 mmol/L    BASE EXCESS 1.0 mmol/L    O2 METHOD VENTILATOR      FIO2 50 %    MODE A/C      Tidal volume 500      SET RATE 16      EPAP/CPAP/PEEP 6.0      Sample source ARTERIAL      SITE RIGHT RADIAL      TERE'S TEST YES     MISC. LAB TEST   Result Value Ref Range    Test Description: LYRICA LEVEL     Reference Lab: [email or web address]     Results: SEE REPORT FROM [facility]    METABOLIC PANEL, COMPREHENSIVE   Result Value Ref Range    Sodium 136 136 - 145 mmol/L    Potassium 4.4 3.5 - 5.1 mmol/L    Chloride 103 97 - 108 mmol/L    CO2 26 21 - 32 mmol/L    Anion gap 7 5 - 15 mmol/L    Glucose 123 (H) 65 - 100 mg/dL    BUN 17 6 - 20 MG/DL    Creatinine 1.04 0.70 - 1.30 MG/DL    BUN/Creatinine ratio 16 12 - 20      GFR est AA >60 >60 ml/min/1.73m2    GFR est non-AA >60 >60 ml/min/1.73m2    Calcium 8.2 (L) 8.5 - 10.1 MG/DL    Bilirubin, total 1.4 (H) 0.2 - 1.0 MG/DL    ALT (SGPT) 15 12 - 78 U/L    AST (SGOT) 14 (L) 15 - 37 U/L    Alk. phosphatase 59 45 - 117 U/L    Protein, total 7.1 6.4 - 8.2 g/dL    Albumin 3.4 (L) 3.5 - 5.0 g/dL    Globulin 3.7 2.0 - 4.0 g/dL    A-G Ratio 0.9 (L) 1.1 - 2.2     CBC WITH AUTOMATED DIFF   Result Value Ref Range    WBC 14.1 (H) 4.1 - 11.1 K/uL    RBC 3.59 (L) 4.10 - 5.70 M/uL    HGB 12.0 (L) 12.1 - 17.0 g/dL    HCT 34.9 (L) 36.6 - 50.3 %    MCV 97.2 80.0 - 99.0 FL    MCH 33.4 26.0 - 34.0 PG    MCHC 34.4 30.0 - 36.5 g/dL    RDW 12.8 11.5 - 14.5 %    PLATELET 96 (L) 641 - 400 K/uL    NEUTROPHILS 83 (H) 32 - 75 %    LYMPHOCYTES 9 (L) 12 - 49 %    MONOCYTES 8 5 - 13 %    EOSINOPHILS 0 0 - 7 %    BASOPHILS 0 0 - 1 %    ABS. NEUTROPHILS 11.7 (H) 1.8 - 8.0 K/UL    ABS. LYMPHOCYTES 1.3 0.8 - 3.5 K/UL    ABS. MONOCYTES 1.1 (H) 0.0 - 1.0 K/UL    ABS. EOSINOPHILS 0.0 0.0 - 0.4 K/UL    ABS.  BASOPHILS 0.0 0.0 - 0.1 K/UL   TROPONIN I   Result Value Ref Range    Troponin-I, Qt. <0.04 <0.05 ng/mL   CBC W/O DIFF   Result Value Ref Range    WBC 8.0 4.1 - 11.1 K/uL    RBC 3.43 (L) 4.10 - 5.70 M/uL    HGB 11.3 (L) 12.1 - 17.0 g/dL    HCT 33.3 (L) 36.6 - 50.3 %    MCV 97.1 80.0 - 99.0 FL    MCH 32.9 26.0 - 34.0 PG    MCHC 33.9 30.0 - 36.5 g/dL    RDW 12.7 11.5 - 14.5 %    PLATELET 77 (L) 006 - 544 K/uL   METABOLIC PANEL, BASIC   Result Value Ref Range    Sodium 138 136 - 145 mmol/L    Potassium 3.7 3.5 - 5.1 mmol/L    Chloride 105 97 - 108 mmol/L    CO2 27 21 - 32 mmol/L    Anion gap 6 5 - 15 mmol/L    Glucose 100 65 - 100 mg/dL    BUN 12 6 - 20 MG/DL    Creatinine 0.71 0.70 - 1.30 MG/DL    BUN/Creatinine ratio 17 12 - 20      GFR est AA >60 >60 ml/min/1.73m2    GFR est non-AA >60 >60 ml/min/1.73m2    Calcium 8.6 8.5 - 10.1 MG/DL   MAGNESIUM   Result Value Ref Range    Magnesium 2.2 1.6 - 2.4 mg/dL   PHOSPHORUS   Result Value Ref Range    Phosphorus 2.4 (L) 2.6 - 4.7 MG/DL   BLOOD GAS, ARTERIAL   Result Value Ref Range    pH 7.27 (L) 7.35 - 7.45      PCO2 59 (H) 35.0 - 45.0 mmHg    PO2 95 80 - 100 mmHg    O2 SAT 96 92 - 97 %    BICARBONATE 27 (H) 22 - 26 mmol/L    BASE DEFICIT 1.3 mmol/L    O2 METHOD NASAL O2      O2 FLOW RATE 4.00 L/min    SPONTANEOUS RATE 26.0      Sample source ARTERIAL      SITE LEFT BRACHIAL      TERE'S TEST N/A     CBC W/O DIFF   Result Value Ref Range    WBC 7.8 4.1 - 11.1 K/uL    RBC 3.40 (L) 4.10 - 5.70 M/uL    HGB 11.6 (L) 12.1 - 17.0 g/dL    HCT 33.2 (L) 36.6 - 50.3 %    MCV 97.6 80.0 - 99.0 FL    MCH 34.1 (H) 26.0 - 34.0 PG    MCHC 34.9 30.0 - 36.5 g/dL    RDW 12.4 11.5 - 14.5 %    PLATELET 77 (L) 907 - 818 K/uL   METABOLIC PANEL, BASIC   Result Value Ref Range    Sodium 143 136 - 145 mmol/L    Potassium 3.5 3.5 - 5.1 mmol/L    Chloride 108 97 - 108 mmol/L    CO2 29 21 - 32 mmol/L    Anion gap 6 5 - 15 mmol/L    Glucose 119 (H) 65 - 100 mg/dL    BUN 8 6 - 20 MG/DL    Creatinine 0.71 0.70 - 1.30 MG/DL    BUN/Creatinine ratio 11 (L) 12 - 20      GFR est AA >60 >60 ml/min/1.73m2    GFR est non-AA >60 >60 ml/min/1.73m2    Calcium 8.9 8.5 - 10.1 MG/DL   MAGNESIUM   Result Value Ref Range    Magnesium 1.8 1.6 - 2.4 mg/dL   PHOSPHORUS   Result Value Ref Range    Phosphorus 2.4 (L) 2.6 - 4.7 MG/DL   BLOOD GAS, ARTERIAL   Result Value Ref Range    pH 7.38 7.35 - 7.45      PCO2 45 35.0 - 45.0 mmHg    PO2 104 (H) 80 - 100 mmHg    O2 SAT 98 (H) 92 - 97 %    BICARBONATE 26 22 - 26 mmol/L    BASE EXCESS 0.8 mmol/L    O2 METHOD NASAL O2      O2 FLOW RATE 2.00 L/min    SPONTANEOUS RATE 26.0      Sample source ARTERIAL      SITE LEFT RADIAL      TERE'S TEST YES     VITAMIN B12   Result Value Ref Range    Vitamin B12 1064 (H) 211 - 911 pg/mL   TSH 3RD GENERATION   Result Value Ref Range    TSH 0.93 0.36 - 3.74 uIU/mL   AMMONIA   Result Value Ref Range    Ammonia 25 <32 UMOL/L   CBC W/O DIFF   Result Value Ref Range    WBC 6.2 4.1 - 11.1 K/uL    RBC 3.64 (L) 4.10 - 5.70 M/uL    HGB 12.0 (L) 12.1 - 17.0 g/dL    HCT 33.9 (L) 36.6 - 50.3 %    MCV 93.1 80.0 - 99.0 FL    MCH 33.0 26.0 - 34.0 PG    MCHC 35.4 30.0 - 36.5 g/dL    RDW 12.1 11.5 - 14.5 %    PLATELET 87 (L) 078 - 467 K/uL   METABOLIC PANEL, BASIC   Result Value Ref Range    Sodium 138 136 - 145 mmol/L    Potassium 3.2 (L) 3.5 - 5.1 mmol/L    Chloride 105 97 - 108 mmol/L    CO2 26 21 - 32 mmol/L    Anion gap 7 5 - 15 mmol/L    Glucose 111 (H) 65 - 100 mg/dL    BUN 7 6 - 20 MG/DL    Creatinine 0.61 (L) 0.70 - 1.30 MG/DL    BUN/Creatinine ratio 11 (L) 12 - 20      GFR est AA >60 >60 ml/min/1.73m2    GFR est non-AA >60 >60 ml/min/1.73m2    Calcium 8.6 8.5 - 10.1 MG/DL   MAGNESIUM   Result Value Ref Range    Magnesium 1.5 (L) 1.6 - 2.4 mg/dL   PHOSPHORUS   Result Value Ref Range    Phosphorus 2.2 (L) 2.6 - 4.7 MG/DL   METABOLIC PANEL, COMPREHENSIVE   Result Value Ref Range    Sodium 138 136 - 145 mmol/L    Potassium 3.3 (L) 3.5 - 5.1 mmol/L    Chloride 105 97 - 108 mmol/L    CO2 24 21 - 32 mmol/L    Anion gap 9 5 - 15 mmol/L    Glucose 102 (H) 65 - 100 mg/dL    BUN 9 6 - 20 MG/DL    Creatinine 0.52 (L) 0.70 - 1.30 MG/DL    BUN/Creatinine ratio 17 12 - 20      GFR est AA >60 >60 ml/min/1.73m2    GFR est non-AA >60 >60 ml/min/1.73m2    Calcium 8.6 8.5 - 10.1 MG/DL    Bilirubin, total 1.2 (H) 0.2 - 1.0 MG/DL    ALT (SGPT) 17 12 - 78 U/L    AST (SGOT) 23 15 - 37 U/L    Alk. phosphatase 56 45 - 117 U/L    Protein, total 7.5 6.4 - 8.2 g/dL    Albumin 2.9 (L) 3.5 - 5.0 g/dL    Globulin 4.6 (H) 2.0 - 4.0 g/dL    A-G Ratio 0.6 (L) 1.1 - 2.2     MAGNESIUM   Result Value Ref Range    Magnesium 1.6 1.6 - 2.4 mg/dL   PHOSPHORUS   Result Value Ref Range    Phosphorus 3.0 2.6 - 4.7 MG/DL   CBC WITH AUTOMATED DIFF   Result Value Ref Range    WBC 5.4 4.1 - 11.1 K/uL    RBC 3.58 (L) 4.10 - 5.70 M/uL    HGB 11.9 (L) 12.1 - 17.0 g/dL    HCT 33.2 (L) 36.6 - 50.3 %    MCV 92.7 80.0 - 99.0 FL    MCH 33.2 26.0 - 34.0 PG    MCHC 35.8 30.0 - 36.5 g/dL    RDW 12.1 11.5 - 14.5 %    PLATELET 613 (L) 583 - 400 K/uL    NEUTROPHILS 61 32 - 75 %    LYMPHOCYTES 18 12 - 49 %    MONOCYTES 18 (H) 5 - 13 %    EOSINOPHILS 3 0 - 7 %    BASOPHILS 0 0 - 1 %    ABS. NEUTROPHILS 3.3 1.8 - 8.0 K/UL    ABS. LYMPHOCYTES 1.0 0.8 - 3.5 K/UL    ABS. MONOCYTES 1.0 0.0 - 1.0 K/UL    ABS. EOSINOPHILS 0.2 0.0 - 0.4 K/UL    ABS.  BASOPHILS 0.0 0.0 - 0.1 K/UL   CBC W/O DIFF   Result Value Ref Range    WBC 4.7 4.1 - 11.1 K/uL    RBC 3.52 (L) 4.10 - 5.70 M/uL    HGB 11.9 (L) 12.1 - 17.0 g/dL    HCT 33.1 (L) 36.6 - 50.3 %    MCV 94.0 80.0 - 99.0 FL    MCH 33.8 26.0 - 34.0 PG    MCHC 36.0 30.0 - 36.5 g/dL    RDW 12.3 11.5 - 14.5 %    PLATELET 893 (L) 957 - 141 K/uL   METABOLIC PANEL, BASIC   Result Value Ref Range    Sodium 139 136 - 145 mmol/L    Potassium 3.3 (L) 3.5 - 5.1 mmol/L    Chloride 104 97 - 108 mmol/L    CO2 22 21 - 32 mmol/L    Anion gap 13 5 - 15 mmol/L    Glucose 92 65 - 100 mg/dL    BUN 10 6 - 20 MG/DL    Creatinine 0.49 (L) 0.70 - 1.30 MG/DL    BUN/Creatinine ratio 20 12 - 20      GFR est AA >60 >60 ml/min/1.73m2    GFR est non-AA >60 >60 ml/min/1.73m2    Calcium 8.6 8.5 - 10.1 MG/DL   MAGNESIUM   Result Value Ref Range    Magnesium 1.3 (L) 1.6 - 2.4 mg/dL PHOSPHORUS   Result Value Ref Range    Phosphorus 2.8 2.6 - 4.7 MG/DL   METABOLIC PANEL, BASIC   Result Value Ref Range    Sodium 139 136 - 145 mmol/L    Potassium 3.4 (L) 3.5 - 5.1 mmol/L    Chloride 104 97 - 108 mmol/L    CO2 23 21 - 32 mmol/L    Anion gap 12 5 - 15 mmol/L    Glucose 114 (H) 65 - 100 mg/dL    BUN 13 6 - 20 MG/DL    Creatinine 0.67 (L) 0.70 - 1.30 MG/DL    BUN/Creatinine ratio 19 12 - 20      GFR est AA >60 >60 ml/min/1.73m2    GFR est non-AA >60 >60 ml/min/1.73m2    Calcium 8.6 8.5 - 10.1 MG/DL   MAGNESIUM   Result Value Ref Range    Magnesium 1.6 1.6 - 2.4 mg/dL   PHOSPHORUS   Result Value Ref Range    Phosphorus 3.1 2.6 - 4.7 MG/DL   CBC W/O DIFF   Result Value Ref Range    WBC 6.2 4.1 - 11.1 K/uL    RBC 3.60 (L) 4.10 - 5.70 M/uL    HGB 11.7 (L) 12.1 - 17.0 g/dL    HCT 33.5 (L) 36.6 - 50.3 %    MCV 93.1 80.0 - 99.0 FL    MCH 32.5 26.0 - 34.0 PG    MCHC 34.9 30.0 - 36.5 g/dL    RDW 12.2 11.5 - 14.5 %    PLATELET 144 (L) 319 - 400 K/uL   CBC WITH AUTOMATED DIFF   Result Value Ref Range    WBC 5.8 4.1 - 11.1 K/uL    RBC 3.51 (L) 4.10 - 5.70 M/uL    HGB 11.7 (L) 12.1 - 17.0 g/dL    HCT 32.5 (L) 36.6 - 50.3 %    MCV 92.6 80.0 - 99.0 FL    MCH 33.3 26.0 - 34.0 PG    MCHC 36.0 30.0 - 36.5 g/dL    RDW 12.3 11.5 - 14.5 %    PLATELET 222 (L) 404 - 400 K/uL    NEUTROPHILS 54 32 - 75 %    LYMPHOCYTES 21 12 - 49 %    MONOCYTES 20 (H) 5 - 13 %    EOSINOPHILS 5 0 - 7 %    BASOPHILS 0 0 - 1 %    ABS. NEUTROPHILS 3.1 1.8 - 8.0 K/UL    ABS. LYMPHOCYTES 1.2 0.8 - 3.5 K/UL    ABS. MONOCYTES 1.2 (H) 0.0 - 1.0 K/UL    ABS. EOSINOPHILS 0.3 0.0 - 0.4 K/UL    ABS.  BASOPHILS 0.0 0.0 - 0.1 K/UL    RBC COMMENTS NORMOCYTIC, NORMOCHROMIC      DF SMEAR SCANNED     METABOLIC PANEL, COMPREHENSIVE   Result Value Ref Range    Sodium 138 136 - 145 mmol/L    Potassium 2.9 (L) 3.5 - 5.1 mmol/L    Chloride 102 97 - 108 mmol/L    CO2 29 21 - 32 mmol/L    Anion gap 7 5 - 15 mmol/L    Glucose 133 (H) 65 - 100 mg/dL    BUN 9 6 - 20 MG/DL    Creatinine 0.63 (L) 0.70 - 1.30 MG/DL    BUN/Creatinine ratio 14 12 - 20      GFR est AA >60 >60 ml/min/1.73m2    GFR est non-AA >60 >60 ml/min/1.73m2    Calcium 8.4 (L) 8.5 - 10.1 MG/DL    Bilirubin, total 0.8 0.2 - 1.0 MG/DL    ALT (SGPT) 25 12 - 78 U/L    AST (SGOT) 24 15 - 37 U/L    Alk. phosphatase 59 45 - 117 U/L    Protein, total 6.5 6.4 - 8.2 g/dL    Albumin 2.6 (L) 3.5 - 5.0 g/dL    Globulin 3.9 2.0 - 4.0 g/dL    A-G Ratio 0.7 (L) 1.1 - 2.2     MAGNESIUM   Result Value Ref Range    Magnesium 1.3 (L) 1.6 - 2.4 mg/dL   PHOSPHORUS   Result Value Ref Range    Phosphorus 2.8 2.6 - 4.7 MG/DL   TROPONIN I   Result Value Ref Range    Troponin-I, Qt. 0.31 (H) <0.05 ng/mL   CK W/ CKMB & INDEX   Result Value Ref Range    CK 99 39 - 308 U/L    CK - MB 1.8 <3.6 NG/ML    CK-MB Index 1.8 0 - 2.5     PROTHROMBIN TIME + INR   Result Value Ref Range    INR 1.2 (H) 0.9 - 1.1      Prothrombin time 11.7 (H) 9.0 - 11.1 sec   PTT   Result Value Ref Range    aPTT 30.1 22.1 - 32.5 sec    aPTT, therapeutic range     58.0 - 77.0 SECS   CBC WITH AUTOMATED DIFF   Result Value Ref Range    WBC 7.2 4.1 - 11.1 K/uL    RBC 3.58 (L) 4.10 - 5.70 M/uL    HGB 11.8 (L) 12.1 - 17.0 g/dL    HCT 33.5 (L) 36.6 - 50.3 %    MCV 93.6 80.0 - 99.0 FL    MCH 33.0 26.0 - 34.0 PG    MCHC 35.2 30.0 - 36.5 g/dL    RDW 12.5 11.5 - 14.5 %    PLATELET 645 035 - 490 K/uL    NEUTROPHILS 57 32 - 75 %    LYMPHOCYTES 22 12 - 49 %    MONOCYTES 17 (H) 5 - 13 %    EOSINOPHILS 4 0 - 7 %    BASOPHILS 0 0 - 1 %    ABS. NEUTROPHILS 4.2 1.8 - 8.0 K/UL    ABS. LYMPHOCYTES 1.6 0.8 - 3.5 K/UL    ABS. MONOCYTES 1.2 (H) 0.0 - 1.0 K/UL    ABS. EOSINOPHILS 0.3 0.0 - 0.4 K/UL    ABS.  BASOPHILS 0.0 0.0 - 0.1 K/UL   METABOLIC PANEL, BASIC   Result Value Ref Range    Sodium 137 136 - 145 mmol/L    Potassium 3.0 (L) 3.5 - 5.1 mmol/L    Chloride 100 97 - 108 mmol/L    CO2 31 21 - 32 mmol/L    Anion gap 6 5 - 15 mmol/L    Glucose 136 (H) 65 - 100 mg/dL    BUN 11 6 - 20 MG/DL Creatinine 0.62 (L) 0.70 - 1.30 MG/DL    BUN/Creatinine ratio 18 12 - 20      GFR est AA >60 >60 ml/min/1.73m2    GFR est non-AA >60 >60 ml/min/1.73m2    Calcium 8.6 8.5 - 10.1 MG/DL   MAGNESIUM   Result Value Ref Range    Magnesium 1.6 1.6 - 2.4 mg/dL   PHOSPHORUS   Result Value Ref Range    Phosphorus 2.9 2.6 - 4.7 MG/DL   METABOLIC PANEL, BASIC   Result Value Ref Range    Sodium 136 136 - 145 mmol/L    Potassium 4.2 3.5 - 5.1 mmol/L    Chloride 97 97 - 108 mmol/L    CO2 33 (H) 21 - 32 mmol/L    Anion gap 6 5 - 15 mmol/L    Glucose 149 (H) 65 - 100 mg/dL    BUN 13 6 - 20 MG/DL    Creatinine 0.69 (L) 0.70 - 1.30 MG/DL    BUN/Creatinine ratio 19 12 - 20      GFR est AA >60 >60 ml/min/1.73m2    GFR est non-AA >60 >60 ml/min/1.73m2    Calcium 8.6 8.5 - 10.1 MG/DL   BLOOD GAS, ARTERIAL   Result Value Ref Range    pH 7.35 7.35 - 7.45      PCO2 65 (H) 35.0 - 45.0 mmHg    PO2 164 (H) 80 - 100 mmHg    O2 SAT 99 (H) 92 - 97 %    BICARBONATE 35 (H) 22 - 26 mmol/L    BASE EXCESS 6.7 mmol/L    O2 METHOD BIPAP      FIO2 40 %    MODE BIPAP      SET RATE 4      SPONTANEOUS RATE 19.0      IPAP/PIP 14.0      EPAP/CPAP/PEEP 5.0      Sample source ARTERIAL      SITE RIGHT RADIAL      TERE'S TEST YES     BLOOD GAS, ARTERIAL   Result Value Ref Range    pH 7.45 7.35 - 7.45      PCO2 47 (H) 35.0 - 45.0 mmHg    PO2 139 (H) 80 - 100 mmHg    O2 SAT 99 (H) 92 - 97 %    BICARBONATE 32 (H) 22 - 26 mmol/L    BASE EXCESS 6.7 mmol/L    O2 METHOD BIPAP      FIO2 30 %    MODE BIPAP      SET RATE 4      IPAP/PIP 14.0      EPAP/CPAP/PEEP 5.0      Sample source ARTERIAL      SITE RIGHT RADIAL      TERE'S TEST YES     METABOLIC PANEL, BASIC   Result Value Ref Range    Sodium 137 136 - 145 mmol/L    Potassium 3.6 3.5 - 5.1 mmol/L    Chloride 99 97 - 108 mmol/L    CO2 33 (H) 21 - 32 mmol/L    Anion gap 5 5 - 15 mmol/L    Glucose 126 (H) 65 - 100 mg/dL    BUN 26 (H) 6 - 20 MG/DL    Creatinine 0.77 0.70 - 1.30 MG/DL    BUN/Creatinine ratio 34 (H) 12 - 20 GFR est AA >60 >60 ml/min/1.73m2    GFR est non-AA >60 >60 ml/min/1.73m2    Calcium 9.3 8.5 - 10.1 MG/DL   MAGNESIUM   Result Value Ref Range    Magnesium 2.0 1.6 - 2.4 mg/dL   BLOOD GAS, ARTERIAL   Result Value Ref Range    pH 7.40 7.35 - 7.45      PCO2 49 (H) 35.0 - 45.0 mmHg    PO2 127 (H) 80 - 100 mmHg    O2 SAT 99 (H) 92 - 97 %    BICARBONATE 30 (H) 22 - 26 mmol/L    BASE EXCESS 4.0 mmol/L    O2 METHOD NASAL O2      O2 FLOW RATE 2.00 L/min    Sample source ARTERIAL      SITE LEFT RADIAL      TERE'S TEST YES     METABOLIC PANEL, BASIC   Result Value Ref Range    Sodium 135 (L) 136 - 145 mmol/L    Potassium 3.5 3.5 - 5.1 mmol/L    Chloride 101 97 - 108 mmol/L    CO2 29 21 - 32 mmol/L    Anion gap 5 5 - 15 mmol/L    Glucose 115 (H) 65 - 100 mg/dL    BUN 10 6 - 20 MG/DL    Creatinine 0.64 (L) 0.70 - 1.30 MG/DL    BUN/Creatinine ratio 16 12 - 20      GFR est AA >60 >60 ml/min/1.73m2    GFR est non-AA >60 >60 ml/min/1.73m2    Calcium 8.9 8.5 - 10.1 MG/DL   CBC W/O DIFF   Result Value Ref Range    WBC 5.9 4.1 - 11.1 K/uL    RBC 3.50 (L) 4.10 - 5.70 M/uL    HGB 11.2 (L) 12.1 - 17.0 g/dL    HCT 32.8 (L) 36.6 - 50.3 %    MCV 93.7 80.0 - 99.0 FL    MCH 32.0 26.0 - 34.0 PG    MCHC 34.1 30.0 - 36.5 g/dL    RDW 12.2 11.5 - 14.5 %    PLATELET 207 729 - 837 K/uL   TROPONIN I   Result Value Ref Range    Troponin-I, Qt. 0.08 (H) <0.05 ng/mL   CBC W/O DIFF   Result Value Ref Range    WBC 4.0 (L) 4.1 - 11.1 K/uL    RBC 3.33 (L) 4.10 - 5.70 M/uL    HGB 10.9 (L) 12.1 - 17.0 g/dL    HCT 31.2 (L) 36.6 - 50.3 %    MCV 93.7 80.0 - 99.0 FL    MCH 32.7 26.0 - 34.0 PG    MCHC 34.9 30.0 - 36.5 g/dL    RDW 12.4 11.5 - 14.5 %    PLATELET 113 839 - 962 K/uL   METABOLIC PANEL, COMPREHENSIVE   Result Value Ref Range    Sodium 138 136 - 145 mmol/L    Potassium 3.2 (L) 3.5 - 5.1 mmol/L    Chloride 103 97 - 108 mmol/L    CO2 30 21 - 32 mmol/L    Anion gap 5 5 - 15 mmol/L    Glucose 120 (H) 65 - 100 mg/dL    BUN 10 6 - 20 MG/DL    Creatinine 0.64 (L) 0.70 - 1.30 MG/DL    BUN/Creatinine ratio 16 12 - 20      GFR est AA >60 >60 ml/min/1.73m2    GFR est non-AA >60 >60 ml/min/1.73m2    Calcium 8.9 8.5 - 10.1 MG/DL    Bilirubin, total 0.7 0.2 - 1.0 MG/DL    ALT (SGPT) 28 12 - 78 U/L    AST (SGOT) 21 15 - 37 U/L    Alk. phosphatase 52 45 - 117 U/L    Protein, total 6.4 6.4 - 8.2 g/dL    Albumin 2.9 (L) 3.5 - 5.0 g/dL    Globulin 3.5 2.0 - 4.0 g/dL    A-G Ratio 0.8 (L) 1.1 - 2.2     MAGNESIUM   Result Value Ref Range    Magnesium 1.3 (L) 1.6 - 2.4 mg/dL   PHOSPHORUS   Result Value Ref Range    Phosphorus 2.5 (L) 2.6 - 4.7 MG/DL   TROPONIN I   Result Value Ref Range    Troponin-I, Qt. 0.09 (H) <0.05 ng/mL   TROPONIN I   Result Value Ref Range    Troponin-I, Qt. 0.07 (H) <0.05 ng/mL   GLUCOSE, POC   Result Value Ref Range    Glucose (POC) 110 (H) 65 - 100 mg/dL    Performed by [employer]    POC CREATININE   Result Value Ref Range    Creatinine (POC) 0.9 0.6 - 1.3 MG/DL    GFRAA, POC >60 >60 ml/min/1.73m2    GFRNA, POC >60 >60 ml/min/1.73m2   POC CHEM8   Result Value Ref Range    Calcium, ionized (POC) 1.14 1.12 - 1.32 MMOL/L    Sodium (POC) 140 136 - 145 MMOL/L    Potassium (POC) 4.3 3.5 - 5.1 MMOL/L    Chloride (POC) 101 98 - 107 MMOL/L    CO2 (POC) 30 21 - 32 MMOL/L    Anion gap (POC) 14 5 - 15 mmol/L    Glucose (POC) 107 (H) 65 - 100 MG/DL    BUN (POC) 15 9 - 20 MG/DL    Creatinine (POC) 0.9 0.6 - 1.3 MG/DL    GFRAA, POC >60 >60 ml/min/1.73m2    GFRNA, POC >60 >60 ml/min/1.73m2    Hemoglobin (POC) 14.3 12.1 - 17.0 GM/DL    Hematocrit (POC) 42 36.6 - 50.3 %    Comment Comment Not Indicated.      GLUCOSE, POC   Result Value Ref Range    Glucose (POC) 92 65 - 100 mg/dL    Performed by Juvencio Holcomb    GLUCOSE, POC   Result Value Ref Range    Glucose (POC) 112 (H) 65 - 100 mg/dL    Performed by Queenie Tony    GLUCOSE, POC   Result Value Ref Range    Glucose (POC) 125 (H) 65 - 100 mg/dL    Performed by [address] [place] [id number], POC   Result Value Ref Range    Glucose (POC) 151 (H) 65 - 100 mg/dL    Performed by Rio Barnes    GLUCOSE, POC   Result Value Ref Range    Glucose (POC) 138 (H) 65 - 100 mg/dL    Performed by [facility]    GLUCOSE, POC   Result Value Ref Range    Glucose (POC) 139 (H) 65 - 100 mg/dL    Performed by [facility]    GLUCOSE, POC   Result Value Ref Range    Glucose (POC) 160 (H) 65 - 100 mg/dL    Performed by Herb Munch    GLUCOSE, POC   Result Value Ref Range    Glucose (POC) 138 (H) 65 - 100 mg/dL    Performed by Herb Munch    GLUCOSE, POC   Result Value Ref Range    Glucose (POC) 123 (H) 65 - 100 mg/dL    Performed by [facility]    GLUCOSE, POC   Result Value Ref Range    Glucose (POC) 121 (H) 65 - 100 mg/dL    Performed by [facility]    GLUCOSE, POC   Result Value Ref Range    Glucose (POC) 104 (H) 65 - 100 mg/dL    Performed by [facility]    GLUCOSE, POC   Result Value Ref Range    Glucose (POC) 97 65 - 100 mg/dL    Performed by [facility]    GLUCOSE, POC   Result Value Ref Range    Glucose (POC) 104 (H) 65 - 100 mg/dL    [facility]    EKG, 12 LEAD, INITIAL   Result Value Ref Range    Ventricular Rate 88 BPM    Atrial Rate 88 BPM    P-R Interval 294 ms    QRS Duration 82 ms    Q-T Interval 368 ms    QTC Calculation (Bezet) 445 ms    Calculated P Axis 61 degrees    Calculated R Axis -38 degrees    Calculated T Axis 52 degrees    Diagnosis       Sinus rhythm with 1st degree AV block  Left axis deviation  Nonspecific T wave abnormality  When compared with ECG of 06-SEP-2012 03:00,  NM interval has increased  ST no longer elevated in Anterior leads  Nonspecific T wave abnormality, worse in Anterolateral leads  Confirmed by Socorro Suh ([id number]) on 6/25/2017 1:15:25 PM     EKG, 12 LEAD, SUBSEQUENT   Result Value Ref Range    Ventricular Rate 120 BPM    Atrial Rate 120 BPM    P-R Interval 180 ms    QRS Duration 76 ms    Q-T Interval 356 ms    QTC Calculation (Bezet) 503 ms Calculated R Axis -76 degrees    Calculated T Axis -78 degrees    Diagnosis       Sinus tachycardia with frequent premature ventricular complexes  Left anterior fascicular block  Confirmed by Rodriguez Ackerman ([id number]) on 7/12/2017 9:55:41 AM          Imaging review:  6/24/2017  CT scan of the head without contrast  No acute intracranial abnormality    CT angiogram of the head and neck  No significant cervical carotid artery stenosis. No large vessel intracranial vascular occlusive change. HOME MEDS  Prior to Admission Medications   Prescriptions Last Dose Informant Patient Reported? Taking?   aspirin (ASPIRIN) 325 mg tablet   Yes No   Sig: Take 1 Tab by mouth daily. atorvastatin (LIPITOR) 40 mg tablet   No No   Sig: Take 1 Tab by mouth daily. cyclobenzaprine (FLEXERIL) 5 mg tablet   No No   Sig: Take 1 Tab by mouth three (3) times daily as needed for Muscle Spasm(s). levETIRAcetam (KEPPRA) 250 mg tablet   No No   Sig: Take 1 Tab by mouth two (2) times a day. Take with the 1000mg to make 1250mg twice a day. levETIRAcetam 1,000 mg tablet   No No   Sig: Take 1 Tab by mouth two (2) times a day. magnesium oxide (MAG-OX) 400 mg tablet   Yes No   Sig: Take 400 mg by mouth daily. metoprolol succinate (TOPROL-XL) 25 mg XL tablet   Yes No   Sig: Take  by mouth daily. omeprazole (PRILOSEC) 40 mg capsule   Yes No   Sig: Take 40 mg by mouth daily. pregabalin (LYRICA) 300 mg capsule   No No   Sig: Take 1 Cap by mouth two (2) times a day. Max Daily Amount: 600 mg.   thiamine (B-1) 100 mg tablet   No No   Sig: Take 1 Tab by mouth daily.       Facility-Administered Medications: None       CURRENT MEDS  Current Facility-Administered Medications   Medication Dose Route Frequency    potassium chloride 10 mEq in 100 ml IVPB  10 mEq IntraVENous Q1H    haloperidol lactate (HALDOL) injection 1 mg  1 mg IntraVENous QHS    levETIRAcetam (KEPPRA) 2,000 mg in 0.9% sodium chloride IVPB  2,000 mg IntraVENous Q12H    metoprolol (LOPRESSOR) injection 2.5 mg  2.5 mg IntraVENous Q6H    dextrose 5 % - 0.45% NaCl infusion  75 mL/hr IntraVENous CONTINUOUS    sodium chloride (NS) flush 10 mL  10 mL InterCATHeter Q24H    sodium chloride (NS) flush 10-40 mL  10-40 mL InterCATHeter Q8H    sodium chloride (NS) flush 20 mL  20 mL InterCATHeter Q24H    latanoprost (XALATAN) 0.005 % ophthalmic solution 1 Drop  1 Drop Both Eyes QPM    thiamine (B-1) 100 mg in 0.9% sodium chloride 50 mL IVPB  100 mg IntraVENous DAILY    sodium chloride (NS) flush 5-10 mL  5-10 mL IntraVENous Q8H    enoxaparin (LOVENOX) injection 40 mg  40 mg SubCUTAneous Q24H       IMPRESSION:  Elaine Dunbar is a 80 y.o. male gentleman admitted with seizure. He has been encephalopathic for days. He is improving with decreased haldol. He has required some Ativan for agitation. Patient is more alert today but still failing swallow eval.  This is concerning for possible stroke. Will do an MRI of the brain if patient can tolerate to evaluate for this. Today patient had some bilateral facial twitching and arm twitching more consistent with potential extrapyramidal side effects of excessive antipsychotic use. RECOMMENDATIONS:  1. Continue Keppra  2000mg BID  2. Continue Lyrica 300 mg p.o. twice daily when has p.o. access  3. Limit all sedating medications including Ativan, Benadryl, and Haldol. Will decrease Haldol again tonight to 0.5 mg nightly  4. Seizure precautions  5. MRI of the brain without contrast.  Patient may need Ativan for this  6. Will monitor patient over the next day and if no improvement in swallowing will need a PEG placed. At this point I think he is able to be coherent enough to keep the PEG in place if needed. Will follow    Emmanuel Wiseman MD    7/12/2017    Over 35 minutes was spent reviewing records, discussing with Dr. Leslye Gonzalez and GI, and nursing, obtaining history, examining patient and discussing treatment plans.

## 2023-12-02 NOTE — ED NOTES
Code purple called. Pt. Rounded on. Pt. Resting comfortably in bed with both side rails up. Pt. Has call bell and all belongings in reach at bedside. Patient asked if they needed to go to the restroom. Patient updated on treatment plan, patient verbalized understanding. Pt. Remains hooked up to continuous BP, pulse ox, and Cardiac monitoring at this time. Will continue to monitor patient at this time.   Pt. Turned on side with wedge pillow and sat up at a 45 degree angle.

## (undated) DEVICE — 3M™ DURAPORE™ SURGICAL TAPE 1538-3, 3 INCH X 10 YARD (7,5CM X 9,1M), 4 ROLLS/BOX: Brand: 3M™ DURAPORE™

## (undated) DEVICE — Z DISCONTINUED PER MEDLINE LINE GAS SAMPLING O2/CO2 LNG AD 13 FT NSL W/ TBNG FILTERLINE

## (undated) DEVICE — NEONATAL-ADULT SPO2 SENSOR: Brand: NELLCOR

## (undated) DEVICE — MEDI-VAC YANK SUCT HNDL W/TPRD BULBOUS TIP: Brand: CARDINAL HEALTH

## (undated) DEVICE — NEEDLE HYPO 18GA L1.5IN PNK S STL HUB POLYPR SHLD REG BVL

## (undated) DEVICE — BASIN EMESIS 500CC ROSE 250/CS 60/PLT: Brand: MEDEGEN MEDICAL PRODUCTS, LLC

## (undated) DEVICE — Device

## (undated) DEVICE — (D)SYR 10ML 1/5ML GRAD NSAF -- PKGING CHANGE USE ITEM 338027

## (undated) DEVICE — CUFF ADULT 1 PC 1 VINYL DISP --

## (undated) DEVICE — SOLIDIFIER MEDC 1200ML -- CONVERT TO 356117

## (undated) DEVICE — BLOCK BITE ENDOSCP AD 21 MM W/ DIL BLU LF DISP

## (undated) DEVICE — TRAP SUC MUCOUS 70ML -- MEDICHOICE MEDLINE

## (undated) DEVICE — KENDALL RADIOLUCENT FOAM MONITORING ELECTRODE RECTANGULAR SHAPE: Brand: KENDALL

## (undated) DEVICE — CONTAINER SPEC 20 ML LID NEUT BUFF FORMALIN 10 % POLYPR STS

## (undated) DEVICE — 1200 GUARD II KIT W/5MM TUBE W/O VAC TUBE: Brand: GUARDIAN

## (undated) DEVICE — SET ADMIN 16ML TBNG L100IN 2 Y INJ SITE IV PIGGY BK DISP

## (undated) DEVICE — PLUS BARRIER ISLAND DRESSING: Brand: TELFA

## (undated) DEVICE — Device: Brand: MEDEX

## (undated) DEVICE — SYR 3ML LL TIP 1/10ML GRAD --

## (undated) DEVICE — STRAINER URIN CALC RNL MSH -- CONVERT TO ITEM 357634

## (undated) DEVICE — SNARE ENDOSCP M L240CM W27MM SHTH DIA2.4MM CHN 2.8MM OVL

## (undated) DEVICE — TOWEL 4 PLY TISS 19X30 SUE WHT

## (undated) DEVICE — NON-REM POLYHESIVE PATIENT RETURN ELECTRODE: Brand: VALLEYLAB